# Patient Record
Sex: MALE | Race: WHITE | NOT HISPANIC OR LATINO | Employment: OTHER | URBAN - METROPOLITAN AREA
[De-identification: names, ages, dates, MRNs, and addresses within clinical notes are randomized per-mention and may not be internally consistent; named-entity substitution may affect disease eponyms.]

---

## 2017-01-03 ENCOUNTER — OFFICE VISIT (OUTPATIENT)
Dept: DERMATOLOGY | Facility: CLINIC | Age: 72
End: 2017-01-03
Payer: COMMERCIAL

## 2017-01-03 VITALS — OXYGEN SATURATION: 94 % | HEART RATE: 60 BPM | SYSTOLIC BLOOD PRESSURE: 115 MMHG | DIASTOLIC BLOOD PRESSURE: 73 MMHG

## 2017-01-03 DIAGNOSIS — L57.0 AK (ACTINIC KERATOSIS): Primary | ICD-10-CM

## 2017-01-03 DIAGNOSIS — D48.5 NEOPLASM OF UNCERTAIN BEHAVIOR OF SKIN: ICD-10-CM

## 2017-01-03 PROCEDURE — 88305 TISSUE EXAM BY PATHOLOGIST: CPT | Mod: 90 | Performed by: PHYSICIAN ASSISTANT

## 2017-01-03 PROCEDURE — 11100 HC BIOPSY SKIN/SUBQ/MUC MEM, SINGLE LESION: CPT | Performed by: PHYSICIAN ASSISTANT

## 2017-01-03 PROCEDURE — 99000 SPECIMEN HANDLING OFFICE-LAB: CPT | Performed by: PHYSICIAN ASSISTANT

## 2017-01-03 PROCEDURE — 99213 OFFICE O/P EST LOW 20 MIN: CPT | Mod: 25 | Performed by: PHYSICIAN ASSISTANT

## 2017-01-03 RX ORDER — FLUOROURACIL 50 MG/G
CREAM TOPICAL
Qty: 40 G | Refills: 5 | Status: SHIPPED | OUTPATIENT
Start: 2017-01-03 | End: 2017-05-15

## 2017-01-03 NOTE — NURSING NOTE
"Initial /73 mmHg  Pulse 60  SpO2 94% Estimated body mass index is 47.09 kg/(m^2) as calculated from the following:    Height as of 12/12/16: 1.803 m (5' 11\").    Weight as of 12/12/16: 153.089 kg (337 lb 8 oz). .      "

## 2017-01-03 NOTE — PATIENT INSTRUCTIONS
Directions for face:    1. Wash with CeraVe foaming cleanser AM and PM  2. Apply CeraVe AM in the morning; CeraVe PM in the evening    Reserve the desonide cream for when needed - use once or twice per day when red and rashy for a week at a time then stop              Wound Care Instructions     FOR SUPERFICIAL WOUNDS     [facility] [phone]                       AFTER 24 HOURS YOU SHOULD REMOVE THE BANDAGE AND BEGIN DAILY DRESSING CHANGES AS FOLLOWS:     1) Remove Dressing.     2) Clean and dry the area with tap water using a Q-tip or sterile gauze pad.     3) Apply Vaseline, Aquaphor, Polysporin ointment or Bacitracin ointment over entire wound.  Do NOT use Neosporin ointment.     4) Cover the wound with a band-aid, or a sterile non-stick gauze pad and micropore paper tape      REPEAT THESE INSTRUCTIONS AT LEAST ONCE A DAY UNTIL THE WOUND HAS COMPLETELY HEALED.    It is an old wives tale that a wound heals better when it is exposed to air and allowed to dry out. The wound will heal faster with a better cosmetic result if it is kept moist with ointment and covered with a bandage.    **Do not let the wound dry out.**      Supplies Needed:      *Cotton tipped applicators (Q-tips)    *Polysporin Ointment or Bacitracin Ointment (NOT NEOSPORIN)    *Band-aids or non-stick gauze pads and micropore paper tape.      PATIENT INFORMATION:    During the healing process you will notice a number of changes. All wounds develop a small halo of redness surrounding the wound.  This means healing is occurring. Severe itching with extensive redness usually indicates sensitivity to the ointment or bandage tape used to dress the wound.  You should call our office if this develops.      Swelling  and/or discoloration around your surgical site is common, particularly when performed around the eye.    All wounds normally drain.  The larger the wound the more drainage there will be.  After 7-10 days, you will notice the  wound beginning to shrink and new skin will begin to grow.  The wound is healed when you can see skin has formed over the entire area.  A healed wound has a healthy, shiny look to the surface and is red to dark pink in color to normalize.  Wounds may take approximately 4-6 weeks to heal.  Larger wounds may take 6-8 weeks.  After the wound is healed you may discontinue dressing changes.    You may experience a sensation of tightness as your wound heals. This is normal and will gradually subside.    Your healed wound may be sensitive to temperature changes. This sensitivity improves with time, but if you re having a lot of discomfort, try to avoid temperature extremes.    Patients frequently experience itching after their wound appears to have healed because of the continue healing under the skin.  Plain Vaseline will help relieve the itching.        POSSIBLE COMPLICATIONS    BLEEDIN. Leave the bandage in place.  2. Use tightly rolled up gauze or a cloth to apply direct pressure over the bandage for 30  minutes.  3. Reapply pressure for an additional 30 minutes if necessary  4. Use additional gauze and tape to maintain pressure once the bleeding has stopped.

## 2017-01-03 NOTE — MR AVS SNAPSHOT
After Visit Summary   1/3/2017    James Salvador    MRN: [id number]           Patient Information     Date Of Birth          1945        Visit Information        Provider Department      1/3/2017 9:00 AM Rosa Maria Hansen PA-C [facility]        Today's Diagnoses     AK (actinic keratosis)    -  1     Neoplasm of uncertain behavior of skin           Care Instructions    Directions for face:    1. Wash with CeraVe foaming cleanser AM and PM  2. Apply CeraVe AM in the morning; CeraVe PM in the evening    Reserve the desonide cream for when needed - use once or twice per day when red and rashy for a week at a time then stop              Wound Care Instructions     FOR SUPERFICIAL WOUNDS     [facility] [phone]                       AFTER 24 HOURS YOU SHOULD REMOVE THE BANDAGE AND BEGIN DAILY DRESSING CHANGES AS FOLLOWS:     1) Remove Dressing.     2) Clean and dry the area with tap water using a Q-tip or sterile gauze pad.     3) Apply Vaseline, Aquaphor, Polysporin ointment or Bacitracin ointment over entire wound.  Do NOT use Neosporin ointment.     4) Cover the wound with a band-aid, or a sterile non-stick gauze pad and micropore paper tape      REPEAT THESE INSTRUCTIONS AT LEAST ONCE A DAY UNTIL THE WOUND HAS COMPLETELY HEALED.    It is an old wives tale that a wound heals better when it is exposed to air and allowed to dry out. The wound will heal faster with a better cosmetic result if it is kept moist with ointment and covered with a bandage.    **Do not let the wound dry out.**      Supplies Needed:      *Cotton tipped applicators (Q-tips)    *Polysporin Ointment or Bacitracin Ointment (NOT NEOSPORIN)    *Band-aids or non-stick gauze pads and micropore paper tape.      PATIENT INFORMATION:    During the healing process you will notice a number of changes. All wounds develop a small halo of redness surrounding the wound.  This means healing is  occurring. Severe itching with extensive redness usually indicates sensitivity to the ointment or bandage tape used to dress the wound.  You should call our office if this develops.      Swelling  and/or discoloration around your surgical site is common, particularly when performed around the eye.    All wounds normally drain.  The larger the wound the more drainage there will be.  After 7-10 days, you will notice the wound beginning to shrink and new skin will begin to grow.  The wound is healed when you can see skin has formed over the entire area.  A healed wound has a healthy, shiny look to the surface and is red to dark pink in color to normalize.  Wounds may take approximately 4-6 weeks to heal.  Larger wounds may take 6-8 weeks.  After the wound is healed you may discontinue dressing changes.    You may experience a sensation of tightness as your wound heals. This is normal and will gradually subside.    Your healed wound may be sensitive to temperature changes. This sensitivity improves with time, but if you re having a lot of discomfort, try to avoid temperature extremes.    Patients frequently experience itching after their wound appears to have healed because of the continue healing under the skin.  Plain Vaseline will help relieve the itching.        POSSIBLE COMPLICATIONS    BLEEDIN. Leave the bandage in place.  2. Use tightly rolled up gauze or a cloth to apply direct pressure over the bandage for 30  minutes.  3. Reapply pressure for an additional 30 minutes if necessary  4. Use additional gauze and tape to maintain pressure once the bleeding has stopped.          Follow-ups after your visit        Your next 10 appointments already scheduled     2017 10:40 AM   JENN Extremity with Garrick Mcallister PT   [facility] [place] ([facility] [place]  )    [address] [address]  [place] [address]   [phone]            2017 11:20 AM   JENN Extremity with  Garrick Mcallister    [place] [facility] [place] ([facility] [place]  )    [address]  [address]  [place] [address]   [phone]            Jan 18, 2017 10:40 AM   JENN Extremity with Garrick DE LA TORRE VERNA Mcallister   [facility] [place] ([facility] [place]  )    [address]  [address]  [place] [address]   [phone]            Jan 25, 2017 10:40 AM   JENN Extremity with Garrick WIL Mcallister [facility] [place] ([facility] [place]  )    [address] [place] [address]  [address]  [place] [address]   [phone]            Feb 01, 2017 12:00 PM   JENN Extremity with Garrick DE LA TORRE VERNA Mcallister   [facility] [place] ([facility] [place]  )    [address] [place] [address]  [address]  [place] [address]   [phone]              Who to contact     If you have questions or need follow up information about today's clinic visit or your schedule please contact [facility] directly at [phone].  Normal or non-critical lab and imaging results will be communicated to you by Chideohart, letter or phone within 4 business days after the clinic has received the results. If you do not hear from us within 7 days, please contact the clinic through innRoadt or phone. If you have a critical or abnormal lab result, we will notify you by phone as soon as possible.  Submit refill requests through Step-In or call your pharmacy and they will forward the refill request to us. Please allow 3 business days for your refill to be completed.          Additional Information About Your Visit        Chideohart Information     Step-In gives you secure access to your electronic health record. If you see a primary care provider, you can also send messages to your care team and make appointments. If you have questions, please call your primary care clinic.  If you do not have a primary care provider, please call [phone] and they will assist you.        Your Vitals Were      Pulse Pulse Oximetry                60 94%           Blood Pressure from Last 3 Encounters:   01/03/17 115/73   12/12/16 126/68   12/08/16 122/80    Weight from Last 3 Encounters:   12/12/16 153.089 kg (337 lb 8 oz)   12/08/16 152.318 kg (335 lb 12.8 oz)   11/01/16 153.316 kg (338 lb)              We Performed the Following     BIOPSY SKIN/SUBQ/MUC MEM, SINGLE LESION     Surgical pathology exam          Today's Medication Changes          These changes are accurate as of: 1/3/17  9:33 AM.  If you have any questions, ask your nurse or doctor.               These medicines have changed or have updated prescriptions.        Dose/Directions    * fluorouracil 5 % cream   Commonly known as:  EFUDEX   This may have changed:    - when to take this  - reasons to take this  - additional instructions   Used for:  AK (actinic keratosis), SK (seborrheic keratosis), Lentigo, History of skin cancer   Changed by:  Jv Dutta MD        Apply topically 2 times daily Twice daily for two weeks to face, 3 weeks to arms   Quantity:  40 g   Refills:  2       * fluorouracil 5 % cream   Commonly known as:  EFUDEX   This may have changed:  You were already taking a medication with the same name, and this prescription was added. Make sure you understand how and when to take each.   Used for:  AK (actinic keratosis)   Changed by:  Rosa Maria Hansen PA-C        Apply to AA BID x 2 weeks   Quantity:  40 g   Refills:  5       ketoconazole 2 % shampoo   Commonly known as:  NIZORAL   This may have changed:    - how to take this  - when to take this  - reasons to take this  - additional instructions   Used for:  Seborrheic dermatitis        APPLY TO THE AFFECTED AREA AND WASH OFF AFTER 5 MINUTES. ALTERNATE WITH USUAL SHAMPOO.   Quantity:  240 mL   Refills:  11       * Notice:  This list has 2 medication(s) that are the same as other medications prescribed for you. Read the directions carefully, and ask your doctor or other care  provider to review them with you.         Where to get your medicines      These medications were sent to [facility]/pharmacy #[id number] - [place], [place] - [address] AT [address] [facility]  [address], [place] [address]     Phone:  [phone]    - fluorouracil 5 % cream             Primary Care Provider Office Phone # Fax #    Jeronimo Painter [phone]       [facility] [address] [facility] [address]        Thank you!     Thank you for choosing [facility]  for your care. Our goal is always to provide you with excellent care. Hearing back from our patients is one way we can continue to improve our services. Please take a few minutes to complete the written survey that you may receive in the mail after your visit with us. Thank you!             Your Updated Medication List - Protect others around you: Learn how to safely use, store and throw away your medicines at www.disposemymeds.org.          This list is accurate as of: 1/3/17  9:33 AM.  Always use your most recent med list.                   Brand Name Dispense Instructions for use    aspirin 81 MG tablet      Take 81 mg by mouth daily       atorvastatin 80 MG tablet    LIPITOR    90 tablet    Take 1 tablet (80 mg) by mouth daily       ciclopirox 0.77 % cream    LOPROX    90 g    Apply topically At Bedtime       desonide 0.05 % cream    DESOWEN    60 g    Apply sparingly to affected area on face in morning       * fluorouracil 5 % cream    EFUDEX    40 g    Apply topically 2 times daily Twice daily for two weeks to face, 3 weeks to arms       * fluorouracil 5 % cream    EFUDEX    40 g    Apply to AA BID x 2 weeks       fluticasone 50 MCG/ACT spray    FLONASE    16 g    Spray 2 sprays into both nostrils daily       GABAPENTIN PO      Take 600 mg by mouth 2 times daily       ketoconazole 2 % shampoo    NIZORAL    240 mL    APPLY TO THE AFFECTED AREA AND WASH OFF AFTER 5 MINUTES.  ALTERNATE WITH USUAL SHAMPOO.       ketotifen 0.025 % Soln ophthalmic solution    ZADITOR/REFRESH ANTI-ITCH     Place 1-2 drops into both eyes 2 times daily as needed for itching       lisinopril 20 MG tablet    PRINIVIL/ZESTRIL    90 tablet    Take 1 tablet (20 mg) by mouth daily       loratadine 10 MG tablet    CLARITIN     Take 10 mg by mouth daily as needed for allergies       * metoprolol 50 MG tablet    LOPRESSOR    180 tablet    Take 1 tablet (50 mg) by mouth 2 times daily Take with 25 mg tablet (1 tab) for 75 mg twice a day       * metoprolol 25 MG tablet    LOPRESSOR    180 tablet    Take 1 tablet (25 mg) by mouth 2 times daily Take along  with 50 mg tab for total of 75 mg twice a day       MULTIVITAMIN PO      Take 1 tablet by mouth daily       nitroglycerin 0.4 MG sublingual tablet    NITROSTAT    25 tablet    Place 1 tablet (0.4 mg) under the tongue every 5 minutes as needed for chest pain May repeat twice for a total of 3 tablets.  If chest pain not relieved, call 911       OMEGA-3 FISH OIL PO      Take 3.2 g by mouth daily       PERIOSTAT PO      Take 20 mg by mouth 2 times daily       TRILEPTAL PO      Take 300 mg by mouth 2 times daily       * Notice:  This list has 4 medication(s) that are the same as other medications prescribed for you. Read the directions carefully, and ask your doctor or other care provider to review them with you.

## 2017-01-04 ENCOUNTER — THERAPY VISIT (OUTPATIENT)
Dept: PHYSICAL THERAPY | Facility: CLINIC | Age: 72
End: 2017-01-04
Payer: MEDICARE

## 2017-01-04 DIAGNOSIS — M25.511 SHOULDER PAIN, RIGHT: Primary | ICD-10-CM

## 2017-01-04 DIAGNOSIS — Z96.619 AFTERCARE FOLLOWING SHOULDER JOINT REPLACEMENT SURGERY: ICD-10-CM

## 2017-01-04 DIAGNOSIS — Z47.1 AFTERCARE FOLLOWING SHOULDER JOINT REPLACEMENT SURGERY: ICD-10-CM

## 2017-01-04 PROCEDURE — 97112 NEUROMUSCULAR REEDUCATION: CPT | Mod: GP | Performed by: PHYSICAL THERAPIST

## 2017-01-04 PROCEDURE — 97110 THERAPEUTIC EXERCISES: CPT | Mod: GP | Performed by: PHYSICAL THERAPIST

## 2017-01-05 LAB — COPATH REPORT: NORMAL

## 2017-01-05 NOTE — PROGRESS NOTES
HPI:   James Salvador is a 71 year old male who presents for evaluation of spots on face, spots on arms  chief complaint  Location: face, bilateral lower arms - numerous scaly areas  Condition present for:  years.   Previous treatments include: Efudex, LN2  -h/o multiple SCC    Review Of Systems  Eyes: negative  Ears/Nose/Throat: negative  Respiratory: No shortness of breath, dyspnea on exertion, cough, or hemoptysis  Cardiovascular: negative  Gastrointestinal: negative  Genitourinary: negative  Musculoskeletal: negative  Neurologic: negative  Psychiatric: negative        PHYSICAL EXAM:      Skin exam performed as follows: Type 2 skin. Mood appropriate  Alert and Oriented X 3. Well developed, well nourished in no distress.  General appearance: Normal  Head including face: Normal  Eyes: conjunctiva and lids: Normal  Mouth: Lips, teeth, gums: Normal  Neck: Normal  Chest-breast/axillae: Normal  Back: Normal  Spleen and liver: Normal  Cardiovascular: Exam of peripheral vascular system by observation for swelling, varicosities, edema: Normal  Genitalia: groin, buttocks: Normal  Extremities: digits/nails (clubbing): Normal  Eccrine and Apocrine glands: Normal  Right upper extremity: Normal  Left upper extremity: Normal  Right lower extremity: Normal  Left lower extremity: Normal  Skin: Scalp and body hair: See below    1. Multiple gritty papules across face and forearms, >15 for each  2. 20 mm pink hypertrophic plaque on left forearm    ASSESSMENT/PLAN:     1. Multiple AKs across face and bilateral lower arms. Discussed Efudex vs PDT at length. After discussion he would like to proceed with Efudex BID to face and forearms for 2 weeks. Knows he will get very red and irritated looking. Recheck 2 months after stopping Efudex.   2. R/o SCC on left foreram. Shave bx in typical fashion .  Area cleaned with betadyne and anesthetized with 1% lidocaine with epi .  #15 blade used to remove the lesion and sent to pathology.Bleeding  was cauterized. Pt tolerated procedure well.          Follow-up: recheck after Efudex/PRn sooner  CC:   Scribed By: Rosa Maria Hansen MS, PAVAISHALI

## 2017-01-09 DIAGNOSIS — I47.10 PAROXYSMAL SUPRAVENTRICULAR TACHYCARDIA (H): Primary | ICD-10-CM

## 2017-01-09 DIAGNOSIS — I25.10 CORONARY ARTERY DISEASE INVOLVING NATIVE CORONARY ARTERY OF NATIVE HEART, ANGINA PRESENCE UNSPECIFIED: ICD-10-CM

## 2017-01-09 RX ORDER — METOPROLOL TARTRATE 25 MG/1
25 TABLET, FILM COATED ORAL 2 TIMES DAILY
Qty: 180 TABLET | Refills: 3 | Status: SHIPPED | OUTPATIENT
Start: 2017-01-09 | End: 2017-05-12

## 2017-01-09 RX ORDER — METOPROLOL TARTRATE 50 MG
50 TABLET ORAL 2 TIMES DAILY
Qty: 180 TABLET | Refills: 3 | Status: SHIPPED | OUTPATIENT
Start: 2017-01-09 | End: 2017-05-12

## 2017-01-11 ENCOUNTER — THERAPY VISIT (OUTPATIENT)
Dept: PHYSICAL THERAPY | Facility: CLINIC | Age: 72
End: 2017-01-11
Payer: MEDICARE

## 2017-01-11 DIAGNOSIS — M25.511 SHOULDER PAIN, RIGHT: Primary | ICD-10-CM

## 2017-01-11 DIAGNOSIS — Z96.619 AFTERCARE FOLLOWING SHOULDER JOINT REPLACEMENT SURGERY: ICD-10-CM

## 2017-01-11 DIAGNOSIS — Z47.1 AFTERCARE FOLLOWING SHOULDER JOINT REPLACEMENT SURGERY: ICD-10-CM

## 2017-01-11 PROCEDURE — 97110 THERAPEUTIC EXERCISES: CPT | Mod: GP | Performed by: PHYSICAL THERAPIST

## 2017-01-11 PROCEDURE — 97112 NEUROMUSCULAR REEDUCATION: CPT | Mod: GP | Performed by: PHYSICAL THERAPIST

## 2017-01-18 ENCOUNTER — THERAPY VISIT (OUTPATIENT)
Dept: PHYSICAL THERAPY | Facility: CLINIC | Age: 72
End: 2017-01-18
Payer: MEDICARE

## 2017-01-18 DIAGNOSIS — Z96.619 AFTERCARE FOLLOWING SHOULDER JOINT REPLACEMENT SURGERY: ICD-10-CM

## 2017-01-18 DIAGNOSIS — M25.511 SHOULDER PAIN, RIGHT: Primary | ICD-10-CM

## 2017-01-18 DIAGNOSIS — Z47.1 AFTERCARE FOLLOWING SHOULDER JOINT REPLACEMENT SURGERY: ICD-10-CM

## 2017-01-18 PROCEDURE — 97110 THERAPEUTIC EXERCISES: CPT | Mod: GP | Performed by: PHYSICAL THERAPIST

## 2017-01-18 PROCEDURE — 97112 NEUROMUSCULAR REEDUCATION: CPT | Mod: GP | Performed by: PHYSICAL THERAPIST

## 2017-01-25 ENCOUNTER — THERAPY VISIT (OUTPATIENT)
Dept: PHYSICAL THERAPY | Facility: CLINIC | Age: 72
End: 2017-01-25
Payer: MEDICARE

## 2017-01-25 DIAGNOSIS — Z96.619 AFTERCARE FOLLOWING SHOULDER JOINT REPLACEMENT SURGERY: ICD-10-CM

## 2017-01-25 DIAGNOSIS — M25.511 SHOULDER PAIN, RIGHT: Primary | ICD-10-CM

## 2017-01-25 DIAGNOSIS — Z47.1 AFTERCARE FOLLOWING SHOULDER JOINT REPLACEMENT SURGERY: ICD-10-CM

## 2017-01-25 PROCEDURE — 97112 NEUROMUSCULAR REEDUCATION: CPT | Mod: GP | Performed by: PHYSICAL THERAPIST

## 2017-01-25 PROCEDURE — 97110 THERAPEUTIC EXERCISES: CPT | Mod: GP | Performed by: PHYSICAL THERAPIST

## 2017-01-25 NOTE — PROGRESS NOTES
"Subjective:    HPI                    Objective:    System    Physical Exam    General     ROS    Assessment/Plan:      PROGRESS  REPORT    Progress reporting period is from 11/9/2016 to 1/25/2017.       SUBJECTIVE  Subjective changes noted by patient:  Patient reports shoulder continues to gradually improve.  He has had the flu since he was last here, so he was unable to do the HEP very much this past week.  Dressing, meal prep, and bathing are all getting easier.  Reaching overhead and behind back continue to be challenging.      Current pain level is 3/10 \"discomfort\" .     Previous pain level was  2-6/10.   Changes in function:  Yes (See Goal flowsheet attached for changes in current functional level)  Adverse reaction to treatment or activity: None    OBJECTIVE  AROM: 120 deg elevation, IR/ext to L5, 45 deg ER  PROM: 145 deg flexion, 95 deg Abd, 45 deg IR, 55 deg ER        ASSESSMENT/PLAN  Updated problem list and treatment plan: Diagnosis 1:  TSA  Pain -  self management, education and home program  Decreased ROM/flexibility - therapeutic exercise, therapeutic activity and home program  Decreased strength - therapeutic exercise, therapeutic activities and home program  Impaired muscle performance - neuro re-education and home program  Decreased function - therapeutic activities and home program  Impaired posture - neuro re-education, therapeutic activities and home program  STG/LTGs have been met or progress has been made towards goals:  Yes (See Goal flow sheet completed today.)  Assessment of Progress: The patient's condition is improving.  Patient is meeting short term goals and is progressing towards long term goals.  Self Management Plans:  Patient has been instructed in a home treatment program.  Patient is independent in a home treatment program.  I have re-evaluated this patient and find that the nature, scope, duration and intensity of the therapy is appropriate for the medical condition of the " patientKeerthi Ramirez continues to require the following intervention to meet STG and LTG's:  PT    Recommendations:  This patient would benefit from continued therapy.     Frequency:  1 X week, once daily  Duration:  for 6 weeks        Please refer to the daily flowsheet for treatment today, total treatment time and time spent performing 1:1 timed codes.

## 2017-01-25 NOTE — Clinical Note
"[facility] [place]  [address]  [address]  [place] [address]  [phone]    2017    Re: James Salvador   :   1945  MRN:  [id number]   REFERRING PHYSICIAN:   Jv Nance    [facility] [place]  Date of Initial Evaluation:  16  Visits:  Rxs Used: 15  Reason for Referral:     Shoulder pain, right  Aftercare following shoulder joint replacement surgery    PROGRESS  REPORT  Progress reporting period is from 2016 to 2017.       SUBJECTIVE  Subjective changes noted by patient:  Patient reports shoulder continues to gradually improve.  He has had the flu since he was last here, so he was unable to do the HEP very much this past week.  Dressing, meal prep, and bathing are all getting easier.  Reaching overhead and behind back continue to be challenging.      Current pain level is 3/10 \"discomfort\" .     Previous pain level was  2-6/10.   Changes in function:  Yes (See Goal flowsheet attached for changes in current functional level)  Adverse reaction to treatment or activity: None    OBJECTIVE  AROM: 120 deg elevation, IR/ext to L5, 45 deg ER  PROM: 145 deg flexion, 95 deg Abd, 45 deg IR, 55 deg ER      ASSESSMENT/PLAN  Updated problem list and treatment plan: Diagnosis 1:  TSA  Pain -  self management, education and home program  Decreased ROM/flexibility - therapeutic exercise, therapeutic activity and home program  Decreased strength - therapeutic exercise, therapeutic activities and home program  Impaired muscle performance - neuro re-education and home program  Decreased function - therapeutic activities and home program  Impaired posture - neuro re-education, therapeutic activities and home program  STG/LTGs have been met or progress has been made towards goals:  Yes (See Goal flow sheet completed today.)  Assessment of Progress: The patient's condition is improving.  Patient is meeting short term goals and is " progressing towards long term goals.  Self Management Plans:  Patient has been instructed in a home treatment program.      James Salvador           Patient is independent in a home treatment program.  I have re-evaluated this patient and find that the nature, scope, duration and intensity of the therapy is appropriate for the medical condition of the patient.  James continues to require the following intervention to meet STG and LTG's:  PT    Recommendations:  This patient would benefit from continued therapy.     Frequency:  1 X week, once daily  Duration:  for 6 weeks    Please refer to the daily flowsheet for treatment today, total treatment time and time spent performing 1:1 timed codes.    Thank you for your referral.    INQUIRIES  Therapist: Garrick Mcallister, PT  [facility] [address] [address]  Phone: [phone]  Fax: [phone]

## 2017-02-01 ENCOUNTER — THERAPY VISIT (OUTPATIENT)
Dept: PHYSICAL THERAPY | Facility: CLINIC | Age: 72
End: 2017-02-01
Payer: MEDICARE

## 2017-02-01 DIAGNOSIS — M25.511 SHOULDER PAIN, RIGHT: Primary | ICD-10-CM

## 2017-02-01 DIAGNOSIS — Z47.1 AFTERCARE FOLLOWING SHOULDER JOINT REPLACEMENT SURGERY: ICD-10-CM

## 2017-02-01 DIAGNOSIS — Z96.619 AFTERCARE FOLLOWING SHOULDER JOINT REPLACEMENT SURGERY: ICD-10-CM

## 2017-02-01 PROCEDURE — 97112 NEUROMUSCULAR REEDUCATION: CPT | Mod: GP | Performed by: PHYSICAL THERAPIST

## 2017-02-01 PROCEDURE — 97110 THERAPEUTIC EXERCISES: CPT | Mod: GP | Performed by: PHYSICAL THERAPIST

## 2017-02-08 ENCOUNTER — THERAPY VISIT (OUTPATIENT)
Dept: PHYSICAL THERAPY | Facility: CLINIC | Age: 72
End: 2017-02-08
Payer: MEDICARE

## 2017-02-08 DIAGNOSIS — M25.511 SHOULDER PAIN, RIGHT: Primary | ICD-10-CM

## 2017-02-08 DIAGNOSIS — Z47.1 AFTERCARE FOLLOWING SHOULDER JOINT REPLACEMENT SURGERY: ICD-10-CM

## 2017-02-08 DIAGNOSIS — Z96.619 AFTERCARE FOLLOWING SHOULDER JOINT REPLACEMENT SURGERY: ICD-10-CM

## 2017-02-08 PROCEDURE — 97112 NEUROMUSCULAR REEDUCATION: CPT | Mod: GP | Performed by: PHYSICAL THERAPIST

## 2017-02-08 PROCEDURE — 97110 THERAPEUTIC EXERCISES: CPT | Mod: GP | Performed by: PHYSICAL THERAPIST

## 2017-02-15 ENCOUNTER — THERAPY VISIT (OUTPATIENT)
Dept: PHYSICAL THERAPY | Facility: CLINIC | Age: 72
End: 2017-02-15
Payer: MEDICARE

## 2017-02-15 DIAGNOSIS — M25.511 SHOULDER PAIN, RIGHT: ICD-10-CM

## 2017-02-15 DIAGNOSIS — Z47.1 AFTERCARE FOLLOWING SHOULDER JOINT REPLACEMENT SURGERY: ICD-10-CM

## 2017-02-15 DIAGNOSIS — Z96.619 AFTERCARE FOLLOWING SHOULDER JOINT REPLACEMENT SURGERY: ICD-10-CM

## 2017-02-15 PROCEDURE — 97110 THERAPEUTIC EXERCISES: CPT | Mod: GP | Performed by: PHYSICAL THERAPIST

## 2017-02-15 PROCEDURE — 97112 NEUROMUSCULAR REEDUCATION: CPT | Mod: GP | Performed by: PHYSICAL THERAPIST

## 2017-02-22 ENCOUNTER — THERAPY VISIT (OUTPATIENT)
Dept: PHYSICAL THERAPY | Facility: CLINIC | Age: 72
End: 2017-02-22
Payer: MEDICARE

## 2017-02-22 DIAGNOSIS — Z47.1 AFTERCARE FOLLOWING SHOULDER JOINT REPLACEMENT SURGERY: ICD-10-CM

## 2017-02-22 DIAGNOSIS — Z96.619 AFTERCARE FOLLOWING SHOULDER JOINT REPLACEMENT SURGERY: ICD-10-CM

## 2017-02-22 DIAGNOSIS — M25.511 SHOULDER PAIN, RIGHT: ICD-10-CM

## 2017-02-22 PROCEDURE — 97110 THERAPEUTIC EXERCISES: CPT | Mod: GP | Performed by: PHYSICAL THERAPIST

## 2017-02-22 PROCEDURE — 97112 NEUROMUSCULAR REEDUCATION: CPT | Mod: GP | Performed by: PHYSICAL THERAPIST

## 2017-03-08 ENCOUNTER — THERAPY VISIT (OUTPATIENT)
Dept: PHYSICAL THERAPY | Facility: CLINIC | Age: 72
End: 2017-03-08
Payer: MEDICARE

## 2017-03-08 DIAGNOSIS — Z47.1 AFTERCARE FOLLOWING SHOULDER JOINT REPLACEMENT SURGERY: ICD-10-CM

## 2017-03-08 DIAGNOSIS — M25.511 SHOULDER PAIN, RIGHT: ICD-10-CM

## 2017-03-08 DIAGNOSIS — Z96.619 AFTERCARE FOLLOWING SHOULDER JOINT REPLACEMENT SURGERY: ICD-10-CM

## 2017-03-08 PROCEDURE — G8985 CARRY GOAL STATUS: HCPCS | Mod: GP | Performed by: PHYSICAL THERAPIST

## 2017-03-08 PROCEDURE — G8986 CARRY D/C STATUS: HCPCS | Mod: GP | Performed by: PHYSICAL THERAPIST

## 2017-03-08 PROCEDURE — 97112 NEUROMUSCULAR REEDUCATION: CPT | Mod: GP | Performed by: PHYSICAL THERAPIST

## 2017-03-08 PROCEDURE — 97110 THERAPEUTIC EXERCISES: CPT | Mod: GP | Performed by: PHYSICAL THERAPIST

## 2017-03-08 NOTE — PROGRESS NOTES
Subjective:    HPI                    Objective:    System    Physical Exam    General     ROS    Assessment/Plan:      DISCHARGE REPORT    Progress reporting period is from 11/9/2016 to 3/8/2017.       SUBJECTIVE  Subjective changes noted by patient:  Patient reports shoulder continues to do well and make steady progress.  Reaching in all directions, lifting, carrying, and self-cares have all significantly improved over the course of therapy.  HEP is going well.  Current pain level is 1-2/10.     Previous pain level was  2-6/10.   Changes in function:  Yes (See Goal flowsheet attached for changes in current functional level)  Adverse reaction to treatment or activity: None    OBJECTIVE  AROM: 135 deg elevation, IR/ext to L5, 45 deg ER  PROM: 145 deg flexion, 95 deg Abd, 60 deg IR, 55 deg ER  MMT: 5/5 Add, 4+/5 Abd, 5-/5 IR, 4+/5 ER    ASSESSMENT/PLAN  Updated problem list and treatment plan: Diagnosis 1:  TSA  Pain -  home program  Decreased ROM/flexibility - home program  Decreased strength - home program  Impaired muscle performance - home program  Decreased function - home program  STG/LTGs have been met or progress has been made towards goals:  Yes (See Goal flow sheet completed today.)  Assessment of Progress: The patient's condition is improving.  Patient is meeting short term goals and is progressing towards long term goals.  Self Management Plans:  Patient has been instructed in a home treatment program.  Patient is independent in a home treatment program.  I have re-evaluated this patient and find that the nature, scope, duration and intensity of the therapy is appropriate for the medical condition of the patient.  James continues to require the following intervention to meet STG and LTG's:  PT intervention is no longer required to meet STG/LTG.    Recommendations:  This patient is ready to be discharged from therapy and continue their home treatment program.    Please refer to the daily flowsheet for  treatment today, total treatment time and time spent performing 1:1 timed codes.

## 2017-03-08 NOTE — LETTER
[facility] [facility]  [address]  [address]  [place] [address]  [phone]    2017    Re: James Ramirezppard   :   1945  MRN:  [id number]   REFERRING PHYSICIAN:   Jv Nance    [place] [facility] [place]  Date of Initial Evaluation:    16  Visits:  Rxs Used: 20  Reason for Referral:     Shoulder pain, right  Aftercare following shoulder joint replacement surgery    DISCHARGE REPORT  Progress reporting period is from 2016 to 3/8/2017.       SUBJECTIVE  Subjective changes noted by patient:  Patient reports shoulder continues to do well and make steady progress.  Reaching in all directions, lifting, carrying, and self-cares have all significantly improved over the course of therapy.  HEP is going well.  Current pain level is 1-2/10.     Previous pain level was  2-6/10.   Changes in function:  Yes (See Goal flowsheet attached for changes in current functional level)  Adverse reaction to treatment or activity: None    OBJECTIVE  AROM: 135 deg elevation, IR/ext to L5, 45 deg ER  PROM: 145 deg flexion, 95 deg Abd, 60 deg IR, 55 deg ER  MMT: 5/5 Add, 4+/5 Abd, 5-/5 IR, 4+/5 ER    ASSESSMENT/PLAN  Updated problem list and treatment plan: Diagnosis 1:  TSA  Pain -  home program  Decreased ROM/flexibility - home program  Decreased strength - home program  Impaired muscle performance - home program  Decreased function - home program  STG/LTGs have been met or progress has been made towards goals:  Yes (See Goal flow sheet completed today.)  Assessment of Progress: The patient's condition is improving.  Patient is meeting short term goals and is progressing towards long term goals.  Self Management Plans:  Patient has been instructed in a home treatment program.  Patient is independent in a home treatment program.    Re: James SCHUSTER Madeleine   :   1945      I have re-evaluated this patient and find that the nature, scope, duration and intensity  of the therapy is appropriate for the medical condition of the patient.  James continues to require the following intervention to meet STG and LTG's:  PT intervention is no longer required to meet STG/LTG.    Recommendations:  This patient is ready to be discharged from therapy and continue their home treatment program.    Thank you for your referral.    INQUIRIES  Therapist:   Garrick Mcallister, PT  [facility]  [address] [place] [address]  Phone: [phone]  Fax: [phone]

## 2017-04-04 ENCOUNTER — OFFICE VISIT (OUTPATIENT)
Dept: DERMATOLOGY | Facility: CLINIC | Age: 72
End: 2017-04-04
Payer: COMMERCIAL

## 2017-04-04 VITALS — DIASTOLIC BLOOD PRESSURE: 85 MMHG | HEART RATE: 64 BPM | OXYGEN SATURATION: 94 % | SYSTOLIC BLOOD PRESSURE: 130 MMHG

## 2017-04-04 DIAGNOSIS — D18.00 ANGIOMA: ICD-10-CM

## 2017-04-04 DIAGNOSIS — L82.1 SEBORRHEIC KERATOSIS: ICD-10-CM

## 2017-04-04 DIAGNOSIS — Z85.828 HISTORY OF SQUAMOUS CELL CARCINOMA OF SKIN: ICD-10-CM

## 2017-04-04 DIAGNOSIS — D22.9 NEVUS: ICD-10-CM

## 2017-04-04 DIAGNOSIS — D48.5 NEOPLASM OF UNCERTAIN BEHAVIOR OF SKIN: Primary | ICD-10-CM

## 2017-04-04 DIAGNOSIS — L81.4 LENTIGO: ICD-10-CM

## 2017-04-04 PROCEDURE — 11101 HC BIOPSY SKIN/SUBQ/MUC MEM, EACH ADDTL LESION: CPT | Performed by: PHYSICIAN ASSISTANT

## 2017-04-04 PROCEDURE — 88305 TISSUE EXAM BY PATHOLOGIST: CPT | Performed by: PHYSICIAN ASSISTANT

## 2017-04-04 PROCEDURE — 99214 OFFICE O/P EST MOD 30 MIN: CPT | Mod: 25 | Performed by: PHYSICIAN ASSISTANT

## 2017-04-04 PROCEDURE — 11100 HC BIOPSY SKIN/SUBQ/MUC MEM, SINGLE LESION: CPT | Performed by: PHYSICIAN ASSISTANT

## 2017-04-04 NOTE — NURSING NOTE
"Initial /85  Pulse 64  SpO2 94% Estimated body mass index is 47.07 kg/(m^2) as calculated from the following:    Height as of 12/12/16: 1.803 m (5' 11\").    Weight as of 12/12/16: 153.1 kg (337 lb 8 oz). .      "

## 2017-04-04 NOTE — PROGRESS NOTES
HPI:   James Salvador is a 71 year old male who presents for Full skin cancer screening.  chief complaint  Last Skin Exam: 1/17 1st Baseline: no  Personal HX of Skin Cancer: Scc   Personal HX of Malignant Melanoma: none   Family HX of Skin Cancer / Malignant Melanoma: none  Personal HX of Atypical Moles:   none  Risk factors: sun exposure, history of SCC  New / Changing lesions:Yes has a few scattered areas  Social History: recent shoulder replacemnet - out of PT and doing well. Has TKA scheduled.   On review of systems, there are no further skin complaints, patient is feeling otherwise well.  See patient intake sheet.  ROS of the following were done and are negative: Constitutional, Eyes, Ears, Nose,   Mouth, Throat, Cardiovascular, Respiratory, GI, Genitourinary, Musculoskeletal,   Psychiatric, Endocrine, Allergic/Immunologic.    PHYSICAL EXAM:   Weight:  BP:   Skin exam performed as follows: Type 2 skin. Mood appropriate  Alert and Oriented X 3. Well developed, well nourished in no distress.  General appearance: Normal  Head including face: Normal  Eyes: conjunctiva and lids: Normal  Mouth: Lips, teeth, gums: Normal  Neck: Normal  Chest-breast/axillae: Normal  Back: Normal  Spleen and liver: Normal  Cardiovascular: Exam of peripheral vascular system by observation for swelling, varicosities, edema: Normal  Genitalia: groin, buttocks: Normal  Extremities: digits/nails (clubbing): Normal  Eccrine and Apocrine glands: Normal  Right upper extremity: Normal  Left upper extremity: Normal  Right lower extremity: Normal  Left lower extremity: Normal  Skin: Scalp and body hair: See below    Pt deferred exam of breasts, groin, buttocks: NO    Other physical findings:  1. Multiple pigmented macules on extremities and trunk  2. Multiple pigmented macules on face, trunk and extremities  3. Multiple vascular papules on trunk, arms and legs  4. Multiple scattered keratotic plaques  5. Numerous gritty papules >15 across  face and >15 on each arm  6. 3 mm pink papule on left nasal root  7. Pink shiny papule on right ala 4 mm  8. Pink scaly papule on right posterior shoulder 5 mm       Except as noted above, no other signs of skin cancer or melanoma.     ASSESSMENT/PLAN:   Benign Full skin cancer screening today. . Patient with history of SCC  Advised on monthly self exams and 1 year  Patient Education: Appropriate brochures given.    Multiple benign appearing nevi on arms, legs and trunk. Discussed ABCDEs of melanoma and sunscreen.   Multiple lentigos on arms, legs and trunk. Advised benign, no treatment needed.  Multiple scattered angiomas. Advised benign, no treatment needed.   Seborrheic keratosis on arms, legs and trunk. Advised benign, no treatment needed.  Numerous AKs on face and upper as well as lower arms - advised. Discussed PDT vs Efudex. He will schedule for PDT for face and use Efudex BID x 2 weeks to both arms.   R/o SCC on left nasal root. Shave bx in typical fashion .  Area cleaned with betadyne and anesthetized with 1% lidocaine with epi .  Dermablade used to remove the lesion and sent to pathology. Bleeding was cauterized. Pt tolerated procedure well.  R/o BCC on right ala. Shave bx in typical fashion .  Area cleaned with betadyne and anesthetized with 1% lidocaine with epi .  Dermablade used to remove the lesion and sent to pathology. Bleeding was cauterized. Pt tolerated procedure well.  R/o SCC on right posterior shoulder. Shave bx in typical fashion .  Area cleaned with betadyne and anesthetized with 1% lidocaine with epi .  Dermablade used to remove the lesion and sent to pathology. Bleeding was cauterized. Pt tolerated procedure well.        Follow-up: yearly FSE/PRN sooner    1.) Patient was asked about new and changing moles. YES  2.) Patient received a complete physical skin examination: YES  3.) Patient was counseled to perform a monthly self skin examination: YES  Scribed By: Rosa Maria Hansen, MS, PAVAISHALI

## 2017-04-04 NOTE — MR AVS SNAPSHOT
After Visit Summary   4/4/2017    James Salvador    MRN: [id number]           Patient Information     Date Of Birth          1945        Visit Information        Provider Department      4/4/2017 9:00 AM Rosa Maria Hansen PA-C [facility]        Today's Diagnoses     Neoplasm of uncertain behavior of skin    -  1      Care Instructions          Wound Care Instructions     FOR SUPERFICIAL WOUNDS     [facility] [phone]    [facility] [phone]                       AFTER 24 HOURS YOU SHOULD REMOVE THE BANDAGE AND BEGIN DAILY DRESSING CHANGES AS FOLLOWS:     1) Remove Dressing.     2) Clean and dry the area with tap water using a Q-tip or sterile gauze pad.     3) Apply Vaseline, Aquaphor, Polysporin ointment or Bacitracin ointment over entire wound.  Do NOT use Neosporin ointment.     4) Cover the wound with a band-aid, or a sterile non-stick gauze pad and micropore paper tape      REPEAT THESE INSTRUCTIONS AT LEAST ONCE A DAY UNTIL THE WOUND HAS COMPLETELY HEALED.    It is an old wives tale that a wound heals better when it is exposed to air and allowed to dry out. The wound will heal faster with a better cosmetic result if it is kept moist with ointment and covered with a bandage.    **Do not let the wound dry out.**      Supplies Needed:      *Cotton tipped applicators (Q-tips)    *Polysporin Ointment or Bacitracin Ointment (NOT NEOSPORIN)    *Band-aids or non-stick gauze pads and micropore paper tape.      PATIENT INFORMATION:    During the healing process you will notice a number of changes. All wounds develop a small halo of redness surrounding the wound.  This means healing is occurring. Severe itching with extensive redness usually indicates sensitivity to the ointment or bandage tape used to dress the wound.  You should call our office if this develops.      Swelling  and/or discoloration around your surgical site is common,  particularly when performed around the eye.    All wounds normally drain.  The larger the wound the more drainage there will be.  After 7-10 days, you will notice the wound beginning to shrink and new skin will begin to grow.  The wound is healed when you can see skin has formed over the entire area.  A healed wound has a healthy, shiny look to the surface and is red to dark pink in color to normalize.  Wounds may take approximately 4-6 weeks to heal.  Larger wounds may take 6-8 weeks.  After the wound is healed you may discontinue dressing changes.    You may experience a sensation of tightness as your wound heals. This is normal and will gradually subside.    Your healed wound may be sensitive to temperature changes. This sensitivity improves with time, but if you re having a lot of discomfort, try to avoid temperature extremes.    Patients frequently experience itching after their wound appears to have healed because of the continue healing under the skin.  Plain Vaseline will help relieve the itching.        POSSIBLE COMPLICATIONS    BLEEDIN. Leave the bandage in place.  2. Use tightly rolled up gauze or a cloth to apply direct pressure over the bandage for 30  minutes.  3. Reapply pressure for an additional 30 minutes if necessary  4. Use additional gauze and tape to maintain pressure once the bleeding has stopped.          Follow-ups after your visit        Your next 10 appointments already scheduled     2017 10:20 AM CDT   Pre-Op physical with Jeronimo Painter MD   [facility]    [address] [id number] [phone]            [place] 15, 2017  2:15 PM CDT   Return Visit with Tereso Alexander MD   [facility] ([facility])    [address] [address] [id number] [phone]              Who to contact     If you have questions or need follow  up information about today's clinic visit or your schedule please contact [facility] directly at [phone].  Normal or non-critical lab and imaging results will be communicated to you by [employer], letter or phone within 4 business days after the clinic has received the results. If you do not hear from us within 7 days, please contact the clinic through [employer] or phone. If you have a critical or abnormal lab result, we will notify you by phone as soon as possible.  Submit refill requests through [employer] or call your pharmacy and they will forward the refill request to us. Please allow 3 business days for your refill to be completed.          Additional Information About Your Visit        [employer] Information     [employer] gives you secure access to your electronic health record. If you see a primary care provider, you can also send messages to your care team and make appointments. If you have questions, please call your primary care clinic.  If you do not have a primary care provider, please call [phone] and they will assist you.        Care EveryWhere ID     This is your Care EveryWhere ID. This could be used by other organizations to access your [place] medical records  [id number]        Your Vitals Were     Pulse Pulse Oximetry                64 94%           Blood Pressure from Last 3 Encounters:   04/04/17 130/85   01/03/17 115/73   12/12/16 126/68    Weight from Last 3 Encounters:   12/12/16 (!) 153.1 kg (337 lb 8 oz)   12/08/16 (!) 152.3 kg (335 lb 12.8 oz)   11/01/16 (!) 153.3 kg (338 lb)              We Performed the Following     BIOPSY SKIN/SUBQ/MUC MEM, EACH ADDTL LESION     BIOPSY SKIN/SUBQ/MUC MEM, SINGLE LESION     Surgical pathology exam          Today's Medication Changes          These changes are accurate as of: 4/4/17  9:38 AM.  If you have any questions, ask your nurse or doctor.               These medicines have changed or have updated prescriptions.         Dose/Directions    * fluorouracil 5 % cream   Commonly known as:  EFUDEX   This may have changed:    - when to take this  - reasons to take this  - additional instructions   Used for:  AK (actinic keratosis), SK (seborrheic keratosis), Lentigo, History of skin cancer   Changed by:  Jv Dutta MD        Apply topically 2 times daily Twice daily for two weeks to face, 3 weeks to arms   Quantity:  40 g   Refills:  2       * fluorouracil 5 % cream   Commonly known as:  EFUDEX   This may have changed:  Another medication with the same name was changed. Make sure you understand how and when to take each.   Used for:  AK (actinic keratosis)   Changed by:  Rosa Maria Hansen PA-C        Apply to AA BID x 2 weeks   Quantity:  40 g   Refills:  5       ketoconazole 2 % shampoo   Commonly known as:  NIZORAL   This may have changed:    - how to take this  - when to take this  - reasons to take this  - additional instructions   Used for:  Seborrheic dermatitis        APPLY TO THE AFFECTED AREA AND WASH OFF AFTER 5 MINUTES. ALTERNATE WITH USUAL SHAMPOO.   Quantity:  240 mL   Refills:  11       * Notice:  This list has 2 medication(s) that are the same as other medications prescribed for you. Read the directions carefully, and ask your doctor or other care provider to review them with you.             Primary Care Provider Office Phone # Fax #    Jeronimo Painter [phone]       [facility] [address] [facility] [address]        Thank you!     Thank you for choosing [facility]  for your care. Our goal is always to provide you with excellent care. Hearing back from our patients is one way we can continue to improve our services. Please take a few minutes to complete the written survey that you may receive in the mail after your visit with us. Thank you!             Your Updated Medication List - Protect others around you: Learn how to safely use, store  and throw away your medicines at www.disposemymeds.org.          This list is accurate as of: 4/4/17  9:38 AM.  Always use your most recent med list.                   Brand Name Dispense Instructions for use    aspirin 81 MG tablet      Take 81 mg by mouth daily       atorvastatin 80 MG tablet    LIPITOR    90 tablet    Take 1 tablet (80 mg) by mouth daily       ciclopirox 0.77 % cream    LOPROX    90 g    Apply topically At Bedtime       desonide 0.05 % cream    DESOWEN    60 g    Apply sparingly to affected area on face in morning       * fluorouracil 5 % cream    EFUDEX    40 g    Apply topically 2 times daily Twice daily for two weeks to face, 3 weeks to arms       * fluorouracil 5 % cream    EFUDEX    40 g    Apply to AA BID x 2 weeks       fluticasone 50 MCG/ACT spray    FLONASE    16 g    Spray 2 sprays into both nostrils daily       GABAPENTIN PO      Take 600 mg by mouth 2 times daily       ketoconazole 2 % shampoo    NIZORAL    240 mL    APPLY TO THE AFFECTED AREA AND WASH OFF AFTER 5 MINUTES. ALTERNATE WITH USUAL SHAMPOO.       ketotifen 0.025 % Soln ophthalmic solution    ZADITOR/REFRESH ANTI-ITCH     Place 1-2 drops into both eyes 2 times daily as needed for itching       lisinopril 20 MG tablet    PRINIVIL/ZESTRIL    90 tablet    Take 1 tablet (20 mg) by mouth daily       loratadine 10 MG tablet    CLARITIN     Take 10 mg by mouth daily as needed for allergies       * metoprolol 25 MG tablet    LOPRESSOR    180 tablet    Take 1 tablet (25 mg) by mouth 2 times daily Take along  with 50 mg tab for total of 75 mg twice a day       * metoprolol 50 MG tablet    LOPRESSOR    180 tablet    Take 1 tablet (50 mg) by mouth 2 times daily Take with 25 mg tablet (1 tab) for 75 mg twice a day       MULTIVITAMIN PO      Take 1 tablet by mouth daily       nitroglycerin 0.4 MG sublingual tablet    NITROSTAT    25 tablet    Place 1 tablet (0.4 mg) under the tongue every 5 minutes as needed for chest pain May repeat  twice for a total of 3 tablets.  If chest pain not relieved, call 911       OMEGA-3 FISH OIL PO      Take 3.2 g by mouth daily       PERIOSTAT PO      Take 20 mg by mouth 2 times daily       TRILEPTAL PO      Take 300 mg by mouth 2 times daily       * Notice:  This list has 4 medication(s) that are the same as other medications prescribed for you. Read the directions carefully, and ask your doctor or other care provider to review them with you.

## 2017-04-04 NOTE — PATIENT INSTRUCTIONS
Wound Care Instructions     FOR SUPERFICIAL WOUNDS     [facility] [phone]    [facility] [phone]                       AFTER 24 HOURS YOU SHOULD REMOVE THE BANDAGE AND BEGIN DAILY DRESSING CHANGES AS FOLLOWS:     1) Remove Dressing.     2) Clean and dry the area with tap water using a Q-tip or sterile gauze pad.     3) Apply Vaseline, Aquaphor, Polysporin ointment or Bacitracin ointment over entire wound.  Do NOT use Neosporin ointment.     4) Cover the wound with a band-aid, or a sterile non-stick gauze pad and micropore paper tape      REPEAT THESE INSTRUCTIONS AT LEAST ONCE A DAY UNTIL THE WOUND HAS COMPLETELY HEALED.    It is an old wives tale that a wound heals better when it is exposed to air and allowed to dry out. The wound will heal faster with a better cosmetic result if it is kept moist with ointment and covered with a bandage.    **Do not let the wound dry out.**      Supplies Needed:      *Cotton tipped applicators (Q-tips)    *Polysporin Ointment or Bacitracin Ointment (NOT NEOSPORIN)    *Band-aids or non-stick gauze pads and micropore paper tape.      PATIENT INFORMATION:    During the healing process you will notice a number of changes. All wounds develop a small halo of redness surrounding the wound.  This means healing is occurring. Severe itching with extensive redness usually indicates sensitivity to the ointment or bandage tape used to dress the wound.  You should call our office if this develops.      Swelling  and/or discoloration around your surgical site is common, particularly when performed around the eye.    All wounds normally drain.  The larger the wound the more drainage there will be.  After 7-10 days, you will notice the wound beginning to shrink and new skin will begin to grow.  The wound is healed when you can see skin has formed over the entire area.  A healed wound has a healthy, shiny look to the surface and is red to dark pink in color  to normalize.  Wounds may take approximately 4-6 weeks to heal.  Larger wounds may take 6-8 weeks.  After the wound is healed you may discontinue dressing changes.    You may experience a sensation of tightness as your wound heals. This is normal and will gradually subside.    Your healed wound may be sensitive to temperature changes. This sensitivity improves with time, but if you re having a lot of discomfort, try to avoid temperature extremes.    Patients frequently experience itching after their wound appears to have healed because of the continue healing under the skin.  Plain Vaseline will help relieve the itching.        POSSIBLE COMPLICATIONS    BLEEDIN. Leave the bandage in place.  2. Use tightly rolled up gauze or a cloth to apply direct pressure over the bandage for 30  minutes.  3. Reapply pressure for an additional 30 minutes if necessary  4. Use additional gauze and tape to maintain pressure once the bleeding has stopped.

## 2017-04-06 LAB — COPATH REPORT: NORMAL

## 2017-04-20 ENCOUNTER — OFFICE VISIT (OUTPATIENT)
Dept: INTERNAL MEDICINE | Facility: CLINIC | Age: 72
End: 2017-04-20
Payer: COMMERCIAL

## 2017-04-20 ENCOUNTER — TELEPHONE (OUTPATIENT)
Dept: INTERNAL MEDICINE | Facility: CLINIC | Age: 72
End: 2017-04-20

## 2017-04-20 VITALS
SYSTOLIC BLOOD PRESSURE: 116 MMHG | HEART RATE: 67 BPM | HEIGHT: 71 IN | OXYGEN SATURATION: 93 % | BODY MASS INDEX: 44.1 KG/M2 | TEMPERATURE: 98.9 F | DIASTOLIC BLOOD PRESSURE: 78 MMHG | WEIGHT: 315 LBS

## 2017-04-20 DIAGNOSIS — I48.91 NEW ONSET ATRIAL FIBRILLATION (H): ICD-10-CM

## 2017-04-20 DIAGNOSIS — I25.10 CORONARY ARTERY DISEASE INVOLVING NATIVE CORONARY ARTERY OF NATIVE HEART, ANGINA PRESENCE UNSPECIFIED: ICD-10-CM

## 2017-04-20 DIAGNOSIS — Z01.818 PREOP GENERAL PHYSICAL EXAM: Primary | ICD-10-CM

## 2017-04-20 DIAGNOSIS — M17.12 PRIMARY OSTEOARTHRITIS OF LEFT KNEE: ICD-10-CM

## 2017-04-20 LAB
ANION GAP SERPL CALCULATED.3IONS-SCNC: 8 MMOL/L (ref 3–14)
BUN SERPL-MCNC: 14 MG/DL (ref 7–30)
CALCIUM SERPL-MCNC: 9 MG/DL (ref 8.5–10.1)
CHLORIDE SERPL-SCNC: 101 MMOL/L (ref 94–109)
CO2 SERPL-SCNC: 27 MMOL/L (ref 20–32)
CREAT SERPL-MCNC: 0.76 MG/DL (ref 0.66–1.25)
GFR SERPL CREATININE-BSD FRML MDRD: NORMAL ML/MIN/1.7M2
GLUCOSE SERPL-MCNC: 96 MG/DL (ref 70–99)
HGB BLD-MCNC: 14.6 G/DL (ref 13.3–17.7)
POTASSIUM SERPL-SCNC: 4.6 MMOL/L (ref 3.4–5.3)
SODIUM SERPL-SCNC: 136 MMOL/L (ref 133–144)
TSH SERPL DL<=0.005 MIU/L-ACNC: 1.12 MU/L (ref 0.4–4)

## 2017-04-20 PROCEDURE — 85018 HEMOGLOBIN: CPT | Performed by: INTERNAL MEDICINE

## 2017-04-20 PROCEDURE — 93000 ELECTROCARDIOGRAM COMPLETE: CPT | Performed by: INTERNAL MEDICINE

## 2017-04-20 PROCEDURE — 80048 BASIC METABOLIC PNL TOTAL CA: CPT | Performed by: INTERNAL MEDICINE

## 2017-04-20 PROCEDURE — 84443 ASSAY THYROID STIM HORMONE: CPT | Performed by: INTERNAL MEDICINE

## 2017-04-20 PROCEDURE — 36415 COLL VENOUS BLD VENIPUNCTURE: CPT | Performed by: INTERNAL MEDICINE

## 2017-04-20 PROCEDURE — 99215 OFFICE O/P EST HI 40 MIN: CPT | Performed by: INTERNAL MEDICINE

## 2017-04-20 NOTE — TELEPHONE ENCOUNTER
I saw our mutual patient Mr. Salvador today for a preop for upcoming total knee 5/8 at [facility].  I noted he was in asymptomatic new onset atrial fib of uncertain duration but was quite well rate controlled due to his metoprolol.  My plan was to place him on eliquis in addition to the 81 mg ASA he takes for his ASCVD.  My Q is whether or not there is sufficient benefit of having him put off the surgery until he has been on the anticoag for a month or so.    Your input is appreciated. thanks

## 2017-04-20 NOTE — NURSING NOTE
"Chief Complaint   Patient presents with     Pre-Op Exam       Initial /78  Pulse 67  Temp 98.9  F (37.2  C) (Oral)  Ht 5' 11\" (1.803 m)  Wt (!) 338 lb 12.8 oz (153.7 kg)  SpO2 93%  BMI 47.25 kg/m2 Estimated body mass index is 47.25 kg/(m^2) as calculated from the following:    Height as of this encounter: 5' 11\" (1.803 m).    Weight as of this encounter: 338 lb 12.8 oz (153.7 kg).  Medication Reconciliation: complete    "

## 2017-04-20 NOTE — MR AVS SNAPSHOT
After Visit Summary   4/20/2017    James Salvador    MRN: [id number]           Patient Information     Date Of Birth          1945        Visit Information        Provider Department      4/20/2017 10:20 AM Jeronimo Painter MD [facility]        Today's Diagnoses     Preop general physical exam    -  1    Coronary artery disease involving native coronary artery of native heart, angina presence unspecified          Care Instructions      Before Your Surgery      Call your surgeon if there is any change in your health. This includes signs of a cold or flu (such as a sore throat, runny nose, cough, rash or fever).    Do not smoke, drink alcohol or take over the counter medicine (unless your surgeon or primary care doctor tells you to) for the 24 hours before and after surgery.    If you take prescribed drugs: Follow your doctor s orders about which medicines to take and which to stop until after surgery.    Eating and drinking prior to surgery: follow the instructions from your surgeon    Take a shower or bath the night before surgery. Use the soap your surgeon gave you to gently clean your skin. If you do not have soap from your surgeon, use your regular soap. Do not shave or scrub the surgery site.  Wear clean pajamas and have clean sheets on your bed.     Do not stop your aspirin.  Continue this without any interruption    Do not take your lisinopril for 24 hours prior to your surgery.          Follow-ups after your visit        Your next 10 appointments already scheduled     Jun 08, 2017  1:30 PM CDT   PDT with Rosa Maria Hansen PA-C   [facility]    [address] [address]   [phone]            Tapan 15, 2017  2:15 PM CDT   Return Visit with Tereso Alexander MD   [facility]    [address]  [facility] [address]  "  [phone]            Oct 10, 2017  9:00 AM CDT   Return Visit with Rosa Maria Hansen PA-C   [facility]    [address] [address] [phone]              Who to contact     If you have questions or need follow up information about today's clinic visit or your schedule please contact [facility] directly at [phone].  Normal or non-critical lab and imaging results will be communicated to you by [employer], letter or phone within 4 business days after the clinic has received the results. If you do not hear from us within 7 days, please contact the clinic through [facility] 911t or phone. If you have a critical or abnormal lab result, we will notify you by phone as soon as possible.  Submit refill requests through [employer] or call your pharmacy and they will forward the refill request to us. Please allow 3 business days for your refill to be completed.          Additional Information About Your Visit        [employer] Information     [employer] gives you secure access to your electronic health record. If you see a primary care provider, you can also send messages to your care team and make appointments. If you have questions, please call your primary care clinic.  If you do not have a primary care provider, please call [phone] and they will assist you.        Care EveryWhere ID     This is your Care EveryWhere ID. This could be used by other organizations to access your [place] medical records  [id number]        Your Vitals Were     Pulse Temperature Height Pulse Oximetry BMI (Body Mass Index)       67 98.9  F (37.2  C) (Oral) 5' 11\" (1.803 m) 93% 47.25 kg/m2        Blood Pressure from Last 3 Encounters:   04/20/17 116/78   04/04/17 130/85   01/03/17 115/73    Weight from Last 3 Encounters:   04/20/17 (!) 338 lb 12.8 oz (153.7 kg)   12/12/16 (!) 337 lb 8 oz (153.1 kg)   12/08/16 (!) 335 lb 12.8 oz (152.3 " kg)              We Performed the Following     Basic metabolic panel     EKG 12-lead complete w/read - Clinics     Hemoglobin          Today's Medication Changes          These changes are accurate as of: 4/20/17 11:01 AM.  If you have any questions, ask your nurse or doctor.               These medicines have changed or have updated prescriptions.        Dose/Directions    ketoconazole 2 % shampoo   Commonly known as:  NIZORAL   This may have changed:    - how to take this  - when to take this  - reasons to take this  - additional instructions   Used for:  Seborrheic dermatitis        APPLY TO THE AFFECTED AREA AND WASH OFF AFTER 5 MINUTES. ALTERNATE WITH USUAL SHAMPOO.   Quantity:  240 mL   Refills:  11                Primary Care Provider Office Phone # Fax #    Jeronimo Painter [phone]       [facility] [address] [place] [phone]        Thank you!     Thank you for choosing [facility]  for your care. Our goal is always to provide you with excellent care. Hearing back from our patients is one way we can continue to improve our services. Please take a few minutes to complete the written survey that you may receive in the mail after your visit with us. Thank you!             Your Updated Medication List - Protect others around you: Learn how to safely use, store and throw away your medicines at www.disposemymeds.org.          This list is accurate as of: 4/20/17 11:01 AM.  Always use your most recent med list.                   Brand Name Dispense Instructions for use    aspirin 81 MG tablet      Take 81 mg by mouth daily       atorvastatin 80 MG tablet    LIPITOR    90 tablet    Take 1 tablet (80 mg) by mouth daily       ciclopirox 0.77 % cream    LOPROX    90 g    Apply topically At Bedtime       desonide 0.05 % cream    DESOWEN    60 g    Apply sparingly to affected area on face in morning       fluorouracil 5 % cream    EFUDEX    40 g     Apply to AA BID x 2 weeks       fluticasone 50 MCG/ACT spray    FLONASE    16 g    Spray 2 sprays into both nostrils daily       GABAPENTIN PO      Take 600 mg by mouth 2 times daily       ketoconazole 2 % shampoo    NIZORAL    240 mL    APPLY TO THE AFFECTED AREA AND WASH OFF AFTER 5 MINUTES. ALTERNATE WITH USUAL SHAMPOO.       ketotifen 0.025 % Soln ophthalmic solution    ZADITOR/REFRESH ANTI-ITCH     Place 1-2 drops into both eyes 2 times daily as needed for itching       lisinopril 20 MG tablet    PRINIVIL/ZESTRIL    90 tablet    Take 1 tablet (20 mg) by mouth daily       loratadine 10 MG tablet    CLARITIN     Take 10 mg by mouth daily as needed for allergies       * metoprolol 25 MG tablet    LOPRESSOR    180 tablet    Take 1 tablet (25 mg) by mouth 2 times daily Take along  with 50 mg tab for total of 75 mg twice a day       * metoprolol 50 MG tablet    LOPRESSOR    180 tablet    Take 1 tablet (50 mg) by mouth 2 times daily Take with 25 mg tablet (1 tab) for 75 mg twice a day       MULTIVITAMIN PO      Take 1 tablet by mouth daily       nitroglycerin 0.4 MG sublingual tablet    NITROSTAT    25 tablet    Place 1 tablet (0.4 mg) under the tongue every 5 minutes as needed for chest pain May repeat twice for a total of 3 tablets.  If chest pain not relieved, call 911       OMEGA-3 FISH OIL PO      Take 3.2 g by mouth daily       PERIOSTAT PO      Take 20 mg by mouth 2 times daily       TRILEPTAL PO      Take 300 mg by mouth 2 times daily       * Notice:  This list has 2 medication(s) that are the same as other medications prescribed for you. Read the directions carefully, and ask your doctor or other care provider to review them with you.

## 2017-04-20 NOTE — PROGRESS NOTES
[address] [address]  [phone]  Dept: [phone]    PRE-OP EVALUATION:  Today's date: 2017    James Salvador (: 1945) presents for pre-operative evaluation assessment as requested by Dr. Victor.  He requires evaluation and anesthesia risk assessment prior to undergoing surgery/procedure for treatment of Left knee pain .  Proposed procedure: Left Knee Replacement    Date of Surgery/ Procedure: 17  Time of Surgery/ Procedure: 7:45 AM  Hospital/Surgical Facility: [facility]  Fax number for surgical facility: [phone]  Primary Physician: Jeronimo Painter  Type of Anesthesia Anticipated: General    Patient has a Health Care Directive or Living Will:  YES     1. Yes - Do you have a history of heart attack, stroke, stent, bypass or surgery on an artery in the head, neck, heart or legs?  2. Yes - Do you ever have any pain or discomfort in your chest?  3. Yes - Do you have a history of  Heart Failure?  4. NO - Are you troubled by shortness of breath when: walking on the level, up a slight hill or at night?  5. NO - Do you currently have a cold, bronchitis or other respiratory infection?  6. NO - Do you have a cough, shortness of breath or wheezing?  7. NO - Do you sometimes get pains in the calves of your legs when you walk?  8. NO - Do you or anyone in your family have previous history of blood clots?  9. NO - Do you or does anyone in your family have a serious bleeding problem such as prolonged bleeding following surgeries or cuts?  10. NO - Have you ever had problems with anemia or been told to take iron pills?  11. NO - Have you had any abnormal blood loss such as black, tarry or bloody stools, or abnormal vaginal bleeding?  12. NO - Have you ever had a blood transfusion?  13. NO - Have you or any of your relatives ever had problems with anesthesia?  14. NO - Do you have sleep apnea, excessive snoring or daytime  drowsiness?  15. NO - Do you have any prosthetic heart valves?  16. Yes - Do you have prosthetic joints?  17. NO - Is there any chance that you may be pregnant?      HPI:                                                      Long standing OA both knees.     MEDICAL HISTORY:                                                      Patient Active Problem List    Diagnosis Date Noted     Shoulder pain, right 11/09/2016     Priority: Medium     Aftercare following shoulder joint replacement surgery 11/09/2016     Priority: Medium     Coronary artery disease involving native coronary artery of native heart, angina presence unspecified 01/11/2016     Priority: Medium     Stable angina (H) 01/11/2016     Priority: Medium     Morbid obesity due to excess calories (H) 01/11/2016     Priority: Medium     CAD (coronary artery disease)      Priority: Medium     Cardiac cath and PCI 1994. Cardiac Cath 9/2015: BRIDGET to LAD       History of myocardial infarction      Priority: Medium     PTCA       Paroxysmal supraventricular tachycardia (H)      Priority: Medium     On metoprolol       Actinic keratoses 07/17/2013     Priority: Medium     Actinic cheilitis 07/17/2013     Priority: Medium     Lower lip, left       Hyperlipidemia with target LDL less than 70      Priority: Medium     Benign hypertension      Priority: Medium     Advance care planning 10/30/2012     Priority: Medium     Advance Care Planning 10/5/2015: Receipt of ACP document:  Received: Health Care Directive which was witnessed or notarized on 6/10/2005.  Document previously scanned on 9/22/15.  Validation form previously completed and scanned.  Code Status reflects choices in most recent ACP document. Confirmed/documented designated decision maker(s).  Added by Zohra Hernandez, RN, BSN, MA, Advance Care Planning Liaison.  Advance Care Planning Patient states has Advance Directive and will bring in a copy to clinic. 10/30/2012           Past Medical History:   Diagnosis  Date     Allergic rhinitis      Benign hypertension      CAD (coronary artery disease)     Cardiac cath and PCI 1994. Cardiac Cath 9/2015: BRIDGET to LAD     History of myocardial infarction 1994    PTCA     Hyperlipidemia LDL goal < 70      Paroxysmal supraventricular tachycardia (H)     on metoprolol     Squamous cell carcinoma (H)      Past Surgical History:   Procedure Laterality Date     ANGIOPLASTY  1994    in [place]     ANKLE SURGERY  7/13/2005    right ankle     HEART CATH STENT COR W/WO PTCA  9/23/2015    BRIDGET stent mid LAD     JOINT REPLACEMENT, HIP RT/LT  10/14/2009    right hip      LASER SURGERY OF EYE  06/01/2002     MOHS MICROGRAPHIC PROCEDURE  06/12/2004    squamous cell carcinoma right temple     SINUS SURGERY  7/11/2006     Current Outpatient Prescriptions   Medication Sig Dispense Refill     metoprolol (LOPRESSOR) 25 MG tablet Take 1 tablet (25 mg) by mouth 2 times daily Take along  with 50 mg tab for total of 75 mg twice a day 180 tablet 3     metoprolol (LOPRESSOR) 50 MG tablet Take 1 tablet (50 mg) by mouth 2 times daily Take with 25 mg tablet (1 tab) for 75 mg twice a day 180 tablet 3     fluorouracil (EFUDEX) 5 % cream Apply to AA BID x 2 weeks 40 g 5     atorvastatin (LIPITOR) 80 MG tablet Take 1 tablet (80 mg) by mouth daily 90 tablet 3     fluticasone (FLONASE) 50 MCG/ACT spray Spray 2 sprays into both nostrils daily 16 g 11     lisinopril (PRINIVIL/ZESTRIL) 20 MG tablet Take 1 tablet (20 mg) by mouth daily 90 tablet 3     ciclopirox (LOPROX) 0.77 % cream Apply topically At Bedtime 90 g 3     desonide (DESOWEN) 0.05 % cream Apply sparingly to affected area on face in morning 60 g 2     nitroglycerin (NITROSTAT) 0.4 MG SL tablet Place 1 tablet (0.4 mg) under the tongue every 5 minutes as needed for chest pain May repeat twice for a total of 3 tablets.  If chest pain not relieved, call 911 25 tablet 11     Doxycycline Hyclate (PERIOSTAT PO) Take 20 mg by mouth 2 times daily       aspirin 81  "MG tablet Take 81 mg by mouth daily       Omega-3 Fatty Acids (OMEGA-3 FISH OIL PO) Take 3.2 g by mouth daily        loratadine (CLARITIN) 10 MG tablet Take 10 mg by mouth daily as needed for allergies       ketotifen (ZADITOR/REFRESH ANTI-ITCH) 0.025 % SOLN Place 1-2 drops into both eyes 2 times daily as needed for itching       OXcarbazepine (TRILEPTAL PO) Take 300 mg by mouth 2 times daily       ketoconazole (NIZORAL) 2 % shampoo APPLY TO THE AFFECTED AREA AND WASH OFF AFTER 5 MINUTES. ALTERNATE WITH USUAL SHAMPOO. (Patient taking differently: Apply topically daily as needed APPLY TO THE AFFECTED AREA AND WASH OFF AFTER 5 MINUTES. ALTERNATE WITH USUAL SHAMPOO.) 240 mL 11     GABAPENTIN PO Take 600 mg by mouth 2 times daily        Multiple Vitamin (MULTIVITAMIN OR) Take 1 tablet by mouth daily        OTC products: None, except as noted above    Allergies   Allergen Reactions     Pollen Extract       Latex Allergy: NO    Social History   Substance Use Topics     Smoking status: Former Smoker     Years: 10.00     Types: Cigarettes     Quit date: 1/1/1987     Smokeless tobacco: Never Used     Alcohol use 0.0 oz/week     0 Standard drinks or equivalent per week      Comment: socially - x2-3 per month     History   Drug Use No       REVIEW OF SYSTEMS:                                                    Constitutional, neuro, ENT, endocrine, pulmonary, cardiac, gastrointestinal, genitourinary, musculoskeletal, integument and psychiatric systems are negative, except as otherwise noted.    EXAM:                                                    /78  Pulse 67  Temp 98.9  F (37.2  C) (Oral)  Ht 5' 11\" (1.803 m)  Wt (!) 338 lb 12.8 oz (153.7 kg)  SpO2 93%  BMI 47.25 kg/m2    GENERAL APPEARANCE: over weight     EYES: EOMI,  PERRL     HENT: nose and mouth without ulcers or lesions     NECK: no adenopathy, no asymmetry, masses, or scars and thyroid normal to palpation     RESP: lungs clear to auscultation - no " rales, rhonchi or wheezes     CV: bradycardia and irregular rhythm     ABDOMEN:  soft, nontender, no HSM or masses and bowel sounds normal     MS: extremities normal- no gross deformities noted, no evidence of inflammation in joints, FROM in all extremities.     SKIN: no suspicious lesions or rashes     NEURO: Normal strength and tone, sensory exam grossly normal, mentation intact and speech normal     PSYCH: mentation appears normal. and affect normal/bright     LYMPHATICS: No axillary, cervical, or supraclavicular nodes    DIAGNOSTICS:                                                    EKG: new onset atrial fibrillation/fluuter, rate 50-70s    Recent Labs   Lab Test  12/08/16   1040  11/01/16   1122  02/11/16   1115  09/24/15   0534  09/23/15   1036   12/30/14   0757   HGB   --   14.2  14.4  14.7   --    < >   --    PLT   --    --   154  165   --    < >   --    INR   --    --    --    --   1.02   --    --    NA  135  134  143  138   --    < >   --    POTASSIUM  4.6  4.5   --   4.3  4.7   < >   --    CR  0.71  0.80   --   0.76   --    < >   --    A1C   --    --    --    --    --    --   5.4    < > = values in this interval not displayed.      Results for orders placed or performed in visit on 04/20/17   Hemoglobin   Result Value Ref Range    Hemoglobin 14.6 13.3 - 17.7 g/dL   Basic metabolic panel   Result Value Ref Range    Sodium 136 133 - 144 mmol/L    Potassium 4.6 3.4 - 5.3 mmol/L    Chloride 101 94 - 109 mmol/L    Carbon Dioxide 27 20 - 32 mmol/L    Anion Gap 8 3 - 14 mmol/L    Glucose 96 70 - 99 mg/dL    Urea Nitrogen 14 7 - 30 mg/dL    Creatinine 0.76 0.66 - 1.25 mg/dL    GFR Estimate >90  Non  GFR Calc   >60 mL/min/1.7m2    GFR Estimate If Black >90   GFR Calc   >60 mL/min/1.7m2    Calcium 9.0 8.5 - 10.1 mg/dL   TSH with free T4 reflex   Result Value Ref Range    TSH 1.12 0.40 - 4.00 mU/L      IMPRESSION:                                                    Reason for  surgery/procedure: End stage OA, L knee  Diagnosis/reason for consult: ASCVD, Obesity, new onset atrial fibrillation     The proposed surgical procedure is considered INTERMEDIATE risk.    REVISED CARDIAC RISK INDEX  The patient has the following serious cardiovascular risks for perioperative complications such as (MI, PE, VFib and 3  AV Block):  Coronary Artery Disease (MI, positive stress test, angina, Qs on EKG)  INTERPRETATION: 1 risks: Class II (low risk - 0.9% complication rate)    The patient has the following additional risks for perioperative complications:  No identified additional risks      ICD-10-CM    1. Preop general physical exam Z01.818 EKG 12-lead complete w/read - Clinics     Hemoglobin     Basic metabolic panel   2. Coronary artery disease involving native coronary artery of native heart, angina presence unspecified I25.10 EKG 12-lead complete w/read - Clinics     Hemoglobin     Basic metabolic panel   3. New onset atrial fibrillation (H) I48.91 TSH with free T4 reflex       RECOMMENDATIONS:                                                      -CHADS-VASC score of 3 - pt would benefit from full anticoagulation in addition to his need for ASA due to underlying ASCVD.     -after discussion with cardiology the following recommendations should be implemented:  1. Pt should continue aspirin until surgery without any discontinuation prior to surgery  2. Post operatively the pt should be started on Eliquis 5 mg bid for his atrial fib. This dose should be adequate as well for post procedural DVT prophylaxis.   3. Aspirin should be reintroduced 2 weeks post-operatively   4. His metoprolol should be continued and if NPO a IV dose equivalent Beta-Blocker should be used thru the perioperative period  5. His lisinopril should be held for 24 hrs prior to his surgery     Signed Electronically by: Jeronimo Painter MD    Copy of this evaluation report is provided to requesting physician.    [facility] Preop  Guidelines

## 2017-04-20 NOTE — PATIENT INSTRUCTIONS
Before Your Surgery      Call your surgeon if there is any change in your health. This includes signs of a cold or flu (such as a sore throat, runny nose, cough, rash or fever).    Do not smoke, drink alcohol or take over the counter medicine (unless your surgeon or primary care doctor tells you to) for the 24 hours before and after surgery.    If you take prescribed drugs: Follow your doctor s orders about which medicines to take and which to stop until after surgery.    Eating and drinking prior to surgery: follow the instructions from your surgeon    Take a shower or bath the night before surgery. Use the soap your surgeon gave you to gently clean your skin. If you do not have soap from your surgeon, use your regular soap. Do not shave or scrub the surgery site.  Wear clean pajamas and have clean sheets on your bed.     Do not stop your aspirin.  Continue this without any interruption    Do not take your lisinopril for 24 hours prior to your surgery.

## 2017-04-21 ENCOUNTER — OFFICE VISIT (OUTPATIENT)
Dept: URGENT CARE | Facility: URGENT CARE | Age: 72
End: 2017-04-21
Payer: COMMERCIAL

## 2017-04-21 ENCOUNTER — TELEPHONE (OUTPATIENT)
Dept: NURSING | Facility: CLINIC | Age: 72
End: 2017-04-21

## 2017-04-21 VITALS
DIASTOLIC BLOOD PRESSURE: 74 MMHG | OXYGEN SATURATION: 96 % | HEART RATE: 77 BPM | SYSTOLIC BLOOD PRESSURE: 124 MMHG | WEIGHT: 315 LBS | BODY MASS INDEX: 48.47 KG/M2 | TEMPERATURE: 98.2 F

## 2017-04-21 DIAGNOSIS — S40.021A HEMATOMA OF ARM, RIGHT, INITIAL ENCOUNTER: Primary | ICD-10-CM

## 2017-04-21 PROBLEM — I48.91 NEW ONSET ATRIAL FIBRILLATION (H): Status: ACTIVE | Noted: 2017-04-21

## 2017-04-21 PROCEDURE — 99212 OFFICE O/P EST SF 10 MIN: CPT | Performed by: FAMILY MEDICINE

## 2017-04-21 NOTE — TELEPHONE ENCOUNTER
I'm not sure that starting and then stopping apixaban is of great benefit to this patient.  If he is hemodynamically stable, we can pretend we stopped his anticoagulation for his surgery and will restart it after the surgery.  I don't think he is at higher risk of thromboembolism due to this surgery.  There is also a risk of hemorrhage with apixaban and ASA together.  I would start the apixaban after surgery and restart the ASA two weeks later to avoid post surgical hemorrhage.  Thanks. Jarad Alexander MD, FACC  April 20, 2017 10:33 PM

## 2017-04-21 NOTE — TELEPHONE ENCOUNTER
James Salvador is a 71 year old male who calls with issue after blood draw yesterday.    NURSING ASSESSMENT:  Description:  Describes that he has a 3x2 inch blood blister from blood draw yesterday. Describes it as hard. No redness, no warmth but is swollen and hard.  Onset/duration:  Had blood draw yesterday  Precip. factors:  Blood draw  Associated symptoms:  Denies itchiness.  Pain scale (0-10)   0/10  Last exam/Treatment:  4/20/17  Allergies:   Allergies   Allergen Reactions     Pollen Extract          NURSING PLAN: Nursing advice to patient to  for assessment    RECOMMENDED DISPOSITION:  To  for evaluation  Will comply with recommendation: Yes  If further questions/concerns or if symptoms do not improve, worsen or new symptoms develop, call your PCP or [facility] as soon as possible.      Guideline used:  Telephone Triage Protocols for Nurses, Fifth Edition, Jazmin Noe RN

## 2017-04-21 NOTE — NURSING NOTE
"Chief Complaint   Patient presents with     Urgent Care     Arm Injury     Pt states had right arm drawn for blood yesterday and now has big blood clot on lower right forearm.        Initial /74 (BP Location: Right arm, Patient Position: Chair, Cuff Size: Adult Large)  Pulse 77  Temp 98.2  F (36.8  C) (Tympanic)  Wt (!) 347 lb 8 oz (157.6 kg)  SpO2 96%  BMI 48.47 kg/m2 Estimated body mass index is 48.47 kg/(m^2) as calculated from the following:    Height as of 4/20/17: 5' 11\" (1.803 m).    Weight as of this encounter: 347 lb 8 oz (157.6 kg).  Medication Reconciliation: unable or not appropriate to perform   Zheng Dubios CMA ([facility]) 4/21/2017 3:26 PM    "

## 2017-04-21 NOTE — PROGRESS NOTES
SUBJECTIVE:   James Salvador is a 71 year old male presenting with a chief complaint of a hard 3 inch x 2 inch painless blood blister on the right proximal forearm where a blood draw occurred yesterday.  .  Onset of symptoms was one day ago.  Course of illness is about the same.  .      Current and Associated symptoms: firmness at the blood blister area.       Past Medical History:   Diagnosis Date     Allergic rhinitis      Benign hypertension      CAD (coronary artery disease)     Cardiac cath and PCI 1994. Cardiac Cath 9/2015: BRIDGET to LAD     History of myocardial infarction 1994    PTCA     Hyperlipidemia LDL goal < 70      Paroxysmal supraventricular tachycardia (H)     on metoprolol     Squamous cell carcinoma (H)      Current Outpatient Prescriptions   Medication Sig Dispense Refill     apixaban ANTICOAGULANT (ELIQUIS) 5 MG tablet Take 1 tablet (5 mg) by mouth 2 times daily 60 tablet 1     metoprolol (LOPRESSOR) 25 MG tablet Take 1 tablet (25 mg) by mouth 2 times daily Take along  with 50 mg tab for total of 75 mg twice a day 180 tablet 3     metoprolol (LOPRESSOR) 50 MG tablet Take 1 tablet (50 mg) by mouth 2 times daily Take with 25 mg tablet (1 tab) for 75 mg twice a day 180 tablet 3     fluorouracil (EFUDEX) 5 % cream Apply to AA BID x 2 weeks 40 g 5     atorvastatin (LIPITOR) 80 MG tablet Take 1 tablet (80 mg) by mouth daily 90 tablet 3     fluticasone (FLONASE) 50 MCG/ACT spray Spray 2 sprays into both nostrils daily 16 g 11     lisinopril (PRINIVIL/ZESTRIL) 20 MG tablet Take 1 tablet (20 mg) by mouth daily 90 tablet 3     ciclopirox (LOPROX) 0.77 % cream Apply topically At Bedtime 90 g 3     desonide (DESOWEN) 0.05 % cream Apply sparingly to affected area on face in morning 60 g 2     nitroglycerin (NITROSTAT) 0.4 MG SL tablet Place 1 tablet (0.4 mg) under the tongue every 5 minutes as needed for chest pain May repeat twice for a total of 3 tablets.  If chest pain not relieved, call 911 25 tablet 11      Doxycycline Hyclate (PERIOSTAT PO) Take 20 mg by mouth 2 times daily       aspirin 81 MG tablet Take 81 mg by mouth daily       Omega-3 Fatty Acids (OMEGA-3 FISH OIL PO) Take 3.2 g by mouth daily        loratadine (CLARITIN) 10 MG tablet Take 10 mg by mouth daily as needed for allergies       ketotifen (ZADITOR/REFRESH ANTI-ITCH) 0.025 % SOLN Place 1-2 drops into both eyes 2 times daily as needed for itching       OXcarbazepine (TRILEPTAL PO) Take 300 mg by mouth 2 times daily       GABAPENTIN PO Take 600 mg by mouth 2 times daily        Multiple Vitamin (MULTIVITAMIN OR) Take 1 tablet by mouth daily        Social History   Substance Use Topics     Smoking status: Former Smoker     Years: 10.00     Types: Cigarettes     Quit date: 1/1/1987     Smokeless tobacco: Never Used     Alcohol use 0.0 oz/week     0 Standard drinks or equivalent per week      Comment: socially - x2-3 per month       ROS:  Review of systems negative except as stated above.    OBJECTIVE  :/74 (BP Location: Right arm, Patient Position: Chair, Cuff Size: Adult Large)  Pulse 77  Temp 98.2  F (36.8  C) (Tympanic)  Wt (!) 347 lb 8 oz (157.6 kg)  SpO2 96%  BMI 48.47 kg/m2  GENERAL APPEARANCE: healthy, alert and no distress  Extremities: Anterior aspect of the Right proximal forearm has an 8 cm x 5 cm hematoma.  Normal pulses and capillary refill.    NEURO: Normal strength and tone, sensory exam grossly normal in the right arm and right hand.     ASSESSMENT:  Hematoma at the right proximal forearm.       PLAN:  Ice if the area keeps expanding or if there is pain.     Elder Forde MD

## 2017-04-21 NOTE — MR AVS SNAPSHOT
After Visit Summary   4/21/2017    James Salvador    MRN: [id number]           Patient Information     Date Of Birth          1945        Visit Information        Provider Department      4/21/2017 3:20 PM Elder Forde MD [facility]        Today's Diagnoses     Hematoma of arm, right, initial encounter    -  1      Care Instructions    Ice onto the bruised area if experiencing pain or if the bruised area keeps expanding          Follow-ups after your visit        Your next 10 appointments already scheduled     Jun 08, 2017  1:30 PM CDT   PDT with Rosa Maria Hansen PA-C   [facility]    [address] [place] [address]   [phone]            Tapan 15, 2017  2:15 PM CDT   Return Visit with Tereso Alexander MD   [facility]    [address]  [facility] [place] [id number] [phone]            Oct 10, 2017  9:00 AM CDT   Return Visit with Rosa Maria Hansen PA-C   [facility] ([facility])    [address] [address] [phone]              Who to contact     If you have questions or need follow up information about today's clinic visit or your schedule please contact [facility] directly at [phone].  Normal or non-critical lab and imaging results will be communicated to you by MyChart, letter or phone within 4 business days after the clinic has received the results. If you do not hear from us within 7 days, please contact the clinic through MyChart or phone. If you have a critical or abnormal lab result, we will notify you by phone as soon as possible.  Submit refill requests through [employer] or call your pharmacy and they will forward the refill request to us. Please allow 3 business days for your refill to be completed.          Additional Information About  Your Visit        MyChart Information     [employer] gives you secure access to your electronic health record. If you see a primary care provider, you can also send messages to your care team and make appointments. If you have questions, please call your primary care clinic.  If you do not have a primary care provider, please call [phone] and they will assist you.        Care EveryWhere ID     This is your Care EveryWhere ID. This could be used by other organizations to access your [facility] medical records  [id number]        Your Vitals Were     Pulse Temperature Pulse Oximetry BMI (Body Mass Index)          77 98.2  F (36.8  C) (Tympanic) 96% 48.47 kg/m2         Blood Pressure from Last 3 Encounters:   04/21/17 124/74   04/20/17 116/78   04/04/17 130/85    Weight from Last 3 Encounters:   04/21/17 (!) 347 lb 8 oz (157.6 kg)   04/20/17 (!) 338 lb 12.8 oz (153.7 kg)   12/12/16 (!) 337 lb 8 oz (153.1 kg)              Today, you had the following     No orders found for display       Primary Care Provider Office Phone # Fax #    Jeronimo Painter [phone]       [facility] [address] [place] [address]        Thank you!     Thank you for choosing [facility]  for your care. Our goal is always to provide you with excellent care. Hearing back from our patients is one way we can continue to improve our services. Please take a few minutes to complete the written survey that you may receive in the mail after your visit with us. Thank you!             Your Updated Medication List - Protect others around you: Learn how to safely use, store and throw away your medicines at www.disposemymeds.org.          This list is accurate as of: 4/21/17  4:09 PM.  Always use your most recent med list.                   Brand Name Dispense Instructions for use    apixaban ANTICOAGULANT 5 MG tablet    ELIQUIS    60 tablet    Take 1 tablet (5 mg) by mouth 2 times daily        aspirin 81 MG tablet      Take 81 mg by mouth daily       atorvastatin 80 MG tablet    LIPITOR    90 tablet    Take 1 tablet (80 mg) by mouth daily       ciclopirox 0.77 % cream    LOPROX    90 g    Apply topically At Bedtime       desonide 0.05 % cream    DESOWEN    60 g    Apply sparingly to affected area on face in morning       fluorouracil 5 % cream    EFUDEX    40 g    Apply to AA BID x 2 weeks       fluticasone 50 MCG/ACT spray    FLONASE    16 g    Spray 2 sprays into both nostrils daily       GABAPENTIN PO      Take 600 mg by mouth 2 times daily       ketotifen 0.025 % Soln ophthalmic solution    ZADITOR/REFRESH ANTI-ITCH     Place 1-2 drops into both eyes 2 times daily as needed for itching       lisinopril 20 MG tablet    PRINIVIL/ZESTRIL    90 tablet    Take 1 tablet (20 mg) by mouth daily       loratadine 10 MG tablet    CLARITIN     Take 10 mg by mouth daily as needed for allergies       * metoprolol 25 MG tablet    LOPRESSOR    180 tablet    Take 1 tablet (25 mg) by mouth 2 times daily Take along  with 50 mg tab for total of 75 mg twice a day       * metoprolol 50 MG tablet    LOPRESSOR    180 tablet    Take 1 tablet (50 mg) by mouth 2 times daily Take with 25 mg tablet (1 tab) for 75 mg twice a day       MULTIVITAMIN PO      Take 1 tablet by mouth daily       nitroglycerin 0.4 MG sublingual tablet    NITROSTAT    25 tablet    Place 1 tablet (0.4 mg) under the tongue every 5 minutes as needed for chest pain May repeat twice for a total of 3 tablets.  If chest pain not relieved, call 911       OMEGA-3 FISH OIL PO      Take 3.2 g by mouth daily       PERIOSTAT PO      Take 20 mg by mouth 2 times daily       TRILEPTAL PO      Take 300 mg by mouth 2 times daily       * Notice:  This list has 2 medication(s) that are the same as other medications prescribed for you. Read the directions carefully, and ask your doctor or other care provider to review them with you.

## 2017-04-27 ENCOUNTER — TELEPHONE (OUTPATIENT)
Dept: DERMATOLOGY | Facility: CLINIC | Age: 72
End: 2017-04-27

## 2017-04-27 DIAGNOSIS — L21.9 SEBORRHEIC DERMATITIS: ICD-10-CM

## 2017-04-27 DIAGNOSIS — L21.9 DERMATITIS, SEBORRHEIC: Primary | ICD-10-CM

## 2017-04-27 RX ORDER — KETOCONAZOLE 20 MG/G
CREAM TOPICAL 2 TIMES DAILY
Qty: 30 G | Refills: 2 | Status: CANCELLED | OUTPATIENT
Start: 2017-04-27

## 2017-04-27 RX ORDER — KETOCONAZOLE 20 MG/G
CREAM TOPICAL 2 TIMES DAILY
Qty: 30 G | Refills: 2 | Status: SHIPPED | OUTPATIENT
Start: 2017-04-27 | End: 2017-05-15

## 2017-04-27 RX ORDER — KETOCONAZOLE 20 MG/ML
SHAMPOO TOPICAL
Qty: 120 ML | Refills: 11 | Status: SHIPPED | OUTPATIENT
Start: 2017-04-27 | End: 2017-05-17

## 2017-04-27 NOTE — TELEPHONE ENCOUNTER
Pt running out of ketoconazole shampoo (not cream) and he uses it all the time for flaky itchy scalp. Pt stated he discussed this with Rosa Maria during last visit. Can you please send rx to [facility] in [place]? (med is not on active med list) Pt is aware.

## 2017-05-11 PROBLEM — Z96.652 STATUS POST TOTAL LEFT KNEE REPLACEMENT: Status: ACTIVE | Noted: 2017-05-11

## 2017-05-11 RX ORDER — AMOXICILLIN 250 MG
2 CAPSULE ORAL DAILY
COMMUNITY
End: 2017-06-21

## 2017-05-11 RX ORDER — ONDANSETRON 4 MG/1
4 TABLET, ORALLY DISINTEGRATING ORAL EVERY 6 HOURS PRN
COMMUNITY
End: 2017-05-17

## 2017-05-12 ENCOUNTER — NURSING HOME VISIT (OUTPATIENT)
Dept: GERIATRICS | Facility: CLINIC | Age: 72
End: 2017-05-12
Payer: COMMERCIAL

## 2017-05-12 VITALS
BODY MASS INDEX: 44.1 KG/M2 | DIASTOLIC BLOOD PRESSURE: 93 MMHG | WEIGHT: 315 LBS | TEMPERATURE: 97.6 F | HEART RATE: 106 BPM | OXYGEN SATURATION: 95 % | SYSTOLIC BLOOD PRESSURE: 155 MMHG | HEIGHT: 71 IN | RESPIRATION RATE: 18 BRPM

## 2017-05-12 DIAGNOSIS — I25.10 CORONARY ARTERY DISEASE INVOLVING NATIVE CORONARY ARTERY OF NATIVE HEART, ANGINA PRESENCE UNSPECIFIED: ICD-10-CM

## 2017-05-12 DIAGNOSIS — Z96.652 STATUS POST TOTAL LEFT KNEE REPLACEMENT: Primary | ICD-10-CM

## 2017-05-12 DIAGNOSIS — I48.91 NEW ONSET ATRIAL FIBRILLATION (H): ICD-10-CM

## 2017-05-12 DIAGNOSIS — I10 BENIGN HYPERTENSION: ICD-10-CM

## 2017-05-12 DIAGNOSIS — I47.10 PAROXYSMAL SUPRAVENTRICULAR TACHYCARDIA (H): ICD-10-CM

## 2017-05-12 DIAGNOSIS — E78.5 HYPERLIPIDEMIA WITH TARGET LDL LESS THAN 70: ICD-10-CM

## 2017-05-12 PROCEDURE — 99207 ZZC CDG-CORRECTLY CODED, REVIEWED AND AGREE: CPT | Performed by: NURSE PRACTITIONER

## 2017-05-12 PROCEDURE — 99310 SBSQ NF CARE HIGH MDM 45: CPT | Performed by: NURSE PRACTITIONER

## 2017-05-12 NOTE — PROGRESS NOTES
[facility]  PRIMARY CARE PROVIDER AND CLINIC:  Jeronimo Painter [facility] [address] / [facility] [address]*  Chief Complaint   Patient presents with     Hospital F/U       HPI:    James Salvador is a 71 year old  (1945),admitted to the [facility] from [facility].  Hospital stay 5/8/17 through 5/11/17.  Admitted to this facility for  rehab, medical management and nursing care. Current issues are:       Patient James Salvador is a 71 yr old male admitted to [facility] for rehabilitation s/p hospitalization for left knee replacement by surgeon .  Patient has PMH of CAD with myocardial infarction, history PCI 1994, BRIDGET to LAD 2015, hypertension/HLD, CHF, stable angina, paroxysmal supraventricular tachycardia, obesity, & former smoker. Past surgical history of right & left hip & ankle surgery        Status post total left knee replacement  Incision dressing changes as ordered  has knee immobilizer when up, weight bearing as tolerated ; has TG shapes  On oxycodone as needed, patient report effective, however, request oxycodone 5mg to be scheduled in AM  Follow up  surgeon  Patient was independently with activities of daily living; Patient has numbness in feet bilateral prior to hospital admission   Use bariatric walker, has cane; currently patient is 2 assist with transfers  Lives with wife Renea, she is visiting today  on eliquis x12 days & then start aspirin 162 mg x4 week then resume asa 81mg daily   Patient report no bowel movement since 5/8, he does not want supp; he is ok with increase in Sen    Paroxysmal supraventricular tachycardia (H)  Hyperlipidemia with target LDL less than 70  Coronary artery disease involving native coronary artery of native heart, angina presence unspecified  Benign hypertension  New onset atrial fibrillation (H)  Resume aspirin 2 wks post surgery per hospital note; patient is on eliquis (has stop  date?)  Using oxygen in hospital, no longer using    Has order fordoxycycline on for periodontitis **  Facial nerve pain/neuropathy  MAR states neurontin 3 x daily, had been on 2 x daily   On Trileptal for facial pain?        CODE STATUS/ADVANCE DIRECTIVES DISCUSSION:   CPR/Full code   Patient's living condition: lives with spouse    ALLERGIES:Pollen extract  PAST MEDICAL HISTORY:  has a past medical history of Allergic rhinitis; Benign hypertension; CAD (coronary artery disease); History of myocardial infarction (1994); Hyperlipidemia LDL goal < 70; Paroxysmal supraventricular tachycardia (H); and Squamous cell carcinoma (H). He also has no past medical history of Basal cell carcinoma or Malignant melanoma (H).  PAST SURGICAL HISTORY:  has a past surgical history that includes angioplasty (1994); laser surgery of eye (06/01/2002); Mohs micrographic procedure (06/12/2004); Ankle surgery (7/13/2005); joint replacement, hip rt/lt (10/14/2009); sinus surgery (7/11/2006); and Heart Cath Stent cor w/wo ptca (9/23/2015).  FAMILY HISTORY: family history includes Cardiovascular in his father; Depression in his son; Genitourinary Problems in his mother.  SOCIAL HISTORY:  reports that he quit smoking about 30 years ago. His smoking use included Cigarettes. He quit after 10.00 years of use. He has never used smokeless tobacco. He reports that he drinks alcohol. He reports that he does not use illicit drugs.    Post Discharge Medication Reconciliation Status: discharge medications reconciled and changed, per note/orders (see AVS).  Current Outpatient Prescriptions   Medication Sig Dispense Refill     APIXABAN PO Take 5 mg by mouth 2 times daily       METOPROLOL TARTRATE PO Take 75 mg by mouth 2 times daily       Acetaminophen (TYLENOL PO) Take 650 mg by mouth 4 times daily        Docusate Sodium (COLACE PO) Take 100 mg by mouth 2 times daily       ondansetron (ZOFRAN-ODT) 4 MG ODT tab Take 4 mg by mouth every 6 hours as needed  for nausea       OXYCODONE HCL PO Take 5-10 mg by mouth every 4 hours as needed       senna-docusate (SENOKOT-S;PERICOLACE) 8.6-50 MG per tablet Take 1 tablet by mouth daily        ketoconazole (NIZORAL) 2 % cream Apply topically 2 times daily For face. FAX REFILL REQUESTS TO  [facility]: [phone] 30 g 2     ketoconazole (NIZORAL) 2 % shampoo Use daily as needed 120 mL 11     fluorouracil (EFUDEX) 5 % cream Apply to AA BID x 2 weeks 40 g 5     atorvastatin (LIPITOR) 80 MG tablet Take 1 tablet (80 mg) by mouth daily 90 tablet 3     fluticasone (FLONASE) 50 MCG/ACT spray Spray 2 sprays into both nostrils daily 16 g 11     lisinopril (PRINIVIL/ZESTRIL) 20 MG tablet Take 1 tablet (20 mg) by mouth daily 90 tablet 3     ciclopirox (LOPROX) 0.77 % cream Apply topically At Bedtime 90 g 3     desonide (DESOWEN) 0.05 % cream Apply sparingly to affected area on face in morning 60 g 2     nitroglycerin (NITROSTAT) 0.4 MG SL tablet Place 1 tablet (0.4 mg) under the tongue every 5 minutes as needed for chest pain May repeat twice for a total of 3 tablets.  If chest pain not relieved, call 911 25 tablet 11     Doxycycline Hyclate (PERIOSTAT PO) Take 20 mg by mouth 2 times daily       aspirin 81 MG tablet Take 81 mg by mouth daily       Omega-3 Fatty Acids (OMEGA-3 FISH OIL PO) Take 3.2 g by mouth daily        loratadine (CLARITIN) 10 MG tablet Take 10 mg by mouth daily as needed for allergies       ketotifen (ZADITOR/REFRESH ANTI-ITCH) 0.025 % SOLN Place 1-2 drops into both eyes 2 times daily as needed for itching       OXcarbazepine (TRILEPTAL PO) Take 300 mg by mouth 2 times daily       GABAPENTIN PO Take 600 mg by mouth 2 times daily        Multiple Vitamin (MULTIVITAMIN OR) Take 1 tablet by mouth daily          ROS:  10 point ROS of systems including Constitutional, Eyes, Respiratory, Cardiovascular, Gastroenterology, Genitourinary, Integumentary, Muscularskeletal, Psychiatric were all negative except for pertinent positives  "noted in my HPI.    Exam:  BP (!) 155/93  Pulse 106  Temp 97.6  F (36.4  C)  Resp 18  Ht 5' 11\" (1.803 m)  Wt (!) 338 lb (153.3 kg)  SpO2 95%  BMI 47.14 kg/m2  GENERAL APPEARANCE:  Alert, in no distress  ENT:  Mouth and posterior oropharynx normal, moist mucous membranes  EYES:  EOM, conjunctivae, lids, pupils and irises normal  NECK:  No adenopathy,masses or thyromegaly  RESP:  respiratory effort and palpation of chest normal, lungs clear to auscultation , no respiratory distress  CV:  Palpation and auscultation of heart, regular heart rate & rhythm  SKIN:  Inspection of skin and subcutaneous tissue baseline  NEURO:   Cranial nerves 2-12 are normal tested and grossly at patient's baseline  PSYCH:  oriented X 3     BP: 117-155/71-93  P:   Wt Readings from Last 5 Encounters:   05/12/17 (!) 338 lb (153.3 kg)   04/21/17 (!) 347 lb 8 oz (157.6 kg)   04/20/17 (!) 338 lb 12.8 oz (153.7 kg)   12/12/16 (!) 337 lb 8 oz (153.1 kg)   12/08/16 (!) 335 lb 12.8 oz (152.3 kg)         Lab/Diagnostic data:  CBC RESULTS:   Recent Labs   Lab Test  04/20/17   1148  11/01/16   1122  02/11/16   1115  09/24/15   0534   WBC   --    --   7.6  9.6   RBC   --    --   4.57  4.67   HGB  14.6  14.2  14.4  14.7   HCT   --    --   42.5  42.3   MCV   --    --   93  91   MCH   --    --   31.5  31.5   MCHC   --    --   33.9  34.8   RDW   --    --   13.9  13.9   PLT   --    --   154  165       Last Basic Metabolic Panel:  Recent Labs   Lab Test  04/20/17   1148  12/08/16   1040   NA  136  135   POTASSIUM  4.6  4.6   CHLORIDE  101  101   BENJI  9.0  9.3   CO2  27  27   BUN  14  13   CR  0.76  0.71   GLC  96  86       TSH   Date Value Ref Range Status   04/20/2017 1.12 0.40 - 4.00 mU/L Final   09/22/2015 1.42 0.40 - 4.00 mU/L Final         ASSESSMENT/PLAN:  Status post total left knee replacement  Schedule oxycodone IR 5mg AM, continue as needed order  Add Senna S 2 tabs daily; offer supp if no bowel movement 2 days  Continue " therapies  Follow up surgeon   involved in safe plan home      Paroxysmal supraventricular tachycardia (H)  Hyperlipidemia with target LDL less than 70  Coronary artery disease involving native coronary artery of native heart, angina presence unspecified  Benign hypertension  New onset atrial fibrillation (H)  Stable, continue on current medications to treat, monitor   BMP,CBC 5/15/17           Information reviewed:  Medications, vital signs, orders, nursing notes, problem list, hospital information. Total time spent with patient visit was 35 min including patient visit and review of past records. Greater than 50% of total time spent with counseling and coordinating care.    Electronically signed by:  NAV Busby CNP

## 2017-05-15 ENCOUNTER — HOSPITAL LABORATORY (OUTPATIENT)
Dept: OTHER | Facility: CLINIC | Age: 72
End: 2017-05-15

## 2017-05-15 ENCOUNTER — NURSING HOME VISIT (OUTPATIENT)
Dept: GERIATRICS | Facility: CLINIC | Age: 72
End: 2017-05-15
Payer: COMMERCIAL

## 2017-05-15 VITALS
RESPIRATION RATE: 18 BRPM | TEMPERATURE: 98.2 F | SYSTOLIC BLOOD PRESSURE: 115 MMHG | BODY MASS INDEX: 43.01 KG/M2 | OXYGEN SATURATION: 94 % | HEART RATE: 81 BPM | HEIGHT: 71 IN | DIASTOLIC BLOOD PRESSURE: 74 MMHG | WEIGHT: 307.2 LBS

## 2017-05-15 DIAGNOSIS — R53.81 PHYSICAL DECONDITIONING: ICD-10-CM

## 2017-05-15 DIAGNOSIS — I48.91 ATRIAL FIBRILLATION, UNSPECIFIED TYPE (H): ICD-10-CM

## 2017-05-15 DIAGNOSIS — K59.09 OTHER CONSTIPATION: ICD-10-CM

## 2017-05-15 DIAGNOSIS — I10 BENIGN HYPERTENSION: ICD-10-CM

## 2017-05-15 DIAGNOSIS — Z96.652 STATUS POST TOTAL LEFT KNEE REPLACEMENT: Primary | ICD-10-CM

## 2017-05-15 LAB
ANION GAP SERPL CALCULATED.3IONS-SCNC: 9 MMOL/L (ref 3–14)
BUN SERPL-MCNC: 12 MG/DL (ref 7–30)
CALCIUM SERPL-MCNC: 8.8 MG/DL (ref 8.5–10.1)
CHLORIDE SERPL-SCNC: 103 MMOL/L (ref 94–109)
CO2 SERPL-SCNC: 30 MMOL/L (ref 20–32)
CREAT SERPL-MCNC: 0.72 MG/DL (ref 0.66–1.25)
ERYTHROCYTE [DISTWIDTH] IN BLOOD BY AUTOMATED COUNT: 14.4 % (ref 10–15)
GFR SERPL CREATININE-BSD FRML MDRD: NORMAL ML/MIN/1.7M2
GLUCOSE SERPL-MCNC: 92 MG/DL (ref 70–99)
HCT VFR BLD AUTO: 39.6 % (ref 40–53)
HGB BLD-MCNC: 13.3 G/DL (ref 13.3–17.7)
MCH RBC QN AUTO: 31.4 PG (ref 26.5–33)
MCHC RBC AUTO-ENTMCNC: 33.6 G/DL (ref 31.5–36.5)
MCV RBC AUTO: 94 FL (ref 78–100)
PLATELET # BLD AUTO: 260 10E9/L (ref 150–450)
POTASSIUM SERPL-SCNC: 4.3 MMOL/L (ref 3.4–5.3)
RBC # BLD AUTO: 4.23 10E12/L (ref 4.4–5.9)
SODIUM SERPL-SCNC: 142 MMOL/L (ref 133–144)
WBC # BLD AUTO: 9.8 10E9/L (ref 4–11)

## 2017-05-15 PROCEDURE — 99309 SBSQ NF CARE MODERATE MDM 30: CPT | Performed by: NURSE PRACTITIONER

## 2017-05-15 NOTE — PROGRESS NOTES
[facility]    Chief Complaint   Patient presents with     Nursing Home Acute       HPI:    James Salvador is a 71 year old  (1945), who is being seen today for an episodic care visit at [facility]. Today's concern is:    Patient James Salvador is a 71 yr old male admitted to [facility] for rehabilitation s/p hospitalization for left knee replacement by surgeon .  Patient has PMH of CAD with myocardial infarction, history PCI 1994, BRIDGET to LAD 2015, hypertension/HLD, CHF, stable angina, paroxysmal supraventricular tachycardia, obesity, & former smoker. Past surgical history of right & left hip & ankle surgery       Status post total left knee replacement  Benign hypertension  Atrial fibrillation, unspecified type (H)  Other constipation  Physical deconditioning     Patient reports pain managed with pain medications; patient report regular eliminations  Participating in therapies  He reports he had visitors over the weekend and went outside & he is in good spirits today  He states he is eating well  Vitals stable    ALLERGIES: Pollen extract  Past Medical, Surgical, Family and Social History reviewed and updated in [employer].    Current Outpatient Prescriptions   Medication Sig Dispense Refill     APIXABAN PO Take 5 mg by mouth 2 times daily       METOPROLOL TARTRATE PO Take 75 mg by mouth 2 times daily       Acetaminophen (TYLENOL PO) Take 650 mg by mouth 4 times daily        Docusate Sodium (COLACE PO) Take 100 mg by mouth 2 times daily       ondansetron (ZOFRAN-ODT) 4 MG ODT tab Take 4 mg by mouth every 6 hours as needed for nausea       OXYCODONE HCL PO Take 5-10 mg by mouth every 4 hours as needed Schedule oxycodone 5 mg AM       senna-docusate (SENOKOT-S;PERICOLACE) 8.6-50 MG per tablet Take 2 tablets by mouth daily & as needed 2 tabs daily       ketoconazole (NIZORAL) 2 % shampoo Use daily as needed 120 mL 11     atorvastatin (LIPITOR) 80 MG tablet Take 1 tablet  (80 mg) by mouth daily 90 tablet 3     fluticasone (FLONASE) 50 MCG/ACT spray Spray 2 sprays into both nostrils daily 16 g 11     lisinopril (PRINIVIL/ZESTRIL) 20 MG tablet Take 1 tablet (20 mg) by mouth daily 90 tablet 3     ciclopirox (LOPROX) 0.77 % cream Apply topically At Bedtime 90 g 3     desonide (DESOWEN) 0.05 % cream Apply sparingly to affected area on face in morning 60 g 2     nitroglycerin (NITROSTAT) 0.4 MG SL tablet Place 1 tablet (0.4 mg) under the tongue every 5 minutes as needed for chest pain May repeat twice for a total of 3 tablets.  If chest pain not relieved, call 911 25 tablet 11     Doxycycline Hyclate (PERIOSTAT PO) Take 20 mg by mouth 2 times daily       aspirin 81 MG tablet Take 81 mg by mouth daily       Omega-3 Fatty Acids (OMEGA-3 FISH OIL PO) Take 3.2 g by mouth daily        loratadine (CLARITIN) 10 MG tablet Take 10 mg by mouth daily as needed for allergies       ketotifen (ZADITOR/REFRESH ANTI-ITCH) 0.025 % SOLN Place 1-2 drops into both eyes 2 times daily as needed for itching       OXcarbazepine (TRILEPTAL PO) Take 300 mg by mouth 2 times daily       GABAPENTIN PO Take 600 mg by mouth 2 times daily        Multiple Vitamin (MULTIVITAMIN OR) Take 1 tablet by mouth daily        Medications reviewed:  Medications reconciled to facility chart and changes were made to reflect current medications as identified as above med list. Below are the changes that were made:   Medications stopped since last EPIC medication reconciliation:   Medications Discontinued During This Encounter   Medication Reason     ketoconazole (NIZORAL) 2 % cream      fluorouracil (EFUDEX) 5 % cream        Medications started since last [facility] medication reconciliation:  No orders of the defined types were placed in this encounter.        REVIEW OF SYSTEMS:  10 point ROS of systems including Constitutional, Eyes, Respiratory, Cardiovascular, Gastroenterology, Genitourinary, Integumentary, Muscularskeletal, Psychiatric  "were all negative except for pertinent positives noted in my HPI.    Physical Exam:  /74  Pulse 81  Temp 98.2  F (36.8  C)  Resp 18  Ht 5' 11\" (1.803 m)  Wt (!) 307 lb 3.2 oz (139.3 kg)  SpO2 94%  BMI 42.85 kg/m2  GENERAL APPEARANCE:  Alert, in no distress  ENT:  Mouth and posterior oropharynx normal, moist mucous membranes  EYES:  EOM, conjunctivae, lids, pupils and irises normal  NECK:  No adenopathy,masses or thyromegaly  RESP:  respiratory effort and palpation of chest normal, lungs clear to auscultation , no respiratory distress  CV:  Palpation and auscultation of heart done , regular rate and rhythm, no murmur, rub, or gallop  ABDOMEN:  normal bowel sounds, soft, nontender, no hepatosplenomegaly or other masses  M/S:    lying in bed  SKIN:  Inspection of skin and subcutaneous tissue left knee incision noted: has aqaucell intact with drainage noted beneath drsg, no redness noted, NEURO:   Cranial nerves 2-12 are normal tested and grossly at patient's baseline  PSYCH:  oriented X 3     BP: 115-125/64-74  P:   Wt Readings from Last 5 Encounters:   05/15/17 (!) 307 lb 3.2 oz (139.3 kg)   05/12/17 (!) 338 lb (153.3 kg)   04/21/17 (!) 347 lb 8 oz (157.6 kg)   04/20/17 (!) 338 lb 12.8 oz (153.7 kg)   12/12/16 (!) 337 lb 8 oz (153.1 kg)         Recent Labs:    CBC RESULTS:   Recent Labs   Lab Test  05/15/17   0615  04/20/17   1148   02/11/16   1115   WBC  9.8   --    --   7.6   RBC  4.23*   --    --   4.57   HGB  13.3  14.6   < >  14.4   HCT  39.6*   --    --   42.5   MCV  94   --    --   93   MCH  31.4   --    --   31.5   MCHC  33.6   --    --   33.9   RDW  14.4   --    --   13.9   PLT  260   --    --   154    < > = values in this interval not displayed.       Last Basic Metabolic Panel:  Recent Labs   Lab Test  05/15/17   0615  04/20/17   1148   NA  142  136   POTASSIUM  4.3  4.6   CHLORIDE  103  101   BENJI  8.8  9.0   CO2  30  27   BUN  12  14   CR  0.72  0.76   GLC  92  96         TSH   Date " Value Ref Range Status   04/20/2017 1.12 0.40 - 4.00 mU/L Final   09/22/2015 1.42 0.40 - 4.00 mU/L Final           Assessment/Plan:     Status post total left knee replacement  Benign hypertension  Atrial fibrillation, unspecified type (H)  Other constipation  Physical deconditioning     No new issues, vitals & lab stable; patient reports he had a bowel movement, pain managed  Continue on current medications to treat, monitor   Continue therapies  Follow up ortho 5/25      Time 25min    Electronically signed by  NAV Busby CNP

## 2017-05-17 ENCOUNTER — NURSING HOME VISIT (OUTPATIENT)
Dept: GERIATRICS | Facility: CLINIC | Age: 72
End: 2017-05-17
Payer: COMMERCIAL

## 2017-05-17 VITALS
BODY MASS INDEX: 43.01 KG/M2 | TEMPERATURE: 98.9 F | RESPIRATION RATE: 18 BRPM | WEIGHT: 307.2 LBS | HEIGHT: 71 IN | DIASTOLIC BLOOD PRESSURE: 91 MMHG | SYSTOLIC BLOOD PRESSURE: 136 MMHG | HEART RATE: 75 BPM | OXYGEN SATURATION: 92 %

## 2017-05-17 DIAGNOSIS — I25.10 CORONARY ARTERY DISEASE INVOLVING NATIVE CORONARY ARTERY OF NATIVE HEART, ANGINA PRESENCE UNSPECIFIED: ICD-10-CM

## 2017-05-17 DIAGNOSIS — I10 BENIGN HYPERTENSION: ICD-10-CM

## 2017-05-17 DIAGNOSIS — Z96.652 STATUS POST TOTAL LEFT KNEE REPLACEMENT: Primary | ICD-10-CM

## 2017-05-17 PROCEDURE — 99316 NF DSCHRG MGMT 30 MIN+: CPT | Performed by: NURSE PRACTITIONER

## 2017-05-17 PROCEDURE — 99207 ZZC CDG-CORRECTLY CODED, REVIEWED AND AGREE: CPT | Performed by: NURSE PRACTITIONER

## 2017-05-17 NOTE — PROGRESS NOTES
[facility] DISCHARGE SUMMARY    PATIENT'S NAME: James Salvador  YOB: 1945  MEDICAL RECORD NUMBER:  [id number]    PRIMARY CARE PROVIDER AND CLINIC RESPONSIBLE AFTER TRANSFER: Jeronimo Painter [facility] [address]  / [facility] [id number]    CODE STATUS/ADVANCE DIRECTIVES DISCUSSION:   CPR/Full code        Allergies   Allergen Reactions     Pollen Extract        TRANSFERRING PROVIDERS: NAV Busby CNP, Stein, MD  DATE OF SNF ADMISSION:  May / 11 / 2017  DATE OF SNF (anticipated) DISCHARGE: May / 19 / 2017  DISCHARGE DISPOSITION: FM Provider   Nursing Facility: [facility]  Muslim  stay 5/8/17 to 5/11/17.     Condition on Discharge:  Stable.  Function/Cognitive Scores/ Equipment:  Independent with activities of daily living, transfers and ambulation unlimited per therapies    DISCHARGE DIAGNOSIS:   1. Status post total left knee replacement    2. Benign hypertension    3. Coronary artery disease involving native coronary artery of native heart, angina presence unspecified        [facility] Course:    Patient James Salvador is a 71 yr old male admitted to [facility] for rehabilitation s/p hospitalization for left knee replacement by surgeon .  Patient has PMH of CAD with myocardial infarction, history PCI 1994, BRIDGET to LAD 2015, hypertension/HLD, CHF, stable angina, paroxysmal supraventricular tachycardia, obesity, & former smoker. Past surgical history of right & left hip & ankle surgery        Status post total left knee replacement  Incision dressing changes as ordered, staples intact today, no abnormal drainage noted today, very mild pinkness on bottom edge of incision; staff removed old dressing that was coming off yesterday & noted to have old drainage on dressing with green/yellow tinge; no abnormal drainage noted today; afebrile; negative ultrasound leg today (has edema, some pain)  Pain  managed with using oxycodone 5mg 1-2 x day, plan to wean off ; decreased oxycodone to 5mg 4xday as needed & discontinue scheduled in AM; patient also on tylenol  has knee immobilizer when up, weight bearing as tolerated ; has TG shapes, reminded to elevate legs  See therapies report above, plans to go home Friday with homecare  Follow up  surgeon 5/25  Patient was independently with activities of daily living; Patient has numbness in feet bilateral prior to hospital admission   Use bariatric walker, has cane  Lives with wife Renea, she is very involved  on eliquis x12 days & then start aspirin 162 mg x4 week then resume asa 81mg daily   Patient report regular elimination    Paroxysmal supraventricular tachycardia (H)  Hyperlipidemia with target LDL less than 70  Coronary artery disease involving native coronary artery of native heart, angina presence unspecified  Benign hypertension  New onset atrial fibrillation (H)  Resume aspirin 2 wks post surgery per hospital note; patient is on eliquis with stop date noted above  Using oxygen in hospital, no longer using    Has order for doxycycline on for periodontitis   Facial nerve pain/neuropathy  MAR states neurontin 3 x daily, had been on 2 x daily   On Trileptal for facial pain      CODE STATUS/ADVANCE DIRECTIVES DISCUSSION:   CPR/Full code   Patient's living condition: lives with spouse        PAST MEDICAL HISTORY:  has a past medical history of Actinic cheilitis (7/17/2013); Allergic rhinitis; Benign hypertension; CAD (coronary artery disease); History of myocardial infarction (1994); Hyperlipidemia LDL goal < 70; Paroxysmal supraventricular tachycardia (H); and Squamous cell carcinoma (H). He also has no past medical history of Basal cell carcinoma or Malignant melanoma (H).    DISCHARGE MEDICATIONS:  Current Outpatient Prescriptions   Medication Sig Dispense Refill     APIXABAN PO Take 5 mg by mouth 2 times daily  STOP DATE 5/23       METOPROLOL TARTRATE  PO Take 75 mg by mouth 2 times daily       Acetaminophen (TYLENOL PO) Take 650 mg by mouth 4 times daily        Docusate Sodium (COLACE PO) Take 100 mg by mouth 2 times daily       OXYCODONE HCL PO Take 5 mg by mouth 4 times daily as needed        senna-docusate (SENOKOT-S;PERICOLACE) 8.6-50 MG per tablet Take 2 tablets by mouth daily & as needed 2 tabs daily       atorvastatin (LIPITOR) 80 MG tablet Take 1 tablet (80 mg) by mouth daily 90 tablet 3     fluticasone (FLONASE) 50 MCG/ACT spray Spray 2 sprays into both nostrils daily 16 g 11     lisinopril (PRINIVIL/ZESTRIL) 20 MG tablet Take 1 tablet (20 mg) by mouth daily 90 tablet 3     ciclopirox (LOPROX) 0.77 % cream Apply topically At Bedtime 90 g 3     desonide (DESOWEN) 0.05 % cream Apply sparingly to affected area on face in morning 60 g 2     nitroglycerin (NITROSTAT) 0.4 MG SL tablet Place 1 tablet (0.4 mg) under the tongue every 5 minutes as needed for chest pain May repeat twice for a total of 3 tablets.  If chest pain not relieved, call 911 25 tablet 11     Doxycycline Hyclate (PERIOSTAT PO) Take 20 mg by mouth 2 times daily       aspirin 81 MG tablet Start 162mg on 5/24 -6/21 then resume aspirin 81MG daily       Omega-3 Fatty Acids (OMEGA-3 FISH OIL PO) Take 3.2 g by mouth daily        loratadine (CLARITIN) 10 MG tablet Take 10 mg by mouth daily as needed for allergies       ketotifen (ZADITOR/REFRESH ANTI-ITCH) 0.025 % SOLN Place 1-2 drops into both eyes 2 times daily as needed for itching       OXcarbazepine (TRILEPTAL PO) Take 300 mg by mouth 2 times daily       GABAPENTIN PO Take 600 mg by mouth 2 times daily        Multiple Vitamin (MULTIVITAMIN OR) Take 1 tablet by mouth daily          MEDICATION CHANGES/RATIONALE:     Controlled medications sent with patient: not applicable/none     ROS:    10 point ROS of systems including Constitutional, Eyes, Respiratory, Cardiovascular, Gastroenterology, Genitourinary, Integumentary, Muscularskeletal,  "Psychiatric were all negative except for pertinent positives noted in my HPI.    Physical Exam:   Vitals: BP (!) 136/91  Pulse 75  Temp 98.9  F (37.2  C)  Resp 18  Ht 5' 11\" (1.803 m)  Wt (!) 307 lb 3.2 oz (139.3 kg)  SpO2 92%  BMI 42.85 kg/m2  BMI= Body mass index is 42.85 kg/(m^2).    GENERAL APPEARANCE:  Alert, in no distress  Same as prior  SKIN:  Inspection of skin and subcutaneous tissue incision left knee staples intact, no drainage, has very mild pinkness lower end of incision, has edema ~+2 left lower leg      DISCHARGE PLAN:  Occupational Therapy and Physical Therapy  Patient instructed to follow-up with:  PCP in 7 days      [facility] scheduled appointments:  Future Appointments  Date Time Provider Department Center   6/8/2017 1:30 PM Rosa Maria Hansen PA-C [facility]   6/15/2017 2:15 PM Tereso Alexander MD [facility] CLIN   10/10/2017 9:00 AM Rosa Maria Hansen PA-C [facility]       MTM referral needed and placed by this provider: No      SNF labs   Last Basic Metabolic Panel:  Lab Results   Component Value Date     05/15/2017      Lab Results   Component Value Date    POTASSIUM 4.3 05/15/2017     Lab Results   Component Value Date    CHLORIDE 103 05/15/2017     Lab Results   Component Value Date    BENJI 8.8 05/15/2017     Lab Results   Component Value Date    CO2 30 05/15/2017     Lab Results   Component Value Date    BUN 12 05/15/2017     Lab Results   Component Value Date    CR 0.72 05/15/2017     Lab Results   Component Value Date    GLC 92 05/15/2017       Lab Results   Component Value Date    WBC 9.8 05/15/2017     Lab Results   Component Value Date    RBC 4.23 05/15/2017     Lab Results   Component Value Date    HGB 13.3 05/15/2017     Lab Results   Component Value Date    HCT 39.6 05/15/2017     No components found for: MCT  Lab Results   Component Value Date    MCV 94 05/15/2017     Lab Results   Component Value Date    MCH 31.4 05/15/2017     Lab Results   Component " Value Date    MCHC 33.6 05/15/2017     Lab Results   Component Value Date    RDW 14.4 05/15/2017     Lab Results   Component Value Date     05/15/2017         TOTAL DISCHARGE TIME:   Greater than 30 minutes  Electronically signed by:  NAV Busby CNP

## 2017-05-18 ENCOUNTER — NURSING HOME VISIT (OUTPATIENT)
Dept: GERIATRICS | Facility: CLINIC | Age: 72
End: 2017-05-18
Payer: COMMERCIAL

## 2017-05-18 ENCOUNTER — HOSPITAL LABORATORY (OUTPATIENT)
Dept: OTHER | Facility: CLINIC | Age: 72
End: 2017-05-18

## 2017-05-18 DIAGNOSIS — I10 BENIGN HYPERTENSION: ICD-10-CM

## 2017-05-18 DIAGNOSIS — I48.91 ATRIAL FIBRILLATION, UNSPECIFIED TYPE (H): ICD-10-CM

## 2017-05-18 DIAGNOSIS — I25.10 CORONARY ARTERY DISEASE INVOLVING NATIVE CORONARY ARTERY OF NATIVE HEART, ANGINA PRESENCE UNSPECIFIED: ICD-10-CM

## 2017-05-18 DIAGNOSIS — Z96.652 STATUS POST TOTAL LEFT KNEE REPLACEMENT: Primary | ICD-10-CM

## 2017-05-18 DIAGNOSIS — K59.09 OTHER CONSTIPATION: ICD-10-CM

## 2017-05-18 LAB
ANION GAP SERPL CALCULATED.3IONS-SCNC: 7 MMOL/L (ref 3–14)
BUN SERPL-MCNC: 13 MG/DL (ref 7–30)
CALCIUM SERPL-MCNC: 9.2 MG/DL (ref 8.5–10.1)
CHLORIDE SERPL-SCNC: 103 MMOL/L (ref 94–109)
CO2 SERPL-SCNC: 29 MMOL/L (ref 20–32)
CREAT SERPL-MCNC: 0.76 MG/DL (ref 0.66–1.25)
ERYTHROCYTE [DISTWIDTH] IN BLOOD BY AUTOMATED COUNT: 14 % (ref 10–15)
GFR SERPL CREATININE-BSD FRML MDRD: ABNORMAL ML/MIN/1.7M2
GLUCOSE SERPL-MCNC: 113 MG/DL (ref 70–99)
HCT VFR BLD AUTO: 37.6 % (ref 40–53)
HGB BLD-MCNC: 13 G/DL (ref 13.3–17.7)
MCH RBC QN AUTO: 32 PG (ref 26.5–33)
MCHC RBC AUTO-ENTMCNC: 34.6 G/DL (ref 31.5–36.5)
MCV RBC AUTO: 93 FL (ref 78–100)
PLATELET # BLD AUTO: 227 10E9/L (ref 150–450)
POTASSIUM SERPL-SCNC: 4.8 MMOL/L (ref 3.4–5.3)
RBC # BLD AUTO: 4.06 10E12/L (ref 4.4–5.9)
SODIUM SERPL-SCNC: 139 MMOL/L (ref 133–144)
WBC # BLD AUTO: 8.5 10E9/L (ref 4–11)

## 2017-05-18 PROCEDURE — 99305 1ST NF CARE MODERATE MDM 35: CPT | Performed by: INTERNAL MEDICINE

## 2017-05-18 PROCEDURE — 99207 ZZC CDG-CORRECTLY CODED, REVIEWED AND AGREE: CPT | Performed by: INTERNAL MEDICINE

## 2017-05-20 NOTE — PROGRESS NOTES
[facility]  PRIMARY CARE PROVIDER AND CLINIC:  Jeronimo Painter [facility] [address] / [facility] [address]*    Pt was seen by Dr Tellez on 5/18/17 for an initial TCU visit      HPI:    James Salvador is a 71 year old  (1945),admitted to the [facility] from [facility].  Hospital stay 5/8/17 through 5/11/17.      Admitted to this facility for  rehab, medical management and nursing care.     Pt is a 71 yr old male admitted to [facility] for rehabilitation s/p hospitalization for left knee replacement by  .  Patient has PMH of CAD with myocardial infarction, history PCI 1994, BRIDGET to LAD 2015, hypertension/HLD, CHF, stable angina, paroxysmal supraventricular tachycardia, obesity, & former smoker. Past surgical history of right & left hip & ankle surgery  Was found to have (new dx) afib pre op    Post op course was uncomplicated    Pt notes fair pain control L knee  He has developed significant edema L LE below the knee.  A venous doppler was neg for DVT last pm  He denies chest pain, SOB, dizziness, abd pain  He has chronic facial pain tx with trileptal  He has been constipated since surgery      CODE STATUS/ADVANCE DIRECTIVES DISCUSSION:   CPR/Full code   Patient's living condition: lives with spouse    ALLERGIES:Pollen extract  PAST MEDICAL HISTORY:  has a past medical history of Actinic cheilitis (7/17/2013); Allergic rhinitis; Benign hypertension; CAD (coronary artery disease); History of myocardial infarction (1994); Hyperlipidemia LDL goal < 70; Paroxysmal supraventricular tachycardia (H); and Squamous cell carcinoma (H). He also has no past medical history of Basal cell carcinoma or Malignant melanoma (H).  PAST SURGICAL HISTORY:  has a past surgical history that includes angioplasty (1994); laser surgery of eye (06/01/2002); Mohs micrographic procedure (06/12/2004); Ankle surgery (7/13/2005); joint replacement, hip rt/lt  (10/14/2009); sinus surgery (7/11/2006); and Heart Cath Stent cor w/wo ptca (9/23/2015).  FAMILY HISTORY: family history includes Cardiovascular in his father; Depression in his son; Genitourinary Problems in his mother.  SOCIAL HISTORY:  reports that he quit smoking about 30 years ago. His smoking use included Cigarettes. He quit after 10.00 years of use. He has never used smokeless tobacco. He reports that he drinks alcohol. He reports that he does not use illicit drugs.        Post Discharge Medication Reconciliation Status:   .  Current Outpatient Prescriptions   Medication Sig Dispense Refill     APIXABAN PO Take 5 mg by mouth 2 times daily       METOPROLOL TARTRATE PO Take 75 mg by mouth 2 times daily       Acetaminophen (TYLENOL PO) Take 650 mg by mouth 4 times daily        Docusate Sodium (COLACE PO) Take 100 mg by mouth 2 times daily       OXYCODONE HCL PO Take 5 mg by mouth 4 times daily as needed        senna-docusate (SENOKOT-S;PERICOLACE) 8.6-50 MG per tablet Take 2 tablets by mouth daily & as needed 2 tabs daily       atorvastatin (LIPITOR) 80 MG tablet Take 1 tablet (80 mg) by mouth daily 90 tablet 3     fluticasone (FLONASE) 50 MCG/ACT spray Spray 2 sprays into both nostrils daily 16 g 11     lisinopril (PRINIVIL/ZESTRIL) 20 MG tablet Take 1 tablet (20 mg) by mouth daily 90 tablet 3     ciclopirox (LOPROX) 0.77 % cream Apply topically At Bedtime 90 g 3     desonide (DESOWEN) 0.05 % cream Apply sparingly to affected area on face in morning 60 g 2     nitroglycerin (NITROSTAT) 0.4 MG SL tablet Place 1 tablet (0.4 mg) under the tongue every 5 minutes as needed for chest pain May repeat twice for a total of 3 tablets.  If chest pain not relieved, call 911 25 tablet 11     Doxycycline Hyclate (PERIOSTAT PO) Take 20 mg by mouth 2 times daily       [START ON 5/24/2017] aspirin 81 MG tablet Take 162 mg by mouth daily        Omega-3 Fatty Acids (OMEGA-3 FISH OIL PO) Take 3.2 g by mouth daily        loratadine  (CLARITIN) 10 MG tablet Take 10 mg by mouth daily as needed for allergies       ketotifen (ZADITOR/REFRESH ANTI-ITCH) 0.025 % SOLN Place 1-2 drops into both eyes 2 times daily as needed for itching       OXcarbazepine (TRILEPTAL PO) Take 300 mg by mouth 2 times daily       GABAPENTIN PO Take 600 mg by mouth 2 times daily        Multiple Vitamin (MULTIVITAMIN OR) Take 1 tablet by mouth daily          ROS:  10 point ROS neg except as noted above        Exam:    GENERAL APPEARANCE:  Alert, in no distress, obese male, sitting up in chair  ENT:  Mouth and posterior oropharynx normal, moist mucous membranes  EYES:  EOM, conjunctivae, lids, pupils and irises normal  NECK:  No adenopathy,masses or thyromegaly  RESP RR 12, unlabored, lungs clear  CV:  irregular rhythm, HR 60s  L knee incision was not examined  3 + edema L calf, non-tender, no erythema  Trace edema R calf  SKIN:  Inspection of skin and subcutaneous tissue baseline  NEURO:   Cranial nerves 2-12 are normal tested and grossly at patient's baseline  PSYCH:  oriented X 3       Lab/Diagnostic data:  CBC RESULTS:   Recent Labs   Lab Test  04/20/17   1148  11/01/16   1122  02/11/16   1115  09/24/15   0534   WBC   --    --   7.6  9.6   RBC   --    --   4.57  4.67   HGB  14.6  14.2  14.4  14.7   HCT   --    --   42.5  42.3   MCV   --    --   93  91   MCH   --    --   31.5  31.5   MCHC   --    --   33.9  34.8   RDW   --    --   13.9  13.9   PLT   --    --   154  165       Last Basic Metabolic Panel:  Recent Labs   Lab Test  04/20/17   1148  12/08/16   1040   NA  136  135   POTASSIUM  4.6  4.6   CHLORIDE  101  101   BENJI  9.0  9.3   CO2  27  27   BUN  14  13   CR  0.76  0.71   GLC  96  86       TSH   Date Value Ref Range Status   04/20/2017 1.12 0.40 - 4.00 mU/L Final   09/22/2015 1.42 0.40 - 4.00 mU/L Final         ASSESSMENT/PLAN:    Status post total left knee replacement  Schedule oxycodone IR 5mg AM, continue as needed order  R calf edema secondary to venous  insufficiency, no evidence of DVT  Plan PT/OT, compression stockings, Eliquis for afib. Bowel regimen.    Coronary artery disease involving native coronary artery of native heart, angina presence unspecified  Benign hypertension  New onset atrial fibrillation (H)  HR and BP stable  Plan Continue current medications.  Eliquis has been started, to be continued indefinitely for afib. ASA ( stent) to be started, in addition, in 2 weeks.    Esdras Tellez MD

## 2017-05-22 ENCOUNTER — CARE COORDINATION (OUTPATIENT)
Dept: CARE COORDINATION | Facility: CLINIC | Age: 72
End: 2017-05-22

## 2017-05-22 ENCOUNTER — THERAPY VISIT (OUTPATIENT)
Dept: PHYSICAL THERAPY | Facility: CLINIC | Age: 72
End: 2017-05-22
Payer: MEDICARE

## 2017-05-22 ENCOUNTER — TELEPHONE (OUTPATIENT)
Dept: INTERNAL MEDICINE | Facility: CLINIC | Age: 72
End: 2017-05-22

## 2017-05-22 ENCOUNTER — OFFICE VISIT (OUTPATIENT)
Dept: INTERNAL MEDICINE | Facility: CLINIC | Age: 72
End: 2017-05-22
Payer: COMMERCIAL

## 2017-05-22 VITALS
HEART RATE: 53 BPM | BODY MASS INDEX: 45.72 KG/M2 | OXYGEN SATURATION: 93 % | WEIGHT: 315 LBS | SYSTOLIC BLOOD PRESSURE: 106 MMHG | TEMPERATURE: 99.5 F | DIASTOLIC BLOOD PRESSURE: 76 MMHG

## 2017-05-22 DIAGNOSIS — I48.91 NEW ONSET ATRIAL FIBRILLATION (H): ICD-10-CM

## 2017-05-22 DIAGNOSIS — I25.10 CORONARY ARTERY DISEASE INVOLVING NATIVE CORONARY ARTERY OF NATIVE HEART, ANGINA PRESENCE UNSPECIFIED: ICD-10-CM

## 2017-05-22 DIAGNOSIS — M25.562 LEFT KNEE PAIN: Primary | ICD-10-CM

## 2017-05-22 DIAGNOSIS — Z47.1 AFTERCARE FOLLOWING JOINT REPLACEMENT: ICD-10-CM

## 2017-05-22 DIAGNOSIS — Z96.652 STATUS POST TOTAL LEFT KNEE REPLACEMENT: Primary | ICD-10-CM

## 2017-05-22 PROCEDURE — 99214 OFFICE O/P EST MOD 30 MIN: CPT | Performed by: INTERNAL MEDICINE

## 2017-05-22 PROCEDURE — 97110 THERAPEUTIC EXERCISES: CPT | Mod: GP | Performed by: PHYSICAL THERAPIST

## 2017-05-22 PROCEDURE — G8979 MOBILITY GOAL STATUS: HCPCS | Mod: GP | Performed by: PHYSICAL THERAPIST

## 2017-05-22 PROCEDURE — 97161 PT EVAL LOW COMPLEX 20 MIN: CPT | Mod: GP | Performed by: PHYSICAL THERAPIST

## 2017-05-22 PROCEDURE — G8978 MOBILITY CURRENT STATUS: HCPCS | Mod: GP | Performed by: PHYSICAL THERAPIST

## 2017-05-22 NOTE — LETTER
DEPARTMENT OF HEALTH AND HUMAN SERVICES  CENTERS FOR MEDICARE & MEDICAID SERVICES    PLAN/UPDATED PLAN OF PROGRESS FOR OUTPATIENT REHABILITATION    PATIENT NAME:  James Salvador   : 1945  PROVIDER NUMBER:    [id number]  [id number]   PROVIDER NAME: [email or web address] [place]  MEDICAL RECORD NUMBER: [id number]   START OF CARE DATE:  SOC Date: 17   TYPE:  PT  PRIMARY/TREATMENT DIAGNOSIS: (Pertinent Medical Diagnosis)  Left knee pain  Aftercare following joint replacement  VISITS FROM START OF CARE:  Rxs Used: 1     Subjective:  Patient is a 71 year old male presenting with rehab left knee hpi. The history is provided by the patient. No  was used.   James Salvador is a 71 year old male with a left knee condition.  This is a new condition  S/P TKR on 2017.  No complications.  Patient c/o impaired ambulation on levels and stairs.  He is not driving.  Transitional movements are difficult.  Patient returned home from TCU on 2017.    Patient reports pain: Posterior, medial, lateral and lower leg.  Pain is described as aching and is constant and reported as 4/10.  Associated symptoms: Loss of motion/stiffness, loss of strength and edema. Pain is the same all the time.  Symptoms are exacerbated by activity, walking, standing, descending stairs, ascending stairs, weight bearing, bending/squatting and transfers and relieved by rest, analgesics and ice.  Since onset symptoms are gradually improving.  Previous treatment includes physical therapy (TCU).  General health as reported by patient is good.  Pertinent medical history includes: Cancer, overweight, high blood pressure, heart problems and implanted device.  Medical allergies: no.  Other surgeries include: Orthopedic surgery.  Current medications: High blood pressure medication, pain medication and cardiac medication.  Current occupation is Retired.  Barriers include: None as reported by the patient.  Red flags:  None as reported by the patient.    Objective:  Gait:    Gait Type:  Antalgic   Assistive Devices:  Walker    Knee Evaluation:  AROM  Extension: Left: 15    Right:   Flexion: Left: 75   Right:  PROM  Extension: Left: 10   Right:   Flexion: Left: 85   Right:   Strength:   Extension:  Left: 4-/5    Pain:+        Flexion:  Left: 4/5    Pain:+        PATIENT NAME:  James Salvador   : 1945      Quad Set Left: Poor    Pain:   Edema:  Edema of the knee: 3/5 into LE - LE and ankle are tender to touch.  Kym's (-)    Assessment/Plan:    Patient is a 71-year-old male with left side knee complaints.    Patient has the following significant findings with corresponding treatment plan.                Diagnosis 1:  S/P TKR   Pain -  hot/cold therapy, self management, education and home program  Decreased ROM/flexibility - therapeutic exercise, therapeutic activity and home program  Decreased strength - therapeutic exercise, therapeutic activities and home program  Edema - cold therapy and self management/home program  Impaired gait - gait training and home program  Impaired muscle performance - neuro re-education and home program  Decreased function - therapeutic activities and home program    Therapy Evaluation Codes:   1) History comprised of:   Personal factors that impact the plan of care:  None.    Comorbidity factors that impact the plan of care are:     Cancer, Heart problems, High blood pressure and Overweight.     Medications impacting care: Cardiac, High blood pressure and Pain.  2) Examination of Body Systems comprised of:   Body structures and functions that impact the plan of care:  Knee.   Activity limitations that impact the plan of care are:     Bathing, Driving, Dressing, Sitting, Stairs, Standing and Walking.  3) Clinical presentation characteristics are: Stable/Uncomplicated.  4) Decision-Making:  Low complexity using standardized patient assessment instrument and/or measureable assessment of functional  "outcome.  Cumulative Therapy Evaluation is: Low complexity.    Previous and current functional limitations: (See Goal Flow Sheet for this information)    Short term and Long term goals: (See Goal Flow Sheet for this information)   Communication ability:  Patient appears to be able to clearly communicate and understand verbal and written communication and follow directions correctly.  Treatment Explanation - The following has been discussed with the patient: RX ordered/plan of care  Anticipated outcomes  Possible risks and side effects  This patient would benefit from PT intervention to resume normal activities.   Rehab potential is good.  Frequency:  2 X week, once daily  Duration:  for 8 weeks  Discharge Plan:  Achieve all LTG.  Independent in home treatment program.  Reach maximal therapeutic benefit.    PATIENT NAME:  James Salvador   : 1945            Caregiver Signature/Credentials _____________________________ Date __________           Treating Provider: Brian HILTON         I have reviewed and certified the need for these services and plan of treatment while under my care.    PHYSICIAN'S SIGNATURE:   _____________________________________  Date___________            Jv Nance        Certification period:  Beginning of Cert date period: 17 to  End of Cert period date: 17     Functional Level Progress Report: Please see attached \"Goal Flow sheet for Functional level.\"    ____X____ Continue Services or       ________ DC Services                Service dates: From  SOC Date: 17 date to present         "

## 2017-05-22 NOTE — TELEPHONE ENCOUNTER
See care coordination encounter .  This encounter closed     Nuria Mccormack RN  Care Coordinator-[facility]  [email or web address]  [phone]

## 2017-05-22 NOTE — PROGRESS NOTES
Clinic Care Coordination Contact  OUTREACH    Referral Information:  Referral Source: IP/TCU Report  Reason for Contact: Hospitalization 5/8-5/11/2017- /TCU discharge Left knee replacement  [facility]   Care Conference:  (No)     Universal Utilization:   ED Visits in last year: 0  Hospital visits in last year: 1  Last PCP appointment: 05/22/17  Missed Appointments: 2  Concerns:  (No)  Multiple Providers or Specialists:  (Yes)    Clinical Concerns:  Current Medical Concerns: CC met with the patient and his wife before PCP visit .   Patient presents with a walker.  Patient stays he had a past history of right shoulder and right hip replacement surgery and knows how important the PT is to get stronger  .  Patient will start outpatient PT.    Current Behavioral Concerns: not discussed   Education Provided to patient: CC stressed the importance of monitoring for constipation when on pain medication  and to look for signs of infection or blood clot   Clinical Pathway Name: None      Medication Management:  Reviewed      Functional Status:  Mobility Status: Independent w/Device  Equipment Currently Used at Home: walker, rolling     Psychosocial:  Current living arrangement:: I live in a private home with spouse     Resources and Interventions:  Current Resources:  (NA);  (NA)  PAS Number:  (NA)  Senior Linkage Line Referral Placed:  (NA)  Advanced Care Plans/Directives on file:: Yes  Referrals Placed:  (NA)     Goals:   Goal 1 Statement: I will increase my strength  Goal 1 Progression Percent: 50%  Goal 1 Progression Date: 05/22/17        Barriers: Deconditioned   Strengths: Patient has outpatient therapy   Patient/Caregiver understanding: Expresses good understanding of discharge instructions   Frequency of Care Coordination:  (1-2 weeks )        Plan: CC will follow up in 1-2 weeks     Nuria Mccormack RN  Care Coordinator-[facility]  [email or web address]  [phone]

## 2017-05-22 NOTE — LETTER
[facility] Home  Complex Care Plan  About Me  Patient Name:  James Grant    YOB: 1945  Age:   71 year old   Ana MRN: [id number] Telephone Information:     Home Phone [phone]   Mobile [phone]       Address:    [place] [address] Email address:  [email or web address]      Emergency Contact(s)  Name Relationship Lgl Grd Work Phone Home Phone Mobile Phone   1. CATHERINE GRANT* Spouse   [phone]   2. NSC    [phone]            Primary language:  English     needed? No   [employer]:  [phone] op. 1  Other communication barriers: No  Preferred Method of Communication:  LetterAdan, Phone  Current living arrangement: I live in a private home with spouse  Mobility Status/ Medical Equipment: Independent w/Device  Other information to know about me:    Health Maintenance  Health Maintenance Reviewed: Due/Overdue Hepatitis C screen     My Access Plan  Medical Emergency 911   Primary Clinic Line [facility]- [phone]   24 Hour Appointment Line [phone] or  [phone]) (toll-free)   24 Hour Nurse Line [phone] (toll-free)   Preferred Urgent Care [facility], [phone]   Preferred Hospital [facility]  [phone]   Preferred Pharmacy [id number] - [place], [place] - [address] AT [facility]     Behavioral Health Crisis Line [facility], [phone] or 911     My Care Team Members  Patient Care Team       Relationship Specialty Notifications Start End    Jeronimo Painter MD PCP - General Internal Medicine  10/31/12     Phone: [phone] Pager: [phone] Fax: [phone]        [facility] [address] [facility] [address]         My Care Plans  Self Management and Treatment Plan  Goals and (Comments)  Goal #1: I will increase my strength   I will do  my home exercises and keep future outpatient PT appointments    50% of goal reached       Action Plans on File: None  Advance Care Plans/Directives Type:   Type Advanced Care Plans/Directives: Advanced Directive - On File    My Medical and Care Information  Problem List   Patient Active Problem List   Diagnosis     Advance care planning     Hyperlipidemia with target LDL less than 70     Benign hypertension     Actinic keratoses     Paroxysmal supraventricular tachycardia (H)     History of myocardial infarction     Coronary artery disease involving native coronary artery of native heart, angina presence unspecified     Stable angina (H)     Morbid obesity due to excess calories (H)     Shoulder pain, right     New onset atrial fibrillation (H)     Status post total left knee replacement      Current Medications and Allergies:  See printed Medication Report.    Care Coordination Start Date: 05/22/17   Frequency of Care Coordination:  (1-2 weeks )   Form Last Updated: 05/22/2017

## 2017-05-22 NOTE — MR AVS SNAPSHOT
After Visit Summary   5/22/2017    James Salvador    MRN: [id number]           Patient Information     Date Of Birth          1945        Visit Information        Provider Department      5/22/2017 10:40 AM Jeronimo Painter [place] [facility]        Today's Diagnoses     Status post total left knee replacement    -  1    New onset atrial fibrillation (H)        Coronary artery disease involving native coronary artery of native heart, angina presence unspecified           Follow-ups after your visit        Your next 10 appointments already scheduled     May 22, 2017  2:30 PM CDT   JENN Extremity with Garrick Mcallister PT   [facility] [place] ([place]  )    [address] [place] [address]  [address]  [place] [address]   [phone]            May 24, 2017 10:40 AM CDT   JENN Extremity with Garrick Mcallister PT   [facility] [place] ([place]  )    [address] [place] [address]  [address]  [place] [address]   [phone]            May 31, 2017 10:40 AM CDT   JENN Extremity with Garrick Mcallister PT   [facility] [place] ([facility] [place]  )    [address] [place] [address]  [address]  [place] [address]   [phone]            Jun 02, 2017  9:40 AM CDT   JENN Extremity with Garrick Mcallister PT   [facility] [place] ([place]  )    [address] [place] [address]  [address]  [place] [address]   [phone]            Jun 08, 2017  1:30 PM CDT   PDT with Rosa Maria Hansen PA-C   [facility]    [address] [facility] [address]   [phone]            [place] 15, 2017  2:15 PM CDT   Return Visit with Tereso Alexander MD   [facility] [place] [facility]    [address]  [facility] [id number] [phone]            Oct 10, 2017  9:00 AM CDT   Return Visit with Rosa Maria Hansen PA-C   [facility] ([facility])    [address] [id number] [phone]              Who to contact     If you have questions or need follow up information about today's clinic visit or your schedule please contact [facility] directly at [phone].  Normal or non-critical lab and imaging results will be communicated to you by MyChart, letter or phone within 4 business days after the clinic has received the results. If you do not hear from us within 7 days, please contact the clinic through [employer] or phone. If you have a critical or abnormal lab result, we will notify you by phone as soon as possible.  Submit refill requests through [employer] or call your pharmacy and they will forward the refill request to us. Please allow 3 business days for your refill to be completed.          Additional Information About Your Visit        [employer] Information     [employer] gives you secure access to your electronic health record. If you see a primary care provider, you can also send messages to your care team and make appointments. If you have questions, please call your primary care clinic.  If you do not have a primary care provider, please call [phone] and they will assist you.        Care EveryWhere ID     This is your Care EveryWhere ID. This could be used by other organizations to access your [facility] medical records  [id number]        Your Vitals Were     Pulse Temperature Pulse Oximetry BMI (Body Mass Index)          53 99.5  F (37.5  C) (Oral) 93% 45.72 kg/m2         Blood Pressure from Last 3 Encounters:   05/22/17 106/76   05/17/17 (!) 136/91   05/15/17 115/74    Weight from Last 3 Encounters:   05/22/17 (!) 327 lb 12.5 oz (148.7 kg)   05/17/17 (!) 307 lb 3.2 oz (139.3 kg)   05/15/17 (!) 307 lb 3.2 oz (139.3 kg)              Today, you had the following     No orders found for display       Primary Care Provider Office Phone #  Fax #    Jeronimo Painter [phone]       [facility] [address] [facility] [address]        Thank you!     Thank you for choosing [facility]  for your care. Our goal is always to provide you with excellent care. Hearing back from our patients is one way we can continue to improve our services. Please take a few minutes to complete the written survey that you may receive in the mail after your visit with us. Thank you!             Your Updated Medication List - Protect others around you: Learn how to safely use, store and throw away your medicines at www.disposemymeds.org.          This list is accurate as of: 5/22/17 11:29 AM.  Always use your most recent med list.                   Brand Name Dispense Instructions for use    APIXABAN PO      Take 5 mg by mouth 2 times daily       aspirin 81 MG tablet   Start taking on:  5/24/2017      Take 162 mg by mouth daily Reported on 5/22/2017       atorvastatin 80 MG tablet    LIPITOR    90 tablet    Take 1 tablet (80 mg) by mouth daily       ciclopirox 0.77 % cream    LOPROX    90 g    Apply topically At Bedtime       COLACE PO      Take 100 mg by mouth 2 times daily       desonide 0.05 % cream    DESOWEN    60 g    Apply sparingly to affected area on face in morning       fluticasone 50 MCG/ACT spray    FLONASE    16 g    Spray 2 sprays into both nostrils daily       GABAPENTIN PO      Take 600 mg by mouth 2 times daily       ketotifen 0.025 % Soln ophthalmic solution    ZADITOR/REFRESH ANTI-ITCH     Place 1-2 drops into both eyes 2 times daily as needed for itching       lisinopril 20 MG tablet    PRINIVIL/ZESTRIL    90 tablet    Take 1 tablet (20 mg) by mouth daily       loratadine 10 MG tablet    CLARITIN     Take 10 mg by mouth daily as needed for allergies       METOPROLOL TARTRATE PO      Take 75 mg by mouth 2 times daily       MULTIVITAMIN PO      Take 1 tablet by mouth daily       nitroglycerin 0.4  MG sublingual tablet    NITROSTAT    25 tablet    Place 1 tablet (0.4 mg) under the tongue every 5 minutes as needed for chest pain May repeat twice for a total of 3 tablets.  If chest pain not relieved, call 911       OMEGA-3 FISH OIL PO      Take 3.2 g by mouth daily       OXYCODONE HCL PO      Take 5 mg by mouth 4 times daily as needed       PERIOSTAT PO      Take 20 mg by mouth 2 times daily       senna-docusate 8.6-50 MG per tablet    SENOKOT-S;PERICOLACE     Take 2 tablets by mouth daily & as needed 2 tabs daily       TRILEPTAL PO      Take 300 mg by mouth 2 times daily       TYLENOL PO      Take 650 mg by mouth 4 times daily

## 2017-05-22 NOTE — PROGRESS NOTES
Subjective:    Patient is a 71 year old male presenting with rehab left knee hpi. The history is provided by the patient. No  was used.   James Salvador is a 71 year old male with a left knee condition.      This is a new condition  S/P TKR on 5/8/2017.  No complications.  Patient c/o impaired ambulation on levels and stairs.  He is not driving.  Transitional movements are difficult.  Patient returned home from TCU on 5/18/2017.    Patient reports pain:  Posterior, medial, lateral and lower leg.    Pain is described as aching and is constant and reported as 4/10.  Associated symptoms:  Loss of motion/stiffness, loss of strength and edema. Pain is the same all the time.  Symptoms are exacerbated by activity, walking, standing, descending stairs, ascending stairs, weight bearing, bending/squatting and transfers and relieved by rest, analgesics and ice.  Since onset symptoms are gradually improving.    Previous treatment includes physical therapy (TCU).    General health as reported by patient is good.  Pertinent medical history includes:  Cancer, overweight, high blood pressure, heart problems and implanted device.  Medical allergies: no.  Other surgeries include:  Orthopedic surgery.  Current medications:  High blood pressure medication, pain medication and cardiac medication.  Current occupation is Retired.        Barriers include:  None as reported by the patient.    Red flags:  None as reported by the patient.                        Objective:      Gait:    Gait Type:  Antalgic   Assistive Devices:  Walker                                                        Knee Evaluation:  ROM:    AROM      Extension: Left: 15    Right:   Flexion: Left: 75   Right:  PROM      Extension: Left: 10   Right:   Flexion: Left: 85   Right:       Strength:     Extension:  Left: 4-/5    Pain:+        Flexion:  Left: 4/5    Pain:+        Quad Set Left: Poor    Pain:           Edema:  Edema of the knee: 3/5 into LE  - LE and ankle are tender to touch.  Kym's (-)            General     ROS    Assessment/Plan:      Patient is a 71 year old male with left side knee complaints.    Patient has the following significant findings with corresponding treatment plan.                Diagnosis 1:  S/P TKR   Pain -  hot/cold therapy, self management, education and home program  Decreased ROM/flexibility - therapeutic exercise, therapeutic activity and home program  Decreased strength - therapeutic exercise, therapeutic activities and home program  Edema - cold therapy and self management/home program  Impaired gait - gait training and home program  Impaired muscle performance - neuro re-education and home program  Decreased function - therapeutic activities and home program    Therapy Evaluation Codes:   1) History comprised of:   Personal factors that impact the plan of care:      None.    Comorbidity factors that impact the plan of care are:      Cancer, Heart problems, High blood pressure and Overweight.     Medications impacting care: Cardiac, High blood pressure and Pain.  2) Examination of Body Systems comprised of:   Body structures and functions that impact the plan of care:      Knee.   Activity limitations that impact the plan of care are:      Bathing, Driving, Dressing, Sitting, Stairs, Standing and Walking.  3) Clinical presentation characteristics are:   Stable/Uncomplicated.  4) Decision-Making    Low complexity using standardized patient assessment instrument and/or measureable assessment of functional outcome.  Cumulative Therapy Evaluation is: Low complexity.    Previous and current functional limitations:  (See Goal Flow Sheet for this information)    Short term and Long term goals: (See Goal Flow Sheet for this information)     Communication ability:  Patient appears to be able to clearly communicate and understand verbal and written communication and follow directions correctly.  Treatment Explanation - The following  has been discussed with the patient:   RX ordered/plan of care  Anticipated outcomes  Possible risks and side effects  This patient would benefit from PT intervention to resume normal activities.   Rehab potential is good.    Frequency:  2 X week, once daily  Duration:  for 8 weeks  Discharge Plan:  Achieve all LTG.  Independent in home treatment program.  Reach maximal therapeutic benefit.    Please refer to the daily flowsheet for treatment today, total treatment time and time spent performing 1:1 timed codes.

## 2017-05-22 NOTE — PROGRESS NOTES
SUBJECTIVE:                                                    James Salvador is a 71 year old male who presents to clinic today for the following health issues:    Hospital Follow-up Visit:    Hospital/Nursing Home/IP Rehab Facility: [facility]  Date of Admission: 5-8-17 & 5-11-17  Date of Discharge: 5-11-17 & 5-18-17  Reason(s) for Admission: Knee replacement            Problems taking medications regularly:  None       Medication changes since discharge: None       Problems adhering to non-medication therapy:  None    Summary of hospitalization:  CareEverywhere information obtained and reviewed  Diagnostic Tests/Treatments reviewed.  Follow up needed: none  Other Healthcare Providers Involved in Patient s Care:         Specialist appointment - CV and Surgical follow-up appointment - orthopedics  Update since discharge: improved.     Post Discharge Medication Reconciliation: discharge medications reconciled and changed, per note/orders (see AVS).  Plan of care communicated with patient        Patient was admitted to the hospital after tolerating the procedure listed below. Physical and occupational therapy were consulted for post-acute evaluation and treatment and the patient progressed well. Pain was controlled with oral pain medications and they had return of bowel function. The hospital internists have been consulted for ammon-operative co-management. This has been an otherwise uncomplicated hospital stay.   Since his d/c home from rehab he reports overall feeling well. Knee pain continues and still has good amount of LLE edema     Problem list and histories reviewed & adjusted, as indicated.  Additional history: as documented    Labs reviewed in EPIC    Reviewed and updated as needed this visit by clinical staff  Allergies       Reviewed and updated as needed this visit by Provider         ROS:  Constitutional, HEENT, cardiovascular, pulmonary, gi and gu systems are  negative, except as otherwise noted.    OBJECTIVE:                                                    /76  Pulse 53  Temp 99.5  F (37.5  C) (Oral)  Wt (!) 327 lb 12.5 oz (148.7 kg)  SpO2 93%  BMI 45.72 kg/m2  Body mass index is 45.72 kg/(m^2).  GENERAL APPEARANCE: alert, no distress and over weight  HENT: nose and mouth without ulcers or lesions  NECK: no adenopathy, no asymmetry, masses, or scars and thyroid normal to palpation  RESP: lungs clear to auscultation - no rales, rhonchi or wheezes  CV: regular rates and rhythm, normal S1 S2, no S3 or S4 and no murmur, click or rub  MS: LLE swollen, surgical site w/bandage but otherwise normal.   SKIN: LLE with some erythema distally.      Diagnostic test results:       ASSESSMENT/PLAN:                                                    1. Status post total left knee replacement  Per ortho   Was told to take 162 mg asa for 2 wks starting today? Not needed- continue the eliquis and resume asa at 81 mg below     2. New onset atrial fibrillation (H)  Continue eliquis  Remains rate controlled    3. Coronary artery disease involving native coronary artery of native heart, angina presence unspecified  Resume 81 mg asa now that he is 2 wks post op      Follow up with Provider - prn      Jeronimo Painter MD  [facility]

## 2017-05-22 NOTE — NURSING NOTE
"Chief Complaint   Patient presents with     Hospital F/U       Initial BP 98/64  Pulse 53  Temp 99.5  F (37.5  C) (Oral)  Wt (!) 327 lb 12.5 oz (148.7 kg)  SpO2 93%  BMI 45.72 kg/m2 Estimated body mass index is 45.72 kg/(m^2) as calculated from the following:    Height as of 5/17/17: 5' 11\" (1.803 m).    Weight as of this encounter: 327 lb 12.5 oz (148.7 kg).  Medication Reconciliation: complete    "

## 2017-05-22 NOTE — PROGRESS NOTES
CORRECTION with ELIQUIS;  patient had hospital orders for stop date of eliquis after 2 wks post surgery; however, on further review patient is to stay on indefinitely due to Afib  Follow up primary care provider today  primary care provider & patient updated

## 2017-05-22 NOTE — LETTER
[facility]  [address]  [address]  Phone  [phone]  Fax   [phone]          Dear James,      It was a pleasure to speak with you at your office visit visit on Monday .    I would like to provide you with the enclosed information for your records.  As part of care coordination, we are developing care plans to assist in accomplishing your health care goals.  When we speak next, please feel free to let me know if you want to add or change any information on your care plans.    As always, feel free to contact me if you have any questions or concerns.  I look forward to working with you in the effort to achieve your health care and wellness goals .        Sincerely,        Nuria Mccormack RN, Care Coordinator  [facility]   [email or web address]   [phone]

## 2017-05-24 ENCOUNTER — THERAPY VISIT (OUTPATIENT)
Dept: PHYSICAL THERAPY | Facility: CLINIC | Age: 72
End: 2017-05-24
Payer: MEDICARE

## 2017-05-24 DIAGNOSIS — M25.562 LEFT KNEE PAIN: ICD-10-CM

## 2017-05-24 DIAGNOSIS — Z47.1 AFTERCARE FOLLOWING JOINT REPLACEMENT: ICD-10-CM

## 2017-05-24 PROCEDURE — 97110 THERAPEUTIC EXERCISES: CPT | Mod: GP | Performed by: PHYSICAL THERAPIST

## 2017-05-25 ENCOUNTER — CARE COORDINATION (OUTPATIENT)
Dept: CARE COORDINATION | Facility: CLINIC | Age: 72
End: 2017-05-25

## 2017-05-25 NOTE — PROGRESS NOTES
Clinic Care Coordination Contact  OUTREACH    Referral Information:  Referral Source: IP/TCU Report  Reason for Contact: Hospitalization 5/8-5/11/2017- /TCU discharge Left knee replacement  [facility]     Care Conference: No     Universal Utilization:   ED Visits in last year: 0  Hospital visits in last year: 1  Last PCP appointment: 05/22/17  Missed Appointments: 2  Concerns:  (No)  Multiple Providers or Specialists:  (Yes)    Clinical Concerns:  Current Medical Concerns: Patient reports he is getting around well with his walker  Patient continues home exercises and outpatient PT 2 times a week.  Patient continues to have some ankle soreness on the surgical size after exercise   Patient has a follow up appointment with the Cardiologist Mylene 15 and the  Orthopedic provider June 20   Current Behavioral Concerns: Not discussed    Education Provided to patient: CC stressed the importance of call Orthopedic provider with questions or concerns.  Patient agrees with the plan   Clinical Pathway Name: None  Clinical Pathway: None    Medication Management:  Not discussed      Functional Status:  Mobility Status: Independent w/Device  Equipment Currently Used at Home: walker, rolling  Psychosocial:  Current living arrangement:: I live in a private home with spouse     Resources and Interventions:  Current Resources:  (NA);  (NA)  PAS Number:  (NA)  Senior Linkage Line Referral Placed:  (NA)  Advanced Care Plans/Directives on file:: Yes  Referrals Placed:  (NA)     Goals:   Goal 1 Statement: I will increase my strength  Goal 1 Progression Percent: 50%  Goal 1 Progression Date: 05/25/17         Barriers: deconditioned due to recent surgery   Strengths: Continues outpatient PT     Frequency of Care Coordination:  (1-2 weeks )        Plan: CC will follow up in 1-2 weeks     Nuria Mccormack RN / Clinical Care Coordinator     [address] [address]  [email or web address]  Office :  [phone] / Fax :  [phone]

## 2017-05-31 ENCOUNTER — THERAPY VISIT (OUTPATIENT)
Dept: PHYSICAL THERAPY | Facility: CLINIC | Age: 72
End: 2017-05-31
Payer: MEDICARE

## 2017-05-31 DIAGNOSIS — Z47.1 AFTERCARE FOLLOWING JOINT REPLACEMENT: ICD-10-CM

## 2017-05-31 DIAGNOSIS — M25.562 LEFT KNEE PAIN: ICD-10-CM

## 2017-05-31 PROCEDURE — 97112 NEUROMUSCULAR REEDUCATION: CPT | Mod: GP | Performed by: PHYSICAL THERAPIST

## 2017-05-31 PROCEDURE — 97110 THERAPEUTIC EXERCISES: CPT | Mod: GP | Performed by: PHYSICAL THERAPIST

## 2017-06-05 ENCOUNTER — THERAPY VISIT (OUTPATIENT)
Dept: PHYSICAL THERAPY | Facility: CLINIC | Age: 72
End: 2017-06-05
Payer: MEDICARE

## 2017-06-05 DIAGNOSIS — Z47.1 AFTERCARE FOLLOWING JOINT REPLACEMENT: ICD-10-CM

## 2017-06-05 DIAGNOSIS — M25.562 LEFT KNEE PAIN: ICD-10-CM

## 2017-06-05 PROCEDURE — 97112 NEUROMUSCULAR REEDUCATION: CPT | Mod: GP | Performed by: PHYSICAL THERAPIST

## 2017-06-05 PROCEDURE — 97110 THERAPEUTIC EXERCISES: CPT | Mod: GP | Performed by: PHYSICAL THERAPIST

## 2017-06-05 PROCEDURE — 97010 HOT OR COLD PACKS THERAPY: CPT | Mod: GP | Performed by: PHYSICAL THERAPIST

## 2017-06-07 ENCOUNTER — THERAPY VISIT (OUTPATIENT)
Dept: PHYSICAL THERAPY | Facility: CLINIC | Age: 72
End: 2017-06-07
Payer: MEDICARE

## 2017-06-07 DIAGNOSIS — M25.562 LEFT KNEE PAIN: ICD-10-CM

## 2017-06-07 DIAGNOSIS — Z47.1 AFTERCARE FOLLOWING JOINT REPLACEMENT: ICD-10-CM

## 2017-06-07 PROCEDURE — 97110 THERAPEUTIC EXERCISES: CPT | Mod: GP | Performed by: PHYSICAL THERAPIST

## 2017-06-07 PROCEDURE — 97112 NEUROMUSCULAR REEDUCATION: CPT | Mod: GP | Performed by: PHYSICAL THERAPIST

## 2017-06-12 ENCOUNTER — THERAPY VISIT (OUTPATIENT)
Dept: PHYSICAL THERAPY | Facility: CLINIC | Age: 72
End: 2017-06-12
Payer: MEDICARE

## 2017-06-12 ENCOUNTER — PRE VISIT (OUTPATIENT)
Dept: CARDIOLOGY | Facility: CLINIC | Age: 72
End: 2017-06-12

## 2017-06-12 DIAGNOSIS — M25.562 LEFT KNEE PAIN: ICD-10-CM

## 2017-06-12 DIAGNOSIS — Z47.1 AFTERCARE FOLLOWING JOINT REPLACEMENT: ICD-10-CM

## 2017-06-12 PROCEDURE — 97010 HOT OR COLD PACKS THERAPY: CPT | Mod: GP | Performed by: PHYSICAL THERAPIST

## 2017-06-12 PROCEDURE — 97110 THERAPEUTIC EXERCISES: CPT | Mod: GP | Performed by: PHYSICAL THERAPIST

## 2017-06-13 ENCOUNTER — CARE COORDINATION (OUTPATIENT)
Dept: CARE COORDINATION | Facility: CLINIC | Age: 72
End: 2017-06-13

## 2017-06-13 NOTE — PROGRESS NOTES
Clinic Care Coordination Contact  OUTREACH    Referral Information:  Referral Source: IP/TCU Report  Reason for Contact: Hospitalization 5/8-5/11/2017- /TCU discharge Left knee Arthoplasty   Care Conference: No     Universal Utilization:   ED Visits in last year: 0  Hospital visits in last year: 1  Last PCP appointment: 05/22/17  Missed Appointments: 2  Concerns:  (No)  Multiple Providers or Specialists:  (Yes)    Clinical Concerns:  Current Medical Concerns: Patient continues outpatient PT twice a week.  Patient states he has been off the Oxycodone for a few days .  Patient is walking independently in the house and uses the cane when away from home.  Patient has a follow up appointment with the Orthopedic  surgeon June 20  Current Behavioral Concerns: Not discussed      Clinical Pathway Name: None  Clinical Pathway: None    Medication Management:  Not discussed today      Functional Status:  Mobility Status: Independent w/Device  Equipment Currently Used at Home: walker, rolling  Psychosocial:  Current living arrangement:: I live in a private home with spouse     Resources and Interventions:  Current Resources:  (NA);  (NA)  PAS Number:  (NA)  Senior Linkage Line Referral Placed:  (NA)  Advanced Care Plans/Directives on file:: Yes  Referrals Placed:  (NA)     Goals:   Goal 1 Statement: I will increase my strength  Goal 1 Progression Percent: 70%  Goal 1 Progression Date: 06/13/17        Barriers: Deconditioned   Strengths: Continues outpatient PT     Frequency of Care Coordination:  (1-2 weeks )        Plan:   Patient is requesting a Podiatry referral for great toenail  loss  on the left foot and right foot great toenail is deformed   Care team to call patient back with referral information CC will follow up in 1-2 weeks     Nuria Mccormack RN / Clinical Care Coordinator     [address] [address]  [email or web address]  Office :  [phone] / Fax :   [phone]

## 2017-06-14 ENCOUNTER — THERAPY VISIT (OUTPATIENT)
Dept: PHYSICAL THERAPY | Facility: CLINIC | Age: 72
End: 2017-06-14
Payer: MEDICARE

## 2017-06-14 DIAGNOSIS — Z47.1 AFTERCARE FOLLOWING JOINT REPLACEMENT: ICD-10-CM

## 2017-06-14 DIAGNOSIS — M25.562 LEFT KNEE PAIN: ICD-10-CM

## 2017-06-14 PROCEDURE — 97010 HOT OR COLD PACKS THERAPY: CPT | Mod: GP | Performed by: PHYSICAL THERAPIST

## 2017-06-14 PROCEDURE — 97110 THERAPEUTIC EXERCISES: CPT | Mod: GP | Performed by: PHYSICAL THERAPIST

## 2017-06-14 PROCEDURE — 97112 NEUROMUSCULAR REEDUCATION: CPT | Mod: GP | Performed by: PHYSICAL THERAPIST

## 2017-06-19 ENCOUNTER — THERAPY VISIT (OUTPATIENT)
Dept: PHYSICAL THERAPY | Facility: CLINIC | Age: 72
End: 2017-06-19
Payer: MEDICARE

## 2017-06-19 ENCOUNTER — TELEPHONE (OUTPATIENT)
Dept: CARDIOLOGY | Facility: CLINIC | Age: 72
End: 2017-06-19

## 2017-06-19 DIAGNOSIS — M25.562 LEFT KNEE PAIN: ICD-10-CM

## 2017-06-19 DIAGNOSIS — Z47.1 AFTERCARE FOLLOWING JOINT REPLACEMENT: ICD-10-CM

## 2017-06-19 PROCEDURE — 97010 HOT OR COLD PACKS THERAPY: CPT | Mod: GP | Performed by: PHYSICAL THERAPIST

## 2017-06-19 PROCEDURE — 97112 NEUROMUSCULAR REEDUCATION: CPT | Mod: GP | Performed by: PHYSICAL THERAPIST

## 2017-06-19 PROCEDURE — 97110 THERAPEUTIC EXERCISES: CPT | Mod: GP | Performed by: PHYSICAL THERAPIST

## 2017-06-19 NOTE — TELEPHONE ENCOUNTER
Pt called - had appt time wrong last week.  Wants to see Dr. hill-  Opening available this week-  rescheduled

## 2017-06-21 ENCOUNTER — OFFICE VISIT (OUTPATIENT)
Dept: CARDIOLOGY | Facility: CLINIC | Age: 72
End: 2017-06-21
Payer: COMMERCIAL

## 2017-06-21 ENCOUNTER — THERAPY VISIT (OUTPATIENT)
Dept: PHYSICAL THERAPY | Facility: CLINIC | Age: 72
End: 2017-06-21
Payer: MEDICARE

## 2017-06-21 VITALS
HEIGHT: 71 IN | SYSTOLIC BLOOD PRESSURE: 112 MMHG | BODY MASS INDEX: 44.1 KG/M2 | DIASTOLIC BLOOD PRESSURE: 70 MMHG | WEIGHT: 315 LBS | HEART RATE: 80 BPM

## 2017-06-21 DIAGNOSIS — I25.10 CORONARY ARTERY DISEASE INVOLVING NATIVE CORONARY ARTERY OF NATIVE HEART, ANGINA PRESENCE UNSPECIFIED: Primary | ICD-10-CM

## 2017-06-21 DIAGNOSIS — E78.2 MIXED HYPERLIPIDEMIA: ICD-10-CM

## 2017-06-21 DIAGNOSIS — I20.89 STABLE ANGINA (H): ICD-10-CM

## 2017-06-21 DIAGNOSIS — M25.562 LEFT KNEE PAIN: ICD-10-CM

## 2017-06-21 DIAGNOSIS — I10 BENIGN HYPERTENSION: ICD-10-CM

## 2017-06-21 DIAGNOSIS — I48.91 NEW ONSET ATRIAL FIBRILLATION (H): ICD-10-CM

## 2017-06-21 DIAGNOSIS — Z47.1 AFTERCARE FOLLOWING LEFT KNEE JOINT REPLACEMENT SURGERY: ICD-10-CM

## 2017-06-21 DIAGNOSIS — E66.01 MORBID OBESITY DUE TO EXCESS CALORIES (H): ICD-10-CM

## 2017-06-21 DIAGNOSIS — Z96.652 AFTERCARE FOLLOWING LEFT KNEE JOINT REPLACEMENT SURGERY: ICD-10-CM

## 2017-06-21 PROCEDURE — 97110 THERAPEUTIC EXERCISES: CPT | Mod: GP | Performed by: PHYSICAL THERAPIST

## 2017-06-21 PROCEDURE — G8979 MOBILITY GOAL STATUS: HCPCS | Mod: GP | Performed by: PHYSICAL THERAPIST

## 2017-06-21 PROCEDURE — G8978 MOBILITY CURRENT STATUS: HCPCS | Mod: GP | Performed by: PHYSICAL THERAPIST

## 2017-06-21 PROCEDURE — 97112 NEUROMUSCULAR REEDUCATION: CPT | Mod: GP | Performed by: PHYSICAL THERAPIST

## 2017-06-21 PROCEDURE — 99214 OFFICE O/P EST MOD 30 MIN: CPT | Performed by: INTERNAL MEDICINE

## 2017-06-21 NOTE — LETTER
6/21/2017    Jeronimo Painter MD  [address] [place] [address]    RE: James Salvador       Dear Colleague,    I again had the pleasure of seeing your patient, Alden Salvador, at [facility] for evaluation of coronary artery disease, mixed hyperlipidemia, supraventricular tachyarrhythmias including new onset atrial fibrillation and angina pectoris.  The patient is status post previous myocardial infarction in 1994 while living in [place].  He has had frequent runs of SVT that have been treated medically.  He underwent a right shoulder surgery earlier this year and now on 05/08 underwent left total knee arthroplasty.  In the preoperative evaluation, he was found to be in atrial fibrillation with controlled ventricular response.  The patient was asymptomatic.  In 09/2005, a Lexiscan nuclear stress test showed moderately sized predominantly reversible lateral and inferolateral defect consistent with ischemia.  He was hospitalized for recurrent angina and coronary angiography showed an occluded right coronary artery with collaterals.  The mid-LAD had a severe stenosis which was stented with a drug-eluting stent.  A repeat Lexiscan nuclear stress test in 09/2016 showed the inferior and inferolateral ischemia but no anterior wall ischemia.  The patient is followed by Dr. Gamble in the EP department.  He remains on metoprolol.  He was started on Eliquis after his surgery for his atrial fibrillation.  He denies palpitations.  He denies any recent exertional chest tightness or shortness of breath.  He is exercising at the gym every other day, riding a stationary bicycle for 10 minutes and walking on a treadmill for 10 minutes.  He has been sleeping in a recliner and still denies that he has any sleep apnea.      PHYSICAL EXAMINATION:  Current blood pressure is 112/70, pulse is 80 and irregular, weight is 328 pounds, a decrease of 10 pounds from December.  BMI is 46.  Chest is clear to  auscultation.  Cardiac exam shows an irregularly irregular rhythm, normal S1 and S2 without gallop or murmur.  No JVD.  Pulses are intact without bruits.  Abdomen is obese without organomegaly or bruits.  Extremities are without cyanosis or clubbing.  There is a large scar over his left knee and there is 1+ pedal edema and pretibial edema on the left and trace on the right.     Outpatient Encounter Prescriptions as of 2017   Medication Sig Dispense Refill     METOPROLOL TARTRATE PO Take 75 mg by mouth 2 times daily       [DISCONTINUED] APIXABAN PO Take 5 mg by mouth 2 times daily       Acetaminophen (TYLENOL PO) Take 650 mg by mouth 4 times daily        atorvastatin (LIPITOR) 80 MG tablet Take 1 tablet (80 mg) by mouth daily 90 tablet 3     fluticasone (FLONASE) 50 MCG/ACT spray Spray 2 sprays into both nostrils daily 16 g 11     lisinopril (PRINIVIL/ZESTRIL) 20 MG tablet Take 1 tablet (20 mg) by mouth daily 90 tablet 3     desonide (DESOWEN) 0.05 % cream Apply sparingly to affected area on face in morning 60 g 2     nitroglycerin (NITROSTAT) 0.4 MG SL tablet Place 1 tablet (0.4 mg) under the tongue every 5 minutes as needed for chest pain May repeat twice for a total of 3 tablets.  If chest pain not relieved, call 911 25 tablet 11     Doxycycline Hyclate (PERIOSTAT PO) Take 20 mg by mouth 2 times daily       [] aspirin 81 MG tablet Take 81 mg by mouth daily Reported on 2017       Omega-3 Fatty Acids (OMEGA-3 FISH OIL PO) Take 3.2 g by mouth daily        loratadine (CLARITIN) 10 MG tablet Take 10 mg by mouth daily as needed for allergies       ketotifen (ZADITOR/REFRESH ANTI-ITCH) 0.025 % SOLN Place 1-2 drops into both eyes 2 times daily as needed for itching       OXcarbazepine (TRILEPTAL PO) Take 300 mg by mouth 3 times daily        GABAPENTIN PO Take 600 mg by mouth 3 times daily        Multiple Vitamin (MULTIVITAMIN OR) Take 1 tablet by mouth daily        [DISCONTINUED] Docusate Sodium (COLACE  PO) Take 100 mg by mouth 2 times daily       [DISCONTINUED] OXYCODONE HCL PO Take 5 mg by mouth 4 times daily as needed        [DISCONTINUED] senna-docusate (SENOKOT-S;PERICOLACE) 8.6-50 MG per tablet Take 2 tablets by mouth daily & as needed 2 tabs daily       ciclopirox (LOPROX) 0.77 % cream Apply topically At Bedtime 90 g 3     No facility-administered encounter medications on file as of 6/21/2017.       ASSESSMENT:   1.  Alden Salvador is a delightful almost 71, almost 72-year-old male with a history of coronary artery disease and myocardial infarction with angioplasty in 1994.  He is status post LAD intracoronary stenting in 05/2015 with a known totally occluded right coronary artery.  Stress testing in 2016 showed ischemia in the right coronary artery distribution but not anteriorly.  He is doing well and no intervention is necessary.   2.  New onset atrial fibrillation.  This is not surprising given the patient's risk factors.  He is asymptomatic.  He is anticoagulated and has good rate control.  We are going to perform an echocardiogram to assess atrial size to see if cardioversion makes some sense.   3.  Morbid obesity.  I have again suggested to the patient that many of his issues, including the atrial fibrillation, may be related to his morbid obesity.  I suggested weight loss and exercise as able.      It is my pleasure to assist in the care of Mr. James Salvador (Pete).  I will see him again in 6 months and discuss the results of his echocardiogram when completed.  All his questions were answered to his satisfaction.     Sincerely,    Tereso Alexander MD     [facility]

## 2017-06-21 NOTE — PROGRESS NOTES
HPI and Plan:   See dictation:[id number]    Orders Placed This Encounter   Procedures     Follow-Up with Cardiologist     Echocardiogram       No orders of the defined types were placed in this encounter.      Medications Discontinued During This Encounter   Medication Reason     Docusate Sodium (COLACE PO) Stopped by Patient     OXYCODONE HCL PO Therapy completed     senna-docusate (SENOKOT-S;PERICOLACE) 8.6-50 MG per tablet Therapy completed         Encounter Diagnoses   Name Primary?     Coronary artery disease involving native coronary artery of native heart, angina presence unspecified Yes     New onset atrial fibrillation (H)      Morbid obesity due to excess calories (H)      Mixed hyperlipidemia      Benign hypertension      Stable angina (H)        CURRENT MEDICATIONS:  Current Outpatient Prescriptions   Medication Sig Dispense Refill     APIXABAN PO Take 5 mg by mouth 2 times daily       METOPROLOL TARTRATE PO Take 75 mg by mouth 2 times daily       Acetaminophen (TYLENOL PO) Take 650 mg by mouth 4 times daily        atorvastatin (LIPITOR) 80 MG tablet Take 1 tablet (80 mg) by mouth daily 90 tablet 3     fluticasone (FLONASE) 50 MCG/ACT spray Spray 2 sprays into both nostrils daily 16 g 11     lisinopril (PRINIVIL/ZESTRIL) 20 MG tablet Take 1 tablet (20 mg) by mouth daily 90 tablet 3     desonide (DESOWEN) 0.05 % cream Apply sparingly to affected area on face in morning 60 g 2     nitroglycerin (NITROSTAT) 0.4 MG SL tablet Place 1 tablet (0.4 mg) under the tongue every 5 minutes as needed for chest pain May repeat twice for a total of 3 tablets.  If chest pain not relieved, call 911 25 tablet 11     Doxycycline Hyclate (PERIOSTAT PO) Take 20 mg by mouth 2 times daily       aspirin 81 MG tablet Take 81 mg by mouth daily Reported on 5/22/2017       Omega-3 Fatty Acids (OMEGA-3 FISH OIL PO) Take 3.2 g by mouth daily        loratadine (CLARITIN) 10 MG tablet Take 10 mg by mouth daily as needed for allergies        ketotifen (ZADITOR/REFRESH ANTI-ITCH) 0.025 % SOLN Place 1-2 drops into both eyes 2 times daily as needed for itching       OXcarbazepine (TRILEPTAL PO) Take 300 mg by mouth 3 times daily        GABAPENTIN PO Take 600 mg by mouth 3 times daily        Multiple Vitamin (MULTIVITAMIN OR) Take 1 tablet by mouth daily        ciclopirox (LOPROX) 0.77 % cream Apply topically At Bedtime (Patient not taking: Reported on 6/21/2017) 90 g 3       ALLERGIES     Allergies   Allergen Reactions     Pollen Extract        PAST MEDICAL HISTORY:  Past Medical History:   Diagnosis Date     Actinic cheilitis 7/17/2013    Lower lip, left      Allergic rhinitis      Benign hypertension      CAD (coronary artery disease)     Cardiac cath and PCI 1994. Cardiac Cath 9/2015: BRIDGET to LAD     History of myocardial infarction 1994    PTCA     Hyperlipidemia LDL goal < 70      Paroxysmal supraventricular tachycardia (H)     on metoprolol     Squamous cell carcinoma (H)        PAST SURGICAL HISTORY:  Past Surgical History:   Procedure Laterality Date     ANGIOPLASTY  1994    in [place]     ANKLE SURGERY  7/13/2005    right ankle     HEART CATH STENT COR W/WO PTCA  9/23/2015    BRIDGET stent mid LAD     JOINT REPLACEMENT, HIP RT/LT  10/14/2009    right hip      LASER SURGERY OF EYE  06/01/2002     MOHS MICROGRAPHIC PROCEDURE  06/12/2004    squamous cell carcinoma right temple     SINUS SURGERY  7/11/2006       FAMILY HISTORY:  Family History   Problem Relation Age of Onset     Genitourinary Problems Mother      renal failure     Cardiovascular Father      rupture of dorsal aorta     Depression Son        SOCIAL HISTORY:  Social History     Social History     Marital status:      Spouse name: N/A     Number of children: N/A     Years of education: N/A     Occupational History      Retired     Social History Main Topics     Smoking status: Former Smoker     Years: 10.00     Types: Cigarettes     Quit date: 1/1/1987     Smokeless tobacco: Never  "Used     Alcohol use 0.0 oz/week     0 Standard drinks or equivalent per week      Comment: socially - x2-3 per month     Drug use: No     Sexual activity: Yes     Partners: Female     Other Topics Concern     Caffeine Concern Yes     2 big cups coffee daily     Sleep Concern No     sleeping better since shoulder replaced 11/3/16     Stress Concern No     Weight Concern Yes     Special Diet Yes     trying to do more lean meats     Exercise No     limited - knee     Social History Narrative       Review of Systems:  Skin:  Positive for   hx of skin cancer,   Seeing Dermatologist 3 times year    Eyes:  Positive for glasses    ENT:  Positive for hearing loss;postnasal drainage;sinus trouble hearing aids , seasonal sinus issues   Respiratory:  Negative       Cardiovascular:  Negative      Gastroenterology: Negative      Genitourinary:  Negative      Musculoskeletal:  Positive for back pain right shoulder replaced - 11/3/16  , left knee  replaced 5/8/17 - doing PT  ,  right hip replaced 10/14/2009 ,  some back pain  ,    Neurologic:  Negative      Psychiatric:  Negative      Heme/Lymph/Imm:  Positive for allergies animal , seasonal , pollens   Endocrine:  Negative        Physical Exam:  Vitals: /70 (BP Location: Right arm, Patient Position: Sitting, Cuff Size: Adult Large)  Pulse 80  Ht 1.803 m (5' 11\")  Wt (!) 148.8 kg (328 lb)  BMI 45.75 kg/m2    Constitutional:  cooperative, alert and oriented, well developed, well nourished, in no acute distress morbidly obese      Skin:  warm and dry to the touch, no apparent skin lesions or masses noted        Head:  normocephalic, no masses or lesions        Eyes:  pupils equal and round, conjunctivae and lids unremarkable, sclera white, no xanthalasma, EOMS intact, no nystagmus        ENT:  no pallor or cyanosis, dentition good hearing aide(s) present      Neck:  carotid pulses are full and equal bilaterally, JVP normal, no carotid bruit, no thyromegaly        Chest:  " normal breath sounds, clear to auscultation, normal A-P diameter, normal symmetry, normal respiratory excursion, no use of accessory muscles          Cardiac: regular rhythm;normal S1 and S2;no S3 or S4;apical impulse not displaced irregularly irregular rhythm   no presence of murmur            Abdomen:  abdomen soft, non-tender, BS normoactive, no mass, no HSM, no bruits obese      Vascular: pulses full and equal, no bruits auscultated                                        Extremities and Back:  no deformities, clubbing, cyanosis, erythema observed     RLE edema;trace;pitting LLE edema;1+;pitting scar over left knee    Neurological:  affect appropriate, oriented to time, person and place;no gross motor deficits              CC  Jeronimo Painter MD  [facility]  [address] [place] [address]

## 2017-06-21 NOTE — MR AVS SNAPSHOT
After Visit Summary   6/21/2017    James Salvador    MRN: [id number]           Patient Information     Date Of Birth          1945        Visit Information        Provider Department      6/21/2017 1:45 PM Tereso Alexander MD [facility] [place]        Today's Diagnoses     Coronary artery disease involving native coronary artery of native heart, angina presence unspecified    -  1    New onset atrial fibrillation (H)        Morbid obesity due to excess calories (H)        Mixed hyperlipidemia        Benign hypertension        Stable angina (H)           Follow-ups after your visit        Additional Services     Follow-Up with Cardiologist                 Your next 10 appointments already scheduled     Jun 23, 2017 10:00 AM CDT   New Visit with Jv Milton DPM   [facility] ([facility]    [address] [place] [id number] [phone]            Jun 26, 2017 10:40 AM CDT   JENN Extremity with Garrick Mcallister PT   [facility] [place] ([place]  )    [address] [place] [address]  [address]  [place] [address]   [phone]            Jun 28, 2017 10:40 AM CDT   JENN Extremity with Garrick Mcallister PT   [facility] [place] ([place]  )    [address] [place] [address]  [address]  [place] [address]   [phone]            Jul 03, 2017 10:40 AM CDT   JENN Extremity with Garrick Mcallister PT   [facility] [place] ([facility] [place]  )    [address] [place] [address]  [address]  [place] [address]   [phone]            Jul 05, 2017 10:40 AM CDT   JENN Extremity with Garrick Mcallister PT   [facility] [place] ([place]  )    [address] [place] [address]  [address]  [place] [address]   [phone]            Jul 06, 2017  1:30 PM CDT   Return Visit with Rosa Maria Hansen PA-C   [facility] ([facility]  [place]    [address]  [facility] [address]   [phone]            Oct 10, 2017  9:00 AM CDT   Return Visit with Rosa Maria Hansen PA-C   [facility]    [address] [id number] [phone]              Future tests that were ordered for you today     Open Future Orders        Priority Expected Expires Ordered    Follow-Up with Cardiologist Routine 12/18/2017 6/21/2018 6/21/2017    Echocardiogram Routine 6/28/2017 6/21/2018 6/21/2017            Who to contact     If you have questions or need follow up information about today's clinic visit or your schedule please contact [facility] AT [place] directly at [phone].  Normal or non-critical lab and imaging results will be communicated to you by MyChart, letter or phone within 4 business days after the clinic has received the results. If you do not hear from us within 7 days, please contact the clinic through [employer] or phone. If you have a critical or abnormal lab result, we will notify you by phone as soon as possible.  Submit refill requests through [employer] or call your pharmacy and they will forward the refill request to us. Please allow 3 business days for your refill to be completed.          Additional Information About Your Visit        MyChart Information     [employer] gives you secure access to your electronic health record. If you see a primary care provider, you can also send messages to your care team and make appointments. If you have questions, please call your primary care clinic.  If you do not have a primary care provider, please call [phone] and they will assist you.        Care EveryWhere ID     This is your Care EveryWhere ID. This could be used by other organizations to access your [place] medical records  [id number]        Your Vitals Were     Pulse Height BMI (Body Mass Index)             80 1.803 m  "(5' 11\") 45.75 kg/m2          Blood Pressure from Last 3 Encounters:   06/21/17 112/70   05/22/17 106/76   05/17/17 (!) 136/91    Weight from Last 3 Encounters:   06/21/17 (!) 148.8 kg (328 lb)   05/22/17 (!) 148.7 kg (327 lb 12.5 oz)   05/17/17 (!) 139.3 kg (307 lb 3.2 oz)               Primary Care Provider Office Phone # Fax #    Jeronimo Painter [phone]       [facility] [address] [facility] [address]        Equal Access to Services     NAOMI AYALA : Hadii smooth quiros hadasho Sojoshua, waaxda luqadaha, qaybta kaalmada adeegyada, brenda rausch. So [place] [phone].    ATENCIÓN: Si habla español, tiene a iniguez disposición servicios gratuitos de asistencia lingüística. Debbie al [phone].    We comply with applicable federal civil rights laws and [place] laws. We do not discriminate on the basis of race, color, national origin, age, disability sex, sexual orientation or gender identity.            Thank you!     Thank you for choosing [facility] AT [place]  for your care. Our goal is always to provide you with excellent care. Hearing back from our patients is one way we can continue to improve our services. Please take a few minutes to complete the written survey that you may receive in the mail after your visit with us. Thank you!             Your Updated Medication List - Protect others around you: Learn how to safely use, store and throw away your medicines at www.disposemymeds.org.          This list is accurate as of: 6/21/17  2:26 PM.  Always use your most recent med list.                   Brand Name Dispense Instructions for use Diagnosis    APIXABAN PO      Take 5 mg by mouth 2 times daily        aspirin 81 MG tablet      Take 81 mg by mouth daily Reported on 5/22/2017        atorvastatin 80 MG tablet    LIPITOR    90 tablet    Take 1 tablet (80 mg) by mouth daily    ASCVD (arteriosclerotic cardiovascular " disease)       ciclopirox 0.77 % cream    LOPROX    90 g    Apply topically At Bedtime    Dermatitis, seborrheic       desonide 0.05 % cream    DESOWEN    60 g    Apply sparingly to affected area on face in morning    Dermatitis, seborrheic       fluticasone 50 MCG/ACT spray    FLONASE    16 g    Spray 2 sprays into both nostrils daily    Seasonal allergic rhinitis due to other allergic trigger       GABAPENTIN PO      Take 600 mg by mouth 3 times daily        ketotifen 0.025 % Soln ophthalmic solution    ZADITOR/REFRESH ANTI-ITCH     Place 1-2 drops into both eyes 2 times daily as needed for itching        lisinopril 20 MG tablet    PRINIVIL/ZESTRIL    90 tablet    Take 1 tablet (20 mg) by mouth daily    Benign hypertension       loratadine 10 MG tablet    CLARITIN     Take 10 mg by mouth daily as needed for allergies        METOPROLOL TARTRATE PO      Take 75 mg by mouth 2 times daily        MULTIVITAMIN PO      Take 1 tablet by mouth daily        nitroglycerin 0.4 MG sublingual tablet    NITROSTAT    25 tablet    Place 1 tablet (0.4 mg) under the tongue every 5 minutes as needed for chest pain May repeat twice for a total of 3 tablets.  If chest pain not relieved, call 911    ACS (acute coronary syndrome) (H)       OMEGA-3 FISH OIL PO      Take 3.2 g by mouth daily        PERIOSTAT PO      Take 20 mg by mouth 2 times daily        TRILEPTAL PO      Take 300 mg by mouth 3 times daily        TYLENOL PO      Take 650 mg by mouth 4 times daily

## 2017-06-22 NOTE — PROGRESS NOTES
HISTORY OF PRESENT ILLNESS:  I again had the pleasure of seeing your patient, Alden Salvador, at [facility] for evaluation of coronary artery disease, mixed hyperlipidemia, supraventricular tachyarrhythmias including new onset atrial fibrillation and angina pectoris.  The patient is status post previous myocardial infarction in 1994 while living in [place].  He has had frequent runs of SVT that have been treated medically.  He underwent a right shoulder surgery earlier this year and now on 05/08 underwent left total knee arthroplasty.  In the preoperative evaluation, he was found to be in atrial fibrillation with controlled ventricular response.  The patient was asymptomatic.  In 09/2005, a Lexiscan nuclear stress test showed moderately sized predominantly reversible lateral and inferolateral defect consistent with ischemia.  He was hospitalized for recurrent angina and coronary angiography showed an occluded right coronary artery with collaterals.  The mid-LAD had a severe stenosis which was stented with a drug-eluting stent.  A repeat Lexiscan nuclear stress test in 09/2016 showed the inferior and inferolateral ischemia but no anterior wall ischemia.  The patient is followed by Dr. Gamble in the EP department.  He remains on metoprolol.  He was started on Eliquis after his surgery for his atrial fibrillation.  He denies palpitations.  He denies any recent exertional chest tightness or shortness of breath.  He is exercising at the gym every other day, riding a stationary bicycle for 10 minutes and walking on a treadmill for 10 minutes.  He has been sleeping in a recliner and still denies that he has any sleep apnea.      PHYSICAL EXAMINATION:  Current blood pressure is 112/70, pulse is 80 and irregular, weight is 328 pounds, a decrease of 10 pounds from December.  BMI is 46.  Chest is clear to auscultation.  Cardiac exam shows an irregularly irregular rhythm, normal S1 and S2 without gallop or  murmur.  No JVD.  Pulses are intact without bruits.  Abdomen is obese without organomegaly or bruits.  Extremities are without cyanosis or clubbing.  There is a large scar over his left knee and there is 1+ pedal edema and pretibial edema on the left and trace on the right.      ASSESSMENT:   1.  Alden Grant is a delightful almost 71, almost 72-year-old male with a history of coronary artery disease and myocardial infarction with angioplasty in .  He is status post LAD intracoronary stenting in 2015 with a known totally occluded right coronary artery.  Stress testing in 2016 showed ischemia in the right coronary artery distribution but not anteriorly.  He is doing well and no intervention is necessary.   2.  New onset atrial fibrillation.  This is not surprising given the patient's risk factors.  He is asymptomatic.  He is anticoagulated and has good rate control.  We are going to perform an echocardiogram to assess atrial size to see if cardioversion makes some sense.   3.  Morbid obesity.  I have again suggested to the patient that many of his issues, including the atrial fibrillation, may be related to his morbid obesity.  I suggested weight loss and exercise as able.      It is my pleasure to assist in the care of Mr. James Grant (Pete).  I will see him again in 6 months and discuss the results of his echocardiogram when completed.  All his questions were answered to his satisfaction.      Pj Hahn MD      cc:   Jeronimo Painter MD   [address] [address]         PJ HAHN MD, formerly [facility]             D: 2017 14:36   T: 2017 13:14   MT: KYLEE      Name:     JAMES GRANT   MRN:      [id number]        Account:      [id number]   :      1945           Service Date: 2017      Document: [id number]

## 2017-06-23 ENCOUNTER — OFFICE VISIT (OUTPATIENT)
Dept: PODIATRY | Facility: CLINIC | Age: 72
End: 2017-06-23
Payer: COMMERCIAL

## 2017-06-23 VITALS
WEIGHT: 315 LBS | SYSTOLIC BLOOD PRESSURE: 114 MMHG | HEART RATE: 74 BPM | DIASTOLIC BLOOD PRESSURE: 76 MMHG | BODY MASS INDEX: 44.1 KG/M2 | HEIGHT: 71 IN

## 2017-06-23 DIAGNOSIS — L60.8 CHANGE IN NAIL APPEARANCE: Primary | ICD-10-CM

## 2017-06-23 DIAGNOSIS — L60.8 NAIL DEFORMITY: ICD-10-CM

## 2017-06-23 PROCEDURE — 99203 OFFICE O/P NEW LOW 30 MIN: CPT | Performed by: PODIATRIST

## 2017-06-23 NOTE — MR AVS SNAPSHOT
After Visit Summary   6/23/2017    James Salvador    MRN: [id number]           Patient Information     Date Of Birth          1945        Visit Information        Provider Department      6/23/2017 10:00 AM Jv Milton DPM [facility]        Today's Diagnoses     Change in nail appearance    -  1    Nail deformity          Care Instructions    NAIL FUNGUS / ONYCHOMYCOSIS   Nail fungus is not a hygiene problem and will not likely lead to significant medical   problems. The nails may get thick causing pain and possibly local skin infection.   Treatments include debridement (trimming), oral antifungals, topical antifungals and complete removal of the nail. Most fungal nails are not treated.   Topicals such as tea tree oil can be helpful for surface fungus and may, at best, limit   progression. Over the counter creams (such as Lamisil) can also be used however, their effectiveness is also quite low.  Topical treatment with Pen lac is expensive and often not covered by insurance. Pen lac has an approximate 8% success rate. Topical therapy recommendations is to apply twice a day for at least 3-4 months as it takes 9 months for new nail to grow out.    Experts suggest soaking your feet for 15 to 20 minutes in a mixture of 1 cup vinegar to 4 cups warm water. Be sure to rinse well and pat your feet dry when you're done. You can soak your feet like this daily. But if your skin becomes irritated, try soaking only two to three times a week. Vicks VapoRub, as with vinegar, there have been no controlled clinical trials to assess the effectiveness of Vicks VapoRub on nail fungus, but there have been numerous anecdotal reports that it works. There's no consensus on how often to apply this product, so check with your doctor before using it on your nails.     Oral therapies include Sporanox and Lamisil. Oral therapies are also expensive and not very effective. Side effects such as  liver disease are the main concern. Return of fungus is common even if the treatment worked.     Other Tips:  - Penlac nail medication apply daily x 4 months; remove old polish first day of each week  - Antifungal cream/powder (Zeasorb) - apply daily to feet and shoes x 2 months  - Clean shoes with Lysol or in washing machine every few weeks  - Rotate shoe gear; give them 24 hours to dry out between days wearing them  - Clean pair of socks in morning, clean pair in afternoon if your feet sweat  - Shower shoes used in public showers/pools            Follow-ups after your visit        Your next 10 appointments already scheduled     Jun 26, 2017 10:40 AM CDT   JENN Extremity with Garrick Mcallister PT   [facility] [place] ([facility]  )    [address] [place] [address]  [address]  [place] [address]   [phone]            Jun 28, 2017 10:40 AM CDT   JENN Extremity with Garrick Mcallister    [facility] [place] ([place]  )    [address] [place] [address]  [address]  [place] [address]   [phone]            Jul 03, 2017 10:40 AM CDT   JENN Extremity with Garrick Mcallister    [facility] [place] ([place]  )    [address] [place] [address]  [address]  [place] [address]   [phone]            Jul 05, 2017 10:40 AM CDT   JENN Extremity with Garrick Mcallister    [facility] [place] ([place]  )    [address] [place] [address]  [address]  [place] [address]   [phone]            Jul 06, 2017  1:30 PM CDT   Return Visit with Rosa Maria Hansen PA-C   [facility] ([facility])    [address] [facility] [place] [address]   [phone]            Jul 07, 2017  3:00 PM CDT   Ech Complete with [id number] [facility]    [address] [facility] [address]  [facility] [id number] [phone]           1.  Please bring or wear a comfortable two-piece  outfit. 2.  You may eat, drink and take your normal medicines. 3.  For any questions that cannot be answered, please contact the ordering physician ***Please check-in at the [place] Registration Office located in [address] in the [facility] building. When you are finished registering, please go to [address] and have a seat. The technician will call your name for the test.            Oct 10, 2017  9:00 AM CDT   Return Visit with Rosa Maria Hansen PA-C   [facility] ([facility])    [address] [address] [phone]              Who to contact     If you have questions or need follow up information about today's clinic visit or your schedule please contact [facility] directly at [phone].  Normal or non-critical lab and imaging results will be communicated to you by MyChart, letter or phone within 4 business days after the clinic has received the results. If you do not hear from us within 7 days, please contact the clinic through [employer] or phone. If you have a critical or abnormal lab result, we will notify you by phone as soon as possible.  Submit refill requests through [employer] or call your pharmacy and they will forward the refill request to us. Please allow 3 business days for your refill to be completed.          Additional Information About Your Visit        MyChart Information     [employer] gives you secure access to your electronic health record. If you see a primary care provider, you can also send messages to your care team and make appointments. If you have questions, please call your primary care clinic.  If you do not have a primary care provider, please call [phone] and they will assist you.        Care EveryWhere ID     This is your Care EveryWhere ID. This could be used by other organizations to access your [place] medical records  [id number]        Your Vitals Were     Pulse  "Height BMI (Body Mass Index)             74 1.803 m (5' 11\") 45.75 kg/m2          Blood Pressure from Last 3 Encounters:   06/23/17 114/76   06/21/17 112/70   05/22/17 106/76    Weight from Last 3 Encounters:   06/23/17 (!) 148.8 kg (328 lb)   06/21/17 (!) 148.8 kg (328 lb)   05/22/17 (!) 148.7 kg (327 lb 12.5 oz)              Today, you had the following     No orders found for display       Primary Care Provider Office Phone # Fax #    Jeronimo Painter [phone]       [facility] [address] [facility] [address]        Equal Access to Services     NAOMI AYALA : Hadii aad ku hadasho Soomaali, waaxda luqadaha, qaybta kaalmada adeegyada, waxkaroline carballo haytheo berry . So [facility] [phone].    ATENCIÓN: Si habla español, tiene a iniguez disposición servicios gratuitos de asistencia lingüística. Llame al [phone].    We comply with applicable federal civil rights laws and [place] laws. We do not discriminate on the basis of race, color, national origin, age, disability sex, sexual orientation or gender identity.            Thank you!     Thank you for choosing [facility]  for your care. Our goal is always to provide you with excellent care. Hearing back from our patients is one way we can continue to improve our services. Please take a few minutes to complete the written survey that you may receive in the mail after your visit with us. Thank you!             Your Updated Medication List - Protect others around you: Learn how to safely use, store and throw away your medicines at www.disposemymeds.org.          This list is accurate as of: 6/23/17 10:34 AM.  Always use your most recent med list.                   Brand Name Dispense Instructions for use Diagnosis    APIXABAN PO      Take 5 mg by mouth 2 times daily        atorvastatin 80 MG tablet    LIPITOR    90 tablet    Take 1 tablet (80 mg) by mouth daily    ASCVD (arteriosclerotic " cardiovascular disease)       ciclopirox 0.77 % cream    LOPROX    90 g    Apply topically At Bedtime    Dermatitis, seborrheic       desonide 0.05 % cream    DESOWEN    60 g    Apply sparingly to affected area on face in morning    Dermatitis, seborrheic       fluticasone 50 MCG/ACT spray    FLONASE    16 g    Spray 2 sprays into both nostrils daily    Seasonal allergic rhinitis due to other allergic trigger       GABAPENTIN PO      Take 600 mg by mouth 3 times daily        ketotifen 0.025 % Soln ophthalmic solution    ZADITOR/REFRESH ANTI-ITCH     Place 1-2 drops into both eyes 2 times daily as needed for itching        lisinopril 20 MG tablet    PRINIVIL/ZESTRIL    90 tablet    Take 1 tablet (20 mg) by mouth daily    Benign hypertension       loratadine 10 MG tablet    CLARITIN     Take 10 mg by mouth daily as needed for allergies        METOPROLOL TARTRATE PO      Take 75 mg by mouth 2 times daily        MULTIVITAMIN PO      Take 1 tablet by mouth daily        nitroglycerin 0.4 MG sublingual tablet    NITROSTAT    25 tablet    Place 1 tablet (0.4 mg) under the tongue every 5 minutes as needed for chest pain May repeat twice for a total of 3 tablets.  If chest pain not relieved, call 911    ACS (acute coronary syndrome) (H)       OMEGA-3 FISH OIL PO      Take 3.2 g by mouth daily        PERIOSTAT PO      Take 20 mg by mouth 2 times daily        TRILEPTAL PO      Take 300 mg by mouth 3 times daily        TYLENOL PO      Take 650 mg by mouth 4 times daily

## 2017-06-23 NOTE — PATIENT INSTRUCTIONS
NAIL FUNGUS / ONYCHOMYCOSIS   Nail fungus is not a hygiene problem and will not likely lead to significant medical   problems. The nails may get thick causing pain and possibly local skin infection.   Treatments include debridement (trimming), oral antifungals, topical antifungals and complete removal of the nail. Most fungal nails are not treated.   Topicals such as tea tree oil can be helpful for surface fungus and may, at best, limit   progression. Over the counter creams (such as Lamisil) can also be used however, their effectiveness is also quite low.  Topical treatment with Pen lac is expensive and often not covered by insurance. Pen lac has an approximate 8% success rate. Topical therapy recommendations is to apply twice a day for at least 3-4 months as it takes 9 months for new nail to grow out.    Experts suggest soaking your feet for 15 to 20 minutes in a mixture of 1 cup vinegar to 4 cups warm water. Be sure to rinse well and pat your feet dry when you're done. You can soak your feet like this daily. But if your skin becomes irritated, try soaking only two to three times a week. Vicks VapoRub, as with vinegar, there have been no controlled clinical trials to assess the effectiveness of Vicks VapoRub on nail fungus, but there have been numerous anecdotal reports that it works. There's no consensus on how often to apply this product, so check with your doctor before using it on your nails.     Oral therapies include Sporanox and Lamisil. Oral therapies are also expensive and not very effective. Side effects such as liver disease are the main concern. Return of fungus is common even if the treatment worked.     Other Tips:  - Penlac nail medication apply daily x 4 months; remove old polish first day of each week  - Antifungal cream/powder (Zeasorb)   apply daily to feet and shoes x 2 months  - Clean shoes with Lysol or in washing machine every few weeks  - Rotate shoe gear; give them 24 hours to dry out  between days wearing them  - Clean pair of socks in morning, clean pair in afternoon if your feet sweat  - Shower shoes used in public showers/pools

## 2017-06-23 NOTE — PROGRESS NOTES
ASSESSMENT/PLAN:    Encounter Diagnoses   Name Primary?     Change in nail appearance Yes     Nail deformity    No acute findings to suggest infection.    I explained that the changes might be related to repetitive injury/ trauma, nail fungus, or a cessation in nail growth at a time of other physical injury or illness (shoulder surgery, knee surgery, MI).    The cause and nature of fungal nails was discussed wtih the patient.  I explained that there are no ideal treatment options. Oral therapy rarely results in a cure and often needs to be repeated every few years.   Topical preparations are even less successful.      I explained that for particularly deformed and/or painful toenails, permanent removal is an option.        Body mass index is 45.75 kg/(m^2).    Weight management plan: Patient was referred to their PCP to discuss a diet and exercise plan.      Jv Milton DPM, FACFAS, [facility] Department of Podiatry/Foot & Ankle Surgery      ____________________________________________________________________    HPI:         Chief Complaint: bilateral toenail concern.   Onset of problem: 2 + years  Occasional pain  He stated that last fall the left great toe nail fell off.  Prior to that, it went through changes similar to the current right great toe.   *  Past Medical History:   Diagnosis Date     Actinic cheilitis 7/17/2013    Lower lip, left      Allergic rhinitis      Benign hypertension      CAD (coronary artery disease)     Cardiac cath and PCI 1994. Cardiac Cath 9/2015: BRIDGET to LAD     History of myocardial infarction 1994    PTCA     Hyperlipidemia LDL goal < 70      Paroxysmal supraventricular tachycardia (H)     on metoprolol     Squamous cell carcinoma (H)    *  *  Past Surgical History:   Procedure Laterality Date     ANGIOPLASTY  1994    in [place]     ANKLE SURGERY  7/13/2005    right ankle     HEART CATH STENT COR W/WO PTCA  9/23/2015    BRIDGET stent mid LAD     JOINT REPLACEMENT, HIP RT/LT   10/14/2009    right hip      LASER SURGERY OF EYE  06/01/2002     MOHS MICROGRAPHIC PROCEDURE  06/12/2004    squamous cell carcinoma right temple     SINUS SURGERY  7/11/2006   *  *  Current Outpatient Prescriptions   Medication Sig Dispense Refill     APIXABAN PO Take 5 mg by mouth 2 times daily       METOPROLOL TARTRATE PO Take 75 mg by mouth 2 times daily       Acetaminophen (TYLENOL PO) Take 650 mg by mouth 4 times daily        atorvastatin (LIPITOR) 80 MG tablet Take 1 tablet (80 mg) by mouth daily 90 tablet 3     fluticasone (FLONASE) 50 MCG/ACT spray Spray 2 sprays into both nostrils daily 16 g 11     lisinopril (PRINIVIL/ZESTRIL) 20 MG tablet Take 1 tablet (20 mg) by mouth daily 90 tablet 3     ciclopirox (LOPROX) 0.77 % cream Apply topically At Bedtime 90 g 3     desonide (DESOWEN) 0.05 % cream Apply sparingly to affected area on face in morning 60 g 2     nitroglycerin (NITROSTAT) 0.4 MG SL tablet Place 1 tablet (0.4 mg) under the tongue every 5 minutes as needed for chest pain May repeat twice for a total of 3 tablets.  If chest pain not relieved, call 911 25 tablet 11     Doxycycline Hyclate (PERIOSTAT PO) Take 20 mg by mouth 2 times daily       Omega-3 Fatty Acids (OMEGA-3 FISH OIL PO) Take 3.2 g by mouth daily        loratadine (CLARITIN) 10 MG tablet Take 10 mg by mouth daily as needed for allergies       ketotifen (ZADITOR/REFRESH ANTI-ITCH) 0.025 % SOLN Place 1-2 drops into both eyes 2 times daily as needed for itching       OXcarbazepine (TRILEPTAL PO) Take 300 mg by mouth 3 times daily        GABAPENTIN PO Take 600 mg by mouth 3 times daily        Multiple Vitamin (MULTIVITAMIN OR) Take 1 tablet by mouth daily          ROS:     A 10-point review of systems was performed and is positive for that noted in the HPI and as seen below.  All other areas are negative.     Numbness in feet?  yes   Calf pain with walking? yes  Recent foot/ankle injury? no  Weight change over past 12 months? no  Self  "perception as overweight? yes  Recent flu-like symptoms? no  Joint pain other than feet ? 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, walking 6x/ week;  Tobacco use:  no  Social History     Social History     Marital status:      Spouse name: N/A     Number of children: N/A     Years of education: N/A     Occupational History      Retired     Social History Main Topics     Smoking status: Former Smoker     Years: 10.00     Types: Cigarettes     Quit date: 1/1/1987     Smokeless tobacco: Never Used     Alcohol use 0.0 oz/week     0 Standard drinks or equivalent per week      Comment: socially - x2-3 per month     Drug use: No     Sexual activity: Yes     Partners: Female     Other Topics Concern     Caffeine Concern Yes     2 big cups coffee daily     Sleep Concern No     sleeping better since shoulder replaced 11/3/16     Stress Concern No     Weight Concern Yes     Special Diet Yes     trying to do more lean meats     Exercise No     limited - knee     Social History Narrative       Family history:  Family History   Problem Relation Age of Onset     Genitourinary Problems Mother      renal failure     Cardiovascular Father      rupture of dorsal aorta     Depression Son        Rheumatoid arthritis:  no  Foot Problems: no  Diabetes: no      EXAM:    Vitals: /76  Pulse 74  Ht 1.803 m (5' 11\")  Wt (!) 148.8 kg (328 lb)  BMI 45.75 kg/m2  BMI: Body mass index is 45.75 kg/(m^2).  Height: 5' 11\"    Constitutional/ general:  Pt is in no apparent distress, appears well-nourished.  Cooperative with history and physical exam.     Vascular:  Pedal pulses are palpable bilaterally for both the DP and PT arteries.  CFT < 3 sec.  Generalized left LE edema.       Neuro:  Alert and oriented x 3. Coordinated gait.  Light touch sensation is intact to the L4, L5, S1 distributions. No obvious deficits.  No evidence of neurological-based weakness, spasticity, or " contracture in the lower extremities.     Derm: Normal texture and turgor.  No erythema, ecchymosis, or cyanosis.  No open lesions.   Bilateral hallux nails are abnormal.  Left is thickened and discolored distally.  The right hallux nail appears to have an old nail plate  from a newer nail plate.     Musculoskeletal:    Lower extremity muscle strength is normal.  Patient is ambulatory without an assistive device or brace .  No gross deformities.        Jv Milton DPM, FACFAS, MS    [place] Department of Podiatry/Foot & Ankle Surgery

## 2017-06-23 NOTE — LETTER
6/23/2017       RE: James Salvador  [address] [place] [address]           Dear Colleague,    Thank you for referring your patient, James Salvador, to the [facility]. Please see a copy of my visit note below.      ASSESSMENT/PLAN:    Encounter Diagnoses   Name Primary?     Change in nail appearance Yes     Nail deformity    No acute findings to suggest infection.    I explained that the changes might be related to repetitive injury/ trauma, nail fungus, or a cessation in nail growth at a time of other physical injury or illness (shoulder surgery, knee surgery, MI).    The cause and nature of fungal nails was discussed wtih the patient.  I explained that there are no ideal treatment options. Oral therapy rarely results in a cure and often needs to be repeated every few years.   Topical preparations are even less successful.      I explained that for particularly deformed and/or painful toenails, permanent removal is an option.        Body mass index is 45.75 kg/(m^2).    Weight management plan: Patient was referred to their PCP to discuss a diet and exercise plan.      Jv Milton DPM, FACFAS, MS    [place] Department of Podiatry/Foot & Ankle Surgery      ____________________________________________________________________    HPI:         Chief Complaint: bilateral toenail concern.   Onset of problem: 2 + years  Occasional pain  He stated that last fall the left great toe nail fell off.  Prior to that, it went through changes similar to the current right great toe.   *  Past Medical History:   Diagnosis Date     Actinic cheilitis 7/17/2013    Lower lip, left      Allergic rhinitis      Benign hypertension      CAD (coronary artery disease)     Cardiac cath and PCI 1994. Cardiac Cath 9/2015: BRIDGET to LAD     History of myocardial infarction 1994    PTCA     Hyperlipidemia LDL goal < 70      Paroxysmal supraventricular tachycardia (H)     on metoprolol     Squamous cell carcinoma  (H)    *  *  Past Surgical History:   Procedure Laterality Date     ANGIOPLASTY  1994    in [place]     ANKLE SURGERY  7/13/2005    right ankle     HEART CATH STENT COR W/WO PTCA  9/23/2015    BRIDGET stent mid LAD     JOINT REPLACEMENT, HIP RT/LT  10/14/2009    right hip      LASER SURGERY OF EYE  06/01/2002     MOHS MICROGRAPHIC PROCEDURE  06/12/2004    squamous cell carcinoma right temple     SINUS SURGERY  7/11/2006   *  *  Current Outpatient Prescriptions   Medication Sig Dispense Refill     APIXABAN PO Take 5 mg by mouth 2 times daily       METOPROLOL TARTRATE PO Take 75 mg by mouth 2 times daily       Acetaminophen (TYLENOL PO) Take 650 mg by mouth 4 times daily        atorvastatin (LIPITOR) 80 MG tablet Take 1 tablet (80 mg) by mouth daily 90 tablet 3     fluticasone (FLONASE) 50 MCG/ACT spray Spray 2 sprays into both nostrils daily 16 g 11     lisinopril (PRINIVIL/ZESTRIL) 20 MG tablet Take 1 tablet (20 mg) by mouth daily 90 tablet 3     ciclopirox (LOPROX) 0.77 % cream Apply topically At Bedtime 90 g 3     desonide (DESOWEN) 0.05 % cream Apply sparingly to affected area on face in morning 60 g 2     nitroglycerin (NITROSTAT) 0.4 MG SL tablet Place 1 tablet (0.4 mg) under the tongue every 5 minutes as needed for chest pain May repeat twice for a total of 3 tablets.  If chest pain not relieved, call 911 25 tablet 11     Doxycycline Hyclate (PERIOSTAT PO) Take 20 mg by mouth 2 times daily       Omega-3 Fatty Acids (OMEGA-3 FISH OIL PO) Take 3.2 g by mouth daily        loratadine (CLARITIN) 10 MG tablet Take 10 mg by mouth daily as needed for allergies       ketotifen (ZADITOR/REFRESH ANTI-ITCH) 0.025 % SOLN Place 1-2 drops into both eyes 2 times daily as needed for itching       OXcarbazepine (TRILEPTAL PO) Take 300 mg by mouth 3 times daily        GABAPENTIN PO Take 600 mg by mouth 3 times daily        Multiple Vitamin (MULTIVITAMIN OR) Take 1 tablet by mouth daily          ROS:     A 10-point review of  "systems was performed and is positive for that noted in the HPI and as seen below.  All other areas are negative.     Numbness in feet?  yes   Calf pain with walking? yes  Recent foot/ankle injury? no  Weight change over past 12 months? no  Self perception as overweight? yes  Recent flu-like symptoms? no  Joint pain other than feet ? 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, walking 6x/ week;  Tobacco use:  no  Social History     Social History     Marital status:      Spouse name: N/A     Number of children: N/A     Years of education: N/A     Occupational History      Retired     Social History Main Topics     Smoking status: Former Smoker     Years: 10.00     Types: Cigarettes     Quit date: 1/1/1987     Smokeless tobacco: Never Used     Alcohol use 0.0 oz/week     0 Standard drinks or equivalent per week      Comment: socially - x2-3 per month     Drug use: No     Sexual activity: Yes     Partners: Female     Other Topics Concern     Caffeine Concern Yes     2 big cups coffee daily     Sleep Concern No     sleeping better since shoulder replaced 11/3/16     Stress Concern No     Weight Concern Yes     Special Diet Yes     trying to do more lean meats     Exercise No     limited - knee     Social History Narrative       Family history:  Family History   Problem Relation Age of Onset     Genitourinary Problems Mother      renal failure     Cardiovascular Father      rupture of dorsal aorta     Depression Son        Rheumatoid arthritis:  no  Foot Problems: no  Diabetes: no      EXAM:    Vitals: /76  Pulse 74  Ht 1.803 m (5' 11\")  Wt (!) 148.8 kg (328 lb)  BMI 45.75 kg/m2  BMI: Body mass index is 45.75 kg/(m^2).  Height: 5' 11\"    Constitutional/ general:  Pt is in no apparent distress, appears well-nourished.  Cooperative with history and physical exam.     Vascular:  Pedal pulses are palpable bilaterally for both the DP and PT arteries.  " CFT < 3 sec.  Generalized left LE edema.       Neuro:  Alert and oriented x 3. Coordinated gait.  Light touch sensation is intact to the L4, L5, S1 distributions. No obvious deficits.  No evidence of neurological-based weakness, spasticity, or contracture in the lower extremities.     Derm: Normal texture and turgor.  No erythema, ecchymosis, or cyanosis.  No open lesions.   Bilateral hallux nails are abnormal.  Left is thickened and discolored distally.  The right hallux nail appears to have an old nail plate  from a newer nail plate.     Musculoskeletal:    Lower extremity muscle strength is normal.  Patient is ambulatory without an assistive device or brace .  No gross deformities.        Jv Milton DPM, FACFAS, MS    [place] Department of Podiatry/Foot & Ankle Surgery                Again, thank you for allowing me to participate in the care of your patient.        Sincerely,              Jv Milton DPM

## 2017-06-26 ENCOUNTER — THERAPY VISIT (OUTPATIENT)
Dept: PHYSICAL THERAPY | Facility: CLINIC | Age: 72
End: 2017-06-26
Payer: MEDICARE

## 2017-06-26 ENCOUNTER — CARE COORDINATION (OUTPATIENT)
Dept: CARE COORDINATION | Facility: CLINIC | Age: 72
End: 2017-06-26

## 2017-06-26 DIAGNOSIS — M25.562 LEFT KNEE PAIN: ICD-10-CM

## 2017-06-26 DIAGNOSIS — Z47.1 AFTERCARE FOLLOWING JOINT REPLACEMENT: ICD-10-CM

## 2017-06-26 PROCEDURE — 97112 NEUROMUSCULAR REEDUCATION: CPT | Mod: GP | Performed by: PHYSICAL THERAPIST

## 2017-06-26 PROCEDURE — 97110 THERAPEUTIC EXERCISES: CPT | Mod: GP | Performed by: PHYSICAL THERAPIST

## 2017-06-26 PROCEDURE — G8978 MOBILITY CURRENT STATUS: HCPCS | Mod: GP | Performed by: PHYSICAL THERAPIST

## 2017-06-26 PROCEDURE — G8979 MOBILITY GOAL STATUS: HCPCS | Mod: GP | Performed by: PHYSICAL THERAPIST

## 2017-06-26 NOTE — PROGRESS NOTES
Clinic Care Coordination Contact  OUTREACH    Referral Information:  Referral Source: IP/TCU Report  Reason for Contact: Reason for Contact: Hospitalization 5/8-5/11/2017- /TCU discharge Left knee replacement  Dr [facility]         Universal Utilization:   ED Visits in last year: 0  Hospital visits in last year: 1  Last PCP appointment: 05/22/17  Missed Appointments: 2  Concerns:  (No)  Multiple Providers or Specialists:  (Yes)    Clinical Concerns:  Current Medical Concerns:Patient continues outpatient PT.  Patient reports he is walking independently in the home and using a cane when away from home.  Patient states his degree of motion is even better than the surgeons expectations    Current Behavioral Concerns: Not discussed     Clinical Pathway Name: None  Clinical Pathway: None    Medication Management:  Not discussed today      Functional Status:  Mobility Status: Independent w/Device  Equipment Currently Used at Home: walker, rolling  Psychosocial:  Current living arrangement:: I live in a private home with spouse     Resources and Interventions:  Current Resources:  (NA);  (NA)  PAS Number:  (NA)  Senior Linkage Line Referral Placed:  (NA)  Advanced Care Plans/Directives on file:: Yes  Referrals Placed:  (NA)     Goals:   Goal 1 Statement: I will increase my strength I will continue outpatient PT GoaL %      Barriers: No barriers   Strengths: Patient is gaining strength in knee        Plan: No unmet needs, no further care coordination needed at this time     Nuria Mccormack RN / Clinical Care Coordinator     [address] [address]  [email or web address]  Office :  [phone] / Fax :  [phone]

## 2017-06-26 NOTE — LETTER
[facility]  Complex Care Plan  About Me  Patient Name:  James Salavdor    YOB: 1945  Age:   72 year old   [place] MRN: [id number] Telephone Information:     Home Phone [phone]   Mobile [phone]       Address:    [address] [place] [address] Email address:  [email or web address]      Emergency Contact(s)  Name Relationship Lgl Grd Work Phone Home Phone Mobile Phone   1CATHERINE OSMAN* Spouse   [phone]           Primary language:  English     needed? No   [place] [facility]:  [phone] op. 1  Other communication barriers: No  Preferred Method of Communication:  Letter, MyChart, Phone  Current living arrangement: I live in a private home with spouse  Mobility Status/ Medical Equipment: Independent w/Device  Other information to know about me:    Health Maintenance  Health Maintenance Reviewed: Due/Overdue Hepatitis C screening is recommended for your age group     My Access Plan  Medical Emergency 911   Primary Clinic Line [facility]- [phone]   24 Hour Appointment Line [phone] or  [phone] ([phone]) (toll-free)   24 Hour Nurse Line [phone] (toll-free)   Preferred Urgent Care [facility], [phone]   Preferred Hospital [facility]  [phone]   Preferred Pharmacy [id number] - [place], [place] - [address] AT [facility]     Behavioral Health Crisis Line [facility], [phone] or 911     My Care Team Members  Patient Care Team       Relationship Specialty Notifications Start End    Jeronimo Painter MD PCP - General Internal Medicine  10/31/12     Phone: [phone] Pager: [phone] Fax: [phone]        [facility] [address] [facility] [address]         My Care Plans  Self Management and Treatment Plan  Goals and (Comments)  Goal #1: I will  increase my strength    100%    of goal reached    Action Plans on File: None  Advance Care Plans/Directives Type:   Type Advanced Care Plans/Directives: Advanced Directive - On File    My Medical and Care Information  Problem List   Patient Active Problem List   Diagnosis     Advance care planning     Hyperlipidemia with target LDL less than 70     Benign hypertension     Actinic keratoses     Paroxysmal supraventricular tachycardia (H)     History of myocardial infarction     Coronary artery disease involving native coronary artery of native heart, angina presence unspecified     Stable angina (H)     Morbid obesity due to excess calories (H)     Shoulder pain, right     New onset atrial fibrillation (H)     Status post total left knee replacement     Left knee pain     Aftercare following joint replacement     Mixed hyperlipidemia      Current Medications and Allergies:  See printed Medication Report.    Care Coordination Start Date: 05/22/17   Frequency of Care Coordination:  (1-2 weeks )   Form Last Updated: 07/10/2017

## 2017-06-26 NOTE — LETTER
[facility] [place]  [address]  [address]  [place] [address]  [phone]    2017    Re: James Salvador   :   1945  MRN:  [id number]   REFERRING PHYSICIAN:   Jv Nance    [facility] [place]    Date of Initial Evaluation:  17  Visits:  Rxs Used: 10  Reason for Referral:     Left knee pain  Aftercare following joint replacement    PROGRESS  REPORT  Progress reporting period is from 2017 to 2017.       SUBJECTIVE  Subjective: Patient aggressively stretched knee yesterday w/o warm-up.  Knee was temporarily more painful.  Overall, knee is feeling much better.  Patient and surgeon are pleased with progress.  He uses a can when ambulating outside his home.  He does not use a cane for short indoor distances.  He is able to go up/down stairs reciprocally at times.  Current pain level is low/moderate.     Previous pain level was  4/10.   Changes in function:  Yes (See Goal flowsheet attached for changes in current functional level)  Adverse reaction to treatment or activity: None    OBJECTIVE  PROM: 5-115 degrees.  AROM: 15 degree extension lag.  MMT: 5-/5 Ext, 5-/5 Flex.    ASSESSMENT/PLAN  Updated problem list and treatment plan: Diagnosis 1:  S/P TKR  Pain -  hot/cold therapy, self management, education and home program  Decreased ROM/flexibility - therapeutic exercise, therapeutic activity and home program  Decreased strength - therapeutic exercise, therapeutic activities and home program  Impaired balance - neuro re-education, therapeutic activities and home program  Edema - cold therapy and self management/home program  Impaired gait - gait training and home program  Impaired muscle performance - neuro re-education and home program  Decreased function - therapeutic activities and home program  STG/LTGs have been met or progress has been made towards goals:  Yes (See Goal  Re: James SCHUSTER Madeleine   :   1945     flow  sheet completed today.)  Assessment of Progress: The patient's condition is improving.  Patient is meeting short term goals and is progressing towards long term goals.  Self Management Plans:  Patient has been instructed in a home treatment program.  Patient is independent in a home treatment program.  I have re-evaluated this patient and find that the nature, scope, duration and intensity of the therapy is appropriate for the medical condition of the patient.  James continues to require the following intervention to meet STG and LTG's:  PT  Recommendations:  This patient would benefit from continued therapy.     Frequency:  2 X week, once daily  Duration:  for 4 weeks    Thank you for your referral.    INQUIRIES  Therapist: Garrick Mcallister, PT  [facility] [place]  [address] [place] [address]  Phone: [phone]  Fax: [phone]

## 2017-06-26 NOTE — LETTER
[facility]  Complex Care Plan  About Me  Patient Name:  James Grant    YOB: 1945  Age:   72 year old   Ana MRN: [id number] Telephone Information:     Home Phone [phone]   Mobile [phone]       Address:    [address] [place] [address] Email address:  [email or web address]      Emergency Contact(s)  Name Relationship Lgl Grd Work Phone Home Phone Mobile Phone   1. CATHERINE GRANT* Spouse   [phone]           Primary language:  English     needed? No   [place] [facility]:  [phone] op. 1  Other communication barriers: No  Preferred Method of Communication:  Letter, MyChart, Phone  Current living arrangement: I live in a private home with spouse  Mobility Status/ Medical Equipment: Independent w/Device  Other information to know about me:    Health Maintenance  Health Maintenance Reviewed: Due/Overdue Hepatitis C screening recommended for your age group    My Access Plan  Medical Emergency 911   Primary Clinic Line [facility]- [phone]   24 Hour Appointment Line [phone] or  [phone]) (toll-free)   24 Hour Nurse Line [phone] (toll-free)   Preferred Urgent Care [facility], [phone]   Preferred Hospital [facility]  [phone]   Preferred Pharmacy [id number] [place], [place] - [address] AT [facility]     Behavioral Health Crisis Line [facility], [phone] or 911     My Care Team Members  Patient Care Team       Relationship Specialty Notifications Start End    Jeronimo Painter MD PCP - General Internal Medicine  10/31/12     Phone: [phone] Pager: [phone] Fax: [phone]        [facility] [address] [facility] [address]         My Care Plans  Self Management and Treatment Plan  Goals and (Comments)  Goal #1: I will increase my strength         of goal reached    Goal #2:          of goal reached    Goal #3:          of goal reached    Goal #4:         of goal reached     Goal #5:          of goal reached  -  Goal #6:          of goal reached    Goal #7:          of goal reached    Goal #8:           of goal reached        Goal #9:          of goal reached    Goal #10:        of goal reached    Action Plans on File: None  Advance Care Plans/Directives Type:   Type Advanced Care Plans/Directives: Advanced Directive - On File    My Medical and Care Information  Problem List   Patient Active Problem List   Diagnosis     Advance care planning     Hyperlipidemia with target LDL less than 70     Benign hypertension     Actinic keratoses     Paroxysmal supraventricular tachycardia (H)     History of myocardial infarction     Coronary artery disease involving native coronary artery of native heart, angina presence unspecified     Stable angina (H)     Morbid obesity due to excess calories (H)     Shoulder pain, right     New onset atrial fibrillation (H)     Status post total left knee replacement     Left knee pain     Aftercare following joint replacement     Mixed hyperlipidemia      Current Medications and Allergies:  See printed Medication Report.    Care Coordination Start Date: 05/22/17   Frequency of Care Coordination:  (1-2 weeks )   Form Last Updated: 07/10/2017

## 2017-06-28 ENCOUNTER — THERAPY VISIT (OUTPATIENT)
Dept: PHYSICAL THERAPY | Facility: CLINIC | Age: 72
End: 2017-06-28
Payer: MEDICARE

## 2017-06-28 DIAGNOSIS — M25.562 LEFT KNEE PAIN: ICD-10-CM

## 2017-06-28 DIAGNOSIS — Z47.1 AFTERCARE FOLLOWING JOINT REPLACEMENT: ICD-10-CM

## 2017-06-28 PROCEDURE — 97110 THERAPEUTIC EXERCISES: CPT | Mod: GP | Performed by: PHYSICAL THERAPIST

## 2017-06-28 PROCEDURE — 97112 NEUROMUSCULAR REEDUCATION: CPT | Mod: GP | Performed by: PHYSICAL THERAPIST

## 2017-06-28 NOTE — PROGRESS NOTES
Subjective:    HPI                    Objective:    System    Physical Exam    General     ROS    Assessment/Plan:      PROGRESS  REPORT    Progress reporting period is from 5/22/2017 to 6/26/2017.       SUBJECTIVE  Subjective: Patient aggressively stretched knee yesterday w/o warm-up.  Knee was temporarily more painful.  Overall, knee is feeling much better.  Patient and surgeon are pleased with progress.  He uses a can when ambulating outside his home.  He does not use a cane for short indoor distances.  He is able to go up/down stairs reciprocally at times.  Current pain level is low/moderate.     Previous pain level was  4/10.   Changes in function:  Yes (See Goal flowsheet attached for changes in current functional level)  Adverse reaction to treatment or activity: None    OBJECTIVE  PROM: 5-115 degrees.  AROM: 15 degree extension lag.  MMT: 5-/5 Ext, 5-/5 Flex.      ASSESSMENT/PLAN  Updated problem list and treatment plan: Diagnosis 1:  S/P TKR  Pain -  hot/cold therapy, self management, education and home program  Decreased ROM/flexibility - therapeutic exercise, therapeutic activity and home program  Decreased strength - therapeutic exercise, therapeutic activities and home program  Impaired balance - neuro re-education, therapeutic activities and home program  Edema - cold therapy and self management/home program  Impaired gait - gait training and home program  Impaired muscle performance - neuro re-education and home program  Decreased function - therapeutic activities and home program  STG/LTGs have been met or progress has been made towards goals:  Yes (See Goal flow sheet completed today.)  Assessment of Progress: The patient's condition is improving.  Patient is meeting short term goals and is progressing towards long term goals.  Self Management Plans:  Patient has been instructed in a home treatment program.  Patient is independent in a home treatment program.  I have re-evaluated this patient and  find that the nature, scope, duration and intensity of the therapy is appropriate for the medical condition of the patient.  James continues to require the following intervention to meet STG and LTG's:  PT    Recommendations:  This patient would benefit from continued therapy.     Frequency:  2 X week, once daily  Duration:  for 4 weeks        Please refer to the daily flowsheet for treatment today, total treatment time and time spent performing 1:1 timed codes.

## 2017-07-03 ENCOUNTER — THERAPY VISIT (OUTPATIENT)
Dept: PHYSICAL THERAPY | Facility: CLINIC | Age: 72
End: 2017-07-03
Payer: MEDICARE

## 2017-07-03 DIAGNOSIS — M25.562 LEFT KNEE PAIN: ICD-10-CM

## 2017-07-03 DIAGNOSIS — Z47.1 AFTERCARE FOLLOWING JOINT REPLACEMENT: ICD-10-CM

## 2017-07-03 PROCEDURE — 97112 NEUROMUSCULAR REEDUCATION: CPT | Mod: GP | Performed by: PHYSICAL THERAPIST

## 2017-07-03 PROCEDURE — 97110 THERAPEUTIC EXERCISES: CPT | Mod: GP | Performed by: PHYSICAL THERAPIST

## 2017-07-03 PROCEDURE — 97010 HOT OR COLD PACKS THERAPY: CPT | Mod: GP | Performed by: PHYSICAL THERAPIST

## 2017-07-05 ENCOUNTER — THERAPY VISIT (OUTPATIENT)
Dept: PHYSICAL THERAPY | Facility: CLINIC | Age: 72
End: 2017-07-05
Payer: MEDICARE

## 2017-07-05 DIAGNOSIS — M25.562 LEFT KNEE PAIN: ICD-10-CM

## 2017-07-05 DIAGNOSIS — Z47.1 AFTERCARE FOLLOWING JOINT REPLACEMENT: ICD-10-CM

## 2017-07-05 PROCEDURE — 97112 NEUROMUSCULAR REEDUCATION: CPT | Mod: GP | Performed by: PHYSICAL THERAPIST

## 2017-07-05 PROCEDURE — 97110 THERAPEUTIC EXERCISES: CPT | Mod: GP | Performed by: PHYSICAL THERAPIST

## 2017-07-06 ENCOUNTER — OFFICE VISIT (OUTPATIENT)
Dept: DERMATOLOGY | Facility: CLINIC | Age: 72
End: 2017-07-06
Payer: COMMERCIAL

## 2017-07-06 VITALS — DIASTOLIC BLOOD PRESSURE: 78 MMHG | OXYGEN SATURATION: 95 % | SYSTOLIC BLOOD PRESSURE: 130 MMHG | HEART RATE: 72 BPM

## 2017-07-06 DIAGNOSIS — L57.0 AK (ACTINIC KERATOSIS): ICD-10-CM

## 2017-07-06 DIAGNOSIS — L21.9 DERMATITIS, SEBORRHEIC: Primary | ICD-10-CM

## 2017-07-06 PROCEDURE — 96567 PDT DSTR PRMLG LES SKN: CPT | Performed by: PHYSICIAN ASSISTANT

## 2017-07-06 PROCEDURE — 99213 OFFICE O/P EST LOW 20 MIN: CPT | Mod: 25 | Performed by: PHYSICIAN ASSISTANT

## 2017-07-06 RX ORDER — FLUOCINONIDE TOPICAL SOLUTION USP, 0.05% 0.5 MG/ML
SOLUTION TOPICAL
Qty: 60 ML | Refills: 3 | Status: SHIPPED | OUTPATIENT
Start: 2017-07-06 | End: 2018-02-26

## 2017-07-06 NOTE — PATIENT INSTRUCTIONS
Possible side effects  Photosensitivity reactions: Reactions caused by light can show up on the skin where the drug is applied. They usually involve redness and a tingling or burning sensation. For about 2 days after the drug is used, you should take care to not expose treated areas of your face and scalp to light.  Stay out of strong, direct light.   Stay indoors as much as possible.   Wear protective clothing and wide-brimmed hats to avoid sunlight when outdoors.   Avoid beaches, snow, light colored concrete, or other surfaces where strong light may be reflected.  Sunscreens will not protect the skin from photosensitivity reactions.  Skin changes: The treated skin will likely turn red and may swell after treatment. This usually peaks about a day after treatment and gets better within a week. It should be gone about 4 weeks after treatment. The skin may also be itchy or change color after treatment.  Recommended General Skin care:   Eliminate harsh soaps, i.e. Dial, Zest, Georgian spring.  Use mild soaps such as Cetaphil or Dove sensitive skin.  Avoid overly hot or cold showers.  Use Vanicream, Cetaphil, Eucerin or Cerave creams to moisturize your skin.  Scoop-able creams are better than thin lotions.  Apply moisturizer immediately to damp skin after patting skin dry with towel.   American Academy of D[employer]at[employer] Public inFormation Center:   Informative resources for the public to learn more about   skin conditions, tips on performing self-examinations, sun   safety, and more The public can find information online at   www aad org or by calling (235) 469-DERM (7234)       Recheck in 3-4 months.

## 2017-07-06 NOTE — MR AVS SNAPSHOT
After Visit Summary   7/6/2017    James Salvador    MRN: [id number]           Patient Information     Date Of Birth          1945        Visit Information        Provider Department      7/6/2017 1:30 PM Rosa Maria Hansen PA-C [facility]        Today's Diagnoses     Dermatitis, seborrheic    -  1      Care Instructions    Possible side effects  Photosensitivity reactions: Reactions caused by light can show up on the skin where the drug is applied. They usually involve redness and a tingling or burning sensation. For about 2 days after the drug is used, you should take care to not expose treated areas of your face and scalp to light.  Stay out of strong, direct light.   Stay indoors as much as possible.   Wear protective clothing and wide-brimmed hats to avoid sunlight when outdoors.   Avoid beaches, snow, light colored concrete, or other surfaces where strong light may be reflected.  Sunscreens will not protect the skin from photosensitivity reactions.  Skin changes: The treated skin will likely turn red and may swell after treatment. This usually peaks about a day after treatment and gets better within a week. It should be gone about 4 weeks after treatment. The skin may also be itchy or change color after treatment.  Recommended General Skin care:   Eliminate harsh soaps, i.e. Dial, Zest, [place]itan spring.  Use mild soaps such as Cetaphil or Dove sensitive skin.  Avoid overly hot or cold showers.  Use Vanicream, Cetaphil, Eucerin or Cerave creams to moisturize your skin.  Scoop-able creams are better than thin lotions.  Apply moisturizer immediately to damp skin after patting skin dry with towel.   American Academy of Dermatology Public inFormation Center:   Informative resources for the public to learn more about   skin conditions, tips on performing self-examinations, sun   safety, and more The public can find information online at   www aad org or by calling (579) 470-EXDI (4672)        Recheck in 3-4 months.                 Follow-ups after your visit        Your next 10 appointments already scheduled     Jul 07, 2017  2:00 PM CDT   Ech Complete with [id number] ([facility])    [address] [facility]  [facility] [address]   [phone]           1.  Please bring or wear a comfortable two-piece outfit. 2.  You may eat, drink and take your normal medicines. 3.  For any questions that cannot be answered, please contact the ordering physician ***Please check-in at the [place] Registration Office located in [address] in the [facility] building. When you are finished registering, please go to [address] and have a seat. The technician will call your name for the test.            Jul 10, 2017 11:20 AM CDT   JENN Extremity with Ene Shirley PT   [facility] [place] ([facility] [place]  )    [address] [place] [address]  [address]  [place] [address]   [phone]            Jul 17, 2017 11:20 AM CDT   JENN Extremity with Ene Shirley PT   [facility] [place] ([facility] [place]  )    [address] [place] [address]  [address]  [place] [address]   [phone]            Jul 24, 2017 11:20 AM CDT   JENN Extremity with Ene Shirley PT   [facility] [place] ([facility] [place]  )    [address] [place] [address]  [address]  [place] [address]   [phone]            Aug 02, 2017 11:20 AM CDT   JENN Extremity with Ene Shirley PT   [facility] [place] ([facility] [place]  )    [address] [place] [address]  [address]  [place] [address]   [phone]            Oct 10, 2017  9:00 AM CDT   Return Visit with Rosa Maria Hansen PA-C   [facility]    [address] [address] [phone]              Who to contact     If you have questions or need follow up information about today's clinic visit or your  schedule please contact [facility] directly at [phone].  Normal or non-critical lab and imaging results will be communicated to you by MyChart, letter or phone within 4 business days after the clinic has received the results. If you do not hear from us within 7 days, please contact the clinic through PublicRelayhart or phone. If you have a critical or abnormal lab result, we will notify you by phone as soon as possible.  Submit refill requests through [employer] or call your pharmacy and they will forward the refill request to us. Please allow 3 business days for your refill to be completed.          Additional Information About Your Visit        PublicRelayhart Information     [employer] gives you secure access to your electronic health record. If you see a primary care provider, you can also send messages to your care team and make appointments. If you have questions, please call your primary care clinic.  If you do not have a primary care provider, please call [phone] and they will assist you.        Care EveryWhere ID     This is your Care EveryWhere ID. This could be used by other organizations to access your [facility] medical records  [id number]        Your Vitals Were     Pulse Pulse Oximetry                72 95%           Blood Pressure from Last 3 Encounters:   07/06/17 130/78   06/23/17 114/76   06/21/17 112/70    Weight from Last 3 Encounters:   06/23/17 (!) 148.8 kg (328 lb)   06/21/17 (!) 148.8 kg (328 lb)   05/22/17 (!) 148.7 kg (327 lb 12.5 oz)              Today, you had the following     No orders found for display         Today's Medication Changes          These changes are accurate as of: 7/6/17  3:41 PM.  If you have any questions, ask your nurse or doctor.               Start taking these medicines.        Dose/Directions    fluocinonide 0.05 % solution   Commonly known as:  LIDEX   Used for:  Dermatitis, seborrheic   Started by:  Rosa Maria Hansen PA-C        Apply to scalp BID x 1-2  weeks PRN   Quantity:  60 mL   Refills:  3            Where to get your medicines      These medications were sent to [id number] - [place], [place] - [address] AT [facility]  [address], [place] [address]     Phone:  [phone]     fluocinonide 0.05 % solution                Primary Care Provider Office Phone # Fax #    Jeronimo Painter [phone]       [facility] [address] [place] [phone]        Equal Access to Services     [facility] : Hadii aad ku hadasho Soomaali, waaxda luqadaha, qaybta kaalmada adeegyada, brenda berry . So [facility] [phone].    ATENCIÓN: Si habla español, tiene a iniguez disposición servicios gratuitos de asistencia lingüística. [facility] [phone].    We comply with applicable federal civil rights laws and [place] laws. We do not discriminate on the basis of race, color, national origin, age, disability sex, sexual orientation or gender identity.            Thank you!     Thank you for choosing [facility]  for your care. Our goal is always to provide you with excellent care. Hearing back from our patients is one way we can continue to improve our services. Please take a few minutes to complete the written survey that you may receive in the mail after your visit with us. Thank you!             Your Updated Medication List - Protect others around you: Learn how to safely use, store and throw away your medicines at www.disposemymeds.org.          This list is accurate as of: 7/6/17  3:41 PM.  Always use your most recent med list.                   Brand Name Dispense Instructions for use Diagnosis    APIXABAN PO      Take 5 mg by mouth 2 times daily        atorvastatin 80 MG tablet    LIPITOR    90 tablet    Take 1 tablet (80 mg) by mouth daily    ASCVD (arteriosclerotic cardiovascular disease)       ciclopirox 0.77 % cream    LOPROX    90 g    Apply topically At  Bedtime    Dermatitis, seborrheic       desonide 0.05 % cream    DESOWEN    60 g    Apply sparingly to affected area on face in morning    Dermatitis, seborrheic       fluocinonide 0.05 % solution    LIDEX    60 mL    Apply to scalp BID x 1-2 weeks PRN    Dermatitis, seborrheic       fluticasone 50 MCG/ACT spray    FLONASE    16 g    Spray 2 sprays into both nostrils daily    Seasonal allergic rhinitis due to other allergic trigger       GABAPENTIN PO      Take 600 mg by mouth 3 times daily        ketotifen 0.025 % Soln ophthalmic solution    ZADITOR/REFRESH ANTI-ITCH     Place 1-2 drops into both eyes 2 times daily as needed for itching        lisinopril 20 MG tablet    PRINIVIL/ZESTRIL    90 tablet    Take 1 tablet (20 mg) by mouth daily    Benign hypertension       loratadine 10 MG tablet    CLARITIN     Take 10 mg by mouth daily as needed for allergies        METOPROLOL TARTRATE PO      Take 75 mg by mouth 2 times daily        MULTIVITAMIN PO      Take 1 tablet by mouth daily        nitroGLYcerin 0.4 MG sublingual tablet    NITROSTAT    25 tablet    Place 1 tablet (0.4 mg) under the tongue every 5 minutes as needed for chest pain May repeat twice for a total of 3 tablets.  If chest pain not relieved, call 911    ACS (acute coronary syndrome) (H)       OMEGA-3 FISH OIL PO      Take 3.2 g by mouth daily        PERIOSTAT PO      Take 20 mg by mouth 2 times daily        TRILEPTAL PO      Take 300 mg by mouth 3 times daily        TYLENOL PO      Take 650 mg by mouth 4 times daily

## 2017-07-06 NOTE — NURSING NOTE
"Chief Complaint   Patient presents with     Derm Problem     PDT       Initial /78  Pulse 72  SpO2 95% Estimated body mass index is 45.75 kg/(m^2) as calculated from the following:    Height as of 6/23/17: 1.803 m (5' 11\").    Weight as of 6/23/17: 148.8 kg (328 lb).  BP completed using cuff size: esthela Aguilar LPN    "

## 2017-07-06 NOTE — PROGRESS NOTES
"HPI:   James Salvador is a 72 year old male who presents for PDT for the face for management of numerous AKs  chief complaint  Location: face   Condition present for:  years.   Previous treatments include: none    Review Of Systems  Eyes: negative  Ears/Nose/Throat: negative  Respiratory: No shortness of breath, dyspnea on exertion, cough, or hemoptysis  Cardiovascular: negative  Gastrointestinal: negative  Genitourinary: negative  Musculoskeletal: negative  Neurologic: negative  Psychiatric: negative        PHYSICAL EXAM:      Skin exam performed as follows: Type 2 skin. Mood appropriate  Alert and Oriented X 3. Well developed, well nourished in no distress.  General appearance: Normal  Head including face: Normal  Eyes: conjunctiva and lids: Normal  Mouth: Lips, teeth, gums: Normal  Neck: Normal  Chest-breast/axillae: Normal  Back: Normal  Spleen and liver: Normal  Cardiovascular: Exam of peripheral vascular system by observation for swelling, varicosities, edema: Normal  Genitalia: groin, buttocks: Normal  Extremities: digits/nails (clubbing): Normal  Eccrine and Apocrine glands: Normal  Right upper extremity: Normal  Left upper extremity: Normal  Right lower extremity: Normal  Left lower extremity: Normal  Skin: Scalp and body hair: See below    1. Numerous gritty papules across face >15 in number    ASSESSMENT/PLAN:     Numerous actinic keratoses here today for PDT. PDT: PGACAC discussed. Risks including but not limited to redness, swelling, and blistering to treated area. Patient was instructed to cleanse face thoroughly with a mild cleanser, then face was degreased with acetone. ALA was then applied to face in a uniform manner. Patient sat for 90 minutes after ALA was applied. The blue light shined on patient's face for 16 minutes 40 seconds he/she was approximately 2-4\" away from the light. Patient then was instructed to wash face and sunscreen with spf 50 was applied. Instructed patient to avoid sun light " for 48 hours and apply sunscreen daily. Follow-up in 3-4 months.     2.  Seborrheic dermatitis - advised on chronic, recurrent condition. Discussed that it is a reaction to the normal yeast on the skin. Has tried ketoconazole shampoo, ketoconazole cream and desonide cream in the past.   --Start Lidex sol'n daily x 2-3 weeks PRN  --Continue ketoconazole shampoo daily PRN  --Restart ketoconazole cream for face BID PRN - reserve desonide for big flares only  --Can consider diflucan if struggling        Follow-up: 2-3 months for recheck/PRN sooner  CC:   Scribed By: Rosa Maria Hansen, MS, PA-C

## 2017-07-07 ENCOUNTER — HOSPITAL ENCOUNTER (OUTPATIENT)
Dept: CARDIOLOGY | Facility: CLINIC | Age: 72
Discharge: HOME | End: 2017-07-07
Attending: INTERNAL MEDICINE | Admitting: INTERNAL MEDICINE
Payer: MEDICARE

## 2017-07-07 DIAGNOSIS — I25.10 CORONARY ARTERY DISEASE INVOLVING NATIVE CORONARY ARTERY OF NATIVE HEART, ANGINA PRESENCE UNSPECIFIED: ICD-10-CM

## 2017-07-07 DIAGNOSIS — I48.91 NEW ONSET ATRIAL FIBRILLATION (H): ICD-10-CM

## 2017-07-07 PROCEDURE — 93306 TTE W/DOPPLER COMPLETE: CPT | Mod: 26 | Performed by: INTERNAL MEDICINE

## 2017-07-07 PROCEDURE — 93306 TTE W/DOPPLER COMPLETE: CPT

## 2017-07-10 ENCOUNTER — THERAPY VISIT (OUTPATIENT)
Dept: PHYSICAL THERAPY | Facility: CLINIC | Age: 72
End: 2017-07-10
Payer: MEDICARE

## 2017-07-10 DIAGNOSIS — M25.562 LEFT KNEE PAIN: ICD-10-CM

## 2017-07-10 DIAGNOSIS — Z47.1 AFTERCARE FOLLOWING JOINT REPLACEMENT: ICD-10-CM

## 2017-07-10 PROCEDURE — 97110 THERAPEUTIC EXERCISES: CPT | Mod: GP | Performed by: PHYSICAL THERAPIST

## 2017-07-10 PROCEDURE — 97112 NEUROMUSCULAR REEDUCATION: CPT | Mod: GP | Performed by: PHYSICAL THERAPIST

## 2017-07-13 DIAGNOSIS — I48.91 NEW ONSET ATRIAL FIBRILLATION (H): ICD-10-CM

## 2017-07-14 NOTE — TELEPHONE ENCOUNTER
eliquis      Last Written Prescription Date:  na  Last Fill Quantity: na,   # refills: na  Last Office Visit with [facility], [facility] or  Health prescribing provider: 05/22/17  Future Office visit:  0  Next 5 appointments (look out 90 days)     Oct 10, 2017  9:00 AM CDT   Return Visit with Rosa Maria Hansen PA-C   [facility] ([facility])    [address] [id number] [phone]                   Routing refill request to provider for review/approval because:  Drug not active on patient's medication list

## 2017-07-20 ENCOUNTER — THERAPY VISIT (OUTPATIENT)
Dept: PHYSICAL THERAPY | Facility: CLINIC | Age: 72
End: 2017-07-20
Payer: MEDICARE

## 2017-07-20 DIAGNOSIS — Z47.1 AFTERCARE FOLLOWING JOINT REPLACEMENT: ICD-10-CM

## 2017-07-20 DIAGNOSIS — M25.562 LEFT KNEE PAIN: ICD-10-CM

## 2017-07-20 PROCEDURE — 97112 NEUROMUSCULAR REEDUCATION: CPT | Mod: GP | Performed by: PHYSICAL THERAPIST

## 2017-07-20 PROCEDURE — 97110 THERAPEUTIC EXERCISES: CPT | Mod: GP | Performed by: PHYSICAL THERAPIST

## 2017-07-24 ENCOUNTER — THERAPY VISIT (OUTPATIENT)
Dept: PHYSICAL THERAPY | Facility: CLINIC | Age: 72
End: 2017-07-24
Payer: MEDICARE

## 2017-07-24 DIAGNOSIS — Z47.1 AFTERCARE FOLLOWING JOINT REPLACEMENT: ICD-10-CM

## 2017-07-24 DIAGNOSIS — M25.562 LEFT KNEE PAIN: ICD-10-CM

## 2017-07-24 PROCEDURE — 97110 THERAPEUTIC EXERCISES: CPT | Mod: KX | Performed by: PHYSICAL THERAPIST

## 2017-08-02 ENCOUNTER — THERAPY VISIT (OUTPATIENT)
Dept: PHYSICAL THERAPY | Facility: CLINIC | Age: 72
End: 2017-08-02
Payer: MEDICARE

## 2017-08-02 DIAGNOSIS — M25.562 LEFT KNEE PAIN: ICD-10-CM

## 2017-08-02 DIAGNOSIS — Z47.1 AFTERCARE FOLLOWING JOINT REPLACEMENT: ICD-10-CM

## 2017-08-02 PROCEDURE — G8980 MOBILITY D/C STATUS: HCPCS | Mod: GP | Performed by: PHYSICAL THERAPIST

## 2017-08-02 PROCEDURE — 97112 NEUROMUSCULAR REEDUCATION: CPT | Mod: KX | Performed by: PHYSICAL THERAPIST

## 2017-08-02 PROCEDURE — 97110 THERAPEUTIC EXERCISES: CPT | Mod: KX | Performed by: PHYSICAL THERAPIST

## 2017-08-02 PROCEDURE — G8979 MOBILITY GOAL STATUS: HCPCS | Mod: GP | Performed by: PHYSICAL THERAPIST

## 2017-08-02 ASSESSMENT — ACTIVITIES OF DAILY LIVING (ADL)
RISE FROM A CHAIR: ACTIVITY IS MINIMALLY DIFFICULT
STIFFNESS: I HAVE THE SYMPTOM BUT IT DOES NOT AFFECT MY ACTIVITY
KNEE_ACTIVITY_OF_DAILY_LIVING_SUM: 60
HOW_WOULD_YOU_RATE_THE_OVERALL_FUNCTION_OF_YOUR_KNEE_DURING_YOUR_USUAL_DAILY_ACTIVITIES?: NEARLY NORMAL
SQUAT: ACTIVITY IS NOT DIFFICULT
GO UP STAIRS: ACTIVITY IS MINIMALLY DIFFICULT
PAIN: I HAVE THE SYMPTOM BUT IT DOES NOT AFFECT MY ACTIVITY
AS_A_RESULT_OF_YOUR_KNEE_INJURY,_HOW_WOULD_YOU_RATE_YOUR_CURRENT_LEVEL_OF_DAILY_ACTIVITY?: NEARLY NORMAL
RAW_SCORE: 60
WEAKNESS: I DO NOT HAVE THE SYMPTOM
KNEE_ACTIVITY_OF_DAILY_LIVING_SCORE: 85.71
LIMPING: I DO NOT HAVE THE SYMPTOM
SWELLING: THE SYMPTOM AFFECTS MY ACTIVITY SLIGHTLY
WALK: ACTIVITY IS MINIMALLY DIFFICULT
HOW_WOULD_YOU_RATE_THE_CURRENT_FUNCTION_OF_YOUR_KNEE_DURING_YOUR_USUAL_DAILY_ACTIVITIES_ON_A_SCALE_FROM_0_TO_100_WITH_100_BEING_YOUR_LEVEL_OF_KNEE_FUNCTION_PRIOR_TO_YOUR_INJURY_AND_0_BEING_THE_INABILITY_TO_PERFORM_ANY_OF_YOUR_USUAL_DAILY_ACTIVITIES?: 90
GIVING WAY, BUCKLING OR SHIFTING OF KNEE: I HAVE THE SYMPTOM BUT IT DOES NOT AFFECT MY ACTIVITY
GO DOWN STAIRS: ACTIVITY IS NOT DIFFICULT
STAND: ACTIVITY IS MINIMALLY DIFFICULT
KNEEL ON THE FRONT OF YOUR KNEE: ACTIVITY IS MINIMALLY DIFFICULT
SIT WITH YOUR KNEE BENT: ACTIVITY IS NOT DIFFICULT

## 2017-08-02 NOTE — PROGRESS NOTES
Subjective:    HPI                    Objective:    System    Physical Exam    General     ROS    Assessment/Plan:      DISCHARGE REPORT    Progress reporting period is from 6/26/2017 to 8/2/2017.       SUBJECTIVE  Subjective changes noted by patient:  States his L knee is feeling good today. Saw the surgeon yesterday who was pleased with his progress. Has been able to walk 40 minutes per day.    Current pain level is 0/10.     Previous pain level was  4/10  .   Changes in function:  Yes (See Goal flowsheet attached for changes in current functional level)  Adverse reaction to treatment or activity: None    OBJECTIVE  Changes noted in objective findings:  Yes,   Objective:   AROM L Knee Flx: 119, Ext: 0,   Strength L Knee Flx: 5/5, Ext: 5/5, L Hip Flx: -5/5;   Tolerating 90lbs eccentric loading;   Squat: requires use of railing, good form with support;   Gait: no AD, normal;   Balance SLS: unable w/o finger assist     ASSESSMENT/PLAN  Updated problem list and treatment plan: Diagnosis 1:  S/p L TKA    STG/LTGs have been met or progress has been made towards goals:  Yes (See Goal flow sheet completed today.)  Assessment of Progress: The patient has met all of their long term goals.Decreased ROM/flexibility - home program  Decreased strength - home program  Impaired balance - home program  Self Management Plans:  Patient is independent in a home treatment program.  I have re-evaluated this patient and find that the nature, scope, duration and intensity of the therapy is appropriate for the medical condition of the patient.  James continues to require the following intervention to meet STG and LTG's:  PT intervention is no longer required to meet STG/LTG.    Recommendations:  This patient is ready to be discharged from therapy and continue their home treatment program.    Please refer to the daily flowsheet for treatment today, total treatment time and time spent performing 1:1 timed codes.

## 2017-09-19 ENCOUNTER — DOCUMENTATION ONLY (OUTPATIENT)
Dept: OTHER | Facility: CLINIC | Age: 72
End: 2017-09-19

## 2017-09-19 DIAGNOSIS — Z71.89 ADVANCE CARE PLANNING: Chronic | ICD-10-CM

## 2017-10-10 ENCOUNTER — OFFICE VISIT (OUTPATIENT)
Dept: DERMATOLOGY | Facility: CLINIC | Age: 72
End: 2017-10-10
Payer: COMMERCIAL

## 2017-10-10 VITALS
WEIGHT: 315 LBS | HEIGHT: 71 IN | SYSTOLIC BLOOD PRESSURE: 132 MMHG | HEART RATE: 78 BPM | BODY MASS INDEX: 44.1 KG/M2 | DIASTOLIC BLOOD PRESSURE: 76 MMHG | OXYGEN SATURATION: 96 %

## 2017-10-10 DIAGNOSIS — D18.00 ANGIOMA: ICD-10-CM

## 2017-10-10 DIAGNOSIS — D22.9 NEVUS: ICD-10-CM

## 2017-10-10 DIAGNOSIS — L82.1 SEBORRHEIC KERATOSIS: ICD-10-CM

## 2017-10-10 DIAGNOSIS — L21.9 DERMATITIS, SEBORRHEIC: ICD-10-CM

## 2017-10-10 DIAGNOSIS — L81.4 LENTIGO: ICD-10-CM

## 2017-10-10 DIAGNOSIS — L57.0 AK (ACTINIC KERATOSIS): Primary | ICD-10-CM

## 2017-10-10 PROCEDURE — 17000 DESTRUCT PREMALG LESION: CPT | Performed by: PHYSICIAN ASSISTANT

## 2017-10-10 PROCEDURE — 99214 OFFICE O/P EST MOD 30 MIN: CPT | Mod: 25 | Performed by: PHYSICIAN ASSISTANT

## 2017-10-10 NOTE — PROGRESS NOTES
HPI:   James Salvador is a 71 year old male who presents for Full skin cancer screening.  chief complaint  Last Skin Exam: 6 mo ago      1st Baseline: no  Personal HX of Skin Cancer: Scc   Personal HX of Malignant Melanoma: none   Family HX of Skin Cancer / Malignant Melanoma: none  Personal HX of Atypical Moles:   none  Risk factors: sun exposure, history of SCC  New / Changing lesions:Yes has a few scattered areas  Social History: Recent TKA doing well   On review of systems, there are no further skin complaints, patient is feeling otherwise well.  See patient intake sheet.  ROS of the following were done and are negative: Constitutional, Eyes, Ears, Nose,   Mouth, Throat, Cardiovascular, Respiratory, GI, Genitourinary, Musculoskeletal,   Psychiatric, Endocrine, Allergic/Immunologic.    PHYSICAL EXAM:   Weight:  BP:   Skin exam performed as follows: Type 2 skin. Mood appropriate  Alert and Oriented X 3. Well developed, well nourished in no distress.  General appearance: Normal  Head including face: Normal  Eyes: conjunctiva and lids: Normal  Mouth: Lips, teeth, gums: Normal  Neck: Normal  Chest-breast/axillae: Normal  Back: Normal  Spleen and liver: Normal  Cardiovascular: Exam of peripheral vascular system by observation for swelling, varicosities, edema: Normal  Genitalia: groin, buttocks: Normal  Extremities: digits/nails (clubbing): Normal  Eccrine and Apocrine glands: Normal  Right upper extremity: Normal  Left upper extremity: Normal  Right lower extremity: Normal  Left lower extremity: Normal  Skin: Scalp and body hair: See below    Pt deferred exam of breasts, groin, buttocks: NO    Other physical findings:  1. Multiple pigmented macules on extremities and trunk  2. Multiple pigmented macules on face, trunk and extremities  3. Multiple vascular papules on trunk, arms and legs  4. Multiple scattered keratotic plaques  5. Pink gritty papule on left shoulder x 1  6. Numerous gritty papules  >15 on each  arm         Except as noted above, no other signs of skin cancer or melanoma.     ASSESSMENT/PLAN:   Benign Full skin cancer screening today. . Patient with history of SCC  Advised on monthly self exams and 1 year  Patient Education: Appropriate brochures given.    Multiple benign appearing nevi on arms, legs and trunk. Discussed ABCDEs of melanoma and sunscreen.   Multiple lentigos on arms, legs and trunk. Advised benign, no treatment needed.  Multiple scattered angiomas. Advised benign, no treatment needed.   Seborrheic keratosis on arms, legs and trunk. Advised benign, no treatment needed.  Actinic keratosis on left shoulder x 1. As precancerous, cryosurgery performed. Advised on blistering and post-op care. Advised if not resolved in 1-2 months to return for evaluation  Numerous AKs on upper as well as lower arms - advised. Start Efudex BID x 2 weeks to both arms. (upper and lower left arm). Face looks great after PDT; no further treatment for this.   Seborrheic dermatitis - advised on chronic, recurrent condition. Discussed that it is a reaction to the normal yeast on the skin.   --Continue Lidex solution for the scalp QD-BID x 1-2 weeks PRN  --Desonide for the face QD x 5-7 days then PRN            Follow-up: 6 month FSE/PRN sooner    1.) Patient was asked about new and changing moles. YES  2.) Patient received a complete physical skin examination: YES  3.) Patient was counseled to perform a monthly self skin examination: YES  Scribed By: Rosa Maria Hansen, MS, PA-C

## 2017-10-10 NOTE — MR AVS SNAPSHOT
After Visit Summary   10/10/2017    James Salvador    MRN: [id number]           Patient Information     Date Of Birth          1945        Visit Information        Provider Department      10/10/2017 9:00 AM Rosa Maria Hansen PA-C [facility]        Care Instructions    For any flaky areas on the face (eyebrows, nose, etc.) - you can use desonide cream daily as needed. Try to use it only when needed.    Continue the ketoconazole shampoo every day.     Fluocinonide scalp solution to the scalp and ears daily as needed          Follow-ups after your visit        Who to contact     If you have questions or need follow up information about today's clinic visit or your schedule please contact [facility] directly at [phone].  Normal or non-critical lab and imaging results will be communicated to you by Vacatiahart, letter or phone within 4 business days after the clinic has received the results. If you do not hear from us within 7 days, please contact the clinic through Vacatiahart or phone. If you have a critical or abnormal lab result, we will notify you by phone as soon as possible.  Submit refill requests through [employer] or call your pharmacy and they will forward the refill request to us. Please allow 3 business days for your refill to be completed.          Additional Information About Your Visit        MyChart Information     [employer] gives you secure access to your electronic health record. If you see a primary care provider, you can also send messages to your care team and make appointments. If you have questions, please call your primary care clinic.  If you do not have a primary care provider, please call [phone] and they will assist you.        Care EveryWhere ID     This is your Care EveryWhere ID. This could be used by other organizations to access your [facility] medical records  [id number]        Your Vitals Were     Pulse Height Pulse  "Oximetry BMI (Body Mass Index)          78 1.803 m (5' 11\") 96% 45.75 kg/m2         Blood Pressure from Last 3 Encounters:   10/10/17 132/76   07/06/17 130/78   06/23/17 114/76    Weight from Last 3 Encounters:   10/10/17 (!) 148.8 kg (328 lb)   06/23/17 (!) 148.8 kg (328 lb)   06/21/17 (!) 148.8 kg (328 lb)              Today, you had the following     No orders found for display       Primary Care Provider Office Phone # Fax #    Jeronimo Painter [phone]       [address] [place] [address]        Equal Access to Services     NAOMI AYALA : Hadii smooth alanizo Soerikali, waaxda luqadaha, qaybta kaalmada adeegyada, brenda berry . So [facility] [phone].    ATENCIÓN: Si habla español, tiene a iniguez disposición servicios gratuitos de asistencia lingüística. Llame al [phone].    We comply with applicable federal civil rights laws and [place] laws. We do not discriminate on the basis of race, color, national origin, age, disability, sex, sexual orientation, or gender identity.            Thank you!     Thank you for choosing [facility]  for your care. Our goal is always to provide you with excellent care. Hearing back from our patients is one way we can continue to improve our services. Please take a few minutes to complete the written survey that you may receive in the mail after your visit with us. Thank you!             Your Updated Medication List - Protect others around you: Learn how to safely use, store and throw away your medicines at www.disposemymeds.org.          This list is accurate as of: 10/10/17  9:18 AM.  Always use your most recent med list.                   Brand Name Dispense Instructions for use Diagnosis    apixaban ANTICOAGULANT 5 MG tablet    ELIQUIS    60 tablet    Take 1 tablet (5 mg) by mouth 2 times daily    New onset atrial fibrillation (H)       atorvastatin 80 MG tablet    LIPITOR    90 tablet    Take " 1 tablet (80 mg) by mouth daily    ASCVD (arteriosclerotic cardiovascular disease)       ciclopirox 0.77 % cream    LOPROX    90 g    Apply topically At Bedtime    Dermatitis, seborrheic       desonide 0.05 % cream    DESOWEN    60 g    Apply sparingly to affected area on face in morning    Dermatitis, seborrheic       fluocinonide 0.05 % solution    LIDEX    60 mL    Apply to scalp BID x 1-2 weeks PRN    Dermatitis, seborrheic       fluticasone 50 MCG/ACT spray    FLONASE    16 g    Spray 2 sprays into both nostrils daily    Seasonal allergic rhinitis due to other allergic trigger       GABAPENTIN PO      Take 600 mg by mouth 3 times daily        ketotifen 0.025 % Soln ophthalmic solution    ZADITOR/REFRESH ANTI-ITCH     Place 1-2 drops into both eyes 2 times daily as needed for itching        lisinopril 20 MG tablet    PRINIVIL/ZESTRIL    90 tablet    Take 1 tablet (20 mg) by mouth daily    Benign hypertension       loratadine 10 MG tablet    CLARITIN     Take 10 mg by mouth daily as needed for allergies        METOPROLOL TARTRATE PO      Take 75 mg by mouth 2 times daily        MULTIVITAMIN PO      Take 1 tablet by mouth daily        nitroGLYcerin 0.4 MG sublingual tablet    NITROSTAT    25 tablet    Place 1 tablet (0.4 mg) under the tongue every 5 minutes as needed for chest pain May repeat twice for a total of 3 tablets.  If chest pain not relieved, call 911    ACS (acute coronary syndrome) (H)       OMEGA-3 FISH OIL PO      Take 3.2 g by mouth daily        PERIOSTAT PO      Take 20 mg by mouth 2 times daily        TRILEPTAL PO      Take 300 mg by mouth 3 times daily        TYLENOL PO      Take 650 mg by mouth 4 times daily

## 2017-10-10 NOTE — NURSING NOTE
"Chief Complaint   Patient presents with     Derm Problem       Initial /76  Pulse 78  Ht 1.803 m (5' 11\")  Wt (!) 148.8 kg (328 lb)  SpO2 96%  BMI 45.75 kg/m2 Estimated body mass index is 45.75 kg/(m^2) as calculated from the following:    Height as of this encounter: 1.803 m (5' 11\").    Weight as of this encounter: 148.8 kg (328 lb).  Medication Reconciliation: complete    "

## 2017-10-10 NOTE — PATIENT INSTRUCTIONS
For any flaky areas on the face (eyebrows, nose, etc.) - you can use desonide cream daily as needed. Try to use it only when needed.    Continue the ketoconazole shampoo every day.     Fluocinonide scalp solution to the scalp and ears daily as needed

## 2017-11-24 DIAGNOSIS — I25.10 ASCVD (ARTERIOSCLEROTIC CARDIOVASCULAR DISEASE): ICD-10-CM

## 2017-11-24 RX ORDER — ATORVASTATIN CALCIUM 80 MG/1
TABLET, FILM COATED ORAL
Qty: 90 TABLET | Refills: 0 | Status: SHIPPED | OUTPATIENT
Start: 2017-11-24 | End: 2018-04-23

## 2017-11-27 ENCOUNTER — MYC MEDICAL ADVICE (OUTPATIENT)
Dept: INTERNAL MEDICINE | Facility: CLINIC | Age: 72
End: 2017-11-27

## 2017-11-27 DIAGNOSIS — Z11.59 NEED FOR HEPATITIS C SCREENING TEST: Primary | ICD-10-CM

## 2017-12-01 DIAGNOSIS — I25.10 ASCVD (ARTERIOSCLEROTIC CARDIOVASCULAR DISEASE): ICD-10-CM

## 2017-12-01 DIAGNOSIS — Z11.59 NEED FOR HEPATITIS C SCREENING TEST: ICD-10-CM

## 2017-12-01 LAB
CHOLEST SERPL-MCNC: 132 MG/DL
HCV AB SERPL QL IA: NONREACTIVE
HDLC SERPL-MCNC: 39 MG/DL
LDLC SERPL CALC-MCNC: 65 MG/DL
NONHDLC SERPL-MCNC: 93 MG/DL
TRIGL SERPL-MCNC: 142 MG/DL

## 2017-12-01 PROCEDURE — 80061 LIPID PANEL: CPT | Performed by: INTERNAL MEDICINE

## 2017-12-01 PROCEDURE — 86803 HEPATITIS C AB TEST: CPT | Performed by: INTERNAL MEDICINE

## 2017-12-01 PROCEDURE — 36415 COLL VENOUS BLD VENIPUNCTURE: CPT | Performed by: INTERNAL MEDICINE

## 2017-12-22 ENCOUNTER — OFFICE VISIT (OUTPATIENT)
Dept: CARDIOLOGY | Facility: CLINIC | Age: 72
End: 2017-12-22
Attending: INTERNAL MEDICINE
Payer: COMMERCIAL

## 2017-12-22 VITALS
BODY MASS INDEX: 44.1 KG/M2 | SYSTOLIC BLOOD PRESSURE: 132 MMHG | HEIGHT: 71 IN | HEART RATE: 68 BPM | WEIGHT: 315 LBS | DIASTOLIC BLOOD PRESSURE: 80 MMHG

## 2017-12-22 DIAGNOSIS — E78.2 MIXED HYPERLIPIDEMIA: ICD-10-CM

## 2017-12-22 DIAGNOSIS — I10 BENIGN HYPERTENSION: ICD-10-CM

## 2017-12-22 DIAGNOSIS — E66.01 MORBID OBESITY DUE TO EXCESS CALORIES (H): ICD-10-CM

## 2017-12-22 DIAGNOSIS — I48.91 NEW ONSET ATRIAL FIBRILLATION (H): ICD-10-CM

## 2017-12-22 DIAGNOSIS — I20.89 STABLE ANGINA (H): ICD-10-CM

## 2017-12-22 DIAGNOSIS — I25.10 CORONARY ARTERY DISEASE INVOLVING NATIVE CORONARY ARTERY OF NATIVE HEART, ANGINA PRESENCE UNSPECIFIED: ICD-10-CM

## 2017-12-22 PROCEDURE — 99214 OFFICE O/P EST MOD 30 MIN: CPT | Performed by: INTERNAL MEDICINE

## 2017-12-22 RX ORDER — METOPROLOL TARTRATE 25 MG/1
25 TABLET, FILM COATED ORAL 2 TIMES DAILY
COMMUNITY
End: 2018-01-25

## 2017-12-22 RX ORDER — METOPROLOL TARTRATE 50 MG
50 TABLET ORAL 2 TIMES DAILY
COMMUNITY
End: 2018-01-25

## 2017-12-22 NOTE — MR AVS SNAPSHOT
After Visit Summary   12/22/2017    James Salvador    MRN: [id number]           Patient Information     Date Of Birth          1945        Visit Information        Provider Department      12/22/2017 2:45 PM Tereso Alexander MD [facility]        Today's Diagnoses     Coronary artery disease involving native coronary artery of native heart, angina presence unspecified        New onset atrial fibrillation (H)        Morbid obesity due to excess calories (H)        Mixed hyperlipidemia        Benign hypertension        Stable angina (H)           Follow-ups after your visit        Additional Services     Follow-Up with Cardiologist                 Your next 10 appointments already scheduled     Apr 10, 2018  9:00 AM CDT   [facility] Visit with Rosa Maria Hansen PA-C   [facility]    [address] [place] [id number] [phone]              Future tests that were ordered for you today     Open Future Orders        Priority Expected Expires Ordered    Follow-Up with Cardiologist Routine 6/20/2018 12/22/2018 12/22/2017            Who to contact     If you have questions or need follow up information about today's clinic visit or your schedule please contact [facility] directly at [phone].  Normal or non-critical lab and imaging results will be communicated to you by MyChart, letter or phone within 4 business days after the clinic has received the results. If you do not hear from us within 7 days, please contact the clinic through MyChart or phone. If you have a critical or abnormal lab result, we will notify you by phone as soon as possible.  Submit refill requests through [employer] or call your pharmacy and they will forward the refill request to us. Please allow 3 business days for your refill to be completed.          Additional  "Information About Your Visit        MyChart Information     [facility] gives you secure access to your electronic health record. If you see a primary care provider, you can also send messages to your care team and make appointments. If you have questions, please call your primary care clinic.  If you do not have a primary care provider, please call [phone] and they will assist you.        Care EveryWhere ID     This is your Care EveryWhere ID. This could be used by other organizations to access your [facility] medical records  [id number]        Your Vitals Were     Pulse Height BMI (Body Mass Index)             68 1.803 m (5' 11\") 48.22 kg/m2          Blood Pressure from Last 3 Encounters:   12/22/17 132/80   10/10/17 132/76   07/06/17 130/78    Weight from Last 3 Encounters:   12/22/17 (!) 156.8 kg (345 lb 11.2 oz)   10/10/17 (!) 148.8 kg (328 lb)   06/23/17 (!) 148.8 kg (328 lb)              We Performed the Following     Follow-Up with Cardiologist          Today's Medication Changes          These changes are accurate as of: 12/22/17  3:21 PM.  If you have any questions, ask your nurse or doctor.               These medicines have changed or have updated prescriptions.        Dose/Directions    ciclopirox 0.77 % cream   Commonly known as:  LOPROX   This may have changed:    - when to take this  - reasons to take this   Used for:  Dermatitis, seborrheic        Apply topically At Bedtime   Quantity:  90 g   Refills:  3       desonide 0.05 % cream   Commonly known as:  DESOWEN   This may have changed:    - when to take this  - reasons to take this  - additional instructions   Used for:  Dermatitis, seborrheic        Apply sparingly to affected area on face in morning   Quantity:  60 g   Refills:  2       fluticasone 50 MCG/ACT spray   Commonly known as:  FLONASE   This may have changed:    - when to take this  - reasons to take this   Used for:  Seasonal allergic rhinitis due to other allergic trigger        " Dose:  2 spray   Spray 2 sprays into both nostrils daily   Quantity:  16 g   Refills:  11                Primary Care Provider Office Phone # Fax #    Jeronimo Painter [phone] [address] [place] [address]        Equal Access to Services     NAOMI AYALA : Hadii aad ku hadasho Soomaali, waaxda luqadaha, qaybta kaalmada adeegyada, waxay leticiain haymathewn adechris prudencio kristi ah. So [facility] [phone].    ATENCIÓN: Si habla español, tiene a iniguez disposición servicios gratuitos de asistencia lingüística. [facility] [phone].    We comply with applicable federal civil rights laws and [place] laws. We do not discriminate on the basis of race, color, national origin, age, disability, sex, sexual orientation, or gender identity.            Thank you!     Thank you for choosing [facility]  for your care. Our goal is always to provide you with excellent care. Hearing back from our patients is one way we can continue to improve our services. Please take a few minutes to complete the written survey that you may receive in the mail after your visit with us. Thank you!             Your Updated Medication List - Protect others around you: Learn how to safely use, store and throw away your medicines at www.disposemymeds.org.          This list is accurate as of: 12/22/17  3:21 PM.  Always use your most recent med list.                   Brand Name Dispense Instructions for use Diagnosis    apixaban ANTICOAGULANT 5 MG tablet    ELIQUIS    60 tablet    Take 1 tablet (5 mg) by mouth 2 times daily    New onset atrial fibrillation (H)       atorvastatin 80 MG tablet    LIPITOR    90 tablet    TAKE 1 TABLET (80 MG) BY MOUTH DAILY    ASCVD (arteriosclerotic cardiovascular disease)       ciclopirox 0.77 % cream    LOPROX    90 g    Apply topically At Bedtime    Dermatitis, seborrheic       desonide 0.05 % cream    DESOWEN    60 g    Apply sparingly to affected  area on face in morning    Dermatitis, seborrheic       fluocinonide 0.05 % solution    LIDEX    60 mL    Apply to scalp BID x 1-2 weeks PRN    Dermatitis, seborrheic       fluticasone 50 MCG/ACT spray    FLONASE    16 g    Spray 2 sprays into both nostrils daily    Seasonal allergic rhinitis due to other allergic trigger       GABAPENTIN PO      Take 600 mg by mouth 3 times daily        ketotifen 0.025 % Soln ophthalmic solution    ZADITOR/REFRESH ANTI-ITCH     Place 1-2 drops into both eyes 2 times daily as needed for itching        lisinopril 20 MG tablet    PRINIVIL/ZESTRIL    90 tablet    Take 1 tablet (20 mg) by mouth daily    Benign hypertension       loratadine 10 MG tablet    CLARITIN     Take 10 mg by mouth daily as needed for allergies        * metoprolol 50 MG tablet    LOPRESSOR     Take 50 mg by mouth 2 times daily Take along with a 25 mg tablet twice daily        * metoprolol 25 MG tablet    LOPRESSOR     Take 25 mg by mouth 2 times daily Take along with a 50 mg tablet twice daily        MULTIVITAMIN PO      Take 1 tablet by mouth daily        nitroGLYcerin 0.4 MG sublingual tablet    NITROSTAT    25 tablet    Place 1 tablet (0.4 mg) under the tongue every 5 minutes as needed for chest pain May repeat twice for a total of 3 tablets.  If chest pain not relieved, call 911    ACS (acute coronary syndrome) (H)       OMEGA-3 FISH OIL PO      Take 3.2 g by mouth daily        PERIOSTAT PO      Take 20 mg by mouth 2 times daily        TRILEPTAL PO      Take 300 mg by mouth 3 times daily        TYLENOL PO      Take 650 mg by mouth 4 times daily        * Notice:  This list has 2 medication(s) that are the same as other medications prescribed for you. Read the directions carefully, and ask your doctor or other care provider to review them with you.

## 2017-12-22 NOTE — LETTER
12/22/2017      Jeronimo Painter MD  [address] [facility] [address]      RE: James Ramirezppard       Dear Colleague,    I had the pleasure of seeing James Salvador in the [facility].    PRIMARY CARE PHYSICIAN:  Dr. Jeronimo Painter.      HISTORY OF PRESENT ILLNESS:  I again had the pleasure of seeing your patient, James Salvador, at [facility] for evaluation of coronary artery disease, mixed hyperlipidemia and new onset atrial fibrillation as well as angina pectoris.  The patient is status post previous myocardial infarction in 1994 while living in [place].  He had frequent runs of SVT that had been treated medically.  He underwent a right shoulder surgery earlier this year and on 05/08 underwent left total knee arthroplasty.  In preoperative evaluation he was found to be in atrial fibrillation with controlled ventricular response.  He was asymptomatic.  09/2005 a Lexiscan nuclear stress test showed moderately sized, predominantly reversible, lateral and inferolateral defect consistent with ischemia.  He was hospitalized for recurrent angina and coronary angiography showed an occluded right coronary artery with collaterals.  The mid-LAD had severe stenosis which was then stented with a drug-eluting stent.  Lexiscan nuclear stress test in 09/2016 showed the inferior and inferolateral ischemia but no anterior wall ischemia.  His most recent coronary angiogram 09/23/2015 showed the chronically occluded right coronary artery with contralateral collaterals.  The patient is followed by Dr. Gamble in the EP department.  He remains on metoprolol with excellent rate control.  He was started on Eliquis after a surgery for his atrial fibrillation.  He denies palpitations.  He notes shortness of breath and chest congestion with long walks or stair climbing.  He otherwise does quite well.  He has limited exercise at this point.  His weight continues to increase  because of impulsive eating.  He sleeps in a recliner and still denies that he has any sleep apnea.  His echocardiogram from 07/07 demonstrated moderate to severe left atrial enlargement and mild to moderate right atrial enlargement.  RVSP was 26 mmHg plus right atrial pressure.  Left ventricular ejection fraction was normal with mild concentric left ventricular hypertrophy.      PHYSICAL EXAMINATION:  As below.      ASSESSMENT:   1.  Alden Salvador is a delightful 72-year-old male with a history of coronary artery disease and myocardial infarction with angioplasty in 1994.  He is status post LAD intracoronary stenting in 05/2015 with a known totally occluded right coronary artery.  Followup stress testing in 2006 showed ischemia in the right coronary artery distribution but not anteriorly.  He denies angina and is doing well without intervention now.   2.  Atrial fibrillation since May of this year.  It is not surprising given his risk factors.  He is asymptomatic and anticoagulated and we will continue with rate control alone.   3.  Morbid obesity.  We again discussed the need for a psychologist to evaluate why he has his impulsive eating.      It is my pleasure to assist in the care of Mr. James Salvador (Pete).  I will see him again in 6 months or earlier on a p.r.n. basis.  All his questions were answered to his satisfaction.       Outpatient Encounter Prescriptions as of 12/22/2017   Medication Sig Dispense Refill     metoprolol (LOPRESSOR) 50 MG tablet Take 50 mg by mouth 2 times daily Take along with a 25 mg tablet twice daily       metoprolol (LOPRESSOR) 25 MG tablet Take 25 mg by mouth 2 times daily Take along with a 50 mg tablet twice daily       atorvastatin (LIPITOR) 80 MG tablet TAKE 1 TABLET (80 MG) BY MOUTH DAILY 90 tablet 0     apixaban ANTICOAGULANT (ELIQUIS) 5 MG tablet Take 1 tablet (5 mg) by mouth 2 times daily 60 tablet 11     fluocinonide (LIDEX) 0.05 % solution Apply to scalp BID x 1-2 weeks  PRN 60 mL 3     Acetaminophen (TYLENOL PO) Take 650 mg by mouth 4 times daily        fluticasone (FLONASE) 50 MCG/ACT spray Spray 2 sprays into both nostrils daily (Patient taking differently: Spray 2 sprays into both nostrils as needed ) 16 g 11     lisinopril (PRINIVIL/ZESTRIL) 20 MG tablet Take 1 tablet (20 mg) by mouth daily 90 tablet 3     ciclopirox (LOPROX) 0.77 % cream Apply topically At Bedtime (Patient taking differently: Apply topically as needed ) 90 g 3     desonide (DESOWEN) 0.05 % cream Apply sparingly to affected area on face in morning (Patient taking differently: as needed Apply sparingly to affected area on face in morning) 60 g 2     nitroglycerin (NITROSTAT) 0.4 MG SL tablet Place 1 tablet (0.4 mg) under the tongue every 5 minutes as needed for chest pain May repeat twice for a total of 3 tablets.  If chest pain not relieved, call 911 25 tablet 11     Doxycycline Hyclate (PERIOSTAT PO) Take 20 mg by mouth 2 times daily       Omega-3 Fatty Acids (OMEGA-3 FISH OIL PO) Take 3.2 g by mouth daily        loratadine (CLARITIN) 10 MG tablet Take 10 mg by mouth daily as needed for allergies       ketotifen (ZADITOR/REFRESH ANTI-ITCH) 0.025 % SOLN Place 1-2 drops into both eyes 2 times daily as needed for itching       OXcarbazepine (TRILEPTAL PO) Take 300 mg by mouth 3 times daily        GABAPENTIN PO Take 600 mg by mouth 3 times daily        Multiple Vitamin (MULTIVITAMIN OR) Take 1 tablet by mouth daily        [DISCONTINUED] METOPROLOL TARTRATE PO Take 75 mg by mouth 2 times daily       No facility-administered encounter medications on file as of 12/22/2017.      Again, thank you for allowing me to participate in the care of your patient.      Sincerely,    Tereso Alexander MD     [facility]

## 2017-12-22 NOTE — PROGRESS NOTES
HPI and Plan:   See dictation:[id number]    Orders Placed This Encounter   Procedures     Follow-Up with Cardiologist       Orders Placed This Encounter   Medications     metoprolol (LOPRESSOR) 50 MG tablet     Sig: Take 50 mg by mouth 2 times daily Take along with a 25 mg tablet twice daily     metoprolol (LOPRESSOR) 25 MG tablet     Sig: Take 25 mg by mouth 2 times daily Take along with a 50 mg tablet twice daily       Medications Discontinued During This Encounter   Medication Reason     METOPROLOL TARTRATE PO Erroneous Entry         Encounter Diagnoses   Name Primary?     Coronary artery disease involving native coronary artery of native heart, angina presence unspecified      New onset atrial fibrillation (H)      Morbid obesity due to excess calories (H)      Mixed hyperlipidemia      Benign hypertension      Stable angina (H)        CURRENT MEDICATIONS:  Current Outpatient Prescriptions   Medication Sig Dispense Refill     metoprolol (LOPRESSOR) 50 MG tablet Take 50 mg by mouth 2 times daily Take along with a 25 mg tablet twice daily       metoprolol (LOPRESSOR) 25 MG tablet Take 25 mg by mouth 2 times daily Take along with a 50 mg tablet twice daily       atorvastatin (LIPITOR) 80 MG tablet TAKE 1 TABLET (80 MG) BY MOUTH DAILY 90 tablet 0     apixaban ANTICOAGULANT (ELIQUIS) 5 MG tablet Take 1 tablet (5 mg) by mouth 2 times daily 60 tablet 11     fluocinonide (LIDEX) 0.05 % solution Apply to scalp BID x 1-2 weeks PRN 60 mL 3     Acetaminophen (TYLENOL PO) Take 650 mg by mouth 4 times daily        fluticasone (FLONASE) 50 MCG/ACT spray Spray 2 sprays into both nostrils daily (Patient taking differently: Spray 2 sprays into both nostrils as needed ) 16 g 11     lisinopril (PRINIVIL/ZESTRIL) 20 MG tablet Take 1 tablet (20 mg) by mouth daily 90 tablet 3     ciclopirox (LOPROX) 0.77 % cream Apply topically At Bedtime (Patient taking differently: Apply topically as needed ) 90 g 3     desonide (DESOWEN) 0.05 % cream  Apply sparingly to affected area on face in morning (Patient taking differently: as needed Apply sparingly to affected area on face in morning) 60 g 2     nitroglycerin (NITROSTAT) 0.4 MG SL tablet Place 1 tablet (0.4 mg) under the tongue every 5 minutes as needed for chest pain May repeat twice for a total of 3 tablets.  If chest pain not relieved, call 911 25 tablet 11     Doxycycline Hyclate (PERIOSTAT PO) Take 20 mg by mouth 2 times daily       Omega-3 Fatty Acids (OMEGA-3 FISH OIL PO) Take 3.2 g by mouth daily        loratadine (CLARITIN) 10 MG tablet Take 10 mg by mouth daily as needed for allergies       ketotifen (ZADITOR/REFRESH ANTI-ITCH) 0.025 % SOLN Place 1-2 drops into both eyes 2 times daily as needed for itching       OXcarbazepine (TRILEPTAL PO) Take 300 mg by mouth 3 times daily        GABAPENTIN PO Take 600 mg by mouth 3 times daily        Multiple Vitamin (MULTIVITAMIN OR) Take 1 tablet by mouth daily          ALLERGIES     Allergies   Allergen Reactions     Cats      Cat Dander     Dogs      Dog Dander     Pollen Extract        PAST MEDICAL HISTORY:  Past Medical History:   Diagnosis Date     Actinic cheilitis 7/17/2013    Lower lip, left      Allergic rhinitis      Benign hypertension      CAD (coronary artery disease)     Cardiac cath and PCI 1994. Cardiac Cath 9/2015: BRIDGET to LAD     History of myocardial infarction 1994    PTCA     Hyperlipidemia LDL goal < 70      Mixed hyperlipidemia 6/21/2017     Morbid obesity due to excess calories (H) 1/11/2016     New onset atrial fibrillation (H) 4/21/2017     Paroxysmal supraventricular tachycardia (H)     on metoprolol     Squamous cell carcinoma      Stable angina (H) 1/11/2016       PAST SURGICAL HISTORY:  Past Surgical History:   Procedure Laterality Date     ANGIOPLASTY  1994    in [place]     ANKLE SURGERY  7/13/2005    right ankle     HEART CATH STENT COR W/WO PTCA  9/23/2015    BRIDGET stent mid LAD     JOINT REPLACEMENT, HIP RT/LT  10/14/2009     right hip      LASER SURGERY OF EYE  06/01/2002     MOHS MICROGRAPHIC PROCEDURE  06/12/2004    squamous cell carcinoma right temple     SINUS SURGERY  7/11/2006       FAMILY HISTORY:  Family History   Problem Relation Age of Onset     Genitourinary Problems Mother      renal failure     Cardiovascular Father      rupture of dorsal aorta     Depression Son        SOCIAL HISTORY:  Social History     Social History     Marital status:      Spouse name: N/A     Number of children: N/A     Years of education: N/A     Occupational History      Retired     Social History Main Topics     Smoking status: Former Smoker     Years: 10.00     Types: Cigarettes     Quit date: 1/1/1987     Smokeless tobacco: Never Used     Alcohol use 0.0 oz/week     0 Standard drinks or equivalent per week      Comment: socially - x2-3 per month     Drug use: No     Sexual activity: Yes     Partners: Female     Other Topics Concern     Caffeine Concern Yes     2 big cups coffee daily     Sleep Concern No     sleeping better since shoulder replaced 11/3/16     Stress Concern No     Weight Concern Yes     Special Diet Yes     trying to do more lean meats     Exercise No     limited - knee     Social History Narrative       Review of Systems:  Skin:  Positive for   hx of skin cancer,  Seeing Dermatologist 3 times year   Eyes:  Positive for glasses    ENT:  Positive for hearing loss;postnasal drainage;sinus trouble hearing aids, seasonal sinus issues - comes and goes   Respiratory:  Positive for dyspnea on exertion     Cardiovascular:    Positive for;palpitations;lightheadedness    Gastroenterology: Negative      Genitourinary:  Negative      Musculoskeletal:  Positive for back pain right shoulder replaced - 11/3/16, left knee replaced 5/8/17, right hip replaced 10/14/2009, chronic back pain  Neurologic:  Positive for numbness or tingling of feet    Psychiatric:  Negative      Heme/Lymph/Imm:  Positive for allergies animal , seasonal ,  "pollens  Endocrine:  Negative        Physical Exam:  Vitals: /80 (BP Location: Right arm, Patient Position: Chair, Cuff Size: Adult Large)  Pulse 68  Ht 1.803 m (5' 11\")  Wt (!) 156.8 kg (345 lb 11.2 oz)  BMI 48.22 kg/m2    Constitutional:  cooperative, alert and oriented, well developed, well nourished, in no acute distress morbidly obese      Skin:  warm and dry to the touch, no apparent skin lesions or masses noted          Head:  normocephalic, no masses or lesions        Eyes:  pupils equal and round, conjunctivae and lids unremarkable, sclera white, no xanthalasma, EOMS intact, no nystagmus        Lymph:      ENT:  no pallor or cyanosis, dentition good hearing aide(s) present      Neck:  carotid pulses are full and equal bilaterally, JVP normal, no carotid bruit        Respiratory:  normal breath sounds, clear to auscultation, normal A-P diameter, normal symmetry, normal respiratory excursion, no use of accessory muscles         Cardiac: regular rhythm;normal S1 and S2;no S3 or S4;apical impulse not displaced irregularly irregular rhythm   no presence of murmur          pulses full and equal, no bruits auscultated                                        GI:  abdomen soft, non-tender, BS normoactive, no mass, no HSM, no bruits obese      Extremities and Muscular Skeletal:  no deformities, clubbing, cyanosis, erythema observed     RLE edema;trace;pitting LLE edema;1+;pitting scar over left knee    Neurological:  no gross motor deficits;affect appropriate        Psych:  Alert and Oriented x 3        CC  Tereso Alexander MD  [address]  [place] [id number]              "

## 2017-12-23 NOTE — PROGRESS NOTES
PRIMARY CARE PHYSICIAN:  Dr. Jeronimo Painter.      HISTORY OF PRESENT ILLNESS:  I again had the pleasure of seeing your patient, James Salvador, at [facility] for evaluation of coronary artery disease, mixed hyperlipidemia and new onset atrial fibrillation as well as angina pectoris.  The patient is status post previous myocardial infarction in 1994 while living in [place].  He had frequent runs of SVT that had been treated medically.  He underwent a right shoulder surgery earlier this year and on 05/08 underwent left total knee arthroplasty.  In preoperative evaluation he was found to be in atrial fibrillation with controlled ventricular response.  He was asymptomatic.  09/2005 a Lexiscan nuclear stress test showed moderately sized, predominantly reversible, lateral and inferolateral defect consistent with ischemia.  He was hospitalized for recurrent angina and coronary angiography showed an occluded right coronary artery with collaterals.  The mid-LAD had severe stenosis which was then stented with a drug-eluting stent.  Lexiscan nuclear stress test in 09/2016 showed the inferior and inferolateral ischemia but no anterior wall ischemia.  His most recent coronary angiogram 09/23/2015 showed the chronically occluded right coronary artery with contralateral collaterals.  The patient is followed by Dr. Gamble in the EP department.  He remains on metoprolol with excellent rate control.  He was started on Eliquis after a surgery for his atrial fibrillation.  He denies palpitations.  He notes shortness of breath and chest congestion with long walks or stair climbing.  He otherwise does quite well.  He has limited exercise at this point.  His weight continues to increase because of impulsive eating.  He sleeps in a recliner and still denies that he has any sleep apnea.  His echocardiogram from 07/07 demonstrated moderate to severe left atrial enlargement and mild to moderate right atrial  enlargement.  RVSP was 26 mmHg plus right atrial pressure.  Left ventricular ejection fraction was normal with mild concentric left ventricular hypertrophy.      PHYSICAL EXAMINATION:  As below.      ASSESSMENT:   1.  Alden Grant is a delightful 72-year-old male with a history of coronary artery disease and myocardial infarction with angioplasty in .  He is status post LAD intracoronary stenting in 2015 with a known totally occluded right coronary artery.  Followup stress testing in  showed ischemia in the right coronary artery distribution but not anteriorly.  He denies angina and is doing well without intervention now.   2.  Atrial fibrillation since May of this year.  It is not surprising given his risk factors.  He is asymptomatic and anticoagulated and we will continue with rate control alone.   3.  Morbid obesity.  We again discussed the need for a psychologist to evaluate why he has his impulsive eating.      It is my pleasure to assist in the care of Mr. James Grant (Pete).  I will see him again in 6 months or earlier on a p.r.n. basis.  All his questions were answered to his satisfaction.      Pj Hahn MD       cc:   Jeronimo Painter MD    [address]  [address]         PJ HAHN MD, [facility]             D: 2017 15:32   T: 2017 10:28   MT: [email or web address]      Name:     JAMES GRANT   MRN:      [id number]        Account:      [id number]   :      1945           Service Date: 2017      Document: [id number]

## 2018-01-24 DIAGNOSIS — I10 BENIGN HYPERTENSION: ICD-10-CM

## 2018-01-25 DIAGNOSIS — I47.10 PAROXYSMAL SUPRAVENTRICULAR TACHYCARDIA (H): ICD-10-CM

## 2018-01-25 DIAGNOSIS — I10 BENIGN HYPERTENSION: Primary | ICD-10-CM

## 2018-01-25 DIAGNOSIS — I10 BENIGN HYPERTENSION: ICD-10-CM

## 2018-01-25 RX ORDER — METOPROLOL TARTRATE 50 MG
50 TABLET ORAL 2 TIMES DAILY
Qty: 180 TABLET | Refills: 1 | Status: SHIPPED | OUTPATIENT
Start: 2018-01-25 | End: 2018-07-26

## 2018-01-25 RX ORDER — METOPROLOL TARTRATE 25 MG/1
25 TABLET, FILM COATED ORAL 2 TIMES DAILY
Qty: 180 TABLET | Refills: 1 | Status: SHIPPED | OUTPATIENT
Start: 2018-01-25 | End: 2018-07-24

## 2018-01-25 RX ORDER — LISINOPRIL 20 MG/1
TABLET ORAL
Qty: 90 TABLET | Refills: 1 | Status: SHIPPED | OUTPATIENT
Start: 2018-01-25 | End: 2018-07-24

## 2018-01-25 RX ORDER — METOPROLOL TARTRATE 25 MG/1
25 TABLET, FILM COATED ORAL 2 TIMES DAILY
Qty: 180 TABLET | Refills: 1 | Status: SHIPPED | OUTPATIENT
Start: 2018-01-25 | End: 2018-01-25

## 2018-01-25 NOTE — TELEPHONE ENCOUNTER
"Requested Prescriptions   Pending Prescriptions Disp Refills     lisinopril (PRINIVIL/ZESTRIL) 20 MG tablet [Pharmacy Med Name: LISINOPRIL 20 MG TABLET] 90 tablet 2    Last Written Prescription Date:  12/08/16  Last Fill Quantity: 90,  # refills: 3   Last Office Visit with FMTAYLOR, MIGUEL ANGEL or [facility] prescribing provider:  05/22/17   Future Office Visit:  0  Next 5 appointments (look out 90 days)     Apr 10, 2018  9:00 AM CDT   Return Visit with Rosa Maria Hansen PA-C   [facility] ([facility])    [address] [address] [phone]                      Sig: TAKE 1 TABLET (20 MG) BY MOUTH DAILY **RTS 1/29/17**    ACE Inhibitors (Including Combos) Protocol Passed    1/24/2018  2:48 PM       Passed - Blood pressure under 140/90    BP Readings from Last 3 Encounters:   12/22/17 132/80   10/10/17 132/76   07/06/17 130/78                Passed - Recent or future visit with authorizing provider's specialty    Patient had office visit in the last year or has a visit in the next 30 days with authorizing provider.  See \"Patient Info\" tab in inbasket, or \"Choose Columns\" in Meds & Orders section of the refill encounter.            Passed - Patient is age 18 or older       Passed - Normal serum creatinine on file in past 12 months    Recent Labs   Lab Test  05/18/17   0630   CR  0.76            Passed - Normal serum potassium on file in past 12 months    Recent Labs   Lab Test  05/18/17   0630   POTASSIUM  4.8             "

## 2018-01-29 DIAGNOSIS — J30.2 CHRONIC SEASONAL ALLERGIC RHINITIS, UNSPECIFIED TRIGGER: Primary | ICD-10-CM

## 2018-01-30 ENCOUNTER — TRANSFERRED RECORDS (OUTPATIENT)
Dept: HEALTH INFORMATION MANAGEMENT | Facility: CLINIC | Age: 73
End: 2018-01-30

## 2018-01-30 RX ORDER — FLUTICASONE PROPIONATE 50 MCG
2 SPRAY, SUSPENSION (ML) NASAL DAILY
Qty: 16 ML | Refills: 11 | Status: SHIPPED | OUTPATIENT
Start: 2018-01-30 | End: 2018-12-22

## 2018-01-30 NOTE — TELEPHONE ENCOUNTER
"Requested Prescriptions   Pending Prescriptions Disp Refills     fluticasone (FLONASE) 50 MCG/ACT spray [Pharmacy Med Name: FLUTICASONE PROP 50 MCG SPRAY] 16 mL 9        Last Written Prescription Date:  12/08/16  Last Fill Quantity: 16g,  # refills: 11   Last Office Visit with [facility] provider:  05/22/17   Future Office Visit:  0  Next 5 appointments (look out 90 days)     Apr 10, 2018  9:00 AM CDT   [facility]    [address] [address] [phone]                    Sig: SPRAY 2 SPRAYS INTO BOTH NOSTRILS DAILY **FILL 12/17/16**    Inhaled Steroids Protocol Passed    1/29/2018 11:25 PM       Passed - Patient is age 12 or older       Passed - Recent or future visit with authorizing provider's specialty    Patient had office visit in the last year or has a visit in the next 30 days with authorizing provider.  See \"Patient Info\" tab in inbasket, or \"Choose Columns\" in Meds & Orders section of the refill encounter.             "

## 2018-02-26 DIAGNOSIS — L21.9 DERMATITIS, SEBORRHEIC: ICD-10-CM

## 2018-02-26 RX ORDER — FLUOCINONIDE TOPICAL SOLUTION USP, 0.05% 0.5 MG/ML
SOLUTION TOPICAL
Qty: 60 ML | Refills: 3 | Status: SHIPPED | OUTPATIENT
Start: 2018-02-26 | End: 2018-08-22

## 2018-02-26 NOTE — TELEPHONE ENCOUNTER
"    Requested Prescriptions   Pending Prescriptions Disp Refills     fluocinonide (LIDEX) 0.05 % solution 60 mL 3     Sig: Apply to scalp BID x 1-2 weeks PRN    Topical Steroid Protocol Failed    2/26/2018  7:49 AM       Failed - High potency steroid not ordered       Passed - Patient is age 6 or older       Passed - Authorizing prescriber's most recent note related to this medication read.       Passed - Recent or future visit with authorizing provider's specialty    Patient had office visit in the last year or has a visit in the next 30 days with authorizing provider.  See \"Patient Info\" tab in inbasket, or \"Choose Columns\" in Meds & Orders section of the refill encounter.               "

## 2018-04-23 DIAGNOSIS — I25.10 ASCVD (ARTERIOSCLEROTIC CARDIOVASCULAR DISEASE): ICD-10-CM

## 2018-04-25 RX ORDER — ATORVASTATIN CALCIUM 80 MG/1
TABLET, FILM COATED ORAL
Qty: 90 TABLET | Refills: 0 | Status: SHIPPED | OUTPATIENT
Start: 2018-04-25 | End: 2018-07-09

## 2018-04-25 NOTE — TELEPHONE ENCOUNTER
"Requested Prescriptions   Pending Prescriptions Disp Refills     atorvastatin (LIPITOR) 80 MG tablet [Pharmacy Med Name: ATORVASTATIN 80 MG TABLET] 90 tablet 0     Sig: TAKE 1 TABLET (80 MG) BY MOUTH DAILY - DUE FOR FASTING LABS IN DECEMBER    Statins Protocol Passed    4/23/2018 10:45 PM       Passed - LDL on file in past 12 months    Recent Labs   Lab Test  12/01/17   0854   LDL  65            Passed - No abnormal creatine kinase in past 12 months    No lab results found.            Passed - Recent (12 mo) or future (30 days) visit within the authorizing provider's specialty    Patient had office visit in the last 12 months or has a visit in the next 30 days with authorizing provider or within the authorizing provider's specialty.  See \"Patient Info\" tab in inbasket, or \"Choose Columns\" in Meds & Orders section of the refill encounter.           Passed - Patient is age 18 or older        Prescription approved per [facility] Refill Protocol.    "

## 2018-05-01 ENCOUNTER — TELEPHONE (OUTPATIENT)
Dept: DERMATOLOGY | Facility: CLINIC | Age: 73
End: 2018-05-01

## 2018-05-01 ENCOUNTER — OFFICE VISIT (OUTPATIENT)
Dept: DERMATOLOGY | Facility: CLINIC | Age: 73
End: 2018-05-01
Payer: COMMERCIAL

## 2018-05-01 VITALS — DIASTOLIC BLOOD PRESSURE: 82 MMHG | OXYGEN SATURATION: 95 % | SYSTOLIC BLOOD PRESSURE: 134 MMHG | HEART RATE: 49 BPM

## 2018-05-01 DIAGNOSIS — D48.5 NEOPLASM OF UNCERTAIN BEHAVIOR OF SKIN: Primary | ICD-10-CM

## 2018-05-01 DIAGNOSIS — L57.0 AK (ACTINIC KERATOSIS): ICD-10-CM

## 2018-05-01 DIAGNOSIS — L81.4 LENTIGO: ICD-10-CM

## 2018-05-01 DIAGNOSIS — D18.00 ANGIOMA: ICD-10-CM

## 2018-05-01 DIAGNOSIS — L82.1 SEBORRHEIC KERATOSIS: ICD-10-CM

## 2018-05-01 DIAGNOSIS — D22.9 NEVUS: ICD-10-CM

## 2018-05-01 PROCEDURE — 11100 HC BIOPSY SKIN/SUBQ/MUC MEM, SINGLE LESION: CPT | Mod: 59 | Performed by: PHYSICIAN ASSISTANT

## 2018-05-01 PROCEDURE — 17000 DESTRUCT PREMALG LESION: CPT | Mod: 51 | Performed by: PHYSICIAN ASSISTANT

## 2018-05-01 PROCEDURE — 99214 OFFICE O/P EST MOD 30 MIN: CPT | Mod: 25 | Performed by: PHYSICIAN ASSISTANT

## 2018-05-01 PROCEDURE — 88305 TISSUE EXAM BY PATHOLOGIST: CPT | Mod: TC | Performed by: PHYSICIAN ASSISTANT

## 2018-05-01 PROCEDURE — 17003 DESTRUCT PREMALG LES 2-14: CPT | Performed by: PHYSICIAN ASSISTANT

## 2018-05-01 RX ORDER — FLUOROURACIL 50 MG/G
CREAM TOPICAL
Qty: 40 G | Refills: 5 | Status: SHIPPED | OUTPATIENT
Start: 2018-05-01 | End: 2018-10-30

## 2018-05-01 NOTE — PROGRESS NOTES
HPI:   James Salvador is a 72 year old male who presents for Full skin cancer screening.   chief complaint   Last Skin Exam: 6 mo ago      1st Baseline: no  Personal HX of Skin Cancer: Scc   Personal HX of Malignant Melanoma: none   Family HX of Skin Cancer / Malignant Melanoma: none  Personal HX of Atypical Moles:   none  Risk factors: sun exposure, history of SCC  New / Changing lesions: yes right cheek and lower lip     Social History: Recent TKA doing well. Mother in law will likely pass away soon - he has been in a coma for the past 4 days. Has grandchildren that he watches; youngest is 5 and oldest is 14  On review of systems, there are no further skin complaints, patient is feeling otherwise well.  See patient intake sheet.  ROS of the following were done and are negative: Constitutional, Eyes, Ears, Nose,   Mouth, Throat, Cardiovascular, Respiratory, GI, Genitourinary, Musculoskeletal,   Psychiatric, Endocrine, Allergic/Immunologic.    This document serves as a record of the services and decisions personally performed and made by Rosa Maria Hansen, MS, PA-C. It was created on her behalf by Sara Kendall, a trained medical scribe. The creation of this document is based on the provider's statements to the medical scribe.  Sara Kendall 10:25 AM May 1, 2018    PHYSICAL EXAM:   /82  Pulse (!) 49  SpO2 95%  Skin exam performed as follows: Type 2 skin. Mood appropriate  Alert and Oriented X 3. Well developed, well nourished in no distress.  General appearance: Normal  Head including face: Normal  Eyes: conjunctiva and lids: Normal  Mouth: Lips, teeth, gums: Normal  Neck: Normal  Chest-breast/axillae: Normal  Back: Normal  Spleen and liver: Normal  Cardiovascular: Exam of peripheral vascular system by observation for swelling, varicosities, edema: Normal  Genitalia: groin, buttocks: Normal  Extremities: digits/nails (clubbing): Normal  Eccrine and Apocrine glands: Normal  Right upper extremity:  Normal  Left upper extremity: Normal  Right lower extremity: Normal  Left lower extremity: Normal  Skin: Scalp and body hair: See below    Pt deferred exam of breasts, groin, buttocks: NO    Other physical findings:  1. Multiple pigmented macules on extremities and trunk  2. Multiple pigmented macules on face, trunk and extremities  3. Multiple vascular papules on trunk, arms and legs  4. Multiple scattered keratotic plaques  5. Pink gritty papule on right cheek x 1, left forearm x 4, vertex scalp x 4  6. Numerous gritty papules  >15 on each arm  7. 5 mm irregular brown macule on left lower lip     Except as noted above, no other signs of skin cancer or melanoma.     ASSESSMENT/PLAN:   Benign Full skin cancer screening today. . Patient with history of SCC  Advised on monthly self exams and 1 year  Patient Education: Appropriate brochures given.    Multiple benign appearing nevi on arms, legs and trunk. Discussed ABCDEs of melanoma and sunscreen.   Multiple lentigos on arms, legs and trunk. Advised benign, no treatment needed.  Multiple scattered angiomas. Advised benign, no treatment needed.   Seborrheic keratosis on arms, legs and trunk. Advised benign, no treatment needed.  Actinic keratosis on right cheek x 1, left forearm x 4, vertex scalp x 4. As precancerous, cryosurgery performed. Advised on blistering and post-op care. Advised if not resolved in 1-2 months to return for evaluation  Numerous AKs on upper as well as lower arms - advised. Two week course of Efudex did seem effective. Start Efudex BID x 3-4 weeks to both arms. (upper and lower left arm). Face looks great after PDT; no further treatment for this. Consider PDT for arms if Efudex not effective again.   Lentigo r/o atypicality on left lower lip. Photo taken and placed in chart. Shave bx in typical fashion .  Area cleaned with betadyne and anesthetized with 1% lidocaine with epi .  Dermablade used to remove the lesion and sent to pathology. Bleeding  was cauterized. Pt tolerated procedure well.       Follow-up: 6 month FSE/PRN sooner    1.) Patient was asked about new and changing moles. YES  2.) Patient received a complete physical skin examination: YES  3.) Patient was counseled to perform a monthly self skin examination: YES  Scribed By: Sara Kendall medical scribe    The information in this document, created by the medical scribe for me, accurately reflects the services I personally performed and the decisions made by me. I have reviewed and approved this document for accuracy prior to leaving the patient care area.  May 1, 2018 10:36 AM    Rosa Maria Hansen MS, PA-C

## 2018-05-01 NOTE — LETTER
5/1/2018         RE: James Salvador  [address]  [place] [address]        Dear Colleague,    Thank you for referring your patient, James Salvador, to the [facility]. Please see a copy of my visit note below.    HPI:   James Salvador is a 72 year old male who presents for Full skin cancer screening.   chief complaint   Last Skin Exam: 6 mo ago      1st Baseline: no  Personal HX of Skin Cancer: Scc   Personal HX of Malignant Melanoma: none   Family HX of Skin Cancer / Malignant Melanoma: none  Personal HX of Atypical Moles:   none  Risk factors: sun exposure, history of SCC  New / Changing lesions: yes right cheek and lower lip     Social History: Recent TKA doing well. Mother in law will likely pass away soon - he has been in a coma for the past 4 days. Has grandchildren that he watches; youngest is 5 and oldest is 14  On review of systems, there are no further skin complaints, patient is feeling otherwise well.  See patient intake sheet.  ROS of the following were done and are negative: Constitutional, Eyes, Ears, Nose,   Mouth, Throat, Cardiovascular, Respiratory, GI, Genitourinary, Musculoskeletal,   Psychiatric, Endocrine, Allergic/Immunologic.    This document serves as a record of the services and decisions personally performed and made by Rosa Maria Hansen, MS, PA-C. It was created on her behalf by Sara Kendall, a trained medical scribe. The creation of this document is based on the provider's statements to the medical scribe.  Sara Kendall 10:25 AM May 1, 2018    PHYSICAL EXAM:   /82  Pulse (!) 49  SpO2 95%  Skin exam performed as follows: Type 2 skin. Mood appropriate  Alert and Oriented X 3. Well developed, well nourished in no distress.  General appearance: Normal  Head including face: Normal  Eyes: conjunctiva and lids: Normal  Mouth: Lips, teeth, gums: Normal  Neck: Normal  Chest-breast/axillae: Normal  Back: Normal  Spleen and liver:  Normal  Cardiovascular: Exam of peripheral vascular system by observation for swelling, varicosities, edema: Normal  Genitalia: groin, buttocks: Normal  Extremities: digits/nails (clubbing): Normal  Eccrine and Apocrine glands: Normal  Right upper extremity: Normal  Left upper extremity: Normal  Right lower extremity: Normal  Left lower extremity: Normal  Skin: Scalp and body hair: See below    Pt deferred exam of breasts, groin, buttocks: NO    Other physical findings:  1. Multiple pigmented macules on extremities and trunk  2. Multiple pigmented macules on face, trunk and extremities  3. Multiple vascular papules on trunk, arms and legs  4. Multiple scattered keratotic plaques  5. Pink gritty papule on right cheek x 1, left forearm x 4, vertex scalp x 4  6. Numerous gritty papules  >15 on each arm  7. 5 mm irregular brown macule on left lower lip     Except as noted above, no other signs of skin cancer or melanoma.     ASSESSMENT/PLAN:   Benign Full skin cancer screening today. . Patient with history of SCC  Advised on monthly self exams and 1 year  Patient Education: Appropriate brochures given.    Multiple benign appearing nevi on arms, legs and trunk. Discussed ABCDEs of melanoma and sunscreen.   Multiple lentigos on arms, legs and trunk. Advised benign, no treatment needed.  Multiple scattered angiomas. Advised benign, no treatment needed.   Seborrheic keratosis on arms, legs and trunk. Advised benign, no treatment needed.  Actinic keratosis on right cheek x 1, left forearm x 4, vertex scalp x 4. As precancerous, cryosurgery performed. Advised on blistering and post-op care. Advised if not resolved in 1-2 months to return for evaluation  Numerous AKs on upper as well as lower arms - advised. Two week course of Efudex did seem effective. Start Efudex BID x 3-4 weeks to both arms. (upper and lower left arm). Face looks great after PDT; no further treatment for this. Consider PDT for arms if Efudex not effective  again.   Lentigo r/o atypicality on left lower lip. Photo taken and placed in chart. Shave bx in typical fashion .  Area cleaned with betadyne and anesthetized with 1% lidocaine with epi .  Dermablade used to remove the lesion and sent to pathology. Bleeding was cauterized. Pt tolerated procedure well.       Follow-up: 6 month FSE/PRN sooner    1.) Patient was asked about new and changing moles. YES  2.) Patient received a complete physical skin examination: YES  3.) Patient was counseled to perform a monthly self skin examination: YES  Scribed By: Sara Kendall medical scribe    The information in this document, created by the medical scribe for me, accurately reflects the services I personally performed and the decisions made by me. I have reviewed and approved this document for accuracy prior to leaving the patient care area.  May 1, 2018 10:36 AM    Rosa Maria Hansen MS, SHEY      Again, thank you for allowing me to participate in the care of your patient.        Sincerely,        Rosa Maria Hansen PA-C

## 2018-05-01 NOTE — PATIENT INSTRUCTIONS
Apply Efudex cream to arms twice daily for 3-4 weeks           Wound Care Instructions     FOR SUPERFICIAL WOUNDS     [facility] [phone]    [facility] [phone]                       AFTER 24 HOURS YOU SHOULD REMOVE THE BANDAGE AND BEGIN DAILY DRESSING CHANGES AS FOLLOWS:     1) Remove Dressing.     2) Clean and dry the area with tap water using a Q-tip or sterile gauze pad.     3) Apply Vaseline, Aquaphor, Polysporin ointment or Bacitracin ointment over entire wound.  Do NOT use Neosporin ointment.     4) Cover the wound with a band-aid, or a sterile non-stick gauze pad and micropore paper tape      REPEAT THESE INSTRUCTIONS AT LEAST ONCE A DAY UNTIL THE WOUND HAS COMPLETELY HEALED.    It is an old wives tale that a wound heals better when it is exposed to air and allowed to dry out. The wound will heal faster with a better cosmetic result if it is kept moist with ointment and covered with a bandage.    **Do not let the wound dry out.**      Supplies Needed:      *Cotton tipped applicators (Q-tips)    *Polysporin Ointment or Bacitracin Ointment (NOT NEOSPORIN)    *Band-aids or non-stick gauze pads and micropore paper tape.      PATIENT INFORMATION:    During the healing process you will notice a number of changes. All wounds develop a small halo of redness surrounding the wound.  This means healing is occurring. Severe itching with extensive redness usually indicates sensitivity to the ointment or bandage tape used to dress the wound.  You should call our office if this develops.      Swelling  and/or discoloration around your surgical site is common, particularly when performed around the eye.    All wounds normally drain.  The larger the wound the more drainage there will be.  After 7-10 days, you will notice the wound beginning to shrink and new skin will begin to grow.  The wound is healed when you can see skin has formed over the entire area.  A healed wound has a healthy,  shiny look to the surface and is red to dark pink in color to normalize.  Wounds may take approximately 4-6 weeks to heal.  Larger wounds may take 6-8 weeks.  After the wound is healed you may discontinue dressing changes.    You may experience a sensation of tightness as your wound heals. This is normal and will gradually subside.    Your healed wound may be sensitive to temperature changes. This sensitivity improves with time, but if you re having a lot of discomfort, try to avoid temperature extremes.    Patients frequently experience itching after their wound appears to have healed because of the continue healing under the skin.  Plain Vaseline will help relieve the itching.        POSSIBLE COMPLICATIONS    BLEEDIN. Leave the bandage in place.  2. Use tightly rolled up gauze or a cloth to apply direct pressure over the bandage for 30  minutes.  3. Reapply pressure for an additional 30 minutes if necessary  4. Use additional gauze and tape to maintain pressure once the bleeding has stopped.

## 2018-05-01 NOTE — MR AVS SNAPSHOT
After Visit Summary   5/1/2018    James Salvador    MRN: [id number]           Patient Information     Date Of Birth          1945        Visit Information        Provider Department      5/1/2018 10:00 AM Rosa Maria Hansen PA-C [facility]        Today's Diagnoses     Neoplasm of uncertain behavior of skin    -  1    AK (actinic keratosis)        Nevus        Seborrheic keratosis        Lentigo        Angioma          Care Instructions    Apply Efudex cream to arms twice daily for 3-4 weeks           Wound Care Instructions     FOR SUPERFICIAL WOUNDS     [facility] [phone]    [facility] [phone]                       AFTER 24 HOURS YOU SHOULD REMOVE THE BANDAGE AND BEGIN DAILY DRESSING CHANGES AS FOLLOWS:     1) Remove Dressing.     2) Clean and dry the area with tap water using a Q-tip or sterile gauze pad.     3) Apply Vaseline, Aquaphor, Polysporin ointment or Bacitracin ointment over entire wound.  Do NOT use Neosporin ointment.     4) Cover the wound with a band-aid, or a sterile non-stick gauze pad and micropore paper tape      REPEAT THESE INSTRUCTIONS AT LEAST ONCE A DAY UNTIL THE WOUND HAS COMPLETELY HEALED.    It is an old wives tale that a wound heals better when it is exposed to air and allowed to dry out. The wound will heal faster with a better cosmetic result if it is kept moist with ointment and covered with a bandage.    **Do not let the wound dry out.**      Supplies Needed:      *Cotton tipped applicators (Q-tips)    *Polysporin Ointment or Bacitracin Ointment (NOT NEOSPORIN)    *Band-aids or non-stick gauze pads and micropore paper tape.      PATIENT INFORMATION:    During the healing process you will notice a number of changes. All wounds develop a small halo of redness surrounding the wound.  This means healing is occurring. Severe itching with extensive redness usually indicates sensitivity to the ointment or  bandage tape used to dress the wound.  You should call our office if this develops.      Swelling  and/or discoloration around your surgical site is common, particularly when performed around the eye.    All wounds normally drain.  The larger the wound the more drainage there will be.  After 7-10 days, you will notice the wound beginning to shrink and new skin will begin to grow.  The wound is healed when you can see skin has formed over the entire area.  A healed wound has a healthy, shiny look to the surface and is red to dark pink in color to normalize.  Wounds may take approximately 4-6 weeks to heal.  Larger wounds may take 6-8 weeks.  After the wound is healed you may discontinue dressing changes.    You may experience a sensation of tightness as your wound heals. This is normal and will gradually subside.    Your healed wound may be sensitive to temperature changes. This sensitivity improves with time, but if you re having a lot of discomfort, try to avoid temperature extremes.    Patients frequently experience itching after their wound appears to have healed because of the continue healing under the skin.  Plain Vaseline will help relieve the itching.        POSSIBLE COMPLICATIONS    BLEEDIN. Leave the bandage in place.  2. Use tightly rolled up gauze or a cloth to apply direct pressure over the bandage for 30  minutes.  3. Reapply pressure for an additional 30 minutes if necessary  4. Use additional gauze and tape to maintain pressure once the bleeding has stopped.            Follow-ups after your visit        Your next 10 appointments already scheduled     2018 10:15 AM CDT   Return Visit with Tereso Alexander MD   [facility] ([facility])    [address] [address] [address] [phone]              Who to contact     If you have questions or need follow up information about today's clinic visit or your schedule  please contact [facility] directly at [phone].  Normal or non-critical lab and imaging results will be communicated to you by MyChart, letter or phone within 4 business days after the clinic has received the results. If you do not hear from us within 7 days, please contact the clinic through [email or web address] or phone. If you have a critical or abnormal lab result, we will notify you by phone as soon as possible.  Submit refill requests through [employer] or call your pharmacy and they will forward the refill request to us. Please allow 3 business days for your refill to be completed.          Additional Information About Your Visit        [id number] Information     [employer] gives you secure access to your electronic health record. If you see a primary care provider, you can also send messages to your care team and make appointments. If you have questions, please call your primary care clinic.  If you do not have a primary care provider, please call [phone] and they will assist you.        Care EveryWhere ID     This is your Care EveryWhere ID. This could be used by other organizations to access your [facility] medical records  [id number]        Your Vitals Were     Pulse Pulse Oximetry                49 95%           Blood Pressure from Last 3 Encounters:   05/01/18 134/82   12/22/17 132/80   10/10/17 132/76    Weight from Last 3 Encounters:   12/22/17 (!) 156.8 kg (345 lb 11.2 oz)   10/10/17 (!) 148.8 kg (328 lb)   06/23/17 (!) 148.8 kg (328 lb)              Today, you had the following     No orders found for display         Today's Medication Changes          These changes are accurate as of 5/1/18 10:37 AM.  If you have any questions, ask your nurse or doctor.               These medicines have changed or have updated prescriptions.        Dose/Directions    desonide 0.05 % cream   Commonly known as:  DESOWEN   This may have changed:    - when to take this  - reasons to take this  - additional  instructions   Used for:  Dermatitis, seborrheic        Apply sparingly to affected area on face in morning   Quantity:  60 g   Refills:  2                Primary Care Provider Office Phone # Fax #    Jeronimo Painter [phone] [address] [facility] [address]        Equal Access to Services     NAOMI AAYLA : Hadii aad ku hadasho Soomaali, waaxda luqadaha, qaybta kaalmada adeegyada, waxay idiin hayaan adeeg kharash la'aan . So [facility] [phone].    ATENCIÓN: Si habla español, tiene a iniguez disposición servicios gratuitos de asistencia lingüística. Llame al [phone].    We comply with applicable federal civil rights laws and [place] laws. We do not discriminate on the basis of race, color, national origin, age, disability, sex, sexual orientation, or gender identity.            Thank you!     Thank you for choosing [facility]  for your care. Our goal is always to provide you with excellent care. Hearing back from our patients is one way we can continue to improve our services. Please take a few minutes to complete the written survey that you may receive in the mail after your visit with us. Thank you!             Your Updated Medication List - Protect others around you: Learn how to safely use, store and throw away your medicines at www.disposemymeds.org.          This list is accurate as of 5/1/18 10:37 AM.  Always use your most recent med list.                   Brand Name Dispense Instructions for use Diagnosis    apixaban ANTICOAGULANT 5 MG tablet    ELIQUIS    60 tablet    Take 1 tablet (5 mg) by mouth 2 times daily    New onset atrial fibrillation (H)       atorvastatin 80 MG tablet    LIPITOR    90 tablet    TAKE 1 TABLET (80 MG) BY MOUTH DAILY - DUE FOR FASTING LABS IN DECEMBER    ASCVD (arteriosclerotic cardiovascular disease)       ciclopirox 0.77 % cream    LOPROX    90 g    Apply topically At Bedtime    Dermatitis, seborrheic       desonide  0.05 % cream    DESOWEN    60 g    Apply sparingly to affected area on face in morning    Dermatitis, seborrheic       fluocinonide 0.05 % solution    LIDEX    60 mL    Apply to scalp BID x 1-2 weeks PRN    Dermatitis, seborrheic       fluticasone 50 MCG/ACT spray    FLONASE    16 mL    Spray 2 sprays into both nostrils daily    Chronic seasonal allergic rhinitis, unspecified trigger       GABAPENTIN PO      Take 600 mg by mouth 3 times daily        ketotifen 0.025 % Soln ophthalmic solution    ZADITOR/REFRESH ANTI-ITCH     Place 1-2 drops into both eyes 2 times daily as needed for itching        lisinopril 20 MG tablet    PRINIVIL/ZESTRIL    90 tablet    TAKE 1 TABLET (20 MG) BY MOUTH DAILY **RTS 1/29/17**    Benign hypertension       loratadine 10 MG tablet    CLARITIN     Take 10 mg by mouth daily as needed for allergies        * metoprolol tartrate 50 MG tablet    LOPRESSOR    180 tablet    Take 1 tablet (50 mg) by mouth 2 times daily Take along with a 25 mg tablet twice daily    Benign hypertension, Paroxysmal supraventricular tachycardia (H)       * metoprolol tartrate 25 MG tablet    LOPRESSOR    180 tablet    Take 1 tablet (25 mg) by mouth 2 times daily Take along with a 50 mg tablet twice daily    Benign hypertension, Paroxysmal supraventricular tachycardia (H)       MULTIVITAMIN PO      Take 1 tablet by mouth daily        nitroGLYcerin 0.4 MG sublingual tablet    NITROSTAT    25 tablet    Place 1 tablet (0.4 mg) under the tongue every 5 minutes as needed for chest pain May repeat twice for a total of 3 tablets.  If chest pain not relieved, call 911    ACS (acute coronary syndrome) (H)       OMEGA-3 FISH OIL PO      Take 3.2 g by mouth daily        PERIOSTAT PO      Take 20 mg by mouth 2 times daily        TRILEPTAL PO      Take 300 mg by mouth 3 times daily        TYLENOL PO      Take 650 mg by mouth 4 times daily        * Notice:  This list has 2 medication(s) that are the same as other medications  prescribed for you. Read the directions carefully, and ask your doctor or other care provider to review them with you.

## 2018-05-01 NOTE — NURSING NOTE
"Chief Complaint   Patient presents with     Derm Problem     6 month FSE       Initial /82  Pulse (!) 49  SpO2 95% Estimated body mass index is 48.22 kg/(m^2) as calculated from the following:    Height as of 12/22/17: 1.803 m (5' 11\").    Weight as of 12/22/17: 156.8 kg (345 lb 11.2 oz).  Medication Reconciliation: complete    "

## 2018-05-01 NOTE — TELEPHONE ENCOUNTER
Reason for Call:  Other prescription    Detailed comments: Patient has question about his prescription, please call.    Phone Number Patient can be reached at: Cell number on file:    Telephone Information:   Mobile [phone]       Best Time: Anytime    Can we leave a detailed message on this number? YES    Call taken on 5/1/2018 at 2:49 PM by PEYTON WALKER

## 2018-05-01 NOTE — TELEPHONE ENCOUNTER
Called and spoke with patient clarifying his medication order. Pharmacist gave patient the trade name of the medication so he was confused. Patient voiced understanding after explanation.  Kermit RN-BSN  [facility]  [phone]

## 2018-05-07 ENCOUNTER — TELEPHONE (OUTPATIENT)
Dept: DERMATOLOGY | Facility: CLINIC | Age: 73
End: 2018-05-07

## 2018-05-07 LAB — COPATH REPORT: NORMAL

## 2018-05-07 NOTE — TELEPHONE ENCOUNTER
Called and spoke with patient regarding pathology results-actinic keratosis on lower lip. Educated patient on diagnosis and treatment. Scheduled appointment-patient voiced understanding.  Kermit RN-BSN  [facility]  [phone]

## 2018-05-07 NOTE — TELEPHONE ENCOUNTER
Notes Recorded by Rosa Maria Hansen PA-C on 5/7/2018 at 8:58 AM  Path for the biopsy on the lower lip came back as an actinic keratosis. Recommend cryo for complete resolution.

## 2018-05-15 ENCOUNTER — OFFICE VISIT (OUTPATIENT)
Dept: DERMATOLOGY | Facility: CLINIC | Age: 73
End: 2018-05-15
Payer: COMMERCIAL

## 2018-05-15 VITALS — SYSTOLIC BLOOD PRESSURE: 137 MMHG | DIASTOLIC BLOOD PRESSURE: 80 MMHG | OXYGEN SATURATION: 94 % | HEART RATE: 51 BPM

## 2018-05-15 DIAGNOSIS — L57.0 AK (ACTINIC KERATOSIS): Primary | ICD-10-CM

## 2018-05-15 PROCEDURE — 17000 DESTRUCT PREMALG LESION: CPT | Performed by: PHYSICIAN ASSISTANT

## 2018-05-15 NOTE — LETTER
5/15/2018         RE: James Salvador  [address]  [place] [address]        Dear Colleague,    Thank you for referring your patient, James Salvador, to the [facility]. Please see a copy of my visit note below.    HPI:   James Salvador is a 72 year old male who presents for cryo on left lower lip for biopsy proven AK  chief complaint  Location: left lower lip      Review Of Systems  Eyes: negative  Ears/Nose/Throat: negative  Respiratory: No shortness of breath, dyspnea on exertion, cough, or hemoptysis  Cardiovascular: negative  Gastrointestinal: negative  Genitourinary: negative  Musculoskeletal: negative  Neurologic: negative  Psychiatric: negative    This document serves as a record of the services and decisions personally performed and made by Rosa Maria Hansen, MS, PA-C. It was created on her behalf by Inez Prince, a trained medical scribe. The creation of this document is based on the provider's statements to the medical scribe.  Inez Prince 10:41 AM May 15, 2018    PHYSICAL EXAM:    /80  Pulse 51  SpO2 94%  Skin exam performed as follows: Type 2 skin. Mood appropriate  Alert and Oriented X 3. Well developed, well nourished in no distress.  General appearance: Normal  Head including face: Normal  Eyes: conjunctiva and lids: Normal  Mouth: Lips, teeth, gums: Normal  Neck: Normal  Chest-breast/axillae: Normal  Back: Normal  Spleen and liver: Normal  Cardiovascular: Exam of peripheral vascular system by observation for swelling, varicosities, edema: Normal  Genitalia: groin, buttocks: Normal  Extremities: digits/nails (clubbing): Normal  Eccrine and Apocrine glands: Normal  Right upper extremity: Normal  Left upper extremity: Normal  Right lower extremity: Normal  Left lower extremity: Normal  Skin: Scalp and body hair: See below    1. Pink ulcerated area on the left lower lip      ASSESSMENT/PLAN:     1. Biopsy proven Actinic keratosis on  left lower lip x 1. As precancerous, cryosurgery performed. Advised on blistering and post-op care. Advised if not resolved in 1-2 months to return for evaluation  2. Numerous AKs on the forearm - continue Efudex cream BID for 1 more week (3 weeks total) then d/c          Follow-up: FSE in 6 months/PRN sooner  CC:   Scribed By:Inez Prince, Medical Scribe    The information in this document, created by the medical scribe for me, accurately reflects the services I personally performed and the decisions made by me. I have reviewed and approved this document for accuracy prior to leaving the patient care area.  May 15, 2018 10:46 AM      Rosa Maria Hansen MS, PAVAISHALI      Again, thank you for allowing me to participate in the care of your patient.        Sincerely,        Rosa Maria Hansen PA-C

## 2018-05-15 NOTE — MR AVS SNAPSHOT
After Visit Summary   5/15/2018    James Salvador    MRN: [id number]           Patient Information     Date Of Birth          1945        Visit Information        Provider Department      5/15/2018 10:30 AM Rosa Maria Hansen PA-C [facility]        Today's Diagnoses     AK (actinic keratosis)    -  1       Follow-ups after your visit        Your next 10 appointments already scheduled     Jul 12, 2018 10:15 AM CDT   Return Visit with Tereso Alexander MD   [facility]    [address]  [facility] [id number] [phone]            Oct 30, 2018 10:00 AM CDT   [facility] Visit with Rosa Maria Hansen PA-C   [facility]    [address] [address] [phone]              Who to contact     If you have questions or need follow up information about today's clinic visit or your schedule please contact [facility] directly at [phone].  Normal or non-critical lab and imaging results will be communicated to you by MyChart, letter or phone within 4 business days after the clinic has received the results. If you do not hear from us within 7 days, please contact the clinic through [employer] or phone. If you have a critical or abnormal lab result, we will notify you by phone as soon as possible.  Submit refill requests through [employer] or call your pharmacy and they will forward the refill request to us. Please allow 3 business days for your refill to be completed.          Additional Information About Your Visit        MyChart Information     [employer] gives you secure access to your electronic health record. If you see a primary care provider, you can also send messages to your care team and make appointments. If you have questions, please call your primary care clinic.  If you do not have a  primary care provider, please call [phone] and they will assist you.        Care EveryWhere ID     This is your Care EveryWhere ID. This could be used by other organizations to access your [place] medical records  [id number]        Your Vitals Were     Pulse Pulse Oximetry                51 94%           Blood Pressure from Last 3 Encounters:   05/15/18 137/80   05/01/18 134/82   12/22/17 132/80    Weight from Last 3 Encounters:   12/22/17 (!) 156.8 kg (345 lb 11.2 oz)   10/10/17 (!) 148.8 kg (328 lb)   06/23/17 (!) 148.8 kg (328 lb)              We Performed the Following     DESTRUCT PREMALIGNANT LESION, FIRST          Today's Medication Changes          These changes are accurate as of 5/15/18  5:58 PM.  If you have any questions, ask your nurse or doctor.               These medicines have changed or have updated prescriptions.        Dose/Directions    desonide 0.05 % cream   Commonly known as:  DESOWEN   This may have changed:    - when to take this  - reasons to take this  - additional instructions   Used for:  Dermatitis, seborrheic        Apply sparingly to affected area on face in morning   Quantity:  60 g   Refills:  2                Primary Care Provider Office Phone # Fax #    Jeronimo Painter [phone]       [address] [place] [id number]        Equal Access to Services     NAOMI AYALA AH: Hadii smooth quiros hadasho Soerikali, waaxda luqadaha, qaybta kaalmada debbie, brenda rausch. So [facility] [phone].    ATENCIÓN: Si habla español, tiene a iniguez disposición servicios gratuitos de asistencia lingüística. Llame al [phone].    We comply with applicable federal civil rights laws and [place] laws. We do not discriminate on the basis of race, color, national origin, age, disability, sex, sexual orientation, or gender identity.            Thank you!     Thank you for choosing [facility]  for your care. Our goal is  always to provide you with excellent care. Hearing back from our patients is one way we can continue to improve our services. Please take a few minutes to complete the written survey that you may receive in the mail after your visit with us. Thank you!             Your Updated Medication List - Protect others around you: Learn how to safely use, store and throw away your medicines at www.disposemymeds.org.          This list is accurate as of 5/15/18  5:58 PM.  Always use your most recent med list.                   Brand Name Dispense Instructions for use Diagnosis    apixaban ANTICOAGULANT 5 MG tablet    ELIQUIS    60 tablet    Take 1 tablet (5 mg) by mouth 2 times daily    New onset atrial fibrillation (H)       atorvastatin 80 MG tablet    LIPITOR    90 tablet    TAKE 1 TABLET (80 MG) BY MOUTH DAILY - DUE FOR FASTING LABS IN DECEMBER    ASCVD (arteriosclerotic cardiovascular disease)       ciclopirox 0.77 % cream    LOPROX    90 g    Apply topically At Bedtime    Dermatitis, seborrheic       desonide 0.05 % cream    DESOWEN    60 g    Apply sparingly to affected area on face in morning    Dermatitis, seborrheic       fluocinonide 0.05 % solution    LIDEX    60 mL    Apply to scalp BID x 1-2 weeks PRN    Dermatitis, seborrheic       fluorouracil 5 % cream    EFUDEX    40 g    Apply to AA BID x 3-4 weeks    AK (actinic keratosis)       fluticasone 50 MCG/ACT spray    FLONASE    16 mL    Spray 2 sprays into both nostrils daily    Chronic seasonal allergic rhinitis, unspecified trigger       GABAPENTIN PO      Take 600 mg by mouth 3 times daily        ketotifen 0.025 % Soln ophthalmic solution    ZADITOR/REFRESH ANTI-ITCH     Place 1-2 drops into both eyes 2 times daily as needed for itching        lisinopril 20 MG tablet    PRINIVIL/ZESTRIL    90 tablet    TAKE 1 TABLET (20 MG) BY MOUTH DAILY **RTS 1/29/17**    Benign hypertension       loratadine 10 MG tablet    CLARITIN     Take 10 mg by mouth daily as needed for  allergies        * metoprolol tartrate 50 MG tablet    LOPRESSOR    180 tablet    Take 1 tablet (50 mg) by mouth 2 times daily Take along with a 25 mg tablet twice daily    Benign hypertension, Paroxysmal supraventricular tachycardia (H)       * metoprolol tartrate 25 MG tablet    LOPRESSOR    180 tablet    Take 1 tablet (25 mg) by mouth 2 times daily Take along with a 50 mg tablet twice daily    Benign hypertension, Paroxysmal supraventricular tachycardia (H)       MULTIVITAMIN PO      Take 1 tablet by mouth daily        nitroGLYcerin 0.4 MG sublingual tablet    NITROSTAT    25 tablet    Place 1 tablet (0.4 mg) under the tongue every 5 minutes as needed for chest pain May repeat twice for a total of 3 tablets.  If chest pain not relieved, call 911    ACS (acute coronary syndrome) (H)       OMEGA-3 FISH OIL PO      Take 3.2 g by mouth daily        PERIOSTAT PO      Take 20 mg by mouth 2 times daily        TRILEPTAL PO      Take 300 mg by mouth 3 times daily        TYLENOL PO      Take 650 mg by mouth 4 times daily        * Notice:  This list has 2 medication(s) that are the same as other medications prescribed for you. Read the directions carefully, and ask your doctor or other care provider to review them with you.

## 2018-05-15 NOTE — PROGRESS NOTES
HPI:   James Salvador is a 72 year old male who presents for cryo on left lower lip for biopsy proven AK  chief complaint  Location: left lower lip      Review Of Systems  Eyes: negative  Ears/Nose/Throat: negative  Respiratory: No shortness of breath, dyspnea on exertion, cough, or hemoptysis  Cardiovascular: negative  Gastrointestinal: negative  Genitourinary: negative  Musculoskeletal: negative  Neurologic: negative  Psychiatric: negative    This document serves as a record of the services and decisions personally performed and made by Rosa Maria Hansen, MS, PA-C. It was created on her behalf by Inez Prince, a trained medical scribe. The creation of this document is based on the provider's statements to the medical scribe.  Inez Prince 10:41 AM May 15, 2018    PHYSICAL EXAM:    /80  Pulse 51  SpO2 94%  Skin exam performed as follows: Type 2 skin. Mood appropriate  Alert and Oriented X 3. Well developed, well nourished in no distress.  General appearance: Normal  Head including face: Normal  Eyes: conjunctiva and lids: Normal  Mouth: Lips, teeth, gums: Normal  Neck: Normal  Chest-breast/axillae: Normal  Back: Normal  Spleen and liver: Normal  Cardiovascular: Exam of peripheral vascular system by observation for swelling, varicosities, edema: Normal  Genitalia: groin, buttocks: Normal  Extremities: digits/nails (clubbing): Normal  Eccrine and Apocrine glands: Normal  Right upper extremity: Normal  Left upper extremity: Normal  Right lower extremity: Normal  Left lower extremity: Normal  Skin: Scalp and body hair: See below    1. Pink ulcerated area on the left lower lip      ASSESSMENT/PLAN:     1. Biopsy proven Actinic keratosis on left lower lip x 1. As precancerous, cryosurgery performed. Advised on blistering and post-op care. Advised if not resolved in 1-2 months to return for evaluation  2. Numerous AKs on the forearm - continue Efudex cream BID for 1 more week (3 weeks  total) then d/c          Follow-up: FSE in 6 months/PRN sooner  CC:   Scribed By:Inez Prince, Medical Scribe    The information in this document, created by the medical scribe for me, accurately reflects the services I personally performed and the decisions made by me. I have reviewed and approved this document for accuracy prior to leaving the patient care area.  May 15, 2018 10:46 AM      Rosa Maria Hansen MS, PA-C

## 2018-05-23 DIAGNOSIS — L21.9 SEBORRHEIC DERMATITIS: ICD-10-CM

## 2018-05-23 RX ORDER — KETOCONAZOLE 20 MG/G
CREAM TOPICAL 2 TIMES DAILY
Qty: 30 G | Refills: 2 | Status: SHIPPED | OUTPATIENT
Start: 2018-05-23

## 2018-05-23 NOTE — TELEPHONE ENCOUNTER
Drug to drug warning per [facility] protocol. Please advise.  MIGEL Carmen-BSN  [facility]  [phone]

## 2018-05-25 NOTE — TELEPHONE ENCOUNTER
Called and spoke to patient letting him know his Ketoconazole cream has been called into his pharmacy of choice. Patient voiced understanding.  MIGEL Carmen-BSN  [facility]  [phone]

## 2018-05-30 DIAGNOSIS — L21.9 SEBORRHEIC DERMATITIS: ICD-10-CM

## 2018-05-30 RX ORDER — KETOCONAZOLE 20 MG/ML
SHAMPOO TOPICAL
Qty: 120 ML | Refills: 11 | Status: SHIPPED | OUTPATIENT
Start: 2018-05-30 | End: 2019-01-30

## 2018-05-30 NOTE — TELEPHONE ENCOUNTER
Patient called in stating that he needs more refills for his Ketoconazole shampoo. Let patient know I would have provider put in order- () Pharmacy has been selected. Patient voiced understanding.    Kermit RN-BSN  [facility]  [phone]

## 2018-06-01 ENCOUNTER — OFFICE VISIT (OUTPATIENT)
Dept: URGENT CARE | Facility: URGENT CARE | Age: 73
End: 2018-06-01
Payer: COMMERCIAL

## 2018-06-01 VITALS
HEART RATE: 78 BPM | WEIGHT: 315 LBS | SYSTOLIC BLOOD PRESSURE: 138 MMHG | BODY MASS INDEX: 46.53 KG/M2 | OXYGEN SATURATION: 96 % | DIASTOLIC BLOOD PRESSURE: 80 MMHG
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
## 2018-06-01 DIAGNOSIS — S89.92XA SHIN INJURY, LEFT, INITIAL ENCOUNTER: ICD-10-CM

## 2018-06-01 DIAGNOSIS — Z79.01 LONG TERM CURRENT USE OF ANTICOAGULANT THERAPY: ICD-10-CM

## 2018-06-01 DIAGNOSIS — S00.83XA FACIAL CONTUSION, INITIAL ENCOUNTER: ICD-10-CM

## 2018-06-01 DIAGNOSIS — T07.XXXA ABRASION, MULTIPLE SITES: ICD-10-CM

## 2018-06-01 DIAGNOSIS — S09.90XA HEAD INJURY, INITIAL ENCOUNTER: Primary | ICD-10-CM

## 2018-06-01 PROCEDURE — 99214 OFFICE O/P EST MOD 30 MIN: CPT | Performed by: FAMILY MEDICINE

## 2018-06-01 NOTE — PATIENT INSTRUCTIONS
"  Facial Contusion  A contusion is another word for a bruise. It happens when small blood vessels break open and leak blood into the nearby area. A facial contusion can result from a bump, hit, or fall. This may happen during sports or an accident. Symptoms of a contusion often include changes in skin color (bruising), swelling, and pain.   The swelling from the contusion should decrease in a few days. Bruising and pain may take several weeks to go away.   Home care    If you have been prescribed medicines for pain, take them as directed.    To help reduce swelling and pain, wrap a cold pack or bag of frozen peas in a thin towel. Put it on the injured area for up to 20 minutes. Do this a few times a day until the swelling goes down.     If you have scrapes or cuts on your face requiring stiches or other closures, care for them as directed.    For the next 24 hours (or longer if instructed):  ? Don t drink alcohol, or use sedatives or medicines that make you sleepy.  ? Don t drive or operate machinery.  ? Don't do anything strenuous. Don t lift or strain.  ? Don't return to sports or other activity that could result in another head injury.  Note about concussions  Because the injury was to your head, it is possible that a concussion (mild brain injury) could result. Symptoms of a concussion can show up later. Be alert for signs and symptoms of a concussion. Seek emergency medical care if any of these develop over the next hours to days:    Headache    Nausea or vomiting    Dizziness    Sensitivity to light or noise    Unusual sleepiness or grogginess    Trouble falling asleep    Personality changes    Vision changes    Memory loss    Confusion    Trouble walking or clumsiness    Loss of consciousness (even for a short time)    Inability to be awakened    Feeling \"off\" or slow as if in a daze   Follow-up care  Follow up with your healthcare provider, or as directed.  When to seek medical advice  Call your healthcare " provider right away if any of these occur:    Swelling or pain that gets worse, not better    New swelling or pain    Warmth or drainage from the swollen area or from cuts or scrapes    Fluid drainage or bleeding from the nose or ears    Fever of 100.4 F (38 C) or higher, or as directed by your healthcare provider  Call 911  Call 911 if any of the following occur:     Repeated vomiting    Unusual drowsiness or trouble awakening    Fainting or loss of consciousness    Seizure    Worsening confusion, memory loss, dizziness, headache, behavior, speech, or vision  Date Last Reviewed: 5/1/2017 2000-2017 [employer]. [address] [place], [place] [id number]. All rights reserved. This information is not intended as a substitute for professional medical care. Always follow your healthcare professional's instructions.        Black Eye     Wrap a thin towel around a cold pack before applying it to your eye.     A black eye is really a bruise around your eye. It is often caused by an injury to your face or head. It is not usually due to an injury to the eye itself. The swelling and black-and-blue color happen because of blood and fluids collecting in the skin around your eye. A black eye should return to normal in 1 or 2 weeks.  When to go to the emergency room (ER)  In many cases, a black eye is a minor injury. It can be treated at home with cold packs and pain medicine. But seek medical care right away if you have any of these symptoms:    A change or loss of vision    Trouble moving your eye up and down or side to side     Blood inside your eye, or bleeding from your nose or ears    Fluid leaking from your eye  What to expect in the ER  While in the ER, you may expect the following:     Your injury will be examined.    Your vision, the way your eye moves, and the bones around your eye will be checked.    You may have a fluorescein stain test. This uses dye and a special light to check for damage to the  surface of your eye.    An X-ray or other tests may be done.    Depending on the results of your exam and tests, you may be referred to an eye specialist (ophthalmologist).  Follow-up  While your eye is healing, call your healthcare provider if you notice any of these symptoms:    Swelling that doesn't improve after a few days    Increased or severe pain    Changes in your vision    Warmth, redness, or pus near the bruise  To reduce pain and swelling from a black eye    Apply ice packs every 20 minutes while you're awake for the first 24 hours.    Use warm compresses every 20 minutes while you're awake for the next 24 hours.   Date Last Reviewed: 10/1/2017    5735-2630 [employer]. [address] [place] [address], [place] [id number]. All rights reserved. This information is not intended as a substitute for professional medical care. Always follow your healthcare professional's instructions.        Abrasions  Abrasions are skin scrapes. Their treatment depends on how large and deep the abrasion is.  Home care  You may be prescribed an antibiotic cream or ointment to apply to the wound. This helps prevent infection. Follow instructions when using this medicine.  General care    To care for the abrasion, do the following each day for as long as directed by your healthcare provider.  ? If you were given a bandage, change it once a day. If your bandage sticks to the wound, soak it in warm water until it loosens.  ? Wash the area with soap and warm water. You may do this in a sink or under a tub faucet or shower. Rinse off the soap. Then pat the area dry with a clean towel.  ? If antibiotic ointment or cream was prescribed, reapply it to the wound as directed. Cover the wound with a fresh nonstick bandage. If the bandage becomes wet or dirty, change it as soon as possible.  ? Some antibiotic ointments or cream can cause an allergic reaction or dermatitis. This may cause redness, itching and or hives. If this occurs,  stop using the ointment immediately and wash off any remaining ointment. You may need to take some allergy medicine to relieve symptoms.    You may use acetaminophen or ibuprofen to control pain unless another pain medicine was prescribed. Talk with your healthcare provider before using these medicines if you have chronic liver or kidney disease or ever had a stomach ulcer or GI bleeding. Don t use ibuprofen in children younger than six months old.    Most skin wounds heal within 10 days. But an infection may occur even with treatment. So it s important to watch the wound for signs of infection as listed below.  Follow-up care  Follow up with your healthcare provider, or as advised.  When to seek medical advice  Call your healthcare provider right away if any of these occur:    Fever of 100.4 F (38 C) or higher, or as directed by your healthcare provider    Increasing pain, redness, swelling, or drainage from the wound    Bleeding from the wound that does not stop after a few minutes of steady, firm pressure    Decreased ability to move any body part near the wound  Date Last Reviewed: 3/3/2017    8605-1477 [employer]. [address] [place] [address]. All rights reserved. This information is not intended as a substitute for professional medical care. Always follow your healthcare professional's instructions.

## 2018-06-01 NOTE — MR AVS SNAPSHOT
After Visit Summary   6/1/2018    James Salvador    MRN: [id number]           Patient Information     Date Of Birth          1945        Visit Information        Provider Department      6/1/2018 1:30 PM Abigail Zhang MD [facility]        Today's Diagnoses     Head injury, initial encounter    -  1    Long term current use of anticoagulant therapy        Facial contusion, initial encounter        Abrasion, multiple sites        Shin injury, left, initial encounter        Fall, initial encounter          Care Instructions      Facial Contusion  A contusion is another word for a bruise. It happens when small blood vessels break open and leak blood into the nearby area. A facial contusion can result from a bump, hit, or fall. This may happen during sports or an accident. Symptoms of a contusion often include changes in skin color (bruising), swelling, and pain.   The swelling from the contusion should decrease in a few days. Bruising and pain may take several weeks to go away.   Home care    If you have been prescribed medicines for pain, take them as directed.    To help reduce swelling and pain, wrap a cold pack or bag of frozen peas in a thin towel. Put it on the injured area for up to 20 minutes. Do this a few times a day until the swelling goes down.     If you have scrapes or cuts on your face requiring stiches or other closures, care for them as directed.    For the next 24 hours (or longer if instructed):  ? Don t drink alcohol, or use sedatives or medicines that make you sleepy.  ? Don t drive or operate machinery.  ? Don't do anything strenuous. Don t lift or strain.  ? Don't return to sports or other activity that could result in another head injury.  Note about concussions  Because the injury was to your head, it is possible that a concussion (mild brain injury) could result. Symptoms of a concussion can show up later. Be alert for signs and symptoms of a  "concussion. Seek emergency medical care if any of these develop over the next hours to days:    Headache    Nausea or vomiting    Dizziness    Sensitivity to light or noise    Unusual sleepiness or grogginess    Trouble falling asleep    Personality changes    Vision changes    Memory loss    Confusion    Trouble walking or clumsiness    Loss of consciousness (even for a short time)    Inability to be awakened    Feeling \"off\" or slow as if in a daze   Follow-up care  Follow up with your healthcare provider, or as directed.  When to seek medical advice  Call your healthcare provider right away if any of these occur:    Swelling or pain that gets worse, not better    New swelling or pain    Warmth or drainage from the swollen area or from cuts or scrapes    Fluid drainage or bleeding from the nose or ears    Fever of 100.4 F (38 C) or higher, or as directed by your healthcare provider  Call 911  Call 911 if any of the following occur:     Repeated vomiting    Unusual drowsiness or trouble awakening    Fainting or loss of consciousness    Seizure    Worsening confusion, memory loss, dizziness, headache, behavior, speech, or vision  Date Last Reviewed: 5/1/2017 2000-2017 [employer]. [address] [place] [address]. All rights reserved. This information is not intended as a substitute for professional medical care. Always follow your healthcare professional's instructions.        Black Eye     Wrap a thin towel around a cold pack before applying it to your eye.     A black eye is really a bruise around your eye. It is often caused by an injury to your face or head. It is not usually due to an injury to the eye itself. The swelling and black-and-blue color happen because of blood and fluids collecting in the skin around your eye. A black eye should return to normal in 1 or 2 weeks.  When to go to the emergency room (ER)  In many cases, a black eye is a minor injury. It can be treated at home " with cold packs and pain medicine. But seek medical care right away if you have any of these symptoms:    A change or loss of vision    Trouble moving your eye up and down or side to side     Blood inside your eye, or bleeding from your nose or ears    Fluid leaking from your eye  What to expect in the ER  While in the ER, you may expect the following:     Your injury will be examined.    Your vision, the way your eye moves, and the bones around your eye will be checked.    You may have a fluorescein stain test. This uses dye and a special light to check for damage to the surface of your eye.    An X-ray or other tests may be done.    Depending on the results of your exam and tests, you may be referred to an eye specialist (ophthalmologist).  Follow-up  While your eye is healing, call your healthcare provider if you notice any of these symptoms:    Swelling that doesn't improve after a few days    Increased or severe pain    Changes in your vision    Warmth, redness, or pus near the bruise  To reduce pain and swelling from a black eye    Apply ice packs every 20 minutes while you're awake for the first 24 hours.    Use warm compresses every 20 minutes while you're awake for the next 24 hours.   Date Last Reviewed: 10/1/2017    0819-9729 [employer]. [address] [place] [address], [place] [address]. All rights reserved. This information is not intended as a substitute for professional medical care. Always follow your healthcare professional's instructions.        Abrasions  Abrasions are skin scrapes. Their treatment depends on how large and deep the abrasion is.  Home care  You may be prescribed an antibiotic cream or ointment to apply to the wound. This helps prevent infection. Follow instructions when using this medicine.  General care    To care for the abrasion, do the following each day for as long as directed by your healthcare provider.  ? If you were given a bandage, change it once a day. If your  bandage sticks to the wound, soak it in warm water until it loosens.  ? Wash the area with soap and warm water. You may do this in a sink or under a tub faucet or shower. Rinse off the soap. Then pat the area dry with a clean towel.  ? If antibiotic ointment or cream was prescribed, reapply it to the wound as directed. Cover the wound with a fresh nonstick bandage. If the bandage becomes wet or dirty, change it as soon as possible.  ? Some antibiotic ointments or cream can cause an allergic reaction or dermatitis. This may cause redness, itching and or hives. If this occurs, stop using the ointment immediately and wash off any remaining ointment. You may need to take some allergy medicine to relieve symptoms.    You may use acetaminophen or ibuprofen to control pain unless another pain medicine was prescribed. Talk with your healthcare provider before using these medicines if you have chronic liver or kidney disease or ever had a stomach ulcer or GI bleeding. Don t use ibuprofen in children younger than six months old.    Most skin wounds heal within 10 days. But an infection may occur even with treatment. So it s important to watch the wound for signs of infection as listed below.  Follow-up care  Follow up with your healthcare provider, or as advised.  When to seek medical advice  Call your healthcare provider right away if any of these occur:    Fever of 100.4 F (38 C) or higher, or as directed by your healthcare provider    Increasing pain, redness, swelling, or drainage from the wound    Bleeding from the wound that does not stop after a few minutes of steady, firm pressure    Decreased ability to move any body part near the wound  Date Last Reviewed: 3/3/2017    1984-6113 [employer]. [address] [place] [address]. All rights reserved. This information is not intended as a substitute for professional medical care. Always follow your healthcare professional's instructions.                 Follow-ups after your visit        Follow-up notes from your care team     Return if symptoms worsen or fail to improve.      Your next 10 appointments already scheduled     Jul 12, 2018 10:15 AM CDT   Return Visit with Tereso Alexander MD   [facility] ([facility])    [address]  [facility] [id number] [phone]            Oct 30, 2018 10:00 AM CDT   Return Visit with Rosa Maria Hansen PA-C   [facility]    [address] [address] [phone]              Who to contact     If you have questions or need follow up information about today's clinic visit or your schedule please contact [facility] directly at [phone].  Normal or non-critical lab and imaging results will be communicated to you by MyChart, letter or phone within 4 business days after the clinic has received the results. If you do not hear from us within 7 days, please contact the clinic through MyChart or phone. If you have a critical or abnormal lab result, we will notify you by phone as soon as possible.  Submit refill requests through [employer] or call your pharmacy and they will forward the refill request to us. Please allow 3 business days for your refill to be completed.          Additional Information About Your Visit        MyChart Information     [employer] gives you secure access to your electronic health record. If you see a primary care provider, you can also send messages to your care team and make appointments. If you have questions, please call your primary care clinic.  If you do not have a primary care provider, please call [phone] and they will assist you.        Care EveryWhere ID     This is your Care EveryWhere ID. This could be used by other organizations to access your [place] medical records  [id number]        Your Vitals Were     Pulse  Pulse Oximetry BMI (Body Mass Index)             78 96% 46.53 kg/m2          Blood Pressure from Last 3 Encounters:   06/01/18 138/80   05/15/18 137/80   05/01/18 134/82    Weight from Last 3 Encounters:   06/01/18 (!) 333 lb 9.6 oz (151.3 kg)   12/22/17 (!) 345 lb 11.2 oz (156.8 kg)   10/10/17 (!) 328 lb (148.8 kg)              Today, you had the following     No orders found for display         Today's Medication Changes          These changes are accurate as of 6/1/18  2:08 PM.  If you have any questions, ask your nurse or doctor.               These medicines have changed or have updated prescriptions.        Dose/Directions    desonide 0.05 % cream   Commonly known as:  DESOWEN   This may have changed:    - when to take this  - reasons to take this  - additional instructions   Used for:  Dermatitis, seborrheic        Apply sparingly to affected area on face in morning   Quantity:  60 g   Refills:  2                Primary Care Provider Office Phone # Fax #    Jreonimo Painter [phone]       [address] [place] [address]        Equal Access to Services     NAOMI AYALA AH: Hadii smooth Shah, wajanettda chani, qaybta kaalmada adedaniloda, brenda rausch. So [facility] [phone].    ATENCIÓN: Si habla español, tiene a iniguez disposición servicios gratuitos de asistencia lingüística. [facility] [phone].    We comply with applicable federal civil rights laws and [place] laws. We do not discriminate on the basis of race, color, national origin, age, disability, sex, sexual orientation, or gender identity.            Thank you!     Thank you for choosing [facility]  for your care. Our goal is always to provide you with excellent care. Hearing back from our patients is one way we can continue to improve our services. Please take a few minutes to complete the written survey that you may receive in the mail after your visit with us. Thank  you!             Your Updated Medication List - Protect others around you: Learn how to safely use, store and throw away your medicines at www.disposemymeds.org.          This list is accurate as of 6/1/18  2:08 PM.  Always use your most recent med list.                   Brand Name Dispense Instructions for use Diagnosis    apixaban ANTICOAGULANT 5 MG tablet    ELIQUIS    60 tablet    Take 1 tablet (5 mg) by mouth 2 times daily    New onset atrial fibrillation (H)       atorvastatin 80 MG tablet    LIPITOR    90 tablet    TAKE 1 TABLET (80 MG) BY MOUTH DAILY - DUE FOR FASTING LABS IN DECEMBER    ASCVD (arteriosclerotic cardiovascular disease)       ciclopirox 0.77 % cream    LOPROX    90 g    Apply topically At Bedtime    Dermatitis, seborrheic       desonide 0.05 % cream    DESOWEN    60 g    Apply sparingly to affected area on face in morning    Dermatitis, seborrheic       fluocinonide 0.05 % solution    LIDEX    60 mL    Apply to scalp BID x 1-2 weeks PRN    Dermatitis, seborrheic       fluorouracil 5 % cream    EFUDEX    40 g    Apply to AA BID x 3-4 weeks    AK (actinic keratosis)       fluticasone 50 MCG/ACT spray    FLONASE    16 mL    Spray 2 sprays into both nostrils daily    Chronic seasonal allergic rhinitis, unspecified trigger       GABAPENTIN PO      Take 600 mg by mouth 3 times daily        * ketoconazole 2 % cream    NIZORAL    30 g    Apply topically 2 times daily For face. FAX REFILL REQUESTS TO CACHORRO TRUJILLO: [phone]    Seborrheic dermatitis       * ketoconazole 2 % shampoo    NIZORAL    120 mL    Use daily as needed    Seborrheic dermatitis       ketotifen 0.025 % Soln ophthalmic solution    ZADITOR/REFRESH ANTI-ITCH     Place 1-2 drops into both eyes 2 times daily as needed for itching        lisinopril 20 MG tablet    PRINIVIL/ZESTRIL    90 tablet    TAKE 1 TABLET (20 MG) BY MOUTH DAILY **RTS 1/29/17**    Benign hypertension       loratadine 10 MG tablet    CLARITIN     Take 10 mg by mouth  daily as needed for allergies        * metoprolol tartrate 50 MG tablet    LOPRESSOR    180 tablet    Take 1 tablet (50 mg) by mouth 2 times daily Take along with a 25 mg tablet twice daily    Benign hypertension, Paroxysmal supraventricular tachycardia (H)       * metoprolol tartrate 25 MG tablet    LOPRESSOR    180 tablet    Take 1 tablet (25 mg) by mouth 2 times daily Take along with a 50 mg tablet twice daily    Benign hypertension, Paroxysmal supraventricular tachycardia (H)       MULTIVITAMIN PO      Take 1 tablet by mouth daily        nitroGLYcerin 0.4 MG sublingual tablet    NITROSTAT    25 tablet    Place 1 tablet (0.4 mg) under the tongue every 5 minutes as needed for chest pain May repeat twice for a total of 3 tablets.  If chest pain not relieved, call 911    ACS (acute coronary syndrome) (H)       OMEGA-3 FISH OIL PO      Take 3.2 g by mouth daily        PERIOSTAT PO      Take 20 mg by mouth 2 times daily        TRILEPTAL PO      Take 300 mg by mouth 3 times daily        TYLENOL PO      Take 650 mg by mouth 4 times daily        * Notice:  This list has 4 medication(s) that are the same as other medications prescribed for you. Read the directions carefully, and ask your doctor or other care provider to review them with you.

## 2018-06-01 NOTE — PROGRESS NOTES
SUBJECTIVE:   72 year old male sustained a fall about 1 hour ago.  He tripped over a concrete step and hit his head on the storm door and his left shin area on the step.  No LOC.  He does not feel stunned or dizzy.  No nausea or vomiting.  Normal vision.  His hands also took the fall.  He denies previous head injury.  He did scrape the left forehead/brow area and his left shin.  He is starting to feel a mild headache at the left forehead site.  No epistaxis.  Tetanus vaccination status reviewed: tetanus re-vaccination not indicated.  He takes Eliquis for afib.       OBJECTIVE:   /80 (BP Location: Right arm, Patient Position: Chair, Cuff Size: Adult Large)  Pulse 78  Wt (!) 333 lb 9.6 oz (151.3 kg)  SpO2 96%  BMI 46.53 kg/m2   Patient appears serious but in NAD.  Head: there is a large contusion with superficial abrasion on the left brow area.  no underlying crepitus or bony step offs. Eyes: sclera is not injected.  PERRL, EOMI.  No lid injury.  Nose: no epistaxis. Normal nose alignment. Ears: no discharge, no bleeding, has hearing aids in.   OP is clear.  No TMJ pain.  Normal bite.  Neck: not tender.  Hands: there is bruising on the thenar eminence R>L, no abrasions.  Normal wrist exam.  Normal elbow and shoulder exams.  Left shin: there are superficial abrasions over the left shin area with small bruise underneath.  Normal lower leg ROM and hip ROM.      ASSESSMENT: Head injury, Facial contusion, left forehead and left shin abrasion s/p fall, h/o long term anticoagulation    PLAN:   Wounds were cleansed and dressed by the nurse.  Wound cares reviewed.  Recommend to watch for worsening headache or worsening neurological status for the next 12-24 hours as this would merit further work-up in the ED for internal head bleeding.   No worrisome neurological findings now.  No signs of concussion by history or exam.  Observe for any signs of infection or other problems.   Abigail Barajas MD

## 2018-06-26 DIAGNOSIS — L21.9 DERMATITIS, SEBORRHEIC: ICD-10-CM

## 2018-06-26 NOTE — TELEPHONE ENCOUNTER
Requested Prescriptions   Pending Prescriptions Disp Refills     desonide (DESOWEN) 0.05 % cream 60 g 2     Sig: Apply sparingly to affected area on face in morning    There is no refill protocol information for this order        Last Written Prescription Date:  3/31/16  Last Fill Quantity: 60g,  # refills: 2   Last office visit: 8/11/2016 with prescribing provider: Luzmaria Liao Office Visit:   Next 5 appointments (look out 90 days)     Jul 12, 2018 10:15 AM CDT   Return Visit with Tereso Alexander MD   [facility] ([facility])    [address] [address] [address] [phone]

## 2018-06-28 RX ORDER — DESONIDE 0.5 MG/G
CREAM TOPICAL
Qty: 60 G | Refills: 0 | Status: SHIPPED | OUTPATIENT
Start: 2018-06-28 | End: 2018-06-29

## 2018-06-29 RX ORDER — DESONIDE 0.5 MG/G
CREAM TOPICAL
Qty: 60 G | Refills: 0 | Status: SHIPPED | OUTPATIENT
Start: 2018-06-29 | End: 2019-05-21

## 2018-07-09 ENCOUNTER — OFFICE VISIT (OUTPATIENT)
Dept: INTERNAL MEDICINE | Facility: CLINIC | Age: 73
End: 2018-07-09
Payer: COMMERCIAL

## 2018-07-09 VITALS
BODY MASS INDEX: 44.1 KG/M2 | SYSTOLIC BLOOD PRESSURE: 132 MMHG | DIASTOLIC BLOOD PRESSURE: 76 MMHG | OXYGEN SATURATION: 94 % | HEIGHT: 71 IN | HEART RATE: 53 BPM | WEIGHT: 315 LBS | TEMPERATURE: 98.8 F | RESPIRATION RATE: 20 BRPM

## 2018-07-09 DIAGNOSIS — R63.2 OVEREATING: ICD-10-CM

## 2018-07-09 DIAGNOSIS — I48.91 NEW ONSET ATRIAL FIBRILLATION (H): ICD-10-CM

## 2018-07-09 DIAGNOSIS — R73.9 HYPERGLYCEMIA: ICD-10-CM

## 2018-07-09 DIAGNOSIS — E66.01 MORBID OBESITY (H): Primary | ICD-10-CM

## 2018-07-09 DIAGNOSIS — Z79.01 CHRONIC ANTICOAGULATION: Primary | ICD-10-CM

## 2018-07-09 DIAGNOSIS — I25.10 CORONARY ARTERY DISEASE INVOLVING NATIVE CORONARY ARTERY OF NATIVE HEART, ANGINA PRESENCE UNSPECIFIED: ICD-10-CM

## 2018-07-09 DIAGNOSIS — I25.10 ASCVD (ARTERIOSCLEROTIC CARDIOVASCULAR DISEASE): ICD-10-CM

## 2018-07-09 PROCEDURE — 99214 OFFICE O/P EST MOD 30 MIN: CPT | Performed by: INTERNAL MEDICINE

## 2018-07-09 RX ORDER — AMOXICILLIN 500 MG/1
2000 CAPSULE ORAL PRN
Refills: 0 | COMMUNITY
Start: 2018-02-06 | End: 2022-06-20

## 2018-07-09 NOTE — PROGRESS NOTES
"  SUBJECTIVE:   James Salvador is a 73 year old male who presents to clinic today for the following health issues:      Patient would like to talk to the doctor about his weight  Patient states he has a complex relationship between himself and food.  He has a difficult time controlling his appetite but also sees food as a  a means of making himself feel better.      Problem list and histories reviewed & adjusted, as indicated.  Additional history: as documented    Labs reviewed in EPIC    Reviewed and updated as needed this visit by clinical staff  Allergies  Meds       Reviewed and updated as needed this visit by Provider         ROS:  Constitutional, HEENT, cardiovascular, pulmonary, gi and gu systems are negative, except as otherwise noted.    OBJECTIVE:                                                    /76  Pulse 53  Temp 98.8  F (37.1  C) (Oral)  Resp 20  Ht 5' 11\" (1.803 m)  Wt (!) 339 lb 11.2 oz (154.1 kg)  SpO2 94%  BMI 47.38 kg/m2  Body mass index is 47.38 kg/(m^2).  GENERAL APPEARANCE: alert, no distress and over weight    Diagnostic test results:  none      ASSESSMENT/PLAN:                                                    1. Morbid obesity (H)  2. Overeating  - BARIATRIC ADULT REFERRAL  - MENTAL HEALTH REFERRAL  - Adult; Outpatient Treatment; Individual/Couples/Family/Group Therapy/Health Psychology; Other: Behavioral [facility] Providers [phone]; We will contact you to schedule the appointment or please call with any questi...    3. Hyperglycemia  - Hemoglobin A1c; Future    4. Coronary artery disease involving native coronary artery of native heart, angina presence unspecified  - Basic metabolic panel; Future    Total of 25 minutes were spent with the patient today, over 50% of which was spent in education and/or counselling.        Jeronimo Painter MD  [facility]  "

## 2018-07-09 NOTE — TELEPHONE ENCOUNTER
"Requested Prescriptions   Pending Prescriptions Disp Refills     ELIQUIS 5 MG tablet [Pharmacy Med Name: ELIQUIS 5 MG TABLET]  Last Written Prescription Date:  07/14/2017  Last Fill Quantity: 60,  # refills: 11   Last office visit: 7/9/2018 with prescribing provider:     Future Office Visit:   Next 5 appointments (look out 90 days)     Jul 12, 2018 10:15 AM CDT   Return Visit with Tereso Alexander MD   [facility] ([facility])    [address] [address] [phone]                  60 tablet 11     Sig: TAKE 1 TABLET (5 MG) BY MOUTH 2 TIMES DAILY    Platelet Inhibitors Failed    7/9/2018  2:23 PM       Failed - Normal ALT on file in past 12 months    Recent Labs   Lab Test  12/08/16   1040   ALT  37            Failed - Normal HGB on file in past 12 months    Recent Labs   Lab Test  05/18/17   0630   HGB  13.0*              Failed - Normal AST on file in past 12 months    No lab results found.         Failed - Normal Platelets on file in past 12 months    Recent Labs   Lab Test  05/18/17   0630   PLT  227              Failed - Normal serum creatinine on file in past 12 months    Recent Labs   Lab Test  05/18/17   0630   CR  0.76            Passed - Recent (12 mo) or future (30 days) visit within the authorizing provider's specialty    Patient had office visit in the last 12 months or has a visit in the next 30 days with authorizing provider or within the authorizing provider's specialty.  See \"Patient Info\" tab in inbasket, or \"Choose Columns\" in Meds & Orders section of the refill encounter.           Passed - Patient is age 18 or older        atorvastatin (LIPITOR) 80 MG tablet [Pharmacy Med Name: ATORVASTATIN 80 MG TABLET]  Last Written Prescription Date:  04/25/2018  Last Fill Quantity: 90,  # refills: 0   Last office visit: 7/9/2018 with prescribing provider:     Future Office Visit:   Next 5 appointments (look out 90 days)     " "Jul 12, 2018 10:15 AM CDT   Return Visit with Tereso Alexander MD   [facility] ([facility])    [address] [address] [phone]                  90 tablet 0     Sig: TAKE 1 TABLET BY MOUTH DAILY - DUE FOR FASTING LABS IN DECEMBER    Statins Protocol Passed    7/9/2018  2:23 PM       Passed - LDL on file in past 12 months    Recent Labs   Lab Test  12/01/17   0854   LDL  65            Passed - No abnormal creatine kinase in past 12 months    No lab results found.            Passed - Recent (12 mo) or future (30 days) visit within the authorizing provider's specialty    Patient had office visit in the last 12 months or has a visit in the next 30 days with authorizing provider or within the authorizing provider's specialty.  See \"Patient Info\" tab in inbasket, or \"Choose Columns\" in Meds & Orders section of the refill encounter.           Passed - Patient is age 18 or older          "

## 2018-07-09 NOTE — MR AVS SNAPSHOT
After Visit Summary   7/9/2018    James Salvador    MRN: [id number]           Patient Information     Date Of Birth          1945        Visit Information        Provider Department      7/9/2018 10:40 AM Jeronimo Painter MD [facility]        Today's Diagnoses     Morbid obesity (H)    -  1    Overeating        Hyperglycemia        Coronary artery disease involving native coronary artery of native heart, angina presence unspecified           Follow-ups after your visit        Additional Services     BARIATRIC ADULT REFERRAL       Your provider has referred you to: [facility]: Medical [facility] [phone]. [email or web address]    Please be aware that coverage of these services is subject to the terms and limitations of your health insurance plan.  Call member services at your health plan with any benefit or coverage questions.      Please bring the following with you to your appointment:      (1) List of current medications   (2) This referral request   (3) Any documents/labs given to you for this referral            MENTAL HEALTH REFERRAL  - Adult; Outpatient Treatment; Individual/Couples/Family/Group Therapy/Health Psychology; Other: Behavioral Healthcare Providers [phone]; We will contact you to schedule the appointment or please call with any questi...       All scheduling is subject to the client's specific insurance plan & benefits, provider/location availability, and provider clinical specialities.  Please arrive 15 minutes early for your first appointment and bring your completed paperwork.    Please be aware that coverage of these services is subject to the terms and limitations of your health insurance plan.  Call member services at your health plan with any benefit or coverage questions.                            Your next 10 appointments already  scheduled     Jul 12, 2018 10:15 AM CDT   Return Visit with Tereso Alexander MD   [facility] ([facility])    [address]  [place] [place] [address] [phone]            Oct 30, 2018 10:00 AM CDT   Return Visit with Rosa Maria Hansen PA-C   [facility]    [address] [address] [phone]              Future tests that were ordered for you today     Open Future Orders        Priority Expected Expires Ordered    Basic metabolic panel Routine  7/9/2019 7/9/2018    Hemoglobin A1c Routine 7/16/2018 7/9/2019 7/9/2018            Who to contact     If you have questions or need follow up information about today's clinic visit or your schedule please contact [facility] directly at [phone].  Normal or non-critical lab and imaging results will be communicated to you by [employer], letter or phone within 4 business days after the clinic has received the results. If you do not hear from us within 7 days, please contact the clinic through [employer] or phone. If you have a critical or abnormal lab result, we will notify you by phone as soon as possible.  Submit refill requests through [email or web address] or call your pharmacy and they will forward the refill request to us. Please allow 3 business days for your refill to be completed.          Additional Information About Your Visit        [employer] Information     [email or web address] gives you secure access to your electronic health record. If you see a primary care provider, you can also send messages to your care team and make appointments. If you have questions, please call your primary care clinic.  If you do not have a primary care provider, please call [phone] and they will assist you.        Care EveryWhere ID     This is your Care EveryWhere ID. This could be used by other organizations to access your  "[facility] medical records  [id number]        Your Vitals Were     Pulse Temperature Respirations Height Pulse Oximetry BMI (Body Mass Index)    53 98.8  F (37.1  C) (Oral) 20 5' 11\" (1.803 m) 94% 47.38 kg/m2       Blood Pressure from Last 3 Encounters:   07/09/18 132/76   06/01/18 138/80   05/15/18 137/80    Weight from Last 3 Encounters:   07/09/18 (!) 339 lb 11.2 oz (154.1 kg)   06/01/18 (!) 333 lb 9.6 oz (151.3 kg)   12/22/17 (!) 345 lb 11.2 oz (156.8 kg)              We Performed the Following     BARIATRIC ADULT REFERRAL     MENTAL HEALTH REFERRAL  - Adult; Outpatient Treatment; Individual/Couples/Family/Group Therapy/Health Psychology; Other: Behavioral Healthcare Providers [phone]; We will contact you to schedule the appointment or please call with any [employer]...        Primary Care Provider Office Phone # Fax #    Jeronimo Painter [phone]       [address] [place] [id number]        Equal Access to Services     BARB AYALA : Hadii smooth quiros hadasho Soerikali, waaxda luqadaha, qaybta kaalmada adechrisyada, brenda rausch. So [facility] [phone].    ATENCIÓN: Si habla español, tiene a iniguez disposición servicios gratuitos de asistencia lingüística. Llame al [phone].    We comply with applicable federal civil rights laws and [place] laws. We do not discriminate on the basis of race, color, national origin, age, disability, sex, sexual orientation, or gender identity.            Thank you!     Thank you for choosing [facility]  for your care. Our goal is always to provide you with excellent care. Hearing back from our patients is one way we can continue to improve our services. Please take a few minutes to complete the written survey that you may receive in the mail after your visit with us. Thank you!             Your Updated Medication List - Protect others around you: Learn how to safely use, store and throw away your " medicines at www.disposemymeds.org.          This list is accurate as of 7/9/18 11:23 AM.  Always use your most recent med list.                   Brand Name Dispense Instructions for use Diagnosis    amoxicillin 500 MG capsule    AMOXIL     2,000 mg as needed When going to the dentist        apixaban ANTICOAGULANT 5 MG tablet    ELIQUIS    60 tablet    Take 1 tablet (5 mg) by mouth 2 times daily    New onset atrial fibrillation (H)       atorvastatin 80 MG tablet    LIPITOR    90 tablet    TAKE 1 TABLET (80 MG) BY MOUTH DAILY - DUE FOR FASTING LABS IN DECEMBER    ASCVD (arteriosclerotic cardiovascular disease)       ciclopirox 0.77 % cream    LOPROX    90 g    Apply topically At Bedtime    Dermatitis, seborrheic       desonide 0.05 % cream    DESOWEN    60 g    Apply sparingly to affected area on face in morning    Dermatitis, seborrheic       fluocinonide 0.05 % solution    LIDEX    60 mL    Apply to scalp BID x 1-2 weeks PRN    Dermatitis, seborrheic       fluorouracil 5 % cream    EFUDEX    40 g    Apply to AA BID x 3-4 weeks    AK (actinic keratosis)       fluticasone 50 MCG/ACT spray    FLONASE    16 mL    Spray 2 sprays into both nostrils daily    Chronic seasonal allergic rhinitis, unspecified trigger       GABAPENTIN PO      Take 600 mg by mouth 3 times daily        * ketoconazole 2 % cream    NIZORAL    30 g    Apply topically 2 times daily For face. FAX REFILL REQUESTS TO [facility]: [phone]    Seborrheic dermatitis       * ketoconazole 2 % shampoo    NIZORAL    120 mL    Use daily as needed    Seborrheic dermatitis       ketotifen 0.025 % Soln ophthalmic solution    ZADITOR/REFRESH ANTI-ITCH     Place 1-2 drops into both eyes 2 times daily as needed for itching        lisinopril 20 MG tablet    PRINIVIL/ZESTRIL    90 tablet    TAKE 1 TABLET (20 MG) BY MOUTH DAILY **RTS 1/29/17**    Benign hypertension       loratadine 10 MG tablet    CLARITIN     Take 10 mg by mouth daily as needed for allergies         * metoprolol tartrate 50 MG tablet    LOPRESSOR    180 tablet    Take 1 tablet (50 mg) by mouth 2 times daily Take along with a 25 mg tablet twice daily    Benign hypertension, Paroxysmal supraventricular tachycardia (H)       * metoprolol tartrate 25 MG tablet    LOPRESSOR    180 tablet    Take 1 tablet (25 mg) by mouth 2 times daily Take along with a 50 mg tablet twice daily    Benign hypertension, Paroxysmal supraventricular tachycardia (H)       MULTIVITAMIN PO      Take 1 tablet by mouth daily        nitroGLYcerin 0.4 MG sublingual tablet    NITROSTAT    25 tablet    Place 1 tablet (0.4 mg) under the tongue every 5 minutes as needed for chest pain May repeat twice for a total of 3 tablets.  If chest pain not relieved, call 911    ACS (acute coronary syndrome) (H)       OMEGA-3 FISH OIL PO      Take 3.2 g by mouth daily        PERIOSTAT PO      Take 20 mg by mouth 2 times daily        TRILEPTAL PO      Take 300 mg by mouth 3 times daily        TYLENOL PO      Take 650 mg by mouth 4 times daily        * Notice:  This list has 4 medication(s) that are the same as other medications prescribed for you. Read the directions carefully, and ask your doctor or other care provider to review them with you.

## 2018-07-10 RX ORDER — ATORVASTATIN CALCIUM 80 MG/1
TABLET, FILM COATED ORAL
Qty: 90 TABLET | Refills: 3 | Status: SHIPPED | OUTPATIENT
Start: 2018-07-10 | End: 2019-07-04

## 2018-07-10 ASSESSMENT — PATIENT HEALTH QUESTIONNAIRE - PHQ9: SUM OF ALL RESPONSES TO PHQ QUESTIONS 1-9: 11

## 2018-07-10 NOTE — TELEPHONE ENCOUNTER
eliquis- Routing refill request to provider for review/approval because:  Labs not current:  AST not on file     Lipitor Prescription approved per [facility].

## 2018-07-12 ENCOUNTER — MYC REFILL (OUTPATIENT)
Dept: INTERNAL MEDICINE | Facility: CLINIC | Age: 73
End: 2018-07-12

## 2018-07-12 ENCOUNTER — OFFICE VISIT (OUTPATIENT)
Dept: CARDIOLOGY | Facility: CLINIC | Age: 73
End: 2018-07-12
Attending: INTERNAL MEDICINE
Payer: COMMERCIAL

## 2018-07-12 VITALS
HEIGHT: 71 IN | DIASTOLIC BLOOD PRESSURE: 82 MMHG | SYSTOLIC BLOOD PRESSURE: 125 MMHG | HEART RATE: 60 BPM | BODY MASS INDEX: 44.1 KG/M2 | WEIGHT: 315 LBS

## 2018-07-12 DIAGNOSIS — I10 BENIGN HYPERTENSION: ICD-10-CM

## 2018-07-12 DIAGNOSIS — E78.2 MIXED HYPERLIPIDEMIA: ICD-10-CM

## 2018-07-12 DIAGNOSIS — I48.20 CHRONIC ATRIAL FIBRILLATION (H): ICD-10-CM

## 2018-07-12 DIAGNOSIS — I25.10 CORONARY ARTERY DISEASE INVOLVING NATIVE CORONARY ARTERY OF NATIVE HEART, ANGINA PRESENCE UNSPECIFIED: Primary | ICD-10-CM

## 2018-07-12 DIAGNOSIS — E66.01 MORBID OBESITY DUE TO EXCESS CALORIES (H): ICD-10-CM

## 2018-07-12 DIAGNOSIS — I20.89 STABLE ANGINA (H): ICD-10-CM

## 2018-07-12 DIAGNOSIS — I48.91 NEW ONSET ATRIAL FIBRILLATION (H): ICD-10-CM

## 2018-07-12 PROCEDURE — 99214 OFFICE O/P EST MOD 30 MIN: CPT | Performed by: INTERNAL MEDICINE

## 2018-07-12 NOTE — PROGRESS NOTES
HPI and Plan:   See dictation:[id number]    Orders Placed This Encounter   Procedures     Follow-Up with Cardiologist       Orders Placed This Encounter   Medications     ASPIRIN PO     Sig: Take 81 mg by mouth daily       There are no discontinued medications.      Encounter Diagnoses   Name Primary?     Coronary artery disease involving native coronary artery of native heart, angina presence unspecified Yes     Chronic atrial fibrillation (H)      Morbid obesity due to excess calories (H)      Mixed hyperlipidemia      Benign hypertension      Stable angina (H)        CURRENT MEDICATIONS:  Current Outpatient Prescriptions   Medication Sig Dispense Refill     Acetaminophen (TYLENOL PO) Take 650 mg by mouth 4 times daily        amoxicillin (AMOXIL) 500 MG capsule 2,000 mg as needed When going to the dentist  0     apixaban ANTICOAGULANT (ELIQUIS) 5 MG tablet Take 1 tablet (5 mg) by mouth 2 times daily 60 tablet 11     ASPIRIN PO Take 81 mg by mouth daily       atorvastatin (LIPITOR) 80 MG tablet TAKE 1 TABLET BY MOUTH DAILY - DUE FOR FASTING LABS IN DECEMBER 90 tablet 3     ciclopirox (LOPROX) 0.77 % cream Apply topically At Bedtime 90 g 3     desonide (DESOWEN) 0.05 % cream Apply sparingly to affected area on face in morning 60 g 0     Doxycycline Hyclate (PERIOSTAT PO) Take 20 mg by mouth 2 times daily       fluocinonide (LIDEX) 0.05 % solution Apply to scalp BID x 1-2 weeks PRN 60 mL 3     fluorouracil (EFUDEX) 5 % cream Apply to AA BID x 3-4 weeks 40 g 5     fluticasone (FLONASE) 50 MCG/ACT spray Spray 2 sprays into both nostrils daily 16 mL 11     GABAPENTIN PO Take 600 mg by mouth 3 times daily        ketoconazole (NIZORAL) 2 % cream Apply topically 2 times daily For face. [facility] [phone] 30 g 2     ketoconazole (NIZORAL) 2 % shampoo Use daily as needed 120 mL 11     ketotifen (ZADITOR/REFRESH ANTI-ITCH) 0.025 % SOLN Place 1-2 drops into both eyes 2 times daily as needed for itching        lisinopril (PRINIVIL/ZESTRIL) 20 MG tablet TAKE 1 TABLET (20 MG) BY MOUTH DAILY **RTS 1/29/17** 90 tablet 1     loratadine (CLARITIN) 10 MG tablet Take 10 mg by mouth daily as needed for allergies       metoprolol tartrate (LOPRESSOR) 25 MG tablet Take 1 tablet (25 mg) by mouth 2 times daily Take along with a 50 mg tablet twice daily 180 tablet 1     metoprolol tartrate (LOPRESSOR) 50 MG tablet Take 1 tablet (50 mg) by mouth 2 times daily Take along with a 25 mg tablet twice daily 180 tablet 1     Multiple Vitamin (MULTIVITAMIN OR) Take 1 tablet by mouth daily        nitroglycerin (NITROSTAT) 0.4 MG SL tablet Place 1 tablet (0.4 mg) under the tongue every 5 minutes as needed for chest pain May repeat twice for a total of 3 tablets.  If chest pain not relieved, call 911 25 tablet 11     Omega-3 Fatty Acids (OMEGA-3 FISH OIL PO) Take 3.2 g by mouth daily        OXcarbazepine (TRILEPTAL PO) Take 300 mg by mouth 2 times daily          ALLERGIES     Allergies   Allergen Reactions     Cats      Cat Dander     Dogs      Dog Dander     Pollen Extract        PAST MEDICAL HISTORY:  Past Medical History:   Diagnosis Date     Actinic cheilitis 7/17/2013    Lower lip, left      Actinic keratosis      Allergic rhinitis      Benign hypertension      CAD (coronary artery disease)     Cardiac cath and PCI 1994. Cardiac Cath 9/2015: BRIDGET to LAD     History of myocardial infarction 1994    PTCA     Hyperlipidemia LDL goal < 70      Mixed hyperlipidemia 6/21/2017     Morbid obesity due to excess calories (H) 1/11/2016     New onset atrial fibrillation (H) 4/21/2017     Paroxysmal supraventricular tachycardia (H)     on metoprolol     Squamous cell carcinoma      Stable angina (H) 1/11/2016       PAST SURGICAL HISTORY:  Past Surgical History:   Procedure Laterality Date     ANGIOPLASTY  1994    in [place]     ANKLE SURGERY  7/13/2005    right ankle     HEART CATH STENT COR W/WO PTCA  9/23/2015    BRIDGET stent mid LAD     JOINT  "REPLACEMENT, HIP RT/LT  10/14/2009    right hip      LASER SURGERY OF EYE  06/01/2002     MOHS MICROGRAPHIC PROCEDURE  06/12/2004    squamous cell carcinoma right temple     SINUS SURGERY  7/11/2006       FAMILY HISTORY:  Family History   Problem Relation Age of Onset     Genitourinary Problems Mother      renal failure     Cardiovascular Father      rupture of dorsal aorta     Depression Son        SOCIAL HISTORY:  Social History     Social History     Marital status:      Spouse name: N/A     Number of children: 3     Years of education: N/A     Occupational History      Retired     Social History Main Topics     Smoking status: Former Smoker     Years: 10.00     Types: Cigarettes     Quit date: 1/1/1987     Smokeless tobacco: Never Used     Alcohol use 0.0 oz/week     0 Standard drinks or equivalent per week      Comment: socially - x2-3 per month     Drug use: No     Sexual activity: Yes     Partners: Female     Other Topics Concern     Caffeine Concern Yes     2 big cups coffee daily     Sleep Concern No     sleeping better since shoulder replaced 11/3/16     Stress Concern No     Weight Concern Yes     Special Diet Yes     trying to do more lean meats     Exercise No     limited - knee     Social History Narrative       Review of Systems:  Skin:  Positive for bruising hx of skin cancer,  Seeing Dermatologist 3 times year   Eyes:  Positive for glasses    ENT:  Positive for hearing loss    Respiratory:  Negative for       Cardiovascular:    edema;Positive for    Gastroenterology: Positive for heartburn    Genitourinary:  Negative for      Musculoskeletal:  Positive for joint pain right shoulder replaced - 11/3/16, left knee replaced 5/8/17, right hip replaced 10/14/2009, chronic back pain  Neurologic:  Negative for      Psychiatric:  Positive for sleep disturbances    Heme/Lymph/Imm:  Positive for allergies    Endocrine:  Negative        Physical Exam:  Vitals: /82  Pulse 60  Ht 1.803 m (5' 11\")  " Wt (!) 153.8 kg (339 lb)  BMI 47.28 kg/m2    Constitutional:  cooperative, alert and oriented, well developed, well nourished, in no acute distress morbidly obese      Skin:  warm and dry to the touch, no apparent skin lesions or masses noted          Head:  normocephalic, no masses or lesions        Eyes:  pupils equal and round;conjunctivae and lids unremarkable;sclera white;no xanthalasma;EOMS intact        Lymph:      ENT:  no pallor or cyanosis, dentition good hearing aide(s) present      Neck:  carotid pulses are full and equal bilaterally, JVP normal, no carotid bruit        Respiratory:  normal breath sounds, clear to auscultation, normal A-P diameter, normal symmetry, normal respiratory excursion, no use of accessory muscles         Cardiac: regular rhythm;normal S1 and S2;no S3 or S4;apical impulse not displaced irregularly irregular rhythm   no presence of murmur          pulses full and equal, no bruits auscultated                                        GI:  abdomen soft, non-tender, BS normoactive, no mass, no HSM, no bruits obese      Extremities and Muscular Skeletal:  no deformities, clubbing, cyanosis, erythema observed   trace;bilateral LE edema     scar over left knee    Neurological:  no gross motor deficits;affect appropriate        Psych:  Alert and Oriented x 3        CC  Tereso Alexander MD  [address]  [place] [address]

## 2018-07-12 NOTE — PROGRESS NOTES
Service Date: 07/12/2018      PRIMARY CARE PHYSICIAN:  Jeronimo Painter MD      HISTORY OF PRESENT ILLNESS:  I again had the pleasure of seeing your patient, James Salvador (Pete) at [facility] for evaluation of coronary artery disease, mixed hyperlipidemia and now chronic atrial fibrillation.  The patient is status post myocardial infarction in 1994 while living in [place].  He had frequent runs of SVT that had been treated medically.  He underwent a right shoulder surgery earlier in 2017 and then on 05/08/2017 underwent left total knee arthroplasty.  In preoperative evaluation, he was found to be in atrial fibrillation with controlled ventricular response.  He was asymptomatic.  In 09/2015, a Lexiscan nuclear stress test showed moderate-sized predominantly reversible lateral and inferolateral defect consistent with ischemia.  He was hospitalized for recurrent angina in 2015 and coronary angiography again showed occluded right coronary artery with collaterals and severe stenosis in the mid-LAD that was then stented with a drug-eluting stent.  His last Lexiscan nuclear stress test in 09/2016 showed the inferior and inferolateral ischemia but no anterior wall ischemia.  The patient follows with Dr. Gamble in the EP department.  He remains on metoprolol with excellent rate control.  He has been started on Eliquis after a surgery last year for his atrial fibrillation.  The patient notes some dyspnea on exertion with stair climbing.  His weight continues to be elevated due to impulsive eating.  He sleeps in a recliner but denies sleep apnea.  He discusses a low-carbohydrate diet that he may consider.  Echocardiogram in 07/2016 demonstrated moderate to severe left atrial enlargement and mild to moderate right atrial enlargement.  RVSP was 26 mmHg plus right atrial pressure and LV function was normal with mild concentric left ventricular hypertrophy.  He has some arthritis in his ankles and toes  which is troubling him.     PHYSICAL EXAMINATION:  As below.      ASSESSMENT/PLAN:   1.  lAden Grant is a delightful 73-year-old male with a history of coronary artery disease and myocardial infarction with angioplasty in .  He is status post LAD intracoronary stenting in 2015 with a known totally occluded right coronary artery.  Followup stress testing in 2016 showed ischemia in the right coronary artery distribution but not anteriorly.  He denies angina and is doing well without intervention.  He continues to exercise in the [employer] [facility].   2.  Atrial fibrillation over the last year is now chronic.  He is rate controlled and I have made no medication changes.  He remains anticoagulated.   3.  Morbid obesity.  We again discussed the need for a psychologist to evaluate why he has impulsive eating.      It is my pleasure to assist in the care of Alden Grant.  I will see him again in 1 year or earlier on a p.r.n. basis.    All his questions were answered to his satisfaction.      Pj Hahn MD      cc:   [place] [address]         PJ HAHN MD, [facility]             D: 2018   T: 2018   MT: al      Name:     HUGO GRANT   MRN:      [id number]        Account:      [id number]   :      1945           Service Date: 2018      Document: [id number]

## 2018-07-12 NOTE — TELEPHONE ENCOUNTER
"Requested Prescriptions   Pending Prescriptions Disp Refills     apixaban ANTICOAGULANT (ELIQUIS) 5 MG tablet 60 tablet 11     Sig: Take 1 tablet (5 mg) by mouth 2 times daily    Platelet Inhibitors Failed    7/12/2018  9:08 AM       Failed - Normal ALT on file in past 12 months    Recent Labs   Lab Test  12/08/16   1040   ALT  37            Failed - Normal HGB on file in past 12 months    Recent Labs   Lab Test  05/18/17   0630   HGB  13.0*              Failed - Normal AST on file in past 12 months    No lab results found.         Failed - Normal Platelets on file in past 12 months    Recent Labs   Lab Test  05/18/17   0630   PLT  227              Failed - Normal serum creatinine on file in past 12 months    Recent Labs   Lab Test  05/18/17   0630   CR  0.76            Passed - Recent (12 mo) or future (30 days) visit within the authorizing provider's specialty    Patient had office visit in the last 12 months or has a visit in the next 30 days with authorizing provider or within the authorizing provider's specialty.  See \"Patient Info\" tab in inbasket, or \"Choose Columns\" in Meds & Orders section of the refill encounter.           Passed - Patient is age 18 or older          "

## 2018-07-12 NOTE — TELEPHONE ENCOUNTER
Routing refill request to provider for review/approval because:  Labs out of range:  Hgb  Labs not current:  Hgb, ALT, Plt, Cr,   No AST on file

## 2018-07-12 NOTE — LETTER
7/12/2018    Jeronimo Painter MD  [address] [facility] [address]    RE: James Salvador       Dear Colleague,    I had the pleasure of seeing James Salvador in the [facility].    HPI and Plan:   See dictation:[id number]    Orders Placed This Encounter   Procedures     Follow-Up with Cardiologist       Orders Placed This Encounter   Medications     ASPIRIN PO     Sig: Take 81 mg by mouth daily       There are no discontinued medications.      Encounter Diagnoses   Name Primary?     Coronary artery disease involving native coronary artery of native heart, angina presence unspecified Yes     Chronic atrial fibrillation (H)      Morbid obesity due to excess calories (H)      Mixed hyperlipidemia      Benign hypertension      Stable angina (H)        CURRENT MEDICATIONS:  Current Outpatient Prescriptions   Medication Sig Dispense Refill     Acetaminophen (TYLENOL PO) Take 650 mg by mouth 4 times daily        amoxicillin (AMOXIL) 500 MG capsule 2,000 mg as needed When going to the dentist  0     apixaban ANTICOAGULANT (ELIQUIS) 5 MG tablet Take 1 tablet (5 mg) by mouth 2 times daily 60 tablet 11     ASPIRIN PO Take 81 mg by mouth daily       atorvastatin (LIPITOR) 80 MG tablet TAKE 1 TABLET BY MOUTH DAILY - DUE FOR FASTING LABS IN DECEMBER 90 tablet 3     ciclopirox (LOPROX) 0.77 % cream Apply topically At Bedtime 90 g 3     desonide (DESOWEN) 0.05 % cream Apply sparingly to affected area on face in morning 60 g 0     Doxycycline Hyclate (PERIOSTAT PO) Take 20 mg by mouth 2 times daily       fluocinonide (LIDEX) 0.05 % solution Apply to scalp BID x 1-2 weeks PRN 60 mL 3     fluorouracil (EFUDEX) 5 % cream Apply to AA BID x 3-4 weeks 40 g 5     fluticasone (FLONASE) 50 MCG/ACT spray Spray 2 sprays into both nostrils daily 16 mL 11     GABAPENTIN PO Take 600 mg by mouth 3 times daily        ketoconazole (NIZORAL) 2 % cream Apply topically 2 times daily For face. FAX REFILL REQUESTS  TO [facility]: [phone] 30 g 2     ketoconazole (NIZORAL) 2 % shampoo Use daily as needed 120 mL 11     ketotifen (ZADITOR/REFRESH ANTI-ITCH) 0.025 % SOLN Place 1-2 drops into both eyes 2 times daily as needed for itching       lisinopril (PRINIVIL/ZESTRIL) 20 MG tablet TAKE 1 TABLET (20 MG) BY MOUTH DAILY **RTS 1/29/17** 90 tablet 1     loratadine (CLARITIN) 10 MG tablet Take 10 mg by mouth daily as needed for allergies       metoprolol tartrate (LOPRESSOR) 25 MG tablet Take 1 tablet (25 mg) by mouth 2 times daily Take along with a 50 mg tablet twice daily 180 tablet 1     metoprolol tartrate (LOPRESSOR) 50 MG tablet Take 1 tablet (50 mg) by mouth 2 times daily Take along with a 25 mg tablet twice daily 180 tablet 1     Multiple Vitamin (MULTIVITAMIN OR) Take 1 tablet by mouth daily        nitroglycerin (NITROSTAT) 0.4 MG SL tablet Place 1 tablet (0.4 mg) under the tongue every 5 minutes as needed for chest pain May repeat twice for a total of 3 tablets.  If chest pain not relieved, call 911 25 tablet 11     Omega-3 Fatty Acids (OMEGA-3 FISH OIL PO) Take 3.2 g by mouth daily        OXcarbazepine (TRILEPTAL PO) Take 300 mg by mouth 2 times daily          ALLERGIES     Allergies   Allergen Reactions     Cats      Cat Dander     Dogs      Dog Dander     Pollen Extract        PAST MEDICAL HISTORY:  Past Medical History:   Diagnosis Date     Actinic cheilitis 7/17/2013    Lower lip, left      Actinic keratosis      Allergic rhinitis      Benign hypertension      CAD (coronary artery disease)     Cardiac cath and PCI 1994. Cardiac Cath 9/2015: BRIDGET to LAD     History of myocardial infarction 1994    PTCA     Hyperlipidemia LDL goal < 70      Mixed hyperlipidemia 6/21/2017     Morbid obesity due to excess calories (H) 1/11/2016     New onset atrial fibrillation (H) 4/21/2017     Paroxysmal supraventricular tachycardia (H)     on metoprolol     Squamous cell carcinoma      Stable angina (H) 1/11/2016       PAST SURGICAL  HISTORY:  Past Surgical History:   Procedure Laterality Date     ANGIOPLASTY  1994    in [place]     ANKLE SURGERY  7/13/2005    right ankle     HEART CATH STENT COR W/WO PTCA  9/23/2015    BRIDGET stent mid LAD     JOINT REPLACEMENT, HIP RT/LT  10/14/2009    right hip      LASER SURGERY OF EYE  06/01/2002     MOHS MICROGRAPHIC PROCEDURE  06/12/2004    squamous cell carcinoma right temple     SINUS SURGERY  7/11/2006       FAMILY HISTORY:  Family History   Problem Relation Age of Onset     Genitourinary Problems Mother      renal failure     Cardiovascular Father      rupture of dorsal aorta     Depression Son        SOCIAL HISTORY:  Social History     Social History     Marital status:      Spouse name: N/A     Number of children: 3     Years of education: N/A     Occupational History      Retired     Social History Main Topics     Smoking status: Former Smoker     Years: 10.00     Types: Cigarettes     Quit date: 1/1/1987     Smokeless tobacco: Never Used     Alcohol use 0.0 oz/week     0 Standard drinks or equivalent per week      Comment: socially - x2-3 per month     Drug use: No     Sexual activity: Yes     Partners: Female     Other Topics Concern     Caffeine Concern Yes     2 big cups coffee daily     Sleep Concern No     sleeping better since shoulder replaced 11/3/16     Stress Concern No     Weight Concern Yes     Special Diet Yes     trying to do more lean meats     Exercise No     limited - knee     Social History Narrative       Review of Systems:  Skin:  Positive for bruising hx of skin cancer,  Seeing Dermatologist 3 times year   Eyes:  Positive for glasses    ENT:  Positive for hearing loss    Respiratory:  Negative for       Cardiovascular:    edema;Positive for    Gastroenterology: Positive for heartburn    Genitourinary:  Negative for      Musculoskeletal:  Positive for joint pain right shoulder replaced - 11/3/16, left knee replaced 5/8/17, right hip replaced 10/14/2009, chronic back  "pain  Neurologic:  Negative for      Psychiatric:  Positive for sleep disturbances    Heme/Lymph/Imm:  Positive for allergies    Endocrine:  Negative        Physical Exam:  Vitals: /82  Pulse 60  Ht 1.803 m (5' 11\")  Wt (!) 153.8 kg (339 lb)  BMI 47.28 kg/m2    Constitutional:  cooperative, alert and oriented, well developed, well nourished, in no acute distress morbidly obese      Skin:  warm and dry to the touch, no apparent skin lesions or masses noted          Head:  normocephalic, no masses or lesions        Eyes:  pupils equal and round;conjunctivae and lids unremarkable;sclera white;no xanthalasma;EOMS intact        Lymph:      ENT:  no pallor or cyanosis, dentition good hearing aide(s) present      Neck:  carotid pulses are full and equal bilaterally, JVP normal, no carotid bruit        Respiratory:  normal breath sounds, clear to auscultation, normal A-P diameter, normal symmetry, normal respiratory excursion, no use of accessory muscles         Cardiac: regular rhythm;normal S1 and S2;no S3 or S4;apical impulse not displaced irregularly irregular rhythm   no presence of murmur          pulses full and equal, no bruits auscultated                                        GI:  abdomen soft, non-tender, BS normoactive, no mass, no HSM, no bruits obese      Extremities and Muscular Skeletal:  no deformities, clubbing, cyanosis, erythema observed   trace;bilateral LE edema     scar over left knee    Neurological:  no gross motor deficits;affect appropriate        Psych:  Alert and Oriented x 3        CC  Tereso Alexander MD  [address]  [place] [id number]                Thank you for allowing me to participate in the care of your patient.      Sincerely,     Tereso Alexander MD     [facility]    cc:   Tereso Alexander MD  [address]  [place] [phone]        "

## 2018-07-12 NOTE — TELEPHONE ENCOUNTER
Message from doggyloott:  Original authorizing provider: Jeronimo Painter MD    Alden Salvador would like a refill of the following medications:  apixaban ANTICOAGULANT (ELIQUIS) 5 MG tablet [Jeronimo Painter MD]    Preferred pharmacy: [id number] - [place], [place] - [address] AT [facility] [place]    Comment:  Please have this Rx refilled as soon as possible as I only have enough to get me into Friday. If there are any issues, please call me direct at [phone]. Thank you.

## 2018-07-12 NOTE — LETTER
7/12/2018      Jeronimo Painter MD  [address] [facility] [address]      RE: James Salvador       Dear Colleague,    I had the pleasure of seeing James Salvador in the [facility].    Service Date: 07/12/2018      PRIMARY CARE PHYSICIAN:  Jeronimo Painter MD      HISTORY OF PRESENT ILLNESS:  I again had the pleasure of seeing your patient, James Salvador (Pete) at [facility] for evaluation of coronary artery disease, mixed hyperlipidemia and now chronic atrial fibrillation.  The patient is status post myocardial infarction in 1994 while living in [place].  He had frequent runs of SVT that had been treated medically.  He underwent a right shoulder surgery earlier in 2017 and then on 05/08/2017 underwent left total knee arthroplasty.  In preoperative evaluation, he was found to be in atrial fibrillation with controlled ventricular response.  He was asymptomatic.  In 09/2015, a Lexiscan nuclear stress test showed moderate-sized predominantly reversible lateral and inferolateral defect consistent with ischemia.  He was hospitalized for recurrent angina in 2015 and coronary angiography again showed occluded right coronary artery with collaterals and severe stenosis in the mid-LAD that was then stented with a drug-eluting stent.  His last Lexiscan nuclear stress test in 09/2016 showed the inferior and inferolateral ischemia but no anterior wall ischemia.  The patient follows with Dr. Gamble in the EP department.  He remains on metoprolol with excellent rate control.  He has been started on Eliquis after a surgery last year for his atrial fibrillation.  The patient notes some dyspnea on exertion with stair climbing.  His weight continues to be elevated due to impulsive eating.  He sleeps in a recliner but denies sleep apnea.  He discusses a low-carbohydrate diet that he may consider.  Echocardiogram in 07/2016 demonstrated moderate to severe left atrial  enlargement and mild to moderate right atrial enlargement.  RVSP was 26 mmHg plus right atrial pressure and LV function was normal with mild concentric left ventricular hypertrophy.  He has some arthritis in his ankles and toes which is troubling him.     PHYSICAL EXAMINATION:  As below.      ASSESSMENT/PLAN:   1.  Alden Grant is a delightful 73-year-old male with a history of coronary artery disease and myocardial infarction with angioplasty in .  He is status post LAD intracoronary stenting in 2015 with a known totally occluded right coronary artery.  Followup stress testing in 2016 showed ischemia in the right coronary artery distribution but not anteriorly.  He denies angina and is doing well without intervention.  He continues to exercise in the [employer].   2.  Atrial fibrillation over the last year is now chronic.  He is rate controlled and I have made no medication changes.  He remains anticoagulated.   3.  Morbid obesity.  We again discussed the need for a psychologist to evaluate why he has impulsive eating.      It is my pleasure to assist in the care of Alden Grant.  I will see him again in 1 year or earlier on a p.r.n. basis.    All his questions were answered to his satisfaction.      Pj Hahn MD      cc:   [place] [address]         PJ HAHN MD, [facility]             D: 2018   T: 2018   MT: al      Name:     HUGO GRANT   MRN:      [id number]        Account:      [id number]   :      1945           Service Date: 2018      Document: [id number]           Outpatient Encounter Prescriptions as of 2018   Medication Sig Dispense Refill     Acetaminophen (TYLENOL PO) Take 650 mg by mouth 4 times daily        amoxicillin (AMOXIL) 500 MG capsule 2,000 mg as needed When going to the dentist  0     ASPIRIN PO Take 81 mg by mouth daily       atorvastatin (LIPITOR) 80 MG tablet TAKE 1  TABLET BY MOUTH DAILY - DUE FOR FASTING LABS IN DECEMBER 90 tablet 3     ciclopirox (LOPROX) 0.77 % cream Apply topically At Bedtime 90 g 3     desonide (DESOWEN) 0.05 % cream Apply sparingly to affected area on face in morning 60 g 0     Doxycycline Hyclate (PERIOSTAT PO) Take 20 mg by mouth 2 times daily       fluocinonide (LIDEX) 0.05 % solution Apply to scalp BID x 1-2 weeks PRN 60 mL 3     fluorouracil (EFUDEX) 5 % cream Apply to AA BID x 3-4 weeks 40 g 5     fluticasone (FLONASE) 50 MCG/ACT spray Spray 2 sprays into both nostrils daily 16 mL 11     GABAPENTIN PO Take 600 mg by mouth 3 times daily        ketoconazole (NIZORAL) 2 % cream Apply topically 2 times daily For face. [facility]X [facility]FI[facility] REQUESTS TO  [facility]: [phone] 30 g 2     ketoconazole (NIZORAL) 2 % shampoo Use daily as needed 120 mL 11     ketotifen (ZADITOR/REFRESH ANTI-ITCH) 0.025 % SOLN Place 1-2 drops into both eyes 2 times daily as needed for itching       lisinopril (PRINIVIL/ZESTRIL) 20 MG tablet TAKE 1 TABLET (20 MG) BY MOUTH DAILY **RTS 1/29/17** 90 tablet 1     loratadine (CLARITIN) 10 MG tablet Take 10 mg by mouth daily as needed for allergies       metoprolol tartrate (LOPRESSOR) 25 MG tablet Take 1 tablet (25 mg) by mouth 2 times daily Take along with a 50 mg tablet twice daily 180 tablet 1     metoprolol tartrate (LOPRESSOR) 50 MG tablet Take 1 tablet (50 mg) by mouth 2 times daily Take along with a 25 mg tablet twice daily 180 tablet 1     Multiple Vitamin (MULTIVITAMIN OR) Take 1 tablet by mouth daily        nitroglycerin (NITROSTAT) 0.4 MG SL tablet Place 1 tablet (0.4 mg) under the tongue every 5 minutes as needed for chest pain May repeat twice for a total of 3 tablets.  If chest pain not relieved, call 911 25 tablet 11     Omega-3 Fatty Acids (OMEGA-3 FISH OIL PO) Take 3.2 g by mouth daily        OXcarbazepine (TRILEPTAL PO) Take 300 mg by mouth 2 times daily        [DISCONTINUED] apixaban ANTICOAGULANT (ELIQUIS) 5 MG tablet Take  1 tablet (5 mg) by mouth 2 times daily 60 tablet 11     No facility-administered encounter medications on file as of 7/12/2018.        Again, thank you for allowing me to participate in the care of your patient.      Sincerely,    Tereso Alexander MD     [facility]

## 2018-07-12 NOTE — MR AVS SNAPSHOT
After Visit Summary   7/12/2018    James Salvador    MRN: [id number]           Patient Information     Date Of Birth          1945        Visit Information        Provider Department      7/12/2018 10:15 AM Tereso Alexander MD [facility]        Today's Diagnoses     Coronary artery disease involving native coronary artery of native heart, angina presence unspecified    -  1    Chronic atrial fibrillation (H)        Morbid obesity due to excess calories (H)        Mixed hyperlipidemia        Benign hypertension        Stable angina (H)           Follow-ups after your visit        Additional Services     Follow-Up with Cardiologist                 Your next 10 appointments already scheduled     Oct 30, 2018 10:00 AM CDT   Return Visit with Rosa Maria Hansen PA-C   [facility] ([facility])    [address] [place] [address] [phone]              Future tests that were ordered for you today     Open Future Orders        Priority Expected Expires Ordered    Follow-Up with Cardiologist Routine 7/12/2019 7/13/2019 7/12/2018            Who to contact     If you have questions or need follow up information about today's clinic visit or your schedule please contact [facility] directly at [phone].  Normal or non-critical lab and imaging results will be communicated to you by MyChart, letter or phone within 4 business days after the clinic has received the results. If you do not hear from us within 7 days, please contact the clinic through MyChart or phone. If you have a critical or abnormal lab result, we will notify you by phone as soon as possible.  Submit refill requests through [employer] or call your pharmacy and they will forward the refill request to us. Please allow 3 business days for your refill to be completed.          Additional  "Information About Your Visit        MyChart Information     [employer] gives you secure access to your electronic health record. If you see a primary care provider, you can also send messages to your care team and make appointments. If you have questions, please call your primary care clinic.  If you do not have a primary care provider, please call [phone] and they will assist you.        Care EveryWhere ID     This is your Care EveryWhere ID. This could be used by other organizations to access your [place] medical records  [id number]        Your Vitals Were     Pulse Height BMI (Body Mass Index)             60 1.803 m (5' 11\") 47.28 kg/m2          Blood Pressure from Last 3 Encounters:   07/12/18 125/82   07/09/18 132/76   06/01/18 138/80    Weight from Last 3 Encounters:   07/12/18 (!) 153.8 kg (339 lb)   07/09/18 (!) 154.1 kg (339 lb 11.2 oz)   06/01/18 (!) 151.3 kg (333 lb 9.6 oz)              We Performed the Following     Follow-Up with Cardiologist        Primary Care Provider Office Phone # Fax #    Jeronimo Painter [phone]       [address] [place] [phone]        Equal Access to Services     NAOMI AYALA : Hadii smooth ku hadasho Soomaali, waaxda luqadaha, qaybta kaalmada adeegyada, waxay idiin haymathewn jody rausch. So [facility] [phone].    ATENCIÓN: Si habla español, tiene a iniguez disposición servicios gratHC Rods and Customsos de asistencia lingüística. Llame al [phone].    We comply with applicable federal civil rights laws and [place] laws. We do not discriminate on the basis of race, color, national origin, age, disability, sex, sexual orientation, or gender identity.            Thank you!     Thank you for choosing [facility]  for your care. Our goal is always to provide you with excellent care. Hearing back from our patients is one way we can continue to improve our services. Please take a few minutes to complete the " written survey that you may receive in the mail after your visit with us. Thank you!             Your Updated Medication List - Protect others around you: Learn how to safely use, store and throw away your medicines at www.disposemymeds.org.          This list is accurate as of 7/12/18 11:00 AM.  Always use your most recent med list.                   Brand Name Dispense Instructions for use Diagnosis    amoxicillin 500 MG capsule    AMOXIL     2,000 mg as needed When going to the dentist        apixaban ANTICOAGULANT 5 MG tablet    ELIQUIS    60 tablet    Take 1 tablet (5 mg) by mouth 2 times daily    New onset atrial fibrillation (H)       ASPIRIN PO      Take 81 mg by mouth daily        atorvastatin 80 MG tablet    LIPITOR    90 tablet    TAKE 1 TABLET BY MOUTH DAILY - DUE FOR FASTING LABS IN DECEMBER    ASCVD (arteriosclerotic cardiovascular disease)       ciclopirox 0.77 % cream    LOPROX    90 g    Apply topically At Bedtime    Dermatitis, seborrheic       desonide 0.05 % cream    DESOWEN    60 g    Apply sparingly to affected area on face in morning    Dermatitis, seborrheic       fluocinonide 0.05 % solution    LIDEX    60 mL    Apply to scalp BID x 1-2 weeks PRN    Dermatitis, seborrheic       fluorouracil 5 % cream    EFUDEX    40 g    Apply to AA BID x 3-4 weeks    AK (actinic keratosis)       fluticasone 50 MCG/ACT spray    FLONASE    16 mL    Spray 2 sprays into both nostrils daily    Chronic seasonal allergic rhinitis, unspecified trigger       GABAPENTIN PO      Take 600 mg by mouth 3 times daily        * ketoconazole 2 % cream    NIZORAL    30 g    Apply topically 2 times daily For face. FAX REFILL REQUESTS TO [facility]: [phone]    Seborrheic dermatitis       * ketoconazole 2 % shampoo    NIZORAL    120 mL    Use daily as needed    Seborrheic dermatitis       ketotifen 0.025 % Soln ophthalmic solution    ZADITOR/REFRESH ANTI-ITCH     Place 1-2 drops into both eyes 2 times daily as needed for  itching        lisinopril 20 MG tablet    PRINIVIL/ZESTRIL    90 tablet    TAKE 1 TABLET (20 MG) BY MOUTH DAILY **RTS 1/29/17**    Benign hypertension       loratadine 10 MG tablet    CLARITIN     Take 10 mg by mouth daily as needed for allergies        * metoprolol tartrate 50 MG tablet    LOPRESSOR    180 tablet    Take 1 tablet (50 mg) by mouth 2 times daily Take along with a 25 mg tablet twice daily    Benign hypertension, Paroxysmal supraventricular tachycardia (H)       * metoprolol tartrate 25 MG tablet    LOPRESSOR    180 tablet    Take 1 tablet (25 mg) by mouth 2 times daily Take along with a 50 mg tablet twice daily    Benign hypertension, Paroxysmal supraventricular tachycardia (H)       MULTIVITAMIN PO      Take 1 tablet by mouth daily        nitroGLYcerin 0.4 MG sublingual tablet    NITROSTAT    25 tablet    Place 1 tablet (0.4 mg) under the tongue every 5 minutes as needed for chest pain May repeat twice for a total of 3 tablets.  If chest pain not relieved, call 911    ACS (acute coronary syndrome) (H)       OMEGA-3 FISH OIL PO      Take 3.2 g by mouth daily        PERIOSTAT PO      Take 20 mg by mouth 2 times daily        TRILEPTAL PO      Take 300 mg by mouth 2 times daily        TYLENOL PO      Take 650 mg by mouth 4 times daily        * Notice:  This list has 4 medication(s) that are the same as other medications prescribed for you. Read the directions carefully, and ask your doctor or other care provider to review them with you.

## 2018-07-13 RX ORDER — APIXABAN 5 MG/1
TABLET, FILM COATED ORAL
Qty: 60 TABLET | Refills: 2 | OUTPATIENT
Start: 2018-07-13

## 2018-07-18 DIAGNOSIS — I25.10 CORONARY ARTERY DISEASE INVOLVING NATIVE CORONARY ARTERY OF NATIVE HEART, ANGINA PRESENCE UNSPECIFIED: ICD-10-CM

## 2018-07-18 DIAGNOSIS — Z79.01 CHRONIC ANTICOAGULATION: ICD-10-CM

## 2018-07-18 DIAGNOSIS — R73.9 HYPERGLYCEMIA: ICD-10-CM

## 2018-07-18 LAB
ALT SERPL W P-5'-P-CCNC: 38 U/L (ref 0–70)
ANION GAP SERPL CALCULATED.3IONS-SCNC: 7 MMOL/L (ref 3–14)
AST SERPL W P-5'-P-CCNC: 24 U/L (ref 0–45)
BUN SERPL-MCNC: 15 MG/DL (ref 7–30)
CALCIUM SERPL-MCNC: 8.6 MG/DL (ref 8.5–10.1)
CHLORIDE SERPL-SCNC: 100 MMOL/L (ref 94–109)
CO2 SERPL-SCNC: 26 MMOL/L (ref 20–32)
CREAT SERPL-MCNC: 0.74 MG/DL (ref 0.66–1.25)
ERYTHROCYTE [DISTWIDTH] IN BLOOD BY AUTOMATED COUNT: 13.5 % (ref 10–15)
GFR SERPL CREATININE-BSD FRML MDRD: >90 ML/MIN/1.7M2
GLUCOSE SERPL-MCNC: 87 MG/DL (ref 70–99)
HBA1C MFR BLD: 5.5 % (ref 0–5.6)
HCT VFR BLD AUTO: 43.4 % (ref 40–53)
HGB BLD-MCNC: 14.6 G/DL (ref 13.3–17.7)
MCH RBC QN AUTO: 31.7 PG (ref 26.5–33)
MCHC RBC AUTO-ENTMCNC: 33.6 G/DL (ref 31.5–36.5)
MCV RBC AUTO: 94 FL (ref 78–100)
PLATELET # BLD AUTO: 161 10E9/L (ref 150–450)
POTASSIUM SERPL-SCNC: 4.5 MMOL/L (ref 3.4–5.3)
RBC # BLD AUTO: 4.61 10E12/L (ref 4.4–5.9)
SODIUM SERPL-SCNC: 133 MMOL/L (ref 133–144)
WBC # BLD AUTO: 6.8 10E9/L (ref 4–11)

## 2018-07-18 PROCEDURE — 85027 COMPLETE CBC AUTOMATED: CPT | Performed by: INTERNAL MEDICINE

## 2018-07-18 PROCEDURE — 80048 BASIC METABOLIC PNL TOTAL CA: CPT | Performed by: INTERNAL MEDICINE

## 2018-07-18 PROCEDURE — 84450 TRANSFERASE (AST) (SGOT): CPT | Performed by: INTERNAL MEDICINE

## 2018-07-18 PROCEDURE — 84460 ALANINE AMINO (ALT) (SGPT): CPT | Performed by: INTERNAL MEDICINE

## 2018-07-18 PROCEDURE — 83036 HEMOGLOBIN GLYCOSYLATED A1C: CPT | Performed by: INTERNAL MEDICINE

## 2018-07-18 PROCEDURE — 36415 COLL VENOUS BLD VENIPUNCTURE: CPT | Performed by: INTERNAL MEDICINE

## 2018-07-24 DIAGNOSIS — I10 BENIGN HYPERTENSION: ICD-10-CM

## 2018-07-24 DIAGNOSIS — I47.10 PAROXYSMAL SUPRAVENTRICULAR TACHYCARDIA (H): ICD-10-CM

## 2018-07-24 RX ORDER — METOPROLOL TARTRATE 25 MG/1
25 TABLET, FILM COATED ORAL 2 TIMES DAILY
Qty: 180 TABLET | Refills: 3 | Status: SHIPPED | OUTPATIENT
Start: 2018-07-24 | End: 2019-05-06

## 2018-07-24 NOTE — TELEPHONE ENCOUNTER
Received refill request for:  Metoprolol Tartrate  Last OV was: 7/12/2018 with Dr. Alexander  Labs/EKG: n/a  F/U scheduled: orders in Epic for 7/2019  New script sent to: [facility]

## 2018-07-24 NOTE — TELEPHONE ENCOUNTER
"Requested Prescriptions   Pending Prescriptions Disp Refills     lisinopril (PRINIVIL/ZESTRIL) 20 MG tablet [Pharmacy Med Name: LISINOPRIL 20 MG TABLET] 90 tablet 1    Last Written Prescription Date:  1/25/2018  Last Fill Quantity: 90,  # refills: 1   Last office visit: 7/9/2018 with prescribing provider:  7/9/2018   Future Office Visit:     Sig: TAKE 1 TABLET (20 MG) BY MOUTH DAILY **RTS 1/29/17**    ACE Inhibitors (Including Combos) Protocol Passed    7/24/2018 11:36 AM       Passed - Blood pressure under 140/90 in past 12 months    BP Readings from Last 3 Encounters:   07/12/18 125/82   07/09/18 132/76   06/01/18 138/80                Passed - Recent (12 mo) or future (30 days) visit within the authorizing provider's specialty    Patient had office visit in the last 12 months or has a visit in the next 30 days with authorizing provider or within the authorizing provider's specialty.  See \"Patient Info\" tab in inbasket, or \"Choose Columns\" in Meds & Orders section of the refill encounter.           Passed - Patient is age 18 or older       Passed - Normal serum creatinine on file in past 12 months    Recent Labs   Lab Test  07/18/18   0820   CR  0.74            Passed - Normal serum potassium on file in past 12 months    Recent Labs   Lab Test  07/18/18   0820   POTASSIUM  4.5               "

## 2018-07-25 RX ORDER — LISINOPRIL 20 MG/1
TABLET ORAL
Qty: 90 TABLET | Refills: 3 | Status: SHIPPED | OUTPATIENT
Start: 2018-07-25 | End: 2019-01-28

## 2018-07-26 DIAGNOSIS — I10 BENIGN HYPERTENSION: ICD-10-CM

## 2018-07-26 DIAGNOSIS — I47.10 PAROXYSMAL SUPRAVENTRICULAR TACHYCARDIA (H): ICD-10-CM

## 2018-07-26 RX ORDER — METOPROLOL TARTRATE 50 MG
50 TABLET ORAL 2 TIMES DAILY
Qty: 180 TABLET | Refills: 3 | Status: SHIPPED | OUTPATIENT
Start: 2018-07-26 | End: 2019-05-06

## 2018-07-26 NOTE — TELEPHONE ENCOUNTER
Received refill request for:  Metoprolol Tartrate - 50 mg tab  Last OV was: 7/12/2018 with Dr. Alexander  Labs/EKG: n/a  F/U scheduled: orders in Epic for 7/2019  [facility]

## 2018-07-30 ENCOUNTER — TRANSFERRED RECORDS (OUTPATIENT)
Dept: HEALTH INFORMATION MANAGEMENT | Facility: CLINIC | Age: 73
End: 2018-07-30

## 2018-08-06 ENCOUNTER — OFFICE VISIT (OUTPATIENT)
Dept: SURGERY | Facility: CLINIC | Age: 73
End: 2018-08-06
Payer: COMMERCIAL

## 2018-08-06 VITALS
HEART RATE: 56 BPM | DIASTOLIC BLOOD PRESSURE: 80 MMHG | BODY MASS INDEX: 44.1 KG/M2 | RESPIRATION RATE: 12 BRPM | WEIGHT: 315 LBS | SYSTOLIC BLOOD PRESSURE: 135 MMHG | HEIGHT: 71 IN | OXYGEN SATURATION: 95 %

## 2018-08-06 DIAGNOSIS — E66.01 MORBID OBESITY (H): Primary | ICD-10-CM

## 2018-08-06 PROCEDURE — 99205 OFFICE O/P NEW HI 60 MIN: CPT | Performed by: FAMILY MEDICINE

## 2018-08-06 NOTE — PROGRESS NOTES
HPI:  James Salvador is a 73 year old year old male who I was asked to see by Dr. Jeronimo Painter for medical bariatric consultation. Weight related co-morbidities include   Patient Active Problem List   Diagnosis     Advance care planning     Hyperlipidemia with target LDL less than 70     Benign hypertension     Actinic keratoses     Paroxysmal supraventricular tachycardia (H)     History of myocardial infarction     Coronary artery disease involving native coronary artery of native heart, angina presence unspecified     Stable angina (H)     Morbid obesity due to excess calories (H)     Shoulder pain, right     Chronic atrial fibrillation (H)     Status post total left knee replacement     Mixed hyperlipidemia   .  His BMI is Body mass index is 47.25 kg/(m^2)..      Assessment/ Plan:  James is a 73 year old year old male who presents for medical weight management.    1. Morbid Obesity  We discussed healthy habits to assist with weight loss. We discussed medication that may assist with weight loss. At this point he is not interested in taking another medication.  Written information was given. He may be a candidate for topamax in the future. He may also benefit from meeting with a therapist who works with weight issues at [facility]. He will consider joining a gym that has a swimming pool so that he can exercise with less joint pain.    2. ASCVD  Patient is aware that weight loss may help prevent the development of further heart disease. He is motivated by this to lose weight.      Follow up: 1 month with bariatric dietician (I suggested his wife come also)  and 1 month later with myself      >60 minutes spent with patient, > 50% spent in counseling          Counseling:  We discussed [facility] Bariatric Basics including:  -eating 3 meals daily  -eating protein first  -eating slowly, chewing food well  -avoiding/limiting calorie containing beverages  -choosing wheat, not white with breads,  crackers, pastas, danna, bagels, tortillas, rice  -limiting carbohydrates in general  -limiting restaurant or cafeteria eating to twice a week or less    We discussed the importance of restorative sleep and stress management in maintaining a healthy weight.    We reviewed medications associated with weight gain.    We discussed insulin resistance and glycemic index as it relates to appetite and weight control.     We discussed the National Weight Control Registry healthy weight maintenance strategies and ways to optimize metabolism.  We discussed the importance of physical activity including cardiovascular and strength training in maintaining a healthier weight and explored viable options.    We discussed medications available for weight loss including phentermine, phendimetrazine, topamax, qsymia, lorcaserin, contrave, diethylproprion, and orlistat. We discussed the risks and benefits of each. We discussed indications, contraindications, potential side effects, and estimated costs of each. Literature was offered.    History Surrouding Consultation:  Struggles with weight started at age after heart attack and multiple orthopedic surgeries  His weight at age 18 was 225#  He has had several past supervised and unsupervised weight loss attempts  The most weight lost was: unsure  Unfortunately there was not durable weight maintenance.  History of bulimia, anorexia, or binge eating disorder? no  If Present has eating disorder been in remission at least 3 years? na    Dietary History  Meals per day: 3  Snacks: frequent  Typical Snack: donuts, chips, cookies, ice cream  Who does the grocery shopping? Patient's wife  Who does the cooking? Patient's wife  A typical meal includes: B: cheerios and banana and coffee L: sandwich with apple and diet soda and chips D: chicken and corn and mashed potato and butter  Regular Pop: no  Juice: once a week  Caffeine: coffee (32oz per day)  Amount of restaurant eating per week: 2-3  Eating  "a the table with the TV off? sometimes    Physical Activity Patterns  Current physical activity routine includes\" goes to fitness center, does cardio (new step machine or treadmill)    Limitations from being physically active on a regular basis includes: heart issues, L knee pain. Ankle pain        PMH:  Past Medical History:   Diagnosis Date     Actinic cheilitis 7/17/2013    Lower lip, left      Actinic keratosis      Allergic rhinitis      Benign hypertension      CAD (coronary artery disease)     Cardiac cath and PCI 1994. Cardiac Cath 9/2015: BRIDGET to LAD     History of myocardial infarction 1994    PTCA     Hyperlipidemia LDL goal < 70      Mixed hyperlipidemia 6/21/2017     Morbid obesity due to excess calories (H) 1/11/2016     New onset atrial fibrillation (H) 4/21/2017     Paroxysmal supraventricular tachycardia (H)     on metoprolol     Squamous cell carcinoma      Stable angina (H) 1/11/2016       Past Surgical Hx:  Past Surgical History:   Procedure Laterality Date     ANGIOPLASTY  1994    in [place]     ANKLE SURGERY  7/13/2005    right ankle     HEART CATH STENT COR W/WO PTCA  9/23/2015    BRIDGET stent mid LAD     JOINT REPLACEMENT, HIP RT/LT  10/14/2009    right hip      LASER SURGERY OF EYE  06/01/2002     MOHS MICROGRAPHIC PROCEDURE  06/12/2004    squamous cell carcinoma right temple     SINUS SURGERY  7/11/2006       Medication:  Current Outpatient Prescriptions   Medication     Acetaminophen (TYLENOL PO)     amoxicillin (AMOXIL) 500 MG capsule     apixaban ANTICOAGULANT (ELIQUIS) 5 MG tablet     ASPIRIN PO     atorvastatin (LIPITOR) 80 MG tablet     ciclopirox (LOPROX) 0.77 % cream     desonide (DESOWEN) 0.05 % cream     Doxycycline Hyclate (PERIOSTAT PO)     fluocinonide (LIDEX) 0.05 % solution     fluorouracil (EFUDEX) 5 % cream     fluticasone (FLONASE) 50 MCG/ACT spray     GABAPENTIN PO     ketoconazole (NIZORAL) 2 % cream     ketoconazole (NIZORAL) 2 % shampoo     ketotifen (ZADITOR/REFRESH " ANTI-ITCH) 0.025 % SOLN     lisinopril (PRINIVIL/ZESTRIL) 20 MG tablet     loratadine (CLARITIN) 10 MG tablet     metoprolol tartrate (LOPRESSOR) 25 MG tablet     metoprolol tartrate (LOPRESSOR) 50 MG tablet     Multiple Vitamin (MULTIVITAMIN OR)     nitroglycerin (NITROSTAT) 0.4 MG SL tablet     Omega-3 Fatty Acids (OMEGA-3 FISH OIL PO)     OXcarbazepine (TRILEPTAL PO)     No current facility-administered medications for this visit.        Allergies:Cats; Dogs; and Pollen extract    Family Hx:  Family History   Problem Relation Age of Onset     Genitourinary Problems Mother      renal failure     Cardiovascular Father      rupture of dorsal aorta     Depression Son        Social Hx:  Social History     Social History     Marital status:      Spouse name: N/A     Number of children: 3     Years of education: N/A     Occupational History      Retired     Social History Main Topics     Smoking status: Former Smoker     Years: 10.00     Types: Cigarettes     Quit date: 1/1/1987     Smokeless tobacco: Never Used     Alcohol use 0.0 oz/week     0 Standard drinks or equivalent per week      Comment: socially - x2-3 per month     Drug use: No     Sexual activity: Yes     Partners: Female     Other Topics Concern     Caffeine Concern Yes     2 big cups coffee daily     Sleep Concern No     sleeping better since shoulder replaced 11/3/16     Stress Concern No     Weight Concern Yes     Special Diet Yes     trying to do more lean meats     Exercise No     limited - knee     Social History Narrative       Tobacco Use Hx:  History   Smoking Status     Former Smoker     Years: 10.00     Types: Cigarettes     Quit date: 1/1/1987   Smokeless Tobacco     Never Used       ROS  General  Fatigue: sometimes  Sleep Quality:good, sleeps in recliner or temperpedic bed due to knee issues  HEENT  Hx of glaucoma: no  Vision changes: no  Cardiovascular  Chest Pain with Exertion: sometimes  Palpitations: sometimes  Hx of heart disease:  "yes  Pulmonary  Shortness of breath at rest: no  Shortness of breath with exertion: no  Snoring: not if he sleeps in recliner  Stop-bang score: na  Salcha Score: na  Gastrointestinal  Heartburn: no  Abdominal pain: no  Psychiatric  Moods Stable: yes, sometimes gets depressed thinking about his weight  Endocrine  Polydipsia: no  Polyuria: no  Neurologic:  Hx of seizures: no  Dermatologic  Rashes: yes      /80 (BP Location: Left arm, Patient Position: Sitting, Cuff Size: Adult Large)  Pulse 56  Resp 12  Ht 5' 11\" (1.803 m)  Wt (!) 338 lb 12.8 oz (153.7 kg)  SpO2 95%  BMI 47.25 kg/m2  Wt Readings from Last 2 Encounters:   08/06/18 (!) 338 lb 12.8 oz (153.7 kg)   07/12/18 (!) 339 lb (153.8 kg)     Body mass index is 47.25 kg/(m^2).  Neck circumference/Waist Circumference: [unfilled]      Physical Exam:    GEN: Alert and oriented in no acute distress.   HEENT: PERRLA, mucous membranes moist. Airway adequate  NECK: Supple without LAD or thyromegaly. Carotid bruits absent.  LUNGS: CTA without wheezes or crackles, good air movement throughout  CV: RRR no MRG  ABDOMEN: moderate protuberance, BS normal, non tender to palpation, no rebound or guarding, no HSM  SKIN: no rashes, no skin tags, no acanthosis nigrans  EXTREMITIES: trace edema, dorsalis pedis palpable    Labs:    Lab Results   Component Value Date    WBC 6.8 07/18/2018    HGB 14.6 07/18/2018    HCT 43.4 07/18/2018    MCV 94 07/18/2018     07/18/2018     Lab Results   Component Value Date    CHOL 132 12/01/2017    CHOL 117 12/08/2016    CHOL 135 09/22/2015     Lab Results   Component Value Date    HDL 39 (L) 12/01/2017    HDL 46 12/08/2016    HDL 38 (L) 09/22/2015     No components found for: LDLCALC  Lab Results   Component Value Date    TRIG 142 12/01/2017    TRIG 160 (H) 12/08/2016    TRIG 160 (H) 09/22/2015     No components found for: CHOLHDL  Lab Results   Component Value Date    ALT 38 07/18/2018    AST 24 07/18/2018     No components " found for: HGBA1C  No components found for: [id number]         >60 minutes spent with patient, > 50% spent in counseling

## 2018-08-06 NOTE — MR AVS SNAPSHOT
After Visit Summary   8/6/2018    James Salvador    MRN: [id number]           Patient Information     Date Of Birth          1945        Visit Information        Provider Department      8/6/2018 1:00 PM Lucila Boykin MD [facility] BARIATRICS      Care Instructions    Eat Better ? Move More ? Live Well    Eat 3 nutrient-rich meals each day     Don t skip meals--it will cause you to overeat later in the day!     Eating fiber (vegetables/fruits/whole grains) and protein with meals helps you stay full longer     Choose foods with less than 10 grams of sugar and 5 grams of fat per serving to prevent excess calories and weight re-gain   Eat around the same times each day to develop a routine eating schedule    Avoid snacking unless physically hungry.   Planned snacks: 1-2 times per day and no more than 150 calories    Eat protein first    Protein helps with healing, maintaining adequate muscle mass, reducing hunger and optimizing nutritional status    Aim for 60-80 grams of protein per day   Fill up on Fiber    Fiber comes from plants--fruits, veggies, whole grains, nuts/seeds and beans    Fiber is low in calories, high in phytonutrients and helps you stay full longer    Aim for 25-35 grams per day by eating fiber with meals and snacks  Eat S-[id number]-W-[id number]    Take 20-30 minutes to eat each meal by taking small bites, chewing foods to applesauce consistency or 20-30 times before you swallow    Eating foods too fast can delay satiety/fullness signals and increase overeating   ? Slow down your eating by using toddler utensils, putting your fork/spoon down between bites and not watching TV or emailing during meals!   Keep a Journal          Writing down what you eat, how you feel and when you are active helps you identify new changes to work on from week to week          Look for ways to cut 100 calories from your current diet 2-3 times per day  Drink 64 ounces  of 0-Calorie drinks between meals    Water    Zero calorie Propel  or Vitamin Water      SoBe Lifewater  Zero Calories    Crystal Light , Sugar-Free Edvin-Aid , and other sugar-free lemonade or flavored win    Keep Caffeine to less than 300mg per day ie: 3-6oz cups coffee     Work up to 45-60 minutes of physical activity most days of the week    Helps with losing weight and prevent regaining those extra pounds!     Do a combo of cardio (walking/water exercises) and strength training (lifting weights/Vinyasa yoga)    Avoid Mindless Eating    Be present when you eat--take note of the smell, taste and quality of your food    Make a list of alternative activities you could do to prevent eating out of boredom/stress  ? Go for a walk, call a friend, chew gum, paint your nails, re-organize the garage, etc              Follow-ups after your visit        Follow-up notes from your care team     Return in about 4 weeks (around 9/3/2018) for bariatric dietician evaluation.      Your next 10 appointments already scheduled     Sep 06, 2018  1:30 PM CDT   New Weight Management Visit with Chery Seymour 3, RD   [facility] ([facility])    [address] [place] [id number] [phone]            Oct 30, 2018 10:00 AM CDT   [facility]    [address] [address] [phone]              Who to contact     If you have questions or need follow up information about today's clinic visit or your schedule please contact [facility] directly at [phone].  Normal or non-critical lab and imaging results will be communicated to you by MyChart, letter or phone within 4 business days after the clinic has received the results. If you do not hear from us within 7 days, please contact the clinic  "through [employer] or phone. If you have a critical or abnormal lab result, we will notify you by phone as soon as possible.  Submit refill requests through [employer] or call your pharmacy and they will forward the refill request to us. Please allow 3 business days for your refill to be completed.          Additional Information About Your Visit        [employer] Information     [employer] gives you secure access to your electronic health record. If you see a primary care provider, you can also send messages to your care team and make appointments. If you have questions, please call your primary care clinic.  If you do not have a primary care provider, please call [phone] and they will assist you.        Care EveryWhere ID     This is your Care EveryWhere ID. This could be used by other organizations to access your [facility] medical records  [id number]        Your Vitals Were     Pulse Respirations Height Pulse Oximetry BMI (Body Mass Index)       56 12 5' 11\" (1.803 m) 95% 47.25 kg/m2        Blood Pressure from Last 3 Encounters:   08/06/18 135/80   07/12/18 125/82   07/09/18 132/76    Weight from Last 3 Encounters:   08/06/18 (!) 338 lb 12.8 oz (153.7 kg)   07/12/18 (!) 339 lb (153.8 kg)   07/09/18 (!) 339 lb 11.2 oz (154.1 kg)              Today, you had the following     No orders found for display       Primary Care Provider Office Phone # Fax #    Jeronimo Painter [phone]       [address] [place] [address]        Equal Access to Services     [facility] : Hadii smooth quiros hadasho Sojoshua, waaxda luqadaha, qaybta kaalmabrenda kellogg . So [facility] [phone].    ATENCIÓN: Si habla español, tiene a iniguez disposición servicios gratuitos de asistencia lingüística. Llame al [phone].    We comply with applicable federal civil rights laws and [place] laws. We do not discriminate on the basis of race, color, national origin, age, disability, sex, " sexual orientation, or gender identity.            Thank you!     Thank you for choosing [facility]  for your care. Our goal is always to provide you with excellent care. Hearing back from our patients is one way we can continue to improve our services. Please take a few minutes to complete the written survey that you may receive in the mail after your visit with us. Thank you!             Your Updated Medication List - Protect others around you: Learn how to safely use, store and throw away your medicines at www.disposemymeds.org.          This list is accurate as of 8/6/18  1:56 PM.  Always use your most recent med list.                   Brand Name Dispense Instructions for use Diagnosis    amoxicillin 500 MG capsule    AMOXIL     2,000 mg as needed When going to the dentist        apixaban ANTICOAGULANT 5 MG tablet    ELIQUIS    60 tablet    Take 1 tablet (5 mg) by mouth 2 times daily    New onset atrial fibrillation (H)       ASPIRIN PO      Take 81 mg by mouth daily        atorvastatin 80 MG tablet    LIPITOR    90 tablet    TAKE 1 TABLET BY MOUTH DAILY - DUE FOR FASTING LABS IN DECEMBER    ASCVD (arteriosclerotic cardiovascular disease)       ciclopirox 0.77 % cream    LOPROX    90 g    Apply topically At Bedtime    Dermatitis, seborrheic       desonide 0.05 % cream    DESOWEN    60 g    Apply sparingly to affected area on face in morning    Dermatitis, seborrheic       fluocinonide 0.05 % solution    LIDEX    60 mL    Apply to scalp BID x 1-2 weeks PRN    Dermatitis, seborrheic       fluorouracil 5 % cream    EFUDEX    40 g    Apply to AA BID x 3-4 weeks    AK (actinic keratosis)       fluticasone 50 MCG/ACT spray    FLONASE    16 mL    Spray 2 sprays into both nostrils daily    Chronic seasonal allergic rhinitis, unspecified trigger       GABAPENTIN PO      Take 600 mg by mouth 3 times daily        * ketoconazole 2 % cream    NIZORAL    30 g    Apply topically 2 times daily  For face. FAX REFILL REQUESTS TO CACH[facility] TRUJILLO: [phone]    Seborrheic dermatitis       * ketoconazole 2 % shampoo    NIZORAL    120 mL    Use daily as needed    Seborrheic dermatitis       ketotifen 0.025 % Soln ophthalmic solution    ZADITOR/REFRESH ANTI-ITCH     Place 1-2 drops into both eyes 2 times daily as needed for itching        lisinopril 20 MG tablet    PRINIVIL/ZESTRIL    90 tablet    TAKE 1 TABLET (20 MG) BY MOUTH DAILY **RTS 1/29/17**    Benign hypertension       loratadine 10 MG tablet    CLARITIN     Take 10 mg by mouth daily as needed for allergies        * metoprolol tartrate 25 MG tablet    LOPRESSOR    180 tablet    Take 1 tablet (25 mg) by mouth 2 times daily Take along with a 50 mg tablet twice daily    Benign hypertension, Paroxysmal supraventricular tachycardia (H)       * metoprolol tartrate 50 MG tablet    LOPRESSOR    180 tablet    Take 1 tablet (50 mg) by mouth 2 times daily Take along with a 25 mg tablet twice daily    Benign hypertension, Paroxysmal supraventricular tachycardia (H)       MULTIVITAMIN PO      Take 1 tablet by mouth daily        nitroGLYcerin 0.4 MG sublingual tablet    NITROSTAT    25 tablet    Place 1 tablet (0.4 mg) under the tongue every 5 minutes as needed for chest pain May repeat twice for a total of 3 tablets.  If chest pain not relieved, call 911    ACS (acute coronary syndrome) (H)       OMEGA-3 FISH OIL PO      Take 3.2 g by mouth daily        PERIOSTAT PO      Take 20 mg by mouth 2 times daily        TRILEPTAL PO      Take 300 mg by mouth 2 times daily        TYLENOL PO      Take 650 mg by mouth 4 times daily        * Notice:  This list has 4 medication(s) that are the same as other medications prescribed for you. Read the directions carefully, and ask your doctor or other care provider to review them with you.

## 2018-08-06 NOTE — PATIENT INSTRUCTIONS

## 2018-08-22 DIAGNOSIS — L21.9 DERMATITIS, SEBORRHEIC: ICD-10-CM

## 2018-08-22 RX ORDER — FLUOCINONIDE TOPICAL SOLUTION USP, 0.05% 0.5 MG/ML
SOLUTION TOPICAL
Qty: 60 ML | Refills: 3 | Status: SHIPPED | OUTPATIENT
Start: 2018-08-22 | End: 2019-02-06

## 2018-08-22 NOTE — TELEPHONE ENCOUNTER
fluocinonide (LIDEX) 0.05 % solution    Date Last Filled: 06/22/2018  QTY: 60    [facility]

## 2018-10-30 ENCOUNTER — OFFICE VISIT (OUTPATIENT)
Dept: DERMATOLOGY | Facility: CLINIC | Age: 73
End: 2018-10-30
Payer: COMMERCIAL

## 2018-10-30 VITALS — OXYGEN SATURATION: 95 % | SYSTOLIC BLOOD PRESSURE: 140 MMHG | HEART RATE: 59 BPM | DIASTOLIC BLOOD PRESSURE: 80 MMHG

## 2018-10-30 DIAGNOSIS — Z85.89 HISTORY OF SQUAMOUS CELL CARCINOMA: ICD-10-CM

## 2018-10-30 DIAGNOSIS — L57.0 AK (ACTINIC KERATOSIS): ICD-10-CM

## 2018-10-30 DIAGNOSIS — L82.1 SEBORRHEIC KERATOSIS: ICD-10-CM

## 2018-10-30 DIAGNOSIS — D48.5 NEOPLASM OF UNCERTAIN BEHAVIOR OF SKIN: Primary | ICD-10-CM

## 2018-10-30 DIAGNOSIS — D22.9 NEVUS: ICD-10-CM

## 2018-10-30 DIAGNOSIS — D18.00 ANGIOMA: ICD-10-CM

## 2018-10-30 DIAGNOSIS — L81.4 LENTIGO: ICD-10-CM

## 2018-10-30 PROCEDURE — 11101 HC DESTRUCT PREMALIGNANT LESION, FIRST: CPT | Performed by: PHYSICIAN ASSISTANT

## 2018-10-30 PROCEDURE — 99214 OFFICE O/P EST MOD 30 MIN: CPT | Mod: 25 | Performed by: PHYSICIAN ASSISTANT

## 2018-10-30 PROCEDURE — 11100 HC BIOPSY SKIN/SUBQ/MUC MEM, SINGLE LESION: CPT | Mod: 59 | Performed by: PHYSICIAN ASSISTANT

## 2018-10-30 PROCEDURE — 17000 DESTRUCT PREMALG LESION: CPT | Mod: 51 | Performed by: PHYSICIAN ASSISTANT

## 2018-10-30 PROCEDURE — 88305 TISSUE EXAM BY PATHOLOGIST: CPT | Mod: TC | Performed by: PHYSICIAN ASSISTANT

## 2018-10-30 RX ORDER — FLUOROURACIL 50 MG/G
CREAM TOPICAL
Qty: 40 G | Refills: 5 | Status: SHIPPED | OUTPATIENT
Start: 2018-10-30 | End: 2019-05-28

## 2018-10-30 NOTE — PATIENT INSTRUCTIONS
Wound Care Instructions     FOR SUPERFICIAL WOUNDS     [facility] [phone]                 AFTER 24 HOURS YOU SHOULD REMOVE THE BANDAGE AND BEGIN DAILY DRESSING CHANGES AS FOLLOWS:     1) Remove Dressing.     2) Clean and dry the area with tap water using a Q-tip or sterile gauze pad.     3) Apply Vaseline, Aquaphor, Polysporin ointment or Bacitracin ointment over entire wound.  Do NOT use Neosporin ointment.     4) Cover the wound with a band-aid, or a sterile non-stick gauze pad and micropore paper tape      REPEAT THESE INSTRUCTIONS AT LEAST ONCE A DAY UNTIL THE WOUND HAS COMPLETELY HEALED.    It is an old wives tale that a wound heals better when it is exposed to air and allowed to dry out. The wound will heal faster with a better cosmetic result if it is kept moist with ointment and covered with a bandage.    **Do not let the wound dry out.**      Supplies Needed:      *Cotton tipped applicators (Q-tips)    *Polysporin Ointment or Bacitracin Ointment (NOT NEOSPORIN)    *Band-aids or non-stick gauze pads and micropore paper tape.      PATIENT INFORMATION:    During the healing process you will notice a number of changes. All wounds develop a small halo of redness surrounding the wound.  This means healing is occurring. Severe itching with extensive redness usually indicates sensitivity to the ointment or bandage tape used to dress the wound.  You should call our office if this develops.      Swelling  and/or discoloration around your surgical site is common, particularly when performed around the eye.    All wounds normally drain.  The larger the wound the more drainage there will be.  After 7-10 days, you will notice the wound beginning to shrink and new skin will begin to grow.  The wound is healed when you can see skin has formed over the entire area.  A healed wound has a healthy, shiny look to the surface and is red to dark pink in color to normalize.  Wounds may take approximately 4-6  weeks to heal.  Larger wounds may take 6-8 weeks.  After the wound is healed you may discontinue dressing changes.    You may experience a sensation of tightness as your wound heals. This is normal and will gradually subside.    Your healed wound may be sensitive to temperature changes. This sensitivity improves with time, but if you re having a lot of discomfort, try to avoid temperature extremes.    Patients frequently experience itching after their wound appears to have healed because of the continue healing under the skin.  Plain Vaseline will help relieve the itching.        POSSIBLE COMPLICATIONS    BLEEDIN. Leave the bandage in place.  2. Use tightly rolled up gauze or a cloth to apply direct pressure over the bandage for 30  minutes.  3. Reapply pressure for an additional 30 minutes if necessary  4. Use additional gauze and tape to maintain pressure once the bleeding has stopped.

## 2018-10-30 NOTE — PROGRESS NOTES
HPI:   James Salvador is a 73 year old male who presents for Full skin cancer screening.   chief complaint   Last Skin Exam: 6 mo ago      1st Baseline: no  Personal HX of Skin Cancer: SCC   Personal HX of Malignant Melanoma: none   Family HX of Skin Cancer / Malignant Melanoma: none  Personal HX of Atypical Moles:   none  Risk factors: sun exposure, history of SCC  New / Changing lesions: yes, spot on left arm      Social History: Recent TKA doing well. Mother in law will likely pass away soon - he has been in a coma for the past 4 days. Has grandchildren that he watches; youngest is 5 and oldest is 14  On review of systems, there are no further skin complaints, patient is feeling otherwise well.  See patient intake sheet.  ROS of the following were done and are negative: Constitutional, Eyes, Ears, Nose,   Mouth, Throat, Cardiovascular, Respiratory, GI, Genitourinary, Musculoskeletal,   Psychiatric, Endocrine, Allergic/Immunologic.    This document serves as a record of the services and decisions personally performed and made by Rosa Maria Hansen, MS, PA-C. It was created on her behalf by Inez Prince, a trained medical scribe. The creation of this document is based on the provider's statements to the medical scribe.  Inez Prince 10:09 AM October 30, 2018    PHYSICAL EXAM:   /87  Pulse 59  SpO2 95%  Skin exam performed as follows: Type 2 skin. Mood appropriate  Alert and Oriented X 3. Well developed, well nourished in no distress.  General appearance: Normal  Head including face: Normal  Eyes: conjunctiva and lids: Normal  Mouth: Lips, teeth, gums: Normal  Neck: Normal  Chest-breast/axillae: Normal  Back: Normal  Spleen and liver: Normal  Cardiovascular: Exam of peripheral vascular system by observation for swelling, varicosities, edema: Normal  Genitalia: groin, buttocks: Normal  Extremities: digits/nails (clubbing): Normal  Eccrine and Apocrine glands: Normal  Right upper  extremity: Normal  Left upper extremity: Normal  Right lower extremity: Normal  Left lower extremity: Normal  Skin: Scalp and body hair: See below    Pt deferred exam of breasts, groin, buttocks: NO    Other physical findings:  1. Multiple pigmented macules on extremities and trunk  2. Multiple pigmented macules on face, trunk and extremities  3. Multiple vascular papules on trunk, arms and legs  4. Multiple scattered keratotic plaques  5. Left cubital fossa 5 mm pink hypertrophic papule  6. 10 mm pink scaly plaque on left forearm proximal   7. 11 mm pink scaly plaque on left forearm distal   8. 12 mm pink scaly plaque on left sternum     9. Pink scaly papule located on right wrist    6. Numerous gritty papules  >15 on each arm      Except as noted above, no other signs of skin cancer or melanoma.     ASSESSMENT/PLAN:   Benign Full skin cancer screening today. . Patient with history of SCC  Advised on monthly self exams and 1 year  Patient Education: Appropriate brochures given.    Multiple benign appearing nevi on arms, legs and trunk. Discussed ABCDEs of melanoma and sunscreen.   Multiple lentigos on arms, legs and trunk. Advised benign, no treatment needed.  Multiple scattered angiomas. Advised benign, no treatment needed.   Seborrheic keratosis on arms, legs and trunk. Advised benign, no treatment needed.  R/o SCC on left volar forearm, left forearm proximal, left forearm distal, left sternum. Photo taken and placed in chart. Shave bx in typical fashion .  Area cleaned with betadyne and anesthetized with 1% lidocaine with epi .  Dermablade used to remove the lesion and sent to pathology. Bleeding was cauterized. Pt tolerated procedure well.  Actinic keratosis on right wrist. As precancerous, cryosurgery performed. Advised on blistering and post-op care. Advised if not resolved in 1-2 months to return for evaluation  Numerous AKs on upper arms, lower arms, scalp, and face - advised. Would like to try efudex again  for all areas.      Patient to follow up with Primary Care provider regarding elevated blood pressure.      Follow-up: pending path/6 month FSE/PRN sooner    1.) Patient was asked about new and changing moles. YES  2.) Patient received a complete physical skin examination: YES  3.) Patient was counseled to perform a monthly self skin examination: YES  Scribed By: Inez Prince, Medical Scribe    The information in this document, created by the medical scribe for me, accurately reflects the services I personally performed and the decisions made by me. I have reviewed and approved this document for accuracy prior to leaving the patient care area.  October 30, 2018 10:32 AM    Rosa Maria Hansen MS, PA-C

## 2018-10-30 NOTE — MR AVS SNAPSHOT
After Visit Summary   10/30/2018    James Salvador    MRN: [id number]           Patient Information     Date Of Birth          1945        Visit Information        Provider Department      10/30/2018 10:00 AM Rosa Maria Hansen PA-C [facility]        Today's Diagnoses     Neoplasm of uncertain behavior of skin    -  1    AK (actinic keratosis)        Angioma        Lentigo        Seborrheic keratosis        Nevus        History of squamous cell carcinoma          Care Instructions      Wound Care Instructions     FOR SUPERFICIAL WOUNDS     [facility] [phone]                 AFTER 24 HOURS YOU SHOULD REMOVE THE BANDAGE AND BEGIN DAILY DRESSING CHANGES AS FOLLOWS:     1) Remove Dressing.     2) Clean and dry the area with tap water using a Q-tip or sterile gauze pad.     3) Apply Vaseline, Aquaphor, Polysporin ointment or Bacitracin ointment over entire wound.  Do NOT use Neosporin ointment.     4) Cover the wound with a band-aid, or a sterile non-stick gauze pad and micropore paper tape      REPEAT THESE INSTRUCTIONS AT LEAST ONCE A DAY UNTIL THE WOUND HAS COMPLETELY HEALED.    It is an old wives tale that a wound heals better when it is exposed to air and allowed to dry out. The wound will heal faster with a better cosmetic result if it is kept moist with ointment and covered with a bandage.    **Do not let the wound dry out.**      Supplies Needed:      *Cotton tipped applicators (Q-tips)    *Polysporin Ointment or Bacitracin Ointment (NOT NEOSPORIN)    *Band-aids or non-stick gauze pads and micropore paper tape.      PATIENT INFORMATION:    During the healing process you will notice a number of changes. All wounds develop a small halo of redness surrounding the wound.  This means healing is occurring. Severe itching with extensive redness usually indicates sensitivity to the ointment or bandage tape used to dress the wound.  You should call our  office if this develops.      Swelling  and/or discoloration around your surgical site is common, particularly when performed around the eye.    All wounds normally drain.  The larger the wound the more drainage there will be.  After 7-10 days, you will notice the wound beginning to shrink and new skin will begin to grow.  The wound is healed when you can see skin has formed over the entire area.  A healed wound has a healthy, shiny look to the surface and is red to dark pink in color to normalize.  Wounds may take approximately 4-6 weeks to heal.  Larger wounds may take 6-8 weeks.  After the wound is healed you may discontinue dressing changes.    You may experience a sensation of tightness as your wound heals. This is normal and will gradually subside.    Your healed wound may be sensitive to temperature changes. This sensitivity improves with time, but if you re having a lot of discomfort, try to avoid temperature extremes.    Patients frequently experience itching after their wound appears to have healed because of the continue healing under the skin.  Plain Vaseline will help relieve the itching.        POSSIBLE COMPLICATIONS    BLEEDIN. Leave the bandage in place.  2. Use tightly rolled up gauze or a cloth to apply direct pressure over the bandage for 30  minutes.  3. Reapply pressure for an additional 30 minutes if necessary  4. Use additional gauze and tape to maintain pressure once the bleeding has stopped.            Follow-ups after your visit        Your next 10 appointments already scheduled     Dec 11, 2018  9:10 AM CST   LAB with EA LAB   [facility] ([facility])    [address] [place] [id number] [phone]           Please do not eat 10-12 hours before your appointment if you are coming in fasting for labs on lipids, cholesterol, or glucose (sugar). This does not apply to pregnant women. Water, hot tea and black coffee (with nothing  added) are okay. Do not drink other fluids, diet soda or chew gum.              Who to contact     If you have questions or need follow up information about today's clinic visit or your schedule please contact [facility] directly at [phone].  Normal or non-critical lab and imaging results will be communicated to you by MyChart, letter or phone within 4 business days after the clinic has received the results. If you do not hear from us within 7 days, please contact the clinic through [facility] or phone. If you have a critical or abnormal lab result, we will notify you by phone as soon as possible.  Submit refill requests through [employer] or call your pharmacy and they will forward the refill request to us. Please allow 3 business days for your refill to be completed.          Additional Information About Your Visit        [facility] Information     [employer] gives you secure access to your electronic health record. If you see a primary care provider, you can also send messages to your care team and make appointments. If you have questions, please call your primary care clinic.  If you do not have a primary care provider, please call [phone] and they will assist you.        Care EveryWhere ID     This is your Care EveryWhere ID. This could be used by other organizations to access your [facility] medical records  [id number]        Your Vitals Were     Pulse Pulse Oximetry                59 95%           Blood Pressure from Last 3 Encounters:   10/30/18 148/87   08/06/18 135/80   07/12/18 125/82    Weight from Last 3 Encounters:   08/06/18 (!) 153.7 kg (338 lb 12.8 oz)   07/12/18 (!) 153.8 kg (339 lb)   07/09/18 (!) 154.1 kg (339 lb 11.2 oz)              Today, you had the following     No orders found for display       Primary Care Provider Office Phone # Fax #    Jeronimo Painter [phone] [address] [place] [address]        Equal Access to Services      NAOMI [facility] : Hadii aad ku hadvik Soeirkali, waaxda luqadaha, qaybta kaalmada adetamy, brenda leticiain hayaalety jimenezchris flannery kristi . So [facility] [phone].    ATENCIÓN: Si habla español, tiene a iniguez disposición servicios gratuitos de asistencia lingüística. Llame al [phone].    We comply with applicable federal civil rights laws and [place] laws. We do not discriminate on the basis of race, color, national origin, age, disability, sex, sexual orientation, or gender identity.            Thank you!     Thank you for choosing [facility]  for your care. Our goal is always to provide you with excellent care. Hearing back from our patients is one way we can continue to improve our services. Please take a few minutes to complete the written survey that you may receive in the mail after your visit with us. Thank you!             Your Updated Medication List - Protect others around you: Learn how to safely use, store and throw away your medicines at www.disposemymeds.org.          This list is accurate as of 10/30/18 10:41 AM.  Always use your most recent med list.                   Brand Name Dispense Instructions for use Diagnosis    amoxicillin 500 MG capsule    AMOXIL     2,000 mg as needed When going to the dentist        apixaban ANTICOAGULANT 5 MG tablet    ELIQUIS    60 tablet    Take 1 tablet (5 mg) by mouth 2 times daily    New onset atrial fibrillation (H)       ASPIRIN PO      Take 81 mg by mouth daily        atorvastatin 80 MG tablet    LIPITOR    90 tablet    TAKE 1 TABLET BY MOUTH DAILY - DUE FOR FASTING LABS IN DECEMBER    ASCVD (arteriosclerotic cardiovascular disease)       ciclopirox 0.77 % cream    LOPROX    90 g    Apply topically At Bedtime    Dermatitis, seborrheic       desonide 0.05 % cream    DESOWEN    60 g    Apply sparingly to affected area on face in morning    Dermatitis, seborrheic       fluocinonide 0.05 % solution    LIDEX    60 mL    Apply to scalp BID x 1-2  weeks PRN    Dermatitis, seborrheic       fluorouracil 5 % cream    EFUDEX    40 g    Apply to AA BID x 3-4 weeks    AK (actinic keratosis)       fluticasone 50 MCG/ACT spray    FLONASE    16 mL    Spray 2 sprays into both nostrils daily    Chronic seasonal allergic rhinitis, unspecified trigger       GABAPENTIN PO      Take 600 mg by mouth 3 times daily        * ketoconazole 2 % cream    NIZORAL    30 g    Apply topically 2 times daily For face. [facility] REQUESTS [facility]  [facility]: [phone]    Seborrheic dermatitis       * ketoconazole 2 % shampoo    NIZORAL    120 mL    Use daily as needed    Seborrheic dermatitis       ketotifen 0.025 % Soln ophthalmic solution    ZADITOR/REFRESH ANTI-ITCH     Place 1-2 drops into both eyes 2 times daily as needed for itching        lisinopril 20 MG tablet    PRINIVIL/ZESTRIL    90 tablet    TAKE 1 TABLET (20 MG) BY MOUTH DAILY **RTS 1/29/17**    Benign hypertension       loratadine 10 MG tablet    CLARITIN     Take 10 mg by mouth daily as needed for allergies        * metoprolol tartrate 25 MG tablet    LOPRESSOR    180 tablet    Take 1 tablet (25 mg) by mouth 2 times daily Take along with a 50 mg tablet twice daily    Benign hypertension, Paroxysmal supraventricular tachycardia (H)       * metoprolol tartrate 50 MG tablet    LOPRESSOR    180 tablet    Take 1 tablet (50 mg) by mouth 2 times daily Take along with a 25 mg tablet twice daily    Benign hypertension, Paroxysmal supraventricular tachycardia (H)       MULTIVITAMIN PO      Take 1 tablet by mouth daily        nitroGLYcerin 0.4 MG sublingual tablet    NITROSTAT    25 tablet    Place 1 tablet (0.4 mg) under the tongue every 5 minutes as needed for chest pain May repeat twice for a total of 3 tablets.  If chest pain not relieved, call 911    ACS (acute coronary syndrome) (H)       OMEGA-3 FISH OIL PO      Take 3.2 g by mouth daily        PERIOSTAT PO      Take 20 mg by mouth 2 times daily        TRILEPTAL PO      Take 300  mg by mouth 2 times daily        TYLENOL PO      Take 650 mg by mouth 4 times daily        * Notice:  This list has 4 medication(s) that are the same as other medications prescribed for you. Read the directions carefully, and ask your doctor or other care provider to review them with you.

## 2018-10-30 NOTE — LETTER
10/30/2018         RE: James Salvador  [address] [place] [address]        Dear Colleague,    Thank you for referring your patient, James Salvador, to the [facility]. Please see a copy of my visit note below.    HPI:   James Salvador is a 73 year old male who presents for Full skin cancer screening.   chief complaint   Last Skin Exam: 6 mo ago      1st Baseline: no  Personal HX of Skin Cancer: SCC   Personal HX of Malignant Melanoma: none   Family HX of Skin Cancer / Malignant Melanoma: none  Personal HX of Atypical Moles:   none  Risk factors: sun exposure, history of SCC  New / Changing lesions: yes, spot on left arm      Social History: Recent TKA doing well. Mother in law will likely pass away soon - he has been in a coma for the past 4 days. Has grandchildren that he watches; youngest is 5 and oldest is 14  On review of systems, there are no further skin complaints, patient is feeling otherwise well.  See patient intake sheet.  ROS of the following were done and are negative: Constitutional, Eyes, Ears, Nose,   Mouth, Throat, Cardiovascular, Respiratory, GI, Genitourinary, Musculoskeletal,   Psychiatric, Endocrine, Allergic/Immunologic.    This document serves as a record of the services and decisions personally performed and made by Rosa Maria Hansen, MS, PA-C. It was created on her behalf by Inez Prince, a trained medical scribe. The creation of this document is based on the provider's statements to the medical scribe.  Inez Prince 10:09 AM October 30, 2018    PHYSICAL EXAM:   /87  Pulse 59  SpO2 95%  Skin exam performed as follows: Type 2 skin. Mood appropriate  Alert and Oriented X 3. Well developed, well nourished in no distress.  General appearance: Normal  Head including face: Normal  Eyes: conjunctiva and lids: Normal  Mouth: Lips, teeth, gums: Normal  Neck: Normal  Chest-breast/axillae: Normal  Back: Normal  Spleen and  liver: Normal  Cardiovascular: Exam of peripheral vascular system by observation for swelling, varicosities, edema: Normal  Genitalia: groin, buttocks: Normal  Extremities: digits/nails (clubbing): Normal  Eccrine and Apocrine glands: Normal  Right upper extremity: Normal  Left upper extremity: Normal  Right lower extremity: Normal  Left lower extremity: Normal  Skin: Scalp and body hair: See below    Pt deferred exam of breasts, groin, buttocks: NO    Other physical findings:  1. Multiple pigmented macules on extremities and trunk  2. Multiple pigmented macules on face, trunk and extremities  3. Multiple vascular papules on trunk, arms and legs  4. Multiple scattered keratotic plaques  5. Left cubital fossa 5 mm pink hypertrophic papule  6. 10 mm pink scaly plaque on left forearm proximal   7. 11 mm pink scaly plaque on left forearm distal   8. 12 mm pink scaly plaque on left sternum     9. Pink scaly papule located on right wrist    6. Numerous gritty papules  >15 on each arm      Except as noted above, no other signs of skin cancer or melanoma.     ASSESSMENT/PLAN:   Benign Full skin cancer screening today. . Patient with history of SCC  Advised on monthly self exams and 1 year  Patient Education: Appropriate brochures given.    Multiple benign appearing nevi on arms, legs and trunk. Discussed ABCDEs of melanoma and sunscreen.   Multiple lentigos on arms, legs and trunk. Advised benign, no treatment needed.  Multiple scattered angiomas. Advised benign, no treatment needed.   Seborrheic keratosis on arms, legs and trunk. Advised benign, no treatment needed.  R/o SCC on left volar forearm, left forearm proximal, left forearm distal, left sternum. Photo taken and placed in chart. Shave bx in typical fashion .  Area cleaned with betadyne and anesthetized with 1% lidocaine with epi .  Dermablade used to remove the lesion and sent to pathology. Bleeding was cauterized. Pt tolerated procedure well.  Actinic keratosis on  right wrist. As precancerous, cryosurgery performed. Advised on blistering and post-op care. Advised if not resolved in 1-2 months to return for evaluation  Numerous AKs on upper arms, lower arms, scalp, and face - advised. Would like to try efudex again for all areas.      Patient to follow up with Primary Care provider regarding elevated blood pressure.      Follow-up: pending path/6 month FSE/PRN sooner    1.) Patient was asked about new and changing moles. YES  2.) Patient received a complete physical skin examination: YES  3.) Patient was counseled to perform a monthly self skin examination: YES  Scribed By: Inez Prince, Medical Scribe    The information in this document, created by the medical scribe for me, accurately reflects the services I personally performed and the decisions made by me. I have reviewed and approved this document for accuracy prior to leaving the patient care area.  October 30, 2018 10:32 AM    Rosa Maria Hansen MS, PAVAISHALI      Again, thank you for allowing me to participate in the care of your patient.        Sincerely,        Rosa Maria Hansen PA-C

## 2018-11-02 ENCOUNTER — TELEPHONE (OUTPATIENT)
Dept: DERMATOLOGY | Facility: CLINIC | Age: 73
End: 2018-11-02

## 2018-11-02 LAB — COPATH REPORT: NORMAL

## 2018-11-02 NOTE — TELEPHONE ENCOUNTER
Called and spoke to patient. Educated patient on biopsy results- SCIS x2, Verrucous Keratosis, and AK. Educated patient on Cryo treatment for his AK. Educated patient on SCIS, Mohs, and tentatively scheduled Mohs. Informed patient that I need to get approval from provider in order to get both spots done in one appointment. Informed patient I would call back with an answer. Patient voiced understanding.    Kermit RN-BSN  [facility]  [phone]

## 2018-11-02 NOTE — LETTER
[address]  [address]  [phone]    11/6/2018       James Salvador  [address] [place] [address]      Dear James:    You are scheduled for Mohs Surgery on: 1/3/19 @7:15am.    Please check in at [facility], [address].     You don't need to arrive more than 5-10 minutes prior to your appointment time.     Be sure to eat a good breakfast and bathe and wash your hair prior to surgery.     If you are taking any anti-coagulants that are prescribed by your Doctor (such as Coumadin/Warfarin, Plavix, Aspirin, Ibuprofen), please continue taking them.     However, if you are taking anti-coagulants over the counter without a Doctor's order for a medical condition, please discontinue them 10 days prior to surgery.     Please wear loose comfortable clothing as it could possibly be 4-6 hours until your surgery is completed depending upon how many layers of tissue need to be removed.      Thank you,    VICENTE Dutta MD

## 2018-11-02 NOTE — TELEPHONE ENCOUNTER
Notes Recorded by Rosa Maria Hansen PA-C on 11/2/2018 at 2:49 PM  Left forearm proximal, left forearm distal SCIS please schedule for excision.     Path for the biopsy on the sternum came back as an actinic keratosis. Recommend cryo for complete resolution - this can be done at mohs appt.     Left volar forearm verrucous keratosis no further treatment.

## 2018-11-05 NOTE — TELEPHONE ENCOUNTER
Called and LM for patient to call back to confirm upcoming Mohs appointment- provider agreed to do both spots in 1 appointment.    Kermit RN-BSN  [facility]  [phone]

## 2018-11-06 NOTE — TELEPHONE ENCOUNTER
Called and spoke to patient. Informed patient that provider has approved of doing Mohs for both spots in 1 appointment. Packet/letter sent. Patient voiced understanding.    Kermit RN-BSN  [facility]  [phone]

## 2018-12-06 ENCOUNTER — DOCUMENTATION ONLY (OUTPATIENT)
Dept: LAB | Facility: CLINIC | Age: 73
End: 2018-12-06

## 2018-12-06 DIAGNOSIS — E78.5 HYPERLIPIDEMIA WITH TARGET LDL LESS THAN 70: Primary | ICD-10-CM

## 2018-12-11 DIAGNOSIS — E78.5 HYPERLIPIDEMIA WITH TARGET LDL LESS THAN 70: ICD-10-CM

## 2018-12-11 LAB
CHOLEST SERPL-MCNC: 126 MG/DL
HDLC SERPL-MCNC: 40 MG/DL
LDLC SERPL CALC-MCNC: 55 MG/DL
NONHDLC SERPL-MCNC: 86 MG/DL
TRIGL SERPL-MCNC: 153 MG/DL

## 2018-12-11 PROCEDURE — 36415 COLL VENOUS BLD VENIPUNCTURE: CPT | Performed by: INTERNAL MEDICINE

## 2018-12-11 PROCEDURE — 80061 LIPID PANEL: CPT | Performed by: INTERNAL MEDICINE

## 2018-12-22 DIAGNOSIS — J30.2 CHRONIC SEASONAL ALLERGIC RHINITIS: ICD-10-CM

## 2018-12-23 NOTE — TELEPHONE ENCOUNTER
"Requested Prescriptions   Pending Prescriptions Disp Refills     fluticasone (FLONASE) 50 MCG/ACT nasal spray [Pharmacy Med Name: FLUTICASONE PROP 50 MCG SPRAY] 16 mL 11    Last Written Prescription Date:  1/30/2018  Last Fill Quantity: 16mL,  # refills: 11   Last office visit: 7/9/2018 with prescribing provider:  7/9/2018   Future Office Visit:     Sig: SPRAY 2 SPRAYS INTO BOTH NOSTRILS DAILY    Inhaled Steroids Protocol Passed - 12/22/2018  5:42 PM       Passed - Patient is age 12 or older       Passed - Recent (12 mo) or future (30 days) visit within the authorizing provider's specialty    Patient had office visit in the last 12 months or has a visit in the next 30 days with authorizing provider or within the authorizing provider's specialty.  See \"Patient Info\" tab in inbasket, or \"Choose Columns\" in Meds & Orders section of the refill encounter.                "

## 2018-12-24 RX ORDER — FLUTICASONE PROPIONATE 50 MCG
2 SPRAY, SUSPENSION (ML) NASAL DAILY
Qty: 16 ML | Refills: 6 | Status: SHIPPED | OUTPATIENT
Start: 2018-12-24 | End: 2019-10-15

## 2018-12-31 NOTE — PROGRESS NOTES
Surgical Office Location:  [facility]  [address] [place] [id number]

## 2019-01-02 DIAGNOSIS — I48.91 NEW ONSET ATRIAL FIBRILLATION (H): ICD-10-CM

## 2019-01-03 ENCOUNTER — OFFICE VISIT (OUTPATIENT)
Dept: DERMATOLOGY | Facility: CLINIC | Age: 74
End: 2019-01-03
Payer: COMMERCIAL

## 2019-01-03 VITALS — DIASTOLIC BLOOD PRESSURE: 80 MMHG | HEART RATE: 55 BPM | SYSTOLIC BLOOD PRESSURE: 127 MMHG | OXYGEN SATURATION: 93 %

## 2019-01-03 DIAGNOSIS — L57.0 AK (ACTINIC KERATOSIS): Primary | ICD-10-CM

## 2019-01-03 DIAGNOSIS — D04.62 SQUAMOUS CELL CARCINOMA IN SITU (SCCIS) OF SKIN OF LEFT FOREARM: ICD-10-CM

## 2019-01-03 PROCEDURE — 17000 DESTRUCT PREMALG LESION: CPT | Mod: 51 | Performed by: DERMATOLOGY

## 2019-01-03 PROCEDURE — 17313 MOHS 1 STAGE T/A/L: CPT | Mod: 59 | Performed by: DERMATOLOGY

## 2019-01-03 PROCEDURE — 17313 MOHS 1 STAGE T/A/L: CPT | Performed by: DERMATOLOGY

## 2019-01-03 RX ORDER — APIXABAN 5 MG/1
TABLET, FILM COATED ORAL
Qty: 60 TABLET | Refills: 5 | Status: SHIPPED | OUTPATIENT
Start: 2019-01-03 | End: 2019-07-04

## 2019-01-03 NOTE — LETTER
1/3/2019         RE: James Salvador  [address] [place] [address]        Dear Colleague,    Thank you for referring your patient, James Salvador, to the [facility]. Please see a copy of my visit note below.    Surgical Office Location:  [facility]  [address] [place] [address]      James Salvador is a 73 year old year old male patient here today for evaluation and managment of actinic keratosis and squamous cell carcinoma in situ.   .  Patient states this has been present for a while on arms.  Patient reports the following symptoms:  Scale on arms.  Patient reports the following previous treatments cryo several times.  .  Patient reports the following modifying factors none.  Associated symptoms: none.  Patient has no other skin complaints today.  Remainder of the HPI, Meds, PMH, Allergies, FH, and SH was reviewed in chart.      Past Medical History:   Diagnosis Date     Actinic cheilitis 7/17/2013    Lower lip, left      Actinic keratosis      Allergic rhinitis      Benign hypertension      CAD (coronary artery disease)     Cardiac cath and PCI 1994. Cardiac Cath 9/2015: BRIDGET to LAD     History of myocardial infarction 1994    PTCA     Hyperlipidemia LDL goal < 70      Mixed hyperlipidemia 6/21/2017     Morbid obesity due to excess calories (H) 1/11/2016     New onset atrial fibrillation (H) 4/21/2017     Paroxysmal supraventricular tachycardia (H)     on metoprolol     Squamous cell carcinoma      Stable angina (H) 1/11/2016       Past Surgical History:   Procedure Laterality Date     ANGIOPLASTY  1994    in [place]     ANKLE SURGERY  7/13/2005    right ankle     HEART CATH STENT COR W/WO PTCA  9/23/2015    BRIDGET stent mid LAD     JOINT REPLACEMENT, HIP RT/LT  10/14/2009    right hip      LASER SURGERY OF EYE  06/01/2002     MOHS MICROGRAPHIC PROCEDURE  06/12/2004    squamous cell carcinoma right temple     SINUS SURGERY  7/11/2006         Family History   Problem Relation Age of Onset     Genitourinary Problems Mother         renal failure     Cardiovascular Father         rupture of dorsal aorta     Depression Son        Social History     Socioeconomic History     Marital status:      Spouse name: Not on file     Number of children: 3     Years of education: Not on file     Highest education level: Not on file   Social Needs     Financial resource strain: Not on file     Food insecurity - worry: Not on file     Food insecurity - inability: Not on file     Transportation needs - medical: Not on file     Transportation needs - non-medical: Not on file   Occupational History     Employer: RETIRED   Tobacco Use     Smoking status: Former Smoker     Years: 10.00     Types: Cigarettes     Last attempt to quit: 1987     Years since quittin.0     Smokeless tobacco: Never Used   Substance and Sexual Activity     Alcohol use: Yes     Alcohol/week: 0.0 oz     Comment: socially - x2-3 per month     Drug use: No     Sexual activity: Yes     Partners: Female   Other Topics Concern     Parent/sibling w/ CABG, MI or angioplasty before 65F 55M? Not Asked      Service Not Asked     Blood Transfusions Not Asked     Caffeine Concern Yes     Comment: 2 big cups coffee daily     Occupational Exposure Not Asked     Hobby Hazards Not Asked     Sleep Concern No     Comment: sleeping better since shoulder replaced 11/3/16     Stress Concern No     Weight Concern Yes     Special Diet Yes     Comment: trying to do more lean meats     Back Care Not Asked     Exercise No     Comment: limited - knee     Bike Helmet Not Asked     Seat Belt Not Asked     Self-Exams Not Asked   Social History Narrative     Not on file       Outpatient Encounter Medications as of 1/3/2019   Medication Sig Dispense Refill     Acetaminophen (TYLENOL PO) Take 650 mg by mouth 4 times daily        amoxicillin (AMOXIL) 500 MG capsule 2,000 mg as needed When going to the dentist   0     apixaban ANTICOAGULANT (ELIQUIS) 5 MG tablet Take 1 tablet (5 mg) by mouth 2 times daily 60 tablet 5     ASPIRIN PO Take 81 mg by mouth daily       atorvastatin (LIPITOR) 80 MG tablet TAKE 1 TABLET BY MOUTH DAILY - DUE FOR FASTING LABS IN DECEMBER 90 tablet 3     ciclopirox (LOPROX) 0.77 % cream Apply topically At Bedtime 90 g 3     desonide (DESOWEN) 0.05 % cream Apply sparingly to affected area on face in morning 60 g 0     Doxycycline Hyclate (PERIOSTAT PO) Take 20 mg by mouth 2 times daily       fluocinonide (LIDEX) 0.05 % solution Apply to scalp BID x 1-2 weeks PRN 60 mL 3     fluorouracil (EFUDEX) 5 % cream Apply to AA BID x 3-4 weeks 40 g 5     fluticasone (FLONASE) 50 MCG/ACT nasal spray SPRAY 2 SPRAYS INTO BOTH NOSTRILS DAILY 16 mL 6     GABAPENTIN PO Take 600 mg by mouth 3 times daily        ketoconazole (NIZORAL) 2 % cream Apply topically 2 times daily For face. FAX [facility]FILL REQUESTS TO [facility]: [phone] 30 g 2     ketoconazole (NIZORAL) 2 % shampoo Use daily as needed 120 mL 11     ketotifen (ZADITOR/REFRESH ANTI-ITCH) 0.025 % SOLN Place 1-2 drops into both eyes 2 times daily as needed for itching       lisinopril (PRINIVIL/ZESTRIL) 20 MG tablet TAKE 1 TABLET (20 MG) BY MOUTH DAILY **RTS 1/29/17** 90 tablet 3     loratadine (CLARITIN) 10 MG tablet Take 10 mg by mouth daily as needed for allergies       metoprolol tartrate (LOPRESSOR) 25 MG tablet Take 1 tablet (25 mg) by mouth 2 times daily Take along with a 50 mg tablet twice daily 180 tablet 3     metoprolol tartrate (LOPRESSOR) 50 MG tablet Take 1 tablet (50 mg) by mouth 2 times daily Take along with a 25 mg tablet twice daily 180 tablet 3     Multiple Vitamin (MULTIVITAMIN OR) Take 1 tablet by mouth daily        nitroglycerin (NITROSTAT) 0.4 MG SL tablet Place 1 tablet (0.4 mg) under the tongue every 5 minutes as needed for chest pain May repeat twice for a total of 3 tablets.  If chest pain not relieved, call 911 25 tablet 11     Burton-3  Fatty Acids (OMEGA-3 FISH OIL PO) Take 3.2 g by mouth daily        OXcarbazepine (TRILEPTAL PO) Take 300 mg by mouth 2 times daily        No facility-administered encounter medications on file as of 1/3/2019.              Review Of Systems  Skin: As above  Eyes: negative  Ears/Nose/Throat: negative  Respiratory: No shortness of breath, dyspnea on exertion, cough, or hemoptysis  Cardiovascular: negative  Gastrointestinal: negative  Genitourinary: negative  Musculoskeletal: negative  Neurologic: negative  Psychiatric: negative  Hematologic/Lymphatic/Immunologic: negative  Endocrine: negative      O:   NAD, WDWN, Alert & Oriented, Mood & Affect wnl, Vitals stable   Here today alone   /80   Pulse 55   SpO2 93%    General appearance normal   Vitals stable   Alert, oriented and in no acute distress      Following lymph nodes palpated: Occipital, Cervical, Supraclavicular no lad   Sternum gritty papule   L forear prox 1x1.4cm red scaly papule in white macule   L forearm ditsal 11x15 mm red scaly papule in white scar      Eyes: Conjunctivae/lids:Normal     ENT: Lips, buccal mucosa, tongue: normal    MSK:Normal    Cardiovascular: peripheral edema none    Pulm: Breathing Normal    Lymph Nodes: No Head and Neck Lymphadenopathy     Neuro/Psych: Orientation:Normal; Mood/Affect:Normal      A/P:  1. Sternum actinic keratosis   LN2:  Treated with LN2 for 5s for 1-2 cycles. Warned risks of blistering, pain, pigment change, scarring, and incomplete resolution.  Advised patient to return if lesions do not completely resolve.  Wound care sheet given.  2. L forearm prox rec squamous cell carcinoma in situ  MOHS:   Recurrent    After PGACAC discussed with patient, decision for Mohs surgery was made. Indication for Mohs was Recurrent. Patient confirmed the site with Dr. Dutta.  After anesthesia with LEC, the tumor was excised using standard Mohs technique in 1 stages(s).  CLEAR MARGINS OBTAINED and Final defect size was 1.9 x  1.7 cm.       3. L forearm distal rec squamous cell carcinoma in situ  MOHS:   Recurrent    After PGACAC discussed with patient, decision for Mohs surgery was made. Indication for Mohs was Recurrent. Patient confirmed the site with Dr. Dutta.  After anesthesia with LEC, the tumor was excised using standard Mohs technique in 1 stages(s).  CLEAR MARGINS OBTAINED and Final defect size was 2 x 1.8cm.       REPAIR SECOND INTENT: We discussed the options for wound management in full with the patient including risks/benefits/possible outcomes. Decision made to allow the wound to heal by second intention. EBL minimal; complications none; wound care routine.  The patient was discharged in good condition and will return in one month or prn for wound evaluation.  BENIGN LESIONS DISCUSSED WITH PATIENT:  I discussed the specifics of tumor, prognosis, and genetics of benign lesions.  I explained that treatment of these lesions would be purely cosmetic and not medically neccessary.  I discussed with patient different removal options including excision, cautery and /or laser.      Nature and genetics of benign skin lesions dicussed with patient.  Signs and Symptoms of skin cancer discussed with patient.  ABCDEs of melanoma reviewed with patient.  Patient encouraged to perform monthly skin exams.  UV precautions reviewed with patient.  Patient to follow up with Primary Care provider regarding elevated blood pressure.  Skin care regimen reviewed with patient: Eliminate harsh soaps, i.e. Dial, zest, irsih spring; Mild soaps such as Cetaphil or Dove sensitive skin, avoid hot or cold showers, aggressive use of emollients including vanicream, cetaphil or cerave discussed with patient.    Risks of non-melanoma skin cancer discussed with patient   Return to clinic 6 months      Again, thank you for allowing me to participate in the care of your patient.        Sincerely,        Jv Dutta MD

## 2019-01-03 NOTE — PATIENT INSTRUCTIONS
Open Wound Care     for ______________        ? No strenuous activity for 48 hours    ? Take Tylenol as needed for discomfort.                                                .         ? Do not drink alcoholic beverages for 48 hours.    ? Keep the pressure bandage in place for 24 hours. If the bandage becomes blood tinged or loose, reinforce it with gauze and tape.        (Refer to the reverse side of this page for management of bleeding).    ? Remove bandage in 24 hours and begin wound care as follows:     1. Clean area with tap water using a Q tip or gauze pad, (shower / bathe normally)  2. Dry wound with Q tip or gauze pad  3. Apply Aquaphor, Vaseline, Polysporin or Bacitracin Ointment with a Q tip    Do NOT use Neosporin Ointment *  4. Cover the wound with a band-aid or nonstick gauze pad and paper tape.  5. Repeat wound care once a day until wound is completely healed.    It is an old wives tale that a wound heals better when it is exposed to air and allowed to dry out. The wound will heal faster with a better cosmetic result if it is kept moist with ointment and covered with a bandage.  Do not let the wound dry out.      Supplies Needed:                Qtips or gauze pads                Polysporin or Bacitracin Ointment                Bandaids or nonstick gauze pads and paper tape    Wound care kits and brown paper tape are available for purchase at   the pharmacy.    BLEEDIN. Use tightly rolled up gauze or cloth to apply direct pressure over the bandage for 20   minutes.  2. Reapply pressure for an additional 20 minutes if necessary  3. Call the office or go to the nearest emergency room if pressure fails to stop the bleeding.  4. Use additional gauze and tape to maintain pressure once the bleeding has stopped.  5. Begin wound care 24 hours after surgery as directed.                  WOUND HEALING    1. One week after surgery a pink / red halo will form around the outside of the wound.   This is new  skin.  2. The center of the wound will appear yellowish white and produce some drainage.  3. The pink halo will slowly migrate in toward the center of the wound until the wound is covered with new shiny pink skin.  4. There will be no more drainage when the wound is completely healed.  5. It will take six months to one year for the redness to fade.  6. The scar may be itchy, tight and sensitive to extreme temperatures for a year after the surgery.  7. Massaging the area several times a day for several minutes after the wound is completely healed will help the scar soften and normalize faster. Begin massage only after healing is complete.      In case of emergency call: Dr Dutta: [phone]     [facility]: [phone]    [facility]: [phone]

## 2019-01-03 NOTE — NURSING NOTE
"Initial /80   Pulse 55   SpO2 93%  Estimated body mass index is 47.25 kg/m  as calculated from the following:    Height as of 8/6/18: 1.803 m (5' 11\").    Weight as of 8/6/18: 153.7 kg (338 lb 12.8 oz). .    MIGEL Carmen-BSN  [facility]  [phone]  "

## 2019-01-03 NOTE — PROGRESS NOTES
James Salvador is a 73 year old year old male patient here today for evaluation and managment of actinic keratosis and squamous cell carcinoma in situ.   .  Patient states this has been present for a while on arms.  Patient reports the following symptoms:  Scale on arms.  Patient reports the following previous treatments cryo several times.  .  Patient reports the following modifying factors none.  Associated symptoms: none.  Patient has no other skin complaints today.  Remainder of the HPI, Meds, PMH, Allergies, FH, and SH was reviewed in chart.      Past Medical History:   Diagnosis Date     Actinic cheilitis 7/17/2013    Lower lip, left      Actinic keratosis      Allergic rhinitis      Benign hypertension      CAD (coronary artery disease)     Cardiac cath and PCI 1994. Cardiac Cath 9/2015: BRIDGET to LAD     History of myocardial infarction 1994    PTCA     Hyperlipidemia LDL goal < 70      Mixed hyperlipidemia 6/21/2017     Morbid obesity due to excess calories (H) 1/11/2016     New onset atrial fibrillation (H) 4/21/2017     Paroxysmal supraventricular tachycardia (H)     on metoprolol     Squamous cell carcinoma      Stable angina (H) 1/11/2016       Past Surgical History:   Procedure Laterality Date     ANGIOPLASTY  1994    in [place]     ANKLE SURGERY  7/13/2005    right ankle     HEART CATH STENT COR W/WO PTCA  9/23/2015    BRIDGET stent mid LAD     JOINT REPLACEMENT, HIP RT/LT  10/14/2009    right hip      LASER SURGERY OF EYE  06/01/2002     MOHS MICROGRAPHIC PROCEDURE  06/12/2004    squamous cell carcinoma right temple     SINUS SURGERY  7/11/2006        Family History   Problem Relation Age of Onset     Genitourinary Problems Mother         renal failure     Cardiovascular Father         rupture of dorsal aorta     Depression Son        Social History     Socioeconomic History     Marital status:      Spouse name: Not on file     Number of children: 3     Years of education: Not on file     Highest  education level: Not on file   Social Needs     Financial resource strain: Not on file     Food insecurity - worry: Not on file     Food insecurity - inability: Not on file     Transportation needs - medical: Not on file     Transportation needs - non-medical: Not on file   Occupational History     Employer: RETIRED   Tobacco Use     Smoking status: Former Smoker     Years: 10.00     Types: Cigarettes     Last attempt to quit: 1987     Years since quittin.0     Smokeless tobacco: Never Used   Substance and Sexual Activity     Alcohol use: Yes     Alcohol/week: 0.0 oz     Comment: socially - x2-3 per month     Drug use: No     Sexual activity: Yes     Partners: Female   Other Topics Concern     Parent/sibling w/ CABG, MI or angioplasty before 65F 55M? Not Asked      Service Not Asked     Blood Transfusions Not Asked     Caffeine Concern Yes     Comment: 2 big cups coffee daily     Occupational Exposure Not Asked     Hobby Hazards Not Asked     Sleep Concern No     Comment: sleeping better since shoulder replaced 11/3/16     Stress Concern No     Weight Concern Yes     Special Diet Yes     Comment: trying to do more lean meats     Back Care Not Asked     Exercise No     Comment: limited - knee     Bike Helmet Not Asked     Seat Belt Not Asked     Self-Exams Not Asked   Social History Narrative     Not on file       Outpatient Encounter Medications as of 1/3/2019   Medication Sig Dispense Refill     Acetaminophen (TYLENOL PO) Take 650 mg by mouth 4 times daily        amoxicillin (AMOXIL) 500 MG capsule 2,000 mg as needed When going to the dentist  0     apixaban ANTICOAGULANT (ELIQUIS) 5 MG tablet Take 1 tablet (5 mg) by mouth 2 times daily 60 tablet 5     ASPIRIN PO Take 81 mg by mouth daily       atorvastatin (LIPITOR) 80 MG tablet TAKE 1 TABLET BY MOUTH DAILY - DUE FOR FASTING LABS IN  tablet 3     ciclopirox (LOPROX) 0.77 % cream Apply topically At Bedtime 90 g 3     desonide (DESOWEN)  0.05 % cream Apply sparingly to affected area on face in morning 60 g 0     Doxycycline Hyclate (PERIOSTAT PO) Take 20 mg by mouth 2 times daily       fluocinonide (LIDEX) 0.05 % solution Apply to scalp BID x 1-2 weeks PRN 60 mL 3     fluorouracil (EFUDEX) 5 % cream Apply to AA BID x 3-4 weeks 40 g 5     fluticasone (FLONASE) 50 MCG/ACT nasal spray SPRAY 2 SPRAYS INTO BOTH NOSTRILS DAILY 16 mL 6     GABAPENTIN PO Take 600 mg by mouth 3 times daily        ketoconazole (NIZORAL) 2 % cream Apply topically 2 times daily For face. FAX REFILL REQUESTS TO [facility]: [phone] 30 g 2     ketoconazole (NIZORAL) 2 % shampoo Use daily as needed 120 mL 11     ketotifen (ZADITOR/REFRESH ANTI-ITCH) 0.025 % SOLN Place 1-2 drops into both eyes 2 times daily as needed for itching       lisinopril (PRINIVIL/ZESTRIL) 20 MG tablet TAKE 1 TABLET (20 MG) BY MOUTH DAILY **RTS 1/29/17** 90 tablet 3     loratadine (CLARITIN) 10 MG tablet Take 10 mg by mouth daily as needed for allergies       metoprolol tartrate (LOPRESSOR) 25 MG tablet Take 1 tablet (25 mg) by mouth 2 times daily Take along with a 50 mg tablet twice daily 180 tablet 3     metoprolol tartrate (LOPRESSOR) 50 MG tablet Take 1 tablet (50 mg) by mouth 2 times daily Take along with a 25 mg tablet twice daily 180 tablet 3     Multiple Vitamin (MULTIVITAMIN OR) Take 1 tablet by mouth daily        nitroglycerin (NITROSTAT) 0.4 MG SL tablet Place 1 tablet (0.4 mg) under the tongue every 5 minutes as needed for chest pain May repeat twice for a total of 3 tablets.  If chest pain not relieved, call 911 25 tablet 11     Omega-3 Fatty Acids (OMEGA-3 FISH OIL PO) Take 3.2 g by mouth daily        OXcarbazepine (TRILEPTAL PO) Take 300 mg by mouth 2 times daily        No facility-administered encounter medications on file as of 1/3/2019.              Review Of Systems  Skin: As above  Eyes: negative  Ears/Nose/Throat: negative  Respiratory: No shortness of breath, dyspnea on exertion,  cough, or hemoptysis  Cardiovascular: negative  Gastrointestinal: negative  Genitourinary: negative  Musculoskeletal: negative  Neurologic: negative  Psychiatric: negative  Hematologic/Lymphatic/Immunologic: negative  Endocrine: negative      O:   NAD, WDWN, Alert & Oriented, Mood & Affect wnl, Vitals stable   Here today alone   /80   Pulse 55   SpO2 93%    General appearance normal   Vitals stable   Alert, oriented and in no acute distress      Following lymph nodes palpated: Occipital, Cervical, Supraclavicular no lad   Sternum gritty papule   L forear prox 1x1.4cm red scaly papule in white macule   L forearm ditsal 11x15 mm red scaly papule in white scar      Eyes: Conjunctivae/lids:Normal     ENT: Lips, buccal mucosa, tongue: normal    MSK:Normal    Cardiovascular: peripheral edema none    Pulm: Breathing Normal    Lymph Nodes: No Head and Neck Lymphadenopathy     Neuro/Psych: Orientation:Normal; Mood/Affect:Normal      A/P:  1. Sternum actinic keratosis   LN2:  Treated with LN2 for 5s for 1-2 cycles. Warned risks of blistering, pain, pigment change, scarring, and incomplete resolution.  Advised patient to return if lesions do not completely resolve.  Wound care sheet given.  2. L forearm prox rec squamous cell carcinoma in situ  MOHS:   Recurrent    After PGACAC discussed with patient, decision for Mohs surgery was made. Indication for Mohs was Recurrent. Patient confirmed the site with Dr. Dutta.  After anesthesia with LEC, the tumor was excised using standard Mohs technique in 1 stages(s).  CLEAR MARGINS OBTAINED and Final defect size was 1.9 x 1.7 cm.       3. L forearm distal rec squamous cell carcinoma in situ  MOHS:   Recurrent    After PGACAC discussed with patient, decision for Mohs surgery was made. Indication for Mohs was Recurrent. Patient confirmed the site with Dr. Dutta.  After anesthesia with LEC, the tumor was excised using standard Mohs technique in 1 stages(s).  CLEAR MARGINS  OBTAINED and Final defect size was 2 x 1.8cm.       REPAIR SECOND INTENT: We discussed the options for wound management in full with the patient including risks/benefits/possible outcomes. Decision made to allow the wound to heal by second intention. EBL minimal; complications none; wound care routine.  The patient was discharged in good condition and will return in one month or prn for wound evaluation.  BENIGN LESIONS DISCUSSED WITH PATIENT:  I discussed the specifics of tumor, prognosis, and genetics of benign lesions.  I explained that treatment of these lesions would be purely cosmetic and not medically neccessary.  I discussed with patient different removal options including excision, cautery and /or laser.      Nature and genetics of benign skin lesions dicussed with patient.  Signs and Symptoms of skin cancer discussed with patient.  ABCDEs of melanoma reviewed with patient.  Patient encouraged to perform monthly skin exams.  UV precautions reviewed with patient.  Patient to follow up with Primary Care provider regarding elevated blood pressure.  Skin care regimen reviewed with patient: Eliminate harsh soaps, i.e. Dial, zest, irsih spring; Mild soaps such as Cetaphil or Dove sensitive skin, avoid hot or cold showers, aggressive use of emollients including vanicream, cetaphil or cerave discussed with patient.    Risks of non-melanoma skin cancer discussed with patient   Return to clinic 6 months

## 2019-01-03 NOTE — TELEPHONE ENCOUNTER
Prescription approved per [facility] Refill Protocol.    Fina ESPINAL RN, BSN, PHN

## 2019-01-03 NOTE — TELEPHONE ENCOUNTER
Requested Prescriptions   Pending Prescriptions Disp Refills     ELIQUIS 5 MG tablet [Pharmacy Med Name: ELIQUIS 5 MG TABLET] 60 tablet 5     Sig: TAKE 1 TABLET (5 MG) BY MOUTH 2 TIMES DAILY    Direct Oral Anticoagulant Agents Passed - 1/2/2019 10:28 AM       Passed - Normal Platelets on file in past 12 months    Recent Labs   Lab Test 07/18/18  0820                 Passed - Medication is active on med list       Passed - Patient is 18-79 years of age       Passed - Serum creatinine less than or equal to 1.4 on file in past 12 mos    Recent Labs   Lab Test 07/18/18  0820   CR 0.74            Passed - Weight is greater than 60 kg for the past year    Wt Readings from Last 3 Encounters:   08/06/18 (!) 153.7 kg (338 lb 12.8 oz)   07/12/18 (!) 153.8 kg (339 lb)   07/09/18 (!) 154.1 kg (339 lb 11.2 oz)            Passed - Recent (6 mo) or future (30 days) visit within the authorizing provider's specialty

## 2019-01-28 DIAGNOSIS — I10 BENIGN HYPERTENSION: ICD-10-CM

## 2019-01-30 DIAGNOSIS — L21.9 SEBORRHEIC DERMATITIS: ICD-10-CM

## 2019-01-30 RX ORDER — KETOCONAZOLE 20 MG/ML
SHAMPOO TOPICAL
Qty: 120 ML | Refills: 3 | Status: SHIPPED | OUTPATIENT
Start: 2019-01-30 | End: 2020-03-31

## 2019-01-30 RX ORDER — LISINOPRIL 20 MG/1
TABLET ORAL
Qty: 90 TABLET | Refills: 1 | Status: SHIPPED | OUTPATIENT
Start: 2019-01-30 | End: 2019-06-07

## 2019-02-05 DIAGNOSIS — L21.9 DERMATITIS, SEBORRHEIC: ICD-10-CM

## 2019-02-06 RX ORDER — FLUOCINONIDE TOPICAL SOLUTION USP, 0.05% 0.5 MG/ML
SOLUTION TOPICAL
Qty: 60 ML | Refills: 3 | Status: SHIPPED | OUTPATIENT
Start: 2019-02-06 | End: 2019-05-23

## 2019-03-04 ENCOUNTER — OFFICE VISIT (OUTPATIENT)
Dept: INTERNAL MEDICINE | Facility: CLINIC | Age: 74
End: 2019-03-04
Payer: COMMERCIAL

## 2019-03-04 VITALS
BODY MASS INDEX: 44.1 KG/M2 | OXYGEN SATURATION: 96 % | HEIGHT: 71 IN | HEART RATE: 74 BPM | SYSTOLIC BLOOD PRESSURE: 130 MMHG | WEIGHT: 315 LBS | TEMPERATURE: 98.6 F | DIASTOLIC BLOOD PRESSURE: 76 MMHG | RESPIRATION RATE: 18 BRPM

## 2019-03-04 DIAGNOSIS — I20.89 STABLE ANGINA (H): ICD-10-CM

## 2019-03-04 DIAGNOSIS — Z00.00 MEDICARE ANNUAL WELLNESS VISIT, SUBSEQUENT: Primary | ICD-10-CM

## 2019-03-04 DIAGNOSIS — I48.20 CHRONIC ATRIAL FIBRILLATION (H): ICD-10-CM

## 2019-03-04 DIAGNOSIS — I25.10 CORONARY ARTERY DISEASE INVOLVING NATIVE CORONARY ARTERY OF NATIVE HEART, ANGINA PRESENCE UNSPECIFIED: ICD-10-CM

## 2019-03-04 DIAGNOSIS — E66.01 MORBID OBESITY (H): ICD-10-CM

## 2019-03-04 PROCEDURE — 99397 PER PM REEVAL EST PAT 65+ YR: CPT | Performed by: INTERNAL MEDICINE

## 2019-03-04 ASSESSMENT — MIFFLIN-ST. JEOR: SCORE: 2322.06

## 2019-03-04 ASSESSMENT — PATIENT HEALTH QUESTIONNAIRE - PHQ9: SUM OF ALL RESPONSES TO PHQ QUESTIONS 1-9: 3

## 2019-03-04 ASSESSMENT — ACTIVITIES OF DAILY LIVING (ADL): CURRENT_FUNCTION: NO ASSISTANCE NEEDED

## 2019-03-04 NOTE — PROGRESS NOTES
"SUBJECTIVE:   James Salvador is a 73 year old male who presents for Preventive Visit.    Are you in the first 12 months of your Medicare coverage?  No    Annual Wellness Visit     In general, how would you rate your overall health?  Good    Frequency of exercise:  2-3 days/week    Duration of exercise:  30-45 minutes    Do you usually eat at least 4 servings of fruit and vegetables a day, include whole grains    & fiber and avoid regularly eating high fat or \"junk\" foods?  Yes    Taking medications regularly:  Yes    Medication side effects:  None    Ability to successfully perform activities of daily living:  No assistance needed    Home Safety:  No safety concerns identified    Hearing Impairment:  Difficulty following a conversation in a noisy restaurant or crowded room and find that men's voices are easier to understand than woman's    In the past 6 months, have you been bothered by leaking of urine?  No    In general, how would you rate your overall mental or emotional health?  Good    PHQ-2 Total Score: 0    Additional concerns today:  Yes    Do you feel safe in your environment? Yes    Do you have a Health Care Directive? Yes: Advance Directive has been received and scanned.    Fall risk  Fallen 2 or more times in the past year?: No(1 time)  Any fall with injury in the past year?: No    Cognitive Screening   1) Repeat 3 items (Leader, Season, Table)    2) Clock draw: NORMAL  3) 3 item recall: Recalls 2 objects   Results: NORMAL clock, 1-2 items recalled: COGNITIVE IMPAIRMENT LESS LIKELY    Mini-CogTM Copyright PAOLO Casas. Licensed by the author for use in [facility]; reprinted with permission (cordelia@.[facility]). All rights reserved.      Reviewed and updated as needed this visit by clinical staff  Tobacco  Allergies  Meds  Med Hx  Surg Hx  Fam Hx  Soc Hx        Reviewed and updated as needed this visit by Provider        Social History     Tobacco Use     Smoking status: Former Smoker     Years: " "10.00     Types: Cigarettes     Last attempt to quit: 1987     Years since quittin.1     Smokeless tobacco: Never Used   Substance Use Topics     Alcohol use: Yes     Alcohol/week: 0.0 oz     Comment: socially - x2-3 per month       Alcohol Use 3/4/2019   If you drink alcohol do you typically have greater than 3 drinks per day OR greater than 7 drinks per week? No       Current providers sharing in care for this patient include:   Patient Care Team:  Jeronimo Painter MD as PCP - General (Internal Medicine)  Jeronimo Painter MD as PCP - Assigned PCP    The following health maintenance items are reviewed in Epic and correct as of today:  Health Maintenance   Topic Date Due     DEPRESSION ACTION PLAN  1963     MEDICARE ANNUAL WELLNESS VISIT  2016     BMP Q1 YR  2019     PHQ-9 Q6 MONTHS  2019     LIPID MONITORING Q1 YEAR  2019     FALL RISK ASSESSMENT  2020     COLONOSCOPY Q10 YR  2022     ADVANCE DIRECTIVE PLANNING Q5 YRS  2022     DTAP/TDAP/TD IMMUNIZATION (2 - Td) 10/02/2022     INFLUENZA VACCINE  Completed     ZOSTER IMMUNIZATION  Completed     AORTIC ANEURYSM SCREENING (SYSTEM ASSIGNED)  Completed     HEPATITIS C SCREENING  Completed     IPV IMMUNIZATION  Aged Out     MENINGITIS IMMUNIZATION  Aged Out       Review of Systems  Constitutional, HEENT, cardiovascular, pulmonary, GI, , musculoskeletal, neuro, skin, endocrine and psych systems are negative, except as otherwise noted.    OBJECTIVE:   /76   Pulse 74   Temp 98.6  F (37  C) (Oral)   Resp 18   Ht 1.803 m (5' 11\")   Wt (!) 155.5 kg (342 lb 12.8 oz)   SpO2 96%   BMI 47.81 kg/m   Estimated body mass index is 47.81 kg/m  as calculated from the following:    Height as of this encounter: 1.803 m (5' 11\").    Weight as of this encounter: 155.5 kg (342 lb 12.8 oz).  Physical Exam  GENERAL: alert, no distress and over weight  EYES: Eyes grossly normal to inspection, PERRL and conjunctivae " "and sclerae normal  HENT: ear canals and TM's normal, nose and mouth without ulcers or lesions  NECK: no adenopathy, no asymmetry, masses, or scars and thyroid normal to palpation  RESP: lungs clear to auscultation - no rales, rhonchi or wheezes  CV: regular rate and rhythm, normal S1 S2, no S3 or S4, no murmur, click or rub, no peripheral edema and peripheral pulses strong  ABDOMEN: soft, nontender, no hepatosplenomegaly, no masses and bowel sounds normal  MS: no gross musculoskeletal defects noted, no edema  SKIN: no suspicious lesions or rashes  NEURO: Normal strength and tone, mentation intact and speech normal  PSYCH: mentation appears normal, affect normal/bright  LYMPH: no cervical, supraclavicular, axillary, or inguinal adenopathy    Diagnostic Test Results:  No results found for this or any previous visit (from the past 24 hour(s)).    ASSESSMENT / PLAN:   1. Medicare annual wellness visit, subsequent  uptodate on screening      2. Coronary artery disease involving native coronary artery of native heart, angina presence unspecified  Stable. No symptoms.     3. Morbid obesity (H)  Exercise and diet    4. Chronic atrial fibrillation (H)  Rate controlled. Anticoagulated  We discussed his dual anticoag - asa + elquis. Would like Dr. Rodriguez's opinion in regard to whether he may d/c asa. Appears quite stable clinically at this time from a CAD standpoint.  Obviously, with both meds bleeding risk increased.     End of Life Planning:  Patient currently has an advanced directive: Yes.  Practitioner is supportive of decision.    COUNSELING:  Reviewed preventive health counseling, as reflected in patient instructions    BP Readings from Last 1 Encounters:   03/04/19 130/76     Estimated body mass index is 47.81 kg/m  as calculated from the following:    Height as of this encounter: 1.803 m (5' 11\").    Weight as of this encounter: 155.5 kg (342 lb 12.8 oz).      Weight management plan: Discussed healthy diet and " exercise guidelines     reports that he quit smoking about 32 years ago. His smoking use included cigarettes. He quit after 10.00 years of use. he has never used smokeless tobacco.      Appropriate preventive services were discussed with this patient, including applicable screening as appropriate for cardiovascular disease, diabetes, osteopenia/osteoporosis, and glaucoma.  As appropriate for age/gender, discussed screening for colorectal cancer, prostate cancer, breast cancer, and cervical cancer. Checklist reviewing preventive services available has been given to the patient.    Reviewed patients plan of care and provided an AVS. The Basic Care Plan (routine screening as documented in Health Maintenance) for James meets the Care Plan requirement. This Care Plan has been established and reviewed with the Patient.    Counseling Resources:  ATP IV Guidelines  Pooled Cohorts Equation Calculator  Breast Cancer Risk Calculator  FRAX Risk Assessment  ICSI Preventive Guidelines  Dietary Guidelines for Americans, 2010  USDA's MyPlate  ASA Prophylaxis  Lung CA Screening    Jeronimo Painter MD  [facility]

## 2019-03-04 NOTE — PATIENT INSTRUCTIONS
Preventive Health Recommendations:     See your health care provider every year to    Review health changes.     Discuss preventive care.      Review your medicines if your doctor has prescribed any.    Talk with your health care provider about whether you should have a test to screen for prostate cancer (PSA).    Every 3 years, have a diabetes test (fasting glucose). If you are at risk for diabetes, you should have this test more often.    Every 5 years, have a cholesterol test. Have this test more often if you are at risk for high cholesterol or heart disease.     Every 10 years, have a colonoscopy. Or, have a yearly FIT test (stool test). These exams will check for colon cancer.    Talk to with your health care provider about screening for Abdominal Aortic Aneurysm if you have a family history of AAA or have a history of smoking.  Shots:     Get a flu shot each year.     Get a tetanus shot every 10 years.     Talk to your doctor about your pneumonia vaccines. There are now two you should receive - Pneumovax (PPSV 23) and Prevnar (PCV 13).    Talk to your pharmacist about a shingles vaccine.     Talk to your doctor about the hepatitis B vaccine.  Nutrition:     Eat at least 5 servings of fruits and vegetables each day.     Eat whole-grain bread, whole-wheat pasta and brown rice instead of white grains and rice.     Get adequate Calcium and Vitamin D.   Lifestyle    Exercise for at least 150 minutes a week (30 minutes a day, 5 days a week). This will help you control your weight and prevent disease.     Limit alcohol to one drink per day.     No smoking.     Wear sunscreen to prevent skin cancer.     See your dentist every six months for an exam and cleaning.     See your eye doctor every 1 to 2 years to screen for conditions such as glaucoma, macular degeneration and cataracts.    Personalized Prevention Plan  You are due for the preventive services outlined below.  Your care team is available to assist you in  scheduling these services.  If you have already completed any of these items, please share that information with your care team to update in your medical record.    Health Maintenance Due   Topic Date Due     Depression Action Plan Review  06/28/1963     Annual Wellness Visit  01/12/2016     Depression Assessment - every 6 months  01/09/2019

## 2019-04-16 ENCOUNTER — OFFICE VISIT (OUTPATIENT)
Dept: DERMATOLOGY | Facility: CLINIC | Age: 74
End: 2019-04-16
Payer: COMMERCIAL

## 2019-04-16 VITALS — HEART RATE: 48 BPM | DIASTOLIC BLOOD PRESSURE: 80 MMHG | SYSTOLIC BLOOD PRESSURE: 145 MMHG | OXYGEN SATURATION: 95 %

## 2019-04-16 DIAGNOSIS — L82.0 INFLAMED SEBORRHEIC KERATOSIS: ICD-10-CM

## 2019-04-16 DIAGNOSIS — Z85.89 HISTORY OF SQUAMOUS CELL CARCINOMA: ICD-10-CM

## 2019-04-16 DIAGNOSIS — L57.0 AK (ACTINIC KERATOSIS): Primary | ICD-10-CM

## 2019-04-16 PROCEDURE — 99212 OFFICE O/P EST SF 10 MIN: CPT | Mod: 25 | Performed by: PHYSICIAN ASSISTANT

## 2019-04-16 PROCEDURE — 17003 DESTRUCT PREMALG LES 2-14: CPT | Mod: 59 | Performed by: PHYSICIAN ASSISTANT

## 2019-04-16 PROCEDURE — 17000 DESTRUCT PREMALG LESION: CPT | Mod: 59 | Performed by: PHYSICIAN ASSISTANT

## 2019-04-16 PROCEDURE — 17110 DESTRUCTION B9 LES UP TO 14: CPT | Performed by: PHYSICIAN ASSISTANT

## 2019-04-16 NOTE — LETTER
4/16/2019         RE: James Salvador  [address] [place] [address]        Dear Colleague,    Thank you for referring your patient, James Salvador, to the [facility]. Please see a copy of my visit note below.    HPI:   Chief complaints: James Salvador (Pete) is a 73 year old male who presents for evaluation of spots on left calf, head and right wrist.  Condition present for:  On left calf- since January, started off small and has increased in size.   Previous treatments include: on right wrist- LN2; head- ketoconazole    SHx: traveled to [place] during the winter time    Review Of Systems  Eyes: negative  Ears/Nose/Throat: negative  Respiratory: No shortness of breath, dyspnea on exertion, cough, or hemoptysis  Cardiovascular: negative  Gastrointestinal: negative  Genitourinary: negative  Musculoskeletal: negative  Neurologic: negative  Psychiatric: negative    This document serves as a record of the services and decisions personally performed and made by Rosa Maria Hansen, MS, PA-C. It was created on her behalf by Inez Prince, a trained medical scribe. The creation of this document is based on the provider's statements to the medical scribe.  Inez Prince 9:02 AM April 16, 2019    PHYSICAL EXAM:    /80   Pulse (!) 48   SpO2 95%   Skin exam performed as follows: Type 2 skin. Mood appropriate  Alert and Oriented X 3. Well developed, well nourished in no distress.  General appearance: Normal  Head including face: Normal  Eyes: conjunctiva and lids: Normal  Mouth: Lips, teeth, gums: Normal  Neck: Normal  Chest-breast/axillae: Normal  Back: Normal  Spleen and liver: Normal  Cardiovascular: Exam of peripheral vascular system by observation for swelling, varicosities, edema: Normal  Genitalia: groin, buttocks: Normal  Extremities: digits/nails (clubbing): Normal  Eccrine and Apocrine glands: Normal  Right upper extremity: Normal  Left upper  extremity: Normal  Right lower extremity: Normal  Left lower extremity: Normal  Skin: Scalp and body hair: See below    1. Inflamed keratotic plaque located on left calf x 1  2. Pink scaly papule located on vertex scalp x 5, right wrist x 1, and left cheek x 1    ASSESSMENT/PLAN:     1. Inflamed seborrheic keratosis on left calf x 1. As physically tender cryosurgery performed. Advised on post op care.   2. Actinic keratosis on vertex scalp x 5, right wrist x 1 and left cheek x 1 . As precancerous, cryosurgery performed. Advised on blistering and post-op care. Advised if not resolved in 1-2 months to return for evaluation  3. VINICIUS to follow up with Primary Care provider regarding elevated blood pressure.        Follow-up: 6 month FSE/PRN sooner  CC:   Scribed By: Inez Prince, Medical Scribe    The information in this document, created by the medical scribe for me, accurately reflects the services I personally performed and the decisions made by me. I have reviewed and approved this document for accuracy prior to leaving the patient care area.  April 16, 2019 9:16 AM    Rosa Maria Hansen MS, PAVAISHALI          Again, thank you for allowing me to participate in the care of your patient.        Sincerely,        Rosa Maria Hansen PA-C

## 2019-04-16 NOTE — PROGRESS NOTES
HPI:   Chief complaints: James Salvador (Pete) is a 73 year old male who presents for evaluation of spots on left calf, head and right wrist.  Condition present for:  On left calf- since January, started off small and has increased in size.   Previous treatments include: on right wrist- LN2; head- ketoconazole    SHx: traveled to [place] during the winter time    Review Of Systems  Eyes: negative  Ears/Nose/Throat: negative  Respiratory: No shortness of breath, dyspnea on exertion, cough, or hemoptysis  Cardiovascular: negative  Gastrointestinal: negative  Genitourinary: negative  Musculoskeletal: negative  Neurologic: negative  Psychiatric: negative    This document serves as a record of the services and decisions personally performed and made by Rosa Maria Hansen, MS, PA-C. It was created on her behalf by Inez Prince, a trained medical scribe. The creation of this document is based on the provider's statements to the medical scribe.  Inez Prince 9:02 AM April 16, 2019    PHYSICAL EXAM:    /80   Pulse (!) 48   SpO2 95%   Skin exam performed as follows: Type 2 skin. Mood appropriate  Alert and Oriented X 3. Well developed, well nourished in no distress.  General appearance: Normal  Head including face: Normal  Eyes: conjunctiva and lids: Normal  Mouth: Lips, teeth, gums: Normal  Neck: Normal  Chest-breast/axillae: Normal  Back: Normal  Spleen and liver: Normal  Cardiovascular: Exam of peripheral vascular system by observation for swelling, varicosities, edema: Normal  Genitalia: groin, buttocks: Normal  Extremities: digits/nails (clubbing): Normal  Eccrine and Apocrine glands: Normal  Right upper extremity: Normal  Left upper extremity: Normal  Right lower extremity: Normal  Left lower extremity: Normal  Skin: Scalp and body hair: See below    1. Inflamed keratotic plaque located on left calf x 1  2. Pink scaly papule located on vertex scalp x 5, right wrist x 1, and left cheek x  1    ASSESSMENT/PLAN:     1. Inflamed seborrheic keratosis on left calf x 1. As physically tender cryosurgery performed. Advised on post op care.   2. Actinic keratosis on vertex scalp x 5, right wrist x 1 and left cheek x 1 . As precancerous, cryosurgery performed. Advised on blistering and post-op care. Advised if not resolved in 1-2 months to return for evaluation  3. VINICIUS to follow up with Primary Care provider regarding elevated blood pressure.        Follow-up: 6 month FSE/PRN sooner  CC:   Scribed By: Inez Prince, Medical Scribe    The information in this document, created by the medical scribe for me, accurately reflects the services I personally performed and the decisions made by me. I have reviewed and approved this document for accuracy prior to leaving the patient care area.  April 16, 2019 9:16 AM    Rosa Maria Hansen MS, PA-C

## 2019-05-03 ENCOUNTER — TELEPHONE (OUTPATIENT)
Dept: CARDIOLOGY | Facility: CLINIC | Age: 74
End: 2019-05-03

## 2019-05-03 NOTE — TELEPHONE ENCOUNTER
Patient called stating that he was admitted to [facility] and discharged yesterday. Pt was unable to get in to see Dr. Alexander until 7/29/19 so he made an appointment with his NP Mylene Kat for Monday 5/6/19. Pt wanted to make sure that was OK and what was recommended.     Hospital notes in Care Everywhere. Pt was admitted for SOB, diaphoresis and lightheadedness. Pt was found to have bradycardia and hyponatremia. Metoprolol was held and restarted at lower dose (25 mg BID) per EP consult Recommended cardiology follow up 1-2 weeks.     Agreed patient should keep follow up with Mylene Kat 5/6/19 to review hospitalization and then can see Dr. Alexander for follow up end of July.

## 2019-05-06 ENCOUNTER — HOSPITAL ENCOUNTER (OUTPATIENT)
Dept: CARDIOLOGY | Facility: CLINIC | Age: 74
Discharge: HOME | End: 2019-05-06
Attending: NURSE PRACTITIONER | Admitting: NURSE PRACTITIONER
Payer: COMMERCIAL

## 2019-05-06 ENCOUNTER — OFFICE VISIT (OUTPATIENT)
Dept: CARDIOLOGY | Facility: CLINIC | Age: 74
End: 2019-05-06
Payer: COMMERCIAL

## 2019-05-06 VITALS
HEIGHT: 71 IN | SYSTOLIC BLOOD PRESSURE: 152 MMHG | HEART RATE: 60 BPM | BODY MASS INDEX: 44.1 KG/M2 | DIASTOLIC BLOOD PRESSURE: 76 MMHG | WEIGHT: 315 LBS

## 2019-05-06 DIAGNOSIS — I48.20 CHRONIC ATRIAL FIBRILLATION (H): ICD-10-CM

## 2019-05-06 DIAGNOSIS — I48.20 CHRONIC ATRIAL FIBRILLATION (H): Primary | ICD-10-CM

## 2019-05-06 DIAGNOSIS — I10 BENIGN HYPERTENSION: ICD-10-CM

## 2019-05-06 DIAGNOSIS — I47.10 PAROXYSMAL SUPRAVENTRICULAR TACHYCARDIA (H): ICD-10-CM

## 2019-05-06 LAB
ANION GAP SERPL CALCULATED.3IONS-SCNC: 3 MMOL/L (ref 3–14)
BUN SERPL-MCNC: 13 MG/DL (ref 7–30)
CALCIUM SERPL-MCNC: 8.9 MG/DL (ref 8.5–10.1)
CHLORIDE SERPL-SCNC: 92 MMOL/L (ref 94–109)
CO2 SERPL-SCNC: 31 MMOL/L (ref 20–32)
CREAT SERPL-MCNC: 0.72 MG/DL (ref 0.66–1.25)
GFR SERPL CREATININE-BSD FRML MDRD: >90 ML/MIN/{1.73_M2}
GLUCOSE SERPL-MCNC: 81 MG/DL (ref 70–99)
POTASSIUM SERPL-SCNC: 4.6 MMOL/L (ref 3.4–5.3)
SODIUM SERPL-SCNC: 126 MMOL/L (ref 133–144)

## 2019-05-06 PROCEDURE — 93225 XTRNL ECG REC<48 HRS REC: CPT

## 2019-05-06 PROCEDURE — 36415 COLL VENOUS BLD VENIPUNCTURE: CPT | Performed by: NURSE PRACTITIONER

## 2019-05-06 PROCEDURE — 99214 OFFICE O/P EST MOD 30 MIN: CPT | Mod: 25 | Performed by: NURSE PRACTITIONER

## 2019-05-06 PROCEDURE — 80048 BASIC METABOLIC PNL TOTAL CA: CPT | Performed by: NURSE PRACTITIONER

## 2019-05-06 PROCEDURE — 93227 XTRNL ECG REC<48 HR R&I: CPT | Performed by: INTERNAL MEDICINE

## 2019-05-06 RX ORDER — METOPROLOL TARTRATE 25 MG/1
25 TABLET, FILM COATED ORAL 2 TIMES DAILY
Qty: 180 TABLET | Refills: 0 | Status: SHIPPED | OUTPATIENT
Start: 2019-05-06 | End: 2019-05-10

## 2019-05-06 ASSESSMENT — MIFFLIN-ST. JEOR: SCORE: 2308.91

## 2019-05-06 NOTE — LETTER
5/6/2019    Jeronimo Painter MD  [address] [facility] [address]    RE: James Salvador       Dear Colleague,    I had the pleasure of seeing James Salvador in the [facility].    HPI and Plan:   See dictation  #[id number]    Orders Placed This Encounter   Procedures     NM Lexiscan stress test (nuc card)     Basic metabolic panel     Holter Monitor 24 hour Adult Pediatric       Orders Placed This Encounter   Medications     metoprolol tartrate (LOPRESSOR) 25 MG tablet     Sig: Take 1 tablet (25 mg) by mouth 2 times daily Take along with a 50 mg tablet twice daily     Dispense:  180 tablet     Refill:  0       Medications Discontinued During This Encounter   Medication Reason     metoprolol tartrate (LOPRESSOR) 50 MG tablet      metoprolol tartrate (LOPRESSOR) 25 MG tablet          Encounter Diagnoses   Name Primary?     Benign hypertension      Paroxysmal supraventricular tachycardia (H)      Chronic atrial fibrillation (H) Yes       CURRENT MEDICATIONS:  Current Outpatient Medications   Medication Sig Dispense Refill     Acetaminophen (TYLENOL PO) Take 650 mg by mouth 4 times daily        amoxicillin (AMOXIL) 500 MG capsule 2,000 mg as needed When going to the dentist  0     ASPIRIN PO Take 81 mg by mouth daily       atorvastatin (LIPITOR) 80 MG tablet TAKE 1 TABLET BY MOUTH DAILY - DUE FOR FASTING LABS IN DECEMBER 90 tablet 3     ciclopirox (LOPROX) 0.77 % cream Apply topically At Bedtime 90 g 3     desonide (DESOWEN) 0.05 % cream Apply sparingly to affected area on face in morning (Patient taking differently: Apply sparingly to affected area on face in morning PRN) 60 g 0     Doxycycline Hyclate (PERIOSTAT PO) Take 20 mg by mouth 2 times daily       ELIQUIS 5 MG tablet TAKE 1 TABLET (5 MG) BY MOUTH 2 TIMES DAILY 60 tablet 5     fluocinonide (LIDEX) 0.05 % external solution Apply to scalp BID x 1-2 weeks PRN 60 mL 3     fluorouracil (EFUDEX) 5 % cream Apply to AA BID x 3-4  weeks 40 g 5     fluticasone (FLONASE) 50 MCG/ACT nasal spray SPRAY 2 SPRAYS INTO BOTH NOSTRILS DAILY (Patient taking differently: Spray 2 sprays into both nostrils as needed ) 16 mL 6     GABAPENTIN PO Take 600 mg by mouth 3 times daily        ketoconazole (NIZORAL) 2 % cream Apply topically 2 times daily For face. FAX REFILL REQUESTS TO [facility]: [phone] 30 g 2     ketoconazole (NIZORAL) 2 % external shampoo Use daily as needed 120 mL 3     ketotifen (ZADITOR/REFRESH ANTI-ITCH) 0.025 % SOLN Place 1-2 drops into both eyes 2 times daily as needed for itching       lisinopril (PRINIVIL/ZESTRIL) 20 MG tablet TAKE 1 TABLET (20 MG) BY MOUTH DAILY **RTS 1/29/17** 90 tablet 1     loratadine (CLARITIN) 10 MG tablet Take 10 mg by mouth daily as needed for allergies       metoprolol tartrate (LOPRESSOR) 25 MG tablet Take 1 tablet (25 mg) by mouth 2 times daily Take along with a 50 mg tablet twice daily 180 tablet 0     Multiple Vitamin (MULTIVITAMIN OR) Take 1 tablet by mouth daily        nitroglycerin (NITROSTAT) 0.4 MG SL tablet Place 1 tablet (0.4 mg) under the tongue every 5 minutes as needed for chest pain May repeat twice for a total of 3 tablets.  If chest pain not relieved, call 911 25 tablet 11     Omega-3 Fatty Acids (OMEGA-3 FISH OIL PO) Take 3.2 g by mouth daily        OXcarbazepine (TRILEPTAL PO) Take 300 mg by mouth 3 times daily          ALLERGIES     Allergies   Allergen Reactions     Cats      Cat Dander     Dogs      Dog Dander     No Clinical Screening - See Comments      Pet dander, pollen, grasses, molds     Pollen Extract        PAST MEDICAL HISTORY:  Past Medical History:   Diagnosis Date     Actinic cheilitis 7/17/2013    Lower lip, left      Actinic keratosis      Allergic rhinitis      Benign hypertension      CAD (coronary artery disease)     Cardiac cath and PCI 1994. Cardiac Cath 9/2015: BRIDGET to LAD     History of myocardial infarction 1994    PTCA     Hyperlipidemia LDL goal < 70      Mixed  hyperlipidemia 2017     Morbid obesity due to excess calories (H) 2016     New onset atrial fibrillation (H) 2017     Paroxysmal supraventricular tachycardia (H)     on metoprolol     Squamous cell carcinoma      Stable angina (H) 2016       PAST SURGICAL HISTORY:  Past Surgical History:   Procedure Laterality Date     ANGIOPLASTY      in [place]     ANKLE SURGERY  2005    right ankle     HEART CATH STENT COR W/WO PTCA  2015    BRIDGET stent mid LAD     JOINT REPLACEMENT, HIP RT/LT  10/14/2009    right hip      LASER SURGERY OF EYE  2002     MOHS MICROGRAPHIC PROCEDURE  2004    squamous cell carcinoma right temple     SINUS SURGERY  2006       FAMILY HISTORY:  Family History   Problem Relation Age of Onset     Genitourinary Problems Mother         renal failure     Cardiovascular Father         rupture of dorsal aorta     Depression Son        SOCIAL HISTORY:  Social History     Socioeconomic History     Marital status:      Spouse name: None     Number of children: 3     Years of education: None     Highest education level: None   Occupational History     Employer: RETIRED   Social Needs     Financial resource strain: None     Food insecurity:     Worry: None     Inability: None     Transportation needs:     Medical: None     Non-medical: None   Tobacco Use     Smoking status: Former Smoker     Years: 10.00     Types: Cigarettes     Last attempt to quit: 1987     Years since quittin.3     Smokeless tobacco: Never Used   Substance and Sexual Activity     Alcohol use: Yes     Alcohol/week: 0.0 oz     Comment: socially - x2-3 per month -  none currently     Drug use: No     Sexual activity: Yes     Partners: Female   Lifestyle     Physical activity:     Days per week: None     Minutes per session: None     Stress: None   Relationships     Social connections:     Talks on phone: None     Gets together: None     Attends [employer] service: None     Active  "member of club or organization: None     Attends meetings of clubs or organizations: None     Relationship status: None     Intimate partner violence:     Fear of current or ex partner: None     Emotionally abused: None     Physically abused: None     Forced sexual activity: None   Other Topics Concern     Parent/sibling w/ CABG, MI or angioplasty before 65F 55M? Not Asked      Service Not Asked     Blood Transfusions Not Asked     Caffeine Concern Yes     Comment: 2 big cups coffee daily     Occupational Exposure Not Asked     Hobby Hazards Not Asked     Sleep Concern No     Comment: sleeping better since shoulder replaced 11/3/16     Stress Concern No     Weight Concern Yes     Special Diet Yes     Comment: trying to do more lean meats     Back Care Not Asked     Exercise No     Comment: limited - knee     Bike Helmet Not Asked     Seat Belt Not Asked     Self-Exams Not Asked   Social History Narrative     None       Review of Systems:  Skin:  Positive for bruising hx of skin cancer,  Seeing Dermatologist 3 times year   Eyes:  Positive for glasses    ENT:  Positive for hearing loss hearing aids, seasonal sinus issues - comes and goes  Respiratory:  Positive for   \"breathing is a little more strained\" per pt; yawning more frequently   Cardiovascular:    Positive for;fatigue fatigue occ  Gastroenterology: not assessed      Genitourinary:  not assessed      Musculoskeletal:  not assessed      Neurologic:  Positive for headaches incoherent on tuesday before his hospital stay and his eyes rolled into the back of his head per pt- none since then; occ headaches  Psychiatric:  not assessed      Heme/Lymph/Imm:  not assessed      Endocrine:  not assessed        Physical Exam:  Vitals: /76 (BP Location: Right arm, Patient Position: Chair, Cuff Size: Adult Large)   Pulse 60   Ht 1.803 m (5' 11\")   Wt (!) 154.2 kg (339 lb 14.4 oz)   BMI 47.41 kg/m       Constitutional:  cooperative;in no acute distress " morbidly obese      Skin:  warm and dry to the touch          Head:  normocephalic        Eyes:  pupils equal and round        Lymph:      ENT:  no pallor or cyanosis, dentition good hearing aide(s) present      Neck:  no carotid bruit        Respiratory:  clear to auscultation;normal respiratory excursion         Cardiac: normal S1 and S2;apical impulse not displaced irregularly irregular rhythm   no presence of murmur          pulses full and equal                                        GI:  abdomen soft obese      Extremities and Muscular Skeletal:      trace;bilateral LE edema     scar over left knee    Neurological:  no gross motor deficits;affect appropriate        Psych:  Alert and Oriented x 3          CC  Jeronimo Painter MD  [address] [place] [id number]                    Thank you for allowing me to participate in the care of your patient.      Sincerely,     NAV Muller CNP     [facility]    cc:   Jeronimo Painter MD  [facility] [address] [place] [id number]

## 2019-05-06 NOTE — PROGRESS NOTES
HPI and Plan:   See dictation  #[id number]    Orders Placed This Encounter   Procedures     NM Lexiscan stress test (nuc card)     Basic metabolic panel     Holter Monitor 24 hour Adult Pediatric       Orders Placed This Encounter   Medications     metoprolol tartrate (LOPRESSOR) 25 MG tablet     Sig: Take 1 tablet (25 mg) by mouth 2 times daily Take along with a 50 mg tablet twice daily     Dispense:  180 tablet     Refill:  0       Medications Discontinued During This Encounter   Medication Reason     metoprolol tartrate (LOPRESSOR) 50 MG tablet      metoprolol tartrate (LOPRESSOR) 25 MG tablet          Encounter Diagnoses   Name Primary?     Benign hypertension      Paroxysmal supraventricular tachycardia (H)      Chronic atrial fibrillation (H) Yes       CURRENT MEDICATIONS:  Current Outpatient Medications   Medication Sig Dispense Refill     Acetaminophen (TYLENOL PO) Take 650 mg by mouth 4 times daily        amoxicillin (AMOXIL) 500 MG capsule 2,000 mg as needed When going to the dentist  0     ASPIRIN PO Take 81 mg by mouth daily       atorvastatin (LIPITOR) 80 MG tablet TAKE 1 TABLET BY MOUTH DAILY - DUE FOR FASTING LABS IN DECEMBER 90 tablet 3     ciclopirox (LOPROX) 0.77 % cream Apply topically At Bedtime 90 g 3     desonide (DESOWEN) 0.05 % cream Apply sparingly to affected area on face in morning (Patient taking differently: Apply sparingly to affected area on face in morning PRN) 60 g 0     Doxycycline Hyclate (PERIOSTAT PO) Take 20 mg by mouth 2 times daily       ELIQUIS 5 MG tablet TAKE 1 TABLET (5 MG) BY MOUTH 2 TIMES DAILY 60 tablet 5     fluocinonide (LIDEX) 0.05 % external solution Apply to scalp BID x 1-2 weeks PRN 60 mL 3     fluorouracil (EFUDEX) 5 % cream Apply to AA BID x 3-4 weeks 40 g 5     fluticasone (FLONASE) 50 MCG/ACT nasal spray SPRAY 2 SPRAYS INTO BOTH NOSTRILS DAILY (Patient taking differently: Spray 2 sprays into both nostrils as needed ) 16 mL 6     GABAPENTIN PO Take 600 mg by mouth  3 times daily        ketoconazole (NIZORAL) 2 % cream Apply topically 2 times daily For face. FAX REFILL REQUESTS TO  [facility]: [phone] 30 g 2     ketoconazole (NIZORAL) 2 % external shampoo Use daily as needed 120 mL 3     ketotifen (ZADITOR/REFRESH ANTI-ITCH) 0.025 % SOLN Place 1-2 drops into both eyes 2 times daily as needed for itching       lisinopril (PRINIVIL/ZESTRIL) 20 MG tablet TAKE 1 TABLET (20 MG) BY MOUTH DAILY **RTS 1/29/17** 90 tablet 1     loratadine (CLARITIN) 10 MG tablet Take 10 mg by mouth daily as needed for allergies       metoprolol tartrate (LOPRESSOR) 25 MG tablet Take 1 tablet (25 mg) by mouth 2 times daily Take along with a 50 mg tablet twice daily 180 tablet 0     Multiple Vitamin (MULTIVITAMIN OR) Take 1 tablet by mouth daily        nitroglycerin (NITROSTAT) 0.4 MG SL tablet Place 1 tablet (0.4 mg) under the tongue every 5 minutes as needed for chest pain May repeat twice for a total of 3 tablets.  If chest pain not relieved, call 911 25 tablet 11     Omega-3 Fatty Acids (OMEGA-3 FISH OIL PO) Take 3.2 g by mouth daily        OXcarbazepine (TRILEPTAL PO) Take 300 mg by mouth 3 times daily          ALLERGIES     Allergies   Allergen Reactions     Cats      Cat Dander     Dogs      Dog Dander     No Clinical Screening - See Comments      Pet dander, pollen, grasses, molds     Pollen Extract        PAST MEDICAL HISTORY:  Past Medical History:   Diagnosis Date     Actinic cheilitis 7/17/2013    Lower lip, left      Actinic keratosis      Allergic rhinitis      Benign hypertension      CAD (coronary artery disease)     Cardiac cath and PCI 1994. Cardiac Cath 9/2015: BRIDGET to LAD     History of myocardial infarction 1994    PTCA     Hyperlipidemia LDL goal < 70      Mixed hyperlipidemia 6/21/2017     Morbid obesity due to excess calories (H) 1/11/2016     New onset atrial fibrillation (H) 4/21/2017     Paroxysmal supraventricular tachycardia (H)     on metoprolol     Squamous cell carcinoma       Stable angina (H) 2016       PAST SURGICAL HISTORY:  Past Surgical History:   Procedure Laterality Date     ANGIOPLASTY      in [place]     ANKLE SURGERY  2005    right ankle     HEART CATH STENT COR W/WO PTCA  2015    BRIDGET stent mid LAD     JOINT REPLACEMENT, HIP RT/LT  10/14/2009    right hip      LASER SURGERY OF EYE  2002     MOHS MICROGRAPHIC PROCEDURE  2004    squamous cell carcinoma right temple     SINUS SURGERY  2006       FAMILY HISTORY:  Family History   Problem Relation Age of Onset     Genitourinary Problems Mother         renal failure     Cardiovascular Father         rupture of dorsal aorta     Depression Son        SOCIAL HISTORY:  Social History     Socioeconomic History     Marital status:      Spouse name: None     Number of children: 3     Years of education: None     Highest education level: None   Occupational History     Employer: RETIRED   Social Needs     Financial resource strain: None     Food insecurity:     Worry: None     Inability: None     Transportation needs:     Medical: None     Non-medical: None   Tobacco Use     Smoking status: Former Smoker     Years: 10.00     Types: Cigarettes     Last attempt to quit: 1987     Years since quittin.3     Smokeless tobacco: Never Used   Substance and Sexual Activity     Alcohol use: Yes     Alcohol/week: 0.0 oz     Comment: socially - x2-3 per month -  none currently     Drug use: No     Sexual activity: Yes     Partners: Female   Lifestyle     Physical activity:     Days per week: None     Minutes per session: None     Stress: None   Relationships     Social connections:     Talks on phone: None     Gets together: None     Attends [employer] service: None     Active member of club or organization: None     Attends meetings of clubs or organizations: None     Relationship status: None     Intimate partner violence:     Fear of current or ex partner: None     Emotionally abused: None      "Physically abused: None     Forced sexual activity: None   Other Topics Concern     Parent/sibling w/ CABG, MI or angioplasty before 65F 55M? Not Asked      Service Not Asked     Blood Transfusions Not Asked     Caffeine Concern Yes     Comment: 2 big cups coffee daily     Occupational Exposure Not Asked     Hobby Hazards Not Asked     Sleep Concern No     Comment: sleeping better since shoulder replaced 11/3/16     Stress Concern No     Weight Concern Yes     Special Diet Yes     Comment: trying to do more lean meats     Back Care Not Asked     Exercise No     Comment: limited - knee     Bike Helmet Not Asked     Seat Belt Not Asked     Self-Exams Not Asked   Social History Narrative     None       Review of Systems:  Skin:  Positive for bruising hx of skin cancer,  Seeing Dermatologist 3 times year   Eyes:  Positive for glasses    ENT:  Positive for hearing loss hearing aids, seasonal sinus issues - comes and goes  Respiratory:  Positive for   \"breathing is a little more strained\" per pt; yawning more frequently   Cardiovascular:    Positive for;fatigue fatigue occ  Gastroenterology: not assessed      Genitourinary:  not assessed      Musculoskeletal:  not assessed      Neurologic:  Positive for headaches incoherent on tuesday before his hospital stay and his eyes rolled into the back of his head per pt- none since then; occ headaches  Psychiatric:  not assessed      Heme/Lymph/Imm:  not assessed      Endocrine:  not assessed        Physical Exam:  Vitals: /76 (BP Location: Right arm, Patient Position: Chair, Cuff Size: Adult Large)   Pulse 60   Ht 1.803 m (5' 11\")   Wt (!) 154.2 kg (339 lb 14.4 oz)   BMI 47.41 kg/m      Constitutional:  cooperative;in no acute distress morbidly obese      Skin:  warm and dry to the touch          Head:  normocephalic        Eyes:  pupils equal and round        Lymph:      ENT:  no pallor or cyanosis, dentition good hearing aide(s) present      Neck:  no carotid " bruit        Respiratory:  clear to auscultation;normal respiratory excursion         Cardiac: normal S1 and S2;apical impulse not displaced irregularly irregular rhythm   no presence of murmur          pulses full and equal                                        GI:  abdomen soft obese      Extremities and Muscular Skeletal:      trace;bilateral LE edema     scar over left knee    Neurological:  no gross motor deficits;affect appropriate        Psych:  Alert and Oriented x 3          CC  Jeronimo Painter MD  [address] [place] [address]

## 2019-05-06 NOTE — LETTER
5/6/2019      Jeronimo Painter MD  [address] [facility] [address]      RE: James Salvador       Dear Colleague,    I had the pleasure of seeing James Salvador in the [facility].    Service Date: 05/06/2019      HISTORY OF PRESENT ILLNESS:  This 73-year-old male presents to [facility] today for a followup visit.  He is a patient of Dr. Alexander seen in our clinic for coronary artery disease, permanent atrial fibrillation, mixed hyperlipidemia and obesity.      Alden suffered a myocardial infarction in 1994 in [place].  In 2015, he suffered unstable angina and underwent stenting to the mid LAD.  He was also noted to have a collateral dependent occluded right coronary artery.  His last stress test in 2016 did show some inferior ischemia.      He was found to have asymptomatic atrial fibrillation in 2017.  His heart rate was then controlled on metoprolol and he is on Eliquis for CVA prophylaxis.  He was last seen by Dr. Alexander in 07/2018 and doing well.      Unfortunately last week, Alden was seen at [facility] after he developed acute onset of shortness of breath and lightheadedness.  Emergency services found significant bradycardia ( heart rate of 20 beats per minute) and systolic blood pressure 90 mmHg.  He was given atropine and IVFs, which improved his heart rate to 40-50 beats per minute.    His metoprolol and lisinopril were both held.  He ruled out for myocardial infarction.  He developed some mild heart failure which was treated with IV Lasix.  Initially, his sodium was 126 and improved to 127 at discharge.  Followup telemetry monitoring showed improvement in his heart rate without any significant bradycardia and metoprolol was added back at a lower dose of 25 mg twice daily.  He was evaluated by the electrophysiologist who did not feel that a pacemaker was indicated at that time.  An echo (05/01/2019) showed normal left  ventricular ejection fraction and size, no regional wall motion abnormalities, normal right ventricular function, mild to moderate left ventricular hypertrophy, severe biatrial enlargement and dilated IVC.  He returns today for reassessment.      Alden has been home now 1 week.  He tells me he is slowly improving.  He does have less energy than he did prior to this event.  He denies any true chest pain or significant shortness of breath.  He sleeps in a recliner for the past several years due to orthopedic pain when lying flat on his side.   He is unsure if he has sleep apnea and is not too excited about having an evaluation.  He has no sensations of palpitations.  He denies any significant lightheadedness or dizziness.  He is weighing himself daily and has not noticed significant weight fluctuations.  He tells me that he is now motivated to lose weight and is changing his eating habits.  We talked about avoiding salty foods.  He denies any significant edema or orthopnea.      PHYSICAL EXAMINATION:  His blood pressure today is 152/70.  He tells me he was told to hold lisinopril until yesterday.  Therefore, he has only taken lisinopril 20 mg for the last 24 hours.  His heart rate 60 beats per minute and is irregular.  His lungs are clear.  There is no significant peripheral edema.  Weight is 339 pounds, placing his BMI at 47.      IMPRESSION AND PLAN:   1.  Coronary artery disease.  Remote myocardial infarction and unstable angina in 2015, which he underwent stenting to the mid LAD.  He has known collateral dependent occluded right coronary artery.  Given his recent hospitalization for significant bradycardia, I have asked him to undergo a nuclear stress test.   2.  Permanent asymptomatic atrial fibrillation/recent hospitalization for significant bradycardia.  Due to heart rates at 20 beats per minute, his Metoprolol was temporarily held and resumed at a lower dose at 25 mg twice daily.  He has done fairly well in  followup.  Heart rate appears to be appropriate today.  I have asked him to undergo 24-hour Holter monitor.  We will have him further discuss this issue with Dr. Gamble, whom he has seen prior.   3.  Morbid obesity.  The patient is motivated to make some lifestyle changes and reduce his caloric intake.      Thanks for allowing me to participate in this patient's care.  We also ordered a BMP to assess his hyponatremia.  Followup is planned.         NAV GREENWOOD, MAL             D: 2019   T: 2019   MT: GARRICK      Name:     HUGO GRANT   MRN:      [id number]        Account:      [id number]   :      1945           Service Date: 2019      Document: [id number]           Outpatient Encounter Medications as of 2019   Medication Sig Dispense Refill     Acetaminophen (TYLENOL PO) Take 650 mg by mouth 4 times daily        amoxicillin (AMOXIL) 500 MG capsule 2,000 mg as needed When going to the dentist  0     ASPIRIN PO Take 81 mg by mouth daily       atorvastatin (LIPITOR) 80 MG tablet TAKE 1 TABLET BY MOUTH DAILY - DUE FOR FASTING LABS IN  tablet 3     ciclopirox (LOPROX) 0.77 % cream Apply topically At Bedtime 90 g 3     desonide (DESOWEN) 0.05 % cream Apply sparingly to affected area on face in morning (Patient taking differently: Apply sparingly to affected area on face in morning PRN) 60 g 0     Doxycycline Hyclate (PERIOSTAT PO) Take 20 mg by mouth 2 times daily       ELIQUIS 5 MG tablet TAKE 1 TABLET (5 MG) BY MOUTH 2 TIMES DAILY 60 tablet 5     fluocinonide (LIDEX) 0.05 % external solution Apply to scalp BID x 1-2 weeks PRN 60 mL 3     fluorouracil (EFUDEX) 5 % cream Apply to AA BID x 3-4 weeks 40 g 5     fluticasone (FLONASE) 50 MCG/ACT nasal spray SPRAY 2 SPRAYS INTO BOTH NOSTRILS DAILY (Patient taking differently: Spray 2 sprays into both nostrils as needed ) 16 mL 6     GABAPENTIN PO Take 600 mg by mouth 3 times daily        ketoconazole (NIZORAL) 2 % cream Apply  topically 2 times daily For face. FAX REFILL REQUESTS TO  [facility]: [phone] 30 g 2     ketoconazole (NIZORAL) 2 % external shampoo Use daily as needed 120 mL 3     ketotifen (ZADITOR/REFRESH ANTI-ITCH) 0.025 % SOLN Place 1-2 drops into both eyes 2 times daily as needed for itching       lisinopril (PRINIVIL/ZESTRIL) 20 MG tablet TAKE 1 TABLET (20 MG) BY MOUTH DAILY **RTS 1/29/17** 90 tablet 1     loratadine (CLARITIN) 10 MG tablet Take 10 mg by mouth daily as needed for allergies       metoprolol tartrate (LOPRESSOR) 25 MG tablet Take 1 tablet (25 mg) by mouth 2 times daily Take along with a 50 mg tablet twice daily 180 tablet 0     Multiple Vitamin (MULTIVITAMIN OR) Take 1 tablet by mouth daily        nitroglycerin (NITROSTAT) 0.4 MG SL tablet Place 1 tablet (0.4 mg) under the tongue every 5 minutes as needed for chest pain May repeat twice for a total of 3 tablets.  If chest pain not relieved, call 911 25 tablet 11     Omega-3 Fatty Acids (OMEGA-3 FISH OIL PO) Take 3.2 g by mouth daily        OXcarbazepine (TRILEPTAL PO) Take 300 mg by mouth 3 times daily        [DISCONTINUED] metoprolol tartrate (LOPRESSOR) 25 MG tablet Take 1 tablet (25 mg) by mouth 2 times daily Take along with a 50 mg tablet twice daily (Patient taking differently: Take 25 mg by mouth 2 times daily ) 180 tablet 3     [DISCONTINUED] metoprolol tartrate (LOPRESSOR) 50 MG tablet Take 1 tablet (50 mg) by mouth 2 times daily Take along with a 25 mg tablet twice daily (Patient not taking: Reported on 5/6/2019) 180 tablet 3     No facility-administered encounter medications on file as of 5/6/2019.        Again, thank you for allowing me to participate in the care of your patient.      Sincerely,    NAV Muller [facility]

## 2019-05-06 NOTE — PROGRESS NOTES
Service Date: 05/06/2019      HISTORY OF PRESENT ILLNESS:  This 73-year-old male presents to [facility] today for a followup visit.  He is a patient of Dr. Alexander seen in our clinic for coronary artery disease, permanent atrial fibrillation, mixed hyperlipidemia and obesity.      Alden suffered a myocardial infarction in 1994 in [place].  In 2015, he suffered unstable angina and underwent stenting to the mid LAD.  He was also noted to have a collateral dependent occluded right coronary artery.  His last stress test in 2016 did show some inferior ischemia.      He was found to have asymptomatic atrial fibrillation in 2017.  His heart rate was then controlled on metoprolol and he is on Eliquis for CVA prophylaxis.  He was last seen by Dr. Alexander in 07/2018 and doing well.      Unfortunately last week, Alden was seen at [facility] after he developed acute onset of shortness of breath and lightheadedness.  Emergency services found significant bradycardia ( heart rate of 20 beats per minute) and systolic blood pressure 90 mmHg.  He was given atropine and IVFs, which improved his heart rate to 40-50 beats per minute.    His metoprolol and lisinopril were both held.  He ruled out for myocardial infarction.  He developed some mild heart failure which was treated with IV Lasix.  Initially, his sodium was 126 and improved to 127 at discharge.  Followup telemetry monitoring showed improvement in his heart rate without any significant bradycardia and metoprolol was added back at a lower dose of 25 mg twice daily.  He was evaluated by the electrophysiologist who did not feel that a pacemaker was indicated at that time.  An echo (05/01/2019) showed normal left ventricular ejection fraction and size, no regional wall motion abnormalities, normal right ventricular function, mild to moderate left ventricular hypertrophy, severe biatrial enlargement and dilated IVC.  He returns today  for reassessment.      Alden has been home now 1 week.  He tells me he is slowly improving.  He does have less energy than he did prior to this event.  He denies any true chest pain or significant shortness of breath.  He sleeps in a recliner for the past several years due to orthopedic pain when lying flat on his side.   He is unsure if he has sleep apnea and is not too excited about having an evaluation.  He has no sensations of palpitations.  He denies any significant lightheadedness or dizziness.  He is weighing himself daily and has not noticed significant weight fluctuations.  He tells me that he is now motivated to lose weight and is changing his eating habits.  We talked about avoiding salty foods.  He denies any significant edema or orthopnea.      PHYSICAL EXAMINATION:  His blood pressure today is 152/70.  He tells me he was told to hold lisinopril until yesterday.  Therefore, he has only taken lisinopril 20 mg for the last 24 hours.  His heart rate 60 beats per minute and is irregular.  His lungs are clear.  There is no significant peripheral edema.  Weight is 339 pounds, placing his BMI at 47.      IMPRESSION AND PLAN:   1.  Coronary artery disease.  Remote myocardial infarction and unstable angina in 2015, which he underwent stenting to the mid LAD.  He has known collateral dependent occluded right coronary artery.  Given his recent hospitalization for significant bradycardia, I have asked him to undergo a nuclear stress test.   2.  Permanent asymptomatic atrial fibrillation/recent hospitalization for significant bradycardia.  Due to heart rates at 20 beats per minute, his Metoprolol was temporarily held and resumed at a lower dose at 25 mg twice daily.  He has done fairly well in followup.  Heart rate appears to be appropriate today.  I have asked him to undergo 24-hour Holter monitor.  We will have him further discuss this issue with Dr. Gamble, whom he has seen prior.   3.  Morbid obesity.  The patient  is motivated to make some lifestyle changes and reduce his caloric intake.      Thanks for allowing me to participate in this patient's care.  We also ordered a BMP to assess his hyponatremia.  Followup is planned.         NAV GREENWOOD, CNP             D: 2019   T: 2019   MT: GARRICK      Name:     HUGO GRANT   MRN:      [id number]        Account:      [id number]   :      1945           Service Date: 2019      Document: [id number]

## 2019-05-07 ENCOUNTER — TELEPHONE (OUTPATIENT)
Dept: CARDIOLOGY | Facility: CLINIC | Age: 74
End: 2019-05-07

## 2019-05-07 NOTE — TELEPHONE ENCOUNTER
Ongoing hypnatremia. W/ NL BUN/Cr.  Pls have pt discuss with PMD  Possible SE of Trileptal He is not on a diuretic.  Thanks, JS

## 2019-05-08 ENCOUNTER — TELEPHONE (OUTPATIENT)
Dept: CARDIOLOGY | Facility: CLINIC | Age: 74
End: 2019-05-08

## 2019-05-08 DIAGNOSIS — I47.10 PAROXYSMAL SUPRAVENTRICULAR TACHYCARDIA (H): Primary | ICD-10-CM

## 2019-05-08 DIAGNOSIS — I48.20 CHRONIC ATRIAL FIBRILLATION (H): ICD-10-CM

## 2019-05-08 NOTE — TELEPHONE ENCOUNTER
"Patient dropped off a Health/Fitness Pre Participation Medical Clearance form for the [facility].     Per Mylene aKt NP, \"I do not recommend starting a exercise program until testing is completed. Recommended f/u with EP within a month\"     Recommendations were reviewed with patient over the phone. Patient was advised to call scheduling to set up his EP appt. Eli LAINEZ         "

## 2019-05-09 ENCOUNTER — OFFICE VISIT (OUTPATIENT)
Dept: INTERNAL MEDICINE | Facility: CLINIC | Age: 74
End: 2019-05-09
Payer: COMMERCIAL

## 2019-05-09 VITALS
HEART RATE: 54 BPM | BODY MASS INDEX: 46.56 KG/M2 | DIASTOLIC BLOOD PRESSURE: 72 MMHG | RESPIRATION RATE: 18 BRPM | WEIGHT: 315 LBS | TEMPERATURE: 98.1 F | OXYGEN SATURATION: 96 % | SYSTOLIC BLOOD PRESSURE: 138 MMHG

## 2019-05-09 DIAGNOSIS — E87.1 HYPO-OSMOLAR HYPONATREMIA: Primary | ICD-10-CM

## 2019-05-09 DIAGNOSIS — I48.20 CHRONIC ATRIAL FIBRILLATION (H): ICD-10-CM

## 2019-05-09 DIAGNOSIS — R00.1 SYMPTOMATIC BRADYCARDIA: ICD-10-CM

## 2019-05-09 LAB
OSMOLALITY UR: 542 MMOL/KG (ref 100–1200)
SODIUM UR-SCNC: 37 MMOL/L

## 2019-05-09 PROCEDURE — 83935 ASSAY OF URINE OSMOLALITY: CPT | Performed by: INTERNAL MEDICINE

## 2019-05-09 PROCEDURE — 84300 ASSAY OF URINE SODIUM: CPT | Performed by: INTERNAL MEDICINE

## 2019-05-09 PROCEDURE — 99214 OFFICE O/P EST MOD 30 MIN: CPT | Performed by: INTERNAL MEDICINE

## 2019-05-09 NOTE — PROGRESS NOTES
SUBJECTIVE:   James Salvador is a 73 year old male who presents to clinic today for the following   health issues:    Hospital Follow-up Visit:    Hospital/Nursing Home/IP Rehab Facility: [facility]   Date of Admission: 4/30/19  Date of Discharge: 5/2/19  Reason(s) for Admission: Symptomatic sinus bradycardia              Problems taking medications regularly:  None       Medication changes since discharge:   Lowered Metoprolol to 25mg from 75mg       Problems adhering to non-medication therapy:  None    Summary of hospitalization:  [facility] discharge summary reviewed  Diagnostic Tests/Treatments reviewed.  Follow up needed: none  Other Healthcare Providers Involved in Patient s Care:         None  Update since discharge: stable.     Post Discharge Medication Reconciliation: discharge medications reconciled, continue medications without change.  Plan of care communicated with patient        73 yoM PMH of CAD s/p PCI, Atrial fibrillation (on metoprolol), HTN who presented with acute onset shortness of breath, diaphoresis, lightheadedness. He reports that he was doing his normal daily routine and had just dropped the grandchildren off at school when he got home and suddenly felt very sick. Per his wife, his eyes were going in and out of rolling in the back of his head and he looked sick so she called 9-11.      When EMS arrived, his HR was in the 20's and BP was 90/50s. He received a total of 1 mg of atropine and HR improved to the 40-50's. He was then asymptomatic but more sleepy upon arrival to the ED. He was hemodynamically stable and given an 500 cc bolus. His initial labs were remarkable for hyponatremia to 126. Initial magnesium was 1.6. CBC was unremarkable. Troponin was normal at 0.03. EKG was notable for atrial fibrillation with slow ventricular response. His Bps were stable to the 110-130s/70-80s    # Symptomatic bradycardia   Patient initially presented with HR in the 30-40's. Metoprolol  was held and his heart rate improved to the 60-70's at rest and 100's when active. EP was consulted who recommended starting metoprolol at 25 mg bid and monitoring for recurrence of bradycardia. He responded well to the metoprolol and was occasionally bradycardic to the 30's but was asymptomatic at that time and was able to have increased HR to the 120s when active. He was intermittently hypertensive and with soft blood pressures throughout his hospitalization. His lisinopril was held. At the time of discharge, it was recommended to the patient to take his blood pressure twice daily and follow up with his PCP with recordings. He will also be discharged with a holter monitor to get a better estimate of baseline heart rate. He should follow up with his PCP early next week for BP monitoring. He was encouraged to restart the lisinopril if multiple blood pressures remained >130. He was instructed to follow up with cardiology within 2-3 weeks.     #Hyponatremia   Differential is a mixed picture including primary polydipsia from significant PO intake of water vs SIADH from trileptal vs hypervolemic hyponatremia from a component of decompensated heart failure. Patient was fluid restricted to 2L and diuresed with IV lasix and sodium increased from 126 to 127. We spoke with the patient regarding this sodium and the potential for it to be related to trileptal. He requested trying the fluid restriction and close monitoring before making changes to the trileptal. He was instructed to follow up with his PCP for sodium management.     Since discharge is felt okay.  His lisinopril was increased to 20 mg daily.  He remains on metoprolol 25 twice daily.  He was seen yesterday cardiology.  Plan is to go forth with stress testing and Holter monitoring.  As far as the hyponatremia is concerned etiology is not known though with the elevated urine osmolality and sodium levels this is most consistent with SIADH.    Additional history: as  documented    Reviewed  and updated as needed this visit by clinical staff  Tobacco  Allergies  Meds  Med Hx  Surg Hx  Fam Hx  Soc Hx        Reviewed and updated as needed this visit by Provider         Labs reviewed in EPIC    ROS:  Constitutional, HEENT, cardiovascular, pulmonary, gi and gu systems are negative, except as otherwise noted.    OBJECTIVE:                                                    /72   Pulse 54   Temp 98.1  F (36.7  C) (Temporal)   Resp 18   Wt (!) 151.4 kg (333 lb 12.8 oz)   SpO2 96%   BMI 46.56 kg/m    Body mass index is 46.56 kg/m .  GENERAL APPEARANCE: alert, active, no distress and over weight  HENT: nose and mouth without ulcers or lesions  NECK: no adenopathy, no asymmetry, masses, or scars and thyroid normal to palpation  RESP: lungs clear to auscultation - no rales, rhonchi or wheezes  CV: normal S1 S2, no S3 or S4, no murmur, click or rub and irregularly irregular rhythm  ABDOMEN: soft, nontender, without hepatosplenomegaly or masses and bowel sounds normal  MS: extremities normal- no gross deformities noted  SKIN: no suspicious lesions or rashes    Diagnostic test results:  No results found for this or any previous visit (from the past 24 hour(s)).     ASSESSMENT/PLAN:                                                    1. Hypo-osmolar hyponatremia  Seems to have developed at some point since summer 2018.   Repeat urine studies. Serum Na level from 5/6 unchanged - remains 126. He is asymptomatic so work up can be done non-urgently.  Cont fluid restriction   - Osmolality urine  - Sodium random urine  - **Basic metabolic panel FUTURE 14d; Future    2. Chronic atrial fibrillation  3. Symptomatic bradycardia  Per cardiology, await results of upcoming testing      Jeronimo Painter MD  [facility]

## 2019-05-10 ENCOUNTER — TELEPHONE (OUTPATIENT)
Dept: CARDIOLOGY | Facility: CLINIC | Age: 74
End: 2019-05-10

## 2019-05-10 DIAGNOSIS — I10 BENIGN HYPERTENSION: ICD-10-CM

## 2019-05-10 DIAGNOSIS — I47.10 PAROXYSMAL SUPRAVENTRICULAR TACHYCARDIA (H): ICD-10-CM

## 2019-05-10 RX ORDER — METOPROLOL TARTRATE 25 MG/1
12.5 TABLET, FILM COATED ORAL 2 TIMES DAILY
Qty: 180 TABLET | Refills: 0
Start: 2019-05-10 | End: 2019-05-10

## 2019-05-10 RX ORDER — METOPROLOL TARTRATE 25 MG/1
12.5 TABLET, FILM COATED ORAL 2 TIMES DAILY
Qty: 30 TABLET | Refills: 0 | Status: ON HOLD | OUTPATIENT
Start: 2019-05-10 | End: 2019-05-31

## 2019-05-10 NOTE — TELEPHONE ENCOUNTER
Holter reviewed by Dr. Gamble.     Notes recorded by Lucila Wallace RN on 5/10/2019 at 10:24 AM CDT  Had Dr Gamble review 24 hour holter in [place], SERGO lucas.    Verbal orders per Dr Gamble:  1. Decrease metoprolol (lopressor)  to 12.5mg po bid  2. Make appt to see EP to discuss PPM placement    Medication list updated.  Message sent to Amita Siddiqui RN to call patient to discuss recommendations and set up appt with EP.  MIGEL Son  ---------------------------------------------------------------------------------------------------------------------------------------------------    Recommendations were reviewed with patient over the phone. Sent script for Metoprolol 12.5 mg BID. Patient was instructed to call our scheduling dept to set up his visit.     Spoke w/Lucila Gaytan will check w/Dr. Gamble if we should hold off on Nuclear Stress Test that is scheduled next week on Mon/Tue. Lucila will update patient if we need to hold off on testing.     Eli LAINEZ

## 2019-05-10 NOTE — TELEPHONE ENCOUNTER
Spoke to Dr Gamble to see if patient should proceed with nuclear stress testing on 5/13 and 5/14.      Verbal order:  1 hold on nuclear stress testing until seen by EP.       Spoke to patient regarding above.  He provided verbal understanding.  Patient to see Dr Gamble on 5/28 in [place] (patient aware of location).  Asked patient to monitor and log home BP and HR twice daily and bring to appt.  Patient will be reducing his metoprolol tartrate to 12.5 mg po bid starting this evening.  Also asked patient to call if he had any questions or concerns.    MIGEL Son

## 2019-05-20 DIAGNOSIS — E87.1 HYPO-OSMOLAR HYPONATREMIA: ICD-10-CM

## 2019-05-20 LAB
ANION GAP SERPL CALCULATED.3IONS-SCNC: 4 MMOL/L (ref 3–14)
BUN SERPL-MCNC: 13 MG/DL (ref 7–30)
CALCIUM SERPL-MCNC: 8.9 MG/DL (ref 8.5–10.1)
CHLORIDE SERPL-SCNC: 97 MMOL/L (ref 94–109)
CO2 SERPL-SCNC: 28 MMOL/L (ref 20–32)
CREAT SERPL-MCNC: 0.7 MG/DL (ref 0.66–1.25)
GFR SERPL CREATININE-BSD FRML MDRD: >90 ML/MIN/{1.73_M2}
GLUCOSE SERPL-MCNC: 87 MG/DL (ref 70–99)
POTASSIUM SERPL-SCNC: 4.7 MMOL/L (ref 3.4–5.3)
SODIUM SERPL-SCNC: 129 MMOL/L (ref 133–144)

## 2019-05-20 PROCEDURE — 36415 COLL VENOUS BLD VENIPUNCTURE: CPT | Performed by: INTERNAL MEDICINE

## 2019-05-20 PROCEDURE — 80048 BASIC METABOLIC PNL TOTAL CA: CPT | Performed by: INTERNAL MEDICINE

## 2019-05-21 DIAGNOSIS — L21.9 DERMATITIS, SEBORRHEIC: ICD-10-CM

## 2019-05-21 PROBLEM — I48.21 PERMANENT ATRIAL FIBRILLATION (H): Status: ACTIVE | Noted: 2017-04-21

## 2019-05-21 NOTE — TELEPHONE ENCOUNTER
Requested Prescriptions   Pending Prescriptions Disp Refills     desonide (DESOWEN) 0.05 % external cream 60 g 0     Sig: Apply sparingly to affected area on face in morning   Last Written Prescription Date:  6/29/18  Last Fill Quantity: 60g,  # refills: 0   Last office visit: 4/16/2019 with prescribing provider:  KAPIL Hansen   Future Office Visit:   Next 5 appointments (look out 90 days)    May 23, 2019  9:00 AM CDT  [facility]ter [facility] [address] [address] [phone]   Jul 29, 2019  2:15 PM CDT  Return Visit with Tereso Alexander MD  [facility] ([facility]) [address] [place] [address] [phone]             There is no refill protocol information for this order

## 2019-05-22 RX ORDER — DESONIDE 0.5 MG/G
CREAM TOPICAL
Qty: 60 G | Refills: 0 | Status: SHIPPED | OUTPATIENT
Start: 2019-05-22 | End: 2021-06-18

## 2019-05-23 ENCOUNTER — OFFICE VISIT (OUTPATIENT)
Dept: INTERNAL MEDICINE | Facility: CLINIC | Age: 74
End: 2019-05-23
Payer: COMMERCIAL

## 2019-05-23 VITALS
TEMPERATURE: 97.9 F | BODY MASS INDEX: 45.16 KG/M2 | DIASTOLIC BLOOD PRESSURE: 78 MMHG | HEART RATE: 54 BPM | WEIGHT: 315 LBS | SYSTOLIC BLOOD PRESSURE: 120 MMHG | RESPIRATION RATE: 18 BRPM | OXYGEN SATURATION: 94 %

## 2019-05-23 DIAGNOSIS — L21.9 DERMATITIS, SEBORRHEIC: ICD-10-CM

## 2019-05-23 DIAGNOSIS — E87.1 HYPONATREMIA: Primary | ICD-10-CM

## 2019-05-23 DIAGNOSIS — I25.10 CORONARY ARTERY DISEASE INVOLVING NATIVE CORONARY ARTERY OF NATIVE HEART, ANGINA PRESENCE UNSPECIFIED: ICD-10-CM

## 2019-05-23 DIAGNOSIS — I10 BENIGN HYPERTENSION: ICD-10-CM

## 2019-05-23 DIAGNOSIS — I48.20 CHRONIC ATRIAL FIBRILLATION (H): ICD-10-CM

## 2019-05-23 DIAGNOSIS — L57.0 AK (ACTINIC KERATOSIS): ICD-10-CM

## 2019-05-23 PROCEDURE — 99214 OFFICE O/P EST MOD 30 MIN: CPT | Performed by: INTERNAL MEDICINE

## 2019-05-23 RX ORDER — FLUOCINONIDE TOPICAL SOLUTION USP, 0.05% 0.5 MG/ML
SOLUTION TOPICAL
Qty: 60 ML | Refills: 3 | Status: SHIPPED | OUTPATIENT
Start: 2019-05-23 | End: 2019-12-10

## 2019-05-23 NOTE — PROGRESS NOTES
Subjective     James Salvador is a 73 year old male who presents to clinic today for the following health issues:    HPI   Patient is here to discuss most recent lab work.  Recent follow-up visit here for hospitalization for bradycardia.  Work-up is ongoing for that issue and he recently had his metoprolol dose decreased further to 12.5 mg bid.  At the same hospitalization he was noted to be hyponatremic.  Work-up was consistent with SIADH.  Sodium abi was 125.  Most recent increase to 129.  He has been asymptomatic throughout this.  Today feels fine.  Is restricting his fluid intake to 64 ounces/day prior to admit would consume considerable amount of liquids a day up to 100 ounces.     Reviewed and updated as needed this visit by Provider         Review of Systems   ROS COMP: Constitutional, HEENT, cardiovascular, pulmonary, gi and gu systems are negative, except as otherwise noted.      Objective    /78   Pulse 54   Temp 97.9  F (36.6  C) (Temporal)   Resp 18   Wt 146.9 kg (323 lb 12.8 oz)   SpO2 94%   BMI 45.16 kg/m    Body mass index is 45.16 kg/m .  Physical Exam   GENERAL: alert, no distress and over weight  EYES: Eyes grossly normal to inspection  NECK: no adenopathy, no asymmetry, masses, or scars and thyroid normal to palpation  RESP: lungs clear to auscultation - no rales, rhonchi or wheezes  CV: Bradycardic irregular,  no peripheral edema and peripheral pulses strong  t    Labs reviewed in Epic        Assessment & Plan     1. Hyponatremia  Improving.  Asymptomatic.  If he can cut back little bit further on his fluid intake would be helpful but overall not concerned.  It is possible that his Trileptal contributes to his hyponatremia.  Recommended only way to follow this and do further work-up only if it worsens  - Basic metabolic panel; Future     (I48.2) Chronic atrial fibrillation (H)  (I25.10) Coronary artery disease involving native coronary artery of native heart, angina presence  unspecified  (I10) Benign hypertension  Comment: BP looks ok here, f/u cards for rhythm  Plan: EP f/u for bradycardia           No follow-ups on file.    Jeronimo Painter MD  [facility]

## 2019-05-28 ENCOUNTER — OFFICE VISIT (OUTPATIENT)
Dept: CARDIOLOGY | Facility: CLINIC | Age: 74
End: 2019-05-28
Attending: NURSE PRACTITIONER
Payer: COMMERCIAL

## 2019-05-28 VITALS
BODY MASS INDEX: 46.44 KG/M2 | WEIGHT: 315 LBS | HEART RATE: 77 BPM | DIASTOLIC BLOOD PRESSURE: 83 MMHG | SYSTOLIC BLOOD PRESSURE: 170 MMHG

## 2019-05-28 DIAGNOSIS — I47.10 PAROXYSMAL SUPRAVENTRICULAR TACHYCARDIA (H): ICD-10-CM

## 2019-05-28 DIAGNOSIS — I48.20 CHRONIC ATRIAL FIBRILLATION (H): ICD-10-CM

## 2019-05-28 PROCEDURE — 99214 OFFICE O/P EST MOD 30 MIN: CPT | Performed by: INTERNAL MEDICINE

## 2019-05-28 RX ORDER — LIDOCAINE 40 MG/G
CREAM TOPICAL
Status: CANCELLED | OUTPATIENT
Start: 2019-05-28

## 2019-05-28 RX ORDER — CEFAZOLIN SODIUM IN 0.9 % NACL 3 G/100 ML
3 INTRAVENOUS SOLUTION, PIGGYBACK (ML) INTRAVENOUS
Status: CANCELLED | OUTPATIENT
Start: 2019-05-28

## 2019-05-28 RX ORDER — SODIUM CHLORIDE 450 MG/100ML
INJECTION, SOLUTION INTRAVENOUS CONTINUOUS
Status: CANCELLED | OUTPATIENT
Start: 2019-05-28

## 2019-05-28 RX ORDER — FUROSEMIDE 40 MG
40 TABLET ORAL DAILY
Qty: 30 TABLET | Refills: 1 | Status: SHIPPED | OUTPATIENT
Start: 2019-05-28 | End: 2019-06-20

## 2019-05-28 RX ORDER — FLUOROURACIL 50 MG/G
CREAM TOPICAL
Qty: 40 G | Refills: 1 | Status: SHIPPED | OUTPATIENT
Start: 2019-05-28 | End: 2019-10-15

## 2019-05-28 NOTE — PROGRESS NOTES
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      HISTORY OF PRESENT ILLNESS:  Mr. 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     Patient saw me back in 2016 due to episodes of paroxysmal SVT.  He was starting beta-blockers and did well from the SVT standpoint.  Apparently, in 2017 he developed atrial fibrillation has been rate control strategy since then.  He informs in the last 2 years the dose of metoprolol has been slowly, progressively been decreased as he develops side effects related to the medication.  Some point he was taken 125 mg of metoprolol daily, which has decreased to 50 mg daily.    In the beginning of May, he was admitted to [facility] with symptomatic bradycardia.  At that time, he informs that begins complete fatigue and lightheaded.  His wife was not able to obtain blood pressure at home and EMS was called.  He was brought to [facility] and was found to have symptomatic bradycardia with heart rate around 20 bpm.  During hospital stay metoprolol was discontinued and heart rate progressively improved.  An echocardiogram was obtained which revealed normal cardiac function.  He was discharged home with a reduced dose of metoprolol.    He was evaluated in clinic here and a Holter monitor confirmed tachycardia bradycardia syndrome.  The monitor showed average heart rate of 59 bpm with a max of 163 and a minimum of 25 bpm.  He had several pauses during the day.  The longest one lasting 4.18 seconds.    Today, he complains of dyspnea on exertion and affirms that he is coughing as he got cold recently.  He denies any other symptoms such as chest pain, lightheadedness, near-syncope or syncope.       ASSESSMENT AND PLAN:   1.  Tachy-edinson syndrome.   He was recently admitted with symptomatic bradycardia and most recent monitor confirmed tachycardia bradycardia syndrome with significant pauses.  I favor pacemaker rotation to facilitate therapy.  I explained the procedure details understand there is 1 2% risk in case associated procedure.  Like to move for the procedure make arrangements to have it done.    Of note, he requested a Medtronic pacemaker.    2.  Coronary artery disease, status post PCI to LAD with no acute issues.  We will continue current medical therapy.   3.  Hypertension.  Blood pressure is elevated today.  Recommend increasing lisinopril from 20 to 30 mg a day.  I will also add Lasix (40 mg daily) as he seems to be fluid overloaded physical exam.  4.  Embolic prevention.  Chads vas score of 3.  Continue to correlation with Eliquis indefinitely.  He will hold Eliquis 2 days prior to the procedure.  May stop aspirin after procedure.    Ronny Gamble MD    Physical Exam:  Vitals: /83   Pulse 77   Wt (!) 151 kg (333 lb)   BMI 46.44 kg/m      Constitutional:  AAO x3.  Pt is in NAD.  HEAD: normocephalic.  SKIN: Skin normal color, texture and turgor with no lesions or eruptions.  Eyes: PERRL, EOMI.  ENT:  Supple, normal JVP. No lymphadenopathy or thyroid enlargement.  Chest:  Rales in base B/L.  Cardiac:  IIR, variable sound of S1 and Normal S2. No murmurs rubs or gallop.   Abdomen:  Normal BS.  Soft, non-tender and non-distended.  No rebound or guarding.    Extremities:  Pedious pulses palpable B/L.  No LE edema noticed.   Neurological: Strength and sensation grossly symmetric and intact throughout.       CURRENT MEDICATIONS:  Current Outpatient Medications   Medication Sig Dispense Refill     Acetaminophen (TYLENOL PO) Take 650 mg by mouth 4 times daily        amoxicillin (AMOXIL) 500 MG capsule 2,000 mg as needed When going to the dentist  0     ASPIRIN PO Take 81 mg by mouth daily       atorvastatin (LIPITOR) 80 MG tablet TAKE 1 TABLET BY  MOUTH DAILY - DUE FOR FASTING LABS IN DECEMBER 90 tablet 3     ciclopirox (LOPROX) 0.77 % cream Apply topically At Bedtime 90 g 3     desonide (DESOWEN) 0.05 % external cream Apply sparingly to affected area on face in morning 60 g 0     Doxycycline Hyclate (PERIOSTAT PO) Take 20 mg by mouth 2 times daily       ELIQUIS 5 MG tablet TAKE 1 TABLET (5 MG) BY MOUTH 2 TIMES DAILY 60 tablet 5     fluocinonide (LIDEX) 0.05 % external solution Apply to scalp BID x 1-2 weeks PRN 60 mL 3     fluorouracil (EFUDEX) 5 % external cream Apply to scap BID x 3-4 weeks. Protect area from the sun. Do not apply to large surface area. 40 g 1     fluticasone (FLONASE) 50 MCG/ACT nasal spray SPRAY 2 SPRAYS INTO BOTH NOSTRILS DAILY (Patient taking differently: Spray 2 sprays into both nostrils as needed ) 16 mL 6     GABAPENTIN PO Take 600 mg by mouth 3 times daily        ketoconazole (NIZORAL) 2 % cream Apply topically 2 times daily For face. FAX REFILL REQUESTS TO [facility]: [phone] 30 g 2     ketoconazole (NIZORAL) 2 % external shampoo Use daily as needed 120 mL 3     ketotifen (ZADITOR/REFRESH ANTI-ITCH) 0.025 % SOLN Place 1-2 drops into both eyes 2 times daily as needed for itching       lisinopril (PRINIVIL/ZESTRIL) 20 MG tablet TAKE 1 TABLET (20 MG) BY MOUTH DAILY **RTS 1/29/17** 90 tablet 1     loratadine (CLARITIN) 10 MG tablet Take 10 mg by mouth daily as needed for allergies       metoprolol tartrate (LOPRESSOR) 25 MG tablet Take 0.5 tablets (12.5 mg) by mouth 2 times daily 30 tablet 0     Multiple Vitamin (MULTIVITAMIN OR) Take 1 tablet by mouth daily        nitroglycerin (NITROSTAT) 0.4 MG SL tablet Place 1 tablet (0.4 mg) under the tongue every 5 minutes as needed for chest pain May repeat twice for a total of 3 tablets.  If chest pain not relieved, call 911 25 tablet 11     Omega-3 Fatty Acids (OMEGA-3 FISH OIL PO) Take 3.2 g by mouth daily        OXcarbazepine (TRILEPTAL PO) Take 300 mg by mouth 3 times daily           ALLERGIES     Allergies   Allergen Reactions     Cats      Cat Dander     Dogs      Dog Dander     No Clinical Screening - See Comments      Pet dander, pollen, grasses, molds     Pollen Extract        PAST MEDICAL HISTORY:  Past Medical History:   Diagnosis Date     Actinic cheilitis 7/17/2013    Lower lip, left      Actinic keratosis      Allergic rhinitis      Benign hypertension      CAD (coronary artery disease)     Cardiac cath and PCI 1994. Cardiac Cath 9/2015: BRIDGET to LAD     History of myocardial infarction 1994    PTCA     Hyperlipidemia LDL goal < 70      Mixed hyperlipidemia 6/21/2017     Morbid obesity due to excess calories (H) 1/11/2016     Paroxysmal supraventricular tachycardia (H)     on metoprolol     Permanent atrial fibrillation (H) 04/21/2017     Squamous cell carcinoma      Stable angina (H) 1/11/2016       PAST SURGICAL HISTORY:  Past Surgical History:   Procedure Laterality Date     ANGIOPLASTY  1994    in [place]     ANKLE SURGERY  7/13/2005    right ankle     HEART CATH STENT COR W/WO PTCA  9/23/2015    BRIDGET stent mid LAD     JOINT REPLACEMENT, HIP RT/LT  10/14/2009    right hip      LASER SURGERY OF EYE  06/01/2002     MOHS MICROGRAPHIC PROCEDURE  06/12/2004    squamous cell carcinoma right temple     SINUS SURGERY  7/11/2006       FAMILY HISTORY:  Family History   Problem Relation Age of Onset     Genitourinary Problems Mother         renal failure     Cardiovascular Father         rupture of dorsal aorta     Depression Son        SOCIAL HISTORY:  Social History     Socioeconomic History     Marital status:      Spouse name: None     Number of children: 3     Years of education: None     Highest education level: None   Occupational History     Employer: RETIRED   Social Needs     Financial resource strain: None     Food insecurity:     Worry: None     Inability: None     Transportation needs:     Medical: None     Non-medical: None   Tobacco Use     Smoking status: Former  Smoker     Years: 10.00     Types: Cigarettes     Last attempt to quit: 1987     Years since quittin.4     Smokeless tobacco: Never Used   Substance and Sexual Activity     Alcohol use: Yes     Alcohol/week: 0.0 oz     Comment: socially - x2-3 per month -  none currently     Drug use: No     Sexual activity: Yes     Partners: Female   Lifestyle     Physical activity:     Days per week: None     Minutes per session: None     Stress: None   Relationships     Social connections:     Talks on phone: None     Gets together: None     Attends Evangelical service: None     Active member of club or organization: None     Attends meetings of clubs or organizations: None     Relationship status: None     Intimate partner violence:     Fear of current or ex partner: None     Emotionally abused: None     Physically abused: None     Forced sexual activity: None   Other Topics Concern     Parent/sibling w/ CABG, MI or angioplasty before 65F 55M? Not Asked      Service Not Asked     Blood Transfusions Not Asked     Caffeine Concern Yes     Comment: 2 big cups coffee daily     Occupational Exposure Not Asked     Hobby Hazards Not Asked     Sleep Concern No     Comment: sleeping better since shoulder replaced 11/3/16     Stress Concern No     Weight Concern Yes     Special Diet Yes     Comment: trying to do more lean meats     Back Care Not Asked     Exercise No     Comment: limited - knee     Bike Helmet Not Asked     Seat Belt Not Asked     Self-Exams Not Asked   Social History Narrative     None       Review of Systems:  Skin:  not assessed     Eyes:  Positive for glasses  ENT:  Positive for hearing loss  Respiratory:  Positive for dyspnea on exertion  Cardiovascular:    Positive for;fatigue  Gastroenterology: not assessed    Genitourinary:  not assessed    Musculoskeletal:  not assessed    Neurologic:  Positive for headaches  Psychiatric:  not assessed    Heme/Lymph/Imm:  not assessed    Endocrine:  not assessed         Recent Lab Results:  LIPID RESULTS:  Lab Results   Component Value Date    CHOL 126 12/11/2018    HDL 40 12/11/2018    LDL 55 12/11/2018    TRIG 153 (H) 12/11/2018    CHOLHDLRATIO 3.6 09/22/2015       LIVER ENZYME RESULTS:  Lab Results   Component Value Date    AST 24 07/18/2018    ALT 38 07/18/2018       CBC RESULTS:  Lab Results   Component Value Date    WBC 6.8 07/18/2018    RBC 4.61 07/18/2018    HGB 14.6 07/18/2018    HCT 43.4 07/18/2018    MCV 94 07/18/2018    MCH 31.7 07/18/2018    MCHC 33.6 07/18/2018    RDW 13.5 07/18/2018     07/18/2018       BMP RESULTS:  Lab Results   Component Value Date     (L) 05/20/2019    POTASSIUM 4.7 05/20/2019    CHLORIDE 97 05/20/2019    CO2 28 05/20/2019    ANIONGAP 4 05/20/2019    GLC 87 05/20/2019    BUN 13 05/20/2019    CR 0.70 05/20/2019    GFRESTIMATED >90 05/20/2019    GFRESTBLACK >90 05/20/2019    BENJI 8.9 05/20/2019        A1C RESULTS:  Lab Results   Component Value Date    A1C 5.5 07/18/2018       INR RESULTS:  Lab Results   Component Value Date    INR 1.02 09/23/2015         ECHOCARDIOGRAM  No results found for this or any previous visit (from the past 8760 hour(s)).      No orders of the defined types were placed in this encounter.    No orders of the defined types were placed in this encounter.    There are no discontinued medications.      Encounter Diagnoses   Name Primary?     Paroxysmal supraventricular tachycardia (H)      Chronic atrial fibrillation (H)          CC  NAV Renteria CNP  [address]  [place] [id number]

## 2019-05-28 NOTE — LETTER
5/28/2019    Jeronimo Painter MD  [address] [facility] [address]    RE: James MARIELY Salvador       Dear Colleague,    I had the pleasure of seeing James Salvador in the [facility].    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      HISTORY OF PRESENT ILLNESS:  Mr. 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     Patient saw me back in 2016 due to episodes of paroxysmal SVT.  He was starting beta-blockers and did well from the SVT standpoint.  Apparently, in 2017 he developed atrial fibrillation has been rate control strategy since then.  He informs in the last 2 years the dose of metoprolol has been slowly, progressively been decreased as he develops side effects related to the medication.  Some point he was taken 125 mg of metoprolol daily, which has decreased to 50 mg daily.    In the beginning of May, he was admitted to [facility] with symptomatic bradycardia.  At that time, he informs that begins complete fatigue and lightheaded.  His wife was not able to obtain blood pressure at home and EMS was called.  He was brought to [facility] and was found to have symptomatic bradycardia with heart rate around 20 bpm.  During hospital stay metoprolol was discontinued and heart rate progressively improved.  An echocardiogram was obtained which revealed normal cardiac function.  He was discharged home with a reduced dose of metoprolol.    He was evaluated in clinic here and a Holter monitor confirmed tachycardia bradycardia syndrome.  The monitor showed average heart rate of 59 bpm with a max of 163 and a minimum of 25 bpm.  He had several pauses during the day.  The longest one lasting 4.18 seconds.    Today, he complains of  dyspnea on exertion and affirms that he is coughing as he got cold recently.  He denies any other symptoms such as chest pain, lightheadedness, near-syncope or syncope.       ASSESSMENT AND PLAN:   1.   Tachy-edinson syndrome.  He was recently admitted with symptomatic bradycardia and most recent monitor confirmed tachycardia bradycardia syndrome with significant pauses.  I favor pacemaker rotation to facilitate therapy.  I explained the procedure details understand there is 1 2% risk in case associated procedure.  Like to move for the procedure make arrangements to have it done.    Of note, he requested a Medtronic pacemaker.    2.  Coronary artery disease, status post PCI to LAD with no acute issues.  We will continue current medical therapy.   3.  Hypertension.  Blood pressure is elevated today.  Recommend increasing lisinopril from 20 to 30 mg a day.  I will also add Lasix (40 mg daily) as he seems to be fluid overloaded physical exam.  4.  Embolic prevention.  Chads vas score of 3.  Continue to correlation with Eliquis indefinitely.  He will hold Eliquis 2 days prior to the procedure.  May stop aspirin after procedure.    Ronny Gamble MD    Physical Exam:  Vitals: /83   Pulse 77   Wt (!) 151 kg (333 lb)   BMI 46.44 kg/m       Constitutional:  AAO x3.  Pt is in NAD.  HEAD: normocephalic.  SKIN: Skin normal color, texture and turgor with no lesions or eruptions.  Eyes: PERRL, EOMI.  ENT:  Supple, normal JVP. No lymphadenopathy or thyroid enlargement.  Chest:  Rales in base B/L.  Cardiac:  IIR, variable sound of S1 and Normal S2. No murmurs rubs or gallop.   Abdomen:  Normal BS.  Soft, non-tender and non-distended.  No rebound or guarding.    Extremities:  Pedious pulses palpable B/L.  No LE edema noticed.   Neurological: Strength and sensation grossly symmetric and intact throughout.       CURRENT MEDICATIONS:  Current Outpatient Medications   Medication Sig Dispense Refill     Acetaminophen (TYLENOL PO)  Take 650 mg by mouth 4 times daily        amoxicillin (AMOXIL) 500 MG capsule 2,000 mg as needed When going to the dentist  0     ASPIRIN PO Take 81 mg by mouth daily       atorvastatin (LIPITOR) 80 MG tablet TAKE 1 TABLET BY MOUTH DAILY - DUE FOR FASTING LABS IN DECEMBER 90 tablet 3     ciclopirox (LOPROX) 0.77 % cream Apply topically At Bedtime 90 g 3     desonide (DESOWEN) 0.05 % external cream Apply sparingly to affected area on face in morning 60 g 0     Doxycycline Hyclate (PERIOSTAT PO) Take 20 mg by mouth 2 times daily       ELIQUIS 5 MG tablet TAKE 1 TABLET (5 MG) BY MOUTH 2 TIMES DAILY 60 tablet 5     fluocinonide (LIDEX) 0.05 % external solution Apply to scalp BID x 1-2 weeks PRN 60 mL 3     fluorouracil (EFUDEX) 5 % external cream Apply to scap BID x 3-4 weeks. Protect area from the sun. Do not apply to large surface area. 40 g 1     fluticasone (FLONASE) 50 MCG/ACT nasal spray SPRAY 2 SPRAYS INTO BOTH NOSTRILS DAILY (Patient taking differently: Spray 2 sprays into both nostrils as needed ) 16 mL 6     GABAPENTIN PO Take 600 mg by mouth 3 times daily        ketoconazole (NIZORAL) 2 % cream Apply topically 2 times daily For face. FAX REFILL REQUESTS TO [facility]: [phone] 30 g 2     ketoconazole (NIZORAL) 2 % external shampoo Use daily as needed 120 mL 3     ketotifen (ZADITOR/REFRESH ANTI-ITCH) 0.025 % SOLN Place 1-2 drops into both eyes 2 times daily as needed for itching       lisinopril (PRINIVIL/ZESTRIL) 20 MG tablet TAKE 1 TABLET (20 MG) BY MOUTH DAILY **RTS 1/29/17** 90 tablet 1     loratadine (CLARITIN) 10 MG tablet Take 10 mg by mouth daily as needed for allergies       metoprolol tartrate (LOPRESSOR) 25 MG tablet Take 0.5 tablets (12.5 mg) by mouth 2 times daily 30 tablet 0     Multiple Vitamin (MULTIVITAMIN OR) Take 1 tablet by mouth daily        nitroglycerin (NITROSTAT) 0.4 MG SL tablet Place 1 tablet (0.4 mg) under the tongue every 5 minutes as needed for chest pain May repeat twice for  a total of 3 tablets.  If chest pain not relieved, call 911 25 tablet 11     Omega-3 Fatty Acids (OMEGA-3 FISH OIL PO) Take 3.2 g by mouth daily        OXcarbazepine (TRILEPTAL PO) Take 300 mg by mouth 3 times daily          ALLERGIES     Allergies   Allergen Reactions     Cats      Cat Dander     Dogs      Dog Dander     No Clinical Screening - See Comments      Pet dander, pollen, grasses, molds     Pollen Extract        PAST MEDICAL HISTORY:  Past Medical History:   Diagnosis Date     Actinic cheilitis 7/17/2013    Lower lip, left      Actinic keratosis      Allergic rhinitis      Benign hypertension      CAD (coronary artery disease)     Cardiac cath and PCI 1994. Cardiac Cath 9/2015: BRIDGET to LAD     History of myocardial infarction 1994    PTCA     Hyperlipidemia LDL goal < 70      Mixed hyperlipidemia 6/21/2017     Morbid obesity due to excess calories (H) 1/11/2016     Paroxysmal supraventricular tachycardia (H)     on metoprolol     Permanent atrial fibrillation (H) 04/21/2017     Squamous cell carcinoma      Stable angina (H) 1/11/2016       PAST SURGICAL HISTORY:  Past Surgical History:   Procedure Laterality Date     ANGIOPLASTY  1994    in [place]     ANKLE SURGERY  7/13/2005    right ankle     HEART CATH STENT COR W/WO PTCA  9/23/2015    BRIDGET stent mid LAD     JOINT REPLACEMENT, HIP RT/LT  10/14/2009    right hip      LASER SURGERY OF EYE  06/01/2002     MOHS MICROGRAPHIC PROCEDURE  06/12/2004    squamous cell carcinoma right temple     SINUS SURGERY  7/11/2006       FAMILY HISTORY:  Family History   Problem Relation Age of Onset     Genitourinary Problems Mother         renal failure     Cardiovascular Father         rupture of dorsal aorta     Depression Son        SOCIAL HISTORY:  Social History     Socioeconomic History     Marital status:      Spouse name: None     Number of children: 3     Years of education: None     Highest education level: None   Occupational History     Employer:  RETIRED   Social Needs     Financial resource strain: None     Food insecurity:     Worry: None     Inability: None     Transportation needs:     Medical: None     Non-medical: None   Tobacco Use     Smoking status: Former Smoker     Years: 10.00     Types: Cigarettes     Last attempt to quit: 1987     Years since quittin.4     Smokeless tobacco: Never Used   Substance and Sexual Activity     Alcohol use: Yes     Alcohol/week: 0.0 oz     Comment: socially - x2-3 per month -  none currently     Drug use: No     Sexual activity: Yes     Partners: Female   Lifestyle     Physical activity:     Days per week: None     Minutes per session: None     Stress: None   Relationships     Social connections:     Talks on phone: None     Gets together: None     Attends Faith service: None     Active member of club or organization: None     Attends meetings of clubs or organizations: None     Relationship status: None     Intimate partner violence:     Fear of current or ex partner: None     Emotionally abused: None     Physically abused: None     Forced sexual activity: None   Other Topics Concern     Parent/sibling w/ CABG, MI or angioplasty before 65F 55M? Not Asked      Service Not Asked     Blood Transfusions Not Asked     Caffeine Concern Yes     Comment: 2 big cups coffee daily     Occupational Exposure Not Asked     Hobby Hazards Not Asked     Sleep Concern No     Comment: sleeping better since shoulder replaced 11/3/16     Stress Concern No     Weight Concern Yes     Special Diet Yes     Comment: trying to do more lean meats     Back Care Not Asked     Exercise No     Comment: limited - knee     Bike Helmet Not Asked     Seat Belt Not Asked     Self-Exams Not Asked   Social History Narrative     None       Review of Systems:  Skin:  not assessed     Eyes:  Positive for glasses  ENT:  Positive for hearing loss  Respiratory:  Positive for dyspnea on exertion  Cardiovascular:    Positive  for;fatigue  Gastroenterology: not assessed    Genitourinary:  not assessed    Musculoskeletal:  not assessed    Neurologic:  Positive for headaches  Psychiatric:  not assessed    Heme/Lymph/Imm:  not assessed    Endocrine:  not assessed        Recent Lab Results:  LIPID RESULTS:  Lab Results   Component Value Date    CHOL 126 12/11/2018    HDL 40 12/11/2018    LDL 55 12/11/2018    TRIG 153 (H) 12/11/2018    CHOLHDLRATIO 3.6 09/22/2015       LIVER ENZYME RESULTS:  Lab Results   Component Value Date    AST 24 07/18/2018    ALT 38 07/18/2018       CBC RESULTS:  Lab Results   Component Value Date    WBC 6.8 07/18/2018    RBC 4.61 07/18/2018    HGB 14.6 07/18/2018    HCT 43.4 07/18/2018    MCV 94 07/18/2018    MCH 31.7 07/18/2018    MCHC 33.6 07/18/2018    RDW 13.5 07/18/2018     07/18/2018       BMP RESULTS:  Lab Results   Component Value Date     (L) 05/20/2019    POTASSIUM 4.7 05/20/2019    CHLORIDE 97 05/20/2019    CO2 28 05/20/2019    ANIONGAP 4 05/20/2019    GLC 87 05/20/2019    BUN 13 05/20/2019    CR 0.70 05/20/2019    GFRESTIMATED >90 05/20/2019    GFRESTBLACK >90 05/20/2019    BENJI 8.9 05/20/2019        A1C RESULTS:  Lab Results   Component Value Date    A1C 5.5 07/18/2018       INR RESULTS:  Lab Results   Component Value Date    INR 1.02 09/23/2015         ECHOCARDIOGRAM  No results found for this or any previous visit (from the past 8760 hour(s)).      No orders of the defined types were placed in this encounter.    No orders of the defined types were placed in this encounter.    There are no discontinued medications.      Encounter Diagnoses   Name Primary?     Paroxysmal supraventricular tachycardia (H)      Chronic atrial fibrillation (H)          GUANAKITO Kat APRN CNP  [address]  [place] [id number]              Thank you for allowing me to participate in the care of your patient.    Sincerely,     Ronny Gamble MD     [facility]

## 2019-05-29 DIAGNOSIS — I49.5 SSS (SICK SINUS SYNDROME) (H): Primary | ICD-10-CM

## 2019-05-31 ENCOUNTER — SURGERY (OUTPATIENT)
Age: 74
End: 2019-05-31
Payer: COMMERCIAL

## 2019-05-31 ENCOUNTER — HOSPITAL ENCOUNTER (OUTPATIENT)
Facility: CLINIC | Age: 74
Discharge: HOME | End: 2019-05-31
Admitting: INTERNAL MEDICINE
Payer: COMMERCIAL

## 2019-05-31 ENCOUNTER — APPOINTMENT (OUTPATIENT)
Dept: GENERAL RADIOLOGY | Facility: CLINIC | Age: 74
End: 2019-05-31
Attending: INTERNAL MEDICINE
Payer: COMMERCIAL

## 2019-05-31 VITALS
RESPIRATION RATE: 16 BRPM | TEMPERATURE: 98.3 F | WEIGHT: 315 LBS | HEART RATE: 53 BPM | DIASTOLIC BLOOD PRESSURE: 120 MMHG | OXYGEN SATURATION: 94 % | BODY MASS INDEX: 45.24 KG/M2 | SYSTOLIC BLOOD PRESSURE: 169 MMHG

## 2019-05-31 DIAGNOSIS — I48.20 CHRONIC ATRIAL FIBRILLATION (H): ICD-10-CM

## 2019-05-31 DIAGNOSIS — I10 BENIGN HYPERTENSION: Primary | ICD-10-CM

## 2019-05-31 DIAGNOSIS — I48.91 NEW ONSET ATRIAL FIBRILLATION (H): ICD-10-CM

## 2019-05-31 LAB
ANION GAP SERPL CALCULATED.3IONS-SCNC: 6 MMOL/L (ref 3–14)
BUN SERPL-MCNC: 12 MG/DL (ref 7–30)
CALCIUM SERPL-MCNC: 9 MG/DL (ref 8.5–10.1)
CHLORIDE SERPL-SCNC: 98 MMOL/L (ref 94–109)
CO2 SERPL-SCNC: 28 MMOL/L (ref 20–32)
CREAT SERPL-MCNC: 0.65 MG/DL (ref 0.66–1.25)
ERYTHROCYTE [DISTWIDTH] IN BLOOD BY AUTOMATED COUNT: 13.1 % (ref 10–15)
GFR SERPL CREATININE-BSD FRML MDRD: >90 ML/MIN/{1.73_M2}
GLUCOSE SERPL-MCNC: 90 MG/DL (ref 70–99)
HCT VFR BLD AUTO: 41.9 % (ref 40–53)
HGB BLD-MCNC: 14.9 G/DL (ref 13.3–17.7)
MCH RBC QN AUTO: 32.5 PG (ref 26.5–33)
MCHC RBC AUTO-ENTMCNC: 35.6 G/DL (ref 31.5–36.5)
MCV RBC AUTO: 91 FL (ref 78–100)
PLATELET # BLD AUTO: 170 10E9/L (ref 150–450)
POTASSIUM SERPL-SCNC: 4.6 MMOL/L (ref 3.4–5.3)
RBC # BLD AUTO: 4.59 10E12/L (ref 4.4–5.9)
SODIUM SERPL-SCNC: 132 MMOL/L (ref 133–144)
WBC # BLD AUTO: 5.2 10E9/L (ref 4–11)

## 2019-05-31 PROCEDURE — 40000852 ZZH STATISTIC HEART CATH LAB OR EP LAB

## 2019-05-31 PROCEDURE — 25800029 ZZH RX IP 258 OP 250: Performed by: INTERNAL MEDICINE

## 2019-05-31 PROCEDURE — 25000125 ZZHC RX 250: Performed by: INTERNAL MEDICINE

## 2019-05-31 PROCEDURE — 93010 ELECTROCARDIOGRAM REPORT: CPT | Performed by: INTERNAL MEDICINE

## 2019-05-31 PROCEDURE — C1786 PMKR, SINGLE, RATE-RESP: HCPCS | Performed by: INTERNAL MEDICINE

## 2019-05-31 PROCEDURE — 25000132 ZZH RX MED GY IP 250 OP 250 PS 637: Performed by: INTERNAL MEDICINE

## 2019-05-31 PROCEDURE — 36591 DRAW BLOOD OFF VENOUS DEVICE: CPT

## 2019-05-31 PROCEDURE — 33274 TCAT INSJ/RPL PERM LDLS PM: CPT | Performed by: INTERNAL MEDICINE

## 2019-05-31 PROCEDURE — C1894 INTRO/SHEATH, NON-LASER: HCPCS | Performed by: INTERNAL MEDICINE

## 2019-05-31 PROCEDURE — 27210794 ZZH OR GENERAL SUPPLY STERILE: Performed by: INTERNAL MEDICINE

## 2019-05-31 PROCEDURE — 99152 MOD SED SAME PHYS/QHP 5/>YRS: CPT | Performed by: INTERNAL MEDICINE

## 2019-05-31 PROCEDURE — 40000235 ZZH STATISTIC TELEMETRY

## 2019-05-31 PROCEDURE — 85027 COMPLETE CBC AUTOMATED: CPT | Performed by: INTERNAL MEDICINE

## 2019-05-31 PROCEDURE — 93005 ELECTROCARDIOGRAM TRACING: CPT

## 2019-05-31 PROCEDURE — 99153 MOD SED SAME PHYS/QHP EA: CPT | Performed by: INTERNAL MEDICINE

## 2019-05-31 PROCEDURE — 40000986 XR CHEST 2 VW

## 2019-05-31 PROCEDURE — 25000128 H RX IP 250 OP 636: Performed by: INTERNAL MEDICINE

## 2019-05-31 PROCEDURE — 33207 INSERT HEART PM VENTRICULAR: CPT | Mod: KX | Performed by: INTERNAL MEDICINE

## 2019-05-31 PROCEDURE — C1898 LEAD, PMKR, OTHER THAN TRANS: HCPCS | Performed by: INTERNAL MEDICINE

## 2019-05-31 PROCEDURE — 80048 BASIC METABOLIC PNL TOTAL CA: CPT | Performed by: INTERNAL MEDICINE

## 2019-05-31 PROCEDURE — 40000065 ZZH STATISTIC EKG NON-CHARGEABLE

## 2019-05-31 DEVICE — IMP LEAD PACING BIPOLAR CAPSUREFIX NOVUS 58CM 5076-58: Type: IMPLANTABLE DEVICE | Status: FUNCTIONAL

## 2019-05-31 DEVICE — PACEMAKER AZURE XT SR MRI SYS: Type: IMPLANTABLE DEVICE | Status: FUNCTIONAL

## 2019-05-31 RX ORDER — HEPARIN SODIUM 200 [USP'U]/100ML
100-600 INJECTION, SOLUTION INTRAVENOUS CONTINUOUS PRN
Status: DISCONTINUED | OUTPATIENT
Start: 2019-05-31 | End: 2019-05-31 | Stop reason: HOSPADM

## 2019-05-31 RX ORDER — FENTANYL CITRATE 50 UG/ML
INJECTION, SOLUTION INTRAMUSCULAR; INTRAVENOUS
Status: DISCONTINUED | OUTPATIENT
Start: 2019-05-31 | End: 2019-05-31 | Stop reason: HOSPADM

## 2019-05-31 RX ORDER — OXYCODONE AND ACETAMINOPHEN 5; 325 MG/1; MG/1
1 TABLET ORAL EVERY 4 HOURS PRN
Status: DISCONTINUED | OUTPATIENT
Start: 2019-05-31 | End: 2019-05-31 | Stop reason: HOSPADM

## 2019-05-31 RX ORDER — LIDOCAINE 40 MG/G
CREAM TOPICAL
Status: DISCONTINUED | OUTPATIENT
Start: 2019-05-31 | End: 2019-05-31 | Stop reason: HOSPADM

## 2019-05-31 RX ORDER — CEFAZOLIN SODIUM IN 0.9 % NACL 3 G/100 ML
3 INTRAVENOUS SOLUTION, PIGGYBACK (ML) INTRAVENOUS
Status: COMPLETED | OUTPATIENT
Start: 2019-05-31 | End: 2019-05-31

## 2019-05-31 RX ORDER — ACETAMINOPHEN 325 MG/1
650 TABLET ORAL EVERY 4 HOURS PRN
Status: DISCONTINUED | OUTPATIENT
Start: 2019-05-31 | End: 2019-05-31 | Stop reason: HOSPADM

## 2019-05-31 RX ORDER — SODIUM CHLORIDE 450 MG/100ML
INJECTION, SOLUTION INTRAVENOUS CONTINUOUS
Status: DISCONTINUED | OUTPATIENT
Start: 2019-05-31 | End: 2019-05-31 | Stop reason: HOSPADM

## 2019-05-31 RX ORDER — CARVEDILOL 3.12 MG/1
3.12 TABLET ORAL 2 TIMES DAILY WITH MEALS
Qty: 60 TABLET | Refills: 3 | Status: SHIPPED | OUTPATIENT
Start: 2019-05-31 | End: 2019-06-07

## 2019-05-31 RX ORDER — HEPARIN SODIUM 200 [USP'U]/100ML
100-500 INJECTION, SOLUTION INTRAVENOUS CONTINUOUS PRN
Status: DISCONTINUED | OUTPATIENT
Start: 2019-05-31 | End: 2019-05-31 | Stop reason: HOSPADM

## 2019-05-31 RX ORDER — NALOXONE HYDROCHLORIDE 0.4 MG/ML
.1-.4 INJECTION, SOLUTION INTRAMUSCULAR; INTRAVENOUS; SUBCUTANEOUS
Status: DISCONTINUED | OUTPATIENT
Start: 2019-05-31 | End: 2019-05-31 | Stop reason: HOSPADM

## 2019-05-31 RX ORDER — DOBUTAMINE HYDROCHLORIDE 200 MG/100ML
5-40 INJECTION INTRAVENOUS CONTINUOUS PRN
Status: DISCONTINUED | OUTPATIENT
Start: 2019-05-31 | End: 2019-05-31 | Stop reason: HOSPADM

## 2019-05-31 RX ADMIN — FENTANYL CITRATE 50 MCG: 50 INJECTION, SOLUTION INTRAMUSCULAR; INTRAVENOUS at 15:25

## 2019-05-31 RX ADMIN — FENTANYL CITRATE 50 MCG: 50 INJECTION, SOLUTION INTRAMUSCULAR; INTRAVENOUS at 15:10

## 2019-05-31 RX ADMIN — FENTANYL CITRATE 50 MCG: 50 INJECTION, SOLUTION INTRAMUSCULAR; INTRAVENOUS at 15:35

## 2019-05-31 RX ADMIN — MIDAZOLAM 1 MG: 1 INJECTION INTRAMUSCULAR; INTRAVENOUS at 15:35

## 2019-05-31 RX ADMIN — Medication 3 G: at 15:05

## 2019-05-31 RX ADMIN — LIDOCAINE HYDROCHLORIDE 25 ML: 10 INJECTION, SOLUTION EPIDURAL; INFILTRATION; INTRACAUDAL; PERINEURAL at 15:23

## 2019-05-31 RX ADMIN — SODIUM CHLORIDE: 4.5 INJECTION, SOLUTION INTRAVENOUS at 14:22

## 2019-05-31 RX ADMIN — BACITRACIN 25000 UNITS: 5000 INJECTION, POWDER, FOR SOLUTION INTRAMUSCULAR at 15:39

## 2019-05-31 RX ADMIN — MIDAZOLAM 1 MG: 1 INJECTION INTRAMUSCULAR; INTRAVENOUS at 15:25

## 2019-05-31 RX ADMIN — MIDAZOLAM 1 MG: 1 INJECTION INTRAMUSCULAR; INTRAVENOUS at 15:10

## 2019-05-31 RX ADMIN — ACETAMINOPHEN 650 MG: 325 TABLET, FILM COATED ORAL at 17:23

## 2019-05-31 NOTE — PROGRESS NOTES
[facility] Discharge Nursing Note    Education/questions answered: YES  Patient DC location: HOME  Accompanied by: WIFE  CS discharge time: 1816    Patient s/p PPM implant.  Left sided chest dressing remains CDI without hematoma or swelling.  VS within patients baseline, Afib, tolerates PO, voiding, and rating pain a 2 of 10 after PO Tylenol given.  CXR shows no pneumothorax and device check OK.   Discharge home with .

## 2019-05-31 NOTE — PROGRESS NOTES
Care Suites Admission Nursing Note    Reason for admission: pacemaker placement   CS arrival time: 1240  Accompanied by: wife, Elham  Name/phone of DC : Elham [phone]  Medications held: Asprin since 5/30 Eliquis since 5/29  Consent signed: per MD  Abnormal assessment/labs: na  If abnormal, provider notified: na  Education/questions answered: pre procedure education explained to patient and family   Plan: pacemaker placement

## 2019-05-31 NOTE — DISCHARGE INSTRUCTIONS
Pacemaker Implant Discharge Instructions     After you go home:      Have an adult stay with you until tomorrow.    You may resume your normal diet.       For 24 hours - due to the sedation you received:    Relax and take it easy.    Do NOT make any important or legal decisions.    Do NOT drive or operate machines at home or at work.    Do NOT drink alcohol.    Care of Chest Incision:      Keep the bandage on at least 3 days. You may remove the dressing on Monday Mylene 3. Change it only if it gets loose or soaked. If you need to change it, use 4x4-inch gauze and a large clear bandage.     If there is a pressure dressing (gauze & tape) - 24 hours after your procedure you may remove ONLY the top dressing. Leave the bottom dressing on.    Leave the strips of tape on. They will fall off on their own, or we will remove them at your first check-up.    Check your wound daily for signs of infection, such as increased redness, severe swelling or draining. Fever may also be a sign of infection. Call us if you see any of these signs.    If there are no signs of infection, you may shower after the bandage comes off in 3 days. If you take a tub bath, keep the wound dry.    No soaking the incision (swimming pool, bathtub, hot tub) for 2 weeks.    You may have mild to medium pain for 3 to 5 days. Take Acetaminophen (Tylenol) or Ibuprofen (Advil) for the pain. If the pain persists or is severe, call us.    Activity:      For at least 2 weeks: Do not raise your elbow above your shoulder. You can begin to use your arm as it feels comfortable to you.    Do not use arm on implant side to lift more than 10 pounds for 2 weeks.    In 6 to 8 weeks: You may begin to golf, play tennis, swim and do similar activities.    No driving for one day & limit to necessary driving for one week.    Bleeding:      If you start bleeding from the incision site, sit down and press firmly on the site for 10 minutes.     Once bleeding stops, call [facility] as soon as you can.       Call 911 right away if you have heavy bleeding or bleeding that does not stop.      Medicines:      Take your medications, including blood thinners, unless your provider tells you not to.    If you have stopped any medicines, check with your provider about when to restart them.    Follow Up Appointments:      Follow up with Device Clinic at [facility] of patient preference in 7-10 days.    Call the clinic if:      You have a large or growing hard lump around the site.    The site is red, swollen, hot or tender.    Blood or fluid is draining from the site.    You have chills or a fever greater than 101 F (38 C).    You feel dizzy or light-headed.    Questions or concerns    Telling others about your device:      Before you leave the hospital, you will receive a temporary ID card. A permanent card will be mailed to you about 6 to 8 weeks later. Always carry the ID card with you. It has important details about your device.    You may also get a Medical Alert bracelet or tag that says you have a pacemaker.  Go to [email or web address].     Always tell doctors, dentists and other care providers that you have a device implanted in you.    Let us know before you plan any surgeries. Your care team must take special steps to keep you safe during certain procedures. These steps will depend on the type of device you have. Your provider will need to see your ID card. They may need to call us for instructions.    Device Safety:      Please refer to device  s booklet for further information.        [facility] [place]:    [phone] [facility] (7 days a week)

## 2019-06-03 ENCOUNTER — TELEPHONE (OUTPATIENT)
Dept: CARDIOLOGY | Facility: CLINIC | Age: 74
End: 2019-06-03

## 2019-06-03 DIAGNOSIS — I25.10 CAD (CORONARY ARTERY DISEASE): ICD-10-CM

## 2019-06-03 DIAGNOSIS — I48.91 ATRIAL FIBRILLATION (H): Primary | ICD-10-CM

## 2019-06-03 DIAGNOSIS — Z95.0 CARDIAC PACEMAKER IN SITU: ICD-10-CM

## 2019-06-03 RX ORDER — CARVEDILOL 3.12 MG/1
3.12 TABLET ORAL 2 TIMES DAILY WITH MEALS
Qty: 60 TABLET | Refills: 3 | Status: SHIPPED | OUTPATIENT
Start: 2019-06-03 | End: 2019-07-24

## 2019-06-03 NOTE — TELEPHONE ENCOUNTER
Post device implant discharge phone call.    Reviewed the following:  No raising arm above shoulder on the side of implant for 3 weeks (until June 21)  Remove outer dressing 3 days after implant. (Tuesday 6-4-19)    May shower after outer dressing removed. Leave steri-strips in place, will be removed at 1 week device check  No driving for: 3 days    Watch for redness, drainage, warmth, or fever. Call device clinic if any signs of infection.     1 week device check scheduled: Wednesday June 5th @ 1130 in [facility]/ pt is aware of the location    Pt states understanding of all instructions. sml

## 2019-06-04 LAB — INTERPRETATION ECG - MUSE: NORMAL

## 2019-06-05 ENCOUNTER — ANCILLARY PROCEDURE (OUTPATIENT)
Dept: CARDIOLOGY | Facility: CLINIC | Age: 74
End: 2019-06-05
Attending: INTERNAL MEDICINE
Payer: COMMERCIAL

## 2019-06-05 DIAGNOSIS — I25.10 CAD (CORONARY ARTERY DISEASE): ICD-10-CM

## 2019-06-05 DIAGNOSIS — Z95.0 CARDIAC PACEMAKER IN SITU: ICD-10-CM

## 2019-06-05 DIAGNOSIS — I48.91 ATRIAL FIBRILLATION (H): ICD-10-CM

## 2019-06-05 PROCEDURE — 93279 PRGRMG DEV EVAL PM/LDLS PM: CPT | Performed by: INTERNAL MEDICINE

## 2019-06-07 ENCOUNTER — MYC MEDICAL ADVICE (OUTPATIENT)
Dept: INTERNAL MEDICINE | Facility: CLINIC | Age: 74
End: 2019-06-07

## 2019-06-07 DIAGNOSIS — I10 BENIGN HYPERTENSION: ICD-10-CM

## 2019-06-07 RX ORDER — LISINOPRIL 30 MG/1
30 TABLET ORAL DAILY
Qty: 90 TABLET | Refills: 1 | Status: SHIPPED | OUTPATIENT
Start: 2019-06-07 | End: 2020-01-02

## 2019-06-07 NOTE — TELEPHONE ENCOUNTER
Please note Adan PENA from patient.     Last script for Lisinopril was on 1/30/19 from Dr. Painter.   Hospital discharge medication list review from 5/31/19 no medication change in lisinopril.       However per progress note on 5/28/19 by Dr. Gamble     Hypertension.  Blood pressure is elevated  today.  Recommend increasing lisinopril from 20  to 30 mg a day.  I will also add Lasix (40 mg  daily) as he seems to be fluid overloaded  physical exam.      Please advise.

## 2019-06-09 LAB
MDC_IDC_LEAD_IMPLANT_DT: NORMAL
MDC_IDC_LEAD_LOCATION: NORMAL
MDC_IDC_LEAD_LOCATION_DETAIL_1: NORMAL
MDC_IDC_LEAD_MFG: NORMAL
MDC_IDC_LEAD_MODEL: NORMAL
MDC_IDC_LEAD_POLARITY_TYPE: NORMAL
MDC_IDC_LEAD_SERIAL: NORMAL
MDC_IDC_MSMT_BATTERY_DTM: NORMAL
MDC_IDC_MSMT_BATTERY_REMAINING_LONGEVITY: 181 MO
MDC_IDC_MSMT_BATTERY_RRT_TRIGGER: 2.62
MDC_IDC_MSMT_BATTERY_STATUS: NORMAL
MDC_IDC_MSMT_BATTERY_VOLTAGE: 3.2 V
MDC_IDC_MSMT_LEADCHNL_RV_IMPEDANCE_VALUE: 456 OHM
MDC_IDC_MSMT_LEADCHNL_RV_IMPEDANCE_VALUE: 551 OHM
MDC_IDC_MSMT_LEADCHNL_RV_PACING_THRESHOLD_AMPLITUDE: 0.5 V
MDC_IDC_MSMT_LEADCHNL_RV_PACING_THRESHOLD_PULSEWIDTH: 0.4 MS
MDC_IDC_MSMT_LEADCHNL_RV_SENSING_INTR_AMPL: 2.5 MV
MDC_IDC_MSMT_LEADCHNL_RV_SENSING_INTR_AMPL: 2.88 MV
MDC_IDC_PG_IMPLANT_DTM: NORMAL
MDC_IDC_PG_MFG: NORMAL
MDC_IDC_PG_MODEL: NORMAL
MDC_IDC_PG_SERIAL: NORMAL
MDC_IDC_PG_TYPE: NORMAL
MDC_IDC_SESS_CLINIC_NAME: NORMAL
MDC_IDC_SESS_DTM: NORMAL
MDC_IDC_SESS_TYPE: NORMAL
MDC_IDC_SET_BRADY_HYSTRATE: NORMAL
MDC_IDC_SET_BRADY_LOWRATE: 50 {BEATS}/MIN
MDC_IDC_SET_BRADY_MODE: NORMAL
MDC_IDC_SET_LEADCHNL_RV_PACING_AMPLITUDE: 3 V
MDC_IDC_SET_LEADCHNL_RV_PACING_ANODE_ELECTRODE_1: NORMAL
MDC_IDC_SET_LEADCHNL_RV_PACING_ANODE_LOCATION_1: NORMAL
MDC_IDC_SET_LEADCHNL_RV_PACING_CAPTURE_MODE: NORMAL
MDC_IDC_SET_LEADCHNL_RV_PACING_CATHODE_ELECTRODE_1: NORMAL
MDC_IDC_SET_LEADCHNL_RV_PACING_CATHODE_LOCATION_1: NORMAL
MDC_IDC_SET_LEADCHNL_RV_PACING_POLARITY: NORMAL
MDC_IDC_SET_LEADCHNL_RV_PACING_PULSEWIDTH: 0.4 MS
MDC_IDC_SET_LEADCHNL_RV_SENSING_ANODE_ELECTRODE_1: NORMAL
MDC_IDC_SET_LEADCHNL_RV_SENSING_ANODE_LOCATION_1: NORMAL
MDC_IDC_SET_LEADCHNL_RV_SENSING_CATHODE_ELECTRODE_1: NORMAL
MDC_IDC_SET_LEADCHNL_RV_SENSING_CATHODE_LOCATION_1: NORMAL
MDC_IDC_SET_LEADCHNL_RV_SENSING_POLARITY: NORMAL
MDC_IDC_SET_LEADCHNL_RV_SENSING_SENSITIVITY: 0.9 MV
MDC_IDC_SET_ZONE_DETECTION_INTERVAL: 360 MS
MDC_IDC_SET_ZONE_TYPE: NORMAL
MDC_IDC_STAT_BRADY_DTM_END: NORMAL
MDC_IDC_STAT_BRADY_DTM_START: NORMAL
MDC_IDC_STAT_BRADY_RV_PERCENT_PACED: 24.22 %
MDC_IDC_STAT_EPISODE_RECENT_COUNT: 0
MDC_IDC_STAT_EPISODE_RECENT_COUNT: 0
MDC_IDC_STAT_EPISODE_RECENT_COUNT_DTM_END: NORMAL
MDC_IDC_STAT_EPISODE_RECENT_COUNT_DTM_END: NORMAL
MDC_IDC_STAT_EPISODE_RECENT_COUNT_DTM_START: NORMAL
MDC_IDC_STAT_EPISODE_RECENT_COUNT_DTM_START: NORMAL
MDC_IDC_STAT_EPISODE_TOTAL_COUNT: 0
MDC_IDC_STAT_EPISODE_TOTAL_COUNT: 0
MDC_IDC_STAT_EPISODE_TOTAL_COUNT_DTM_END: NORMAL
MDC_IDC_STAT_EPISODE_TOTAL_COUNT_DTM_END: NORMAL
MDC_IDC_STAT_EPISODE_TOTAL_COUNT_DTM_START: NORMAL
MDC_IDC_STAT_EPISODE_TOTAL_COUNT_DTM_START: NORMAL
MDC_IDC_STAT_EPISODE_TYPE: NORMAL

## 2019-06-20 DIAGNOSIS — I48.20 CHRONIC ATRIAL FIBRILLATION (H): ICD-10-CM

## 2019-06-20 RX ORDER — FUROSEMIDE 40 MG
40 TABLET ORAL DAILY
Qty: 90 TABLET | Refills: 0 | Status: SHIPPED | OUTPATIENT
Start: 2019-06-20 | End: 2019-09-17

## 2019-06-26 ENCOUNTER — ANCILLARY PROCEDURE (OUTPATIENT)
Dept: CARDIOLOGY | Facility: CLINIC | Age: 74
End: 2019-06-26
Attending: INTERNAL MEDICINE
Payer: COMMERCIAL

## 2019-06-26 DIAGNOSIS — Z95.0 CARDIAC PACEMAKER IN SITU: ICD-10-CM

## 2019-07-04 DIAGNOSIS — I25.10 ASCVD (ARTERIOSCLEROTIC CARDIOVASCULAR DISEASE): ICD-10-CM

## 2019-07-04 DIAGNOSIS — I48.91 NEW ONSET ATRIAL FIBRILLATION (H): ICD-10-CM

## 2019-07-04 NOTE — TELEPHONE ENCOUNTER
Requested Prescriptions   Pending Prescriptions Disp Refills     ELIQUIS 5 MG tablet [Pharmacy Med Name: ELIQUIS 5 MG TABLET] 60 tablet 5     Sig: TAKE 1 TABLET BY MOUTH TWICE A DAY   Last Written Prescription Date:  1/3/2019  Last Fill Quantity: 60,  # refills: 5   Last Office Visit: 5/23/2019   Future Office Visit:    Next 5 appointments (look out 90 days)    Jul 29, 2019  2:15 PM CDT  Return Visit with Tereso Alexander MD  [facility] [address] [address]  [facility] [address] [phone]             Direct Oral Anticoagulant Agents Passed - 7/4/2019 11:47 AM        Passed - Normal Platelets on file in past 12 months     Recent Labs   Lab Test 05/31/19  1415                  Passed - Medication is active on med list        Passed - Patient is 18-79 years of age        Passed - Serum creatinine less than or equal to 1.4 on file in past 12 mos     Recent Labs   Lab Test 05/31/19  1415   CR 0.65*             Passed - Weight is greater than 60 kg for the past year     Wt Readings from Last 3 Encounters:   05/31/19 147.1 kg (324 lb 6.4 oz)   05/28/19 (!) 151 kg (333 lb)   05/23/19 146.9 kg (323 lb 12.8 oz)             Passed - Recent (6 mo) or future (30 days) visit within the authorizing provider's specialty        atorvastatin (LIPITOR) 80 MG tablet [Pharmacy Med Name: ATORVASTATIN 80 MG TABLET] 90 tablet 3     Sig: TAKE 1 TABLET BY MOUTH DAILY - DUE FOR FASTING LABS IN DECEMBER   Last Written Prescription Date:  7/10/2018  Last Fill Quantity: 90,  # refills: 3   Last Office Visit: 5/23/2019   Future Office Visit:    Next 5 appointments (look out 90 days)    Jul 29, 2019  2:15 PM CDT  Return Visit with Tereso Alexander MD  [facility]) [address] [address]  [facility] [id number] [phone]             Statins Protocol Passed - 7/4/2019 11:47 AM         "Passed - LDL on file in past 12 months     Recent Labs   Lab Test 12/11/18  0914   LDL 55             Passed - No abnormal creatine kinase in past 12 months     No lab results found.             Passed - Recent (12 mo) or future (30 days) visit within the authorizing provider's specialty     Patient had office visit in the last 12 months or has a visit in the next 30 days with authorizing provider or within the authorizing provider's specialty.  See \"Patient Info\" tab in inbasket, or \"Choose Columns\" in Meds & Orders section of the refill encounter.              Passed - Medication is active on med list        Passed - Patient is age 18 or older          "

## 2019-07-05 RX ORDER — APIXABAN 5 MG/1
TABLET, FILM COATED ORAL
Qty: 60 TABLET | Refills: 5 | Status: SHIPPED | OUTPATIENT
Start: 2019-07-05 | End: 2019-12-30

## 2019-07-05 RX ORDER — ATORVASTATIN CALCIUM 80 MG/1
TABLET, FILM COATED ORAL
Qty: 90 TABLET | Refills: 1 | Status: SHIPPED | OUTPATIENT
Start: 2019-07-05 | End: 2020-01-15

## 2019-07-24 DIAGNOSIS — I48.91 ATRIAL FIBRILLATION (H): ICD-10-CM

## 2019-07-24 DIAGNOSIS — I25.10 CAD (CORONARY ARTERY DISEASE): ICD-10-CM

## 2019-07-24 RX ORDER — CARVEDILOL 3.12 MG/1
3.12 TABLET ORAL 2 TIMES DAILY WITH MEALS
Qty: 180 TABLET | Refills: 0 | Status: SHIPPED | OUTPATIENT
Start: 2019-07-24 | End: 2019-12-11

## 2019-07-29 ENCOUNTER — OFFICE VISIT (OUTPATIENT)
Dept: CARDIOLOGY | Facility: CLINIC | Age: 74
End: 2019-07-29
Payer: COMMERCIAL

## 2019-07-29 VITALS
HEIGHT: 71 IN | WEIGHT: 315 LBS | BODY MASS INDEX: 44.1 KG/M2 | DIASTOLIC BLOOD PRESSURE: 64 MMHG | SYSTOLIC BLOOD PRESSURE: 118 MMHG | HEART RATE: 68 BPM

## 2019-07-29 DIAGNOSIS — E78.5 HYPERLIPIDEMIA WITH TARGET LDL LESS THAN 70: ICD-10-CM

## 2019-07-29 DIAGNOSIS — I48.20 CHRONIC ATRIAL FIBRILLATION (H): ICD-10-CM

## 2019-07-29 DIAGNOSIS — E66.01 MORBID OBESITY DUE TO EXCESS CALORIES (H): ICD-10-CM

## 2019-07-29 DIAGNOSIS — I10 BENIGN HYPERTENSION: ICD-10-CM

## 2019-07-29 DIAGNOSIS — I25.10 CORONARY ARTERY DISEASE INVOLVING NATIVE CORONARY ARTERY OF NATIVE HEART, ANGINA PRESENCE UNSPECIFIED: Primary | ICD-10-CM

## 2019-07-29 DIAGNOSIS — I49.5 TACHY-BRADY SYNDROME (H): ICD-10-CM

## 2019-07-29 PROCEDURE — 99214 OFFICE O/P EST MOD 30 MIN: CPT | Mod: 24 | Performed by: INTERNAL MEDICINE

## 2019-07-29 ASSESSMENT — MIFFLIN-ST. JEOR: SCORE: 2256.28

## 2019-07-29 NOTE — PROGRESS NOTES
Service Date: 07/29/2019      PRIMARY CARE PHYSICIAN:  Dr. Jeronimo Painter.      HISTORY OF PRESENT ILLNESS:  I again had the pleasure of seeing your patient, James Salvador (Pete), for evaluation of coronary artery disease, mixed hyperlipidemia, chronic atrial fibrillation and tachybrady syndrome.  The patient is status post myocardial infarction in 1994 while living in [place].  He had frequent runs of SVT that had been treated medically.  He underwent right shoulder surgery early in 2017 and then on 05/08/2017 underwent left total knee arthroplasty.  In preoperative evaluation he was found to be in atrial fibrillation with controlled ventricular response.  He was asymptomatic.  In 09/2015 a Lexiscan nuclear stress test showed moderate-sized predominantly reversible lateral and inferolateral defect consistent with ischemia.  He was hospitalized for recurrent angina in 2015 and coronary angiography again showed occluded right coronary artery with collaterals and severe stenosis in the mid LAD that was then stented with a drug-eluting stent.  A Lexiscan nuclear stress test in 09/2016 showed the inferior and inferolateral ischemia but no anterior wall ischemia.  The patient has been following with Dr. Gamble in the EP Department for tachybrady syndrome and atrial fibrillation.  Because of severe bradycardia a decision was made to place a VVIR pacemaker on 05/31/2019.  The patient notes that he feels better and denies syncope or presyncope.  The patient has reasonable rate control with the use of metoprolol.  He remains on Eliquis without bleeding diathesis.  The patient's weight and lack of exercise continue to be a problem.  The patient is an impulsive eater.  He sleeps in a recliner but denies sleep apnea.  I gave him a handout on the Mediterranean diet to again help him understand a low-carbohydrate diet.  The patient has now started exercising with a  twice a week.  We talked about aerobic  exercise at least every other day building up to 30 minutes in 3 months.  We also discussed low-impact weightlifting and other exercises.  The patient notes he has some arthritis in ankles and toes.      PHYSICAL EXAMINATION:  As noted below.  Weight 1 year ago 339 pounds, weight this year is 329 pounds.      ASSESSMENT:   1.  Alden Grant is a delightful 74-year-old male with a history of coronary artery disease and myocardial infarction with angioplasty in .  He is status post LAD intracoronary stenting in 2015 with known totally occluded right coronary artery.  Followup Lexiscan nuclear stress test in  showed ischemia in the right coronary artery distribution but not anteriorly.  We would not repeat this until at least  unless he has recurrent angina.   2.  Atrial fibrillation which is chronic.  He is rate controlled.  He has tachybrady syndrome and a permanent VVIR pacemaker was implanted with a low rate of 50 beats per minute.  We will continue to monitor.   3.  Morbid obesity.  We again discussed the need for diet and exercise.      It is my pleasure to assist in the care of Alden Grant.  I will see him again in 1 year or earlier on a p.r.n. basis.  All his questions were answered to his satisfaction.      Pj Hahn MD        cc:   Jeronimo Painter MD    [facility]    [address]  [address]         PJ HAHN MD, [facility]             D: 2019   T: 2019   MT: ALEJANDRO      Name:     HUGO GRANT   MRN:      [id number]        Account:      [id number]   :      1945           Service Date: 2019      Document: [id number]

## 2019-07-29 NOTE — LETTER
7/29/2019      Jeronimo Painter MD  [address] [facility] [address]      RE: James Salvador       Dear Colleague,    I had the pleasure of seeing James Salvador in the [facility].    Service Date: 07/29/2019      PRIMARY CARE PHYSICIAN:  Dr. Jeronimo Painter.      HISTORY OF PRESENT ILLNESS:  I again had the pleasure of seeing your patient, James Salvador (Pete), for evaluation of coronary artery disease, mixed hyperlipidemia, chronic atrial fibrillation and tachybrady syndrome.  The patient is status post myocardial infarction in 1994 while living in [place].  He had frequent runs of SVT that had been treated medically.  He underwent right shoulder surgery early in 2017 and then on 05/08/2017 underwent left total knee arthroplasty.  In preoperative evaluation he was found to be in atrial fibrillation with controlled ventricular response.  He was asymptomatic.  In 09/2015 a Lexiscan nuclear stress test showed moderate-sized predominantly reversible lateral and inferolateral defect consistent with ischemia.  He was hospitalized for recurrent angina in 2015 and coronary angiography again showed occluded right coronary artery with collaterals and severe stenosis in the mid LAD that was then stented with a drug-eluting stent.  A Lexiscan nuclear stress test in 09/2016 showed the inferior and inferolateral ischemia but no anterior wall ischemia.  The patient has been following with Dr. Gamble in the EP Department for tachybrady syndrome and atrial fibrillation.  Because of severe bradycardia a decision was made to place a VVIR pacemaker on 05/31/2019.  The patient notes that he feels better and denies syncope or presyncope.  The patient has reasonable rate control with the use of metoprolol.  He remains on Eliquis without bleeding diathesis.  The patient's weight and lack of exercise continue to be a problem.  The patient is an impulsive eater.  He sleeps in a recliner but  denies sleep apnea.  I gave him a handout on the Mediterranean diet to again help him understand a low-carbohydrate diet.  The patient has now started exercising with a  twice a week.  We talked about aerobic exercise at least every other day building up to 30 minutes in 3 months.  We also discussed low-impact weightlifting and other exercises.  The patient notes he has some arthritis in ankles and toes.      PHYSICAL EXAMINATION:  As noted below.  Weight 1 year ago 339 pounds, weight this year is 329 pounds.      ASSESSMENT:   1.  Alden Grant is a delightful 74-year-old male with a history of coronary artery disease and myocardial infarction with angioplasty in .  He is status post LAD intracoronary stenting in 2015 with known totally occluded right coronary artery.  Followup Lexiscan nuclear stress test in  showed ischemia in the right coronary artery distribution but not anteriorly.  We would not repeat this until at least  unless he has recurrent angina.   2.  Atrial fibrillation which is chronic.  He is rate controlled.  He has tachybrady syndrome and a permanent VVIR pacemaker was implanted with a low rate of 50 beats per minute.  We will continue to monitor.   3.  Morbid obesity.  We again discussed the need for diet and exercise.      It is my pleasure to assist in the care of Alden Grant.  I will see him again in 1 year or earlier on a p.r.n. basis.  All his questions were answered to his satisfaction.      Pj Hahn MD        cc:   Jeronimo Painter MD    [address]  [address]         [facility]HN [facility], [facility]             D: 2019   T: 2019   MT: ALEJANDRO      Name:     HUGO GRANT   MRN:      [id number]        Account:      [id number]   :      1945           Service Date: 2019      Document: [id number]         Outpatient Encounter Medications as of 2019   Medication Sig Dispense  Refill     Acetaminophen (TYLENOL PO) Take 650 mg by mouth 4 times daily        amoxicillin (AMOXIL) 500 MG capsule 2,000 mg as needed When going to the dentist  0     ASPIRIN PO Take 81 mg by mouth daily       atorvastatin (LIPITOR) 80 MG tablet TAKE 1 TABLET BY MOUTH DAILY - DUE FOR FASTING LABS IN DECEMBER 90 tablet 1     carvedilol (COREG) 3.125 MG tablet Take 1 tablet (3.125 mg) by mouth 2 times daily (with meals) 180 tablet 0     ciclopirox (LOPROX) 0.77 % cream Apply topically At Bedtime 90 g 3     Doxycycline Hyclate (PERIOSTAT PO) Take 20 mg by mouth 2 times daily       ELIQUIS 5 MG tablet TAKE 1 TABLET BY MOUTH TWICE A DAY 60 tablet 5     fluocinonide (LIDEX) 0.05 % external solution Apply to scalp BID x 1-2 weeks PRN 60 mL 3     fluorouracil (EFUDEX) 5 % external cream Apply to scap BID x 3-4 weeks. Protect area from the sun. Do not apply to large surface area. 40 g 1     fluticasone (FLONASE) 50 MCG/ACT nasal spray SPRAY 2 SPRAYS INTO BOTH NOSTRILS DAILY (Patient taking differently: Spray 2 sprays into both nostrils as needed ) 16 mL 6     furosemide (LASIX) 40 MG tablet Take 1 tablet (40 mg) by mouth daily 90 tablet 0     GABAPENTIN PO Take 600 mg by mouth 3 times daily        ketoconazole (NIZORAL) 2 % cream Apply topically 2 times daily For face. FAX REFILL REQUESTS TO [facility]: [phone] 30 g 2     ketoconazole (NIZORAL) 2 % external shampoo Use daily as needed 120 mL 3     ketotifen (ZADITOR/REFRESH ANTI-ITCH) 0.025 % SOLN Place 1-2 drops into both eyes 2 times daily as needed for itching       lisinopril (PRINIVIL/ZESTRIL) 30 MG tablet Take 1 tablet (30 mg) by mouth daily 90 tablet 1     loratadine (CLARITIN) 10 MG tablet Take 10 mg by mouth daily as needed for allergies       Multiple Vitamin (MULTIVITAMIN OR) Take 1 tablet by mouth daily        nitroglycerin (NITROSTAT) 0.4 MG SL tablet Place 1 tablet (0.4 mg) under the tongue every 5 minutes as needed for chest pain May repeat twice for a  total of 3 tablets.  If chest pain not relieved, call 911 25 tablet 11     Omega-3 Fatty Acids (OMEGA-3 FISH OIL PO) Take 3.2 g by mouth daily        OXcarbazepine (TRILEPTAL PO) Take 300 mg by mouth 3 times daily        desonide (DESOWEN) 0.05 % external cream Apply sparingly to affected area on face in morning 60 g 0     No facility-administered encounter medications on file as of 7/29/2019.        Again, thank you for allowing me to participate in the care of your patient.      Sincerely,    Tereso Alexander MD     [facility]

## 2019-07-29 NOTE — LETTER
7/29/2019    Jeronimo Painter MD  [address] [facility] [address]    RE: James Salvador       Dear Colleague,    I had the pleasure of seeing James Salvador in the [facility].    HPI and Plan:   See dictation:[id number]    Orders Placed This Encounter   Procedures     Follow-Up with Cardiologist       No orders of the defined types were placed in this encounter.      There are no discontinued medications.      Encounter Diagnoses   Name Primary?     Coronary artery disease involving native coronary artery of native heart, angina presence unspecified Yes     Chronic atrial fibrillation (H)      Tachy-edinson syndrome (H)      Benign hypertension      Hyperlipidemia with target LDL less than 70      Morbid obesity due to excess calories (H)        CURRENT MEDICATIONS:  Current Outpatient Medications   Medication Sig Dispense Refill     Acetaminophen (TYLENOL PO) Take 650 mg by mouth 4 times daily        amoxicillin (AMOXIL) 500 MG capsule 2,000 mg as needed When going to the dentist  0     ASPIRIN PO Take 81 mg by mouth daily       atorvastatin (LIPITOR) 80 MG tablet TAKE 1 TABLET BY MOUTH DAILY - DUE FOR FASTING LABS IN DECEMBER 90 tablet 1     carvedilol (COREG) 3.125 MG tablet Take 1 tablet (3.125 mg) by mouth 2 times daily (with meals) 180 tablet 0     ciclopirox (LOPROX) 0.77 % cream Apply topically At Bedtime 90 g 3     Doxycycline Hyclate (PERIOSTAT PO) Take 20 mg by mouth 2 times daily       ELIQUIS 5 MG tablet TAKE 1 TABLET BY MOUTH TWICE A DAY 60 tablet 5     fluocinonide (LIDEX) 0.05 % external solution Apply to scalp BID x 1-2 weeks PRN 60 mL 3     fluorouracil (EFUDEX) 5 % external cream Apply to scap BID x 3-4 weeks. Protect area from the sun. Do not apply to large surface area. 40 g 1     fluticasone (FLONASE) 50 MCG/ACT nasal spray SPRAY 2 SPRAYS INTO BOTH NOSTRILS DAILY (Patient taking differently: Spray 2 sprays into both nostrils as needed ) 16 mL 6      furosemide (LASIX) 40 MG tablet Take 1 tablet (40 mg) by mouth daily 90 tablet 0     GABAPENTIN PO Take 600 mg by mouth 3 times daily        ketoconazole (NIZORAL) 2 % cream Apply topically 2 times daily For face. FAX REFILL REQUESTS TO  [facility]: [phone] 30 g 2     ketoconazole (NIZORAL) 2 % external shampoo Use daily as needed 120 mL 3     ketotifen (ZADITOR/REFRESH ANTI-ITCH) 0.025 % SOLN Place 1-2 drops into both eyes 2 times daily as needed for itching       lisinopril (PRINIVIL/ZESTRIL) 30 MG tablet Take 1 tablet (30 mg) by mouth daily 90 tablet 1     loratadine (CLARITIN) 10 MG tablet Take 10 mg by mouth daily as needed for allergies       Multiple Vitamin (MULTIVITAMIN OR) Take 1 tablet by mouth daily        nitroglycerin (NITROSTAT) 0.4 MG SL tablet Place 1 tablet (0.4 mg) under the tongue every 5 minutes as needed for chest pain May repeat twice for a total of 3 tablets.  If chest pain not relieved, call 911 25 tablet 11     Omega-3 Fatty Acids (OMEGA-3 FISH OIL PO) Take 3.2 g by mouth daily        OXcarbazepine (TRILEPTAL PO) Take 300 mg by mouth 3 times daily        desonide (DESOWEN) 0.05 % external cream Apply sparingly to affected area on face in morning 60 g 0       ALLERGIES     Allergies   Allergen Reactions     Cats      Cat Dander     Dogs      Dog Dander     No Clinical Screening - See Comments      Pet dander, pollen, grasses, molds     Pollen Extract        PAST MEDICAL HISTORY:  Past Medical History:   Diagnosis Date     Actinic cheilitis 7/17/2013    Lower lip, left      Actinic keratosis      Allergic rhinitis      Benign hypertension      CAD (coronary artery disease)     Cardiac cath and PCI 1994. Cardiac Cath 9/2015: BRIDGET to LAD     History of myocardial infarction 1994    PTCA     Hyperlipidemia LDL goal < 70      Mixed hyperlipidemia 6/21/2017     Morbid obesity due to excess calories (H) 1/11/2016     Paroxysmal supraventricular tachycardia (H)     on metoprolol     Permanent  atrial fibrillation (H) 2017     Squamous cell carcinoma      Stable angina (H) 2016     Tachy-edinson syndrome (H) 2019    Added automatically from request for surgery [id number]       PAST SURGICAL HISTORY:  Past Surgical History:   Procedure Laterality Date     ANGIOPLASTY      in [place]     ANKLE SURGERY  2005    right ankle     EP PERM PACER SINGLE LEAD N/A 2019    Medtronic single lead pacemaker     HEART CATH STENT COR W/WO PTCA  2015    BRIDGET stent mid LAD     JOINT REPLACEMENT, HIP RT/LT  10/14/2009    right hip      LASER SURGERY OF EYE  2002     MOHS MICROGRAPHIC PROCEDURE  2004    squamous cell carcinoma right temple     SINUS SURGERY  2006       FAMILY HISTORY:  Family History   Problem Relation Age of Onset     Genitourinary Problems Mother         renal failure     Cardiovascular Father         rupture of dorsal aorta     Depression Son        SOCIAL HISTORY:  Social History     Socioeconomic History     Marital status:      Spouse name: None     Number of children: 3     Years of education: None     Highest education level: None   Occupational History     Employer: RETIRED   Social Needs     Financial resource strain: None     Food insecurity:     Worry: None     Inability: None     Transportation needs:     Medical: None     Non-medical: None   Tobacco Use     Smoking status: Former Smoker     Years: 10.00     Types: Cigarettes     Last attempt to quit: 1987     Years since quittin.5     Smokeless tobacco: Never Used   Substance and Sexual Activity     Alcohol use: Yes     Alcohol/week: 0.0 oz     Comment: socially - x2-3 per month -  none currently     Drug use: No     Sexual activity: Yes     Partners: Female   Lifestyle     Physical activity:     Days per week: None     Minutes per session: None     Stress: None   Relationships     Social connections:     Talks on phone: None     Gets together: None     Attends Synagogue service:  "None     Active member of club or organization: None     Attends meetings of clubs or organizations: None     Relationship status: None     Intimate partner violence:     Fear of current or ex partner: None     Emotionally abused: None     Physically abused: None     Forced sexual activity: None   Other Topics Concern     Parent/sibling w/ CABG, MI or angioplasty before 65F 55M? Not Asked      Service Not Asked     Blood Transfusions Not Asked     Caffeine Concern Yes     Comment: 2 big cups coffee daily     Occupational Exposure Not Asked     Hobby Hazards Not Asked     Sleep Concern No     Comment: sleeping better since shoulder replaced 11/3/16     Stress Concern No     Weight Concern Yes     Special Diet Yes     Comment: trying to do more lean meats     Back Care Not Asked     Exercise No     Comment: limited - knee     Bike Helmet Not Asked     Seat Belt Not Asked     Self-Exams Not Asked   Social History Narrative     None       Review of Systems:  Skin:  Negative       Eyes:  Positive for glasses    ENT:  Positive for hearing loss    Respiratory:  Negative       Cardiovascular:  Negative      Gastroenterology: Negative      Genitourinary:  Negative      Musculoskeletal:  Positive for arthritis;neck pain;back pain    Neurologic:  Negative      Psychiatric:  Negative      Heme/Lymph/Imm:  Positive for allergies animal , seasonal , pollens  Endocrine:  Negative        Physical Exam:  Vitals: /64 (BP Location: Right arm, Patient Position: Sitting, Cuff Size: Adult Large)   Pulse 68   Ht 1.803 m (5' 11\")   Wt 149.4 kg (329 lb 6.4 oz)   BMI 45.94 kg/m       Constitutional:  cooperative, alert and oriented, well developed, well nourished, in no acute distress morbidly obese      Skin:  warm and dry to the touch, no apparent skin lesions or masses noted   pacemaker incision in the left infraclavicular area was well-healed      Head:  normocephalic, no masses or lesions        Eyes:  pupils equal and " round;conjunctivae and lids unremarkable;sclera white;no xanthalasma;EOMS intact        Lymph:      ENT:  no pallor or cyanosis, dentition good hearing aide(s) present      Neck:  carotid pulses are full and equal bilaterally, JVP normal, no carotid bruit        Respiratory:  normal breath sounds, clear to auscultation, normal A-P diameter, normal symmetry, normal respiratory excursion, no use of accessory muscles         Cardiac: regular rhythm;normal S1 and S2;no S3 or S4;apical impulse not displaced irregularly irregular rhythm   no presence of murmur          pulses full and equal, no bruits auscultated                                        GI:  abdomen soft, non-tender, BS normoactive, no mass, no HSM, no bruits obese      Extremities and Muscular Skeletal:  no deformities, clubbing, cyanosis, erythema observed   trace;bilateral LE edema     scar over left knee    Neurological:  no gross motor deficits;affect appropriate        Psych:  Alert and Oriented x 3        CC  Jeronimo Painter MD  [address] [place] [id number]                Thank you for allowing me to participate in the care of your patient.      Sincerely,     Tereso Alexander MD     [facility]    cc:   Jeronimo Painter MD  [address] [place] [id number]

## 2019-07-29 NOTE — PROGRESS NOTES
HPI and Plan:   See dictation:[id number]    Orders Placed This Encounter   Procedures     Follow-Up with Cardiologist       No orders of the defined types were placed in this encounter.      There are no discontinued medications.      Encounter Diagnoses   Name Primary?     Coronary artery disease involving native coronary artery of native heart, angina presence unspecified Yes     Chronic atrial fibrillation (H)      Tachy-edinosn syndrome (H)      Benign hypertension      Hyperlipidemia with target LDL less than 70      Morbid obesity due to excess calories (H)        CURRENT MEDICATIONS:  Current Outpatient Medications   Medication Sig Dispense Refill     Acetaminophen (TYLENOL PO) Take 650 mg by mouth 4 times daily        amoxicillin (AMOXIL) 500 MG capsule 2,000 mg as needed When going to the dentist  0     ASPIRIN PO Take 81 mg by mouth daily       atorvastatin (LIPITOR) 80 MG tablet TAKE 1 TABLET BY MOUTH DAILY - DUE FOR FASTING LABS IN DECEMBER 90 tablet 1     carvedilol (COREG) 3.125 MG tablet Take 1 tablet (3.125 mg) by mouth 2 times daily (with meals) 180 tablet 0     ciclopirox (LOPROX) 0.77 % cream Apply topically At Bedtime 90 g 3     Doxycycline Hyclate (PERIOSTAT PO) Take 20 mg by mouth 2 times daily       ELIQUIS 5 MG tablet TAKE 1 TABLET BY MOUTH TWICE A DAY 60 tablet 5     fluocinonide (LIDEX) 0.05 % external solution Apply to scalp BID x 1-2 weeks PRN 60 mL 3     fluorouracil (EFUDEX) 5 % external cream Apply to scap BID x 3-4 weeks. Protect area from the sun. Do not apply to large surface area. 40 g 1     fluticasone (FLONASE) 50 MCG/ACT nasal spray SPRAY 2 SPRAYS INTO BOTH NOSTRILS DAILY (Patient taking differently: Spray 2 sprays into both nostrils as needed ) 16 mL 6     furosemide (LASIX) 40 MG tablet Take 1 tablet (40 mg) by mouth daily 90 tablet 0     GABAPENTIN PO Take 600 mg by mouth 3 times daily        ketoconazole (NIZORAL) 2 % cream Apply topically 2 times daily For face. FAX REFILL  REQUESTS TO [facility]: [phone] 30 g 2     ketoconazole (NIZORAL) 2 % external shampoo Use daily as needed 120 mL 3     ketotifen (ZADITOR/REFRESH ANTI-ITCH) 0.025 % SOLN Place 1-2 drops into both eyes 2 times daily as needed for itching       lisinopril (PRINIVIL/ZESTRIL) 30 MG tablet Take 1 tablet (30 mg) by mouth daily 90 tablet 1     loratadine (CLARITIN) 10 MG tablet Take 10 mg by mouth daily as needed for allergies       Multiple Vitamin (MULTIVITAMIN OR) Take 1 tablet by mouth daily        nitroglycerin (NITROSTAT) 0.4 MG SL tablet Place 1 tablet (0.4 mg) under the tongue every 5 minutes as needed for chest pain May repeat twice for a total of 3 tablets.  If chest pain not relieved, call 911 25 tablet 11     Omega-3 Fatty Acids (OMEGA-3 FISH OIL PO) Take 3.2 g by mouth daily        OXcarbazepine (TRILEPTAL PO) Take 300 mg by mouth 3 times daily        desonide (DESOWEN) 0.05 % external cream Apply sparingly to affected area on face in morning 60 g 0       ALLERGIES     Allergies   Allergen Reactions     Cats      Cat Dander     Dogs      Dog Dander     No Clinical Screening - See Comments      Pet dander, pollen, grasses, molds     Pollen Extract        PAST MEDICAL HISTORY:  Past Medical History:   Diagnosis Date     Actinic cheilitis 7/17/2013    Lower lip, left      Actinic keratosis      Allergic rhinitis      Benign hypertension      CAD (coronary artery disease)     Cardiac cath and PCI 1994. Cardiac Cath 9/2015: BRIDGET to LAD     History of myocardial infarction 1994    PTCA     Hyperlipidemia LDL goal < 70      Mixed hyperlipidemia 6/21/2017     Morbid obesity due to excess calories (H) 1/11/2016     Paroxysmal supraventricular tachycardia (H)     on metoprolol     Permanent atrial fibrillation (H) 04/21/2017     Squamous cell carcinoma      Stable angina (H) 1/11/2016     Tachy-edinson syndrome (H) 5/29/2019    Added automatically from request for surgery [id number]       PAST SURGICAL HISTORY:  Past  Surgical History:   Procedure Laterality Date     ANGIOPLASTY  1994    in [place]     ANKLE SURGERY  2005    right ankle     EP PERM PACER SINGLE LEAD N/A 2019    Medtronic single lead pacemaker     HEART CATH STENT COR W/WO PTCA  2015    BRIDGET stent mid LAD     JOINT REPLACEMENT, HIP RT/LT  10/14/2009    right hip      LASER SURGERY OF EYE  2002     MOHS MICROGRAPHIC PROCEDURE  2004    squamous cell carcinoma right temple     SINUS SURGERY  2006       FAMILY HISTORY:  Family History   Problem Relation Age of Onset     Genitourinary Problems Mother         renal failure     Cardiovascular Father         rupture of dorsal aorta     Depression Son        SOCIAL HISTORY:  Social History     Socioeconomic History     Marital status:      Spouse name: None     Number of children: 3     Years of education: None     Highest education level: None   Occupational History     Employer: RETIRED   Social Needs     Financial resource strain: None     Food insecurity:     Worry: None     Inability: None     Transportation needs:     Medical: None     Non-medical: None   Tobacco Use     Smoking status: Former Smoker     Years: 10.00     Types: Cigarettes     Last attempt to quit: 1987     Years since quittin.5     Smokeless tobacco: Never Used   Substance and Sexual Activity     Alcohol use: Yes     Alcohol/week: 0.0 oz     Comment: socially - x2-3 per month -  none currently     Drug use: No     Sexual activity: Yes     Partners: Female   Lifestyle     Physical activity:     Days per week: None     Minutes per session: None     Stress: None   Relationships     Social connections:     Talks on phone: None     Gets together: None     Attends Alevism service: None     Active member of club or organization: None     Attends meetings of clubs or organizations: None     Relationship status: None     Intimate partner violence:     Fear of current or ex partner: None     Emotionally  "abused: None     Physically abused: None     Forced sexual activity: None   Other Topics Concern     Parent/sibling w/ CABG, MI or angioplasty before 65F 55M? Not Asked      Service Not Asked     Blood Transfusions Not Asked     Caffeine Concern Yes     Comment: 2 big cups coffee daily     Occupational Exposure Not Asked     Hobby Hazards Not Asked     Sleep Concern No     Comment: sleeping better since shoulder replaced 11/3/16     Stress Concern No     Weight Concern Yes     Special Diet Yes     Comment: trying to do more lean meats     Back Care Not Asked     Exercise No     Comment: limited - knee     Bike Helmet Not Asked     Seat Belt Not Asked     Self-Exams Not Asked   Social History Narrative     None       Review of Systems:  Skin:  Negative       Eyes:  Positive for glasses    ENT:  Positive for hearing loss    Respiratory:  Negative       Cardiovascular:  Negative      Gastroenterology: Negative      Genitourinary:  Negative      Musculoskeletal:  Positive for arthritis;neck pain;back pain    Neurologic:  Negative      Psychiatric:  Negative      Heme/Lymph/Imm:  Positive for allergies animal , seasonal , pollens  Endocrine:  Negative        Physical Exam:  Vitals: /64 (BP Location: Right arm, Patient Position: Sitting, Cuff Size: Adult Large)   Pulse 68   Ht 1.803 m (5' 11\")   Wt 149.4 kg (329 lb 6.4 oz)   BMI 45.94 kg/m      Constitutional:  cooperative, alert and oriented, well developed, well nourished, in no acute distress morbidly obese      Skin:  warm and dry to the touch, no apparent skin lesions or masses noted   pacemaker incision in the left infraclavicular area was well-healed      Head:  normocephalic, no masses or lesions        Eyes:  pupils equal and round;conjunctivae and lids unremarkable;sclera white;no xanthalasma;EOMS intact        Lymph:      ENT:  no pallor or cyanosis, dentition good hearing aide(s) present      Neck:  carotid pulses are full and equal " bilaterally, JVP normal, no carotid bruit        Respiratory:  normal breath sounds, clear to auscultation, normal A-P diameter, normal symmetry, normal respiratory excursion, no use of accessory muscles         Cardiac: regular rhythm;normal S1 and S2;no S3 or S4;apical impulse not displaced irregularly irregular rhythm   no presence of murmur          pulses full and equal, no bruits auscultated                                        GI:  abdomen soft, non-tender, BS normoactive, no mass, no HSM, no bruits obese      Extremities and Muscular Skeletal:  no deformities, clubbing, cyanosis, erythema observed   trace;bilateral LE edema     scar over left knee    Neurological:  no gross motor deficits;affect appropriate        Psych:  Alert and Oriented x 3        CC  Jeronimo Painter MD  [address] [place] [phone]

## 2019-09-17 DIAGNOSIS — I48.20 CHRONIC ATRIAL FIBRILLATION (H): ICD-10-CM

## 2019-09-17 RX ORDER — FUROSEMIDE 40 MG
40 TABLET ORAL DAILY
Qty: 90 TABLET | Refills: 3 | Status: SHIPPED | OUTPATIENT
Start: 2019-09-17 | End: 2020-08-10

## 2019-10-07 ENCOUNTER — ANCILLARY PROCEDURE (OUTPATIENT)
Dept: CARDIOLOGY | Facility: CLINIC | Age: 74
End: 2019-10-07
Attending: INTERNAL MEDICINE
Payer: COMMERCIAL

## 2019-10-07 DIAGNOSIS — Z95.0 CARDIAC PACEMAKER IN SITU: ICD-10-CM

## 2019-10-07 PROCEDURE — 93296 REM INTERROG EVL PM/IDS: CPT | Performed by: INTERNAL MEDICINE

## 2019-10-07 PROCEDURE — 93294 REM INTERROG EVL PM/LDLS PM: CPT | Performed by: INTERNAL MEDICINE

## 2019-10-09 ENCOUNTER — TRANSFERRED RECORDS (OUTPATIENT)
Dept: HEALTH INFORMATION MANAGEMENT | Facility: CLINIC | Age: 74
End: 2019-10-09

## 2019-10-11 ENCOUNTER — ANCILLARY PROCEDURE (OUTPATIENT)
Dept: CARDIOLOGY | Facility: CLINIC | Age: 74
End: 2019-10-11
Attending: INTERNAL MEDICINE
Payer: COMMERCIAL

## 2019-10-11 DIAGNOSIS — Z95.0 CARDIAC PACEMAKER IN SITU: Primary | ICD-10-CM

## 2019-10-11 DIAGNOSIS — Z95.0 PACEMAKER: ICD-10-CM

## 2019-10-15 ENCOUNTER — OFFICE VISIT (OUTPATIENT)
Dept: DERMATOLOGY | Facility: CLINIC | Age: 74
End: 2019-10-15
Payer: COMMERCIAL

## 2019-10-15 ENCOUNTER — TRANSFERRED RECORDS (OUTPATIENT)
Dept: HEALTH INFORMATION MANAGEMENT | Facility: CLINIC | Age: 74
End: 2019-10-15

## 2019-10-15 VITALS — SYSTOLIC BLOOD PRESSURE: 112 MMHG | DIASTOLIC BLOOD PRESSURE: 71 MMHG | OXYGEN SATURATION: 96 % | HEART RATE: 63 BPM

## 2019-10-15 DIAGNOSIS — L82.1 SEBORRHEIC KERATOSIS: ICD-10-CM

## 2019-10-15 DIAGNOSIS — D18.01 ANGIOMA OF SKIN: ICD-10-CM

## 2019-10-15 DIAGNOSIS — J30.2 CHRONIC SEASONAL ALLERGIC RHINITIS: ICD-10-CM

## 2019-10-15 DIAGNOSIS — Z85.89 HISTORY OF SQUAMOUS CELL CARCINOMA: ICD-10-CM

## 2019-10-15 DIAGNOSIS — L57.0 AK (ACTINIC KERATOSIS): ICD-10-CM

## 2019-10-15 DIAGNOSIS — L21.9 DERMATITIS, SEBORRHEIC: ICD-10-CM

## 2019-10-15 DIAGNOSIS — D48.5 NEOPLASM OF UNCERTAIN BEHAVIOR OF SKIN: Primary | ICD-10-CM

## 2019-10-15 DIAGNOSIS — L81.4 LENTIGO: ICD-10-CM

## 2019-10-15 DIAGNOSIS — D22.9 NEVUS: ICD-10-CM

## 2019-10-15 PROCEDURE — 88305 TISSUE EXAM BY PATHOLOGIST: CPT | Mod: TC | Performed by: PHYSICIAN ASSISTANT

## 2019-10-15 PROCEDURE — 11102 TANGNTL BX SKIN SINGLE LES: CPT | Performed by: PHYSICIAN ASSISTANT

## 2019-10-15 PROCEDURE — 11103 TANGNTL BX SKIN EA SEP/ADDL: CPT | Performed by: PHYSICIAN ASSISTANT

## 2019-10-15 PROCEDURE — 88342 IMHCHEM/IMCYTCHM 1ST ANTB: CPT | Mod: TC | Performed by: PHYSICIAN ASSISTANT

## 2019-10-15 PROCEDURE — 99214 OFFICE O/P EST MOD 30 MIN: CPT | Mod: 25 | Performed by: PHYSICIAN ASSISTANT

## 2019-10-15 RX ORDER — FLUOROURACIL 50 MG/G
CREAM TOPICAL
Qty: 40 G | Refills: 3 | Status: SHIPPED | OUTPATIENT
Start: 2019-10-15 | End: 2020-06-16

## 2019-10-15 NOTE — PROGRESS NOTES
HPI:   Chief complaint: James Salvador (Pete) is a 74 year old male who presents for Full skin cancer screening to rule out skin cancer.   Last Skin Exam: 6 mo ago      1st Baseline: no  Personal HX of Skin Cancer: SCC   Personal HX of Malignant Melanoma: none   Family HX of Skin Cancer / Malignant Melanoma: none  Personal HX of Atypical Moles: none  Risk factors: sun exposure, history of SCC  New / Changing lesions: yes, spots on scalp   MHx: had pacemaker placed over the summer     Social History: Has grandchildren that he watches; youngest is 5 and oldest is 14. He lives in [place]. Had a pacemaker placed in June; HR dropped to 20 BPM  On review of systems, there are no further skin complaints, patient is feeling otherwise well.  See patient intake sheet.  ROS of the following were done and are negative: Constitutional, Eyes, Ears, Nose,   Mouth, Throat, Cardiovascular, Respiratory, GI, Genitourinary, Musculoskeletal,   Psychiatric, Endocrine, Allergic/Immunologic.    This document serves as a record of the services and decisions personally performed and made by Rosa Maria Hansen, MS, PA-C. It was created on her behalf by Inez Prince, a trained medical scribe. The creation of this document is based on the provider's statements to the medical scribe.  Inez Prince 9:29 AM October 15, 2019    PHYSICAL EXAM:   /71   Pulse 63   SpO2 96%   Skin exam performed as follows: Type 2 skin. Mood appropriate  Alert and Oriented X 3. Well developed, well nourished in no distress.  General appearance: Normal  Head including face: Normal  Eyes: conjunctiva and lids: Normal  Mouth: Lips, teeth, gums: Normal  Neck: Normal  Chest-breast/axillae: Normal  Back: Normal  Spleen and liver: Normal  Cardiovascular: Exam of peripheral vascular system by observation for swelling, varicosities, edema: Normal  Genitalia: groin, buttocks: Normal  Extremities: digits/nails (clubbing): Normal  Eccrine and  Apocrine glands: Normal  Right upper extremity: Normal  Left upper extremity: Normal  Right lower extremity: Normal  Left lower extremity: Normal  Skin: Scalp and body hair: See below    Pt deferred exam of breasts, groin, buttocks: NO    Other physical findings:  1. Multiple pigmented macules on extremities and trunk  2. Multiple pigmented macules on face, trunk and extremities  3. Multiple vascular papules on trunk, arms and legs  4. Multiple scattered keratotic plaques  5. 6 mm pink papule right frontal scalp   6. 5 mm pink excoriated papule on left parietal scalp  7. 6 mm pink papule on left zygoma  8. Numerous gritty papules  >15 on each arm, on the right cheek, top of the scalp      Except as noted above, no other signs of skin cancer or melanoma.     ASSESSMENT/PLAN:   Benign Full skin cancer screening today.     Patient with history of SCC  Advised on monthly self exams and 1 year  Patient Education: Appropriate brochures given.    Multiple benign appearing nevi on arms, legs and trunk. Discussed ABCDEs of melanoma and sunscreen.   Multiple lentigos on arms, legs and trunk. Advised benign, no treatment needed.  Multiple scattered angiomas. Advised benign, no treatment needed.   Seborrheic keratosis on arms, legs and trunk. Advised benign, no treatment needed.  R/o SCC on right frontal scalp. Photo taken and placed in chart. Shave biopsy performed.  Area cleaned and anesthetized with 1% lidocaine with epinephrine.  Dermablade used to remove the lesion and sent to pathology. Bleeding was cauterized. Pt tolerated procedure well with no complications.   R/o SCC on left parietal scalp. Photo taken and placed in chart. Shave biopsy performed.  Area cleaned and anesthetized with 1% lidocaine with epinephrine.  Dermablade used to remove the lesion and sent to pathology. Bleeding was cauterized. Pt tolerated procedure well with no complications. .   R/o BCC on left zygoma. Photo taken and placed in chart. Shave  biopsy performed.  Area cleaned and anesthetized with 1% lidocaine with epinephrine.  Dermablade used to remove the lesion and sent to pathology. Bleeding was cauterized. Pt tolerated procedure well with no complications.    Numerous AKs on upper arms, lower arms, scalp, and face - advised. Would like to try efudex again for all areas.      Follow-up: pending path/Q 6 month FSE/PRN sooner    1.) Patient was asked about new and changing moles. YES  2.) Patient received a complete physical skin examination: YES  3.) Patient was counseled to perform a monthly self skin examination: YES  Scribed By: Inez Prince, Medical Scribe    The information in this document, created by the medical scribe for me, accurately reflects the services I personally performed and the decisions made by me. I have reviewed and approved this document for accuracy prior to leaving the patient care area.  October 15, 2019 9:42 AM    Rosa Maria Hansen MS, PA-C

## 2019-10-15 NOTE — PATIENT INSTRUCTIONS
Wound Care Instructions     FOR SUPERFICIAL WOUNDS     [facility] [phone]                 AFTER 24 HOURS YOU SHOULD REMOVE THE BANDAGE AND BEGIN DAILY DRESSING CHANGES AS FOLLOWS:     1) Remove Dressing.     2) Clean and dry the area with tap water using a Q-tip or sterile gauze pad.     3) Apply Vaseline, Aquaphor, Polysporin ointment or Bacitracin ointment over entire wound.  Do NOT use Neosporin ointment.     4) Cover the wound with a band-aid, or a sterile non-stick gauze pad and micropore paper tape    REPEAT THESE INSTRUCTIONS AT LEAST ONCE A DAY UNTIL THE WOUND HAS COMPLETELY HEALED.    It is an old wives tale that a wound heals better when it is exposed to air and allowed to dry out. The wound will heal faster with a better cosmetic result if it is kept moist with ointment and covered with a bandage.    **Do not let the wound dry out.**    Supplies Needed:      *Cotton tipped applicators (Q-tips)    *Vaseline, Aquaphor, Polysporin or Bacitracin Ointment (NOT NEOSPORIN)    *Band-aids or non-stick gauze pads and micropore paper tape.      PATIENT INFORMATION:    During the healing process you will notice a number of changes. All wounds develop a small halo of redness surrounding the wound.  This means healing is occurring. Severe itching with extensive redness usually indicates sensitivity to the ointment or bandage tape used to dress the wound.  You should call our office if this develops.      Swelling  and/or discoloration around your surgical site is common, particularly when performed around the eye.    All wounds normally drain.  The larger the wound the more drainage there will be.  After 7-10 days, you will notice the wound beginning to shrink and new skin will begin to grow.  The wound is healed when you can see skin has formed over the entire area.  A healed wound has a healthy, shiny look to the surface and is red to dark pink in color to normalize.  Wounds may take approximately  4-6 weeks to heal.  Larger wounds may take 6-8 weeks.  After the wound is healed you may discontinue dressing changes.    You may experience a sensation of tightness as your wound heals. This is normal and will gradually subside.    Your healed wound may be sensitive to temperature changes. This sensitivity improves with time, but if you re having a lot of discomfort, try to avoid temperature extremes.    Patients frequently experience itching after their wound appears to have healed because of the continue healing under the skin.  Plain Vaseline will help relieve the itching.      POSSIBLE COMPLICATIONS    BLEEDIN. Leave the bandage in place.  2. Use tightly rolled up gauze or a cloth to apply direct pressure over the bandage for 30  minutes.  3. Reapply pressure for an additional 30 minutes if necessary  4. Use additional gauze and tape to maintain pressure once the bleeding has stopped.

## 2019-10-15 NOTE — LETTER
10/15/2019         RE: James Salvador  [address] [facility] [address]        Dear Colleague,    Thank you for referring your patient, James Salvador, to the [facility]. Please see a copy of my visit note below.    HPI:   Chief complaint: James Salvador (Pete) is a 74 year old male who presents for Full skin cancer screening to rule out skin cancer.   Last Skin Exam: 6 mo ago      1st Baseline: no  Personal HX of Skin Cancer: SCC   Personal HX of Malignant Melanoma: none   Family HX of Skin Cancer / Malignant Melanoma: none  Personal HX of Atypical Moles: none  Risk factors: sun exposure, history of SCC  New / Changing lesions: yes, spots on scalp   MHx: had pacemaker placed over the summer     Social History: Has grandchildren that he watches; youngest is 5 and oldest is 14. He lives in [place]. Had a pacemaker placed in June; HR dropped to 20 BPM  On review of systems, there are no further skin complaints, patient is feeling otherwise well.  See patient intake sheet.  ROS of the following were done and are negative: Constitutional, Eyes, Ears, Nose,   Mouth, Throat, Cardiovascular, Respiratory, GI, Genitourinary, Musculoskeletal,   Psychiatric, Endocrine, Allergic/Immunologic.    This document serves as a record of the services and decisions personally performed and made by Rosa Maria Hansen, MS, PA-C. It was created on her behalf by Inez Prince, a trained medical scribe. The creation of this document is based on the provider's statements to the medical scribe.  Inez Prince 9:29 AM October 15, 2019    PHYSICAL EXAM:   /71   Pulse 63   SpO2 96%   Skin exam performed as follows: Type 2 skin. Mood appropriate  Alert and Oriented X 3. Well developed, well nourished in no distress.  General appearance: Normal  Head including face: Normal  Eyes: conjunctiva and lids: Normal  Mouth: Lips, teeth, gums: Normal  Neck: Normal  Chest-breast/axillae:  Normal  Back: Normal  Spleen and liver: Normal  Cardiovascular: Exam of peripheral vascular system by observation for swelling, varicosities, edema: Normal  Genitalia: groin, buttocks: Normal  Extremities: digits/nails (clubbing): Normal  Eccrine and Apocrine glands: Normal  Right upper extremity: Normal  Left upper extremity: Normal  Right lower extremity: Normal  Left lower extremity: Normal  Skin: Scalp and body hair: See below    Pt deferred exam of breasts, groin, buttocks: NO    Other physical findings:  1. Multiple pigmented macules on extremities and trunk  2. Multiple pigmented macules on face, trunk and extremities  3. Multiple vascular papules on trunk, arms and legs  4. Multiple scattered keratotic plaques  5. 6 mm pink papule right frontal scalp   6. 5 mm pink excoriated papule on left parietal scalp  7. 6 mm pink papule on left zygoma  8. Numerous gritty papules  >15 on each arm, on the right cheek, top of the scalp      Except as noted above, no other signs of skin cancer or melanoma.     ASSESSMENT/PLAN:   Benign Full skin cancer screening today.     Patient with history of SCC  Advised on monthly self exams and 1 year  Patient Education: Appropriate brochures given.    Multiple benign appearing nevi on arms, legs and trunk. Discussed ABCDEs of melanoma and sunscreen.   Multiple lentigos on arms, legs and trunk. Advised benign, no treatment needed.  Multiple scattered angiomas. Advised benign, no treatment needed.   Seborrheic keratosis on arms, legs and trunk. Advised benign, no treatment needed.  R/o SCC on right frontal scalp. Photo taken and placed in chart. Shave biopsy performed.  Area cleaned and anesthetized with 1% lidocaine with epinephrine.  Dermablade used to remove the lesion and sent to pathology. Bleeding was cauterized. Pt tolerated procedure well with no complications.   R/o SCC on left parietal scalp. Photo taken and placed in chart. Shave biopsy performed.  Area cleaned and  anesthetized with 1% lidocaine with epinephrine.  Dermablade used to remove the lesion and sent to pathology. Bleeding was cauterized. Pt tolerated procedure well with no complications. .   R/o BCC on left zygoma. Photo taken and placed in chart. Shave biopsy performed.  Area cleaned and anesthetized with 1% lidocaine with epinephrine.  Dermablade used to remove the lesion and sent to pathology. Bleeding was cauterized. Pt tolerated procedure well with no complications.    Numerous AKs on upper arms, lower arms, scalp, and face - advised. Would like to try efudex again for all areas.      Follow-up: pending path/Q 6 month FSE/PRN sooner    1.) Patient was asked about new and changing moles. YES  2.) Patient received a complete physical skin examination: YES  3.) Patient was counseled to perform a monthly self skin examination: YES  Scribed By: Inez Prince, Medical Scribe    The information in this document, created by the medical scribe for me, accurately reflects the services I personally performed and the decisions made by me. I have reviewed and approved this document for accuracy prior to leaving the patient care area.  October 15, 2019 9:42 AM    Rosa Maria Hansen MS, SHEY      Again, thank you for allowing me to participate in the care of your patient.        Sincerely,        Rosa Maria Hansen PA-C

## 2019-10-16 RX ORDER — FLUTICASONE PROPIONATE 50 MCG
SPRAY, SUSPENSION (ML) NASAL
Qty: 48 ML | Refills: 1 | Status: SHIPPED | OUTPATIENT
Start: 2019-10-16 | End: 2020-04-27

## 2019-10-16 NOTE — TELEPHONE ENCOUNTER
"Requested Prescriptions   Pending Prescriptions Disp Refills     fluticasone (FLONASE) 50 MCG/ACT nasal spray [Pharmacy Med Name: FLUTICASONE PROP 50 MCG SPRAY] 48 mL 2     Sig: SPRAY 2 SPRAYS INTO BOTH NOSTRILS DAILY -RTS 1/7/19       Inhaled Steroids Protocol Passed - 10/15/2019 11:00 PM        Passed - Patient is age 12 or older        Passed - Recent (12 mo) or future (30 days) visit within the authorizing provider's specialty     Patient has had an office visit with the authorizing provider or a provider within the authorizing providers department within the previous 12 mos or has a future within next 30 days. See \"Patient Info\" tab in inbasket, or \"Choose Columns\" in Meds & Orders section of the refill encounter.              Passed - Medication is active on med list          "

## 2019-10-22 ENCOUNTER — MYC MEDICAL ADVICE (OUTPATIENT)
Dept: INTERNAL MEDICINE | Facility: CLINIC | Age: 74
End: 2019-10-22

## 2019-10-23 ENCOUNTER — DOCUMENTATION ONLY (OUTPATIENT)
Dept: CARDIOLOGY | Facility: CLINIC | Age: 74
End: 2019-10-23

## 2019-10-23 DIAGNOSIS — Z95.0 CARDIAC PACEMAKER IN SITU: Primary | ICD-10-CM

## 2019-10-23 LAB — COPATH REPORT: NORMAL

## 2019-10-24 ENCOUNTER — TELEPHONE (OUTPATIENT)
Dept: DERMATOLOGY | Facility: CLINIC | Age: 74
End: 2019-10-24

## 2019-10-24 NOTE — LETTER
[address]  [id number] [phone]    10/28/2019       James Salvador  [address] [place] [address]      Dear James:    You are scheduled for Mohs Surgery on: 11/7/19 @7:00am & 12/19/19 @7:30am.    Please check in at [facility], [address].     You don't need to arrive more than 5-10 minutes prior to your appointment time.     Be sure to eat a good breakfast and bathe and wash your hair prior to surgery.     If you are taking any anti-coagulants that are prescribed by your Doctor (such as Coumadin/Warfarin, Plavix, Aspirin, Ibuprofen), please continue taking them.     However, if you are taking anti-coagulants over the counter without a Doctor's order for a medical condition, please discontinue them 10 days prior to surgery.     Please wear loose comfortable clothing as it could possibly be 4-6 hours until your surgery is completed depending upon how many layers of tissue need to be removed.      Thank you,    VICENTE Dutta MD

## 2019-10-24 NOTE — TELEPHONE ENCOUNTER
----- Message from Rosa Maria Hansen PA-C sent at 10/24/2019  1:51 PM CDT -----  Notified patient of results of melanoma on the left zygoma please schedule for mohs   Notified patient of results of SCC on the left parietal scalp please schedule mohs    He would like both done together if possible; advised this might be decided day of depending on how the day is going.

## 2019-10-25 ENCOUNTER — HOSPITAL ENCOUNTER (OUTPATIENT)
Dept: GENERAL RADIOLOGY | Facility: CLINIC | Age: 74
Discharge: HOME | End: 2019-10-25
Attending: INTERNAL MEDICINE | Admitting: INTERNAL MEDICINE
Payer: COMMERCIAL

## 2019-10-25 DIAGNOSIS — Z95.0 CARDIAC PACEMAKER IN SITU: ICD-10-CM

## 2019-10-25 PROCEDURE — 71046 X-RAY EXAM CHEST 2 VIEWS: CPT

## 2019-10-25 NOTE — TELEPHONE ENCOUNTER
Called and spoke to patient.     Informed patient I am waiting on an answer from the physician in regards to scheduling his mohs appointment- Melanoma and SCC.     Informed patient I will give him a call back on Monday with an answer.     Patient voiced understanding.    Kermit RN-BSN-N  [facility]  [phone]

## 2019-10-26 LAB
MDC_IDC_LEAD_IMPLANT_DT: NORMAL
MDC_IDC_LEAD_LOCATION: NORMAL
MDC_IDC_LEAD_LOCATION_DETAIL_1: NORMAL
MDC_IDC_LEAD_MFG: NORMAL
MDC_IDC_LEAD_MODEL: NORMAL
MDC_IDC_LEAD_POLARITY_TYPE: NORMAL
MDC_IDC_LEAD_SERIAL: NORMAL
MDC_IDC_MSMT_BATTERY_DTM: NORMAL
MDC_IDC_MSMT_BATTERY_REMAINING_LONGEVITY: 184 MO
MDC_IDC_MSMT_BATTERY_RRT_TRIGGER: 2.62
MDC_IDC_MSMT_BATTERY_STATUS: NORMAL
MDC_IDC_MSMT_BATTERY_VOLTAGE: 3.17 V
MDC_IDC_MSMT_LEADCHNL_RV_IMPEDANCE_VALUE: 361 OHM
MDC_IDC_MSMT_LEADCHNL_RV_IMPEDANCE_VALUE: 437 OHM
MDC_IDC_MSMT_LEADCHNL_RV_PACING_THRESHOLD_AMPLITUDE: 0.62 V
MDC_IDC_MSMT_LEADCHNL_RV_PACING_THRESHOLD_PULSEWIDTH: 0.4 MS
MDC_IDC_MSMT_LEADCHNL_RV_SENSING_INTR_AMPL: 2 MV
MDC_IDC_MSMT_LEADCHNL_RV_SENSING_INTR_AMPL: 2.12 MV
MDC_IDC_PG_IMPLANT_DTM: NORMAL
MDC_IDC_PG_MFG: NORMAL
MDC_IDC_PG_MODEL: NORMAL
MDC_IDC_PG_SERIAL: NORMAL
MDC_IDC_PG_TYPE: NORMAL
MDC_IDC_SESS_CLINIC_NAME: NORMAL
MDC_IDC_SESS_DTM: NORMAL
MDC_IDC_SESS_TYPE: NORMAL
MDC_IDC_SET_BRADY_HYSTRATE: NORMAL
MDC_IDC_SET_BRADY_LOWRATE: 50 {BEATS}/MIN
MDC_IDC_SET_BRADY_MODE: NORMAL
MDC_IDC_SET_LEADCHNL_RV_PACING_AMPLITUDE: 2 V
MDC_IDC_SET_LEADCHNL_RV_PACING_ANODE_ELECTRODE_1: NORMAL
MDC_IDC_SET_LEADCHNL_RV_PACING_ANODE_LOCATION_1: NORMAL
MDC_IDC_SET_LEADCHNL_RV_PACING_CAPTURE_MODE: NORMAL
MDC_IDC_SET_LEADCHNL_RV_PACING_CATHODE_ELECTRODE_1: NORMAL
MDC_IDC_SET_LEADCHNL_RV_PACING_CATHODE_LOCATION_1: NORMAL
MDC_IDC_SET_LEADCHNL_RV_PACING_POLARITY: NORMAL
MDC_IDC_SET_LEADCHNL_RV_PACING_PULSEWIDTH: 0.4 MS
MDC_IDC_SET_LEADCHNL_RV_SENSING_ANODE_ELECTRODE_1: NORMAL
MDC_IDC_SET_LEADCHNL_RV_SENSING_ANODE_LOCATION_1: NORMAL
MDC_IDC_SET_LEADCHNL_RV_SENSING_CATHODE_ELECTRODE_1: NORMAL
MDC_IDC_SET_LEADCHNL_RV_SENSING_CATHODE_LOCATION_1: NORMAL
MDC_IDC_SET_LEADCHNL_RV_SENSING_POLARITY: NORMAL
MDC_IDC_SET_LEADCHNL_RV_SENSING_SENSITIVITY: 0.6 MV
MDC_IDC_SET_ZONE_DETECTION_INTERVAL: 360 MS
MDC_IDC_SET_ZONE_TYPE: NORMAL
MDC_IDC_STAT_BRADY_DTM_END: NORMAL
MDC_IDC_STAT_BRADY_DTM_START: NORMAL
MDC_IDC_STAT_BRADY_RV_PERCENT_PACED: 23.14 %
MDC_IDC_STAT_EPISODE_RECENT_COUNT: 0
MDC_IDC_STAT_EPISODE_RECENT_COUNT: 2
MDC_IDC_STAT_EPISODE_RECENT_COUNT_DTM_END: NORMAL
MDC_IDC_STAT_EPISODE_RECENT_COUNT_DTM_END: NORMAL
MDC_IDC_STAT_EPISODE_RECENT_COUNT_DTM_START: NORMAL
MDC_IDC_STAT_EPISODE_RECENT_COUNT_DTM_START: NORMAL
MDC_IDC_STAT_EPISODE_TOTAL_COUNT: 0
MDC_IDC_STAT_EPISODE_TOTAL_COUNT: 2
MDC_IDC_STAT_EPISODE_TOTAL_COUNT_DTM_END: NORMAL
MDC_IDC_STAT_EPISODE_TOTAL_COUNT_DTM_END: NORMAL
MDC_IDC_STAT_EPISODE_TOTAL_COUNT_DTM_START: NORMAL
MDC_IDC_STAT_EPISODE_TOTAL_COUNT_DTM_START: NORMAL
MDC_IDC_STAT_EPISODE_TYPE: NORMAL

## 2019-10-28 NOTE — TELEPHONE ENCOUNTER
Called and spoke to patient.     Scheduled future mohs appointments (x2). Letter/packet sent.     Patient voiced understanding.    MIGEL Carmen-BSN-PHN  [facility]  [phone]

## 2019-10-29 ENCOUNTER — TELEPHONE (OUTPATIENT)
Dept: CARDIOLOGY | Facility: CLINIC | Age: 74
End: 2019-10-29

## 2019-10-29 DIAGNOSIS — Z95.0 CARDIAC PACEMAKER IN SITU: Primary | ICD-10-CM

## 2019-10-29 NOTE — TELEPHONE ENCOUNTER
Called and informed pt of need to f/u with Dr Gamble to discuss lead revision due to CXR showing that lead has retracted and R wave sensitivity is low. Pt agreed.

## 2019-10-29 NOTE — TELEPHONE ENCOUNTER
----- Message from Ronny Gamble MD sent at 10/29/2019 11:23 AM CDT -----  I cant be sure but the lead seems to be slightly retracted.  He should come to see me to discuss lead revision.    Ronny

## 2019-11-07 ENCOUNTER — OFFICE VISIT (OUTPATIENT)
Dept: DERMATOLOGY | Facility: CLINIC | Age: 74
End: 2019-11-07
Payer: COMMERCIAL

## 2019-11-07 VITALS — OXYGEN SATURATION: 95 % | HEART RATE: 71 BPM | DIASTOLIC BLOOD PRESSURE: 78 MMHG | SYSTOLIC BLOOD PRESSURE: 136 MMHG

## 2019-11-07 DIAGNOSIS — C43.39 MELANOMA OF CHEEK (H): Primary | ICD-10-CM

## 2019-11-07 PROCEDURE — 13132 CMPLX RPR F/C/C/M/N/AX/G/H/F: CPT | Mod: 51 | Performed by: DERMATOLOGY

## 2019-11-07 PROCEDURE — 88341 IMHCHEM/IMCYTCHM EA ADD ANTB: CPT | Performed by: DERMATOLOGY

## 2019-11-07 PROCEDURE — 88342 IMHCHEM/IMCYTCHM 1ST ANTB: CPT | Mod: 59 | Performed by: DERMATOLOGY

## 2019-11-07 PROCEDURE — 17311 MOHS 1 STAGE H/N/HF/G: CPT | Performed by: DERMATOLOGY

## 2019-11-07 NOTE — LETTER
11/7/2019         RE: James Salvador  [address] [place] [address]        Dear Colleague,    Thank you for referring your patient, James Salvador, to the [facility]. Please see a copy of my visit note below.    Surgical Office Location:  [facility]  [address] [place] [address]      James Salvador is a 74 year old year old male patient here today for evaluation and managment of 0.6mm mlenaoma on left zygoma.  Patient reports the following modifying factors none.  Associated symptoms: none.  Patient has no other skin complaints today.  Remainder of the HPI, Meds, PMH, Allergies, FH, and SH was reviewed in chart.      Past Medical History:   Diagnosis Date     Actinic cheilitis 7/17/2013    Lower lip, left      Actinic keratosis      Allergic rhinitis      Benign hypertension      CAD (coronary artery disease)     Cardiac cath and PCI 1994. Cardiac Cath 9/2015: BRIDGET to LAD     History of myocardial infarction 1994    PTCA     Hyperlipidemia LDL goal < 70      Melanoma (H) 10/15/2019    10/15/19 left zygoma     Mixed hyperlipidemia 6/21/2017     Morbid obesity due to excess calories (H) 1/11/2016     Paroxysmal supraventricular tachycardia (H)     on metoprolol     Permanent atrial fibrillation 04/21/2017     Squamous cell carcinoma      Stable angina (H) 1/11/2016     Tachy-edinson syndrome (H) 5/29/2019    Added automatically from request for surgery [id number]       Past Surgical History:   Procedure Laterality Date     ANGIOPLASTY  1994    in [place]     ANKLE SURGERY  7/13/2005    right ankle     EP PERM PACER SINGLE LEAD N/A 5/31/2019    [employer] single lead pacemaker     HEART CATH STENT COR W/WO PTCA  9/23/2015    BRIDGET stent mid LAD     JOINT REPLACEMENT, HIP RT/LT  10/14/2009    right hip      LASER SURGERY OF EYE  06/01/2002     MOHS MICROGRAPHIC PROCEDURE  06/12/2004    squamous cell carcinoma right temple     SINUS SURGERY  7/11/2006         Family History   Problem Relation Age of Onset     Genitourinary Problems Mother         renal failure     Cardiovascular Father         rupture of dorsal aorta     Depression Son      Skin Cancer No family hx of        Social History     Socioeconomic History     Marital status:      Spouse name: Not on file     Number of children: 3     Years of education: Not on file     Highest education level: Not on file   Occupational History     Employer: RETIRED   Social Needs     Financial resource strain: Not on file     Food insecurity:     Worry: Not on file     Inability: Not on file     Transportation needs:     Medical: Not on file     Non-medical: Not on file   Tobacco Use     Smoking status: Former Smoker     Packs/day: 0.00     Years: 10.00     Pack years: 0.00     Types: Cigarettes     Last attempt to quit: 1987     Years since quittin.8     Smokeless tobacco: Never Used   Substance and Sexual Activity     Alcohol use: Yes     Alcohol/week: 0.0 standard drinks     Comment: socially - x2-3 per month -  none currently     Drug use: No     Sexual activity: Yes     Partners: Female   Lifestyle     Physical activity:     Days per week: Not on file     Minutes per session: Not on file     Stress: Not on file   Relationships     Social connections:     Talks on phone: Not on file     Gets together: Not on file     Attends Gnosticist service: Not on file     Active member of club or organization: Not on file     Attends meetings of clubs or organizations: Not on file     Relationship status: Not on file     Intimate partner violence:     Fear of current or ex partner: Not on file     Emotionally abused: Not on file     Physically abused: Not on file     Forced sexual activity: Not on file   Other Topics Concern     Parent/sibling w/ CABG, MI or angioplasty before 65F 55M? Not Asked      Service Not Asked     Blood Transfusions Not Asked     Caffeine Concern Yes     Comment: 2 big cups coffee  daily     Occupational Exposure Not Asked     Hobby Hazards Not Asked     Sleep Concern No     Comment: sleeping better since shoulder replaced 11/3/16     Stress Concern No     Weight Concern Yes     Special Diet Yes     Comment: trying to do more lean meats     Back Care Not Asked     Exercise No     Comment: limited - knee     Bike Helmet Not Asked     Seat Belt Not Asked     Self-Exams Not Asked   Social History Narrative     Not on file       Outpatient Encounter Medications as of 11/7/2019   Medication Sig Dispense Refill     Acetaminophen (TYLENOL PO) Take 650 mg by mouth 4 times daily        amoxicillin (AMOXIL) 500 MG capsule 2,000 mg as needed When going to the dentist  0     ASPIRIN PO Take 81 mg by mouth daily       atorvastatin (LIPITOR) 80 MG tablet TAKE 1 TABLET BY MOUTH DAILY - DUE FOR FASTING LABS IN DECEMBER 90 tablet 1     carvedilol (COREG) 3.125 MG tablet Take 1 tablet (3.125 mg) by mouth 2 times daily (with meals) 180 tablet 0     ciclopirox (LOPROX) 0.77 % cream Apply topically At Bedtime 90 g 3     desonide (DESOWEN) 0.05 % external cream Apply sparingly to affected area on face in morning 60 g 0     Doxycycline Hyclate (PERIOSTAT PO) Take 20 mg by mouth 2 times daily       ELIQUIS 5 MG tablet TAKE 1 TABLET BY MOUTH TWICE A DAY 60 tablet 5     fluocinonide (LIDEX) 0.05 % external solution Apply to scalp BID x 1-2 weeks PRN 60 mL 3     fluorouracil (EFUDEX) 5 % external cream Apply to scap BID x 3-4 weeks. Protect area from the sun. 40 g 3     fluticasone (FLONASE) 50 MCG/ACT nasal spray SPRAY 2 SPRAYS INTO BOTH NOSTRILS DAILY -RTS 1/7/19 48 mL 1     furosemide (LASIX) 40 MG tablet Take 1 tablet (40 mg) by mouth daily 90 tablet 3     GABAPENTIN PO Take 600 mg by mouth 3 times daily        ketoconazole (NIZORAL) 2 % cream Apply topically 2 times daily For face. FAX REFILL REQUESTS TO  RONNIE: [phone] 30 g 2     ketoconazole (NIZORAL) 2 % external shampoo Use daily as needed 120 mL 3      ketotifen (ZADITOR/REFRESH ANTI-ITCH) 0.025 % SOLN Place 1-2 drops into both eyes 2 times daily as needed for itching       lisinopril (PRINIVIL/ZESTRIL) 30 MG tablet Take 1 tablet (30 mg) by mouth daily 90 tablet 1     loratadine (CLARITIN) 10 MG tablet Take 10 mg by mouth daily as needed for allergies       Multiple Vitamin (MULTIVITAMIN OR) Take 1 tablet by mouth daily        nitroglycerin (NITROSTAT) 0.4 MG SL tablet Place 1 tablet (0.4 mg) under the tongue every 5 minutes as needed for chest pain May repeat twice for a total of 3 tablets.  If chest pain not relieved, call 911 25 tablet 11     Omega-3 Fatty Acids (OMEGA-3 FISH OIL PO) Take 3.2 g by mouth daily        OXcarbazepine (TRILEPTAL PO) Take 300 mg by mouth 3 times daily        No facility-administered encounter medications on file as of 11/7/2019.              Review Of Systems  Skin: As above  Eyes: negative  Ears/Nose/Throat: negative  Respiratory: No shortness of breath, dyspnea on exertion, cough, or hemoptysis  Cardiovascular: negative  Gastrointestinal: negative  Genitourinary: negative  Musculoskeletal: negative  Neurologic: negative  Psychiatric: negative  Hematologic/Lymphatic/Immunologic: negative  Endocrine: negative      O:   NAD, WDWN, Alert & Oriented, Mood & Affect wnl, Vitals stable   Here today alone   /78   Pulse 71   SpO2 95%    General appearance normal   Vitals stable   Alert, oriented and in no acute distress      Following lymph nodes palpated: Occipital, Cervical, Supraclavicular no lad   L zygoma 7mm red macule      Eyes: Conjunctivae/lids:Normal     ENT: Lips, buccal mucosa, tongue: normal    MSK:Normal    Cardiovascular: peripheral edema none    Pulm: Breathing Normal    Lymph Nodes: No Head and Neck Lymphadenopathy     Neuro/Psych: Orientation:Normal; Mood/Affect:Normal      A/P:  1. L zygoma melanoma 0.6mm  [place] discussed with patient   [place] sent out today     MELANOMA DISCUSSED WITH PATIENT:  I discussed the  specifics of tumor, prognosis, metachronous melanoma, self exam, and genetics with the patient. I explained the need for monthly skin exams including and taught the patient how to do this. Patient was asked about new or changing moles . I discussed with patient signs and symptoms that could arise in the setting of recurrent locoregional or metastatic disease. In addition, the need to undergo every 4 month dermatologic full skin survey and evaluation given that patients with a diagnosis of melanoma are at risk of recurrence (local and distant) and of subsequent de marty melanoma.  . I reviewed treatment options, including a discussion of wide excision (the gold standard) versus Mohs surgery with MART-1 immunostains.     Note: MART-1 (Melanoma Antigen Recognized by T-cells) antibody immunostaining was used during Mohs surgery as per standard protocol, in addition to routine processing of all specimens with hematoxylin and eosin. The peripheral margins/edges were processed with the MART-1 stain (4 specimens total). The center was examined with hematoxylin & eosin and MART-1 immunostains. The patient was informed of the procedure and its risk/benefits during the consent for the procedure.    One or more of the reagents used in immunohistochemical testing in this case may not have been cleared or approved by the U.S. Food and Drug Administration (FDA). The FDA has determined that such clearance or approval is not necessary. These tests are used for clinical purposes. They should not be regarded as investigational or for research. These reagents  performance characteristics have been determined by [facility]. This laboratory is certified under the Clinical Laboratory Improvement Amendments of 1988 (CLIA-88) as qualified to perform high complexity clinical laboratory testing.    [facility], decision for Mohs surgery was made. Indication for Mohs was aggressive histology. Patient  confirmed the site with Dr. Dutta. After anesthesia with LEC, the tumor was excised using standard Mohs technique in 1 stages(s).  MART 1 stains were performed on 4 specimens. CLEAR MARGINS OBTAINED and Final defect size was 2 x 1.7 cm.   REPAIR COMPLEX: Because of the tightness of the surrounding skin and Because of the size and full thickness nature of the defect, a complex closure was planned. After LEC anesthesia and prep, Burow's triangles were excised in the relaxed skin tension lines. The wound edges were widely undermined by dissection in the subcutaneous plane until adequate tissue mobility was obtained. Hemostasis was obtained. The wound edges were closed in a layered fashion using Vicryl and Fast Absorbing Plain Gut sutures. Postoperative length was 6.8 cm.   EBL minimal; complications none; wound care routine.  The patient was discharged in good condition and will return in one week for wound evaluation.  BENIGN LESIONS DISCUSSED WITH PATIENT:  I discussed the specifics of tumor, prognosis, and genetics of benign lesions.  I explained that treatment of these lesions would be purely cosmetic and not medically neccessary.  I discussed with patient different removal options including excision, cautery and /or laser.      Nature and genetics of benign skin lesions dicussed with patient.  Signs and Symptoms of skin cancer discussed with patient.  ABCDEs of melanoma reviewed with patient.  Patient encouraged to perform monthly skin exams.  UV precautions reviewed with patient.  Skin care regimen reviewed with patient: Eliminate harsh soaps, i.e. Dial, zest, irsih spring; Mild soaps such as Cetaphil or Dove sensitive skin, avoid hot or cold showers, aggressive use of emollients including vanicream, cetaphil or cerave discussed with patient.    Risks of non-melanoma skin cancer discussed with patient   Return to clinic 4 months      Again, thank you for allowing me to participate in the care of your patient.         Sincerely,        Jv Dutta MD

## 2019-11-07 NOTE — PROGRESS NOTES
James Salvador is a 74 year old year old male patient here today for evaluation and managment of 0.6mm mlenaoma on left zygoma.  Patient reports the following modifying factors none.  Associated symptoms: none.  Patient has no other skin complaints today.  Remainder of the HPI, Meds, PMH, Allergies, FH, and SH was reviewed in chart.      Past Medical History:   Diagnosis Date     Actinic cheilitis 7/17/2013    Lower lip, left      Actinic keratosis      Allergic rhinitis      Benign hypertension      CAD (coronary artery disease)     Cardiac cath and PCI 1994. Cardiac Cath 9/2015: BRIDGET to LAD     History of myocardial infarction 1994    PTCA     Hyperlipidemia LDL goal < 70      Melanoma (H) 10/15/2019    10/15/19 left zygoma     Mixed hyperlipidemia 6/21/2017     Morbid obesity due to excess calories (H) 1/11/2016     Paroxysmal supraventricular tachycardia (H)     on metoprolol     Permanent atrial fibrillation 04/21/2017     Squamous cell carcinoma      Stable angina (H) 1/11/2016     Tachy-edinson syndrome (H) 5/29/2019    Added automatically from request for surgery [id number]       Past Surgical History:   Procedure Laterality Date     ANGIOPLASTY  1994    in [place]     ANKLE SURGERY  7/13/2005    right ankle     EP PERM PACER SINGLE LEAD N/A 5/31/2019    Medtronic single lead pacemaker     HEART CATH STENT COR W/WO PTCA  9/23/2015    BRIDGET stent mid LAD     JOINT REPLACEMENT, HIP RT/LT  10/14/2009    right hip      LASER SURGERY OF EYE  06/01/2002     MOHS MICROGRAPHIC PROCEDURE  06/12/2004    squamous cell carcinoma right temple     SINUS SURGERY  7/11/2006        Family History   Problem Relation Age of Onset     Genitourinary Problems Mother         renal failure     Cardiovascular Father         rupture of dorsal aorta     Depression Son      Skin Cancer No family hx of        Social History     Socioeconomic History     Marital status:      Spouse name: Not on file     Number of children: 3     Years  of education: Not on file     Highest education level: Not on file   Occupational History     Employer: RETIRED   Social Needs     Financial resource strain: Not on file     Food insecurity:     Worry: Not on file     Inability: Not on file     Transportation needs:     Medical: Not on file     Non-medical: Not on file   Tobacco Use     Smoking status: Former Smoker     Packs/day: 0.00     Years: 10.00     Pack years: 0.00     Types: Cigarettes     Last attempt to quit: 1987     Years since quittin.8     Smokeless tobacco: Never Used   Substance and Sexual Activity     Alcohol use: Yes     Alcohol/week: 0.0 standard drinks     Comment: socially - x2-3 per month -  none currently     Drug use: No     Sexual activity: Yes     Partners: Female   Lifestyle     Physical activity:     Days per week: Not on file     Minutes per session: Not on file     Stress: Not on file   Relationships     Social connections:     Talks on phone: Not on file     Gets together: Not on file     Attends Christian service: Not on file     Active member of club or organization: Not on file     Attends meetings of clubs or organizations: Not on file     Relationship status: Not on file     Intimate partner violence:     Fear of current or ex partner: Not on file     Emotionally abused: Not on file     Physically abused: Not on file     Forced sexual activity: Not on file   Other Topics Concern     Parent/sibling w/ CABG, MI or angioplasty before 65F 55M? Not Asked      Service Not Asked     Blood Transfusions Not Asked     Caffeine Concern Yes     Comment: 2 big cups coffee daily     Occupational Exposure Not Asked     Hobby Hazards Not Asked     Sleep Concern No     Comment: sleeping better since shoulder replaced 11/3/16     Stress Concern No     Weight Concern Yes     Special Diet Yes     Comment: trying to do more lean meats     Back Care Not Asked     Exercise No     Comment: limited - knee     Bike Helmet Not Asked      Seat Belt Not Asked     Self-Exams Not Asked   Social History Narrative     Not on file       Outpatient Encounter Medications as of 11/7/2019   Medication Sig Dispense Refill     Acetaminophen (TYLENOL PO) Take 650 mg by mouth 4 times daily        amoxicillin (AMOXIL) 500 MG capsule 2,000 mg as needed When going to the dentist  0     ASPIRIN PO Take 81 mg by mouth daily       atorvastatin (LIPITOR) 80 MG tablet TAKE 1 TABLET BY MOUTH DAILY - DUE FOR FASTING LABS IN DECEMBER 90 tablet 1     carvedilol (COREG) 3.125 MG tablet Take 1 tablet (3.125 mg) by mouth 2 times daily (with meals) 180 tablet 0     ciclopirox (LOPROX) 0.77 % cream Apply topically At Bedtime 90 g 3     desonide (DESOWEN) 0.05 % external cream Apply sparingly to affected area on face in morning 60 g 0     Doxycycline Hyclate (PERIOSTAT PO) Take 20 mg by mouth 2 times daily       ELIQUIS 5 MG tablet TAKE 1 TABLET BY MOUTH TWICE A DAY 60 tablet 5     fluocinonide (LIDEX) 0.05 % external solution Apply to scalp BID x 1-2 weeks PRN 60 mL 3     fluorouracil (EFUDEX) 5 % external cream Apply to scap BID x 3-4 weeks. Protect area from the sun. 40 g 3     fluticasone (FLONASE) 50 MCG/ACT nasal spray SPRAY 2 SPRAYS INTO BOTH NOSTRILS DAILY -RTS 1/7/19 48 mL 1     furosemide (LASIX) 40 MG tablet Take 1 tablet (40 mg) by mouth daily 90 tablet 3     GABAPENTIN PO Take 600 mg by mouth 3 times daily        ketoconazole (NIZORAL) 2 % cream Apply topically 2 times daily For face. FAX REFILL REQUESTS TO [facility]: [phone] 30 g 2     ketoconazole (NIZORAL) 2 % external shampoo Use daily as needed 120 mL 3     ketotifen (ZADITOR/REFRESH ANTI-ITCH) 0.025 % SOLN Place 1-2 drops into both eyes 2 times daily as needed for itching       lisinopril (PRINIVIL/ZESTRIL) 30 MG tablet Take 1 tablet (30 mg) by mouth daily 90 tablet 1     loratadine (CLARITIN) 10 MG tablet Take 10 mg by mouth daily as needed for allergies       Multiple Vitamin (MULTIVITAMIN OR) Take 1  tablet by mouth daily        nitroglycerin (NITROSTAT) 0.4 MG SL tablet Place 1 tablet (0.4 mg) under the tongue every 5 minutes as needed for chest pain May repeat twice for a total of 3 tablets.  If chest pain not relieved, call 911 25 tablet 11     Omega-3 Fatty Acids (OMEGA-3 FISH OIL PO) Take 3.2 g by mouth daily        OXcarbazepine (TRILEPTAL PO) Take 300 mg by mouth 3 times daily        No facility-administered encounter medications on file as of 11/7/2019.              Review Of Systems  Skin: As above  Eyes: negative  Ears/Nose/Throat: negative  Respiratory: No shortness of breath, dyspnea on exertion, cough, or hemoptysis  Cardiovascular: negative  Gastrointestinal: negative  Genitourinary: negative  Musculoskeletal: negative  Neurologic: negative  Psychiatric: negative  Hematologic/Lymphatic/Immunologic: negative  Endocrine: negative      O:   NAD, WDWN, Alert & Oriented, Mood & Affect wnl, Vitals stable   Here today alone   /78   Pulse 71   SpO2 95%    General appearance normal   Vitals stable   Alert, oriented and in no acute distress      Following lymph nodes palpated: Occipital, Cervical, Supraclavicular no lad   L zygoma 7mm red macule      Eyes: Conjunctivae/lids:Normal     ENT: Lips, buccal mucosa, tongue: normal    MSK:Normal    Cardiovascular: peripheral edema none    Pulm: Breathing Normal    Lymph Nodes: No Head and Neck Lymphadenopathy     Neuro/Psych: Orientation:Normal; Mood/Affect:Normal      A/P:  1. L zygoma melanoma 0.6mm  [place] discussed with patient   [place] sent out today     MELANOMA DISCUSSED WITH PATIENT:  I discussed the specifics of tumor, prognosis, metachronous melanoma, self exam, and genetics with the patient. I explained the need for monthly skin exams including and taught the patient how to do this. Patient was asked about new or changing moles . I discussed with patient signs and symptoms that could arise in the setting of recurrent locoregional or metastatic  disease. In addition, the need to undergo every 4 month dermatologic full skin survey and evaluation given that patients with a diagnosis of melanoma are at risk of recurrence (local and distant) and of subsequent de marty melanoma.  . I reviewed treatment options, including a discussion of wide excision (the gold standard) versus Mohs surgery with MART-1 immunostains.     Note: MART-1 (Melanoma Antigen Recognized by T-cells) antibody immunostaining was used during Mohs surgery as per standard protocol, in addition to routine processing of all specimens with hematoxylin and eosin. The peripheral margins/edges were processed with the MART-1 stain (4 specimens total). The center was examined with hematoxylin & eosin and MART-1 immunostains. The patient was informed of the procedure and its risk/benefits during the consent for the procedure.    One or more of the reagents used in immunohistochemical testing in this case may not have been cleared or approved by the U.S. Food and Drug Administration (FDA). The FDA has determined that such clearance or approval is not necessary. These tests are used for clinical purposes. They should not be regarded as investigational or for research. These reagents  performance characteristics have been determined by [facility]. This laboratory is certified under the Clinical Laboratory Improvement Amendments of 1988 (CLIA-88) as qualified to perform high complexity clinical laboratory testing.    After [facility] discussed with patient, decision for Mohs surgery was made. Indication for Mohs was aggressive histology. Patient confirmed the site with Dr. Dutta. After anesthesia with LEC, the tumor was excised using standard Mohs technique in 1 stages(s).  MART 1 stains were performed on 4 specimens. CLEAR MARGINS OBTAINED and Final defect size was 2 x 1.7 cm.   REPAIR COMPLEX: Because of the tightness of the surrounding skin and Because of the size and full thickness nature  of the defect, a complex closure was planned. After LEC anesthesia and prep, Burow's triangles were excised in the relaxed skin tension lines. The wound edges were widely undermined by dissection in the subcutaneous plane until adequate tissue mobility was obtained. Hemostasis was obtained. The wound edges were closed in a layered fashion using Vicryl and Fast Absorbing Plain Gut sutures. Postoperative length was 6.8 cm.   EBL minimal; complications none; wound care routine.  The patient was discharged in good condition and will return in one week for wound evaluation.  BENIGN LESIONS DISCUSSED WITH PATIENT:  I discussed the specifics of tumor, prognosis, and genetics of benign lesions.  I explained that treatment of these lesions would be purely cosmetic and not medically neccessary.  I discussed with patient different removal options including excision, cautery and /or laser.      Nature and genetics of benign skin lesions dicussed with patient.  Signs and Symptoms of skin cancer discussed with patient.  ABCDEs of melanoma reviewed with patient.  Patient encouraged to perform monthly skin exams.  UV precautions reviewed with patient.  Skin care regimen reviewed with patient: Eliminate harsh soaps, i.e. Dial, zest, irsih spring; Mild soaps such as Cetaphil or Dove sensitive skin, avoid hot or cold showers, aggressive use of emollients including vanicream, cetaphil or cerave discussed with patient.    Risks of non-melanoma skin cancer discussed with patient   Return to clinic 4 months

## 2019-11-07 NOTE — PATIENT INSTRUCTIONS
Sutured Wound Care     [facility]: [phone]    [facility]: [phone]          ? No strenuous activity for 48 hours. Resume moderate activity in 48 hours. No heavy exercising until you are seen for follow up in one week.     ? Take Tylenol as needed for discomfort.                         ? Do not drink alcoholic beverages for 48 hours.     ? Keep the pressure bandage in place for 24 hours. If the bandage becomes blood tinged or loose, reinforce it with gauze and tape.        (Refer to the reverse side of this page for management of bleeding).    ? Remove pressure bandage in 24 hours     ? Leave the flat bandage in place until your follow up appointment.    ? Keep the bandage dry. Wash around it carefully.    ? If the tape becomes soiled or starts to come off, reinforce it with additional paper tape.    ? Do not smoke for 3 weeks; smoking is detrimental to wound healing.    ? It is normal to have swelling and bruising around the surgical site. The bruising will fade in approximately 10-14 days. Elevate the area to reduce swelling.    ? Numbness, itchiness and sensitivity to temperature changes can occur after surgery and may take up to 18 months to normalize.      POSSIBLE COMPLICATIONS    BLEEDIN. Leave the bandage in place.  2. Use tightly rolled up gauze or a cloth to apply direct pressure over the bandage for 20   minutes.  3. Reapply pressure for an additional 20 minutes if necessary  4. Call the office or go to the nearest emergency room if pressure fails to stop the bleeding.  5. Use additional gauze and tape to maintain pressure once the bleeding has stopped.        PAIN:    1. Post operative pain should slowly get better, never worse.  2. A severe increase in pain may indicate a problem. Call the office if this occurs.    In case of emergency phone:Dr Dutta [phone]

## 2019-11-14 ENCOUNTER — OFFICE VISIT (OUTPATIENT)
Dept: CARDIOLOGY | Facility: CLINIC | Age: 74
End: 2019-11-14
Attending: INTERNAL MEDICINE
Payer: COMMERCIAL

## 2019-11-14 VITALS
SYSTOLIC BLOOD PRESSURE: 132 MMHG | HEART RATE: 60 BPM | BODY MASS INDEX: 44.1 KG/M2 | HEIGHT: 71 IN | DIASTOLIC BLOOD PRESSURE: 74 MMHG | WEIGHT: 315 LBS

## 2019-11-14 DIAGNOSIS — Z95.0 CARDIAC PACEMAKER IN SITU: ICD-10-CM

## 2019-11-14 PROCEDURE — 99214 OFFICE O/P EST MOD 30 MIN: CPT | Performed by: INTERNAL MEDICINE

## 2019-11-14 ASSESSMENT — MIFFLIN-ST. JEOR: SCORE: 2218.18

## 2019-11-14 NOTE — LETTER
"11/14/2019    Jeronimo Painter MD  [address] [facility] [address]    RE: James SCHUSTER Madeleine       Dear Colleague,    I had the pleasure of seeing James Salvador in the [facility].    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      HISTORY OF PRESENT ILLNESS:  Mr. Salvador is a delightful 73-year-old gentleman with a history of hypertension, hyperlipidemia, squamous cell carcinoma, previous MI (1994, status post PCI) and permanent atrial fibrillation, who  underwent pacemaker implantation (05/31/2018) and most recent was found to have low sensing the RV lead.  He was here he is here for evaluation.      At the moment he is doing well.  He denies any symptoms during this visit chest pain, shortness of breath, lightheadedness, near-syncope or syncope.      Device was interrogated October 7 and showed low sensing RV lead on (2 mV).  He is known to have chronic low since the RV lead.  Other problems are stable on (including pacing threshold and impedance).  Additionally he is not pacemaker dependent.          ASSESSMENT AND PLAN:   1.  Device care.  Poor LV lead sensing was noticed, however seems to be a chronic problem for him.  The device is otherwise working and pacing functionally well.  Therefore I favor conservative approach.  We will continue device checks to 3 months.     2. Coronary artery disease, status post PCI to LAD with no acute issues.  We will continue current medical therapy.   3. Hypertension.  Blood pressure is   well controlled.  Continue current medical therapy.    4.  Atrial fibrillation.  Heart rates well controlled.    5. Embolic prevention.  Chads vas score of 3.  Continue to correlation with Eliquis indefinitely.        Ronny Gamble MD    Physical Exam:  Vitals: /74   Pulse 60   Ht 1.803 m (5' 11\")   Wt 145.6 kg (321 lb)   BMI 44.77 kg/m       Constitutional:  AAO x3.  Pt " is in NAD.  HEAD: normocephalic.  SKIN: Skin normal color, texture and turgor with no lesions or eruptions.  Eyes: PERRL, EOMI.  ENT:  Supple, normal JVP. No lymphadenopathy or thyroid enlargement.  Chest:  CTAB  Cardiac:  IIR, variable sound of S1 and Normal S2. No murmurs rubs or gallop.   Abdomen:  Normal BS.  Soft, non-tender and non-distended.  No rebound or guarding.    Extremities:  Pedious pulses palpable B/L.  No LE edema noticed.   Neurological: Strength and sensation grossly symmetric and intact throughout.       CURRENT MEDICATIONS:  Current Outpatient Medications   Medication Sig Dispense Refill     Acetaminophen (TYLENOL PO) Take 650 mg by mouth 4 times daily        amoxicillin (AMOXIL) 500 MG capsule 2,000 mg as needed When going to the dentist  0     ASPIRIN PO Take 81 mg by mouth daily       atorvastatin (LIPITOR) 80 MG tablet TAKE 1 TABLET BY MOUTH DAILY - DUE FOR FASTING LABS IN DECEMBER 90 tablet 1     carvedilol (COREG) 3.125 MG tablet Take 1 tablet (3.125 mg) by mouth 2 times daily (with meals) 180 tablet 0     ciclopirox (LOPROX) 0.77 % cream Apply topically At Bedtime 90 g 3     desonide (DESOWEN) 0.05 % external cream Apply sparingly to affected area on face in morning 60 g 0     Doxycycline Hyclate (PERIOSTAT PO) Take 20 mg by mouth 2 times daily       ELIQUIS 5 MG tablet TAKE 1 TABLET BY MOUTH TWICE A DAY 60 tablet 5     fluocinonide (LIDEX) 0.05 % external solution Apply to scalp BID x 1-2 weeks PRN 60 mL 3     fluorouracil (EFUDEX) 5 % external cream Apply to scap BID x 3-4 weeks. Protect area from the sun. 40 g 3     fluticasone (FLONASE) 50 MCG/ACT nasal spray SPRAY 2 SPRAYS INTO BOTH NOSTRILS DAILY -RTS 1/7/19 48 mL 1     furosemide (LASIX) 40 MG tablet Take 1 tablet (40 mg) by mouth daily 90 tablet 3     GABAPENTIN PO Take 600 mg by mouth 3 times daily        ketoconazole (NIZORAL) 2 % cream Apply topically 2 times daily For face. FAX REFILL REQUESTS TO [facility]: [phone] 30 g 2      ketoconazole (NIZORAL) 2 % external shampoo Use daily as needed 120 mL 3     ketotifen (ZADITOR/REFRESH ANTI-ITCH) 0.025 % SOLN Place 1-2 drops into both eyes 2 times daily as needed for itching       lisinopril (PRINIVIL/ZESTRIL) 30 MG tablet Take 1 tablet (30 mg) by mouth daily 90 tablet 1     loratadine (CLARITIN) 10 MG tablet Take 10 mg by mouth daily as needed for allergies       Multiple Vitamin (MULTIVITAMIN OR) Take 1 tablet by mouth daily        nitroglycerin (NITROSTAT) 0.4 MG SL tablet Place 1 tablet (0.4 mg) under the tongue every 5 minutes as needed for chest pain May repeat twice for a total of 3 tablets.  If chest pain not relieved, call 911 25 tablet 11     Omega-3 Fatty Acids (OMEGA-3 FISH OIL PO) Take 3.2 g by mouth daily        OXcarbazepine (TRILEPTAL PO) Take 300 mg by mouth 3 times daily          ALLERGIES     Allergies   Allergen Reactions     Cats      Cat Dander     Dogs      Dog Dander     No Clinical Screening - See Comments      Pet dander, pollen, grasses, molds     Pollen Extract        PAST MEDICAL HISTORY:  Past Medical History:   Diagnosis Date     Actinic cheilitis 7/17/2013    Lower lip, left      Actinic keratosis      Allergic rhinitis      Benign hypertension      CAD (coronary artery disease)     Cardiac cath and PCI 1994. Cardiac Cath 9/2015: BRIDGET to LAD     History of myocardial infarction 1994    PTCA     Hyperlipidemia LDL goal < 70      Melanoma (H) 10/15/2019    10/15/19 left zygoma     Mixed hyperlipidemia 6/21/2017     Morbid obesity due to excess calories (H) 1/11/2016     Paroxysmal supraventricular tachycardia (H)     on metoprolol     Permanent atrial fibrillation 04/21/2017     Squamous cell carcinoma      Stable angina (H) 1/11/2016     Tachy-edinson syndrome (H) 5/29/2019    Added automatically from request for surgery [id number]       PAST SURGICAL HISTORY:  Past Surgical History:   Procedure Laterality Date     ANGIOPLASTY  1994    in [place]     ANKLE SURGERY   2005    right ankle     EP PERM PACER SINGLE LEAD N/A 2019    Medtronic single lead pacemaker     HEART CATH STENT COR W/WO PTCA  2015    BRIDGET stent mid LAD     JOINT REPLACEMENT, HIP RT/LT  10/14/2009    right hip      LASER SURGERY OF EYE  2002     MOHS MICROGRAPHIC PROCEDURE  2004    squamous cell carcinoma right temple     SINUS SURGERY  2006       FAMILY HISTORY:  Family History   Problem Relation Age of Onset     Genitourinary Problems Mother         renal failure     Cardiovascular Father         rupture of dorsal aorta     Depression Son      Skin Cancer No family hx of        SOCIAL HISTORY:  Social History     Socioeconomic History     Marital status:      Spouse name: None     Number of children: 3     Years of education: None     Highest education level: None   Occupational History     Employer: RETIRED   Social Needs     Financial resource strain: None     Food insecurity:     Worry: None     Inability: None     Transportation needs:     Medical: None     Non-medical: None   Tobacco Use     Smoking status: Former Smoker     Packs/day: 0.00     Years: 10.00     Pack years: 0.00     Types: Cigarettes     Last attempt to quit: 1987     Years since quittin.8     Smokeless tobacco: Never Used   Substance and Sexual Activity     Alcohol use: Yes     Alcohol/week: 0.0 standard drinks     Comment: socially - x2-3 per month -  none currently     Drug use: No     Sexual activity: Yes     Partners: Female   Lifestyle     Physical activity:     Days per week: None     Minutes per session: None     Stress: None   Relationships     Social connections:     Talks on phone: None     Gets together: None     Attends Evangelical service: None     Active member of club or organization: None     Attends meetings of clubs or organizations: None     Relationship status: None     Intimate partner violence:     Fear of current or ex partner: None     Emotionally abused: None      Physically abused: None     Forced sexual activity: None   Other Topics Concern     Parent/sibling w/ CABG, MI or angioplasty before 65F 55M? Not Asked      Service Not Asked     Blood Transfusions Not Asked     Caffeine Concern Yes     Comment: 2 big cups coffee daily     Occupational Exposure Not Asked     Hobby Hazards Not Asked     Sleep Concern No     Comment: sleeping better since shoulder replaced 11/3/16     Stress Concern No     Weight Concern Yes     Special Diet Yes     Comment: trying to do more lean meats     Back Care Not Asked     Exercise No     Comment: limited - knee     Bike Helmet Not Asked     Seat Belt Not Asked     Self-Exams Not Asked   Social History Narrative     None       Review of Systems:  Skin:  Positive for bruising   Eyes:  Positive for glasses  ENT:  Positive for hearing loss  Respiratory:  Positive for    Cardiovascular:    Positive for;fatigue  Gastroenterology: not assessed    Genitourinary:  not assessed    Musculoskeletal:  not assessed    Neurologic:  Positive for headaches  Psychiatric:  not assessed    Heme/Lymph/Imm:  not assessed    Endocrine:  not assessed        Recent Lab Results:  LIPID RESULTS:  Lab Results   Component Value Date    CHOL 126 12/11/2018    HDL 40 12/11/2018    LDL 55 12/11/2018    TRIG 153 (H) 12/11/2018    CHOLHDLRATIO 3.6 09/22/2015       LIVER ENZYME RESULTS:  Lab Results   Component Value Date    AST 24 07/18/2018    ALT 38 07/18/2018       CBC RESULTS:  Lab Results   Component Value Date    WBC 5.2 05/31/2019    RBC 4.59 05/31/2019    HGB 14.9 05/31/2019    HCT 41.9 05/31/2019    MCV 91 05/31/2019    MCH 32.5 05/31/2019    MCHC 35.6 05/31/2019    RDW 13.1 05/31/2019     05/31/2019       BMP RESULTS:  Lab Results   Component Value Date     (L) 05/31/2019    POTASSIUM 4.6 05/31/2019    CHLORIDE 98 05/31/2019    CO2 28 05/31/2019    ANIONGAP 6 05/31/2019    GLC 90 05/31/2019    BUN 12 05/31/2019    CR 0.65 (L) 05/31/2019     GFRESTIMATED >90 05/31/2019    GFRESTBLACK >90 05/31/2019    BENJI 9.0 05/31/2019        A1C RESULTS:  Lab Results   Component Value Date    A1C 5.5 07/18/2018       INR RESULTS:  Lab Results   Component Value Date    INR 1.02 09/23/2015         ECHOCARDIOGRAM  No results found for this or any previous visit (from the past 8760 hour(s)).      No orders of the defined types were placed in this encounter.    No orders of the defined types were placed in this encounter.    There are no discontinued medications.      Encounter Diagnosis   Name Primary?     Cardiac pacemaker in situ          CC  Ronny Gamble MD  [address]  [place] [id number]                Thank you for allowing me to participate in the care of your patient.      Sincerely,     Ronny Gamble MD     [facility]    cc:   Ronny Gamble MD  [address]  [place] [id number]

## 2019-11-14 NOTE — PROGRESS NOTES
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      HISTORY OF PRESENT ILLNESS:  Mr. Salvador is a delightful 73-year-old gentleman with a history of hypertension, hyperlipidemia, squamous cell carcinoma, previous MI (1994, status post PCI) and permanent atrial fibrillation, who underwent pacemaker implantation (05/31/2018) and most recent was found to have low sensing the RV lead.  He was here he is here for evaluation.      At the moment he is doing well.  He denies any symptoms during this visit chest pain, shortness of breath, lightheadedness, near-syncope or syncope.      Device was interrogated October 7 and showed low sensing RV lead on (2 mV).  He is known to have chronic low since the RV lead.  Other problems are stable on (including pacing threshold and impedance).  Additionally he is not pacemaker dependent.          ASSESSMENT AND PLAN:   1. Device care.  Poor LV lead sensing was noticed, however seems to be a chronic problem for him.  The device is otherwise working and pacing functionally well.  Therefore I favor conservative approach.  We will continue device checks to 3 months.     2. Coronary artery disease, status post PCI to LAD with no acute issues.  We will continue current medical therapy.   3. Hypertension.  Blood pressure is  well controlled.  Continue current medical therapy.    4. Atrial fibrillation.  Heart rates well controlled.    5. Embolic prevention.  Chads vas score of 3.  Continue to correlation with Eliquis indefinitely.        Ronny Gamble MD    Physical Exam:  Vitals: /74   Pulse 60   Ht 1.803 m (5' 11\")   Wt 145.6 kg (321 lb)   BMI 44.77 kg/m      Constitutional:  AAO x3.  Pt is in NAD.  HEAD: normocephalic.  SKIN: Skin normal color, texture and turgor with no lesions or eruptions.  Eyes: PERRL, EOMI.  ENT:  Supple, normal JVP. No lymphadenopathy or thyroid enlargement.  Chest:  CTAB  Cardiac:  IIR, " variable sound of S1 and Normal S2. No murmurs rubs or gallop.   Abdomen:  Normal BS.  Soft, non-tender and non-distended.  No rebound or guarding.    Extremities:  Pedious pulses palpable B/L.  No LE edema noticed.   Neurological: Strength and sensation grossly symmetric and intact throughout.       CURRENT MEDICATIONS:  Current Outpatient Medications   Medication Sig Dispense Refill     Acetaminophen (TYLENOL PO) Take 650 mg by mouth 4 times daily        amoxicillin (AMOXIL) 500 MG capsule 2,000 mg as needed When going to the dentist  0     ASPIRIN PO Take 81 mg by mouth daily       atorvastatin (LIPITOR) 80 MG tablet TAKE 1 TABLET BY MOUTH DAILY - DUE FOR FASTING LABS IN DECEMBER 90 tablet 1     carvedilol (COREG) 3.125 MG tablet Take 1 tablet (3.125 mg) by mouth 2 times daily (with meals) 180 tablet 0     ciclopirox (LOPROX) 0.77 % cream Apply topically At Bedtime 90 g 3     desonide (DESOWEN) 0.05 % external cream Apply sparingly to affected area on face in morning 60 g 0     Doxycycline Hyclate (PERIOSTAT PO) Take 20 mg by mouth 2 times daily       ELIQUIS 5 MG tablet TAKE 1 TABLET BY MOUTH TWICE A DAY 60 tablet 5     fluocinonide (LIDEX) 0.05 % external solution Apply to scalp BID x 1-2 weeks PRN 60 mL 3     fluorouracil (EFUDEX) 5 % external cream Apply to scap BID x 3-4 weeks. Protect area from the sun. 40 g 3     fluticasone (FLONASE) 50 MCG/ACT nasal spray SPRAY 2 SPRAYS INTO BOTH NOSTRILS DAILY -RTS 1/7/19 48 mL 1     furosemide (LASIX) 40 MG tablet Take 1 tablet (40 mg) by mouth daily 90 tablet 3     GABAPENTIN PO Take 600 mg by mouth 3 times daily        ketoconazole (NIZORAL) 2 % cream Apply topically 2 times daily For face. FAX REFILL REQUESTS TO [facility]: [phone] 30 g 2     ketoconazole (NIZORAL) 2 % external shampoo Use daily as needed 120 mL 3     ketotifen (ZADITOR/REFRESH ANTI-ITCH) 0.025 % SOLN Place 1-2 drops into both eyes 2 times daily as needed for itching       lisinopril  (PRINIVIL/ZESTRIL) 30 MG tablet Take 1 tablet (30 mg) by mouth daily 90 tablet 1     loratadine (CLARITIN) 10 MG tablet Take 10 mg by mouth daily as needed for allergies       Multiple Vitamin (MULTIVITAMIN OR) Take 1 tablet by mouth daily        nitroglycerin (NITROSTAT) 0.4 MG SL tablet Place 1 tablet (0.4 mg) under the tongue every 5 minutes as needed for chest pain May repeat twice for a total of 3 tablets.  If chest pain not relieved, call 911 25 tablet 11     Omega-3 Fatty Acids (OMEGA-3 FISH OIL PO) Take 3.2 g by mouth daily        OXcarbazepine (TRILEPTAL PO) Take 300 mg by mouth 3 times daily          ALLERGIES     Allergies   Allergen Reactions     Cats      Cat Dander     Dogs      Dog Dander     No Clinical Screening - See Comments      Pet dander, pollen, grasses, molds     Pollen Extract        PAST MEDICAL HISTORY:  Past Medical History:   Diagnosis Date     Actinic cheilitis 7/17/2013    Lower lip, left      Actinic keratosis      Allergic rhinitis      Benign hypertension      CAD (coronary artery disease)     Cardiac cath and PCI 1994. Cardiac Cath 9/2015: BRIDGET to LAD     History of myocardial infarction 1994    PTCA     Hyperlipidemia LDL goal < 70      Melanoma (H) 10/15/2019    10/15/19 left zygoma     Mixed hyperlipidemia 6/21/2017     Morbid obesity due to excess calories (H) 1/11/2016     Paroxysmal supraventricular tachycardia (H)     on metoprolol     Permanent atrial fibrillation 04/21/2017     Squamous cell carcinoma      Stable angina (H) 1/11/2016     Tachy-edinson syndrome (H) 5/29/2019    Added automatically from request for surgery [id number]       PAST SURGICAL HISTORY:  Past Surgical History:   Procedure Laterality Date     ANGIOPLASTY  1994    in [place]     ANKLE SURGERY  7/13/2005    right ankle     EP PERM PACER SINGLE LEAD N/A 5/31/2019    Medtronic single lead pacemaker     HEART CATH STENT COR W/WO PTCA  9/23/2015    BRIDGET stent mid LAD     JOINT REPLACEMENT, HIP RT/LT   10/14/2009    right hip      LASER SURGERY OF EYE  2002     MOHS MICROGRAPHIC PROCEDURE  2004    squamous cell carcinoma right temple     SINUS SURGERY  2006       FAMILY HISTORY:  Family History   Problem Relation Age of Onset     Genitourinary Problems Mother         renal failure     Cardiovascular Father         rupture of dorsal aorta     Depression Son      Skin Cancer No family hx of        SOCIAL HISTORY:  Social History     Socioeconomic History     Marital status:      Spouse name: None     Number of children: 3     Years of education: None     Highest education level: None   Occupational History     Employer: RETIRED   Social Needs     Financial resource strain: None     Food insecurity:     Worry: None     Inability: None     Transportation needs:     Medical: None     Non-medical: None   Tobacco Use     Smoking status: Former Smoker     Packs/day: 0.00     Years: 10.00     Pack years: 0.00     Types: Cigarettes     Last attempt to quit: 1987     Years since quittin.8     Smokeless tobacco: Never Used   Substance and Sexual Activity     Alcohol use: Yes     Alcohol/week: 0.0 standard drinks     Comment: socially - x2-3 per month -  none currently     Drug use: No     Sexual activity: Yes     Partners: Female   Lifestyle     Physical activity:     Days per week: None     Minutes per session: None     Stress: None   Relationships     Social connections:     Talks on phone: None     Gets together: None     Attends [employer] service: None     Active member of club or organization: None     Attends meetings of clubs or organizations: None     Relationship status: None     Intimate partner violence:     Fear of current or ex partner: None     Emotionally abused: None     Physically abused: None     Forced sexual activity: None   Other Topics Concern     Parent/sibling w/ CABG, MI or angioplasty before 65F 55M? Not Asked      Service Not Asked     Blood Transfusions Not  Asked     Caffeine Concern Yes     Comment: 2 big cups coffee daily     Occupational Exposure Not Asked     Hobby Hazards Not Asked     Sleep Concern No     Comment: sleeping better since shoulder replaced 11/3/16     Stress Concern No     Weight Concern Yes     Special Diet Yes     Comment: trying to do more lean meats     Back Care Not Asked     Exercise No     Comment: limited - knee     Bike Helmet Not Asked     Seat Belt Not Asked     Self-Exams Not Asked   Social History Narrative     None       Review of Systems:  Skin:  Positive for bruising   Eyes:  Positive for glasses  ENT:  Positive for hearing loss  Respiratory:  Positive for    Cardiovascular:    Positive for;fatigue  Gastroenterology: not assessed    Genitourinary:  not assessed    Musculoskeletal:  not assessed    Neurologic:  Positive for headaches  Psychiatric:  not assessed    Heme/Lymph/Imm:  not assessed    Endocrine:  not assessed        Recent Lab Results:  LIPID RESULTS:  Lab Results   Component Value Date    CHOL 126 12/11/2018    HDL 40 12/11/2018    LDL 55 12/11/2018    TRIG 153 (H) 12/11/2018    CHOLHDLRATIO 3.6 09/22/2015       LIVER ENZYME RESULTS:  Lab Results   Component Value Date    AST 24 07/18/2018    ALT 38 07/18/2018       CBC RESULTS:  Lab Results   Component Value Date    WBC 5.2 05/31/2019    RBC 4.59 05/31/2019    HGB 14.9 05/31/2019    HCT 41.9 05/31/2019    MCV 91 05/31/2019    MCH 32.5 05/31/2019    MCHC 35.6 05/31/2019    RDW 13.1 05/31/2019     05/31/2019       BMP RESULTS:  Lab Results   Component Value Date     (L) 05/31/2019    POTASSIUM 4.6 05/31/2019    CHLORIDE 98 05/31/2019    CO2 28 05/31/2019    ANIONGAP 6 05/31/2019    GLC 90 05/31/2019    BUN 12 05/31/2019    CR 0.65 (L) 05/31/2019    GFRESTIMATED >90 05/31/2019    GFRESTBLACK >90 05/31/2019    BENJI 9.0 05/31/2019        A1C RESULTS:  Lab Results   Component Value Date    A1C 5.5 07/18/2018       INR RESULTS:  Lab Results   Component Value Date     INR 1.02 09/23/2015         ECHOCARDIOGRAM  No results found for this or any previous visit (from the past 8760 hour(s)).      No orders of the defined types were placed in this encounter.    No orders of the defined types were placed in this encounter.    There are no discontinued medications.      Encounter Diagnosis   Name Primary?     Cardiac pacemaker in situ          CC  Ronny Gamble MD  [address]  [place] [id number]

## 2019-11-14 NOTE — LETTER
"11/14/2019    Jeronimo Painter MD  [address] [facility] [address]    RE: James SCHUSTER Madeleine       Dear Colleague,    I had the pleasure of seeing James Salvador in the [facility].    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      HISTORY OF PRESENT ILLNESS:  Mr. Salvador is a delightful 73-year-old gentleman with a history of hypertension, hyperlipidemia, squamous cell carcinoma, previous MI (1994, status post PCI) and permanent atrial fibrillation, who  underwent pacemaker implantation (05/31/2018) and most recent was found to have low sensing the RV lead.  He was here he is here for evaluation.      At the moment he is doing well.  He denies any symptoms during this visit chest pain, shortness of breath, lightheadedness, near-syncope or syncope.      Device was interrogated October 7 and showed low sensing RV lead on (2 mV).  He is known to have chronic low since the RV lead.  Other problems are stable on (including pacing threshold and impedance).  Additionally he is not pacemaker dependent.          ASSESSMENT AND PLAN:   1.  Device care.  Poor LV lead sensing was noticed, however seems to be a chronic problem for him.  The device is otherwise working and pacing functionally well.  Therefore I favor conservative approach.  We will continue device checks to 3 months.     2. Coronary artery disease, status post PCI to LAD with no acute issues.  We will continue current medical therapy.   3. Hypertension.  Blood pressure is   well controlled.  Continue current medical therapy.    4.  Atrial fibrillation.  Heart rates well controlled.    5. Embolic prevention.  Chads vas score of 3.  Continue to correlation with Eliquis indefinitely.        Ronny Gamble MD    Physical Exam:  Vitals: /74   Pulse 60   Ht 1.803 m (5' 11\")   Wt 145.6 kg (321 lb)   BMI 44.77 kg/m       Constitutional:  AAO x3.  Pt " is in NAD.  HEAD: normocephalic.  SKIN: Skin normal color, texture and turgor with no lesions or eruptions.  Eyes: PERRL, EOMI.  ENT:  Supple, normal JVP. No lymphadenopathy or thyroid enlargement.  Chest:  CTAB  Cardiac:  IIR, variable sound of S1 and Normal S2. No murmurs rubs or gallop.   Abdomen:  Normal BS.  Soft, non-tender and non-distended.  No rebound or guarding.    Extremities:  Pedious pulses palpable B/L.  No LE edema noticed.   Neurological: Strength and sensation grossly symmetric and intact throughout.       CURRENT MEDICATIONS:  Current Outpatient Medications   Medication Sig Dispense Refill     Acetaminophen (TYLENOL PO) Take 650 mg by mouth 4 times daily        amoxicillin (AMOXIL) 500 MG capsule 2,000 mg as needed When going to the dentist  0     ASPIRIN PO Take 81 mg by mouth daily       atorvastatin (LIPITOR) 80 MG tablet TAKE 1 TABLET BY MOUTH DAILY - DUE FOR FASTING LABS IN DECEMBER 90 tablet 1     carvedilol (COREG) 3.125 MG tablet Take 1 tablet (3.125 mg) by mouth 2 times daily (with meals) 180 tablet 0     ciclopirox (LOPROX) 0.77 % cream Apply topically At Bedtime 90 g 3     desonide (DESOWEN) 0.05 % external cream Apply sparingly to affected area on face in morning 60 g 0     Doxycycline Hyclate (PERIOSTAT PO) Take 20 mg by mouth 2 times daily       ELIQUIS 5 MG tablet TAKE 1 TABLET BY MOUTH TWICE A DAY 60 tablet 5     fluocinonide (LIDEX) 0.05 % external solution Apply to scalp BID x 1-2 weeks PRN 60 mL 3     fluorouracil (EFUDEX) 5 % external cream Apply to scap BID x 3-4 weeks. Protect area from the sun. 40 g 3     fluticasone (FLONASE) 50 MCG/ACT nasal spray SPRAY 2 SPRAYS INTO BOTH NOSTRILS DAILY -RTS 1/7/19 48 mL 1     furosemide (LASIX) 40 MG tablet Take 1 tablet (40 mg) by mouth daily 90 tablet 3     GABAPENTIN PO Take 600 mg by mouth 3 times daily        ketoconazole (NIZORAL) 2 % cream Apply topically 2 times daily For face. FAX REFILL REQUESTS TO [facility]: [phone] 30 g 2      ketoconazole (NIZORAL) 2 % external shampoo Use daily as needed 120 mL 3     ketotifen (ZADITOR/REFRESH ANTI-ITCH) 0.025 % SOLN Place 1-2 drops into both eyes 2 times daily as needed for itching       lisinopril (PRINIVIL/ZESTRIL) 30 MG tablet Take 1 tablet (30 mg) by mouth daily 90 tablet 1     loratadine (CLARITIN) 10 MG tablet Take 10 mg by mouth daily as needed for allergies       Multiple Vitamin (MULTIVITAMIN OR) Take 1 tablet by mouth daily        nitroglycerin (NITROSTAT) 0.4 MG SL tablet Place 1 tablet (0.4 mg) under the tongue every 5 minutes as needed for chest pain May repeat twice for a total of 3 tablets.  If chest pain not relieved, call 911 25 tablet 11     Omega-3 Fatty Acids (OMEGA-3 FISH OIL PO) Take 3.2 g by mouth daily        OXcarbazepine (TRILEPTAL PO) Take 300 mg by mouth 3 times daily          ALLERGIES     Allergies   Allergen Reactions     Cats      Cat Dander     Dogs      Dog Dander     No Clinical Screening - See Comments      Pet dander, pollen, grasses, molds     Pollen Extract        PAST MEDICAL HISTORY:  Past Medical History:   Diagnosis Date     Actinic cheilitis 7/17/2013    Lower lip, left      Actinic keratosis      Allergic rhinitis      Benign hypertension      CAD (coronary artery disease)     Cardiac cath and PCI 1994. Cardiac Cath 9/2015: BRIDGET to LAD     History of myocardial infarction 1994    PTCA     Hyperlipidemia LDL goal < 70      Melanoma (H) 10/15/2019    10/15/19 left zygoma     Mixed hyperlipidemia 6/21/2017     Morbid obesity due to excess calories (H) 1/11/2016     Paroxysmal supraventricular tachycardia (H)     on metoprolol     Permanent atrial fibrillation 04/21/2017     Squamous cell carcinoma      Stable angina (H) 1/11/2016     Tachy-edinson syndrome (H) 5/29/2019    Added automatically from request for surgery [id number]       PAST SURGICAL HISTORY:  Past Surgical History:   Procedure Laterality Date     ANGIOPLASTY  1994    in [place]     ANKLE SURGERY   2005    right ankle     EP PERM PACER SINGLE LEAD N/A 2019    Medtronic single lead pacemaker     HEART CATH STENT COR W/WO PTCA  2015    BRIDGET stent mid LAD     JOINT REPLACEMENT, HIP RT/LT  10/14/2009    right hip      LASER SURGERY OF EYE  2002     MOHS MICROGRAPHIC PROCEDURE  2004    squamous cell carcinoma right temple     SINUS SURGERY  2006       FAMILY HISTORY:  Family History   Problem Relation Age of Onset     Genitourinary Problems Mother         renal failure     Cardiovascular Father         rupture of dorsal aorta     Depression Son      Skin Cancer No family hx of        SOCIAL HISTORY:  Social History     Socioeconomic History     Marital status:      Spouse name: None     Number of children: 3     Years of education: None     Highest education level: None   Occupational History     Employer: RETIRED   Social Needs     Financial resource strain: None     Food insecurity:     Worry: None     Inability: None     Transportation needs:     Medical: None     Non-medical: None   Tobacco Use     Smoking status: Former Smoker     Packs/day: 0.00     Years: 10.00     Pack years: 0.00     Types: Cigarettes     Last attempt to quit: 1987     Years since quittin.8     Smokeless tobacco: Never Used   Substance and Sexual Activity     Alcohol use: Yes     Alcohol/week: 0.0 standard drinks     Comment: socially - x2-3 per month -  none currently     Drug use: No     Sexual activity: Yes     Partners: Female   Lifestyle     Physical activity:     Days per week: None     Minutes per session: None     Stress: None   Relationships     Social connections:     Talks on phone: None     Gets together: None     Attends [employer] service: None     Active member of club or organization: None     Attends meetings of clubs or organizations: None     Relationship status: None     Intimate partner violence:     Fear of current or ex partner: None     Emotionally abused: None      Physically abused: None     Forced sexual activity: None   Other Topics Concern     Parent/sibling w/ CABG, MI or angioplasty before 65F 55M? Not Asked      Service Not Asked     Blood Transfusions Not Asked     Caffeine Concern Yes     Comment: 2 big cups coffee daily     Occupational Exposure Not Asked     Hobby Hazards Not Asked     Sleep Concern No     Comment: sleeping better since shoulder replaced 11/3/16     Stress Concern No     Weight Concern Yes     Special Diet Yes     Comment: trying to do more lean meats     Back Care Not Asked     Exercise No     Comment: limited - knee     Bike Helmet Not Asked     Seat Belt Not Asked     Self-Exams Not Asked   Social History Narrative     None       Review of Systems:  Skin:  Positive for bruising   Eyes:  Positive for glasses  ENT:  Positive for hearing loss  Respiratory:  Positive for    Cardiovascular:    Positive for;fatigue  Gastroenterology: not assessed    Genitourinary:  not assessed    Musculoskeletal:  not assessed    Neurologic:  Positive for headaches  Psychiatric:  not assessed    Heme/Lymph/Imm:  not assessed    Endocrine:  not assessed        Recent Lab Results:  LIPID RESULTS:  Lab Results   Component Value Date    CHOL 126 12/11/2018    HDL 40 12/11/2018    LDL 55 12/11/2018    TRIG 153 (H) 12/11/2018    CHOLHDLRATIO 3.6 09/22/2015       LIVER ENZYME RESULTS:  Lab Results   Component Value Date    AST 24 07/18/2018    ALT 38 07/18/2018       CBC RESULTS:  Lab Results   Component Value Date    WBC 5.2 05/31/2019    RBC 4.59 05/31/2019    HGB 14.9 05/31/2019    HCT 41.9 05/31/2019    MCV 91 05/31/2019    MCH 32.5 05/31/2019    MCHC 35.6 05/31/2019    RDW 13.1 05/31/2019     05/31/2019       BMP RESULTS:  Lab Results   Component Value Date     (L) 05/31/2019    POTASSIUM 4.6 05/31/2019    CHLORIDE 98 05/31/2019    CO2 28 05/31/2019    ANIONGAP 6 05/31/2019    GLC 90 05/31/2019    BUN 12 05/31/2019    CR 0.65 (L) 05/31/2019     GFRESTIMATED >90 05/31/2019    GFRESTBLACK >90 05/31/2019    BENJI 9.0 05/31/2019        A1C RESULTS:  Lab Results   Component Value Date    A1C 5.5 07/18/2018       INR RESULTS:  Lab Results   Component Value Date    INR 1.02 09/23/2015         ECHOCARDIOGRAM  No results found for this or any previous visit (from the past 8760 hour(s)).      No orders of the defined types were placed in this encounter.    No orders of the defined types were placed in this encounter.    There are no discontinued medications.      Encounter Diagnosis   Name Primary?     Cardiac pacemaker in situ              Thank you for allowing me to participate in the care of your patient.    Sincerely,     Ronny Gamble MD     [facility]

## 2019-11-15 ENCOUNTER — ALLIED HEALTH/NURSE VISIT (OUTPATIENT)
Dept: DERMATOLOGY | Facility: CLINIC | Age: 74
End: 2019-11-15
Payer: COMMERCIAL

## 2019-11-15 DIAGNOSIS — Z48.01 ENCOUNTER FOR CHANGE OR REMOVAL OF SURGICAL WOUND DRESSING: Primary | ICD-10-CM

## 2019-11-15 PROCEDURE — 99207 ZZC NO CHARGE NURSE ONLY: CPT

## 2019-11-15 NOTE — PATIENT INSTRUCTIONS

## 2019-11-15 NOTE — NURSING NOTE
Pt returned to clinic for post surgery 1 week follow up bandage change. Pt has no complaints, denies pain. Bandage removed from left zygoma, area cleansed with normal saline. Site is healing and wound edges approximating well. Reapplied new steri strips and paper tape.    Advised to watch for signs/sx of infection; spreading redness, drainage, odor, fever. Call or report promptly to clinic. Pt given written instructions and informed to rtc as needed. Patient verbalized understanding.     MIGEL Carmen-BSN-PHN  [facility]  [phone]

## 2019-12-04 ENCOUNTER — TELEPHONE (OUTPATIENT)
Dept: DERMATOLOGY | Facility: CLINIC | Age: 74
End: 2019-12-04

## 2019-12-04 LAB — LAB SCANNED RESULT: NORMAL

## 2019-12-04 NOTE — TELEPHONE ENCOUNTER
Called and spoke w/ female.    Asked female to have patient call me back at [phone].    Female voiced understanding.    Kermit RN-BSN-PHN  [facility]  [phone]

## 2019-12-04 NOTE — TELEPHONE ENCOUNTER
----- Message from Veena Armstrong CMA sent at 12/4/2019  2:20 PM CST -----    ----- Message -----  From: Jv Dutta MD  Sent: 12/4/2019   2:13 PM CST  To: Alanna Henao Ma/Rosamaria    Your melanoma test showed that your melanoma was a low risk Class 1B score in which the 5-year metastatic specific free survival rate was determined to be 97% and recurrence free survival is 86%        This is consider a class 1 or low risk melanoma.  I would still encrouage self lymph node exams and skin checks every 3-4 months    Please call with questions

## 2019-12-05 NOTE — TELEPHONE ENCOUNTER
Patient returned phone call at 8:20a 12/5. Please call him back at [phone]. You can leave a message if he does not answer. Thanks.

## 2019-12-06 ENCOUNTER — MYC MEDICAL ADVICE (OUTPATIENT)
Dept: DERMATOLOGY | Facility: CLINIC | Age: 74
End: 2019-12-06

## 2019-12-06 NOTE — TELEPHONE ENCOUNTER
Patient read [employer] message explaining castle results.    MIGEL Carmen-BSN-PHN  [facility]  [phone]

## 2019-12-06 NOTE — TELEPHONE ENCOUNTER
[employer] message sent:    Holden Ruano-    I hope you had a great Thanksgiving!    Your castle results for your melanoma came back.    Your melanoma test showed that your melanoma was a low risk Class 1B score in which the 5-year metastatic specific free survival rate was determined to be 97% and recurrence free survival is 86%.    This is considered a class 1 or low risk melanoma. The provider still encourages self lymph node exams and skin checks every 3-4 months.    Let me know if you have any questions.    Have a nice weekend,    MIGEL Carmen-BSN-PHN  [facility]  [phone]

## 2019-12-09 DIAGNOSIS — L21.9 DERMATITIS, SEBORRHEIC: ICD-10-CM

## 2019-12-09 RX ORDER — FLUOCINONIDE TOPICAL SOLUTION USP, 0.05% 0.5 MG/ML
SOLUTION TOPICAL
Qty: 60 ML | Refills: 3 | Status: CANCELLED | OUTPATIENT
Start: 2019-12-09

## 2019-12-09 NOTE — TELEPHONE ENCOUNTER
"Requested Prescriptions   Pending Prescriptions Disp Refills     fluocinonide (LIDEX) 0.05 % external solution 60 mL 3     Sig: Apply to scalp BID x 1-2 weeks PRN   Last Written Prescription Date:  5/23/19  Last Fill Quantity: 60ml,  # refills: 3   Last office visit: 11/15/2019 with prescribing provider:  Nurse   Future Office Visit:   Next 5 appointments (look out 90 days)    Mar 03, 2020  9:00 AM CST  Return Visit with Rosa Maria Hansen PA-C  [facility] ([facility] [address] [address] [phone]             Topical Steroids and Nonsteroidals Protocol Failed - 12/9/2019  3:52 PM        Failed - High potency steroid not ordered        Passed - Patient is age 6 or older        Passed - Authorizing prescriber's most recent note related to this medication read.     If refill request is for ophthalmic use, please forward request to provider for approval.          Passed - Recent (12 mo) or future (30 days) visit within the authorizing provider's specialty     Patient has had an office visit with the authorizing provider or a provider within the authorizing providers department within the previous 12 mos or has a future within next 30 days. See \"Patient Info\" tab in inbasket, or \"Choose Columns\" in Meds & Orders section of the refill encounter.              Passed - Medication is active on med list          "

## 2019-12-10 DIAGNOSIS — L21.9 DERMATITIS, SEBORRHEIC: ICD-10-CM

## 2019-12-10 RX ORDER — FLUOCINONIDE TOPICAL SOLUTION USP, 0.05% 0.5 MG/ML
SOLUTION TOPICAL
Qty: 60 ML | Refills: 3 | Status: SHIPPED | OUTPATIENT
Start: 2019-12-10 | End: 2021-08-04

## 2019-12-10 NOTE — TELEPHONE ENCOUNTER
[facility] protocol failed.     Topical Steroids and Nonsteroidals Protocol Fxzayp56/10 11:49 AM   High potency steroid not ordered     MIGEL Carmen-BSN-N  [facility]  [phone]

## 2019-12-11 DIAGNOSIS — I48.91 ATRIAL FIBRILLATION (H): ICD-10-CM

## 2019-12-11 DIAGNOSIS — I25.10 CAD (CORONARY ARTERY DISEASE): ICD-10-CM

## 2019-12-11 RX ORDER — CARVEDILOL 3.12 MG/1
3.12 TABLET ORAL 2 TIMES DAILY WITH MEALS
Qty: 180 TABLET | Refills: 3 | Status: SHIPPED | OUTPATIENT
Start: 2019-12-11 | End: 2020-11-11

## 2019-12-17 NOTE — PROGRESS NOTES
Surgical Office Location:  [facility]  [address] [place] [id number]

## 2019-12-19 ENCOUNTER — OFFICE VISIT (OUTPATIENT)
Dept: DERMATOLOGY | Facility: CLINIC | Age: 74
End: 2019-12-19
Payer: COMMERCIAL

## 2019-12-19 VITALS — SYSTOLIC BLOOD PRESSURE: 105 MMHG | HEART RATE: 70 BPM | OXYGEN SATURATION: 95 % | DIASTOLIC BLOOD PRESSURE: 68 MMHG

## 2019-12-19 DIAGNOSIS — C44.42 SQUAMOUS CELL CARCINOMA OF SCALP: Primary | ICD-10-CM

## 2019-12-19 PROCEDURE — 17311 MOHS 1 STAGE H/N/HF/G: CPT | Performed by: DERMATOLOGY

## 2019-12-19 NOTE — LETTER
12/19/2019         RE: James Salvador  [address] [place] [address]        Dear Colleague,    Thank you for referring your patient, James Salvador, to the [facility]. Please see a copy of my visit note below.    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  Patient has no other skin complaints today.  Remainder of the HPI, Meds, PMH, Allergies, FH, and SH was reviewed in chart.      Past Medical History:   Diagnosis Date     Actinic cheilitis 7/17/2013    Lower lip, left      Actinic keratosis      Allergic rhinitis      Benign hypertension      CAD (coronary artery disease)     Cardiac cath and PCI 1994. Cardiac Cath 9/2015: BRIDGET to LAD     History of myocardial infarction 1994    PTCA     Hyperlipidemia LDL goal < 70      Melanoma (H) 10/15/2019    10/15/19 left zygoma     Mixed hyperlipidemia 6/21/2017     Morbid obesity due to excess calories (H) 1/11/2016     Paroxysmal supraventricular tachycardia (H)     on metoprolol     Permanent atrial fibrillation 04/21/2017     Squamous cell carcinoma      Stable angina (H) 1/11/2016     Tachy-edinson syndrome (H) 5/29/2019    Added automatically from request for surgery [id number]       Past Surgical History:   Procedure Laterality Date     ANGIOPLASTY  1994    in [place]     ANKLE SURGERY  7/13/2005    right ankle     EP PERM PACER SINGLE LEAD N/A 5/31/2019    Medtronic single lead pacemaker     HEART CATH STENT COR W/WO PTCA  9/23/2015    BRIDGET stent mid LAD     JOINT REPLACEMENT, HIP RT/LT  10/14/2009    right hip      LASER SURGERY OF EYE  06/01/2002     MOHS MICROGRAPHIC PROCEDURE  06/12/2004    squamous cell carcinoma right temple     SINUS SURGERY  7/11/2006        Family History   Problem Relation Age of Onset     Genitourinary  Problems Mother         renal failure     Cardiovascular Father         rupture of dorsal aorta     Depression Son      Skin Cancer No family hx of        Social History     Socioeconomic History     Marital status:      Spouse name: Not on file     Number of children: 3     Years of education: Not on file     Highest education level: Not on file   Occupational History     Employer: RETIRED   Social Needs     Financial resource strain: Not on file     Food insecurity:     Worry: Not on file     Inability: Not on file     Transportation needs:     Medical: Not on file     Non-medical: Not on file   Tobacco Use     Smoking status: Former Smoker     Packs/day: 0.00     Years: 10.00     Pack years: 0.00     Types: Cigarettes     Last attempt to quit: 1987     Years since quittin.9     Smokeless tobacco: Never Used   Substance and Sexual Activity     Alcohol use: Yes     Alcohol/week: 0.0 standard drinks     Comment: socially - x2-3 per month -  none currently     Drug use: No     Sexual activity: Yes     Partners: Female   Lifestyle     Physical activity:     Days per week: Not on file     Minutes per session: Not on file     Stress: Not on file   Relationships     Social connections:     Talks on phone: Not on file     Gets together: Not on file     Attends Amish service: Not on file     Active member of club or organization: Not on file     Attends meetings of clubs or organizations: Not on file     Relationship status: Not on file     Intimate partner violence:     Fear of current or ex partner: Not on file     Emotionally abused: Not on file     Physically abused: Not on file     Forced sexual activity: Not on file   Other Topics Concern     Parent/sibling w/ CABG, MI or angioplasty before 65F 55M? Not Asked      Service Not Asked     Blood Transfusions Not Asked     Caffeine Concern Yes     Comment: 2 big cups coffee daily     Occupational Exposure Not Asked     Hobby Hazards Not Asked      Sleep Concern No     Comment: sleeping better since shoulder replaced 11/3/16     Stress Concern No     Weight Concern Yes     Special Diet Yes     Comment: trying to do more lean meats     Back Care Not Asked     Exercise No     Comment: limited - knee     Bike Helmet Not Asked     Seat Belt Not Asked     Self-Exams Not Asked   Social History Narrative     Not on file       Outpatient Encounter Medications as of 12/19/2019   Medication Sig Dispense Refill     Acetaminophen (TYLENOL PO) Take 650 mg by mouth 4 times daily        amoxicillin (AMOXIL) 500 MG capsule 2,000 mg as needed When going to the dentist  0     ASPIRIN PO Take 81 mg by mouth daily       atorvastatin (LIPITOR) 80 MG tablet TAKE 1 TABLET BY MOUTH DAILY - DUE FOR FASTING LABS IN DECEMBER 90 tablet 1     carvedilol (COREG) 3.125 MG tablet Take 1 tablet (3.125 mg) by mouth 2 times daily (with meals) 180 tablet 3     ciclopirox (LOPROX) 0.77 % cream Apply topically At Bedtime 90 g 3     desonide (DESOWEN) 0.05 % external cream Apply sparingly to affected area on face in morning 60 g 0     Doxycycline Hyclate (PERIOSTAT PO) Take 20 mg by mouth 2 times daily       ELIQUIS 5 MG tablet TAKE 1 TABLET BY MOUTH TWICE A DAY 60 tablet 5     fluocinonide (LIDEX) 0.05 % external solution Apply to scalp BID x 1-2 weeks PRN ([phone] Thanks) 60 mL 3     fluorouracil (EFUDEX) 5 % external cream Apply to scap BID x 3-4 weeks. Protect area from the sun. 40 g 3     fluticasone (FLONASE) 50 MCG/ACT nasal spray SPRAY 2 SPRAYS INTO BOTH NOSTRILS DAILY -RTS 1/7/19 48 mL 1     furosemide (LASIX) 40 MG tablet Take 1 tablet (40 mg) by mouth daily 90 tablet 3     GABAPENTIN PO Take 600 mg by mouth 3 times daily        ketoconazole (NIZORAL) 2 % cream Apply topically 2 times daily For face. FAX REFILL REQUESTS TO [facility]: [phone] 30 g 2     ketoconazole (NIZORAL) 2 % external shampoo Use daily as  needed 120 mL 3     ketotifen (ZADITOR/REFRESH ANTI-ITCH) 0.025 % SOLN Place 1-2 drops into both eyes 2 times daily as needed for itching       lisinopril (PRINIVIL/ZESTRIL) 30 MG tablet Take 1 tablet (30 mg) by mouth daily 90 tablet 1     loratadine (CLARITIN) 10 MG tablet Take 10 mg by mouth daily as needed for allergies       Multiple Vitamin (MULTIVITAMIN OR) Take 1 tablet by mouth daily        nitroglycerin (NITROSTAT) 0.4 MG SL tablet Place 1 tablet (0.4 mg) under the tongue every 5 minutes as needed for chest pain May repeat twice for a total of 3 tablets.  If chest pain not relieved, call 911 25 tablet 11     Omega-3 Fatty Acids (OMEGA-3 FISH OIL PO) Take 3.2 g by mouth daily        OXcarbazepine (TRILEPTAL PO) Take 300 mg by mouth 3 times daily        No facility-administered encounter medications on file as of 12/19/2019.              Review Of Systems  Skin: As above  Eyes: negative  Ears/Nose/Throat: negative  Respiratory: No shortness of breath, dyspnea on exertion, cough, or hemoptysis  Cardiovascular: negative  Gastrointestinal: negative  Genitourinary: negative  Musculoskeletal: negative  Neurologic: negative  Psychiatric: negative  Hematologic/Lymphatic/Immunologic: negative  Endocrine: negative      O:   NAD, WDWN, Alert & Oriented, Mood & Affect wnl, Vitals stable   Here today alone   /68   Pulse 70   SpO2 95%    General appearance normal   Vitals stable   Alert, oriented and in no acute distress      Following lymph nodes palpated: Occipital, Cervical, Supraclavicular no lad   L cheek healing well   L parietal scalp 6mm scaly papule       Eyes: Conjunctivae/lids:Normal     ENT: Lips, buccal mucosa, tongue: normal    MSK:Normal    Cardiovascular: peripheral edema none    Pulm: Breathing Normal    Lymph Nodes: No Head and Neck Lymphadenopathy     Neuro/Psych: Orientation:Alert and Orientedx3 ; Mood/Affect:normal       A/P:  1. L parietal scalp squamous cell carcinoma  MOHS:    Location    After PGACAC discussed with patient, decision for Mohs surgery was made. Indication for Mohs was Location. Patient confirmed the site with Dr. Dutta.  After anesthesia with LEC, the tumor was excised using standard Mohs technique in 1 stages(s).  CLEAR MARGINS OBTAINED and Final defect size was 1.3 x 1.1 cm.       REPAIR SECOND INTENT: We discussed the options for wound management in full with the patient including risks/benefits/possible outcomes. Decision made to allow the wound to heal by second intention. EBL minimal; complications none; wound care routine.  The patient was discharged in good condition and will return in one month or prn for wound evaluation.  BENIGN LESIONS DISCUSSED WITH PATIENT:  I discussed the specifics of tumor, prognosis, and genetics of benign lesions.  I explained that treatment of these lesions would be purely cosmetic and not medically neccessary.  I discussed with patient different removal options including excision, cautery and /or laser.      Nature and genetics of benign skin lesions dicussed with patient.  Signs and Symptoms of skin cancer discussed with patient.  ABCDEs of melanoma reviewed with patient.  Patient encouraged to perform monthly skin exams.  UV precautions reviewed with patient.  Patient to follow up with Primary Care provider regarding elevated blood pressure.  Skin care regimen reviewed with patient: Eliminate harsh soaps, i.e. Dial, zest, irsih spring; Mild soaps such as Cetaphil or Dove sensitive skin, avoid hot or cold showers, aggressive use of emollients including vanicream, cetaphil or cerave discussed with patient.    Risks of non-melanoma skin cancer discussed with patient   Return to clinic 6 months      Again, thank you for allowing me to participate in the care of your patient.        Sincerely,        Jv Dutta MD

## 2019-12-19 NOTE — PROGRESS NOTES
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  Patient has no other skin complaints today.  Remainder of the HPI, Meds, PMH, Allergies, FH, and SH was reviewed in chart.      Past Medical History:   Diagnosis Date     Actinic cheilitis 7/17/2013    Lower lip, left      Actinic keratosis      Allergic rhinitis      Benign hypertension      CAD (coronary artery disease)     Cardiac cath and PCI 1994. Cardiac Cath 9/2015: BRIDGET to LAD     History of myocardial infarction 1994    PTCA     Hyperlipidemia LDL goal < 70      Melanoma (H) 10/15/2019    10/15/19 left zygoma     Mixed hyperlipidemia 6/21/2017     Morbid obesity due to excess calories (H) 1/11/2016     Paroxysmal supraventricular tachycardia (H)     on metoprolol     Permanent atrial fibrillation 04/21/2017     Squamous cell carcinoma      Stable angina (H) 1/11/2016     Tachy-edinson syndrome (H) 5/29/2019    Added automatically from request for surgery [id number]       Past Surgical History:   Procedure Laterality Date     ANGIOPLASTY  1994    in [place]     ANKLE SURGERY  7/13/2005    right ankle     EP PERM PACER SINGLE LEAD N/A 5/31/2019    Medtronic single lead pacemaker     HEART CATH STENT COR W/WO PTCA  9/23/2015    BRIDGET stent mid LAD     JOINT REPLACEMENT, HIP RT/LT  10/14/2009    right hip      LASER SURGERY OF EYE  06/01/2002     MOHS MICROGRAPHIC PROCEDURE  06/12/2004    squamous cell carcinoma right temple     SINUS SURGERY  7/11/2006        Family History   Problem Relation Age of Onset     Genitourinary Problems Mother         renal failure     Cardiovascular Father         rupture of dorsal aorta     Depression Son      Skin Cancer No family hx of        Social History     Socioeconomic History     Marital status:      Spouse name: Not on file     Number of children: 3     Years of education: Not on file     Highest education level: Not on file    Occupational History     Employer: RETIRED   Social Needs     Financial resource strain: Not on file     Food insecurity:     Worry: Not on file     Inability: Not on file     Transportation needs:     Medical: Not on file     Non-medical: Not on file   Tobacco Use     Smoking status: Former Smoker     Packs/day: 0.00     Years: 10.00     Pack years: 0.00     Types: Cigarettes     Last attempt to quit: 1987     Years since quittin.9     Smokeless tobacco: Never Used   Substance and Sexual Activity     Alcohol use: Yes     Alcohol/week: 0.0 standard drinks     Comment: socially - x2-3 per month -  none currently     Drug use: No     Sexual activity: Yes     Partners: Female   Lifestyle     Physical activity:     Days per week: Not on file     Minutes per session: Not on file     Stress: Not on file   Relationships     Social connections:     Talks on phone: Not on file     Gets together: Not on file     Attends Jehovah's witness service: Not on file     Active member of club or organization: Not on file     Attends meetings of clubs or organizations: Not on file     Relationship status: Not on file     Intimate partner violence:     Fear of current or ex partner: Not on file     Emotionally abused: Not on file     Physically abused: Not on file     Forced sexual activity: Not on file   Other Topics Concern     Parent/sibling w/ CABG, MI or angioplasty before 65F 55M? Not Asked      Service Not Asked     Blood Transfusions Not Asked     Caffeine Concern Yes     Comment: 2 big cups coffee daily     Occupational Exposure Not Asked     Hobby Hazards Not Asked     Sleep Concern No     Comment: sleeping better since shoulder replaced 11/3/16     Stress Concern No     Weight Concern Yes     Special Diet Yes     Comment: trying to do more lean meats     Back Care Not Asked     Exercise No     Comment: limited - knee     Bike Helmet Not Asked     Seat Belt Not Asked     Self-Exams Not Asked   Social History  Narrative     Not on file       Outpatient Encounter Medications as of 12/19/2019   Medication Sig Dispense Refill     Acetaminophen (TYLENOL PO) Take 650 mg by mouth 4 times daily        amoxicillin (AMOXIL) 500 MG capsule 2,000 mg as needed When going to the dentist  0     ASPIRIN PO Take 81 mg by mouth daily       atorvastatin (LIPITOR) 80 MG tablet TAKE 1 TABLET BY MOUTH DAILY - DUE FOR FASTING LABS IN DECEMBER 90 tablet 1     carvedilol (COREG) 3.125 MG tablet Take 1 tablet (3.125 mg) by mouth 2 times daily (with meals) 180 tablet 3     ciclopirox (LOPROX) 0.77 % cream Apply topically At Bedtime 90 g 3     desonide (DESOWEN) 0.05 % external cream Apply sparingly to affected area on face in morning 60 g 0     Doxycycline Hyclate (PERIOSTAT PO) Take 20 mg by mouth 2 times daily       ELIQUIS 5 MG tablet TAKE 1 TABLET BY MOUTH TWICE A DAY 60 tablet 5     fluocinonide (LIDEX) 0.05 % external solution Apply to scalp BID x 1-2 weeks PRN (Fax Future refill requests to clinic patient is seen at-[facility]: fax [phone] Thanks) 60 mL 3     fluorouracil (EFUDEX) 5 % external cream Apply to scap BID x 3-4 weeks. Protect area from the sun. 40 g 3     fluticasone (FLONASE) 50 MCG/ACT nasal spray SPRAY 2 SPRAYS INTO BOTH NOSTRILS DAILY -RTS 1/7/19 48 mL 1     furosemide (LASIX) 40 MG tablet Take 1 tablet (40 mg) by mouth daily 90 tablet 3     GABAPENTIN PO Take 600 mg by mouth 3 times daily        ketoconazole (NIZORAL) 2 % cream Apply topically 2 times daily For face. FAX REFILL REQUESTS TO [facility]: [phone] 30 g 2     ketoconazole (NIZORAL) 2 % external shampoo Use daily as needed 120 mL 3     ketotifen (ZADITOR/REFRESH ANTI-ITCH) 0.025 % SOLN Place 1-2 drops into both eyes 2 times daily as needed for itching       lisinopril (PRINIVIL/ZESTRIL) 30 MG tablet Take 1 tablet (30 mg) by mouth daily 90 tablet 1     loratadine (CLARITIN) 10 MG tablet Take 10 mg by mouth daily as needed for allergies       Multiple Vitamin  (MULTIVITAMIN OR) Take 1 tablet by mouth daily        nitroglycerin (NITROSTAT) 0.4 MG SL tablet Place 1 tablet (0.4 mg) under the tongue every 5 minutes as needed for chest pain May repeat twice for a total of 3 tablets.  If chest pain not relieved, call 911 25 tablet 11     Omega-3 Fatty Acids (OMEGA-3 FISH OIL PO) Take 3.2 g by mouth daily        OXcarbazepine (TRILEPTAL PO) Take 300 mg by mouth 3 times daily        No facility-administered encounter medications on file as of 12/19/2019.              Review Of Systems  Skin: As above  Eyes: negative  Ears/Nose/Throat: negative  Respiratory: No shortness of breath, dyspnea on exertion, cough, or hemoptysis  Cardiovascular: negative  Gastrointestinal: negative  Genitourinary: negative  Musculoskeletal: negative  Neurologic: negative  Psychiatric: negative  Hematologic/Lymphatic/Immunologic: negative  Endocrine: negative      O:   NAD, WDWN, Alert & Oriented, Mood & Affect wnl, Vitals stable   Here today alone   /68   Pulse 70   SpO2 95%    General appearance normal   Vitals stable   Alert, oriented and in no acute distress      Following lymph nodes palpated: Occipital, Cervical, Supraclavicular no lad   L cheek healing well   L parietal scalp 6mm scaly papule       Eyes: Conjunctivae/lids:Normal     ENT: Lips, buccal mucosa, tongue: normal    MSK:Normal    Cardiovascular: peripheral edema none    Pulm: Breathing Normal    Lymph Nodes: No Head and Neck Lymphadenopathy     Neuro/Psych: Orientation:Alert and Orientedx3 ; Mood/Affect:normal       A/P:  1. L parietal scalp squamous cell carcinoma  MOHS:   Location    After PGACAC discussed with patient, decision for Mohs surgery was made. Indication for Mohs was Location. Patient confirmed the site with Dr. Dutta.  After anesthesia with LEC, the tumor was excised using standard Mohs technique in 1 stages(s).  CLEAR MARGINS OBTAINED and Final defect size was 1.3 x 1.1 cm.       REPAIR SECOND INTENT: We  discussed the options for wound management in full with the patient including risks/benefits/possible outcomes. Decision made to allow the wound to heal by second intention. EBL minimal; complications none; wound care routine.  The patient was discharged in good condition and will return in one month or prn for wound evaluation.  BENIGN LESIONS DISCUSSED WITH PATIENT:  I discussed the specifics of tumor, prognosis, and genetics of benign lesions.  I explained that treatment of these lesions would be purely cosmetic and not medically neccessary.  I discussed with patient different removal options including excision, cautery and /or laser.      Nature and genetics of benign skin lesions dicussed with patient.  Signs and Symptoms of skin cancer discussed with patient.  ABCDEs of melanoma reviewed with patient.  Patient encouraged to perform monthly skin exams.  UV precautions reviewed with patient.  Patient to follow up with Primary Care provider regarding elevated blood pressure.  Skin care regimen reviewed with patient: Eliminate harsh soaps, i.e. Dial, zest, irsih spring; Mild soaps such as Cetaphil or Dove sensitive skin, avoid hot or cold showers, aggressive use of emollients including vanicream, cetaphil or cerave discussed with patient.    Risks of non-melanoma skin cancer discussed with patient   Return to clinic 6 months

## 2019-12-19 NOTE — PATIENT INSTRUCTIONS
Open Wound Care     for ______________        ? No strenuous activity for 48 hours    ? Take Tylenol as needed for discomfort.                                                .         ? Do not drink alcoholic beverages for 48 hours.    ? Keep the pressure bandage in place for 24 hours. If the bandage becomes blood tinged or loose, reinforce it with gauze and tape.        (Refer to the reverse side of this page for management of bleeding).    ? Remove bandage in 24 hours and begin wound care as follows:     1. Clean area with tap water using a Q tip or gauze pad, (shower / bathe normally)  2. Dry wound with Q tip or gauze pad  3. Apply Aquaphor, Vaseline, Polysporin or Bacitracin Ointment with a Q tip    Do NOT use Neosporin Ointment *  4. Cover the wound with a band-aid or nonstick gauze pad and paper tape.  5. Repeat wound care once a day until wound is completely healed.    It is an old wives tale that a wound heals better when it is exposed to air and allowed to dry out. The wound will heal faster with a better cosmetic result if it is kept moist with ointment and covered with a bandage.  Do not let the wound dry out.      Supplies Needed:                Qtips or gauze pads                Polysporin or Bacitracin Ointment                Bandaids or nonstick gauze pads and paper tape    Wound care kits and brown paper tape are available for purchase at   the pharmacy.    BLEEDIN. Use tightly rolled up gauze or cloth to apply direct pressure over the bandage for 20   minutes.  2. Reapply pressure for an additional 20 minutes if necessary  3. Call the office or go to the nearest emergency room if pressure fails to stop the bleeding.  4. Use additional gauze and tape to maintain pressure once the bleeding has stopped.  5. Begin wound care 24 hours after surgery as directed.                  WOUND HEALING    1. One week after surgery a pink / red halo will form around the outside of the wound.   This is new  skin.  2. The center of the wound will appear yellowish white and produce some drainage.  3. The pink halo will slowly migrate in toward the center of the wound until the wound is covered with new shiny pink skin.  4. There will be no more drainage when the wound is completely healed.  5. It will take six months to one year for the redness to fade.  6. The scar may be itchy, tight and sensitive to extreme temperatures for a year after the surgery.  7. Massaging the area several times a day for several minutes after the wound is completely healed will help the scar soften and normalize faster. Begin massage only after healing is complete.      In case of emergency call: Dr Dutta: [phone]     [facility]: [phone]    [facility]: [phone]

## 2020-01-02 DIAGNOSIS — I10 BENIGN HYPERTENSION: ICD-10-CM

## 2020-01-02 RX ORDER — LISINOPRIL 30 MG/1
TABLET ORAL
Qty: 90 TABLET | Refills: 1 | Status: SHIPPED | OUTPATIENT
Start: 2020-01-02 | End: 2020-06-17

## 2020-01-02 NOTE — TELEPHONE ENCOUNTER
Routing refill request to provider for review/approval because:  Labs out of range:  Elevated CR    Fina CATN, RN, PHN

## 2020-01-02 NOTE — TELEPHONE ENCOUNTER
"Requested Prescriptions   Pending Prescriptions Disp Refills     lisinopril (PRINIVIL/ZESTRIL) 30 MG tablet [Pharmacy Med Name: LISINOPRIL 30 MG TABLET]  Last Written Prescription Date:  06/07/2019  Last Fill Quantity: 90,  # refills: 01   Last Office Visit: 5/23/2019   Future Office Visit:    Next 5 appointments (look out 90 days)    Mar 03, 2020  9:00 AM CST  Return Visit with Rosa Maria Hansen PA-C  [facility] ([facility] [address] [address] [phone]          90 tablet 1     Sig: TAKE 1 TABLET BY MOUTH EVERY DAY       ACE Inhibitors (Including Combos) Protocol Failed - 1/2/2020 12:30 PM        Failed - Normal serum creatinine on file in past 12 months     Recent Labs   Lab Test 05/31/19  1415   CR 0.65*             Passed - Blood pressure under 140/90 in past 12 months     BP Readings from Last 3 Encounters:   12/19/19 105/68   11/14/19 132/74   11/07/19 136/78                 Passed - Recent (12 mo) or future (30 days) visit within the authorizing provider's specialty     Patient has had an office visit with the authorizing provider or a provider within the authorizing providers department within the previous 12 mos or has a future within next 30 days. See \"Patient Info\" tab in inbasket, or \"Choose Columns\" in Meds & Orders section of the refill encounter.              Passed - Medication is active on med list        Passed - Patient is age 18 or older        Passed - Normal serum potassium on file in past 12 months     Recent Labs   Lab Test 05/31/19  1415   POTASSIUM 4.6               "

## 2020-01-15 DIAGNOSIS — I25.10 ASCVD (ARTERIOSCLEROTIC CARDIOVASCULAR DISEASE): ICD-10-CM

## 2020-01-15 RX ORDER — ATORVASTATIN CALCIUM 80 MG/1
TABLET, FILM COATED ORAL
Qty: 30 TABLET | Refills: 0 | Status: SHIPPED | OUTPATIENT
Start: 2020-01-15 | End: 2020-01-30

## 2020-01-15 NOTE — LETTER
[facility]  [address] [id number] [phone]            James Salvador  [address] [place] [address]        January 15, 2020    Dear VINICIUS,    While refilling your prescription today, we noticed that you are due to have labs drawn.  We will refill your prescription for 30 days, but a follow-up appointment must be made before any additional refills can be approved.     Taking care of your health is important to us and we look forward to seeing you in the near future.  Please call us at [phone] or [phone] (or use [employer]) to schedule an appointment.     Please disregard this notice if you have already made an appointment.    Sincerely,        [facility]

## 2020-01-15 NOTE — TELEPHONE ENCOUNTER
"Requested Prescriptions   Pending Prescriptions Disp Refills     atorvastatin (LIPITOR) 80 MG tablet [Pharmacy Med Name: ATORVASTATIN 80 MG TABLET] 90 tablet 1     Sig: TAKE 1 TABLET BY MOUTH DAILY - DUE FOR FASTING LABS IN DECEMBER       Statins Protocol Failed - 1/15/2020  1:37 AM        Failed - LDL on file in past 12 months     Recent Labs   Lab Test 12/11/18  0914   LDL 55             Passed - No abnormal creatine kinase in past 12 months     No lab results found.             Passed - Recent (12 mo) or future (30 days) visit within the authorizing provider's specialty     Patient has had an office visit with the authorizing provider or a provider within the authorizing providers department within the previous 12 mos or has a future within next 30 days. See \"Patient Info\" tab in inbasket, or \"Choose Columns\" in Meds & Orders section of the refill encounter.              Passed - Medication is active on med list        Passed - Patient is age 18 or older        Medication is being filled for 1 time refill only due to:  Patient needs labs fasting.    "

## 2020-01-16 LAB
MDC_IDC_EPISODE_DTM: NORMAL
MDC_IDC_EPISODE_DTM: NORMAL
MDC_IDC_EPISODE_DURATION: 1 S
MDC_IDC_EPISODE_DURATION: 1 S
MDC_IDC_EPISODE_ID: 1
MDC_IDC_EPISODE_ID: 2
MDC_IDC_EPISODE_TYPE: NORMAL
MDC_IDC_EPISODE_TYPE: NORMAL
MDC_IDC_LEAD_IMPLANT_DT: NORMAL
MDC_IDC_LEAD_LOCATION: NORMAL
MDC_IDC_LEAD_LOCATION_DETAIL_1: NORMAL
MDC_IDC_LEAD_MFG: NORMAL
MDC_IDC_LEAD_MODEL: NORMAL
MDC_IDC_LEAD_POLARITY_TYPE: NORMAL
MDC_IDC_LEAD_SERIAL: NORMAL
MDC_IDC_MSMT_BATTERY_DTM: NORMAL
MDC_IDC_MSMT_BATTERY_REMAINING_LONGEVITY: 184 MO
MDC_IDC_MSMT_BATTERY_RRT_TRIGGER: 2.62
MDC_IDC_MSMT_BATTERY_STATUS: NORMAL
MDC_IDC_MSMT_BATTERY_VOLTAGE: 3.18 V
MDC_IDC_MSMT_LEADCHNL_RV_IMPEDANCE_VALUE: 361 OHM
MDC_IDC_MSMT_LEADCHNL_RV_IMPEDANCE_VALUE: 437 OHM
MDC_IDC_MSMT_LEADCHNL_RV_PACING_THRESHOLD_AMPLITUDE: 0.5 V
MDC_IDC_MSMT_LEADCHNL_RV_PACING_THRESHOLD_PULSEWIDTH: 0.4 MS
MDC_IDC_MSMT_LEADCHNL_RV_SENSING_INTR_AMPL: 2 MV
MDC_IDC_MSMT_LEADCHNL_RV_SENSING_INTR_AMPL: 2 MV
MDC_IDC_PG_IMPLANT_DTM: NORMAL
MDC_IDC_PG_MFG: NORMAL
MDC_IDC_PG_MODEL: NORMAL
MDC_IDC_PG_SERIAL: NORMAL
MDC_IDC_PG_TYPE: NORMAL
MDC_IDC_SESS_CLINIC_NAME: NORMAL
MDC_IDC_SESS_DTM: NORMAL
MDC_IDC_SESS_TYPE: NORMAL
MDC_IDC_SET_BRADY_HYSTRATE: NORMAL
MDC_IDC_SET_BRADY_LOWRATE: 50 {BEATS}/MIN
MDC_IDC_SET_BRADY_MODE: NORMAL
MDC_IDC_SET_LEADCHNL_RV_PACING_AMPLITUDE: 2 V
MDC_IDC_SET_LEADCHNL_RV_PACING_ANODE_ELECTRODE_1: NORMAL
MDC_IDC_SET_LEADCHNL_RV_PACING_ANODE_LOCATION_1: NORMAL
MDC_IDC_SET_LEADCHNL_RV_PACING_CAPTURE_MODE: NORMAL
MDC_IDC_SET_LEADCHNL_RV_PACING_CATHODE_ELECTRODE_1: NORMAL
MDC_IDC_SET_LEADCHNL_RV_PACING_CATHODE_LOCATION_1: NORMAL
MDC_IDC_SET_LEADCHNL_RV_PACING_POLARITY: NORMAL
MDC_IDC_SET_LEADCHNL_RV_PACING_PULSEWIDTH: 0.4 MS
MDC_IDC_SET_LEADCHNL_RV_SENSING_ANODE_ELECTRODE_1: NORMAL
MDC_IDC_SET_LEADCHNL_RV_SENSING_ANODE_LOCATION_1: NORMAL
MDC_IDC_SET_LEADCHNL_RV_SENSING_CATHODE_ELECTRODE_1: NORMAL
MDC_IDC_SET_LEADCHNL_RV_SENSING_CATHODE_LOCATION_1: NORMAL
MDC_IDC_SET_LEADCHNL_RV_SENSING_POLARITY: NORMAL
MDC_IDC_SET_LEADCHNL_RV_SENSING_SENSITIVITY: 0.9 MV
MDC_IDC_SET_ZONE_DETECTION_INTERVAL: 360 MS
MDC_IDC_SET_ZONE_TYPE: NORMAL
MDC_IDC_STAT_BRADY_DTM_END: NORMAL
MDC_IDC_STAT_BRADY_DTM_START: NORMAL
MDC_IDC_STAT_BRADY_RV_PERCENT_PACED: 23.13 %
MDC_IDC_STAT_EPISODE_RECENT_COUNT: 0
MDC_IDC_STAT_EPISODE_RECENT_COUNT: 2
MDC_IDC_STAT_EPISODE_RECENT_COUNT_DTM_END: NORMAL
MDC_IDC_STAT_EPISODE_RECENT_COUNT_DTM_END: NORMAL
MDC_IDC_STAT_EPISODE_RECENT_COUNT_DTM_START: NORMAL
MDC_IDC_STAT_EPISODE_RECENT_COUNT_DTM_START: NORMAL
MDC_IDC_STAT_EPISODE_TOTAL_COUNT: 0
MDC_IDC_STAT_EPISODE_TOTAL_COUNT: 2
MDC_IDC_STAT_EPISODE_TOTAL_COUNT_DTM_END: NORMAL
MDC_IDC_STAT_EPISODE_TOTAL_COUNT_DTM_END: NORMAL
MDC_IDC_STAT_EPISODE_TOTAL_COUNT_DTM_START: NORMAL
MDC_IDC_STAT_EPISODE_TOTAL_COUNT_DTM_START: NORMAL
MDC_IDC_STAT_EPISODE_TYPE: NORMAL

## 2020-01-19 ENCOUNTER — ANCILLARY PROCEDURE (OUTPATIENT)
Dept: CARDIOLOGY | Facility: CLINIC | Age: 75
End: 2020-01-19
Attending: INTERNAL MEDICINE
Payer: COMMERCIAL

## 2020-01-19 DIAGNOSIS — Z95.0 CARDIAC PACEMAKER IN SITU: ICD-10-CM

## 2020-01-19 PROCEDURE — 93294 REM INTERROG EVL PM/LDLS PM: CPT | Performed by: INTERNAL MEDICINE

## 2020-01-20 DIAGNOSIS — Z95.0 CARDIAC PACEMAKER IN SITU: Primary | ICD-10-CM

## 2020-01-23 DIAGNOSIS — E87.1 HYPONATREMIA: ICD-10-CM

## 2020-01-23 DIAGNOSIS — I25.10 ASCVD (ARTERIOSCLEROTIC CARDIOVASCULAR DISEASE): ICD-10-CM

## 2020-01-23 LAB
ANION GAP SERPL CALCULATED.3IONS-SCNC: 3 MMOL/L (ref 3–14)
BUN SERPL-MCNC: 23 MG/DL (ref 7–30)
CALCIUM SERPL-MCNC: 9.5 MG/DL (ref 8.5–10.1)
CHLORIDE SERPL-SCNC: 106 MMOL/L (ref 94–109)
CHOLEST SERPL-MCNC: 159 MG/DL
CO2 SERPL-SCNC: 30 MMOL/L (ref 20–32)
CREAT SERPL-MCNC: 0.8 MG/DL (ref 0.66–1.25)
GFR SERPL CREATININE-BSD FRML MDRD: 88 ML/MIN/{1.73_M2}
GLUCOSE SERPL-MCNC: 90 MG/DL (ref 70–99)
HDLC SERPL-MCNC: 38 MG/DL
LDLC SERPL CALC-MCNC: 79 MG/DL
NONHDLC SERPL-MCNC: 121 MG/DL
POTASSIUM SERPL-SCNC: 4.6 MMOL/L (ref 3.4–5.3)
SODIUM SERPL-SCNC: 139 MMOL/L (ref 133–144)
TRIGL SERPL-MCNC: 211 MG/DL

## 2020-01-23 PROCEDURE — 80061 LIPID PANEL: CPT | Performed by: INTERNAL MEDICINE

## 2020-01-23 PROCEDURE — 36415 COLL VENOUS BLD VENIPUNCTURE: CPT | Performed by: INTERNAL MEDICINE

## 2020-01-23 PROCEDURE — 80048 BASIC METABOLIC PNL TOTAL CA: CPT | Performed by: INTERNAL MEDICINE

## 2020-01-30 ENCOUNTER — OFFICE VISIT (OUTPATIENT)
Dept: INTERNAL MEDICINE | Facility: CLINIC | Age: 75
End: 2020-01-30
Payer: COMMERCIAL

## 2020-01-30 VITALS
RESPIRATION RATE: 16 BRPM | SYSTOLIC BLOOD PRESSURE: 108 MMHG | WEIGHT: 315 LBS | DIASTOLIC BLOOD PRESSURE: 72 MMHG | TEMPERATURE: 98.4 F | BODY MASS INDEX: 44.1 KG/M2 | HEART RATE: 86 BPM | HEIGHT: 71 IN | OXYGEN SATURATION: 94 %

## 2020-01-30 DIAGNOSIS — I25.10 ASCVD (ARTERIOSCLEROTIC CARDIOVASCULAR DISEASE): Primary | ICD-10-CM

## 2020-01-30 DIAGNOSIS — E78.5 HYPERLIPIDEMIA WITH TARGET LDL LESS THAN 70: ICD-10-CM

## 2020-01-30 DIAGNOSIS — I10 BENIGN HYPERTENSION: ICD-10-CM

## 2020-01-30 DIAGNOSIS — I48.20 CHRONIC ATRIAL FIBRILLATION (H): ICD-10-CM

## 2020-01-30 PROCEDURE — 99214 OFFICE O/P EST MOD 30 MIN: CPT | Performed by: INTERNAL MEDICINE

## 2020-01-30 RX ORDER — ATORVASTATIN CALCIUM 80 MG/1
TABLET, FILM COATED ORAL
Qty: 90 TABLET | Refills: 3 | Status: SHIPPED | OUTPATIENT
Start: 2020-01-30 | End: 2021-01-28

## 2020-01-30 ASSESSMENT — MIFFLIN-ST. JEOR: SCORE: 2222.26

## 2020-01-30 NOTE — NURSING NOTE
"Vital signs:  Temp: 98.4  F (36.9  C) Temp src: Oral BP: 108/72 Pulse: 86   Resp: 16 SpO2: 94 %     Height: 180.3 cm (5' 11\") Weight: 146 kg (321 lb 14.4 oz)  Estimated body mass index is 44.9 kg/m  as calculated from the following:    Height as of this encounter: 1.803 m (5' 11\").    Weight as of this encounter: 146 kg (321 lb 14.4 oz).          "

## 2020-01-30 NOTE — PATIENT INSTRUCTIONS

## 2020-01-30 NOTE — PROGRESS NOTES
"Subjective     James Salvador is a 74 year old male who presents to clinic today for the following health issues:    HPI   ASCVD  Hyperlipidemia Follow-Up      Are you regularly taking any medication or supplement to lower your cholesterol?   Yes- Atorvastatin    Are you having muscle aches or other side effects that you think could be caused by your cholesterol lowering medication?  No    Hypertension Follow-up      Do you check your blood pressure regularly outside of the clinic? Yes     Are you following a low salt diet? No    Are your blood pressures ever more than 140 on the top number (systolic) OR more   than 90 on the bottom number (diastolic), for example 140/90? No      How many servings of fruits and vegetables do you eat daily?  4 or more    On average, how many sweetened beverages do you drink each day (Examples: soda, juice, sweet tea, etc.  Do NOT count diet or artificially sweetened beverages)?   0    How many days per week do you exercise enough to make your heart beat faster? 3 or less    How many minutes a day do you exercise enough to make your heart beat faster? 30 - 60    How many days per week do you miss taking your medication? 0           Reviewed and updated as needed this visit by Provider         Review of Systems   ROS COMP: Constitutional, HEENT, cardiovascular, pulmonary, gi and gu systems are negative, except as otherwise noted.      Objective    /72   Pulse 86   Temp 98.4  F (36.9  C) (Oral)   Resp 16   Ht 1.803 m (5' 11\")   Wt 146 kg (321 lb 14.4 oz)   SpO2 94%   BMI 44.90 kg/m    Body mass index is 44.9 kg/m .  Physical Exam   GENERAL APPEARANCE: alert, no distress and over weight  HENT: nose and mouth without ulcers or lesions and normal cephalic/atraumatic  NECK: no adenopathy, no asymmetry, masses, or scars and thyroid normal to palpation  RESP: lungs clear to auscultation - no rales, rhonchi or wheezes  CV: regular rates and rhythm, normal S1 S2, no S3 or S4 and " "no murmur, click or rub  MS: extremities normal- no gross deformities noted  SKIN: no suspicious lesions or rashes    Labs reviewed        Assessment & Plan       ICD-10-CM    1. ASCVD (arteriosclerotic cardiovascular disease) I25.10 atorvastatin (LIPITOR) 80 MG tablet   2. Benign hypertension I10    3. Chronic atrial fibrillation I48.20    4. Hyperlipidemia with target LDL less than 70 E78.5      Overall doing well  Ideally would like LDL< 70. If on recheck on achieved, as had been better- consider switch to crestor.     BMI:   Estimated body mass index is 44.9 kg/m  as calculated from the following:    Height as of this encounter: 1.803 m (5' 11\").    Weight as of this encounter: 146 kg (321 lb 14.4 oz).           Work on weight loss  Regular exercise  See Patient Instructions    Return in about 6 months (around 7/30/2020) for Wellness Visit with labs before.    Jeronimo Painter MD  [facility]        "

## 2020-02-04 LAB
MDC_IDC_LEAD_IMPLANT_DT: NORMAL
MDC_IDC_LEAD_LOCATION: NORMAL
MDC_IDC_LEAD_LOCATION_DETAIL_1: NORMAL
MDC_IDC_LEAD_MFG: NORMAL
MDC_IDC_LEAD_MODEL: NORMAL
MDC_IDC_LEAD_POLARITY_TYPE: NORMAL
MDC_IDC_LEAD_SERIAL: NORMAL
MDC_IDC_MSMT_BATTERY_DTM: NORMAL
MDC_IDC_MSMT_BATTERY_REMAINING_LONGEVITY: 180 MO
MDC_IDC_MSMT_BATTERY_RRT_TRIGGER: 2.62
MDC_IDC_MSMT_BATTERY_STATUS: NORMAL
MDC_IDC_MSMT_BATTERY_VOLTAGE: 3.15 V
MDC_IDC_MSMT_LEADCHNL_RV_IMPEDANCE_VALUE: 342 OHM
MDC_IDC_MSMT_LEADCHNL_RV_IMPEDANCE_VALUE: 399 OHM
MDC_IDC_MSMT_LEADCHNL_RV_PACING_THRESHOLD_AMPLITUDE: 0.62 V
MDC_IDC_MSMT_LEADCHNL_RV_PACING_THRESHOLD_PULSEWIDTH: 0.4 MS
MDC_IDC_MSMT_LEADCHNL_RV_SENSING_INTR_AMPL: 2.5 MV
MDC_IDC_MSMT_LEADCHNL_RV_SENSING_INTR_AMPL: 2.5 MV
MDC_IDC_PG_IMPLANT_DTM: NORMAL
MDC_IDC_PG_MFG: NORMAL
MDC_IDC_PG_MODEL: NORMAL
MDC_IDC_PG_SERIAL: NORMAL
MDC_IDC_PG_TYPE: NORMAL
MDC_IDC_SESS_CLINIC_NAME: NORMAL
MDC_IDC_SESS_DTM: NORMAL
MDC_IDC_SESS_TYPE: NORMAL
MDC_IDC_SET_BRADY_HYSTRATE: NORMAL
MDC_IDC_SET_BRADY_LOWRATE: 50 {BEATS}/MIN
MDC_IDC_SET_BRADY_MODE: NORMAL
MDC_IDC_SET_LEADCHNL_RV_PACING_AMPLITUDE: 2 V
MDC_IDC_SET_LEADCHNL_RV_PACING_ANODE_ELECTRODE_1: NORMAL
MDC_IDC_SET_LEADCHNL_RV_PACING_ANODE_LOCATION_1: NORMAL
MDC_IDC_SET_LEADCHNL_RV_PACING_CAPTURE_MODE: NORMAL
MDC_IDC_SET_LEADCHNL_RV_PACING_CATHODE_ELECTRODE_1: NORMAL
MDC_IDC_SET_LEADCHNL_RV_PACING_CATHODE_LOCATION_1: NORMAL
MDC_IDC_SET_LEADCHNL_RV_PACING_POLARITY: NORMAL
MDC_IDC_SET_LEADCHNL_RV_PACING_PULSEWIDTH: 0.4 MS
MDC_IDC_SET_LEADCHNL_RV_SENSING_ANODE_ELECTRODE_1: NORMAL
MDC_IDC_SET_LEADCHNL_RV_SENSING_ANODE_LOCATION_1: NORMAL
MDC_IDC_SET_LEADCHNL_RV_SENSING_CATHODE_ELECTRODE_1: NORMAL
MDC_IDC_SET_LEADCHNL_RV_SENSING_CATHODE_LOCATION_1: NORMAL
MDC_IDC_SET_LEADCHNL_RV_SENSING_POLARITY: NORMAL
MDC_IDC_SET_LEADCHNL_RV_SENSING_SENSITIVITY: 0.6 MV
MDC_IDC_SET_ZONE_DETECTION_INTERVAL: 360 MS
MDC_IDC_SET_ZONE_TYPE: NORMAL
MDC_IDC_STAT_BRADY_DTM_END: NORMAL
MDC_IDC_STAT_BRADY_DTM_START: NORMAL
MDC_IDC_STAT_BRADY_RV_PERCENT_PACED: 22.28 %
MDC_IDC_STAT_EPISODE_RECENT_COUNT: 0
MDC_IDC_STAT_EPISODE_RECENT_COUNT: 0
MDC_IDC_STAT_EPISODE_RECENT_COUNT_DTM_END: NORMAL
MDC_IDC_STAT_EPISODE_RECENT_COUNT_DTM_END: NORMAL
MDC_IDC_STAT_EPISODE_RECENT_COUNT_DTM_START: NORMAL
MDC_IDC_STAT_EPISODE_RECENT_COUNT_DTM_START: NORMAL
MDC_IDC_STAT_EPISODE_TOTAL_COUNT: 0
MDC_IDC_STAT_EPISODE_TOTAL_COUNT: 2
MDC_IDC_STAT_EPISODE_TOTAL_COUNT_DTM_END: NORMAL
MDC_IDC_STAT_EPISODE_TOTAL_COUNT_DTM_END: NORMAL
MDC_IDC_STAT_EPISODE_TOTAL_COUNT_DTM_START: NORMAL
MDC_IDC_STAT_EPISODE_TOTAL_COUNT_DTM_START: NORMAL
MDC_IDC_STAT_EPISODE_TYPE: NORMAL

## 2020-02-07 ENCOUNTER — OFFICE VISIT (OUTPATIENT)
Dept: DERMATOLOGY | Facility: CLINIC | Age: 75
End: 2020-02-07
Payer: COMMERCIAL

## 2020-02-07 VITALS — SYSTOLIC BLOOD PRESSURE: 147 MMHG | HEART RATE: 64 BPM | DIASTOLIC BLOOD PRESSURE: 89 MMHG | OXYGEN SATURATION: 96 %

## 2020-02-07 DIAGNOSIS — D18.01 ANGIOMA OF SKIN: ICD-10-CM

## 2020-02-07 DIAGNOSIS — D22.9 NEVUS: ICD-10-CM

## 2020-02-07 DIAGNOSIS — Z85.828 HISTORY OF BASAL CELL CARCINOMA: ICD-10-CM

## 2020-02-07 DIAGNOSIS — L81.4 LENTIGO: ICD-10-CM

## 2020-02-07 DIAGNOSIS — Z85.828 HISTORY OF SQUAMOUS CELL CARCINOMA OF SKIN: ICD-10-CM

## 2020-02-07 DIAGNOSIS — L82.1 SEBORRHEIC KERATOSIS: ICD-10-CM

## 2020-02-07 DIAGNOSIS — D48.5 NEOPLASM OF UNCERTAIN BEHAVIOR OF SKIN: Primary | ICD-10-CM

## 2020-02-07 DIAGNOSIS — L57.0 AK (ACTINIC KERATOSIS): ICD-10-CM

## 2020-02-07 DIAGNOSIS — Z85.820 HISTORY OF MELANOMA: ICD-10-CM

## 2020-02-07 PROCEDURE — 17003 DESTRUCT PREMALG LES 2-14: CPT | Mod: 59 | Performed by: PHYSICIAN ASSISTANT

## 2020-02-07 PROCEDURE — 99214 OFFICE O/P EST MOD 30 MIN: CPT | Mod: 25 | Performed by: PHYSICIAN ASSISTANT

## 2020-02-07 PROCEDURE — 11102 TANGNTL BX SKIN SINGLE LES: CPT | Performed by: PHYSICIAN ASSISTANT

## 2020-02-07 PROCEDURE — 88305 TISSUE EXAM BY PATHOLOGIST: CPT | Mod: TC | Performed by: PHYSICIAN ASSISTANT

## 2020-02-07 PROCEDURE — 17000 DESTRUCT PREMALG LESION: CPT | Mod: 59 | Performed by: PHYSICIAN ASSISTANT

## 2020-02-07 NOTE — PROGRESS NOTES
HPI:   Chief complaint: James Salvador (Pete) is a 74 year old male who presents for Full skin cancer screening to rule out skin cancer.   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 He lives in [place]. Had a pacemaker placed in June; HR dropped to 20 BPM  On review of systems, there are no further skin complaints, patient is feeling otherwise well.  See patient intake sheet.  ROS of the following were done and are negative: Constitutional, Eyes, Ears, Nose,   Mouth, Throat, Cardiovascular, Respiratory, GI, Genitourinary, Musculoskeletal,   Psychiatric, Endocrine, Allergic/Immunologic.    HPI, past medical history, social history, allergies, medications reviewed as of February 7, 2020    This document serves as a record of the services and decisions personally performed and made by Rosa Maria Hansen, MS, PA-C. It was created on her behalf by Inez Prince, a trained medical scribe. The creation of this document is based on the provider's statements to the medical scribe.  Inez Prince 11:06 AM February 7, 2020    PHYSICAL EXAM:   BP (!) 147/89   Pulse 64   SpO2 96%   Skin exam performed as follows: Type 2 skin. Mood appropriate  Alert and Oriented X 3. Well developed, well nourished in no distress.  General appearance: Normal  Head including face: Normal  Eyes: conjunctiva and lids: Normal  Mouth: Lips, teeth, gums: Normal  Neck: Normal  Chest-breast/axillae: Normal  Back:  Normal  Spleen and liver: Normal  Cardiovascular: Exam of peripheral vascular system by observation for swelling, varicosities, edema: Normal  Genitalia: groin, buttocks: Normal  Extremities: digits/nails (clubbing): Normal  Eccrine and Apocrine glands: Normal  Right upper extremity: Normal  Left upper extremity: Normal  Right lower extremity: Normal  Left lower extremity: Normal  Skin: Scalp and body hair: See below    Pt deferred exam of breasts, groin, buttocks: NO    Other physical findings:  1. Multiple pigmented macules on extremities and trunk  2. Multiple pigmented macules on face, trunk and extremities  3. Multiple vascular papules on trunk, arms and legs  4. Multiple scattered keratotic plaques  5. Pink gritty papule on the left dorsal hand x 4  6. Left temple 10 mm pink plaque      Except as noted above, no other signs of skin cancer or melanoma.     ASSESSMENT/PLAN:   Benign Full skin cancer screening today.     Patient with history of SCC  Advised on monthly self exams and 1 year  Patient Education: Appropriate brochures given.    Multiple benign appearing nevi on arms, legs and trunk. Discussed ABCDEs of melanoma and sunscreen.   Multiple lentigos on arms, legs and trunk. Advised benign, no treatment needed.  Multiple scattered angiomas. Advised benign, no treatment needed.   Seborrheic keratosis on arms, legs and trunk. Advised benign, no treatment needed.  Actinic keratosis on the left dorsal hand x 4. As precancerous, cryosurgery performed. Advised on blistering and post-op care. Advised if not resolved in 1-2 months to return for evaluation   R/o BCC on left temple. Photo taken and placed in chart. Shave biopsy performed.  Area cleaned and anesthetized with 1% lidocaine with epinephrine.  Dermablade used to remove the lesion and sent to pathology. Bleeding was cauterized. Pt tolerated procedure well with no complications.        Follow-up: pending path/Q 6 month FSE/PRN sooner    1.) Patient was  asked about new and changing moles. YES  2.) Patient received a complete physical skin examination: YES  3.) Patient was counseled to perform a monthly self skin examination: YES  Scribed By: Inez Prince, Medical Scribe    The information in this document, created by the medical scribe for me, accurately reflects the services I personally performed and the decisions made by me. I have reviewed and approved this document for accuracy prior to leaving the patient care area.  February 7, 2020 11:20 AM    Rosa Maria Hansen MS, PA-C

## 2020-02-07 NOTE — LETTER
2/7/2020         RE: James Salvador  [address] [facility] [address]        Dear Colleague,    Thank you for referring your patient, James Salvador, to the [facility]. Please see a copy of my visit note below.    HPI:   Chief complaint: James Salvador (Pete) is a 74 year old male who presents for Full skin cancer screening to rule out skin cancer.   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 He lives in [place]. Had a pacemaker placed in June; HR dropped to 20 BPM  On review of systems, there are no further skin complaints, patient is feeling otherwise well.  See patient intake sheet.  ROS of the following were done and are negative: Constitutional, Eyes, Ears, Nose,   Mouth, Throat, Cardiovascular, Respiratory, GI, Genitourinary, Musculoskeletal,   Psychiatric, Endocrine, Allergic/Immunologic.    HPI, past medical history, social history, allergies, medications reviewed as of February 7, 2020    This document serves as a record of the services and decisions personally performed and made by Rosa Maria Hansen, MS, PA-C. It was created on her behalf by Inez Prince, a trained medical scribe. The creation of this document is based on the provider's statements to the medical scribe.  Inez Prince 11:06 AM February 7, 2020    PHYSICAL EXAM:   BP (!) 147/89   Pulse 64   SpO2 96%   Skin exam performed as follows: Type 2 skin. Mood  appropriate  Alert and Oriented X 3. Well developed, well nourished in no distress.  General appearance: Normal  Head including face: Normal  Eyes: conjunctiva and lids: Normal  Mouth: Lips, teeth, gums: Normal  Neck: Normal  Chest-breast/axillae: Normal  Back: Normal  Spleen and liver: Normal  Cardiovascular: Exam of peripheral vascular system by observation for swelling, varicosities, edema: Normal  Genitalia: groin, buttocks: Normal  Extremities: digits/nails (clubbing): Normal  Eccrine and Apocrine glands: Normal  Right upper extremity: Normal  Left upper extremity: Normal  Right lower extremity: Normal  Left lower extremity: Normal  Skin: Scalp and body hair: See below    Pt deferred exam of breasts, groin, buttocks: NO    Other physical findings:  1. Multiple pigmented macules on extremities and trunk  2. Multiple pigmented macules on face, trunk and extremities  3. Multiple vascular papules on trunk, arms and legs  4. Multiple scattered keratotic plaques  5. Pink gritty papule on the left dorsal hand x 4  6. Left temple 10 mm pink plaque      Except as noted above, no other signs of skin cancer or melanoma.     ASSESSMENT/PLAN:   Benign Full skin cancer screening today.     Patient with history of SCC  Advised on monthly self exams and 1 year  Patient Education: Appropriate brochures given.    Multiple benign appearing nevi on arms, legs and trunk. Discussed ABCDEs of melanoma and sunscreen.   Multiple lentigos on arms, legs and trunk. Advised benign, no treatment needed.  Multiple scattered angiomas. Advised benign, no treatment needed.   Seborrheic keratosis on arms, legs and trunk. Advised benign, no treatment needed.  Actinic keratosis on the left dorsal hand x 4. As precancerous, cryosurgery performed. Advised on blistering and post-op care. Advised if not resolved in 1-2 months to return for evaluation   R/o BCC on left temple. Photo taken and placed in chart. Shave biopsy performed.  Area cleaned and  anesthetized with 1% lidocaine with epinephrine.  Dermablade used to remove the lesion and sent to pathology. Bleeding was cauterized. Pt tolerated procedure well with no complications.        Follow-up: pending path/Q 6 month FSE/PRN sooner    1.) Patient was asked about new and changing moles. YES  2.) Patient received a complete physical skin examination: YES  3.) Patient was counseled to perform a monthly self skin examination: YES  Scribed By: Inez Prince, Medical Scribe    The information in this document, created by the medical scribe for me, accurately reflects the services I personally performed and the decisions made by me. I have reviewed and approved this document for accuracy prior to leaving the patient care area.  February 7, 2020 11:20 AM    Rosa Maria Hansen MS, SHEY      Again, thank you for allowing me to participate in the care of your patient.        Sincerely,        Rosa Maria Hansen PA-C

## 2020-02-11 LAB — COPATH REPORT: NORMAL

## 2020-02-12 ENCOUNTER — TELEPHONE (OUTPATIENT)
Dept: DERMATOLOGY | Facility: CLINIC | Age: 75
End: 2020-02-12

## 2020-02-12 NOTE — TELEPHONE ENCOUNTER
Called and spoke to patient.    Educated patient on biopsy results- BCC.    Educated patient on BCC, mohs, and scheduled following day mohs appointment.    (Patient has had multiple mohs appointments in the past- understands pre-surgery instructions).    Patient voiced understanding.    Kermit RN-BSN-N  [facility]  [phone]

## 2020-02-12 NOTE — TELEPHONE ENCOUNTER
----- Message from Jennifer Marroquin PA-C sent at 2/12/2020  2:02 PM CST -----  LAUREN Ruano,  The biopsy of your left temple returned as a basal cell carcinoma, please schedule mohs with Dr. Dutta.

## 2020-02-13 ENCOUNTER — OFFICE VISIT (OUTPATIENT)
Dept: DERMATOLOGY | Facility: CLINIC | Age: 75
End: 2020-02-13
Payer: COMMERCIAL

## 2020-02-13 VITALS — SYSTOLIC BLOOD PRESSURE: 130 MMHG | HEART RATE: 70 BPM | DIASTOLIC BLOOD PRESSURE: 78 MMHG | OXYGEN SATURATION: 94 %

## 2020-02-13 DIAGNOSIS — C44.319 BASAL CELL CARCINOMA (BCC) OF LEFT TEMPLE REGION: Primary | ICD-10-CM

## 2020-02-13 PROCEDURE — 17312 MOHS ADDL STAGE: CPT | Performed by: DERMATOLOGY

## 2020-02-13 PROCEDURE — 17311 MOHS 1 STAGE H/N/HF/G: CPT | Performed by: DERMATOLOGY

## 2020-02-13 NOTE — PROGRESS NOTES
James Salvador is a 74 year old year old male patient here today for evaluation and managment of basal cell carcinoma on left temple.  HE notes redness on previous site.  Patient has no other skin complaints today.  Remainder of the HPI, Meds, PMH, Allergies, FH, and SH was reviewed in chart.      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 Cardiac Cath 9/2015: BRIDGET to LAD     History of myocardial infarction 1994    PTCA     Hyperlipidemia LDL goal < 70      Melanoma (H) 10/15/2019    10/15/19 left zygoma     Mixed hyperlipidemia 6/21/2017     Morbid obesity due to excess calories (H) 1/11/2016     Paroxysmal supraventricular tachycardia (H)     on metoprolol     Permanent atrial fibrillation 04/21/2017     Squamous cell carcinoma      Stable angina (H) 1/11/2016     Tachy-edinson syndrome (H) 5/29/2019    Added automatically from request for surgery [id number]       Past Surgical History:   Procedure Laterality Date     ANGIOPLASTY  1994    in [place]     ANKLE SURGERY  7/13/2005    right ankle     EP PERM PACER SINGLE LEAD N/A 5/31/2019    Medtronic single lead pacemaker     HEART CATH STENT COR W/WO PTCA  9/23/2015    BRIDGET stent mid LAD     JOINT REPLACEMENT, HIP RT/LT  10/14/2009    right hip      LASER SURGERY OF EYE  06/01/2002     MOHS MICROGRAPHIC PROCEDURE  06/12/2004    squamous cell carcinoma right temple     SINUS SURGERY  7/11/2006        Family History   Problem Relation Age of Onset     Genitourinary Problems Mother         renal failure     Cardiovascular Father         rupture of dorsal aorta     Depression Son      Skin Cancer No family hx of        Social History     Socioeconomic History     Marital status:      Spouse name: Not on file     Number of children: 3     Years of education:  Not on file     Highest education level: Not on file   Occupational History     Employer: RETIRED   Social Needs     Financial resource strain: Not on file     Food insecurity:     Worry: Not on file     Inability: Not on file     Transportation needs:     Medical: Not on file     Non-medical: Not on file   Tobacco Use     Smoking status: Former Smoker     Packs/day: 0.00     Years: 10.00     Pack years: 0.00     Types: Cigarettes     Last attempt to quit: 1987     Years since quittin.1     Smokeless tobacco: Never Used   Substance and Sexual Activity     Alcohol use: Yes     Alcohol/week: 0.0 standard drinks     Comment: socially - x2-3 per month -  none currently     Drug use: No     Sexual activity: Yes     Partners: Female   Lifestyle     Physical activity:     Days per week: Not on file     Minutes per session: Not on file     Stress: Not on file   Relationships     Social connections:     Talks on phone: Not on file     Gets together: Not on file     Attends Amish service: Not on file     Active member of club or organization: Not on file     Attends meetings of clubs or organizations: Not on file     Relationship status: Not on file     Intimate partner violence:     Fear of current or ex partner: Not on file     Emotionally abused: Not on file     Physically abused: Not on file     Forced sexual activity: Not on file   Other Topics Concern     Parent/sibling w/ CABG, MI or angioplasty before 65F 55M? Not Asked      Service Not Asked     Blood Transfusions Not Asked     Caffeine Concern Yes     Comment: 2 big cups coffee daily     Occupational Exposure Not Asked     Hobby Hazards Not Asked     Sleep Concern No     Comment: sleeping better since shoulder replaced 11/3/16     Stress Concern No     Weight Concern Yes     Special Diet Yes     Comment: trying to do more lean meats     Back Care Not Asked     Exercise No     Comment: limited - knee     Bike Helmet Not Asked     Seat Belt Not  Asked     Self-Exams Not Asked   Social History Narrative     Not on file       Outpatient Encounter Medications as of 2/13/2020   Medication Sig Dispense Refill     Acetaminophen (TYLENOL PO) Take 650 mg by mouth 4 times daily        amoxicillin (AMOXIL) 500 MG capsule 2,000 mg as needed When going to the dentist  0     ASPIRIN PO Take 81 mg by mouth daily       atorvastatin (LIPITOR) 80 MG tablet TAKE 1 TABLET BY MOUTH AT BEDTIME 90 tablet 3     carvedilol (COREG) 3.125 MG tablet Take 1 tablet (3.125 mg) by mouth 2 times daily (with meals) 180 tablet 3     ciclopirox (LOPROX) 0.77 % cream Apply topically At Bedtime 90 g 3     desonide (DESOWEN) 0.05 % external cream Apply sparingly to affected area on face in morning 60 g 0     Doxycycline Hyclate (PERIOSTAT PO) Take 20 mg by mouth 2 times daily       ELIQUIS ANTICOAGULANT 5 MG tablet TAKE 1 TABLET BY MOUTH TWICE A DAY 60 tablet 5     fluocinonide (LIDEX) 0.05 % external solution Apply to scalp BID x 1-2 weeks PRN (Fax [facility] [phone] Thanks) 60 mL 3     fluorouracil (EFUDEX) 5 % external cream Apply to scap BID x 3-4 weeks. Protect area from the sun. 40 g 3     fluticasone (FLONASE) 50 MCG/ACT nasal spray SPRAY 2 SPRAYS INTO BOTH NOSTRILS DAILY -RTS 1/7/19 48 mL 1     furosemide (LASIX) 40 MG tablet Take 1 tablet (40 mg) by mouth daily 90 tablet 3     GABAPENTIN PO Take 600 mg by mouth 3 times daily        ketoconazole (NIZORAL) 2 % cream Apply topically 2 times daily For face. [facility]X [facility]FILL REQUESTS TO [facility]: [phone] 30 g 2     ketoconazole (NIZORAL) 2 % external shampoo Use daily as needed 120 mL 3     ketotifen (ZADITOR/REFRESH ANTI-ITCH) 0.025 % SOLN Place 1-2 drops into both eyes 2 times daily as needed for itching       lisinopril (PRINIVIL/ZESTRIL) 30 MG tablet TAKE 1 TABLET BY MOUTH EVERY DAY 90 tablet 1     loratadine (CLARITIN) 10 MG tablet Take 10 mg by mouth daily as needed for  allergies       Multiple Vitamin (MULTIVITAMIN OR) Take 1 tablet by mouth daily        nitroglycerin (NITROSTAT) 0.4 MG SL tablet Place 1 tablet (0.4 mg) under the tongue every 5 minutes as needed for chest pain May repeat twice for a total of 3 tablets.  If chest pain not relieved, call 911 25 tablet 11     Omega-3 Fatty Acids (OMEGA-3 FISH OIL PO) Take 3.2 g by mouth daily        OXcarbazepine (TRILEPTAL PO) Take 300 mg by mouth 3 times daily        No facility-administered encounter medications on file as of 2/13/2020.              Review Of Systems  Skin: As above  Eyes: negative  Ears/Nose/Throat: negative  Respiratory: No shortness of breath, dyspnea on exertion, cough, or hemoptysis  Cardiovascular: negative  Gastrointestinal: negative  Genitourinary: negative  Musculoskeletal: negative  Neurologic: negative  Psychiatric: negative  Hematologic/Lymphatic/Immunologic: negative  Endocrine: negative      O:   NAD, WDWN, Alert & Oriented, Mood & Affect wnl, Vitals stable   Here today alone   /78   Pulse 70   SpO2 94%    General appearance normal   Vitals stable   Alert, oriented and in no acute distress     L temple 1cm scaly papule   L cheek telangiectasia on scar      Eyes: Conjunctivae/lids:Normal     ENT: Lips, buccal mucosa, tongue: normal    MSK:Normal    Cardiovascular: peripheral edema none    Pulm: Breathing Normal    Neuro/Psych: Orientation:Alert and Orientedx3 ; Mood/Affect:normal       A/P:  1. Hx of non-melanoma skin cancer  ipl discussed with patient   Schedule  2. L temple basal cell carcinoma   MOHS:   Location    After PGACAC discussed with patient, decision for Mohs surgery was made. Indication for Mohs was Location. Patient confirmed the site with Dr. Dutta.  After anesthesia with LEC, the tumor was excised using standard Mohs technique in 2 stages(s).  CLEAR MARGINS OBTAINED and Final defect size was 1.9 x 1.6 cm.       REPAIR SECOND INTENT: We discussed the options for wound  management in full with the patient including risks/benefits/possible outcomes. Decision made to allow the wound to heal by second intention. EBL minimal; complications none; wound care routine.  The patient was discharged in good condition and will return in one month or prn for wound evaluation.  BENIGN LESIONS DISCUSSED WITH PATIENT:  I discussed the specifics of tumor, prognosis, and genetics of benign lesions.  I explained that treatment of these lesions would be purely cosmetic and not medically neccessary.  I discussed with patient different removal options including excision, cautery and /or laser.      Nature and genetics of benign skin lesions dicussed with patient.  Signs and Symptoms of skin cancer discussed with patient.  ABCDEs of melanoma reviewed with patient.  Patient encouraged to perform monthly skin exams.  UV precautions reviewed with patient.  Patient to follow up with Primary Care provider regarding elevated blood pressure.  Skin care regimen reviewed with patient: Eliminate harsh soaps, i.e. Dial, zest, irsih spring; Mild soaps such as Cetaphil or Dove sensitive skin, avoid hot or cold showers, aggressive use of emollients including vanicream, cetaphil or cerave discussed with patient.    Risks of non-melanoma skin cancer discussed with patient   Return to clinic 3 months

## 2020-02-13 NOTE — PROGRESS NOTES
Surgical Office Location:  [facility]  [address] [place] [id number]

## 2020-02-13 NOTE — LETTER
2/13/2020         RE: James Salvador  [address] [place] [address]        Dear Colleague,    Thank you for referring your patient, James Salvador, to the [facility]. Please see a copy of my visit note below.    Surgical Office Location:  [facility]  [address] [place] [address]      James Salvador is a 74 year old year old male patient here today for evaluation and managment of basal cell carcinoma on left temple.  HE notes redness on previous site.  Patient has no other skin complaints today.  Remainder of the HPI, Meds, PMH, Allergies, FH, and SH was reviewed in chart.      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 Cardiac Cath 9/2015: BRIDGET to LAD     History of myocardial infarction 1994    PTCA     Hyperlipidemia LDL goal < 70      Melanoma (H) 10/15/2019    10/15/19 left zygoma     Mixed hyperlipidemia 6/21/2017     Morbid obesity due to excess calories (H) 1/11/2016     Paroxysmal supraventricular tachycardia (H)     on metoprolol     Permanent atrial fibrillation 04/21/2017     Squamous cell carcinoma      Stable angina (H) 1/11/2016     Tachy-edinson syndrome (H) 5/29/2019    Added automatically from request for surgery [id number]       Past Surgical History:   Procedure Laterality Date     ANGIOPLASTY  1994    in [place]     ANKLE SURGERY  7/13/2005    right ankle     EP PERM PACER SINGLE LEAD N/A 5/31/2019    Medtronic single lead pacemaker     HEART CATH STENT COR W/WO PTCA  9/23/2015    BRIDGET stent mid LAD     JOINT REPLACEMENT, HIP RT/LT  10/14/2009    right hip      LASER SURGERY OF EYE  06/01/2002     MOHS MICROGRAPHIC PROCEDURE  06/12/2004    squamous cell carcinoma right temple     SINUS SURGERY  7/11/2006        Family  History   Problem Relation Age of Onset     Genitourinary Problems Mother         renal failure     Cardiovascular Father         rupture of dorsal aorta     Depression Son      Skin Cancer No family hx of        Social History     Socioeconomic History     Marital status:      Spouse name: Not on file     Number of children: 3     Years of education: Not on file     Highest education level: Not on file   Occupational History     Employer: RETIRED   Social Needs     Financial resource strain: Not on file     Food insecurity:     Worry: Not on file     Inability: Not on file     Transportation needs:     Medical: Not on file     Non-medical: Not on file   Tobacco Use     Smoking status: Former Smoker     Packs/day: 0.00     Years: 10.00     Pack years: 0.00     Types: Cigarettes     Last attempt to quit: 1987     Years since quittin.1     Smokeless tobacco: Never Used   Substance and Sexual Activity     Alcohol use: Yes     Alcohol/week: 0.0 standard drinks     Comment: socially - x2-3 per month -  none currently     Drug use: No     Sexual activity: Yes     Partners: Female   Lifestyle     Physical activity:     Days per week: Not on file     Minutes per session: Not on file     Stress: Not on file   Relationships     Social connections:     Talks on phone: Not on file     Gets together: Not on file     Attends Jainism service: Not on file     Active member of club or organization: Not on file     Attends meetings of clubs or organizations: Not on file     Relationship status: Not on file     Intimate partner violence:     Fear of current or ex partner: Not on file     Emotionally abused: Not on file     Physically abused: Not on file     Forced sexual activity: Not on file   Other Topics Concern     Parent/sibling w/ CABG, MI or angioplasty before 65F 55M? Not Asked      Service Not Asked     Blood Transfusions Not Asked     Caffeine Concern Yes     Comment: 2 big cups coffee daily      Occupational Exposure Not Asked     Hobby Hazards Not Asked     Sleep Concern No     Comment: sleeping better since shoulder replaced 11/3/16     Stress Concern No     Weight Concern Yes     Special Diet Yes     Comment: trying to do more lean meats     Back Care Not Asked     Exercise No     Comment: limited - knee     Bike Helmet Not Asked     Seat Belt Not Asked     Self-Exams Not Asked   Social History Narrative     Not on file       Outpatient Encounter Medications as of 2/13/2020   Medication Sig Dispense Refill     Acetaminophen (TYLENOL PO) Take 650 mg by mouth 4 times daily        amoxicillin (AMOXIL) 500 MG capsule 2,000 mg as needed When going to the dentist  0     ASPIRIN PO Take 81 mg by mouth daily       atorvastatin (LIPITOR) 80 MG tablet TAKE 1 TABLET BY MOUTH AT BEDTIME 90 tablet 3     carvedilol (COREG) 3.125 MG tablet Take 1 tablet (3.125 mg) by mouth 2 times daily (with meals) 180 tablet 3     ciclopirox (LOPROX) 0.77 % cream Apply topically At Bedtime 90 g 3     desonide (DESOWEN) 0.05 % external cream Apply sparingly to affected area on face in morning 60 g 0     Doxycycline Hyclate (PERIOSTAT PO) Take 20 mg by mouth 2 times daily       ELIQUIS ANTICOAGULANT 5 MG tablet TAKE 1 TABLET BY MOUTH TWICE A DAY 60 tablet 5     fluocinonide (LIDEX) 0.05 % external solution Apply to scalp BID x 1-2 weeks PRN ([facility]ax [facility] [phone] Thanks) 60 mL 3     fluorouracil (EFUDEX) 5 % external cream Apply to scap BID x 3-4 weeks. Protect area from the sun. 40 g 3     fluticasone (FLONASE) 50 MCG/ACT nasal spray SPRAY 2 SPRAYS INTO BOTH NOSTRILS DAILY -RTS 1/7/19 48 mL 1     furosemide (LASIX) 40 MG tablet Take 1 tablet (40 mg) by mouth daily 90 tablet 3     GABAPENTIN PO Take 600 mg by mouth 3 times daily        ketoconazole (NIZORAL) 2 % cream Apply topically 2 times daily For face. [facility]QUEST[facility]: [phone] 30 g 2     ketoconazole  (NIZORAL) 2 % external shampoo Use daily as needed 120 mL 3     ketotifen (ZADITOR/REFRESH ANTI-ITCH) 0.025 % SOLN Place 1-2 drops into both eyes 2 times daily as needed for itching       lisinopril (PRINIVIL/ZESTRIL) 30 MG tablet TAKE 1 TABLET BY MOUTH EVERY DAY 90 tablet 1     loratadine (CLARITIN) 10 MG tablet Take 10 mg by mouth daily as needed for allergies       Multiple Vitamin (MULTIVITAMIN OR) Take 1 tablet by mouth daily        nitroglycerin (NITROSTAT) 0.4 MG SL tablet Place 1 tablet (0.4 mg) under the tongue every 5 minutes as needed for chest pain May repeat twice for a total of 3 tablets.  If chest pain not relieved, call 911 25 tablet 11     Omega-3 Fatty Acids (OMEGA-3 FISH OIL PO) Take 3.2 g by mouth daily        OXcarbazepine (TRILEPTAL PO) Take 300 mg by mouth 3 times daily        No facility-administered encounter medications on file as of 2/13/2020.              Review Of Systems  Skin: As above  Eyes: negative  Ears/Nose/Throat: negative  Respiratory: No shortness of breath, dyspnea on exertion, cough, or hemoptysis  Cardiovascular: negative  Gastrointestinal: negative  Genitourinary: negative  Musculoskeletal: negative  Neurologic: negative  Psychiatric: negative  Hematologic/Lymphatic/Immunologic: negative  Endocrine: negative      O:   NAD, WDWN, Alert & Oriented, Mood & Affect wnl, Vitals stable   Here today alone   /78   Pulse 70   SpO2 94%    General appearance normal   Vitals stable   Alert, oriented and in no acute distress     L temple 1cm scaly papule   L cheek telangiectasia on scar      Eyes: Conjunctivae/lids:Normal     ENT: Lips, buccal mucosa, tongue: normal    MSK:Normal    Cardiovascular: peripheral edema none    Pulm: Breathing Normal    Neuro/Psych: Orientation:Alert and Orientedx3 ; Mood/Affect:normal       A/P:  1. Hx of non-melanoma skin cancer  ipl discussed with patient   Schedule  2. L temple basal cell carcinoma   MOHS:   Location    After PGACAC discussed with  patient, decision for Mohs surgery was made. Indication for Mohs was Location. Patient confirmed the site with Dr. Dutta.  After anesthesia with LEC, the tumor was excised using standard Mohs technique in 2 stages(s).  CLEAR MARGINS OBTAINED and Final defect size was 1.9 x 1.6 cm.       REPAIR SECOND INTENT: We discussed the options for wound management in full with the patient including risks/benefits/possible outcomes. Decision made to allow the wound to heal by second intention. EBL minimal; complications none; wound care routine.  The patient was discharged in good condition and will return in one month or prn for wound evaluation.  BENIGN LESIONS DISCUSSED WITH PATIENT:  I discussed the specifics of tumor, prognosis, and genetics of benign lesions.  I explained that treatment of these lesions would be purely cosmetic and not medically neccessary.  I discussed with patient different removal options including excision, cautery and /or laser.      Nature and genetics of benign skin lesions dicussed with patient.  Signs and Symptoms of skin cancer discussed with patient.  ABCDEs of melanoma reviewed with patient.  Patient encouraged to perform monthly skin exams.  UV precautions reviewed with patient.  Patient to follow up with Primary Care provider regarding elevated blood pressure.  Skin care regimen reviewed with patient: Eliminate harsh soaps, i.e. Dial, zest, irsih spring; Mild soaps such as Cetaphil or Dove sensitive skin, avoid hot or cold showers, aggressive use of emollients including vanicream, cetaphil or cerave discussed with patient.    Risks of non-melanoma skin cancer discussed with patient   Return to clinic 3 months      Again, thank you for allowing me to participate in the care of your patient.        Sincerely,        Jv Dutta MD

## 2020-02-13 NOTE — PATIENT INSTRUCTIONS
Open Wound Care     for Left temple    ? No strenuous activity for 48 hours    ? Take Tylenol as needed for discomfort.                                                .         ? Do not drink alcoholic beverages for 48 hours.    ? Keep the pressure bandage in place for 24 hours. If the bandage becomes blood tinged or loose, reinforce it with gauze and tape.        (Refer to the reverse side of this page for management of bleeding).    ? Remove bandage in 24 hours and begin wound care as follows:     1. Clean area with tap water using a Q tip or gauze pad, (shower / bathe normally)  2. Dry wound with Q tip or gauze pad  3. Apply Aquaphor, Vaseline, Polysporin or Bacitracin Ointment with a Q tip    Do NOT use Neosporin Ointment   4. Cover the wound with a band-aid or nonstick gauze pad and paper tape.  5. Repeat wound care once a day until wound is completely healed.    It is an old wives tale that a wound heals better when it is exposed to air and allowed to dry out. The wound will heal faster with a better cosmetic result if it is kept moist with ointment and covered with a bandage.  Do not let the wound dry out.    Supplies Needed:                Qtips or gauze pads                Vaseline, Aquaphor, Polysporin Ointment or Bacitracin Ointment (NOT NEOSPORIN)                Bandaids or nonstick gauze pads and paper tape    Wound care kits and brown paper tape are available for purchase at   the pharmacy.    BLEEDIN. Use tightly rolled up gauze or cloth to apply direct pressure over the bandage for 20   minutes.  2. Reapply pressure for an additional 20 minutes if necessary  3. Call the office or go to the nearest emergency room if pressure fails to stop the bleeding.  4. Use additional gauze and tape to maintain pressure once the bleeding has stopped.  5. Begin wound care 24 hours after surgery as directed.                WOUND HEALING    1. One week after surgery a pink / red halo will form around the  outside of the wound.   This is new skin.  2. The center of the wound will appear yellowish white and produce some drainage.  3. The pink halo will slowly migrate in toward the center of the wound until the wound is covered with new shiny pink skin.  4. There will be no more drainage when the wound is completely healed.  5. It will take six months to one year for the redness to fade.  6. The scar may be itchy, tight and sensitive to extreme temperatures for a year after the surgery.  7. Massaging the area several times a day for several minutes after the wound is completely healed will help the scar soften and normalize faster. Begin massage only after healing is complete.    In case of emergency call: Dr Dutta: [phone]     [facility]: [phone]    [facility]: [phone]

## 2020-03-30 DIAGNOSIS — L21.9 SEBORRHEIC DERMATITIS: ICD-10-CM

## 2020-03-31 RX ORDER — KETOCONAZOLE 20 MG/ML
SHAMPOO TOPICAL
Qty: 120 ML | Refills: 3 | Status: SHIPPED | OUTPATIENT
Start: 2020-03-31 | End: 2020-07-31

## 2020-03-31 NOTE — TELEPHONE ENCOUNTER
"Requested Prescriptions   Pending Prescriptions Disp Refills     ketoconazole (NIZORAL) 2 % external shampoo 120 mL 3     Sig: Use daily as needed       Antifungal Agents Passed - 3/30/2020 11:23 AM        Passed - Recent (12 mo) or future (30 days) visit within the authorizing provider's specialty     Patient has had an office visit with the authorizing provider or a provider within the authorizing providers department within the previous 12 mos or has a future within next 30 days. See \"Patient Info\" tab in inbasket, or \"Choose Columns\" in Meds & Orders section of the refill encounter.              Passed - Not Fluconazole or Terconazole      If oral Fluconazole or Terconazole, may refill if indicated in progress notes.           Passed - Medication is active on med list             Prescription approved per [facility] [place].    Fina CATN, RN, PHN      "

## 2020-04-27 DIAGNOSIS — J30.2 CHRONIC SEASONAL ALLERGIC RHINITIS: ICD-10-CM

## 2020-04-27 RX ORDER — FLUTICASONE PROPIONATE 50 MCG
SPRAY, SUSPENSION (ML) NASAL
Qty: 48 ML | Refills: 1 | Status: SHIPPED | OUTPATIENT
Start: 2020-04-27 | End: 2021-02-23

## 2020-04-27 NOTE — TELEPHONE ENCOUNTER
"Requested Prescriptions   Pending Prescriptions Disp Refills     fluticasone (FLONASE) 50 MCG/ACT nasal spray 48 mL 1       Nasal Allergy Protocol Passed - 4/27/2020 11:39 AM        Passed - Patient is age 12 or older        Passed - Recent (12 mo) or future (30 days) visit within the authorizing provider's specialty     Patient has had an office visit with the authorizing provider or a provider within the authorizing providers department within the previous 12 mos or has a future within next 30 days. See \"Patient Info\" tab in inbasket, or \"Choose Columns\" in Meds & Orders section of the refill encounter.              Passed - Medication is active on med list           "

## 2020-05-04 ENCOUNTER — ANCILLARY PROCEDURE (OUTPATIENT)
Dept: CARDIOLOGY | Facility: CLINIC | Age: 75
End: 2020-05-04
Attending: INTERNAL MEDICINE
Payer: COMMERCIAL

## 2020-05-04 DIAGNOSIS — Z95.0 CARDIAC PACEMAKER IN SITU: ICD-10-CM

## 2020-05-04 PROCEDURE — 93294 REM INTERROG EVL PM/LDLS PM: CPT | Performed by: INTERNAL MEDICINE

## 2020-05-04 PROCEDURE — 93296 REM INTERROG EVL PM/IDS: CPT | Performed by: INTERNAL MEDICINE

## 2020-05-12 LAB
MDC_IDC_LEAD_IMPLANT_DT: NORMAL
MDC_IDC_LEAD_LOCATION: NORMAL
MDC_IDC_LEAD_LOCATION_DETAIL_1: NORMAL
MDC_IDC_LEAD_MFG: NORMAL
MDC_IDC_LEAD_MODEL: NORMAL
MDC_IDC_LEAD_POLARITY_TYPE: NORMAL
MDC_IDC_LEAD_SERIAL: NORMAL
MDC_IDC_MSMT_BATTERY_DTM: NORMAL
MDC_IDC_MSMT_BATTERY_REMAINING_LONGEVITY: 177 MO
MDC_IDC_MSMT_BATTERY_RRT_TRIGGER: 2.62
MDC_IDC_MSMT_BATTERY_STATUS: NORMAL
MDC_IDC_MSMT_BATTERY_VOLTAGE: 3.11 V
MDC_IDC_MSMT_LEADCHNL_RV_IMPEDANCE_VALUE: 342 OHM
MDC_IDC_MSMT_LEADCHNL_RV_IMPEDANCE_VALUE: 418 OHM
MDC_IDC_MSMT_LEADCHNL_RV_PACING_THRESHOLD_AMPLITUDE: 0.62 V
MDC_IDC_MSMT_LEADCHNL_RV_PACING_THRESHOLD_PULSEWIDTH: 0.4 MS
MDC_IDC_MSMT_LEADCHNL_RV_SENSING_INTR_AMPL: 2.5 MV
MDC_IDC_MSMT_LEADCHNL_RV_SENSING_INTR_AMPL: 2.5 MV
MDC_IDC_PG_IMPLANT_DTM: NORMAL
MDC_IDC_PG_MFG: NORMAL
MDC_IDC_PG_MODEL: NORMAL
MDC_IDC_PG_SERIAL: NORMAL
MDC_IDC_PG_TYPE: NORMAL
MDC_IDC_SESS_CLINIC_NAME: NORMAL
MDC_IDC_SESS_DTM: NORMAL
MDC_IDC_SESS_TYPE: NORMAL
MDC_IDC_SET_BRADY_HYSTRATE: NORMAL
MDC_IDC_SET_BRADY_LOWRATE: 50 {BEATS}/MIN
MDC_IDC_SET_BRADY_MODE: NORMAL
MDC_IDC_SET_LEADCHNL_RV_PACING_AMPLITUDE: 2 V
MDC_IDC_SET_LEADCHNL_RV_PACING_ANODE_ELECTRODE_1: NORMAL
MDC_IDC_SET_LEADCHNL_RV_PACING_ANODE_LOCATION_1: NORMAL
MDC_IDC_SET_LEADCHNL_RV_PACING_CAPTURE_MODE: NORMAL
MDC_IDC_SET_LEADCHNL_RV_PACING_CATHODE_ELECTRODE_1: NORMAL
MDC_IDC_SET_LEADCHNL_RV_PACING_CATHODE_LOCATION_1: NORMAL
MDC_IDC_SET_LEADCHNL_RV_PACING_POLARITY: NORMAL
MDC_IDC_SET_LEADCHNL_RV_PACING_PULSEWIDTH: 0.4 MS
MDC_IDC_SET_LEADCHNL_RV_SENSING_ANODE_ELECTRODE_1: NORMAL
MDC_IDC_SET_LEADCHNL_RV_SENSING_ANODE_LOCATION_1: NORMAL
MDC_IDC_SET_LEADCHNL_RV_SENSING_CATHODE_ELECTRODE_1: NORMAL
MDC_IDC_SET_LEADCHNL_RV_SENSING_CATHODE_LOCATION_1: NORMAL
MDC_IDC_SET_LEADCHNL_RV_SENSING_POLARITY: NORMAL
MDC_IDC_SET_LEADCHNL_RV_SENSING_SENSITIVITY: 0.6 MV
MDC_IDC_SET_ZONE_DETECTION_INTERVAL: 360 MS
MDC_IDC_SET_ZONE_TYPE: NORMAL
MDC_IDC_STAT_BRADY_DTM_END: NORMAL
MDC_IDC_STAT_BRADY_DTM_START: NORMAL
MDC_IDC_STAT_BRADY_RV_PERCENT_PACED: 24.3 %
MDC_IDC_STAT_EPISODE_RECENT_COUNT: 0
MDC_IDC_STAT_EPISODE_RECENT_COUNT: 0
MDC_IDC_STAT_EPISODE_RECENT_COUNT_DTM_END: NORMAL
MDC_IDC_STAT_EPISODE_RECENT_COUNT_DTM_END: NORMAL
MDC_IDC_STAT_EPISODE_RECENT_COUNT_DTM_START: NORMAL
MDC_IDC_STAT_EPISODE_RECENT_COUNT_DTM_START: NORMAL
MDC_IDC_STAT_EPISODE_TOTAL_COUNT: 0
MDC_IDC_STAT_EPISODE_TOTAL_COUNT: 2
MDC_IDC_STAT_EPISODE_TOTAL_COUNT_DTM_END: NORMAL
MDC_IDC_STAT_EPISODE_TOTAL_COUNT_DTM_END: NORMAL
MDC_IDC_STAT_EPISODE_TOTAL_COUNT_DTM_START: NORMAL
MDC_IDC_STAT_EPISODE_TOTAL_COUNT_DTM_START: NORMAL
MDC_IDC_STAT_EPISODE_TYPE: NORMAL

## 2020-06-12 ENCOUNTER — OFFICE VISIT (OUTPATIENT)
Dept: DERMATOLOGY | Facility: CLINIC | Age: 75
End: 2020-06-12
Payer: COMMERCIAL

## 2020-06-12 VITALS — HEART RATE: 57 BPM | OXYGEN SATURATION: 93 % | DIASTOLIC BLOOD PRESSURE: 79 MMHG | SYSTOLIC BLOOD PRESSURE: 146 MMHG

## 2020-06-12 DIAGNOSIS — Z85.828 HISTORY OF SQUAMOUS CELL CARCINOMA OF SKIN: ICD-10-CM

## 2020-06-12 DIAGNOSIS — Z85.828 HISTORY OF BASAL CELL CARCINOMA (BCC) OF SKIN: ICD-10-CM

## 2020-06-12 DIAGNOSIS — L82.1 SEBORRHEIC KERATOSIS: ICD-10-CM

## 2020-06-12 DIAGNOSIS — D22.9 NEVUS: ICD-10-CM

## 2020-06-12 DIAGNOSIS — L57.0 ACTINIC KERATOSIS: ICD-10-CM

## 2020-06-12 DIAGNOSIS — D48.5 NEOPLASM OF UNCERTAIN BEHAVIOR OF SKIN: Primary | ICD-10-CM

## 2020-06-12 DIAGNOSIS — D18.01 ANGIOMA OF SKIN: ICD-10-CM

## 2020-06-12 DIAGNOSIS — L81.4 LENTIGO: ICD-10-CM

## 2020-06-12 DIAGNOSIS — Z85.820 HISTORY OF MELANOMA: ICD-10-CM

## 2020-06-12 PROCEDURE — 11103 TANGNTL BX SKIN EA SEP/ADDL: CPT | Performed by: PHYSICIAN ASSISTANT

## 2020-06-12 PROCEDURE — 88342 IMHCHEM/IMCYTCHM 1ST ANTB: CPT | Mod: TC | Performed by: PHYSICIAN ASSISTANT

## 2020-06-12 PROCEDURE — 99214 OFFICE O/P EST MOD 30 MIN: CPT | Mod: 25 | Performed by: PHYSICIAN ASSISTANT

## 2020-06-12 PROCEDURE — 88305 TISSUE EXAM BY PATHOLOGIST: CPT | Mod: TC | Performed by: PHYSICIAN ASSISTANT

## 2020-06-12 PROCEDURE — 11102 TANGNTL BX SKIN SINGLE LES: CPT | Performed by: PHYSICIAN ASSISTANT

## 2020-06-12 NOTE — PROGRESS NOTES
HPI:   Chief complaint: James Salvador (Pete) is a 74 year old male who presents for Full skin cancer screening to rule out skin cancer.   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 He lives in [place]. Had a pacemaker placed in June; HR dropped to 20 BPM  On review of systems, there are no further skin complaints, patient is feeling otherwise well.  See patient intake sheet.  ROS of the following were done and are negative: Constitutional, Eyes, Ears, Nose,   Mouth, Throat, Cardiovascular, Respiratory, GI, Genitourinary, Musculoskeletal,   Psychiatric, Endocrine, Allergic/Immunologic.    HPI, past medical history, social history, allergies, medications reviewed as of June 12, 2020        PHYSICAL EXAM:   BP (!) 146/79   Pulse 57   SpO2 93%   Skin exam performed as follows: Type 2 skin. Mood appropriate  Alert and Oriented X 3. Well developed, well nourished in no distress.  General appearance: Normal  Head including face: Normal  Eyes: conjunctiva and lids: Normal  Mouth: Lips, teeth, gums: Normal  Neck: Normal  Chest-breast/axillae: Normal  Back: Normal  Spleen and liver: Normal  Cardiovascular: Exam of peripheral vascular system by observation for swelling, varicosities, edema: Normal  Genitalia: groin, buttocks: Normal  Extremities: digits/nails (clubbing): Normal  Eccrine and Apocrine glands: Normal  Right upper extremity: Normal  Left upper extremity: Normal  Right lower extremity:  Normal  Left lower extremity: Normal  Skin: Scalp and body hair: See below    Pt deferred exam of breasts, groin, buttocks: NO    Other physical findings:  1. Multiple pigmented macules on extremities and trunk  2. Multiple pigmented macules on face, trunk and extremities  3. Multiple vascular papules on trunk, arms and legs  4. Multiple scattered keratotic plaques  5. Pink gritty papules on the right forearm, left elbow, left helix  6. Left forearm distal 5 mm hyperkeratotic papule  7. Left forearm proximal 5mm pink hyperkeratotic papule  8. Right medial back 10 mm pink shiny plaque       Except as noted above, no other signs of skin cancer or melanoma.     ASSESSMENT/PLAN:   Benign Full skin cancer screening today.     Patient with history of SCC, BCC and melanoma  Advised on monthly self exams and 1 year  Patient Education: Appropriate brochures given.    Multiple benign appearing nevi on arms, legs and trunk. Discussed ABCDEs of melanoma and sunscreen.   Multiple lentigos on arms, legs and trunk. Advised benign, no treatment needed.  Multiple scattered angiomas. Advised benign, no treatment needed.   Seborrheic keratosis on arms, legs and trunk. Advised benign, no treatment needed.  Actinic keratoses on the forearm left elbow, left helix - start efudex BID x 2 weeks   R/o SCC on the left forearm distal, left forearm proximal. Shave biopsy performed.  Area cleaned and anesthetized with 1% lidocaine with epinephrine.  Dermablade used to remove the lesion and sent to pathology. Bleeding was cauterized. Pt tolerated procedure well with no complications.   R/o BCC on the right medial back. Shave biopsy performed.  Area cleaned and anesthetized with 1% lidocaine with epinephrine.  Dermablade used to remove the lesion and sent to pathology. Bleeding was cauterized. Pt tolerated procedure well with no complications.        Follow-up: 4 months    1.) Patient was asked about new and changing moles. YES  2.) Patient  received a complete physical skin examination: YES  3.) Patient was counseled to perform a monthly self skin examination: YES  Scribed By: Rosa Maria Hansen MS, PA-C

## 2020-06-12 NOTE — PATIENT INSTRUCTIONS
Use efudex twice per day for 2 weeks (or once per day for 4 weeks) on the right forearm, left elbow area and on the right upper ear (scaly area)          Wound Care Instructions     FOR SUPERFICIAL WOUNDS     [facility] [phone]    [facility] [phone]                       AFTER 24 HOURS YOU SHOULD REMOVE THE BANDAGE AND BEGIN DAILY DRESSING CHANGES AS FOLLOWS:     1) Remove Dressing.     2) Clean and dry the area with tap water using a Q-tip or sterile gauze pad.     3) Apply Vaseline, Aquaphor, Polysporin ointment or Bacitracin ointment over entire wound.  Do NOT use Neosporin ointment.     4) Cover the wound with a band-aid, or a sterile non-stick gauze pad and micropore paper tape      REPEAT THESE INSTRUCTIONS AT LEAST ONCE A DAY UNTIL THE WOUND HAS COMPLETELY HEALED.    It is an old wives tale that a wound heals better when it is exposed to air and allowed to dry out. The wound will heal faster with a better cosmetic result if it is kept moist with ointment and covered with a bandage.    **Do not let the wound dry out.**      Supplies Needed:      *Cotton tipped applicators (Q-tips)    *Polysporin Ointment or Bacitracin Ointment (NOT NEOSPORIN)    *Band-aids or non-stick gauze pads and micropore paper tape.      PATIENT INFORMATION:    During the healing process you will notice a number of changes. All wounds develop a small halo of redness surrounding the wound.  This means healing is occurring. Severe itching with extensive redness usually indicates sensitivity to the ointment or bandage tape used to dress the wound.  You should call our office if this develops.      Swelling  and/or discoloration around your surgical site is common, particularly when performed around the eye.    All wounds normally drain.  The larger the wound the more drainage there will be.  After 7-10 days, you will notice the wound beginning to shrink and new skin will begin to grow.  The wound is  healed when you can see skin has formed over the entire area.  A healed wound has a healthy, shiny look to the surface and is red to dark pink in color to normalize.  Wounds may take approximately 4-6 weeks to heal.  Larger wounds may take 6-8 weeks.  After the wound is healed you may discontinue dressing changes.    You may experience a sensation of tightness as your wound heals. This is normal and will gradually subside.    Your healed wound may be sensitive to temperature changes. This sensitivity improves with time, but if you re having a lot of discomfort, try to avoid temperature extremes.    Patients frequently experience itching after their wound appears to have healed because of the continue healing under the skin.  Plain Vaseline will help relieve the itching.        POSSIBLE COMPLICATIONS    BLEEDIN. Leave the bandage in place.  2. Use tightly rolled up gauze or a cloth to apply direct pressure over the bandage for 30  minutes.  3. Reapply pressure for an additional 30 minutes if necessary  4. Use additional gauze and tape to maintain pressure once the bleeding has stopped.

## 2020-06-12 NOTE — LETTER
6/12/2020         RE: James Salvador  [address] [address]        Dear Colleague,    Thank you for referring your patient, James Salvador, to the [facility]. Please see a copy of my visit note below.    HPI:   Chief complaint: James Salvador (Pete) is a 74 year old male who presents for Full skin cancer screening to rule out skin cancer.   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 He lives in [place]. Had a pacemaker placed in June; HR dropped to 20 BPM  On review of systems, there are no further skin complaints, patient is feeling otherwise well.  See patient intake sheet.  ROS of the following were done and are negative: Constitutional, Eyes, Ears, Nose,   Mouth, Throat, Cardiovascular, Respiratory, GI, Genitourinary, Musculoskeletal,   Psychiatric, Endocrine, Allergic/Immunologic.    HPI, past medical history, social history, allergies, medications reviewed as of June 12, 2020        PHYSICAL EXAM:   BP (!) 146/79   Pulse 57   SpO2 93%   Skin exam performed as follows: Type 2 skin. Mood appropriate  Alert and Oriented X 3. Well developed, well nourished in no distress.  General appearance: Normal  Head including face: Normal  Eyes: conjunctiva and lids: Normal  Mouth: Lips, teeth, gums: Normal  Neck: Normal  Chest-breast/axillae: Normal  Back: Normal  Spleen and liver: Normal  Cardiovascular: Exam of peripheral vascular system by  observation for swelling, varicosities, edema: Normal  Genitalia: groin, buttocks: Normal  Extremities: digits/nails (clubbing): Normal  Eccrine and Apocrine glands: Normal  Right upper extremity: Normal  Left upper extremity: Normal  Right lower extremity: Normal  Left lower extremity: Normal  Skin: Scalp and body hair: See below    Pt deferred exam of breasts, groin, buttocks: NO    Other physical findings:  1. Multiple pigmented macules on extremities and trunk  2. Multiple pigmented macules on face, trunk and extremities  3. Multiple vascular papules on trunk, arms and legs  4. Multiple scattered keratotic plaques  5. Pink gritty papules on the right forearm, left elbow, left helix  6. Left forearm distal 5 mm hyperkeratotic papule  7. Left forearm proximal 5mm pink hyperkeratotic papule  8. Right medial back 10 mm pink shiny plaque       Except as noted above, no other signs of skin cancer or melanoma.     ASSESSMENT/PLAN:   Benign Full skin cancer screening today.     Patient with history of SCC, BCC and melanoma  Advised on monthly self exams and 1 year  Patient Education: Appropriate brochures given.    Multiple benign appearing nevi on arms, legs and trunk. Discussed ABCDEs of melanoma and sunscreen.   Multiple lentigos on arms, legs and trunk. Advised benign, no treatment needed.  Multiple scattered angiomas. Advised benign, no treatment needed.   Seborrheic keratosis on arms, legs and trunk. Advised benign, no treatment needed.  Actinic keratoses on the forearm left elbow, left helix - start efudex BID x 2 weeks   R/o SCC on the left forearm distal, left forearm proximal. Shave biopsy performed.  Area cleaned and anesthetized with 1% lidocaine with epinephrine.  Dermablade used to remove the lesion and sent to pathology. Bleeding was cauterized. Pt tolerated procedure well with no complications.   R/o BCC on the right medial back. Shave biopsy performed.  Area cleaned and anesthetized with 1% lidocaine  with epinephrine.  Dermablade used to remove the lesion and sent to pathology. Bleeding was cauterized. Pt tolerated procedure well with no complications.        Follow-up: 4 months    1.) Patient was asked about new and changing moles. YES  2.) Patient received a complete physical skin examination: YES  3.) Patient was counseled to perform a monthly self skin examination: YES  Scribed By: Rosa Maria Hansen MS, SHEY      Again, thank you for allowing me to participate in the care of your patient.        Sincerely,        Rosa Maria Hansen PA-C

## 2020-06-16 DIAGNOSIS — L57.0 AK (ACTINIC KERATOSIS): ICD-10-CM

## 2020-06-16 RX ORDER — FLUOROURACIL 50 MG/G
CREAM TOPICAL
Qty: 40 G | Refills: 3 | Status: SHIPPED | OUTPATIENT
Start: 2020-06-16 | End: 2023-06-20

## 2020-06-16 NOTE — TELEPHONE ENCOUNTER
Routing refill request to provider for review/approval because:  Refills for this medication group require provider approval

## 2020-06-16 NOTE — TELEPHONE ENCOUNTER
Requested Prescriptions   Pending Prescriptions Disp Refills     fluorouracil (EFUDEX) 5 % external cream 40 g 3     Sig: Apply to scap BID x 3-4 weeks. Protect area from the sun.       There is no refill protocol information for this order        Last Written Prescription Date:  Last Fill Quantity: ,  # refills:    Last office visit: 6/12/2020 with prescribing provider:  Rosa Maria Liao Office Visit:

## 2020-06-17 ENCOUNTER — TELEPHONE (OUTPATIENT)
Dept: DERMATOLOGY | Facility: CLINIC | Age: 75
End: 2020-06-17

## 2020-06-17 DIAGNOSIS — I10 BENIGN HYPERTENSION: ICD-10-CM

## 2020-06-17 LAB — COPATH REPORT: NORMAL

## 2020-06-17 NOTE — TELEPHONE ENCOUNTER
Called and spoke to patient.    Educated patient on biopsy results- SCIS (mohs) + AK (cryo) + scar (benign).    Educated patient on SCIS, AK, mohs, cryo, scheduled mohs/cryo appointment, and letter sent (patient declined mohs packet).    Patient voiced understanding.    Kermit RN-BSN-N  [facility]  [phone]

## 2020-06-17 NOTE — TELEPHONE ENCOUNTER
----- Message from Rosa Maria Hansen PA-C sent at 6/17/2020  1:01 PM CDT -----  Left forearm distal scis please schedule for excision  Path for the biopsy on the left forearm proximal came back as an actinic keratosis. Recommend cryo for complete resolution.   Neck came back as a scar no further treatment

## 2020-06-17 NOTE — TELEPHONE ENCOUNTER
Routing refill request to provider for review/approval because:  Labs out of range:  Elevated BP

## 2020-06-17 NOTE — LETTER
[address]  [address]  [phone]    6/17/2020       James Salvador  [address] [place] [address]      Dear James:    You are scheduled for Mohs Surgery on: 7/16/20 @8:00am.    Please check in at [facility], [address].     You don't need to arrive more than 5-10 minutes prior to your appointment time.     Be sure to eat a good breakfast and bathe and wash your hair prior to surgery.     If you are taking any anti-coagulants that are prescribed by your Doctor (such as Coumadin/Warfarin, Plavix, Aspirin, Ibuprofen), please continue taking them.     However, if you are taking anti-coagulants over the counter without a Doctor's order for a medical condition, please discontinue them 10 days prior to surgery.     Please wear loose comfortable clothing as it could possibly be 4-6 hours until your surgery is completed depending upon how many layers of tissue need to be removed.      Thank you,    VICENTE Dutta MD

## 2020-06-18 RX ORDER — LISINOPRIL 30 MG/1
30 TABLET ORAL DAILY
Qty: 90 TABLET | Refills: 0 | Status: SHIPPED | OUTPATIENT
Start: 2020-06-18 | End: 2020-09-11

## 2020-06-25 ENCOUNTER — TRANSFERRED RECORDS (OUTPATIENT)
Dept: HEALTH INFORMATION MANAGEMENT | Facility: CLINIC | Age: 75
End: 2020-06-25

## 2020-07-02 ENCOUNTER — TELEPHONE (OUTPATIENT)
Dept: DERMATOLOGY | Facility: CLINIC | Age: 75
End: 2020-07-02

## 2020-07-02 NOTE — TELEPHONE ENCOUNTER
Reason for Call:  Other call back    Detailed comments: Patient has some questions regarding Mohs    Phone Number Patient can be reached at: Cell number on file:    Telephone Information:   Mobile [phone]       Best Time: Anytime    Can we leave a detailed message on this number? YES    Call taken on 7/2/2020 at 2:01 PM by Hernando Blanco

## 2020-07-03 NOTE — TELEPHONE ENCOUNTER
calld patient- he received a letter from  [facility] stating he was due for a FBSCS and recheck MOHS site- patient states he just had this done with Rosa Maria at  [facility].  I do not see a letter dated 06/30/20- I believe this was an automated lab reminder letter  And advised patient that he can disregard this as he has had a FBSCS on 06/12 and a pending appointment with Dr. Dutta for another MOHS    patient verbalized understanding      Eunice SANTANA RN BSN  [facility]  [phone]  [facility]  [phone]

## 2020-07-14 NOTE — PROGRESS NOTES
Surgical Office Location:  [facility]  [address] [place] [id number]

## 2020-07-16 ENCOUNTER — OFFICE VISIT (OUTPATIENT)
Dept: DERMATOLOGY | Facility: CLINIC | Age: 75
End: 2020-07-16
Payer: COMMERCIAL

## 2020-07-16 VITALS — DIASTOLIC BLOOD PRESSURE: 78 MMHG | SYSTOLIC BLOOD PRESSURE: 138 MMHG | OXYGEN SATURATION: 95 % | HEART RATE: 66 BPM

## 2020-07-16 DIAGNOSIS — D04.62 SQUAMOUS CELL CARCINOMA IN SITU (SCCIS) OF SKIN OF LEFT FOREARM: Primary | ICD-10-CM

## 2020-07-16 PROCEDURE — 17313 MOHS 1 STAGE T/A/L: CPT | Performed by: DERMATOLOGY

## 2020-07-16 NOTE — LETTER
7/16/2020         RE: James Salvador  [address] [place] [address]        Dear Colleague,    Thank you for referring your patient, James Salvador, to the [facility]. Please see a copy of my visit note below.    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   .  Patient states this has been present for a while.  Patient reports the following symptoms:  Not healing.  Patient reports the following previous treatments cryo and ed+c or excision of something.  .  These treatments did not work.  Patient reports the following modifying factors none.  Associated symptoms: none.  Patient has no other skin complaints today.  Remainder of the HPI, Meds, PMH, Allergies, FH, and SH was reviewed in chart.      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 Cardiac Cath 9/2015: BRIDGET to LAD     History of myocardial infarction 1994    PTCA     Hyperlipidemia LDL goal < 70      Malignant melanoma (H)      Melanoma (H) 10/15/2019    10/15/19 left zygoma     Mixed hyperlipidemia 6/21/2017     Morbid obesity due to excess calories (H) 1/11/2016     Paroxysmal supraventricular tachycardia (H)     on metoprolol     Permanent atrial fibrillation (H) 04/21/2017     Squamous cell carcinoma      Stable angina (H) 1/11/2016     Tachy-edinson syndrome (H) 5/29/2019    Added automatically from request for surgery [id number]       Past Surgical History:   Procedure Laterality Date     ANGIOPLASTY  1994    in [place]     ANKLE SURGERY  7/13/2005    right ankle     EP PERM PACER SINGLE LEAD N/A  2019    Medtronic single lead pacemaker     HEART CATH STENT COR W/WO PTCA  2015    BRIDGET stent mid LAD     JOINT REPLACEMENT, HIP RT/LT  10/14/2009    right hip      LASER SURGERY OF EYE  2002     MOHS MICROGRAPHIC PROCEDURE  2004    squamous cell carcinoma right temple     SINUS SURGERY  2006        Family History   Problem Relation Age of Onset     Genitourinary Problems Mother         renal failure     Cardiovascular Father         rupture of dorsal aorta     Depression Son      Skin Cancer No family hx of        Social History     Socioeconomic History     Marital status:      Spouse name: Not on file     Number of children: 3     Years of education: Not on file     Highest education level: Not on file   Occupational History     Employer: RETIRED   Social Needs     Financial resource strain: Not on file     Food insecurity     Worry: Not on file     Inability: Not on file     Transportation needs     Medical: Not on file     Non-medical: Not on file   Tobacco Use     Smoking status: Former Smoker     Packs/day: 0.00     Years: 10.00     Pack years: 0.00     Types: Cigarettes     Last attempt to quit: 1987     Years since quittin.5     Smokeless tobacco: Never Used   Substance and Sexual Activity     Alcohol use: Yes     Alcohol/week: 0.0 standard drinks     Comment: socially - x2-3 per month -  none currently     Drug use: No     Sexual activity: Yes     Partners: Female   Lifestyle     Physical activity     Days per week: Not on file     Minutes per session: Not on file     Stress: Not on file   Relationships     Social connections     Talks on phone: Not on file     Gets together: Not on file     Attends Buddhist service: Not on file     Active member of club or organization: Not on file     Attends meetings of clubs or organizations: Not on file     Relationship status: Not on file     Intimate partner violence     Fear of current or ex partner: Not on file      Emotionally abused: Not on file     Physically abused: Not on file     Forced sexual activity: Not on file   Other Topics Concern     Parent/sibling w/ CABG, MI or angioplasty before 65F 55M? Not Asked      Service Not Asked     Blood Transfusions Not Asked     Caffeine Concern Yes     Comment: 2 big cups coffee daily     Occupational Exposure Not Asked     Hobby Hazards Not Asked     Sleep Concern No     Comment: sleeping better since shoulder replaced 11/3/16     Stress Concern No     Weight Concern Yes     Special Diet Yes     Comment: trying to do more lean meats     Back Care Not Asked     Exercise No     Comment: limited - knee     Bike Helmet Not Asked     Seat Belt Not Asked     Self-Exams Not Asked   Social History Narrative     Not on file       Outpatient Encounter Medications as of 7/16/2020   Medication Sig Dispense Refill     Acetaminophen (TYLENOL PO) Take 650 mg by mouth 4 times daily        amoxicillin (AMOXIL) 500 MG capsule 2,000 mg as needed When going to the dentist  0     ASPIRIN PO Take 81 mg by mouth daily       atorvastatin (LIPITOR) 80 MG tablet TAKE 1 TABLET BY MOUTH AT BEDTIME 90 tablet 3     carvedilol (COREG) 3.125 MG tablet Take 1 tablet (3.125 mg) by mouth 2 times daily (with meals) 180 tablet 3     ciclopirox (LOPROX) 0.77 % cream Apply topically At Bedtime 90 g 3     desonide (DESOWEN) 0.05 % external cream Apply sparingly to affected area on face in morning 60 g 0     Doxycycline Hyclate (PERIOSTAT PO) Take 20 mg by mouth 2 times daily       ELIQUIS ANTICOAGULANT 5 MG tablet TAKE 1 TABLET BY MOUTH TWICE A DAY 60 tablet 5     fluocinonide (LIDEX) 0.05 % external solution Apply to scalp BID x 1-2 weeks PRN ([phone] Thanks) 60 mL 3     fluorouracil (EFUDEX) 5 % external cream Apply to scap BID x 3-4 weeks. Protect area from the sun. 40 g 3     fluticasone (FLONASE) 50 MCG/ACT nasal spray SPRAY 2 SPRAYS  INTO BOTH NOSTRILS DAILY 48 mL 1     furosemide (LASIX) 40 MG tablet Take 1 tablet (40 mg) by mouth daily 90 tablet 3     GABAPENTIN PO Take 600 mg by mouth 3 times daily        ketoconazole (NIZORAL) 2 % cream Apply topically 2 times daily For face. FAX REFILL REQUESTS TO  [facility]: [phone] 30 g 2     ketoconazole (NIZORAL) 2 % external shampoo Use daily as needed 120 mL 3     ketotifen (ZADITOR/REFRESH ANTI-ITCH) 0.025 % SOLN Place 1-2 drops into both eyes 2 times daily as needed for itching       lisinopril (ZESTRIL) 30 MG tablet Take 1 tablet (30 mg) by mouth daily 90 tablet 0     loratadine (CLARITIN) 10 MG tablet Take 10 mg by mouth daily as needed for allergies       Multiple Vitamin (MULTIVITAMIN OR) Take 1 tablet by mouth daily        nitroglycerin (NITROSTAT) 0.4 MG SL tablet Place 1 tablet (0.4 mg) under the tongue every 5 minutes as needed for chest pain May repeat twice for a total of 3 tablets.  If chest pain not relieved, call 911 25 tablet 11     Omega-3 Fatty Acids (OMEGA-3 FISH OIL PO) Take 3.2 g by mouth daily        OXcarbazepine (TRILEPTAL PO) Take 300 mg by mouth 3 times daily        No facility-administered encounter medications on file as of 7/16/2020.              Review Of Systems  Skin: As above  Eyes: negative  Ears/Nose/Throat: negative  Respiratory: No shortness of breath, dyspnea on exertion, cough, or hemoptysis  Cardiovascular: negative  Gastrointestinal: negative  Genitourinary: negative  Musculoskeletal: negative  Neurologic: negative  Psychiatric: negative  Hematologic/Lymphatic/Immunologic: negative  Endocrine: negative      O:   NAD, WDWN, Alert & Oriented, Mood & Affect wnl, Vitals stable   Here today alone   /78   Pulse 66   SpO2 95%    General appearance normal   Vitals stable   Alert, oriented and in no acute distress     L distal forearm 9mm scaly plaque on edge of white scar       Eyes: Conjunctivae/lids:Normal     ENT: Lips, buccal mucosa, tongue:  normal    MSK:Normal    Cardiovascular: peripheral edema none    Pulm: Breathing Normal    Neuro/Psych: Orientation:Alert and Orientedx3 ; Mood/Affect:normal       A/P:  1. Recurrent squamous cell carcinoma in situ   MOHS:   Recurrent    The rationale for Mohs surgery was discussed with the patient and consent was obtained.  The risks and benefits as well as alternatives to therapy were discussed, in detail.  Specifically, the risks of infection, scarring, bleeding, prolonged wound healing, incomplete removal, allergy to anesthesia, nerve injury and recurrence were addressed.  Indication for Mohs was Recurrent. Prior to the procedure, the treatment site was clearly identified and, if available, confirmed with previous photos and confirmed by the patient   All components of the Universal Protocol/PAUSE rule were completed.  The Mohs surgeon operated in two distinct and integrated capacities as the surgeon and pathologist.      The area was prepped with Betasept.  A rim of normal appearing skin was marked circumferentially around the lesion.  The area was infiltrated with local anesthesia.  The tumor was first debulked to remove all clinically apparent tumor.  An incision following the standard Mohs approach was done and the specimen was oriented,mapped and placed in 1 block(s).  Each specimen was then chromacoded and processed in the Mohs laboratory using standard Mohs technique and submitted for frozen section histology.    The tumor was excised using standard Mohs technique in 1 stages(s).  CLEAR MARGINS OBTAINED and Final defect size was 1.5 x 1.6 cm.     We discussed the options for wound management in full with the patient including risks/benefits/ possible outcomes.        REPAIR SECOND INTENT: We discussed the options for wound management in full with the patient including risks/benefits/possible outcomes. Decision made to allow the wound to heal by second intention. EBL minimal; complications none; wound care  routine.  The patient was discharged in good condition and will return in one month or prn for wound evaluation.  BENIGN LESIONS DISCUSSED WITH PATIENT:  I discussed the specifics of tumor, prognosis, and genetics of benign lesions.  I explained that treatment of these lesions would be purely cosmetic and not medically neccessary.  I discussed with patient different removal options including excision, cautery and /or laser.      Nature and genetics of benign skin lesions dicussed with patient.  Signs and Symptoms of skin cancer discussed with patient.  Patient encouraged to perform monthly skin exams.  UV precautions reviewed with patient.  Skin care regimen reviewed with patient: Eliminate harsh soaps, i.e. Dial, zest, irsih spring; Mild soaps such as Cetaphil or Dove sensitive skin, avoid hot or cold showers, aggressive use of emollients including vanicream, cetaphil or cerave discussed with patient.    Risks of non-melanoma skin cancer discussed with patient   Return to clinic 6 months      Again, thank you for allowing me to participate in the care of your patient.        Sincerely,        Jv Dutta MD

## 2020-07-16 NOTE — NURSING NOTE
"Initial /78   Pulse 66   SpO2 95%  Estimated body mass index is 44.9 kg/m  as calculated from the following:    Height as of 1/30/20: 1.803 m (5' 11\").    Weight as of 1/30/20: 146 kg (321 lb 14.4 oz). .    MIGEL Carmen-BSN-N  [facility]  [phone]  "

## 2020-07-16 NOTE — PATIENT INSTRUCTIONS
Open Wound Care     for ______________        ? No strenuous activity for 48 hours    ? Take Tylenol as needed for discomfort.                                                .         ? Do not drink alcoholic beverages for 48 hours.    ? Keep the pressure bandage in place for 24 hours. If the bandage becomes blood tinged or loose, reinforce it with gauze and tape.        (Refer to the reverse side of this page for management of bleeding).    ? Remove bandage in 24 hours and begin wound care as follows:     1. Clean area with tap water using a Q tip or gauze pad, (shower / bathe normally)  2. Dry wound with Q tip or gauze pad  3. Apply Aquaphor, Vaseline, Polysporin or Bacitracin Ointment with a Q tip    Do NOT use Neosporin Ointment *  4. Cover the wound with a band-aid or nonstick gauze pad and paper tape.  5. Repeat wound care once a day until wound is completely healed.    It is an old wives tale that a wound heals better when it is exposed to air and allowed to dry out. The wound will heal faster with a better cosmetic result if it is kept moist with ointment and covered with a bandage.  Do not let the wound dry out.      Supplies Needed:                Qtips or gauze pads                Polysporin or Bacitracin Ointment                Bandaids or nonstick gauze pads and paper tape    Wound care kits and brown paper tape are available for purchase at   the pharmacy.    BLEEDIN. Use tightly rolled up gauze or cloth to apply direct pressure over the bandage for 20   minutes.  2. Reapply pressure for an additional 20 minutes if necessary  3. Call the office or go to the nearest emergency room if pressure fails to stop the bleeding.  4. Use additional gauze and tape to maintain pressure once the bleeding has stopped.  5. Begin wound care 24 hours after surgery as directed.                  WOUND HEALING    1. One week after surgery a pink / red halo will form around the outside of the wound.   This is new  skin.  2. The center of the wound will appear yellowish white and produce some drainage.  3. The pink halo will slowly migrate in toward the center of the wound until the wound is covered with new shiny pink skin.  4. There will be no more drainage when the wound is completely healed.  5. It will take six months to one year for the redness to fade.  6. The scar may be itchy, tight and sensitive to extreme temperatures for a year after the surgery.  7. Massaging the area several times a day for several minutes after the wound is completely healed will help the scar soften and normalize faster. Begin massage only after healing is complete.      In case of emergency call: Dr Dutta: [phone]     [facility]: [phone]     Detail Level: Zone

## 2020-07-16 NOTE — PROGRESS NOTES
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   .  Patient states this has been present for a while.  Patient reports the following symptoms:  Not healing.  Patient reports the following previous treatments cryo and ed+c or excision of something.  .  These treatments did not work.  Patient reports the following modifying factors none.  Associated symptoms: none.  Patient has no other skin complaints today.  Remainder of the HPI, Meds, PMH, Allergies, FH, and SH was reviewed in chart.      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 Cardiac Cath 9/2015: BRIDGET to LAD     History of myocardial infarction 1994    PTCA     Hyperlipidemia LDL goal < 70      Malignant melanoma (H)      Melanoma (H) 10/15/2019    10/15/19 left zygoma     Mixed hyperlipidemia 6/21/2017     Morbid obesity due to excess calories (H) 1/11/2016     Paroxysmal supraventricular tachycardia (H)     on metoprolol     Permanent atrial fibrillation (H) 04/21/2017     Squamous cell carcinoma      Stable angina (H) 1/11/2016     Tachy-edinson syndrome (H) 5/29/2019    Added automatically from request for surgery [id number]       Past Surgical History:   Procedure Laterality Date     ANGIOPLASTY  1994    in [place]     ANKLE SURGERY  7/13/2005    right ankle     EP PERM PACER SINGLE LEAD N/A 5/31/2019    [employer] single lead pacemaker     HEART CATH STENT COR W/WO PTCA  9/23/2015    BRIDGET stent mid LAD     JOINT REPLACEMENT, HIP RT/LT  10/14/2009    right hip      LASER SURGERY OF EYE  06/01/2002     MOHS MICROGRAPHIC PROCEDURE  06/12/2004    squamous cell carcinoma right temple     SINUS SURGERY  7/11/2006        Family History   Problem Relation  Age of Onset     Genitourinary Problems Mother         renal failure     Cardiovascular Father         rupture of dorsal aorta     Depression Son      Skin Cancer No family hx of        Social History     Socioeconomic History     Marital status:      Spouse name: Not on file     Number of children: 3     Years of education: Not on file     Highest education level: Not on file   Occupational History     Employer: RETIRED   Social Needs     Financial resource strain: Not on file     Food insecurity     Worry: Not on file     Inability: Not on file     Transportation needs     Medical: Not on file     Non-medical: Not on file   Tobacco Use     Smoking status: Former Smoker     Packs/day: 0.00     Years: 10.00     Pack years: 0.00     Types: Cigarettes     Last attempt to quit: 1987     Years since quittin.5     Smokeless tobacco: Never Used   Substance and Sexual Activity     Alcohol use: Yes     Alcohol/week: 0.0 standard drinks     Comment: socially - x2-3 per month -  none currently     Drug use: No     Sexual activity: Yes     Partners: Female   Lifestyle     Physical activity     Days per week: Not on file     Minutes per session: Not on file     Stress: Not on file   Relationships     Social connections     Talks on phone: Not on file     Gets together: Not on file     Attends Tenriism service: Not on file     Active member of club or organization: Not on file     Attends meetings of clubs or organizations: Not on file     Relationship status: Not on file     Intimate partner violence     Fear of current or ex partner: Not on file     Emotionally abused: Not on file     Physically abused: Not on file     Forced sexual activity: Not on file   Other Topics Concern     Parent/sibling w/ CABG, MI or angioplasty before 65F 55M? Not Asked      Service Not Asked     Blood Transfusions Not Asked     Caffeine Concern Yes     Comment: 2 big cups coffee daily     Occupational Exposure Not Asked      Hobby Hazards Not Asked     Sleep Concern No     Comment: sleeping better since shoulder replaced 11/3/16     Stress Concern No     Weight Concern Yes     Special Diet Yes     Comment: trying to do more lean meats     Back Care Not Asked     Exercise No     Comment: limited - knee     Bike Helmet Not Asked     Seat Belt Not Asked     Self-Exams Not Asked   Social History Narrative     Not on file       Outpatient Encounter Medications as of 7/16/2020   Medication Sig Dispense Refill     Acetaminophen (TYLENOL PO) Take 650 mg by mouth 4 times daily        amoxicillin (AMOXIL) 500 MG capsule 2,000 mg as needed When going to the dentist  0     ASPIRIN PO Take 81 mg by mouth daily       atorvastatin (LIPITOR) 80 MG tablet TAKE 1 TABLET BY MOUTH AT BEDTIME 90 tablet 3     carvedilol (COREG) 3.125 MG tablet Take 1 tablet (3.125 mg) by mouth 2 times daily (with meals) 180 tablet 3     ciclopirox (LOPROX) 0.77 % cream Apply topically At Bedtime 90 g 3     desonide (DESOWEN) 0.05 % external cream Apply sparingly to affected area on face in morning 60 g 0     Doxycycline Hyclate (PERIOSTAT PO) Take 20 mg by mouth 2 times daily       ELIQUIS ANTICOAGULANT 5 MG tablet TAKE 1 TABLET BY MOUTH TWICE A DAY 60 tablet 5     fluocinonide (LIDEX) 0.05 % external solution Apply to scalp BID x 1-2 weeks PRN ([phone] Thanks) 60 mL 3     fluorouracil (EFUDEX) 5 % external cream Apply to scap BID x 3-4 weeks. Protect area from the sun. 40 g 3     fluticasone (FLONASE) 50 MCG/ACT nasal spray SPRAY 2 SPRAYS INTO BOTH NOSTRILS DAILY 48 mL 1     furosemide (LASIX) 40 MG tablet Take 1 tablet (40 mg) by mouth daily 90 tablet 3     GABAPENTIN PO Take 600 mg by mouth 3 times daily        ketoconazole (NIZORAL) 2 % cream Apply topically 2 times daily For face. FAX REFILL REQUESTS TO [facility]: [phone] 30 g 2     ketoconazole (NIZORAL) 2 % external shampoo Use daily as  needed 120 mL 3     ketotifen (ZADITOR/REFRESH ANTI-ITCH) 0.025 % SOLN Place 1-2 drops into both eyes 2 times daily as needed for itching       lisinopril (ZESTRIL) 30 MG tablet Take 1 tablet (30 mg) by mouth daily 90 tablet 0     loratadine (CLARITIN) 10 MG tablet Take 10 mg by mouth daily as needed for allergies       Multiple Vitamin (MULTIVITAMIN OR) Take 1 tablet by mouth daily        nitroglycerin (NITROSTAT) 0.4 MG SL tablet Place 1 tablet (0.4 mg) under the tongue every 5 minutes as needed for chest pain May repeat twice for a total of 3 tablets.  If chest pain not relieved, call 911 25 tablet 11     Omega-3 Fatty Acids (OMEGA-3 FISH OIL PO) Take 3.2 g by mouth daily        OXcarbazepine (TRILEPTAL PO) Take 300 mg by mouth 3 times daily        No facility-administered encounter medications on file as of 7/16/2020.              Review Of Systems  Skin: As above  Eyes: negative  Ears/Nose/Throat: negative  Respiratory: No shortness of breath, dyspnea on exertion, cough, or hemoptysis  Cardiovascular: negative  Gastrointestinal: negative  Genitourinary: negative  Musculoskeletal: negative  Neurologic: negative  Psychiatric: negative  Hematologic/Lymphatic/Immunologic: negative  Endocrine: negative      O:   NAD, WDWN, Alert & Oriented, Mood & Affect wnl, Vitals stable   Here today alone   /78   Pulse 66   SpO2 95%    General appearance normal   Vitals stable   Alert, oriented and in no acute distress     L distal forearm 9mm scaly plaque on edge of white scar       Eyes: Conjunctivae/lids:Normal     ENT: Lips, buccal mucosa, tongue: normal    MSK:Normal    Cardiovascular: peripheral edema none    Pulm: Breathing Normal    Neuro/Psych: Orientation:Alert and Orientedx3 ; Mood/Affect:normal       A/P:  1. Recurrent squamous cell carcinoma in situ   MOHS:   Recurrent    The rationale for Mohs surgery was discussed with the patient and consent was obtained.  The risks and benefits as well as alternatives to  therapy were discussed, in detail.  Specifically, the risks of infection, scarring, bleeding, prolonged wound healing, incomplete removal, allergy to anesthesia, nerve injury and recurrence were addressed.  Indication for Mohs was Recurrent. Prior to the procedure, the treatment site was clearly identified and, if available, confirmed with previous photos and confirmed by the patient   All components of the Universal Protocol/PAUSE rule were completed.  The Mohs surgeon operated in two distinct and integrated capacities as the surgeon and pathologist.      The area was prepped with Betasept.  A rim of normal appearing skin was marked circumferentially around the lesion.  The area was infiltrated with local anesthesia.  The tumor was first debulked to remove all clinically apparent tumor.  An incision following the standard Mohs approach was done and the specimen was oriented,mapped and placed in 1 block(s).  Each specimen was then chromacoded and processed in the Mohs laboratory using standard Mohs technique and submitted for frozen section histology.    The tumor was excised using standard Mohs technique in 1 stages(s).  CLEAR MARGINS OBTAINED and Final defect size was 1.5 x 1.6 cm.     We discussed the options for wound management in full with the patient including risks/benefits/ possible outcomes.        REPAIR SECOND INTENT: We discussed the options for wound management in full with the patient including risks/benefits/possible outcomes. Decision made to allow the wound to heal by second intention. EBL minimal; complications none; wound care routine.  The patient was discharged in good condition and will return in one month or prn for wound evaluation.  BENIGN LESIONS DISCUSSED WITH PATIENT:  I discussed the specifics of tumor, prognosis, and genetics of benign lesions.  I explained that treatment of these lesions would be purely cosmetic and not medically neccessary.  I discussed with patient different removal  options including excision, cautery and /or laser.      Nature and genetics of benign skin lesions dicussed with patient.  Signs and Symptoms of skin cancer discussed with patient.  Patient encouraged to perform monthly skin exams.  UV precautions reviewed with patient.  Skin care regimen reviewed with patient: Eliminate harsh soaps, i.e. Dial, zest, irsih spring; Mild soaps such as Cetaphil or Dove sensitive skin, avoid hot or cold showers, aggressive use of emollients including vanicream, cetaphil or cerave discussed with patient.    Risks of non-melanoma skin cancer discussed with patient   Return to clinic 6 months

## 2020-07-23 DIAGNOSIS — I48.91 NEW ONSET ATRIAL FIBRILLATION (H): ICD-10-CM

## 2020-07-27 DIAGNOSIS — L21.9 DERMATITIS, SEBORRHEIC: Primary | ICD-10-CM

## 2020-07-27 RX ORDER — FLUOCINONIDE TOPICAL SOLUTION USP, 0.05% 0.5 MG/ML
SOLUTION TOPICAL 2 TIMES DAILY
Qty: 60 ML | Refills: 4 | Status: SHIPPED | OUTPATIENT
Start: 2020-07-27 | End: 2021-04-26

## 2020-07-27 NOTE — TELEPHONE ENCOUNTER
Refill request approved/signed by provider.    Kermit RN-BSN-N  [facility]  [phone]

## 2020-07-30 DIAGNOSIS — L21.9 SEBORRHEIC DERMATITIS: ICD-10-CM

## 2020-07-31 RX ORDER — KETOCONAZOLE 20 MG/ML
SHAMPOO TOPICAL
Qty: 120 ML | Refills: 11 | Status: SHIPPED | OUTPATIENT
Start: 2020-07-31 | End: 2022-02-11

## 2020-07-31 NOTE — TELEPHONE ENCOUNTER
Requested Prescriptions   Pending Prescriptions Disp Refills     ketoconazole (NIZORAL) 2 % external shampoo 120 mL 11     Sig: Use daily as needed       There is no refill protocol information for this order

## 2020-08-10 DIAGNOSIS — I48.20 CHRONIC ATRIAL FIBRILLATION (H): ICD-10-CM

## 2020-08-10 RX ORDER — FUROSEMIDE 40 MG
40 TABLET ORAL DAILY
Qty: 90 TABLET | Refills: 0 | Status: SHIPPED | OUTPATIENT
Start: 2020-08-10 | End: 2020-11-30

## 2020-08-11 ENCOUNTER — DOCUMENTATION ONLY (OUTPATIENT)
Dept: CARDIOLOGY | Facility: CLINIC | Age: 75
End: 2020-08-11

## 2020-08-11 NOTE — PROGRESS NOTES

## 2020-08-12 ENCOUNTER — ANCILLARY PROCEDURE (OUTPATIENT)
Dept: CARDIOLOGY | Facility: CLINIC | Age: 75
End: 2020-08-12
Attending: INTERNAL MEDICINE
Payer: COMMERCIAL

## 2020-08-12 DIAGNOSIS — I49.5 SSS (SICK SINUS SYNDROME) (H): Primary | ICD-10-CM

## 2020-08-12 DIAGNOSIS — Z95.0 CARDIAC PACEMAKER IN SITU: ICD-10-CM

## 2020-08-12 PROCEDURE — 93279 PRGRMG DEV EVAL PM/LDLS PM: CPT | Performed by: INTERNAL MEDICINE

## 2020-08-13 ENCOUNTER — HOSPITAL ENCOUNTER (EMERGENCY)
Facility: CLINIC | Age: 75
Discharge: HOME | End: 2020-08-13
Attending: EMERGENCY MEDICINE | Admitting: EMERGENCY MEDICINE
Payer: COMMERCIAL

## 2020-08-13 ENCOUNTER — OFFICE VISIT (OUTPATIENT)
Dept: URGENT CARE | Facility: URGENT CARE | Age: 75
End: 2020-08-13
Payer: COMMERCIAL

## 2020-08-13 ENCOUNTER — APPOINTMENT (OUTPATIENT)
Dept: CT IMAGING | Facility: CLINIC | Age: 75
End: 2020-08-13
Attending: EMERGENCY MEDICINE
Payer: COMMERCIAL

## 2020-08-13 VITALS
RESPIRATION RATE: 13 BRPM | HEART RATE: 67 BPM | OXYGEN SATURATION: 97 % | SYSTOLIC BLOOD PRESSURE: 165 MMHG | TEMPERATURE: 97.7 F | DIASTOLIC BLOOD PRESSURE: 106 MMHG

## 2020-08-13 VITALS
DIASTOLIC BLOOD PRESSURE: 84 MMHG | TEMPERATURE: 98.4 F | OXYGEN SATURATION: 96 % | SYSTOLIC BLOOD PRESSURE: 144 MMHG | HEART RATE: 76 BPM

## 2020-08-13 DIAGNOSIS — E87.1 HYPONATREMIA: ICD-10-CM
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
## 2020-08-13 DIAGNOSIS — S01.21XA LACERATION OF NOSE, INITIAL ENCOUNTER: ICD-10-CM

## 2020-08-13 DIAGNOSIS — Z79.01 LONG TERM CURRENT USE OF ANTICOAGULANT THERAPY: ICD-10-CM

## 2020-08-13 DIAGNOSIS — S00.83XA CONTUSION OF FACE, INITIAL ENCOUNTER: Primary | ICD-10-CM

## 2020-08-13 LAB
ANION GAP SERPL CALCULATED.3IONS-SCNC: 7 MMOL/L (ref 3–14)
BASOPHILS # BLD AUTO: 0.1 10E9/L (ref 0–0.2)
BASOPHILS NFR BLD AUTO: 1 %
BUN SERPL-MCNC: 13 MG/DL (ref 7–30)
CALCIUM SERPL-MCNC: 9 MG/DL (ref 8.5–10.1)
CHLORIDE SERPL-SCNC: 94 MMOL/L (ref 94–109)
CO2 SERPL-SCNC: 26 MMOL/L (ref 20–32)
CREAT SERPL-MCNC: 0.72 MG/DL (ref 0.66–1.25)
DIFFERENTIAL METHOD BLD: ABNORMAL
EOSINOPHIL # BLD AUTO: 0.4 10E9/L (ref 0–0.7)
EOSINOPHIL NFR BLD AUTO: 3.2 %
ERYTHROCYTE [DISTWIDTH] IN BLOOD BY AUTOMATED COUNT: 12.9 % (ref 10–15)
GFR SERPL CREATININE-BSD FRML MDRD: >90 ML/MIN/{1.73_M2}
GLUCOSE SERPL-MCNC: 89 MG/DL (ref 70–99)
HCT VFR BLD AUTO: 43.3 % (ref 40–53)
HGB BLD-MCNC: 14.9 G/DL (ref 13.3–17.7)
IMM GRANULOCYTES # BLD: 0 10E9/L (ref 0–0.4)
IMM GRANULOCYTES NFR BLD: 0.4 %
LYMPHOCYTES # BLD AUTO: 1 10E9/L (ref 0.8–5.3)
LYMPHOCYTES NFR BLD AUTO: 8.7 %
MCH RBC QN AUTO: 32.4 PG (ref 26.5–33)
MCHC RBC AUTO-ENTMCNC: 34.4 G/DL (ref 31.5–36.5)
MCV RBC AUTO: 94 FL (ref 78–100)
MONOCYTES # BLD AUTO: 0.7 10E9/L (ref 0–1.3)
MONOCYTES NFR BLD AUTO: 6.3 %
NEUTROPHILS # BLD AUTO: 8.9 10E9/L (ref 1.6–8.3)
NEUTROPHILS NFR BLD AUTO: 80.4 %
NRBC # BLD AUTO: 0 10*3/UL
NRBC BLD AUTO-RTO: 0 /100
PLATELET # BLD AUTO: 171 10E9/L (ref 150–450)
POTASSIUM SERPL-SCNC: 4.6 MMOL/L (ref 3.4–5.3)
RBC # BLD AUTO: 4.6 10E12/L (ref 4.4–5.9)
SODIUM SERPL-SCNC: 127 MMOL/L (ref 133–144)
WBC # BLD AUTO: 11 10E9/L (ref 4–11)

## 2020-08-13 PROCEDURE — 70450 CT HEAD/BRAIN W/O DYE: CPT

## 2020-08-13 PROCEDURE — 99213 OFFICE O/P EST LOW 20 MIN: CPT | Performed by: FAMILY MEDICINE

## 2020-08-13 PROCEDURE — 27210282 ZZH ADHESIVE DERMABOND SKIN

## 2020-08-13 PROCEDURE — 93005 ELECTROCARDIOGRAM TRACING: CPT

## 2020-08-13 PROCEDURE — 99285 EMERGENCY DEPT VISIT HI MDM: CPT | Mod: 25

## 2020-08-13 PROCEDURE — 85025 COMPLETE CBC W/AUTO DIFF WBC: CPT | Performed by: EMERGENCY MEDICINE

## 2020-08-13 PROCEDURE — 80048 BASIC METABOLIC PNL TOTAL CA: CPT | Performed by: EMERGENCY MEDICINE

## 2020-08-13 RX ORDER — METHYLCELLULOSE 4000CPS 30 %
POWDER (GRAM) MISCELLANEOUS
Status: DISCONTINUED
Start: 2020-08-13 | End: 2020-08-13 | Stop reason: HOSPADM

## 2020-08-13 ASSESSMENT — ENCOUNTER SYMPTOMS
ARTHRALGIAS: 0
LIGHT-HEADEDNESS: 0
ABDOMINAL PAIN: 0
BACK PAIN: 0
WOUND: 1
NECK PAIN: 0
FATIGUE: 1
HEADACHES: 1

## 2020-08-13 NOTE — ED AVS SNAPSHOT
[facility] Emergency Department  [address]  [facility] [address]  Phone:  [phone]  Fax:  [phone]                                    James Salvador   MRN: [id number]    Department:  [facility] Emergency Department   Date of Visit:  8/13/2020           After Visit Summary Signature Page    I have received my discharge instructions, and my questions have been answered. I have discussed any challenges I see with this plan with the nurse or doctor.    ..........................................................................................................................................  Patient/Patient Representative Signature      ..........................................................................................................................................  Patient Representative Print Name and Relationship to Patient    ..................................................               ................................................  Date                                   Time    ..........................................................................................................................................  Reviewed by Signature/Title    ...................................................              ..............................................  Date                                               Time          22EPIC Rev 08/18

## 2020-08-13 NOTE — PROGRESS NOTES
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 Cardiac Cath 9/2015: BRIDGET to LAD     History of myocardial infarction 1994    PTCA     Hyperlipidemia LDL goal < 70      Malignant melanoma (H)      Melanoma (H) 10/15/2019    10/15/19 left zygoma     Mixed hyperlipidemia 6/21/2017     Morbid obesity due to excess calories (H) 1/11/2016     Paroxysmal supraventricular tachycardia (H)     on metoprolol     Permanent atrial fibrillation (H) 04/21/2017     Squamous cell carcinoma      Stable angina (H) 1/11/2016     Tachy-edinson syndrome (H) 5/29/2019    Added automatically from request for surgery [id number]     Current Outpatient Medications   Medication     Acetaminophen (TYLENOL PO)     amoxicillin (AMOXIL) 500 MG capsule     apixaban ANTICOAGULANT (ELIQUIS ANTICOAGULANT) 5 MG tablet     ASPIRIN PO     atorvastatin (LIPITOR) 80 MG tablet     carvedilol (COREG) 3.125 MG tablet     ciclopirox (LOPROX) 0.77 % cream     desonide (DESOWEN) 0.05 % external cream     Doxycycline Hyclate (PERIOSTAT PO)     fluocinonide (LIDEX) 0.05 % external solution     fluocinonide (LIDEX) 0.05 % external solution     fluorouracil (EFUDEX) 5 % external cream     fluticasone (FLONASE) 50 MCG/ACT nasal spray     furosemide (LASIX) 40 MG tablet     GABAPENTIN PO     ketoconazole (NIZORAL) 2 % cream     ketoconazole (NIZORAL) 2 % external shampoo     ketotifen (ZADITOR/REFRESH ANTI-ITCH) 0.025 % SOLN     lisinopril (ZESTRIL) 30 MG tablet     loratadine (CLARITIN) 10 MG tablet     Multiple Vitamin (MULTIVITAMIN OR)     nitroglycerin (NITROSTAT) 0.4 MG SL tablet     Omega-3 Fatty Acids (OMEGA-3 FISH OIL PO)     OXcarbazepine (TRILEPTAL PO)     No current facility-administered medications for this visit.      Allergies   Allergen Reactions      Cats      Cat Dander     Dogs      Dog Dander     No Clinical Screening - See Comments      Pet dander, pollen, grasses, molds     Pollen Extract      BP (!) 144/84   Pulse 76   Temp 98.4  F (36.9  C)   SpO2 96%

## 2020-08-13 NOTE — PROGRESS NOTES
CHIEF COMPLAINT    Fell, hit face.      HISTORY    76 y/o man with h/o a fib fell and hit face today. Landed on asphalt. No LOC. He has a laceration on nose.    He takes Eliquis. He has a pacemaker.      Patient Active Problem List   Diagnosis     Advance care planning     Hyperlipidemia with target LDL less than 70     Benign hypertension     Actinic keratoses     Paroxysmal supraventricular tachycardia (H)     History of myocardial infarction     Coronary artery disease involving native coronary artery of native heart, angina presence unspecified     Stable angina (H)     Morbid obesity due to excess calories (H)     Shoulder pain, right     Chronic atrial fibrillation (H)     Status post total left knee replacement     Mixed hyperlipidemia     Tachy-edinson syndrome (H)       Current Outpatient Medications   Medication Sig Dispense Refill     Acetaminophen (TYLENOL PO) Take 650 mg by mouth 4 times daily        amoxicillin (AMOXIL) 500 MG capsule 2,000 mg as needed When going to the dentist  0     apixaban ANTICOAGULANT (ELIQUIS ANTICOAGULANT) 5 MG tablet TAKE 1 TABLET BY MOUTH TWICE A DAY 60 tablet 5     ASPIRIN PO Take 81 mg by mouth daily       atorvastatin (LIPITOR) 80 MG tablet TAKE 1 TABLET BY MOUTH AT BEDTIME 90 tablet 3     carvedilol (COREG) 3.125 MG tablet Take 1 tablet (3.125 mg) by mouth 2 times daily (with meals) 180 tablet 3     ciclopirox (LOPROX) 0.77 % cream Apply topically At Bedtime 90 g 3     desonide (DESOWEN) 0.05 % external cream Apply sparingly to affected area on face in morning 60 g 0     Doxycycline Hyclate (PERIOSTAT PO) Take 20 mg by mouth 2 times daily       fluocinonide (LIDEX) 0.05 % external solution Apply topically 2 times daily 60 mL 4     fluocinonide (LIDEX) 0.05 % external solution Apply to scalp BID x 1-2 weeks PRN ([facility] [phone] Thanks) 60 mL 3     fluorouracil (EFUDEX) 5 % external cream Apply to scap BID x  3-4 weeks. Protect area from the sun. 40 g 3     fluticasone (FLONASE) 50 MCG/ACT nasal spray SPRAY 2 SPRAYS INTO BOTH NOSTRILS DAILY 48 mL 1     furosemide (LASIX) 40 MG tablet Take 1 tablet (40 mg) by mouth daily 90 tablet 0     GABAPENTIN PO Take 600 mg by mouth 3 times daily        ketoconazole (NIZORAL) 2 % cream Apply topically 2 times daily For face. [facility]X [facility]FILL REQUESTS TO [facility]: [phone] 30 g 2     ketoconazole (NIZORAL) 2 % external shampoo Use daily as needed 120 mL 11     ketotifen (ZADITOR/REFRESH ANTI-ITCH) 0.025 % SOLN Place 1-2 drops into both eyes 2 times daily as needed for itching       lisinopril (ZESTRIL) 30 MG tablet Take 1 tablet (30 mg) by mouth daily 90 tablet 0     loratadine (CLARITIN) 10 MG tablet Take 10 mg by mouth daily as needed for allergies       Multiple Vitamin (MULTIVITAMIN OR) Take 1 tablet by mouth daily        nitroglycerin (NITROSTAT) 0.4 MG SL tablet Place 1 tablet (0.4 mg) under the tongue every 5 minutes as needed for chest pain May repeat twice for a total of 3 tablets.  If chest pain not relieved, call 911 25 tablet 11     Omega-3 Fatty Acids (OMEGA-3 FISH OIL PO) Take 3.2 g by mouth daily        OXcarbazepine (TRILEPTAL PO) Take 300 mg by mouth 3 times daily          REVIEW OF SYSTEMS    No SOB  No CP  No HA      Past Medical History:   Diagnosis Date     Actinic cheilitis 7/17/2013    Lower lip, left      Actinic keratosis      Allergic rhinitis      Basal cell carcinoma      Benign hypertension      CAD (coronary artery disease)     Cardiac cath and PCI 1994. Cardiac Cath 9/2015: BRIDGET to LAD     History of myocardial infarction 1994    PTCA     Hyperlipidemia LDL goal < 70      Malignant melanoma (H)      Melanoma (H) 10/15/2019    10/15/19 left zygoma     Mixed hyperlipidemia 6/21/2017     Morbid obesity due to excess calories (H) 1/11/2016     Paroxysmal supraventricular tachycardia (H)     on metoprolol     Permanent atrial fibrillation (H) 04/21/2017      Squamous cell carcinoma      Stable angina (H) 1/11/2016     Tachy-edinson syndrome (H) 5/29/2019    Added automatically from request for surgery [id number]       EXAM  BP (!) 144/84   Pulse 76   Temp 98.4  F (36.9  C)   SpO2 96%     Alert. Speech clear.  PERRL  Bandaged laceration nose  Non labored resp  Motor intact      (S00.83XA) Contusion of face, initial encounter  (primary encounter diagnosis)  Comment:   Plan:     (S01.21XA) Laceration of nose, initial encounter  Comment:   Plan:     (Z79.01) Long term current use of anticoagulant therapy  Comment:   Plan:       Referred to MYRIAM Paul.  Spoke with MYRIAM PEDRAZA

## 2020-08-13 NOTE — ED PROVIDER NOTES
History     Chief Complaint:  Fall  Fall       HPI  James Salvador is a 75 year old year old male on Eliquis with a history of atrial fibrillation, hypertension, hyperlipidemia, and myocardial infarction who presents for evaluation of fall. The patient was with friends when he tripped on some grass after his foot got stuck in a divot and he fell forward onto his face. His friend who witnessed the fall is a nurse so was able to give him some aid and sat him in a chair. The patient reports bleeding from his nose and knee and also complains of a headache. Of note, he has not eaten since breakfast, and his wife thinks he seems lethargic. The patient denies loss of consciousness, knee pain, hip pain, back pain, abdominal pain, neck pain,  or light headedness.   Tetanus up to date.    Allergies:  No Known Drug Allergies     Medications:   Eliquis  Aspirin  Lipitor  Coreg  Loprox  Periostat  Flonase  Lasix  Gabapentin  Zestril  Claritin  Nitrostat  Trileptal    Medical History:   Actinic cheilitis  Actinic keratosis  Basal cell carcinoma  Hypertension  Coronary artery disease  History of myocardial infarction  Hyperlipidemia  Malignant melanoma  Morbid obesity  Paroxysmal supraventricular tachycardia  Atrial fibrillation  Stable angina  Degenerative joint disease of knee  Carpal tunnel syndrome of right wrist  Osteoarthritis    Surgical History   Angioplasty  Ankle surgery, right  Perm pacer  Herat cath stent cor w/wo ptca  Joint replacement, right hip  Laser surgery of eye  Mohs micrographic procedure  Sinus surgery    Family History:   Genitourinary problems, renal failure  Cardiovascular, rupture of dorsal aorta  Depression    Social History:  The patient was accompanied to the ED by his wife.  Smoking Status: Former Smoker    Type: Cigarettes    Quit 1987  Smokeless Tobacco: Never Used  Alcohol Use: Positive  Drug Use: Negative  Primary Care: Jeronimo Painter    Review of Systems   Constitutional: Positive for  fatigue.   Gastrointestinal: Negative for abdominal pain.   Musculoskeletal: Negative for arthralgias, back pain and neck pain.   Skin: Positive for wound (nose).   Neurological: Positive for headaches. Negative for syncope and light-headedness.   All other systems reviewed and are negative.    Physical Exam     Patient Vitals for the past 24 hrs:   BP Temp Temp src Heart Rate Resp SpO2   08/13/20 1645 -- -- -- 64 13 97 %   08/13/20 1615 (!) 158/89 -- -- 61 11 94 %   08/13/20 1610 -- -- -- 60 13 96 %   08/13/20 1600 -- -- -- 64 11 95 %   08/13/20 1550 -- -- -- 63 15 98 %   08/13/20 1540 -- -- -- 74 16 98 %   08/13/20 1530 -- -- -- 82 15 96 %   08/13/20 1520 (!) 151/89 -- -- -- -- 98 %   08/13/20 1443 133/77 97.7  F (36.5  C) Oral 79 16 97 %      Physical Exam    General: Sitting up in bed  Eyes:  The pupils are equal and round    Conjunctivae and sclerae are normal  ENT:    Wearing a mask, abrasion on nose, no mid face tenderness  Neck:  Normal range of motion, no midline cervical spine tenderness  CV:  Regular rate     Skin warm and well perfused   Resp:  Non labored breathing on room air    No tachypnea    No cough heard  GI:  Abdomen is soft, there is no rigidity    No distension    No rebound tenderness     No abdominal tenderness  MS:  Normal muscular tone, non tender lower extremities  Skin:  Abrasion on right knee  Neuro:   Awake, alert.      GCS 15    Speech is normal and fluent.    Face is symmetric.     Moves all extremities equally  Psych: Normal affect.  Appropriate interactions.    Emergency Department Course     ECG:  ECG taken at 1519, ECG read at 1524  Atrial fibrillation with premature ventricular or aberrantly conducted complexes  Abnormal ECG  Rate 86 bpm. AK interval * ms. QRS duration 96 ms. QT/QTc 376/449 ms. P-R-T axes * 5 25.    Imaging:  Radiology results were communicated with the patient who voiced understanding of the findings.    Head CT w/o contrast  1. No bleed or fractures are  identified.     2. Air-fluid level left maxillary sinus.    Reading per radiology     Laboratory:  Laboratory findings were communicated with the patient who voiced understanding of the findings.    CBC: WBC 11.0, HGB 14.9,   BMP:  (L) (Creatinine 0.72) o/w WNL     Procedures:      Laceration Repair        LACERATION:  A simple clean 2 cm abrasion      LOCATION:  Nose      FUNCTION:  Distally sensation and circulation are intact.      ANESTHESIA:  LET - Topical      PREPARATION:  Irrigation with Normal Saline      DEBRIDEMENT:  no debridement      CLOSURE:  Wound was closed with Dermabond    Interventions:   1540 LET topical  1543 Methylcellulose powder    Emergency Department Course:    1513 Nursing notes and vitals reviewed.    1519 EKG obtained as noted above.    1525 I performed an exam of the patient as documented above.     1528 IV was inserted and blood was drawn for laboratory testing, results above.    1648 The patient was sent for CT while in the emergency department, results above.     1705 Findings and plan explained to the Patient. Patient discharged home with instructions regarding supportive care, medications, and reasons to return. The importance of close follow-up was reviewed. The patient was prescribed as below.    1731 I performed the laceration procedure as above.    Impression & Plan     Medical Decision Making:  James Salvador is a 75 year old male who presented to the ED with fall. Patient with mechanical fall. CT head with no acute findings. Sodium mildly low, has history of this-told in past to not drink as much water. This is likely incidental finding given that he was having no preceding symptoms prior to fall. EKG with atrial fibrillation which he has a history of. Ambulated well in ED. Recommended f/u with PCP for recheck of sodium. No other injuries from fall. Cleared cervical spine clinically.     Diagnosis:     ICD-10-CM    1. Fall, initial encounter  W19.XXXA    2.  Hyponatremia  E87.1    3. Laceration of nose, initial encounter  S01.21XA         Disposition:  Discharged to home.    Discharge Medications:  New Prescriptions    No medications on file       Scribe Disclosure:  I, Albania Gardner, am serving as a scribe at 3:14 PM on 8/13/2020 to document services personally performed by Kylah Blair MD based on my observations and the provider's statements to me.      Kylah Blair MD  08/13/20 1809

## 2020-08-13 NOTE — DISCHARGE INSTRUCTIONS
Follow up with primary care provider for recheck of sodium  Let the glue fall off on its own. No ointment on the nose since that will dissolve it    Discharge Instructions  Head Injury    You have been seen today for a head injury. Your evaluation included a history and physical examination. You may have had a CT (CAT) scan performed, though most head injuries do not require a scan. Based on this evaluation, your provider today does not feel that your head injury is serious.    Generally, every Emergency Department visit should have a follow-up clinic visit with either a primary or a specialty clinic/provider. Please follow-up as instructed by your emergency provider today.  Return to the Emergency Department if:  You are confused or you are not acting right.  Your headache gets worse or you start to have a really bad headache even with your recommended treatment plan.  You vomit (throw up) more than once.  You have a seizure.  You have trouble walking.  You have weakness or paralysis (cannot move) in an arm or a leg.  You have blood or fluid coming from your ears or nose.  You have new symptoms or anything that worries you.    Sleeping:  It is okay for you to sleep, but someone should wake you up if instructed by your provider, and someone should check on you at your usual time to wake up.     Activity:  Do not drive for at least 24 hours.  Do not drive if you have dizzy spells or trouble concentrating, or remembering things.  Do not return to any contact sports until cleared by your regular provider.     MORE INFORMATION:    Concussion:  A concussion is a minor head injury that may cause temporary problems with the way the brain works. Although concussions are important, they are generally not an emergency or a reason that a person needs to be hospitalized. Some concussion symptoms include confusion, amnesia (forgetful), nausea (sick to your stomach) and vomiting (throwing up), dizziness, fatigue, memory or  concentration problems, irritability and sleep problems. For most people, concussions are mild and temporary but some will have more severe and persistent symptoms that require on-going care and treatment.  CT Scans: Your evaluation today may have included a CT scan (CAT scan) to look for things like bleeding or a skull fracture (broken bone).  CT scans involve radiation and too many CT scans can cause serious health problems like cancer, especially in children.  Because of this, your provider may not have ordered a CT scan today if they think you are at low risk for a serious or life threatening problem.    If you were given a prescription for medicine here today, be sure to read all of the information (including the package insert) that comes with your prescription.  This will include important information about the medicine, its side effects, and any warnings that you need to know about.  The pharmacist who fills the prescription can provide more information and answer questions you may have about the medicine.  If you have questions or concerns that the pharmacist cannot address, please call or return to the Emergency Department.     Remember that you can always come back to the Emergency Department if you are not able to see your regular provider in the amount of time listed above, if you get any new symptoms, or if there is anything that worries you.

## 2020-08-13 NOTE — ED TRIAGE NOTES
Pt was at a baseball game; was walking and tripped on the grass but landed face first on asphalt.  Pt has laceration on nose.  Pt takes Elliquis.

## 2020-08-14 LAB — INTERPRETATION ECG - MUSE: NORMAL

## 2020-08-19 LAB
MDC_IDC_LEAD_IMPLANT_DT: NORMAL
MDC_IDC_LEAD_LOCATION: NORMAL
MDC_IDC_LEAD_LOCATION_DETAIL_1: NORMAL
MDC_IDC_LEAD_MFG: NORMAL
MDC_IDC_LEAD_MODEL: NORMAL
MDC_IDC_LEAD_POLARITY_TYPE: NORMAL
MDC_IDC_LEAD_SERIAL: NORMAL
MDC_IDC_MSMT_BATTERY_DTM: NORMAL
MDC_IDC_MSMT_BATTERY_REMAINING_LONGEVITY: 173 MO
MDC_IDC_MSMT_BATTERY_RRT_TRIGGER: 2.62
MDC_IDC_MSMT_BATTERY_STATUS: NORMAL
MDC_IDC_MSMT_BATTERY_VOLTAGE: 3.08 V
MDC_IDC_MSMT_LEADCHNL_RV_IMPEDANCE_VALUE: 342 OHM
MDC_IDC_MSMT_LEADCHNL_RV_IMPEDANCE_VALUE: 418 OHM
MDC_IDC_MSMT_LEADCHNL_RV_PACING_THRESHOLD_AMPLITUDE: 0.62 V
MDC_IDC_MSMT_LEADCHNL_RV_PACING_THRESHOLD_PULSEWIDTH: 0.4 MS
MDC_IDC_MSMT_LEADCHNL_RV_SENSING_INTR_AMPL: 3.25 MV
MDC_IDC_MSMT_LEADCHNL_RV_SENSING_INTR_AMPL: 3.5 MV
MDC_IDC_PG_IMPLANT_DTM: NORMAL
MDC_IDC_PG_MFG: NORMAL
MDC_IDC_PG_MODEL: NORMAL
MDC_IDC_PG_SERIAL: NORMAL
MDC_IDC_PG_TYPE: NORMAL
MDC_IDC_SESS_CLINIC_NAME: NORMAL
MDC_IDC_SESS_DTM: NORMAL
MDC_IDC_SESS_TYPE: NORMAL
MDC_IDC_SET_BRADY_HYSTRATE: NORMAL
MDC_IDC_SET_BRADY_LOWRATE: 50 {BEATS}/MIN
MDC_IDC_SET_BRADY_MODE: NORMAL
MDC_IDC_SET_LEADCHNL_RV_PACING_AMPLITUDE: 2 V
MDC_IDC_SET_LEADCHNL_RV_PACING_ANODE_ELECTRODE_1: NORMAL
MDC_IDC_SET_LEADCHNL_RV_PACING_ANODE_LOCATION_1: NORMAL
MDC_IDC_SET_LEADCHNL_RV_PACING_CAPTURE_MODE: NORMAL
MDC_IDC_SET_LEADCHNL_RV_PACING_CATHODE_ELECTRODE_1: NORMAL
MDC_IDC_SET_LEADCHNL_RV_PACING_CATHODE_LOCATION_1: NORMAL
MDC_IDC_SET_LEADCHNL_RV_PACING_POLARITY: NORMAL
MDC_IDC_SET_LEADCHNL_RV_PACING_PULSEWIDTH: 0.4 MS
MDC_IDC_SET_LEADCHNL_RV_SENSING_ANODE_ELECTRODE_1: NORMAL
MDC_IDC_SET_LEADCHNL_RV_SENSING_ANODE_LOCATION_1: NORMAL
MDC_IDC_SET_LEADCHNL_RV_SENSING_CATHODE_ELECTRODE_1: NORMAL
MDC_IDC_SET_LEADCHNL_RV_SENSING_CATHODE_LOCATION_1: NORMAL
MDC_IDC_SET_LEADCHNL_RV_SENSING_POLARITY: NORMAL
MDC_IDC_SET_LEADCHNL_RV_SENSING_SENSITIVITY: 0.6 MV
MDC_IDC_SET_ZONE_DETECTION_INTERVAL: 360 MS
MDC_IDC_SET_ZONE_TYPE: NORMAL
MDC_IDC_STAT_BRADY_DTM_END: NORMAL
MDC_IDC_STAT_BRADY_DTM_START: NORMAL
MDC_IDC_STAT_BRADY_RV_PERCENT_PACED: 26.4 %
MDC_IDC_STAT_EPISODE_RECENT_COUNT: 0
MDC_IDC_STAT_EPISODE_RECENT_COUNT: 0
MDC_IDC_STAT_EPISODE_RECENT_COUNT_DTM_END: NORMAL
MDC_IDC_STAT_EPISODE_RECENT_COUNT_DTM_END: NORMAL
MDC_IDC_STAT_EPISODE_RECENT_COUNT_DTM_START: NORMAL
MDC_IDC_STAT_EPISODE_RECENT_COUNT_DTM_START: NORMAL
MDC_IDC_STAT_EPISODE_TOTAL_COUNT: 0
MDC_IDC_STAT_EPISODE_TOTAL_COUNT: 2
MDC_IDC_STAT_EPISODE_TOTAL_COUNT_DTM_END: NORMAL
MDC_IDC_STAT_EPISODE_TOTAL_COUNT_DTM_END: NORMAL
MDC_IDC_STAT_EPISODE_TOTAL_COUNT_DTM_START: NORMAL
MDC_IDC_STAT_EPISODE_TOTAL_COUNT_DTM_START: NORMAL
MDC_IDC_STAT_EPISODE_TYPE: NORMAL

## 2020-09-10 DIAGNOSIS — I10 BENIGN HYPERTENSION: ICD-10-CM

## 2020-09-11 RX ORDER — LISINOPRIL 30 MG/1
TABLET ORAL
Qty: 90 TABLET | Refills: 0 | Status: SHIPPED | OUTPATIENT
Start: 2020-09-11 | End: 2020-12-17

## 2020-09-11 NOTE — TELEPHONE ENCOUNTER
Most recent BPs quite high  If checks home BP -set up for virtual visit. otherwise curbside BP then virtual.

## 2020-09-11 NOTE — PROGRESS NOTES
CARDIOLOGY VISIT    REASON FOR VISIT: Coronary disease, A. fib, pacemaker    SUBJECTIVE:  75-year-old male seen for coronary disease, A. fib, and pacemaker.    He had MI in 1994 treated in [place].  He had LAD stent in 2015.  He has a history of SVT runs treated medically.  Nuclear stress in 2016 showed medium sized area of ischemia of the basal to mid inferior and inferolateral wall, EF 60%.    A. fib was diagnosed in 2017, he had no symptoms.  Echo July 2017 showed EF 60%, moderate severe left atrial enlargement, no valve disease.    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    Device check August 2020 showed 26% ventricular paced, rhythm permanent A. fib with rates 40s to 70s, no other arrhythmias, 14 years battery.    During the coronavirus pandemic, he has been very cautious and not been out much.  He does some light walking, but has some knee pain that limits him.  He denies any chest pain, dyspnea, or lower extremity edema.  Blood pressure averages 125/80.    MEDICATIONS:  Current Outpatient Medications   Medication     Acetaminophen (TYLENOL PO)     amoxicillin (AMOXIL) 500 MG capsule     apixaban ANTICOAGULANT (ELIQUIS ANTICOAGULANT) 5 MG tablet     ASPIRIN PO     atorvastatin (LIPITOR) 80 MG tablet     carvedilol (COREG) 3.125 MG tablet     ciclopirox (LOPROX) 0.77 % cream     desonide (DESOWEN) 0.05 % external cream     Doxycycline Hyclate (PERIOSTAT PO)     fluocinonide (LIDEX) 0.05 % external solution     fluocinonide (LIDEX) 0.05 % external solution     fluorouracil (EFUDEX) 5 % external cream     fluticasone (FLONASE) 50 MCG/ACT nasal spray     furosemide (LASIX) 40 MG tablet     GABAPENTIN PO     ketoconazole (NIZORAL) 2 % cream     ketoconazole (NIZORAL) 2 % external shampoo     ketotifen (ZADITOR/REFRESH ANTI-ITCH) 0.025 % SOLN     lisinopril (ZESTRIL) 30 MG tablet      "loratadine (CLARITIN) 10 MG tablet     Multiple Vitamin (MULTIVITAMIN OR)     nitroglycerin (NITROSTAT) 0.4 MG SL tablet     Omega-3 Fatty Acids (OMEGA-3 FISH OIL PO)     OXcarbazepine (TRILEPTAL PO)     No current facility-administered medications for this visit.        ALLERGIES:  Allergies   Allergen Reactions     Cats      Cat Dander     Dogs      Dog Dander     No Clinical Screening - See Comments      Pet dander, pollen, grasses, molds     Pollen Extract        REVIEW OF SYSTEMS:  Constitutional:  No weight loss, fever, chills, weakness or fatigue.  HEENT:  Eyes:  No visual loss, blurred vision, double vision or yellow sclerae. No hearing loss, sneezing, congestion, runny nose or sore throat.  Skin:  No rash or itching.  Cardiovascular: per HPI  Respiratory: per HPI  GI:  No anorexia, nausea, vomiting or diarrhea. No abdominal pain or blood.  :  No dysurea, hematuria  Neurologic:  No headache, dizziness, syncope, paralysis, ataxia, numbness or tingling in the extremities. No change in bowel or bladder control.  Musculoskeletal:  No muscle, back pain, joint pain or stiffness.  Hematologic:  No anemia, bleeding or bruising.  Lymphatics:  No enlarged nodes. No history of splenectomy.  Psychiatric:  No history of depression or anxiety.  Endocrine:  No reports of sweating, cold or heat intolerance. No polyuria or polydipsia.  Allergies:  No history of asthma, hives, eczema or rhinitis.    PHYSICAL EXAM:  /68 (BP Location: Right arm, Patient Position: Sitting, Cuff Size: Adult Regular)   Pulse 66   Ht 1.803 m (5' 11\")   Wt 148.8 kg (328 lb)   SpO2 96%   BMI 45.75 kg/m      Constitutional: awake, alert, no distress  Eyes: PERRL, sclera nonicteric  ENT: trachea midline  Respiratory: Lungs clear  Cardiovascular: Distant sounds, regular rate and rhythm, no murmur, no lower extremity edema  GI: nondistended, nontender, bowel sounds present  Lymph/Hematologic: no lymphadenopathy  Skin: dry, no " rash  Musculoskeletal: good muscle tone, strength 5/5 in upper and lower extremities  Neurologic: no focal deficits  Neuropsychiatric: appropriate affact    DATA:  Lab: August 2020: Potassium 4.6, creatinine 0.7  Recent Labs   Lab Test 01/23/20  0737 12/11/18  0914  09/22/15  1825 12/30/14  0757   CHOL 159 126   < > 135 127   HDL 38* 40   < > 38* 34*   LDL 79 55   < > 65 50   TRIG 211* 153*   < > 160* 214*   CHOLHDLRATIO  --   --   --  3.6 3.7    < > = values in this interval not displayed.       ASSESSMENT:  75-year-old male seen for follow-up of coronary disease, A. fib, and pacemaker.  Overall he is stable from a cardiac perspective.  He has no angina or heart failure symptoms.  Pacemaker is working well.  Blood pressure and lipids are well controlled.  He is due for an echo to make sure there is no tricuspid valve disease with a pacemaker wire.    RECOMMENDATIONS:  1.  Coronary artery disease  -Continue current medical therapy    2.  Pacemaker  -Continue routine device checks  -Echocardiogram    3.  Chronic atrial fibrillation  -Continue Eliquis    Follow-up in 1 year in [place].    Aquilino Buchanan MD  Cardiology - [facility]  Pager:  [phone]  Text Page  September 18, 2020

## 2020-09-11 NOTE — TELEPHONE ENCOUNTER
Routing refill request to provider for review/approval because:  BP not at goal    BP Readings from Last 3 Encounters:   08/13/20 (!) 165/106   08/13/20 (!) 144/84   07/16/20 138/78       Last office visit: 1/30/2020   Future Office Visit:   Next 5 appointments (look out 90 days)    Sep 18, 2020  9:15 AM CDT  Return Visit with Aquilino Buchanan MD  [facility] ([facility]) [address] [address] [address] [phone]   Oct 16, 2020 10:00 AM CDT  Return Visit with Rosa Maria Hansen PA-C  [facility] [address] [address] [phone]

## 2020-09-18 ENCOUNTER — OFFICE VISIT (OUTPATIENT)
Dept: CARDIOLOGY | Facility: CLINIC | Age: 75
End: 2020-09-18
Payer: COMMERCIAL

## 2020-09-18 VITALS
HEART RATE: 66 BPM | WEIGHT: 315 LBS | DIASTOLIC BLOOD PRESSURE: 68 MMHG | BODY MASS INDEX: 44.1 KG/M2 | OXYGEN SATURATION: 96 % | SYSTOLIC BLOOD PRESSURE: 104 MMHG | HEIGHT: 71 IN

## 2020-09-18 DIAGNOSIS — I25.10 CORONARY ARTERY DISEASE INVOLVING NATIVE CORONARY ARTERY OF NATIVE HEART WITHOUT ANGINA PECTORIS: ICD-10-CM

## 2020-09-18 DIAGNOSIS — Z95.0 CARDIAC PACEMAKER IN SITU: Primary | ICD-10-CM

## 2020-09-18 DIAGNOSIS — I48.21 PERMANENT ATRIAL FIBRILLATION (H): ICD-10-CM

## 2020-09-18 PROCEDURE — 99214 OFFICE O/P EST MOD 30 MIN: CPT | Performed by: INTERNAL MEDICINE

## 2020-09-18 ASSESSMENT — MIFFLIN-ST. JEOR: SCORE: 2244.93

## 2020-09-18 NOTE — PATIENT INSTRUCTIONS
Echocardiogram  Will schedule an echocardiogram, or ultrasound of the heart.  This looks at the size and function of the heart and valves.  It generally takes about 20-30 minutes.  This will tell if there is any reduced heart function or problem with any of the valves.        Will have you follow up in one year in [place].

## 2020-09-18 NOTE — LETTER
9/18/2020    Jeronimo Painter MD  [address]  [facility] [address]    RE: James Salvador       Dear Colleague,    I had the pleasure of seeing James Salvador in the [facility].    CARDIOLOGY VISIT    REASON FOR VISIT: Coronary disease, A. fib, pacemaker    SUBJECTIVE:  75-year-old male seen for coronary disease, A. fib, and pacemaker.    He had MI in 1994 treated in [place].  He had LAD stent in 2015.  He has a history of SVT runs treated medically.  Nuclear stress in 2016 showed medium sized area of ischemia of the basal to mid inferior and inferolateral wall, EF 60%.    A. fib was diagnosed in 2017, he had no symptoms.  Echo July 2017 showed EF 60%, moderate severe left atrial enlargement, no valve disease.    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    Device check August 2020 showed 26% ventricular paced, rhythm permanent A. fib with rates 40s to 70s, no other arrhythmias, 14 years battery.    During the coronavirus pandemic, he has been very cautious and not been out much.  He does some light walking, but has some knee pain that limits him.  He denies any chest pain, dyspnea, or lower extremity edema.  Blood pressure averages 125/80.    MEDICATIONS:  Current Outpatient Medications   Medication     Acetaminophen (TYLENOL PO)     amoxicillin (AMOXIL) 500 MG capsule     apixaban ANTICOAGULANT (ELIQUIS ANTICOAGULANT) 5 MG tablet     ASPIRIN PO     atorvastatin (LIPITOR) 80 MG tablet     carvedilol (COREG) 3.125 MG tablet     ciclopirox (LOPROX) 0.77 % cream     desonide (DESOWEN) 0.05 % external cream     Doxycycline Hyclate (PERIOSTAT PO)     fluocinonide (LIDEX) 0.05 % external solution     fluocinonide (LIDEX) 0.05 % external solution     fluorouracil (EFUDEX) 5 % external cream     fluticasone (FLONASE) 50 MCG/ACT nasal spray      "furosemide (LASIX) 40 MG tablet     GABAPENTIN PO     ketoconazole (NIZORAL) 2 % cream     ketoconazole (NIZORAL) 2 % external shampoo     ketotifen (ZADITOR/REFRESH ANTI-ITCH) 0.025 % SOLN     lisinopril (ZESTRIL) 30 MG tablet     loratadine (CLARITIN) 10 MG tablet     Multiple Vitamin (MULTIVITAMIN OR)     nitroglycerin (NITROSTAT) 0.4 MG SL tablet     Omega-3 Fatty Acids (OMEGA-3 FISH OIL PO)     OXcarbazepine (TRILEPTAL PO)     No current facility-administered medications for this visit.        ALLERGIES:  Allergies   Allergen Reactions     Cats      Cat Dander     Dogs      Dog Dander     No Clinical Screening - See Comments      Pet dander, pollen, grasses, molds     Pollen Extract        REVIEW OF SYSTEMS:  Constitutional:  No weight loss, fever, chills, weakness or fatigue.  HEENT:  Eyes:  No visual loss, blurred vision, double vision or yellow sclerae. No hearing loss, sneezing, congestion, runny nose or sore throat.  Skin:  No rash or itching.  Cardiovascular: per HPI  Respiratory: per HPI  GI:  No anorexia, nausea, vomiting or diarrhea. No abdominal pain or blood.  :  No dysurea, hematuria  Neurologic:  No headache, dizziness, syncope, paralysis, ataxia, numbness or tingling in the extremities. No change in bowel or bladder control.  Musculoskeletal:  No muscle, back pain, joint pain or stiffness.  Hematologic:  No anemia, bleeding or bruising.  Lymphatics:  No enlarged nodes. No history of splenectomy.  Psychiatric:  No history of depression or anxiety.  Endocrine:  No reports of sweating, cold or heat intolerance. No polyuria or polydipsia.  Allergies:  No history of asthma, hives, eczema or rhinitis.    PHYSICAL EXAM:  /68 (BP Location: Right arm, Patient Position: Sitting, Cuff Size: Adult Regular)   Pulse 66   Ht 1.803 m (5' 11\")   Wt 148.8 kg (328 lb)   SpO2 96%   BMI 45.75 kg/m      Constitutional: awake, alert, no distress  Eyes: PERRL, sclera nonicteric  ENT: trachea " midline  Respiratory: Lungs clear  Cardiovascular: Distant sounds, regular rate and rhythm, no murmur, no lower extremity edema  GI: nondistended, nontender, bowel sounds present  Lymph/Hematologic: no lymphadenopathy  Skin: dry, no rash  Musculoskeletal: good muscle tone, strength 5/5 in upper and lower extremities  Neurologic: no focal deficits  Neuropsychiatric: appropriate affact    DATA:  Lab: August 2020: Potassium 4.6, creatinine 0.7  Recent Labs   Lab Test 01/23/20  0737 12/11/18  0914  09/22/15  1825 12/30/14  0757   CHOL 159 126   < > 135 127   HDL 38* 40   < > 38* 34*   LDL 79 55   < > 65 50   TRIG 211* 153*   < > 160* 214*   CHOLHDLRATIO  --   --   --  3.6 3.7    < > = values in this interval not displayed.       ASSESSMENT:  75-year-old male seen for follow-up of coronary disease, A. fib, and pacemaker.  Overall he is stable from a cardiac perspective.  He has no angina or heart failure symptoms.  Pacemaker is working well.  Blood pressure and lipids are well controlled.  He is due for an echo to make sure there is no tricuspid valve disease with a pacemaker wire.    RECOMMENDATIONS:  1.  Coronary artery disease  -Continue current medical therapy    2.  Pacemaker  -Continue routine device checks  -Echocardiogram    3.  Chronic atrial fibrillation  -Continue Eliquis    Follow-up in 1 year in [place].    Aquilino Buchanan MD  Cardiology - [facility]  Pager:  [phone]  Text Page  September 18, 2020        Thank you for allowing me to participate in the care of your patient.    Sincerely,     Aquilino Buchanan MD     [facility]

## 2020-09-24 ENCOUNTER — HOSPITAL ENCOUNTER (OUTPATIENT)
Dept: CARDIOLOGY | Facility: CLINIC | Age: 75
Discharge: HOME | End: 2020-09-24
Attending: INTERNAL MEDICINE | Admitting: INTERNAL MEDICINE
Payer: COMMERCIAL

## 2020-09-24 DIAGNOSIS — Z95.0 CARDIAC PACEMAKER IN SITU: ICD-10-CM

## 2020-09-24 DIAGNOSIS — I25.10 CORONARY ARTERY DISEASE INVOLVING NATIVE CORONARY ARTERY OF NATIVE HEART WITHOUT ANGINA PECTORIS: ICD-10-CM

## 2020-09-24 PROCEDURE — 25500064 ZZH RX 255 OP 636: Performed by: INTERNAL MEDICINE

## 2020-09-24 PROCEDURE — 93306 TTE W/DOPPLER COMPLETE: CPT | Mod: 26 | Performed by: INTERNAL MEDICINE

## 2020-09-24 PROCEDURE — 93306 TTE W/DOPPLER COMPLETE: CPT

## 2020-09-24 RX ADMIN — HUMAN ALBUMIN MICROSPHERES AND PERFLUTREN 3 ML: 10; .22 INJECTION, SOLUTION INTRAVENOUS at 15:19

## 2020-09-25 NOTE — RESULT ENCOUNTER NOTE
"Dr. Buchanan reviewed echo done 9/24/20, \"Echo looks good, normal function, no valve disease from the pacemaker wire.  Follow-up as planned\".     Sent pt Sidecar message w/ results/recommendations. Follow-up due 9/2021.   Sandhya RN, BSN  09/25/20 12:52 PM"

## 2020-10-12 ENCOUNTER — MYC MEDICAL ADVICE (OUTPATIENT)
Dept: INTERNAL MEDICINE | Facility: CLINIC | Age: 75
End: 2020-10-12

## 2020-10-13 ENCOUNTER — MYC MEDICAL ADVICE (OUTPATIENT)
Dept: INTERNAL MEDICINE | Facility: CLINIC | Age: 75
End: 2020-10-13

## 2020-10-16 ENCOUNTER — OFFICE VISIT (OUTPATIENT)
Dept: DERMATOLOGY | Facility: CLINIC | Age: 75
End: 2020-10-16
Payer: COMMERCIAL

## 2020-10-16 VITALS — OXYGEN SATURATION: 95 % | SYSTOLIC BLOOD PRESSURE: 120 MMHG | DIASTOLIC BLOOD PRESSURE: 67 MMHG | HEART RATE: 74 BPM

## 2020-10-16 DIAGNOSIS — D48.5 NEOPLASM OF UNCERTAIN BEHAVIOR OF SKIN: Primary | ICD-10-CM

## 2020-10-16 DIAGNOSIS — D22.9 NEVUS: ICD-10-CM

## 2020-10-16 DIAGNOSIS — L81.4 LENTIGO: ICD-10-CM

## 2020-10-16 DIAGNOSIS — D18.01 ANGIOMA OF SKIN: ICD-10-CM

## 2020-10-16 DIAGNOSIS — Z85.820 HISTORY OF MELANOMA: ICD-10-CM

## 2020-10-16 DIAGNOSIS — L82.1 SEBORRHEIC KERATOSIS: ICD-10-CM

## 2020-10-16 DIAGNOSIS — Z85.828 HISTORY OF SQUAMOUS CELL CARCINOMA OF SKIN: ICD-10-CM

## 2020-10-16 PROCEDURE — 11102 TANGNTL BX SKIN SINGLE LES: CPT | Performed by: PHYSICIAN ASSISTANT

## 2020-10-16 PROCEDURE — 88305 TISSUE EXAM BY PATHOLOGIST: CPT | Performed by: PATHOLOGY

## 2020-10-16 PROCEDURE — 99214 OFFICE O/P EST MOD 30 MIN: CPT | Mod: 25 | Performed by: PHYSICIAN ASSISTANT

## 2020-10-16 NOTE — PATIENT INSTRUCTIONS
Wound Care Instructions     FOR SUPERFICIAL WOUNDS     [facility] [phone]                 AFTER 24 HOURS YOU SHOULD REMOVE THE BANDAGE AND BEGIN DAILY DRESSING CHANGES AS FOLLOWS:     1) Remove Dressing.     2) Clean and dry the area with tap water using a Q-tip or sterile gauze pad.     3) Apply Vaseline, Aquaphor, Polysporin ointment or Bacitracin ointment over entire wound.  Do NOT use Neosporin ointment.     4) Cover the wound with a band-aid, or a sterile non-stick gauze pad and micropore paper tape    REPEAT THESE INSTRUCTIONS AT LEAST ONCE A DAY UNTIL THE WOUND HAS COMPLETELY HEALED.    It is an old wives tale that a wound heals better when it is exposed to air and allowed to dry out. The wound will heal faster with a better cosmetic result if it is kept moist with ointment and covered with a bandage.    **Do not let the wound dry out.**    Supplies Needed:      *Cotton tipped applicators (Q-tips)    *Vaseline, Aquaphor, Polysporin or Bacitracin Ointment (NOT NEOSPORIN)    *Band-aids or non-stick gauze pads and micropore paper tape.      PATIENT INFORMATION:    During the healing process you will notice a number of changes. All wounds develop a small halo of redness surrounding the wound.  This means healing is occurring. Severe itching with extensive redness usually indicates sensitivity to the ointment or bandage tape used to dress the wound.  You should call our office if this develops.      Swelling  and/or discoloration around your surgical site is common, particularly when performed around the eye.    All wounds normally drain.  The larger the wound the more drainage there will be.  After 7-10 days, you will notice the wound beginning to shrink and new skin will begin to grow.  The wound is healed when you can see skin has formed over the entire area.  A healed wound has a healthy, shiny look to the surface and is red to dark pink in color to normalize.  Wounds may take approximately  4-6 weeks to heal.  Larger wounds may take 6-8 weeks.  After the wound is healed you may discontinue dressing changes.    You may experience a sensation of tightness as your wound heals. This is normal and will gradually subside.    Your healed wound may be sensitive to temperature changes. This sensitivity improves with time, but if you re having a lot of discomfort, try to avoid temperature extremes.    Patients frequently experience itching after their wound appears to have healed because of the continue healing under the skin.  Plain Vaseline will help relieve the itching.      POSSIBLE COMPLICATIONS    BLEEDIN. Leave the bandage in place.  2. Use tightly rolled up gauze or a cloth to apply direct pressure over the bandage for 30  minutes.  3. Reapply pressure for an additional 30 minutes if necessary  4. Use additional gauze and tape to maintain pressure once the bleeding has stopped.

## 2020-10-16 NOTE — LETTER
10/16/2020         RE: James Salvador  [address]  [place] [address]        Dear Colleague,    Thank you for referring your patient, James Salvador, to the [facility]. Please see a copy of my visit note below.    HPI:   Chief complaint: James Salvador (Pete) is a 75 year old male who presents for Full skin cancer screening to rule out skin cancer.   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 He lives in [place].   On review of systems, there are no further skin complaints, patient is feeling otherwise well.  See patient intake sheet.  ROS of the following were done and are negative: Constitutional, Eyes, Ears, Nose,   Mouth, Throat, Cardiovascular, Respiratory, GI, Genitourinary, Musculoskeletal,   Psychiatric, Endocrine, Allergic/Immunologic.    HPI, past medical history, social history, allergies, medications reviewed as of October 16, 2020      PHYSICAL EXAM:   /67   Pulse 74   SpO2 95%   Skin exam performed as follows: Type 2 skin. Mood appropriate  Alert and Oriented X 3. Well developed, well nourished in no distress.  General appearance: Normal  Head including face: Normal  Eyes: conjunctiva and lids: Normal  Mouth: Lips, teeth, gums: Normal  Neck: Normal  Chest-breast/axillae: Normal  Back: Normal  Spleen and liver: Normal  Cardiovascular: Exam of peripheral vascular system by observation for swelling,  varicosities, edema: Normal  Genitalia: groin, buttocks: Normal  Extremities: digits/nails (clubbing): Normal  Eccrine and Apocrine glands: Normal  Right upper extremity: Normal  Left upper extremity: Normal  Right lower extremity: Normal  Left lower extremity: Normal  Skin: Scalp and body hair: See below    Pt deferred exam of breasts, groin, buttocks: NO    Other physical findings:  1. Multiple pigmented macules on extremities and trunk  2. Multiple pigmented macules on face, trunk and extremities  3. Multiple vascular papules on trunk, arms and legs  4. Multiple scattered keratotic plaques  5. Pink gritty papules on the left cheek x 2, right frontal scalp, left frontal scalp   6. 8 mm pink hypertrophic papule on the right forearm        Except as noted above, no other signs of skin cancer or melanoma.     ASSESSMENT/PLAN:   Benign Full skin cancer screening today.     Patient with history of SCC, BCC and melanoma  Advised on monthly self exams and 1 year  Patient Education: Appropriate brochures given.    Multiple benign appearing nevi on arms, legs and trunk. Discussed ABCDEs of melanoma and sunscreen.   Multiple lentigos on arms, legs and trunk. Advised benign, no treatment needed.  Multiple scattered angiomas. Advised benign, no treatment needed.   Seborrheic keratosis on arms, legs and trunk. Advised benign, no treatment needed.  Actinic keratoses on the  left cheek x 2, right frontal scalp, left frontal scalp - start efudex BID x 2 weeks   R/o SCC on the right forearm. Shave biopsy performed.  Area cleaned and anesthetized with 1% lidocaine with epinephrine.  Dermablade used to remove the lesion and sent to pathology. Bleeding was cauterized. Pt tolerated procedure well with no complications.         Follow-up: 4 months    1.) Patient was asked about new and changing moles. YES  2.) Patient received a complete physical skin examination: YES  3.) Patient was counseled to perform a monthly self skin  examination: YES  Scribed By: Rosa Maria Hansen, MS, PAAnishaC        Again, thank you for allowing me to participate in the care of your patient.        Sincerely,        Rosa Maria Hansen PA-C

## 2020-10-16 NOTE — PROGRESS NOTES
HPI:   Chief complaint: James Salvador (Pete) is a 75 year old male who presents for Full skin cancer screening to rule out skin cancer.   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 He lives in [place].   On review of systems, there are no further skin complaints, patient is feeling otherwise well.  See patient intake sheet.  ROS of the following were done and are negative: Constitutional, Eyes, Ears, Nose,   Mouth, Throat, Cardiovascular, Respiratory, GI, Genitourinary, Musculoskeletal,   Psychiatric, Endocrine, Allergic/Immunologic.    HPI, past medical history, social history, allergies, medications reviewed as of October 16, 2020      PHYSICAL EXAM:   /67   Pulse 74   SpO2 95%   Skin exam performed as follows: Type 2 skin. Mood appropriate  Alert and Oriented X 3. Well developed, well nourished in no distress.  General appearance: Normal  Head including face: Normal  Eyes: conjunctiva and lids: Normal  Mouth: Lips, teeth, gums: Normal  Neck: Normal  Chest-breast/axillae: Normal  Back: Normal  Spleen and liver: Normal  Cardiovascular: Exam of peripheral vascular system by observation for swelling, varicosities, edema: Normal  Genitalia: groin, buttocks: Normal  Extremities: digits/nails (clubbing): Normal  Eccrine and Apocrine glands: Normal  Right upper extremity: Normal  Left upper extremity: Normal  Right lower extremity: Normal  Left lower extremity:  Normal  Skin: Scalp and body hair: See below    Pt deferred exam of breasts, groin, buttocks: NO    Other physical findings:  1. Multiple pigmented macules on extremities and trunk  2. Multiple pigmented macules on face, trunk and extremities  3. Multiple vascular papules on trunk, arms and legs  4. Multiple scattered keratotic plaques  5. Pink gritty papules on the left cheek x 2, right frontal scalp, left frontal scalp   6. 8 mm pink hypertrophic papule on the right forearm        Except as noted above, no other signs of skin cancer or melanoma.     ASSESSMENT/PLAN:   Benign Full skin cancer screening today.     Patient with history of SCC, BCC and melanoma  Advised on monthly self exams and 1 year  Patient Education: Appropriate brochures given.    Multiple benign appearing nevi on arms, legs and trunk. Discussed ABCDEs of melanoma and sunscreen.   Multiple lentigos on arms, legs and trunk. Advised benign, no treatment needed.  Multiple scattered angiomas. Advised benign, no treatment needed.   Seborrheic keratosis on arms, legs and trunk. Advised benign, no treatment needed.  Actinic keratoses on the  left cheek x 2, right frontal scalp, left frontal scalp - start efudex BID x 2 weeks   R/o SCC on the right forearm. Shave biopsy performed.  Area cleaned and anesthetized with 1% lidocaine with epinephrine.  Dermablade used to remove the lesion and sent to pathology. Bleeding was cauterized. Pt tolerated procedure well with no complications.         Follow-up: 4 months    1.) Patient was asked about new and changing moles. YES  2.) Patient received a complete physical skin examination: YES  3.) Patient was counseled to perform a monthly self skin examination: YES  Scribed By: Rosa Maria Hansen MS, PAVAISHALI

## 2020-10-20 LAB — COPATH REPORT: NORMAL

## 2020-10-21 ENCOUNTER — MYC MEDICAL ADVICE (OUTPATIENT)
Dept: INTERNAL MEDICINE | Facility: CLINIC | Age: 75
End: 2020-10-21

## 2020-10-21 ENCOUNTER — TELEPHONE (OUTPATIENT)
Dept: DERMATOLOGY | Facility: CLINIC | Age: 75
End: 2020-10-21

## 2020-10-21 DIAGNOSIS — Z91.81 PERSONAL HISTORY OF FALL: Primary | ICD-10-CM

## 2020-10-21 NOTE — TELEPHONE ENCOUNTER
MD Please write a prescription for DME for grab bars and installation also write a letter of medical necessity to [facility] that pt needs these for balance and safety concerns for falls .Iris Bryant RN

## 2020-10-21 NOTE — LETTER
2020    INSURER: Payor: [employer] / Plan: [employer] MEDICARE ADVANTAGE / Product Type: Medicare /   Re: Prior Authorization Request  Patient: James Salvador  Policy ID#:  [id number]  : 1945      To Whom it May Concern:    I am writing to formally request authorization of coverage for my patient,  James Salvador.  I recommend Grab Bars for his bathroom.      DME Documentation:   Describe the reason for need to support medical necessity: history of falls, high risk of recurrent falls.     I, the undersigned, certify that the above prescribed supplies are medically necessary for this patient and is both reasonable and necessary in reference to accepted standards of medical and necessary in reference to accepted standards of medical practice in the treatment of this patient's condition and is not prescribed as a convenience.        Please call my office if you have any questions or concerns.      Sincerely,        Jeronimo Painter MD

## 2020-10-21 NOTE — LETTER
[address]  [address]  [phone]    10/23/2020       James Salvador  [address] [place] [address]      Dear James:    You are scheduled for Mohs Surgery on: 11/19/20 @10:00am.    Please check in at [facility], [address].     You don't need to arrive more than 5-10 minutes prior to your appointment time.     Be sure to eat a good breakfast and bathe and wash your hair prior to surgery.     If you are taking any anti-coagulants that are prescribed by your Doctor (such as Coumadin/Warfarin, Plavix, Aspirin, Ibuprofen), please continue taking them.     However, if you are taking anti-coagulants over the counter without a Doctor's order for a medical condition, please discontinue them 10 days prior to surgery.     Please wear loose comfortable clothing as it could possibly be 4-6 hours until your surgery is completed depending upon how many layers of tissue need to be removed.      Thank you,    VICENTE Dutta MD

## 2020-10-21 NOTE — TELEPHONE ENCOUNTER
----- Message from Rosa Maria Hansen PA-C sent at 10/21/2020  2:35 PM CDT -----  Right forearm scis please schedule for excision

## 2020-10-23 NOTE — TELEPHONE ENCOUNTER
Called and spoke to patient.    Educated patient on biopsy results- SCIS.    Educated patient on SCIS, mohs, scheduled mohs, and letter/packet sent.    Patient voiced understanding.    Kermit RN-BSN-N  [facility]  [phone]

## 2020-10-26 ENCOUNTER — TRANSFERRED RECORDS (OUTPATIENT)
Dept: HEALTH INFORMATION MANAGEMENT | Facility: CLINIC | Age: 75
End: 2020-10-26

## 2020-10-26 NOTE — TELEPHONE ENCOUNTER
Spoke with pt, faxed and emailed letter and order.  Pt will mychart back with [employer] to send letter and dme to as well    Florecita Srivastava CMA

## 2020-11-11 ENCOUNTER — ANCILLARY PROCEDURE (OUTPATIENT)
Dept: CARDIOLOGY | Facility: CLINIC | Age: 75
End: 2020-11-11
Attending: INTERNAL MEDICINE
Payer: COMMERCIAL

## 2020-11-11 DIAGNOSIS — I25.10 CAD (CORONARY ARTERY DISEASE): ICD-10-CM

## 2020-11-11 DIAGNOSIS — I49.5 SSS (SICK SINUS SYNDROME) (H): ICD-10-CM

## 2020-11-11 DIAGNOSIS — I48.91 ATRIAL FIBRILLATION (H): ICD-10-CM

## 2020-11-11 DIAGNOSIS — Z95.0 CARDIAC PACEMAKER IN SITU: ICD-10-CM

## 2020-11-11 PROCEDURE — 93294 REM INTERROG EVL PM/LDLS PM: CPT | Performed by: INTERNAL MEDICINE

## 2020-11-11 PROCEDURE — 93296 REM INTERROG EVL PM/IDS: CPT | Performed by: INTERNAL MEDICINE

## 2020-11-11 RX ORDER — CARVEDILOL 3.12 MG/1
3.12 TABLET ORAL 2 TIMES DAILY WITH MEALS
Qty: 180 TABLET | Refills: 3 | Status: SHIPPED | OUTPATIENT
Start: 2020-11-11 | End: 2021-02-08

## 2020-11-12 LAB
MDC_IDC_LEAD_IMPLANT_DT: NORMAL
MDC_IDC_LEAD_LOCATION: NORMAL
MDC_IDC_LEAD_LOCATION_DETAIL_1: NORMAL
MDC_IDC_LEAD_MFG: NORMAL
MDC_IDC_LEAD_MODEL: NORMAL
MDC_IDC_LEAD_POLARITY_TYPE: NORMAL
MDC_IDC_LEAD_SERIAL: NORMAL
MDC_IDC_MSMT_BATTERY_DTM: NORMAL
MDC_IDC_MSMT_BATTERY_REMAINING_LONGEVITY: 169 MO
MDC_IDC_MSMT_BATTERY_RRT_TRIGGER: 2.62
MDC_IDC_MSMT_BATTERY_STATUS: NORMAL
MDC_IDC_MSMT_BATTERY_VOLTAGE: 3.06 V
MDC_IDC_MSMT_LEADCHNL_RV_IMPEDANCE_VALUE: 323 OHM
MDC_IDC_MSMT_LEADCHNL_RV_IMPEDANCE_VALUE: 399 OHM
MDC_IDC_MSMT_LEADCHNL_RV_PACING_THRESHOLD_AMPLITUDE: 0.62 V
MDC_IDC_MSMT_LEADCHNL_RV_PACING_THRESHOLD_PULSEWIDTH: 0.4 MS
MDC_IDC_MSMT_LEADCHNL_RV_SENSING_INTR_AMPL: 3.75 MV
MDC_IDC_MSMT_LEADCHNL_RV_SENSING_INTR_AMPL: 3.75 MV
MDC_IDC_PG_IMPLANT_DTM: NORMAL
MDC_IDC_PG_MFG: NORMAL
MDC_IDC_PG_MODEL: NORMAL
MDC_IDC_PG_SERIAL: NORMAL
MDC_IDC_PG_TYPE: NORMAL
MDC_IDC_SESS_CLINIC_NAME: NORMAL
MDC_IDC_SESS_DTM: NORMAL
MDC_IDC_SESS_TYPE: NORMAL
MDC_IDC_SET_BRADY_HYSTRATE: NORMAL
MDC_IDC_SET_BRADY_LOWRATE: 50 {BEATS}/MIN
MDC_IDC_SET_BRADY_MODE: NORMAL
MDC_IDC_SET_LEADCHNL_RV_PACING_AMPLITUDE: 2 V
MDC_IDC_SET_LEADCHNL_RV_PACING_ANODE_ELECTRODE_1: NORMAL
MDC_IDC_SET_LEADCHNL_RV_PACING_ANODE_LOCATION_1: NORMAL
MDC_IDC_SET_LEADCHNL_RV_PACING_CAPTURE_MODE: NORMAL
MDC_IDC_SET_LEADCHNL_RV_PACING_CATHODE_ELECTRODE_1: NORMAL
MDC_IDC_SET_LEADCHNL_RV_PACING_CATHODE_LOCATION_1: NORMAL
MDC_IDC_SET_LEADCHNL_RV_PACING_POLARITY: NORMAL
MDC_IDC_SET_LEADCHNL_RV_PACING_PULSEWIDTH: 0.4 MS
MDC_IDC_SET_LEADCHNL_RV_SENSING_ANODE_ELECTRODE_1: NORMAL
MDC_IDC_SET_LEADCHNL_RV_SENSING_ANODE_LOCATION_1: NORMAL
MDC_IDC_SET_LEADCHNL_RV_SENSING_CATHODE_ELECTRODE_1: NORMAL
MDC_IDC_SET_LEADCHNL_RV_SENSING_CATHODE_LOCATION_1: NORMAL
MDC_IDC_SET_LEADCHNL_RV_SENSING_POLARITY: NORMAL
MDC_IDC_SET_LEADCHNL_RV_SENSING_SENSITIVITY: 0.6 MV
MDC_IDC_SET_ZONE_DETECTION_INTERVAL: 360 MS
MDC_IDC_SET_ZONE_TYPE: NORMAL
MDC_IDC_STAT_BRADY_DTM_END: NORMAL
MDC_IDC_STAT_BRADY_DTM_START: NORMAL
MDC_IDC_STAT_BRADY_RV_PERCENT_PACED: 23.82 %
MDC_IDC_STAT_EPISODE_RECENT_COUNT: 0
MDC_IDC_STAT_EPISODE_RECENT_COUNT: 0
MDC_IDC_STAT_EPISODE_RECENT_COUNT_DTM_END: NORMAL
MDC_IDC_STAT_EPISODE_RECENT_COUNT_DTM_END: NORMAL
MDC_IDC_STAT_EPISODE_RECENT_COUNT_DTM_START: NORMAL
MDC_IDC_STAT_EPISODE_RECENT_COUNT_DTM_START: NORMAL
MDC_IDC_STAT_EPISODE_TOTAL_COUNT: 0
MDC_IDC_STAT_EPISODE_TOTAL_COUNT: 2
MDC_IDC_STAT_EPISODE_TOTAL_COUNT_DTM_END: NORMAL
MDC_IDC_STAT_EPISODE_TOTAL_COUNT_DTM_END: NORMAL
MDC_IDC_STAT_EPISODE_TOTAL_COUNT_DTM_START: NORMAL
MDC_IDC_STAT_EPISODE_TOTAL_COUNT_DTM_START: NORMAL
MDC_IDC_STAT_EPISODE_TYPE: NORMAL

## 2020-11-16 NOTE — PROGRESS NOTES
Surgical Office Location:  [facility]  [address] [place] [id number]

## 2020-11-19 ENCOUNTER — OFFICE VISIT (OUTPATIENT)
Dept: DERMATOLOGY | Facility: CLINIC | Age: 75
End: 2020-11-19
Payer: COMMERCIAL

## 2020-11-19 VITALS — OXYGEN SATURATION: 94 % | DIASTOLIC BLOOD PRESSURE: 64 MMHG | HEART RATE: 61 BPM | SYSTOLIC BLOOD PRESSURE: 116 MMHG

## 2020-11-19 DIAGNOSIS — D04.61 SQUAMOUS CELL CARCINOMA IN SITU (SCCIS) OF SKIN OF RIGHT FOREARM: Primary | ICD-10-CM

## 2020-11-19 PROCEDURE — 99207 PR NO CHARGE LOS: CPT | Performed by: DERMATOLOGY

## 2020-11-19 PROCEDURE — 17313 MOHS 1 STAGE T/A/L: CPT | Performed by: DERMATOLOGY

## 2020-11-19 NOTE — LETTER
11/19/2020         RE: James Salvador  [address] [place] [address]        Dear Colleague,    Thank you for referring your patient, James Salvador, to the [facility]. Please see a copy of my visit note below.    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   .  Patient states this has been present for a while.  Patient reports the following symptoms:  Not healing.  Patient reports the following previous treatments cryo.  These treatments did not work.  Patient reports the following modifying factors none.  Associated symptoms: none.  Patient has no other skin complaints today.  Remainder of the HPI, Meds, PMH, Allergies, FH, and SH was reviewed in chart.      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 Cardiac Cath 9/2015: BRIDGET to LAD     History of myocardial infarction 1994    PTCA     Hyperlipidemia LDL goal < 70      Malignant melanoma (H)      Melanoma (H) 10/15/2019    10/15/19 left zygoma     Mixed hyperlipidemia 6/21/2017     Morbid obesity due to excess calories (H) 1/11/2016     Paroxysmal supraventricular tachycardia (H)     on metoprolol     Permanent atrial fibrillation (H) 04/21/2017     Squamous cell carcinoma      Stable angina (H) 1/11/2016     Tachy-edinson syndrome (H) 5/29/2019    Added automatically from request for surgery [id number]       Past Surgical History:   Procedure Laterality Date     ANGIOPLASTY  1994    in [place]     ANKLE SURGERY  7/13/2005    right ankle     EP PERM PACER SINGLE LEAD N/A 5/31/2019     Medtronic single lead pacemaker     HEART CATH STENT COR W/WO PTCA  2015    BRIDGET stent mid LAD     JOINT REPLACEMENT, HIP RT/LT  10/14/2009    right hip      LASER SURGERY OF EYE  2002     MOHS MICROGRAPHIC PROCEDURE  2004    squamous cell carcinoma right temple     SINUS SURGERY  2006        Family History   Problem Relation Age of Onset     Genitourinary Problems Mother         renal failure     Cardiovascular Father         rupture of dorsal aorta     Depression Son      Skin Cancer No family hx of        Social History     Socioeconomic History     Marital status:      Spouse name: Not on file     Number of children: 3     Years of education: Not on file     Highest education level: Not on file   Occupational History     Employer: RETIRED   Social Needs     Financial resource strain: Not on file     Food insecurity     Worry: Not on file     Inability: Not on file     Transportation needs     Medical: Not on file     Non-medical: Not on file   Tobacco Use     Smoking status: Former Smoker     Packs/day: 0.00     Years: 10.00     Pack years: 0.00     Types: Cigarettes     Quit date: 1987     Years since quittin.9     Smokeless tobacco: Never Used   Substance and Sexual Activity     Alcohol use: Yes     Alcohol/week: 0.0 standard drinks     Comment: socially - x2-3 per month -  none currently     Drug use: No     Sexual activity: Yes     Partners: Female   Lifestyle     Physical activity     Days per week: Not on file     Minutes per session: Not on file     Stress: Not on file   Relationships     Social connections     Talks on phone: Not on file     Gets together: Not on file     Attends Christian service: Not on file     Active member of club or organization: Not on file     Attends meetings of clubs or organizations: Not on file     Relationship status: Not on file     Intimate partner violence     Fear of current or ex partner: Not on file     Emotionally abused: Not on  file     Physically abused: Not on file     Forced sexual activity: Not on file   Other Topics Concern     Parent/sibling w/ CABG, MI or angioplasty before 65F 55M? Not Asked      Service Not Asked     Blood Transfusions Not Asked     Caffeine Concern Yes     Comment: 2 big cups coffee daily     Occupational Exposure Not Asked     Hobby Hazards Not Asked     Sleep Concern No     Comment: sleeping better since shoulder replaced 11/3/16     Stress Concern No     Weight Concern Yes     Special Diet Yes     Comment: trying to do more lean meats     Back Care Not Asked     Exercise No     Comment: limited - knee     Bike Helmet Not Asked     Seat Belt Not Asked     Self-Exams Not Asked   Social History Narrative     Not on file       Outpatient Encounter Medications as of 11/19/2020   Medication Sig Dispense Refill     Acetaminophen (TYLENOL PO) Take 650 mg by mouth 4 times daily        amoxicillin (AMOXIL) 500 MG capsule 2,000 mg as needed When going to the dentist  0     apixaban ANTICOAGULANT (ELIQUIS ANTICOAGULANT) 5 MG tablet TAKE 1 TABLET BY MOUTH TWICE A DAY 60 tablet 5     ASPIRIN PO Take 81 mg by mouth daily       atorvastatin (LIPITOR) 80 MG tablet TAKE 1 TABLET BY MOUTH AT BEDTIME 90 tablet 3     carvedilol (COREG) 3.125 MG tablet Take 1 tablet (3.125 mg) by mouth 2 times daily (with meals) 180 tablet 3     ciclopirox (LOPROX) 0.77 % cream Apply topically At Bedtime 90 g 3     desonide (DESOWEN) 0.05 % external cream Apply sparingly to affected area on face in morning 60 g 0     Doxycycline Hyclate (PERIOSTAT PO) Take 20 mg by mouth 2 times daily       fluocinonide (LIDEX) 0.05 % external solution Apply topically 2 times daily 60 mL 4     fluocinonide (LIDEX) 0.05 % external solution Apply to scalp BID x 1-2 weeks PRN ([phone] Thanks) 60 mL 3     fluorouracil (EFUDEX) 5 % external cream Apply to scap BID x 3-4 weeks. Protect area  from the sun. 40 g 3     fluticasone (FLONASE) 50 MCG/ACT nasal spray SPRAY 2 SPRAYS INTO BOTH NOSTRILS DAILY 48 mL 1     furosemide (LASIX) 40 MG tablet Take 1 tablet (40 mg) by mouth daily 90 tablet 0     GABAPENTIN PO Take 600 mg by mouth 3 times daily        ketoconazole (NIZORAL) 2 % cream Apply topically 2 times daily For face. FAX REFILL REQUESTS TO [facility]: [phone] 30 g 2     ketoconazole (NIZORAL) 2 % external shampoo Use daily as needed 120 mL 11     ketotifen (ZADITOR/REFRESH ANTI-ITCH) 0.025 % SOLN Place 1-2 drops into both eyes 2 times daily as needed for itching       lisinopril (ZESTRIL) 30 MG tablet TAKE 1 TABLET BY MOUTH EVERY DAY 90 tablet 0     loratadine (CLARITIN) 10 MG tablet Take 10 mg by mouth daily as needed for allergies       Multiple Vitamin (MULTIVITAMIN OR) Take 1 tablet by mouth daily        nitroglycerin (NITROSTAT) 0.4 MG SL tablet Place 1 tablet (0.4 mg) under the tongue every 5 minutes as needed for chest pain May repeat twice for a total of 3 tablets.  If chest pain not relieved, call 911 25 tablet 11     Omega-3 Fatty Acids (OMEGA-3 FISH OIL PO) Take 3.2 g by mouth daily        OXcarbazepine (TRILEPTAL PO) Take 300 mg by mouth 3 times daily        No facility-administered encounter medications on file as of 11/19/2020.              Review Of Systems  Skin: As above  Eyes: negative  Ears/Nose/Throat: negative  Respiratory: No shortness of breath, dyspnea on exertion, cough, or hemoptysis  Cardiovascular: negative  Gastrointestinal: negative  Genitourinary: negative  Musculoskeletal: negative  Neurologic: negative  Psychiatric: negative  Hematologic/Lymphatic/Immunologic: negative  Endocrine: negative      O:   NAD, WDWN, Alert & Oriented, Mood & Affect wnl, Vitals stable   Here today alone   /64   Pulse 61   SpO2 94%    General appearance normal   Vitals stable   Alert, oriented and in no acute distress     R forearm 1.2cm ulcerated scaly papule       Eyes:  Conjunctivae/lids:Normal     ENT: Lips, buccal mucosa, tongue: normal    MSK:Normal    Cardiovascular: peripheral edema none    Pulm: Breathing Normal    Neuro/Psych: Orientation:Alert and Orientedx3 ; Mood/Affect:normal       A/P:  1. R forearm squamous cell carcinoma in situ   MOHS:   Size    The rationale for Mohs surgery was discussed with the patient and consent was obtained.  The risks and benefits as well as alternatives to therapy were discussed, in detail.  Specifically, the risks of infection, scarring, bleeding, prolonged wound healing, incomplete removal, allergy to anesthesia, nerve injury and recurrence were addressed.  Indication for Mohs was Size. Prior to the procedure, the treatment site was clearly identified and, if available, confirmed with previous photos and confirmed by the patient   All components of the Universal Protocol/PAUSE rule were completed.  The Mohs surgeon operated in two distinct and integrated capacities as the surgeon and pathologist.      The area was prepped with Betasept.  A rim of normal appearing skin was marked circumferentially around the lesion.  The area was infiltrated with local anesthesia.  The tumor was first debulked to remove all clinically apparent tumor.  An incision following the standard Mohs approach was done and the specimen was oriented,mapped and placed in 1 block(s).  Each specimen was then chromacoded and processed in the Mohs laboratory using standard Mohs technique and submitted for frozen section histology.  Frozen section analysis showed no residual tumor but CLEAR MARGINS.      The tumor was excised using standard Mohs technique in 1 stages(s).  CLEAR MARGINS OBTAINED and Final defect size was 1.7 cm.     We discussed the options for wound management in full with the patient including risks/benefits/ possible outcomes.        REPAIR SECOND INTENT: We discussed the options for wound management in full with the patient including  risks/benefits/possible outcomes. Decision made to allow the wound to heal by second intention. EBL minimal; complications none; wound care routine.  The patient was discharged in good condition and will return in one month or prn for wound evaluation.    It was a pleasure speaking to James Salvador today.  BENIGN LESIONS DISCUSSED WITH PATIENT:  I discussed the specifics of tumor, prognosis, and genetics of benign lesions.  I explained that treatment of these lesions would be purely cosmetic and not medically neccessary.  I discussed with patient different removal options including excision, cautery and /or laser.      Nature and genetics of benign skin lesions dicussed with patient.  Signs and Symptoms of skin cancer discussed with patient.  Patient encouraged to perform monthly skin exams.  UV precautions reviewed with patient.  Skin care regimen reviewed with patient: Eliminate harsh soaps, i.e. Dial, zest, irsih spring; Mild soaps such as Cetaphil or Dove sensitive skin, avoid hot or cold showers, aggressive use of emollients including vanicream, cetaphil or cerave discussed with patient.    Risks of non-melanoma skin cancer discussed with patient   Return to clinic 6 months        Again, thank you for allowing me to participate in the care of your patient.        Sincerely,        Jv Dutta MD

## 2020-11-19 NOTE — NURSING NOTE
"Initial /64   Pulse 61   SpO2 94%  Estimated body mass index is 45.75 kg/m  as calculated from the following:    Height as of 9/18/20: 1.803 m (5' 11\").    Weight as of 9/18/20: 148.8 kg (328 lb). .    MIGEL Carmen-BSN-N  [facility]  [phone]  "

## 2020-11-19 NOTE — PROGRESS NOTES
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   .  Patient states this has been present for a while.  Patient reports the following symptoms:  Not healing.  Patient reports the following previous treatments cryo.  These treatments did not work.  Patient reports the following modifying factors none.  Associated symptoms: none.  Patient has no other skin complaints today.  Remainder of the HPI, Meds, PMH, Allergies, FH, and SH was reviewed in chart.      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 Cardiac Cath 9/2015: BRIDGET to LAD     History of myocardial infarction 1994    PTCA     Hyperlipidemia LDL goal < 70      Malignant melanoma (H)      Melanoma (H) 10/15/2019    10/15/19 left zygoma     Mixed hyperlipidemia 6/21/2017     Morbid obesity due to excess calories (H) 1/11/2016     Paroxysmal supraventricular tachycardia (H)     on metoprolol     Permanent atrial fibrillation (H) 04/21/2017     Squamous cell carcinoma      Stable angina (H) 1/11/2016     Tachy-edinson syndrome (H) 5/29/2019    Added automatically from request for surgery [id number]       Past Surgical History:   Procedure Laterality Date     ANGIOPLASTY  1994    in [place]     ANKLE SURGERY  7/13/2005    right ankle     EP PERM PACER SINGLE LEAD N/A 5/31/2019    Medtronic single lead pacemaker     HEART CATH STENT COR W/WO PTCA  9/23/2015    BRIDGET stent mid LAD     JOINT REPLACEMENT, HIP RT/LT  10/14/2009    right hip      LASER SURGERY OF EYE  06/01/2002     MOHS MICROGRAPHIC PROCEDURE  06/12/2004    squamous cell carcinoma right temple     SINUS SURGERY  7/11/2006        Family History   Problem Relation Age of Onset      Genitourinary Problems Mother         renal failure     Cardiovascular Father         rupture of dorsal aorta     Depression Son      Skin Cancer No family hx of        Social History     Socioeconomic History     Marital status:      Spouse name: Not on file     Number of children: 3     Years of education: Not on file     Highest education level: Not on file   Occupational History     Employer: RETIRED   Social Needs     Financial resource strain: Not on file     Food insecurity     Worry: Not on file     Inability: Not on file     Transportation needs     Medical: Not on file     Non-medical: Not on file   Tobacco Use     Smoking status: Former Smoker     Packs/day: 0.00     Years: 10.00     Pack years: 0.00     Types: Cigarettes     Quit date: 1987     Years since quittin.9     Smokeless tobacco: Never Used   Substance and Sexual Activity     Alcohol use: Yes     Alcohol/week: 0.0 standard drinks     Comment: socially - x2-3 per month -  none currently     Drug use: No     Sexual activity: Yes     Partners: Female   Lifestyle     Physical activity     Days per week: Not on file     Minutes per session: Not on file     Stress: Not on file   Relationships     Social connections     Talks on phone: Not on file     Gets together: Not on file     Attends Holiness service: Not on file     Active member of club or organization: Not on file     Attends meetings of clubs or organizations: Not on file     Relationship status: Not on file     Intimate partner violence     Fear of current or ex partner: Not on file     Emotionally abused: Not on file     Physically abused: Not on file     Forced sexual activity: Not on file   Other Topics Concern     Parent/sibling w/ CABG, MI or angioplasty before 65F 55M? Not Asked      Service Not Asked     Blood Transfusions Not Asked     Caffeine Concern Yes     Comment: 2 big cups coffee daily     Occupational Exposure Not Asked     Hobby Hazards Not Asked      Sleep Concern No     Comment: sleeping better since shoulder replaced 11/3/16     Stress Concern No     Weight Concern Yes     Special Diet Yes     Comment: trying to do more lean meats     Back Care Not Asked     Exercise No     Comment: limited - knee     Bike Helmet Not Asked     Seat Belt Not Asked     Self-Exams Not Asked   Social History Narrative     Not on file       Outpatient Encounter Medications as of 11/19/2020   Medication Sig Dispense Refill     Acetaminophen (TYLENOL PO) Take 650 mg by mouth 4 times daily        amoxicillin (AMOXIL) 500 MG capsule 2,000 mg as needed When going to the dentist  0     apixaban ANTICOAGULANT (ELIQUIS ANTICOAGULANT) 5 MG tablet TAKE 1 TABLET BY MOUTH TWICE A DAY 60 tablet 5     ASPIRIN PO Take 81 mg by mouth daily       atorvastatin (LIPITOR) 80 MG tablet TAKE 1 TABLET BY MOUTH AT BEDTIME 90 tablet 3     carvedilol (COREG) 3.125 MG tablet Take 1 tablet (3.125 mg) by mouth 2 times daily (with meals) 180 tablet 3     ciclopirox (LOPROX) 0.77 % cream Apply topically At Bedtime 90 g 3     desonide (DESOWEN) 0.05 % external cream Apply sparingly to affected area on face in morning 60 g 0     Doxycycline Hyclate (PERIOSTAT PO) Take 20 mg by mouth 2 times daily       fluocinonide (LIDEX) 0.05 % external solution Apply topically 2 times daily 60 mL 4     fluocinonide (LIDEX) 0.05 % external solution Apply to scalp BID x 1-2 weeks PRN ([phone] Thanks) 60 mL 3     fluorouracil (EFUDEX) 5 % external cream Apply to scap BID x 3-4 weeks. Protect area from the sun. 40 g 3     fluticasone (FLONASE) 50 MCG/ACT nasal spray SPRAY 2 SPRAYS INTO BOTH NOSTRILS DAILY 48 mL 1     furosemide (LASIX) 40 MG tablet Take 1 tablet (40 mg) by mouth daily 90 tablet 0     GABAPENTIN PO Take 600 mg by mouth 3 times daily        ketoconazole (NIZORAL) 2 % cream Apply topically 2 times daily For face. FAX REFILL REQUESTS TO [facility]:  [phone] 30 g 2     ketoconazole (NIZORAL) 2 % external shampoo Use daily as needed 120 mL 11     ketotifen (ZADITOR/REFRESH ANTI-ITCH) 0.025 % SOLN Place 1-2 drops into both eyes 2 times daily as needed for itching       lisinopril (ZESTRIL) 30 MG tablet TAKE 1 TABLET BY MOUTH EVERY DAY 90 tablet 0     loratadine (CLARITIN) 10 MG tablet Take 10 mg by mouth daily as needed for allergies       Multiple Vitamin (MULTIVITAMIN OR) Take 1 tablet by mouth daily        nitroglycerin (NITROSTAT) 0.4 MG SL tablet Place 1 tablet (0.4 mg) under the tongue every 5 minutes as needed for chest pain May repeat twice for a total of 3 tablets.  If chest pain not relieved, call 911 25 tablet 11     Omega-3 Fatty Acids (OMEGA-3 FISH OIL PO) Take 3.2 g by mouth daily        OXcarbazepine (TRILEPTAL PO) Take 300 mg by mouth 3 times daily        No facility-administered encounter medications on file as of 11/19/2020.              Review Of Systems  Skin: As above  Eyes: negative  Ears/Nose/Throat: negative  Respiratory: No shortness of breath, dyspnea on exertion, cough, or hemoptysis  Cardiovascular: negative  Gastrointestinal: negative  Genitourinary: negative  Musculoskeletal: negative  Neurologic: negative  Psychiatric: negative  Hematologic/Lymphatic/Immunologic: negative  Endocrine: negative      O:   NAD, WDWN, Alert & Oriented, Mood & Affect wnl, Vitals stable   Here today alone   /64   Pulse 61   SpO2 94%    General appearance normal   Vitals stable   Alert, oriented and in no acute distress     R forearm 1.2cm ulcerated scaly papule       Eyes: Conjunctivae/lids:Normal     ENT: Lips, buccal mucosa, tongue: normal    MSK:Normal    Cardiovascular: peripheral edema none    Pulm: Breathing Normal    Neuro/Psych: Orientation:Alert and Orientedx3 ; Mood/Affect:normal       A/P:  1. R forearm squamous cell carcinoma in situ   MOHS:   Size    The rationale for Mohs surgery was discussed with the patient and consent was  obtained.  The risks and benefits as well as alternatives to therapy were discussed, in detail.  Specifically, the risks of infection, scarring, bleeding, prolonged wound healing, incomplete removal, allergy to anesthesia, nerve injury and recurrence were addressed.  Indication for Mohs was Size. Prior to the procedure, the treatment site was clearly identified and, if available, confirmed with previous photos and confirmed by the patient   All components of the Universal Protocol/PAUSE rule were completed.  The Mohs surgeon operated in two distinct and integrated capacities as the surgeon and pathologist.      The area was prepped with Betasept.  A rim of normal appearing skin was marked circumferentially around the lesion.  The area was infiltrated with local anesthesia.  The tumor was first debulked to remove all clinically apparent tumor.  An incision following the standard Mohs approach was done and the specimen was oriented,mapped and placed in 1 block(s).  Each specimen was then chromacoded and processed in the Mohs laboratory using standard Mohs technique and submitted for frozen section histology.  Frozen section analysis showed no residual tumor but CLEAR MARGINS.      The tumor was excised using standard Mohs technique in 1 stages(s).  CLEAR MARGINS OBTAINED and Final defect size was 1.7 cm.     We discussed the options for wound management in full with the patient including risks/benefits/ possible outcomes.        REPAIR SECOND INTENT: We discussed the options for wound management in full with the patient including risks/benefits/possible outcomes. Decision made to allow the wound to heal by second intention. EBL minimal; complications none; wound care routine.  The patient was discharged in good condition and will return in one month or prn for wound evaluation.    It was a pleasure speaking to James Salvador today.  BENIGN LESIONS DISCUSSED WITH PATIENT:  I discussed the specifics of tumor,  prognosis, and genetics of benign lesions.  I explained that treatment of these lesions would be purely cosmetic and not medically neccessary.  I discussed with patient different removal options including excision, cautery and /or laser.      Nature and genetics of benign skin lesions dicussed with patient.  Signs and Symptoms of skin cancer discussed with patient.  Patient encouraged to perform monthly skin exams.  UV precautions reviewed with patient.  Skin care regimen reviewed with patient: Eliminate harsh soaps, i.e. Dial, zest, irsih spring; Mild soaps such as Cetaphil or Dove sensitive skin, avoid hot or cold showers, aggressive use of emollients including vanicream, cetaphil or cerave discussed with patient.    Risks of non-melanoma skin cancer discussed with patient   Return to clinic 6 months

## 2020-11-30 DIAGNOSIS — I48.20 CHRONIC ATRIAL FIBRILLATION (H): ICD-10-CM

## 2020-11-30 RX ORDER — FUROSEMIDE 40 MG
40 TABLET ORAL DAILY
Qty: 90 TABLET | Refills: 0 | Status: SHIPPED | OUTPATIENT
Start: 2020-11-30 | End: 2021-02-24

## 2020-11-30 NOTE — TELEPHONE ENCOUNTER
Patient asked that the lasix be transferred to Dr Buchanan to fill.  90 days sent and pt will need f/u with Dr Buchanan 9/8/21 per Dr Buchanan note. [email or web address]

## 2020-12-14 ENCOUNTER — HEALTH MAINTENANCE LETTER (OUTPATIENT)
Age: 75
End: 2020-12-14

## 2020-12-17 DIAGNOSIS — I10 BENIGN HYPERTENSION: ICD-10-CM

## 2020-12-17 RX ORDER — LISINOPRIL 30 MG/1
TABLET ORAL
Qty: 90 TABLET | Refills: 0 | Status: SHIPPED | OUTPATIENT
Start: 2020-12-17 | End: 2021-02-10

## 2021-01-20 ENCOUNTER — TELEPHONE (OUTPATIENT)
Dept: INTERNAL MEDICINE | Facility: CLINIC | Age: 76
End: 2021-01-20

## 2021-01-20 DIAGNOSIS — I10 BENIGN HYPERTENSION: ICD-10-CM

## 2021-01-20 DIAGNOSIS — E78.5 HYPERLIPIDEMIA WITH TARGET LDL LESS THAN 70: Primary | ICD-10-CM

## 2021-01-20 NOTE — TELEPHONE ENCOUNTER
Reason for Call:  Other call back    Detailed comments: PT requesting his PCP put in a cholesterol order for him before his physical 01/28. (Please call pt when order is placed so he can schedule before seeing his PCP)     Phone Number Patient can be reached at: Cell number on file:    Telephone Information:   Mobile [phone]       Best Time: Anytime    Can we leave a detailed message on this number? YES    Call taken on 1/20/2021 at 4:36 PM by Lauren Chavez

## 2021-01-22 DIAGNOSIS — I48.91 NEW ONSET ATRIAL FIBRILLATION (H): ICD-10-CM

## 2021-01-22 RX ORDER — APIXABAN 5 MG/1
TABLET, FILM COATED ORAL
Qty: 60 TABLET | Refills: 5 | Status: SHIPPED | OUTPATIENT
Start: 2021-01-22 | End: 2021-02-03

## 2021-01-26 DIAGNOSIS — I25.10 ASCVD (ARTERIOSCLEROTIC CARDIOVASCULAR DISEASE): ICD-10-CM

## 2021-01-27 ENCOUNTER — TELEPHONE (OUTPATIENT)
Dept: CARDIOLOGY | Facility: CLINIC | Age: 76
End: 2021-01-27

## 2021-01-27 NOTE — TELEPHONE ENCOUNTER
Received call from patient stating that he was wondering if it was okay to use his iPhone with his pacemaker.  Reassured patient that he could use his iPhone and that we have not seen any interference with the use of it since his pacemaker was implanted on 5/31/19.    Patient also inquired if he had a defibrillator.  Explained that patient did not have a defibrillator and only had the pacemaker function on his device.    Patient was appreciative of information and will call with any further questions.    MIGEL Doyle

## 2021-01-29 RX ORDER — ATORVASTATIN CALCIUM 80 MG/1
TABLET, FILM COATED ORAL
Qty: 90 TABLET | Refills: 0 | Status: SHIPPED | OUTPATIENT
Start: 2021-01-29 | End: 2021-02-10

## 2021-01-29 ASSESSMENT — ACTIVITIES OF DAILY LIVING (ADL): CURRENT_FUNCTION: NO ASSISTANCE NEEDED

## 2021-01-31 ENCOUNTER — IMMUNIZATION (OUTPATIENT)
Dept: NURSING | Facility: CLINIC | Age: 76
End: 2021-01-31
Payer: COMMERCIAL

## 2021-01-31 PROCEDURE — 91300 PR COVID VAC PFIZER DIL RECON 30 MCG/0.3 ML IM: CPT

## 2021-01-31 PROCEDURE — 0001A PR COVID VAC PFIZER DIL RECON 30 MCG/0.3 ML IM: CPT

## 2021-02-03 ENCOUNTER — OFFICE VISIT (OUTPATIENT)
Dept: INTERNAL MEDICINE | Facility: CLINIC | Age: 76
End: 2021-02-03
Payer: COMMERCIAL

## 2021-02-03 VITALS
TEMPERATURE: 97.9 F | HEIGHT: 71 IN | DIASTOLIC BLOOD PRESSURE: 78 MMHG | SYSTOLIC BLOOD PRESSURE: 132 MMHG | HEART RATE: 74 BPM | OXYGEN SATURATION: 96 % | WEIGHT: 315 LBS | BODY MASS INDEX: 44.1 KG/M2

## 2021-02-03 DIAGNOSIS — N52.9 IMPOTENCE OF ORGANIC ORIGIN: ICD-10-CM

## 2021-02-03 DIAGNOSIS — I10 BENIGN HYPERTENSION: ICD-10-CM

## 2021-02-03 DIAGNOSIS — E66.01 MORBID OBESITY DUE TO EXCESS CALORIES (H): ICD-10-CM

## 2021-02-03 DIAGNOSIS — Z00.00 ENCOUNTER FOR MEDICARE ANNUAL WELLNESS EXAM: Primary | ICD-10-CM

## 2021-02-03 DIAGNOSIS — I48.91 NEW ONSET ATRIAL FIBRILLATION (H): ICD-10-CM

## 2021-02-03 DIAGNOSIS — I25.10 ASCVD (ARTERIOSCLEROTIC CARDIOVASCULAR DISEASE): ICD-10-CM

## 2021-02-03 DIAGNOSIS — Z79.01 CHRONIC ANTICOAGULATION: ICD-10-CM

## 2021-02-03 PROCEDURE — 99214 OFFICE O/P EST MOD 30 MIN: CPT | Mod: 25 | Performed by: INTERNAL MEDICINE

## 2021-02-03 PROCEDURE — 99397 PER PM REEVAL EST PAT 65+ YR: CPT | Performed by: INTERNAL MEDICINE

## 2021-02-03 RX ORDER — SILDENAFIL 50 MG/1
50-100 TABLET, FILM COATED ORAL DAILY PRN
Qty: 10 TABLET | Refills: 11 | Status: SHIPPED | OUTPATIENT
Start: 2021-02-03 | End: 2021-08-04

## 2021-02-03 ASSESSMENT — ACTIVITIES OF DAILY LIVING (ADL): CURRENT_FUNCTION: NO ASSISTANCE NEEDED

## 2021-02-03 ASSESSMENT — MIFFLIN-ST. JEOR: SCORE: 2197.3

## 2021-02-03 NOTE — PROGRESS NOTES
"SUBJECTIVE:   James Salvador is a 75 year old male who presents for Preventive Visit.    Patient has been advised of split billing requirements and indicates understanding: Yes   Are you in the first 12 months of your Medicare coverage?  No    Healthy Habits:     In general, how would you rate your overall health?  Good    Frequency of exercise:  2-3 days/week    Duration of exercise:  Less than 15 minutes    Do you usually eat at least 4 servings of fruit and vegetables a day, include whole grains    & fiber and avoid regularly eating high fat or \"junk\" foods?  No    Taking medications regularly:  Yes    Medication side effects:  None    Ability to successfully perform activities of daily living:  No assistance needed    Home Safety:  Lack of grab bars in the bathroom    Hearing Impairment:  No hearing concerns    In the past 6 months, have you been bothered by leaking of urine? Yes    In general, how would you rate your overall mental or emotional health?  Good      PHQ-2 Total Score: 0    Additional concerns today:  Yes (Discuss ongoing L knee pain from fall 10/2020, does see ortho, planning on seeing 03/15/2021, mild tremors, short term memory loss, change eliquis to 3 month supply)      Do you feel safe in your environment? YES    Have you ever done Advance Care Planning? (For example, a Health Directive, POLST, or a discussion with a medical provider or your loved ones about your wishes): Yes, advance care planning is on file.       Fall risk  Fallen 2 or more times in the past year?: Yes  Any fall with injury in the past year?: Yes  Timed Up and Go Test (>13.5 is fall risk; contact physician) : 16    Cognitive Screening   1) Repeat 3 items (Leader, Season, Table)    2) Clock draw: NORMAL  3) 3 item recall: Recalls 3 objects  Results: 3 items recalled: COGNITIVE IMPAIRMENT LESS LIKELY    Mini-CogTM Copyright PAOLO Casas. Licensed by the author for use in [facility]; reprinted with permission " (cordelia@[email or web address]). All rights reserved.      Do you have sleep apnea, excessive snoring or daytime drowsiness?: no    Reviewed and updated as needed this visit by clinical staff  Tobacco  Allergies  Meds   Med Hx  Surg Hx  Fam Hx  Soc Hx        Reviewed and updated as needed this visit by Provider                Social History     Tobacco Use     Smoking status: Former Smoker     Packs/day: 0.00     Years: 10.00     Pack years: 0.00     Types: Cigarettes     Quit date: 1987     Years since quittin.1     Smokeless tobacco: Never Used   Substance Use Topics     Alcohol use: Yes     Alcohol/week: 0.0 standard drinks     Comment: socially - x2-3 per month -  none currently     If you drink alcohol do you typically have >3 drinks per day or >7 drinks per week? No    No flowsheet data found.            Current providers sharing in care for this patient include:   Patient Care Team:  Jeronimo Painter MD as PCP - General (Internal Medicine)  Jeronimo Painter MD as Assigned PCP  Jv Dutta MD as Assigned Surgical Provider  Aquilino Buchanan MD as Assigned Heart and Vascular Provider    The following health maintenance items are reviewed in Epic and correct as of today:  Health Maintenance   Topic Date Due     COVID-19 Vaccine (2 of 2 - Pfizer series) 2021     MEDICARE ANNUAL WELLNESS VISIT  2022     FALL RISK ASSESSMENT  2022     BMP  2022     LIPID  2022     COLORECTAL CANCER SCREENING  2022     DTAP/TDAP/TD IMMUNIZATION (2 - Td) 10/02/2022     ADVANCE CARE PLANNING  2026     HEPATITIS C SCREENING  Completed     PHQ-2  Completed     INFLUENZA VACCINE  Completed     Pneumococcal Vaccine: 65+ Years  Completed     ZOSTER IMMUNIZATION  Completed     AORTIC ANEURYSM SCREENING (SYSTEM ASSIGNED)  Completed     Pneumococcal Vaccine: Pediatrics (0 to 5 Years) and At-Risk Patients (6 to 64 Years)  Aged Out     IPV IMMUNIZATION  Aged Out     MENINGITIS  "IMMUNIZATION  Aged Out     HEPATITIS B IMMUNIZATION  Aged Out         Review of Systems      OBJECTIVE:   /78   Pulse 74   Temp 97.9  F (36.6  C) (Temporal)   Ht 1.803 m (5' 11\")   Wt 144 kg (317 lb 8 oz)   SpO2 96%   BMI 44.28 kg/m   Estimated body mass index is 44.28 kg/m  as calculated from the following:    Height as of this encounter: 1.803 m (5' 11\").    Weight as of this encounter: 144 kg (317 lb 8 oz).  Physical Exam  GENERAL APPEARANCE: alert, no distress and over weight  HENT: nose and mouth without ulcers or lesions and normal cephalic/atraumatic  NECK: no adenopathy, no asymmetry, masses, or scars and thyroid normal to palpation  RESP: lungs clear to auscultation - no rales, rhonchi or wheezes  CV: regular rates and rhythm, normal S1 S2, no S3 or S4 and no murmur, click or rub    Labs reviewed in Epic    ASSESSMENT / PLAN:   1. Encounter for Medicare annual wellness exam  Updated screening, immunizations, prevention.  Please see health maintenance list, care gaps     2. Chronic anticoagulation  - CBC with platelets; Future    3. Impotence of organic origin  - sildenafil (VIAGRA) 50 MG tablet; Take 1-2 tablets ( mg) by mouth daily as needed (ED)  Dispense: 10 tablet; Refill: 11    4. New onset atrial fibrillation (H)  - apixaban ANTICOAGULANT (ELIQUIS ANTICOAGULANT) 5 MG tablet; TAKE 1 TABLET BY MOUTH TWICE A DAY  Dispense: 180 tablet; Refill: 3    5. Morbid obesity due to excess calories (H)  Weight loss      Patient has been advised of split billing requirements and indicates understanding: Yes  COUNSELING:  Reviewed preventive health counseling, as reflected in patient instructions    Estimated body mass index is 44.28 kg/m  as calculated from the following:    Height as of this encounter: 1.803 m (5' 11\").    Weight as of this encounter: 144 kg (317 lb 8 oz).    Weight management plan: Discussed healthy diet and exercise guidelines    He reports that he quit smoking about 34 years " ago. His smoking use included cigarettes. He smoked 0.00 packs per day for 10.00 years. He has never used smokeless tobacco.      Appropriate preventive services were discussed with this patient, including applicable screening as appropriate for cardiovascular disease, diabetes, osteopenia/osteoporosis, and glaucoma.  As appropriate for age/gender, discussed screening for colorectal cancer, prostate cancer, breast cancer, and cervical cancer. Checklist reviewing preventive services available has been given to the patient.    Reviewed patients plan of care and provided an AVS. The Basic Care Plan (routine screening as documented in Health Maintenance) for James meets the Care Plan requirement. This Care Plan has been established and reviewed with the Patient.    Counseling Resources:  ATP IV Guidelines  Pooled Cohorts Equation Calculator  Breast Cancer Risk Calculator  Breast Cancer: Medication to Reduce Risk  FRAX Risk Assessment  ICSI Preventive Guidelines  Dietary Guidelines for Americans, 2010  [employer] MyPlate  ASA Prophylaxis  Lung CA Screening    Jeronimo Painter MD  [facility]    Identified Health Risks:    The patient was counseled and encouraged to consider modifying their diet and eating habits. He was provided with information on recommended healthy diet options.  Information on urinary incontinence and treatment options given to patient.  He is at risk for falling and has been provided with information to reduce the risk of falling at home.

## 2021-02-03 NOTE — PATIENT INSTRUCTIONS
James can engage in intercourse.    At your visit today, we discussed your risk for falls and preventive options.    Fall Prevention  Falls often occur due to slipping, tripping or losing your balance. Millions of people fall every year and injure themselves. Here are ways to reduce your risk of falling again.     Think about your fall, was there anything that caused your fall that can be fixed, removed, or replaced?    Make your home safe by keeping walkways clear of objects you may trip over, such as electric cords.    Use non-slip pads under rugs. Don't use area rugs or small throw rugs.    Use non-slip mats in bathtubs and showers.    Install handrails and lights on staircases. The handrails should be on both sides of the stairs.    Don't walk in poorly lit areas.    Don't stand on chairs or wobbly ladders.    Use caution when reaching overhead or looking upward. This position can cause a loss of balance.    Be sure your shoes fit properly, have non-slip bottoms and are in good condition.     Wear shoes both inside and out. Don't go barefoot or wear slippers.    Be cautious when going up and down stairs, curbs, and when walking on uneven sidewalks.    If your balance is poor, consider using a cane or walker.    If your fall was related to alcohol use, stop or limit alcohol intake.     If your fall was related to use of sleeping medicines, talk to your healthcare provider about this. You may need to reduce your dosage at bedtime if you awaken during the night to go to the bathroom.      To reduce the need for nighttime bathroom trips:  ? Don't drink fluids for several hours before going to bed  ? Empty your bladder before going to bed  ? Men can keep a urinal at the bedside    Stay as active as you can. Balance, flexibility, strength, and endurance all come from exercise. They all play a role in preventing falls. Ask your healthcare provider which types of activity are right for you.    Get your vision checked on  a regular basis.    If you have pets, know where they are before you stand up or walk so you don't trip over them.    Use night lights.    Go over all your medicines with a pharmacist or other healthcare provider to see if any of them could make you more likely to fall.  Lena last reviewed this educational content on 4/1/2018 2000-2020 [employer]. [address] [place] [address], [place] [address]. All rights reserved. This information is not intended as a substitute for professional medical care. Always follow your healthcare professional's instructions.        Patient Education   Personalized Prevention Plan  You are due for the preventive services outlined below.  Your care team is available to assist you in scheduling these services.  If you have already completed any of these items, please share that information with your care team to update in your medical record.  Health Maintenance Due   Topic Date Due     Cholesterol Lab  01/23/2021     FALL RISK ASSESSMENT  01/30/2021       Understanding USDA MyPlate  The USDA has guidelines to help you make healthy food choices. These are called MyPlate. MyPlate shows the food groups that make up healthy meals using the image of a place setting. Before you eat, think about the healthiest choices for what to put on your plate or in your cup or bowl. To learn more about building a healthy plate, visit www.choosemyplate.gov.     The food groups    Fruits. Any fruit or 100% fruit juice counts as part of the Fruit Group. Fruits may be fresh, canned, frozen, or dried, and may be whole, cut-up, or pureed. Make 1/2 of your plate fruits and vegetables.    Vegetables. Any vegetable or 100% vegetable juice counts as a member of the Vegetable Group. Vegetables may be fresh, frozen, canned, or dried. They can be served raw or cooked and may be whole, cut-up, or mashed. Make 1/2 of your plate fruits and vegetables.    Grains. All foods made from grains are part of the  Grains Group. These include wheat, rice, oats, cornmeal, and barley. Grains are often used to make foods such as bread, pasta, oatmeal, cereal, tortillas, and grits. Grains should be no more than 1/4 of your plate. At least half of your grains should be whole grains.    Protein. This group includes meat, poultry, seafood, beans and peas, eggs, processed soy products (such as tofu), nuts (including nut butters), and seeds. Make protein choices no more than 1/4 of your plate. Meat and poultry choices should be lean or low fat.    Dairy. The Dairy Group includes all fluid milk products and foods made from milk that contain calcium, such as yogurt and cheese. (Foods that have little calcium, such as cream, butter, and cream cheese, are not part of this group.) Most dairy choices should be low-fat or fat-free.    Oils. Oils aren't a food group, but they do contain essential nutrients. However it's important to watch your intake of oils. These are fats that are liquid at room temperature. They include canola, corn, olive, soybean, vegetable, and sunflower oil. Foods that are mainly oil include mayonnaise, certain salad dressings, and soft margarines. You likely already get your daily oil allowance from the foods you eat.  Things to limit  Eating healthy also means limiting these things in your diet:    Salt (sodium). Many processed foods have a lot of sodium. To keep sodium intake down, eat fresh vegetables, meats, poultry, and seafood when possible. Purchase low-sodium, reduced-sodium, or no-salt-added food products at the store. And don't add salt to your meals at home. Instead, season them with herbs and spices such as dill, oregano, cumin, and paprika. Or try adding flavor with lemon or lime zest and juice.    Saturated fat. Saturated fats are most often found in animal products such as beef, pork, and chicken. They are often solid at room temperature, such as butter. To reduce your saturated fat intake, choose leaner  cuts of meat and poultry. And try healthier cooking methods such as grilling, broiling, roasting, or baking. For a simple lower-fat swap, use plain nonfat yogurt instead of mayonnaise when making potato salad or macaroni salad.    Added sugars. These are sugars added to foods. They are in foods such as ice cream, candy, soda, fruit drinks, sports drinks, energy drinks, cookies, pastries, jams, and syrups. Cut down on added sugars by sharing sweet treats with a family member or friend. You can also choose fruit for dessert, and drink water or other unsweetened beverages.  StayWell last reviewed this educational content on 6/1/2020 2000-2020 [employer]. [address] [place] [address], [place] [address]. All rights reserved. This information is not intended as a substitute for professional medical care. Always follow your healthcare professional's instructions.          Urinary Incontinence (Male)    Urinary incontinence means not being able to control the release of urine from the bladder.   Causes  Common causes of urinary incontinence in men include:    Infection    Certain medicines    Aging    Poor pelvic muscle tone    Bladder spasms    Obesity    Trouble urinating and fully emptying the bladder (urinary retention)  Other things that can cause incontinence are:     Nervous system diseases    Diabetes    Sleep apnea    Urinary tract infections    Prostate surgery    Pelvic injury  Constipation and smoking have also been identified as risk factors.   Symptoms    Urge incontinence (overactive bladder). This is a sudden urge to urinate. It occurs even though there may not be much urine in the bladder. The need to urinate often during the night is common. It's due to bladder spasms.    Stress incontinence. This is urine leakage that you can't control. It can occur with sneezing, coughing, and other actions that put stress on the bladder.    Treatment  Treatment depends on what is causing the condition.  Bladder infections are treated with antibiotics. Urinary retention is treated with a bladder catheter.   Home care  Follow these guidelines when caring for yourself at home:    Don't have any foods and drinks that may irritate the bladder. This includes:  ? Chocolate  ? Alcohol  ? Caffeine  ? Carbonated drinks  ? Acidic fruits and juices    Limit fluids to 6 to 8 cups a day.    Lose weight if you are overweight. This will reduce your symptoms.    If advised, do regular pelvic muscle-strengthening exercises such as Kegel exercises.    If needed, wear absorbent pads to catch urine. Change the pads often. This is for good hygiene and to prevent skin and bladder infections.    Bathe daily for good hygiene.    If an antibiotic was prescribed to treat a bladder infection, take it until it's finished. Keep taking it even if you are feeling better. This is to make sure your infection has cleared.    If a catheter was left in place, keep bacteria from getting into the collection bag. Don't disconnect the catheter from the collection bag.    Use a leg band to secure the catheter drainage tube, so it does not pull on the catheter. Drain the collection bag when it becomes full. To do this, use the drain spout at the bottom of the bag. Don't disconnect the bag from the catheter.    Don't pull on or try to remove a catheter. The catheter must be removed by a healthcare provider.    If you smoke, stop. Ask your provider for help if you can't do this on your own.  Follow-up care  Follow up with your healthcare provider, or as advised.  When to get medical advice  Call your healthcare provider right away if any of these occur:    Fever over 100.4 F (38 C), or as directed by your provider    Bladder pain or fullness    Belly swelling, nausea, or vomiting    Back pain    Weakness, dizziness, or fainting    If a catheter was left in place, return if:  ? The catheter falls out  ? The catheter stops draining for 6 hours  ? Your urine  gets cloudy or smells bad  Lena last reviewed this educational content on 1/1/2020 2000-2020 [employer]. [address] [facility], [place] [address]. All rights reserved. This information is not intended as a substitute for professional medical care. Always follow your healthcare professional's instructions.          Preventing Falls at Home  A person can fall for many reasons. Older adults may fall because reaction time slows down as we age. Your muscles and joints may get stiff, weak, or less flexible because of illness, medicines, or a physical condition.   Other health problems that make falls more likely include:     Arthritis    Dizziness or lightheadedness when you stand up (orthostatic hypotension)    History of a stroke    Dizziness    Anemia    Certain medicines taken for mental illness or to control blood pressure.    Problems with balance or gait    Bladder or urinary problems    History of falling    Changes in vision (vision impairment)    Changes in thinking skills and memory (cognitive impairment)  Injuries from a fall can include serious injuries such as broken bones, dislocated joints, internal bleeding and cuts. Injuries like these can limit your independence.   Prevention tips  To help prevent falls and fall-related injuries, follow the tips below.    Floors  To make floors safer:     Put nonskid pads under area rugs.    Remove small rugs.    Replace worn floor coverings.    Tack carpets firmly to each step on carpeted stairs. Put nonskid strips on the edges of uncarpeted stairs.    Keep floors and stairs free of clutter and cords.    Arrange furniture so there are clear pathways.    Clean up any spills right away.  Bathrooms    To make bathrooms safer:     Install grab bars in the tub or shower.    Apply nonskid strips or put a nonskid rubber mat in the tub or shower.    Sit on a bath chair to bathe.    Use bathmats with nonskid backing.  Lighting  To improve visibility in  your home:      Keep a flashlight in each room. Or put a lamp next to the bed within easy reach.    Put nightlights in the bedrooms, hallways, kitchen, and bathrooms.    Make sure all stairways have good lighting.    Take your time when going up and down stairs.    Put handrails on both sides of stairs and in walkways for more support. To prevent injury to your wrist or arm, don t use handrails to pull yourself up.    Install grab bars to pull yourself up.    Move or rearrange items that you use often. This will make them easier to find or reach.    Look at your home to find any safety hazards. Especially look at doorways, walkways, and the driveway. Remove or repair any safety problems that you find.  Other changes to make    Look around to find any safety hazards. Look closely at doorways, walkways, and the driveway. Remove or repair any safety problems that you find.    Wear shoes that fit well.    Take your time when going up and down stairs.    Put handrails on both sides of stairs and in walkways for more support. To prevent injury to your wrist or arm, don t use handrails to pull yourself up.    Install grab bars wherever needed to pull yourself up.    Arrange items that you use often. This will make them easier to find or reach.    Lena last reviewed this educational content on 3/1/2020    5421-6712 [employer]. [address] [place] [address], [place] [address]. All rights reserved. This information is not intended as a substitute for professional medical care. Always follow your healthcare professional's instructions.

## 2021-02-08 DIAGNOSIS — I25.10 CAD (CORONARY ARTERY DISEASE): ICD-10-CM

## 2021-02-08 DIAGNOSIS — I48.91 ATRIAL FIBRILLATION (H): ICD-10-CM

## 2021-02-08 RX ORDER — CARVEDILOL 3.12 MG/1
3.12 TABLET ORAL 2 TIMES DAILY WITH MEALS
Qty: 180 TABLET | Refills: 1 | Status: SHIPPED | OUTPATIENT
Start: 2021-02-08 | End: 2021-03-22

## 2021-02-09 DIAGNOSIS — Z79.01 CHRONIC ANTICOAGULATION: ICD-10-CM

## 2021-02-09 DIAGNOSIS — I25.10 ASCVD (ARTERIOSCLEROTIC CARDIOVASCULAR DISEASE): ICD-10-CM

## 2021-02-09 DIAGNOSIS — I10 BENIGN HYPERTENSION: ICD-10-CM

## 2021-02-09 LAB
ANION GAP SERPL CALCULATED.3IONS-SCNC: 2 MMOL/L (ref 3–14)
BUN SERPL-MCNC: 14 MG/DL (ref 7–30)
CALCIUM SERPL-MCNC: 9.6 MG/DL (ref 8.5–10.1)
CHLORIDE SERPL-SCNC: 95 MMOL/L (ref 94–109)
CHOLEST SERPL-MCNC: 133 MG/DL
CO2 SERPL-SCNC: 31 MMOL/L (ref 20–32)
CREAT SERPL-MCNC: 0.77 MG/DL (ref 0.66–1.25)
ERYTHROCYTE [DISTWIDTH] IN BLOOD BY AUTOMATED COUNT: 12.6 % (ref 10–15)
GFR SERPL CREATININE-BSD FRML MDRD: 89 ML/MIN/{1.73_M2}
GLUCOSE SERPL-MCNC: 82 MG/DL (ref 70–99)
HCT VFR BLD AUTO: 42.5 % (ref 40–53)
HDLC SERPL-MCNC: 48 MG/DL
HGB BLD-MCNC: 14.6 G/DL (ref 13.3–17.7)
LDLC SERPL CALC-MCNC: 65 MG/DL
MCH RBC QN AUTO: 32.7 PG (ref 26.5–33)
MCHC RBC AUTO-ENTMCNC: 34.4 G/DL (ref 31.5–36.5)
MCV RBC AUTO: 95 FL (ref 78–100)
NONHDLC SERPL-MCNC: 85 MG/DL
PLATELET # BLD AUTO: 162 10E9/L (ref 150–450)
POTASSIUM SERPL-SCNC: 4.9 MMOL/L (ref 3.4–5.3)
RBC # BLD AUTO: 4.47 10E12/L (ref 4.4–5.9)
SODIUM SERPL-SCNC: 128 MMOL/L (ref 133–144)
TRIGL SERPL-MCNC: 99 MG/DL
WBC # BLD AUTO: 7.4 10E9/L (ref 4–11)

## 2021-02-09 PROCEDURE — 80048 BASIC METABOLIC PNL TOTAL CA: CPT | Performed by: INTERNAL MEDICINE

## 2021-02-09 PROCEDURE — 36415 COLL VENOUS BLD VENIPUNCTURE: CPT | Performed by: INTERNAL MEDICINE

## 2021-02-09 PROCEDURE — 85027 COMPLETE CBC AUTOMATED: CPT | Performed by: INTERNAL MEDICINE

## 2021-02-09 PROCEDURE — 80061 LIPID PANEL: CPT | Performed by: INTERNAL MEDICINE

## 2021-02-10 ENCOUNTER — MYC MEDICAL ADVICE (OUTPATIENT)
Dept: INTERNAL MEDICINE | Facility: CLINIC | Age: 76
End: 2021-02-10

## 2021-02-10 DIAGNOSIS — I48.91 NEW ONSET ATRIAL FIBRILLATION (H): ICD-10-CM

## 2021-02-10 RX ORDER — ATORVASTATIN CALCIUM 80 MG/1
TABLET, FILM COATED ORAL
Qty: 90 TABLET | Refills: 1 | Status: SHIPPED | OUTPATIENT
Start: 2021-02-10 | End: 2021-09-24

## 2021-02-10 RX ORDER — LISINOPRIL 30 MG/1
30 TABLET ORAL DAILY
Qty: 90 TABLET | Refills: 1 | Status: SHIPPED | OUTPATIENT
Start: 2021-02-10 | End: 2021-08-17

## 2021-02-21 ENCOUNTER — IMMUNIZATION (OUTPATIENT)
Dept: NURSING | Facility: CLINIC | Age: 76
End: 2021-02-21
Attending: INTERNAL MEDICINE
Payer: COMMERCIAL

## 2021-02-21 PROCEDURE — 0002A PR COVID VAC PFIZER DIL RECON 30 MCG/0.3 ML IM: CPT

## 2021-02-21 PROCEDURE — 91300 PR COVID VAC PFIZER DIL RECON 30 MCG/0.3 ML IM: CPT

## 2021-02-22 DIAGNOSIS — J30.2 CHRONIC SEASONAL ALLERGIC RHINITIS: ICD-10-CM

## 2021-02-23 RX ORDER — FLUTICASONE PROPIONATE 50 MCG
SPRAY, SUSPENSION (ML) NASAL
Qty: 48 ML | Refills: 3 | Status: SHIPPED | OUTPATIENT
Start: 2021-02-23 | End: 2022-03-11

## 2021-02-24 DIAGNOSIS — I48.20 CHRONIC ATRIAL FIBRILLATION (H): ICD-10-CM

## 2021-02-24 RX ORDER — FUROSEMIDE 40 MG
40 TABLET ORAL DAILY
Qty: 90 TABLET | Refills: 2 | Status: SHIPPED | OUTPATIENT
Start: 2021-02-24 | End: 2021-10-19

## 2021-02-24 NOTE — TELEPHONE ENCOUNTER
Received refill request for:  furosemide  Last OV was: 9/18/2020 with Dr. Buchanan  Labs/EKG: last BMP 2/9/21  F/U scheduled: orders in Epic for 9/2021  New script sent to: [facility]

## 2021-02-25 ENCOUNTER — ANCILLARY PROCEDURE (OUTPATIENT)
Dept: CARDIOLOGY | Facility: CLINIC | Age: 76
End: 2021-02-25
Attending: INTERNAL MEDICINE
Payer: COMMERCIAL

## 2021-02-25 DIAGNOSIS — Z95.0 CARDIAC PACEMAKER IN SITU: ICD-10-CM

## 2021-02-25 PROCEDURE — 93296 REM INTERROG EVL PM/IDS: CPT | Performed by: INTERNAL MEDICINE

## 2021-02-25 PROCEDURE — 93294 REM INTERROG EVL PM/LDLS PM: CPT | Performed by: INTERNAL MEDICINE

## 2021-02-28 DIAGNOSIS — E87.1 HYPONATREMIA: Primary | ICD-10-CM

## 2021-03-01 LAB
MDC_IDC_LEAD_IMPLANT_DT: NORMAL
MDC_IDC_LEAD_LOCATION: NORMAL
MDC_IDC_LEAD_LOCATION_DETAIL_1: NORMAL
MDC_IDC_LEAD_MFG: NORMAL
MDC_IDC_LEAD_MODEL: NORMAL
MDC_IDC_LEAD_POLARITY_TYPE: NORMAL
MDC_IDC_LEAD_SERIAL: NORMAL
MDC_IDC_MSMT_BATTERY_DTM: NORMAL
MDC_IDC_MSMT_BATTERY_REMAINING_LONGEVITY: 166 MO
MDC_IDC_MSMT_BATTERY_RRT_TRIGGER: 2.62
MDC_IDC_MSMT_BATTERY_STATUS: NORMAL
MDC_IDC_MSMT_BATTERY_VOLTAGE: 3.05 V
MDC_IDC_MSMT_LEADCHNL_RV_IMPEDANCE_VALUE: 342 OHM
MDC_IDC_MSMT_LEADCHNL_RV_IMPEDANCE_VALUE: 399 OHM
MDC_IDC_MSMT_LEADCHNL_RV_PACING_THRESHOLD_AMPLITUDE: 0.62 V
MDC_IDC_MSMT_LEADCHNL_RV_PACING_THRESHOLD_PULSEWIDTH: 0.4 MS
MDC_IDC_MSMT_LEADCHNL_RV_SENSING_INTR_AMPL: 4.38 MV
MDC_IDC_MSMT_LEADCHNL_RV_SENSING_INTR_AMPL: 4.38 MV
MDC_IDC_PG_IMPLANT_DTM: NORMAL
MDC_IDC_PG_MFG: NORMAL
MDC_IDC_PG_MODEL: NORMAL
MDC_IDC_PG_SERIAL: NORMAL
MDC_IDC_PG_TYPE: NORMAL
MDC_IDC_SESS_CLINIC_NAME: NORMAL
MDC_IDC_SESS_DTM: NORMAL
MDC_IDC_SESS_TYPE: NORMAL
MDC_IDC_SET_BRADY_HYSTRATE: NORMAL
MDC_IDC_SET_BRADY_LOWRATE: 50 {BEATS}/MIN
MDC_IDC_SET_BRADY_MODE: NORMAL
MDC_IDC_SET_LEADCHNL_RV_PACING_AMPLITUDE: 2 V
MDC_IDC_SET_LEADCHNL_RV_PACING_ANODE_ELECTRODE_1: NORMAL
MDC_IDC_SET_LEADCHNL_RV_PACING_ANODE_LOCATION_1: NORMAL
MDC_IDC_SET_LEADCHNL_RV_PACING_CAPTURE_MODE: NORMAL
MDC_IDC_SET_LEADCHNL_RV_PACING_CATHODE_ELECTRODE_1: NORMAL
MDC_IDC_SET_LEADCHNL_RV_PACING_CATHODE_LOCATION_1: NORMAL
MDC_IDC_SET_LEADCHNL_RV_PACING_POLARITY: NORMAL
MDC_IDC_SET_LEADCHNL_RV_PACING_PULSEWIDTH: 0.4 MS
MDC_IDC_SET_LEADCHNL_RV_SENSING_ANODE_ELECTRODE_1: NORMAL
MDC_IDC_SET_LEADCHNL_RV_SENSING_ANODE_LOCATION_1: NORMAL
MDC_IDC_SET_LEADCHNL_RV_SENSING_CATHODE_ELECTRODE_1: NORMAL
MDC_IDC_SET_LEADCHNL_RV_SENSING_CATHODE_LOCATION_1: NORMAL
MDC_IDC_SET_LEADCHNL_RV_SENSING_POLARITY: NORMAL
MDC_IDC_SET_LEADCHNL_RV_SENSING_SENSITIVITY: 0.6 MV
MDC_IDC_SET_ZONE_DETECTION_INTERVAL: 360 MS
MDC_IDC_SET_ZONE_TYPE: NORMAL
MDC_IDC_STAT_BRADY_DTM_END: NORMAL
MDC_IDC_STAT_BRADY_DTM_START: NORMAL
MDC_IDC_STAT_BRADY_RV_PERCENT_PACED: 24.03 %
MDC_IDC_STAT_EPISODE_RECENT_COUNT: 0
MDC_IDC_STAT_EPISODE_RECENT_COUNT: 0
MDC_IDC_STAT_EPISODE_RECENT_COUNT_DTM_END: NORMAL
MDC_IDC_STAT_EPISODE_RECENT_COUNT_DTM_END: NORMAL
MDC_IDC_STAT_EPISODE_RECENT_COUNT_DTM_START: NORMAL
MDC_IDC_STAT_EPISODE_RECENT_COUNT_DTM_START: NORMAL
MDC_IDC_STAT_EPISODE_TOTAL_COUNT: 0
MDC_IDC_STAT_EPISODE_TOTAL_COUNT: 2
MDC_IDC_STAT_EPISODE_TOTAL_COUNT_DTM_END: NORMAL
MDC_IDC_STAT_EPISODE_TOTAL_COUNT_DTM_END: NORMAL
MDC_IDC_STAT_EPISODE_TOTAL_COUNT_DTM_START: NORMAL
MDC_IDC_STAT_EPISODE_TOTAL_COUNT_DTM_START: NORMAL
MDC_IDC_STAT_EPISODE_TYPE: NORMAL

## 2021-03-03 ENCOUNTER — MYC MEDICAL ADVICE (OUTPATIENT)
Dept: INTERNAL MEDICINE | Facility: CLINIC | Age: 76
End: 2021-03-03

## 2021-03-12 DIAGNOSIS — E87.1 HYPONATREMIA: ICD-10-CM

## 2021-03-12 DIAGNOSIS — E78.5 HYPERLIPIDEMIA WITH TARGET LDL LESS THAN 70: ICD-10-CM

## 2021-03-12 LAB
ANION GAP SERPL CALCULATED.3IONS-SCNC: 4 MMOL/L (ref 3–14)
BUN SERPL-MCNC: 12 MG/DL (ref 7–30)
CALCIUM SERPL-MCNC: 9 MG/DL (ref 8.5–10.1)
CHLORIDE SERPL-SCNC: 99 MMOL/L (ref 94–109)
CHOLEST SERPL-MCNC: 151 MG/DL
CO2 SERPL-SCNC: 26 MMOL/L (ref 20–32)
CREAT SERPL-MCNC: 0.71 MG/DL (ref 0.66–1.25)
GFR SERPL CREATININE-BSD FRML MDRD: >90 ML/MIN/{1.73_M2}
GLUCOSE SERPL-MCNC: 85 MG/DL (ref 70–99)
HDLC SERPL-MCNC: 47 MG/DL
LDLC SERPL CALC-MCNC: 73 MG/DL
NONHDLC SERPL-MCNC: 104 MG/DL
POTASSIUM SERPL-SCNC: 4.5 MMOL/L (ref 3.4–5.3)
SODIUM SERPL-SCNC: 129 MMOL/L (ref 133–144)
TRIGL SERPL-MCNC: 154 MG/DL

## 2021-03-12 PROCEDURE — 80048 BASIC METABOLIC PNL TOTAL CA: CPT | Performed by: INTERNAL MEDICINE

## 2021-03-12 PROCEDURE — 80061 LIPID PANEL: CPT | Performed by: INTERNAL MEDICINE

## 2021-03-12 PROCEDURE — 36415 COLL VENOUS BLD VENIPUNCTURE: CPT | Performed by: INTERNAL MEDICINE

## 2021-03-19 ENCOUNTER — OFFICE VISIT (OUTPATIENT)
Dept: DERMATOLOGY | Facility: CLINIC | Age: 76
End: 2021-03-19
Payer: COMMERCIAL

## 2021-03-19 ENCOUNTER — OFFICE VISIT (OUTPATIENT)
Dept: INTERNAL MEDICINE | Facility: CLINIC | Age: 76
End: 2021-03-19
Payer: COMMERCIAL

## 2021-03-19 VITALS
OXYGEN SATURATION: 96 % | DIASTOLIC BLOOD PRESSURE: 70 MMHG | TEMPERATURE: 97.1 F | HEART RATE: 52 BPM | SYSTOLIC BLOOD PRESSURE: 124 MMHG

## 2021-03-19 VITALS — SYSTOLIC BLOOD PRESSURE: 129 MMHG | DIASTOLIC BLOOD PRESSURE: 70 MMHG | OXYGEN SATURATION: 95 % | HEART RATE: 63 BPM

## 2021-03-19 DIAGNOSIS — I25.10 CORONARY ARTERY DISEASE INVOLVING NATIVE CORONARY ARTERY OF NATIVE HEART, ANGINA PRESENCE UNSPECIFIED: ICD-10-CM

## 2021-03-19 DIAGNOSIS — J01.01 ACUTE RECURRENT MAXILLARY SINUSITIS: ICD-10-CM

## 2021-03-19 DIAGNOSIS — L57.0 ACTINIC KERATOSIS: ICD-10-CM

## 2021-03-19 DIAGNOSIS — D48.5 NEOPLASM OF UNCERTAIN BEHAVIOR OF SKIN: Primary | ICD-10-CM

## 2021-03-19 DIAGNOSIS — Z01.818 PREOP GENERAL PHYSICAL EXAM: Primary | ICD-10-CM

## 2021-03-19 PROCEDURE — 88305 TISSUE EXAM BY PATHOLOGIST: CPT | Performed by: PATHOLOGY

## 2021-03-19 PROCEDURE — 17003 DESTRUCT PREMALG LES 2-14: CPT | Mod: 59 | Performed by: PHYSICIAN ASSISTANT

## 2021-03-19 PROCEDURE — 93000 ELECTROCARDIOGRAM COMPLETE: CPT | Performed by: INTERNAL MEDICINE

## 2021-03-19 PROCEDURE — 99212 OFFICE O/P EST SF 10 MIN: CPT | Mod: 25 | Performed by: PHYSICIAN ASSISTANT

## 2021-03-19 PROCEDURE — 17000 DESTRUCT PREMALG LESION: CPT | Mod: 59 | Performed by: PHYSICIAN ASSISTANT

## 2021-03-19 PROCEDURE — 11102 TANGNTL BX SKIN SINGLE LES: CPT | Performed by: PHYSICIAN ASSISTANT

## 2021-03-19 PROCEDURE — 99214 OFFICE O/P EST MOD 30 MIN: CPT | Performed by: INTERNAL MEDICINE

## 2021-03-19 RX ORDER — AMOXICILLIN AND CLAVULANATE POTASSIUM 562.5; 437.5; 62.5 MG/1; MG/1; MG/1
2 TABLET, MULTILAYER, EXTENDED RELEASE ORAL 2 TIMES DAILY
Qty: 28 TABLET | Refills: 0 | Status: SHIPPED | OUTPATIENT
Start: 2021-03-19 | End: 2021-03-26

## 2021-03-19 NOTE — PATIENT INSTRUCTIONS

## 2021-03-19 NOTE — PROGRESS NOTES
[address] [address]  Phone: [phone]  Primary Provider: Jeronimo Painter        PREOPERATIVE EVALUATION:  Today's date: 3/19/2021    James Salvador is a 75 year old male who presents for a preoperative evaluation.    Surgical Information:  Surgery/Procedure: left knee surgery- L quadriceps tendon repair  Surgery Location: [facility]   Surgeon: Dr. Victor  Surgery Date: 4/5/21  Time of Surgery: TBD  Where patient plans to recover: At home with family  Fax number for surgical facility: [phone]    Type of Anesthesia Anticipated: General    Assessment & Plan     The proposed surgical procedure is considered INTERMEDIATE risk.    Preop general physical exam  Patellar instability, L knee  Coronary artery disease involving native coronary artery of native heart, angina presence unspecified  Acute recurrent maxillary sinusitis     Implanted Device:   - Type of device: [employer] PCM Patient advised to bring device information on day of surgery.    Risks and Recommendations:  The patient has the following additional risks and recommendations for perioperative complications:   - Consult Hospitalist / IM to assist with post-op medical management   - Morbid obesity (BMI >40)    Medication Instructions:  Patient is to take all scheduled medications on the day of surgery   - aspirin: Discontinue aspirin 7-10 days prior to procedure to reduce bleeding risk. It should be resumed postoperatively.    - apixaban (Eliquis), edoxaban (Savaysa), rivaroxaban (Xarelto): Bleeding risk is moderate or high for this procedure AND CrCl  (>=) 50 mL/min. HOLD 2 days before surgery.    - ACE/ARB: May be continued on the day of surgery.    - Beta Blockers: Continue taking on the day of surgery.   - Diuretics: HOLD on the day of surgery.    RECOMMENDATION:  APPROVAL GIVEN to proceed with proposed procedure, without further diagnostic evaluation.    Review of  prior external note(s) from - Outside records from orthopedics, cardiology  Review of the result(s) of each unique test - labs                  Subjective      HPI related to upcoming procedure: Longstanding OA knees s/p L TKA. Patellar instability since that procedure.       Preop Questions 3/27/2021   1. Have you ever had a heart attack or stroke? YES    2. Have you ever had surgery on your heart or blood vessels, such as a stent placement, a coronary artery bypass, or surgery on an artery in your head, neck, heart, or legs? YES    3. Do you have chest pain with activity? No   4. Do you have a history of  heart failure? No   5. Do you currently have a cold, bronchitis or symptoms of other infection? YES    6. Do you have a cough, shortness of breath, or wheezing? No   7. Do you or anyone in your family have previous history of blood clots? No   8. Do you or does anyone in your family have a serious bleeding problem such as prolonged bleeding following surgeries or cuts? No   9. Have you ever had problems with anemia or been told to take iron pills? No   10. Have you had any abnormal blood loss such as black, tarry or bloody stools? No   11. Have you ever had a blood transfusion? No   12. Are you willing to have a blood transfusion if it is medically needed before, during, or after your surgery? Yes   13. Have you or any of your relatives ever had problems with anesthesia? No   14. Do you have sleep apnea, excessive snoring or daytime drowsiness? No   15. Do you have any artifical heart valves or other implanted medical devices like a pacemaker, defibrillator, or continuous glucose monitor? YES    15a. What type of device do you have? medtronic pacemaker   15b. Name of the clinic that manages your device:  [facility]   16. Do you have artificial joints? YES    17. Are you allergic to latex? No     Health Care Directive:  Patient has a Health Care Directive on file      Preoperative Review of :   reviewed - no  record of controlled substances prescribed.     Review of Systems  Constitutional, neuro, ENT, endocrine, pulmonary, cardiac, gastrointestinal, genitourinary, musculoskeletal, integument and psychiatric systems are negative, except as otherwise noted.    Patient Active Problem List    Diagnosis Date Noted     Tachy-edinson syndrome (H) 05/29/2019     Priority: Medium     Added automatically from request for surgery [id number]       Mixed hyperlipidemia 06/21/2017     Priority: Medium     Status post total left knee replacement 05/11/2017     Priority: Medium     Chronic atrial fibrillation (H) 04/21/2017     Priority: Medium     Shoulder pain, right 11/09/2016     Priority: Medium     Coronary artery disease involving native coronary artery of native heart, angina presence unspecified 01/11/2016     Priority: Medium     Stable angina (H) 01/11/2016     Priority: Medium     Morbid obesity due to excess calories (H) 01/11/2016     Priority: Medium     History of myocardial infarction      Priority: Medium     PTCA       Paroxysmal supraventricular tachycardia (H)      Priority: Medium     On metoprolol       Actinic keratoses 07/17/2013     Priority: Medium     Hyperlipidemia with target LDL less than 70      Priority: Medium     Benign hypertension      Priority: Medium     Advance care planning 10/30/2012     Priority: Medium     Advance Care Planning 10/5/2015: Receipt of ACP document:  Received: Health Care Directive which was witnessed or notarized on 6/10/2005.  Document previously scanned on 9/22/15.  Validation form previously completed and scanned.  Code Status reflects choices in most recent ACP document. Confirmed/documented designated decision maker(s).  Added by Zohra Hernandez, RN, BSN, MA, Advance Care Planning Liaison.  Advance Care Planning Patient states has Advance Directive and will bring in a copy to clinic. 10/30/2012         Past Medical History:   Diagnosis Date     Actinic cheilitis 7/17/2013     Lower lip, left      Actinic keratosis      Allergic rhinitis      Basal cell carcinoma      Benign hypertension      CAD (coronary artery disease)     Cardiac cath and PCI 1994. Cardiac Cath 9/2015: BRIDGET to LAD     History of myocardial infarction 1994    PTCA     Hyperlipidemia LDL goal < 70      Malignant melanoma (H)      Melanoma (H) 10/15/2019    10/15/19 left zygoma     Mixed hyperlipidemia 6/21/2017     Morbid obesity due to excess calories (H) 1/11/2016     Paroxysmal supraventricular tachycardia (H)     on metoprolol     Permanent atrial fibrillation (H) 04/21/2017     Squamous cell carcinoma      Stable angina (H) 1/11/2016     Tachy-edinson syndrome (H) 5/29/2019    Added automatically from request for surgery [id number]     Past Surgical History:   Procedure Laterality Date     ANGIOPLASTY  1994    in [place]     ANKLE SURGERY  7/13/2005    right ankle     EP PERM PACER SINGLE LEAD N/A 5/31/2019    Medtronic single lead pacemaker     HEART CATH STENT COR W/WO PTCA  9/23/2015    BRIDGET stent mid LAD     JOINT REPLACEMENT, HIP RT/LT  10/14/2009    right hip      LASER SURGERY OF EYE  06/01/2002     MOHS MICROGRAPHIC PROCEDURE  06/12/2004    squamous cell carcinoma right temple     SINUS SURGERY  7/11/2006     Current Outpatient Medications   Medication Sig Dispense Refill     Acetaminophen (TYLENOL PO) Take 650 mg by mouth 4 times daily        amoxicillin (AMOXIL) 500 MG capsule 2,000 mg as needed When going to the dentist  0     apixaban ANTICOAGULANT (ELIQUIS ANTICOAGULANT) 5 MG tablet TAKE 1 TABLET BY MOUTH TWICE A  tablet 3     ASPIRIN PO Take 81 mg by mouth daily       atorvastatin (LIPITOR) 80 MG tablet TAKE 1 TABLET BY MOUTH EVERYDAY AT BEDTIME 90 tablet 1     ciclopirox (LOPROX) 0.77 % cream Apply topically At Bedtime 90 g 3     desonide (DESOWEN) 0.05 % external cream Apply sparingly to affected area on face in morning 60 g 0     Doxycycline Hyclate (PERIOSTAT PO) Take 20 mg by mouth 2 times  daily       fluocinonide (LIDEX) 0.05 % external solution Apply topically 2 times daily 60 mL 4     fluocinonide (LIDEX) 0.05 % external solution Apply to scalp BID x 1-2 weeks PRN (Fax [facility] [phone] Thanks) 60 mL 3     fluorouracil (EFUDEX) 5 % external cream Apply to scap BID x 3-4 weeks. Protect area from the sun. 40 g 3     fluticasone (FLONASE) 50 MCG/ACT nasal spray INSTILL 2 SPRAYS INTO BOTH NOSTRILS DAILY 48 mL 3     furosemide (LASIX) 40 MG tablet Take 1 tablet (40 mg) by mouth daily 90 tablet 2     GABAPENTIN PO Take 600 mg by mouth 3 times daily        ketoconazole (NIZORAL) 2 % cream Apply topically 2 times daily For face. FAX REFILL REQUESTS TO [facility]: [phone] 30 g 2     ketoconazole (NIZORAL) 2 % external shampoo Use daily as needed 120 mL 11     ketotifen (ZADITOR/REFRESH ANTI-ITCH) 0.025 % SOLN Place 1-2 drops into both eyes 2 times daily as needed for itching       lisinopril (ZESTRIL) 30 MG tablet Take 1 tablet (30 mg) by mouth daily 90 tablet 1     loratadine (CLARITIN) 10 MG tablet Take 10 mg by mouth daily as needed for allergies       Multiple Vitamin (MULTIVITAMIN OR) Take 1 tablet by mouth daily        nitroglycerin (NITROSTAT) 0.4 MG SL tablet Place 1 tablet (0.4 mg) under the tongue every 5 minutes as needed for chest pain May repeat twice for a total of 3 tablets.  If chest pain not relieved, call 911 25 tablet 11     Omega-3 Fatty Acids (OMEGA-3 FISH OIL PO) Take 3.2 g by mouth daily        OXcarbazepine (TRILEPTAL PO) Take 300 mg by mouth 3 times daily        sildenafil (VIAGRA) 50 MG tablet Take 1-2 tablets ( mg) by mouth daily as needed (ED) 10 tablet 11     carvedilol (COREG) 3.125 MG tablet Take 1 tablet (3.125 mg) by mouth 2 times daily (with meals) 180 tablet 1       Allergies   Allergen Reactions     Cats      Cat Dander     Dogs      Dog Dander     No Clinical Screening - See Comments      Pet dander,  pollen, grasses, molds     Pollen Extract         Social History     Tobacco Use     Smoking status: Former Smoker     Packs/day: 0.00     Years: 10.00     Pack years: 0.00     Types: Cigarettes     Quit date: 1987     Years since quittin.2     Smokeless tobacco: Never Used   Substance Use Topics     Alcohol use: Yes     Alcohol/week: 0.0 standard drinks     Comment: socially - x2-3 per month -  none currently       History   Drug Use No         Objective     /70   Pulse 52   Temp 97.1  F (36.2  C) (Oral)   SpO2 96%     Physical Exam    GENERAL APPEARANCE: alert, no distress and over weight     EYES: EOMI,  PERRL     HENT: normal cephalic/atraumatic     NECK: no adenopathy, no asymmetry, masses, or scars and thyroid normal to palpation     RESP: lungs clear to auscultation - no rales, rhonchi or wheezes     CV: irregular rhythm, rate controlled.      ABDOMEN:  soft, nontender, no HSM or masses and bowel sounds normal     MS: extremities normal- no gross deformities noted, no evidence of inflammation in joints, FROM in all extremities.     SKIN: no suspicious lesions or rashes     NEURO: Normal strength and tone, sensory exam grossly normal, mentation intact and speech normal     PSYCH: mentation appears normal. and affect normal/bright     LYMPHATICS: No cervical adenopathy    Recent Labs   Lab Test 21  0947 21  0808 20  1528   HGB  --  14.6 14.9   PLT  --  162 171   * 128* 127*   POTASSIUM 4.5 4.9 4.6   CR 0.71 0.77 0.72        Diagnostics:  EKG: atrial fibrillation, rate 70s, unchanged from previous tracings    Revised Cardiac Risk Index (RCRI):  The patient has the following serious cardiovascular risks for perioperative complications:   - Coronary Artery Disease (MI, positive stress test, angina, Qs on EKG) = 1 point     RCRI Interpretation: 1 point: Class II (low risk - 0.9% complication rate)           Signed Electronically by: Jeronimo Painter MD  Copy of this  evaluation report is provided to requesting physician.

## 2021-03-19 NOTE — PROGRESS NOTES
HPI:   Chief complaints: James Salvador is a 75 year old male who presents for recheck of mohs site on the right forearm. It has been healing well.       PHYSICAL EXAM:    /70   Pulse 63   SpO2 95%   Skin exam performed as follows: Type 2 skin. Mood appropriate  Alert and Oriented X 3. Well developed, well nourished in no distress.  General appearance: Normal  Head including face: Normal  Eyes: conjunctiva and lids: Normal  Mouth: Lips, teeth, gums: Normal  Neck: Normal  Chest-breast/axillae: Normal  Back: Normal  Spleen and liver: Normal  Cardiovascular: Exam of peripheral vascular system by observation for swelling, varicosities, edema: Normal  Genitalia: groin, buttocks: Normal  Extremities: digits/nails (clubbing): Normal  Eccrine and Apocrine glands: Normal  Right upper extremity: Normal  Left upper extremity: Normal  Right lower extremity: Normal  Left lower extremity: Normal  Skin: Scalp and body hair: See below    1. 7 mm pink scaly papule on the left forearm  2. Pink gritty papule on the right dorsal hand x 1, right forearm x 1, left dorsal hand x 2    ASSESSMENT/PLAN:     1. R/o residual SCC on the left forearm. Shave biopsy performed.  Area cleaned and anesthetized with 1% lidocaine with epinephrine.  Dermablade used to remove the lesion and sent to pathology. Bleeding was cauterized. Pt tolerated procedure well with no complications.   2. Actinic keratosis on the right dorsal hand x 1, right forearm x 1, left dorsal hand x 2. As precancerous, cryosurgery performed. Advised on blistering and post-op care. Advised if not resolved in 1-2 months to return for evaluation          Follow-up: 6 month FSE  CC:   Scribed By: Rosa Maria Hansen, MS, PA-C

## 2021-03-19 NOTE — LETTER
3/19/2021         RE: James Salvador  [address] [place] [address]        Dear Colleague,    Thank you for referring your patient, James Salvador, to the [facility]. Please see a copy of my visit note below.    HPI:   Chief complaints: James Salvador is a 75 year old male who presents for recheck of mohs site on the right forearm. It has been healing well.       PHYSICAL EXAM:    /70   Pulse 63   SpO2 95%   Skin exam performed as follows: Type 2 skin. Mood appropriate  Alert and Oriented X 3. Well developed, well nourished in no distress.  General appearance: Normal  Head including face: Normal  Eyes: conjunctiva and lids: Normal  Mouth: Lips, teeth, gums: Normal  Neck: Normal  Chest-breast/axillae: Normal  Back: Normal  Spleen and liver: Normal  Cardiovascular: Exam of peripheral vascular system by observation for swelling, varicosities, edema: Normal  Genitalia: groin, buttocks: Normal  Extremities: digits/nails (clubbing): Normal  Eccrine and Apocrine glands: Normal  Right upper extremity: Normal  Left upper extremity: Normal  Right lower extremity: Normal  Left lower extremity: Normal  Skin: Scalp and body hair: See below    1. 7 mm pink scaly papule on the left forearm  2. Pink gritty papule on the right dorsal hand x 1, right forearm x 1, left dorsal hand x 2    ASSESSMENT/PLAN:     1. R/o residual SCC on the left forearm. Shave biopsy performed.  Area cleaned and anesthetized with 1% lidocaine with epinephrine.  Dermablade used to remove the lesion and sent to pathology. Bleeding was cauterized. Pt tolerated procedure well with no complications.   2. Actinic keratosis on the right dorsal hand x 1, right forearm x 1, left dorsal hand x 2. As precancerous, cryosurgery performed. Advised on blistering and post-op care. Advised if not resolved in 1-2 months to return for evaluation          Follow-up: 6 month FSE  CC:   Scribed By: Rosa Maria Hansen MS,  SHEY          Again, thank you for allowing me to participate in the care of your patient.        Sincerely,        Rosa Maria Nascimento PA-C

## 2021-03-22 DIAGNOSIS — I48.91 ATRIAL FIBRILLATION (H): ICD-10-CM

## 2021-03-22 DIAGNOSIS — I25.10 CAD (CORONARY ARTERY DISEASE): ICD-10-CM

## 2021-03-22 RX ORDER — CARVEDILOL 3.12 MG/1
3.12 TABLET ORAL 2 TIMES DAILY WITH MEALS
Qty: 180 TABLET | Refills: 1 | Status: SHIPPED | OUTPATIENT
Start: 2021-03-22 | End: 2021-06-03

## 2021-03-23 ENCOUNTER — TELEPHONE (OUTPATIENT)
Dept: DERMATOLOGY | Facility: CLINIC | Age: 76
End: 2021-03-23

## 2021-03-23 LAB — COPATH REPORT: NORMAL

## 2021-03-23 NOTE — TELEPHONE ENCOUNTER
----- Message from Rosa Maria Hansen PA-C sent at 3/23/2021  2:17 PM CDT -----  Path for the biopsy on the left forearm came back as an actinic keratosis. Recommend cryo for complete resolution. Alternatively, he can use his Efudex on the spot BID x 2 weeks if he'd prefer to treat at home.

## 2021-03-24 NOTE — TELEPHONE ENCOUNTER
Called and spoke to patient.    Educated patient on biopsy results- AK (cryo).    **patient has hx of AK    Scheduled following day cryo appointment.    Patient voiced understanding.    Kermit RN-BSN-PHN  [facility]  [phone]

## 2021-03-25 ENCOUNTER — OFFICE VISIT (OUTPATIENT)
Dept: DERMATOLOGY | Facility: CLINIC | Age: 76
End: 2021-03-25
Payer: COMMERCIAL

## 2021-03-25 DIAGNOSIS — L57.0 AK (ACTINIC KERATOSIS): Primary | ICD-10-CM

## 2021-03-25 PROCEDURE — 17000 DESTRUCT PREMALG LESION: CPT | Performed by: DERMATOLOGY

## 2021-03-25 PROCEDURE — 99207 PR NO CHARGE LOS: CPT | Performed by: DERMATOLOGY

## 2021-03-25 NOTE — PROGRESS NOTES
James Salvador is an extremely pleasant 75 year old year old male patient here today for evaluation and managment of actinic keratosis on left forearm.  Patient has no other skin complaints today.  Remainder of the HPI, Meds, PMH, Allergies, FH, and SH was reviewed in chart.      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 Cardiac Cath 9/2015: BRIDGET to LAD     History of myocardial infarction 1994    PTCA     Hyperlipidemia LDL goal < 70      Malignant melanoma (H)      Melanoma (H) 10/15/2019    10/15/19 left zygoma     Mixed hyperlipidemia 6/21/2017     Morbid obesity due to excess calories (H) 1/11/2016     Paroxysmal supraventricular tachycardia (H)     on metoprolol     Permanent atrial fibrillation (H) 04/21/2017     Squamous cell carcinoma      Stable angina (H) 1/11/2016     Tachy-edinson syndrome (H) 5/29/2019    Added automatically from request for surgery [id number]       Past Surgical History:   Procedure Laterality Date     ANGIOPLASTY  1994    in [place]     ANKLE SURGERY  7/13/2005    right ankle     EP PERM PACER SINGLE LEAD N/A 5/31/2019    Medtronic single lead pacemaker     HEART CATH STENT COR W/WO PTCA  9/23/2015    BRIDGET stent mid LAD     JOINT REPLACEMENT, HIP RT/LT  10/14/2009    right hip      LASER SURGERY OF EYE  06/01/2002     MOHS MICROGRAPHIC PROCEDURE  06/12/2004    squamous cell carcinoma right temple     SINUS SURGERY  7/11/2006        Family History   Problem Relation Age of Onset     Genitourinary Problems Mother         renal failure     Cardiovascular Father         rupture of dorsal aorta     Depression Son      Skin Cancer No family hx of        Social History     Socioeconomic History     Marital status:      Spouse name: Not on file     Number of children: 3     Years  of education: Not on file     Highest education level: Not on file   Occupational History     Employer: RETIRED   Social Needs     Financial resource strain: Not on file     Food insecurity     Worry: Not on file     Inability: Not on file     Transportation needs     Medical: Not on file     Non-medical: Not on file   Tobacco Use     Smoking status: Former Smoker     Packs/day: 0.00     Years: 10.00     Pack years: 0.00     Types: Cigarettes     Quit date: 1987     Years since quittin.2     Smokeless tobacco: Never Used   Substance and Sexual Activity     Alcohol use: Yes     Alcohol/week: 0.0 standard drinks     Comment: socially - x2-3 per month -  none currently     Drug use: No     Sexual activity: Yes     Partners: Female   Lifestyle     Physical activity     Days per week: Not on file     Minutes per session: Not on file     Stress: Not on file   Relationships     Social connections     Talks on phone: Not on file     Gets together: Not on file     Attends Spiritism service: Not on file     Active member of club or organization: Not on file     Attends meetings of clubs or organizations: Not on file     Relationship status: Not on file     Intimate partner violence     Fear of current or ex partner: Not on file     Emotionally abused: Not on file     Physically abused: Not on file     Forced sexual activity: Not on file   Other Topics Concern     Parent/sibling w/ CABG, MI or angioplasty before 65F 55M? Not Asked      Service Not Asked     Blood Transfusions Not Asked     Caffeine Concern Yes     Comment: 2 big cups coffee daily     Occupational Exposure Not Asked     Hobby Hazards Not Asked     Sleep Concern No     Comment: sleeping better since shoulder replaced 11/3/16     Stress Concern No     Weight Concern Yes     Special Diet Yes     Comment: trying to do more lean meats     Back Care Not Asked     Exercise No     Comment: limited - knee     Bike Helmet Not Asked     Seat Belt Not  Asked     Self-Exams Not Asked   Social History Narrative     Not on file       Outpatient Encounter Medications as of 3/25/2021   Medication Sig Dispense Refill     Acetaminophen (TYLENOL PO) Take 650 mg by mouth 4 times daily        amoxicillin (AMOXIL) 500 MG capsule 2,000 mg as needed When going to the dentist  0     amoxicillin-clavulanate (AUGMENTIN XR) 1000-62.5 MG 12 hr tablet Take 2 tablets by mouth 2 times daily for 7 days 28 tablet 0     apixaban ANTICOAGULANT (ELIQUIS ANTICOAGULANT) 5 MG tablet TAKE 1 TABLET BY MOUTH TWICE A  tablet 3     ASPIRIN PO Take 81 mg by mouth daily       atorvastatin (LIPITOR) 80 MG tablet TAKE 1 TABLET BY MOUTH EVERYDAY AT BEDTIME 90 tablet 1     carvedilol (COREG) 3.125 MG tablet Take 1 tablet (3.125 mg) by mouth 2 times daily (with meals) 180 tablet 1     ciclopirox (LOPROX) 0.77 % cream Apply topically At Bedtime 90 g 3     desonide (DESOWEN) 0.05 % external cream Apply sparingly to affected area on face in morning 60 g 0     Doxycycline Hyclate (PERIOSTAT PO) Take 20 mg by mouth 2 times daily       fluocinonide (LIDEX) 0.05 % external solution Apply topically 2 times daily 60 mL 4     fluocinonide (LIDEX) 0.05 % external solution Apply to scalp BID x 1-2 weeks PRN ([phone] Thanks) 60 mL 3     fluorouracil (EFUDEX) 5 % external cream Apply to scap BID x 3-4 weeks. Protect area from the sun. 40 g 3     fluticasone (FLONASE) 50 MCG/ACT nasal spray INSTILL 2 SPRAYS INTO BOTH NOSTRILS DAILY 48 mL 3     furosemide (LASIX) 40 MG tablet Take 1 tablet (40 mg) by mouth daily 90 tablet 2     GABAPENTIN PO Take 600 mg by mouth 3 times daily        ketoconazole (NIZORAL) 2 % cream Apply topically 2 times daily For face. FAX REFILL REQUESTS TO [facility]: [phone] 30 g 2     ketoconazole (NIZORAL) 2 % external shampoo Use daily as needed 120 mL 11     ketotifen (ZADITOR/REFRESH ANTI-ITCH) 0.025 % SOLN  Place 1-2 drops into both eyes 2 times daily as needed for itching       lisinopril (ZESTRIL) 30 MG tablet Take 1 tablet (30 mg) by mouth daily 90 tablet 1     loratadine (CLARITIN) 10 MG tablet Take 10 mg by mouth daily as needed for allergies       Multiple Vitamin (MULTIVITAMIN OR) Take 1 tablet by mouth daily        nitroglycerin (NITROSTAT) 0.4 MG SL tablet Place 1 tablet (0.4 mg) under the tongue every 5 minutes as needed for chest pain May repeat twice for a total of 3 tablets.  If chest pain not relieved, call 911 25 tablet 11     Omega-3 Fatty Acids (OMEGA-3 FISH OIL PO) Take 3.2 g by mouth daily        OXcarbazepine (TRILEPTAL PO) Take 300 mg by mouth 3 times daily        sildenafil (VIAGRA) 50 MG tablet Take 1-2 tablets ( mg) by mouth daily as needed (ED) 10 tablet 11     No facility-administered encounter medications on file as of 3/25/2021.              Review Of Systems  Skin: As above  Eyes: negative  Ears/Nose/Throat: negative  Respiratory: No shortness of breath, dyspnea on exertion, cough, or hemoptysis  Cardiovascular: negative  Gastrointestinal: negative  Genitourinary: negative  Musculoskeletal: negative  Neurologic: negative  Psychiatric: negative  Hematologic/Lymphatic/Immunologic: negative  Endocrine: negative      O:   NAD, WDWN, Alert & Oriented, Mood & Affect wnl, Vitals stable   Here today alone   There were no vitals taken for this visit.   General appearance normal   Vitals stable   Alert, oriented and in no acute distress     L forearm gritty scaly papule       Eyes: Conjunctivae/lids:Normal     ENT: Lips, buccal mucosa, tongue: normal    MSK:Normal    Cardiovascular: peripheral edema none    Pulm: Breathing Normal    Neuro/Psych: Orientation:Alert and Orientedx3 ; Mood/Affect:normal       A/P:  1. L forearm actinic keratosis   LN2:  Treated with LN2 for 5s for 1-2 cycles. Warned risks of blistering, pain, pigment change, scarring, and incomplete resolution.  Advised patient to  return if lesions do not completely resolve.  Wound care sheet given.  It was a pleasure speaking to James Salvador today.  Previous clinic  notes and pertinent laboratory tests were reviewed prior to James Salvador's visit.  Patient encouraged to perform monthly skin exams.  UV precautions reviewed with patient.  Return to clinic 6 months

## 2021-03-25 NOTE — LETTER
3/25/2021         RE: James Salvador  [address] [place] [address]        Dear Colleague,    Thank you for referring your patient, James Salvador, to the [facility]. Please see a copy of my visit note below.    James Salvador is an extremely pleasant 75 year old year old male patient here today for evaluation and managment of actinic keratosis on left forearm.  Patient has no other skin complaints today.  Remainder of the HPI, Meds, PMH, Allergies, FH, and SH was reviewed in chart.      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 Cardiac Cath 9/2015: BRIDGET to LAD     History of myocardial infarction 1994    PTCA     Hyperlipidemia LDL goal < 70      Malignant melanoma (H)      Melanoma (H) 10/15/2019    10/15/19 left zygoma     Mixed hyperlipidemia 6/21/2017     Morbid obesity due to excess calories (H) 1/11/2016     Paroxysmal supraventricular tachycardia (H)     on metoprolol     Permanent atrial fibrillation (H) 04/21/2017     Squamous cell carcinoma      Stable angina (H) 1/11/2016     Tachy-edinson syndrome (H) 5/29/2019    Added automatically from request for surgery [id number]       Past Surgical History:   Procedure Laterality Date     ANGIOPLASTY  1994    in [place]     ANKLE SURGERY  7/13/2005    right ankle     EP PERM PACER SINGLE LEAD N/A 5/31/2019    Medtronic single lead pacemaker     HEART CATH STENT COR W/WO PTCA  9/23/2015    BRIDGET stent mid LAD     JOINT REPLACEMENT, HIP RT/LT  10/14/2009    right hip      LASER SURGERY OF EYE  06/01/2002     MOHS MICROGRAPHIC PROCEDURE  06/12/2004    squamous cell carcinoma right temple     SINUS SURGERY  7/11/2006        Family History   Problem Relation Age of Onset     Genitourinary Problems Mother          renal failure     Cardiovascular Father         rupture of dorsal aorta     Depression Son      Skin Cancer No family hx of        Social History     Socioeconomic History     Marital status:      Spouse name: Not on file     Number of children: 3     Years of education: Not on file     Highest education level: Not on file   Occupational History     Employer: RETIRED   Social Needs     Financial resource strain: Not on file     Food insecurity     Worry: Not on file     Inability: Not on file     Transportation needs     Medical: Not on file     Non-medical: Not on file   Tobacco Use     Smoking status: Former Smoker     Packs/day: 0.00     Years: 10.00     Pack years: 0.00     Types: Cigarettes     Quit date: 1987     Years since quittin.2     Smokeless tobacco: Never Used   Substance and Sexual Activity     Alcohol use: Yes     Alcohol/week: 0.0 standard drinks     Comment: socially - x2-3 per month -  none currently     Drug use: No     Sexual activity: Yes     Partners: Female   Lifestyle     Physical activity     Days per week: Not on file     Minutes per session: Not on file     Stress: Not on file   Relationships     Social connections     Talks on phone: Not on file     Gets together: Not on file     Attends Mu-ism service: Not on file     Active member of club or organization: Not on file     Attends meetings of clubs or organizations: Not on file     Relationship status: Not on file     Intimate partner violence     Fear of current or ex partner: Not on file     Emotionally abused: Not on file     Physically abused: Not on file     Forced sexual activity: Not on file   Other Topics Concern     Parent/sibling w/ CABG, MI or angioplasty before 65F 55M? Not Asked      Service Not Asked     Blood Transfusions Not Asked     Caffeine Concern Yes     Comment: 2 big cups coffee daily     Occupational Exposure Not Asked     Hobby Hazards Not Asked     Sleep Concern No     Comment:  sleeping better since shoulder replaced 11/3/16     Stress Concern No     Weight Concern Yes     Special Diet Yes     Comment: trying to do more lean meats     Back Care Not Asked     Exercise No     Comment: limited - knee     Bike Helmet Not Asked     Seat Belt Not Asked     Self-Exams Not Asked   Social History Narrative     Not on file       Outpatient Encounter Medications as of 3/25/2021   Medication Sig Dispense Refill     Acetaminophen (TYLENOL PO) Take 650 mg by mouth 4 times daily        amoxicillin (AMOXIL) 500 MG capsule 2,000 mg as needed When going to the dentist  0     amoxicillin-clavulanate (AUGMENTIN XR) 1000-62.5 MG 12 hr tablet Take 2 tablets by mouth 2 times daily for 7 days 28 tablet 0     apixaban ANTICOAGULANT (ELIQUIS ANTICOAGULANT) 5 MG tablet TAKE 1 TABLET BY MOUTH TWICE A  tablet 3     ASPIRIN PO Take 81 mg by mouth daily       atorvastatin (LIPITOR) 80 MG tablet TAKE 1 TABLET BY MOUTH EVERYDAY AT BEDTIME 90 tablet 1     carvedilol (COREG) 3.125 MG tablet Take 1 tablet (3.125 mg) by mouth 2 times daily (with meals) 180 tablet 1     ciclopirox (LOPROX) 0.77 % cream Apply topically At Bedtime 90 g 3     desonide (DESOWEN) 0.05 % external cream Apply sparingly to affected area on face in morning 60 g 0     Doxycycline Hyclate (PERIOSTAT PO) Take 20 mg by mouth 2 times daily       fluocinonide (LIDEX) 0.05 % external solution Apply topically 2 times daily 60 mL 4     fluocinonide (LIDEX) 0.05 % external solution Apply to scalp BID x 1-2 weeks PRN ([phone] Thanks) 60 mL 3     fluorouracil (EFUDEX) 5 % external cream Apply to scap BID x 3-4 weeks. Protect area from the sun. 40 g 3     fluticasone (FLONASE) 50 MCG/ACT nasal spray INSTILL 2 SPRAYS INTO BOTH NOSTRILS DAILY 48 mL 3     furosemide (LASIX) 40 MG tablet Take 1 tablet (40 mg) by mouth daily 90 tablet 2     GABAPENTIN PO Take 600 mg by mouth 3 times daily         ketoconazole (NIZORAL) 2 % cream Apply topically 2 times daily For face. FAX REFILL REQUESTS TO  [facility]: [phone] 30 g 2     ketoconazole (NIZORAL) 2 % external shampoo Use daily as needed 120 mL 11     ketotifen (ZADITOR/REFRESH ANTI-ITCH) 0.025 % SOLN Place 1-2 drops into both eyes 2 times daily as needed for itching       lisinopril (ZESTRIL) 30 MG tablet Take 1 tablet (30 mg) by mouth daily 90 tablet 1     loratadine (CLARITIN) 10 MG tablet Take 10 mg by mouth daily as needed for allergies       Multiple Vitamin (MULTIVITAMIN OR) Take 1 tablet by mouth daily        nitroglycerin (NITROSTAT) 0.4 MG SL tablet Place 1 tablet (0.4 mg) under the tongue every 5 minutes as needed for chest pain May repeat twice for a total of 3 tablets.  If chest pain not relieved, call 911 25 tablet 11     Omega-3 Fatty Acids (OMEGA-3 FISH OIL PO) Take 3.2 g by mouth daily        OXcarbazepine (TRILEPTAL PO) Take 300 mg by mouth 3 times daily        sildenafil (VIAGRA) 50 MG tablet Take 1-2 tablets ( mg) by mouth daily as needed (ED) 10 tablet 11     No facility-administered encounter medications on file as of 3/25/2021.              Review Of Systems  Skin: As above  Eyes: negative  Ears/Nose/Throat: negative  Respiratory: No shortness of breath, dyspnea on exertion, cough, or hemoptysis  Cardiovascular: negative  Gastrointestinal: negative  Genitourinary: negative  Musculoskeletal: negative  Neurologic: negative  Psychiatric: negative  Hematologic/Lymphatic/Immunologic: negative  Endocrine: negative      O:   NAD, WDWN, Alert & Oriented, Mood & Affect wnl, Vitals stable   Here today alone   There were no vitals taken for this visit.   General appearance normal   Vitals stable   Alert, oriented and in no acute distress     L forearm gritty scaly papule       Eyes: Conjunctivae/lids:Normal     ENT: Lips, buccal mucosa, tongue: normal    MSK:Normal    Cardiovascular: peripheral edema none    Pulm: Breathing  Normal    Neuro/Psych: Orientation:Alert and Orientedx3 ; Mood/Affect:normal       A/P:  1. L forearm actinic keratosis   LN2:  Treated with LN2 for 5s for 1-2 cycles. Warned risks of blistering, pain, pigment change, scarring, and incomplete resolution.  Advised patient to return if lesions do not completely resolve.  Wound care sheet given.  It was a pleasure speaking to James Salvador today.  Previous clinic  notes and pertinent laboratory tests were reviewed prior to James Salvador's visit.  Patient encouraged to perform monthly skin exams.  UV precautions reviewed with patient.  Return to clinic 6 months        Again, thank you for allowing me to participate in the care of your patient.        Sincerely,        Jv Dutta MD

## 2021-04-06 ENCOUNTER — TELEPHONE (OUTPATIENT)
Dept: CARDIOLOGY | Facility: CLINIC | Age: 76
End: 2021-04-06

## 2021-04-06 NOTE — TELEPHONE ENCOUNTER
Pt's wife Sharon called, requesting a refill for Furosemide 40 mg once daily.   Refill was already sent 2/24/21 for 90 day supply with 2 refills. Advised I would call the pharmacy.     Called [facility] pharmacy, they did not receive the refill on 2/24/21, however took verbal order for furosemide. They will fill today.   Sandhya RN, BSN  04/06/21 11:15 AM

## 2021-04-23 DIAGNOSIS — L21.9 DERMATITIS, SEBORRHEIC: ICD-10-CM

## 2021-04-26 RX ORDER — FLUOCINONIDE TOPICAL SOLUTION USP, 0.05% 0.5 MG/ML
SOLUTION TOPICAL
Qty: 60 ML | Refills: 4 | Status: SHIPPED | OUTPATIENT
Start: 2021-04-26 | End: 2022-02-11

## 2021-04-26 NOTE — TELEPHONE ENCOUNTER
"Failed protocol.  please route to  team if patient needs an appointment     Eunice BALDERASRN BSN  [facility]  [phone]  Requested Prescriptions   Pending Prescriptions Disp Refills     fluocinonide (LIDEX) 0.05 % external solution 60 mL 4     Sig: Apply topically 2 times daily       Topical Steroids and Nonsteroidals Protocol Failed - 4/23/2021  7:18 PM        Failed - High potency steroid not ordered        Passed - Patient is age 6 or older        Passed - Authorizing prescriber's most recent note related to this medication read.     If refill request is for ophthalmic use, please forward request to provider for approval.          Passed - Recent (12 mo) or future (30 days) visit within the authorizing provider's specialty     Patient has had an office visit with the authorizing provider or a provider within the authorizing providers department within the previous 12 mos or has a future within next 30 days. See \"Patient Info\" tab in inbasket, or \"Choose Columns\" in Meds & Orders section of the refill encounter.              Passed - Medication is active on med list             "

## 2021-05-28 ENCOUNTER — THERAPY VISIT (OUTPATIENT)
Dept: PHYSICAL THERAPY | Facility: CLINIC | Age: 76
End: 2021-05-28
Payer: COMMERCIAL

## 2021-05-28 DIAGNOSIS — M25.562 KNEE PAIN, LEFT: Primary | ICD-10-CM

## 2021-05-28 DIAGNOSIS — Z47.89 AFTERCARE FOLLOWING SURGERY OF THE MUSCULOSKELETAL SYSTEM: ICD-10-CM

## 2021-05-28 PROCEDURE — 97161 PT EVAL LOW COMPLEX 20 MIN: CPT | Mod: GP | Performed by: PHYSICAL THERAPIST

## 2021-05-28 PROCEDURE — 97110 THERAPEUTIC EXERCISES: CPT | Mod: GP | Performed by: PHYSICAL THERAPIST

## 2021-05-28 ASSESSMENT — ACTIVITIES OF DAILY LIVING (ADL)
WEAKNESS: THE SYMPTOM AFFECTS MY ACTIVITY SLIGHTLY
HOW_WOULD_YOU_RATE_THE_OVERALL_FUNCTION_OF_YOUR_KNEE_DURING_YOUR_USUAL_DAILY_ACTIVITIES?: ABNORMAL
STAND: ACTIVITY IS FAIRLY DIFFICULT
GO UP STAIRS: ACTIVITY IS FAIRLY DIFFICULT
KNEEL ON THE FRONT OF YOUR KNEE: ACTIVITY IS VERY DIFFICULT
RISE FROM A CHAIR: ACTIVITY IS SOMEWHAT DIFFICULT
SQUAT: ACTIVITY IS VERY DIFFICULT
SIT WITH YOUR KNEE BENT: ACTIVITY IS MINIMALLY DIFFICULT
WALK: ACTIVITY IS MINIMALLY DIFFICULT
STIFFNESS: THE SYMPTOM AFFECTS MY ACTIVITY MODERATELY
KNEE_ACTIVITY_OF_DAILY_LIVING_SCORE: 48.57
AS_A_RESULT_OF_YOUR_KNEE_INJURY,_HOW_WOULD_YOU_RATE_YOUR_CURRENT_LEVEL_OF_DAILY_ACTIVITY?: ABNORMAL
GIVING WAY, BUCKLING OR SHIFTING OF KNEE: THE SYMPTOM AFFECTS MY ACTIVITY SLIGHTLY
LIMPING: THE SYMPTOM AFFECTS MY ACTIVITY SLIGHTLY
KNEE_ACTIVITY_OF_DAILY_LIVING_SUM: 34
GO DOWN STAIRS: ACTIVITY IS FAIRLY DIFFICULT
RAW_SCORE: 34
PAIN: THE SYMPTOM AFFECTS MY ACTIVITY MODERATELY
HOW_WOULD_YOU_RATE_THE_CURRENT_FUNCTION_OF_YOUR_KNEE_DURING_YOUR_USUAL_DAILY_ACTIVITIES_ON_A_SCALE_FROM_0_TO_100_WITH_100_BEING_YOUR_LEVEL_OF_KNEE_FUNCTION_PRIOR_TO_YOUR_INJURY_AND_0_BEING_THE_INABILITY_TO_PERFORM_ANY_OF_YOUR_USUAL_DAILY_ACTIVITIES?: 80
SWELLING: THE SYMPTOM AFFECTS MY ACTIVITY MODERATELY

## 2021-05-28 NOTE — PROGRESS NOTES
Physical Therapy Initial Evaluation  Subjective:  The history is provided by the patient. No  was used.   Patient Health History  James Salvador being seen for PT for rehab of left knee quad tendon repair.     Problem began: 4/5/2021.   Problem occurred: Fall onto knee   Pain is reported as 1/10 on pain scale.  General health as reported by patient is fair.  Pertinent medical history includes: cancer, chest pain, heart problems, high blood pressure, implanted device, overweight, sleep disorder/apnea and weakness.   Red flags:  None as reported by patient.  Medical allergies: none.   Surgeries include:  Orthopedic surgery, heart surgery, cancer surgery and other. Other surgery history details: Eyelid surgery, mohs surgery, sinus surgery, tonsils and adenoids removed, muscle and tendon repair in wrists as a child, oral surgeries.    Current medications:  Anti-inflammatory, cardiac medication, high blood pressure medication and pain medication.    Current occupation is Retired.   Primary job tasks include:  Computer work, driving, lifting/carrying and prolonged sitting.                  Therapist Generated HPI Evaluation  Problem details: End of October 2020 the patient fell at home, tearing his L quad tendon. Held off on surgery due to COVID-19 pandemic. Underwent L quad tendon repair on 4/5/2021 performed by Dr. Victor as an outpatient procedure. Was placed in an immobilizer for 6 weeks; however, wasn't able to transfer out of his lazy boy so did not wear the immobilizer constantly for the 6 weeks. Hasn't had physical therapy at this point. Currently ambulating w/ a SEC and navigating stairs step to. 5 stairs into home w/ B rails, flight of stairs to basement w/ 1 rail and 1 partial rail - goes to basement infrequently, otherwise, single floor living. Bowflex machine in basement. Pain increases randomly, activity doesn't seem to matter. Using ice and Tylenol for pain..         Type of problem:   Left knee.    This is a new condition.  Condition occurred with:  A fall/slip.  Where condition occurred: at home.  Patient reports pain:  Anterior and medial.  Pain is described as aching and is intermittent.  Pain radiates to:  Thigh and lower leg. Pain is the same all the time.  Since onset symptoms are gradually improving.  Associated symptoms:  Edema, loss of motion/stiffness, loss of strength, buckling/giving out and numbness. Symptoms are exacerbated by nothing  and relieved by other and ice (Tyelnol).      Barriers include:  None as reported by patient.                        Objective:    Gait:    Gait Type:  Antalgic   Assistive Devices:  Cane  Deviations:  General Deviations:  Toe out L, stance time decr, stride length decr, cole decr and base of support incr    Flexibility/Screens:       Lower Extremity:  Decreased left lower extremity flexibility:Quadriceps                                                   Hip Evaluation    Hip Strength:  : will assess further hip strength next session, limited d/t time in eval today.    Flexion:   Left: 4+/5   Pain:  Right: 5/5   Pain:                                                 Knee Evaluation:  ROM:  Strength wnl knee: SLR L: partial - no ext lag.  AROM      Extension:  Left: 5    Right:  3  Flexion: Left: 103    Right: 120  PROM      Extension: Left: 2   Right:   Flexion: Left: 109   Right:       Strength:     Extension:  Left: 4+/5    Pain:+      Right: 5/5   Pain:  Flexion:  Left: 5-/5   Strong/pain free  Pain:      Right: 5/5   Pain:    Quad Set Left: Fair    Pain:   Quad Set Right: Good    Pain:        Edema:  Edema of the knee: mild generalized L knee edema.    Mobility Testing:      Patellofemoral Medial:  Left: hypomobile      Patellofemoral Lateral:  Left: hypomobile      Patellofemoral Superior:  Left: hypomobile      Patellofemoral Inferior:  Left: hypermobile      Functional Testing:            Proprioception:   Stork Balance Test: Left:  Unable   Right:   % of Uninvolved:           General     ROS    Assessment/Plan:    Patient is a 75 year old male with left side knee complaints.    Patient has the following significant findings with corresponding treatment plan.                Diagnosis 1:  S/p L quad tendon repair (  Pain -  hot/cold therapy, electric stimulation, manual therapy, education, directional preference exercise and home program  Decreased ROM/flexibility - manual therapy and therapeutic exercise  Decreased joint mobility - manual therapy and therapeutic exercise  Decreased strength - therapeutic exercise and therapeutic activities  Impaired balance - neuro re-education and therapeutic activities  Decreased proprioception - neuro re-education and therapeutic activities  Impaired gait - gait training  Impaired muscle performance - neuro re-education  Decreased function - therapeutic activities    Therapy Evaluation Codes:   1) History comprised of:   Personal factors that impact the plan of care:      None.    Comorbidity factors that impact the plan of care are:      Cancer, Heart problems, High blood pressure, Implanted device, Overweight, Sleep disorder/apnea and Weakness.     Medications impacting care: Anti-depressant, Cardiac, High blood pressure and Pain.  2) Examination of Body Systems comprised of:   Body structures and functions that impact the plan of care:      Knee.   Activity limitations that impact the plan of care are:      Bending, Dressing, Lifting, Squatting/kneeling, Stairs, Standing and Walking.  3) Clinical presentation characteristics are:   Stable/Uncomplicated.  4) Decision-Making    Low complexity using standardized patient assessment instrument and/or measureable assessment of functional outcome.  Cumulative Therapy Evaluation is: Low complexity.    Previous and current functional limitations:  (See Goal Flow Sheet for this information)    Short term and Long term goals: (See Goal Flow Sheet for this information)      Communication ability:  Patient appears to be able to clearly communicate and understand verbal and written communication and follow directions correctly.  Treatment Explanation - The following has been discussed with the patient:   RX ordered/plan of care  Anticipated outcomes  Possible risks and side effects  This patient would benefit from PT intervention to resume normal activities.   Rehab potential is good.    Frequency:  1 X week, once daily  Duration:  for 12 weeks  Discharge Plan:  Achieve all LTG.  Independent in home treatment program.  Reach maximal therapeutic benefit.    Please refer to the daily flowsheet for treatment today, total treatment time and time spent performing 1:1 timed codes.

## 2021-05-28 NOTE — LETTER
[facility]  [address]  [address]  [place] [address]  [phone]    May 28, 2021    Re: James Salvador   :   1945  MRN:  [id number]   REFERRING PHYSICIAN:   Sim [facility]    Date of Initial Evaluation:  2021  Visits:  Rxs Used: 1  Reason for Referral:     Aftercare following surgery of the musculoskeletal system  Knee pain, left    EVALUATION SUMMARY    Physical Therapy Initial Evaluation  Subjective:  The history is provided by the patient. No  was used.   Patient Health History  James Salvador being seen for PT for rehab of left knee quad tendon repair.   Problem began: 2021.   Problem occurred: Fall onto knee   Pain is reported as 1/10 on pain scale.  General health as reported by patient is fair.  Pertinent medical history includes: cancer, chest pain, heart problems, high blood pressure, implanted device, overweight, sleep disorder/apnea and weakness.   Red flags:  None as reported by patient.  Medical allergies: none.   Surgeries include:  Orthopedic surgery, heart surgery, cancer surgery and other. Other surgery history details: Eyelid surgery, mohs surgery, sinus surgery, tonsils and adenoids removed, muscle and tendon repair in wrists as a child, oral surgeries.    Current medications:  Anti-inflammatory, cardiac medication, high blood pressure medication and pain medication.    Current occupation is Retired.   Primary job tasks include:  Computer work, driving, lifting/carrying and prolonged sitting.   Therapist Generated HPI Evaluation  Problem details: End of 2020 the patient fell at home, tearing his L quad tendon. Held off on surgery due to COVID-19 pandemic. Underwent L quad tendon repair on 2021 performed by Dr. Victor as an outpatient procedure. Was placed in an immobilizer for 6 weeks; however, wasn't able to transfer out of his lazy boy so  did not wear the immobilizer constantly for the 6 weeks. Hasn't had physical therapy at this point. Currently ambulating w/ a SEC and navigating stairs step to. 5 stairs into home w/ B rails, flight of stairs to basement w/ 1 rail and 1 partial rail - goes to basement infrequently, otherwise, single floor living. Bowflex machine in basement. Pain increases randomly, activity doesn't seem to matter. Using ice and Tylenol for pain..         Type of problem:  Left knee.  This is a new condition.  Re: James Salvador   :   1945    Condition occurred with:  A fall/slip.  Where condition occurred: at home.  Patient reports pain:  Anterior and medial.  Pain is described as aching and is intermittent.  Pain radiates to:  Thigh and lower leg. Pain is the same all the time.  Since onset symptoms are gradually improving.  Associated symptoms:  Edema, loss of motion/stiffness, loss of strength, buckling/giving out and numbness. Symptoms are exacerbated by nothing  and relieved by other and ice (Tyelnol).  Barriers include:  None as reported by patient.  Objective:  Gait:    Gait Type:  Antalgic   Assistive Devices:  Cane  Deviations:  General Deviations:  Toe out L, stance time decr, stride length decr, cole decr and base of support incr  Flexibility/Screens:   Lower Extremity:  Decreased left lower extremity flexibility:Quadriceps  Hip Evaluation  Hip Strength:  : will assess further hip strength next session, limited d/t time in eval today.  Flexion:   Left: 4+/5   Pain:  Right: 5/5   Pain:  Knee Evaluation:  ROM:  Strength wnl knee: SLR L: partial - no ext lag.  AROM  Extension:  Left: 5    Right:  3  Flexion: Left: 103    Right: 120  PROM  Extension: Left: 2   Right:   Flexion: Left: 109   Right:   Strength:   Extension:  Left: 4+/5    Pain:+      Right: 5/5   Pain:  Flexion:  Left: 5-/5   Strong/pain free  Pain:      Right: 5/5   Pain:    Quad Set Left: Fair    Pain:   Quad Set Right: Good    Pain:  Edema:   Edema of the knee: mild generalized L knee edema.  Mobility Testing:    Patellofemoral Medial:  Left: hypomobile      Patellofemoral Lateral:  Left: hypomobile      Patellofemoral Superior:  Left: hypomobile      Patellofemoral Inferior:  Left: hypermobile      Functional Testing:    Proprioception:   Stork Balance Test: Left:  Unable  Right:   % of Uninvolved:   Assessment/Plan:    Patient is a 75 year old male with left side knee complaints.    Patient has the following significant findings with corresponding treatment plan.                Diagnosis 1:  S/p L quad tendon repair (  Pain -  hot/cold therapy, electric stimulation, manual therapy, education, directional preference exercise and home program  Decreased ROM/flexibility - manual therapy and therapeutic exercise  Decreased joint mobility - manual therapy and therapeutic exercise  Re: James Salvador   :   1945    Decreased strength - therapeutic exercise and therapeutic activities  Impaired balance - neuro re-education and therapeutic activities  Decreased proprioception - neuro re-education and therapeutic activities  Impaired gait - gait training  Impaired muscle performance - neuro re-education  Decreased function - therapeutic activities    Therapy Evaluation Codes:   1) History comprised of:   Personal factors that impact the plan of care:      None.    Comorbidity factors that impact the plan of care are:      Cancer, Heart problems, High blood pressure, Implanted device, Overweight, Sleep disorder/apnea and Weakness.     Medications impacting care: Anti-depressant, Cardiac, High blood pressure and Pain.  2) Examination of Body Systems comprised of:   Body structures and functions that impact the plan of care:      Knee.   Activity limitations that impact the plan of care are:      Bending, Dressing, Lifting, Squatting/kneeling, Stairs, Standing and Walking.  3) Clinical presentation characteristics  are:   Stable/Uncomplicated.  4) Decision-Making    Low complexity using standardized patient assessment instrument and/or measureable assessment of functional outcome.  Cumulative Therapy Evaluation is: Low complexity.    Previous and current functional limitations:  (See Goal Flow Sheet for this information)    Short term and Long term goals: (See Goal Flow Sheet for this information)     Communication ability:  Patient appears to be able to clearly communicate and understand verbal and written communication and follow directions correctly.  Treatment Explanation - The following has been discussed with the patient:   RX ordered/plan of care  Anticipated outcomes  Possible risks and side effects  This patient would benefit from PT intervention to resume normal activities.   Rehab potential is good.    Frequency:  1 X week, once daily  Duration:  for 12 weeks  Discharge Plan:  Achieve all LTG.  Independent in home treatment program.  Reach maximal therapeutic benefit.            Re: James Salvador   :   1945          Thank you for your referral.    INQUIRIES  Therapist: Joel Alcantara PT  [address] [address]  Phone: [phone]  Fax: [phone]

## 2021-06-03 ENCOUNTER — TELEPHONE (OUTPATIENT)
Dept: CARDIOLOGY | Facility: CLINIC | Age: 76
End: 2021-06-03

## 2021-06-03 DIAGNOSIS — I25.10 CAD (CORONARY ARTERY DISEASE): ICD-10-CM

## 2021-06-03 DIAGNOSIS — I48.91 ATRIAL FIBRILLATION (H): ICD-10-CM

## 2021-06-03 RX ORDER — CARVEDILOL 3.12 MG/1
3.12 TABLET ORAL 2 TIMES DAILY WITH MEALS
Qty: 180 TABLET | Refills: 1 | Status: SHIPPED | OUTPATIENT
Start: 2021-06-03 | End: 2021-11-22

## 2021-06-03 NOTE — TELEPHONE ENCOUNTER
Patient left VM at 1:45pm for Melvin Buchanan, was transferred to Complex Care  for Austin Buchanan    Would like to speak to a nurse regarding a prescription that needs to be coordinated with the pharmacy    Needs to schedule an appointment as well with Dr. Melvin Buchanan    Please return call    Thank you  Sabas CANAS   - Complex Care Team

## 2021-06-03 NOTE — TELEPHONE ENCOUNTER
Called and spoke with pt. He used to follow up with Dr. Alexander, and saw Dr. Buchanan last fall to establish care. Pt reports he has his medications filled together routinely, but Carvedilol got off, because he was filling under Dr. Alexander's name. Pt wondering if we could send new Rx for 90 days of Carvedilol to get him in sync with refills of his other medications. Rx sent. Pt also wanting to arrange follow up. He is due 9/2021 with Dr. Buchanan. Assisted pt in arranging.     Natalia Meneses, RN, BSN, CHFN  06/03/21 at 2:47 PM

## 2021-06-04 ENCOUNTER — THERAPY VISIT (OUTPATIENT)
Dept: PHYSICAL THERAPY | Facility: CLINIC | Age: 76
End: 2021-06-04
Payer: COMMERCIAL

## 2021-06-04 DIAGNOSIS — M25.562 KNEE PAIN, LEFT: ICD-10-CM

## 2021-06-04 DIAGNOSIS — Z47.89 AFTERCARE FOLLOWING SURGERY OF THE MUSCULOSKELETAL SYSTEM: ICD-10-CM

## 2021-06-04 PROCEDURE — 97110 THERAPEUTIC EXERCISES: CPT | Mod: GP | Performed by: PHYSICAL THERAPIST

## 2021-06-09 ENCOUNTER — THERAPY VISIT (OUTPATIENT)
Dept: PHYSICAL THERAPY | Facility: CLINIC | Age: 76
End: 2021-06-09
Payer: COMMERCIAL

## 2021-06-09 DIAGNOSIS — Z47.89 AFTERCARE FOLLOWING SURGERY OF THE MUSCULOSKELETAL SYSTEM: ICD-10-CM

## 2021-06-09 DIAGNOSIS — M25.562 KNEE PAIN, LEFT: ICD-10-CM

## 2021-06-09 PROCEDURE — 97110 THERAPEUTIC EXERCISES: CPT | Mod: GP | Performed by: PHYSICAL THERAPIST

## 2021-06-09 PROCEDURE — 97530 THERAPEUTIC ACTIVITIES: CPT | Mod: GP | Performed by: PHYSICAL THERAPIST

## 2021-06-14 ENCOUNTER — ANCILLARY PROCEDURE (OUTPATIENT)
Dept: CARDIOLOGY | Facility: CLINIC | Age: 76
End: 2021-06-14
Attending: INTERNAL MEDICINE
Payer: COMMERCIAL

## 2021-06-14 DIAGNOSIS — Z95.0 CARDIAC PACEMAKER IN SITU: ICD-10-CM

## 2021-06-14 PROCEDURE — 93294 REM INTERROG EVL PM/LDLS PM: CPT | Performed by: INTERNAL MEDICINE

## 2021-06-14 PROCEDURE — 93296 REM INTERROG EVL PM/IDS: CPT | Performed by: INTERNAL MEDICINE

## 2021-06-18 ENCOUNTER — OFFICE VISIT (OUTPATIENT)
Dept: DERMATOLOGY | Facility: CLINIC | Age: 76
End: 2021-06-18
Payer: COMMERCIAL

## 2021-06-18 ENCOUNTER — THERAPY VISIT (OUTPATIENT)
Dept: PHYSICAL THERAPY | Facility: CLINIC | Age: 76
End: 2021-06-18
Payer: COMMERCIAL

## 2021-06-18 VITALS — DIASTOLIC BLOOD PRESSURE: 78 MMHG | HEART RATE: 57 BPM | SYSTOLIC BLOOD PRESSURE: 125 MMHG | OXYGEN SATURATION: 95 %

## 2021-06-18 DIAGNOSIS — M25.562 KNEE PAIN, LEFT: ICD-10-CM

## 2021-06-18 DIAGNOSIS — D18.01 ANGIOMA OF SKIN: ICD-10-CM

## 2021-06-18 DIAGNOSIS — D22.9 NEVUS: Primary | ICD-10-CM

## 2021-06-18 DIAGNOSIS — Z85.828 HISTORY OF SKIN CANCER: ICD-10-CM

## 2021-06-18 DIAGNOSIS — Z85.820 HISTORY OF MELANOMA: ICD-10-CM

## 2021-06-18 DIAGNOSIS — Z47.89 AFTERCARE FOLLOWING SURGERY OF THE MUSCULOSKELETAL SYSTEM: ICD-10-CM

## 2021-06-18 DIAGNOSIS — L21.9 DERMATITIS, SEBORRHEIC: ICD-10-CM

## 2021-06-18 DIAGNOSIS — L57.0 ACTINIC KERATOSIS: ICD-10-CM

## 2021-06-18 DIAGNOSIS — L82.1 SEBORRHEIC KERATOSIS: ICD-10-CM

## 2021-06-18 DIAGNOSIS — L81.4 LENTIGO: ICD-10-CM

## 2021-06-18 PROCEDURE — 97530 THERAPEUTIC ACTIVITIES: CPT | Mod: GP | Performed by: PHYSICAL THERAPIST

## 2021-06-18 PROCEDURE — 97110 THERAPEUTIC EXERCISES: CPT | Mod: GP | Performed by: PHYSICAL THERAPIST

## 2021-06-18 PROCEDURE — 17000 DESTRUCT PREMALG LESION: CPT | Performed by: PHYSICIAN ASSISTANT

## 2021-06-18 PROCEDURE — 99213 OFFICE O/P EST LOW 20 MIN: CPT | Mod: 25 | Performed by: PHYSICIAN ASSISTANT

## 2021-06-18 RX ORDER — DESONIDE 0.5 MG/G
CREAM TOPICAL
Qty: 60 G | Refills: 3 | Status: SHIPPED | OUTPATIENT
Start: 2021-06-18 | End: 2022-07-15

## 2021-06-18 NOTE — LETTER
6/18/2021         RE: James Salvador  [address] [id number]        Dear Colleague,    Thank you for referring your patient, James Salvador, to the [facility]. Please see a copy of my visit note below.    HPI:   Chief complaint: James Salvador (Pete) is a 75 year old male who presents for Full skin cancer screening to rule out skin cancer.   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 He lives in [place].   On review of systems, there are no further skin complaints, patient is feeling otherwise well.  See patient intake sheet.  ROS of the following were done and are negative: Constitutional, Eyes, Ears, Nose,   Mouth, Throat, Cardiovascular, Respiratory, GI, Genitourinary, Musculoskeletal,   Psychiatric, Endocrine, Allergic/Immunologic.      PHYSICAL EXAM:   /78   Pulse 57   SpO2 95%   Skin exam performed as follows: Type 2 skin. Mood appropriate  Alert and Oriented X 3. Well developed, well nourished in no distress.  General appearance: Normal  Head including face: Normal  Eyes: conjunctiva and lids: Normal  Mouth: Lips, teeth, gums: Normal  Neck: Normal  Chest-breast/axillae: Normal  Back: Normal  Spleen and liver: Normal  Cardiovascular: Exam of peripheral vascular system by observation for swelling, varicosities, edema: Normal  Genitalia: groin, buttocks: Normal  Extremities: digits/nails (clubbing): Normal  Eccrine and Apocrine glands: Normal  Right upper extremity: Normal  Left upper extremity:  Normal  Right lower extremity: Normal  Left lower extremity: Normal  Skin: Scalp and body hair: See below    Pt deferred exam of breasts, groin, buttocks: NO    Other physical findings:  1. Multiple pigmented macules on extremities and trunk  2. Multiple pigmented macules on face, trunk and extremities  3. Multiple vascular papules on trunk, arms and legs  4. Multiple scattered keratotic plaques  5. Pink gritty papules on the right lower cheek x 1      Except as noted above, no other signs of skin cancer or melanoma.     ASSESSMENT/PLAN:   Benign Full skin cancer screening today.     Patient with history of SCC, BCC and melanoma  Advised on monthly self exams and 1 year  Patient Education: Appropriate brochures given.    Multiple benign appearing nevi on arms, legs and trunk. Discussed ABCDEs of melanoma and sunscreen.   Multiple lentigos on arms, legs and trunk. Advised benign, no treatment needed.  Multiple scattered angiomas. Advised benign, no treatment needed.   Seborrheic keratosis on arms, legs and trunk. Advised benign, no treatment needed.  Actinic keratosis on the right lower cheek x 1. As precancerous, cryosurgery performed. Advised on blistering and post-op care. Advised if not resolved in 1-2 months to return for evaluation  History of seborrheic dermatitis on the face and scalp - scalp well controlled with ketoconazole and lidex as needed. Face controlled with desonide - advised to only use this when needed.             Follow-up: 4 months    1.) Patient was asked about new and changing moles. YES  2.) Patient received a complete physical skin examination: YES  3.) Patient was counseled to perform a monthly self skin examination: YES  Scribed By: Rosa Maria Hansen, MS, PAAnishaC        Again, thank you for allowing me to participate in the care of your patient.        Sincerely,        Rosa Maria Hansen PA-C

## 2021-06-18 NOTE — PROGRESS NOTES
HPI:   Chief complaint: James Salvador (Pete) is a 75 year old male who presents for Full skin cancer screening to rule out skin cancer.   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 He lives in [place].   On review of systems, there are no further skin complaints, patient is feeling otherwise well.  See patient intake sheet.  ROS of the following were done and are negative: Constitutional, Eyes, Ears, Nose,   Mouth, Throat, Cardiovascular, Respiratory, GI, Genitourinary, Musculoskeletal,   Psychiatric, Endocrine, Allergic/Immunologic.      PHYSICAL EXAM:   /78   Pulse 57   SpO2 95%   Skin exam performed as follows: Type 2 skin. Mood appropriate  Alert and Oriented X 3. Well developed, well nourished in no distress.  General appearance: Normal  Head including face: Normal  Eyes: conjunctiva and lids: Normal  Mouth: Lips, teeth, gums: Normal  Neck: Normal  Chest-breast/axillae: Normal  Back: Normal  Spleen and liver: Normal  Cardiovascular: Exam of peripheral vascular system by observation for swelling, varicosities, edema: Normal  Genitalia: groin, buttocks: Normal  Extremities: digits/nails (clubbing): Normal  Eccrine and Apocrine glands: Normal  Right upper extremity: Normal  Left upper extremity: Normal  Right lower extremity: Normal  Left lower extremity: Normal  Skin: Scalp and body hair: See below    Pt deferred exam of breasts, groin, buttocks: NO    Other physical findings:  1. Multiple pigmented macules on extremities and trunk  2. Multiple pigmented macules  on face, trunk and extremities  3. Multiple vascular papules on trunk, arms and legs  4. Multiple scattered keratotic plaques  5. Pink gritty papules on the right lower cheek x 1      Except as noted above, no other signs of skin cancer or melanoma.     ASSESSMENT/PLAN:   Benign Full skin cancer screening today.     Patient with history of SCC, BCC and melanoma  Advised on monthly self exams and 1 year  Patient Education: Appropriate brochures given.    Multiple benign appearing nevi on arms, legs and trunk. Discussed ABCDEs of melanoma and sunscreen.   Multiple lentigos on arms, legs and trunk. Advised benign, no treatment needed.  Multiple scattered angiomas. Advised benign, no treatment needed.   Seborrheic keratosis on arms, legs and trunk. Advised benign, no treatment needed.  Actinic keratosis on the right lower cheek x 1. As precancerous, cryosurgery performed. Advised on blistering and post-op care. Advised if not resolved in 1-2 months to return for evaluation  History of seborrheic dermatitis on the face and scalp - scalp well controlled with ketoconazole and lidex as needed. Face controlled with desonide - advised to only use this when needed.             Follow-up: 4 months    1.) Patient was asked about new and changing moles. YES  2.) Patient received a complete physical skin examination: YES  3.) Patient was counseled to perform a monthly self skin examination: YES  Scribed By: Rosa Maria Hansen MS, PAVAISHALI

## 2021-06-24 LAB
MDC_IDC_LEAD_IMPLANT_DT: NORMAL
MDC_IDC_LEAD_LOCATION: NORMAL
MDC_IDC_LEAD_LOCATION_DETAIL_1: NORMAL
MDC_IDC_LEAD_MFG: NORMAL
MDC_IDC_LEAD_MODEL: NORMAL
MDC_IDC_LEAD_POLARITY_TYPE: NORMAL
MDC_IDC_LEAD_SERIAL: NORMAL
MDC_IDC_MSMT_BATTERY_DTM: NORMAL
MDC_IDC_MSMT_BATTERY_REMAINING_LONGEVITY: 162 MO
MDC_IDC_MSMT_BATTERY_RRT_TRIGGER: 2.62
MDC_IDC_MSMT_BATTERY_STATUS: NORMAL
MDC_IDC_MSMT_BATTERY_VOLTAGE: 3.04 V
MDC_IDC_MSMT_LEADCHNL_RV_IMPEDANCE_VALUE: 342 OHM
MDC_IDC_MSMT_LEADCHNL_RV_IMPEDANCE_VALUE: 399 OHM
MDC_IDC_MSMT_LEADCHNL_RV_PACING_THRESHOLD_AMPLITUDE: 0.62 V
MDC_IDC_MSMT_LEADCHNL_RV_PACING_THRESHOLD_PULSEWIDTH: 0.4 MS
MDC_IDC_MSMT_LEADCHNL_RV_SENSING_INTR_AMPL: 3.25 MV
MDC_IDC_MSMT_LEADCHNL_RV_SENSING_INTR_AMPL: 3.25 MV
MDC_IDC_PG_IMPLANT_DTM: NORMAL
MDC_IDC_PG_MFG: NORMAL
MDC_IDC_PG_MODEL: NORMAL
MDC_IDC_PG_SERIAL: NORMAL
MDC_IDC_PG_TYPE: NORMAL
MDC_IDC_SESS_CLINIC_NAME: NORMAL
MDC_IDC_SESS_DTM: NORMAL
MDC_IDC_SESS_TYPE: NORMAL
MDC_IDC_SET_BRADY_HYSTRATE: NORMAL
MDC_IDC_SET_BRADY_LOWRATE: 50 {BEATS}/MIN
MDC_IDC_SET_BRADY_MODE: NORMAL
MDC_IDC_SET_LEADCHNL_RV_PACING_AMPLITUDE: 2 V
MDC_IDC_SET_LEADCHNL_RV_PACING_ANODE_ELECTRODE_1: NORMAL
MDC_IDC_SET_LEADCHNL_RV_PACING_ANODE_LOCATION_1: NORMAL
MDC_IDC_SET_LEADCHNL_RV_PACING_CAPTURE_MODE: NORMAL
MDC_IDC_SET_LEADCHNL_RV_PACING_CATHODE_ELECTRODE_1: NORMAL
MDC_IDC_SET_LEADCHNL_RV_PACING_CATHODE_LOCATION_1: NORMAL
MDC_IDC_SET_LEADCHNL_RV_PACING_POLARITY: NORMAL
MDC_IDC_SET_LEADCHNL_RV_PACING_PULSEWIDTH: 0.4 MS
MDC_IDC_SET_LEADCHNL_RV_SENSING_ANODE_ELECTRODE_1: NORMAL
MDC_IDC_SET_LEADCHNL_RV_SENSING_ANODE_LOCATION_1: NORMAL
MDC_IDC_SET_LEADCHNL_RV_SENSING_CATHODE_ELECTRODE_1: NORMAL
MDC_IDC_SET_LEADCHNL_RV_SENSING_CATHODE_LOCATION_1: NORMAL
MDC_IDC_SET_LEADCHNL_RV_SENSING_POLARITY: NORMAL
MDC_IDC_SET_LEADCHNL_RV_SENSING_SENSITIVITY: 0.6 MV
MDC_IDC_SET_ZONE_DETECTION_INTERVAL: 360 MS
MDC_IDC_SET_ZONE_TYPE: NORMAL
MDC_IDC_STAT_BRADY_DTM_END: NORMAL
MDC_IDC_STAT_BRADY_DTM_START: NORMAL
MDC_IDC_STAT_BRADY_RV_PERCENT_PACED: 24.91 %
MDC_IDC_STAT_EPISODE_RECENT_COUNT: 0
MDC_IDC_STAT_EPISODE_RECENT_COUNT: 0
MDC_IDC_STAT_EPISODE_RECENT_COUNT_DTM_END: NORMAL
MDC_IDC_STAT_EPISODE_RECENT_COUNT_DTM_END: NORMAL
MDC_IDC_STAT_EPISODE_RECENT_COUNT_DTM_START: NORMAL
MDC_IDC_STAT_EPISODE_RECENT_COUNT_DTM_START: NORMAL
MDC_IDC_STAT_EPISODE_TOTAL_COUNT: 0
MDC_IDC_STAT_EPISODE_TOTAL_COUNT: 2
MDC_IDC_STAT_EPISODE_TOTAL_COUNT_DTM_END: NORMAL
MDC_IDC_STAT_EPISODE_TOTAL_COUNT_DTM_END: NORMAL
MDC_IDC_STAT_EPISODE_TOTAL_COUNT_DTM_START: NORMAL
MDC_IDC_STAT_EPISODE_TOTAL_COUNT_DTM_START: NORMAL
MDC_IDC_STAT_EPISODE_TYPE: NORMAL

## 2021-08-04 ENCOUNTER — OFFICE VISIT (OUTPATIENT)
Dept: INTERNAL MEDICINE | Facility: CLINIC | Age: 76
End: 2021-08-04
Payer: COMMERCIAL

## 2021-08-04 VITALS
DIASTOLIC BLOOD PRESSURE: 82 MMHG | OXYGEN SATURATION: 98 % | TEMPERATURE: 98.7 F | BODY MASS INDEX: 44.1 KG/M2 | WEIGHT: 315 LBS | HEIGHT: 71 IN | SYSTOLIC BLOOD PRESSURE: 136 MMHG | HEART RATE: 63 BPM

## 2021-08-04 DIAGNOSIS — I25.10 CORONARY ARTERY DISEASE INVOLVING NATIVE CORONARY ARTERY OF NATIVE HEART, ANGINA PRESENCE UNSPECIFIED: ICD-10-CM

## 2021-08-04 DIAGNOSIS — Z96.652 STATUS POST TOTAL LEFT KNEE REPLACEMENT: ICD-10-CM

## 2021-08-04 DIAGNOSIS — Z01.818 ENCOUNTER FOR PREOPERATIVE ASSESSMENT: Primary | ICD-10-CM

## 2021-08-04 DIAGNOSIS — I20.89 STABLE ANGINA (H): ICD-10-CM

## 2021-08-04 DIAGNOSIS — S76.112A QUADRICEPS TENDON RUPTURE, LEFT, INITIAL ENCOUNTER: ICD-10-CM

## 2021-08-04 DIAGNOSIS — E87.1 HYPONATREMIA: ICD-10-CM

## 2021-08-04 PROBLEM — Z96.611 STATUS POST TOTAL SHOULDER ARTHROPLASTY, RIGHT: Status: ACTIVE | Noted: 2017-11-12

## 2021-08-04 PROBLEM — M17.9 DJD (DEGENERATIVE JOINT DISEASE) OF KNEE: Status: ACTIVE | Noted: 2017-05-09

## 2021-08-04 LAB
ERYTHROCYTE [DISTWIDTH] IN BLOOD BY AUTOMATED COUNT: 12.9 % (ref 10–15)
HCT VFR BLD AUTO: 42.5 % (ref 40–53)
HGB BLD-MCNC: 14.9 G/DL (ref 13.3–17.7)
MCH RBC QN AUTO: 32.9 PG (ref 26.5–33)
MCHC RBC AUTO-ENTMCNC: 35.1 G/DL (ref 31.5–36.5)
MCV RBC AUTO: 94 FL (ref 78–100)
PLATELET # BLD AUTO: 186 10E3/UL (ref 150–450)
RBC # BLD AUTO: 4.53 10E6/UL (ref 4.4–5.9)
WBC # BLD AUTO: 7.8 10E3/UL (ref 4–11)

## 2021-08-04 PROCEDURE — 93000 ELECTROCARDIOGRAM COMPLETE: CPT | Performed by: INTERNAL MEDICINE

## 2021-08-04 PROCEDURE — 85027 COMPLETE CBC AUTOMATED: CPT | Performed by: INTERNAL MEDICINE

## 2021-08-04 PROCEDURE — 36415 COLL VENOUS BLD VENIPUNCTURE: CPT | Performed by: INTERNAL MEDICINE

## 2021-08-04 PROCEDURE — 99214 OFFICE O/P EST MOD 30 MIN: CPT | Performed by: INTERNAL MEDICINE

## 2021-08-04 PROCEDURE — 80048 BASIC METABOLIC PNL TOTAL CA: CPT | Performed by: INTERNAL MEDICINE

## 2021-08-04 RX ORDER — DOXYCYCLINE HYCLATE 20 MG
TABLET ORAL
COMMUNITY
Start: 2021-07-24 | End: 2023-06-20

## 2021-08-04 ASSESSMENT — MIFFLIN-ST. JEOR: SCORE: 2244.46

## 2021-08-04 NOTE — PROGRESS NOTES
[address] [address]  Phone: [phone]  Primary Provider: Jeronimo Painter  Pre-op Performing Provider: IBRAHIMA WOMACK    PREOPERATIVE EVALUATION:  Today's date: 8/4/2021    James Salvador is a 76 year old male who presents for a preoperative evaluation.    Surgical Information:  Surgery/Procedure: RECONSTRUCTION KNEE LIGAMENT MEDIAL PATELLOFEMORAL  Surgery Location: [facility]   Surgeon: Dr. Osborn  Surgery Date: 8/18/21  Time of Surgery: 1PM  Where patient plans to recover: At home with family  Fax number for surgical facility: [phone]    Type of Anesthesia Anticipated: General    Assessment & Plan     The proposed surgical procedure is considered INTERMEDIATE risk.    Encounter for preoperative assessment  Quadriceps tendon rupture, left, initial encounter  Status post total left knee replacement  Approval given to proceed with proposed procedure without further diagnostic evaluation. Medications were reviewed in detail today. Hold Eliquis for 2 days prior to procedure and aspirin for 7 days prior to procedure. On the morning of surgery, take all other normal medications with a small sip of water. Resume all medications post-operatively at the same doses unless otherwise directed by surgical care team.  - EKG 12-lead complete w/read - Clinics  - CBC with platelets; Future  - Basic metabolic panel; Future    Coronary artery disease involving native coronary artery of native heart, angina presence unspecified  Stable angina (H)  EKG done today unchanged from past. Med recs as above.     Implanted Device:   - Type of device: pacemaker Patient advised to bring device information on day of surgery.    RECOMMENDATION:  APPROVAL GIVEN to proceed with proposed procedure, without further diagnostic evaluation.    RECOMMEND CONSULTING WITH HOSPITALIST FOR POST-OP MEDICAL MANAGEMENT    ADDENDUM 08/06/2021  Sodium unexpectedly  came back at 122. Does have history of hyponatremia but not usually this low. Unclear etiology. On furosemide 40mg daily but not on thiazide diuretic. Will have Alden come back for a non-fasting lab appointment to repeat BMP and check urine sodium.    Geovanni Crook MD, MPH  [facility]  Internal Medicine    Subjective     HPI related to upcoming procedure: Alden presents for a pre-op exam. He has no other complaints. He reports a personal history of cardiac disease. He tells me he is able to walk up a flight of stairs with a cane without getting short of breath or developing chest pain.    Preop Questions 8/4/2021   1. Have you ever had a heart attack or stroke? YES   2. Have you ever had surgery on your heart or blood vessels, such as a stent placement, a coronary artery bypass, or surgery on an artery in your head, neck, heart, or legs? YES   3. Do you have chest pain with activity? No   4. Do you have a history of  heart failure? YES   5. Do you currently have a cold, bronchitis or symptoms of other infection? No   6. Do you have a cough, shortness of breath, or wheezing? No   7. Do you or anyone in your family have previous history of blood clots? No   8. Do you or does anyone in your family have a serious bleeding problem such as prolonged bleeding following surgeries or cuts? No   9. Have you ever had problems with anemia or been told to take iron pills? No   10. Have you had any abnormal blood loss such as black, tarry or bloody stools? No   11. Have you ever had a blood transfusion? No   12. Are you willing to have a blood transfusion if it is medically needed before, during, or after your surgery? Yes   13. Have you or any of your relatives ever had problems with anesthesia? YES   14. Do you have sleep apnea, excessive snoring or daytime drowsiness? YES    14a. Do you have a CPAP machine? No   15. Do you have any artifical heart valves or other implanted medical devices like  a pacemaker, defibrillator, or continuous glucose monitor? YES   15a. What type of device do you have? Pacemaker   15b. Name of the clinic that manages your device:  [place]   16. Do you have artificial joints? YES   17. Are you allergic to latex? No     Health Care Directive: Patient has a Health Care Directive on file    Preoperative Review of :   reviewed - old norco script from last surgery in March 2021    Review of Systems  Constitutional, neuro, ENT, endocrine, pulmonary, cardiac, gastrointestinal, genitourinary, musculoskeletal, integument and psychiatric systems are negative, except as otherwise noted.    Patient Active Problem List    Diagnosis Date Noted     Tachy-edinson syndrome (H) 05/29/2019     Priority: Medium     Added automatically from request for surgery [id number]       Status post total shoulder arthroplasty, right 11/12/2017     Priority: Medium     Mixed hyperlipidemia 06/21/2017     Priority: Medium     Knee pain, left 05/22/2017     Priority: Medium     Status post total left knee replacement 05/11/2017     Priority: Medium     DJD (degenerative joint disease) of knee 05/09/2017     Priority: Medium     Formatting of this note might be different from the original.  DJD (degenerative joint disease) of knee--left TKA       Chronic atrial fibrillation (H) 04/21/2017     Priority: Medium     Coronary artery disease involving native coronary artery of native heart, angina presence unspecified 01/11/2016     Priority: Medium     Cardiac cath and PCI 1994. Cardiac Cath 9/2015: BRIDGET to LAD       Stable angina (H) 01/11/2016     Priority: Medium     Morbid obesity due to excess calories (H) 01/11/2016     Priority: Medium     History of myocardial infarction      Priority: Medium     PTCA       Paroxysmal supraventricular tachycardia (H)      Priority: Medium     On metoprolol       Actinic keratoses 07/17/2013     Priority: Medium     Hyperlipidemia with target LDL less than 70       Priority: Medium     Benign hypertension      Priority: Medium     Advance care planning 10/30/2012     Priority: Medium     Advance Care Planning 10/5/2015: Receipt of ACP document:  Received: Health Care Directive which was witnessed or notarized on 6/10/2005.  Document previously scanned on 9/22/15.  Validation form previously completed and scanned.  Code Status reflects choices in most recent ACP document. Confirmed/documented designated decision maker(s).  Added by Zohra Hernandez, RN, BSN, MA, Advance Care Planning Liaison.  Advance Care Planning Patient states has Advance Directive and will bring in a copy to clinic. 10/30/2012         Past Medical History:   Diagnosis Date     Actinic cheilitis 7/17/2013    Lower lip, left      Actinic keratosis      Allergic rhinitis      Basal cell carcinoma      Benign hypertension      CAD (coronary artery disease)     Cardiac cath and PCI 1994. Cardiac Cath 9/2015: BRIDGET to LAD     History of myocardial infarction 1994    PTCA     Hyperlipidemia LDL goal < 70      Malignant melanoma (H)      Melanoma (H) 10/15/2019    10/15/19 left zygoma     Mixed hyperlipidemia 6/21/2017     Morbid obesity due to excess calories (H) 1/11/2016     Paroxysmal supraventricular tachycardia (H)     on metoprolol     Permanent atrial fibrillation (H) 04/21/2017     Squamous cell carcinoma      Stable angina (H) 1/11/2016     Tachy-edinson syndrome (H) 5/29/2019    Added automatically from request for surgery [id number]     Past Surgical History:   Procedure Laterality Date     ANGIOPLASTY  1994    in [place]     ANKLE SURGERY  7/13/2005    right ankle     EP PERM PACER SINGLE LEAD N/A 5/31/2019    [employer] single lead pacemaker     HEART CATH STENT COR W/WO PTCA  9/23/2015    BRIDGET stent mid LAD     JOINT REPLACEMENT, HIP RT/LT  10/14/2009    right hip      LASER SURGERY OF EYE  06/01/2002     MOHS MICROGRAPHIC PROCEDURE  06/12/2004    squamous cell carcinoma right temple     SINUS SURGERY   7/11/2006     Current Outpatient Medications   Medication Sig Dispense Refill     Acetaminophen (TYLENOL PO) Take 650 mg by mouth 4 times daily        amoxicillin (AMOXIL) 500 MG capsule 2,000 mg as needed When going to the dentist  0     apixaban ANTICOAGULANT (ELIQUIS ANTICOAGULANT) 5 MG tablet TAKE 1 TABLET BY MOUTH TWICE A  tablet 3     ASPIRIN PO Take 81 mg by mouth daily       atorvastatin (LIPITOR) 80 MG tablet TAKE 1 TABLET BY MOUTH EVERYDAY AT BEDTIME 90 tablet 1     carvedilol (COREG) 3.125 MG tablet Take 1 tablet (3.125 mg) by mouth 2 times daily (with meals) 180 tablet 1     ciclopirox (LOPROX) 0.77 % cream Apply topically At Bedtime 90 g 3     desonide (DESOWEN) 0.05 % external cream Apply sparingly to affected area on face every day-BID x 1-2 weeks then PRN only 60 g 3     Doxycycline Hyclate (PERIOSTAT PO) Take 20 mg by mouth 2 times daily       doxycycline hyclate (PERIOSTAT) 20 MG tablet TAKE 1 TABLET BY MOUTH TWICE A DAY       fluocinonide (LIDEX) 0.05 % external solution Apply to AA BID x 1-2 weeks then PRN only 60 mL 4     fluorouracil (EFUDEX) 5 % external cream Apply to scap BID x 3-4 weeks. Protect area from the sun. 40 g 3     fluticasone (FLONASE) 50 MCG/ACT nasal spray INSTILL 2 SPRAYS INTO BOTH NOSTRILS DAILY 48 mL 3     furosemide (LASIX) 40 MG tablet Take 1 tablet (40 mg) by mouth daily 90 tablet 2     GABAPENTIN PO Take 600 mg by mouth 3 times daily        ketoconazole (NIZORAL) 2 % cream Apply topically 2 times daily For face. FAX REFILL REQUESTS TO [facility]: [phone] 30 g 2     ketoconazole (NIZORAL) 2 % external shampoo Use daily as needed 120 mL 11     ketotifen (ZADITOR/REFRESH ANTI-ITCH) 0.025 % SOLN Place 1-2 drops into both eyes 2 times daily as needed for itching       lisinopril (ZESTRIL) 30 MG tablet Take 1 tablet (30 mg) by mouth daily 90 tablet 1     loratadine (CLARITIN) 10 MG tablet Take 10 mg by mouth daily as needed for allergies       Multiple Vitamin  "(MULTIVITAMIN OR) Take 1 tablet by mouth daily        nitroglycerin (NITROSTAT) 0.4 MG SL tablet Place 1 tablet (0.4 mg) under the tongue every 5 minutes as needed for chest pain May repeat twice for a total of 3 tablets.  If chest pain not relieved, call 911 25 tablet 11     Omega-3 Fatty Acids (OMEGA-3 FISH OIL PO) Take 3.2 g by mouth daily        OXcarbazepine (TRILEPTAL PO) Take 300 mg by mouth 3 times daily        Allergies   Allergen Reactions     Cats      Cat Dander     Dogs      Dog Dander     Hydromorphone      Other reaction(s): Confusion     No Clinical Screening - See Comments      Pet dander, pollen, grasses, molds     Pollen Extract       Social History     Tobacco Use     Smoking status: Former Smoker     Packs/day: 0.00     Years: 10.00     Pack years: 0.00     Types: Cigarettes     Quit date: 1987     Years since quittin.6     Smokeless tobacco: Never Used   Substance Use Topics     Alcohol use: Yes     Alcohol/week: 0.0 standard drinks     Comment: socially - x2-3 per month -  none currently     History   Drug Use No         Objective   /82   Pulse 63   Temp 98.7  F (37.1  C) (Tympanic)   Ht 1.803 m (5' 11\")   Wt 149.2 kg (329 lb)   SpO2 98%   BMI 45.89 kg/m      Physical Exam  GENERAL: Alert and in no distress.  EYES: Conjunctivae/corneas clear. EOMs grossly intact  HENT: NC/AT, facies symmetric.  RESP: CTAB. No w/r/r.  CV: IRR IRR, no m/r/g.  GI: NT, ND, without rebound or guarding, no CVA tenderness  MSK: Walks with cane.  SKIN: No significant ulcers, lesions or rashes on the visualized portions of the skin  NEURO: Alert. Oriented.  PSYCH: Linear thought process. Speech normal rate and volume. No tangential thoughts, hallucinations, or delusions.    Recent Labs   Lab Test 21  0947 21  0808 20  1528   HGB  --  14.6 14.9   PLT  --  162 171   * 128* 127*   POTASSIUM 4.5 4.9 4.6   CR 0.71 0.77 0.72      Diagnostics:  Labs pending at this time.  Results " will be reviewed when available.   EKG: atrial fibrillation, rate controlled, unchanged from previous tracings    Revised Cardiac Risk Index (RCRI):  The patient has the following serious cardiovascular risks for perioperative complications:   - Coronary Artery Disease (MI, positive stress test, angina, Qs on EKG) = 1 point     RCRI Interpretation: 1 point: Class II (low risk - 0.9% complication rate)    Signed Electronically by: Geovanni Crook MD  Copy of this evaluation report is provided to requesting physician.

## 2021-08-04 NOTE — PATIENT INSTRUCTIONS
Medication Instructions:  Take all scheduled medications on the day of surgery except:   - aspirin: Discontinue aspirin 7-10 days prior to procedure to reduce bleeding risk. It should be resumed postoperatively.    - apixaban (Eliquis): HOLD 2 days before surgery.     - I will send you a message on [email or web address] when I am able to look at the results of your tests from today

## 2021-08-05 LAB
ANION GAP SERPL CALCULATED.3IONS-SCNC: 5 MMOL/L (ref 3–14)
BUN SERPL-MCNC: 13 MG/DL (ref 7–30)
CALCIUM SERPL-MCNC: 9.1 MG/DL (ref 8.5–10.1)
CHLORIDE BLD-SCNC: 88 MMOL/L (ref 94–109)
CO2 SERPL-SCNC: 29 MMOL/L (ref 20–32)
CREAT SERPL-MCNC: 0.73 MG/DL (ref 0.66–1.25)
GFR SERPL CREATININE-BSD FRML MDRD: 90 ML/MIN/1.73M2
GLUCOSE BLD-MCNC: 86 MG/DL (ref 70–99)
POTASSIUM BLD-SCNC: 5.1 MMOL/L (ref 3.4–5.3)
SODIUM SERPL-SCNC: 122 MMOL/L (ref 133–144)

## 2021-08-10 ENCOUNTER — LAB (OUTPATIENT)
Dept: LAB | Facility: CLINIC | Age: 76
End: 2021-08-10
Payer: COMMERCIAL

## 2021-08-10 DIAGNOSIS — E87.1 HYPONATREMIA: ICD-10-CM

## 2021-08-10 PROCEDURE — 84300 ASSAY OF URINE SODIUM: CPT

## 2021-08-10 PROCEDURE — 80048 BASIC METABOLIC PNL TOTAL CA: CPT

## 2021-08-10 PROCEDURE — 36415 COLL VENOUS BLD VENIPUNCTURE: CPT

## 2021-08-11 LAB
ANION GAP SERPL CALCULATED.3IONS-SCNC: 8 MMOL/L (ref 3–14)
BUN SERPL-MCNC: 11 MG/DL (ref 7–30)
CALCIUM SERPL-MCNC: 9.5 MG/DL (ref 8.5–10.1)
CHLORIDE BLD-SCNC: 89 MMOL/L (ref 94–109)
CO2 SERPL-SCNC: 28 MMOL/L (ref 20–32)
CREAT SERPL-MCNC: 0.77 MG/DL (ref 0.66–1.25)
GFR SERPL CREATININE-BSD FRML MDRD: 88 ML/MIN/1.73M2
GLUCOSE BLD-MCNC: 75 MG/DL (ref 70–99)
POTASSIUM BLD-SCNC: 4.8 MMOL/L (ref 3.4–5.3)
SODIUM SERPL-SCNC: 125 MMOL/L (ref 133–144)
SODIUM UR-SCNC: 40 MMOL/L

## 2021-08-16 DIAGNOSIS — I10 BENIGN HYPERTENSION: ICD-10-CM

## 2021-08-17 RX ORDER — LISINOPRIL 30 MG/1
TABLET ORAL
Qty: 90 TABLET | Refills: 3 | Status: SHIPPED | OUTPATIENT
Start: 2021-08-17 | End: 2022-07-15

## 2021-08-26 NOTE — PROGRESS NOTES
CARDIOLOGY VISIT    REASON FOR VISIT: CAD, afib, pacemaker    SUBJECTIVE:  76-year-old male seen for coronary disease, A. fib, and pacemaker.     He had MI in 1994 treated in [place].  He had LAD stent in 2015.  He has a history of SVT runs treated medically.  Nuclear stress in 2016 showed medium sized area of ischemia of the basal to mid inferior and inferolateral wall, EF 60%.     A. fib was diagnosed in 2017, he had no symptoms.  Echo July 2017 showed EF 60%, moderate severe left atrial enlargement, no valve disease.     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     Echo September 2020 showed EF 60%, normal RV, no valve disease.    Device check June 2021 showed 25% V paced, chronic A. fib, stable heart rates, battery 13 years.    He underwent a left knee replacement and subsequently had palpitations.  He required another surgery 2 weeks ago.  He has been laid up most of the summer unfortunately.  The past 2 weeks he has had some lower extremity edema since his second surgery.  Blood pressure runs 135/70.  He denies any chest pain.    MEDICATIONS:  Current Outpatient Medications   Medication     Acetaminophen (TYLENOL PO)     amoxicillin (AMOXIL) 500 MG capsule     apixaban ANTICOAGULANT (ELIQUIS ANTICOAGULANT) 5 MG tablet     ASPIRIN PO     atorvastatin (LIPITOR) 80 MG tablet     carvedilol (COREG) 3.125 MG tablet     ciclopirox (LOPROX) 0.77 % cream     desonide (DESOWEN) 0.05 % external cream     Doxycycline Hyclate (PERIOSTAT PO)     doxycycline hyclate (PERIOSTAT) 20 MG tablet     fluocinonide (LIDEX) 0.05 % external solution     fluorouracil (EFUDEX) 5 % external cream     fluticasone (FLONASE) 50 MCG/ACT nasal spray     furosemide (LASIX) 40 MG tablet     GABAPENTIN PO     ketoconazole (NIZORAL) 2 % cream     ketoconazole (NIZORAL) 2 % external shampoo     ketotifen (ZADITOR/REFRESH  "ANTI-ITCH) 0.025 % SOLN     lisinopril (ZESTRIL) 30 MG tablet     loratadine (CLARITIN) 10 MG tablet     Multiple Vitamin (MULTIVITAMIN OR)     nitroglycerin (NITROSTAT) 0.4 MG SL tablet     Omega-3 Fatty Acids (OMEGA-3 FISH OIL PO)     OXcarbazepine (TRILEPTAL PO)     No current facility-administered medications for this visit.       ALLERGIES:  Allergies   Allergen Reactions     Cats      Cat Dander     Dogs      Dog Dander     Hydromorphone      Other reaction(s): Confusion     No Clinical Screening - See Comments      Pet dander, pollen, grasses, molds     Pollen Extract        REVIEW OF SYSTEMS:  Constitutional:  No weight loss, fever, chills, weakness or fatigue.  HEENT:  Eyes:  No visual loss, blurred vision, double vision or yellow sclerae. No hearing loss, sneezing, congestion, runny nose or sore throat.  Skin:  No rash or itching.  Cardiovascular: per HPI  Respiratory: per HPI  GI:  No anorexia, nausea, vomiting or diarrhea. No abdominal pain or blood.  :  No dysurea, hematuria  Neurologic:  No headache, dizziness, syncope, paralysis, ataxia, numbness or tingling in the extremities. No change in bowel or bladder control.  Musculoskeletal:  No muscle, back pain, joint pain or stiffness.  Hematologic:  No anemia, bleeding or bruising.  Lymphatics:  No enlarged nodes. No history of splenectomy.  Psychiatric:  No history of depression or anxiety.  Endocrine:  No reports of sweating, cold or heat intolerance. No polyuria or polydipsia.  Allergies:  No history of asthma, hives, eczema or rhinitis.    PHYSICAL EXAM:  /66 (BP Location: Right arm, Patient Position: Chair, Cuff Size: Adult Large)   Pulse 58   Ht 1.803 m (5' 11\")   Wt 148.7 kg (327 lb 14.4 oz)   SpO2 97%   BMI 45.73 kg/m    Constitutional: awake, alert, no distress  Eyes: PERRL, sclera nonicteric  ENT: trachea midline  Respiratory: Lungs clear  Cardiovascular: Regular rate and rhythm, no murmurs, 1+ pitting edema of the right leg, left " leg is wrapped  GI: nondistended, nontender, bowel sounds present  Lymph/Hematologic: no lymphadenopathy  Skin: dry, no rash  Musculoskeletal: good muscle tone, strength 5/5 in upper and lower extremities  Neurologic: no focal deficits  Neuropsychiatric: appropriate affact    DATA:  Lab: August 10, 2021: Sodium 125, potassium 4.8, creatinine 0.8  Recent Labs   Lab Test 03/12/21  0947 02/09/21  0808 09/22/15  1825 12/30/14  0757   CHOL 151 133 135 127   HDL 47 48 38* 34*   LDL 73 65 65 50   TRIG 154* 99 160* 214*   CHOLHDLRATIO  --   --  3.6 3.7     ASSESSMENT:  76-year-old male seen for CAD, A. fib, pacemaker.  His cardiac issues seem stable.  Unfortunately he has been laid up due to his knee surgeries and recovery.  Suspect his current edema is postop related.  His primary physician is adjusting his furosemide in the context of the recent hyponatremia.    RECOMMENDATIONS:  1.  CAD  -Continue current medications    2.  Chronic A. Fib  -Rates controlled with pacemaker, continue Eliquis    3.  Pacemaker  -Continue routine device checks    Follow-up in 1 year in the [facility].  Repeat echo in about 2 years.    Aquilino Buchanan MD  Cardiology - [facility]  Pager:  [phone]  Text Page  September 1, 2021

## 2021-09-01 ENCOUNTER — OFFICE VISIT (OUTPATIENT)
Dept: CARDIOLOGY | Facility: CLINIC | Age: 76
End: 2021-09-01
Payer: COMMERCIAL

## 2021-09-01 VITALS
SYSTOLIC BLOOD PRESSURE: 132 MMHG | BODY MASS INDEX: 44.1 KG/M2 | OXYGEN SATURATION: 97 % | HEART RATE: 58 BPM | DIASTOLIC BLOOD PRESSURE: 66 MMHG | HEIGHT: 71 IN | WEIGHT: 315 LBS

## 2021-09-01 DIAGNOSIS — Z95.0 CARDIAC PACEMAKER IN SITU: ICD-10-CM

## 2021-09-01 DIAGNOSIS — I48.21 PERMANENT ATRIAL FIBRILLATION (H): ICD-10-CM

## 2021-09-01 DIAGNOSIS — I25.10 CORONARY ARTERY DISEASE INVOLVING NATIVE CORONARY ARTERY OF NATIVE HEART WITHOUT ANGINA PECTORIS: ICD-10-CM

## 2021-09-01 PROCEDURE — 99214 OFFICE O/P EST MOD 30 MIN: CPT | Performed by: INTERNAL MEDICINE

## 2021-09-01 ASSESSMENT — MIFFLIN-ST. JEOR: SCORE: 2239.47

## 2021-09-01 NOTE — PATIENT INSTRUCTIONS
Keep your medications the same.    We will see you back in one year, our schedulers will call you then to arrange the appointment.

## 2021-09-01 NOTE — LETTER
9/1/2021    Jeronimo Painter MD  [address]  [facility] [address]    RE: James Salvador       Dear Colleague,    I had the pleasure of seeing James Salvador in the [facility].    CARDIOLOGY VISIT    REASON FOR VISIT: CAD, afib, pacemaker    SUBJECTIVE:  76-year-old male seen for coronary disease, A. fib, and pacemaker.     He had MI in 1994 treated in [place].  He had LAD stent in 2015.  He has a history of SVT runs treated medically.  Nuclear stress in 2016 showed medium sized area of ischemia of the basal to mid inferior and inferolateral wall, EF 60%.     A. fib was diagnosed in 2017, he had no symptoms.  Echo July 2017 showed EF 60%, moderate severe left atrial enlargement, no valve disease.     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     Echo September 2020 showed EF 60%, normal RV, no valve disease.    Device check June 2021 showed 25% V paced, chronic A. fib, stable heart rates, battery 13 years.    He underwent a left knee replacement and subsequently had palpitations.  He required another surgery 2 weeks ago.  He has been laid up most of the summer unfortunately.  The past 2 weeks he has had some lower extremity edema since his second surgery.  Blood pressure runs 135/70.  He denies any chest pain.    MEDICATIONS:  Current Outpatient Medications   Medication     Acetaminophen (TYLENOL PO)     amoxicillin (AMOXIL) 500 MG capsule     apixaban ANTICOAGULANT (ELIQUIS ANTICOAGULANT) 5 MG tablet     ASPIRIN PO     atorvastatin (LIPITOR) 80 MG tablet     carvedilol (COREG) 3.125 MG tablet     ciclopirox (LOPROX) 0.77 % cream     desonide (DESOWEN) 0.05 % external cream     Doxycycline Hyclate (PERIOSTAT PO)     doxycycline hyclate (PERIOSTAT) 20 MG tablet     fluocinonide (LIDEX) 0.05 % external solution      "fluorouracil (EFUDEX) 5 % external cream     fluticasone (FLONASE) 50 MCG/ACT nasal spray     furosemide (LASIX) 40 MG tablet     GABAPENTIN PO     ketoconazole (NIZORAL) 2 % cream     ketoconazole (NIZORAL) 2 % external shampoo     ketotifen (ZADITOR/REFRESH ANTI-ITCH) 0.025 % SOLN     lisinopril (ZESTRIL) 30 MG tablet     loratadine (CLARITIN) 10 MG tablet     Multiple Vitamin (MULTIVITAMIN OR)     nitroglycerin (NITROSTAT) 0.4 MG SL tablet     Omega-3 Fatty Acids (OMEGA-3 FISH OIL PO)     OXcarbazepine (TRILEPTAL PO)     No current facility-administered medications for this visit.       ALLERGIES:  Allergies   Allergen Reactions     Cats      Cat Dander     Dogs      Dog Dander     Hydromorphone      Other reaction(s): Confusion     No Clinical Screening - See Comments      Pet dander, pollen, grasses, molds     Pollen Extract        REVIEW OF SYSTEMS:  Constitutional:  No weight loss, fever, chills, weakness or fatigue.  HEENT:  Eyes:  No visual loss, blurred vision, double vision or yellow sclerae. No hearing loss, sneezing, congestion, runny nose or sore throat.  Skin:  No rash or itching.  Cardiovascular: per HPI  Respiratory: per HPI  GI:  No anorexia, nausea, vomiting or diarrhea. No abdominal pain or blood.  :  No dysurea, hematuria  Neurologic:  No headache, dizziness, syncope, paralysis, ataxia, numbness or tingling in the extremities. No change in bowel or bladder control.  Musculoskeletal:  No muscle, back pain, joint pain or stiffness.  Hematologic:  No anemia, bleeding or bruising.  Lymphatics:  No enlarged nodes. No history of splenectomy.  Psychiatric:  No history of depression or anxiety.  Endocrine:  No reports of sweating, cold or heat intolerance. No polyuria or polydipsia.  Allergies:  No history of asthma, hives, eczema or rhinitis.    PHYSICAL EXAM:  /66 (BP Location: Right arm, Patient Position: Chair, Cuff Size: Adult Large)   Pulse 58   Ht 1.803 m (5' 11\")   Wt 148.7 kg (327 lb " 14.4 oz)   SpO2 97%   BMI 45.73 kg/m    Constitutional: awake, alert, no distress  Eyes: PERRL, sclera nonicteric  ENT: trachea midline  Respiratory: Lungs clear  Cardiovascular: Regular rate and rhythm, no murmurs, 1+ pitting edema of the right leg, left leg is wrapped  GI: nondistended, nontender, bowel sounds present  Lymph/Hematologic: no lymphadenopathy  Skin: dry, no rash  Musculoskeletal: good muscle tone, strength 5/5 in upper and lower extremities  Neurologic: no focal deficits  Neuropsychiatric: appropriate affact    DATA:  Lab: August 10, 2021: Sodium 125, potassium 4.8, creatinine 0.8  Recent Labs   Lab Test 03/12/21  0947 02/09/21  0808 09/22/15  1825 12/30/14  0757   CHOL 151 133 135 127   HDL 47 48 38* 34*   LDL 73 65 65 50   TRIG 154* 99 160* 214*   CHOLHDLRATIO  --   --  3.6 3.7     ASSESSMENT:  76-year-old male seen for CAD, A. fib, pacemaker.  His cardiac issues seem stable.  Unfortunately he has been laid up due to his knee surgeries and recovery.  Suspect his current edema is postop related.  His primary physician is adjusting his furosemide in the context of the recent hyponatremia.    RECOMMENDATIONS:  1.  CAD  -Continue current medications    2.  Chronic A. Fib  -Rates controlled with pacemaker, continue Eliquis    3.  Pacemaker  -Continue routine device checks    Follow-up in 1 year in the [place] clinic.  Repeat echo in about 2 years.    Aquilino Buchanan MD  Cardiology - [facility]  Pager:  [phone]  Text Page  September 1, 2021          Thank you for allowing me to participate in the care of your patient.      Sincerely,     Aquilino Buchanan MD     [facility]  cc:   Aquilino Buchanan MD  [facility]  [address]  [place] [id number]

## 2021-09-07 ENCOUNTER — VIRTUAL VISIT (OUTPATIENT)
Dept: INTERNAL MEDICINE | Facility: CLINIC | Age: 76
End: 2021-09-07
Payer: COMMERCIAL

## 2021-09-07 DIAGNOSIS — E87.1 HYPONATREMIA: Primary | ICD-10-CM

## 2021-09-07 PROCEDURE — 99213 OFFICE O/P EST LOW 20 MIN: CPT | Mod: TEL | Performed by: INTERNAL MEDICINE

## 2021-09-07 NOTE — PROGRESS NOTES
"Alden is a 76 year old who is being evaluated via a billable video visit.      How would you like to obtain your AVS? MyChart  If the video visit is dropped, the invitation should be resent by: Send to e-mail at: [email or web address]  Will anyone else be joining your video visit? No    Assessment & Plan     Hyponatremia  Recheck in a few weeks on diuretic/current meds  - Basic metabolic panel  (Ca, Cl, CO2, Creat, Gluc, K, Na, BUN); Future    Ordering of each unique test         BMI:   Estimated body mass index is 45.73 kg/m  as calculated from the following:    Height as of 9/1/21: 1.803 m (5' 11\").    Weight as of 9/1/21: 148.7 kg (327 lb 14.4 oz).       See Patient Instructions    No follow-ups on file.    Jeronimo Painter MD  [facility]    Jose Ruano is a 76 year old who presents for the following health issues     HPI           Review of Systems         Objective           Vitals:  No vitals were obtained today due to virtual visit.    Physical Exam   GENERAL: alert and no distress              Telephone visit : 22 minutes  "

## 2021-09-15 ENCOUNTER — ANCILLARY PROCEDURE (OUTPATIENT)
Dept: CARDIOLOGY | Facility: CLINIC | Age: 76
End: 2021-09-15
Attending: INTERNAL MEDICINE
Payer: COMMERCIAL

## 2021-09-15 ENCOUNTER — TELEPHONE (OUTPATIENT)
Dept: CARDIOLOGY | Facility: CLINIC | Age: 76
End: 2021-09-15

## 2021-09-15 DIAGNOSIS — I49.5 SSS (SICK SINUS SYNDROME) (H): ICD-10-CM

## 2021-09-15 DIAGNOSIS — Z95.0 CARDIAC PACEMAKER IN SITU: Primary | ICD-10-CM

## 2021-09-15 DIAGNOSIS — Z95.0 CARDIAC PACEMAKER IN SITU: ICD-10-CM

## 2021-09-15 PROCEDURE — 93296 REM INTERROG EVL PM/IDS: CPT | Performed by: INTERNAL MEDICINE

## 2021-09-15 PROCEDURE — 93294 REM INTERROG EVL PM/LDLS PM: CPT | Performed by: INTERNAL MEDICINE

## 2021-09-15 NOTE — TELEPHONE ENCOUNTER
Patient called heart clinic and is unable to make in-clinic appointment today due to mobility issues. Patient sent manual device transmission to do remote device check today and was rescheduled for in-clinic device check in January.

## 2021-09-16 ENCOUNTER — THERAPY VISIT (OUTPATIENT)
Dept: PHYSICAL THERAPY | Facility: CLINIC | Age: 76
End: 2021-09-16
Payer: COMMERCIAL

## 2021-09-16 DIAGNOSIS — M25.562 KNEE PAIN, LEFT: Primary | ICD-10-CM

## 2021-09-16 DIAGNOSIS — Z47.89 AFTERCARE FOLLOWING SURGERY OF THE MUSCULOSKELETAL SYSTEM: ICD-10-CM

## 2021-09-16 LAB
MDC_IDC_LEAD_IMPLANT_DT: NORMAL
MDC_IDC_LEAD_LOCATION: NORMAL
MDC_IDC_LEAD_LOCATION_DETAIL_1: NORMAL
MDC_IDC_LEAD_MFG: NORMAL
MDC_IDC_LEAD_MODEL: NORMAL
MDC_IDC_LEAD_POLARITY_TYPE: NORMAL
MDC_IDC_LEAD_SERIAL: NORMAL
MDC_IDC_MSMT_BATTERY_DTM: NORMAL
MDC_IDC_MSMT_BATTERY_REMAINING_LONGEVITY: 159 MO
MDC_IDC_MSMT_BATTERY_RRT_TRIGGER: 2.62
MDC_IDC_MSMT_BATTERY_STATUS: NORMAL
MDC_IDC_MSMT_BATTERY_VOLTAGE: 3.04 V
MDC_IDC_MSMT_LEADCHNL_RV_IMPEDANCE_VALUE: 323 OHM
MDC_IDC_MSMT_LEADCHNL_RV_IMPEDANCE_VALUE: 399 OHM
MDC_IDC_MSMT_LEADCHNL_RV_PACING_THRESHOLD_AMPLITUDE: 0.75 V
MDC_IDC_MSMT_LEADCHNL_RV_PACING_THRESHOLD_PULSEWIDTH: 0.4 MS
MDC_IDC_MSMT_LEADCHNL_RV_SENSING_INTR_AMPL: 3.38 MV
MDC_IDC_MSMT_LEADCHNL_RV_SENSING_INTR_AMPL: 3.38 MV
MDC_IDC_PG_IMPLANT_DTM: NORMAL
MDC_IDC_PG_MFG: NORMAL
MDC_IDC_PG_MODEL: NORMAL
MDC_IDC_PG_SERIAL: NORMAL
MDC_IDC_PG_TYPE: NORMAL
MDC_IDC_SESS_CLINIC_NAME: NORMAL
MDC_IDC_SESS_DTM: NORMAL
MDC_IDC_SESS_TYPE: NORMAL
MDC_IDC_SET_BRADY_HYSTRATE: NORMAL
MDC_IDC_SET_BRADY_LOWRATE: 50 {BEATS}/MIN
MDC_IDC_SET_BRADY_MODE: NORMAL
MDC_IDC_SET_LEADCHNL_RV_PACING_AMPLITUDE: 2 V
MDC_IDC_SET_LEADCHNL_RV_PACING_ANODE_ELECTRODE_1: NORMAL
MDC_IDC_SET_LEADCHNL_RV_PACING_ANODE_LOCATION_1: NORMAL
MDC_IDC_SET_LEADCHNL_RV_PACING_CAPTURE_MODE: NORMAL
MDC_IDC_SET_LEADCHNL_RV_PACING_CATHODE_ELECTRODE_1: NORMAL
MDC_IDC_SET_LEADCHNL_RV_PACING_CATHODE_LOCATION_1: NORMAL
MDC_IDC_SET_LEADCHNL_RV_PACING_POLARITY: NORMAL
MDC_IDC_SET_LEADCHNL_RV_PACING_PULSEWIDTH: 0.4 MS
MDC_IDC_SET_LEADCHNL_RV_SENSING_ANODE_ELECTRODE_1: NORMAL
MDC_IDC_SET_LEADCHNL_RV_SENSING_ANODE_LOCATION_1: NORMAL
MDC_IDC_SET_LEADCHNL_RV_SENSING_CATHODE_ELECTRODE_1: NORMAL
MDC_IDC_SET_LEADCHNL_RV_SENSING_CATHODE_LOCATION_1: NORMAL
MDC_IDC_SET_LEADCHNL_RV_SENSING_POLARITY: NORMAL
MDC_IDC_SET_LEADCHNL_RV_SENSING_SENSITIVITY: 0.6 MV
MDC_IDC_SET_ZONE_DETECTION_INTERVAL: 360 MS
MDC_IDC_SET_ZONE_TYPE: NORMAL
MDC_IDC_STAT_BRADY_DTM_END: NORMAL
MDC_IDC_STAT_BRADY_DTM_START: NORMAL
MDC_IDC_STAT_BRADY_RV_PERCENT_PACED: 28.7 %
MDC_IDC_STAT_EPISODE_RECENT_COUNT: 0
MDC_IDC_STAT_EPISODE_RECENT_COUNT_DTM_END: NORMAL
MDC_IDC_STAT_EPISODE_RECENT_COUNT_DTM_START: NORMAL
MDC_IDC_STAT_EPISODE_TOTAL_COUNT: 0
MDC_IDC_STAT_EPISODE_TOTAL_COUNT: 0
MDC_IDC_STAT_EPISODE_TOTAL_COUNT: 2
MDC_IDC_STAT_EPISODE_TOTAL_COUNT_DTM_END: NORMAL
MDC_IDC_STAT_EPISODE_TOTAL_COUNT_DTM_START: NORMAL
MDC_IDC_STAT_EPISODE_TYPE: NORMAL

## 2021-09-16 PROCEDURE — 97110 THERAPEUTIC EXERCISES: CPT | Mod: GP | Performed by: PHYSICAL THERAPIST

## 2021-09-16 PROCEDURE — 97161 PT EVAL LOW COMPLEX 20 MIN: CPT | Mod: GP | Performed by: PHYSICAL THERAPIST

## 2021-09-16 ASSESSMENT — ACTIVITIES OF DAILY LIVING (ADL)
STAND: ACTIVITY IS SOMEWHAT DIFFICULT
KNEE_ACTIVITY_OF_DAILY_LIVING_SCORE: 34.29
GO DOWN STAIRS: ACTIVITY IS VERY DIFFICULT
WEAKNESS: THE SYMPTOM AFFECTS MY ACTIVITY SLIGHTLY
RAW_SCORE: 24
SWELLING: THE SYMPTOM AFFECTS MY ACTIVITY SLIGHTLY
SIT WITH YOUR KNEE BENT: I AM UNABLE TO DO THE ACTIVITY
AS_A_RESULT_OF_YOUR_KNEE_INJURY,_HOW_WOULD_YOU_RATE_YOUR_CURRENT_LEVEL_OF_DAILY_ACTIVITY?: SEVERELY ABNORMAL
SQUAT: I AM UNABLE TO DO THE ACTIVITY
RISE FROM A CHAIR: I AM UNABLE TO DO THE ACTIVITY
WALK: ACTIVITY IS VERY DIFFICULT
LIMPING: THE SYMPTOM AFFECTS MY ACTIVITY SEVERELY
GO UP STAIRS: ACTIVITY IS VERY DIFFICULT
GIVING WAY, BUCKLING OR SHIFTING OF KNEE: I DO NOT HAVE THE SYMPTOM
STIFFNESS: THE SYMPTOM AFFECTS MY ACTIVITY SLIGHTLY
HOW_WOULD_YOU_RATE_THE_CURRENT_FUNCTION_OF_YOUR_KNEE_DURING_YOUR_USUAL_DAILY_ACTIVITIES_ON_A_SCALE_FROM_0_TO_100_WITH_100_BEING_YOUR_LEVEL_OF_KNEE_FUNCTION_PRIOR_TO_YOUR_INJURY_AND_0_BEING_THE_INABILITY_TO_PERFORM_ANY_OF_YOUR_USUAL_DAILY_ACTIVITIES?: 5
HOW_WOULD_YOU_RATE_THE_OVERALL_FUNCTION_OF_YOUR_KNEE_DURING_YOUR_USUAL_DAILY_ACTIVITIES?: SEVERELY ABNORMAL
KNEEL ON THE FRONT OF YOUR KNEE: I AM UNABLE TO DO THE ACTIVITY
KNEE_ACTIVITY_OF_DAILY_LIVING_SUM: 24
PAIN: THE SYMPTOM AFFECTS MY ACTIVITY SLIGHTLY

## 2021-09-16 NOTE — LETTER
[facility]  [address]  [address]  [place] [address]  [phone]    2021    Re: James Salvador   :   1945  MRN:  [id number]   REFERRING PHYSICIAN:   Calderon DICK [facility]    Date of Initial Evaluation: 21  Visits:  Rxs Used: 1  Reason for Referral:     Aftercare following surgery of the musculoskeletal system  Knee pain, left    EVALUATION SUMMARY    Physical Therapy Initial Evaluation  Subjective:  The history is provided by the patient. No  was used.   Patient Health History  James Salvador being seen for L knee.     Problem began: 2021.   Problem occurred: fall   Pain is reported as 3/10 on pain scale.  General health as reported by patient is fair.  Pertinent medical history includes: cancer, heart problems, high blood pressure, implanted device, overweight and sleep disorder/apnea.   Red flags:  None as reported by patient.  Medical allergies: other. Other medical allergies details: see Epic.   Surgeries include:  Cancer surgery, heart surgery and orthopedic surgery. Other surgery history details: R shoulder, L TKA, R hip, R heel.    Current medications:  Anti-inflammatory, cardiac medication, high blood pressure medication, pain medication, sleep medication and heparin/coumadin.    Current occupation is retired.               Therapist Generated HPI Evaluation  Problem details: Patient underwent L MPFL reconstruction performed by Dr. Osborn from [facility] with a 1 night stay at [facility]. Currently ambulating with a FWW and knee immobilizer. Pain is located medially and increases with activity but also while sitting. Transfers are difficult, as a result has rented a lift recliner, has installed shower and toilet bars. Able to live on main level but does have stairs to other levels, 5 steps into the home with B rails..         Type of problem:  Left  knee.          Re: James Salvador   :   1945    This is a new condition.  Condition occurred with:  A fall/slip.  Where condition occurred: at home.  Patient reports pain:  Medial and in the joint.  Pain is described as sharp and is intermittent.  Pain radiates to:  Thigh. Pain is the same all the time.  Since onset symptoms are gradually improving.  Associated symptoms:  Edema, loss of motion/stiffness and loss of strength. Symptoms are exacerbated by activity, standing, weight bearing, walking, sitting and transfers  and relieved by bracing/immobilizing and ice.    Barriers include:  Requires assistance with ADL's.                 Objective:    Gait:    Gait Type:  Antalgic   Weight Bearing Status:  WBAT   Assistive Devices:  Walker and brace  Deviations:  Hip:  Hip hiking L and circumduction LKnee:  Knee flexion decr L     Knee Evaluation:  ROM:  Strength wnl knee: Requires assistant for L SLR.  AROM      Extension:  Left: 3    Right:  0  Flexion: Left: 55    Right: 111  PROM    Extension: Left: 0   Right:     Strength:         Quad Set Left: Fair    Pain:   Quad Set Right: Good    Pain:    Edema:  Edema of the knee: mild increase in generalized L LE swelling.  Circumference:      Joint Line:  Left:  48cm   Right:  48cm    Mobility Testing:      Patellofemoral Medial:  Left: hypomobile      Patellofemoral Lateral:  Left: hypomobile      Patellofemoral Superior:  Left: hypomobile      Patellofemoral Inferior:  Left: hypomobile        Re: James Salvador   :   1945    Assessment/Plan:    Patient is a 76 year old male with left side knee complaints.    Patient has the following significant findings with corresponding treatment plan.                Diagnosis 1:  S/p L MPLF reconstruction (DOS 2021)  Pain -  hot/cold therapy, manual therapy, education and home program  Decreased ROM/flexibility - manual therapy and therapeutic exercise  Decreased joint mobility - manual therapy and therapeutic  exercise  Decreased strength - therapeutic exercise and therapeutic activities  Edema - cold therapy  Impaired gait - gait training  Impaired muscle performance - neuro re-education  Decreased function - therapeutic activities    Therapy Evaluation Codes:   1) History comprised of:   Personal factors that impact the plan of care:      Age and Past/current experiences.    Comorbidity factors that impact the plan of care are:      Cancer, Heart problems, High blood pressure, Implanted device, Overweight and Sleep disorder/apnea.     Medications impacting care: Anti-inflammatory, Cardiac, High blood pressure, Heparin/coumadin, Pain and Sleep.  2) Examination of Body Systems comprised of:   Body structures and functions that impact the plan of care:      Knee.   Activity limitations that impact the plan of care are:      Bathing, Bending, Driving, Lifting, Sitting, Squatting/kneeling, Stairs, Standing, Walking, Sleeping and Laying down.  3) Clinical presentation characteristics are:   Stable/Uncomplicated.  4) Decision-Making    Low complexity using standardized patient assessment instrument and/or measureable assessment of functional outcome.  Cumulative Therapy Evaluation is: Low complexity.    Previous and current functional limitations:  (See Goal Flow Sheet for this information)    Short term and Long term goals: (See Goal Flow Sheet for this information)     Communication ability:  Patient appears to be able to clearly communicate and understand verbal and written communication and follow directions correctly.  Treatment Explanation - The following has been discussed with the patient:   RX ordered/plan of care  Anticipated outcomes  Possible risks and side effects  This patient would benefit from PT intervention to resume normal activities.   Rehab potential is good.    Frequency:  2 X week, once daily  Duration:  for 6 weeks tapering to 1 X a week over 6 weeks  Discharge Plan:  Achieve all LTG.  Independent in  home treatment program.  Re: James Salvador   :   1945    Reach maximal therapeutic benefit.    Thank you for your referral.    INQUIRIES  Therapist: Ene DICK [address] [address]  Phone: [phone]  Fax: [phone]

## 2021-09-16 NOTE — PROGRESS NOTES
Physical Therapy Initial Evaluation  Subjective:  The history is provided by the patient. No  was used.   Patient Health History  James Salvador being seen for L knee.     Problem began: 8/18/2021.   Problem occurred: fall   Pain is reported as 3/10 on pain scale.  General health as reported by patient is fair.  Pertinent medical history includes: cancer, heart problems, high blood pressure, implanted device, overweight and sleep disorder/apnea.   Red flags:  None as reported by patient.  Medical allergies: other. Other medical allergies details: see Epic.   Surgeries include:  Cancer surgery, heart surgery and orthopedic surgery. Other surgery history details: R shoulder, L TKA, R hip, R heel.    Current medications:  Anti-inflammatory, cardiac medication, high blood pressure medication, pain medication, sleep medication and heparin/coumadin.    Current occupation is retired.                     Therapist Generated HPI Evaluation  Problem details: Patient underwent L MPFL reconstruction performed by Dr. Osborn from [facility] with a 1 night stay at [facility]. Currently ambulating with a FWW and knee immobilizer. Pain is located medially and increases with activity but also while sitting. Transfers are difficult, as a result has rented a lift recliner, has installed shower and toilet bars. Able to live on main level but does have stairs to other levels, 5 steps into the home with B rails..         Type of problem:  Left knee.    This is a new condition.  Condition occurred with:  A fall/slip.  Where condition occurred: at home.  Patient reports pain:  Medial and in the joint.  Pain is described as sharp and is intermittent.  Pain radiates to:  Thigh. Pain is the same all the time.  Since onset symptoms are gradually improving.  Associated symptoms:  Edema, loss of motion/stiffness and loss of strength. Symptoms are exacerbated by activity, standing, weight bearing, walking, sitting and  transfers  and relieved by bracing/immobilizing and ice.      Barriers include:  Requires assistance with ADL's.                        Objective:    Gait:    Gait Type:  Antalgic   Weight Bearing Status:  WBAT   Assistive Devices:  Walker and brace  Deviations:  Hip:  Hip hiking L and circumduction LKnee:  Knee flexion decr L                                                      Knee Evaluation:  ROM:  Strength wnl knee: Requires assistant for L SLR.  AROM      Extension:  Left: 3    Right:  0  Flexion: Left: 55    Right: 111  PROM      Extension: Left: 0   Right:         Strength:         Quad Set Left: Fair    Pain:   Quad Set Right: Good    Pain:        Edema:  Edema of the knee: mild increase in generalized L LE swelling.  Circumference:      Joint Line:  Left:  48cm   Right:  48cm    Mobility Testing:      Patellofemoral Medial:  Left: hypomobile      Patellofemoral Lateral:  Left: hypomobile      Patellofemoral Superior:  Left: hypomobile      Patellofemoral Inferior:  Left: hypomobile            General     ROS    Assessment/Plan:    Patient is a 76 year old male with left side knee complaints.    Patient has the following significant findings with corresponding treatment plan.                Diagnosis 1:  S/p L MPLF reconstruction (DOS 8/18/2021)  Pain -  hot/cold therapy, manual therapy, education and home program  Decreased ROM/flexibility - manual therapy and therapeutic exercise  Decreased joint mobility - manual therapy and therapeutic exercise  Decreased strength - therapeutic exercise and therapeutic activities  Edema - cold therapy  Impaired gait - gait training  Impaired muscle performance - neuro re-education  Decreased function - therapeutic activities    Therapy Evaluation Codes:   1) History comprised of:   Personal factors that impact the plan of care:      Age and Past/current experiences.    Comorbidity factors that impact the plan of care are:      Cancer, Heart problems, High blood  pressure, Implanted device, Overweight and Sleep disorder/apnea.     Medications impacting care: Anti-inflammatory, Cardiac, High blood pressure, Heparin/coumadin, Pain and Sleep.  2) Examination of Body Systems comprised of:   Body structures and functions that impact the plan of care:      Knee.   Activity limitations that impact the plan of care are:      Bathing, Bending, Driving, Lifting, Sitting, Squatting/kneeling, Stairs, Standing, Walking, Sleeping and Laying down.  3) Clinical presentation characteristics are:   Stable/Uncomplicated.  4) Decision-Making    Low complexity using standardized patient assessment instrument and/or measureable assessment of functional outcome.  Cumulative Therapy Evaluation is: Low complexity.    Previous and current functional limitations:  (See Goal Flow Sheet for this information)    Short term and Long term goals: (See Goal Flow Sheet for this information)     Communication ability:  Patient appears to be able to clearly communicate and understand verbal and written communication and follow directions correctly.  Treatment Explanation - The following has been discussed with the patient:   RX ordered/plan of care  Anticipated outcomes  Possible risks and side effects  This patient would benefit from PT intervention to resume normal activities.   Rehab potential is good.    Frequency:  2 X week, once daily  Duration:  for 6 weeks tapering to 1 X a week over 6 weeks  Discharge Plan:  Achieve all LTG.  Independent in home treatment program.  Reach maximal therapeutic benefit.    Please refer to the daily flowsheet for treatment today, total treatment time and time spent performing 1:1 timed codes.

## 2021-09-20 ENCOUNTER — THERAPY VISIT (OUTPATIENT)
Dept: PHYSICAL THERAPY | Facility: CLINIC | Age: 76
End: 2021-09-20
Payer: COMMERCIAL

## 2021-09-20 DIAGNOSIS — Z47.89 AFTERCARE FOLLOWING SURGERY OF THE MUSCULOSKELETAL SYSTEM: ICD-10-CM

## 2021-09-20 PROCEDURE — 97110 THERAPEUTIC EXERCISES: CPT | Mod: GP | Performed by: PHYSICAL THERAPIST

## 2021-09-20 PROCEDURE — 97112 NEUROMUSCULAR REEDUCATION: CPT | Mod: GP | Performed by: PHYSICAL THERAPIST

## 2021-09-22 ENCOUNTER — THERAPY VISIT (OUTPATIENT)
Dept: PHYSICAL THERAPY | Facility: CLINIC | Age: 76
End: 2021-09-22
Payer: COMMERCIAL

## 2021-09-22 DIAGNOSIS — Z47.89 AFTERCARE FOLLOWING SURGERY OF THE MUSCULOSKELETAL SYSTEM: ICD-10-CM

## 2021-09-22 PROCEDURE — 97112 NEUROMUSCULAR REEDUCATION: CPT | Mod: GP | Performed by: PHYSICAL THERAPIST

## 2021-09-22 PROCEDURE — 97110 THERAPEUTIC EXERCISES: CPT | Mod: GP | Performed by: PHYSICAL THERAPIST

## 2021-09-24 DIAGNOSIS — I25.10 ASCVD (ARTERIOSCLEROTIC CARDIOVASCULAR DISEASE): ICD-10-CM

## 2021-09-24 RX ORDER — ATORVASTATIN CALCIUM 80 MG/1
TABLET, FILM COATED ORAL
Qty: 90 TABLET | Refills: 3 | Status: SHIPPED | OUTPATIENT
Start: 2021-09-24 | End: 2022-10-10

## 2021-09-24 NOTE — TELEPHONE ENCOUNTER
Routing refill request to provider for review/approval because:  Drug interaction warning HIGH   Svetlana Roche, RN

## 2021-09-27 ENCOUNTER — THERAPY VISIT (OUTPATIENT)
Dept: PHYSICAL THERAPY | Facility: CLINIC | Age: 76
End: 2021-09-27
Payer: COMMERCIAL

## 2021-09-27 DIAGNOSIS — Z47.89 AFTERCARE FOLLOWING SURGERY OF THE MUSCULOSKELETAL SYSTEM: ICD-10-CM

## 2021-09-27 PROCEDURE — 97530 THERAPEUTIC ACTIVITIES: CPT | Mod: GP | Performed by: PHYSICAL THERAPIST

## 2021-09-27 PROCEDURE — 97110 THERAPEUTIC EXERCISES: CPT | Mod: GP | Performed by: PHYSICAL THERAPIST

## 2021-09-29 ENCOUNTER — THERAPY VISIT (OUTPATIENT)
Dept: PHYSICAL THERAPY | Facility: CLINIC | Age: 76
End: 2021-09-29
Payer: COMMERCIAL

## 2021-09-29 DIAGNOSIS — Z47.89 AFTERCARE FOLLOWING SURGERY OF THE MUSCULOSKELETAL SYSTEM: ICD-10-CM

## 2021-09-29 PROCEDURE — 97110 THERAPEUTIC EXERCISES: CPT | Mod: GP | Performed by: PHYSICAL THERAPIST

## 2021-09-29 PROCEDURE — 97112 NEUROMUSCULAR REEDUCATION: CPT | Mod: GP | Performed by: PHYSICAL THERAPIST

## 2021-10-05 ENCOUNTER — THERAPY VISIT (OUTPATIENT)
Dept: PHYSICAL THERAPY | Facility: CLINIC | Age: 76
End: 2021-10-05
Payer: COMMERCIAL

## 2021-10-05 DIAGNOSIS — Z47.89 AFTERCARE FOLLOWING SURGERY OF THE MUSCULOSKELETAL SYSTEM: ICD-10-CM

## 2021-10-05 PROCEDURE — 97110 THERAPEUTIC EXERCISES: CPT | Mod: GP | Performed by: PHYSICAL THERAPIST

## 2021-10-05 PROCEDURE — 97112 NEUROMUSCULAR REEDUCATION: CPT | Mod: GP | Performed by: PHYSICAL THERAPIST

## 2021-10-07 ENCOUNTER — THERAPY VISIT (OUTPATIENT)
Dept: PHYSICAL THERAPY | Facility: CLINIC | Age: 76
End: 2021-10-07
Payer: COMMERCIAL

## 2021-10-07 DIAGNOSIS — Z47.89 AFTERCARE FOLLOWING SURGERY OF THE MUSCULOSKELETAL SYSTEM: ICD-10-CM

## 2021-10-07 DIAGNOSIS — R26.9 IMPAIRED GAIT: Primary | ICD-10-CM

## 2021-10-07 PROCEDURE — 97110 THERAPEUTIC EXERCISES: CPT | Mod: GP | Performed by: PHYSICAL THERAPIST

## 2021-10-07 PROCEDURE — 97530 THERAPEUTIC ACTIVITIES: CPT | Mod: GP | Performed by: PHYSICAL THERAPIST

## 2021-10-07 PROCEDURE — 97116 GAIT TRAINING THERAPY: CPT | Mod: GP | Performed by: PHYSICAL THERAPIST

## 2021-10-07 NOTE — LETTER
"[facility]  [address]  [address]  [place] [address]  [phone]    2021    Re: James SCHUSTER Salvador   :   1945  MRN:  [id number]   REFERRING PHYSICIAN:   Calderon DICK [facility]    Date of Initial Evaluation:  21  Visits:  Rxs Used: 7  Reason for Referral:     Aftercare following surgery of the musculoskeletal system  Impaired gait    EVALUATION SUMMARY    Subjective:  HPI  Physical Exam                  Objective:  System    Physical Exam    General     ROS    Assessment/Plan:    PROGRESS  REPORT    Progress reporting period is from 21 to 10-7-21.       SUBJECTIVE   Subjective: Pt is noting decreased pain and improved walking tolerance, walking 5-10 minutes at a time with walker.      Current Pain level: 1/10.     Previous pain level was  3/10  .   Changes in function:  Yes (See Goal flowsheet attached for changes in current functional level)  Adverse reaction to treatment or activity: None    OBJECTIVE  Changes noted in objective findings:  Yes, improved ROM and gait.   Objective: AROM left knee 5-100, PROM left knee 0-110, Tandem stance 10\", Gait slightly antalgic with walker, Improved with cueing for upright posture, and glut control with left stance.    Re: James Salvador   :   1945      ASSESSMENT/PLAN  Updated problem list and treatment plan: Diagnosis 1:  Left knee(MPFL)  Pain -  hot/cold therapy, self management, education and home program  Decreased ROM/flexibility - therapeutic exercise and home program  Decreased strength - therapeutic exercise, therapeutic activities and home program  Impaired gait - gait training and home program  Decreased function - therapeutic activities and home program  STG/LTGs have been met or progress has been made towards goals:  Yes (See Goal flow sheet completed today.)  Assessment of Progress: Patient is meeting short term goals and is " progressing towards long term goals.  Self Management Plans:  Patient has been instructed in a home treatment program.  I have re-evaluated this patient and find that the nature, scope, duration and intensity of the therapy is appropriate for the medical condition of the patient.  James continues to require the following intervention to meet STG and LTG's:  PT    Recommendations:  This patient would benefit from continued therapy.     Frequency:  2 X week, once daily  Duration:  for 3 weeks tapering to 1 X a week over 5 weeks        Thank you for your referral.    INQUIRIES  Therapist: Estefanía Townsend PT  [place] [id number]  Phone: [phone]  Fax: [phone]

## 2021-10-07 NOTE — PROGRESS NOTES
"Subjective:  HPI  Physical Exam                    Objective:  System    Physical Exam    General     ROS    Assessment/Plan:    PROGRESS  REPORT    Progress reporting period is from 9-16-21 to 10-7-21.       SUBJECTIVE   Subjective: Pt is noting decreased pain and improved walking tolerance, walking 5-10 minutes at a time with walker.      Current Pain level: 1/10.     Previous pain level was  3/10  .   Changes in function:  Yes (See Goal flowsheet attached for changes in current functional level)  Adverse reaction to treatment or activity: None    OBJECTIVE  Changes noted in objective findings:  Yes, improved ROM and gait.   Objective: AROM left knee 5-100, PROM left knee 0-110, Tandem stance 10\", Gait slightly antalgic with walker, Improved with cueing for upright posture, and glut control with left stance.      ASSESSMENT/PLAN  Updated problem list and treatment plan: Diagnosis 1:  Left knee(MPFL)  Pain -  hot/cold therapy, self management, education and home program  Decreased ROM/flexibility - therapeutic exercise and home program  Decreased strength - therapeutic exercise, therapeutic activities and home program  Impaired gait - gait training and home program  Decreased function - therapeutic activities and home program  STG/LTGs have been met or progress has been made towards goals:  Yes (See Goal flow sheet completed today.)  Assessment of Progress: Patient is meeting short term goals and is progressing towards long term goals.  Self Management Plans:  Patient has been instructed in a home treatment program.  I have re-evaluated this patient and find that the nature, scope, duration and intensity of the therapy is appropriate for the medical condition of the patient.  James continues to require the following intervention to meet STG and LTG's:  PT    Recommendations:  This patient would benefit from continued therapy.     Frequency:  2 X week, once daily  Duration:  for 3 weeks tapering to 1 X a week over 5 " weeks        Please refer to the daily flowsheet for treatment today, total treatment time and time spent performing 1:1 timed codes.

## 2021-10-08 ENCOUNTER — IMMUNIZATION (OUTPATIENT)
Dept: NURSING | Facility: CLINIC | Age: 76
End: 2021-10-08
Payer: COMMERCIAL

## 2021-10-08 PROCEDURE — 91300 PR COVID VAC PFIZER DIL RECON 30 MCG/0.3 ML IM: CPT

## 2021-10-08 PROCEDURE — 0004A PR COVID VAC PFIZER DIL RECON 30 MCG/0.3 ML IM: CPT

## 2021-10-13 ENCOUNTER — MYC MEDICAL ADVICE (OUTPATIENT)
Dept: INTERNAL MEDICINE | Facility: CLINIC | Age: 76
End: 2021-10-13

## 2021-10-13 DIAGNOSIS — I48.20 CHRONIC ATRIAL FIBRILLATION (H): ICD-10-CM

## 2021-10-15 NOTE — TELEPHONE ENCOUNTER
Dr. Painter,     Patient would like to lower the dose of Furosemide. Please see [facility] message.     Should I have him make an appointment to discuss?    Svetlana Roche RN

## 2021-10-19 ENCOUNTER — OFFICE VISIT (OUTPATIENT)
Dept: DERMATOLOGY | Facility: CLINIC | Age: 76
End: 2021-10-19
Payer: COMMERCIAL

## 2021-10-19 VITALS — OXYGEN SATURATION: 95 % | HEART RATE: 62 BPM | SYSTOLIC BLOOD PRESSURE: 165 MMHG | DIASTOLIC BLOOD PRESSURE: 89 MMHG

## 2021-10-19 DIAGNOSIS — Z85.828 HISTORY OF SCC (SQUAMOUS CELL CARCINOMA) OF SKIN: ICD-10-CM

## 2021-10-19 DIAGNOSIS — D48.5 NEOPLASM OF UNCERTAIN BEHAVIOR OF SKIN: ICD-10-CM

## 2021-10-19 DIAGNOSIS — Z85.820 HISTORY OF MELANOMA: ICD-10-CM

## 2021-10-19 DIAGNOSIS — L82.1 SEBORRHEIC KERATOSIS: ICD-10-CM

## 2021-10-19 DIAGNOSIS — D22.9 NEVUS: ICD-10-CM

## 2021-10-19 DIAGNOSIS — L81.4 LENTIGO: ICD-10-CM

## 2021-10-19 DIAGNOSIS — L57.0 ACTINIC KERATOSIS: Primary | ICD-10-CM

## 2021-10-19 DIAGNOSIS — D18.01 ANGIOMA OF SKIN: ICD-10-CM

## 2021-10-19 DIAGNOSIS — Z85.828 HISTORY OF BASAL CELL CARCINOMA (BCC): ICD-10-CM

## 2021-10-19 PROCEDURE — 88304 TISSUE EXAM BY PATHOLOGIST: CPT | Performed by: PHYSICIAN ASSISTANT

## 2021-10-19 PROCEDURE — 17000 DESTRUCT PREMALG LESION: CPT | Performed by: PHYSICIAN ASSISTANT

## 2021-10-19 PROCEDURE — 99214 OFFICE O/P EST MOD 30 MIN: CPT | Mod: 25 | Performed by: PHYSICIAN ASSISTANT

## 2021-10-19 RX ORDER — FUROSEMIDE 40 MG
20 TABLET ORAL DAILY
Start: 2021-10-19 | End: 2022-02-14 | Stop reason: ALTCHOICE

## 2021-10-19 NOTE — LETTER
10/19/2021         RE: James Salvador  [address] [address]        Dear Colleague,    Thank you for referring your patient, James Salvador, to the [facility]. Please see a copy of my visit note below.    HPI:   Chief complaint: James Salvador (Pete) is a 76 year old male who presents for Full skin cancer screening to rule out skin cancer.   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 He lives in [place].   On review of systems, there are no further skin complaints, patient is feeling otherwise well.  See patient intake sheet.  ROS of the following were done and are negative: Constitutional, Eyes, Ears, Nose,   Mouth, Throat, Cardiovascular, Respiratory, GI, Genitourinary, Musculoskeletal,   Psychiatric, Endocrine, Allergic/Immunologic.      PHYSICAL EXAM:   BP (!) 165/89   Pulse 62   SpO2 95%   Skin exam performed as follows: Type 2 skin. Mood appropriate  Alert and Oriented X 3. Well developed, well nourished in no distress.  General appearance: Normal  Head including face: Normal  Eyes: conjunctiva and lids: Normal  Mouth: Lips, teeth, gums: Normal  Neck: Normal  Chest-breast/axillae: Normal  Back: Normal  Spleen and liver: Normal  Cardiovascular: Exam of peripheral vascular system by observation for swelling, varicosities, edema: Normal  Genitalia: groin, buttocks: Normal  Extremities: digits/nails (clubbing): Normal  Eccrine and Apocrine glands: Normal  Right upper extremity: Normal  Left upper extremity:  Normal  Right lower extremity: Normal  Left lower extremity: Normal  Skin: Scalp and body hair: See below    Pt deferred exam of breasts, groin, buttocks: NO    Other physical findings:  1. Multiple pigmented macules on extremities and trunk  2. Multiple pigmented macules on face, trunk and extremities  3. Multiple vascular papules on trunk, arms and legs  4. Multiple scattered keratotic plaques  5. Pink gritty papules on the right elbow x 1  6. Vascular papules on the buttocks and perianal area      Except as noted above, no other signs of skin cancer or melanoma.     ASSESSMENT/PLAN:   Benign Full skin cancer screening today.     Patient with history of SCC, BCC and melanoma  Advised on monthly self exams and 1 year  Patient Education: Appropriate brochures given.    Multiple benign appearing nevi on arms, legs and trunk. Discussed ABCDEs of melanoma and sunscreen.   Multiple lentigos on arms, legs and trunk. Advised benign, no treatment needed.  Multiple scattered angiomas. Advised benign, no treatment needed.   Seborrheic keratosis on arms, legs and trunk. Advised benign, no treatment needed.  Actinic keratosis on the right elbow x 1. As precancerous, cryosurgery performed. Advised on blistering and post-op care. 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   History of seborrheic dermatitis on the face and scalp - scalp well controlled with ketoconazole and lidex as needed. Face controlled with desonide - advised to only use this when needed.             Follow-up: 4 months    1.) Patient was asked about new and changing moles. YES  2.) Patient received a complete physical skin examination: YES  3.) Patient was counseled to perform a monthly self skin examination: YES  Scribed By: Rosa Maria Hansen, MS, PA-C        Again, thank you for allowing me to participate in the care of your patient.         Sincerely,        Rosa Maria Nascimento PA-C

## 2021-10-19 NOTE — PATIENT INSTRUCTIONS
On the buttocks, these appear to be angiokeratomas/angiomas - these are blood vessel overgrowths and are benign.       Wound Care Instructions     FOR SUPERFICIAL WOUNDS     [facility] [phone]                 AFTER 24 HOURS YOU SHOULD REMOVE THE BANDAGE AND BEGIN DAILY DRESSING CHANGES AS FOLLOWS:     1) Remove Dressing.     2) Clean and dry the area with tap water using a Q-tip or sterile gauze pad.     3) Apply Vaseline, Aquaphor, Polysporin ointment or Bacitracin ointment over entire wound.  Do NOT use Neosporin ointment.     4) Cover the wound with a band-aid, or a sterile non-stick gauze pad and micropore paper tape    REPEAT THESE INSTRUCTIONS AT LEAST ONCE A DAY UNTIL THE WOUND HAS COMPLETELY HEALED.    It is an old wives tale that a wound heals better when it is exposed to air and allowed to dry out. The wound will heal faster with a better cosmetic result if it is kept moist with ointment and covered with a bandage.    **Do not let the wound dry out.**    Supplies Needed:      *Cotton tipped applicators (Q-tips)    *Vaseline, Aquaphor, Polysporin or Bacitracin Ointment (NOT NEOSPORIN)    *Band-aids or non-stick gauze pads and micropore paper tape.      PATIENT INFORMATION:    During the healing process you will notice a number of changes. All wounds develop a small halo of redness surrounding the wound.  This means healing is occurring. Severe itching with extensive redness usually indicates sensitivity to the ointment or bandage tape used to dress the wound.  You should call our office if this develops.      Swelling  and/or discoloration around your surgical site is common, particularly when performed around the eye.    All wounds normally drain.  The larger the wound the more drainage there will be.  After 7-10 days, you will notice the wound beginning to shrink and new skin will begin to grow.  The wound is healed when you can see skin has formed over the entire area.  A healed wound  has a healthy, shiny look to the surface and is red to dark pink in color to normalize.  Wounds may take approximately 4-6 weeks to heal.  Larger wounds may take 6-8 weeks.  After the wound is healed you may discontinue dressing changes.    You may experience a sensation of tightness as your wound heals. This is normal and will gradually subside.    Your healed wound may be sensitive to temperature changes. This sensitivity improves with time, but if you re having a lot of discomfort, try to avoid temperature extremes.    Patients frequently experience itching after their wound appears to have healed because of the continue healing under the skin.  Plain Vaseline will help relieve the itching.      POSSIBLE COMPLICATIONS    BLEEDIN. Leave the bandage in place.  2. Use tightly rolled up gauze or a cloth to apply direct pressure over the bandage for 30  minutes.  3. Reapply pressure for an additional 30 minutes if necessary  4. Use additional gauze and tape to maintain pressure once the bleeding has stopped.

## 2021-10-19 NOTE — PROGRESS NOTES
HPI:   Chief complaint: James Salvador (Pete) is a 76 year old male who presents for Full skin cancer screening to rule out skin cancer.   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 He lives in [place].   On review of systems, there are no further skin complaints, patient is feeling otherwise well.  See patient intake sheet.  ROS of the following were done and are negative: Constitutional, Eyes, Ears, Nose,   Mouth, Throat, Cardiovascular, Respiratory, GI, Genitourinary, Musculoskeletal,   Psychiatric, Endocrine, Allergic/Immunologic.      PHYSICAL EXAM:   BP (!) 165/89   Pulse 62   SpO2 95%   Skin exam performed as follows: Type 2 skin. Mood appropriate  Alert and Oriented X 3. Well developed, well nourished in no distress.  General appearance: Normal  Head including face: Normal  Eyes: conjunctiva and lids: Normal  Mouth: Lips, teeth, gums: Normal  Neck: Normal  Chest-breast/axillae: Normal  Back: Normal  Spleen and liver: Normal  Cardiovascular: Exam of peripheral vascular system by observation for swelling, varicosities, edema: Normal  Genitalia: groin, buttocks: Normal  Extremities: digits/nails (clubbing): Normal  Eccrine and Apocrine glands: Normal  Right upper extremity: Normal  Left upper extremity: Normal  Right lower extremity: Normal  Left lower extremity: Normal  Skin: Scalp and body hair: See below    Pt deferred exam of breasts, groin, buttocks: NO    Other physical findings:  1. Multiple pigmented macules on extremities and trunk  2. Multiple pigmented  macules on face, trunk and extremities  3. Multiple vascular papules on trunk, arms and legs  4. Multiple scattered keratotic plaques  5. Pink gritty papules on the right elbow x 1  6. Vascular papules on the buttocks and perianal area      Except as noted above, no other signs of skin cancer or melanoma.     ASSESSMENT/PLAN:   Benign Full skin cancer screening today.     Patient with history of SCC, BCC and melanoma  Advised on monthly self exams and 1 year  Patient Education: Appropriate brochures given.    Multiple benign appearing nevi on arms, legs and trunk. Discussed ABCDEs of melanoma and sunscreen.   Multiple lentigos on arms, legs and trunk. Advised benign, no treatment needed.  Multiple scattered angiomas. Advised benign, no treatment needed.   Seborrheic keratosis on arms, legs and trunk. Advised benign, no treatment needed.  Actinic keratosis on the right elbow x 1. As precancerous, cryosurgery performed. Advised on blistering and post-op care. 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   History of seborrheic dermatitis on the face and scalp - scalp well controlled with ketoconazole and lidex as needed. Face controlled with desonide - advised to only use this when needed.             Follow-up: 4 months    1.) Patient was asked about new and changing moles. YES  2.) Patient received a complete physical skin examination: YES  3.) Patient was counseled to perform a monthly self skin examination: YES  Scribed By: Rosa Maria Hansen, MS, PAVAISHALI

## 2021-10-20 ENCOUNTER — THERAPY VISIT (OUTPATIENT)
Dept: PHYSICAL THERAPY | Facility: CLINIC | Age: 76
End: 2021-10-20
Payer: COMMERCIAL

## 2021-10-20 DIAGNOSIS — Z47.89 AFTERCARE FOLLOWING SURGERY OF THE MUSCULOSKELETAL SYSTEM: ICD-10-CM

## 2021-10-20 DIAGNOSIS — R26.9 IMPAIRED GAIT: ICD-10-CM

## 2021-10-20 PROCEDURE — 97110 THERAPEUTIC EXERCISES: CPT | Mod: GP | Performed by: PHYSICAL THERAPIST

## 2021-10-20 PROCEDURE — 97116 GAIT TRAINING THERAPY: CPT | Mod: GP | Performed by: PHYSICAL THERAPIST

## 2021-10-20 PROCEDURE — 97530 THERAPEUTIC ACTIVITIES: CPT | Mod: GP | Performed by: PHYSICAL THERAPIST

## 2021-10-21 LAB
PATH REPORT.COMMENTS IMP SPEC: NORMAL
PATH REPORT.COMMENTS IMP SPEC: NORMAL
PATH REPORT.FINAL DX SPEC: NORMAL
PATH REPORT.GROSS SPEC: NORMAL
PATH REPORT.MICROSCOPIC SPEC OTHER STN: NORMAL
PATH REPORT.RELEVANT HX SPEC: NORMAL

## 2021-10-22 ENCOUNTER — THERAPY VISIT (OUTPATIENT)
Dept: PHYSICAL THERAPY | Facility: CLINIC | Age: 76
End: 2021-10-22
Payer: COMMERCIAL

## 2021-10-22 DIAGNOSIS — R26.9 IMPAIRED GAIT: ICD-10-CM

## 2021-10-22 DIAGNOSIS — Z47.89 AFTERCARE FOLLOWING SURGERY OF THE MUSCULOSKELETAL SYSTEM: ICD-10-CM

## 2021-10-22 PROCEDURE — 97110 THERAPEUTIC EXERCISES: CPT | Mod: GP | Performed by: PHYSICAL THERAPIST

## 2021-10-22 PROCEDURE — 97530 THERAPEUTIC ACTIVITIES: CPT | Mod: GP | Performed by: PHYSICAL THERAPIST

## 2021-10-25 ENCOUNTER — THERAPY VISIT (OUTPATIENT)
Dept: PHYSICAL THERAPY | Facility: CLINIC | Age: 76
End: 2021-10-25
Payer: COMMERCIAL

## 2021-10-25 DIAGNOSIS — R26.9 IMPAIRED GAIT: ICD-10-CM

## 2021-10-25 DIAGNOSIS — Z47.89 AFTERCARE FOLLOWING SURGERY OF THE MUSCULOSKELETAL SYSTEM: ICD-10-CM

## 2021-10-25 PROCEDURE — 97110 THERAPEUTIC EXERCISES: CPT | Mod: GP | Performed by: PHYSICAL THERAPIST

## 2021-10-25 PROCEDURE — 97530 THERAPEUTIC ACTIVITIES: CPT | Mod: GP | Performed by: PHYSICAL THERAPIST

## 2021-10-29 ENCOUNTER — THERAPY VISIT (OUTPATIENT)
Dept: PHYSICAL THERAPY | Facility: CLINIC | Age: 76
End: 2021-10-29
Payer: COMMERCIAL

## 2021-10-29 DIAGNOSIS — Z47.89 AFTERCARE FOLLOWING SURGERY OF THE MUSCULOSKELETAL SYSTEM: ICD-10-CM

## 2021-10-29 DIAGNOSIS — R26.9 IMPAIRED GAIT: ICD-10-CM

## 2021-10-29 PROCEDURE — 97530 THERAPEUTIC ACTIVITIES: CPT | Mod: GP | Performed by: PHYSICAL THERAPIST

## 2021-10-29 PROCEDURE — 97110 THERAPEUTIC EXERCISES: CPT | Mod: GP | Performed by: PHYSICAL THERAPIST

## 2021-11-01 ENCOUNTER — THERAPY VISIT (OUTPATIENT)
Dept: PHYSICAL THERAPY | Facility: CLINIC | Age: 76
End: 2021-11-01
Payer: COMMERCIAL

## 2021-11-01 DIAGNOSIS — R26.9 IMPAIRED GAIT: ICD-10-CM

## 2021-11-01 DIAGNOSIS — Z47.89 AFTERCARE FOLLOWING SURGERY OF THE MUSCULOSKELETAL SYSTEM: ICD-10-CM

## 2021-11-01 PROCEDURE — 97530 THERAPEUTIC ACTIVITIES: CPT | Mod: GP | Performed by: PHYSICAL THERAPIST

## 2021-11-01 PROCEDURE — 97110 THERAPEUTIC EXERCISES: CPT | Mod: GP | Performed by: PHYSICAL THERAPIST

## 2021-11-05 ENCOUNTER — THERAPY VISIT (OUTPATIENT)
Dept: PHYSICAL THERAPY | Facility: CLINIC | Age: 76
End: 2021-11-05
Payer: COMMERCIAL

## 2021-11-05 DIAGNOSIS — Z47.89 AFTERCARE FOLLOWING SURGERY OF THE MUSCULOSKELETAL SYSTEM: ICD-10-CM

## 2021-11-05 DIAGNOSIS — R26.9 IMPAIRED GAIT: ICD-10-CM

## 2021-11-05 PROCEDURE — 97110 THERAPEUTIC EXERCISES: CPT | Mod: GP | Performed by: PHYSICAL THERAPIST

## 2021-11-05 PROCEDURE — 97140 MANUAL THERAPY 1/> REGIONS: CPT | Mod: GP | Performed by: PHYSICAL THERAPIST

## 2021-11-08 ENCOUNTER — THERAPY VISIT (OUTPATIENT)
Dept: PHYSICAL THERAPY | Facility: CLINIC | Age: 76
End: 2021-11-08
Payer: COMMERCIAL

## 2021-11-08 DIAGNOSIS — R26.9 IMPAIRED GAIT: ICD-10-CM

## 2021-11-08 DIAGNOSIS — Z47.89 AFTERCARE FOLLOWING SURGERY OF THE MUSCULOSKELETAL SYSTEM: ICD-10-CM

## 2021-11-08 PROCEDURE — 97112 NEUROMUSCULAR REEDUCATION: CPT | Mod: GP | Performed by: PHYSICAL THERAPIST

## 2021-11-08 PROCEDURE — 97530 THERAPEUTIC ACTIVITIES: CPT | Mod: GP | Performed by: PHYSICAL THERAPIST

## 2021-11-08 PROCEDURE — 97110 THERAPEUTIC EXERCISES: CPT | Mod: GP | Performed by: PHYSICAL THERAPIST

## 2021-11-17 ENCOUNTER — THERAPY VISIT (OUTPATIENT)
Dept: PHYSICAL THERAPY | Facility: CLINIC | Age: 76
End: 2021-11-17
Payer: COMMERCIAL

## 2021-11-17 DIAGNOSIS — Z47.89 AFTERCARE FOLLOWING SURGERY OF THE MUSCULOSKELETAL SYSTEM: ICD-10-CM

## 2021-11-17 DIAGNOSIS — R26.9 IMPAIRED GAIT: ICD-10-CM

## 2021-11-17 PROCEDURE — 97110 THERAPEUTIC EXERCISES: CPT | Mod: GP | Performed by: PHYSICAL THERAPIST

## 2021-11-17 PROCEDURE — 97112 NEUROMUSCULAR REEDUCATION: CPT | Mod: GP | Performed by: PHYSICAL THERAPIST

## 2021-11-22 ENCOUNTER — THERAPY VISIT (OUTPATIENT)
Dept: PHYSICAL THERAPY | Facility: CLINIC | Age: 76
End: 2021-11-22
Payer: COMMERCIAL

## 2021-11-22 DIAGNOSIS — I48.91 ATRIAL FIBRILLATION (H): ICD-10-CM

## 2021-11-22 DIAGNOSIS — I25.10 CAD (CORONARY ARTERY DISEASE): ICD-10-CM

## 2021-11-22 DIAGNOSIS — R26.9 IMPAIRED GAIT: ICD-10-CM

## 2021-11-22 DIAGNOSIS — Z47.89 AFTERCARE FOLLOWING SURGERY OF THE MUSCULOSKELETAL SYSTEM: ICD-10-CM

## 2021-11-22 PROCEDURE — 97140 MANUAL THERAPY 1/> REGIONS: CPT | Mod: GP | Performed by: PHYSICAL THERAPIST

## 2021-11-22 PROCEDURE — 97110 THERAPEUTIC EXERCISES: CPT | Mod: GP | Performed by: PHYSICAL THERAPIST

## 2021-11-22 PROCEDURE — 97530 THERAPEUTIC ACTIVITIES: CPT | Mod: GP | Performed by: PHYSICAL THERAPIST

## 2021-11-22 RX ORDER — CARVEDILOL 3.12 MG/1
3.12 TABLET ORAL 2 TIMES DAILY WITH MEALS
Qty: 180 TABLET | Refills: 2 | Status: SHIPPED | OUTPATIENT
Start: 2021-11-22 | End: 2022-02-14

## 2021-12-02 ENCOUNTER — THERAPY VISIT (OUTPATIENT)
Dept: PHYSICAL THERAPY | Facility: CLINIC | Age: 76
End: 2021-12-02
Payer: COMMERCIAL

## 2021-12-02 DIAGNOSIS — I48.91 NEW ONSET ATRIAL FIBRILLATION (H): ICD-10-CM

## 2021-12-02 DIAGNOSIS — R26.9 IMPAIRED GAIT: ICD-10-CM

## 2021-12-02 DIAGNOSIS — Z47.89 AFTERCARE FOLLOWING SURGERY OF THE MUSCULOSKELETAL SYSTEM: ICD-10-CM

## 2021-12-02 PROCEDURE — 99207 PR NO CHARGE LOS: CPT | Mod: GP | Performed by: PHYSICAL THERAPIST

## 2021-12-02 NOTE — PROGRESS NOTES
"Subjective:  HPI  Physical Exam                    Objective:  System    Physical Exam    General     ROS    Assessment/Plan:    SUBJECTIVE   Subjective: Has been working hard the past week, adding ankle weights to his exercises and walking, every other day. Noticed his L patella \"popping\" a couple of days ago. Doesn't recall a specific incident that caused this to happen. Denies pain and swelling.     Current pain level: 0/10       Changes in function:  Yes,        Adverse reaction to treatment or activity:  L patella subluxing, patient cannot recall specific event/activity that resulted in initial subluxation    OBJECTIVE  Changes in objective findings:    Objective: with patient seated, L patella subluxes laterally during knee extension      ASSESSMENT  James continues to require intervention to meet STG and LTG's: recommend follow-up w/ Dr. Osborn prior to continuing PT    Patient has experienced an exacerbation of symptoms.  Response to therapy has shown a worsening of  muscle control and patellar subluxation    Progress made towards STG/LTG?  Yes, prior to today's session, had been progressing toward goals    PLAN  Contact MD regarding concerning symptom    This patient would benefit from continued therapy.     Frequency:  1 X week, once daily  Duration:  for 6 weeks tapering to 2 X a month over 6 weeks    PTA/ATC plan:  N/A    Please refer to the daily flowsheet for treatment today, total treatment time and time spent performing 1:1 timed codes.        "

## 2021-12-03 RX ORDER — APIXABAN 5 MG/1
TABLET, FILM COATED ORAL
Qty: 180 TABLET | Refills: 1 | Status: SHIPPED | OUTPATIENT
Start: 2021-12-03 | End: 2022-06-23

## 2021-12-03 NOTE — TELEPHONE ENCOUNTER
Needs MD review.    Please refill as appropriate.  Thank you,    Alicia Avery RN  [facility]

## 2021-12-08 ENCOUNTER — THERAPY VISIT (OUTPATIENT)
Dept: PHYSICAL THERAPY | Facility: CLINIC | Age: 76
End: 2021-12-08
Payer: COMMERCIAL

## 2021-12-08 DIAGNOSIS — Z47.89 AFTERCARE FOLLOWING SURGERY OF THE MUSCULOSKELETAL SYSTEM: ICD-10-CM

## 2021-12-08 DIAGNOSIS — R26.9 IMPAIRED GAIT: ICD-10-CM

## 2021-12-08 PROCEDURE — 97110 THERAPEUTIC EXERCISES: CPT | Mod: GP | Performed by: PHYSICAL THERAPIST

## 2021-12-15 ENCOUNTER — THERAPY VISIT (OUTPATIENT)
Dept: PHYSICAL THERAPY | Facility: CLINIC | Age: 76
End: 2021-12-15
Payer: COMMERCIAL

## 2021-12-15 DIAGNOSIS — R26.9 IMPAIRED GAIT: ICD-10-CM

## 2021-12-15 DIAGNOSIS — Z47.89 AFTERCARE FOLLOWING SURGERY OF THE MUSCULOSKELETAL SYSTEM: ICD-10-CM

## 2021-12-15 PROCEDURE — 97110 THERAPEUTIC EXERCISES: CPT | Mod: GP | Performed by: PHYSICAL THERAPIST

## 2021-12-22 ENCOUNTER — THERAPY VISIT (OUTPATIENT)
Dept: PHYSICAL THERAPY | Facility: CLINIC | Age: 76
End: 2021-12-22
Payer: COMMERCIAL

## 2021-12-22 DIAGNOSIS — R26.9 IMPAIRED GAIT: ICD-10-CM

## 2021-12-22 DIAGNOSIS — Z47.89 AFTERCARE FOLLOWING SURGERY OF THE MUSCULOSKELETAL SYSTEM: ICD-10-CM

## 2021-12-22 PROCEDURE — 97110 THERAPEUTIC EXERCISES: CPT | Mod: GP | Performed by: PHYSICAL THERAPIST

## 2021-12-29 ENCOUNTER — THERAPY VISIT (OUTPATIENT)
Dept: PHYSICAL THERAPY | Facility: CLINIC | Age: 76
End: 2021-12-29
Payer: COMMERCIAL

## 2021-12-29 DIAGNOSIS — R26.9 IMPAIRED GAIT: ICD-10-CM

## 2021-12-29 DIAGNOSIS — Z47.89 AFTERCARE FOLLOWING SURGERY OF THE MUSCULOSKELETAL SYSTEM: ICD-10-CM

## 2021-12-29 PROCEDURE — 97112 NEUROMUSCULAR REEDUCATION: CPT | Mod: GP | Performed by: PHYSICAL THERAPIST

## 2021-12-29 PROCEDURE — 97110 THERAPEUTIC EXERCISES: CPT | Mod: GP | Performed by: PHYSICAL THERAPIST

## 2021-12-29 NOTE — PROGRESS NOTES
Subjective:  HPI  Physical Exam                    Objective:  System    Physical Exam    General     ROS    Assessment/Plan:    PROGRESS  REPORT    Progress reporting period is from 12/2/2021 to 12/29/2021.       SUBJECTIVE  Subjective changes noted by patient: Continues to have good days and bad days. Yesterday was a good day, but did fatigue easily with the exercises.      Current pain level is 1/10.     Previous pain level was 3/10.     Changes in function:  Yes (See Goal flowsheet attached for changes in current functional level)  Adverse reaction to treatment or activity: None    OBJECTIVE  Changes noted in objective findings:  Yes,     Objective:     Strength L Hip Flx: -5/5, Knee Ext: 4/5 painful, Knee Flx: 5/5,     QS: good,     Gait: SEC, slow cole, able to ambulate short distances w/o SEC, increased base of support, unsteady;     Balance FT EC: 5 sec, SL L: unable      ASSESSMENT/PLAN  Updated problem list and treatment plan: Diagnosis 1:  S/p L MPLF reconstruction (DOS 8/18/2021)  Pain -  hot/cold therapy, manual therapy, education and home program  Decreased strength - therapeutic exercise and therapeutic activities  Impaired balance - neuro re-education and therapeutic activities  Decreased proprioception - neuro re-education and therapeutic activities  Impaired gait - gait training  Impaired muscle performance - neuro re-education  Decreased function - therapeutic activities  STG/LTGs have been met or progress has been made towards goals:  Yes (See Goal flow sheet completed today.)  Assessment of Progress: The patient's condition is improving.  The patient's condition has potential to improve.  The patient has had set backs in their progress.  Patient is meeting short term goals and is progressing towards long term goals.  Self Management Plans:  Patient has been instructed in a home treatment program.  I have re-evaluated this patient and find that the nature, scope, duration and intensity of the  therapy is appropriate for the medical condition of the patient.  James continues to require the following intervention to meet STG and LTG's:  PT    Recommendations:  This patient would benefit from further evaluation.    Continue working on HEP, plan to be conservative with PT sessions and PT medicare dollars into 2022 as further surgical procedures are a possibility. Patient has a follow up with Dr. Osborn in Jan 2022, will schedule further PT after this appointment.    Please refer to the daily flowsheet for treatment today, total treatment time and time spent performing 1:1 timed codes.

## 2022-01-05 DIAGNOSIS — I25.10 CAD (CORONARY ARTERY DISEASE): Primary | ICD-10-CM

## 2022-01-05 DIAGNOSIS — I48.20 CHRONIC ATRIAL FIBRILLATION (H): ICD-10-CM

## 2022-01-05 NOTE — TELEPHONE ENCOUNTER
Medication Refilled: Furosemide   Last office visit: 2021 with Dr. Buchanan   Last Labs/EK2021 BMP   Next office visit: 2022 orders in epic - add on for FLP and BMP for annual review at time of OV   Pharmacy sent to: [facility] Marie RN      ADDENDUM: pt contacted to review lasix dosage - ordered for 40mg tabs with orders to take 20mg daily - pt reports he was recently reduced in dose by his PMD due to low sodium levels. Pt states he has a large supply left at home for the time being and does not need a refill. Pt will reach out should he need more supply.   HORACIO Marie RN, BSN. 22 1:24 PM

## 2022-01-07 ENCOUNTER — TELEPHONE (OUTPATIENT)
Dept: DERMATOLOGY | Facility: CLINIC | Age: 77
End: 2022-01-07
Payer: COMMERCIAL

## 2022-01-07 NOTE — TELEPHONE ENCOUNTER
[facility] Call Center    Phone Message    May a detailed message be left on voicemail: yes     Reason for Call: Symptoms or Concerns     If patient has red-flag symptoms, warm transfer to triage line    Current symptom or concern: red spots of concern    Symptoms have been present for:  1 month(s)    Has patient previously been seen for this? No    By : NA    Date: NA    Are there any new or worsening symptoms? Yes: Red spots that are not going away on his neck, cheek, and thigh. Please call Pt to discuss. His next Appt is not until 04/19  Thanks      Action Taken: Message routed to:  [facility] Derm    Travel Screening: Not Applicable

## 2022-01-07 NOTE — TELEPHONE ENCOUNTER
Called patient:  Has new red spots- one on the face, neck, and inside of thigh (dime sized)- neck and thigh came around end of November  Facial one has been there prior to that- thinks its been there for a year- states Rosa Maria has evaluated this one    patient has used efudex on these spots- states he was given the efudex and told to treat any abnormal spot he gets.    States efudex did not do anything for the spots- they have not grown but become mildly redder     advised that we do not have anything sooner - added to the wait list    Patient wants this sent to Dr. Dutta and Rosa Maria to see if they can see him sooner.      Please advise,    Eunice BALDERASRN BSN  [facility] [place]  [phone]

## 2022-01-10 NOTE — TELEPHONE ENCOUNTER
Patient scheduled to see Rosa Maria Hansen PA-C on 1/11/2022 @10:00am.    MIGEL Carmen-BSN-PHN  [facility]  [phone]

## 2022-01-11 ENCOUNTER — OFFICE VISIT (OUTPATIENT)
Dept: DERMATOLOGY | Facility: CLINIC | Age: 77
End: 2022-01-11
Payer: COMMERCIAL

## 2022-01-11 VITALS — DIASTOLIC BLOOD PRESSURE: 82 MMHG | HEART RATE: 69 BPM | OXYGEN SATURATION: 95 % | SYSTOLIC BLOOD PRESSURE: 167 MMHG

## 2022-01-11 DIAGNOSIS — D48.5 NEOPLASM OF UNCERTAIN BEHAVIOR OF SKIN: ICD-10-CM

## 2022-01-11 PROCEDURE — 11103 TANGNTL BX SKIN EA SEP/ADDL: CPT | Performed by: PHYSICIAN ASSISTANT

## 2022-01-11 PROCEDURE — 11102 TANGNTL BX SKIN SINGLE LES: CPT | Performed by: PHYSICIAN ASSISTANT

## 2022-01-11 PROCEDURE — 99207 PR DROP WITH A PROCEDURE: CPT | Performed by: PHYSICIAN ASSISTANT

## 2022-01-11 PROCEDURE — 88305 TISSUE EXAM BY PATHOLOGIST: CPT | Performed by: DERMATOLOGY

## 2022-01-11 NOTE — PATIENT INSTRUCTIONS
Wound Care Instructions     FOR SUPERFICIAL WOUNDS     [facility]  [phone]                 AFTER 24 HOURS YOU SHOULD REMOVE THE BANDAGE AND BEGIN DAILY DRESSING CHANGES AS FOLLOWS:     1) Remove Dressing.     2) Clean and dry the area with tap water using a Q-tip or sterile gauze pad.     3) Apply Vaseline, Aquaphor, Polysporin ointment or Bacitracin ointment over entire wound.  Do NOT use Neosporin ointment.     4) Cover the wound with a band-aid, or a sterile non-stick gauze pad and micropore paper tape    REPEAT THESE INSTRUCTIONS AT LEAST ONCE A DAY UNTIL THE WOUND HAS COMPLETELY HEALED.    It is an old wives tale that a wound heals better when it is exposed to air and allowed to dry out. The wound will heal faster with a better cosmetic result if it is kept moist with ointment and covered with a bandage.    **Do not let the wound dry out.**    Supplies Needed:      *Cotton tipped applicators (Q-tips)    *Vaseline, Aquaphor, Polysporin or Bacitracin Ointment (NOT NEOSPORIN)    *Band-aids or non-stick gauze pads and micropore paper tape.      PATIENT INFORMATION:    During the healing process you will notice a number of changes. All wounds develop a small halo of redness surrounding the wound.  This means healing is occurring. Severe itching with extensive redness usually indicates sensitivity to the ointment or bandage tape used to dress the wound.  You should call our office if this develops.      Swelling  and/or discoloration around your surgical site is common, particularly when performed around the eye.    All wounds normally drain.  The larger the wound the more drainage there will be.  After 7-10 days, you will notice the wound beginning to shrink and new skin will begin to grow.  The wound is healed when you can see skin has formed over the entire area.  A healed wound has a healthy, shiny look to the surface and is red to dark pink in color to normalize.  Wounds may  take approximately 4-6 weeks to heal.  Larger wounds may take 6-8 weeks.  After the wound is healed you may discontinue dressing changes.    You may experience a sensation of tightness as your wound heals. This is normal and will gradually subside.    Your healed wound may be sensitive to temperature changes. This sensitivity improves with time, but if you re having a lot of discomfort, try to avoid temperature extremes.    Patients frequently experience itching after their wound appears to have healed because of the continue healing under the skin.  Plain Vaseline will help relieve the itching.      POSSIBLE COMPLICATIONS    BLEEDIN. Leave the bandage in place.  2. Use tightly rolled up gauze or a cloth to apply direct pressure over the bandage for 30  minutes.  3. Reapply pressure for an additional 30 minutes if necessary  4. Use additional gauze and tape to maintain pressure once the bleeding has stopped.

## 2022-01-11 NOTE — LETTER
1/11/2022         RE: James Salvador  [address] [place] [address]        Dear Colleague,    Thank you for referring your patient, James Salvador, to the [facility]. Please see a copy of my visit note below.    HPI:   Chief complaints: James Salvador is a pleasant 76 year old male who presents for evaluation of spots of concern. They are sore and will not heal. He has tried to treat the areas with efudex, but this has not helped. He has a history of NMSC and melanoma      PHYSICAL EXAM:    BP (!) 167/82   Pulse 69   SpO2 95%   Skin exam performed as follows: Type 2 skin. Mood appropriate  Alert and Oriented X 3. Well developed, well nourished in no distress.  General appearance: Normal  Head including face: Normal  Eyes: conjunctiva and lids: Normal  Mouth: Lips, teeth, gums: Normal  Neck: Normal  Cardiovascular: Exam of peripheral vascular system by observation for swelling, varicosities, edema: Normal  Right upper extremity: Normal  Left upper extremity: Normal  Right lower extremity: Normal  Left lower extremity: Normal  Skin: Scalp and body hair: See below    1. Ill defined 10 mm pink plaque on the right lateral neck superior  2. 12 mm pink plaque on the right lateral neck inferior  3. 15 mm pink plaque on the right thigh    ASSESSMENT/PLAN:     1. R/o SCC on the right lateral neck superior, right lateral neck inferior, right thigh. Shave biopsy performed.  Area cleaned and anesthetized with 1% lidocaine with epinephrine.  Dermablade used to remove the lesion and sent to pathology. Bleeding was cauterized. Pt tolerated procedure well with no complications.           Follow-up: pending path  CC:   Scribed By: Rosa Maria Hansen, MS, PAVAISHALI          Again, thank you for allowing me to participate in the care of your patient.        Sincerely,        Rosa Maria Hansen PA-C

## 2022-01-11 NOTE — PROGRESS NOTES
HPI:   Chief complaints: James Salvador is a pleasant 76 year old male who presents for evaluation of spots of concern. They are sore and will not heal. He has tried to treat the areas with efudex, but this has not helped. He has a history of NMSC and melanoma      PHYSICAL EXAM:    BP (!) 167/82   Pulse 69   SpO2 95%   Skin exam performed as follows: Type 2 skin. Mood appropriate  Alert and Oriented X 3. Well developed, well nourished in no distress.  General appearance: Normal  Head including face: Normal  Eyes: conjunctiva and lids: Normal  Mouth: Lips, teeth, gums: Normal  Neck: Normal  Cardiovascular: Exam of peripheral vascular system by observation for swelling, varicosities, edema: Normal  Right upper extremity: Normal  Left upper extremity: Normal  Right lower extremity: Normal  Left lower extremity: Normal  Skin: Scalp and body hair: See below    1. Ill defined 10 mm pink plaque on the right lateral neck superior  2. 12 mm pink plaque on the right lateral neck inferior  3. 15 mm pink plaque on the right thigh    ASSESSMENT/PLAN:     1. R/o SCC on the right lateral neck superior, right lateral neck inferior, right thigh. Shave biopsy performed.  Area cleaned and anesthetized with 1% lidocaine with epinephrine.  Dermablade used to remove the lesion and sent to pathology. Bleeding was cauterized. Pt tolerated procedure well with no complications.           Follow-up: pending path  CC:   Scribed By: Rosa Maria Hansen, MS, PAVAISHALI

## 2022-01-12 ENCOUNTER — ANCILLARY PROCEDURE (OUTPATIENT)
Dept: CARDIOLOGY | Facility: CLINIC | Age: 77
End: 2022-01-12
Attending: INTERNAL MEDICINE
Payer: COMMERCIAL

## 2022-01-12 DIAGNOSIS — Z95.0 CARDIAC PACEMAKER IN SITU: ICD-10-CM

## 2022-01-12 DIAGNOSIS — I44.2 CHB (COMPLETE HEART BLOCK) (H): Primary | ICD-10-CM

## 2022-01-12 PROCEDURE — 93279 PRGRMG DEV EVAL PM/LDLS PM: CPT | Performed by: INTERNAL MEDICINE

## 2022-01-13 LAB
MDC_IDC_EPISODE_DTM: NORMAL
MDC_IDC_EPISODE_DURATION: 0 S
MDC_IDC_EPISODE_ID: 3
MDC_IDC_EPISODE_TYPE: NORMAL
MDC_IDC_LEAD_IMPLANT_DT: NORMAL
MDC_IDC_LEAD_LOCATION: NORMAL
MDC_IDC_LEAD_LOCATION_DETAIL_1: NORMAL
MDC_IDC_LEAD_MFG: NORMAL
MDC_IDC_LEAD_MODEL: NORMAL
MDC_IDC_LEAD_POLARITY_TYPE: NORMAL
MDC_IDC_LEAD_SERIAL: NORMAL
MDC_IDC_MSMT_BATTERY_DTM: NORMAL
MDC_IDC_MSMT_BATTERY_REMAINING_LONGEVITY: 155 MO
MDC_IDC_MSMT_BATTERY_RRT_TRIGGER: 2.62
MDC_IDC_MSMT_BATTERY_STATUS: NORMAL
MDC_IDC_MSMT_BATTERY_VOLTAGE: 3.04 V
MDC_IDC_MSMT_LEADCHNL_RV_IMPEDANCE_VALUE: 323 OHM
MDC_IDC_MSMT_LEADCHNL_RV_IMPEDANCE_VALUE: 399 OHM
MDC_IDC_MSMT_LEADCHNL_RV_PACING_THRESHOLD_AMPLITUDE: 0.62 V
MDC_IDC_MSMT_LEADCHNL_RV_PACING_THRESHOLD_PULSEWIDTH: 0.4 MS
MDC_IDC_MSMT_LEADCHNL_RV_SENSING_INTR_AMPL: 3.75 MV
MDC_IDC_MSMT_LEADCHNL_RV_SENSING_INTR_AMPL: 4.25 MV
MDC_IDC_PG_IMPLANT_DTM: NORMAL
MDC_IDC_PG_MFG: NORMAL
MDC_IDC_PG_MODEL: NORMAL
MDC_IDC_PG_SERIAL: NORMAL
MDC_IDC_PG_TYPE: NORMAL
MDC_IDC_SESS_CLINIC_NAME: NORMAL
MDC_IDC_SESS_DTM: NORMAL
MDC_IDC_SESS_TYPE: NORMAL
MDC_IDC_SET_BRADY_HYSTRATE: NORMAL
MDC_IDC_SET_BRADY_LOWRATE: 50 {BEATS}/MIN
MDC_IDC_SET_BRADY_MAX_SENSOR_RATE: 130 {BEATS}/MIN
MDC_IDC_SET_BRADY_MODE: NORMAL
MDC_IDC_SET_LEADCHNL_RV_PACING_AMPLITUDE: 2 V
MDC_IDC_SET_LEADCHNL_RV_PACING_ANODE_ELECTRODE_1: NORMAL
MDC_IDC_SET_LEADCHNL_RV_PACING_ANODE_LOCATION_1: NORMAL
MDC_IDC_SET_LEADCHNL_RV_PACING_CAPTURE_MODE: NORMAL
MDC_IDC_SET_LEADCHNL_RV_PACING_CATHODE_ELECTRODE_1: NORMAL
MDC_IDC_SET_LEADCHNL_RV_PACING_CATHODE_LOCATION_1: NORMAL
MDC_IDC_SET_LEADCHNL_RV_PACING_POLARITY: NORMAL
MDC_IDC_SET_LEADCHNL_RV_PACING_PULSEWIDTH: 0.4 MS
MDC_IDC_SET_LEADCHNL_RV_SENSING_ANODE_ELECTRODE_1: NORMAL
MDC_IDC_SET_LEADCHNL_RV_SENSING_ANODE_LOCATION_1: NORMAL
MDC_IDC_SET_LEADCHNL_RV_SENSING_CATHODE_ELECTRODE_1: NORMAL
MDC_IDC_SET_LEADCHNL_RV_SENSING_CATHODE_LOCATION_1: NORMAL
MDC_IDC_SET_LEADCHNL_RV_SENSING_POLARITY: NORMAL
MDC_IDC_SET_LEADCHNL_RV_SENSING_SENSITIVITY: 0.6 MV
MDC_IDC_SET_ZONE_DETECTION_INTERVAL: 360 MS
MDC_IDC_SET_ZONE_TYPE: NORMAL
MDC_IDC_STAT_BRADY_DTM_END: NORMAL
MDC_IDC_STAT_BRADY_DTM_START: NORMAL
MDC_IDC_STAT_BRADY_RV_PERCENT_PACED: 26.09 %
MDC_IDC_STAT_EPISODE_RECENT_COUNT: 0
MDC_IDC_STAT_EPISODE_RECENT_COUNT: 1
MDC_IDC_STAT_EPISODE_RECENT_COUNT_DTM_END: NORMAL
MDC_IDC_STAT_EPISODE_RECENT_COUNT_DTM_END: NORMAL
MDC_IDC_STAT_EPISODE_RECENT_COUNT_DTM_START: NORMAL
MDC_IDC_STAT_EPISODE_RECENT_COUNT_DTM_START: NORMAL
MDC_IDC_STAT_EPISODE_TOTAL_COUNT: 0
MDC_IDC_STAT_EPISODE_TOTAL_COUNT: 3
MDC_IDC_STAT_EPISODE_TOTAL_COUNT_DTM_END: NORMAL
MDC_IDC_STAT_EPISODE_TOTAL_COUNT_DTM_END: NORMAL
MDC_IDC_STAT_EPISODE_TOTAL_COUNT_DTM_START: NORMAL
MDC_IDC_STAT_EPISODE_TOTAL_COUNT_DTM_START: NORMAL
MDC_IDC_STAT_EPISODE_TYPE: NORMAL

## 2022-01-15 LAB
PATH REPORT.COMMENTS IMP SPEC: ABNORMAL
PATH REPORT.COMMENTS IMP SPEC: ABNORMAL
PATH REPORT.COMMENTS IMP SPEC: YES
PATH REPORT.FINAL DX SPEC: ABNORMAL
PATH REPORT.GROSS SPEC: ABNORMAL
PATH REPORT.MICROSCOPIC SPEC OTHER STN: ABNORMAL
PATH REPORT.RELEVANT HX SPEC: ABNORMAL

## 2022-01-17 ENCOUNTER — TELEPHONE (OUTPATIENT)
Dept: DERMATOLOGY | Facility: CLINIC | Age: 77
End: 2022-01-17
Payer: COMMERCIAL

## 2022-01-17 NOTE — LETTER
[address]  [address]  [phone]    1/17/2022       James Salvador  [address] [place] [address]      Dear James:    You are scheduled for Mohs Surgery on: 2/24/2022 @8:15am.    Please check in at [facility], [address].     You don't need to arrive more than 5-10 minutes prior to your appointment time.     Be sure to eat a good breakfast and bathe and wash your hair prior to surgery.     If you are taking any anti-coagulants that are prescribed by your Doctor (such as Coumadin/Warfarin, Plavix, Aspirin, Ibuprofen), please continue taking them.     However, if you are taking anti-coagulants over the counter without a Doctor's order for a medical condition, please discontinue them 10 days prior to surgery.     Please wear loose comfortable clothing as it could possibly be 4-6 hours until your surgery is completed depending upon how many layers of tissue need to be removed.      Thank you,    VICENTE Dutta MD

## 2022-01-27 ENCOUNTER — MEDICAL CORRESPONDENCE (OUTPATIENT)
Dept: HEALTH INFORMATION MANAGEMENT | Facility: CLINIC | Age: 77
End: 2022-01-27
Payer: COMMERCIAL

## 2022-01-27 ENCOUNTER — TRANSFERRED RECORDS (OUTPATIENT)
Dept: HEALTH INFORMATION MANAGEMENT | Facility: CLINIC | Age: 77
End: 2022-01-27
Payer: COMMERCIAL

## 2022-02-04 ENCOUNTER — LAB (OUTPATIENT)
Dept: LAB | Facility: CLINIC | Age: 77
End: 2022-02-04
Payer: COMMERCIAL

## 2022-02-04 DIAGNOSIS — E87.1 HYPONATREMIA: ICD-10-CM

## 2022-02-04 DIAGNOSIS — I25.10 CAD (CORONARY ARTERY DISEASE): ICD-10-CM

## 2022-02-04 DIAGNOSIS — T88.7XXA DRUG SIDE EFFECTS: Primary | ICD-10-CM

## 2022-02-04 PROCEDURE — 84450 TRANSFERASE (AST) (SGOT): CPT

## 2022-02-04 PROCEDURE — 36415 COLL VENOUS BLD VENIPUNCTURE: CPT

## 2022-02-04 PROCEDURE — 82247 BILIRUBIN TOTAL: CPT

## 2022-02-04 PROCEDURE — 80048 BASIC METABOLIC PNL TOTAL CA: CPT

## 2022-02-04 PROCEDURE — 84460 ALANINE AMINO (ALT) (SGPT): CPT

## 2022-02-04 PROCEDURE — 80061 LIPID PANEL: CPT

## 2022-02-05 LAB
ALT SERPL W P-5'-P-CCNC: 38 U/L (ref 0–70)
ANION GAP SERPL CALCULATED.3IONS-SCNC: 2 MMOL/L (ref 3–14)
AST SERPL W P-5'-P-CCNC: 26 U/L (ref 0–45)
BILIRUB SERPL-MCNC: 0.4 MG/DL (ref 0.2–1.3)
BUN SERPL-MCNC: 13 MG/DL (ref 7–30)
CALCIUM SERPL-MCNC: 8.9 MG/DL (ref 8.5–10.1)
CHLORIDE BLD-SCNC: 95 MMOL/L (ref 94–109)
CHOLEST SERPL-MCNC: 137 MG/DL
CO2 SERPL-SCNC: 31 MMOL/L (ref 20–32)
CREAT SERPL-MCNC: 0.64 MG/DL (ref 0.66–1.25)
FASTING STATUS PATIENT QL REPORTED: YES
GFR SERPL CREATININE-BSD FRML MDRD: >90 ML/MIN/1.73M2
GLUCOSE BLD-MCNC: 97 MG/DL (ref 70–99)
HDLC SERPL-MCNC: 46 MG/DL
LDLC SERPL CALC-MCNC: 64 MG/DL
NONHDLC SERPL-MCNC: 91 MG/DL
POTASSIUM BLD-SCNC: 4.8 MMOL/L (ref 3.4–5.3)
SODIUM SERPL-SCNC: 128 MMOL/L (ref 133–144)
TRIGL SERPL-MCNC: 135 MG/DL

## 2022-02-07 ENCOUNTER — THERAPY VISIT (OUTPATIENT)
Dept: PHYSICAL THERAPY | Facility: CLINIC | Age: 77
End: 2022-02-07
Payer: COMMERCIAL

## 2022-02-07 DIAGNOSIS — R26.9 IMPAIRED GAIT: ICD-10-CM

## 2022-02-07 DIAGNOSIS — Z47.89 AFTERCARE FOLLOWING SURGERY OF THE MUSCULOSKELETAL SYSTEM: ICD-10-CM

## 2022-02-07 PROCEDURE — 97530 THERAPEUTIC ACTIVITIES: CPT | Mod: GP | Performed by: PHYSICAL THERAPIST

## 2022-02-07 PROCEDURE — 97110 THERAPEUTIC EXERCISES: CPT | Mod: GP | Performed by: PHYSICAL THERAPIST

## 2022-02-07 NOTE — PROGRESS NOTES
Subjective:  HPI  Physical Exam                    Objective:  System    Physical Exam    General     ROS    Assessment/Plan:    PROGRESS  REPORT    Progress reporting period is from 12/29/2021 to 2/7/2022.       SUBJECTIVE  Subjective changes noted by patient: Had a follow up with Dr. Osbonr, at this time the plan is to avoid another surgery, more physical therapy ordered. States L knee isn't bad but could be better. At times the knee will hurt, specifically when transferring to stand after prolonged sitting. Wears his brace when up and walking. Has been working from last years PT exercises and walking. Walks twice daily, with his walker, 10-15 minutes each. Navigating stairs step too.      Current pain level is 0/10.     Previous pain level was 3/10.     Changes in function:  Yes (See Goal flowsheet attached for changes in current functional level)  Adverse reaction to treatment or activity: None    OBJECTIVE  Changes noted in objective findings:  Yes,   Objective:   Gait: SEC, brace on L knee, decrease L knee flexion through swing, circumducts L LE, L LE externally rotated;   Strength Knee Flx: -5/5, Ext: -5/5 painful, Hip Flx L: +4/5, R: 5/5, Hip Abd L: 4/5, R: +4/5   Difficulty transferring from sit to stand, requires assist from arms and a raise surface  Lateral translation of patella with open chain terminal knee extension and open chain knee flexion    ASSESSMENT/PLAN  Updated problem list and treatment plan: Diagnosis 1:  L knee pain (failed MPLF reconstruction)  Pain -  hot/cold therapy, manual therapy, education, directional preference exercise and home program  Decreased strength - therapeutic exercise and therapeutic activities  Decreased proprioception - neuro re-education and therapeutic activities  Impaired gait - gait training  Impaired muscle performance - neuro re-education  Decreased function - therapeutic activities  STG/LTGs have been met or progress has been made towards goals:  Yes (See Goal  flow sheet completed today.)  Assessment of Progress: The patient's condition has potential to improve.  The patient has had set backs in their progress.  Self Management Plans:  Patient has been instructed in a home treatment program.  I have re-evaluated this patient and find that the nature, scope, duration and intensity of the therapy is appropriate for the medical condition of the patient.  James continues to require the following intervention to meet STG and LTG's:  PT    Recommendations:  This patient would benefit from continued therapy.     Frequency:  1 X week, once daily  Duration:  for 6 weeks tapering to 2 X a month over 6 weeks        Please refer to the daily flowsheet for treatment today, total treatment time and time spent performing 1:1 timed codes.

## 2022-02-10 DIAGNOSIS — L21.9 DERMATITIS, SEBORRHEIC: ICD-10-CM

## 2022-02-11 DIAGNOSIS — L21.9 SEBORRHEIC DERMATITIS: ICD-10-CM

## 2022-02-11 RX ORDER — FLUOCINONIDE TOPICAL SOLUTION USP, 0.05% 0.5 MG/ML
SOLUTION TOPICAL
Qty: 60 ML | Refills: 4 | Status: SHIPPED | OUTPATIENT
Start: 2022-02-11 | End: 2022-06-17

## 2022-02-11 RX ORDER — KETOCONAZOLE 20 MG/ML
SHAMPOO TOPICAL
Qty: 120 ML | Refills: 11 | Status: SHIPPED | OUTPATIENT
Start: 2022-02-11 | End: 2023-08-15

## 2022-02-11 NOTE — TELEPHONE ENCOUNTER
Routing refill request to provider for review/approval because:   High potency steroid not ordered    Anna Whitlock RN

## 2022-02-14 ENCOUNTER — THERAPY VISIT (OUTPATIENT)
Dept: PHYSICAL THERAPY | Facility: CLINIC | Age: 77
End: 2022-02-14
Payer: COMMERCIAL

## 2022-02-14 ENCOUNTER — MYC MEDICAL ADVICE (OUTPATIENT)
Dept: INTERNAL MEDICINE | Facility: CLINIC | Age: 77
End: 2022-02-14

## 2022-02-14 ENCOUNTER — OFFICE VISIT (OUTPATIENT)
Dept: INTERNAL MEDICINE | Facility: CLINIC | Age: 77
End: 2022-02-14
Payer: COMMERCIAL

## 2022-02-14 VITALS
DIASTOLIC BLOOD PRESSURE: 80 MMHG | TEMPERATURE: 97.3 F | HEIGHT: 71 IN | WEIGHT: 315 LBS | BODY MASS INDEX: 44.1 KG/M2 | HEART RATE: 91 BPM | RESPIRATION RATE: 16 BRPM | OXYGEN SATURATION: 98 % | SYSTOLIC BLOOD PRESSURE: 136 MMHG

## 2022-02-14 DIAGNOSIS — R26.9 IMPAIRED GAIT: ICD-10-CM

## 2022-02-14 DIAGNOSIS — I10 BENIGN HYPERTENSION: ICD-10-CM

## 2022-02-14 DIAGNOSIS — R39.15 URINARY URGENCY: ICD-10-CM

## 2022-02-14 DIAGNOSIS — I48.11 LONGSTANDING PERSISTENT ATRIAL FIBRILLATION (H): Primary | ICD-10-CM

## 2022-02-14 DIAGNOSIS — Z47.89 AFTERCARE FOLLOWING SURGERY OF THE MUSCULOSKELETAL SYSTEM: ICD-10-CM

## 2022-02-14 DIAGNOSIS — E66.01 MORBID OBESITY DUE TO EXCESS CALORIES (H): ICD-10-CM

## 2022-02-14 DIAGNOSIS — R25.9 MIXED ACTION AND RESTING TREMOR: ICD-10-CM

## 2022-02-14 DIAGNOSIS — I25.10 CORONARY ARTERY DISEASE INVOLVING NATIVE CORONARY ARTERY OF NATIVE HEART WITHOUT ANGINA PECTORIS: ICD-10-CM

## 2022-02-14 PROCEDURE — 99215 OFFICE O/P EST HI 40 MIN: CPT | Performed by: INTERNAL MEDICINE

## 2022-02-14 PROCEDURE — 97112 NEUROMUSCULAR REEDUCATION: CPT | Mod: GP | Performed by: PHYSICAL THERAPIST

## 2022-02-14 PROCEDURE — 97110 THERAPEUTIC EXERCISES: CPT | Mod: GP | Performed by: PHYSICAL THERAPIST

## 2022-02-14 RX ORDER — CARVEDILOL 3.12 MG/1
6.25 TABLET ORAL 2 TIMES DAILY WITH MEALS
Start: 2022-02-14 | End: 2022-04-07

## 2022-02-14 ASSESSMENT — MIFFLIN-ST. JEOR: SCORE: 2203.64

## 2022-02-14 NOTE — PROGRESS NOTES
"  Assessment & Plan     Longstanding persistent atrial fibrillation (H)  Benign hypertension  Coronary artery disease involving native coronary artery of native heart without angina pectoris  Paced w/underlying afib- cont anticoag.   Rate controlled  Edema resolved on furosemide but with significant hyponatremia developing.  Discontinue furosemide. For BP- increase carvedilolol. Monitor weights     Mixed action and resting tremor  Most consistent with essential tremor- more noticeable w/action. But will monitor. No other obvious bradykinesia noted    Urinary urgency  Likely will improve some off of the diuretic- monitor.     Morbid obesity due to excess calories (H)  Cont focus on weight loss        I spent a total of 50 minutes on the day of the visit.   Time spent doing chart review, history and exam, documentation and further activities per the note       BMI:   Estimated body mass index is 44.63 kg/m  as calculated from the following:    Height as of this encounter: 1.803 m (5' 11\").    Weight as of this encounter: 145.2 kg (320 lb).       Work on weight loss  See Patient Instructions    No follow-ups on file.    Jeronimo Painter MD  [facility]    Jose Ruano is a 76 year old who presents for the following health issues      Chief Complaint   Patient presents with     Discuss     medication Furoemide and dosage      Tremors     getting worse     Memory Loss     short term memory loss     Urinary Problem     Frequent Urination          Review of Systems         Objective    /80   Pulse 91   Temp 97.3  F (36.3  C) (Temporal)   Resp 16   Ht 1.803 m (5' 11\")   Wt 145.2 kg (320 lb)   SpO2 98%   BMI 44.63 kg/m    Body mass index is 44.63 kg/m .  Physical Exam   GENERAL: alert, no distress and over weight  CV: regular rates and rhythm and no peripheral edema  SKIN: no suspicious lesions or rashes  Neuro: tremor- seems more action but there is minor resting " component RUE            Six Item Cognitive Impairment Test   (6CIT):      What year is it?                               Correct - 0 points    What month is it?                               Correct - 0 points      Give the patient an address to remember with five components:   [place] ( first and last name - 2 components)   [address]  (number and name of street - 2 components)   [place] ( city - 1 component)      About what time is it (within the hour)? Correct - 0 points    Count backwards from 20 to 1:   Correct - 0 points    Say the months of the year in reverse: Correct - 0 points    Repeat the address phrase:   Correct - 0 points    Total 6CIT Score:      0/28    Interpretation: The 6CIT uses an inverse score and questions are weighted to produce a total out of 28. Scores of 0-7 are considered normal and 8 or more significant.    Advantages The test has high sensitivity without compromising specificity even in mild dementia. It is easy to translate linguistically and culturally.  Disadvantages The main disadvantage is in the scoring and weighting of the test, which is initially confusing, however computer models have simplified this greatly.    Probability Statistics: At the 7/8 cut off: Overall figures sensitivity 90% specificity 100%, in mild dementia sensitivity = 78% , specificity = 100%    Copyright 2000 [facility], [facility], [place]. Courtesy of Dr. Mynor Watt

## 2022-02-14 NOTE — PATIENT INSTRUCTIONS
Stop the furosemide  Monitor BP goal < 130/80  Increase your carvedilol to 2 tablets twice daily    When checking your blood pressure at home make sure you do the followin. No exercise, caffeine or nicotine within the past 30 minutes  2. Sit down and relax for 5 to 10 minutes before checking  3.   Check your blood pressure 3 times  by 1 minute intervals.  Average the 3 readings to get your final value.  4.   If you do not like math instead of averaging the 3 values you can cross out the high and low readings and use the middle value.      If you gain more than 2 lbs in one day or 5 lbs in 1 week contact our office.

## 2022-02-21 ENCOUNTER — THERAPY VISIT (OUTPATIENT)
Dept: PHYSICAL THERAPY | Facility: CLINIC | Age: 77
End: 2022-02-21
Payer: COMMERCIAL

## 2022-02-21 DIAGNOSIS — Z47.89 AFTERCARE FOLLOWING SURGERY OF THE MUSCULOSKELETAL SYSTEM: Primary | ICD-10-CM

## 2022-02-21 DIAGNOSIS — R26.9 IMPAIRED GAIT: ICD-10-CM

## 2022-02-21 PROCEDURE — 97110 THERAPEUTIC EXERCISES: CPT | Mod: GP | Performed by: PHYSICAL THERAPIST

## 2022-02-22 NOTE — PROGRESS NOTES
Surgical Office Location:  [facility]  [address] [place] [id number]

## 2022-02-24 ENCOUNTER — OFFICE VISIT (OUTPATIENT)
Dept: DERMATOLOGY | Facility: CLINIC | Age: 77
End: 2022-02-24
Payer: COMMERCIAL

## 2022-02-24 VITALS — DIASTOLIC BLOOD PRESSURE: 75 MMHG | SYSTOLIC BLOOD PRESSURE: 131 MMHG | HEART RATE: 55 BPM | OXYGEN SATURATION: 94 %

## 2022-02-24 DIAGNOSIS — L57.0 AK (ACTINIC KERATOSIS): Primary | ICD-10-CM

## 2022-02-24 DIAGNOSIS — D04.4 SQUAMOUS CELL CARCINOMA IN SITU (SCCIS) OF SKIN OF NECK: ICD-10-CM

## 2022-02-24 PROCEDURE — 99207 PR NO CHARGE LOS: CPT | Performed by: DERMATOLOGY

## 2022-02-24 PROCEDURE — 12001 RPR S/N/AX/GEN/TRNK 2.5CM/<: CPT | Mod: 59 | Performed by: DERMATOLOGY

## 2022-02-24 PROCEDURE — 17000 DESTRUCT PREMALG LESION: CPT | Performed by: DERMATOLOGY

## 2022-02-24 PROCEDURE — 17311 MOHS 1 STAGE H/N/HF/G: CPT | Performed by: DERMATOLOGY

## 2022-02-24 NOTE — PATIENT INSTRUCTIONS
Open Wound Care     for _right neck_____________        ? No strenuous activity for 48 hours    ? Take Tylenol as needed for discomfort.                                                .         ? Do not drink alcoholic beverages for 48 hours.    ? Keep the pressure bandage in place for 24 hours. If the bandage becomes blood tinged or loose, reinforce it with gauze and tape.        (Refer to the reverse side of this page for management of bleeding).    ? Remove bandage in 24 hours and begin wound care as follows:     1. Clean area with tap water using a Q tip or gauze pad, (shower / bathe normally)  2. Dry wound with Q tip or gauze pad  3. Apply Aquaphor, Vaseline, Polysporin or Bacitracin Ointment with a Q tip    Do NOT use Neosporin Ointment *  4. Cover the wound with a band-aid or nonstick gauze pad and paper tape.  5. Repeat wound care once a day until wound is completely healed.    It is an old wives tale that a wound heals better when it is exposed to air and allowed to dry out. The wound will heal faster with a better cosmetic result if it is kept moist with ointment and covered with a bandage.  Do not let the wound dry out.      Supplies Needed:                Qtips or gauze pads                Polysporin or Bacitracin Ointment                Bandaids or nonstick gauze pads and paper tape    Wound care kits and brown paper tape are available for purchase at   the pharmacy.    BLEEDIN. Use tightly rolled up gauze or cloth to apply direct pressure over the bandage for 20   minutes.  2. Reapply pressure for an additional 20 minutes if necessary  3. Call the office or go to the nearest emergency room if pressure fails to stop the bleeding.  4. Use additional gauze and tape to maintain pressure once the bleeding has stopped.  5. Begin wound care 24 hours after surgery as directed.                  WOUND HEALING    1. One week after surgery a pink / red halo will form around the outside of the wound.   This  is new skin.  2. The center of the wound will appear yellowish white and produce some drainage.  3. The pink halo will slowly migrate in toward the center of the wound until the wound is covered with new shiny pink skin.  4. There will be no more drainage when the wound is completely healed.  5. It will take six months to one year for the redness to fade.  6. The scar may be itchy, tight and sensitive to extreme temperatures for a year after the surgery.  7. Massaging the area several times a day for several minutes after the wound is completely healed will help the scar soften and normalize faster. Begin massage only after healing is complete.      In case of emergency call: Dr Dutta: [phone]    [facility]:[phone]    WOUND CARE INSTRUCTIONS   FOR CRYOSURGERY   This area treated with liquid nitrogen should form a blister (areas treated may or may not blister-skin may just turn dark and slough off). You do not need to bandage the area unless a blister forms and breaks (which may be a few days). When the blister breaks, begin daily dressing changes as follows:  1) Clean and dry the area with tap water using clean Q-tip or sterile gauze pad.   2) Apply Polysporin ointment or Bacitracin ointment over entire wound. Do NOT use Neosporin ointment.   3) Cover the wound with a band-aid or sterile non-stick gauze pad and micropore paper tape.   REPEAT THESE INSTRUCTIONS AT LEAST ONCE A DAY UNTIL THE WOUND HAS COMPLETELY HEALED.   It is an old wives tale that a wound heals better when it is exposed to air and allowed to dry out. The wound will heal faster with a better cosmetic result if it is kept moist with ointment and covered with a bandage.   Do not let the wound dry out.   IMPORTANT INFORMATION ON REVERSE SIDE   Supplies Needed:   *Cotton tipped applicators (Q-tips)   *Polysporin ointment or Bacitracin ointment (NOT NEOSPORIN)   *Band-aids, or non stick gauze pads and micropore paper tape   PATIENT  INFORMATION   During the healing process you will notice a number of changes. All wounds develop a small halo of redness surrounding the wound. This means healing is occurring. Severe itching with extensive redness usually indicates sensitivity to the ointment or bandage tape used to dress the wound. You should call our office if this develops.   Swelling and/or discoloration around your surgical site is common, particularly when performed around the eye.   All wounds normally drain. The larger the wound the more drainage there will be. After 7-10 days, you will notice the wound beginning to shrink and new skin will begin to grow. The wound is healed when you can see skin has formed over the entire area. A healed wound has a healthy, shiny look to the surface and is red to dark pink in color to normalize. Wounds may take approximately 4-6 weeks to heal. Larger wounds may take 6-8 weeks. After the wound is healed you may discontinue dressing changes.   You may experience a sensation of tightness as your wound heals. This is normal and will gradually subside.   Your healed wound may be sensitive to temperature changes. This sensitivity improves with time, but if you re having a lot of discomfort, try to avoid temperature extremes.   Patients frequently experience itching after their wound appears to have healed because of the continue healing under the skin. Plain Vaseline will help relieve the itching.

## 2022-02-24 NOTE — LETTER
2/24/2022         RE: James Salvador  [address] [place] [address]        Dear Colleague,    Thank you for referring your patient, James Salvador, to the [facility]. Please see a copy of my visit note below.    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  Patient has no other skin complaints today.  Remainder of the HPI, Meds, PMH, Allergies, FH, and SH was reviewed in chart.      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 Cardiac Cath 9/2015: BRIDGET to LAD     History of myocardial infarction 1994    PTCA     Hyperlipidemia LDL goal < 70      Malignant melanoma (H)      Melanoma (H) 10/15/2019    10/15/19 left zygoma     Mixed hyperlipidemia 6/21/2017     Morbid obesity due to excess calories (H) 1/11/2016     Paroxysmal supraventricular tachycardia (H)     on metoprolol     Permanent atrial fibrillation (H) 04/21/2017     Squamous cell carcinoma      Stable angina (H) 1/11/2016     Tachy-edinson syndrome (H) 5/29/2019    Added automatically from request for surgery [id number]       Past Surgical History:   Procedure Laterality Date     ANGIOPLASTY  1994    in [place]     ANKLE SURGERY  7/13/2005    right ankle     EP PERM PACER SINGLE LEAD N/A 5/31/2019    Medtronic single lead pacemaker     HEART CATH STENT COR W/WO PTCA  9/23/2015    BRIDGET stent mid LAD     JOINT REPLACEMENT, HIP RT/LT  10/14/2009    right hip      LASER SURGERY OF EYE  06/01/2002     MOHS MICROGRAPHIC PROCEDURE  06/12/2004    squamous cell carcinoma right temple      SINUS SURGERY  2006        Family History   Problem Relation Age of Onset     Genitourinary Problems Mother         renal failure     Cardiovascular Father         rupture of dorsal aorta     Depression Son      Skin Cancer No family hx of        Social History     Socioeconomic History     Marital status:      Spouse name: Not on file     Number of children: 3     Years of education: Not on file     Highest education level: Not on file   Occupational History     Employer: RETIRED   Tobacco Use     Smoking status: Former Smoker     Packs/day: 0.00     Years: 10.00     Pack years: 0.00     Types: Cigarettes     Quit date: 1987     Years since quittin.1     Smokeless tobacco: Never Used   Substance and Sexual Activity     Alcohol use: Yes     Alcohol/week: 0.0 standard drinks     Drug use: No     Sexual activity: Yes     Partners: Female   Other Topics Concern     Parent/sibling w/ CABG, MI or angioplasty before 65F 55M? Not Asked      Service Not Asked     Blood Transfusions Not Asked     Caffeine Concern Yes     Comment: 2 big cups coffee daily     Occupational Exposure Not Asked     Hobby Hazards Not Asked     Sleep Concern No     Comment: sleeping better since shoulder replaced 11/3/16     Stress Concern No     Weight Concern Yes     Special Diet Yes     Comment: trying to do more lean meats     Back Care Not Asked     Exercise No     Comment: limited - knee     Bike Helmet Not Asked     Seat Belt Not Asked     Self-Exams Not Asked   Social History Narrative     Not on file     Social Determinants of Health     Financial Resource Strain: Not on file   Food Insecurity: Not on file   Transportation Needs: Not on file   Physical Activity: Not on file   Stress: Not on file   Social Connections: Not on file   Intimate Partner Violence: Not on file   Housing Stability: Not on file       Outpatient Encounter Medications as of 2022   Medication Sig Dispense Refill     furosemide (LASIX)  20 MG tablet Take 1 tablet (20 mg) by mouth daily 90 tablet 3     Acetaminophen (TYLENOL PO) Take 650 mg by mouth 4 times daily        amoxicillin (AMOXIL) 500 MG capsule 2,000 mg as needed When going to the dentist  0     ASPIRIN PO Take 81 mg by mouth daily       atorvastatin (LIPITOR) 80 MG tablet TAKE 1 TABLET BY MOUTH EVERYDAY AT BEDTIME 90 tablet 3     carvedilol (COREG) 3.125 MG tablet Take 2 tablets (6.25 mg) by mouth 2 times daily (with meals)       ciclopirox (LOPROX) 0.77 % cream Apply topically At Bedtime 90 g 3     desonide (DESOWEN) 0.05 % external cream Apply sparingly to affected area on face every day-BID x 1-2 weeks then PRN only 60 g 3     doxycycline hyclate (PERIOSTAT) 20 MG tablet TAKE 1 TABLET BY MOUTH TWICE A DAY       ELIQUIS ANTICOAGULANT 5 MG tablet TAKE 1 TABLET BY MOUTH  TWICE DAILY 180 tablet 1     fluocinonide (LIDEX) 0.05 % external solution Apply to AA BID x 1-2 weeks then PRN only 60 mL 4     fluorouracil (EFUDEX) 5 % external cream Apply to scap BID x 3-4 weeks. Protect area from the sun. 40 g 3     fluticasone (FLONASE) 50 MCG/ACT nasal spray INSTILL 2 SPRAYS INTO BOTH NOSTRILS DAILY 48 mL 3     GABAPENTIN PO Take 600 mg by mouth 3 times daily        ketoconazole (NIZORAL) 2 % cream Apply topically 2 times daily For face. FAX REFILL REQUESTS TO [facility]: [phone] 30 g 2     ketoconazole (NIZORAL) 2 % external shampoo Use daily as needed 120 mL 11     ketotifen (ZADITOR/REFRESH ANTI-ITCH) 0.025 % SOLN Place 1-2 drops into both eyes 2 times daily as needed for itching       lisinopril (ZESTRIL) 30 MG tablet TAKE 1 TABLET BY MOUTH EVERY DAY 90 tablet 3     loratadine (CLARITIN) 10 MG tablet Take 10 mg by mouth daily as needed for allergies       Multiple Vitamin (MULTIVITAMIN OR) Take 1 tablet by mouth daily        nitroglycerin (NITROSTAT) 0.4 MG SL tablet Place 1 tablet (0.4 mg) under the tongue every 5 minutes as needed for chest pain May repeat twice for a total of 3  tablets.  If chest pain not relieved, call 911 25 tablet 11     Omega-3 Fatty Acids (OMEGA-3 FISH OIL PO) Take 3.2 g by mouth daily        OXcarbazepine (TRILEPTAL PO) Take 300 mg by mouth 3 times daily        No facility-administered encounter medications on file as of 2/24/2022.             O:   NAD, WDWN, Alert & Oriented, Mood & Affect wnl, Vitals stable   Here today alone   /75   Pulse 55   SpO2 94%    General appearance normal   Vitals stable   Alert, oriented and in no acute distress      Following lymph nodes palpated: Occipital, Cervical, Supraclavicular no lad  R neck superior gritty scaly papule   R neck inferior 1.2cm red scaly plaque       Eyes: Conjunctivae/lids:Normal     ENT: Lips, buccal mucosa, tongue: normal    MSK:Normal    Cardiovascular: peripheral edema none    Pulm: Breathing Normal    Lymph Nodes: No Head and Neck Lymphadenopathy     Neuro/Psych: Orientation:Alert and Orientedx3 ; Mood/Affect:normal       A/P:  1. R neck superior actinic keratosis   LN2:  Treated with LN2 for 5s for 1-2 cycles. Warned risks of blistering, pain, pigment change, scarring, and incomplete resolution.  Advised patient to return if lesions do not completely resolve.  Wound care sheet given.  2. R neck inf squamous cell carcinoma in situ   MOHS:   Size    The rationale for Mohs surgery was discussed with the patient and consent was obtained.  The risks and benefits as well as alternatives to therapy were discussed, in detail.  Specifically, the risks of infection, scarring, bleeding, prolonged wound healing, incomplete removal, allergy to anesthesia, nerve injury and recurrence were addressed.  Indication for Mohs was Size. Prior to the procedure, the treatment site was clearly identified and, if available, confirmed with previous photos and confirmed by the patient   All components of the Universal Protocol/PAUSE rule were completed.  The Mohs surgeon operated in two distinct and integrated capacities as  the surgeon and pathologist.      The area was prepped with Betasept.  A rim of normal appearing skin was marked circumferentially around the lesion.  The area was infiltrated with local anesthesia.  The tumor was first debulked to remove all clinically apparent tumor.  An incision following the standard Mohs approach was done and the specimen was oriented,mapped and placed in 1 block(s).  Each specimen was then chromacoded and processed in the Mohs laboratory using standard Mohs technique and submitted for frozen section histology.  Frozen section analysis showed no residual tumor but CLEAR MARGINS.      The tumor was excised using standard Mohs technique in 1 stages(s).  CLEAR MARGINS OBTAINED and Final defect size was 1.8 x 1.6 cm.     We discussed the options for wound management in full with the patient including risks/benefits/ possible outcomes.        REPAIR SECOND INTENT: We discussed the options for wound management in full with the patient including risks/benefits/possible outcomes. Decision made to allow the wound to heal by second intention. Cautery was used for for hemostasis. EBL minimal; complications none; wound care routine.  The patient was discharged in good condition and will return in one month or prn for wound evaluation.  It was a pleasure speaking to James Salvador today.  Previous clinic notes and pertinent laboratory tests were reviewed prior to James Salvador's visit.  Nature and genetics of benign skin lesions dicussed with patient.  Signs and Symptoms of skin cancer discussed with patient.  Patient encouraged to perform monthly skin exams.  UV precautions reviewed with patient.  Risks of non-melanoma skin cancer discussed with patient   Return to clinic 6 months        Again, thank you for allowing me to participate in the care of your patient.        Sincerely,        Jv Dutta MD

## 2022-02-24 NOTE — PROGRESS NOTES
James Salvador is an extremely pleasant 76 year old year old male patient here today for evaluation and managment of squamous cell carcinoma in situ and actinic keratosis.  Patient has no other skin complaints today.  Remainder of the HPI, Meds, PMH, Allergies, FH, and SH was reviewed in chart.      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 Cardiac Cath 9/2015: BRIDGET to LAD     History of myocardial infarction 1994    PTCA     Hyperlipidemia LDL goal < 70      Malignant melanoma (H)      Melanoma (H) 10/15/2019    10/15/19 left zygoma     Mixed hyperlipidemia 6/21/2017     Morbid obesity due to excess calories (H) 1/11/2016     Paroxysmal supraventricular tachycardia (H)     on metoprolol     Permanent atrial fibrillation (H) 04/21/2017     Squamous cell carcinoma      Stable angina (H) 1/11/2016     Tachy-edinson syndrome (H) 5/29/2019    Added automatically from request for surgery [id number]       Past Surgical History:   Procedure Laterality Date     ANGIOPLASTY  1994    in [place]     ANKLE SURGERY  7/13/2005    right ankle     EP PERM PACER SINGLE LEAD N/A 5/31/2019    [employer] single lead pacemaker     HEART CATH STENT COR W/WO PTCA  9/23/2015    BRIDGET stent mid LAD     JOINT REPLACEMENT, HIP RT/LT  10/14/2009    right hip      LASER SURGERY OF EYE  06/01/2002     MOHS MICROGRAPHIC PROCEDURE  06/12/2004    squamous cell carcinoma right temple     SINUS SURGERY  7/11/2006        Family History   Problem Relation Age of Onset     Genitourinary Problems Mother         renal failure     Cardiovascular Father         rupture of dorsal aorta     Depression Son      Skin Cancer No family hx of        Social History     Socioeconomic History     Marital status:      Spouse name: Not on file     Number of  children: 3     Years of education: Not on file     Highest education level: Not on file   Occupational History     Employer: RETIRED   Tobacco Use     Smoking status: Former Smoker     Packs/day: 0.00     Years: 10.00     Pack years: 0.00     Types: Cigarettes     Quit date: 1987     Years since quittin.1     Smokeless tobacco: Never Used   Substance and Sexual Activity     Alcohol use: Yes     Alcohol/week: 0.0 standard drinks     Drug use: No     Sexual activity: Yes     Partners: Female   Other Topics Concern     Parent/sibling w/ CABG, MI or angioplasty before 65F 55M? Not Asked      Service Not Asked     Blood Transfusions Not Asked     Caffeine Concern Yes     Comment: 2 big cups coffee daily     Occupational Exposure Not Asked     Hobby Hazards Not Asked     Sleep Concern No     Comment: sleeping better since shoulder replaced 11/3/16     Stress Concern No     Weight Concern Yes     Special Diet Yes     Comment: trying to do more lean meats     Back Care Not Asked     Exercise No     Comment: limited - knee     Bike Helmet Not Asked     Seat Belt Not Asked     Self-Exams Not Asked   Social History Narrative     Not on file     Social Determinants of Health     Financial Resource Strain: Not on file   Food Insecurity: Not on file   Transportation Needs: Not on file   Physical Activity: Not on file   Stress: Not on file   Social Connections: Not on file   Intimate Partner Violence: Not on file   Housing Stability: Not on file       Outpatient Encounter Medications as of 2022   Medication Sig Dispense Refill     furosemide (LASIX) 20 MG tablet Take 1 tablet (20 mg) by mouth daily 90 tablet 3     Acetaminophen (TYLENOL PO) Take 650 mg by mouth 4 times daily        amoxicillin (AMOXIL) 500 MG capsule 2,000 mg as needed When going to the dentist  0     ASPIRIN PO Take 81 mg by mouth daily       atorvastatin (LIPITOR) 80 MG tablet TAKE 1 TABLET BY MOUTH EVERYDAY AT BEDTIME 90 tablet 3      carvedilol (COREG) 3.125 MG tablet Take 2 tablets (6.25 mg) by mouth 2 times daily (with meals)       ciclopirox (LOPROX) 0.77 % cream Apply topically At Bedtime 90 g 3     desonide (DESOWEN) 0.05 % external cream Apply sparingly to affected area on face every day-BID x 1-2 weeks then PRN only 60 g 3     doxycycline hyclate (PERIOSTAT) 20 MG tablet TAKE 1 TABLET BY MOUTH TWICE A DAY       ELIQUIS ANTICOAGULANT 5 MG tablet TAKE 1 TABLET BY MOUTH  TWICE DAILY 180 tablet 1     fluocinonide (LIDEX) 0.05 % external solution Apply to AA BID x 1-2 weeks then PRN only 60 mL 4     fluorouracil (EFUDEX) 5 % external cream Apply to scap BID x 3-4 weeks. Protect area from the sun. 40 g 3     fluticasone (FLONASE) 50 MCG/ACT nasal spray INSTILL 2 SPRAYS INTO BOTH NOSTRILS DAILY 48 mL 3     GABAPENTIN PO Take 600 mg by mouth 3 times daily        ketoconazole (NIZORAL) 2 % cream Apply topically 2 times daily For face. FAX REFILL REQUESTS TO [facility]: [phone] 30 g 2     ketoconazole (NIZORAL) 2 % external shampoo Use daily as needed 120 mL 11     ketotifen (ZADITOR/REFRESH ANTI-ITCH) 0.025 % SOLN Place 1-2 drops into both eyes 2 times daily as needed for itching       lisinopril (ZESTRIL) 30 MG tablet TAKE 1 TABLET BY MOUTH EVERY DAY 90 tablet 3     loratadine (CLARITIN) 10 MG tablet Take 10 mg by mouth daily as needed for allergies       Multiple Vitamin (MULTIVITAMIN OR) Take 1 tablet by mouth daily        nitroglycerin (NITROSTAT) 0.4 MG SL tablet Place 1 tablet (0.4 mg) under the tongue every 5 minutes as needed for chest pain May repeat twice for a total of 3 tablets.  If chest pain not relieved, call 911 25 tablet 11     Omega-3 Fatty Acids (OMEGA-3 FISH OIL PO) Take 3.2 g by mouth daily        OXcarbazepine (TRILEPTAL PO) Take 300 mg by mouth 3 times daily        No facility-administered encounter medications on file as of 2/24/2022.             O:   NAD, WDWN, Alert & Oriented, Mood & Affect wnl, Vitals stable   Here  today alone   /75   Pulse 55   SpO2 94%    General appearance normal   Vitals stable   Alert, oriented and in no acute distress      Following lymph nodes palpated: Occipital, Cervical, Supraclavicular no lad  R neck superior gritty scaly papule   R neck inferior 1.2cm red scaly plaque       Eyes: Conjunctivae/lids:Normal     ENT: Lips, buccal mucosa, tongue: normal    MSK:Normal    Cardiovascular: peripheral edema none    Pulm: Breathing Normal    Lymph Nodes: No Head and Neck Lymphadenopathy     Neuro/Psych: Orientation:Alert and Orientedx3 ; Mood/Affect:normal       A/P:  1. R neck superior actinic keratosis   LN2:  Treated with LN2 for 5s for 1-2 cycles. Warned risks of blistering, pain, pigment change, scarring, and incomplete resolution.  Advised patient to return if lesions do not completely resolve.  Wound care sheet given.  2. R neck inf squamous cell carcinoma in situ   MOHS:   Size    The rationale for Mohs surgery was discussed with the patient and consent was obtained.  The risks and benefits as well as alternatives to therapy were discussed, in detail.  Specifically, the risks of infection, scarring, bleeding, prolonged wound healing, incomplete removal, allergy to anesthesia, nerve injury and recurrence were addressed.  Indication for Mohs was Size. Prior to the procedure, the treatment site was clearly identified and, if available, confirmed with previous photos and confirmed by the patient   All components of the Universal Protocol/PAUSE rule were completed.  The Mohs surgeon operated in two distinct and integrated capacities as the surgeon and pathologist.      The area was prepped with Betasept.  A rim of normal appearing skin was marked circumferentially around the lesion.  The area was infiltrated with local anesthesia.  The tumor was first debulked to remove all clinically apparent tumor.  An incision following the standard Mohs approach was done and the specimen was oriented,mapped and  placed in 1 block(s).  Each specimen was then chromacoded and processed in the Mohs laboratory using standard Mohs technique and submitted for frozen section histology.  Frozen section analysis showed no residual tumor but CLEAR MARGINS.      The tumor was excised using standard Mohs technique in 1 stages(s).  CLEAR MARGINS OBTAINED and Final defect size was 1.8 x 1.6 cm.     We discussed the options for wound management in full with the patient including risks/benefits/ possible outcomes.        REPAIR SECOND INTENT: We discussed the options for wound management in full with the patient including risks/benefits/possible outcomes. Decision made to allow the wound to heal by second intention. Cautery was used for for hemostasis. EBL minimal; complications none; wound care routine.  The patient was discharged in good condition and will return in one month or prn for wound evaluation.  It was a pleasure speaking to James Salvador today.  Previous clinic notes and pertinent laboratory tests were reviewed prior to James Salvador's visit.  Nature and genetics of benign skin lesions dicussed with patient.  Signs and Symptoms of skin cancer discussed with patient.  Patient encouraged to perform monthly skin exams.  UV precautions reviewed with patient.  Risks of non-melanoma skin cancer discussed with patient   Return to clinic 6 months

## 2022-02-24 NOTE — NURSING NOTE
"Initial /75   Pulse 55   SpO2 94%  Estimated body mass index is 44.63 kg/m  as calculated from the following:    Height as of 2/14/22: 1.803 m (5' 11\").    Weight as of 2/14/22: 145.2 kg (320 lb).     MIGEL Carmen-BSN-N  [facility]  [phone]  "

## 2022-02-28 ENCOUNTER — THERAPY VISIT (OUTPATIENT)
Dept: PHYSICAL THERAPY | Facility: CLINIC | Age: 77
End: 2022-02-28
Payer: COMMERCIAL

## 2022-02-28 DIAGNOSIS — R26.9 IMPAIRED GAIT: ICD-10-CM

## 2022-02-28 DIAGNOSIS — Z47.89 AFTERCARE FOLLOWING SURGERY OF THE MUSCULOSKELETAL SYSTEM: ICD-10-CM

## 2022-02-28 PROCEDURE — 97112 NEUROMUSCULAR REEDUCATION: CPT | Mod: GP | Performed by: PHYSICAL THERAPIST

## 2022-02-28 PROCEDURE — 97110 THERAPEUTIC EXERCISES: CPT | Mod: GP | Performed by: PHYSICAL THERAPIST

## 2022-03-07 ENCOUNTER — THERAPY VISIT (OUTPATIENT)
Dept: PHYSICAL THERAPY | Facility: CLINIC | Age: 77
End: 2022-03-07
Payer: COMMERCIAL

## 2022-03-07 DIAGNOSIS — Z47.89 AFTERCARE FOLLOWING SURGERY OF THE MUSCULOSKELETAL SYSTEM: ICD-10-CM

## 2022-03-07 DIAGNOSIS — R26.9 IMPAIRED GAIT: ICD-10-CM

## 2022-03-07 PROCEDURE — 97110 THERAPEUTIC EXERCISES: CPT | Mod: GP | Performed by: PHYSICAL THERAPIST

## 2022-03-07 PROCEDURE — 97530 THERAPEUTIC ACTIVITIES: CPT | Mod: GP | Performed by: PHYSICAL THERAPIST

## 2022-03-11 DIAGNOSIS — J30.2 CHRONIC SEASONAL ALLERGIC RHINITIS: ICD-10-CM

## 2022-03-11 RX ORDER — FLUTICASONE PROPIONATE 50 MCG
SPRAY, SUSPENSION (ML) NASAL
Qty: 48 ML | Refills: 3 | Status: SHIPPED | OUTPATIENT
Start: 2022-03-11 | End: 2023-06-29

## 2022-03-11 NOTE — TELEPHONE ENCOUNTER
Routing refill request to provider for review/approval because:  Drug interaction warning between flonase and ketoconazole    Amanda Kimball RN

## 2022-03-16 ENCOUNTER — THERAPY VISIT (OUTPATIENT)
Dept: PHYSICAL THERAPY | Facility: CLINIC | Age: 77
End: 2022-03-16
Payer: COMMERCIAL

## 2022-03-16 DIAGNOSIS — Z47.89 AFTERCARE FOLLOWING SURGERY OF THE MUSCULOSKELETAL SYSTEM: ICD-10-CM

## 2022-03-16 DIAGNOSIS — R26.9 IMPAIRED GAIT: ICD-10-CM

## 2022-03-16 PROCEDURE — 97530 THERAPEUTIC ACTIVITIES: CPT | Mod: GP | Performed by: PHYSICAL THERAPIST

## 2022-03-16 PROCEDURE — 97110 THERAPEUTIC EXERCISES: CPT | Mod: GP | Performed by: PHYSICAL THERAPIST

## 2022-03-19 ENCOUNTER — HEALTH MAINTENANCE LETTER (OUTPATIENT)
Age: 77
End: 2022-03-19

## 2022-03-22 ENCOUNTER — HOSPITAL ENCOUNTER (EMERGENCY)
Facility: CLINIC | Age: 77
Discharge: HOME | End: 2022-03-23
Attending: EMERGENCY MEDICINE | Admitting: EMERGENCY MEDICINE
Payer: COMMERCIAL

## 2022-03-22 DIAGNOSIS — R04.0 EPISTAXIS: ICD-10-CM

## 2022-03-22 LAB
BASOPHILS # BLD AUTO: 0.1 10E3/UL (ref 0–0.2)
BASOPHILS NFR BLD AUTO: 2 %
EOSINOPHIL # BLD AUTO: 0.4 10E3/UL (ref 0–0.7)
EOSINOPHIL NFR BLD AUTO: 5 %
ERYTHROCYTE [DISTWIDTH] IN BLOOD BY AUTOMATED COUNT: 12.4 % (ref 10–15)
HCT VFR BLD AUTO: 41.4 % (ref 40–53)
HGB BLD-MCNC: 14.6 G/DL (ref 13.3–17.7)
IMM GRANULOCYTES # BLD: 0 10E3/UL
IMM GRANULOCYTES NFR BLD: 0 %
LYMPHOCYTES # BLD AUTO: 1.3 10E3/UL (ref 0.8–5.3)
LYMPHOCYTES NFR BLD AUTO: 16 %
MCH RBC QN AUTO: 32.7 PG (ref 26.5–33)
MCHC RBC AUTO-ENTMCNC: 35.3 G/DL (ref 31.5–36.5)
MCV RBC AUTO: 93 FL (ref 78–100)
MONOCYTES # BLD AUTO: 0.6 10E3/UL (ref 0–1.3)
MONOCYTES NFR BLD AUTO: 8 %
NEUTROPHILS # BLD AUTO: 5.3 10E3/UL (ref 1.6–8.3)
NEUTROPHILS NFR BLD AUTO: 69 %
NRBC # BLD AUTO: 0 10E3/UL
NRBC BLD AUTO-RTO: 0 /100
PLATELET # BLD AUTO: 199 10E3/UL (ref 150–450)
RBC # BLD AUTO: 4.46 10E6/UL (ref 4.4–5.9)
WBC # BLD AUTO: 7.7 10E3/UL (ref 4–11)

## 2022-03-22 PROCEDURE — 36415 COLL VENOUS BLD VENIPUNCTURE: CPT | Performed by: EMERGENCY MEDICINE

## 2022-03-22 PROCEDURE — 99284 EMERGENCY DEPT VISIT MOD MDM: CPT | Mod: 25

## 2022-03-22 PROCEDURE — 250N000009 HC RX 250: Performed by: EMERGENCY MEDICINE

## 2022-03-22 PROCEDURE — 30901 CONTROL OF NOSEBLEED: CPT

## 2022-03-22 PROCEDURE — 85025 COMPLETE CBC W/AUTO DIFF WBC: CPT | Performed by: EMERGENCY MEDICINE

## 2022-03-22 RX ORDER — OXYMETAZOLINE HYDROCHLORIDE 0.05 G/100ML
2 SPRAY NASAL ONCE
Status: COMPLETED | OUTPATIENT
Start: 2022-03-22 | End: 2022-03-22

## 2022-03-22 RX ORDER — OXYMETAZOLINE HYDROCHLORIDE 0.05 G/100ML
SPRAY NASAL
Status: DISCONTINUED
Start: 2022-03-22 | End: 2022-03-23 | Stop reason: HOSPADM

## 2022-03-22 RX ORDER — TRANEXAMIC ACID 100 MG/ML
500 INJECTION, SOLUTION INTRAVENOUS ONCE
Status: DISCONTINUED | OUTPATIENT
Start: 2022-03-22 | End: 2022-03-23 | Stop reason: HOSPADM

## 2022-03-22 RX ADMIN — Medication 2 SPRAY: at 23:43

## 2022-03-22 ASSESSMENT — ENCOUNTER SYMPTOMS
BLOOD IN STOOL: 0
SHORTNESS OF BREATH: 0

## 2022-03-23 VITALS
WEIGHT: 310 LBS | RESPIRATION RATE: 20 BRPM | SYSTOLIC BLOOD PRESSURE: 126 MMHG | HEART RATE: 88 BPM | BODY MASS INDEX: 43.24 KG/M2 | DIASTOLIC BLOOD PRESSURE: 83 MMHG | OXYGEN SATURATION: 95 %

## 2022-03-23 LAB
HOLD SPECIMEN: NORMAL

## 2022-03-23 NOTE — ED PROVIDER NOTES
History   Chief Complaint:  Epistaxis     The history is provided by the patient.      James Salvador is a 76 year old male with a history of atrial fibrillation, CAD, and hypertension who presents with his wife for epistaxis. The patient notes that his nose first bled on Sunday (3/20). However, at around 1840 today, his nose began bleeding nonstop. He is unsure if there is blood coming from his right nostril, but he is confident the left nostril is where the bleeding is coming from. In the past, he had a deviated septum, which he got surgery to fix. The patient denies having any blood in stool/urine, vomiting, and shortness of breath.  Patient is on Eliquis for atrial fibrillation.    Review of Systems   HENT: Positive for nosebleeds (Left side).    Respiratory: Negative for shortness of breath.    Gastrointestinal: Negative for blood in stool.   Genitourinary:        NEG: Blood in urine   All other systems reviewed and are negative.    Allergies:  Cats  Dogs  Hydromorphone  No Clinical Screening - See Comments  Pollen Extract    Medications:  amoxicillin   Aspirin  atorvastatin   carvedilol   doxycycline hyclate   Eliquis  fluocinonide  furosemide   Gabapentin  ketotifen  lisinopril  loratadine  nitroglycerin   OXcarbazepine    Past Medical History:     Actinic cheilitis   Actinic keratosis   Allergic rhinitis   Basal cell carcinoma   Benign hypertension   CAD   History of myocardial infarction   Hyperlipidemia   Malignant melanoma   Melanoma   Mixed hyperlipidemia   Morbid obesity due to excess calories    Paroxysmal supraventricular tachycardia    Permanent atrial fibrillation    Squamous cell carcinoma   Stable angina  Tachy-edinson syndrome       Past Surgical History:    Angioplasty  Ankle surgery, rt  EP perm pacer single lead  Heart cath stent cor w/wo ptca  Join replacement, hip rt/lt  Laser surgery of eye  Mohs micrographic procedure  Sinus surgery     Family History:    Renal failure -  Mother  Cardiovascular - Father  Depression - Son    Social History:  The patient presents with his wife.  The patient is a former smoker.    Physical Exam     Patient Vitals for the past 24 hrs:   BP Pulse Resp SpO2 Weight   03/23/22 0045 126/83 -- -- 95 % --   03/23/22 0030 (!) 151/90 88 -- 95 % --   03/23/22 0015 -- -- -- 95 % --   03/23/22 0000 -- -- -- 97 % --   03/22/22 2345 -- -- -- 95 % --   03/22/22 2330 -- -- -- 100 % --   03/22/22 2328 -- -- -- -- 140.6 kg (310 lb)   03/22/22 2327 (!) 159/111 85 20 96 % --     Physical Exam    General:   Pleasant, age appropriate sitting upright with nasal clamps in place.  HEENT:    Ongoing venous bleeding present in the left naris.      Active venous bleeding from Kiesselbach's plexus on the left.      No mass or lesion noted.     Oropharynx is moist, without lesions or trismus.  Eyes:    Conjunctiva normal  Neck:    Supple, no meningismus.     CV:     Regular rate, irregular rhythm.      No murmurs, rubs or gallops.  PULM:    Clear to auscultation bilateral.       No respiratory distress.      Good air exchange.  ABD:    Soft, non-tender, non-distended.       No pulsatile masses.       No rebound, guarding or rigidity.  MSK:     No gross deformity to all four extremities.   LYMPH:   No cervical lymphadenopathy.  NEURO:   Alert, good muscular tone, no atrophy.   Skin:    Warm, dry and intact.    Psych:    Mood is good and affect is appropriate.      Emergency Department Course     Laboratory:  Labs Ordered and Resulted from Time of ED Arrival to Time of ED Departure   CBC WITH PLATELETS AND DIFFERENTIAL       Result Value    WBC Count 7.7      RBC Count 4.46      Hemoglobin 14.6      Hematocrit 41.4      MCV 93      MCH 32.7      MCHC 35.3      RDW 12.4      Platelet Count 199      % Neutrophils 69      % Lymphocytes 16      % Monocytes 8      % Eosinophils 5      % Basophils 2      % Immature Granulocytes 0      NRBCs per 100 WBC 0      Absolute Neutrophils 5.3       Absolute Lymphocytes 1.3      Absolute Monocytes 0.6      Absolute Eosinophils 0.4      Absolute Basophils 0.1      Absolute Immature Granulocytes 0.0      Absolute NRBCs 0.0        Emergency Department Course:    Procedures:  Procedure: Epistaxis Management  Performed by: Dr. Sumit Whittaker    Technique: Afrin nasal was sprayed in the nares bilaterally followed by packing the nares with cotton balls soaked in tranexamic acid for 15 minutes.  On examination, bleeding was isolated to the Kiesselbach's plexus.  Silver nitrate cautery was then applied to the area of bleeding.  On re-examination 20 minutes later there is no residual bleeding.    Complications: none    Reviewed:  I reviewed nursing notes, vitals, past medical history, Care Everywhere and [facility]    Assessments:  2345 I obtained history and examined the patient as noted above.   0017 I rechecked the patient and explained findings.  0044 I rechecked the patient again.    Interventions:  2343 oxymetazoline (AFRIN) 0.05 % spray 2 spray, Nasal    Disposition:  The patient was discharged to home.     Impression & Plan     CMS Diagnoses: None.    Medical Decision Makin-year-old male on Eliquis presents the ED with epistaxis primary in the left.  Patient noted to have an area of bleeding at Kiesselbach's plexus in the left.  Bleeding was reduced with Afrin and tranexamic acid.  There was mild residual venous bleeding that resolved with silver nitrate.  Patient was observed for an additional 25 minutes with no recurrence of bleeding.  Patient will use Vaseline to keep the area moisturized.  He will return to ED for any worsening symptoms.    Diagnosis:    ICD-10-CM    1. Epistaxis  R04.0      Scribe Disclosure:  Norma BOOTHh[place] Alex, am serving as a scribe at 11:43 PM on 3/22/2022 to document services personally performed by Sumit Whittaker MD based on my observations and the provider's statements to me.      Sumit Whittaker MD  22 [phone]

## 2022-04-04 ASSESSMENT — ENCOUNTER SYMPTOMS
FREQUENCY: 1
CHILLS: 0
FEVER: 0
HEARTBURN: 0
EYE PAIN: 0
WEAKNESS: 1
HEMATURIA: 0
PALPITATIONS: 0
NAUSEA: 0
SORE THROAT: 0
SHORTNESS OF BREATH: 0
DYSURIA: 0
NERVOUS/ANXIOUS: 0
DIZZINESS: 0
COUGH: 0
DIARRHEA: 0
PARESTHESIAS: 0
ABDOMINAL PAIN: 0
HEMATOCHEZIA: 0
MYALGIAS: 0
CONSTIPATION: 0
HEADACHES: 0
ARTHRALGIAS: 0
JOINT SWELLING: 1

## 2022-04-04 ASSESSMENT — ACTIVITIES OF DAILY LIVING (ADL)
CURRENT_FUNCTION: HOUSEWORK REQUIRES ASSISTANCE
CURRENT_FUNCTION: PREPARING MEALS REQUIRES ASSISTANCE
CURRENT_FUNCTION: LAUNDRY REQUIRES ASSISTANCE
CURRENT_FUNCTION: SHOPPING REQUIRES ASSISTANCE

## 2022-04-05 ENCOUNTER — THERAPY VISIT (OUTPATIENT)
Dept: PHYSICAL THERAPY | Facility: CLINIC | Age: 77
End: 2022-04-05
Payer: COMMERCIAL

## 2022-04-05 DIAGNOSIS — Z47.89 AFTERCARE FOLLOWING SURGERY OF THE MUSCULOSKELETAL SYSTEM: ICD-10-CM

## 2022-04-05 DIAGNOSIS — R26.9 IMPAIRED GAIT: ICD-10-CM

## 2022-04-05 PROCEDURE — 97112 NEUROMUSCULAR REEDUCATION: CPT | Mod: GP | Performed by: PHYSICAL THERAPIST

## 2022-04-05 PROCEDURE — 97110 THERAPEUTIC EXERCISES: CPT | Mod: GP | Performed by: PHYSICAL THERAPIST

## 2022-04-05 NOTE — PROGRESS NOTES
Subjective:  HPI  Physical Exam                    Objective:  System    Physical Exam    General     ROS    Assessment/Plan:    PROGRESS  REPORT    Progress reporting period is from 2/7/2022 to 4/5/2022.       SUBJECTIVE  Subjective changes noted by patient:   knee pain has been up and down. The past week has been dealing with prolonged nose bleed. Was seen in ER. At the moment it has stopped.     Current pain level is 2/10.     Previous pain level was 3/10.     Changes in function:  Yes (See Goal flowsheet attached for changes in current functional level), slowly with multiple set backs  Adverse reaction to treatment or activity: None    OBJECTIVE  Changes noted in objective findings:  Yes,   Objective:   Gait: FWW, knee brace, slow cole; QS: good,   SLR: no ext lag,   Strength Knee Ext L: 4/5 painful, Knee Flx L: -5/5, Hip Flex L: +4/5;   Balance FT EO: 30 sec, FT EC: 2 sec - falls posteriorly,      ASSESSMENT/PLAN  Updated problem list and treatment plan: Diagnosis 1:  L knee pain (failed MPLF reconstruction)  Pain -  hot/cold therapy, manual therapy, education and home program  Decreased strength - therapeutic exercise and therapeutic activities  Impaired balance - neuro re-education and therapeutic activities  Decreased proprioception - neuro re-education and therapeutic activities  Impaired gait - gait training  Impaired muscle performance - neuro re-education  Decreased function - therapeutic activities  STG/LTGs have been met or progress has been made towards goals:  Yes (See Goal flow sheet completed today.); however, progress is slow  Assessment of Progress: The patient's condition is improving.  The patient's condition has potential to improve.  The patient has had set backs in their progress.  Self Management Plans:  Patient has been instructed in a home treatment program.  I have re-evaluated this patient and find that the nature, scope, duration and intensity of the therapy is appropriate for the  medical condition of the patient.  James continues to require the following intervention to meet STG and LTG's:  PT    Recommendations:  This patient would benefit from continued therapy.     Frequency:  1 X week, once daily  Duration:  for 4 weeks tapering to 2 X a month over 8 weeks        Please refer to the daily flowsheet for treatment today, total treatment time and time spent performing 1:1 timed codes.

## 2022-04-07 ENCOUNTER — OFFICE VISIT (OUTPATIENT)
Dept: INTERNAL MEDICINE | Facility: CLINIC | Age: 77
End: 2022-04-07
Payer: COMMERCIAL

## 2022-04-07 VITALS
SYSTOLIC BLOOD PRESSURE: 134 MMHG | OXYGEN SATURATION: 97 % | WEIGHT: 309.1 LBS | HEART RATE: 57 BPM | HEIGHT: 71 IN | DIASTOLIC BLOOD PRESSURE: 82 MMHG | BODY MASS INDEX: 43.27 KG/M2 | TEMPERATURE: 97.3 F

## 2022-04-07 DIAGNOSIS — E87.1 HYPONATREMIA: ICD-10-CM

## 2022-04-07 DIAGNOSIS — Z00.00 ENCOUNTER FOR MEDICARE ANNUAL WELLNESS EXAM: Primary | ICD-10-CM

## 2022-04-07 DIAGNOSIS — I24.9 ACS (ACUTE CORONARY SYNDROME) (H): ICD-10-CM

## 2022-04-07 DIAGNOSIS — Z12.11 SCREEN FOR COLON CANCER: ICD-10-CM

## 2022-04-07 DIAGNOSIS — Z85.820 HX OF MELANOMA EXCISION: ICD-10-CM

## 2022-04-07 DIAGNOSIS — R04.0 EPISTAXIS: ICD-10-CM

## 2022-04-07 DIAGNOSIS — Z98.890 HX OF MELANOMA EXCISION: ICD-10-CM

## 2022-04-07 DIAGNOSIS — I48.11 LONGSTANDING PERSISTENT ATRIAL FIBRILLATION (H): ICD-10-CM

## 2022-04-07 DIAGNOSIS — I25.10 CORONARY ARTERY DISEASE INVOLVING NATIVE CORONARY ARTERY OF NATIVE HEART WITHOUT ANGINA PECTORIS: ICD-10-CM

## 2022-04-07 PROBLEM — C43.39 MELANOMA OF CHEEK (H): Status: ACTIVE | Noted: 2022-04-07

## 2022-04-07 LAB
ANION GAP SERPL CALCULATED.3IONS-SCNC: 4 MMOL/L (ref 3–14)
BUN SERPL-MCNC: 11 MG/DL (ref 7–30)
CALCIUM SERPL-MCNC: 9.4 MG/DL (ref 8.5–10.1)
CHLORIDE BLD-SCNC: 91 MMOL/L (ref 94–109)
CO2 SERPL-SCNC: 26 MMOL/L (ref 20–32)
CREAT SERPL-MCNC: 0.65 MG/DL (ref 0.66–1.25)
GFR SERPL CREATININE-BSD FRML MDRD: >90 ML/MIN/1.73M2
GLUCOSE BLD-MCNC: 87 MG/DL (ref 70–99)
POTASSIUM BLD-SCNC: 5.1 MMOL/L (ref 3.4–5.3)
SODIUM SERPL-SCNC: 121 MMOL/L (ref 133–144)

## 2022-04-07 PROCEDURE — 99214 OFFICE O/P EST MOD 30 MIN: CPT | Mod: 25 | Performed by: INTERNAL MEDICINE

## 2022-04-07 PROCEDURE — 0054A COVID-19,PF,PFIZER (12+ YRS): CPT | Performed by: INTERNAL MEDICINE

## 2022-04-07 PROCEDURE — 36415 COLL VENOUS BLD VENIPUNCTURE: CPT | Performed by: INTERNAL MEDICINE

## 2022-04-07 PROCEDURE — 91305 COVID-19,PF,PFIZER (12+ YRS): CPT | Performed by: INTERNAL MEDICINE

## 2022-04-07 PROCEDURE — 99397 PER PM REEVAL EST PAT 65+ YR: CPT | Mod: 25 | Performed by: INTERNAL MEDICINE

## 2022-04-07 PROCEDURE — 80048 BASIC METABOLIC PNL TOTAL CA: CPT | Performed by: INTERNAL MEDICINE

## 2022-04-07 RX ORDER — CARVEDILOL 3.12 MG/1
3.12 TABLET ORAL 2 TIMES DAILY WITH MEALS
COMMUNITY
Start: 2022-04-07 | End: 2022-08-23

## 2022-04-07 RX ORDER — NITROGLYCERIN 0.4 MG/1
0.4 TABLET SUBLINGUAL EVERY 5 MIN PRN
Qty: 25 TABLET | Refills: 11 | Status: SHIPPED | OUTPATIENT
Start: 2022-04-07

## 2022-04-07 ASSESSMENT — ENCOUNTER SYMPTOMS
DIZZINESS: 0
HEARTBURN: 0
COUGH: 0
NAUSEA: 0
EYE PAIN: 0
PALPITATIONS: 0
HEMATOCHEZIA: 0
ARTHRALGIAS: 0
FEVER: 0
CONSTIPATION: 0
DIARRHEA: 0
ABDOMINAL PAIN: 0
HEADACHES: 0
MYALGIAS: 0
HEMATURIA: 0
PARESTHESIAS: 0
SORE THROAT: 0
NERVOUS/ANXIOUS: 0
WEAKNESS: 1
SHORTNESS OF BREATH: 0
FREQUENCY: 1
CHILLS: 0
JOINT SWELLING: 1
DYSURIA: 0

## 2022-04-07 ASSESSMENT — ACTIVITIES OF DAILY LIVING (ADL)
CURRENT_FUNCTION: SHOPPING REQUIRES ASSISTANCE
CURRENT_FUNCTION: PREPARING MEALS REQUIRES ASSISTANCE
CURRENT_FUNCTION: HOUSEWORK REQUIRES ASSISTANCE
CURRENT_FUNCTION: LAUNDRY REQUIRES ASSISTANCE

## 2022-04-07 NOTE — PATIENT INSTRUCTIONS
Patient Education   Personalized Prevention Plan  You are due for the preventive services outlined below.  Your care team is available to assist you in scheduling these services.  If you have already completed any of these items, please share that information with your care team to update in your medical record.  Health Maintenance Due   Topic Date Due     FALL RISK ASSESSMENT  02/03/2022     Colorectal Cancer Screening  04/25/2022     Your Health Risk Assessment indicates you feel you are not in good health    A healthy lifestyle helps keep the body fit and the mind alert. It helps protect you from disease, helps you fight disease, and helps prevent chronic disease (disease that doesn't go away) from getting worse. This is important as you get older and begin to notice twinges in muscles and joints and a decline in the strength and stamina you once took for granted. A healthy lifestyle includes good healthcare, good nutrition, weight control, recreation, and regular exercise. Avoid harmful substances and do what you can to keep safe. Another part of a healthy lifestyle is stay mentally active and socially involved.    Good healthcare     Have a wellness visit every year.     If you have new symptoms, let us know right away. Don't wait until the next checkup.     Take medicines exactly as prescribed and keep your medicines in a safe place. Tell us if your medicine causes problems.   Healthy diet and weight control     Eat 3 or 4 small, nutritious, low-fat, high-fiber meals a day. Include a variety of fruits, vegetables, and whole-grain foods.     Make sure you get enough calcium in your diet. Calcium, vitamin D, and exercise help prevent osteoporosis (bone thinning).     If you live alone, try eating with others when you can. That way you get a good meal and have company while you eat it.     Try to keep a healthy weight. If you eat more calories than your body uses for energy, it will be stored as fat and you  will gain weight.     Recreation   Recreation is not limited to sports and team events. It includes any activity that provides relaxation, interest, enjoyment, and exercise. Recreation provides an outlet for physical, mental, and social energy. It can give a sense of worth and achievement. It can help you stay healthy.    Mental Exercise and Social Involvement  Mental and emotional health is as important as physical health. Keep in touch with friends and family. Stay as active as possible. Continue to learn and challenge yourself.   Things you can do to stay mentally active are:    Learn something new, like a foreign language or musical instrument.     Play SCRABBLE or do crossword puzzles. If you cannot find people to play these games with you at home, you can play them with others on your computer through the Internet.     Join a games club--anything from card games to chess or checkers or lawn bowling.     Start a new hobby.     Go back to school.     Volunteer.     Read.   Keep up with world events.  Activities of Daily Living    Your Health Risk Assessment indicates you have difficulties with activities of daily living such as housework, bathing, preparing meals, taking medication, etc. Please make a follow up appointment for us to address this issue in more detail.    Signs of Hearing Loss      Hearing much better with one ear can be a sign of hearing loss.   Hearing loss is a problem shared by many people. In fact, it is one of the most common health problems, particularly as people age. Most people age 65 and older have some hearing loss. By age 80, almost everyone does. Hearing loss often occurs slowly over the years. So you may not realize your hearing has gotten worse.  Have your hearing checked  Call your healthcare provider if you:    Have to strain to hear normal conversation    Have to watch other people s faces very carefully to follow what they re saying    Need to ask people to repeat what they ve  said    Often misunderstand what people are saying    Turn the volume of the television or radio up so high that others complain    Feel that people are mumbling when they re talking to you    Find that the effort to hear leaves you feeling tired and irritated    Notice, when using the phone, that you hear better with one ear than the other  [employer] last reviewed this educational content on 1/1/2020 2000-2021 [employer]. All rights reserved. This information is not intended as a substitute for professional medical care. Always follow your healthcare professional's instructions.          Urinary Incontinence (Male)    Urinary incontinence means not being able to control the release of urine from the bladder.   Causes  Common causes of urinary incontinence in men include:    Infection    Certain medicines    Aging    Poor pelvic muscle tone    Bladder spasms    Obesity    Trouble urinating and fully emptying the bladder (urinary retention)  Other things that can cause incontinence are:     Nervous system diseases    Diabetes    Sleep apnea    Urinary tract infections    Prostate surgery    Pelvic injury  Constipation and smoking have also been identified as risk factors.   Symptoms    Urge incontinence (overactive bladder). This is a sudden urge to urinate. It occurs even though there may not be much urine in the bladder. The need to urinate often during the night is common. It's due to bladder spasms.    Stress incontinence. This is urine leakage that you can't control. It can occur with sneezing, coughing, and other actions that put stress on the bladder.    Treatment  Treatment depends on what is causing the condition. Bladder infections are treated with antibiotics. Urinary retention is treated with a bladder catheter.   Home care  Follow these guidelines when caring for yourself at home:    Don't have any foods and drinks that may irritate the bladder. This  includes:  ? Chocolate  ? Alcohol  ? Caffeine  ? Carbonated drinks  ? Acidic fruits and juices    Limit fluids to 6 to 8 cups a day.    Lose weight if you are overweight. This will reduce your symptoms.    If advised, do regular pelvic muscle-strengthening exercises such as Kegel exercises.    If needed, wear absorbent pads to catch urine. Change the pads often. This is for good hygiene and to prevent skin and bladder infections.    Bathe daily for good hygiene.    If an antibiotic was prescribed to treat a bladder infection, take it until it's finished. Keep taking it even if you are feeling better. This is to make sure your infection has cleared.    If a catheter was left in place, keep bacteria from getting into the collection bag. Don't disconnect the catheter from the collection bag.    Use a leg band to secure the catheter drainage tube, so it does not pull on the catheter. Drain the collection bag when it becomes full. To do this, use the drain spout at the bottom of the bag. Don't disconnect the bag from the catheter.    Don't pull on or try to remove a catheter. The catheter must be removed by a healthcare provider.    If you smoke, stop. Ask your provider for help if you can't do this on your own.  Follow-up care  Follow up with your healthcare provider, or as advised.  When to get medical advice  Call your healthcare provider right away if any of these occur:    Fever over 100.4 F (38 C), or as directed by your provider    Bladder pain or fullness    Belly swelling, nausea, or vomiting    Back pain    Weakness, dizziness, or fainting    If a catheter was left in place, return if:  ? The catheter falls out  ? The catheter stops draining for 6 hours  ? Your urine gets cloudy or smells bad  Lena last reviewed this educational content on 1/1/2020 2000-2021 [employer]. All rights reserved. This information is not intended as a substitute for professional medical care. Always follow your  healthcare professional's instructions.          Preventing Falls at Home  A person can fall for many reasons. Older adults may fall because reaction time slows down as we age. Your muscles and joints may get stiff, weak, or less flexible because of illness, medicines, or a physical condition.   Other health problems that make falls more likely include:     Arthritis    Dizziness or lightheadedness when you stand up (orthostatic hypotension)    History of a stroke    Dizziness    Anemia    Certain medicines taken for mental illness or to control blood pressure.    Problems with balance or gait    Bladder or urinary problems    History of falling    Changes in vision (vision impairment)    Changes in thinking skills and memory (cognitive impairment)  Injuries from a fall can include serious injuries such as broken bones, dislocated joints, internal bleeding and cuts. Injuries like these can limit your independence.   Prevention tips  To help prevent falls and fall-related injuries, follow the tips below.    Floors  To make floors safer:     Put nonskid pads under area rugs.    Remove small rugs.    Replace worn floor coverings.    Tack carpets firmly to each step on carpeted stairs. Put nonskid strips on the edges of uncarpeted stairs.    Keep floors and stairs free of clutter and cords.    Arrange furniture so there are clear pathways.    Clean up any spills right away.  Bathrooms    To make bathrooms safer:     Install grab bars in the tub or shower.    Apply nonskid strips or put a nonskid rubber mat in the tub or shower.    Sit on a bath chair to bathe.    Use bathmats with nonskid backing.  Lighting  To improve visibility in your home:      Keep a flashlight in each room. Or put a lamp next to the bed within easy reach.    Put nightlights in the bedrooms, hallways, kitchen, and bathrooms.    Make sure all stairways have good lighting.    Take your time when going up and down stairs.    Put handrails on both  sides of stairs and in walkways for more support. To prevent injury to your wrist or arm, don t use handrails to pull yourself up.    Install grab bars to pull yourself up.    Move or rearrange items that you use often. This will make them easier to find or reach.    Look at your home to find any safety hazards. Especially look at doorways, walkways, and the driveway. Remove or repair any safety problems that you find.  Other changes to make    Look around to find any safety hazards. Look closely at doorways, walkways, and the driveway. Remove or repair any safety problems that you find.    Wear shoes that fit well.    Take your time when going up and down stairs.    Put handrails on both sides of stairs and in walkways for more support. To prevent injury to your wrist or arm, don t use handrails to pull yourself up.    Install grab bars wherever needed to pull yourself up.    Arrange items that you use often. This will make them easier to find or reach.    [employer] last reviewed this educational content on 3/1/2020    4578-1375 [employer]. All rights reserved. This information is not intended as a substitute for professional medical care. Always follow your healthcare professional's instructions.

## 2022-04-07 NOTE — PROGRESS NOTES
"SUBJECTIVE:   James Salvador is a 76 year old male who presents for Preventive Visit.    Patient has been advised of split billing requirements and indicates understanding: Yes  Are you in the first 12 months of your Medicare coverage?  No    Healthy Habits:     In general, how would you rate your overall health?  Fair    Frequency of exercise:  2-3 days/week    Duration of exercise:  Less than 15 minutes    Do you usually eat at least 4 servings of fruit and vegetables a day, include whole grains    & fiber and avoid regularly eating high fat or \"junk\" foods?  Yes    Taking medications regularly:  Yes    Medication side effects:  None    Ability to successfully perform activities of daily living:  Shopping requires assistance, preparing meals requires assistance, housework requires assistance and laundry requires assistance    Home Safety:  No safety concerns identified    Hearing Impairment:  Difficulty following a conversation in a noisy restaurant or crowded room, feel that people are mumbling or not speaking clearly, difficulty following dialogue in the theater, difficult to understand a speaker at a public meeting or Rastafari service, need to ask people to speak up or repeat themselves, find that men's voices are easier to understand than woman's, difficulty understanding soft or whispered speech and difficulty understanding speech on the telephone    In the past 6 months, have you been bothered by leaking of urine? Yes    In general, how would you rate your overall mental or emotional health?  Good      PHQ-2 Total Score: 0    Additional concerns today:  Yes    Do you feel safe in your environment? YES    Have you ever done Advance Care Planning? (For example, a Health Directive, POLST, or a discussion with a medical provider or your loved ones about your wishes): Yes, advance care planning is on file.       Fall risk  Fallen 2 or more times in the past year?: Yes  Any fall with injury in the past year?: " No  Timed Up and Go Test (>13.5 is fall risk; contact physician) : 13      Cognitive Screening   1) Repeat 3 items (Leader, Season, Table)    2) Clock draw: NORMAL  3) 3 item recall: Recalls 3 objects  Results: 3 items recalled: COGNITIVE IMPAIRMENT LESS LIKELY    Mini-CogTM Copyright PAOLO Casas. Licensed by the author for use in [facility]; reprinted with permission (cordelia@[facility]). All rights reserved.      Do you have sleep apnea, excessive snoring or daytime drowsiness?: no    Reviewed and updated as needed this visit by clinical staff   Tobacco  Allergies  Meds   Med Hx  Surg Hx  Fam Hx  Soc Hx        Reviewed and updated as needed this visit by Provider                 Social History     Tobacco Use     Smoking status: Former Smoker     Packs/day: 0.50     Years: 10.00     Pack years: 5.00     Types: Cigarettes     Start date: 1966     Quit date: 1974     Years since quittin.9     Smokeless tobacco: Never Used     Tobacco comment: Also smoked from 1985 - 1990   Substance Use Topics     Alcohol use: Yes     Alcohol/week: 0.0 standard drinks     Comment: Socially         Alcohol Use 2022   Prescreen: >3 drinks/day or >7 drinks/week? Not Applicable   Prescreen: >3 drinks/day or >7 drinks/week? -         Current providers sharing in care for this patient include: Patient Care Team:  Jeronimo Painter MD as PCP - General (Internal Medicine)  Jeronimo Painter MD as Assigned PCP  Aquilino Buchanan MD as Assigned Heart and Vascular Provider  Rosa Maria Hansen PA-C as Assigned Surgical Provider    The following health maintenance items are reviewed in Epic and correct as of today:  Health Maintenance Due   Topic Date Due     FALL RISK ASSESSMENT  2022           Review of Systems   Constitutional: Negative for chills and fever.   HENT: Positive for hearing loss. Negative for congestion, ear pain and sore throat.    Eyes: Negative for pain and visual  "disturbance.   Respiratory: Negative for cough and shortness of breath.    Cardiovascular: Negative for chest pain, palpitations and peripheral edema.   Gastrointestinal: Negative for abdominal pain, constipation, diarrhea, heartburn, hematochezia and nausea.   Genitourinary: Positive for frequency, impotence and urgency. Negative for dysuria, genital sores, hematuria and penile discharge.   Musculoskeletal: Positive for joint swelling. Negative for arthralgias and myalgias.   Skin: Negative for rash.   Neurological: Positive for weakness. Negative for dizziness, headaches and paresthesias.   Psychiatric/Behavioral: Negative for mood changes. The patient is not nervous/anxious.          OBJECTIVE:   /82   Pulse 57   Temp 97.3  F (36.3  C) (Temporal)   Ht 1.803 m (5' 11\")   Wt 140.2 kg (309 lb 1.6 oz)   SpO2 97%   BMI 43.11 kg/m   Estimated body mass index is 43.11 kg/m  as calculated from the following:    Height as of this encounter: 1.803 m (5' 11\").    Weight as of this encounter: 140.2 kg (309 lb 1.6 oz).  Physical Exam  GENERAL: alert, no distress, obese and elderly  EYES: Eyes grossly normal to inspection, PERRL and conjunctivae and sclerae normal  HENT: ear canals and TM's normal, nose and mouth without ulcers or lesions  NECK: no adenopathy, no asymmetry, masses, or scars and thyroid normal to palpation  RESP: lungs clear to auscultation - no rales, rhonchi or wheezes  CV: regular rate and rhythm, normal S1 S2, no S3 or S4, no murmur, click or rub, no peripheral edema and peripheral pulses strong  ABDOMEN: soft, nontender, no hepatosplenomegaly, no masses and bowel sounds normal  MS: obese legs, L knee in brace, no pitting edema.  Varicose veins bilat.   SKIN: no suspicious lesions or rashes  NEURO: Normal strength and tone, mentation intact and speech normal  PSYCH: mentation appears normal, affect normal/bright  LYMPH: no cervical, supraclavicular, axillary, or inguinal adenopathy    Results " for orders placed or performed in visit on 04/07/22 (from the past 24 hour(s))   Basic metabolic panel  (Ca, Cl, CO2, Creat, Gluc, K, Na, BUN)   Result Value Ref Range    Sodium 121 (L) 133 - 144 mmol/L    Potassium 5.1 3.4 - 5.3 mmol/L    Chloride 91 (L) 94 - 109 mmol/L    Carbon Dioxide (CO2) 26 20 - 32 mmol/L    Anion Gap 4 3 - 14 mmol/L    Urea Nitrogen 11 7 - 30 mg/dL    Creatinine 0.65 (L) 0.66 - 1.25 mg/dL    Calcium 9.4 8.5 - 10.1 mg/dL    Glucose 87 70 - 99 mg/dL    GFR Estimate >90 >60 mL/min/1.73m2       ASSESSMENT / PLAN:       ICD-10-CM    1. Encounter for Medicare annual wellness exam  Z00.00    2. Epistaxis  R04.0    3. Longstanding persistent atrial fibrillation (H)  I48.11 carvedilol (COREG) 3.125 MG tablet     ASPIRIN NOT PRESCRIBED (INTENTIONAL)   4. Coronary artery disease involving native coronary artery of native heart without angina pectoris  I25.10 Basic metabolic panel  (Ca, Cl, CO2, Creat, Gluc, K, Na, BUN)     carvedilol (COREG) 3.125 MG tablet     ASPIRIN NOT PRESCRIBED (INTENTIONAL)     Basic metabolic panel  (Ca, Cl, CO2, Creat, Gluc, K, Na, BUN)   5. Screen for colon cancer  Z12.11    6. Hx of melanoma excision  Z98.890     Z85.820      -Updated screening, immunizations, prevention.  Please see health maintenance list, care gaps  -never made changes to medical regimen per our last encounter- still on furosemide and lower dose of carvedilol. Given this - rechecked lytes - with Na+ 121.   -volume status seems euvolemic- so a bit perplexed on Na+ level. Will ask him to get urine Na+. Osm tomorrow at CV visit.  Hold off on any med changes until these are back.     -d/c asa. Pretty severe episode of epistaxis. On eliquis , CAD stable since 2017 PTCA. Unless sx recur I think ok to go with eliquis alone vs dual therapy.   -continue focus on weight loss  -discussed colon ca screening. For now defers    Patient has been advised of split billing requirements and indicates understanding:  "Yes    COUNSELING:  Reviewed preventive health counseling, as reflected in patient instructions    Estimated body mass index is 43.11 kg/m  as calculated from the following:    Height as of this encounter: 1.803 m (5' 11\").    Weight as of this encounter: 140.2 kg (309 lb 1.6 oz).    Weight management plan: Discussed healthy diet and exercise guidelines    He reports that he quit smoking about 47 years ago. His smoking use included cigarettes. He started smoking about 55 years ago. He has a 5.00 pack-year smoking history. He has never used smokeless tobacco.      Appropriate preventive services were discussed with this patient, including applicable screening as appropriate for cardiovascular disease, diabetes, osteopenia/osteoporosis, and glaucoma.  As appropriate for age/gender, discussed screening for colorectal cancer, prostate cancer, breast cancer, and cervical cancer. Checklist reviewing preventive services available has been given to the patient.    Reviewed patients plan of care and provided an AVS. The Basic Care Plan (routine screening as documented in Health Maintenance) for James meets the Care Plan requirement. This Care Plan has been established and reviewed with the Patient.    Counseling Resources:  ATP IV Guidelines  Pooled Cohorts Equation Calculator  Breast Cancer Risk Calculator  Breast Cancer: Medication to Reduce Risk  FRAX Risk Assessment  ICSI Preventive Guidelines  Dietary Guidelines for Americans, 2010  [employer]'s MyPlate  ASA Prophylaxis  Lung CA Screening    Jeronimo Painter MD  [facility]    Identified Health Risks:    The patient was provided with suggestions to help him develop a healthy physical lifestyle.  The patient reports that he has difficulty with activities of daily living. I have asked that the patient make a follow up appointment in 26 weeks where this issue will be further evaluated and addressed.  The patient was provided with written " information regarding signs of hearing loss.  Information on urinary incontinence and treatment options given to patient.  He is at risk for falling and has been provided with information to reduce the risk of falling at home.

## 2022-04-13 ENCOUNTER — ANCILLARY PROCEDURE (OUTPATIENT)
Dept: CARDIOLOGY | Facility: CLINIC | Age: 77
End: 2022-04-13
Attending: INTERNAL MEDICINE
Payer: COMMERCIAL

## 2022-04-13 DIAGNOSIS — I44.2 CHB (COMPLETE HEART BLOCK) (H): ICD-10-CM

## 2022-04-13 DIAGNOSIS — Z95.0 CARDIAC PACEMAKER IN SITU: ICD-10-CM

## 2022-04-13 PROCEDURE — 93296 REM INTERROG EVL PM/IDS: CPT | Performed by: INTERNAL MEDICINE

## 2022-04-13 PROCEDURE — 93294 REM INTERROG EVL PM/LDLS PM: CPT | Performed by: INTERNAL MEDICINE

## 2022-04-18 ENCOUNTER — THERAPY VISIT (OUTPATIENT)
Dept: PHYSICAL THERAPY | Facility: CLINIC | Age: 77
End: 2022-04-18
Payer: COMMERCIAL

## 2022-04-18 DIAGNOSIS — Z47.89 AFTERCARE FOLLOWING SURGERY OF THE MUSCULOSKELETAL SYSTEM: Primary | ICD-10-CM

## 2022-04-18 DIAGNOSIS — R26.9 IMPAIRED GAIT: ICD-10-CM

## 2022-04-18 PROCEDURE — 97110 THERAPEUTIC EXERCISES: CPT | Mod: GP | Performed by: PHYSICAL THERAPIST

## 2022-04-19 ENCOUNTER — OFFICE VISIT (OUTPATIENT)
Dept: DERMATOLOGY | Facility: CLINIC | Age: 77
End: 2022-04-19
Payer: COMMERCIAL

## 2022-04-19 VITALS — OXYGEN SATURATION: 97 % | DIASTOLIC BLOOD PRESSURE: 75 MMHG | HEART RATE: 55 BPM | SYSTOLIC BLOOD PRESSURE: 132 MMHG

## 2022-04-19 DIAGNOSIS — L21.9 DERMATITIS, SEBORRHEIC: Primary | ICD-10-CM

## 2022-04-19 DIAGNOSIS — D22.9 NEVUS: ICD-10-CM

## 2022-04-19 DIAGNOSIS — L82.1 SEBORRHEIC KERATOSIS: ICD-10-CM

## 2022-04-19 DIAGNOSIS — D48.5 NEOPLASM OF UNCERTAIN BEHAVIOR OF SKIN: ICD-10-CM

## 2022-04-19 DIAGNOSIS — D18.01 ANGIOMA OF SKIN: ICD-10-CM

## 2022-04-19 DIAGNOSIS — L81.4 LENTIGO: ICD-10-CM

## 2022-04-19 PROCEDURE — 99213 OFFICE O/P EST LOW 20 MIN: CPT | Mod: 25 | Performed by: PHYSICIAN ASSISTANT

## 2022-04-19 PROCEDURE — 88342 IMHCHEM/IMCYTCHM 1ST ANTB: CPT | Performed by: DERMATOLOGY

## 2022-04-19 PROCEDURE — 88305 TISSUE EXAM BY PATHOLOGIST: CPT | Performed by: DERMATOLOGY

## 2022-04-19 PROCEDURE — 11102 TANGNTL BX SKIN SINGLE LES: CPT | Performed by: PHYSICIAN ASSISTANT

## 2022-04-19 RX ORDER — TRIAMCINOLONE ACETONIDE 1 MG/ML
LOTION TOPICAL
Qty: 60 ML | Refills: 3 | Status: SHIPPED | OUTPATIENT
Start: 2022-04-19 | End: 2022-07-15

## 2022-04-19 NOTE — PROGRESS NOTES
HPI:   Chief complaint: James Salvador (Pete) is a 76 year old male who presents for Full skin cancer screening to rule out skin cancer.   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  See patient intake sheet.  ROS of the following were done and are negative: Constitutional, Eyes, Ears, Nose,   Mouth, Throat, Cardiovascular, Respiratory, GI, Genitourinary, Musculoskeletal,   Psychiatric, Endocrine, Allergic/Immunologic.      PHYSICAL EXAM:   /75   Pulse 55   SpO2 97%   Skin exam performed as follows: Type 2 skin. Mood appropriate  Alert and Oriented X 3. Well developed, well nourished in no distress.  General appearance: Normal  Head including face: Normal  Eyes: conjunctiva and lids: Normal  Mouth: Lips, teeth, gums: Normal  Neck: Normal  Chest-breast/axillae: Normal  Back: Normal  Spleen and liver: Normal  Cardiovascular: Exam of peripheral vascular system by observation for swelling, varicosities, edema: Normal  Genitalia: groin, buttocks: Normal  Extremities: digits/nails (clubbing): Normal  Eccrine and Apocrine glands: Normal  Right upper extremity: Normal  Left upper extremity: Normal  Right lower extremity: Normal  Left lower extremity: Normal  Skin: Scalp and body hair: See below    Pt deferred exam of breasts, groin, buttocks: NO    Other physical findings:  1. Multiple pigmented macules on extremities and trunk  2. Multiple pigmented macules on face, trunk and extremities  3. Multiple vascular papules on trunk, arms and legs  4. Multiple  scattered keratotic plaques  5. 2 mm dark brown macule on the left frontal scalp      Except as noted above, no other signs of skin cancer or melanoma.     ASSESSMENT/PLAN:   Benign Full skin cancer screening today.     Patient with history of SCC, BCC and melanoma  Advised on monthly self exams and 1 year  Patient Education: Appropriate brochures given.    Multiple benign appearing nevi on arms, legs and trunk. Discussed ABCDEs of melanoma and sunscreen.   Multiple lentigos on arms, legs and trunk. Advised benign, no treatment needed.  Multiple scattered angiomas. Advised benign, no treatment needed.   Seborrheic keratosis on arms, legs and trunk. Advised benign, no treatment needed.  Atypical nevus vs blue nevus vs other on the left frontal scalp. Shave biopsy performed.  Area cleaned and anesthetized with 1% lidocaine with epinephrine.  Dermablade used to remove the lesion and sent to pathology. Bleeding was cauterized. Pt tolerated procedure well with no complications.   History of seborrheic dermatitis on the face and scalp - scalp well controlled with ketoconazole and lidex as needed. Will switch to TAC lotion as fluocinonide solution is no longer covered.  Face controlled with desonide - advised to only use this when needed.             Follow-up: 6 months    1.) Patient was asked about new and changing moles. YES  2.) Patient received a complete physical skin examination: YES  3.) Patient was counseled to perform a monthly self skin examination: YES  Scribed By: Rosa Maria Hansen MS, PAVAISHALI

## 2022-04-19 NOTE — LETTER
4/19/2022         RE: James Salvador  [address] [place] [address]        Dear Colleague,    Thank you for referring your patient, James Salvador, to the [facility]. Please see a copy of my visit note below.    HPI:   Chief complaint: James Salvador (Pete) is a 76 year old male who presents for Full skin cancer screening to rule out skin cancer.   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  See patient intake sheet.  ROS of the following were done and are negative: Constitutional, Eyes, Ears, Nose,   Mouth, Throat, Cardiovascular, Respiratory, GI, Genitourinary, Musculoskeletal,   Psychiatric, Endocrine, Allergic/Immunologic.      PHYSICAL EXAM:   /75   Pulse 55   SpO2 97%   Skin exam performed as follows: Type 2 skin. Mood appropriate  Alert and Oriented X 3. Well developed, well nourished in no distress.  General appearance: Normal  Head including face: Normal  Eyes: conjunctiva and lids: Normal  Mouth: Lips, teeth, gums: Normal  Neck: Normal  Chest-breast/axillae: Normal  Back: Normal  Spleen and liver: Normal  Cardiovascular: Exam of peripheral vascular system by observation for swelling, varicosities, edema: Normal  Genitalia: groin, buttocks: Normal  Extremities: digits/nails (clubbing): Normal  Eccrine and Apocrine glands: Normal  Right upper extremity: Normal  Left upper extremity: Normal  Right lower extremity: Normal  Left lower extremity: Normal  Skin: Scalp and body hair: See below    Pt  deferred exam of breasts, groin, buttocks: NO    Other physical findings:  1. Multiple pigmented macules on extremities and trunk  2. Multiple pigmented macules on face, trunk and extremities  3. Multiple vascular papules on trunk, arms and legs  4. Multiple scattered keratotic plaques  5. 2 mm dark brown macule on the left frontal scalp      Except as noted above, no other signs of skin cancer or melanoma.     ASSESSMENT/PLAN:   Benign Full skin cancer screening today.     Patient with history of SCC, BCC and melanoma  Advised on monthly self exams and 1 year  Patient Education: Appropriate brochures given.    Multiple benign appearing nevi on arms, legs and trunk. Discussed ABCDEs of melanoma and sunscreen.   Multiple lentigos on arms, legs and trunk. Advised benign, no treatment needed.  Multiple scattered angiomas. Advised benign, no treatment needed.   Seborrheic keratosis on arms, legs and trunk. Advised benign, no treatment needed.  Atypical nevus vs blue nevus vs other on the left frontal scalp. Shave biopsy performed.  Area cleaned and anesthetized with 1% lidocaine with epinephrine.  Dermablade used to remove the lesion and sent to pathology. Bleeding was cauterized. Pt tolerated procedure well with no complications.   History of seborrheic dermatitis on the face and scalp - scalp well controlled with ketoconazole and lidex as needed. Will switch to TAC lotion as fluocinonide solution is no longer covered.  Face controlled with desonide - advised to only use this when needed.             Follow-up: 6 months    1.) Patient was asked about new and changing moles. YES  2.) Patient received a complete physical skin examination: YES  3.) Patient was counseled to perform a monthly self skin examination: YES  Scribed By: Rosa Maria Hansen, MS, PAVAISHALI        Again, thank you for allowing me to participate in the care of your patient.        Sincerely,        Rosa Maria Hansen PA-C

## 2022-04-19 NOTE — PATIENT INSTRUCTIONS
Wound Care Instructions     FOR SUPERFICIAL WOUNDS     [facility]  [phone]                 AFTER 24 HOURS YOU SHOULD REMOVE THE BANDAGE AND BEGIN DAILY DRESSING CHANGES AS FOLLOWS:     1) Remove Dressing.     2) Clean and dry the area with tap water using a Q-tip or sterile gauze pad.     3) Apply Vaseline, Aquaphor, Polysporin ointment or Bacitracin ointment over entire wound.  Do NOT use Neosporin ointment.     4) Cover the wound with a band-aid, or a sterile non-stick gauze pad and micropore paper tape    REPEAT THESE INSTRUCTIONS AT LEAST ONCE A DAY UNTIL THE WOUND HAS COMPLETELY HEALED.    It is an old wives tale that a wound heals better when it is exposed to air and allowed to dry out. The wound will heal faster with a better cosmetic result if it is kept moist with ointment and covered with a bandage.    **Do not let the wound dry out.**    Supplies Needed:      *Cotton tipped applicators (Q-tips)    *Vaseline, Aquaphor, Polysporin or Bacitracin Ointment (NOT NEOSPORIN)    *Band-aids or non-stick gauze pads and micropore paper tape.      PATIENT INFORMATION:    During the healing process you will notice a number of changes. All wounds develop a small halo of redness surrounding the wound.  This means healing is occurring. Severe itching with extensive redness usually indicates sensitivity to the ointment or bandage tape used to dress the wound.  You should call our office if this develops.      Swelling  and/or discoloration around your surgical site is common, particularly when performed around the eye.    All wounds normally drain.  The larger the wound the more drainage there will be.  After 7-10 days, you will notice the wound beginning to shrink and new skin will begin to grow.  The wound is healed when you can see skin has formed over the entire area.  A healed wound has a healthy, shiny look to the surface and is red to dark pink in color to normalize.  Wounds may  take approximately 4-6 weeks to heal.  Larger wounds may take 6-8 weeks.  After the wound is healed you may discontinue dressing changes.    You may experience a sensation of tightness as your wound heals. This is normal and will gradually subside.    Your healed wound may be sensitive to temperature changes. This sensitivity improves with time, but if you re having a lot of discomfort, try to avoid temperature extremes.    Patients frequently experience itching after their wound appears to have healed because of the continue healing under the skin.  Plain Vaseline will help relieve the itching.      POSSIBLE COMPLICATIONS    BLEEDING:    Leave the bandage in place.  Use tightly rolled up gauze or a cloth to apply direct pressure over the bandage for 30  minutes.  Reapply pressure for an additional 30 minutes if necessary  Use additional gauze and tape to maintain pressure once the bleeding has stopped.

## 2022-04-21 LAB
MDC_IDC_LEAD_IMPLANT_DT: NORMAL
MDC_IDC_LEAD_LOCATION: NORMAL
MDC_IDC_LEAD_LOCATION_DETAIL_1: NORMAL
MDC_IDC_LEAD_MFG: NORMAL
MDC_IDC_LEAD_MODEL: NORMAL
MDC_IDC_LEAD_POLARITY_TYPE: NORMAL
MDC_IDC_LEAD_SERIAL: NORMAL
MDC_IDC_MSMT_BATTERY_DTM: NORMAL
MDC_IDC_MSMT_BATTERY_REMAINING_LONGEVITY: 150 MO
MDC_IDC_MSMT_BATTERY_RRT_TRIGGER: 2.62
MDC_IDC_MSMT_BATTERY_STATUS: NORMAL
MDC_IDC_MSMT_BATTERY_VOLTAGE: 3.03 V
MDC_IDC_MSMT_LEADCHNL_RV_IMPEDANCE_VALUE: 342 OHM
MDC_IDC_MSMT_LEADCHNL_RV_IMPEDANCE_VALUE: 380 OHM
MDC_IDC_MSMT_LEADCHNL_RV_PACING_THRESHOLD_AMPLITUDE: 0.75 V
MDC_IDC_MSMT_LEADCHNL_RV_PACING_THRESHOLD_PULSEWIDTH: 0.4 MS
MDC_IDC_MSMT_LEADCHNL_RV_SENSING_INTR_AMPL: 4.38 MV
MDC_IDC_MSMT_LEADCHNL_RV_SENSING_INTR_AMPL: 4.38 MV
MDC_IDC_PG_IMPLANT_DTM: NORMAL
MDC_IDC_PG_MFG: NORMAL
MDC_IDC_PG_MODEL: NORMAL
MDC_IDC_PG_SERIAL: NORMAL
MDC_IDC_PG_TYPE: NORMAL
MDC_IDC_SESS_CLINIC_NAME: NORMAL
MDC_IDC_SESS_DTM: NORMAL
MDC_IDC_SESS_TYPE: NORMAL
MDC_IDC_SET_BRADY_HYSTRATE: NORMAL
MDC_IDC_SET_BRADY_LOWRATE: 50 {BEATS}/MIN
MDC_IDC_SET_BRADY_MAX_SENSOR_RATE: 130 {BEATS}/MIN
MDC_IDC_SET_BRADY_MODE: NORMAL
MDC_IDC_SET_LEADCHNL_RV_PACING_AMPLITUDE: 2 V
MDC_IDC_SET_LEADCHNL_RV_PACING_ANODE_ELECTRODE_1: NORMAL
MDC_IDC_SET_LEADCHNL_RV_PACING_ANODE_LOCATION_1: NORMAL
MDC_IDC_SET_LEADCHNL_RV_PACING_CAPTURE_MODE: NORMAL
MDC_IDC_SET_LEADCHNL_RV_PACING_CATHODE_ELECTRODE_1: NORMAL
MDC_IDC_SET_LEADCHNL_RV_PACING_CATHODE_LOCATION_1: NORMAL
MDC_IDC_SET_LEADCHNL_RV_PACING_POLARITY: NORMAL
MDC_IDC_SET_LEADCHNL_RV_PACING_PULSEWIDTH: 0.4 MS
MDC_IDC_SET_LEADCHNL_RV_SENSING_ANODE_ELECTRODE_1: NORMAL
MDC_IDC_SET_LEADCHNL_RV_SENSING_ANODE_LOCATION_1: NORMAL
MDC_IDC_SET_LEADCHNL_RV_SENSING_CATHODE_ELECTRODE_1: NORMAL
MDC_IDC_SET_LEADCHNL_RV_SENSING_CATHODE_LOCATION_1: NORMAL
MDC_IDC_SET_LEADCHNL_RV_SENSING_POLARITY: NORMAL
MDC_IDC_SET_LEADCHNL_RV_SENSING_SENSITIVITY: 0.6 MV
MDC_IDC_SET_ZONE_DETECTION_INTERVAL: 360 MS
MDC_IDC_SET_ZONE_TYPE: NORMAL
MDC_IDC_STAT_BRADY_DTM_END: NORMAL
MDC_IDC_STAT_BRADY_DTM_START: NORMAL
MDC_IDC_STAT_BRADY_RV_PERCENT_PACED: 57.94 %
MDC_IDC_STAT_EPISODE_RECENT_COUNT: 0
MDC_IDC_STAT_EPISODE_RECENT_COUNT_DTM_END: NORMAL
MDC_IDC_STAT_EPISODE_RECENT_COUNT_DTM_START: NORMAL
MDC_IDC_STAT_EPISODE_TOTAL_COUNT: 0
MDC_IDC_STAT_EPISODE_TOTAL_COUNT: 0
MDC_IDC_STAT_EPISODE_TOTAL_COUNT: 3
MDC_IDC_STAT_EPISODE_TOTAL_COUNT_DTM_END: NORMAL
MDC_IDC_STAT_EPISODE_TOTAL_COUNT_DTM_START: NORMAL
MDC_IDC_STAT_EPISODE_TYPE: NORMAL

## 2022-04-26 ENCOUNTER — LAB (OUTPATIENT)
Dept: LAB | Facility: CLINIC | Age: 77
End: 2022-04-26
Payer: COMMERCIAL

## 2022-04-26 DIAGNOSIS — E87.1 HYPONATREMIA: ICD-10-CM

## 2022-04-26 LAB
OSMOLALITY UR: 523 MMOL/KG (ref 100–1200)
SODIUM UR-SCNC: 38 MMOL/L

## 2022-04-26 PROCEDURE — 83935 ASSAY OF URINE OSMOLALITY: CPT

## 2022-04-26 PROCEDURE — 84300 ASSAY OF URINE SODIUM: CPT

## 2022-05-01 ENCOUNTER — MYC MEDICAL ADVICE (OUTPATIENT)
Dept: INTERNAL MEDICINE | Facility: CLINIC | Age: 77
End: 2022-05-01
Payer: COMMERCIAL

## 2022-05-02 ENCOUNTER — TRANSCRIBE ORDERS (OUTPATIENT)
Dept: OTHER | Age: 77
End: 2022-05-02
Payer: COMMERCIAL

## 2022-05-02 DIAGNOSIS — Z96.652 S/P REVISION OF TOTAL KNEE, LEFT: Primary | ICD-10-CM

## 2022-05-04 NOTE — TELEPHONE ENCOUNTER
See FYI from patient in mychart.    If any action is needed, please route to the appropriate pool (ie.triage or team).    Amanda Kimball, RN    [facility] Triage Nurse

## 2022-05-15 DIAGNOSIS — E87.1 HYPONATREMIA: Primary | ICD-10-CM

## 2022-05-17 DIAGNOSIS — Z01.818 PRE-OPERATIVE CLEARANCE: Primary | ICD-10-CM

## 2022-05-20 ENCOUNTER — DOCUMENTATION ONLY (OUTPATIENT)
Dept: OTHER | Facility: CLINIC | Age: 77
End: 2022-05-20
Payer: COMMERCIAL

## 2022-05-26 ENCOUNTER — LAB (OUTPATIENT)
Dept: LAB | Facility: CLINIC | Age: 77
End: 2022-05-26
Payer: COMMERCIAL

## 2022-05-26 DIAGNOSIS — E87.1 HYPONATREMIA: ICD-10-CM

## 2022-05-26 LAB
ANION GAP SERPL CALCULATED.3IONS-SCNC: 8 MMOL/L (ref 3–14)
BUN SERPL-MCNC: 10 MG/DL (ref 7–30)
CALCIUM SERPL-MCNC: 8.6 MG/DL (ref 8.5–10.1)
CHLORIDE BLD-SCNC: 93 MMOL/L (ref 94–109)
CO2 SERPL-SCNC: 24 MMOL/L (ref 20–32)
CREAT SERPL-MCNC: 0.62 MG/DL (ref 0.66–1.25)
GFR SERPL CREATININE-BSD FRML MDRD: >90 ML/MIN/1.73M2
GLUCOSE BLD-MCNC: 136 MG/DL (ref 70–99)
POTASSIUM BLD-SCNC: 4.6 MMOL/L (ref 3.4–5.3)
SODIUM SERPL-SCNC: 125 MMOL/L (ref 133–144)

## 2022-05-26 PROCEDURE — 36415 COLL VENOUS BLD VENIPUNCTURE: CPT

## 2022-05-26 PROCEDURE — 80048 BASIC METABOLIC PNL TOTAL CA: CPT

## 2022-06-07 ENCOUNTER — TELEPHONE (OUTPATIENT)
Dept: INTERNAL MEDICINE | Facility: CLINIC | Age: 77
End: 2022-06-07
Payer: COMMERCIAL

## 2022-06-07 NOTE — TELEPHONE ENCOUNTER
Christina from [facility] called, state dthat patient no longer needs the MRSA MSSA PCR swab. Patient is having that done there.

## 2022-06-17 ENCOUNTER — OFFICE VISIT (OUTPATIENT)
Dept: INTERNAL MEDICINE | Facility: CLINIC | Age: 77
End: 2022-06-17
Payer: COMMERCIAL

## 2022-06-17 VITALS
OXYGEN SATURATION: 96 % | WEIGHT: 297 LBS | DIASTOLIC BLOOD PRESSURE: 80 MMHG | HEART RATE: 72 BPM | SYSTOLIC BLOOD PRESSURE: 153 MMHG | BODY MASS INDEX: 41.58 KG/M2 | HEIGHT: 71 IN | TEMPERATURE: 97.9 F

## 2022-06-17 DIAGNOSIS — Z91.81 AT HIGH RISK FOR INJURY RELATED TO FALL: ICD-10-CM

## 2022-06-17 DIAGNOSIS — Z01.818 PREOPERATIVE EXAMINATION: Primary | ICD-10-CM

## 2022-06-17 DIAGNOSIS — E87.5 HYPERKALEMIA: ICD-10-CM

## 2022-06-17 DIAGNOSIS — I25.10 CORONARY ARTERY DISEASE INVOLVING NATIVE CORONARY ARTERY OF NATIVE HEART WITHOUT ANGINA PECTORIS: ICD-10-CM

## 2022-06-17 DIAGNOSIS — M17.12 PRIMARY OSTEOARTHRITIS OF LEFT KNEE: ICD-10-CM

## 2022-06-17 DIAGNOSIS — E66.01 MORBID OBESITY DUE TO EXCESS CALORIES (H): ICD-10-CM

## 2022-06-17 DIAGNOSIS — I49.5 TACHY-BRADY SYNDROME (H): ICD-10-CM

## 2022-06-17 DIAGNOSIS — E22.2 SIADH (SYNDROME OF INAPPROPRIATE ADH PRODUCTION) (H): ICD-10-CM

## 2022-06-17 DIAGNOSIS — Z96.652 STATUS POST TOTAL LEFT KNEE REPLACEMENT: ICD-10-CM

## 2022-06-17 LAB
ANION GAP SERPL CALCULATED.3IONS-SCNC: 2 MMOL/L (ref 3–14)
BUN SERPL-MCNC: 12 MG/DL (ref 7–30)
CALCIUM SERPL-MCNC: 9 MG/DL (ref 8.5–10.1)
CHLORIDE BLD-SCNC: 90 MMOL/L (ref 94–109)
CO2 SERPL-SCNC: 31 MMOL/L (ref 20–32)
CREAT SERPL-MCNC: 0.7 MG/DL (ref 0.66–1.25)
ERYTHROCYTE [DISTWIDTH] IN BLOOD BY AUTOMATED COUNT: 12.2 % (ref 10–15)
GFR SERPL CREATININE-BSD FRML MDRD: >90 ML/MIN/1.73M2
GLUCOSE BLD-MCNC: 83 MG/DL (ref 70–99)
HCT VFR BLD AUTO: 39.5 % (ref 40–53)
HGB BLD-MCNC: 14 G/DL (ref 13.3–17.7)
MCH RBC QN AUTO: 32.6 PG (ref 26.5–33)
MCHC RBC AUTO-ENTMCNC: 35.4 G/DL (ref 31.5–36.5)
MCV RBC AUTO: 92 FL (ref 78–100)
PLATELET # BLD AUTO: 173 10E3/UL (ref 150–450)
POTASSIUM BLD-SCNC: 5.5 MMOL/L (ref 3.4–5.3)
RBC # BLD AUTO: 4.29 10E6/UL (ref 4.4–5.9)
SODIUM SERPL-SCNC: 123 MMOL/L (ref 133–144)
WBC # BLD AUTO: 6.4 10E3/UL (ref 4–11)

## 2022-06-17 PROCEDURE — 85027 COMPLETE CBC AUTOMATED: CPT | Performed by: INTERNAL MEDICINE

## 2022-06-17 PROCEDURE — 93000 ELECTROCARDIOGRAM COMPLETE: CPT | Performed by: INTERNAL MEDICINE

## 2022-06-17 PROCEDURE — 36415 COLL VENOUS BLD VENIPUNCTURE: CPT | Performed by: INTERNAL MEDICINE

## 2022-06-17 PROCEDURE — 80048 BASIC METABOLIC PNL TOTAL CA: CPT | Performed by: INTERNAL MEDICINE

## 2022-06-17 PROCEDURE — 99214 OFFICE O/P EST MOD 30 MIN: CPT | Performed by: INTERNAL MEDICINE

## 2022-06-17 NOTE — PATIENT INSTRUCTIONS
Preparing for Your Surgery  Getting started  A nurse will call you to review your health history and instructions. They will give you an arrival time based on your scheduled surgery time. Please be ready to share:  Your doctor's clinic name and phone number  Your medical, surgical and anesthesia history  A list of allergies and sensitivities  A list of medicines, including herbal treatments and over-the-counter drugs  Whether the patient has a legal guardian (ask how to send us the papers in advance)  Please tell us if you're pregnant--or if there's any chance you might be pregnant. Some surgeries may injure a fetus (unborn baby), so they require a pregnancy test. Surgeries that are safe for a fetus don't always need a test, and you can choose whether to have one.   If you have a child who's having surgery, please ask for a copy of Preparing for Your Child's Surgery.    Preparing for surgery  Within 30 days of surgery: Have a pre-op exam (sometimes called an H&P, or History and Physical). This can be done at a clinic or pre-operative center.  If you're having a , you may not need this exam. Talk to your care team.  At your pre-op exam, talk to your care team about all medicines you take. If you need to stop any medicines before surgery, ask when to start taking them again.  We do this for your safety. Many medicines can make you bleed too much during surgery. Some change how well surgery (anesthesia) drugs work.  Call your insurance company to let them know you're having surgery. (If you don't have insurance, call [phone].)  Call your clinic if there's any change in your health. This includes signs of a cold or flu (sore throat, runny nose, cough, rash, fever). It also includes a scrape or scratch near the surgery site.  If you have questions on the day of surgery, call your hospital or surgery center.  COVID testing  You may need to be tested for COVID-19 before having surgery. If so, we will give  you instructions.  Eating and drinking guidelines  For your safety: Unless your surgeon tells you otherwise, follow the guidelines below.  Eat and drink as usual until 8 hours before surgery. After that, no food or milk.  Drink clear liquids until 2 hours before surgery. These are liquids you can see through, like water, Gatorade and Propel Water. You may also have black coffee and tea (no cream or milk).  Nothing by mouth within 2 hours of surgery. This includes gum, candy and breath mints.  If you drink alcohol: Stop drinking it the night before surgery.  If your care team tells you to take medicine on the morning of surgery, it's okay to take it with a sip of water.  Preventing infection  Shower or bathe the night before and morning of your surgery. Follow the instructions your clinic gave you. (If no instructions, use regular soap.)  Don't shave or clip hair near your surgery site. We'll remove the hair if needed.  Don't smoke or vape the morning of surgery. You may chew nicotine gum up to 2 hours before surgery. A nicotine patch is okay.  Note: Some surgeries require you to completely quit smoking and nicotine. Check with your surgeon.  Your care team will make every effort to keep you safe from infection. We will:  Clean our hands often with soap and water (or an alcohol-based hand rub).  Clean the skin at your surgery site with a special soap that kills germs.  Give you a special gown to keep you warm. (Cold raises the risk of infection.)  Wear special hair covers, masks, gowns and gloves during surgery.  Give antibiotic medicine, if prescribed. Not all surgeries need antibiotics.  What to bring on the day of surgery  Photo ID and insurance card  Copy of your health care directive, if you have one  Glasses and hearing aides (bring cases)  You can't wear contacts during surgery  Inhaler and eye drops, if you use them (tell us about these when you arrive)  CPAP machine or breathing device, if you use them  A  few personal items, if spending the night  If you have . . .  A pacemaker, ICD (cardiac defibrillator) or other implant: Bring the ID card.  An implanted stimulator: Bring the remote control.  A legal guardian: Bring a copy of the certified (court-stamped) guardianship papers.  Please remove any jewelry, including body piercings. Leave jewelry and other valuables at home.  If you're going home the day of surgery  You must have a responsible adult drive you home. They should stay with you overnight as well.  If you don't have someone to stay with you, and you aren't safe to go home alone, we may keep you overnight. Insurance often won't pay for this.  After surgery  If it's hard to control your pain or you need more pain medicine, please call your surgeon's office.  Questions?   If you have any questions for your care team, list them here: _________________________________________________________________________________________________________________________________________________________________________ ____________________________________ ____________________________________ ____________________________________  For informational purposes only. Not to replace the advice of your health care provider. Copyright   2003, 2019 [employer]. All rights reserved. Clinically reviewed by Scarlet Fuller MD. [facility] 723313 - REV 07/21.    How to Take Your Medication Before Surgery  - Take all of your medications before surgery except as noted below  -     - apixaban (Eliquis) HOLD 2 days before surgery.  Last dose July 4th   - Furosemide: HOLD on the day of surgery.

## 2022-06-17 NOTE — PROGRESS NOTES
[address] [address]  Phone: [phone]  Primary Provider: Sean Painter  Pre-op Performing Provider: SEAN PAINTER      PREOPERATIVE EVALUATION:  Today's date: 6/17/2022    James Salvador is a 76 year old male who presents for a preoperative evaluation.    Surgical Information:  Surgery/Procedure: L total knee revision  Surgery Location: [facility]  Surgeon: Dr. Mynor Singh  Surgery Date: 07/07/2022  Time of Surgery: TBD  Where patient plans to recover: At home with family  Fax number for surgical facility: [phone]    Type of Anesthesia Anticipated: General    Assessment & Plan     The proposed surgical procedure is considered INTERMEDIATE risk.    Preoperative examination  Primary osteoarthritis of left knee  Status post total left knee replacement  Coronary artery disease involving native coronary artery of native heart without angina pectoris  Morbid obesity due to excess calories (H)  SIADH (syndrome of inappropriate ADH production) (H)  Tachy-edinson syndrome (H)  At high risk for injury related to fall     Implanted Device:   - For full details on implanted device, refer to device management note in Epic from date: pacemaker      Risks and Recommendations:  The patient has the following additional risks and recommendations for perioperative complications:   - Morbid obesity (BMI >40)    Medication Instructions:  Patient is to take all scheduled medications on the day of surgery EXCEPT for modifications listed below:   - apixaban (Eliquis), edoxaban (Savaysa), rivaroxaban (Xarelto): Bleeding risk is moderate or high for this procedure AND CrCl  (>=) 50 mL/min. HOLD 2 days before surgery.    - Diuretics: HOLD on the day of surgery.    RECOMMENDATION:  APPROVAL GIVEN to proceed with proposed procedure, without further diagnostic evaluation.  Monitor fluid intake- SIADH will predispose him to further hyponatremia  w/IVF use    Review of prior external note(s) from - Outside records from ER  Review of the result(s) of each unique test - labs                  Subjective     HPI related to upcoming procedure: Longstanding OA L knee, s/p L TKA previously.     Preop Questions 6/17/2022   1. Have you ever had a heart attack or stroke? YES    2. Have you ever had surgery on your heart or blood vessels, such as a stent placement, a coronary artery bypass, or surgery on an artery in your head, neck, heart, or legs? YES   3. Do you have chest pain with activity? No   4. Do you have a history of  heart failure? YES   5. Do you currently have a cold, bronchitis or symptoms of other infection? No   6. Do you have a cough, shortness of breath, or wheezing? No   7. Do you or anyone in your family have previous history of blood clots? No   8. Do you or does anyone in your family have a serious bleeding problem such as prolonged bleeding following surgeries or cuts? No   9. Have you ever had problems with anemia or been told to take iron pills? No   10. Have you had any abnormal blood loss such as black, tarry or bloody stools? No   11. Have you ever had a blood transfusion? No   12. Are you willing to have a blood transfusion if it is medically needed before, during, or after your surgery? Yes   13. Have you or any of your relatives ever had problems with anesthesia? No   14. Do you have sleep apnea, excessive snoring or daytime drowsiness? YES    14a. Do you have a CPAP machine? No   15. Do you have any artifical heart valves or other implanted medical devices like a pacemaker, defibrillator, or continuous glucose monitor? YES    15a. What type of device do you have? pacemaker   15b. Name of the clinic that manages your device:  [facility]   16. Do you have artificial joints? YES    17. Are you allergic to latex? No       Health Care Directive:  Patient has a Health Care Directive on file      Preoperative Review  of :   reviewed - controlled substances reflected in medication list.          Review of Systems  Constitutional, neuro, ENT, endocrine, pulmonary, cardiac, gastrointestinal, genitourinary, musculoskeletal, integument and psychiatric systems are negative, except as otherwise noted.    Patient Active Problem List    Diagnosis Date Noted     Melanoma of cheek (H) 04/07/2022     Priority: Medium     Impaired gait 10/07/2021     Priority: Medium     Aftercare following surgery of the musculoskeletal system 09/16/2021     Priority: Medium     Tachy-edinson syndrome (H) 05/29/2019     Priority: Medium     Added automatically from request for surgery [id number]       Status post total shoulder arthroplasty, right 11/12/2017     Priority: Medium     Mixed hyperlipidemia 06/21/2017     Priority: Medium     Status post total left knee replacement 05/11/2017     Priority: Medium     DJD (degenerative joint disease) of knee 05/09/2017     Priority: Medium     Formatting of this note might be different from the original.  DJD (degenerative joint disease) of knee--left TKA       Chronic atrial fibrillation (H) 04/21/2017     Priority: Medium     Coronary artery disease involving native coronary artery of native heart, angina presence unspecified 01/11/2016     Priority: Medium     Cardiac cath and PCI 1994. Cardiac Cath 9/2015: BRIDGET to LAD  Replacing diagnoses that were inactivated after the 10/1/2021 regulatory import.       Morbid obesity due to excess calories (H) 01/11/2016     Priority: Medium     History of myocardial infarction      Priority: Medium     PTCA       Paroxysmal supraventricular tachycardia (H)      Priority: Medium     On metoprolol       Actinic keratoses 07/17/2013     Priority: Medium     Hyperlipidemia with target LDL less than 70      Priority: Medium     Benign hypertension      Priority: Medium      Past Medical History:   Diagnosis Date     Actinic cheilitis 7/17/2013    Lower lip, left      Actinic  keratosis      Allergic rhinitis      Arthritis 2004     Basal cell carcinoma      Benign hypertension      CAD (coronary artery disease)     Cardiac cath and PCI 1994. Cardiac Cath 9/2015: BRIDGET to LAD     History of myocardial infarction 1994    PTCA     Hyperlipidemia LDL goal < 70      Malignant melanoma (H)      Melanoma (H) 10/15/2019    10/15/19 left zygoma     Mixed hyperlipidemia 6/21/2017     Morbid obesity due to excess calories (H) 1/11/2016     Paroxysmal supraventricular tachycardia (H)     on metoprolol     Permanent atrial fibrillation (H) 04/21/2017     Squamous cell carcinoma      Stable angina (H) 1/11/2016     Tachy-edinson syndrome (H) 5/29/2019    Added automatically from request for surgery [id number]     Past Surgical History:   Procedure Laterality Date     ANGIOPLASTY  1994    in [place]     ANKLE SURGERY  7/13/2005    right ankle     BIOPSY  10/2019    Facial melanoma     EP PERM PACER SINGLE LEAD N/A 5/31/2019    [employer] single lead pacemaker     HEART CATH STENT COR W/WO PTCA  9/23/2015    BRIDGET stent mid LAD     JOINT REPLACEMENT, HIP RT/LT  10/14/2009    right hip      LASER SURGERY OF EYE  06/01/2002     MOHS MICROGRAPHIC PROCEDURE  06/12/2004    squamous cell carcinoma right temple     SINUS SURGERY  7/11/2006     Current Outpatient Medications   Medication Sig Dispense Refill     Acetaminophen (TYLENOL PO) Take 650 mg by mouth 4 times daily        amoxicillin (AMOXIL) 500 MG capsule 2,000 mg as needed When going to the dentist  0     ASPIRIN NOT PRESCRIBED (INTENTIONAL) Please choose reason not prescribed from choices below. (Patient not taking: Reported on 4/19/2022)       atorvastatin (LIPITOR) 80 MG tablet TAKE 1 TABLET BY MOUTH EVERYDAY AT BEDTIME 90 tablet 3     carvedilol (COREG) 3.125 MG tablet Take 1 tablet (3.125 mg) by mouth 2 times daily (with meals)       ciclopirox (LOPROX) 0.77 % cream Apply topically At Bedtime (Patient not taking: Reported on 4/19/2022) 90 g 3      desonide (DESOWEN) 0.05 % external cream Apply sparingly to affected area on face every day-BID x 1-2 weeks then PRN only 60 g 3     doxycycline hyclate (PERIOSTAT) 20 MG tablet TAKE 1 TABLET BY MOUTH TWICE A DAY       ELIQUIS ANTICOAGULANT 5 MG tablet TAKE 1 TABLET BY MOUTH  TWICE DAILY 180 tablet 1     fluocinonide (LIDEX) 0.05 % external solution Apply to AA BID x 1-2 weeks then PRN only 60 mL 4     fluorouracil (EFUDEX) 5 % external cream Apply to scap BID x 3-4 weeks. Protect area from the sun. 40 g 3     fluticasone (FLONASE) 50 MCG/ACT nasal spray INSTILL 2 SPRAYS INTO BOTH NOSTRILS DAILY. 48 mL 3     furosemide (LASIX) 20 MG tablet Take 1 tablet (20 mg) by mouth daily 90 tablet 3     GABAPENTIN PO Take 600 mg by mouth 3 times daily        ketoconazole (NIZORAL) 2 % cream Apply topically 2 times daily For face. FAX REFILL REQUESTS TO [facility]: [phone] 30 g 2     ketoconazole (NIZORAL) 2 % external shampoo Use daily as needed 120 mL 11     ketotifen (ZADITOR/REFRESH ANTI-ITCH) 0.025 % SOLN Place 1-2 drops into both eyes 2 times daily as needed for itching       lisinopril (ZESTRIL) 30 MG tablet TAKE 1 TABLET BY MOUTH EVERY DAY 90 tablet 3     loratadine (CLARITIN) 10 MG tablet Take 10 mg by mouth daily as needed for allergies       Multiple Vitamin (MULTIVITAMIN OR) Take 1 tablet by mouth daily        nitroGLYcerin (NITROSTAT) 0.4 MG sublingual tablet Place 1 tablet (0.4 mg) under the tongue every 5 minutes as needed for chest pain May repeat twice for a total of 3 tablets.  If chest pain not relieved, call 911 25 tablet 11     Omega-3 Fatty Acids (OMEGA-3 FISH OIL PO) Take 3.2 g by mouth daily        OXcarbazepine (TRILEPTAL PO) Take 300 mg by mouth 3 times daily        triamcinolone (KENALOG) 0.1 % external lotion Apply to scalp BID x 1-2 weeks then PRN only 60 mL 3       Allergies   Allergen Reactions     Cats      Cat Dander     Dogs      Dog Dander     Hydromorphone      Other reaction(s): Confusion  "    No Clinical Screening - See Comments      Pet dander, pollen, grasses, molds     Pollen Extract         Social History     Tobacco Use     Smoking status: Former Smoker     Packs/day: 0.50     Years: 10.00     Pack years: 5.00     Types: Cigarettes     Start date: 1966     Quit date: 1974     Years since quittin.1     Smokeless tobacco: Never Used     Tobacco comment: Also smoked from 1985 - 1990   Substance Use Topics     Alcohol use: Yes     Alcohol/week: 0.0 standard drinks     Comment: Socially       History   Drug Use No         Objective     BP (!) 153/80   Pulse 72   Temp 97.9  F (36.6  C)   Ht 1.803 m (5' 11\")   Wt 134.7 kg (297 lb)   SpO2 96%   BMI 41.42 kg/m      Physical Exam    GENERAL APPEARANCE: alert, no distress and over weight     EYES: EOMI,  PERRL     HENT: normal cephalic/atraumatic     NECK: no adenopathy, no asymmetry, masses, or scars and thyroid normal to palpation     RESP: lungs clear to auscultation - no rales, rhonchi or wheezes     CV: regular rates and rhythm, normal S1 S2, no S3 or S4 and no murmur, click or rub     ABDOMEN:  soft, nontender, no HSM or masses and bowel sounds normal     MS: extremities normal- no gross deformities noted, no evidence of inflammation in joints, FROM in all extremities.     SKIN: no suspicious lesions or rashes     NEURO: Normal strength and tone, sensory exam grossly normal, mentation intact and speech normal     PSYCH: mentation appears normal. and affect normal/bright     LYMPHATICS: No cervical adenopathy    Recent Labs   Lab Test 22  0929 22  1109 22  2337 08/10/21  1512 21  1238   HGB  --   --  14.6  --  14.9   PLT  --   --  199  --  186   * 121*  --    < > 122*   POTASSIUM 4.6 5.1  --    < > 5.1   CR 0.62* 0.65*  --    < > 0.73    < > = values in this interval not displayed.        Diagnostics:  Recent Results (from the past 24 hour(s))   CBC with platelets    Collection Time: 22 " 12:11 PM   Result Value Ref Range    WBC Count 6.4 4.0 - 11.0 10e3/uL    RBC Count 4.29 (L) 4.40 - 5.90 10e6/uL    Hemoglobin 14.0 13.3 - 17.7 g/dL    Hematocrit 39.5 (L) 40.0 - 53.0 %    MCV 92 78 - 100 fL    MCH 32.6 26.5 - 33.0 pg    MCHC 35.4 31.5 - 36.5 g/dL    RDW 12.2 10.0 - 15.0 %    Platelet Count 173 150 - 450 10e3/uL   Basic metabolic panel  (Ca, Cl, CO2, Creat, Gluc, K, Na, BUN)    Collection Time: 06/17/22 12:11 PM   Result Value Ref Range    Sodium 123 (L) 133 - 144 mmol/L    Potassium 5.5 (H) 3.4 - 5.3 mmol/L    Chloride 90 (L) 94 - 109 mmol/L    Carbon Dioxide (CO2) 31 20 - 32 mmol/L    Anion Gap 2 (L) 3 - 14 mmol/L    Urea Nitrogen 12 7 - 30 mg/dL    Creatinine 0.70 0.66 - 1.25 mg/dL    Calcium 9.0 8.5 - 10.1 mg/dL    Glucose 83 70 - 99 mg/dL    GFR Estimate >90 >60 mL/min/1.73m2      EKG: ventricularly paced rhythm, unchanged from previous tracings    Revised Cardiac Risk Index (RCRI):  The patient has the following serious cardiovascular risks for perioperative complications:   - Coronary Artery Disease (MI, positive stress test, angina, Qs on EKG) = 1 point     RCRI Interpretation: 1 point: Class II (low risk - 0.9% complication rate)       Signed Electronically by: Jeronimo Painter MD  Copy of this evaluation report is provided to requesting physician.

## 2022-06-20 ENCOUNTER — APPOINTMENT (OUTPATIENT)
Dept: CT IMAGING | Facility: CLINIC | Age: 77
End: 2022-06-20
Attending: EMERGENCY MEDICINE
Payer: COMMERCIAL

## 2022-06-20 ENCOUNTER — HOSPITAL ENCOUNTER (EMERGENCY)
Facility: CLINIC | Age: 77
Discharge: HOME | End: 2022-06-20
Attending: EMERGENCY MEDICINE | Admitting: EMERGENCY MEDICINE
Payer: COMMERCIAL

## 2022-06-20 ENCOUNTER — APPOINTMENT (OUTPATIENT)
Dept: GENERAL RADIOLOGY | Facility: CLINIC | Age: 77
End: 2022-06-20
Attending: EMERGENCY MEDICINE
Payer: COMMERCIAL

## 2022-06-20 ENCOUNTER — TELEPHONE (OUTPATIENT)
Dept: INTERNAL MEDICINE | Facility: CLINIC | Age: 77
End: 2022-06-20

## 2022-06-20 VITALS
SYSTOLIC BLOOD PRESSURE: 167 MMHG | DIASTOLIC BLOOD PRESSURE: 90 MMHG | OXYGEN SATURATION: 100 % | RESPIRATION RATE: 14 BRPM | TEMPERATURE: 97.9 F | HEART RATE: 59 BPM

## 2022-06-20 DIAGNOSIS — R55 NEAR SYNCOPE: ICD-10-CM

## 2022-06-20 DIAGNOSIS — I95.1 ORTHOSTATIC HYPOTENSION: ICD-10-CM

## 2022-06-20 DIAGNOSIS — E87.1 HYPONATREMIA: ICD-10-CM

## 2022-06-20 DIAGNOSIS — E86.0 DEHYDRATION: ICD-10-CM

## 2022-06-20 LAB
ALBUMIN SERPL-MCNC: 3.6 G/DL (ref 3.4–5)
ALBUMIN UR-MCNC: 10 MG/DL
ALP SERPL-CCNC: 141 U/L (ref 40–150)
ALT SERPL W P-5'-P-CCNC: 34 U/L (ref 0–70)
AMORPH CRY #/AREA URNS HPF: ABNORMAL /HPF
ANION GAP SERPL CALCULATED.3IONS-SCNC: 3 MMOL/L (ref 3–14)
APPEARANCE UR: CLEAR
AST SERPL W P-5'-P-CCNC: 25 U/L (ref 0–45)
ATRIAL RATE - MUSE: 67 BPM
BASOPHILS # BLD AUTO: 0.1 10E3/UL (ref 0–0.2)
BASOPHILS NFR BLD AUTO: 1 %
BILIRUB SERPL-MCNC: 0.3 MG/DL (ref 0.2–1.3)
BILIRUB UR QL STRIP: NEGATIVE
BUN SERPL-MCNC: 15 MG/DL (ref 7–30)
CALCIUM SERPL-MCNC: 8.9 MG/DL (ref 8.5–10.1)
CHLORIDE BLD-SCNC: 93 MMOL/L (ref 94–109)
CO2 SERPL-SCNC: 29 MMOL/L (ref 20–32)
COLOR UR AUTO: ABNORMAL
CREAT SERPL-MCNC: 0.82 MG/DL (ref 0.66–1.25)
DIASTOLIC BLOOD PRESSURE - MUSE: NORMAL MMHG
EOSINOPHIL # BLD AUTO: 0.5 10E3/UL (ref 0–0.7)
EOSINOPHIL NFR BLD AUTO: 5 %
ERYTHROCYTE [DISTWIDTH] IN BLOOD BY AUTOMATED COUNT: 12.8 % (ref 10–15)
GFR SERPL CREATININE-BSD FRML MDRD: >90 ML/MIN/1.73M2
GLUCOSE BLD-MCNC: 98 MG/DL (ref 70–99)
GLUCOSE UR STRIP-MCNC: NEGATIVE MG/DL
HCT VFR BLD AUTO: 39.6 % (ref 40–53)
HGB BLD-MCNC: 13.6 G/DL (ref 13.3–17.7)
HGB UR QL STRIP: NEGATIVE
HOLD SPECIMEN: NORMAL
IMM GRANULOCYTES # BLD: 0 10E3/UL
IMM GRANULOCYTES NFR BLD: 0 %
INTERPRETATION ECG - MUSE: NORMAL
KETONES UR STRIP-MCNC: NEGATIVE MG/DL
LACTATE SERPL-SCNC: 1 MMOL/L (ref 0.7–2)
LEUKOCYTE ESTERASE UR QL STRIP: NEGATIVE
LYMPHOCYTES # BLD AUTO: 0.9 10E3/UL (ref 0.8–5.3)
LYMPHOCYTES NFR BLD AUTO: 10 %
MCH RBC QN AUTO: 32.7 PG (ref 26.5–33)
MCHC RBC AUTO-ENTMCNC: 34.3 G/DL (ref 31.5–36.5)
MCV RBC AUTO: 95 FL (ref 78–100)
MONOCYTES # BLD AUTO: 0.6 10E3/UL (ref 0–1.3)
MONOCYTES NFR BLD AUTO: 7 %
NEUTROPHILS # BLD AUTO: 7.3 10E3/UL (ref 1.6–8.3)
NEUTROPHILS NFR BLD AUTO: 77 %
NITRATE UR QL: NEGATIVE
NRBC # BLD AUTO: 0 10E3/UL
NRBC BLD AUTO-RTO: 0 /100
P AXIS - MUSE: NORMAL DEGREES
PH UR STRIP: 6.5 [PH] (ref 5–7)
PLATELET # BLD AUTO: 168 10E3/UL (ref 150–450)
POTASSIUM BLD-SCNC: 4.3 MMOL/L (ref 3.4–5.3)
PR INTERVAL - MUSE: NORMAL MS
PROT SERPL-MCNC: 8 G/DL (ref 6.8–8.8)
QRS DURATION - MUSE: 206 MS
QT - MUSE: 478 MS
QTC - MUSE: 497 MS
R AXIS - MUSE: -58 DEGREES
RBC # BLD AUTO: 4.16 10E6/UL (ref 4.4–5.9)
RBC URINE: 0 /HPF
SODIUM SERPL-SCNC: 125 MMOL/L (ref 133–144)
SP GR UR STRIP: 1.02 (ref 1–1.03)
SYSTOLIC BLOOD PRESSURE - MUSE: NORMAL MMHG
T AXIS - MUSE: 110 DEGREES
TROPONIN I SERPL HS-MCNC: 39 NG/L
UROBILINOGEN UR STRIP-MCNC: 2 MG/DL
VENTRICULAR RATE- MUSE: 65 BPM
WBC # BLD AUTO: 9.5 10E3/UL (ref 4–11)
WBC URINE: 0 /HPF

## 2022-06-20 PROCEDURE — 96360 HYDRATION IV INFUSION INIT: CPT

## 2022-06-20 PROCEDURE — 84484 ASSAY OF TROPONIN QUANT: CPT | Performed by: EMERGENCY MEDICINE

## 2022-06-20 PROCEDURE — 99285 EMERGENCY DEPT VISIT HI MDM: CPT | Mod: 25

## 2022-06-20 PROCEDURE — 81001 URINALYSIS AUTO W/SCOPE: CPT | Performed by: EMERGENCY MEDICINE

## 2022-06-20 PROCEDURE — 71046 X-RAY EXAM CHEST 2 VIEWS: CPT

## 2022-06-20 PROCEDURE — 70450 CT HEAD/BRAIN W/O DYE: CPT

## 2022-06-20 PROCEDURE — 83605 ASSAY OF LACTIC ACID: CPT | Performed by: EMERGENCY MEDICINE

## 2022-06-20 PROCEDURE — 93005 ELECTROCARDIOGRAM TRACING: CPT

## 2022-06-20 PROCEDURE — 36415 COLL VENOUS BLD VENIPUNCTURE: CPT | Performed by: EMERGENCY MEDICINE

## 2022-06-20 PROCEDURE — 80053 COMPREHEN METABOLIC PANEL: CPT | Performed by: EMERGENCY MEDICINE

## 2022-06-20 PROCEDURE — 258N000003 HC RX IP 258 OP 636: Performed by: EMERGENCY MEDICINE

## 2022-06-20 PROCEDURE — 85004 AUTOMATED DIFF WBC COUNT: CPT | Performed by: EMERGENCY MEDICINE

## 2022-06-20 RX ORDER — LIDOCAINE 40 MG/G
CREAM TOPICAL
Status: DISCONTINUED | OUTPATIENT
Start: 2022-06-20 | End: 2022-06-20 | Stop reason: HOSPADM

## 2022-06-20 RX ADMIN — SODIUM CHLORIDE 1000 ML: 9 INJECTION, SOLUTION INTRAVENOUS at 01:30

## 2022-06-20 ASSESSMENT — ENCOUNTER SYMPTOMS
SHORTNESS OF BREATH: 0
WEAKNESS: 1
VOMITING: 0
FEVER: 0
APPETITE CHANGE: 0
DIARRHEA: 0

## 2022-06-20 NOTE — ED PROVIDER NOTES
History     Chief Complaint:  Near Syncopal Episode     The history is provided by the patient and the spouse. History limited by: Memory Deficits.      James Salvador is a 76 year old male on Eliquis with a pacemaker and history of hypertension, hyperlipidemia, MI, chronic Afib, and CAD who presents with near syncopal episode.The patient's wife reports this evening the patient had taken a shower and when he got out he was walking exceptionally slow and appeared weak and pale. She states he did not think he was going to make it to bed so she helped him sit on the bathroom floor. Patient did not fall to the ground or have a syncopal episode. She adds his right leg was especially weak. She notes that he had a normal appetite today but did not eat dinner since they had a later lunch. She adds that they had gone to a baseball game this morning and the patient took a nap from [id number] after they got home.  Patient reports he has had double vision for a couple days. He has a hard time explaining exactly what he means by double vision.  He denies fever, vomiting, and diarrhea. Denies chest pain and shortness of breath. Per triage note, EMS reports patient's initial blood pressure was 65/38 so they 500 mL of normal saline solution. BP improved on arrival.       Review of Systems   Constitutional: Negative for appetite change and fever.   Eyes: Positive for visual disturbance.   Respiratory: Negative for shortness of breath.    Cardiovascular: Negative for chest pain.   Gastrointestinal: Negative for diarrhea and vomiting.   Skin: Positive for pallor (resolved).   Neurological: Positive for weakness. Negative for syncope.   All other systems reviewed and are negative.    Allergies:  Cats  Dogs  Hydromorphone  Pollen Extract    Medications:  Lipitor   Coreg  Periostat  Eliquis   Efudex  Flonase  Lasix  Zestril   Nitrostat  Trileptal     Past Medical History:     Hyperlipidemia  Hypertension   Actinic  kerastoses  Paroxysmal supraventricular tachycardia  CAD  Obesity   Chronic Afib  Tachy-edinson syndrome  Melanoma of cheek   SIADH  Impaired gait   DJD    Past Surgical History:    Angioplasty   Ankle surgery   Arthroplasty hip   Pacemaker placement  Stent placement   Laser surgery of eye   MOHS Micrographic procedure  Sinus surgery      Family History:    Mother- Genitourinary problems   Father- Cardiovascular problems    Social History:  Patient presents with wife.   Patient arrives via ambulance.    Physical Exam     Patient Vitals for the past 24 hrs:   BP Temp Temp src Pulse Resp SpO2   06/20/22 0315 -- -- -- -- -- 100 %   06/20/22 0300 -- -- -- -- -- 100 %   06/20/22 0245 167/90 -- -- 59 -- --   06/20/22 0145 136/81 -- -- 50 14 100 %   06/20/22 0130 131/75 -- -- 52 14 99 %   06/20/22 0115 112/67 -- -- 49 10 92 %   06/20/22 0100 123/78 97.9  F (36.6  C) Oral 56 15 99 %     Physical Exam  Nursing note and vitals reviewed.  Constitutional: Cooperative. Tired appearing.   HENT:   Mouth/Throat: Very dry mucous membranes.   Eyes: Pupils are equal, round, and reactive to light. Extra occular movements intact.  Cardiovascular: Bradycardic rate, regular rhythm and normal heart sounds.  No murmur. Pacemaker in left upper chest.   Pulmonary/Chest: Effort normal and breath sounds normal. No respiratory distress. No wheezes. No rales.   Abdominal: Soft. Normal appearance and bowel sounds are normal. No distension. There is no tenderness. There is no rigidity and no guarding.   Musculoskeletal: Normal range of motion. No edema in legs.   Neurological: Alert. Oriented x3.  Strength and sensation normal. CN 2-12 intact.   Skin: Skin is warm and dry. No rash noted.   Psychiatric: Normal mood and affect.     Emergency Department Course     ECG  ECG taken at 0052, ECG read at 0100  Ventricular-paced rhythm   Rate 65 bpm. OK interval * ms. QRS duration 206 ms. QT/QTc 478/497 ms. P-R-T axes * 58 110.     Imaging:  XR Chest 2 Views    Final Result   IMPRESSION: Heart size is enlarged but stable. Cardiac pacemaker remains in place. Mildly diminished lung volumes. Chronic mild interstitial prominence, potentially scarring. No overt failure, new lobar consolidation or effusion. Mediastinum and bony    structures are stable in appearance..      CT Head w/o Contrast   Final Result   IMPRESSION:   1.  No CT evidence for acute intracranial process.   2.  Brain atrophy and presumed chronic microvascular ischemic changes as above.        Report per radiology    Laboratory:  Labs Ordered and Resulted from Time of ED Arrival to Time of ED Departure   COMPREHENSIVE METABOLIC PANEL - Abnormal       Result Value    Sodium 125 (*)     Potassium 4.3      Chloride 93 (*)     Carbon Dioxide (CO2) 29      Anion Gap 3      Urea Nitrogen 15      Creatinine 0.82      Calcium 8.9      Glucose 98      Alkaline Phosphatase 141      AST 25      ALT 34      Protein Total 8.0      Albumin 3.6      Bilirubin Total 0.3      GFR Estimate >90     ROUTINE UA WITH MICROSCOPIC - Abnormal    Color Urine Light Yellow      Appearance Urine Clear      Glucose Urine Negative      Bilirubin Urine Negative      Ketones Urine Negative      Specific Gravity Urine 1.017      Blood Urine Negative      pH Urine 6.5      Protein Albumin Urine 10  (*)     Urobilinogen Urine 2.0      Nitrite Urine Negative      Leukocyte Esterase Urine Negative      Amorphous Crystals Urine Few (*)     RBC Urine 0      WBC Urine 0     CBC WITH PLATELETS AND DIFFERENTIAL - Abnormal    WBC Count 9.5      RBC Count 4.16 (*)     Hemoglobin 13.6      Hematocrit 39.6 (*)     MCV 95      MCH 32.7      MCHC 34.3      RDW 12.8      Platelet Count 168      % Neutrophils 77      % Lymphocytes 10      % Monocytes 7      % Eosinophils 5      % Basophils 1      % Immature Granulocytes 0      NRBCs per 100 WBC 0      Absolute Neutrophils 7.3      Absolute Lymphocytes 0.9      Absolute Monocytes 0.6      Absolute Eosinophils  0.5      Absolute Basophils 0.1      Absolute Immature Granulocytes 0.0      Absolute NRBCs 0.0     TROPONIN I - Normal    Troponin I High Sensitivity 39     LACTIC ACID WHOLE BLOOD - Normal    Lactic Acid 1.0         Reviewed:  I reviewed nursing notes, vitals, past medical history and Care Everywhere    Assessments:  0100 I obtained history and examined the patient as noted above.   0238 Nurse did orthostatics results below:   Sitting Orthostatic BP: 150/94 Sitting Orthostatic Pulse: 60 bpm Lying Orthostatic BP: 142/74 Lying Orthostatic Pulse: 54 bpm Standing Orthostatic BP: 167/90 Standing Orthostatic Pulse: 59 bpm  0306  I rechecked the patient and explained findings. Feels improved following IV fluids. Successfully ambulated to bathroom with his walker.    Interventions:  0130 NS, 1000 mL, IV    Disposition:  The patient was discharged to home.     Impression & Plan     Medical Decision Making:  Mr. Salvador is a 76 year old gentleman who presents after a near syncopal event. History was difficult to obtain, sounds like the patient has some baseline memory deficits per his wife. He was out in the heat today watching a baseball from his truck. Clinically he has very dry mucous membrane and I suspect dehydration ultimately as the cause for his near syncopal episode. He did not have dinner.  He never fully lost consciousness. No lateral neurological deficits on exam. After fluid from EMS, his blood pressure has improved. Following an additional liter of IV fluid here, his orthostatic blood pressure and heart rate is normal. Head scan, chest X-ray, and basic labs are reassuring. He is chronically hyponatremic, but this is baseline for him. EKG shows a paced hythm with a reassuring troponin. He is now ambulated in the ER without difficulty. Clinical suspicion for acute stroke or any life threatening event is very low and he will be discharged home in his wife care in stable condition.     Diagnosis:    ICD-10-CM     1. Dehydration  E86.0    2. Orthostatic hypotension  I95.1    3. Near syncope  R55    4. Hyponatremia - chronic  E87.1      Scribe Disclosure:  I, Jennifer Jean & Cristina Crane, am serving as a scribe at 12:58 AM on 6/20/2022 to document services personally performed by Sim Nina MD based on my observations and the provider's statements to me.            Sim Nina MD  06/20/22 0413

## 2022-06-20 NOTE — TELEPHONE ENCOUNTER
Patient's wife calling to inform that the patient collapsed last night.     Was told at the ED that he was dehydrated. Given fluids and potassium is now normal.    Lab only for today cancelled.    Patient is still scheduled for surgery 6/7. Wife states that she will help to continue to monitor symptoms.  Jazmin Hernandez RN

## 2022-06-20 NOTE — TELEPHONE ENCOUNTER
Monitor BP and given fact that he is on furosemide daily if it remains low we may want to adjust things before surgery.  Hopefully this was a one time issue related to being outside yesterday in the heat and humidity.

## 2022-06-20 NOTE — ED TRIAGE NOTES
Pt to ED via Ems from home with c/o near syncopal episode after getting out of shower. Upon arrival EMS's initial BP was 65/38. After 500 CC NS BP increased and pt is now normotensive. Pt a/o x4.  for EMS.      Triage Assessment     Row Name 06/20/22 0051       Triage Assessment (Adult)    Airway WDL WDL       Respiratory WDL    Respiratory WDL WDL       Skin Circulation/Temperature WDL    Skin Circulation/Temperature WDL WDL       Cardiac WDL    Cardiac WDL WDL       Peripheral/Neurovascular WDL    Peripheral Neurovascular WDL WDL       Cognitive/Neuro/Behavioral WDL    Cognitive/Neuro/Behavioral WDL WDL

## 2022-06-20 NOTE — TELEPHONE ENCOUNTER
Spoke to patient's spouse, Renea, to relay providers message.     Spouse stated patient had a decreased fluid intake over weekend and meals were off schedule. She and pt will continue to monitor and pt is increasing fluid intake. Pt is checking BP daily.

## 2022-06-22 DIAGNOSIS — I48.91 NEW ONSET ATRIAL FIBRILLATION (H): ICD-10-CM

## 2022-06-24 RX ORDER — APIXABAN 5 MG/1
TABLET, FILM COATED ORAL
Qty: 180 TABLET | Refills: 1 | Status: SHIPPED | OUTPATIENT
Start: 2022-06-24 | End: 2022-07-15

## 2022-07-14 ENCOUNTER — LAB REQUISITION (OUTPATIENT)
Dept: LAB | Facility: CLINIC | Age: 77
End: 2022-07-14

## 2022-07-14 ENCOUNTER — PATIENT OUTREACH (OUTPATIENT)
Dept: CARE COORDINATION | Facility: CLINIC | Age: 77
End: 2022-07-14

## 2022-07-14 DIAGNOSIS — Z00.01 ENCOUNTER FOR GENERAL ADULT MEDICAL EXAMINATION WITH ABNORMAL FINDINGS: ICD-10-CM

## 2022-07-14 PROCEDURE — U0003 INFECTIOUS AGENT DETECTION BY NUCLEIC ACID (DNA OR RNA); SEVERE ACUTE RESPIRATORY SYNDROME CORONAVIRUS 2 (SARS-COV-2) (CORONAVIRUS DISEASE [COVID-19]), AMPLIFIED PROBE TECHNIQUE, MAKING USE OF HIGH THROUGHPUT TECHNOLOGIES AS DESCRIBED BY CMS-2020-01-R: HCPCS | Performed by: NURSE PRACTITIONER

## 2022-07-14 NOTE — PROGRESS NOTES
Clinic Care Coordination Contact  Care Team Conversations    Situation: .RN Clinical Product Navigator  following patient through TCU care progression.     Background: Patient was inpatient at [facility] 7/7/22-7/14/22 due to s/p TKA, pain, hyponatremia and urinary retention.  [facility]    Assessment: Prior to hospitalization patient was living in a split level home with wife.  Patient has the following equipment at home: reacher, sock aid, shower chair, grab bars in shower, elevated toilet seat and FWW.    Discharge recommendations: Patient to follow-up with orthopedics in 2 weeks.  SBAT to left LE with brace locked in extension, no flexion for 6 weeks.  Discharge with chavez catheter and outpatient follow-up.    Plan/Recommendations: RN Clinical Product Navigator  will send TCU Care Team Care Management hand in communication and request involvement in discharge planning and coordination of care.      Melissa Behl BSN, RN, PHN, [facility]  RN Clinical Product Navigator  [phone]

## 2022-07-15 ENCOUNTER — TRANSITIONAL CARE UNIT VISIT (OUTPATIENT)
Dept: GERIATRICS | Facility: CLINIC | Age: 77
End: 2022-07-15
Payer: COMMERCIAL

## 2022-07-15 ENCOUNTER — NURSING HOME VISIT (OUTPATIENT)
Dept: GERIATRICS | Facility: CLINIC | Age: 77
End: 2022-07-15
Payer: COMMERCIAL

## 2022-07-15 VITALS
RESPIRATION RATE: 18 BRPM | OXYGEN SATURATION: 94 % | DIASTOLIC BLOOD PRESSURE: 78 MMHG | SYSTOLIC BLOOD PRESSURE: 159 MMHG | HEART RATE: 57 BPM | TEMPERATURE: 98.2 F

## 2022-07-15 DIAGNOSIS — G50.0 TRIGEMINAL NEURALGIA: ICD-10-CM

## 2022-07-15 DIAGNOSIS — R53.81 PHYSICAL DECONDITIONING: ICD-10-CM

## 2022-07-15 DIAGNOSIS — I25.10 CORONARY ARTERY DISEASE INVOLVING NATIVE CORONARY ARTERY OF NATIVE HEART WITHOUT ANGINA PECTORIS: ICD-10-CM

## 2022-07-15 DIAGNOSIS — I47.10 PAROXYSMAL SUPRAVENTRICULAR TACHYCARDIA (H): ICD-10-CM

## 2022-07-15 DIAGNOSIS — R33.9 URINARY RETENTION: ICD-10-CM

## 2022-07-15 DIAGNOSIS — E66.01 MORBID OBESITY (H): ICD-10-CM

## 2022-07-15 DIAGNOSIS — Z95.0 S/P PLACEMENT OF CARDIAC PACEMAKER: ICD-10-CM

## 2022-07-15 DIAGNOSIS — I49.5 TACHY-BRADY SYNDROME (H): ICD-10-CM

## 2022-07-15 DIAGNOSIS — C43.39 MELANOMA OF CHEEK (H): ICD-10-CM

## 2022-07-15 DIAGNOSIS — D62 ABLA (ACUTE BLOOD LOSS ANEMIA): ICD-10-CM

## 2022-07-15 DIAGNOSIS — Z71.89 COUNSELING REGARDING ADVANCED DIRECTIVES: ICD-10-CM

## 2022-07-15 DIAGNOSIS — I10 BENIGN HYPERTENSION: ICD-10-CM

## 2022-07-15 DIAGNOSIS — E78.5 HYPERLIPIDEMIA WITH TARGET LDL LESS THAN 70: ICD-10-CM

## 2022-07-15 DIAGNOSIS — Z96.659 STATUS POST REVISION OF TOTAL KNEE REPLACEMENT, UNSPECIFIED LATERALITY: Primary | ICD-10-CM

## 2022-07-15 DIAGNOSIS — I48.20 CHRONIC ATRIAL FIBRILLATION (H): ICD-10-CM

## 2022-07-15 DIAGNOSIS — E22.2 SIADH (SYNDROME OF INAPPROPRIATE ADH PRODUCTION) (H): ICD-10-CM

## 2022-07-15 PROCEDURE — 86481 TB AG RESPONSE T-CELL SUSP: CPT | Performed by: NURSE PRACTITIONER

## 2022-07-15 PROCEDURE — 99207 PR NO CHARGE LOS: CPT | Performed by: PHYSICIAN ASSISTANT

## 2022-07-15 PROCEDURE — 36415 COLL VENOUS BLD VENIPUNCTURE: CPT | Performed by: NURSE PRACTITIONER

## 2022-07-15 PROCEDURE — P9604 ONE-WAY ALLOW PRORATED TRIP: HCPCS | Performed by: NURSE PRACTITIONER

## 2022-07-15 PROCEDURE — 99310 SBSQ NF CARE HIGH MDM 45: CPT | Performed by: NURSE PRACTITIONER

## 2022-07-15 RX ORDER — DESONIDE 0.5 MG/G
CREAM TOPICAL DAILY
COMMUNITY
End: 2024-02-21

## 2022-07-15 RX ORDER — SENNOSIDES 8.6 MG
2 TABLET ORAL DAILY
COMMUNITY
End: 2022-10-06

## 2022-07-15 RX ORDER — LISINOPRIL 20 MG/1
20 TABLET ORAL DAILY
COMMUNITY
End: 2022-10-12

## 2022-07-15 RX ORDER — CEFADROXIL 500 MG/1
1000 CAPSULE ORAL 2 TIMES DAILY
COMMUNITY
Start: 2022-07-14 | End: 2022-07-21

## 2022-07-15 RX ORDER — ACETAMINOPHEN 325 MG/1
650 TABLET ORAL 4 TIMES DAILY
COMMUNITY

## 2022-07-15 NOTE — PROGRESS NOTES
[facility]  INITIAL VISIT NOTE  July 18, 2022    PRIMARY CARE PROVIDER AND CLINIC:  Jeronimo Painter [address]  [address] / [facility] [address]    CHIEF COMPLAINT:  Hospital follow-up/Initial visit    HPI:    James Salvador is a 77 year old  (1945) male who was [facility] July [facility], 20[facility] for an initial visit.     Medical history is notable for CAD, hypertension, dyslipidemia, permanent atrial fibrillation, tachycardia-bradycardia syndrome, s/p pacemaker, PSVT, SIADH, chronic hyponatremia, trigeminal neuralgia, periodontitis, tremor, SCC, melanoma, actinic keratosis, osteoarthritis, and morbid obesity.    Summary of hospital course:  Patient was hospitalized at [facility] from July 7 through July 14, 2022 for planned left TKA revision for failed total knee replacement.  Surgery was performed on July 7, 2022.  Postop hemoglobin dropped to 10.3.  Hospital course was complicated by acute metabolic toxic encephalopathy which was felt to be multifactorial due to hyponatremia and medications.  Mental status improved after adjusting medications and improvement of sodium level.  A Gill catheter was placed for urinary retention.  Ortho recommended WBAT to left LE with brace locked in extension.    Patient is admitted to this facility for medical management, nursing care, and rehab.     Of note, history was obtained from patient, facility RN, and extensive review of the chart.    Today's visit:  Patient was seen in his room, while lying in bed.  He is wearing a left knee hinged brace.  He rates his left knee pain at 4 out of 10.  Pain worsens with any movement.  He has a Gill catheter.  He reports that he had a bowel movement 4 days ago.  He denies fever, chills, chest pain, palpitation, dyspnea, nausea, vomiting, abdomen, or urinary symptoms.  He states that after reducing the dose of oxcarbazepine in the hospital, he has some discomfort on his right side of the  face due to trigeminal neuralgia.  I explained to the patient that we can gradually increase his oxcarbazepine to PTA dose but he also has a neurology appointment at the end of the month and he can wait until that time, which he prefers that.  I also stressed the importance of re-implementing fluid restriction given his SIADH and hyponatremia.    CODE STATUS:   CPR/Full code     PAST MEDICAL HISTORY:   CAD, s/p MI in 1994 (status post PCI and angioplasty) and BRIDGET to mid LAD in September 2015 for unstable angina  Hypertension  Dyslipidemia  Permanent atrial fibrillation  Tachycardia-bradycardia syndrome with paroxysmal complete AV block, s/p cardiac pacemaker in May 2019  PSVT  SIADH  Chronic hyponatremia  Trigeminal neuralgia  Periodontitis  Tremor  SCC/BCC of skin   Melanoma, left zygoma  Actinic keratosis  Seborrheic dermatitis  Allergic rhinitis  Osteoarthritis  Rotator cuff tear  Chronic shoulder pain  Morbid obesity, BMI 40.5    Past Medical History:   Diagnosis Date     Actinic cheilitis 07/17/2013    Lower lip, left      Actinic keratosis      Allergic rhinitis      Arthritis 2004     Basal cell carcinoma      Benign hypertension      CAD (coronary artery disease)     Cardiac cath and PCI 1994. Cardiac Cath 9/2015: BRIDGET to LAD     Hyperlipidemia      Malignant melanoma      Morbid obesity 01/11/2016     Paroxysmal supraventricular tachycardia     on metoprolol     Permanent atrial fibrillation 04/21/2017     Squamous cell carcinoma      Tachy-edinson syndrome 05/29/2019       PAST SURGICAL HISTORY:   Past Surgical History:   Procedure Laterality Date     ANGIOPLASTY  1994    in [place]     ANKLE SURGERY  07/13/2005    right ankle     ARTHROPLASTY HIP Right 10/2009     EP PERM PACER SINGLE LEAD N/A 05/31/2019    Medtronic single lead pacemaker     Facial biopsy - Melanoma       HEART CATH STENT COR W/WO PTCA  09/23/2015    BRIDGET stent mid LAD     LASER SURGERY OF EYE  06/01/2002     MOHS MICROGRAPHIC PROCEDURE   2004    squamous cell carcinoma right temple     SINUS SURGERY  2006       FAMILY HISTORY:   Family History   Problem Relation Age of Onset     Genitourinary Problems Mother         renal failure     Cardiovascular Father         rupture of dorsal aorta     Depression Son      Skin Cancer No family hx of        SOCIAL HISTORY:  Social History     Tobacco Use     Smoking status: Former Smoker     Packs/day: 0.50     Years: 10.00     Pack years: 5.00     Types: Cigarettes     Start date: 1966     Quit date: 1974     Years since quittin.2     Smokeless tobacco: Never Used     Tobacco comment: Also smoked from 1985 - 1990   Substance Use Topics     Alcohol use: Yes     Alcohol/week: 0.0 standard drinks     Comment: Socially       MEDICATIONS:  Current Outpatient Medications   Medication Sig Dispense Refill     acetaminophen (TYLENOL) 325 MG tablet Take 650 mg by mouth 4 times daily       apixaban ANTICOAGULANT (ELIQUIS) 5 MG tablet Take 5 mg by mouth daily       atorvastatin (LIPITOR) 80 MG tablet TAKE 1 TABLET BY MOUTH EVERYDAY AT BEDTIME 90 tablet 3     carvedilol (COREG) 3.125 MG tablet Take 1 tablet (3.125 mg) by mouth 2 times daily (with meals)       cefadroxil (DURICEF) 500 MG capsule Take 1,000 mg by mouth 2 times daily       desonide (DESOWEN) 0.05 % external cream Apply topically daily       doxycycline hyclate (PERIOSTAT) 20 MG tablet TAKE 1 TABLET BY MOUTH TWICE A DAY       fluorouracil (EFUDEX) 5 % external cream Apply to scap BID x 3-4 weeks. Protect area from the sun. 40 g 3     fluticasone (FLONASE) 50 MCG/ACT nasal spray INSTILL 2 SPRAYS INTO BOTH NOSTRILS DAILY. 48 mL 3     furosemide (LASIX) 20 MG tablet Take 1 tablet (20 mg) by mouth daily 90 tablet 3     GABAPENTIN PO Take 900 mg by mouth 3 times daily       ketoconazole (NIZORAL) 2 % cream Apply topically 2 times daily For face. FAX REFILL REQUESTS TO [facility]: [phone] 30 g 2     ketoconazole (NIZORAL) 2 %  "external shampoo Use daily as needed 120 mL 11     lisinopril (ZESTRIL) 20 MG tablet Take 20 mg by mouth daily       Multiple Vitamin (MULTIVITAMIN OR) Take 1 tablet by mouth daily        nitroGLYcerin (NITROSTAT) 0.4 MG sublingual tablet Place 1 tablet (0.4 mg) under the tongue every 5 minutes as needed for chest pain May repeat twice for a total of 3 tablets.  If chest pain not relieved, call 911 25 tablet 11     OXcarbazepine (TRILEPTAL PO) Give 600mg at 7am AND Give 900 mg by mouth two times a day for Nerve Pain Give 900mg at 1600 and 900 mg at 2200.       sennosides (SENOKOT) 8.6 MG tablet Take 2 tablets by mouth daily         Post Discharge Medication Reconciliation Status: discharge medications reconciled and changed, per note/orders.        ALLERGIES:  Allergies   Allergen Reactions     Cats      Cat Dander     Dogs      Dog Dander     Hydromorphone      Other reaction(s): Confusion     Pollen Extract        ROS:  10 point ROS were negative other than the symptoms noted above in the HPI.    PHYSICAL EXAM:  Vital signs were reviewed in the chart.  Vital Signs: BP (!) 155/75   Pulse 67   Temp 98.3  F (36.8  C)   Resp 18   Ht 1.803 m (5' 11\")   Wt 131.8 kg (290 lb 9.6 oz)   SpO2 97%   BMI 40.53 kg/m    General: Comfortable and in no acute distress  HEENT: Conjunctival pallor, no scleral icterus or injection, moist oral mucosa  Cardiovascular: Normal S1, S2, irregularly irregular rhythm, normal rate  Respiratory: Lungs clear to auscultation bilaterally  GI: Abdomen soft, non-tender, non-distended, +BS  Extremities: Left knee hinged brace is on and his left lower extremity is wrapped.  There is no significant right lower extremity.  Neuro: CX II-XII grossly intact; ROM in all four extremities grossly intact  Psych: Alert and oriented x3; normal affect  Skin: No acute rash    LABORATORY/IMAGING DATA:  All relevant labs and imaging data in [facility] and/or Care Everywhere were personally reviewed " today.      Chemistry profile (July 13, 2022): Sodium 128, potassium 4.5, creatinine 0.55, BUN 8, calcium 9, glucose 99    Hemoglobin (July 9, 2022): 10.3      ASSESSMENT/PLAN:  Failed total knee replacement, s/p L TKA revision on July 7, 2022,  Physical conditioning.  Patient is hemodynamically stable.  He has moderate surgical pain.  Plan:  WBAT to left LE with brace locked in extension  Staff to assist with daily care and mobility   Continue PT/OT evaluation and therapy  Continue pain management with scheduled acetaminophen 650 mg p.o. 4 times daily as well as oxcarbazepine and gabapentin as below  Continue DVT prophylaxis with apixaban for atrial fibrillation  Continue prophylactic postop antibiotic therapy with cefadroxil 1000 mg p.o. twice daily through July 21  Follow-up with Ortho as recommended    Acute blood loss anemia.  Due to knee surgery.  Postop hemoglobin dropped to 10.3 from initial 14.3.  Plan:  Recheck CBC on July 19 as ordered  Transfuse PRN for hemoglobin less than 7    Postop encephalopathy, resolved.  Multifactorial due to profound hyponatremia and medications.  PTA oxcarbazepine dose was reduced in the hospital.  He is now oriented x3.  Plan:  Monitor mental status    Postop urinary retention.  A Gill catheter was placed in the hospital.  Plan:  Continue Gill  Voiding trial when patient is more ambulatory    SIADH with chronic hyponatremia.  Profound hyponatremia with sodium level 120 on July 8.  SIADH likely due to oxcarbazepine.  Sodium level improved to 128 on July 13 with fluid restriction in the hospital.  Plan:  Resume fluid restriction at 1.8 L per 24 hours  Recheck BMP on July 19 as ordered    CAD, s/p MI in 1994 (status post PCI and angioplasty) and BRIDGET to mid LAD in September 2015 for unstable angina,  Essential hypertension,  Dyslipidemia.  Stable.  Blood pressure is controlled.  Patient is not on antiplatelet therapy due to chronic anticoagulation.  Plan:  Continue atorvastatin  80 mg p.o. daily, lisinopril 20 mg p.o. daily, carvedilol 3.125 mg p.o. twice daily, and furosemide 20 mg p.o. daily  Nitroglycerin sublingual available PRN for angina  Monitor blood pressure and cardiac status    Permanent atrial fibrillation,  Tachycardia-bradycardia syndrome with paroxysmal complete AV block, s/p cardiac pacemaker in May 2019  History of PSVT.  On today's examination, he has irregular rhythm but rate is controlled.  Plan:  Increase apixaban to PTA dose of 5 mg p.o. twice daily  Continue carvedilol 3.125 mg p.o. twice daily  Monitor heart rate    Trigeminal neuralgia.  Oxcarbazepine dose was reduced in hospital due to hyponatremia.  He reports some discomfort on the right side of his face.  Plan:  Continue gabapentin 900 mg p.o. 3 times daily  Continue oxcarbazepine 600 mg daily at 7 AM, 900 mg p.o. at 1600, and 900 mg p.o. at 2200  Follow-up with neurology as directed    Periodontitis.  Plan:  Continue chronic doxycycline 20 mg p.o. twice daily    BCC of skin,  Right neck SCC in situ, s/p Mohs excision in February 2022,  Left zygoma melanoma.  Patient is followed by dermatology.  Plan:  Continue fluorouracil 5% cream topical twice daily  Follow-up with dermatology as scheduled    Slow transit constipation.  Plan:  Continue the bowel regimen    Morbid obesity, BMI 40.5.  This is clinically significant due to increased nursing cares, use of resources, and specialty equipment.   Plan:  Encourage participation in a long-term plan for weight loss and healthy lifestyle  Staff to assist with daily care and mobility          Orders written by provider at facility:  Increase apixaban to 5 mg p.o. twice daily  Fluid restriction of 1.8 L per 24 hours for SIADH            Disclaimer: This note may contain text created using speech-recognition software and may contain unintended word substitutions.      Electronically signed by:  Agustín Toledo MD

## 2022-07-15 NOTE — PROGRESS NOTES
[facility]ERIATRICS    PRIMARY CARE PROVIDER AND CLINIC:  Jeronimo Painter MD, [address] / [facility] [address]  Chief Complaint   Patient presents with     Hospital F/U      [facility] Medical Record Number:  [id number]  Place of Service where encounter took place:  [facility] (TCU) [[id number]]    James Salvador  is a 77 year old  (1945), admitted to the above facility from  [facility] . Hospital stay 7/7/22 through 7/14/22.  HPI:    78yo male with PMH CAD (most recent stent in 2015), tachy-edinson syndrome s/p pacemaker, afib and pSVT, HTN, HLD, SIADH with chronic hyponatremia, morbid obesity, H/O malignant melanoma admitted for revision left TKA. Post op multifactorial metabolic encephalopathy - improved with med adjustment and increase in NA level. Trigeminal neuralgia treated with gabapentin and trileptal. Urinary retention chavez in place. To TCU for therapies.     Patient seen for initial TCU visit. Reports pain managed adequately with currently available meds. Complains of constipation. No headaches, dizziness, chest pain, dyspnea, bladder issues. Sats 94% room air. BP range 153-159/78-85 since admission. Reviewed code status - Full Code confirmed.     CODE STATUS/ADVANCE DIRECTIVES DISCUSSION:  Prior  CPR/Full code   ALLERGIES:   Allergies   Allergen Reactions     Cats      Cat Dander     Dogs      Dog Dander     Hydromorphone      Other reaction(s): Confusion     Pollen Extract       PAST MEDICAL HISTORY:   Past Medical History:   Diagnosis Date     Actinic cheilitis 07/17/2013    Lower lip, left      Actinic keratosis      Allergic rhinitis      Arthritis 2004     Basal cell carcinoma      Benign hypertension      CAD (coronary artery disease)     Cardiac cath and PCI 1994. Cardiac Cath 9/2015: BRIDGET to LAD     Hyperlipidemia      Malignant melanoma      Morbid obesity 01/11/2016     Paroxysmal supraventricular tachycardia     on metoprolol     Permanent atrial  fibrillation 04/21/2017     Squamous cell carcinoma      Tachy-edinson syndrome 05/29/2019      PAST SURGICAL HISTORY:   has a past surgical history that includes angioplasty (1994); laser surgery of eye (06/01/2002); Mohs micrographic procedure (06/12/2004); Ankle surgery (07/13/2005); sinus surgery (07/11/2006); Heart Cath Stent cor w/wo ptca (09/23/2015); Permanent Pacer Single Lead (N/A, 05/31/2019); Facial biopsy - Melanoma; and Arthroplasty hip (Right, 10/2009).  FAMILY HISTORY: family history includes Cardiovascular in his father; Depression in his son; Genitourinary Problems in his mother.  SOCIAL HISTORY:   reports that he quit smoking about 48 years ago. His smoking use included cigarettes. He started smoking about 55 years ago. He has a 5.00 pack-year smoking history. He has never used smokeless tobacco. He reports current alcohol use. He reports that he does not use drugs.  Patient's living condition: lives with spouse    Post Discharge Medication Reconciliation Status: discharge medications reconciled and changed, per note/orders  Current Outpatient Medications   Medication Sig     acetaminophen (TYLENOL) 325 MG tablet Take 650 mg by mouth 4 times daily     apixaban ANTICOAGULANT (ELIQUIS) 5 MG tablet Take 5 mg by mouth 2 times daily     atorvastatin (LIPITOR) 80 MG tablet TAKE 1 TABLET BY MOUTH EVERYDAY AT BEDTIME     carvedilol (COREG) 3.125 MG tablet Take 1 tablet (3.125 mg) by mouth 2 times daily (with meals)     cefadroxil (DURICEF) 500 MG capsule Take 1,000 mg by mouth 2 times daily Through July 21     desonide (DESOWEN) 0.05 % external cream Apply topically daily     doxycycline hyclate (PERIOSTAT) 20 MG tablet TAKE 1 TABLET BY MOUTH TWICE A DAY     fluorouracil (EFUDEX) 5 % external cream Apply to scap BID x 3-4 weeks. Protect area from the sun.     fluticasone (FLONASE) 50 MCG/ACT nasal spray INSTILL 2 SPRAYS INTO BOTH NOSTRILS DAILY.     furosemide (LASIX) 20 MG tablet Take 1 tablet (20 mg) by  mouth daily     GABAPENTIN PO Take 900 mg by mouth 3 times daily     ketoconazole (NIZORAL) 2 % cream Apply topically 2 times daily For face. FAX REFILL REQUESTS TO  [facility]: [phone]     ketoconazole (NIZORAL) 2 % external shampoo Use daily as needed     lisinopril (ZESTRIL) 20 MG tablet Take 20 mg by mouth daily     Multiple Vitamin (MULTIVITAMIN OR) Take 1 tablet by mouth daily      nitroGLYcerin (NITROSTAT) 0.4 MG sublingual tablet Place 1 tablet (0.4 mg) under the tongue every 5 minutes as needed for chest pain May repeat twice for a total of 3 tablets.  If chest pain not relieved, call 911     OXcarbazepine (TRILEPTAL PO) Give 600mg at 7am AND Give 900 mg by mouth two times a day for Nerve Pain Give 900mg at 1600 and 900 mg at 2200.     sennosides (SENOKOT) 8.6 MG tablet Take 2 tablets by mouth daily     No current facility-administered medications for this visit.       ROS:  10 point ROS of systems including Constitutional, Eyes, Respiratory, Cardiovascular, Gastroenterology, Genitourinary, Integumentary, Musculoskeletal, Psychiatric were all negative except for pertinent positives noted in my HPI.    Vitals:  BP (!) 159/78   Pulse 57   Temp 98.2  F (36.8  C)   Resp 18   SpO2 94%   Exam:  GENERAL APPEARANCE:  Alert, in no distress, pleasant, cooperative, oriented x 4  EYES:  EOM, lids, pupils and irises normal, sclera clear and conjunctiva normal, no discharge or mattering on lids or lashes noted  ENT:  Mouth normal, moist mucous membranes, nose normal without drainage or crusting, external ears without lesions, hearing acuity intact  NECK: supple, symmetrical, trachea midline  RESP:  respiratory effort normal, no chest wall tenderness, no respiratory distress, Lung sounds clear, patient is on room air  CV:  Auscultation of heart done, rate and rhythm controlled and regular, no murmur, no rub or gallop. Edema none bilateral lower extremities.   ABDOMEN:  normal bowel sounds, soft, nontender, no  palpable masses.  M/S:   Gait and station not assessed, no tenderness or swelling of the joints; able to move all extremities, digits normal  NEURO: cranial nerves 2-12 grossly intact, no facial asymmetry, no speech deficits and able to follow directions, moves all extremities symmetrically  PSYCH:  insight and judgement and memory appear intact, affect and mood normal     Lab/Diagnostic data:  7/13: K 4.5, Na 128, Cr 0.55  7/9 Hgb 10.3    ASSESSMENT/PLAN:  Status post revision of total knee replacement, unspecified laterality  Physical deconditioning  Acute, ongoing. Continue Duricef 1000 mg BID x 7 days post op, tylenol 650 mg QID, monitor for effectiveness. Therapies as ordered and f/u with progress next visit. Ortho f/u as planned.     ABLA (acute blood loss anemia)  Acute with surgery. CBC check next week and f/u with results.     Coronary artery disease involving native coronary artery of native heart without angina pectoris  Tachy-edinson syndrome (H)  S/P placement of cardiac pacemaker  Chronic atrial fibrillation (H)  Paroxysmal supraventricular tachycardia (H)  Benign hypertension  Chronic, appears stable and compensated. Continue PTA lisinopril 20 mg daily, coreg 3.125 mg PO BID, lasix 20 mg daily, NTG as needed, apixaban 5 mg BID, monitor vs and wt and review ranges next visit.     Hyperlipidemia with target LDL less than 70  Chronic, continue statin Lipitor 20 mg daily as PTA.     SIADH (syndrome of inappropriate ADH production) (H)  Chronic. Na 128 7/13. BMP next week, f/u with results.     Morbid obesity (H)  Chronic, monitor for safety and assist with mobility. Encourage weight loss.     Melanoma of cheek (H)  By history, oncology f/u per home routine.     Trigeminal neuralgia  Chronic, managed well with PTA trileptal 600 mg in AM and 900 mg at 1400 and 2200 daily, gabapentin 900 mg TID, monitor.     Urinary retention  By history, able to urinate at this time. Monitor.     Slow constipation  Acute,  increase senna to 2 tabs PO daily and give MOM 30 ml PO today. Staff to update provider if not effective.     Counseling regarding advanced directives  Reviewed code status - Full Code confirmed.     Orders:  1. Full Code  2. Increase senna to 2 tabs PO daily diagnosis constipation  3. MOM 30 ml PO today diagnosis constipation  4. CBC and BMP 7/19 diagnosis anemia, SIADH    Total unit/floor time of 40 minutes spent consisted of the following: examination of patient, reviewing the record including pertinent labs and imaging. More than 50% of this time was spent in coordination of care with the patient, nursing staff, and other healthcare providers. This time was spent on discussing the care plan including labs, discharge/safe placement, and specialty follow up. Additional specific discussion included review of code status, pain management, management of constipation, needed follow up labs and visits, expected TCU course/routine/discharge planning, clarification of meds and ordered with staff for a total of over 22 minutes.     Electronically signed by:  NAV [place] CNP

## 2022-07-15 NOTE — PROGRESS NOTES
Ortho Nursing home visit    James Salvador is a 77 year old male who resides at [facility];    Patient is seen today for questions post-op after revision TKA :       Past Medical History:   Diagnosis Date     Actinic cheilitis 07/17/2013    Lower lip, left      Actinic keratosis      Allergic rhinitis      Arthritis 2004     Basal cell carcinoma      Benign hypertension      CAD (coronary artery disease)     Cardiac cath and PCI 1994. Cardiac Cath 9/2015: BRIDGET to LAD     Hyperlipidemia      Malignant melanoma      Morbid obesity 01/11/2016     Paroxysmal supraventricular tachycardia     on metoprolol     Permanent atrial fibrillation 04/21/2017     Squamous cell carcinoma      Tachy-edinson syndrome 05/29/2019      Past Surgical History:   Procedure Laterality Date     ANGIOPLASTY  1994    in [place]     ANKLE SURGERY  07/13/2005    right ankle     ARTHROPLASTY HIP Right 10/2009     EP PERM PACER SINGLE LEAD N/A 05/31/2019    Medtronic single lead pacemaker     Facial biopsy - Melanoma       HEART CATH STENT COR W/WO PTCA  09/23/2015    BRIDGET stent mid LAD     LASER SURGERY OF EYE  06/01/2002     MOHS MICROGRAPHIC PROCEDURE  06/12/2004    squamous cell carcinoma right temple     SINUS SURGERY  07/11/2006        Allergies   Allergen Reactions     Cats      Cat Dander     Dogs      Dog Dander     Hydromorphone      Other reaction(s): Confusion     Pollen Extract       There were no vitals taken for this visit.     Exam:Patient is seen in room with his wife and PT: able with asst;of 2 to rise to standing height; ambulating 20 feet with walker, remains in locked brace in extension; Gill in place for urinary retention.pain well controlled;;       X-rays not available     ASSESSMENT / PLAN:    Patient will likely be followed by [facility] team on-site vs transferring in for post=op visit;    [id number]          [facility]  [phone] Cell

## 2022-07-16 LAB — SARS-COV-2 RNA RESP QL NAA+PROBE: NEGATIVE

## 2022-07-18 ENCOUNTER — LAB REQUISITION (OUTPATIENT)
Dept: LAB | Facility: CLINIC | Age: 77
End: 2022-07-18

## 2022-07-18 ENCOUNTER — TRANSITIONAL CARE UNIT VISIT (OUTPATIENT)
Dept: GERIATRICS | Facility: CLINIC | Age: 77
End: 2022-07-18
Payer: COMMERCIAL

## 2022-07-18 VITALS
WEIGHT: 290.6 LBS | HEART RATE: 67 BPM | OXYGEN SATURATION: 97 % | DIASTOLIC BLOOD PRESSURE: 75 MMHG | TEMPERATURE: 98.3 F | BODY MASS INDEX: 40.68 KG/M2 | RESPIRATION RATE: 18 BRPM | SYSTOLIC BLOOD PRESSURE: 155 MMHG | HEIGHT: 71 IN

## 2022-07-18 DIAGNOSIS — K59.01 SLOW TRANSIT CONSTIPATION: ICD-10-CM

## 2022-07-18 DIAGNOSIS — E22.2 SIADH (SYNDROME OF INAPPROPRIATE ADH PRODUCTION) (H): ICD-10-CM

## 2022-07-18 DIAGNOSIS — G50.0 TRIGEMINAL NEURALGIA: ICD-10-CM

## 2022-07-18 DIAGNOSIS — C44.92 SCC (SQUAMOUS CELL CARCINOMA): ICD-10-CM

## 2022-07-18 DIAGNOSIS — Z96.659 STATUS POST REVISION OF TOTAL KNEE REPLACEMENT, UNSPECIFIED LATERALITY: Primary | ICD-10-CM

## 2022-07-18 DIAGNOSIS — E66.01 MORBID OBESITY (H): ICD-10-CM

## 2022-07-18 DIAGNOSIS — D62 ACUTE BLOOD LOSS ANEMIA: ICD-10-CM

## 2022-07-18 DIAGNOSIS — R33.9 URINARY RETENTION: ICD-10-CM

## 2022-07-18 DIAGNOSIS — I10 ESSENTIAL HYPERTENSION: ICD-10-CM

## 2022-07-18 DIAGNOSIS — I48.21 PERMANENT ATRIAL FIBRILLATION (H): ICD-10-CM

## 2022-07-18 DIAGNOSIS — I49.5 TACHY-BRADY SYNDROME (H): ICD-10-CM

## 2022-07-18 DIAGNOSIS — Z00.01 ENCOUNTER FOR GENERAL ADULT MEDICAL EXAMINATION WITH ABNORMAL FINDINGS: ICD-10-CM

## 2022-07-18 DIAGNOSIS — C43.9 MELANOMA OF SKIN (H): ICD-10-CM

## 2022-07-18 DIAGNOSIS — D64.9 ANEMIA, UNSPECIFIED: ICD-10-CM

## 2022-07-18 DIAGNOSIS — E87.1 HYPO-OSMOLALITY AND HYPONATREMIA: ICD-10-CM

## 2022-07-18 DIAGNOSIS — E87.1 HYPONATREMIA: ICD-10-CM

## 2022-07-18 DIAGNOSIS — I25.10 CORONARY ARTERY DISEASE INVOLVING NATIVE CORONARY ARTERY OF NATIVE HEART WITHOUT ANGINA PECTORIS: ICD-10-CM

## 2022-07-18 DIAGNOSIS — R53.81 PHYSICAL DECONDITIONING: ICD-10-CM

## 2022-07-18 DIAGNOSIS — Z95.0 CARDIAC PACEMAKER IN SITU: ICD-10-CM

## 2022-07-18 DIAGNOSIS — E78.5 DYSLIPIDEMIA: ICD-10-CM

## 2022-07-18 DIAGNOSIS — C44.91 BASAL CELL CARCINOMA (BCC), UNSPECIFIED SITE: ICD-10-CM

## 2022-07-18 DIAGNOSIS — G93.40 ENCEPHALOPATHY: ICD-10-CM

## 2022-07-18 LAB
GAMMA INTERFERON BACKGROUND BLD IA-ACNC: 0 IU/ML
M TB IFN-G BLD-IMP: NEGATIVE
M TB IFN-G CD4+ BCKGRND COR BLD-ACNC: 3.57 IU/ML
MITOGEN IGNF BCKGRD COR BLD-ACNC: 0 IU/ML
MITOGEN IGNF BCKGRD COR BLD-ACNC: 0 IU/ML
QUANTIFERON MITOGEN: 3.57 IU/ML
QUANTIFERON NIL TUBE: 0 IU/ML
QUANTIFERON TB1 TUBE: 0 IU/ML
QUANTIFERON TB2 TUBE: 0

## 2022-07-18 PROCEDURE — U0005 INFEC AGEN DETEC AMPLI PROBE: HCPCS | Performed by: NURSE PRACTITIONER

## 2022-07-18 PROCEDURE — 99305 1ST NF CARE MODERATE MDM 35: CPT | Performed by: INTERNAL MEDICINE

## 2022-07-18 NOTE — LETTER
7/18/2022        RE: James Salvador  [address]  [place] [address]        [facility]  INITIAL VISIT NOTE  July 18, 2022    PRIMARY CARE PROVIDER AND CLINIC:  Jeronimo Painter [address]  [address] / [facility] [id number]    CHIEF COMPLAINT:  Hospital follow-up/Initial visit    HPI:    James Salvador is a 77 year old  (1945) male who was [facility] on July 18, 2022 for an initial visit.     Medical history is notable for CAD, hypertension, dyslipidemia, permanent atrial fibrillation, tachycardia-bradycardia syndrome, s/p pacemaker, PSVT, SIADH, chronic hyponatremia, trigeminal neuralgia, periodontitis, tremor, SCC, melanoma, actinic keratosis, osteoarthritis, and morbid obesity.    Summary of hospital course:  Patient was hospitalized at [facility] from July 7 through July 14, 2022 for planned left TKA revision for failed total knee replacement.  Surgery was performed on July 7, 2022.  Postop hemoglobin dropped to 10.3.  Hospital course was complicated by acute metabolic toxic encephalopathy which was felt to be multifactorial due to hyponatremia and medications.  Mental status improved after adjusting medications and improvement of sodium level.  A Gill catheter was placed for urinary retention.  Ortho recommended WBAT to left LE with brace locked in extension.    Patient is admitted to this facility for medical management, nursing care, and rehab.     Of note, history was obtained from patient, facility RN, and extensive review of the chart.    Today's visit:  Patient was seen in his room, while lying in bed.  He is wearing a left knee hinged brace.  He rates his left knee pain at 4 out of 10.  Pain worsens with any movement.  He has a Gill catheter.  He reports that he had a bowel movement 4 days ago.  He denies fever, chills, chest pain, palpitation, dyspnea, nausea, vomiting, abdomen, or urinary symptoms.  He states that after reducing  the dose of oxcarbazepine in the hospital, he has some discomfort on his right side of the face due to trigeminal neuralgia.  I explained to the patient that we can gradually increase his oxcarbazepine to PTA dose but he also has a neurology appointment at the end of the month and he can wait until that time, which he prefers that.  I also stressed the importance of re-implementing fluid restriction given his SIADH and hyponatremia.    CODE STATUS:   CPR/Full code     PAST MEDICAL HISTORY:   CAD, s/p MI in 1994 (status post PCI and angioplasty) and BRIDGET to mid LAD in September 2015 for unstable angina  Hypertension  Dyslipidemia  Permanent atrial fibrillation  Tachycardia-bradycardia syndrome with paroxysmal complete AV block, s/p cardiac pacemaker in May 2019  PSVT  SIADH  Chronic hyponatremia  Trigeminal neuralgia  Periodontitis  Tremor  SCC/BCC of skin   Melanoma, left zygoma  Actinic keratosis  Seborrheic dermatitis  Allergic rhinitis  Osteoarthritis  Rotator cuff tear  Chronic shoulder pain  Morbid obesity, BMI 40.5    Past Medical History:   Diagnosis Date     Actinic cheilitis 07/17/2013    Lower lip, left      Actinic keratosis      Allergic rhinitis      Arthritis 2004     Basal cell carcinoma      Benign hypertension      CAD (coronary artery disease)     Cardiac cath and PCI 1994. Cardiac Cath 9/2015: BRIDGET to LAD     Hyperlipidemia      Malignant melanoma      Morbid obesity 01/11/2016     Paroxysmal supraventricular tachycardia     on metoprolol     Permanent atrial fibrillation 04/21/2017     Squamous cell carcinoma      Tachy-edinson syndrome 05/29/2019       PAST SURGICAL HISTORY:   Past Surgical History:   Procedure Laterality Date     ANGIOPLASTY  1994    in [place]     ANKLE SURGERY  07/13/2005    right ankle     ARTHROPLASTY HIP Right 10/2009     EP PERM PACER SINGLE LEAD N/A 05/31/2019    [employer] single lead pacemaker     Facial biopsy - Melanoma       HEART CATH STENT COR W/WO PTCA  09/23/2015     BRIDGET stent mid LAD     LASER SURGERY OF EYE  2002     MOHS MICROGRAPHIC PROCEDURE  2004    squamous cell carcinoma right temple     SINUS SURGERY  2006       FAMILY HISTORY:   Family History   Problem Relation Age of Onset     Genitourinary Problems Mother         renal failure     Cardiovascular Father         rupture of dorsal aorta     Depression Son      Skin Cancer No family hx of        SOCIAL HISTORY:  Social History     Tobacco Use     Smoking status: Former Smoker     Packs/day: 0.50     Years: 10.00     Pack years: 5.00     Types: Cigarettes     Start date: 1966     Quit date: 1974     Years since quittin.2     Smokeless tobacco: Never Used     Tobacco comment: Also smoked from 1985 - 1990   Substance Use Topics     Alcohol use: Yes     Alcohol/week: 0.0 standard drinks     Comment: Socially       MEDICATIONS:  Current Outpatient Medications   Medication Sig Dispense Refill     acetaminophen (TYLENOL) 325 MG tablet Take 650 mg by mouth 4 times daily       apixaban ANTICOAGULANT (ELIQUIS) 5 MG tablet Take 5 mg by mouth daily       atorvastatin (LIPITOR) 80 MG tablet TAKE 1 TABLET BY MOUTH EVERYDAY AT BEDTIME 90 tablet 3     carvedilol (COREG) 3.125 MG tablet Take 1 tablet (3.125 mg) by mouth 2 times daily (with meals)       cefadroxil (DURICEF) 500 MG capsule Take 1,000 mg by mouth 2 times daily       desonide (DESOWEN) 0.05 % external cream Apply topically daily       doxycycline hyclate (PERIOSTAT) 20 MG tablet TAKE 1 TABLET BY MOUTH TWICE A DAY       fluorouracil (EFUDEX) 5 % external cream Apply to scap BID x 3-4 weeks. Protect area from the sun. 40 g 3     fluticasone (FLONASE) 50 MCG/ACT nasal spray INSTILL 2 SPRAYS INTO BOTH NOSTRILS DAILY. 48 mL 3     furosemide (LASIX) 20 MG tablet Take 1 tablet (20 mg) by mouth daily 90 tablet 3     GABAPENTIN PO Take 900 mg by mouth 3 times daily       ketoconazole (NIZORAL) 2 % cream Apply topically 2 times daily For face.  "FAX REFILL REQUESTS TO [facility]: [phone] 30 g 2     ketoconazole (NIZORAL) 2 % external shampoo Use daily as needed 120 mL 11     lisinopril (ZESTRIL) 20 MG tablet Take 20 mg by mouth daily       Multiple Vitamin (MULTIVITAMIN OR) Take 1 tablet by mouth daily        nitroGLYcerin (NITROSTAT) 0.4 MG sublingual tablet Place 1 tablet (0.4 mg) under the tongue every 5 minutes as needed for chest pain May repeat twice for a total of 3 tablets.  If chest pain not relieved, call 911 25 tablet 11     OXcarbazepine (TRILEPTAL PO) Give 600mg at 7am AND Give 900 mg by mouth two times a day for Nerve Pain Give 900mg at 1600 and 900 mg at 2200.       sennosides (SENOKOT) 8.6 MG tablet Take 2 tablets by mouth daily         Post Discharge Medication Reconciliation Status: discharge medications reconciled and changed, per note/orders.        ALLERGIES:  Allergies   Allergen Reactions     Cats      Cat Dander     Dogs      Dog Dander     Hydromorphone      Other reaction(s): Confusion     Pollen Extract        ROS:  10 point ROS were negative other than the symptoms noted above in the HPI.    PHYSICAL EXAM:  Vital signs were reviewed in the chart.  Vital Signs: BP (!) 155/75   Pulse 67   Temp 98.3  F (36.8  C)   Resp 18   Ht 1.803 m (5' 11\")   Wt 131.8 kg (290 lb 9.6 oz)   SpO2 97%   BMI 40.53 kg/m    General: Comfortable and in no acute distress  HEENT: Conjunctival pallor, no scleral icterus or injection, moist oral mucosa  Cardiovascular: Normal S1, S2, irregularly irregular rhythm, normal rate  Respiratory: Lungs clear to auscultation bilaterally  GI: Abdomen soft, non-tender, non-distended, +BS  Extremities: Left knee hinged brace is on and his left lower extremity is wrapped.  There is no significant right lower extremity.  Neuro: CX II-XII grossly intact; ROM in all four extremities grossly intact  Psych: Alert and oriented x3; normal affect  Skin: No acute rash    LABORATORY/IMAGING DATA:  All relevant labs and " imaging data in [facility] and/or Care Everywhere were personally reviewed today.      Chemistry profile (July 13, 2022): Sodium 128, potassium 4.5, creatinine 0.55, BUN 8, calcium 9, glucose 99    Hemoglobin (July 9, 2022): 10.3      ASSESSMENT/PLAN:  Failed total knee replacement, s/p L TKA revision on July 7, 2022,  Physical conditioning.  Patient is hemodynamically stable.  He has moderate surgical pain.  Plan:  WBAT to left LE with brace locked in extension  Staff to assist with daily care and mobility   Continue PT/OT evaluation and therapy  Continue pain management with scheduled acetaminophen 650 mg p.o. 4 times daily as well as oxcarbazepine and gabapentin as below  Continue DVT prophylaxis with apixaban for atrial fibrillation  Continue prophylactic postop antibiotic therapy with cefadroxil 1000 mg p.o. twice daily through July 21  Follow-up with Ortho as recommended    Acute blood loss anemia.  Due to knee surgery.  Postop hemoglobin dropped to 10.3 from initial 14.3.  Plan:  Recheck CBC on July 19 as ordered  Transfuse PRN for hemoglobin less than 7    Postop encephalopathy, resolved.  Multifactorial due to profound hyponatremia and medications.  PTA oxcarbazepine dose was reduced in the hospital.  He is now oriented x3.  Plan:  Monitor mental status    Postop urinary retention.  A Gill catheter was placed in the hospital.  Plan:  Continue Gill  Voiding trial when patient is more ambulatory    SIADH with chronic hyponatremia.  Profound hyponatremia with sodium level 120 on July 8.  SIADH likely due to oxcarbazepine.  Sodium level improved to 128 on July 13 with fluid restriction in the hospital.  Plan:  Resume fluid restriction at 1.8 L per 24 hours  Recheck BMP on July 19 as ordered    CAD, s/p MI in 1994 (status post PCI and angioplasty) and BRIDGET to mid LAD in September 2015 for unstable angina,  Essential hypertension,  Dyslipidemia.  Stable.  Blood pressure is controlled.  Patient is not on antiplatelet  therapy due to chronic anticoagulation.  Plan:  Continue atorvastatin 80 mg p.o. daily, lisinopril 20 mg p.o. daily, carvedilol 3.125 mg p.o. twice daily, and furosemide 20 mg p.o. daily  Nitroglycerin sublingual available PRN for angina  Monitor blood pressure and cardiac status    Permanent atrial fibrillation,  Tachycardia-bradycardia syndrome with paroxysmal complete AV block, s/p cardiac pacemaker in May 2019  History of PSVT.  On today's examination, he has irregular rhythm but rate is controlled.  Plan:  Increase apixaban to PTA dose of 5 mg p.o. twice daily  Continue carvedilol 3.125 mg p.o. twice daily  Monitor heart rate    Trigeminal neuralgia.  Oxcarbazepine dose was reduced in hospital due to hyponatremia.  He reports some discomfort on the right side of his face.  Plan:  Continue gabapentin 900 mg p.o. 3 times daily  Continue oxcarbazepine 600 mg daily at 7 AM, 900 mg p.o. at 1600, and 900 mg p.o. at 2200  Follow-up with neurology as directed    Periodontitis.  Plan:  Continue chronic doxycycline 20 mg p.o. twice daily    BCC of skin,  Right neck SCC in situ, s/p Mohs excision in February 2022,  Left zygoma melanoma.  Patient is followed by dermatology.  Plan:  Continue fluorouracil 5% cream topical twice daily  Follow-up with dermatology as scheduled    Slow transit constipation.  Plan:  Continue the bowel regimen    Morbid obesity, BMI 40.5.  This is clinically significant due to increased nursing cares, use of resources, and specialty equipment.   Plan:  Encourage participation in a long-term plan for weight loss and healthy lifestyle  Staff to assist with daily care and mobility          Orders written by provider at facility:  Increase apixaban to 5 mg p.o. twice daily  Fluid restriction of 1.8 L per 24 hours for SIADH            Disclaimer: This note may contain text created using speech-recognition software and may contain unintended word substitutions.      Electronically signed by:  Agustín  RICARDO Toledo MD                          Sincerely,        Agustín Toledo MD

## 2022-07-19 LAB
ANION GAP SERPL CALCULATED.3IONS-SCNC: 8 MMOL/L (ref 3–14)
BUN SERPL-MCNC: 14 MG/DL (ref 7–30)
CALCIUM SERPL-MCNC: 8.9 MG/DL (ref 8.5–10.1)
CHLORIDE BLD-SCNC: 97 MMOL/L (ref 94–109)
CO2 SERPL-SCNC: 25 MMOL/L (ref 20–32)
CREAT SERPL-MCNC: 0.56 MG/DL (ref 0.66–1.25)
ERYTHROCYTE [DISTWIDTH] IN BLOOD BY AUTOMATED COUNT: 13.4 % (ref 10–15)
GFR SERPL CREATININE-BSD FRML MDRD: >90 ML/MIN/1.73M2
GLUCOSE BLD-MCNC: 82 MG/DL (ref 70–99)
HCT VFR BLD AUTO: 36 % (ref 40–53)
HGB BLD-MCNC: 12.3 G/DL (ref 13.3–17.7)
MCH RBC QN AUTO: 32.6 PG (ref 26.5–33)
MCHC RBC AUTO-ENTMCNC: 34.2 G/DL (ref 31.5–36.5)
MCV RBC AUTO: 96 FL (ref 78–100)
PLATELET # BLD AUTO: 265 10E3/UL (ref 150–450)
POTASSIUM BLD-SCNC: 4.2 MMOL/L (ref 3.4–5.3)
RBC # BLD AUTO: 3.77 10E6/UL (ref 4.4–5.9)
SARS-COV-2 RNA RESP QL NAA+PROBE: NEGATIVE
SODIUM SERPL-SCNC: 130 MMOL/L (ref 133–144)
WBC # BLD AUTO: 9.7 10E3/UL (ref 4–11)

## 2022-07-19 PROCEDURE — P9604 ONE-WAY ALLOW PRORATED TRIP: HCPCS | Performed by: NURSE PRACTITIONER

## 2022-07-19 PROCEDURE — 85027 COMPLETE CBC AUTOMATED: CPT | Performed by: NURSE PRACTITIONER

## 2022-07-19 PROCEDURE — 36415 COLL VENOUS BLD VENIPUNCTURE: CPT | Performed by: NURSE PRACTITIONER

## 2022-07-19 PROCEDURE — 80048 BASIC METABOLIC PNL TOTAL CA: CPT | Performed by: NURSE PRACTITIONER

## 2022-07-20 ENCOUNTER — ANCILLARY PROCEDURE (OUTPATIENT)
Dept: CARDIOLOGY | Facility: CLINIC | Age: 77
End: 2022-07-20
Attending: INTERNAL MEDICINE
Payer: COMMERCIAL

## 2022-07-20 VITALS
SYSTOLIC BLOOD PRESSURE: 148 MMHG | BODY MASS INDEX: 40.53 KG/M2 | RESPIRATION RATE: 18 BRPM | WEIGHT: 290.6 LBS | OXYGEN SATURATION: 94 % | HEART RATE: 62 BPM | DIASTOLIC BLOOD PRESSURE: 82 MMHG | TEMPERATURE: 98.3 F

## 2022-07-20 DIAGNOSIS — I44.2 CHB (COMPLETE HEART BLOCK) (H): ICD-10-CM

## 2022-07-20 DIAGNOSIS — Z95.0 CARDIAC PACEMAKER IN SITU: ICD-10-CM

## 2022-07-20 DIAGNOSIS — Z95.0 CARDIAC PACEMAKER IN SITU: Primary | ICD-10-CM

## 2022-07-20 LAB
MDC_IDC_LEAD_IMPLANT_DT: NORMAL
MDC_IDC_LEAD_LOCATION: NORMAL
MDC_IDC_LEAD_LOCATION_DETAIL_1: NORMAL
MDC_IDC_LEAD_MFG: NORMAL
MDC_IDC_LEAD_MODEL: NORMAL
MDC_IDC_LEAD_POLARITY_TYPE: NORMAL
MDC_IDC_LEAD_SERIAL: NORMAL
MDC_IDC_MSMT_BATTERY_DTM: NORMAL
MDC_IDC_MSMT_BATTERY_REMAINING_LONGEVITY: 145 MO
MDC_IDC_MSMT_BATTERY_RRT_TRIGGER: 2.62
MDC_IDC_MSMT_BATTERY_STATUS: NORMAL
MDC_IDC_MSMT_BATTERY_VOLTAGE: 3.03 V
MDC_IDC_MSMT_LEADCHNL_RV_IMPEDANCE_VALUE: 285 OHM
MDC_IDC_MSMT_LEADCHNL_RV_IMPEDANCE_VALUE: 361 OHM
MDC_IDC_MSMT_LEADCHNL_RV_PACING_THRESHOLD_AMPLITUDE: 0.75 V
MDC_IDC_MSMT_LEADCHNL_RV_PACING_THRESHOLD_PULSEWIDTH: 0.4 MS
MDC_IDC_MSMT_LEADCHNL_RV_SENSING_INTR_AMPL: 3.75 MV
MDC_IDC_MSMT_LEADCHNL_RV_SENSING_INTR_AMPL: 3.75 MV
MDC_IDC_PG_IMPLANT_DTM: NORMAL
MDC_IDC_PG_MFG: NORMAL
MDC_IDC_PG_MODEL: NORMAL
MDC_IDC_PG_SERIAL: NORMAL
MDC_IDC_PG_TYPE: NORMAL
MDC_IDC_SESS_CLINIC_NAME: NORMAL
MDC_IDC_SESS_DTM: NORMAL
MDC_IDC_SESS_TYPE: NORMAL
MDC_IDC_SET_BRADY_HYSTRATE: NORMAL
MDC_IDC_SET_BRADY_LOWRATE: 50 {BEATS}/MIN
MDC_IDC_SET_BRADY_MAX_SENSOR_RATE: 130 {BEATS}/MIN
MDC_IDC_SET_BRADY_MODE: NORMAL
MDC_IDC_SET_LEADCHNL_RV_PACING_AMPLITUDE: 2 V
MDC_IDC_SET_LEADCHNL_RV_PACING_ANODE_ELECTRODE_1: NORMAL
MDC_IDC_SET_LEADCHNL_RV_PACING_ANODE_LOCATION_1: NORMAL
MDC_IDC_SET_LEADCHNL_RV_PACING_CAPTURE_MODE: NORMAL
MDC_IDC_SET_LEADCHNL_RV_PACING_CATHODE_ELECTRODE_1: NORMAL
MDC_IDC_SET_LEADCHNL_RV_PACING_CATHODE_LOCATION_1: NORMAL
MDC_IDC_SET_LEADCHNL_RV_PACING_POLARITY: NORMAL
MDC_IDC_SET_LEADCHNL_RV_PACING_PULSEWIDTH: 0.4 MS
MDC_IDC_SET_LEADCHNL_RV_SENSING_ANODE_ELECTRODE_1: NORMAL
MDC_IDC_SET_LEADCHNL_RV_SENSING_ANODE_LOCATION_1: NORMAL
MDC_IDC_SET_LEADCHNL_RV_SENSING_CATHODE_ELECTRODE_1: NORMAL
MDC_IDC_SET_LEADCHNL_RV_SENSING_CATHODE_LOCATION_1: NORMAL
MDC_IDC_SET_LEADCHNL_RV_SENSING_POLARITY: NORMAL
MDC_IDC_SET_LEADCHNL_RV_SENSING_SENSITIVITY: 0.6 MV
MDC_IDC_SET_ZONE_DETECTION_INTERVAL: 360 MS
MDC_IDC_SET_ZONE_TYPE: NORMAL
MDC_IDC_STAT_BRADY_DTM_END: NORMAL
MDC_IDC_STAT_BRADY_DTM_START: NORMAL
MDC_IDC_STAT_BRADY_RV_PERCENT_PACED: 69.27 %
MDC_IDC_STAT_EPISODE_RECENT_COUNT: 0
MDC_IDC_STAT_EPISODE_RECENT_COUNT_DTM_END: NORMAL
MDC_IDC_STAT_EPISODE_RECENT_COUNT_DTM_START: NORMAL
MDC_IDC_STAT_EPISODE_TOTAL_COUNT: 0
MDC_IDC_STAT_EPISODE_TOTAL_COUNT: 0
MDC_IDC_STAT_EPISODE_TOTAL_COUNT: 3
MDC_IDC_STAT_EPISODE_TOTAL_COUNT_DTM_END: NORMAL
MDC_IDC_STAT_EPISODE_TOTAL_COUNT_DTM_START: NORMAL
MDC_IDC_STAT_EPISODE_TYPE: NORMAL

## 2022-07-20 PROCEDURE — 93296 REM INTERROG EVL PM/IDS: CPT | Performed by: INTERNAL MEDICINE

## 2022-07-20 PROCEDURE — 93294 REM INTERROG EVL PM/LDLS PM: CPT | Performed by: INTERNAL MEDICINE

## 2022-07-21 ENCOUNTER — LAB REQUISITION (OUTPATIENT)
Dept: LAB | Facility: CLINIC | Age: 77
End: 2022-07-21

## 2022-07-21 ENCOUNTER — TRANSITIONAL CARE UNIT VISIT (OUTPATIENT)
Dept: GERIATRICS | Facility: CLINIC | Age: 77
End: 2022-07-21
Payer: COMMERCIAL

## 2022-07-21 DIAGNOSIS — Z95.0 CARDIAC PACEMAKER IN SITU: ICD-10-CM

## 2022-07-21 DIAGNOSIS — I25.10 CORONARY ARTERY DISEASE INVOLVING NATIVE CORONARY ARTERY OF NATIVE HEART WITHOUT ANGINA PECTORIS: ICD-10-CM

## 2022-07-21 DIAGNOSIS — E78.5 DYSLIPIDEMIA: ICD-10-CM

## 2022-07-21 DIAGNOSIS — C44.92 SCC (SQUAMOUS CELL CARCINOMA): ICD-10-CM

## 2022-07-21 DIAGNOSIS — G50.0 TRIGEMINAL NEURALGIA: ICD-10-CM

## 2022-07-21 DIAGNOSIS — K59.01 SLOW TRANSIT CONSTIPATION: ICD-10-CM

## 2022-07-21 DIAGNOSIS — E22.2 SIADH (SYNDROME OF INAPPROPRIATE ADH PRODUCTION) (H): ICD-10-CM

## 2022-07-21 DIAGNOSIS — I48.21 PERMANENT ATRIAL FIBRILLATION (H): ICD-10-CM

## 2022-07-21 DIAGNOSIS — K05.30 PERIODONTITIS: ICD-10-CM

## 2022-07-21 DIAGNOSIS — R53.81 PHYSICAL DECONDITIONING: ICD-10-CM

## 2022-07-21 DIAGNOSIS — C43.9 MELANOMA OF SKIN (H): ICD-10-CM

## 2022-07-21 DIAGNOSIS — Z96.659 STATUS POST REVISION OF TOTAL KNEE REPLACEMENT, UNSPECIFIED LATERALITY: Primary | ICD-10-CM

## 2022-07-21 DIAGNOSIS — R33.9 URINARY RETENTION: ICD-10-CM

## 2022-07-21 DIAGNOSIS — D62 ACUTE BLOOD LOSS ANEMIA: ICD-10-CM

## 2022-07-21 DIAGNOSIS — G93.40 ENCEPHALOPATHY: ICD-10-CM

## 2022-07-21 DIAGNOSIS — Z00.01 ENCOUNTER FOR GENERAL ADULT MEDICAL EXAMINATION WITH ABNORMAL FINDINGS: ICD-10-CM

## 2022-07-21 DIAGNOSIS — E66.01 MORBID OBESITY (H): ICD-10-CM

## 2022-07-21 DIAGNOSIS — I49.5 TACHY-BRADY SYNDROME (H): ICD-10-CM

## 2022-07-21 DIAGNOSIS — I10 ESSENTIAL HYPERTENSION: ICD-10-CM

## 2022-07-21 DIAGNOSIS — E87.1 HYPONATREMIA: ICD-10-CM

## 2022-07-21 DIAGNOSIS — C44.91 BASAL CELL CARCINOMA (BCC), UNSPECIFIED SITE: ICD-10-CM

## 2022-07-21 PROCEDURE — U0005 INFEC AGEN DETEC AMPLI PROBE: HCPCS | Performed by: NURSE PRACTITIONER

## 2022-07-21 PROCEDURE — 99309 SBSQ NF CARE MODERATE MDM 30: CPT | Performed by: NURSE PRACTITIONER

## 2022-07-21 NOTE — PROGRESS NOTES
[facility]    Chief Complaint   Patient presents with     RECHECK     HPI:  James Salvador is a 77 year old  (1945), who is being seen today for an episodic care visit at: [facility] (TCU) [[id number]].     Per recent TCU provider progress notes:  78yo male with PMH CAD (most recent stent in 2015), tachy-edinson syndrome s/p pacemaker, afib and pSVT, HTN, HLD, SIADH with chronic hyponatremia, morbid obesity, H/O malignant melanoma admitted for revision left TKA. Post op multifactorial metabolic encephalopathy - improved with med adjustment and increase in NA level. Trigeminal neuralgia treated with gabapentin and trileptal. Urinary retention chavez in place. To TCU for therapies.     Today's concern is: seen for f/u pain, mobility, lab results, urinary retention. Reports doing well. No headaches, dizziness, chest pain, dyspnea, bowel issues. Chavez removed for voiding trail. Recent BP range 143-155/70-82 and sats 93% room air. Walks 86 ft with 2WW in therapies. SLUMS 22/30.     Allergies, and PMH/PSH reviewed in EPIC today.  REVIEW OF SYSTEMS:  4 point ROS including Respiratory, CV, GI and , other than that noted in the HPI,  is negative    Objective:   BP (!) 148/82   Pulse 62   Temp 98.3  F (36.8  C)   Resp 18   Wt 131.8 kg (290 lb 9.6 oz)   SpO2 94%   BMI 40.53 kg/m    GENERAL APPEARANCE:  Alert, in no distress, pleasant, cooperative, oriented x 3  EYES:  EOM, lids, pupils and irises normal, sclera clear and conjunctiva normal, no discharge or mattering on lids or lashes noted  ENT:  Mouth normal, moist mucous membranes, nose normal without drainage or crusting, external ears without lesions, hearing acuity intact  RESP:  respiratory effort normal, no chest wall tenderness, no respiratory distress, Lung sounds clear, patient is on room air  CV:  Auscultation of heart done, rate and rhythm controlled and regular, no murmur, no rub or gallop. Edema none bilateral lower extremities.    ABDOMEN:  normal bowel sounds, soft, nontender, no palpable masses.  M/S:   Gait and station walks with assist , no tenderness or swelling of the joints; able to move all extremities, digits normal  SKIN:  Inspection and palpation of skin and subcutaneous tissue: left knee incision covered with dry dressing, no redness or drainage  NEURO: cranial nerves 2-12 grossly intact, no facial asymmetry, no speech deficits and able to follow directions, moves all extremities symmetrically  PSYCH:  insight and judgement and memory at baseline, affect and mood normal     7/19/22 WBC 9.7, Hgb 12.3, , Na 130, K 4.2, BUN 14, Cr 0.56, Ca 8.9  7/13: K 4.5, Na 128, Cr 0.55  7/9 Hgb 10.3    Assessment/Plan:  Failed total knee replacement, s/p L TKA revision  Physical conditioning  Acute, ongoing. WBAT to left LE with brace locked in extension. Continue pain management with scheduled acetaminophen 650 mg p.o. 4 times daily as well as oxcarbazepine and gabapentin as below. Continue DVT prophylaxis with apixaban. Continue prophylactic postop antibiotic therapy with cefadroxil 1000 mg p.o. twice daily through 7/21. Therapies as ordered and f/u progress next visit. Follow-up with Ortho as recommended.     Acute blood loss anemia  Acute, Hgb 9.7 on 7/19. Monitor CBC PRN. Transfuse PRN for hemoglobin less than 7.     Postop encephalopathy, resolved  Appears at baseline. Monitor mental status and safety.     Postop urinary retention  Acute post op. Gill removed, voiding trial as ordered below.     SIADH with chronic hyponatremia  Acute on chronic. Resumed fluid restriction at 1.8 L per 24 hours. Last Na 130 on 7/19. Monitor BMP PRN.     CAD, s/p MI in 1994 (status post PCI and angioplasty) and BRIDGET to mid LAD in 2015  Essential hypertension  Dyslipidemia  Stable. Continue atorvastatin 80 mg p.o. daily, lisinopril 20 mg p.o. daily, carvedilol 3.125 mg p.o. twice daily, and furosemide 20 mg p.o. daily. Nitroglycerin sublingual  available PRN for angina. Monitor vs and review ranges next visit.     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 Recently Increased apixaban to PTA dose of 5 mg p.o. twice daily, continue carvedilol 3.125 mg p.o. twice daily. Monitor HR and review next visit.     Trigeminal neuralgia  Chronic, stable. Continue gabapentin 900 mg p.o. 3 times daily and oxcarbazepine 600 mg daily at 7 AM, 900 mg p.o. at 1600, and 900 mg p.o. at 2200. Follow-up with neurology as directed.     Periodontitis  Continue chronic doxycycline 20 mg p.o. twice daily    BCC of skin  Right neck SCC in situ, s/p Mohs excision in 2/2022  Left zygoma melanoma  Chronic. Continue fluorouracil 5% cream topical twice daily. Follow-up with dermatology as scheduled.     Slow transit constipation  No complaints. Continue current bowel regimen    Morbid obesity, BMI 40.5  Chronic. Encourage weight loss. Staff to assist with daily care and mobility    Orders:  Removed chavez for voiding trial. Check PVR every shift x 2 days. Straight cath for PVR over 350 ml. If need to cath third time - replace Chavez 16 F 10 ml balloon and schedule urology consult.     Electronically signed by: NAV Montana CNP

## 2022-07-22 LAB — SARS-COV-2 RNA RESP QL NAA+PROBE: NEGATIVE

## 2022-07-25 ENCOUNTER — TELEPHONE (OUTPATIENT)
Dept: GERIATRICS | Facility: CLINIC | Age: 77
End: 2022-07-25

## 2022-07-25 ENCOUNTER — LAB REQUISITION (OUTPATIENT)
Dept: LAB | Facility: CLINIC | Age: 77
End: 2022-07-25

## 2022-07-25 DIAGNOSIS — R30.0 DYSURIA: ICD-10-CM

## 2022-07-25 LAB
MUCOUS THREADS #/AREA URNS LPF: PRESENT /LPF
RBC URINE: 1 /HPF
SQUAMOUS EPITHELIAL: <1 /HPF
WBC URINE: <1 /HPF

## 2022-07-25 PROCEDURE — 87086 URINE CULTURE/COLONY COUNT: CPT | Performed by: NURSE PRACTITIONER

## 2022-07-25 PROCEDURE — 81001 URINALYSIS AUTO W/SCOPE: CPT | Performed by: NURSE PRACTITIONER

## 2022-07-25 PROCEDURE — 81015 MICROSCOPIC EXAM OF URINE: CPT | Performed by: NURSE PRACTITIONER

## 2022-07-25 NOTE — TELEPHONE ENCOUNTER
[facility] Geriatrics Triage Nurse Telephone Encounter    Provider: NAV Talbot CNP  Facility: [facility] Facility Type:  TCU    Caller: [place]  Call Back Number: [phone]    Allergies:    Allergies   Allergen Reactions     Cats      Cat Dander     Dogs      Dog Dander     Hydromorphone      Other reaction(s): Confusion     Pollen Extract         Reason for call: Pt reporting lower abdominal pain, burning with urination and frequency. Gill removed on 7/21/22.    Verbal Order/Direction given by Provider: ISAEL/LAYO    Provider giving Order:  NAV Talbot CNP    Verbal Order given to: Luanne John RN

## 2022-07-26 LAB
ALBUMIN UR-MCNC: 10 MG/DL
APPEARANCE UR: CLEAR
BILIRUB UR QL STRIP: NEGATIVE
COLOR UR AUTO: YELLOW
GLUCOSE UR STRIP-MCNC: NEGATIVE MG/DL
GRANULAR CAST: 1 /LPF
HGB UR QL STRIP: NEGATIVE
HYALINE CASTS: 1 /LPF
KETONES UR STRIP-MCNC: NEGATIVE MG/DL
LEUKOCYTE ESTERASE UR QL STRIP: NEGATIVE
MUCOUS THREADS #/AREA URNS LPF: PRESENT /LPF
NITRATE UR QL: NEGATIVE
PH UR STRIP: 6.5 [PH] (ref 5–7)
RBC URINE: 1 /HPF
SP GR UR STRIP: 1.02 (ref 1–1.03)
SQUAMOUS EPITHELIAL: <1 /HPF
UROBILINOGEN UR STRIP-MCNC: 12 MG/DL
WBC URINE: 1 /HPF

## 2022-07-27 VITALS
WEIGHT: 290.6 LBS | HEART RATE: 68 BPM | OXYGEN SATURATION: 94 % | DIASTOLIC BLOOD PRESSURE: 89 MMHG | BODY MASS INDEX: 40.53 KG/M2 | TEMPERATURE: 97.9 F | RESPIRATION RATE: 18 BRPM | SYSTOLIC BLOOD PRESSURE: 145 MMHG

## 2022-07-27 LAB — BACTERIA UR CULT: NO GROWTH

## 2022-07-28 ENCOUNTER — DISCHARGE SUMMARY NURSING HOME (OUTPATIENT)
Dept: GERIATRICS | Facility: CLINIC | Age: 77
End: 2022-07-28
Payer: COMMERCIAL

## 2022-07-28 ENCOUNTER — LAB REQUISITION (OUTPATIENT)
Dept: LAB | Facility: CLINIC | Age: 77
End: 2022-07-28

## 2022-07-28 DIAGNOSIS — Z96.659 STATUS POST REVISION OF TOTAL KNEE REPLACEMENT, UNSPECIFIED LATERALITY: Primary | ICD-10-CM

## 2022-07-28 DIAGNOSIS — C44.91 BASAL CELL CARCINOMA (BCC), UNSPECIFIED SITE: ICD-10-CM

## 2022-07-28 DIAGNOSIS — I10 ESSENTIAL HYPERTENSION: ICD-10-CM

## 2022-07-28 DIAGNOSIS — C43.9 MELANOMA OF SKIN (H): ICD-10-CM

## 2022-07-28 DIAGNOSIS — R33.9 URINARY RETENTION: ICD-10-CM

## 2022-07-28 DIAGNOSIS — K05.30 PERIODONTITIS: ICD-10-CM

## 2022-07-28 DIAGNOSIS — I49.5 TACHY-BRADY SYNDROME (H): ICD-10-CM

## 2022-07-28 DIAGNOSIS — Z95.0 CARDIAC PACEMAKER IN SITU: ICD-10-CM

## 2022-07-28 DIAGNOSIS — E78.5 DYSLIPIDEMIA: ICD-10-CM

## 2022-07-28 DIAGNOSIS — K59.01 SLOW TRANSIT CONSTIPATION: ICD-10-CM

## 2022-07-28 DIAGNOSIS — I48.21 PERMANENT ATRIAL FIBRILLATION (H): ICD-10-CM

## 2022-07-28 DIAGNOSIS — C44.92 SCC (SQUAMOUS CELL CARCINOMA): ICD-10-CM

## 2022-07-28 DIAGNOSIS — E22.2 SIADH (SYNDROME OF INAPPROPRIATE ADH PRODUCTION) (H): ICD-10-CM

## 2022-07-28 DIAGNOSIS — D62 ACUTE BLOOD LOSS ANEMIA: ICD-10-CM

## 2022-07-28 DIAGNOSIS — E66.01 MORBID OBESITY (H): ICD-10-CM

## 2022-07-28 DIAGNOSIS — R53.81 PHYSICAL DECONDITIONING: ICD-10-CM

## 2022-07-28 DIAGNOSIS — G50.0 TRIGEMINAL NEURALGIA: ICD-10-CM

## 2022-07-28 DIAGNOSIS — I25.10 CORONARY ARTERY DISEASE INVOLVING NATIVE CORONARY ARTERY OF NATIVE HEART WITHOUT ANGINA PECTORIS: ICD-10-CM

## 2022-07-28 DIAGNOSIS — Z00.01 ENCOUNTER FOR GENERAL ADULT MEDICAL EXAMINATION WITH ABNORMAL FINDINGS: ICD-10-CM

## 2022-07-28 DIAGNOSIS — G93.40 ENCEPHALOPATHY: ICD-10-CM

## 2022-07-28 DIAGNOSIS — E87.1 HYPONATREMIA: ICD-10-CM

## 2022-07-28 LAB — SARS-COV-2 RNA RESP QL NAA+PROBE: NEGATIVE

## 2022-07-28 PROCEDURE — U0003 INFECTIOUS AGENT DETECTION BY NUCLEIC ACID (DNA OR RNA); SEVERE ACUTE RESPIRATORY SYNDROME CORONAVIRUS 2 (SARS-COV-2) (CORONAVIRUS DISEASE [COVID-19]), AMPLIFIED PROBE TECHNIQUE, MAKING USE OF HIGH THROUGHPUT TECHNOLOGIES AS DESCRIBED BY CMS-2020-01-R: HCPCS | Performed by: NURSE PRACTITIONER

## 2022-07-28 PROCEDURE — 99316 NF DSCHRG MGMT 30 MIN+: CPT | Performed by: NURSE PRACTITIONER

## 2022-07-28 NOTE — PROGRESS NOTES
[facility] GERIATRICS DISCHARGE SUMMARY  PATIENT'S NAME: James Salvador  YOB: 1945  MEDICAL RECORD NUMBER:  [id number]  Place of Service where encounter took place:  [facility] (TCU) [[id number]]    PRIMARY CARE PROVIDER AND CLINIC RESPONSIBLE AFTER TRANSFER:   Jeronimo Painter MD, [address] / [facility] [address]    [facility] Provider     Transferring providers: NAV Montana CNP, Dr, MD Tre  Recent Hospitalization/ED:  [facility]  stay 7/7/22 to 7/14/22.  Date of SNF Admission: 7/14/22  Date of SNF (anticipated) Discharge: 07/30/22  Discharged to: previous independent home  Cognitive Scores: SLUMS: 22/30  Physical Function: Ambulating 300 ft with 2ww  DME: No new DME needed    CODE STATUS/ADVANCE DIRECTIVES DISCUSSION:  Prior Full Code  ALLERGIES: Cats, Dogs, Hydromorphone, and Pollen extract    NURSING FACILITY COURSE   Medication Changes/Rationale:     Increased senna s dose due to constipation    Increased Eliquis dose to 5 mg BID due to a fib    Per recent TCU provider progress notes:  78yo male with PMH CAD (most recent stent in 2015), tachy-edinson syndrome s/p pacemaker, afib and pSVT, HTN, HLD, SIADH with chronic hyponatremia, morbid obesity, H/O malignant melanoma admitted for revision left TKA. Post op multifactorial metabolic encephalopathy - improved with med adjustment and increase in NA level. Trigeminal neuralgia treated with gabapentin and trileptal. Urinary retention chavez in place. To TCU for therapies.     Today seen for discharge visit. Unfortunately failed voiding trial, Chavez replaced and to see urology as outpatient for follow up. No headaches, dizziness, chest pain, dyspnea, bowel issues. BP range 144-162/82-93 daily and sats 90% room air. Home with home care as ordered below. Reports pain managed well in left knee.     Summary of nursing facility stay:   Failed total knee replacement, s/p L TKA revision  Physical  conditioning  Acute, ongoing. WBAT to left LE with brace locked in extension. Continue pain management with scheduled acetaminophen 650 mg p.o. 4 times daily as well as oxcarbazepine and gabapentin as below. Continue DVT prophylaxis with apixaban. Completed prophylactic post-op antibiotic. Follow-up with Ortho as recommended. Home with home care as ordered below.     Acute blood loss anemia  Acute, Hgb 9.7 on 7/19. Monitor CBC PRN. Transfuse PRN for hemoglobin less than 7.     Postop encephalopathy, resolved  Appears at baseline. Monitor mental status and safety.     Postop urinary retention  Acute post op. Gill removed, voiding trial was failed - Gill back in place, home with planned urology consult outpatient.     SIADH with chronic hyponatremia  Acute on chronic. Resumed fluid restriction at 1.8 L per 24 hours. Last Na 130 on 7/19. Monitor BMP PRN.     CAD, s/p MI in 1994 (status post PCI and angioplasty) and BRIDGET to mid LAD in 2015  Essential hypertension  Dyslipidemia  Stable. Continue atorvastatin 80 mg p.o. daily, lisinopril 20 mg p.o. daily, carvedilol 3.125 mg p.o. twice daily, and furosemide 20 mg p.o. daily. Nitroglycerin sublingual available PRN for angina. Monitor vs and review ranges next PCP visit.     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 Recently Increased apixaban to PTA dose of 5 mg p.o. twice daily, continue carvedilol 3.125 mg p.o. twice daily. Monitor HR and review next visit.     Trigeminal neuralgia  Chronic, stable. Continue gabapentin 900 mg p.o. 3 times daily and oxcarbazepine 600 mg daily at 7 AM, 900 mg p.o. at 1600, and 900 mg p.o. at 2200. Follow-up with neurology as directed.     Periodontitis  Continue chronic doxycycline 20 mg p.o. twice daily    BCC of skin  Right neck SCC in situ, s/p Mohs excision in 2/2022  Left zygoma melanoma  Chronic. Continue fluorouracil 5%  cream topical twice daily. Follow-up with dermatology as scheduled.     Slow transit constipation  No complaints. Continue current bowel regimen    Morbid obesity, BMI 40.5  Chronic. Encourage weight loss. Staff to assist with daily care and mobility    Discharge Medications:  Current Outpatient Medications   Medication Sig Dispense Refill     acetaminophen (TYLENOL) 325 MG tablet Take 650 mg by mouth 4 times daily       apixaban ANTICOAGULANT (ELIQUIS) 5 MG tablet Take 5 mg by mouth 2 times daily       atorvastatin (LIPITOR) 80 MG tablet TAKE 1 TABLET BY MOUTH EVERYDAY AT BEDTIME 90 tablet 3     carvedilol (COREG) 3.125 MG tablet Take 1 tablet (3.125 mg) by mouth 2 times daily (with meals)       desonide (DESOWEN) 0.05 % external cream Apply topically daily       doxycycline hyclate (PERIOSTAT) 20 MG tablet TAKE 1 TABLET BY MOUTH TWICE A DAY       fluorouracil (EFUDEX) 5 % external cream Apply to scap BID x 3-4 weeks. Protect area from the sun. 40 g 3     fluticasone (FLONASE) 50 MCG/ACT nasal spray INSTILL 2 SPRAYS INTO BOTH NOSTRILS DAILY. 48 mL 3     furosemide (LASIX) 20 MG tablet Take 1 tablet (20 mg) by mouth daily 90 tablet 3     GABAPENTIN PO Take 900 mg by mouth 3 times daily       ketoconazole (NIZORAL) 2 % cream Apply topically 2 times daily For face. FAX REFILL REQUESTS TO [facility]: [phone] 30 g 2     ketoconazole (NIZORAL) 2 % external shampoo Use daily as needed 120 mL 11     lisinopril (ZESTRIL) 20 MG tablet Take 20 mg by mouth daily       Multiple Vitamin (MULTIVITAMIN OR) Take 1 tablet by mouth daily        nitroGLYcerin (NITROSTAT) 0.4 MG sublingual tablet Place 1 tablet (0.4 mg) under the tongue every 5 minutes as needed for chest pain May repeat twice for a total of 3 tablets.  If chest pain not relieved, call 911 25 tablet 11     OXcarbazepine (TRILEPTAL PO) Give 600mg at 7am AND Give 900 mg by mouth two times a day for Nerve Pain Give 900mg at 1600 and 900 mg at 2200.       sennosides  (SENOKOT) 8.6 MG tablet Take 2 tablets by mouth daily          Controlled medications:   not applicable/none     Past Medical History:   Past Medical History:   Diagnosis Date     Actinic cheilitis 07/17/2013    Lower lip, left      Actinic keratosis      Allergic rhinitis      Arthritis 2004     Basal cell carcinoma      Benign hypertension      CAD (coronary artery disease)     Cardiac cath and PCI 1994. Cardiac Cath 9/2015: BRIDGET to LAD     Hyperlipidemia      Malignant melanoma      Morbid obesity 01/11/2016     Paroxysmal supraventricular tachycardia     on metoprolol     Permanent atrial fibrillation 04/21/2017     Squamous cell carcinoma      Tachy-edinson syndrome 05/29/2019     Physical Exam:   Vitals: BP (!) 145/89   Pulse 68   Temp 97.9  F (36.6  C)   Resp 18   Wt 131.8 kg (290 lb 9.6 oz)   SpO2 94%   BMI 40.53 kg/m    BMI: Body mass index is 40.53 kg/m .  GENERAL APPEARANCE:  Alert, in no distress, pleasant, cooperative, oriented x 3  EYES:  EOM, lids, pupils and irises normal, sclera clear and conjunctiva normal, no discharge or mattering on lids or lashes noted  ENT:  Mouth normal, moist mucous membranes, nose normal without drainage or crusting, external ears without lesions, hearing acuity intact  RESP:  respiratory effort normal, no chest wall tenderness, no respiratory distress, patient is on room air  CV: Edema none bilateral lower extremities.   M/S:   Gait and station walks with assist, no tenderness or swelling of the joints; able to move all extremities, digits normal  NEURO: cranial nerves 2-12 grossly intact, no facial asymmetry, no speech deficits and able to follow directions, moves all extremities symmetrically  PSYCH:  insight and judgement and memory at baseline, affect and mood normal     SNF labs:   7/19/22 WBC 9.7, Hgb 12.3, , Na 130, K 4.2, BUN 14, Cr 0.56, Ca 8.9  7/13: K 4.5, Na 128, Cr 0.55  7/9 Hgb 10.3    Most Recent 3 CBC's:Recent Labs   Lab Test 06/20/22  0054  06/17/22  1211 03/22/22  2337   WBC 9.5 6.4 7.7   HGB 13.6 14.0 14.6   MCV 95 92 93    173 199     Most Recent 3 BMP's:Recent Labs   Lab Test 06/20/22  0054 06/17/22  1211 05/26/22  0929   * 123* 125*   POTASSIUM 4.3 5.5* 4.6   CHLORIDE 93* 90* 93*   CO2 29 31 24   BUN 15 12 10   CR 0.82 0.70 0.62*   ANIONGAP 3 2* 8   BENJI 8.9 9.0 8.6   GLC 98 83 136*       DISCHARGE PLAN:    Follow up labs: No labs orders/due    Medical Follow Up:      Follow up with primary care provider in 2-3 weeks  Follow up with specialist ortho as recommended   Follow up with consultant urology 1-2 weeks for voiding trial    [facility] scheduled appointments:  Next 5 appointments (look out 90 days)    Oct 18, 2022 11:00 AM  (Arrive by 10:45 AM)  Return Visit with Rosa Maria Hansen PA-C  [facility] ([facility] ) [address] [address] [phone]           Discharge Services: Home Care:  Occupational Therapy, Physical Therapy, Registered Nurse, Home Health Aide and From:  Home Health Care, [facility]    Discharge Instructions Verbalized to Patient at Discharge:     None    TOTAL DISCHARGE TIME:   Greater than 30 minutes  Electronically signed by:  NAV [place] CNP     Documentation of Face to Face and Certification for Home Health Services    I certify that services are/were furnished while this patient was under the care of a physician and that a physician or an allowed non-physician practitioner (NPP), had a face-to-face encounter that meets the physician face-to-face encounter requirements. The encounter was in whole, or in part, related to the primary reason for home health. The patient is confined to his/her home and needs intermittent skilled nursing, physical therapy, speech-language pathology, or the continued need for occupational therapy. A plan of care has been established by a physician and is periodically reviewed  by a physician.  Date of Face-to-Face Encounter: 7/28/2022.    I certify that, based on my findings, the following services are medically necessary home health services: Nursing, Occupational Therapy, Physical Therapy and HHA.    My clinical findings support the need for the above skilled services because: Requires assistance of another person or specialized equipment to access medical services because patient: Is unable to exit home safely on own due to: weakness and pain..    Patient to re-establish plan of care with their PCP within 7-10 days after leaving the facility to reestablish care.  Medicare certified PECOS provider: NAV [place] CNP  Date: August 1, 2022

## 2022-08-01 ENCOUNTER — PATIENT OUTREACH (OUTPATIENT)
Dept: CARE COORDINATION | Facility: CLINIC | Age: 77
End: 2022-08-01

## 2022-08-01 DIAGNOSIS — Z96.652 STATUS POST TOTAL LEFT KNEE REPLACEMENT: Primary | ICD-10-CM

## 2022-08-01 NOTE — PROGRESS NOTES
S-(situation): TCU Discharge    B-(background): Patient was inpatient at [facility] 7/7/22-7/14/22 due to s/p TKA, pain, hyponatremia and urinary retention.  Patient was discharged to [facility] TCU for rehabilitation.    A-(assessment): Patient was discharged to home on 7/30/22 with home care services through OT, PT, RN and HHA.  Patient will need to:  Follow up with primary care provider in 2-3 weeks  Follow up with consultant urology 1-2 weeks for voiding trial  Orthopedics provider is DIANNA     R-(recommendations/plan): Care Coordination referral placed.  CPN will monitor for any newly identified care navigation needs in 1 month.    Melissa Behl BSN, RN, PHN, CCM  RN Clinical Product Navigator  [phone]

## 2022-08-01 NOTE — PROGRESS NOTES
Clinic Care Coordination Contact  [facility]/Voicemail    Referral Source: SNF/TCU  Clinical Data: Care Coordinator Outreach  Outreach attempted x 1.  Left message on patient's voicemail with call back information and requested return call.    Plan: Care Coordinator will try to reach patient again in 1-2 business days.     Vickie Day RN, BSN, PHN  Primary Care / Care Coordinator   [facility]  E-mail [email or web address]   [phone]

## 2022-08-02 ENCOUNTER — PATIENT OUTREACH (OUTPATIENT)
Dept: CARE COORDINATION | Facility: CLINIC | Age: 77
End: 2022-08-02

## 2022-08-02 NOTE — LETTER
[facility] CARE COORDINATION  [address]  [address]  [facility] [address]    August 2, 2022    James Salvador  [address] [place] [address]      Dear James,        I am a clinic care coordinator who works with Jeronimo Painter MD with the [facility]. I recently tried to call and was unable to reach you. Below is a description of clinic care coordination and how I can further assist you.       The clinic care coordination team is made up of a registered nurse, , financial resource worker and community health worker who understand the health care system. The goal of clinic care coordination is to help you manage your health and improve access to the health care system. Our team works alongside your provider to assist you in determining your health and social needs. We can help you obtain health care and community resources, providing you with necessary information and education. We can work with you through any barriers and develop a care plan that helps coordinate and strengthen the communication between you and your care team.    Please feel free to contact me with any questions or concerns regarding care coordination and what we can offer.      We are focused on providing you with the highest-quality healthcare experience possible.    Sincerely,      Vickie Day RN, BSN, PHN  Primary Care / Care Coordinator   [facility]  E-mail [email or web address]   [phone]

## 2022-08-02 NOTE — PROGRESS NOTES
Clinic Care Coordination Contact  [facility]/Voicemail       Clinical Data: Care Coordinator Outreach  Outreach attempted x 2.  Left message on patient's voicemail with call back information and requested return call.  Plan: Care Coordinator will send care coordination introduction letter with care coordinator contact information and explanation of care coordination services via [employer]. Care Coordinator will do no further outreaches at this time.     Vickie Day RN, BSN, PHN  Primary Care / Care Coordinator   [facility]  E-mail [email or web address]   [phone]

## 2022-08-04 ENCOUNTER — PATIENT OUTREACH (OUTPATIENT)
Dept: NURSING | Facility: CLINIC | Age: 77
End: 2022-08-04

## 2022-08-04 NOTE — PROGRESS NOTES
"Clinic Care Coordination Contact  Care Team Conversations    RNCC received a vm from patient's wife with patient concern of urinary retention.    Renea, wife, states patient had a \"total knee revision\" at [facility] by [facility] surgeon Dr. Singh under general anethesia and post surgery patient experienced urinary retention and a chavez was placed.  Patient was transferred to [place] [facility] TCU x 10 days with the chavez cath in place.    Patient has an appointment today at [facility], Connor Sacks, PA. Renea is hoping the chavez cath will be removed today but is unsure what to expect.  Patient has an appointment with Dr. Singh, Ortho [facility] on on 8/23/2022.    Renea is also concerned that patient is experiencing side effects of the Gabapentin/Trileptol combination for his right face trigeminal neuralgia (golfing accident \"many\" years ago) is affecting his balance.  Patient has a Neurology appointment on 8/29/2022.    Renea would like RNCC to relay this message to PCP so that he is \"aware\" of what's going on with patient.    RNCC will apprise PCP of the above.    No further care coordination outreaches at this time.     Vickie Day RN, BSN, PHN  Primary Care / Care Coordinator   [facility]  E-mail [email or web address]   [phone]            "

## 2022-08-09 NOTE — TELEPHONE ENCOUNTER
Please relay to pt and wife that its definitely possible that the gabapentin + trileptal are contributing to his balance , + we also think the trileptal is the main cause of his low sodium.     I rec'd they update Dr. Herzog (neurologist) about this - given symptoms maybe they would recommend some changes prior to his end of Aug appt.

## 2022-08-10 DIAGNOSIS — Z53.9 DIAGNOSIS NOT YET DEFINED: Primary | ICD-10-CM

## 2022-08-10 PROCEDURE — G0180 MD CERTIFICATION HHA PATIENT: HCPCS | Performed by: INTERNAL MEDICINE

## 2022-08-12 NOTE — TELEPHONE ENCOUNTER
Wife informed of notes below- they have updated Dr. Herzog- will see Neuro 08/29/2022    Florecita Srivastava, CMA

## 2022-08-23 ENCOUNTER — PATIENT OUTREACH (OUTPATIENT)
Dept: CARE COORDINATION | Facility: CLINIC | Age: 77
End: 2022-08-23

## 2022-08-23 DIAGNOSIS — I48.11 LONGSTANDING PERSISTENT ATRIAL FIBRILLATION (H): ICD-10-CM

## 2022-08-23 DIAGNOSIS — I25.10 CORONARY ARTERY DISEASE INVOLVING NATIVE CORONARY ARTERY OF NATIVE HEART WITHOUT ANGINA PECTORIS: ICD-10-CM

## 2022-08-23 RX ORDER — CARVEDILOL 3.12 MG/1
3.12 TABLET ORAL 2 TIMES DAILY WITH MEALS
Qty: 180 TABLET | Refills: 0 | Status: SHIPPED | OUTPATIENT
Start: 2022-08-23 | End: 2022-12-25

## 2022-08-23 NOTE — PROGRESS NOTES
Clinic Care Coordination - Chart Review Only    Situation/Background: Patient chart reviewed by RN Clinical Product Navigator related to [employer] conversion.    Assessment: Patient continues to be followed by RN Clinical Product Navigator.    Plan: Patient's chart updated to align with [employer] program for ongoing patient management.    Melissa Behl BSN, RN, PHN, CCM  RN Clinical Product Navigator  [phone]

## 2022-08-29 ENCOUNTER — TRANSFERRED RECORDS (OUTPATIENT)
Dept: HEALTH INFORMATION MANAGEMENT | Facility: CLINIC | Age: 77
End: 2022-08-29

## 2022-08-30 ENCOUNTER — PATIENT OUTREACH (OUTPATIENT)
Dept: CARE COORDINATION | Facility: CLINIC | Age: 77
End: 2022-08-30

## 2022-08-30 ENCOUNTER — DOCUMENTATION ONLY (OUTPATIENT)
Dept: CARDIOLOGY | Facility: CLINIC | Age: 77
End: 2022-08-30

## 2022-08-30 NOTE — PROGRESS NOTES
S-(situation): TCU Discharge Progression    B-(background): [facility]    A-(assessment): Patient was unable to be reached primary care-care coordination services.  No new network navigation needs identified.    R-(recommendations/plan): RN Clinical Product Navigator will perform no further monitoring/outreaches at this time.    Melissa Behl BSN, RN, PHN, [facility]  RN Clinical Product Navigator  [phone]

## 2022-08-30 NOTE — PROGRESS NOTES
Received MRI Cardiology Clearance form from [facility] MRI department.  Form completed, signed by MD, and faxed back to MRI at [phone].      MIGEL Doyle

## 2022-08-31 ENCOUNTER — THERAPY VISIT (OUTPATIENT)
Dept: PHYSICAL THERAPY | Facility: CLINIC | Age: 77
End: 2022-08-31
Payer: COMMERCIAL

## 2022-08-31 DIAGNOSIS — Z96.652 STATUS POST TOTAL LEFT KNEE REPLACEMENT: Primary | ICD-10-CM

## 2022-08-31 PROCEDURE — 97161 PT EVAL LOW COMPLEX 20 MIN: CPT | Mod: GP | Performed by: PHYSICAL THERAPIST

## 2022-08-31 PROCEDURE — 97530 THERAPEUTIC ACTIVITIES: CPT | Mod: GP | Performed by: PHYSICAL THERAPIST

## 2022-08-31 ASSESSMENT — ACTIVITIES OF DAILY LIVING (ADL)
WEAKNESS: I HAVE THE SYMPTOM BUT IT DOES NOT AFFECT MY ACTIVITY
GIVING WAY, BUCKLING OR SHIFTING OF KNEE: I HAVE THE SYMPTOM BUT IT DOES NOT AFFECT MY ACTIVITY
AS_A_RESULT_OF_YOUR_KNEE_INJURY,_HOW_WOULD_YOU_RATE_YOUR_CURRENT_LEVEL_OF_DAILY_ACTIVITY?: ABNORMAL
GO UP STAIRS: ACTIVITY IS MINIMALLY DIFFICULT
STIFFNESS: THE SYMPTOM AFFECTS MY ACTIVITY MODERATELY
HOW_WOULD_YOU_RATE_THE_CURRENT_FUNCTION_OF_YOUR_KNEE_DURING_YOUR_USUAL_DAILY_ACTIVITIES_ON_A_SCALE_FROM_0_TO_100_WITH_100_BEING_YOUR_LEVEL_OF_KNEE_FUNCTION_PRIOR_TO_YOUR_INJURY_AND_0_BEING_THE_INABILITY_TO_PERFORM_ANY_OF_YOUR_USUAL_DAILY_ACTIVITIES?: 99
KNEE_ACTIVITY_OF_DAILY_LIVING_SCORE: 67.14
SQUAT: ACTIVITY IS MINIMALLY DIFFICULT
LIMPING: I DO NOT HAVE THE SYMPTOM
WALK: ACTIVITY IS NOT DIFFICULT
SWELLING: I HAVE THE SYMPTOM BUT IT DOES NOT AFFECT MY ACTIVITY
RISE FROM A CHAIR: ACTIVITY IS SOMEWHAT DIFFICULT
PAIN: I HAVE THE SYMPTOM BUT IT DOES NOT AFFECT MY ACTIVITY
KNEEL ON THE FRONT OF YOUR KNEE: ACTIVITY IS VERY DIFFICULT
GO DOWN STAIRS: ACTIVITY IS SOMEWHAT DIFFICULT
HOW_WOULD_YOU_RATE_THE_OVERALL_FUNCTION_OF_YOUR_KNEE_DURING_YOUR_USUAL_DAILY_ACTIVITIES?: NEARLY NORMAL
RAW_SCORE: 47
STAND: ACTIVITY IS VERY DIFFICULT
SIT WITH YOUR KNEE BENT: ACTIVITY IS SOMEWHAT DIFFICULT
KNEE_ACTIVITY_OF_DAILY_LIVING_SUM: 47

## 2022-08-31 NOTE — PROGRESS NOTES
[facility]    OUTPATIENT Physical Therapy ORTHOPEDIC EVALUATION  PLAN OF TREATMENT FOR OUTPATIENT REHABILITATION  (COMPLETE FOR INITIAL CLAIMS ONLY)  Patient's Last Name, First Name, M.I.  YOB: 1945  James Salvador    Provider s Name:  [facility]   Medical Record No.  [id number]   Start of Care Date:  08/31/22   Onset Date:  07/07/22    Type:     _X__PT   ___OT Medical Diagnosis:    Encounter Diagnosis   Name Primary?    Status post total left knee replacement Yes        Treatment Diagnosis:  L TKA        Goals:     08/31/22 0500   Body Part   Goals listed below are for Left Knee   Goal #1   Goal #1 ambulation   Previous Functional Level Minutes patient could walk;with walker   Performance Level 10 minutes   Current Functional Level Minutes patient can walk;with walker   Performance Level 5 with walker   STG Target Performance Minutes patient will be able to walk;with walker   Performance Level 10 minutes   Rationale for safe household ambulation;for safe outdoor household ambulation;for safe community ambulation   Due Date 09/30/22    LTG Target Performance Minutes patient will be able to  walk;with walker   Performance Level 20 with walker   Rationale for safe household ambulation;for safe outdoor household ambulation;for safe community ambulation   Due Date 11/30/22       Therapy Frequency:  weekly  Predicted Duration of Therapy Intervention:  12 weeks    Susan Joya, PT                 I CERTIFY THE NEED FOR THESE SERVICES FURNISHED UNDER        THIS PLAN OF TREATMENT AND WHILE UNDER MY CARE     (Physician attestation of this document indicates review and certification of the therapy plan).                     Certification Date From:  08/31/22   Certification Date To:  11/28/22    Referring Provider:  Mynor Sung  Assessment        See Epic Evaluation SOC Date: 08/31/22

## 2022-08-31 NOTE — PROGRESS NOTES
Physical Therapy Initial Examination/Evaluation  August 31, 2022    James Salvador is a 77 year old male referred to physical therapy by Mynor Singh MD for treatment of left TKA revision.     Diagnosis: L TKA revision  Precautions/Restrictions/MD instructions/Other pertinent hx E&T    Therapist Impression:   Alden presents to therapy following a left TKA. Demonstrates lack of knee flexion, decreased quadriceps strengths, gait impairments, falls risk, and decreased overall function. Skilled therapy is required to address ongoing limitations, make progress towards therapy goals, improve quality of life, and return to previous level of function.     GOALS: walking    NEXT: strengthening     PTRX: printed     Subjective:  DOI/onset: 5/2/2022 DOS: 7/7/2022  Has had home health physical therapy 1x/week since discharge from hospital and TCU. Today the knee feels the best it has since he has been home. Prior to surgery was using a walker for ambulation due to knee pain.   Acute Injury or Gradual Onset?: post operative  Related PMH: multiple knee surgeries Previous Treatment: PT and Surgery Effect of prior treatment: fair  Imaging: None  Chief Complaint/Functional Limitations:   Walking, motion and see below in therapy evaluation codes   Pain: rest 1 /10, activity 10/10 Location: left knee Frequency: Intermittent Described as: sharp Alleviated by: Rest and Ice Progression of Symptoms: Gradually getting better. Time of day when pain is worse: Activity related and Position related  Sleeping: No issues/uninterrupted   Current HEP/exercise regimen: PT exercises  Patient's goals are see chief complaints walking     Answers for HPI/ROS submitted by the patient on 8/30/2022  Reason for Visit:: PT for left knee post surgical  When problem began:: 7/7/2022  How problem occurred:: Three knee surgeries failed from previous years. Latest revision with 2nd knee replacement to repair failures  Number scale: 2/10  General health as  reported by patient: fair  Please check all that apply to your current or past medical history: changes in bowel/bladder, heart problems, high blood pressure, implanted device, numbness/tingling, overweight, weakness  Surgeries: orthopedic surgery, heart surgery, cancer surgery, other  Other Surgery Detail: Nasal sinus surgery , tonsillectomy, laser eye surgery, colonoscopy, pacemaker implant  Medications you are currently taking: cardiac medication, high blood pressure medication, pain medication, other  Other Meds Detail: Meds for dermatological issues, med to prevent gum disease, diuretic, cholesterol lowering med, seasonal allergy med  Occupation:: Retired  What are your primary job tasks: prolonged sitting      Return to MD:  10/4/2022    KNEE EVALUATION    Dynamic Movement Screen  Gait: antalgic gait favoring left lower extremity, use of front wheeled walker, flexed posture     Range of Motion      Knee ROM Extension Flexion   Left 0 94   Right 0 128      Flexibility Left Right   Quadriceps severe NA   Hamstrings moderate mild   Ankle none/WNL none/WNL   Figure 4 NA NA     Hip and Knee Strength   MMT Left Right   Hip Abduction 3/5 4/5   Hip Extension 3/5 4/5   Hip ER 3/5 4/5          Knee MMT Quadriceps set Straight Leg Raise   Left Good Fair   Right Good Good     Knee ligaments and meniscus: Not assessed      Assessment/Plan:  Patient is a 77 year old male with left side knee complaints.    Patient has the following significant findings with corresponding treatment plan.                Diagnosis 1:  Left Knee  Pain -  hot/cold therapy, electric stimulation, manual therapy, splint/taping/bracing/orthotics, education and home program  Decreased ROM/flexibility - manual therapy and therapeutic exercise  Decreased joint mobility - manual therapy and therapeutic exercise  Decreased strength - therapeutic exercise and therapeutic activities  Impaired balance - neuro re-education and therapeutic activities  Impaired  gait - gait training  Decreased function - therapeutic activities    Therapy Evaluation Codes:     Cumulative Therapy Evaluation is: Low complexity.    Previous and current functional limitations:  (See Goal Flow Sheet for this information)    Short term and Long term goals: (See Goal Flow Sheet for this information)     Communication ability:  Patient appears to be able to clearly communicate and understand verbal and written communication and follow directions correctly.  Treatment Explanation - The following has been discussed with the patient:   RX ordered/plan of care  Anticipated outcomes  Possible risks and side effects  This patient would benefit from PT intervention to resume normal activities.   Rehab potential is good.    Frequency:  1 X week, once daily  Duration:  for 12 weeks  Discharge Plan:  Achieve all LTG.  Independent in home treatment program.  Reach maximal therapeutic benefit.    Please refer to the daily flowsheet for treatment today, total treatment time and time spent performing 1:1 timed codes.     Please refer to the daily flowsheet for treatment today, total treatment time and time spent performing 1:1 timed codes.

## 2022-09-03 ENCOUNTER — HEALTH MAINTENANCE LETTER (OUTPATIENT)
Age: 77
End: 2022-09-03

## 2022-09-06 ENCOUNTER — THERAPY VISIT (OUTPATIENT)
Dept: PHYSICAL THERAPY | Facility: CLINIC | Age: 77
End: 2022-09-06
Payer: COMMERCIAL

## 2022-09-06 DIAGNOSIS — Z96.652 STATUS POST TOTAL LEFT KNEE REPLACEMENT: Primary | ICD-10-CM

## 2022-09-06 PROCEDURE — 97110 THERAPEUTIC EXERCISES: CPT | Mod: GP | Performed by: PHYSICAL THERAPIST

## 2022-09-09 ENCOUNTER — THERAPY VISIT (OUTPATIENT)
Dept: PHYSICAL THERAPY | Facility: CLINIC | Age: 77
End: 2022-09-09
Payer: COMMERCIAL

## 2022-09-09 DIAGNOSIS — Z96.652 STATUS POST TOTAL LEFT KNEE REPLACEMENT: Primary | ICD-10-CM

## 2022-09-09 PROCEDURE — 97110 THERAPEUTIC EXERCISES: CPT | Mod: GP | Performed by: PHYSICAL THERAPIST

## 2022-09-09 PROCEDURE — 97140 MANUAL THERAPY 1/> REGIONS: CPT | Mod: GP | Performed by: PHYSICAL THERAPIST

## 2022-09-12 ENCOUNTER — THERAPY VISIT (OUTPATIENT)
Dept: PHYSICAL THERAPY | Facility: CLINIC | Age: 77
End: 2022-09-12
Payer: COMMERCIAL

## 2022-09-12 DIAGNOSIS — Z96.652 STATUS POST TOTAL LEFT KNEE REPLACEMENT: Primary | ICD-10-CM

## 2022-09-12 PROCEDURE — 97140 MANUAL THERAPY 1/> REGIONS: CPT | Mod: GP | Performed by: PHYSICAL THERAPIST

## 2022-09-12 PROCEDURE — 97530 THERAPEUTIC ACTIVITIES: CPT | Mod: GP | Performed by: PHYSICAL THERAPIST

## 2022-09-12 PROCEDURE — 97110 THERAPEUTIC EXERCISES: CPT | Mod: GP | Performed by: PHYSICAL THERAPIST

## 2022-09-13 ENCOUNTER — HOSPITAL ENCOUNTER (OUTPATIENT)
Dept: MRI IMAGING | Facility: CLINIC | Age: 77
Discharge: HOME | End: 2022-09-13
Attending: PSYCHIATRY & NEUROLOGY
Payer: COMMERCIAL

## 2022-09-13 ENCOUNTER — HOSPITAL ENCOUNTER (OUTPATIENT)
Facility: CLINIC | Age: 77
Discharge: HOME | End: 2022-09-13
Admitting: RADIOLOGY
Payer: COMMERCIAL

## 2022-09-13 DIAGNOSIS — G95.9 CERVICAL MYELOPATHY (H): ICD-10-CM

## 2022-09-13 PROCEDURE — 999N000154 HC STATISTIC RADIOLOGY XRAY, US, CT, MAR, NM

## 2022-09-13 PROCEDURE — 72141 MRI NECK SPINE W/O DYE: CPT

## 2022-09-13 ASSESSMENT — ACTIVITIES OF DAILY LIVING (ADL)
ADLS_ACUITY_SCORE: 37

## 2022-09-13 NOTE — IR NOTE
Patient with implanted pacemaker here for MRI scan. Patient pacemaker remotely accessed. VVO 70bpm. Patient awake alert and oriented X4. Patient denies pain. MRI consent signed.     Patient denies discomfort . Will continue to monitor.

## 2022-09-14 ASSESSMENT — ACTIVITIES OF DAILY LIVING (ADL)
ADLS_ACUITY_SCORE: 37

## 2022-09-15 ENCOUNTER — THERAPY VISIT (OUTPATIENT)
Dept: PHYSICAL THERAPY | Facility: CLINIC | Age: 77
End: 2022-09-15
Payer: COMMERCIAL

## 2022-09-15 DIAGNOSIS — Z96.652 STATUS POST TOTAL LEFT KNEE REPLACEMENT: Primary | ICD-10-CM

## 2022-09-15 DIAGNOSIS — Z47.89 AFTERCARE FOLLOWING SURGERY OF THE MUSCULOSKELETAL SYSTEM: ICD-10-CM

## 2022-09-15 DIAGNOSIS — R26.9 IMPAIRED GAIT: ICD-10-CM

## 2022-09-15 PROCEDURE — 97140 MANUAL THERAPY 1/> REGIONS: CPT | Mod: 59 | Performed by: PHYSICAL THERAPIST

## 2022-09-15 PROCEDURE — 97530 THERAPEUTIC ACTIVITIES: CPT | Mod: GP | Performed by: PHYSICAL THERAPIST

## 2022-09-15 PROCEDURE — 97110 THERAPEUTIC EXERCISES: CPT | Mod: 59 | Performed by: PHYSICAL THERAPIST

## 2022-09-20 ENCOUNTER — THERAPY VISIT (OUTPATIENT)
Dept: PHYSICAL THERAPY | Facility: CLINIC | Age: 77
End: 2022-09-20
Payer: COMMERCIAL

## 2022-09-20 ENCOUNTER — MYC MEDICAL ADVICE (OUTPATIENT)
Dept: INTERNAL MEDICINE | Facility: CLINIC | Age: 77
End: 2022-09-20

## 2022-09-20 DIAGNOSIS — Z96.652 STATUS POST TOTAL LEFT KNEE REPLACEMENT: Primary | ICD-10-CM

## 2022-09-20 DIAGNOSIS — I48.20 CHRONIC ATRIAL FIBRILLATION (H): Primary | ICD-10-CM

## 2022-09-20 PROCEDURE — 97140 MANUAL THERAPY 1/> REGIONS: CPT | Mod: GP | Performed by: PHYSICAL THERAPIST

## 2022-09-20 PROCEDURE — 97110 THERAPEUTIC EXERCISES: CPT | Mod: GP | Performed by: PHYSICAL THERAPIST

## 2022-09-23 ENCOUNTER — IMMUNIZATION (OUTPATIENT)
Dept: PEDIATRICS | Facility: CLINIC | Age: 77
End: 2022-09-23
Payer: COMMERCIAL

## 2022-09-23 ENCOUNTER — THERAPY VISIT (OUTPATIENT)
Dept: PHYSICAL THERAPY | Facility: CLINIC | Age: 77
End: 2022-09-23

## 2022-09-23 DIAGNOSIS — Z23 NEED FOR COVID-19 VACCINE: ICD-10-CM

## 2022-09-23 DIAGNOSIS — Z96.652 STATUS POST TOTAL LEFT KNEE REPLACEMENT: Primary | ICD-10-CM

## 2022-09-23 DIAGNOSIS — Z23 NEEDS FLU SHOT: Primary | ICD-10-CM

## 2022-09-23 PROCEDURE — 97110 THERAPEUTIC EXERCISES: CPT | Mod: GP | Performed by: PHYSICAL THERAPIST

## 2022-09-23 PROCEDURE — 91312 COVID-19,PF,PFIZER BOOSTER BIVALENT: CPT

## 2022-09-23 PROCEDURE — 90662 IIV NO PRSV INCREASED AG IM: CPT

## 2022-09-23 PROCEDURE — 97140 MANUAL THERAPY 1/> REGIONS: CPT | Mod: GP | Performed by: PHYSICAL THERAPIST

## 2022-09-23 PROCEDURE — 99207 PR NO CHARGE NURSE ONLY: CPT

## 2022-09-23 PROCEDURE — 97112 NEUROMUSCULAR REEDUCATION: CPT | Mod: GP | Performed by: PHYSICAL THERAPIST

## 2022-09-23 PROCEDURE — 0124A COVID-19,PF,PFIZER BOOSTER BIVALENT: CPT

## 2022-09-23 PROCEDURE — G0008 ADMIN INFLUENZA VIRUS VAC: HCPCS

## 2022-09-23 NOTE — PROGRESS NOTES
Prior to immunization administration, verified patients identity using patient s name and date of birth. Please see Immunization Activity for additional information.     Screening Questionnaire for Adult Immunization    Are you sick today?   No   Do you have allergies to medications, food, a vaccine component or latex?   No   Have you ever had a serious reaction after receiving a vaccination?   No   Do you have a long-term health problem with heart, lung, kidney, or metabolic disease (e.g., diabetes), asthma, a blood disorder, no spleen, complement component deficiency, a cochlear implant, or a spinal fluid leak?  Are you on long-term aspirin therapy?   No   Do you have cancer, leukemia, HIV/AIDS, or any other immune system problem?   No   Do you have a parent, brother, or sister with an immune system problem?   No   In the past 3 months, have you taken medications that affect  your immune system, such as prednisone, other steroids, or anticancer drugs; drugs for the treatment of rheumatoid arthritis, Crohn s disease, or psoriasis; or have you had radiation treatments?   No   Have you had a seizure, or a brain or other nervous system problem?   No   During the past year, have you received a transfusion of blood or blood    products, or been given immune (gamma) globulin or antiviral drug?   No   For women: Are you pregnant or is there a chance you could become       pregnant during the next month?   No   Have you received any vaccinations in the past 4 weeks?   No     Immunization questionnaire answers were all negative.        Per orders of Dr. Painter, injection of influenza and Bivalent given by Kari Luna MA. Patient instructed to remain in clinic for 15 minutes afterwards, and to report any adverse reaction to me immediately.       Screening performed by Kari Luna MA on 9/23/2022 at 10:52 AM.

## 2022-09-28 ENCOUNTER — THERAPY VISIT (OUTPATIENT)
Dept: PHYSICAL THERAPY | Facility: CLINIC | Age: 77
End: 2022-09-28
Payer: COMMERCIAL

## 2022-09-28 DIAGNOSIS — Z96.652 STATUS POST TOTAL LEFT KNEE REPLACEMENT: Primary | ICD-10-CM

## 2022-09-28 PROCEDURE — 97110 THERAPEUTIC EXERCISES: CPT | Mod: GP | Performed by: PHYSICAL THERAPIST

## 2022-09-28 PROCEDURE — 97112 NEUROMUSCULAR REEDUCATION: CPT | Mod: GP | Performed by: PHYSICAL THERAPIST

## 2022-09-30 ENCOUNTER — THERAPY VISIT (OUTPATIENT)
Dept: PHYSICAL THERAPY | Facility: CLINIC | Age: 77
End: 2022-09-30
Payer: COMMERCIAL

## 2022-09-30 DIAGNOSIS — Z96.652 STATUS POST TOTAL LEFT KNEE REPLACEMENT: Primary | ICD-10-CM

## 2022-09-30 PROCEDURE — 97140 MANUAL THERAPY 1/> REGIONS: CPT | Mod: GP | Performed by: PHYSICAL THERAPIST

## 2022-09-30 PROCEDURE — 97112 NEUROMUSCULAR REEDUCATION: CPT | Mod: GP | Performed by: PHYSICAL THERAPIST

## 2022-09-30 PROCEDURE — 97110 THERAPEUTIC EXERCISES: CPT | Mod: GP | Performed by: PHYSICAL THERAPIST

## 2022-10-03 ENCOUNTER — THERAPY VISIT (OUTPATIENT)
Dept: PHYSICAL THERAPY | Facility: CLINIC | Age: 77
End: 2022-10-03
Payer: COMMERCIAL

## 2022-10-03 DIAGNOSIS — Z96.652 STATUS POST TOTAL LEFT KNEE REPLACEMENT: Primary | ICD-10-CM

## 2022-10-03 PROCEDURE — 97112 NEUROMUSCULAR REEDUCATION: CPT | Mod: GP | Performed by: PHYSICAL THERAPIST

## 2022-10-03 PROCEDURE — 97110 THERAPEUTIC EXERCISES: CPT | Mod: GP | Performed by: PHYSICAL THERAPIST

## 2022-10-03 NOTE — LETTER
[facility]  [address]  [address]  [place] [address]  [phone]    October 3, 2022    Re: James Salvador   :   1945  MRN:  [id number]   REFERRING PHYSICIAN:   Mynor DICK [facility]    Date of Initial Evaluation:  22  Visits:  Rxs Used: 10  Reason for Referral:  Status post total left knee replacement    EVALUATION SUMMARY    Subjective:  HPI  Physical Exam                    Objective:  System    Physical Exam    General     ROS    Assessment/Plan:    PROGRESS  REPORT    Progress reporting period is from 2022 to 10/3/2022.       SUBJECTIVE  Subjective changes noted by patient:  L knee is sore today, had assistance lifting his leg over his scooter yesterday and it was moved wrong.      Current pain level is 5/10.     Previous pain level was 2/10.     Changes in function:  Yes (See Goal flowsheet attached for changes in current functional level)  Adverse reaction to treatment or activity: None    OBJECTIVE  Changes noted in objective findings:  Yes,     Re: James Salvador   :   1945        Objective:     Gait: FWW,     AROM L Knee Flx: 95, Ext: 0,     PROM L Knee Flx: 106 (113 the session prior),     QS: fair+, SLR: no ext lag,     Strength L Knee Flx: +4/5, Ext: +4/5, Hip Flx L: 4/4     ASSESSMENT/PLAN  Updated problem list and treatment plan: Diagnosis 1:  S/p L TKA revision (DOS  Pain -  hot/cold therapy, manual therapy, education and home program  Decreased ROM/flexibility - manual therapy and therapeutic exercise  Decreased joint mobility - manual therapy and therapeutic exercise  Decreased strength - therapeutic exercise and therapeutic activities  Impaired balance - neuro re-education and therapeutic activities  Decreased proprioception - neuro re-education and therapeutic activities  Impaired gait - gait training  Impaired muscle performance - neuro  re-education  Decreased function - therapeutic activities  STG/LTGs have been met or progress has been made towards goals:  Yes (See Goal flow sheet completed today.)  Assessment of Progress: The patient's condition is improving.  The patient's condition has potential to improve.  Patient is meeting short term goals and is progressing towards long term goals.  Self Management Plans:  Patient has been instructed in a home treatment program.  I have re-evaluated this patient and find that the nature, scope, duration and intensity of the therapy is appropriate for the medical condition of the patient.  James continues to require the following intervention to meet STG and LTG's:  PT    Recommendations:  This patient would benefit from continued therapy.     Frequency:  2 X week, once daily  Duration:  for 4 weeks tapering to 1 X a month over 8 weeks      Thank you for your referral.    INQUIRIES  Therapist: Ene Shirley PT  [address] [address]  Phone: [phone]  Fax: [phone]

## 2022-10-03 NOTE — PROGRESS NOTES
Subjective:  HPI  Physical Exam                    Objective:  System    Physical Exam    General     ROS    Assessment/Plan:    PROGRESS  REPORT    Progress reporting period is from 8/31/2022 to 10/3/2022.       SUBJECTIVE  Subjective changes noted by patient:  L knee is sore today, had assistance lifting his leg over his scooter yesterday and it was moved wrong.      Current pain level is 5/10.     Previous pain level was 2/10.     Changes in function:  Yes (See Goal flowsheet attached for changes in current functional level)  Adverse reaction to treatment or activity: None    OBJECTIVE  Changes noted in objective findings:  Yes,     Objective:     Gait: FWW,     AROM L Knee Flx: 95, Ext: 0,     PROM L Knee Flx: 106 (113 the session prior),     QS: fair+, SLR: no ext lag,     Strength L Knee Flx: +4/5, Ext: +4/5, Hip Flx L: 4/4     ASSESSMENT/PLAN  Updated problem list and treatment plan: Diagnosis 1:  S/p L TKA revision (DOS 7/7/20220 Pain -  hot/cold therapy, manual therapy, education and home program  Decreased ROM/flexibility - manual therapy and therapeutic exercise  Decreased joint mobility - manual therapy and therapeutic exercise  Decreased strength - therapeutic exercise and therapeutic activities  Impaired balance - neuro re-education and therapeutic activities  Decreased proprioception - neuro re-education and therapeutic activities  Impaired gait - gait training  Impaired muscle performance - neuro re-education  Decreased function - therapeutic activities  STG/LTGs have been met or progress has been made towards goals:  Yes (See Goal flow sheet completed today.)  Assessment of Progress: The patient's condition is improving.  The patient's condition has potential to improve.  Patient is meeting short term goals and is progressing towards long term goals.  Self Management Plans:  Patient has been instructed in a home treatment program.  I have re-evaluated this patient and find that the nature, scope,  duration and intensity of the therapy is appropriate for the medical condition of the patient.  James continues to require the following intervention to meet STG and LTG's:  PT    Recommendations:  This patient would benefit from continued therapy.     Frequency:  2 X week, once daily  Duration:  for 4 weeks tapering to 1 X a month over 8 weeks        Please refer to the daily flowsheet for treatment today, total treatment time and time spent performing 1:1 timed codes.

## 2022-10-06 ENCOUNTER — THERAPY VISIT (OUTPATIENT)
Dept: PHYSICAL THERAPY | Facility: CLINIC | Age: 77
End: 2022-10-06

## 2022-10-06 ENCOUNTER — OFFICE VISIT (OUTPATIENT)
Dept: INTERNAL MEDICINE | Facility: CLINIC | Age: 77
End: 2022-10-06
Payer: COMMERCIAL

## 2022-10-06 ENCOUNTER — MYC MEDICAL ADVICE (OUTPATIENT)
Dept: INTERNAL MEDICINE | Facility: CLINIC | Age: 77
End: 2022-10-06

## 2022-10-06 VITALS
OXYGEN SATURATION: 97 % | DIASTOLIC BLOOD PRESSURE: 70 MMHG | WEIGHT: 283.9 LBS | HEART RATE: 68 BPM | SYSTOLIC BLOOD PRESSURE: 110 MMHG | TEMPERATURE: 96.3 F | BODY MASS INDEX: 39.6 KG/M2

## 2022-10-06 DIAGNOSIS — R33.8 BENIGN PROSTATIC HYPERPLASIA WITH URINARY RETENTION: ICD-10-CM

## 2022-10-06 DIAGNOSIS — N40.1 BENIGN PROSTATIC HYPERPLASIA WITH URINARY RETENTION: ICD-10-CM

## 2022-10-06 DIAGNOSIS — I48.20 CHRONIC ATRIAL FIBRILLATION (H): ICD-10-CM

## 2022-10-06 DIAGNOSIS — N39.0 URINARY TRACT INFECTION ASSOCIATED WITH INDWELLING URETHRAL CATHETER, INITIAL ENCOUNTER (H): ICD-10-CM

## 2022-10-06 DIAGNOSIS — E22.2 SIADH (SYNDROME OF INAPPROPRIATE ADH PRODUCTION) (H): ICD-10-CM

## 2022-10-06 DIAGNOSIS — I25.10 ASCVD (ARTERIOSCLEROTIC CARDIOVASCULAR DISEASE): ICD-10-CM

## 2022-10-06 DIAGNOSIS — Z96.652 STATUS POST TOTAL LEFT KNEE REPLACEMENT: Primary | ICD-10-CM

## 2022-10-06 DIAGNOSIS — T83.511A URINARY TRACT INFECTION ASSOCIATED WITH INDWELLING URETHRAL CATHETER, INITIAL ENCOUNTER (H): ICD-10-CM

## 2022-10-06 DIAGNOSIS — I10 BENIGN HYPERTENSION: ICD-10-CM

## 2022-10-06 DIAGNOSIS — Z01.818 PREOP GENERAL PHYSICAL EXAM: Primary | ICD-10-CM

## 2022-10-06 LAB
ALBUMIN UR-MCNC: 30 MG/DL
ANION GAP SERPL CALCULATED.3IONS-SCNC: 5 MMOL/L (ref 3–14)
APPEARANCE UR: CLEAR
BACTERIA #/AREA URNS HPF: ABNORMAL /HPF
BILIRUB UR QL STRIP: NEGATIVE
BUN SERPL-MCNC: 10 MG/DL (ref 7–30)
CALCIUM SERPL-MCNC: 9.2 MG/DL (ref 8.5–10.1)
CHLORIDE BLD-SCNC: 92 MMOL/L (ref 94–109)
CO2 SERPL-SCNC: 27 MMOL/L (ref 20–32)
COLOR UR AUTO: YELLOW
CREAT SERPL-MCNC: 0.56 MG/DL (ref 0.66–1.25)
ERYTHROCYTE [DISTWIDTH] IN BLOOD BY AUTOMATED COUNT: 14.2 % (ref 10–15)
GFR SERPL CREATININE-BSD FRML MDRD: >90 ML/MIN/1.73M2
GLUCOSE BLD-MCNC: 81 MG/DL (ref 70–99)
GLUCOSE UR STRIP-MCNC: NEGATIVE MG/DL
HCT VFR BLD AUTO: 34 % (ref 40–53)
HGB BLD-MCNC: 11.5 G/DL (ref 13.3–17.7)
HGB UR QL STRIP: ABNORMAL
KETONES UR STRIP-MCNC: NEGATIVE MG/DL
LEUKOCYTE ESTERASE UR QL STRIP: ABNORMAL
MCH RBC QN AUTO: 30.7 PG (ref 26.5–33)
MCHC RBC AUTO-ENTMCNC: 33.8 G/DL (ref 31.5–36.5)
MCV RBC AUTO: 91 FL (ref 78–100)
NITRATE UR QL: POSITIVE
PH UR STRIP: 6 [PH] (ref 5–7)
PLATELET # BLD AUTO: 307 10E3/UL (ref 150–450)
POTASSIUM BLD-SCNC: 5.1 MMOL/L (ref 3.4–5.3)
RBC # BLD AUTO: 3.75 10E6/UL (ref 4.4–5.9)
RBC #/AREA URNS AUTO: ABNORMAL /HPF
SODIUM SERPL-SCNC: 124 MMOL/L (ref 133–144)
SP GR UR STRIP: 1.01 (ref 1–1.03)
UROBILINOGEN UR STRIP-ACNC: 1 E.U./DL
WBC # BLD AUTO: 7.3 10E3/UL (ref 4–11)
WBC #/AREA URNS AUTO: ABNORMAL /HPF
WBC CLUMPS #/AREA URNS HPF: PRESENT /HPF

## 2022-10-06 PROCEDURE — 97112 NEUROMUSCULAR REEDUCATION: CPT | Mod: GP | Performed by: PHYSICAL THERAPIST

## 2022-10-06 PROCEDURE — 87086 URINE CULTURE/COLONY COUNT: CPT | Performed by: INTERNAL MEDICINE

## 2022-10-06 PROCEDURE — 85027 COMPLETE CBC AUTOMATED: CPT | Performed by: INTERNAL MEDICINE

## 2022-10-06 PROCEDURE — 81001 URINALYSIS AUTO W/SCOPE: CPT | Performed by: INTERNAL MEDICINE

## 2022-10-06 PROCEDURE — 80048 BASIC METABOLIC PNL TOTAL CA: CPT | Performed by: INTERNAL MEDICINE

## 2022-10-06 PROCEDURE — 99214 OFFICE O/P EST MOD 30 MIN: CPT | Performed by: INTERNAL MEDICINE

## 2022-10-06 PROCEDURE — 87186 SC STD MICRODIL/AGAR DIL: CPT | Performed by: INTERNAL MEDICINE

## 2022-10-06 PROCEDURE — 36415 COLL VENOUS BLD VENIPUNCTURE: CPT | Performed by: INTERNAL MEDICINE

## 2022-10-06 PROCEDURE — 97110 THERAPEUTIC EXERCISES: CPT | Mod: GP | Performed by: PHYSICAL THERAPIST

## 2022-10-06 RX ORDER — TAMSULOSIN HYDROCHLORIDE 0.4 MG/1
0.4 CAPSULE ORAL DAILY
COMMUNITY
Start: 2022-08-17 | End: 2023-03-08

## 2022-10-06 RX ORDER — FINASTERIDE 5 MG/1
TABLET, FILM COATED ORAL
COMMUNITY
Start: 2022-08-30 | End: 2023-03-08

## 2022-10-06 NOTE — PATIENT INSTRUCTIONS
Preparing for Your Surgery  Getting started  A nurse will call you to review your health history and instructions. They will give you an arrival time based on your scheduled surgery time. Please be ready to share:    Your doctor's clinic name and phone number    Your medical, surgical and anesthesia history    A list of allergies and sensitivities    A list of medicines, including herbal treatments and over-the-counter drugs    Whether the patient has a legal guardian (ask how to send us the papers in advance)  Please tell us if you're pregnant--or if there's any chance you might be pregnant. Some surgeries may injure a fetus (unborn baby), so they require a pregnancy test. Surgeries that are safe for a fetus don't always need a test, and you can choose whether to have one.   If you have a child who's having surgery, please ask for a copy of Preparing for Your Child's Surgery.    Preparing for surgery    Within 10 to 30 days of surgery: Have a pre-op exam (sometimes called an H&P, or History and Physical). This can be done at a clinic or pre-operative center.  ? If you're having a , you may not need this exam. Talk to your care team.    At your pre-op exam, talk to your care team about all medicines you take. If you need to stop any medicines before surgery, ask when to start taking them again.  ? We do this for your safety. Many medicines can make you bleed too much during surgery. Some change how well surgery (anesthesia) drugs work.    Call your insurance company to let them know you're having surgery. (If you don't have insurance, call [phone].)    Call your clinic if there's any change in your health. This includes signs of a cold or flu (sore throat, runny nose, cough, rash, fever). It also includes a scrape or scratch near the surgery site.    If you have questions on the day of surgery, call your hospital or surgery center.  COVID testing  You may need to be tested for COVID-19 before having  surgery. If so, we will give you instructions (or click here).  Eating and drinking guidelines  For your safety: Unless your surgeon tells you otherwise, follow the guidelines below.    Eat and drink as usual until 8 hours before surgery. After that, no food or milk.    Drink clear liquids until 2 hours before surgery. These are liquids you can see through, like water, Gatorade and Propel Water. You may also have black coffee and tea (no cream or milk).    Nothing by mouth within 2 hours of surgery. This includes gum, candy and breath mints.    If you drink alcohol: Stop drinking it the night before surgery.    If your care team tells you to take medicine on the morning of surgery, it's okay to take it with a sip of water.  Preventing infection    Shower or bathe the night before and morning of your surgery. Follow the instructions your clinic gave you. (If no instructions, use regular soap.)    Don't shave or clip hair near your surgery site. We'll remove the hair if needed.    Don't smoke or vape the morning of surgery. You may chew nicotine gum up to 2 hours before surgery. A nicotine patch is okay.  ? Note: Some surgeries require you to completely quit smoking and nicotine. Check with your surgeon.    Your care team will make every effort to keep you safe from infection. We will:  ? Clean our hands often with soap and water (or an alcohol-based hand rub).  ? Clean the skin at your surgery site with a special soap that kills germs.  ? Give you a special gown to keep you warm. (Cold raises the risk of infection.)  ? Wear special hair covers, masks, gowns and gloves during surgery.  ? Give antibiotic medicine, if prescribed. Not all surgeries need antibiotics.  What to bring on the day of surgery    Photo ID and insurance card    Copy of your health care directive, if you have one    Glasses and hearing aides (bring cases)  ? You can't wear contacts during surgery    Inhaler and eye drops, if you use them (tell us  about these when you arrive)    CPAP machine or breathing device, if you use them    A few personal items, if spending the night    If you have . . .  ? A pacemaker, ICD (cardiac defibrillator) or other implant: Bring the ID card.  ? An implanted stimulator: Bring the remote control.  ? A legal guardian: Bring a copy of the certified (court-stamped) guardianship papers.  Please remove any jewelry, including body piercings. Leave jewelry and other valuables at home.  If you're going home the day of surgery    You must have a responsible adult drive you home. They should stay with you overnight as well.    If you don't have someone to stay with you, and you aren't safe to go home alone, we may keep you overnight. Insurance often won't pay for this.  After surgery  If it's hard to control your pain or you need more pain medicine, please call your surgeon's office.  Questions?   If you have any questions for your care team, list them here: _________________________________________________________________________________________________________________________________________________________________________ ____________________________________ ____________________________________ ____________________________________  For informational purposes only. Not to replace the advice of your health care provider. Copyright   2003, 2019 [facility]. All rights reserved. Clinically reviewed by Scarlet Fuller MD. [employer] [id number] - REV 07/22.    How to Take Your Medication Before Surgery  - Take all of your medications before surgery except as noted below  - HOLD (do not take) your LASIX (FUROSEMIDE) on the morning of surgery.   - HOLD (do not take) Eliquis for 5 days prior to surgery  - Make sure to take both your carvedilol and lisinopril the morning of your surgery

## 2022-10-06 NOTE — PROGRESS NOTES
[address] [address]  Phone: [phone]  Primary Provider: Sean Painter  Pre-op Performing Provider: SEAN PAINTER      PREOPERATIVE EVALUATION:  Today's date: 10/6/2022    James Salvador is a 77 year old male who presents for a preoperative evaluation.    Surgical Information:  Surgery/Procedure: TRANSURETHRAL RESECTION OF THE PROSTATE  Surgery Location: [facility]  Surgeon: Jv Snow  Surgery Date: 10/20/2022  Time of Surgery: 7:10am  Where patient plans to recover: At home with family  Fax number for surgical facility: Note does not need to be faxed, will be available electronically in Epic.    Type of Anesthesia Anticipated: Spinal    Assessment & Plan     The proposed surgical procedure is considered INTERMEDIATE risk.    Preop general physical exam  Benign prostatic hyperplasia with urinary retention  Urinary tract infection associated with indwelling urethral catheter, initial encounter (H)  Chronic atrial fibrillation (H)  Benign hypertension  SIADH (syndrome of inappropriate ADH production) (H)    Risks and Recommendations:  The patient has the following additional risks and recommendations for perioperative complications:   - Morbid obesity (BMI >40)    Medication Instructions:   - apixaban (Eliquis), edoxaban (Savaysa), rivaroxaban (Xarelto): Neuraxial or regional block anticipated AND CrCL (>=) 50mL/min. HOLD 3 days before surgery. Per surgeons request this is being held for 5 days.    - Diuretics: HOLD on the day of surgery.   - start course of keflex for + urine culture     RECOMMENDATION:  APPROVAL GIVEN to proceed with proposed procedure, without further diagnostic evaluation.    Review of prior external note(s) from - Outside records from urology  Review of the result(s) of each unique test - labs                  Subjective     HPI related to upcoming procedure:  Urinary retention requiring indwelling chavez  since undergoing L TKA.       Preop Questions 9/30/2022   1. Have you ever had a heart attack or stroke? YES    2. Have you ever had surgery on your heart or blood vessels, such as a stent placement, a coronary artery bypass, or surgery on an artery in your head, neck, heart, or legs? YES    3. Do you have chest pain with activity? No   4. Do you have a history of  heart failure? YES    5. Do you currently have a cold, bronchitis or symptoms of other infection? No   6. Do you have a cough, shortness of breath, or wheezing? No   7. Do you or anyone in your family have previous history of blood clots? No   8. Do you or does anyone in your family have a serious bleeding problem such as prolonged bleeding following surgeries or cuts? YES   9. Have you ever had problems with anemia or been told to take iron pills? No   10. Have you had any abnormal blood loss such as black, tarry or bloody stools? No   11. Have you ever had a blood transfusion? No   12. Are you willing to have a blood transfusion if it is medically needed before, during, or after your surgery? Yes   13. Have you or any of your relatives ever had problems with anesthesia? YES   14. Do you have sleep apnea, excessive snoring or daytime drowsiness? YES    14a. Do you have a CPAP machine? No   15. Do you have any artifical heart valves or other implanted medical devices like a pacemaker, defibrillator, or continuous glucose monitor? YES    15a. What type of device do you have? Medtronic pacemaker model [id number] Little XT SR MRI SureScan SN: [id number]   15b. Name of the clinic that manages your device:  Ana Gamble MD   16. Do you have artificial joints? YES    17. Are you allergic to latex? No       Health Care Directive:  Patient has a Health Care Directive on file      Preoperative Review of :   reviewed - controlled substances reflected in medication list.      Status of Chronic Conditions:  See problem list for active medical  problems.  Problems all longstanding and stable, except as noted/documented.  See ROS for pertinent symptoms related to these conditions.      Review of Systems  Constitutional, neuro, ENT, endocrine, pulmonary, cardiac, gastrointestinal, genitourinary, musculoskeletal, integument and psychiatric systems are negative, except as otherwise noted.    Patient Active Problem List    Diagnosis Date Noted     SIADH (syndrome of inappropriate ADH production) (H) 06/17/2022     Priority: Medium     Due to oxcarbazepine . Na+ 121-upper 120s range       Melanoma of cheek (H) 04/07/2022     Priority: Medium     Impaired gait 10/07/2021     Priority: Medium     Aftercare following surgery of the musculoskeletal system 09/16/2021     Priority: Medium     Tachy-edinson syndrome (H) 05/29/2019     Priority: Medium     Added automatically from request for surgery [id number]       Status post total shoulder arthroplasty, right 11/12/2017     Priority: Medium     Mixed hyperlipidemia 06/21/2017     Priority: Medium     Status post total left knee replacement 05/11/2017     Priority: Medium     DJD (degenerative joint disease) of knee 05/09/2017     Priority: Medium     Formatting of this note might be different from the original.  DJD (degenerative joint disease) of knee--left TKA       Chronic atrial fibrillation (H) 04/21/2017     Priority: Medium     Coronary artery disease involving native coronary artery of native heart, angina presence unspecified 01/11/2016     Priority: Medium     Cardiac cath and PCI 1994. Cardiac Cath 9/2015: BRIDGET to LAD  Replacing diagnoses that were inactivated after the 10/1/2021 regulatory import.       Morbid obesity due to excess calories (H) 01/11/2016     Priority: Medium     History of myocardial infarction      Priority: Medium     PTCA       Paroxysmal supraventricular tachycardia (H)      Priority: Medium     On metoprolol       Actinic keratoses 07/17/2013     Priority: Medium     Hyperlipidemia with  target LDL less than 70      Priority: Medium     Benign hypertension      Priority: Medium      Past Medical History:   Diagnosis Date     Actinic cheilitis 07/17/2013    Lower lip, left      Actinic keratosis      Allergic rhinitis      Arthritis 2004     Basal cell carcinoma      Benign hypertension      CAD (coronary artery disease)     Cardiac cath and PCI 1994. Cardiac Cath 9/2015: BRIDGET to LAD     Hyperlipidemia      Malignant melanoma      Morbid obesity 01/11/2016     Paroxysmal supraventricular tachycardia     on metoprolol     Permanent atrial fibrillation 04/21/2017     Squamous cell carcinoma      Tachy-edinson syndrome 05/29/2019     Past Surgical History:   Procedure Laterality Date     ANGIOPLASTY  1994    in [place]     ANKLE SURGERY  07/13/2005    right ankle     ARTHROPLASTY HIP Right 10/2009     EP PERM PACER SINGLE LEAD N/A 05/31/2019    Medtronic single lead pacemaker     Facial biopsy - Melanoma       HEART CATH STENT COR W/WO PTCA  09/23/2015    BRIDGET stent mid LAD     LASER SURGERY OF EYE  06/01/2002     MOHS MICROGRAPHIC PROCEDURE  06/12/2004    squamous cell carcinoma right temple     SINUS SURGERY  07/11/2006     Current Outpatient Medications   Medication Sig Dispense Refill     acetaminophen (TYLENOL) 325 MG tablet Take 650 mg by mouth 4 times daily       apixaban ANTICOAGULANT (ELIQUIS) 5 MG tablet Take 1 tablet (5 mg) by mouth 2 times daily 180 tablet 1     carvedilol (COREG) 3.125 MG tablet Take 1 tablet (3.125 mg) by mouth 2 times daily (with meals) 180 tablet 0     [START ON 10/13/2022] cephALEXin (KEFLEX) 500 MG capsule Take 1 capsule (500 mg) by mouth 2 times daily for 5 days (when finished take the previous cephalexin Rx as directed on that Rx as well) 10 capsule 0     desonide (DESOWEN) 0.05 % external cream Apply topically daily       doxycycline hyclate (PERIOSTAT) 20 MG tablet TAKE 1 TABLET BY MOUTH TWICE A DAY       finasteride (PROSCAR) 5 MG tablet        fluorouracil  (EFUDEX) 5 % external cream Apply to scap BID x 3-4 weeks. Protect area from the sun. 40 g 3     fluticasone (FLONASE) 50 MCG/ACT nasal spray INSTILL 2 SPRAYS INTO BOTH NOSTRILS DAILY. 48 mL 3     furosemide (LASIX) 20 MG tablet Take 1 tablet (20 mg) by mouth daily 90 tablet 3     GABAPENTIN PO Take 900 mg by mouth 3 times daily       ketoconazole (NIZORAL) 2 % cream Apply topically 2 times daily For face. FAX REFILL REQUESTS TO [facility]: [phone] 30 g 2     ketoconazole (NIZORAL) 2 % external shampoo Use daily as needed 120 mL 11     lisinopril (ZESTRIL) 20 MG tablet Take 20 mg by mouth daily       Multiple Vitamin (MULTIVITAMIN OR) Take 1 tablet by mouth daily        nitroGLYcerin (NITROSTAT) 0.4 MG sublingual tablet Place 1 tablet (0.4 mg) under the tongue every 5 minutes as needed for chest pain May repeat twice for a total of 3 tablets.  If chest pain not relieved, call 911 25 tablet 11     OXcarbazepine (TRILEPTAL PO) Give 600mg at 7am AND Give 900 mg by mouth two times a day for Nerve Pain Give 900mg at 1600 and 900 mg at 2200.       tamsulosin (FLOMAX) 0.4 MG capsule Take 0.4 mg by mouth daily       atorvastatin (LIPITOR) 80 MG tablet TAKE 1 TABLET BY MOUTH EVERYDAY AT BEDTIME 90 tablet 2       Allergies   Allergen Reactions     Cats      Cat Dander     Dogs      Dog Dander     Hydromorphone      Other reaction(s): Confusion     Pollen Extract         Social History     Tobacco Use     Smoking status: Former     Packs/day: 0.50     Years: 10.00     Pack years: 5.00     Types: Cigarettes     Start date: 1966     Quit date: 1974     Years since quittin.4     Smokeless tobacco: Never     Tobacco comments:     Also smoked from 1985 - 1990   Substance Use Topics     Alcohol use: Yes     Comment: seldom     Family History   Problem Relation Age of Onset     Genitourinary Problems Mother         renal failure     Cardiovascular Father         rupture of dorsal aorta     Depression Son       Skin Cancer No family hx of      History   Drug Use No         Objective     /70   Pulse 68   Temp (!) 96.3  F (35.7  C) (Temporal)   Wt 128.8 kg (283 lb 14.4 oz)   SpO2 97%   BMI 39.60 kg/m      Physical Exam    GENERAL APPEARANCE: healthy, alert and no distress     EYES: Eyes grossly normal to inspection     HENT: normal cephalic/atraumatic     NECK: no adenopathy, no asymmetry, masses, or scars and thyroid normal to palpation     RESP: lungs clear to auscultation - no rales, rhonchi or wheezes     CV: regular rates and rhythm, normal S1 S2, no S3 or S4 and no murmur, click or rub     ABDOMEN:  soft, nontender, no HSM or masses and bowel sounds normal     MS: extremities normal- no gross deformities noted, no evidence of inflammation in joints, FROM in all extremities.     SKIN: no suspicious lesions or rashes     NEURO: Normal strength and tone, sensory exam grossly normal, mentation intact and speech normal     PSYCH: mentation appears normal. and affect normal/bright     LYMPHATICS: No cervical adenopathy    Recent Labs   Lab Test 06/20/22  0054 06/17/22  1211   HGB 13.6 14.0    173   * 123*   POTASSIUM 4.3 5.5*   CR 0.82 0.70        Diagnostics:  Recent Results (from the past 168 hour(s))   Basic metabolic panel  (Ca, Cl, CO2, Creat, Gluc, K, Na, BUN)    Collection Time: 10/06/22 11:28 AM   Result Value Ref Range    Sodium 124 (L) 133 - 144 mmol/L    Potassium 5.1 3.4 - 5.3 mmol/L    Chloride 92 (L) 94 - 109 mmol/L    Carbon Dioxide (CO2) 27 20 - 32 mmol/L    Anion Gap 5 3 - 14 mmol/L    Urea Nitrogen 10 7 - 30 mg/dL    Creatinine 0.56 (L) 0.66 - 1.25 mg/dL    Calcium 9.2 8.5 - 10.1 mg/dL    Glucose 81 70 - 99 mg/dL    GFR Estimate >90 >60 mL/min/1.73m2   CBC with platelets    Collection Time: 10/06/22 11:28 AM   Result Value Ref Range    WBC Count 7.3 4.0 - 11.0 10e3/uL    RBC Count 3.75 (L) 4.40 - 5.90 10e6/uL    Hemoglobin 11.5 (L) 13.3 - 17.7 g/dL    Hematocrit 34.0 (L) 40.0 - 53.0  %    MCV 91 78 - 100 fL    MCH 30.7 26.5 - 33.0 pg    MCHC 33.8 31.5 - 36.5 g/dL    RDW 14.2 10.0 - 15.0 %    Platelet Count 307 150 - 450 10e3/uL   UA Macro with Reflex to Micro and Culture - lab collect    Collection Time: 10/06/22 11:28 AM    Specimen: Urine, Gill Catheter   Result Value Ref Range    Color Urine Yellow Colorless, Straw, Light Yellow, Yellow    Appearance Urine Clear Clear    Glucose Urine Negative Negative mg/dL    Bilirubin Urine Negative Negative    Ketones Urine Negative Negative mg/dL    Specific Gravity Urine 1.015 1.003 - 1.035    Blood Urine Small (A) Negative    pH Urine 6.0 5.0 - 7.0    Protein Albumin Urine 30 (A) Negative mg/dL    Urobilinogen Urine 1.0 0.2, 1.0 E.U./dL    Nitrite Urine Positive (A) Negative    Leukocyte Esterase Urine Large (A) Negative   Urine Microscopic Exam    Collection Time: 10/06/22 11:28 AM   Result Value Ref Range    Bacteria Urine Many (A) None Seen /HPF    RBC Urine 0-2 0-2 /HPF /HPF    WBC Urine  (A) 0-5 /HPF /HPF    WBC Clumps Urine Present (A) None Seen /HPF   Urine Culture    Collection Time: 10/06/22 11:28 AM    Specimen: Urine, Gill Catheter   Result Value Ref Range    Culture >100,000 CFU/mL Escherichia coli (A)        Susceptibility    Escherichia coli - ANALILIA     Ampicillin >=32 Resistant ug/mL     Ampicillin/ Sulbactam >=32 Resistant ug/mL     Piperacillin/Tazobactam 8 Susceptible ug/mL     Cefazolin* 8 Susceptible ug/mL      * Cefazolin ANALILIA breakpoints are for the treatment of uncomplicated urinary tract infections. For the treatment of systemic infections, please contact the laboratory for additional testing.     Cefoxitin 16 Intermediate ug/mL     Ceftazidime <=1 Susceptible ug/mL     Ceftriaxone <=1 Susceptible ug/mL     Cefepime <=1 Susceptible ug/mL     Gentamicin <=1 Susceptible ug/mL     Tobramycin <=1 Susceptible ug/mL     Ciprofloxacin <=0.25 Susceptible ug/mL     Levofloxacin <=0.12 Susceptible ug/mL     Nitrofurantoin <=16  Susceptible ug/mL     Trimethoprim/Sulfamethoxazole <=1/19 Susceptible ug/mL      No EKG this visit, completed in June 2022. paced rhythm     Revised Cardiac Risk Index (RCRI):  The patient has the following serious cardiovascular risks for perioperative complications:   - Coronary Artery Disease (MI, positive stress test, angina, Qs on EKG) = 1 point     RCRI Interpretation: 1 point: Class II (low risk - 0.9% complication rate)       Signed Electronically by: Jeronimo Painter MD  Copy of this evaluation report is provided to requesting physician.

## 2022-10-07 LAB — BACTERIA UR CULT: ABNORMAL

## 2022-10-10 RX ORDER — ATORVASTATIN CALCIUM 80 MG/1
TABLET, FILM COATED ORAL
Qty: 90 TABLET | Refills: 2 | Status: SHIPPED | OUTPATIENT
Start: 2022-10-10 | End: 2023-06-20

## 2022-10-10 NOTE — TELEPHONE ENCOUNTER
Prescription approved per [facility] Refill Protocol.  Wayne Moraes RN  [facility] Triage Nurse

## 2022-10-12 ENCOUNTER — MYC REFILL (OUTPATIENT)
Dept: INTERNAL MEDICINE | Facility: CLINIC | Age: 77
End: 2022-10-12

## 2022-10-12 ENCOUNTER — THERAPY VISIT (OUTPATIENT)
Dept: PHYSICAL THERAPY | Facility: CLINIC | Age: 77
End: 2022-10-12
Payer: COMMERCIAL

## 2022-10-12 DIAGNOSIS — Z96.652 STATUS POST TOTAL LEFT KNEE REPLACEMENT: Primary | ICD-10-CM

## 2022-10-12 DIAGNOSIS — I10 BENIGN HYPERTENSION: Primary | ICD-10-CM

## 2022-10-12 PROCEDURE — 97530 THERAPEUTIC ACTIVITIES: CPT | Mod: GP | Performed by: PHYSICAL THERAPIST

## 2022-10-12 PROCEDURE — 97110 THERAPEUTIC EXERCISES: CPT | Mod: GP | Performed by: PHYSICAL THERAPIST

## 2022-10-12 RX ORDER — CEPHALEXIN 500 MG/1
500 CAPSULE ORAL 2 TIMES DAILY
Qty: 10 CAPSULE | Refills: 0 | Status: SHIPPED | OUTPATIENT
Start: 2022-10-13 | End: 2022-10-18

## 2022-10-14 ENCOUNTER — THERAPY VISIT (OUTPATIENT)
Dept: PHYSICAL THERAPY | Facility: CLINIC | Age: 77
End: 2022-10-14
Payer: COMMERCIAL

## 2022-10-14 DIAGNOSIS — Z96.652 STATUS POST TOTAL LEFT KNEE REPLACEMENT: Primary | ICD-10-CM

## 2022-10-14 PROCEDURE — 97530 THERAPEUTIC ACTIVITIES: CPT | Mod: GP | Performed by: PHYSICAL THERAPIST

## 2022-10-14 PROCEDURE — 97110 THERAPEUTIC EXERCISES: CPT | Mod: GP | Performed by: PHYSICAL THERAPIST

## 2022-10-14 RX ORDER — LISINOPRIL 20 MG/1
20 TABLET ORAL DAILY
Qty: 90 TABLET | Refills: 3 | Status: SHIPPED | OUTPATIENT
Start: 2022-10-14 | End: 2022-10-20

## 2022-10-14 NOTE — TELEPHONE ENCOUNTER
Routing refill request to provider for review/approval because:  Medication is reported/historical    Anna Whitlock RN

## 2022-10-17 ENCOUNTER — MYC MEDICAL ADVICE (OUTPATIENT)
Dept: INTERNAL MEDICINE | Facility: CLINIC | Age: 77
End: 2022-10-17

## 2022-10-17 ENCOUNTER — THERAPY VISIT (OUTPATIENT)
Dept: PHYSICAL THERAPY | Facility: CLINIC | Age: 77
End: 2022-10-17
Payer: COMMERCIAL

## 2022-10-17 DIAGNOSIS — I10 BENIGN HYPERTENSION: ICD-10-CM

## 2022-10-17 DIAGNOSIS — Z96.652 STATUS POST TOTAL LEFT KNEE REPLACEMENT: Primary | ICD-10-CM

## 2022-10-17 PROCEDURE — 97110 THERAPEUTIC EXERCISES: CPT | Mod: 59 | Performed by: PHYSICAL THERAPIST

## 2022-10-17 PROCEDURE — 97530 THERAPEUTIC ACTIVITIES: CPT | Mod: GP | Performed by: PHYSICAL THERAPIST

## 2022-10-20 RX ORDER — LISINOPRIL 30 MG/1
30 TABLET ORAL DAILY
Qty: 90 TABLET | Refills: 3 | Status: SHIPPED | OUTPATIENT
Start: 2022-10-20 | End: 2023-06-20

## 2022-10-20 NOTE — TELEPHONE ENCOUNTER
Please see [facility] and confirm if patient should be taking 20 mg or 30 mg Lisinopril    Per chart review, 7/15/22 notes:  Continue PTA lisinopril 20 mg daily

## 2022-10-26 ENCOUNTER — ANCILLARY PROCEDURE (OUTPATIENT)
Dept: CARDIOLOGY | Facility: CLINIC | Age: 77
End: 2022-10-26
Attending: INTERNAL MEDICINE
Payer: COMMERCIAL

## 2022-10-26 DIAGNOSIS — Z95.0 CARDIAC PACEMAKER IN SITU: ICD-10-CM

## 2022-10-26 PROCEDURE — 93296 REM INTERROG EVL PM/IDS: CPT | Performed by: INTERNAL MEDICINE

## 2022-10-26 PROCEDURE — 93294 REM INTERROG EVL PM/LDLS PM: CPT | Performed by: INTERNAL MEDICINE

## 2022-11-11 ENCOUNTER — THERAPY VISIT (OUTPATIENT)
Dept: PHYSICAL THERAPY | Facility: CLINIC | Age: 77
End: 2022-11-11
Payer: COMMERCIAL

## 2022-11-11 DIAGNOSIS — Z96.652 STATUS POST TOTAL LEFT KNEE REPLACEMENT: Primary | ICD-10-CM

## 2022-11-11 PROBLEM — R26.9 IMPAIRED GAIT: Status: RESOLVED | Noted: 2021-10-07 | Resolved: 2022-11-11

## 2022-11-11 PROBLEM — Z47.89 AFTERCARE FOLLOWING SURGERY OF THE MUSCULOSKELETAL SYSTEM: Status: RESOLVED | Noted: 2021-09-16 | Resolved: 2022-11-11

## 2022-11-11 PROCEDURE — 97110 THERAPEUTIC EXERCISES: CPT | Mod: GP | Performed by: PHYSICAL THERAPIST

## 2022-11-11 PROCEDURE — 97530 THERAPEUTIC ACTIVITIES: CPT | Mod: GP | Performed by: PHYSICAL THERAPIST

## 2022-11-17 ENCOUNTER — THERAPY VISIT (OUTPATIENT)
Dept: PHYSICAL THERAPY | Facility: CLINIC | Age: 77
End: 2022-11-17
Payer: COMMERCIAL

## 2022-11-17 DIAGNOSIS — Z96.652 STATUS POST TOTAL LEFT KNEE REPLACEMENT: Primary | ICD-10-CM

## 2022-11-17 PROCEDURE — 97112 NEUROMUSCULAR REEDUCATION: CPT | Mod: GP | Performed by: PHYSICAL THERAPIST

## 2022-11-17 PROCEDURE — 97530 THERAPEUTIC ACTIVITIES: CPT | Mod: GP | Performed by: PHYSICAL THERAPIST

## 2022-11-17 PROCEDURE — 97110 THERAPEUTIC EXERCISES: CPT | Mod: GP | Performed by: PHYSICAL THERAPIST

## 2022-11-23 LAB
MDC_IDC_LEAD_IMPLANT_DT: NORMAL
MDC_IDC_LEAD_LOCATION: NORMAL
MDC_IDC_LEAD_LOCATION_DETAIL_1: NORMAL
MDC_IDC_LEAD_MFG: NORMAL
MDC_IDC_LEAD_MODEL: NORMAL
MDC_IDC_LEAD_POLARITY_TYPE: NORMAL
MDC_IDC_LEAD_SERIAL: NORMAL
MDC_IDC_MSMT_BATTERY_DTM: NORMAL
MDC_IDC_MSMT_BATTERY_REMAINING_LONGEVITY: 142 MO
MDC_IDC_MSMT_BATTERY_RRT_TRIGGER: 2.62
MDC_IDC_MSMT_BATTERY_STATUS: NORMAL
MDC_IDC_MSMT_BATTERY_VOLTAGE: 3.02 V
MDC_IDC_MSMT_LEADCHNL_RV_IMPEDANCE_VALUE: 304 OHM
MDC_IDC_MSMT_LEADCHNL_RV_IMPEDANCE_VALUE: 361 OHM
MDC_IDC_MSMT_LEADCHNL_RV_PACING_THRESHOLD_AMPLITUDE: 0.75 V
MDC_IDC_MSMT_LEADCHNL_RV_PACING_THRESHOLD_PULSEWIDTH: 0.4 MS
MDC_IDC_MSMT_LEADCHNL_RV_SENSING_INTR_AMPL: 1.25 MV
MDC_IDC_MSMT_LEADCHNL_RV_SENSING_INTR_AMPL: 1.25 MV
MDC_IDC_PG_IMPLANT_DTM: NORMAL
MDC_IDC_PG_MFG: NORMAL
MDC_IDC_PG_MODEL: NORMAL
MDC_IDC_PG_SERIAL: NORMAL
MDC_IDC_PG_TYPE: NORMAL
MDC_IDC_SESS_CLINIC_NAME: NORMAL
MDC_IDC_SESS_DTM: NORMAL
MDC_IDC_SESS_TYPE: NORMAL
MDC_IDC_SET_BRADY_HYSTRATE: NORMAL
MDC_IDC_SET_BRADY_LOWRATE: 50 {BEATS}/MIN
MDC_IDC_SET_BRADY_MAX_SENSOR_RATE: 130 {BEATS}/MIN
MDC_IDC_SET_BRADY_MODE: NORMAL
MDC_IDC_SET_LEADCHNL_RV_PACING_AMPLITUDE: 2 V
MDC_IDC_SET_LEADCHNL_RV_PACING_ANODE_ELECTRODE_1: NORMAL
MDC_IDC_SET_LEADCHNL_RV_PACING_ANODE_LOCATION_1: NORMAL
MDC_IDC_SET_LEADCHNL_RV_PACING_CAPTURE_MODE: NORMAL
MDC_IDC_SET_LEADCHNL_RV_PACING_CATHODE_ELECTRODE_1: NORMAL
MDC_IDC_SET_LEADCHNL_RV_PACING_CATHODE_LOCATION_1: NORMAL
MDC_IDC_SET_LEADCHNL_RV_PACING_POLARITY: NORMAL
MDC_IDC_SET_LEADCHNL_RV_PACING_PULSEWIDTH: 0.4 MS
MDC_IDC_SET_LEADCHNL_RV_SENSING_ANODE_ELECTRODE_1: NORMAL
MDC_IDC_SET_LEADCHNL_RV_SENSING_ANODE_LOCATION_1: NORMAL
MDC_IDC_SET_LEADCHNL_RV_SENSING_CATHODE_ELECTRODE_1: NORMAL
MDC_IDC_SET_LEADCHNL_RV_SENSING_CATHODE_LOCATION_1: NORMAL
MDC_IDC_SET_LEADCHNL_RV_SENSING_POLARITY: NORMAL
MDC_IDC_SET_LEADCHNL_RV_SENSING_SENSITIVITY: 0.6 MV
MDC_IDC_SET_ZONE_DETECTION_INTERVAL: 360 MS
MDC_IDC_SET_ZONE_TYPE: NORMAL
MDC_IDC_STAT_BRADY_DTM_END: NORMAL
MDC_IDC_STAT_BRADY_DTM_START: NORMAL
MDC_IDC_STAT_BRADY_RV_PERCENT_PACED: 35.51 %
MDC_IDC_STAT_EPISODE_RECENT_COUNT: 0
MDC_IDC_STAT_EPISODE_RECENT_COUNT_DTM_END: NORMAL
MDC_IDC_STAT_EPISODE_RECENT_COUNT_DTM_START: NORMAL
MDC_IDC_STAT_EPISODE_TOTAL_COUNT: 0
MDC_IDC_STAT_EPISODE_TOTAL_COUNT: 0
MDC_IDC_STAT_EPISODE_TOTAL_COUNT: 3
MDC_IDC_STAT_EPISODE_TOTAL_COUNT_DTM_END: NORMAL
MDC_IDC_STAT_EPISODE_TOTAL_COUNT_DTM_START: NORMAL
MDC_IDC_STAT_EPISODE_TYPE: NORMAL

## 2022-11-25 ENCOUNTER — THERAPY VISIT (OUTPATIENT)
Dept: PHYSICAL THERAPY | Facility: CLINIC | Age: 77
End: 2022-11-25
Payer: COMMERCIAL

## 2022-11-25 DIAGNOSIS — Z96.652 STATUS POST TOTAL LEFT KNEE REPLACEMENT: Primary | ICD-10-CM

## 2022-11-25 PROCEDURE — 97112 NEUROMUSCULAR REEDUCATION: CPT | Mod: GP | Performed by: PHYSICAL THERAPIST

## 2022-11-25 PROCEDURE — 97530 THERAPEUTIC ACTIVITIES: CPT | Mod: GP | Performed by: PHYSICAL THERAPIST

## 2022-11-25 PROCEDURE — 97110 THERAPEUTIC EXERCISES: CPT | Mod: GP | Performed by: PHYSICAL THERAPIST

## 2022-12-02 ENCOUNTER — THERAPY VISIT (OUTPATIENT)
Dept: PHYSICAL THERAPY | Facility: CLINIC | Age: 77
End: 2022-12-02
Payer: COMMERCIAL

## 2022-12-02 ENCOUNTER — TRANSFERRED RECORDS (OUTPATIENT)
Dept: HEALTH INFORMATION MANAGEMENT | Facility: CLINIC | Age: 77
End: 2022-12-02

## 2022-12-02 DIAGNOSIS — Z96.652 STATUS POST TOTAL LEFT KNEE REPLACEMENT: Primary | ICD-10-CM

## 2022-12-02 PROCEDURE — 97530 THERAPEUTIC ACTIVITIES: CPT | Mod: GP | Performed by: PHYSICAL THERAPIST

## 2022-12-02 PROCEDURE — 97112 NEUROMUSCULAR REEDUCATION: CPT | Mod: GP | Performed by: PHYSICAL THERAPIST

## 2022-12-02 NOTE — LETTER
[facility]  [address]  [address]  [facility] [place] [facility] [address]  [phone]    2022    Re: James Salvador   :   1945  MRN:  [id number]   REFERRING PHYSICIAN:   Mynor DICK [facility]    Date of Initial Evaluation:  22  Visits:  Rxs Used: 18  Reason for Referral:  Status post total left knee replacement    EVALUATION SUMMARY    Subjective:  HPI  Physical Exam                    Objective:  System    Physical Exam    General     ROS    Assessment/Plan:    PROGRESS  REPORT    Progress reporting period is from 10/3/2022 to 2022.       SUBJECTIVE  Subjective changes noted by patient:   Was sore after last session but no increase in pain, simply muscle faigue. Has continued to struggle with urinary pain and incontinence since his prostate surgery. Was prescribed Cipro for the next month, in hopes a clearing a UTI. Has also been struggling with cognition, feels he can watch a TV show but not remember, feels more emotional and hasn't been motivated to exercise lately. Has been able to continue working on walking in the house, with his FWW. Walks are typically 4-10 minutes each with standing breaks.      Current pain level is 2/10.     Previous pain level was 2/10.     Changes in function:  Yes (See Goal flowsheet attached for changes in current functional level)  Re: James SCHUSTER Madeleine   :   1945    Adverse reaction to treatment or activity: activity - prolonged standing/walking    OBJECTIVE  Changes noted in objective findings:  Yes,     Objective:     Gait: FWW, increased trunk flexion but able to correct when cued;     Strength Knee Flx L: -5/5, Ext L: -5/5, Hip Flx L: -4/5, R: -5/5,     30 Sec Sit to Stand: 9 reps,     Balance FT EC: 3 sec, Tandem EO L foot back: unable without assist from counter;     Able to ascend 5, 5in steps with assist of counter and rail, CGA      ASSESSMENT/PLAN  Updated problem list and treatment plan: Diagnosis 1:  S/p L TKA revision (DOS 7/7/20220  Pain -  hot/cold therapy, manual therapy, education and home program  Decreased ROM/flexibility - manual therapy and therapeutic exercise  Decreased strength - therapeutic exercise and therapeutic activities  Impaired balance - neuro re-education and therapeutic activities  Decreased proprioception - neuro re-education and therapeutic activities  Impaired gait - gait training  Impaired muscle performance - neuro re-education  Decreased function - therapeutic activities  Diagnosis 2:  Difficulty walking   Pain -  hot/cold therapy, manual therapy, education and home program  Decreased strength - therapeutic exercise and therapeutic activities  Impaired balance - neuro re-education and therapeutic activities  Decreased proprioception - neuro re-education and therapeutic activities  Impaired gait - gait training  Impaired muscle performance - neuro re-education  STG/LTGs have been met or progress has been made towards goals:  Yes (See Goal flow sheet completed today.)  Assessment of Progress: The patient's condition is improving.  The patient's condition has potential to improve.  The patient has had set backs in their progress.  Self Management Plans:  Patient has been instructed in a home treatment program.  I have re-evaluated this patient and find that the nature, scope, duration and intensity of the therapy is appropriate for the medical condition of the patient.  James continues to require the following intervention to meet STG and LTG's:  PT    Recommendations:  This patient would benefit from continued therapy.     Frequency:  1 X week, once daily  Duration:  for 12 weeks                                                                                               [facility]    OUTPATIENT Physical Therapy ORTHOPEDIC EVALUATION  PLAN OF TREATMENT FOR OUTPATIENT  REHABILITATION  (COMPLETE FOR INITIAL CLAIMS ONLY)  Patient's Last Name, First Name, M.I.  YOB: 1945  James Salvador    Provider s Name:  [facility]   Medical Record No.  [id number]   Start of Care Date:  08/31/22   Onset Date:  07/07/22    Treatment Diagnosis:  s/p L TKA revision Medical Diagnosis:  Status post total left knee replacement       Goals:     12/02/22 0500   Body Part   Goals listed below are for Left Knee   Goal #1   Goal #1 ambulation   Previous Functional Level Minutes patient could walk;with walker   Performance Level 10 minutes   Current Functional Level Minutes patient can walk;with walker   Performance Level 10, hasn't attempted further, has been fatigued and weak from UTI following prostate surgery   STG Target Performance Minutes patient will be able to walk;with walker   Performance Level 20 min   Rationale for safe household ambulation;for safe outdoor household ambulation;for safe community ambulation   Due Date 01/13/23    LTG Target Performance Minutes patient will be able to  walk;with cane   Performance Level 10   Rationale for safe household ambulation;for safe outdoor household ambulation;for safe community ambulation   Due Date 02/24/23               Therapy Frequency:  1x/wk  Predicted Duration of Therapy Intervention:  12 weeks    Ene Shirley PT                 I CERTIFY THE NEED FOR THESE SERVICES FURNISHED UNDER        THIS PLAN OF TREATMENT AND WHILE UNDER MY CARE     (Physician attestation of this document indicates review and certification of the therapy plan).                     Certification Date From:  11/29/22   Certification Date To:  02/21/23    Referring Provider:  Mynor Singh    Initial Assessment        See Epic Evaluation SOC Date: 08/31/22                                                           Thank you for your referral.    INQUIRIES  Therapist: Ene Shirley PT  [facility]  [facility]  [address] [place] [address]  [address]  [facility] [address]  Phone: [phone]  Fax: [phone]

## 2022-12-02 NOTE — LETTER
[facility]  [address]  [address]  [place] [address]  [phone]    2022    Re: James Salvador   :   1945  MRN:  [id number]   REFERRING PHYSICIAN:   Mynor DICK [facility]    Date of Initial Evaluation: 2022  Visits:  Rxs Used: 18  Reason for Referral:  Status post total left knee replacement    EVALUATION SUMMARY    Assessment/Plan:    PROGRESS  REPORT    Progress reporting period is from 10/3/2022 to 2022.       SUBJECTIVE  Subjective changes noted by patient:   Was sore after last session but no increase in pain, simply muscle faigue. Has continued to struggle with urinary pain and incontinence since his prostate surgery. Was prescribed Cipro for the next month, in hopes a clearing a UTI. Has also been struggling with cognition, feels he can watch a TV show but not remember, feels more emotional and hasn't been motivated to exercise lately. Has been able to continue working on walking in the house, with his FWW. Walks are typically 4-10 minutes each with standing breaks.      Current pain level is 2/10.     Previous pain level was 2/10.     Changes in function:  Yes (See Goal flowsheet attached for changes in current functional level)    Adverse reaction to treatment or activity: activity - prolonged standing/walking    OBJECTIVE  Changes noted in objective findings:  Yes,     Objective:     Gait: FWW, increased trunk flexion but able to correct when cued;     Strength Knee Flx L: -5/5, Ext L: -5/5, Hip Flx L: -4/5, R: -5/5,     30 Sec Sit to Stand: 9 reps,     Re: James Salvador   :   1945    Balance FT EC: 3 sec, Tandem EO L foot back: unable without assist from counter;     Able to ascend 5, 5in steps with assist of counter and rail, CGA     ASSESSMENT/PLAN  Updated problem list and treatment plan: Diagnosis 1:  S/p L TKA revision (DOS   Pain -  hot/cold  therapy, manual therapy, education and home program  Decreased ROM/flexibility - manual therapy and therapeutic exercise  Decreased strength - therapeutic exercise and therapeutic activities  Impaired balance - neuro re-education and therapeutic activities  Decreased proprioception - neuro re-education and therapeutic activities  Impaired gait - gait training  Impaired muscle performance - neuro re-education  Decreased function - therapeutic activities  Diagnosis 2:  Difficulty walking   Pain -  hot/cold therapy, manual therapy, education and home program  Decreased strength - therapeutic exercise and therapeutic activities  Impaired balance - neuro re-education and therapeutic activities  Decreased proprioception - neuro re-education and therapeutic activities  Impaired gait - gait training  Impaired muscle performance - neuro re-education  STG/LTGs have been met or progress has been made towards goals:  Yes (See Goal flow sheet completed today.)  Assessment of Progress: The patient's condition is improving.  The patient's condition has potential to improve.  The patient has had set backs in their progress.  Self Management Plans:  Patient has been instructed in a home treatment program.  I have re-evaluated this patient and find that the nature, scope, duration and intensity of the therapy is appropriate for the medical condition of the patient.  James continues to require the following intervention to meet STG and LTG's:  PT  Recommendations:  This patient would benefit from continued therapy.     Frequency:  1 X week, once daily  Duration:  for 12 weeks    Thank you for your referral.    INQUIRIES  Therapist: GET HOWE [facility] [address] [address]  Phone: [phone]  Fax: [phone]               Re: James Salvador   :   1945                                                                               [facility]  [facility]    OUTPATIENT Physical Therapy ORTHOPEDIC EVALUATION  PLAN OF TREATMENT FOR OUTPATIENT REHABILITATION  (COMPLETE FOR INITIAL CLAIMS ONLY)  Patient's Last Name, First Name, M.I.  YOB: 1945  James Salvador    Provider s Name:  [facility]   Medical Record No.  [id number]   Start of Care Date:  08/31/22   Onset Date:  07/07/22    Treatment Diagnosis:  s/p L TKA revision Medical Diagnosis:  Status post total left knee replacement       Goals:     12/02/22 0500   Body Part   Goals listed below are for Left Knee   Goal #1   Goal #1 ambulation   Previous Functional Level Minutes patient could walk;with walker   Performance Level 10 minutes   Current Functional Level Minutes patient can walk;with walker   Performance Level 10, hasn't attempted further, has been fatigued and weak from UTI following prostate surgery   STG Target Performance Minutes patient will be able to walk;with walker   Performance Level 20 min   Rationale for safe household ambulation;for safe outdoor household ambulation;for safe community ambulation   Due Date 01/13/23    LTG Target Performance Minutes patient will be able to  walk;with cane   Performance Level 10   Rationale for safe household ambulation;for safe outdoor household ambulation;for safe community ambulation   Due Date 02/24/23       Therapy Frequency:  1x/wk  Predicted Duration of Therapy Intervention:  12 weeks    Ene Shirley, PT                 I CERTIFY THE NEED FOR THESE SERVICES FURNISHED UNDER        THIS PLAN OF TREATMENT AND WHILE UNDER MY CARE     (Physician attestation of this document indicates review and certification of the therapy plan).                     Certification Date From:  11/29/22   Certification Date To:  02/21/23    Referring Provider:  Mynor Singh    Initial Assessment        See Epic Evaluation SOC Date: 08/31/22

## 2022-12-02 NOTE — PROGRESS NOTES
[facility]    OUTPATIENT Physical Therapy ORTHOPEDIC EVALUATION  PLAN OF TREATMENT FOR OUTPATIENT REHABILITATION  (COMPLETE FOR INITIAL CLAIMS ONLY)  Patient's Last Name, First Name, M.I.  YOB: 1945  James Salvador    Provider s Name:  [facility]   Medical Record No.  [id number]   Start of Care Date:  08/31/22   Onset Date:  07/07/22    Treatment Diagnosis:  s/p L TKA revision Medical Diagnosis:  Status post total left knee replacement       Goals:     12/02/22 0500   Body Part   Goals listed below are for Left Knee   Goal #1   Goal #1 ambulation   Previous Functional Level Minutes patient could walk;with walker   Performance Level 10 minutes   Current Functional Level Minutes patient can walk;with walker   Performance Level 10, hasn't attempted further, has been fatigued and weak from UTI following prostate surgery   STG Target Performance Minutes patient will be able to walk;with walker   Performance Level 20 min   Rationale for safe household ambulation;for safe outdoor household ambulation;for safe community ambulation   Due Date 01/13/23    LTG Target Performance Minutes patient will be able to  walk;with cane   Performance Level 10   Rationale for safe household ambulation;for safe outdoor household ambulation;for safe community ambulation   Due Date 02/24/23       Therapy Frequency:  1x/wk  Predicted Duration of Therapy Intervention:  12 weeks    Ene Shirley, PT                 I CERTIFY THE NEED FOR THESE SERVICES FURNISHED UNDER        THIS PLAN OF TREATMENT AND WHILE UNDER MY CARE     (Physician attestation of this document indicates review and certification of the therapy plan).                     Certification Date From:  11/29/22   Certification Date To:  02/21/23    Referring Provider:  Mynor Singh    Initial Assessment        See Epic  Evaluation SOC Date: 08/31/22

## 2022-12-02 NOTE — PROGRESS NOTES
Subjective:  HPI  Physical Exam                    Objective:  System    Physical Exam    General     ROS    Assessment/Plan:    PROGRESS  REPORT    Progress reporting period is from 10/3/2022 to 12/2/2022.       SUBJECTIVE  Subjective changes noted by patient:   Was sore after last session but no increase in pain, simply muscle faigue. Has continued to struggle with urinary pain and incontinence since his prostate surgery. Was prescribed Cipro for the next month, in hopes a clearing a UTI. Has also been struggling with cognition, feels he can watch a TV show but not remember, feels more emotional and hasn't been motivated to exercise lately. Has been able to continue working on walking in the house, with his FWW. Walks are typically 4-10 minutes each with standing breaks.      Current pain level is 2/10.     Previous pain level was 2/10.     Changes in function:  Yes (See Goal flowsheet attached for changes in current functional level)    Adverse reaction to treatment or activity: activity - prolonged standing/walking    OBJECTIVE  Changes noted in objective findings:  Yes,     Objective:     Gait: FWW, increased trunk flexion but able to correct when cued;     Strength Knee Flx L: -5/5, Ext L: -5/5, Hip Flx L: -4/5, R: -5/5,     30 Sec Sit to Stand: 9 reps,     Balance FT EC: 3 sec, Tandem EO L foot back: unable without assist from counter;     Able to ascend 5, 5in steps with assist of counter and rail, CGA     ASSESSMENT/PLAN  Updated problem list and treatment plan: Diagnosis 1:  S/p L TKA revision (DOS 7/7/20220  Pain -  hot/cold therapy, manual therapy, education and home program  Decreased ROM/flexibility - manual therapy and therapeutic exercise  Decreased strength - therapeutic exercise and therapeutic activities  Impaired balance - neuro re-education and therapeutic activities  Decreased proprioception - neuro re-education and therapeutic activities  Impaired gait - gait training  Impaired muscle  performance - neuro re-education  Decreased function - therapeutic activities  Diagnosis 2:  Difficulty walking   Pain -  hot/cold therapy, manual therapy, education and home program  Decreased strength - therapeutic exercise and therapeutic activities  Impaired balance - neuro re-education and therapeutic activities  Decreased proprioception - neuro re-education and therapeutic activities  Impaired gait - gait training  Impaired muscle performance - neuro re-education  STG/LTGs have been met or progress has been made towards goals:  Yes (See Goal flow sheet completed today.)  Assessment of Progress: The patient's condition is improving.  The patient's condition has potential to improve.  The patient has had set backs in their progress.  Self Management Plans:  Patient has been instructed in a home treatment program.  I have re-evaluated this patient and find that the nature, scope, duration and intensity of the therapy is appropriate for the medical condition of the patient.  James continues to require the following intervention to meet STG and LTG's:  PT    Recommendations:  This patient would benefit from continued therapy.     Frequency:  1 X week, once daily  Duration:  for 12 weeks        Please refer to the daily flowsheet for treatment today, total treatment time and time spent performing 1:1 timed codes.

## 2022-12-16 ENCOUNTER — THERAPY VISIT (OUTPATIENT)
Dept: PHYSICAL THERAPY | Facility: CLINIC | Age: 77
End: 2022-12-16
Payer: COMMERCIAL

## 2022-12-16 DIAGNOSIS — Z96.652 STATUS POST TOTAL LEFT KNEE REPLACEMENT: Primary | ICD-10-CM

## 2022-12-16 PROCEDURE — 97530 THERAPEUTIC ACTIVITIES: CPT | Mod: GP | Performed by: PHYSICAL THERAPIST

## 2022-12-21 ENCOUNTER — THERAPY VISIT (OUTPATIENT)
Dept: PHYSICAL THERAPY | Facility: CLINIC | Age: 77
End: 2022-12-21
Payer: COMMERCIAL

## 2022-12-21 DIAGNOSIS — Z96.652 STATUS POST TOTAL LEFT KNEE REPLACEMENT: Primary | ICD-10-CM

## 2022-12-21 PROCEDURE — 97116 GAIT TRAINING THERAPY: CPT | Mod: GP | Performed by: PHYSICAL THERAPIST

## 2022-12-21 PROCEDURE — 97140 MANUAL THERAPY 1/> REGIONS: CPT | Mod: GP | Performed by: PHYSICAL THERAPIST

## 2022-12-21 PROCEDURE — 97110 THERAPEUTIC EXERCISES: CPT | Mod: GP | Performed by: PHYSICAL THERAPIST

## 2023-01-12 ENCOUNTER — THERAPY VISIT (OUTPATIENT)
Dept: PHYSICAL THERAPY | Facility: CLINIC | Age: 78
End: 2023-01-12
Payer: COMMERCIAL

## 2023-01-12 DIAGNOSIS — Z96.652 STATUS POST TOTAL LEFT KNEE REPLACEMENT: Primary | ICD-10-CM

## 2023-01-12 PROCEDURE — 97110 THERAPEUTIC EXERCISES: CPT | Mod: GP | Performed by: PHYSICAL THERAPIST

## 2023-01-12 PROCEDURE — 97112 NEUROMUSCULAR REEDUCATION: CPT | Mod: GP | Performed by: PHYSICAL THERAPIST

## 2023-01-13 ENCOUNTER — TRANSCRIBE ORDERS (OUTPATIENT)
Dept: ORTHOPEDICS | Facility: CLINIC | Age: 78
End: 2023-01-13

## 2023-01-13 DIAGNOSIS — Z96.652 S/P REVISION OF TOTAL KNEE, LEFT: Primary | ICD-10-CM

## 2023-01-16 ENCOUNTER — THERAPY VISIT (OUTPATIENT)
Dept: PHYSICAL THERAPY | Facility: CLINIC | Age: 78
End: 2023-01-16
Payer: COMMERCIAL

## 2023-01-16 DIAGNOSIS — Z96.652 STATUS POST TOTAL LEFT KNEE REPLACEMENT: Primary | ICD-10-CM

## 2023-01-16 PROCEDURE — 97112 NEUROMUSCULAR REEDUCATION: CPT | Mod: GP | Performed by: PHYSICAL THERAPIST

## 2023-01-16 PROCEDURE — 97110 THERAPEUTIC EXERCISES: CPT | Mod: GP | Performed by: PHYSICAL THERAPIST

## 2023-01-23 ENCOUNTER — THERAPY VISIT (OUTPATIENT)
Dept: PHYSICAL THERAPY | Facility: CLINIC | Age: 78
End: 2023-01-23
Payer: COMMERCIAL

## 2023-01-23 DIAGNOSIS — Z96.652 STATUS POST TOTAL LEFT KNEE REPLACEMENT: Primary | ICD-10-CM

## 2023-01-23 PROCEDURE — 97110 THERAPEUTIC EXERCISES: CPT | Mod: GP | Performed by: PHYSICAL THERAPIST

## 2023-01-23 PROCEDURE — 97112 NEUROMUSCULAR REEDUCATION: CPT | Mod: GP | Performed by: PHYSICAL THERAPIST

## 2023-01-23 PROCEDURE — 97140 MANUAL THERAPY 1/> REGIONS: CPT | Mod: GP | Performed by: PHYSICAL THERAPIST

## 2023-01-30 ENCOUNTER — THERAPY VISIT (OUTPATIENT)
Dept: PHYSICAL THERAPY | Facility: CLINIC | Age: 78
End: 2023-01-30
Payer: COMMERCIAL

## 2023-01-30 DIAGNOSIS — Z96.652 STATUS POST TOTAL LEFT KNEE REPLACEMENT: Primary | ICD-10-CM

## 2023-01-30 PROCEDURE — 97140 MANUAL THERAPY 1/> REGIONS: CPT | Mod: GP | Performed by: PHYSICAL THERAPIST

## 2023-01-30 PROCEDURE — 97110 THERAPEUTIC EXERCISES: CPT | Mod: GP | Performed by: PHYSICAL THERAPIST

## 2023-01-30 PROCEDURE — 97112 NEUROMUSCULAR REEDUCATION: CPT | Mod: GP | Performed by: PHYSICAL THERAPIST

## 2023-01-30 NOTE — PROGRESS NOTES
CARDIOLOGY VISIT    REASON FOR VISIT: CAD, A-fib, pacemaker    SUBJECTIVE:  77-year-old male seen for coronary disease, A. fib, and pacemaker.     He had MI in 1994 treated in [place].  He had LAD stent in 2015.  He has a history of SVT runs treated medically.  Nuclear stress in 2016 showed medium sized area of ischemia of the basal to mid inferior and inferolateral wall, EF 60%.     A. fib was diagnosed in 2017, he had no symptoms.  Echo July 2017 showed EF 60%, moderate severe left atrial enlargement, no valve disease.     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     Echo September 2020 showed EF 60%, normal RV, no valve disease.     Device check February 2022 showed 46% V pacing, rhythm A-fib, battery 11 years.    Overall he has been fairly stable recently.  He had another knee surgery last July and now gets around slowly with a walker.  He has some issues with his prostate, but this has improved.  Home blood pressure runs 130/80.  He denies any chest pain or palpitations.  He has some mild left lower extremity edema.    MEDICATIONS:  Current Outpatient Medications   Medication     acetaminophen (TYLENOL) 325 MG tablet     apixaban ANTICOAGULANT (ELIQUIS) 5 MG tablet     atorvastatin (LIPITOR) 80 MG tablet     carvedilol (COREG) 3.125 MG tablet     desonide (DESOWEN) 0.05 % external cream     doxycycline hyclate (PERIOSTAT) 20 MG tablet     finasteride (PROSCAR) 5 MG tablet     fluorouracil (EFUDEX) 5 % external cream     fluticasone (FLONASE) 50 MCG/ACT nasal spray     furosemide (LASIX) 20 MG tablet     GABAPENTIN PO     ketoconazole (NIZORAL) 2 % cream     ketoconazole (NIZORAL) 2 % external shampoo     lisinopril (ZESTRIL) 30 MG tablet     Multiple Vitamin (MULTIVITAMIN OR)     nitroGLYcerin (NITROSTAT) 0.4 MG sublingual tablet     OXcarbazepine (TRILEPTAL PO)     tamsulosin (FLOMAX)  "0.4 MG capsule     No current facility-administered medications for this visit.       ALLERGIES:  Allergies   Allergen Reactions     Cats      Cat Dander     Dogs      Dog Dander     Hydromorphone      Other reaction(s): Confusion     Pollen Extract        REVIEW OF SYSTEMS:  Constitutional:  No weight loss, fever, chills  HEENT:  Eyes:  No visual loss, blurred vision, double vision or yellow sclerae. No hearing loss, sneezing, congestion, runny nose or sore throat.  Skin:  No rash or itching.  Cardiovascular: per HPI  Respiratory: per HPI  GI:  No anorexia, nausea, vomiting or diarrhea. No abdominal pain or blood.  :  No dysurea, hematuria  Neurologic:  No headache, paralysis, ataxia, numbness or tingling in the extremities. No change in bowel or bladder control.  Musculoskeletal:  No muscle pain  Hematologic:  No bleeding or bruising.  Lymphatics:  No enlarged nodes. No history of splenectomy.  Endocrine:  No reports of sweating, cold or heat intolerance. No polyuria or polydipsia.  Allergies:  No history of asthma, hives, eczema or rhinitis.    PHYSICAL EXAM:  BP (!) 158/86   Pulse 95   Ht 1.803 m (5' 11\")   Wt 130.1 kg (286 lb 12.8 oz)   SpO2 98%   BMI 40.00 kg/m      Constitutional: awake, alert, no distress  Eyes: PERRL, sclera nonicteric  ENT: trachea midline  Respiratory: Lungs clear  Cardiovascular: Regular rate and rhythm, no murmurs  GI: nondistended, nontender, bowel sounds present  Lymph/Hematologic: no lymphadenopathy  Skin: dry, no rash  Musculoskeletal: good muscle tone, strength 5/5 in upper and lower extremities  Neurologic: no focal deficits  Neuropsychiatric: appropriate affact    DATA:  Lab: October 2022: Sodium 124, potassium 5.1, creatinine 0.5, hemoglobin 11.5  Recent Labs   Lab Test 02/04/22  1053 03/12/21  0947 12/08/16  1040 09/22/15  1825 12/30/14  0757   CHOL 137 151   < > 135 127   HDL 46 47   < > 38* 34*   LDL 64 73   < > 65 50   TRIG 135 154*   < > 160* 214*   CHOLHDLRATIO  --  "  --   --  3.6 3.7    < > = values in this interval not displayed.     ASSESSMENT:  77-year-old male seen for CAD, A-fib, pacemaker.  His cardiac issues are stable.  Medications will be kept the same.  Blood pressure and lipids are well controlled.  He will have an echo and follow-up in 1 year.    RECOMMENDATIONS:  1.  CAD  -Continue current medications    2.  Chronic A-fib  -Rate is controlled, continue Eliquis    3.  Pacemaker  -Continue routine device checks    Follow-up in 1 year after echo.    Aquilino Buchanan MD  Cardiology - [facility]  Pager:  [phone]  Text Page  February 1, 2023

## 2023-02-01 ENCOUNTER — ANCILLARY PROCEDURE (OUTPATIENT)
Dept: CARDIOLOGY | Facility: CLINIC | Age: 78
End: 2023-02-01
Attending: INTERNAL MEDICINE
Payer: COMMERCIAL

## 2023-02-01 ENCOUNTER — OFFICE VISIT (OUTPATIENT)
Dept: CARDIOLOGY | Facility: CLINIC | Age: 78
End: 2023-02-01
Payer: COMMERCIAL

## 2023-02-01 VITALS
HEIGHT: 71 IN | OXYGEN SATURATION: 98 % | WEIGHT: 286.8 LBS | DIASTOLIC BLOOD PRESSURE: 80 MMHG | SYSTOLIC BLOOD PRESSURE: 132 MMHG | HEART RATE: 95 BPM | BODY MASS INDEX: 40.15 KG/M2

## 2023-02-01 DIAGNOSIS — I48.21 PERMANENT ATRIAL FIBRILLATION (H): ICD-10-CM

## 2023-02-01 DIAGNOSIS — I49.5 SSS (SICK SINUS SYNDROME) (H): ICD-10-CM

## 2023-02-01 DIAGNOSIS — Z95.0 CARDIAC PACEMAKER IN SITU: ICD-10-CM

## 2023-02-01 DIAGNOSIS — I25.10 CORONARY ARTERY DISEASE INVOLVING NATIVE CORONARY ARTERY OF NATIVE HEART WITHOUT ANGINA PECTORIS: Primary | ICD-10-CM

## 2023-02-01 PROCEDURE — 99214 OFFICE O/P EST MOD 30 MIN: CPT | Performed by: INTERNAL MEDICINE

## 2023-02-01 PROCEDURE — 93279 PRGRMG DEV EVAL PM/LDLS PM: CPT | Performed by: INTERNAL MEDICINE

## 2023-02-01 NOTE — LETTER
2/1/2023    Jeronimo Painter MD  [address]  [facility] [address]    RE: James Salvador       Dear Colleague,     I had the pleasure of seeing James Salvador in the [facility].  CARDIOLOGY VISIT    REASON FOR VISIT: CAD, A-fib, pacemaker    SUBJECTIVE:  77-year-old male seen for coronary disease, A. fib, and pacemaker.     He had MI in 1994 treated in [place].  He had LAD stent in 2015.  He has a history of SVT runs treated medically.  Nuclear stress in 2016 showed medium sized area of ischemia of the basal to mid inferior and inferolateral wall, EF 60%.     A. fib was diagnosed in 2017, he had no symptoms.  Echo July 2017 showed EF 60%, moderate severe left atrial enlargement, no valve disease.     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     Echo September 2020 showed EF 60%, normal RV, no valve disease.     Device check February 2022 showed 46% V pacing, rhythm A-fib, battery 11 years.    Overall he has been fairly stable recently.  He had another knee surgery last July and now gets around slowly with a walker.  He has some issues with his prostate, but this has improved.  Home blood pressure runs 130/80.  He denies any chest pain or palpitations.  He has some mild left lower extremity edema.    MEDICATIONS:  Current Outpatient Medications   Medication     acetaminophen (TYLENOL) 325 MG tablet     apixaban ANTICOAGULANT (ELIQUIS) 5 MG tablet     atorvastatin (LIPITOR) 80 MG tablet     carvedilol (COREG) 3.125 MG tablet     desonide (DESOWEN) 0.05 % external cream     doxycycline hyclate (PERIOSTAT) 20 MG tablet     finasteride (PROSCAR) 5 MG tablet     fluorouracil (EFUDEX) 5 % external cream     fluticasone (FLONASE) 50 MCG/ACT nasal spray     furosemide (LASIX) 20 MG tablet     GABAPENTIN PO     ketoconazole (NIZORAL) 2 % cream     ketoconazole  "(NIZORAL) 2 % external shampoo     lisinopril (ZESTRIL) 30 MG tablet     Multiple Vitamin (MULTIVITAMIN OR)     nitroGLYcerin (NITROSTAT) 0.4 MG sublingual tablet     OXcarbazepine (TRILEPTAL PO)     tamsulosin (FLOMAX) 0.4 MG capsule     No current facility-administered medications for this visit.       ALLERGIES:  Allergies   Allergen Reactions     Cats      Cat Dander     Dogs      Dog Dander     Hydromorphone      Other reaction(s): Confusion     Pollen Extract        REVIEW OF SYSTEMS:  Constitutional:  No weight loss, fever, chills  HEENT:  Eyes:  No visual loss, blurred vision, double vision or yellow sclerae. No hearing loss, sneezing, congestion, runny nose or sore throat.  Skin:  No rash or itching.  Cardiovascular: per HPI  Respiratory: per HPI  GI:  No anorexia, nausea, vomiting or diarrhea. No abdominal pain or blood.  :  No dysurea, hematuria  Neurologic:  No headache, paralysis, ataxia, numbness or tingling in the extremities. No change in bowel or bladder control.  Musculoskeletal:  No muscle pain  Hematologic:  No bleeding or bruising.  Lymphatics:  No enlarged nodes. No history of splenectomy.  Endocrine:  No reports of sweating, cold or heat intolerance. No polyuria or polydipsia.  Allergies:  No history of asthma, hives, eczema or rhinitis.    PHYSICAL EXAM:  BP (!) 158/86   Pulse 95   Ht 1.803 m (5' 11\")   Wt 130.1 kg (286 lb 12.8 oz)   SpO2 98%   BMI 40.00 kg/m      Constitutional: awake, alert, no distress  Eyes: PERRL, sclera nonicteric  ENT: trachea midline  Respiratory: Lungs clear  Cardiovascular: Regular rate and rhythm, no murmurs  GI: nondistended, nontender, bowel sounds present  Lymph/Hematologic: no lymphadenopathy  Skin: dry, no rash  Musculoskeletal: good muscle tone, strength 5/5 in upper and lower extremities  Neurologic: no focal deficits  Neuropsychiatric: appropriate affact    DATA:  Lab: October 2022: Sodium 124, potassium 5.1, creatinine 0.5, hemoglobin 11.5  Recent " Labs   Lab Test 02/04/22  1053 03/12/21  0947 12/08/16  1040 09/22/15  1825 12/30/14  0757   CHOL 137 151   < > 135 127   HDL 46 47   < > 38* 34*   LDL 64 73   < > 65 50   TRIG 135 154*   < > 160* 214*   CHOLHDLRATIO  --   --   --  3.6 3.7    < > = values in this interval not displayed.     ASSESSMENT:  77-year-old male seen for CAD, A-fib, pacemaker.  His cardiac issues are stable.  Medications will be kept the same.  Blood pressure and lipids are well controlled.  He will have an echo and follow-up in 1 year.    RECOMMENDATIONS:  1.  CAD  -Continue current medications    2.  Chronic A-fib  -Rate is controlled, continue Eliquis    3.  Pacemaker  -Continue routine device checks    Follow-up in 1 year after echo.    Aquilino Buchanan MD  Cardiology - [facility]  Pager:  [phone]  Text Page  February 1, 2023  Thank you for allowing me to participate in the care of your patient.      Sincerely,     Aquilino Buchanan MD     [facility]  cc:   Referred Self

## 2023-02-01 NOTE — PATIENT INSTRUCTIONS
Please keep your medications the same.    We would like to see you back in about 1 years time and do an echocardiogram at that time.  Our schedulers will contact you next year to arrange the follow-up.

## 2023-02-02 ENCOUNTER — MYC REFILL (OUTPATIENT)
Dept: INTERNAL MEDICINE | Facility: CLINIC | Age: 78
End: 2023-02-02
Payer: COMMERCIAL

## 2023-02-02 LAB
MDC_IDC_LEAD_IMPLANT_DT: NORMAL
MDC_IDC_LEAD_LOCATION: NORMAL
MDC_IDC_LEAD_LOCATION_DETAIL_1: NORMAL
MDC_IDC_LEAD_MFG: NORMAL
MDC_IDC_LEAD_MODEL: NORMAL
MDC_IDC_LEAD_POLARITY_TYPE: NORMAL
MDC_IDC_LEAD_SERIAL: NORMAL
MDC_IDC_MSMT_BATTERY_DTM: NORMAL
MDC_IDC_MSMT_BATTERY_REMAINING_LONGEVITY: 139 MO
MDC_IDC_MSMT_BATTERY_RRT_TRIGGER: 2.62
MDC_IDC_MSMT_BATTERY_STATUS: NORMAL
MDC_IDC_MSMT_BATTERY_VOLTAGE: 3.02 V
MDC_IDC_MSMT_LEADCHNL_RV_IMPEDANCE_VALUE: 304 OHM
MDC_IDC_MSMT_LEADCHNL_RV_IMPEDANCE_VALUE: 380 OHM
MDC_IDC_MSMT_LEADCHNL_RV_PACING_THRESHOLD_AMPLITUDE: 0.62 V
MDC_IDC_MSMT_LEADCHNL_RV_PACING_THRESHOLD_AMPLITUDE: 0.75 V
MDC_IDC_MSMT_LEADCHNL_RV_PACING_THRESHOLD_PULSEWIDTH: 0.4 MS
MDC_IDC_MSMT_LEADCHNL_RV_PACING_THRESHOLD_PULSEWIDTH: 0.4 MS
MDC_IDC_MSMT_LEADCHNL_RV_SENSING_INTR_AMPL: 2 MV
MDC_IDC_MSMT_LEADCHNL_RV_SENSING_INTR_AMPL: 2.12 MV
MDC_IDC_PG_IMPLANT_DTM: NORMAL
MDC_IDC_PG_MFG: NORMAL
MDC_IDC_PG_MODEL: NORMAL
MDC_IDC_PG_SERIAL: NORMAL
MDC_IDC_PG_TYPE: NORMAL
MDC_IDC_SESS_CLINIC_NAME: NORMAL
MDC_IDC_SESS_DTM: NORMAL
MDC_IDC_SESS_TYPE: NORMAL
MDC_IDC_SET_BRADY_HYSTRATE: NORMAL
MDC_IDC_SET_BRADY_LOWRATE: 50 {BEATS}/MIN
MDC_IDC_SET_BRADY_MAX_SENSOR_RATE: 130 {BEATS}/MIN
MDC_IDC_SET_BRADY_MODE: NORMAL
MDC_IDC_SET_LEADCHNL_RV_PACING_AMPLITUDE: 2 V
MDC_IDC_SET_LEADCHNL_RV_PACING_ANODE_ELECTRODE_1: NORMAL
MDC_IDC_SET_LEADCHNL_RV_PACING_ANODE_LOCATION_1: NORMAL
MDC_IDC_SET_LEADCHNL_RV_PACING_CAPTURE_MODE: NORMAL
MDC_IDC_SET_LEADCHNL_RV_PACING_CATHODE_ELECTRODE_1: NORMAL
MDC_IDC_SET_LEADCHNL_RV_PACING_CATHODE_LOCATION_1: NORMAL
MDC_IDC_SET_LEADCHNL_RV_PACING_POLARITY: NORMAL
MDC_IDC_SET_LEADCHNL_RV_PACING_PULSEWIDTH: 0.4 MS
MDC_IDC_SET_LEADCHNL_RV_SENSING_ANODE_ELECTRODE_1: NORMAL
MDC_IDC_SET_LEADCHNL_RV_SENSING_ANODE_LOCATION_1: NORMAL
MDC_IDC_SET_LEADCHNL_RV_SENSING_CATHODE_ELECTRODE_1: NORMAL
MDC_IDC_SET_LEADCHNL_RV_SENSING_CATHODE_LOCATION_1: NORMAL
MDC_IDC_SET_LEADCHNL_RV_SENSING_POLARITY: NORMAL
MDC_IDC_SET_LEADCHNL_RV_SENSING_SENSITIVITY: 0.6 MV
MDC_IDC_SET_ZONE_DETECTION_INTERVAL: 360 MS
MDC_IDC_SET_ZONE_TYPE: NORMAL
MDC_IDC_STAT_BRADY_DTM_END: NORMAL
MDC_IDC_STAT_BRADY_DTM_START: NORMAL
MDC_IDC_STAT_BRADY_RV_PERCENT_PACED: 46.44 %
MDC_IDC_STAT_EPISODE_RECENT_COUNT: 0
MDC_IDC_STAT_EPISODE_RECENT_COUNT: 0
MDC_IDC_STAT_EPISODE_RECENT_COUNT_DTM_END: NORMAL
MDC_IDC_STAT_EPISODE_RECENT_COUNT_DTM_END: NORMAL
MDC_IDC_STAT_EPISODE_RECENT_COUNT_DTM_START: NORMAL
MDC_IDC_STAT_EPISODE_RECENT_COUNT_DTM_START: NORMAL
MDC_IDC_STAT_EPISODE_TOTAL_COUNT: 0
MDC_IDC_STAT_EPISODE_TOTAL_COUNT: 3
MDC_IDC_STAT_EPISODE_TOTAL_COUNT_DTM_END: NORMAL
MDC_IDC_STAT_EPISODE_TOTAL_COUNT_DTM_END: NORMAL
MDC_IDC_STAT_EPISODE_TOTAL_COUNT_DTM_START: NORMAL
MDC_IDC_STAT_EPISODE_TOTAL_COUNT_DTM_START: NORMAL
MDC_IDC_STAT_EPISODE_TYPE: NORMAL

## 2023-02-03 RX ORDER — GABAPENTIN 300 MG/1
900 CAPSULE ORAL 3 TIMES DAILY
Qty: 270 CAPSULE | Refills: 3 | Status: CANCELLED | OUTPATIENT
Start: 2023-02-03

## 2023-02-06 ENCOUNTER — THERAPY VISIT (OUTPATIENT)
Dept: PHYSICAL THERAPY | Facility: CLINIC | Age: 78
End: 2023-02-06
Payer: COMMERCIAL

## 2023-02-06 DIAGNOSIS — Z96.652 STATUS POST TOTAL LEFT KNEE REPLACEMENT: Primary | ICD-10-CM

## 2023-02-06 PROCEDURE — 97110 THERAPEUTIC EXERCISES: CPT | Mod: GP | Performed by: PHYSICAL THERAPIST

## 2023-02-06 PROCEDURE — 97112 NEUROMUSCULAR REEDUCATION: CPT | Mod: GP | Performed by: PHYSICAL THERAPIST

## 2023-02-06 PROCEDURE — 97530 THERAPEUTIC ACTIVITIES: CPT | Mod: GP | Performed by: PHYSICAL THERAPIST

## 2023-02-15 ENCOUNTER — TELEPHONE (OUTPATIENT)
Dept: DERMATOLOGY | Facility: CLINIC | Age: 78
End: 2023-02-15

## 2023-02-15 DIAGNOSIS — L21.9 DERMATITIS, SEBORRHEIC: ICD-10-CM

## 2023-02-15 DIAGNOSIS — I48.20 CHRONIC ATRIAL FIBRILLATION (H): ICD-10-CM

## 2023-02-15 RX ORDER — TRIAMCINOLONE ACETONIDE 1 MG/ML
LOTION TOPICAL
Qty: 60 ML | Refills: 3 | Status: SHIPPED | OUTPATIENT
Start: 2023-02-15 | End: 2024-02-23

## 2023-02-15 NOTE — TELEPHONE ENCOUNTER
Routing to provider for approval/denial since med not on protocol.    Freya OCONNELL RN  Dermatology   [phone]

## 2023-02-15 NOTE — TELEPHONE ENCOUNTER
[facility]   Refill for   Triamcinolone lotion     Phone [phone]  Fax [phone]    Thank you   Candis Perez [facility]

## 2023-02-17 RX ORDER — FUROSEMIDE 20 MG
TABLET ORAL
Qty: 90 TABLET | Refills: 3 | Status: SHIPPED | OUTPATIENT
Start: 2023-02-17 | End: 2024-02-02

## 2023-02-20 ENCOUNTER — THERAPY VISIT (OUTPATIENT)
Dept: PHYSICAL THERAPY | Facility: CLINIC | Age: 78
End: 2023-02-20
Payer: COMMERCIAL

## 2023-02-20 DIAGNOSIS — Z96.652 STATUS POST TOTAL LEFT KNEE REPLACEMENT: Primary | ICD-10-CM

## 2023-02-20 PROCEDURE — 97110 THERAPEUTIC EXERCISES: CPT | Mod: GP | Performed by: PHYSICAL THERAPIST

## 2023-02-20 PROCEDURE — 97116 GAIT TRAINING THERAPY: CPT | Mod: GP | Performed by: PHYSICAL THERAPIST

## 2023-02-20 PROCEDURE — 97140 MANUAL THERAPY 1/> REGIONS: CPT | Mod: GP | Performed by: PHYSICAL THERAPIST

## 2023-02-20 NOTE — PROGRESS NOTES
[facility]    OUTPATIENT Physical Therapy ORTHOPEDIC EVALUATION  PLAN OF TREATMENT FOR OUTPATIENT REHABILITATION  (COMPLETE FOR INITIAL CLAIMS ONLY)  Patient's Last Name, First Name, M.I.  YOB: 1945  James Salvador    Provider s Name:  [facility]   Medical Record No.  [id number]   Start of Care Date:  08/31/22   Onset Date:  07/07/22    Treatment Diagnosis:  s/p L TKA revision Medical Diagnosis:  Status post total left knee replacement       Goals:     02/20/23 0500   Body Part   Goals listed below are for Left Knee   Goal #1   Goal #1 ambulation   Previous Functional Level Minutes patient could walk;with walker   Performance Level 10 minutes   Current Functional Level Minutes patient can walk;with walker   Performance Level 5 min, 50ft w/ SEC   STG Target Performance Minutes patient will be able to walk;with walker   Performance Level 20 min, 5 min w/ SEC   Rationale for safe household ambulation;for safe outdoor household ambulation;for safe community ambulation   Due Date 04/03/23   If goal not met, Why? improving again after set back    LTG Target Performance Minutes patient will be able to  walk;with cane   Performance Level 10   Rationale for safe household ambulation;for safe outdoor household ambulation;for safe community ambulation   Due Date 05/15/23         Therapy Frequency:  1x/wk  Predicted Duration of Therapy Intervention:  12 weeks    Ene Shirley, PT                 I CERTIFY THE NEED FOR THESE SERVICES FURNISHED UNDER        THIS PLAN OF TREATMENT AND WHILE UNDER MY CARE     (Physician attestation of this document indicates review and certification of the therapy plan).                     Certification Date From:  02/22/23   Certification Date To:  05/15/23    Referring Provider:  Mynor Singh    Initial Assessment        See Epic  Evaluation SOC Date: 08/31/22

## 2023-02-20 NOTE — PROGRESS NOTES
Subjective:  HPI  Physical Exam                    Objective:  System    Physical Exam    General     ROS    Assessment/Plan:    PROGRESS  REPORT    Progress reporting period is from 12/2/2022 to 2/20/2023.       SUBJECTIVE  Subjective changes noted by patient:  L knee is improving again, feeling more confident in the strength of his L leg. Has been able to take some steps without the walker, about 10ft without turns. Exercises going well; however, the SLR bothers his groin.      Current pain level is 1/10.     Previous pain level was 2/10.     Changes in function:  Yes (See Goal flowsheet attached for changes in current functional level)  Adverse reaction to treatment or activity: None    OBJECTIVE  Changes noted in objective findings:  Yes,     Objective:     AROM L Knee Ext: 0, Flx: 105 (taking seated, doesn't tolerate supine due to back issues);     Strength Knee Ext L: -4/5, R: 5/5, Knee Flx L: 5/5, R: 5/5, Hip Flx L: 4/5, R: -5/5; SLR: no ext lag, toes forward;     Balance FT EO: 30 sec, Tandem EO L; 12 sec, R: 30 sec;     Gait: SEC, SBA, 50ft, able to turn confidently to the right, left turns require CGA and are more challenging due to L LE instability; L TFL tender to palpation     ASSESSMENT/PLAN  Updated problem list and treatment plan: Diagnosis 1:   S/p L TKA revision (DOS 7/7/2022  Pain -  hot/cold therapy, manual therapy, education and home program  Decreased ROM/flexibility - manual therapy and therapeutic exercise  Decreased strength - therapeutic exercise and therapeutic activities  Impaired balance - neuro re-education and therapeutic activities  Decreased proprioception - neuro re-education and therapeutic activities  Impaired gait - gait training  Impaired muscle performance - neuro re-education  Decreased function - therapeutic activities  Diagnosis 2:  Difficulty walking   Pain -  hot/cold therapy, manual therapy, education and home program  Decreased strength - therapeutic exercise and  therapeutic activities  Impaired balance - neuro re-education and therapeutic activities  Decreased proprioception - neuro re-education and therapeutic activities  Impaired muscle performance - neuro re-education  Decreased function - therapeutic activities    STG/LTGs have been met or progress has been made towards goals:  Yes (See Goal flow sheet completed today.)    Assessment of Progress: The patient's condition is improving. Progress is slow; however, considered complicated surgical history of L knee the past 3 years, slow but steady progress is expected.  The patient's condition has potential to improve.  The patient has had set backs in their progress.    Self Management Plans:  Patient has been instructed in a home treatment program.  I have re-evaluated this patient and find that the nature, scope, duration and intensity of the therapy is appropriate for the medical condition of the patient.  James continues to require the following intervention to meet STG and LTG's:  PT    Recommendations:  This patient would benefit from continued therapy.     Frequency:  1 X week, once daily  Duration:  for 12 weeks        Please refer to the daily flowsheet for treatment today, total treatment time and time spent performing 1:1 timed codes.

## 2023-02-20 NOTE — TELEPHONE ENCOUNTER
3 refills given per FM protocol- Fartun Carmen RN-BSN  [facility]  [phone]  
Called and spoke to patient. Patient requesting refills of Efudex. Patient has enough to get him by until Tuesday. Patient voiced understanding.    **Rx not on [facility] protocol.     MIGEL Carmen-BSN  [facility]  [phone]  
[place]

## 2023-02-21 ENCOUNTER — OFFICE VISIT (OUTPATIENT)
Dept: DERMATOLOGY | Facility: CLINIC | Age: 78
End: 2023-02-21
Payer: COMMERCIAL

## 2023-02-21 DIAGNOSIS — D48.5 NEOPLASM OF UNCERTAIN BEHAVIOR OF SKIN: ICD-10-CM

## 2023-02-21 PROCEDURE — 88341 IMHCHEM/IMCYTCHM EA ADD ANTB: CPT | Mod: 59 | Performed by: DERMATOLOGY

## 2023-02-21 PROCEDURE — 88305 TISSUE EXAM BY PATHOLOGIST: CPT | Performed by: DERMATOLOGY

## 2023-02-21 PROCEDURE — 88342 IMHCHEM/IMCYTCHM 1ST ANTB: CPT | Performed by: DERMATOLOGY

## 2023-02-21 PROCEDURE — 11102 TANGNTL BX SKIN SINGLE LES: CPT | Performed by: PHYSICIAN ASSISTANT

## 2023-02-21 ASSESSMENT — PAIN SCALES - GENERAL: PAINLEVEL: NO PAIN (0)

## 2023-02-21 NOTE — PATIENT INSTRUCTIONS
Wound Care Instructions     FOR SUPERFICIAL WOUNDS     [facility]  [phone]                 AFTER 24 HOURS YOU SHOULD REMOVE THE BANDAGE AND BEGIN DAILY DRESSING CHANGES AS FOLLOWS:     1) Remove Dressing.     2) Clean and dry the area with tap water using a Q-tip or sterile gauze pad.     3) Apply Vaseline, Aquaphor, Polysporin ointment or Bacitracin ointment over entire wound.  Do NOT use Neosporin ointment.     4) Cover the wound with a band-aid, or a sterile non-stick gauze pad and micropore paper tape    REPEAT THESE INSTRUCTIONS AT LEAST ONCE A DAY UNTIL THE WOUND HAS COMPLETELY HEALED.    It is an old wives tale that a wound heals better when it is exposed to air and allowed to dry out. The wound will heal faster with a better cosmetic result if it is kept moist with ointment and covered with a bandage.    **Do not let the wound dry out.**    Supplies Needed:      *Cotton tipped applicators (Q-tips)    *Vaseline, Aquaphor, Polysporin or Bacitracin Ointment (NOT NEOSPORIN)    *Band-aids or non-stick gauze pads and micropore paper tape.      PATIENT INFORMATION:    During the healing process you will notice a number of changes. All wounds develop a small halo of redness surrounding the wound.  This means healing is occurring. Severe itching with extensive redness usually indicates sensitivity to the ointment or bandage tape used to dress the wound.  You should call our office if this develops.      Swelling  and/or discoloration around your surgical site is common, particularly when performed around the eye.    All wounds normally drain.  The larger the wound the more drainage there will be.  After 7-10 days, you will notice the wound beginning to shrink and new skin will begin to grow.  The wound is healed when you can see skin has formed over the entire area.  A healed wound has a healthy, shiny look to the surface and is red to dark pink in color to normalize.  Wounds may  take approximately 4-6 weeks to heal.  Larger wounds may take 6-8 weeks.  After the wound is healed you may discontinue dressing changes.    You may experience a sensation of tightness as your wound heals. This is normal and will gradually subside.    Your healed wound may be sensitive to temperature changes. This sensitivity improves with time, but if you re having a lot of discomfort, try to avoid temperature extremes.    Patients frequently experience itching after their wound appears to have healed because of the continue healing under the skin.  Plain Vaseline will help relieve the itching.      POSSIBLE COMPLICATIONS    BLEEDING:    Leave the bandage in place.  Use tightly rolled up gauze or a cloth to apply direct pressure over the bandage for 30  minutes.  Reapply pressure for an additional 30 minutes if necessary  Use additional gauze and tape to maintain pressure once the bleeding has stopped.

## 2023-02-21 NOTE — PROGRESS NOTES
HPI:   Chief complaints: James Salvador is a pleasant 77 year old male who presents for evaluation of a non healing spot on the left cheek. He noticed a pink bump appear about 3 months ago. He treated the area with efudex at home but this did not help. The area is tender.       PHYSICAL EXAM:    There were no vitals taken for this visit.  Skin exam performed as follows: Type 2 skin. Mood appropriate  Alert and Oriented X 3. Well developed, well nourished in no distress.  General appearance: Normal  Head including face: Normal  Eyes: conjunctiva and lids: Normal  Mouth: Lips, teeth, gums: Normal  Neck: Normal  Cardiovascular: Exam of peripheral vascular system by observation for swelling, varicosities, edema: Normal  Right upper extremity: Normal  Left upper extremity: Normal  Right lower extremity: Normal  Left lower extremity: Normal  Skin: Scalp and body hair: See below    20 x 10 mm pink nodule on the left cheek    ASSESSMENT/PLAN:     1. R/o SCC vs other skin malignancy on the left cheek. Shave biopsy performed.  Area cleaned and anesthetized with 1% lidocaine with epinephrine.  Dermablade used to remove the lesion and sent to pathology. Bleeding was cauterized. Pt tolerated procedure well with no complications.             Follow-up: pending path  CC:   Scribed By: Rosa Maria Hansen, MS, PA-C

## 2023-02-21 NOTE — LETTER
2/21/2023         RE: James Salvador  [address] [place] [address]        Dear Colleague,    Thank you for referring your patient, James Salvador, to the [facility]. Please see a copy of my visit note below.    HPI:   Chief complaints: James Salvador is a pleasant 77 year old male who presents for evaluation of a non healing spot on the left cheek. He noticed a pink bump appear about 3 months ago. He treated the area with efudex at home but this did not help. The area is tender.       PHYSICAL EXAM:    There were no vitals taken for this visit.  Skin exam performed as follows: Type 2 skin. Mood appropriate  Alert and Oriented X 3. Well developed, well nourished in no distress.  General appearance: Normal  Head including face: Normal  Eyes: conjunctiva and lids: Normal  Mouth: Lips, teeth, gums: Normal  Neck: Normal  Cardiovascular: Exam of peripheral vascular system by observation for swelling, varicosities, edema: Normal  Right upper extremity: Normal  Left upper extremity: Normal  Right lower extremity: Normal  Left lower extremity: Normal  Skin: Scalp and body hair: See below    20 x 10 mm pink nodule on the left cheek    ASSESSMENT/PLAN:     1. R/o SCC vs other skin malignancy on the left cheek. Shave biopsy performed.  Area cleaned and anesthetized with 1% lidocaine with epinephrine.  Dermablade used to remove the lesion and sent to pathology. Bleeding was cauterized. Pt tolerated procedure well with no complications.             Follow-up: pending path  CC:   Scribed By: Rosa Maria Hansen MS, PAVAISHALI          Again, thank you for allowing me to participate in the care of your patient.        Sincerely,        Rosa Maria Hansen PA-C

## 2023-03-01 ENCOUNTER — THERAPY VISIT (OUTPATIENT)
Dept: PHYSICAL THERAPY | Facility: CLINIC | Age: 78
End: 2023-03-01
Payer: COMMERCIAL

## 2023-03-01 DIAGNOSIS — Z96.652 STATUS POST TOTAL LEFT KNEE REPLACEMENT: Primary | ICD-10-CM

## 2023-03-01 LAB
PATH REPORT.COMMENTS IMP SPEC: ABNORMAL
PATH REPORT.COMMENTS IMP SPEC: YES
PATH REPORT.FINAL DX SPEC: ABNORMAL
PATH REPORT.GROSS SPEC: ABNORMAL
PATH REPORT.MICROSCOPIC SPEC OTHER STN: ABNORMAL
PATH REPORT.RELEVANT HX SPEC: ABNORMAL

## 2023-03-01 PROCEDURE — 97110 THERAPEUTIC EXERCISES: CPT | Mod: GP | Performed by: PHYSICAL THERAPIST

## 2023-03-01 PROCEDURE — 97140 MANUAL THERAPY 1/> REGIONS: CPT | Mod: GP | Performed by: PHYSICAL THERAPIST

## 2023-03-02 ENCOUNTER — TELEPHONE (OUTPATIENT)
Dept: DERMATOLOGY | Facility: CLINIC | Age: 78
End: 2023-03-02
Payer: COMMERCIAL

## 2023-03-02 NOTE — TELEPHONE ENCOUNTER
----- Message from Jv Dutta MD sent at 3/2/2023  8:17 AM CST -----  Please schedule for full skin exam within one month

## 2023-03-06 ENCOUNTER — THERAPY VISIT (OUTPATIENT)
Dept: PHYSICAL THERAPY | Facility: CLINIC | Age: 78
End: 2023-03-06
Payer: COMMERCIAL

## 2023-03-06 DIAGNOSIS — Z96.652 STATUS POST TOTAL LEFT KNEE REPLACEMENT: Primary | ICD-10-CM

## 2023-03-06 PROCEDURE — 97110 THERAPEUTIC EXERCISES: CPT | Mod: GP | Performed by: PHYSICAL THERAPIST

## 2023-03-06 PROCEDURE — 97112 NEUROMUSCULAR REEDUCATION: CPT | Mod: GP | Performed by: PHYSICAL THERAPIST

## 2023-03-06 PROCEDURE — 97140 MANUAL THERAPY 1/> REGIONS: CPT | Mod: GP | Performed by: PHYSICAL THERAPIST

## 2023-03-06 NOTE — TELEPHONE ENCOUNTER
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

## 2023-03-08 ENCOUNTER — OFFICE VISIT (OUTPATIENT)
Dept: OTOLARYNGOLOGY | Facility: CLINIC | Age: 78
End: 2023-03-08
Payer: COMMERCIAL

## 2023-03-08 ENCOUNTER — PREP FOR PROCEDURE (OUTPATIENT)
Dept: OTOLARYNGOLOGY | Facility: CLINIC | Age: 78
End: 2023-03-08

## 2023-03-08 VITALS
TEMPERATURE: 97.9 F | HEIGHT: 71 IN | DIASTOLIC BLOOD PRESSURE: 92 MMHG | SYSTOLIC BLOOD PRESSURE: 154 MMHG | BODY MASS INDEX: 39.48 KG/M2 | HEART RATE: 74 BPM | WEIGHT: 282 LBS | OXYGEN SATURATION: 96 %

## 2023-03-08 DIAGNOSIS — D48.5 NEOPLASM OF UNCERTAIN BEHAVIOR OF SKIN: ICD-10-CM

## 2023-03-08 DIAGNOSIS — C43.39 MELANOMA OF CHEEK (H): Primary | ICD-10-CM

## 2023-03-08 PROCEDURE — 99204 OFFICE O/P NEW MOD 45 MIN: CPT | Mod: GC | Performed by: OTOLARYNGOLOGY

## 2023-03-08 ASSESSMENT — PAIN SCALES - GENERAL: PAINLEVEL: MODERATE PAIN (5)

## 2023-03-08 NOTE — LETTER
3/8/2023       RE: James Salvador  [address] [facility] [address]     Dear Colleague,    Thank you for referring your patient, James Salvador, to the [facility] [place] at [facility]. Please see a copy of my visit note below.    Otolaryngology Head and Neck Surgery Clinic  March 8, 2023    History of Present Illness:  James Salvador is a 78 yo male referred for a left cheek melanoma. He has a complex PMHx, including atrial fibrillation (on apixiban) and a prior melanoma of the left zygoma/orbital rim in 2019 that was treated with MMS by Dr. Dutta. He has closely followed with Dermatology given multiple cutaneous malignancies and returned after a three month history of a growing left cheek nodule. He underwent prostate surgery this past fall which was complicated by recurrent staph infections. Around Thanksgiving he first noted a dark spot on his cheek and during the subsequent months it slowly continued to grow. He has not had any left sided facial pain, vision changes, or neck masses.     Past Medical History: BCC, SCC, CAD s/p stents 2015, atrial fibrillation, HTN, HLD   Past Surgical History:  pacemeaker placement, multiple Mohs, left knee surgery, hip arthroplasty, tonsillectomy, FESS   Social History: previous smoker, quit 50 years ago. No ETOH use. Moved from [place] to [place], [place] to be closer to their three grandchildren. Retired telecommunication worker.     Physical Exam:  Alert and engaged male. Wife accompanying. Smyth I  Multiple facial telangiectasias and areas of hyper/hypopigmentation. Small raised scar just lateral to left lateral canthus. 1cm erythematous nodule to left zygoma, biopsy site scab inferiorly. No CN VII or V weakness.   Oral cavity healthy appearing, lower dentures. No nodularity to buccal mucosa or cheek.   Neck soft, no LAD.     Imaging:  Dermatopathology 2/21/2022 Shave biopsy of left  cheek lesion   Breslow depth 2.2mm, + ulceration, + LVI.   Positive deep and lateral margins.     Assessment & Plan:  James Salvador is a 76yo with a complex PMHx including atrial fibrillation, on eliquis, who presents with a pT3b left zygoma/cheek melanoma. Discussed recommendation for wide local excision and sentinel lymph node biopsy as well as need for delayed reconstruction. We reviewed the risk of injury to the adjacent facial nerve as well any nerves adjacent to the sites of SLNBx.     - Wide local excision with SLNBx  - Will consult with FPRS post-operatively for reconstruction     Patient seen and plan discussed with Dr. Rei Stone MD   ENT         Attestation signed by Rolando Minaya MD at 3/12/2023 10:12 PM:  I, Rolando Minaya MD, saw this patient with the resident/fellow and agree with the resident's findings and plan of care as documented in the resident's/fellow's note.        Again, thank you for allowing me to participate in the care of your patient.      Sincerely,    Rolando Minaya MD

## 2023-03-08 NOTE — PROGRESS NOTES
Otolaryngology Head and Neck Surgery Clinic  March 8, 2023    History of Present Illness:  James Salvador is a 76 yo male referred for a left cheek melanoma. He has a complex PMHx, including atrial fibrillation (on apixiban) and a prior melanoma of the left zygoma/orbital rim in 2019 that was treated with MMS by Dr. Dutta. He has closely followed with Dermatology given multiple cutaneous malignancies and returned after a three month history of a growing left cheek nodule. He underwent prostate surgery this past fall which was complicated by recurrent staph infections. Around Thanksgiving he first noted a dark spot on his cheek and during the subsequent months it slowly continued to grow. He has not had any left sided facial pain, vision changes, or neck masses.     Past Medical History: BCC, SCC, CAD s/p stents 2015, atrial fibrillation, HTN, HLD   Past Surgical History:  pacemeaker placement, multiple Mohs, left knee surgery, hip arthroplasty, tonsillectomy, FESS   Social History: previous smoker, quit 50 years ago. No ETOH use. Moved from [place] to [place], [place] to be closer to their three grandchildren. Retired telecommunication worker.     Physical Exam:  Alert and engaged male. Wife accompanying. Smyth I  Multiple facial telangiectasias and areas of hyper/hypopigmentation. Small raised scar just lateral to left lateral canthus. 1cm erythematous nodule to left zygoma, biopsy site scab inferiorly. No CN VII or V weakness.   Oral cavity healthy appearing, lower dentures. No nodularity to buccal mucosa or cheek.   Neck soft, no LAD.     Imaging:  Dermatopathology 2/21/2022 Shave biopsy of left cheek lesion   Breslow depth 2.2mm, + ulceration, + LVI.   Positive deep and lateral margins.     Assessment & Plan:  James Salvador is a 76yo with a complex PMHx including atrial fibrillation, on eliquis, who presents with a pT3b left zygoma/cheek melanoma. Discussed recommendation for wide local excision and sentinel  lymph node biopsy as well as need for delayed reconstruction. We reviewed the risk of injury to the adjacent facial nerve as well any nerves adjacent to the sites of SLNBx.     - Wide local excision with SLNBx  - Will consult with FPRS post-operatively for reconstruction     Patient seen and plan discussed with Dr. Rei Stone MD   ENT

## 2023-03-08 NOTE — NURSING NOTE
"Chief Complaint   Patient presents with     Consult     Melanoma on cheek      Blood pressure (!) 154/92, pulse 74, temperature 97.9  F (36.6  C), height 1.803 m (5' 11\"), weight 127.9 kg (282 lb), SpO2 96 %.    Dheeraj [place] LPN  "

## 2023-03-08 NOTE — PATIENT INSTRUCTIONS
1. We will call you with date for surgery.   2. You will need to arrange a pre-op physical with your primary care provider. You will need to have a plan for you to hold your Eliquis prior to surgery.   3. . Please call the ENT clinic with any questions,concerns, new or worsening symptoms.    -Clinic number is [phone]   - Kathy's direct line (Dr. Minaya's nurse) [phone]

## 2023-03-08 NOTE — NURSING NOTE
Teaching Flowsheet - ENT   Relevant Diagnosis: melanoma of cheek  Teaching Topic: wide local excision of left cheek melanoma  Person(s) involved in teaching: patient and wife       Motivation Level:  Asks Questions:   Yes  Eager to Learn:   Yes  Cooperative:   Yes  Receptive (willing/able to accept information):   Yes  Comments: Reviewed pre-op H and P,  NPO prior to  surgery,  pre-op scrub (given Hibiclens)  Reviewed post-op  cares , activity and pain.     Patient demonstrates understanding of the following:  Reason for the appointment, diagnosis and treatment plan:   Yes  Knowledge of proper use of medications and conditions for which they are ordered (with special attention to potential side effects or drug interactions):  stop aspirin products 1 week before surgery Yes  Which situations necessitate calling provider and whom to contact:   Yes  Nutritional needs and diet plan:   Yes  Pain management techniques:   Yes  Patient instructed on hand hygiene:  Yes  How and/when to access community resources:   Yes     Infection Prevention:  Patient   demonstrates understanding of the following:  Surgical procedure site care taught Yes  Signs and symptoms of infection taught Yes  Wound care taught Yes  Instructional Materials Used/Given: pre- op booklet,verbal  Instruction.    Kathy Steward, RN, BSN

## 2023-03-13 ENCOUNTER — THERAPY VISIT (OUTPATIENT)
Dept: PHYSICAL THERAPY | Facility: CLINIC | Age: 78
End: 2023-03-13
Payer: COMMERCIAL

## 2023-03-13 DIAGNOSIS — Z96.652 STATUS POST TOTAL LEFT KNEE REPLACEMENT: Primary | ICD-10-CM

## 2023-03-13 PROCEDURE — 97140 MANUAL THERAPY 1/> REGIONS: CPT | Mod: GP | Performed by: PHYSICAL THERAPIST

## 2023-03-13 PROCEDURE — 97112 NEUROMUSCULAR REEDUCATION: CPT | Mod: GP | Performed by: PHYSICAL THERAPIST

## 2023-03-13 PROCEDURE — 97110 THERAPEUTIC EXERCISES: CPT | Mod: GP | Performed by: PHYSICAL THERAPIST

## 2023-03-15 ENCOUNTER — VIRTUAL VISIT (OUTPATIENT)
Dept: OTOLARYNGOLOGY | Facility: CLINIC | Age: 78
End: 2023-03-15
Payer: COMMERCIAL

## 2023-03-15 ENCOUNTER — TELEPHONE (OUTPATIENT)
Dept: OTOLARYNGOLOGY | Facility: CLINIC | Age: 78
End: 2023-03-15

## 2023-03-15 VITALS — HEIGHT: 71 IN | BODY MASS INDEX: 38.36 KG/M2 | WEIGHT: 274 LBS

## 2023-03-15 DIAGNOSIS — C43.39 MELANOMA OF CHEEK (H): Primary | ICD-10-CM

## 2023-03-15 PROCEDURE — 99202 OFFICE O/P NEW SF 15 MIN: CPT | Mod: VID | Performed by: OTOLARYNGOLOGY

## 2023-03-15 ASSESSMENT — PAIN SCALES - GENERAL: PAINLEVEL: NO PAIN (0)

## 2023-03-15 NOTE — LETTER
3/15/2023       RE: James Salvador  [address] [place] [address]     Dear Colleague,    Thank you for referring your patient, James Salvador, to the [facility] EAR NOSE AND THROAT CLINIC [place] at [facility]. Please see a copy of my visit note below.    Facial Plastic and Reconstructive Surgery      James Salvador is a 76 yo male referred for a left cheek melanoma. He has a complex PMHx, including atrial fibrillation (on apixiban) and a prior melanoma of the left zygoma/orbital rim in 2019 that was treated with MMS by Dr. Dutta. He has closely followed with Dermatology given multiple cutaneous malignancies and returned after a three month history of a growing left cheek nodule. He underwent prostate surgery this past fall which was complicated by recurrent staph infections. Around Thanksgiving he first noted a dark spot on his cheek and during the subsequent months it slowly continued to grow. He has not had any left sided facial pain, vision changes, or neck masses.      We had a video visit today and discussed reconstructive options for the anticipated deficits. I described a cervicofacial flap and what it would entail. His questions were answered. We will coordinate with the head and neck team for timing of surgery.     Video duration: 25 minutes  Patient at home  Provider in clinic      Again, thank you for allowing me to participate in the care of your patient.      Sincerely,    Ila Sanchez MD

## 2023-03-15 NOTE — TELEPHONE ENCOUNTER
Called patient to schedule surgery with Dr. Minaya    Date of Surgery: 3/30    Location of surgery: [facility]    Pre-Op H&P: PCP Jeronimo Painter MD scheduled for 3/16    Pre/Post Imaging:  Yes lymphoscintigraphy    Discussed COVID-19 Testing: Not Applicable    Post-Op Appt Date: 1 week    Surgery Packet Mailed: 3/15      Additional comments: TAMAR Zhang on 3/15/2023 at 9:24 AM

## 2023-03-16 ENCOUNTER — OFFICE VISIT (OUTPATIENT)
Dept: INTERNAL MEDICINE | Facility: CLINIC | Age: 78
End: 2023-03-16
Payer: COMMERCIAL

## 2023-03-16 VITALS
TEMPERATURE: 98 F | HEART RATE: 83 BPM | DIASTOLIC BLOOD PRESSURE: 77 MMHG | BODY MASS INDEX: 38.22 KG/M2 | WEIGHT: 274 LBS | RESPIRATION RATE: 15 BRPM | SYSTOLIC BLOOD PRESSURE: 125 MMHG

## 2023-03-16 DIAGNOSIS — C43.39 MELANOMA OF CHEEK (H): ICD-10-CM

## 2023-03-16 DIAGNOSIS — Z01.818 PREOP GENERAL PHYSICAL EXAM: Primary | ICD-10-CM

## 2023-03-16 DIAGNOSIS — I25.118 CORONARY ARTERY DISEASE OF NATIVE ARTERY OF NATIVE HEART WITH STABLE ANGINA PECTORIS (H): ICD-10-CM

## 2023-03-16 DIAGNOSIS — E66.01 MORBID OBESITY DUE TO EXCESS CALORIES (H): ICD-10-CM

## 2023-03-16 DIAGNOSIS — E22.2 SIADH (SYNDROME OF INAPPROPRIATE ADH PRODUCTION) (H): ICD-10-CM

## 2023-03-16 DIAGNOSIS — I48.20 CHRONIC ATRIAL FIBRILLATION (H): ICD-10-CM

## 2023-03-16 DIAGNOSIS — E87.5 HYPERKALEMIA: ICD-10-CM

## 2023-03-16 LAB
ANION GAP SERPL CALCULATED.3IONS-SCNC: 11 MMOL/L (ref 7–15)
BUN SERPL-MCNC: 12.2 MG/DL (ref 8–23)
CALCIUM SERPL-MCNC: 9.5 MG/DL (ref 8.8–10.2)
CHLORIDE SERPL-SCNC: 93 MMOL/L (ref 98–107)
CHOLEST SERPL-MCNC: 132 MG/DL
CREAT SERPL-MCNC: 0.71 MG/DL (ref 0.67–1.17)
DEPRECATED HCO3 PLAS-SCNC: 24 MMOL/L (ref 22–29)
ERYTHROCYTE [DISTWIDTH] IN BLOOD BY AUTOMATED COUNT: 15 % (ref 10–15)
GFR SERPL CREATININE-BSD FRML MDRD: >90 ML/MIN/1.73M2
GLUCOSE SERPL-MCNC: 77 MG/DL (ref 70–99)
HCT VFR BLD AUTO: 41.8 % (ref 40–53)
HDLC SERPL-MCNC: 64 MG/DL
HGB BLD-MCNC: 14.6 G/DL (ref 13.3–17.7)
LDLC SERPL CALC-MCNC: 56 MG/DL
MCH RBC QN AUTO: 32 PG (ref 26.5–33)
MCHC RBC AUTO-ENTMCNC: 34.9 G/DL (ref 31.5–36.5)
MCV RBC AUTO: 92 FL (ref 78–100)
NONHDLC SERPL-MCNC: 68 MG/DL
PLATELET # BLD AUTO: 168 10E3/UL (ref 150–450)
POTASSIUM SERPL-SCNC: 5 MMOL/L (ref 3.4–5.3)
RBC # BLD AUTO: 4.56 10E6/UL (ref 4.4–5.9)
SODIUM SERPL-SCNC: 128 MMOL/L (ref 136–145)
TRIGL SERPL-MCNC: 59 MG/DL
WBC # BLD AUTO: 6.2 10E3/UL (ref 4–11)

## 2023-03-16 PROCEDURE — 85027 COMPLETE CBC AUTOMATED: CPT | Performed by: INTERNAL MEDICINE

## 2023-03-16 PROCEDURE — 99215 OFFICE O/P EST HI 40 MIN: CPT | Performed by: INTERNAL MEDICINE

## 2023-03-16 PROCEDURE — 80061 LIPID PANEL: CPT | Performed by: INTERNAL MEDICINE

## 2023-03-16 PROCEDURE — 36415 COLL VENOUS BLD VENIPUNCTURE: CPT | Performed by: INTERNAL MEDICINE

## 2023-03-16 PROCEDURE — 93000 ELECTROCARDIOGRAM COMPLETE: CPT | Performed by: INTERNAL MEDICINE

## 2023-03-16 PROCEDURE — 80048 BASIC METABOLIC PNL TOTAL CA: CPT | Performed by: INTERNAL MEDICINE

## 2023-03-16 RX ORDER — GABAPENTIN 300 MG/1
600 CAPSULE ORAL 3 TIMES DAILY
COMMUNITY
Start: 2023-03-16

## 2023-03-16 RX ORDER — OXCARBAZEPINE 300 MG/1
600 TABLET, FILM COATED ORAL 3 TIMES DAILY
COMMUNITY
Start: 2023-03-16

## 2023-03-16 NOTE — H&P (VIEW-ONLY)
[address] [address]  Phone: [phone]  Primary Provider: Sean Painter  Pre-op Performing Provider: SEAN PAINTER      PREOPERATIVE EVALUATION:  Today's date: 3/16/2023    James Salvador is a 77 year old male who presents for a preoperative evaluation.    Surgical Information:  Surgery/Procedure: wide local excision of left cheek melanoma  Surgery Location: [facility]  Surgeon: Dr. Minaya  Surgery Date: 3/30/2023  Time of Surgery: 12:15pm   Where patient plans to recover: At home with family  Fax number for surgical facility: Note does not need to be faxed, will be available electronically in Epic.    Type of Anesthesia Anticipated: General    Assessment & Plan     The proposed surgical procedure is considered LOW risk.    Preop general physical exam  Melanoma of cheek (H)  Coronary artery disease of native artery of native heart with stable angina pectoris (H)  Chronic atrial fibrillation (H)  Morbid obesity (H)  SIADH (syndrome of inappropriate ADH production) (H)     Implanted Device:   - For full details on implanted device, refer to device management note in Epic from date:  pacemaker      Risks and Recommendations:  The patient has the following additional risks and recommendations for perioperative complications:   - Morbid obesity (BMI >40)  - please make sure pt is ambulatory prior to discharge home including ability to get up from chair independently w/walker as needs to navigate stairs at home     Medication Instructions:  Patient is to take all scheduled medications on the day of surgery EXCEPT for modifications listed below:   - apixaban (Eliquis), edoxaban (Savaysa), rivaroxaban (Xarelto): Bleeding risk is low for this procedure AND CrCl (>=) 50 mL/min. HOLD 1 day before surgery.     - Diuretics: HOLD on the day of surgery.    RECOMMENDATION:  APPROVAL GIVEN to proceed with proposed procedure, without further  diagnostic evaluation.    Review of prior external note(s) from - Outside records from ENT  Review of the result(s) of each unique test - labs, ekg  Assessment requiring an independent historian(s) - family -    Ordering of each unique test  I spent a total of 50 minutes on the day of the visit.   Time spent doing chart review, history and exam, documentation and further activities per the note      Subjective     HPI related to upcoming procedure: melanoma L cheek     Preop Questions 3/9/2023   1. Have you ever had a heart attack or stroke? YES    2. Have you ever had surgery on your heart or blood vessels, such as a stent placement, a coronary artery bypass, or surgery on an artery in your head, neck, heart, or legs? YES    3. Do you have chest pain with activity? No   4. Do you have a history of  heart failure? YES    5. Do you currently have a cold, bronchitis or symptoms of other infection? No   6. Do you have a cough, shortness of breath, or wheezing? No   7. Do you or anyone in your family have previous history of blood clots? No   8. Do you or does anyone in your family have a serious bleeding problem such as prolonged bleeding following surgeries or cuts? No   9. Have you ever had problems with anemia or been told to take iron pills? No   10. Have you had any abnormal blood loss such as black, tarry or bloody stools? No   11. Have you ever had a blood transfusion? No   12. Are you willing to have a blood transfusion if it is medically needed before, during, or after your surgery? Yes   13. Have you or any of your relatives ever had problems with anesthesia? No   14. Do you have sleep apnea, excessive snoring or daytime drowsiness? YES    14a. Do you have a CPAP machine? No   15. Do you have any artifical heart valves or other implanted medical devices like a pacemaker, defibrillator, or continuous glucose monitor? YES    15a. What type of device do you have? An AzureXT SR MRI SureScan implanted. [employer]  brand   15b. Name of the clinic that manages your device:  [facility]. Ronny Gamble M.D.   16. Do you have artificial joints? YES    17. Are you allergic to latex? No       Health Care Directive:  Patient has a Health Care Directive on file      Preoperative Review of :   reviewed - no record of controlled substances prescribed.          Review of Systems  Constitutional, neuro, ENT, endocrine, pulmonary, cardiac, gastrointestinal, genitourinary, musculoskeletal, integument and psychiatric systems are negative, except as otherwise noted.    Patient Active Problem List    Diagnosis Date Noted     SIADH (syndrome of inappropriate ADH production) (H) 06/17/2022     Priority: Medium     Due to oxcarbazepine . Na+ 121-upper 120s range       Melanoma of cheek (H) 04/07/2022     Priority: Medium     Tachy-edinson syndrome (H) 05/29/2019     Priority: Medium     Added automatically from request for surgery [id number]       Status post total shoulder arthroplasty, right 11/12/2017     Priority: Medium     Mixed hyperlipidemia 06/21/2017     Priority: Medium     Status post total left knee replacement 05/11/2017     Priority: Medium     DJD (degenerative joint disease) of knee 05/09/2017     Priority: Medium     Formatting of this note might be different from the original.  DJD (degenerative joint disease) of knee--left TKA       Chronic atrial fibrillation (H) 04/21/2017     Priority: Medium     Coronary artery disease involving native coronary artery of native heart, angina presence unspecified 01/11/2016     Priority: Medium     Cardiac cath and PCI 1994. Cardiac Cath 9/2015: BRIDGET to LAD  Replacing diagnoses that were inactivated after the 10/1/2021 regulatory import.       Morbid obesity due to excess calories (H) 01/11/2016     Priority: Medium     History of myocardial infarction      Priority: Medium     PTCA       Paroxysmal supraventricular tachycardia (H)      Priority: Medium     On metoprolol       Actinic  keratoses 07/17/2013     Priority: Medium     Hyperlipidemia with target LDL less than 70      Priority: Medium     Benign hypertension      Priority: Medium      Past Medical History:   Diagnosis Date     Actinic 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 Cardiac Cath 9/2015: BRIDGET to LAD     Hearing problem 1995    Wear hearing aids     Hyperlipidemia      Malignant melanoma      Morbid obesity 01/11/2016     Paroxysmal supraventricular tachycardia     on metoprolol     Permanent atrial fibrillation 04/21/2017     Squamous cell carcinoma      Tachy-edinson syndrome 05/29/2019     Past Surgical History:   Procedure Laterality Date     ADENOIDECTOMY  Childhood     ANGIOPLASTY  1994    in [place]     ANKLE SURGERY  07/13/2005    right ankle     ARTHROPLASTY HIP Right 10/2009     EP PERM PACER SINGLE LEAD N/A 05/31/2019    Medtronic single lead pacemaker     Facial biopsy - Melanoma       GENITOURINARY SURGERY  10/20/23    Prostate surgery     HEART CATH STENT COR W/WO PTCA  09/23/2015    BRIDGET stent mid LAD     LASER SURGERY OF EYE  06/01/2002     MOHS MICROGRAPHIC PROCEDURE  06/12/2004    squamous cell carcinoma right temple     SINUS SURGERY  07/11/2006     TONSILLECTOMY  Childhood     Current Outpatient Medications   Medication Sig Dispense Refill     acetaminophen (TYLENOL) 325 MG tablet Take 650 mg by mouth 4 times daily       atorvastatin (LIPITOR) 80 MG tablet TAKE 1 TABLET BY MOUTH EVERYDAY AT BEDTIME 90 tablet 2     carvedilol (COREG) 3.125 MG tablet Take 1 tablet (3.125 mg) by mouth 2 times daily (with meals) 180 tablet 0     doxycycline hyclate (PERIOSTAT) 20 MG tablet TAKE 1 TABLET BY MOUTH TWICE A DAY       ELIQUIS ANTICOAGULANT 5 MG tablet TAKE 1 TABLET BY MOUTH  TWICE DAILY 180 tablet 1     furosemide (LASIX)  20 MG tablet TAKE 1 TABLET BY MOUTH EVERY DAY 90 tablet 3     gabapentin (NEURONTIN) 300 MG capsule Take 2 capsules (600 mg) by mouth 3 times daily       ketoconazole (NIZORAL) 2 % cream Apply topically 2 times daily For face. FAX REFILL REQUESTS TO  [facility]: [phone] 30 g 2     ketoconazole (NIZORAL) 2 % external shampoo Use daily as needed 120 mL 11     lisinopril (ZESTRIL) 30 MG tablet Take 1 tablet (30 mg) by mouth daily 90 tablet 3     Multiple Vitamin (MULTIVITAMIN OR) Take 1 tablet by mouth daily        OXcarbazepine (TRILEPTAL) 300 MG tablet Take 2 tablets (600 mg) by mouth 3 times daily ( @ 7am, 1530 and 2200 hrs each day)       triamcinolone (KENALOG) 0.1 % external lotion Apply to scalp BID x 1-2 weeks then PRN only 60 mL 3     desonide (DESOWEN) 0.05 % external cream Apply topically daily       fluorouracil (EFUDEX) 5 % external cream Apply to scap BID x 3-4 weeks. Protect area from the sun. 40 g 3     fluticasone (FLONASE) 50 MCG/ACT nasal spray INSTILL 2 SPRAYS INTO BOTH NOSTRILS DAILY. 48 mL 3     nitroGLYcerin (NITROSTAT) 0.4 MG sublingual tablet Place 1 tablet (0.4 mg) under the tongue every 5 minutes as needed for chest pain May repeat twice for a total of 3 tablets.  If chest pain not relieved, call 911 25 tablet 11       Allergies   Allergen Reactions     Cats      Cat Dander     Dogs      Dog Dander     Hydromorphone      Other reaction(s): Confusion     Pollen Extract         Social History     Tobacco Use     Smoking status: Former     Packs/day: 0.50     Years: 10.00     Pack years: 5.00     Types: Cigarettes, Cigars, Pipe     Start date: 1966     Quit date: 1974     Years since quittin.9     Smokeless tobacco: Never     Tobacco comments:     Also smoked from 1985 - 1990   Substance Use Topics     Alcohol use: Not Currently     Comment: Socially       History   Drug Use No         Objective     /77   Pulse 83   Temp 98  F (36.7  C) (Temporal)   Resp 15   Wt  124.3 kg (274 lb)   BMI 38.22 kg/m      Physical Exam    GENERAL APPEARANCE: alert, no distress and over weight     EYES: EOMI,  PERRL     HENT: normal cephalic/atraumatic and nodular lesion L cheek      NECK: no adenopathy, no asymmetry, masses, or scars and thyroid normal to palpation     RESP: lungs clear to auscultation - no rales, rhonchi or wheezes     CV: regular and  bradycardic,  normal S1 S2, no S3 or S4 and no murmur, click or rub     ABDOMEN:  soft, nontender, no HSM or masses and bowel sounds normal     MS: extremities normal- no gross deformities noted, no evidence of inflammation in joints, FROM in all extremities.     SKIN: nodular lesion L cheek     NEURO: Normal strength and tone, sensory exam grossly normal, mentation intact and speech normal     PSYCH: mentation appears normal. and affect normal/bright     LYMPHATICS: No cervical adenopathy    Recent Labs   Lab Test 10/06/22  1128 06/20/22  0054   HGB 11.5* 13.6    168   * 125*   POTASSIUM 5.1 4.3   CR 0.56* 0.82        Diagnostics:  Recent Results (from the past 24 hour(s))   CBC with platelets    Collection Time: 03/16/23  9:19 AM   Result Value Ref Range    WBC Count 6.2 4.0 - 11.0 10e3/uL    RBC Count 4.56 4.40 - 5.90 10e6/uL    Hemoglobin 14.6 13.3 - 17.7 g/dL    Hematocrit 41.8 40.0 - 53.0 %    MCV 92 78 - 100 fL    MCH 32.0 26.5 - 33.0 pg    MCHC 34.9 31.5 - 36.5 g/dL    RDW 15.0 10.0 - 15.0 %    Platelet Count 168 150 - 450 10e3/uL      EKG: ventricularly paced rhythm w/unpaced atrial beats     Revised Cardiac Risk Index (RCRI):  The patient has the following serious cardiovascular risks for perioperative complications:   - No serious cardiac risks = 0 points     RCRI Interpretation: 0 points: Class I (very low risk - 0.4% complication rate)           Signed Electronically by: Jeronimo Painter MD  Copy of this evaluation report is provided to requesting physician.

## 2023-03-16 NOTE — PATIENT INSTRUCTIONS
For informational purposes only. Not to replace the advice of your health care provider. Copyright   2003,  [employer] [facility] [facility]. All rights reserved. Clinically reviewed by Scarlet Fuller MD. [place] [id number] - REV .  Preparing for Your Surgery  Getting started  A nurse will call you to review your health history and instructions. They will give you an arrival time based on your scheduled surgery time. Please be ready to share:    Your doctor's clinic name and phone number    Your medical, surgical, and anesthesia history    A list of allergies and sensitivities    A list of medicines, including herbal treatments and over-the-counter drugs    Whether the patient has a legal guardian (ask how to send us the papers in advance)  Please tell us if you're pregnant--or if there's any chance you might be pregnant. Some surgeries may injure a fetus (unborn baby), so they require a pregnancy test. Surgeries that are safe for a fetus don't always need a test, and you can choose whether to have one.   If you have a child who's having surgery, please ask for a copy of Preparing for Your Child's Surgery.    Preparing for surgery    Within 10 to 30 days of surgery: Have a pre-op exam (sometimes called an H&P, or History and Physical). This can be done at a clinic or pre-operative center.  ? If you're having a , you may not need this exam. Talk to your care team.    At your pre-op exam, talk to your care team about all medicines you take. If you need to stop any medicines before surgery, ask when to start taking them again.  ? We do this for your safety. Many medicines can make you bleed too much during surgery. Some change how well surgery (anesthesia) drugs work.    Call your insurance company to let them know you're having surgery. (If you don't have insurance, call [phone].)    Call your clinic if there's any change in your health. This includes signs of a cold or flu (sore throat, runny nose,  cough, rash, fever). It also includes a scrape or scratch near the surgery site.    If you have questions on the day of surgery, call your hospital or surgery center.  Eating and drinking guidelines  For your safety: Unless your surgeon tells you otherwise, follow the guidelines below.    Eat and drink as usual until 8 hours before you arrive for surgery. After that, no food or milk.    Drink clear liquids until 2 hours before you arrive. These are liquids you can see through, like water, Gatorade, and Propel Water. They also include plain black coffee and tea (no cream or milk), candy, and breath mints. You can spit out gum when you arrive.    If you drink alcohol: Stop drinking it the night before surgery.    If your care team tells you to take medicine on the morning of surgery, it's okay to take it with a sip of water.  Preventing infection    Shower or bathe the night before and morning of your surgery. Follow the instructions your clinic gave you. (If no instructions, use regular soap.)    Don't shave or clip hair near your surgery site. We'll remove the hair if needed.    Don't smoke or vape the morning of surgery. You may chew nicotine gum up to 2 hours before surgery. A nicotine patch is okay.  ? Note: Some surgeries require you to completely quit smoking and nicotine. Check with your surgeon.    Your care team will make every effort to keep you safe from infection. We will:  ? Clean our hands often with soap and water (or an alcohol-based hand rub).  ? Clean the skin at your surgery site with a special soap that kills germs.  ? Give you a special gown to keep you warm. (Cold raises the risk of infection.)  ? Wear special hair covers, masks, gowns and gloves during surgery.  ? Give antibiotic medicine, if prescribed. Not all surgeries need antibiotics.  What to bring on the day of surgery    Photo ID and insurance card    Copy of your health care directive, if you have one    Glasses and hearing aids (bring  cases)  ? You can't wear contacts during surgery    Inhaler and eye drops, if you use them (tell us about these when you arrive)    CPAP machine or breathing device, if you use them    A few personal items, if spending the night    If you have . . .  ? A pacemaker, ICD (cardiac defibrillator) or other implant: Bring the ID card.  ? An implanted stimulator: Bring the remote control.  ? A legal guardian: Bring a copy of the certified (court-stamped) guardianship papers.  Please remove any jewelry, including body piercings. Leave jewelry and other valuables at home.  If you're going home the day of surgery    You must have a responsible adult drive you home. They should stay with you overnight as well.    If you don't have someone to stay with you, and you aren't safe to go home alone, we may keep you overnight. Insurance often won't pay for this.  After surgery  If it's hard to control your pain or you need more pain medicine, please call your surgeon's office.  Questions?   If you have any questions for your care team, list them here: _________________________________________________________________________________________________________________________________________________________________________ ____________________________________ ____________________________________ ____________________________________    How to Take Your Medication Before Surgery  - Take all of your medications before surgery except as noted below  - HOLD (do not take) your LASIX (FUROSEMIDE) on the morning of surgery.   - HOLD (do not take) ELIQUIS starting the day prior to your surgery. last dose 2 days prior to your procedure.

## 2023-03-16 NOTE — PROGRESS NOTES
[address] [address]  Phone: [phone]  Primary Provider: Sean Painter  Pre-op Performing Provider: SEAN PAINTER      PREOPERATIVE EVALUATION:  Today's date: 3/16/2023    James Salvador is a 77 year old male who presents for a preoperative evaluation.    Surgical Information:  Surgery/Procedure: wide local excision of left cheek melanoma  Surgery Location: [facility]  Surgeon: Dr. Minaya  Surgery Date: 3/30/2023  Time of Surgery: 12:15pm   Where patient plans to recover: At home with family  Fax number for surgical facility: Note does not need to be faxed, will be available electronically in Epic.    Type of Anesthesia Anticipated: General    Assessment & Plan     The proposed surgical procedure is considered LOW risk.    Preop general physical exam  Melanoma of cheek (H)  Coronary artery disease of native artery of native heart with stable angina pectoris (H)  Chronic atrial fibrillation (H)  Morbid obesity (H)  SIADH (syndrome of inappropriate ADH production) (H)     Implanted Device:   - For full details on implanted device, refer to device management note in Epic from date:  pacemaker      Risks and Recommendations:  The patient has the following additional risks and recommendations for perioperative complications:   - Morbid obesity (BMI >40)  - please make sure pt is ambulatory prior to discharge home including ability to get up from chair independently w/walker as needs to navigate stairs at home     Medication Instructions:  Patient is to take all scheduled medications on the day of surgery EXCEPT for modifications listed below:   - apixaban (Eliquis), edoxaban (Savaysa), rivaroxaban (Xarelto): Bleeding risk is low for this procedure AND CrCl (>=) 50 mL/min. HOLD 1 day before surgery.     - Diuretics: HOLD on the day of surgery.    RECOMMENDATION:  APPROVAL GIVEN to proceed with proposed procedure, without further  diagnostic evaluation.    Review of prior external note(s) from - Outside records from ENT  Review of the result(s) of each unique test - labs, ekg  Assessment requiring an independent historian(s) - family -    Ordering of each unique test  I spent a total of 50 minutes on the day of the visit.   Time spent doing chart review, history and exam, documentation and further activities per the note      Subjective     HPI related to upcoming procedure: melanoma L cheek     Preop Questions 3/9/2023   1. Have you ever had a heart attack or stroke? YES    2. Have you ever had surgery on your heart or blood vessels, such as a stent placement, a coronary artery bypass, or surgery on an artery in your head, neck, heart, or legs? YES    3. Do you have chest pain with activity? No   4. Do you have a history of  heart failure? YES    5. Do you currently have a cold, bronchitis or symptoms of other infection? No   6. Do you have a cough, shortness of breath, or wheezing? No   7. Do you or anyone in your family have previous history of blood clots? No   8. Do you or does anyone in your family have a serious bleeding problem such as prolonged bleeding following surgeries or cuts? No   9. Have you ever had problems with anemia or been told to take iron pills? No   10. Have you had any abnormal blood loss such as black, tarry or bloody stools? No   11. Have you ever had a blood transfusion? No   12. Are you willing to have a blood transfusion if it is medically needed before, during, or after your surgery? Yes   13. Have you or any of your relatives ever had problems with anesthesia? No   14. Do you have sleep apnea, excessive snoring or daytime drowsiness? YES    14a. Do you have a CPAP machine? No   15. Do you have any artifical heart valves or other implanted medical devices like a pacemaker, defibrillator, or continuous glucose monitor? YES    15a. What type of device do you have? An AzureXT SR MRI SureScan implanted. [employer]  brand   15b. Name of the clinic that manages your device:  [facility]. Ronny Gamble M.D.   16. Do you have artificial joints? YES    17. Are you allergic to latex? No       Health Care Directive:  Patient has a Health Care Directive on file      Preoperative Review of :   reviewed - no record of controlled substances prescribed.          Review of Systems  Constitutional, neuro, ENT, endocrine, pulmonary, cardiac, gastrointestinal, genitourinary, musculoskeletal, integument and psychiatric systems are negative, except as otherwise noted.    Patient Active Problem List    Diagnosis Date Noted     SIADH (syndrome of inappropriate ADH production) (H) 06/17/2022     Priority: Medium     Due to oxcarbazepine . Na+ 121-upper 120s range       Melanoma of cheek (H) 04/07/2022     Priority: Medium     Tachy-edinson syndrome (H) 05/29/2019     Priority: Medium     Added automatically from request for surgery [id number]       Status post total shoulder arthroplasty, right 11/12/2017     Priority: Medium     Mixed hyperlipidemia 06/21/2017     Priority: Medium     Status post total left knee replacement 05/11/2017     Priority: Medium     DJD (degenerative joint disease) of knee 05/09/2017     Priority: Medium     Formatting of this note might be different from the original.  DJD (degenerative joint disease) of knee--left TKA       Chronic atrial fibrillation (H) 04/21/2017     Priority: Medium     Coronary artery disease involving native coronary artery of native heart, angina presence unspecified 01/11/2016     Priority: Medium     Cardiac cath and PCI 1994. Cardiac Cath 9/2015: BRIDGET to LAD  Replacing diagnoses that were inactivated after the 10/1/2021 regulatory import.       Morbid obesity due to excess calories (H) 01/11/2016     Priority: Medium     History of myocardial infarction      Priority: Medium     PTCA       Paroxysmal supraventricular tachycardia (H)      Priority: Medium     On metoprolol       Actinic  keratoses 07/17/2013     Priority: Medium     Hyperlipidemia with target LDL less than 70      Priority: Medium     Benign hypertension      Priority: Medium      Past Medical History:   Diagnosis Date     Actinic 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 Cardiac Cath 9/2015: BRIDGET to LAD     Hearing problem 1995    Wear hearing aids     Hyperlipidemia      Malignant melanoma      Morbid obesity 01/11/2016     Paroxysmal supraventricular tachycardia     on metoprolol     Permanent atrial fibrillation 04/21/2017     Squamous cell carcinoma      Tachy-edinson syndrome 05/29/2019     Past Surgical History:   Procedure Laterality Date     ADENOIDECTOMY  Childhood     ANGIOPLASTY  1994    in [place]     ANKLE SURGERY  07/13/2005    right ankle     ARTHROPLASTY HIP Right 10/2009     EP PERM PACER SINGLE LEAD N/A 05/31/2019    Medtronic single lead pacemaker     Facial biopsy - Melanoma       GENITOURINARY SURGERY  10/20/23    Prostate surgery     HEART CATH STENT COR W/WO PTCA  09/23/2015    BRIDGET stent mid LAD     LASER SURGERY OF EYE  06/01/2002     MOHS MICROGRAPHIC PROCEDURE  06/12/2004    squamous cell carcinoma right temple     SINUS SURGERY  07/11/2006     TONSILLECTOMY  Childhood     Current Outpatient Medications   Medication Sig Dispense Refill     acetaminophen (TYLENOL) 325 MG tablet Take 650 mg by mouth 4 times daily       atorvastatin (LIPITOR) 80 MG tablet TAKE 1 TABLET BY MOUTH EVERYDAY AT BEDTIME 90 tablet 2     carvedilol (COREG) 3.125 MG tablet Take 1 tablet (3.125 mg) by mouth 2 times daily (with meals) 180 tablet 0     doxycycline hyclate (PERIOSTAT) 20 MG tablet TAKE 1 TABLET BY MOUTH TWICE A DAY       ELIQUIS ANTICOAGULANT 5 MG tablet TAKE 1 TABLET BY MOUTH  TWICE DAILY 180 tablet 1     furosemide (LASIX)  20 MG tablet TAKE 1 TABLET BY MOUTH EVERY DAY 90 tablet 3     gabapentin (NEURONTIN) 300 MG capsule Take 2 capsules (600 mg) by mouth 3 times daily       ketoconazole (NIZORAL) 2 % cream Apply topically 2 times daily For face. FAX REFILL REQUESTS TO  [facility]: [phone] 30 g 2     ketoconazole (NIZORAL) 2 % external shampoo Use daily as needed 120 mL 11     lisinopril (ZESTRIL) 30 MG tablet Take 1 tablet (30 mg) by mouth daily 90 tablet 3     Multiple Vitamin (MULTIVITAMIN OR) Take 1 tablet by mouth daily        OXcarbazepine (TRILEPTAL) 300 MG tablet Take 2 tablets (600 mg) by mouth 3 times daily ( @ 7am, 1530 and 2200 hrs each day)       triamcinolone (KENALOG) 0.1 % external lotion Apply to scalp BID x 1-2 weeks then PRN only 60 mL 3     desonide (DESOWEN) 0.05 % external cream Apply topically daily       fluorouracil (EFUDEX) 5 % external cream Apply to scap BID x 3-4 weeks. Protect area from the sun. 40 g 3     fluticasone (FLONASE) 50 MCG/ACT nasal spray INSTILL 2 SPRAYS INTO BOTH NOSTRILS DAILY. 48 mL 3     nitroGLYcerin (NITROSTAT) 0.4 MG sublingual tablet Place 1 tablet (0.4 mg) under the tongue every 5 minutes as needed for chest pain May repeat twice for a total of 3 tablets.  If chest pain not relieved, call 911 25 tablet 11       Allergies   Allergen Reactions     Cats      Cat Dander     Dogs      Dog Dander     Hydromorphone      Other reaction(s): Confusion     Pollen Extract         Social History     Tobacco Use     Smoking status: Former     Packs/day: 0.50     Years: 10.00     Pack years: 5.00     Types: Cigarettes, Cigars, Pipe     Start date: 1966     Quit date: 1974     Years since quittin.9     Smokeless tobacco: Never     Tobacco comments:     Also smoked from 1985 - 1990   Substance Use Topics     Alcohol use: Not Currently     Comment: Socially       History   Drug Use No         Objective     /77   Pulse 83   Temp 98  F (36.7  C) (Temporal)   Resp 15   Wt  124.3 kg (274 lb)   BMI 38.22 kg/m      Physical Exam    GENERAL APPEARANCE: alert, no distress and over weight     EYES: EOMI,  PERRL     HENT: normal cephalic/atraumatic and nodular lesion L cheek      NECK: no adenopathy, no asymmetry, masses, or scars and thyroid normal to palpation     RESP: lungs clear to auscultation - no rales, rhonchi or wheezes     CV: regular and  bradycardic,  normal S1 S2, no S3 or S4 and no murmur, click or rub     ABDOMEN:  soft, nontender, no HSM or masses and bowel sounds normal     MS: extremities normal- no gross deformities noted, no evidence of inflammation in joints, FROM in all extremities.     SKIN: nodular lesion L cheek     NEURO: Normal strength and tone, sensory exam grossly normal, mentation intact and speech normal     PSYCH: mentation appears normal. and affect normal/bright     LYMPHATICS: No cervical adenopathy    Recent Labs   Lab Test 10/06/22  1128 06/20/22  0054   HGB 11.5* 13.6    168   * 125*   POTASSIUM 5.1 4.3   CR 0.56* 0.82        Diagnostics:  Recent Results (from the past 24 hour(s))   CBC with platelets    Collection Time: 03/16/23  9:19 AM   Result Value Ref Range    WBC Count 6.2 4.0 - 11.0 10e3/uL    RBC Count 4.56 4.40 - 5.90 10e6/uL    Hemoglobin 14.6 13.3 - 17.7 g/dL    Hematocrit 41.8 40.0 - 53.0 %    MCV 92 78 - 100 fL    MCH 32.0 26.5 - 33.0 pg    MCHC 34.9 31.5 - 36.5 g/dL    RDW 15.0 10.0 - 15.0 %    Platelet Count 168 150 - 450 10e3/uL      EKG: ventricularly paced rhythm w/unpaced atrial beats     Revised Cardiac Risk Index (RCRI):  The patient has the following serious cardiovascular risks for perioperative complications:   - No serious cardiac risks = 0 points     RCRI Interpretation: 0 points: Class I (very low risk - 0.4% complication rate)           Signed Electronically by: Jeronimo Painter MD  Copy of this evaluation report is provided to requesting physician.

## 2023-03-17 DIAGNOSIS — C43.39 MELANOMA OF CHEEK (H): Primary | ICD-10-CM

## 2023-03-17 RX ORDER — CEFAZOLIN SODIUM IN 0.9 % NACL 3 G/100 ML
3 INTRAVENOUS SOLUTION, PIGGYBACK (ML) INTRAVENOUS
Status: CANCELLED | OUTPATIENT
Start: 2023-03-17

## 2023-03-17 RX ORDER — CEFAZOLIN SODIUM IN 0.9 % NACL 3 G/100 ML
3 INTRAVENOUS SOLUTION, PIGGYBACK (ML) INTRAVENOUS SEE ADMIN INSTRUCTIONS
Status: CANCELLED | OUTPATIENT
Start: 2023-03-17

## 2023-03-20 ENCOUNTER — THERAPY VISIT (OUTPATIENT)
Dept: PHYSICAL THERAPY | Facility: CLINIC | Age: 78
End: 2023-03-20
Payer: COMMERCIAL

## 2023-03-20 DIAGNOSIS — Z96.652 STATUS POST TOTAL LEFT KNEE REPLACEMENT: Primary | ICD-10-CM

## 2023-03-20 PROCEDURE — 97110 THERAPEUTIC EXERCISES: CPT | Mod: GP | Performed by: PHYSICAL THERAPIST

## 2023-03-20 PROCEDURE — 97112 NEUROMUSCULAR REEDUCATION: CPT | Mod: GP | Performed by: PHYSICAL THERAPIST

## 2023-03-20 PROCEDURE — 97140 MANUAL THERAPY 1/> REGIONS: CPT | Mod: GP | Performed by: PHYSICAL THERAPIST

## 2023-03-22 ENCOUNTER — MYC REFILL (OUTPATIENT)
Dept: CARDIOLOGY | Facility: CLINIC | Age: 78
End: 2023-03-22
Payer: COMMERCIAL

## 2023-03-22 DIAGNOSIS — I48.11 LONGSTANDING PERSISTENT ATRIAL FIBRILLATION (H): ICD-10-CM

## 2023-03-22 DIAGNOSIS — I25.10 CORONARY ARTERY DISEASE INVOLVING NATIVE CORONARY ARTERY OF NATIVE HEART WITHOUT ANGINA PECTORIS: ICD-10-CM

## 2023-03-23 RX ORDER — CARVEDILOL 3.12 MG/1
3.12 TABLET ORAL 2 TIMES DAILY WITH MEALS
Qty: 180 TABLET | Refills: 3 | Status: SHIPPED | OUTPATIENT
Start: 2023-03-23 | End: 2024-02-06

## 2023-03-23 NOTE — TELEPHONE ENCOUNTER
[facility] Cardiology Refill Guideline reviewed.  Medication meets criteria for refill.    Received refill request for:  Carvedilol  Last OV was: 2/1/23  F/U scheduled: 1 year follow-up ordered   New script sent to: [facility].    Eliane CANAS RN  03/23/23 at 8:28 AM

## 2023-03-27 ENCOUNTER — PRE VISIT (OUTPATIENT)
Dept: OTOLARYNGOLOGY | Facility: CLINIC | Age: 78
End: 2023-03-27

## 2023-03-27 ENCOUNTER — THERAPY VISIT (OUTPATIENT)
Dept: PHYSICAL THERAPY | Facility: CLINIC | Age: 78
End: 2023-03-27
Payer: COMMERCIAL

## 2023-03-27 DIAGNOSIS — Z96.652 STATUS POST TOTAL LEFT KNEE REPLACEMENT: Primary | ICD-10-CM

## 2023-03-27 PROCEDURE — 97140 MANUAL THERAPY 1/> REGIONS: CPT | Mod: GP | Performed by: PHYSICAL THERAPIST

## 2023-03-27 PROCEDURE — 97110 THERAPEUTIC EXERCISES: CPT | Mod: GP | Performed by: PHYSICAL THERAPIST

## 2023-03-27 PROCEDURE — 97112 NEUROMUSCULAR REEDUCATION: CPT | Mod: GP | Performed by: PHYSICAL THERAPIST

## 2023-03-29 RX ORDER — CEFAZOLIN SODIUM/WATER 3 G/30 ML
3 SYRINGE (ML) INTRAVENOUS SEE ADMIN INSTRUCTIONS
Status: CANCELLED | OUTPATIENT
Start: 2023-03-29

## 2023-03-29 NOTE — OR NURSING
"FYI - Pt has a Medtronic pacemaker (IB mess sent to cardiac device pool 3/28), is on Eliquis and holding 24 hrs prior to DOS (Last dose taken on 3/28).  Pt has a full, lower denture plate.     Pt is a falls risk due to weakness left knee. Denies any falls in past 6 months. He uses a carloz-walker to ambulate.  He will arrive via  due to distance.  He is able to stand and pivot transfer with SBA1.  He is independent with cares but does wear pull ups secondary to prostate surgery. (Occ. incontinence - he will bring a couple extra pull-ups on DOS).    IB mess to anesthesia re: hx of very slow to wake along with \"incoherent and unable to navigate on his feet\" per pt and his wife.  As a result he was kept overnight for observation. They say it took about 12 hrs to resolve.  "

## 2023-03-30 ENCOUNTER — ANESTHESIA EVENT (OUTPATIENT)
Dept: SURGERY | Facility: CLINIC | Age: 78
End: 2023-03-30
Payer: COMMERCIAL

## 2023-03-30 ENCOUNTER — HOSPITAL ENCOUNTER (OUTPATIENT)
Dept: NUCLEAR MEDICINE | Facility: CLINIC | Age: 78
Setting detail: NUCLEAR MEDICINE
Discharge: HOME | End: 2023-03-30
Attending: OTOLARYNGOLOGY
Payer: COMMERCIAL

## 2023-03-30 ENCOUNTER — HOSPITAL ENCOUNTER (OUTPATIENT)
Facility: CLINIC | Age: 78
Discharge: HOME | End: 2023-03-30
Attending: OTOLARYNGOLOGY | Admitting: OTOLARYNGOLOGY
Payer: COMMERCIAL

## 2023-03-30 ENCOUNTER — ANESTHESIA (OUTPATIENT)
Dept: SURGERY | Facility: CLINIC | Age: 78
End: 2023-03-30
Payer: COMMERCIAL

## 2023-03-30 VITALS
TEMPERATURE: 97.8 F | SYSTOLIC BLOOD PRESSURE: 157 MMHG | DIASTOLIC BLOOD PRESSURE: 85 MMHG | WEIGHT: 276.02 LBS | HEART RATE: 63 BPM | HEIGHT: 73 IN | RESPIRATION RATE: 16 BRPM | OXYGEN SATURATION: 94 % | BODY MASS INDEX: 36.58 KG/M2

## 2023-03-30 DIAGNOSIS — D48.5 NEOPLASM OF UNCERTAIN BEHAVIOR OF SKIN: ICD-10-CM

## 2023-03-30 DIAGNOSIS — C43.39 MELANOMA OF CHEEK (H): Primary | ICD-10-CM

## 2023-03-30 PROCEDURE — A9520 TC99 TILMANOCEPT DIAG 0.5MCI: HCPCS | Performed by: OTOLARYNGOLOGY

## 2023-03-30 PROCEDURE — 78195 LYMPH SYSTEM IMAGING: CPT | Mod: 26 | Performed by: RADIOLOGY

## 2023-03-30 PROCEDURE — 88305 TISSUE EXAM BY PATHOLOGIST: CPT | Mod: TC | Performed by: OTOLARYNGOLOGY

## 2023-03-30 PROCEDURE — 250N000025 HC SEVOFLURANE, PER MIN: Performed by: OTOLARYNGOLOGY

## 2023-03-30 PROCEDURE — 250N000011 HC RX IP 250 OP 636: Performed by: NURSE ANESTHETIST, CERTIFIED REGISTERED

## 2023-03-30 PROCEDURE — 250N000011 HC RX IP 250 OP 636: Performed by: OTOLARYNGOLOGY

## 2023-03-30 PROCEDURE — 710N000010 HC RECOVERY PHASE 1, LEVEL 2, PER MIN: Performed by: OTOLARYNGOLOGY

## 2023-03-30 PROCEDURE — 999N000141 HC STATISTIC PRE-PROCEDURE NURSING ASSESSMENT: Performed by: OTOLARYNGOLOGY

## 2023-03-30 PROCEDURE — 272N000001 HC OR GENERAL SUPPLY STERILE: Performed by: OTOLARYNGOLOGY

## 2023-03-30 PROCEDURE — 38900 IO MAP OF SENT LYMPH NODE: CPT | Mod: GC | Performed by: OTOLARYNGOLOGY

## 2023-03-30 PROCEDURE — 250N000011 HC RX IP 250 OP 636

## 2023-03-30 PROCEDURE — 370N000017 HC ANESTHESIA TECHNICAL FEE, PER MIN: Performed by: OTOLARYNGOLOGY

## 2023-03-30 PROCEDURE — 38500 BIOPSY/REMOVAL LYMPH NODES: CPT | Mod: 52 | Performed by: OTOLARYNGOLOGY

## 2023-03-30 PROCEDURE — 360N000076 HC SURGERY LEVEL 3, PER MIN: Performed by: OTOLARYNGOLOGY

## 2023-03-30 PROCEDURE — 78830 RP LOCLZJ TUM SPECT W/CT 1: CPT

## 2023-03-30 PROCEDURE — 258N000003 HC RX IP 258 OP 636: Performed by: OTOLARYNGOLOGY

## 2023-03-30 PROCEDURE — 258N000003 HC RX IP 258 OP 636: Performed by: NURSE ANESTHETIST, CERTIFIED REGISTERED

## 2023-03-30 PROCEDURE — 250N000009 HC RX 250: Performed by: NURSE ANESTHETIST, CERTIFIED REGISTERED

## 2023-03-30 PROCEDURE — 710N000012 HC RECOVERY PHASE 2, PER MINUTE: Performed by: OTOLARYNGOLOGY

## 2023-03-30 PROCEDURE — 88305 TISSUE EXAM BY PATHOLOGIST: CPT | Mod: 26 | Performed by: PATHOLOGY

## 2023-03-30 PROCEDURE — 11646 EXC F/E/E/N/L MAL+MRG >4 CM: CPT | Mod: GC | Performed by: OTOLARYNGOLOGY

## 2023-03-30 PROCEDURE — 343N000001 HC RX 343: Performed by: OTOLARYNGOLOGY

## 2023-03-30 PROCEDURE — 272N000002 HC OR SUPPLY OTHER OPNP: Performed by: OTOLARYNGOLOGY

## 2023-03-30 PROCEDURE — 78195 LYMPH SYSTEM IMAGING: CPT

## 2023-03-30 PROCEDURE — 250N000011 HC RX IP 250 OP 636: Performed by: ANESTHESIOLOGY

## 2023-03-30 RX ORDER — FENTANYL CITRATE 50 UG/ML
50 INJECTION, SOLUTION INTRAMUSCULAR; INTRAVENOUS EVERY 5 MIN PRN
Status: DISCONTINUED | OUTPATIENT
Start: 2023-03-30 | End: 2023-03-30 | Stop reason: HOSPADM

## 2023-03-30 RX ORDER — ONDANSETRON 2 MG/ML
INJECTION INTRAMUSCULAR; INTRAVENOUS PRN
Status: DISCONTINUED | OUTPATIENT
Start: 2023-03-30 | End: 2023-03-30

## 2023-03-30 RX ORDER — ONDANSETRON 2 MG/ML
4 INJECTION INTRAMUSCULAR; INTRAVENOUS EVERY 30 MIN PRN
Status: DISCONTINUED | OUTPATIENT
Start: 2023-03-30 | End: 2023-03-30 | Stop reason: HOSPADM

## 2023-03-30 RX ORDER — HYDRALAZINE HYDROCHLORIDE 20 MG/ML
5-10 INJECTION INTRAMUSCULAR; INTRAVENOUS EVERY 6 HOURS PRN
Status: DISCONTINUED | OUTPATIENT
Start: 2023-03-30 | End: 2023-04-03 | Stop reason: HOSPADM

## 2023-03-30 RX ORDER — OXYCODONE HYDROCHLORIDE 5 MG/1
5 TABLET ORAL
Status: CANCELLED | OUTPATIENT
Start: 2023-03-30

## 2023-03-30 RX ORDER — CEFAZOLIN SODIUM/WATER 3 G/30 ML
3 SYRINGE (ML) INTRAVENOUS
Status: COMPLETED | OUTPATIENT
Start: 2023-03-30 | End: 2023-03-30

## 2023-03-30 RX ORDER — SODIUM CHLORIDE, SODIUM LACTATE, POTASSIUM CHLORIDE, CALCIUM CHLORIDE 600; 310; 30; 20 MG/100ML; MG/100ML; MG/100ML; MG/100ML
INJECTION, SOLUTION INTRAVENOUS CONTINUOUS
Status: DISCONTINUED | OUTPATIENT
Start: 2023-03-30 | End: 2023-03-30 | Stop reason: HOSPADM

## 2023-03-30 RX ORDER — HYDRALAZINE HYDROCHLORIDE 20 MG/ML
5 INJECTION INTRAMUSCULAR; INTRAVENOUS ONCE
Status: COMPLETED | OUTPATIENT
Start: 2023-03-30 | End: 2023-03-30

## 2023-03-30 RX ORDER — HYDROMORPHONE HCL IN WATER/PF 6 MG/30 ML
0.4 PATIENT CONTROLLED ANALGESIA SYRINGE INTRAVENOUS EVERY 5 MIN PRN
Status: DISCONTINUED | OUTPATIENT
Start: 2023-03-30 | End: 2023-03-30 | Stop reason: HOSPADM

## 2023-03-30 RX ORDER — DEXAMETHASONE SODIUM PHOSPHATE 4 MG/ML
INJECTION, SOLUTION INTRA-ARTICULAR; INTRALESIONAL; INTRAMUSCULAR; INTRAVENOUS; SOFT TISSUE PRN
Status: DISCONTINUED | OUTPATIENT
Start: 2023-03-30 | End: 2023-03-30

## 2023-03-30 RX ORDER — ONDANSETRON 4 MG/1
4 TABLET, ORALLY DISINTEGRATING ORAL EVERY 30 MIN PRN
Status: DISCONTINUED | OUTPATIENT
Start: 2023-03-30 | End: 2023-03-30 | Stop reason: HOSPADM

## 2023-03-30 RX ORDER — FENTANYL CITRATE 50 UG/ML
INJECTION, SOLUTION INTRAMUSCULAR; INTRAVENOUS PRN
Status: DISCONTINUED | OUTPATIENT
Start: 2023-03-30 | End: 2023-03-30

## 2023-03-30 RX ORDER — LIDOCAINE 40 MG/G
CREAM TOPICAL
Status: CANCELLED | OUTPATIENT
Start: 2023-03-30

## 2023-03-30 RX ORDER — MINERAL OIL/HYDROPHIL PETROLAT
OINTMENT (GRAM) TOPICAL 3 TIMES DAILY
Qty: 198 G | Refills: 3 | Status: SHIPPED | OUTPATIENT
Start: 2023-03-30 | End: 2023-06-20

## 2023-03-30 RX ORDER — SODIUM CHLORIDE, SODIUM LACTATE, POTASSIUM CHLORIDE, CALCIUM CHLORIDE 600; 310; 30; 20 MG/100ML; MG/100ML; MG/100ML; MG/100ML
INJECTION, SOLUTION INTRAVENOUS CONTINUOUS
Status: CANCELLED | OUTPATIENT
Start: 2023-03-30

## 2023-03-30 RX ORDER — SODIUM CHLORIDE, SODIUM LACTATE, POTASSIUM CHLORIDE, CALCIUM CHLORIDE 600; 310; 30; 20 MG/100ML; MG/100ML; MG/100ML; MG/100ML
INJECTION, SOLUTION INTRAVENOUS CONTINUOUS PRN
Status: DISCONTINUED | OUTPATIENT
Start: 2023-03-30 | End: 2023-03-30

## 2023-03-30 RX ORDER — LIDOCAINE HYDROCHLORIDE 20 MG/ML
INJECTION, SOLUTION INFILTRATION; PERINEURAL PRN
Status: DISCONTINUED | OUTPATIENT
Start: 2023-03-30 | End: 2023-03-30

## 2023-03-30 RX ORDER — PROPOFOL 10 MG/ML
INJECTION, EMULSION INTRAVENOUS PRN
Status: DISCONTINUED | OUTPATIENT
Start: 2023-03-30 | End: 2023-03-30

## 2023-03-30 RX ORDER — ACETAMINOPHEN 325 MG/1
650 TABLET ORAL
Status: CANCELLED | OUTPATIENT
Start: 2023-03-30

## 2023-03-30 RX ORDER — OXYCODONE HYDROCHLORIDE 5 MG/1
5-10 TABLET ORAL EVERY 4 HOURS PRN
Qty: 6 TABLET | Refills: 0 | Status: SHIPPED | OUTPATIENT
Start: 2023-03-30 | End: 2023-06-20

## 2023-03-30 RX ORDER — HYDROMORPHONE HCL IN WATER/PF 6 MG/30 ML
0.2 PATIENT CONTROLLED ANALGESIA SYRINGE INTRAVENOUS EVERY 5 MIN PRN
Status: DISCONTINUED | OUTPATIENT
Start: 2023-03-30 | End: 2023-03-30 | Stop reason: HOSPADM

## 2023-03-30 RX ORDER — FENTANYL CITRATE 50 UG/ML
25 INJECTION, SOLUTION INTRAMUSCULAR; INTRAVENOUS EVERY 5 MIN PRN
Status: DISCONTINUED | OUTPATIENT
Start: 2023-03-30 | End: 2023-03-30 | Stop reason: HOSPADM

## 2023-03-30 RX ADMIN — FENTANYL CITRATE 50 MCG: 50 INJECTION, SOLUTION INTRAMUSCULAR; INTRAVENOUS at 12:00

## 2023-03-30 RX ADMIN — PROPOFOL 200 MG: 10 INJECTION, EMULSION INTRAVENOUS at 11:46

## 2023-03-30 RX ADMIN — FENTANYL CITRATE 25 MCG: 50 INJECTION, SOLUTION INTRAMUSCULAR; INTRAVENOUS at 13:30

## 2023-03-30 RX ADMIN — REMIFENTANIL HYDROCHLORIDE 0.08 MCG/KG/MIN: 1 INJECTION, POWDER, LYOPHILIZED, FOR SOLUTION INTRAVENOUS at 11:50

## 2023-03-30 RX ADMIN — HYDRALAZINE HYDROCHLORIDE 5 MG: 20 INJECTION INTRAMUSCULAR; INTRAVENOUS at 13:30

## 2023-03-30 RX ADMIN — DEXAMETHASONE SODIUM PHOSPHATE 8 MG: 4 INJECTION, SOLUTION INTRA-ARTICULAR; INTRALESIONAL; INTRAMUSCULAR; INTRAVENOUS; SOFT TISSUE at 12:02

## 2023-03-30 RX ADMIN — TILMANOCEPT 0.5 MILLICURIE: KIT at 08:45

## 2023-03-30 RX ADMIN — Medication 3 G: at 11:40

## 2023-03-30 RX ADMIN — FENTANYL CITRATE 50 MCG: 50 INJECTION, SOLUTION INTRAMUSCULAR; INTRAVENOUS at 12:51

## 2023-03-30 RX ADMIN — SODIUM CHLORIDE, POTASSIUM CHLORIDE, SODIUM LACTATE AND CALCIUM CHLORIDE: 600; 310; 30; 20 INJECTION, SOLUTION INTRAVENOUS at 11:34

## 2023-03-30 RX ADMIN — SUCCINYLCHOLINE CHLORIDE 100 MG: 20 INJECTION, SOLUTION INTRAMUSCULAR; INTRAVENOUS; PARENTERAL at 11:46

## 2023-03-30 RX ADMIN — FENTANYL CITRATE 50 MCG: 50 INJECTION, SOLUTION INTRAMUSCULAR; INTRAVENOUS at 12:58

## 2023-03-30 RX ADMIN — LIDOCAINE HYDROCHLORIDE 80 MG: 20 INJECTION, SOLUTION INFILTRATION; PERINEURAL at 11:46

## 2023-03-30 RX ADMIN — HYDRALAZINE HYDROCHLORIDE 5 MG: 20 INJECTION INTRAMUSCULAR; INTRAVENOUS at 13:56

## 2023-03-30 RX ADMIN — ONDANSETRON 4 MG: 2 INJECTION INTRAMUSCULAR; INTRAVENOUS at 12:38

## 2023-03-30 ASSESSMENT — ACTIVITIES OF DAILY LIVING (ADL)
ADLS_ACUITY_SCORE: 39
ADLS_ACUITY_SCORE: 37
ADLS_ACUITY_SCORE: 39

## 2023-03-30 ASSESSMENT — ENCOUNTER SYMPTOMS: DYSRHYTHMIAS: 1

## 2023-03-30 NOTE — BRIEF OP NOTE
[facility]    Brief Operative Note    Pre-operative diagnosis: Melanoma of cheek (H) [C43.39]  Post-operative diagnosis Same as pre-operative diagnosis    Procedure: Procedure(s):  wide local excision of left cheek melanoma  BIOPSY, LYMPH NODE, SENTINEL  Surgeon: Surgeon(s) and Role:     * Rolando Minaya MD - Primary     * Tony Garcia MD - Resident - Assisting  Anesthesia: General   Estimated Blood Loss: Minimal    Drains: None  Specimens:   ID Type Source Tests Collected by Time Destination   1 : wide local excision left cheek, double green superior, blue inferior, long black lateral, short black medial Tissue Other SURGICAL PATHOLOGY EXAM Rolando Minaya MD 3/30/2023 12:11 PM      Findings:   Nodular melanoma on left cheek. Gamma probe nonreactive in preauricular area. No lymph nodes were excised. .  Complications: None.  Implants: * No implants in log *

## 2023-03-30 NOTE — OR NURSING
Paged pacemaker nurse, no interventions needed. Patient has underlying rhythm, per RN OR may use a magnet if needed.

## 2023-03-30 NOTE — ANESTHESIA CARE TRANSFER NOTE
Patient: James Salvador    Procedure: Procedure(s):  wide local excision of left cheek melanoma  BIOPSY, LYMPH NODE, SENTINEL       Diagnosis: Melanoma of cheek (H) [C43.39]  Diagnosis Additional Information: No value filed.    Anesthesia Type:   General     Note:    Oropharynx: oropharynx clear of all foreign objects and spontaneously breathing  Level of Consciousness: awake  Oxygen Supplementation: face mask  Level of Supplemental Oxygen (L/min / FiO2): 6  Independent Airway: airway patency satisfactory and stable  Dentition: dentition unchanged  Vital Signs Stable: post-procedure vital signs reviewed and stable  Report to RN Given: handoff report given  Patient transferred to: PACU    Handoff Report: Identifed the Patient, Identified the Reponsible Provider, Reviewed the pertinent medical history, Discussed the surgical course, Reviewed Intra-OP anesthesia mangement and issues during anesthesia, Set expectations for post-procedure period and Allowed opportunity for questions and acknowledgement of understanding      Vitals:  Vitals Value Taken Time   BP  03/30/23 1311   Temp     Pulse 59 03/30/23 1315   Resp 6 03/30/23 1315   SpO2 99 % 03/30/23 1315   Vitals shown include unvalidated device data.    Electronically Signed By: NAV Harris CRNA  March 30, 2023  1:16 PM

## 2023-03-30 NOTE — DISCHARGE INSTRUCTIONS
[facility]  Same-Day Surgery   Adult Discharge Orders & Instructions     For 24 hours after surgery    Get plenty of rest.  A responsible adult must stay with you for at least 24 hours after you leave the hospital.   Do not drive or use heavy equipment.  If you have weakness or tingling, don't drive or use heavy equipment until this feeling goes away.  Do not drink alcohol.  Avoid strenuous or risky activities.  Ask for help when climbing stairs.   You may feel lightheaded.  IF so, sit for a few minutes before standing.  Have someone help you get up.   If you have nausea (feel sick to your stomach): Drink only clear liquids such as apple juice, ginger ale, broth or 7-Up.  Rest may also help.  Be sure to drink enough fluids.  Move to a regular diet as you feel able.  You may have a slight fever. Call the doctor if your fever is over 100 F (37.7 C) (taken under the tongue) or lasts longer than 24 hours.  You may have a dry mouth, a sore throat, muscle aches or trouble sleeping.  These should go away after 24 hours.  Do not make important or legal decisions.   Call your doctor for any of the followin.  Signs of infection (fever, growing tenderness at the surgery site, a large amount of drainage or bleeding, severe pain, foul-smelling drainage, redness, swelling).    2. It has been over 8 to 10 hours since surgery and you are still not able to urinate (pass water).    3.  Headache for over 24 hours.    4.  Numbness, tingling or weakness the day after surgery (if you had spinal anesthesia).  To contact a doctor, call Dr Minaya at the ENT - Otolaryngology clinic at [phone]  or:    '   [phone] and ask for the resident on call for    Otolarynologist (answered 24 hours a day)  '   Emergency Department:    [facility]: [phone]       (TTY for hearing impaired: [phone])    [facility]: [phone]       (TTY for hearing impaired: [phone])      none

## 2023-03-30 NOTE — ANESTHESIA PROCEDURE NOTES
Airway       Patient location during procedure: OR       Procedure Start/Stop Times: 3/30/2023 11:47 AM  Staff -        CRNA: Susan Strong APRN CRNA       Performed By: CRNA  Consent for Airway        Urgency: elective  Indications and Patient Condition       Indications for airway management: ammon-procedural       Induction type:intravenous       Mask difficulty assessment: 1 - vent by mask    Final Airway Details       Final airway type: endotracheal airway       Successful airway: ETT - single and Oral  Endotracheal Airway Details        ETT size (mm): 7.5       Cuffed: yes       Successful intubation technique: direct laryngoscopy       DL Blade Type: MAC 3       Grade View of Cords: 1       Adjucts: stylet       Position: Right       Measured from: lips       Secured at (cm): 21    Post intubation assessment        Placement verified by: capnometry, equal breath sounds and chest rise        Number of attempts at approach: 1       Secured with: pink tape       Ease of procedure: easy       Dentition: Unchanged and Intact    Medication(s) Administered   Medication Administration Time: 3/30/2023 11:47 AM

## 2023-03-30 NOTE — INTERVAL H&P NOTE
"I have reviewed the surgical (or preoperative) H&P that is linked to this encounter, and examined the patient. There are no significant changes    Clinical Conditions Present on Arrival:  Clinically Significant Risk Factors Present on Admission           # Hyponatremia: Lowest Na = 128 mmol/L in last 30 days, will monitor as appropriate        # Drug Induced Coagulation Defect: home medication list includes an anticoagulant medication   # Obesity: Estimated body mass index is 36.42 kg/m  as calculated from the following:    Height as of this encounter: 1.854 m (6' 1\").    Weight as of this encounter: 125.2 kg (276 lb 0.3 oz).       "

## 2023-03-30 NOTE — ANESTHESIA POSTPROCEDURE EVALUATION
Patient: James Salvador    Procedure: Procedure(s):  wide local excision of left cheek melanoma  BIOPSY, LYMPH NODE, SENTINEL       Anesthesia Type:  General    Note:  Disposition: Outpatient   Postop Pain Control: Uneventful            Sign Out: Well controlled pain   PONV: No   Neuro/Psych: Uneventful            Sign Out: Acceptable/Baseline neuro status   Airway/Respiratory: Uneventful            Sign Out: Acceptable/Baseline resp. status   CV/Hemodynamics: Uneventful            Sign Out: Acceptable CV status; No obvious hypovolemia; No obvious fluid overload   Other NRE: NONE   DID A NON-ROUTINE EVENT OCCUR? No           Last vitals:  Vitals Value Taken Time   /107 03/30/23 1420   Temp     Pulse 42 03/30/23 1428   Resp 6 03/30/23 1356   SpO2 94 % 03/30/23 1428   Vitals shown include unvalidated device data.    Electronically Signed By: Meg Gamboa MD  March 30, 2023  2:29 PM

## 2023-03-31 ASSESSMENT — ACTIVITIES OF DAILY LIVING (ADL)
ADLS_ACUITY_SCORE: 39

## 2023-04-01 ASSESSMENT — ACTIVITIES OF DAILY LIVING (ADL)
ADLS_ACUITY_SCORE: 39

## 2023-04-02 ASSESSMENT — ACTIVITIES OF DAILY LIVING (ADL)
ADLS_ACUITY_SCORE: 39

## 2023-04-03 ENCOUNTER — THERAPY VISIT (OUTPATIENT)
Dept: PHYSICAL THERAPY | Facility: CLINIC | Age: 78
End: 2023-04-03
Payer: COMMERCIAL

## 2023-04-03 DIAGNOSIS — Z96.652 STATUS POST TOTAL LEFT KNEE REPLACEMENT: Primary | ICD-10-CM

## 2023-04-03 PROCEDURE — 97116 GAIT TRAINING THERAPY: CPT | Mod: GP | Performed by: PHYSICAL THERAPIST

## 2023-04-03 PROCEDURE — 97110 THERAPEUTIC EXERCISES: CPT | Mod: GP | Performed by: PHYSICAL THERAPIST

## 2023-04-03 ASSESSMENT — ACTIVITIES OF DAILY LIVING (ADL)
ADLS_ACUITY_SCORE: 39

## 2023-04-05 ENCOUNTER — OFFICE VISIT (OUTPATIENT)
Dept: OTOLARYNGOLOGY | Facility: CLINIC | Age: 78
End: 2023-04-05
Payer: COMMERCIAL

## 2023-04-05 VITALS
HEIGHT: 73 IN | HEART RATE: 98 BPM | DIASTOLIC BLOOD PRESSURE: 81 MMHG | WEIGHT: 283 LBS | OXYGEN SATURATION: 99 % | BODY MASS INDEX: 37.51 KG/M2 | TEMPERATURE: 98 F | SYSTOLIC BLOOD PRESSURE: 147 MMHG

## 2023-04-05 DIAGNOSIS — C43.39 MELANOMA OF CHEEK (H): Primary | ICD-10-CM

## 2023-04-05 LAB
PATH REPORT.COMMENTS IMP SPEC: NORMAL
PATH REPORT.FINAL DX SPEC: NORMAL
PATH REPORT.GROSS SPEC: NORMAL
PATH REPORT.MICROSCOPIC SPEC OTHER STN: NORMAL
PATH REPORT.RELEVANT HX SPEC: NORMAL
PHOTO IMAGE: NORMAL

## 2023-04-05 PROCEDURE — 99024 POSTOP FOLLOW-UP VISIT: CPT | Performed by: OTOLARYNGOLOGY

## 2023-04-05 ASSESSMENT — PAIN SCALES - GENERAL: PAINLEVEL: NO PAIN (0)

## 2023-04-05 NOTE — LETTER
4/5/2023       RE: James Salvador  [address] [place] [address]     Dear Colleague,    Thank you for referring your patient, James Salvador, to the [facility] EAR NOSE AND THROAT CLINIC [place] at [facility]. Please see a copy of my visit note below.    HISTORY OF PRESENT ILLNESS:  Mr. Salvador returns.  He is status post wide local excision of left cheek melanoma and preauricular exploration for sentinel lymph node.  During the patient's lymphoscintigraphy, there was some questionable uptake in the preauricular area, but there was no other drainage.  Therefore, I explored that area, but unfortunately did not identify any sentinel nodes in the area.  Therefore, we closed the incision.  On his final pathology report, he had some residual melanoma, but the margins are negative.  He states he is doing well.    PHYSICAL EXAMINATION:  On examination, the Xeroform is in place.  The Steri-Strip in the preauricular area is also in place.  The facial nerve function is normal.  He does not have any infection or collection and no lymphadenopathy.    IMPRESSION:  Doing well.    PLAN:  The patient will be getting reconstructed by Dr. Sanchez in about a week.  He will let us know if he needs additional help in the future.      Rolando Minaya MD, MS  Professor and Chair   Dept. of Otolaryngology-Head and Neck Surgery   [facility]

## 2023-04-05 NOTE — NURSING NOTE
"Chief Complaint   Patient presents with     RECHECK     1 week post op      Blood pressure (!) 147/81, pulse 98, temperature 98  F (36.7  C), height 1.854 m (6' 1\"), weight 128.4 kg (283 lb), SpO2 99 %.    Dheeraj Davis LPN  "

## 2023-04-05 NOTE — PROGRESS NOTES
HISTORY OF PRESENT ILLNESS:  Mr. Salvador returns.  He is status post wide local excision of left cheek melanoma and preauricular exploration for sentinel lymph node.  During the patient's lymphoscintigraphy, there was some questionable uptake in the preauricular area, but there was no other drainage.  Therefore, I explored that area, but unfortunately did not identify any sentinel nodes in the area.  Therefore, we closed the incision.  On his final pathology report, he had some residual melanoma, but the margins are negative.  He states he is doing well.    PHYSICAL EXAMINATION:  On examination, the Xeroform is in place.  The Steri-Strip in the preauricular area is also in place.  The facial nerve function is normal.  He does not have any infection or collection and no lymphadenopathy.    IMPRESSION:  Doing well.    PLAN:  The patient will be getting reconstructed by Dr. Sanchez in about a week.  He will let us know if he needs additional help in the future.      Rolando Minaya MD, MS  Professor and Chair   Dept. of Otolaryngology-Head and Neck Surgery   [facility]

## 2023-04-05 NOTE — PATIENT INSTRUCTIONS
1. Please follow-up in clinic as needed.   2. Please call the ENT clinic with any questions,concerns, new or worsening symptoms.    -Clinic number is [phone]   - Kathy's direct line (Dr. Minaya's nurse) [phone]

## 2023-04-06 ENCOUNTER — OFFICE VISIT (OUTPATIENT)
Dept: DERMATOLOGY | Facility: CLINIC | Age: 78
End: 2023-04-06
Payer: COMMERCIAL

## 2023-04-06 DIAGNOSIS — D18.01 ANGIOMA OF SKIN: ICD-10-CM

## 2023-04-06 DIAGNOSIS — Z85.828 HISTORY OF SKIN CANCER: ICD-10-CM

## 2023-04-06 DIAGNOSIS — Z85.820 HISTORY OF MELANOMA: Primary | ICD-10-CM

## 2023-04-06 DIAGNOSIS — L82.1 SEBORRHEIC KERATOSIS: ICD-10-CM

## 2023-04-06 DIAGNOSIS — D23.9 DERMAL NEVUS: ICD-10-CM

## 2023-04-06 DIAGNOSIS — D22.9 NEVUS: ICD-10-CM

## 2023-04-06 DIAGNOSIS — L57.0 AK (ACTINIC KERATOSIS): ICD-10-CM

## 2023-04-06 DIAGNOSIS — L81.4 LENTIGO: ICD-10-CM

## 2023-04-06 PROCEDURE — 17003 DESTRUCT PREMALG LES 2-14: CPT | Performed by: DERMATOLOGY

## 2023-04-06 PROCEDURE — 99213 OFFICE O/P EST LOW 20 MIN: CPT | Mod: 25 | Performed by: DERMATOLOGY

## 2023-04-06 PROCEDURE — 17000 DESTRUCT PREMALG LESION: CPT | Performed by: DERMATOLOGY

## 2023-04-06 NOTE — PROGRESS NOTES
James Salvador is an extremely pleasant 77 year old year old male patient here today for hx of melanoma 2.2mm SNLBX negative.  Jsut had excsied.  HE notes rough spots on arms and face.  Patient states this has been present for a while.  Patient reports the following symptoms:  none.  Patient reports the following previous treatments none.  These treatments did not work.  Patient reports the following modifying factors none.  Associated symptoms: none.  Patient has no other skin complaints today.  Remainder of the HPI, Meds, PMH, Allergies, FH, and SH was reviewed in chart.      Past Medical History:   Diagnosis Date     Actinic 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 Cardiac Cath 9/2015: BRIDGET to LAD     Hearing problem 1995    Wear hearing aids     Hyperlipidemia      Malignant melanoma      Morbid obesity 01/11/2016     Paroxysmal supraventricular tachycardia     on metoprolol     Permanent atrial fibrillation 04/21/2017     Squamous cell carcinoma      Tachy-edinson syndrome 05/29/2019       Past Surgical History:   Procedure Laterality Date     ADENOIDECTOMY  Childhood     ANGIOPLASTY  1994    in [place]     ANKLE SURGERY  07/13/2005    right ankle     ARTHROPLASTY HIP Right 10/2009     BIOPSY NODE SENTINEL Left 3/30/2023    Procedure: BIOPSY, LYMPH NODE, SENTINEL;  Surgeon: Rolando Minaya MD;  Location: [facility] OR     EP PERM PACER SINGLE LEAD N/A 05/31/2019    Medtronic single lead pacemaker     EXCISE MASS CHEEK Left 3/30/2023    Procedure: wide local excision of left cheek melanoma;  Surgeon: Rolando Minaya MD;  Location: [facility] OR     Facial biopsy - Melanoma       GENITOURINARY SURGERY  10/20/23    Prostate surgery     HEART CATH STENT COR W/WO PTCA  09/23/2015    BRIDGET stent mid LAD     LASER SURGERY OF EYE   2002     MOHS MICROGRAPHIC PROCEDURE  2004    squamous cell carcinoma right temple     SINUS SURGERY  2006     TONSILLECTOMY  Childhood        Family History   Problem Relation Age of Onset     Genitourinary Problems Mother         renal failure     Heart Disease Mother      Cerebrovascular Disease Mother         TIA     Cardiovascular Father         rupture of dorsal aorta     Depression Son      Depression Brother         Suicide     Substance Abuse Brother         Heroin     Skin Cancer No family hx of        Social History     Socioeconomic History     Marital status:      Spouse name: Not on file     Number of children: 3     Years of education: Not on file     Highest education level: Not on file   Occupational History     Employer: RETIRED   Tobacco Use     Smoking status: Former     Packs/day: 0.50     Years: 10.00     Pack years: 5.00     Types: Cigarettes, Cigars, Pipe     Start date: 1966     Quit date: 1974     Years since quittin.9     Smokeless tobacco: Never     Tobacco comments:     Also smoked from 1985 - 1990   Vaping Use     Vaping status: Not on file   Substance and Sexual Activity     Alcohol use: Not Currently     Comment: Socially     Drug use: No     Sexual activity: Not Currently     Partners: Female     Birth control/protection: Male Surgical     Comment: Vasectomy   Other Topics Concern     Parent/sibling w/ CABG, MI or angioplasty before 65F 55M? No      Service Not Asked     Blood Transfusions Not Asked     Caffeine Concern Yes     Comment: 2 big cups coffee daily     Occupational Exposure Not Asked     Hobby Hazards Not Asked     Sleep Concern No     Comment: sleeping better since shoulder replaced 11/3/16     Stress Concern No     Weight Concern Yes     Special Diet Yes     Comment: trying to do more lean meats     Back Care Not Asked     Exercise No     Comment: limited - knee     Bike Helmet Not Asked     Seat Belt Not Asked      Self-Exams Not Asked   Social History Narrative     Not on file     Social Determinants of Health     Financial Resource Strain: Not on file   Food Insecurity: Not on file   Transportation Needs: Not on file   Physical Activity: Not on file   Stress: Not on file   Social Connections: Not on file   Intimate Partner Violence: Not on file   Housing Stability: Not on file       Outpatient Encounter Medications as of 4/6/2023   Medication Sig Dispense Refill     acetaminophen (TYLENOL) 325 MG tablet Take 650 mg by mouth 4 times daily       atorvastatin (LIPITOR) 80 MG tablet TAKE 1 TABLET BY MOUTH EVERYDAY AT BEDTIME 90 tablet 2     carvedilol (COREG) 3.125 MG tablet Take 1 tablet (3.125 mg) by mouth 2 times daily (with meals) 180 tablet 3     desonide (DESOWEN) 0.05 % external cream Apply topically daily       doxycycline hyclate (PERIOSTAT) 20 MG tablet TAKE 1 TABLET BY MOUTH TWICE A DAY       ELIQUIS ANTICOAGULANT 5 MG tablet TAKE 1 TABLET BY MOUTH  TWICE DAILY 180 tablet 1     fluorouracil (EFUDEX) 5 % external cream Apply to scap BID x 3-4 weeks. Protect area from the sun. 40 g 3     fluticasone (FLONASE) 50 MCG/ACT nasal spray INSTILL 2 SPRAYS INTO BOTH NOSTRILS DAILY. 48 mL 3     furosemide (LASIX) 20 MG tablet TAKE 1 TABLET BY MOUTH EVERY DAY 90 tablet 3     gabapentin (NEURONTIN) 300 MG capsule Take 2 capsules (600 mg) by mouth 3 times daily       ketoconazole (NIZORAL) 2 % cream Apply topically 2 times daily For face. FAX REFILL REQUESTS TO [facility]: [phone] 30 g 2     ketoconazole (NIZORAL) 2 % external shampoo Use daily as needed 120 mL 11     lisinopril (ZESTRIL) 30 MG tablet Take 1 tablet (30 mg) by mouth daily 90 tablet 3     mineral oil-hydrophilic petrolatum (AQUAPHOR) external ointment Apply topically 3 times daily 198 g 3     nitroGLYcerin (NITROSTAT) 0.4 MG sublingual tablet Place 1 tablet (0.4 mg) under the tongue every 5 minutes as needed for chest pain May repeat twice for a total of 3  tablets.  If chest pain not relieved, call 911 25 tablet 11     OXcarbazepine (TRILEPTAL) 300 MG tablet Take 2 tablets (600 mg) by mouth 3 times daily ( @ 7am, 1530 and 2200 hrs each day)       oxyCODONE (ROXICODONE) 5 MG tablet Take 1-2 tablets (5-10 mg) by mouth every 4 hours as needed for moderate to severe pain 6 tablet 0     triamcinolone (KENALOG) 0.1 % external lotion Apply to scalp BID x 1-2 weeks then PRN only 60 mL 3     No facility-administered encounter medications on file as of 4/6/2023.             O:   NAD, WDWN, Alert & Oriented, Mood & Affect wnl, Vitals stable   Here today alone   General appearance normal   Vitals stable   Alert, oriented and in no acute distress      Following lymph nodes palpated: Occipital, Cervical, Supraclavicular no lad  Gritty scaly papule on face and arms   L cheek bandage in place   pigmented macules on trunk and ext with regular borders and pigment networks      Stuck on papules and brown macules on trunk and ext   Red papules on trunk  Flesh colored papules on trunk     The remainder of the full exam was normal; the following areas were examined:  conjunctiva/lids, , neck, peripheral vascular system, abdomen, lymph nodes, digits/nails, eccrine and apocrine glands, scalp/hair, face, neck, chest, abdomen, buttocks, back, RUE, LUE, RLE, LLE       Eyes: Conjunctivae/lids:Normal     ENT: Lips, buccal mucosa, tongue: normal    MSK:Normal    Cardiovascular: peripheral edema none    Pulm: Breathing Normal    Lymph Nodes: No Head and Neck Lymphadenopathy     Neuro/Psych: Orientation:Alert and Orientedx3 ; Mood/Affect:normal       A/P:  1. Seborrheic keratosis, lentigo, angioma, dermal nevus, hx of non-melanoma skin cancer  2. Hx of melanoma snlbx negative  MELANOMA DISCUSSED WITH PATIENT:  I discussed the specifics of tumor, prognosis, metachronous melanoma, self exam, and genetics with the patient. I explained the need for monthly skin exams including and taught the patient how  to do this. Patient was asked about new or changing moles . I discussed with patient signs and symptoms that could arise in the setting of recurrent locoregional or metastatic disease. In addition, the need to undergo every 4 month dermatologic full skin survey and evaluation given that patients with a diagnosis of melanoma are at risk of recurrence (local and distant) and of subsequent de marty melanoma.    3. Actinic keratosis   R cheek x2, R elbow x1, L elbow x1  LN2:  Treated with LN2 for 5s for 1-2 cycles. Warned risks of blistering, pain, pigment change, scarring, and incomplete resolution.  Advised patient to return if lesions do not completely resolve.  Wound care sheet given.  Using 5-Flurouracil Cream    5-Fluorouracil (5FU) topical cream (brand names Efudex, Carac) is a prescription topical medicine to treat actinic keratoses (pre-squamous cell skin cancer lesions), sun-damaged skin as well as superficial skin cancers.    When applied the areas of sun-damaged skin, the 5FU will  find  damaged skin cells and destroy them.   During treatment, the skin will become red and look very irritated. This is the expected  normal response,  Some patients using 5FU show minimal redness and scaling while others have a very  vigorous  response where the skin scabs and peels. The important thing to realize is that 5FU is treating sun-damaged skin that carries skin cancer risk.        While the skin is irritated, open, sore or scabbed you can apply aquaphor, vaseline or 1% hydrocortisone cream in the morning.      You should apply a thin layer of the cream to the affected area twice a day for 2  weeks every night. A strip of cream the length of your finger tip should be enough to cover your entire face.  For tougher skin like arms, legs, or back, we may suggest longer treatment plans.  However if you react really strong and fast, you might stop earlier or use less frequently. - please call if it is very strong and you are  concern you might need to stop early.      If you prescription coverage allows we may add calcipotriene (Dovonex ), a vitamin-D derivative, to the treatment plan.  In these cases we will have you mix the calcipotriene with the efudex to help shorten the treatment course and improve outcomes.      Typically very strong reactions are related to lots of underlying sun damage, and this means you are getting a good response to the medication. However, there is no need to be miserable while using this. Please let us know if you are having trouble or concerns!     It was a pleasure speaking to James Salvador today.  Previous clinic notes and pertinent laboratory tests were reviewed prior to James Salvador's visit.  Signs and Symptoms of skin cancer discussed with patient.  Patient encouraged to perform monthly skin exams.  UV precautions reviewed with patient.  Return to clinic 4 months

## 2023-04-06 NOTE — LETTER
4/6/2023         RE: James Salvador  [address] [place] [address]        Dear Colleague,    Thank you for referring your patient, James Salvador, to the [facility]. Please see a copy of my visit note below.    James Salvador is an extremely pleasant 77 year old year old male patient here today for hx of melanoma 2.2mm SNLBX negative.  Jsut had excsied.  HE notes rough spots on arms and face.  Patient states this has been present for a while.  Patient reports the following symptoms:  none.  Patient reports the following previous treatments none.  These treatments did not work.  Patient reports the following modifying factors none.  Associated symptoms: none.  Patient has no other skin complaints today.  Remainder of the HPI, Meds, PMH, Allergies, FH, and SH was reviewed in chart.      Past Medical History:   Diagnosis Date     Actinic 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 Cardiac Cath 9/2015: BRIDGET to LAD     Hearing problem 1995    Wear hearing aids     Hyperlipidemia      Malignant melanoma      Morbid obesity 01/11/2016     Paroxysmal supraventricular tachycardia     on metoprolol     Permanent atrial fibrillation 04/21/2017     Squamous cell carcinoma      Tachy-edinson syndrome 05/29/2019       Past Surgical History:   Procedure Laterality Date     ADENOIDECTOMY  Childhood     ANGIOPLASTY  1994    in [place]     ANKLE SURGERY  07/13/2005    right ankle     ARTHROPLASTY HIP Right 10/2009     BIOPSY NODE SENTINEL Left 3/30/2023    Procedure: BIOPSY, LYMPH NODE, SENTINEL;  Surgeon: Rolando Minaya MD;  Location: [facility] OR     EP PERM PACER SINGLE LEAD N/A 05/31/2019    Medtronic single lead pacemaker     EXCISE MASS CHEEK Left 3/30/2023    Procedure: wide local  excision of left cheek melanoma;  Surgeon: Rolando Minaya MD;  Location: [facility] OR     Facial biopsy - Melanoma       GENITOURINARY SURGERY  10/20/23    Prostate surgery     HEART CATH STENT COR W/WO PTCA  2015    BRDIGET stent mid LAD     LASER SURGERY OF EYE  2002     MOHS MICROGRAPHIC PROCEDURE  2004    squamous cell carcinoma right temple     SINUS SURGERY  2006     TONSILLECTOMY  Childhood        Family History   Problem Relation Age of Onset     Genitourinary Problems Mother         renal failure     Heart Disease Mother      Cerebrovascular Disease Mother         TIA     Cardiovascular Father         rupture of dorsal aorta     Depression Son      Depression Brother         Suicide     Substance Abuse Brother         Heroin     Skin Cancer No family hx of        Social History     Socioeconomic History     Marital status:      Spouse name: Not on file     Number of children: 3     Years of education: Not on file     Highest education level: Not on file   Occupational History     Employer: RETIRED   Tobacco Use     Smoking status: Former     Packs/day: 0.50     Years: 10.00     Pack years: 5.00     Types: Cigarettes, Cigars, Pipe     Start date: 1966     Quit date: 1974     Years since quittin.9     Smokeless tobacco: Never     Tobacco comments:     Also smoked from 1985 - 1990   Vaping Use     Vaping status: Not on file   Substance and Sexual Activity     Alcohol use: Not Currently     Comment: Socially     Drug use: No     Sexual activity: Not Currently     Partners: Female     Birth control/protection: Male Surgical     Comment: Vasectomy   Other Topics Concern     Parent/sibling w/ CABG, MI or angioplasty before 65F 55M? No      Service Not Asked     Blood Transfusions Not Asked     Caffeine Concern Yes     Comment: 2 big cups coffee daily     Occupational Exposure Not Asked     Hobby Hazards Not Asked     Sleep Concern No     Comment: sleeping better  since shoulder replaced 11/3/16     Stress Concern No     Weight Concern Yes     Special Diet Yes     Comment: trying to do more lean meats     Back Care Not Asked     Exercise No     Comment: limited - knee     Bike Helmet Not Asked     Seat Belt Not Asked     Self-Exams Not Asked   Social History Narrative     Not on file     Social Determinants of Health     Financial Resource Strain: Not on file   Food Insecurity: Not on file   Transportation Needs: Not on file   Physical Activity: Not on file   Stress: Not on file   Social Connections: Not on file   Intimate Partner Violence: Not on file   Housing Stability: Not on file       Outpatient Encounter Medications as of 4/6/2023   Medication Sig Dispense Refill     acetaminophen (TYLENOL) 325 MG tablet Take 650 mg by mouth 4 times daily       atorvastatin (LIPITOR) 80 MG tablet TAKE 1 TABLET BY MOUTH EVERYDAY AT BEDTIME 90 tablet 2     carvedilol (COREG) 3.125 MG tablet Take 1 tablet (3.125 mg) by mouth 2 times daily (with meals) 180 tablet 3     desonide (DESOWEN) 0.05 % external cream Apply topically daily       doxycycline hyclate (PERIOSTAT) 20 MG tablet TAKE 1 TABLET BY MOUTH TWICE A DAY       ELIQUIS ANTICOAGULANT 5 MG tablet TAKE 1 TABLET BY MOUTH  TWICE DAILY 180 tablet 1     fluorouracil (EFUDEX) 5 % external cream Apply to scap BID x 3-4 weeks. Protect area from the sun. 40 g 3     fluticasone (FLONASE) 50 MCG/ACT nasal spray INSTILL 2 SPRAYS INTO BOTH NOSTRILS DAILY. 48 mL 3     furosemide (LASIX) 20 MG tablet TAKE 1 TABLET BY MOUTH EVERY DAY 90 tablet 3     gabapentin (NEURONTIN) 300 MG capsule Take 2 capsules (600 mg) by mouth 3 times daily       ketoconazole (NIZORAL) 2 % cream Apply topically 2 times daily For face. FAX REFILL REQUESTS TO [facility]: [phone] 30 g 2     ketoconazole (NIZORAL) 2 % external shampoo Use daily as needed 120 mL 11     lisinopril (ZESTRIL) 30 MG tablet Take 1 tablet (30 mg) by mouth daily 90 tablet 3     mineral  oil-hydrophilic petrolatum (AQUAPHOR) external ointment Apply topically 3 times daily 198 g 3     nitroGLYcerin (NITROSTAT) 0.4 MG sublingual tablet Place 1 tablet (0.4 mg) under the tongue every 5 minutes as needed for chest pain May repeat twice for a total of 3 tablets.  If chest pain not relieved, call 911 25 tablet 11     OXcarbazepine (TRILEPTAL) 300 MG tablet Take 2 tablets (600 mg) by mouth 3 times daily ( @ 7am, 1530 and 2200 hrs each day)       oxyCODONE (ROXICODONE) 5 MG tablet Take 1-2 tablets (5-10 mg) by mouth every 4 hours as needed for moderate to severe pain 6 tablet 0     triamcinolone (KENALOG) 0.1 % external lotion Apply to scalp BID x 1-2 weeks then PRN only 60 mL 3     No facility-administered encounter medications on file as of 4/6/2023.             O:   NAD, WDWN, Alert & Oriented, Mood & Affect wnl, Vitals stable   Here today alone   General appearance normal   Vitals stable   Alert, oriented and in no acute distress      Following lymph nodes palpated: Occipital, Cervical, Supraclavicular no lad  Gritty scaly papule on face and arms   L cheek bandage in place   pigmented macules on trunk and ext with regular borders and pigment networks      Stuck on papules and brown macules on trunk and ext   Red papules on trunk  Flesh colored papules on trunk     The remainder of the full exam was normal; the following areas were examined:  conjunctiva/lids, , neck, peripheral vascular system, abdomen, lymph nodes, digits/nails, eccrine and apocrine glands, scalp/hair, face, neck, chest, abdomen, buttocks, back, RUE, LUE, RLE, LLE       Eyes: Conjunctivae/lids:Normal     ENT: Lips, buccal mucosa, tongue: normal    MSK:Normal    Cardiovascular: peripheral edema none    Pulm: Breathing Normal    Lymph Nodes: No Head and Neck Lymphadenopathy     Neuro/Psych: Orientation:Alert and Orientedx3 ; Mood/Affect:normal       A/P:  1. Seborrheic keratosis, lentigo, angioma, dermal nevus, hx of non-melanoma skin  cancer  2. Hx of melanoma snlbx negative  MELANOMA DISCUSSED WITH PATIENT:  I discussed the specifics of tumor, prognosis, metachronous melanoma, self exam, and genetics with the patient. I explained the need for monthly skin exams including and taught the patient how to do this. Patient was asked about new or changing moles . I discussed with patient signs and symptoms that could arise in the setting of recurrent locoregional or metastatic disease. In addition, the need to undergo every 4 month dermatologic full skin survey and evaluation given that patients with a diagnosis of melanoma are at risk of recurrence (local and distant) and of subsequent de marty melanoma.    3. Actinic keratosis   R cheek x2, R elbow x1, L elbow x1  LN2:  Treated with LN2 for 5s for 1-2 cycles. Warned risks of blistering, pain, pigment change, scarring, and incomplete resolution.  Advised patient to return if lesions do not completely resolve.  Wound care sheet given.  Using 5-Flurouracil Cream    5-Fluorouracil (5FU) topical cream (brand names Efudex, Carac) is a prescription topical medicine to treat actinic keratoses (pre-squamous cell skin cancer lesions), sun-damaged skin as well as superficial skin cancers.    When applied the areas of sun-damaged skin, the 5FU will  find  damaged skin cells and destroy them.   During treatment, the skin will become red and look very irritated. This is the expected  normal response,  Some patients using 5FU show minimal redness and scaling while others have a very  vigorous  response where the skin scabs and peels. The important thing to realize is that 5FU is treating sun-damaged skin that carries skin cancer risk.        While the skin is irritated, open, sore or scabbed you can apply aquaphor, vaseline or 1% hydrocortisone cream in the morning.      You should apply a thin layer of the cream to the affected area twice a day for 2  weeks every night. A strip of cream the length of your finger  tip should be enough to cover your entire face.  For tougher skin like arms, legs, or back, we may suggest longer treatment plans.  However if you react really strong and fast, you might stop earlier or use less frequently. - please call if it is very strong and you are concern you might need to stop early.      If you prescription coverage allows we may add calcipotriene (Dovonex ), a vitamin-D derivative, to the treatment plan.  In these cases we will have you mix the calcipotriene with the efudex to help shorten the treatment course and improve outcomes.      Typically very strong reactions are related to lots of underlying sun damage, and this means you are getting a good response to the medication. However, there is no need to be miserable while using this. Please let us know if you are having trouble or concerns!     It was a pleasure speaking to James Salvador today.  Previous clinic notes and pertinent laboratory tests were reviewed prior to James Salvador's visit.  Signs and Symptoms of skin cancer discussed with patient.  Patient encouraged to perform monthly skin exams.  UV precautions reviewed with patient.  Return to clinic 4 months        Again, thank you for allowing me to participate in the care of your patient.        Sincerely,        Jv Dutta MD

## 2023-04-10 ENCOUNTER — THERAPY VISIT (OUTPATIENT)
Dept: PHYSICAL THERAPY | Facility: CLINIC | Age: 78
End: 2023-04-10
Payer: COMMERCIAL

## 2023-04-10 DIAGNOSIS — Z96.652 STATUS POST TOTAL LEFT KNEE REPLACEMENT: Primary | ICD-10-CM

## 2023-04-10 PROCEDURE — 97140 MANUAL THERAPY 1/> REGIONS: CPT | Mod: GP | Performed by: PHYSICAL THERAPIST

## 2023-04-10 PROCEDURE — 97112 NEUROMUSCULAR REEDUCATION: CPT | Mod: GP | Performed by: PHYSICAL THERAPIST

## 2023-04-10 PROCEDURE — 97110 THERAPEUTIC EXERCISES: CPT | Mod: GP | Performed by: PHYSICAL THERAPIST

## 2023-04-12 ENCOUNTER — ANESTHESIA EVENT (OUTPATIENT)
Dept: SURGERY | Facility: CLINIC | Age: 78
End: 2023-04-12
Payer: COMMERCIAL

## 2023-04-13 ENCOUNTER — HOSPITAL ENCOUNTER (OUTPATIENT)
Facility: CLINIC | Age: 78
Discharge: HOME | End: 2023-04-13
Attending: OTOLARYNGOLOGY | Admitting: OTOLARYNGOLOGY
Payer: COMMERCIAL

## 2023-04-13 ENCOUNTER — ANESTHESIA (OUTPATIENT)
Dept: SURGERY | Facility: CLINIC | Age: 78
End: 2023-04-13
Payer: COMMERCIAL

## 2023-04-13 VITALS
SYSTOLIC BLOOD PRESSURE: 141 MMHG | HEART RATE: 60 BPM | RESPIRATION RATE: 14 BRPM | HEIGHT: 73 IN | DIASTOLIC BLOOD PRESSURE: 108 MMHG | BODY MASS INDEX: 36.44 KG/M2 | WEIGHT: 274.91 LBS | OXYGEN SATURATION: 96 % | TEMPERATURE: 97.5 F

## 2023-04-13 DIAGNOSIS — Z98.890 POST-OPERATIVE STATE: Primary | ICD-10-CM

## 2023-04-13 PROCEDURE — 250N000009 HC RX 250: Performed by: NURSE ANESTHETIST, CERTIFIED REGISTERED

## 2023-04-13 PROCEDURE — 999N000141 HC STATISTIC PRE-PROCEDURE NURSING ASSESSMENT: Performed by: OTOLARYNGOLOGY

## 2023-04-13 PROCEDURE — 250N000009 HC RX 250: Performed by: OTOLARYNGOLOGY

## 2023-04-13 PROCEDURE — 710N000009 HC RECOVERY PHASE 1, LEVEL 1, PER MIN: Performed by: OTOLARYNGOLOGY

## 2023-04-13 PROCEDURE — 250N000013 HC RX MED GY IP 250 OP 250 PS 637: Performed by: OTOLARYNGOLOGY

## 2023-04-13 PROCEDURE — 370N000017 HC ANESTHESIA TECHNICAL FEE, PER MIN: Performed by: OTOLARYNGOLOGY

## 2023-04-13 PROCEDURE — 258N000003 HC RX IP 258 OP 636: Performed by: NURSE ANESTHETIST, CERTIFIED REGISTERED

## 2023-04-13 PROCEDURE — 360N000077 HC SURGERY LEVEL 4, PER MIN: Performed by: OTOLARYNGOLOGY

## 2023-04-13 PROCEDURE — 250N000011 HC RX IP 250 OP 636: Performed by: OTOLARYNGOLOGY

## 2023-04-13 PROCEDURE — 710N000012 HC RECOVERY PHASE 2, PER MINUTE: Performed by: OTOLARYNGOLOGY

## 2023-04-13 PROCEDURE — 272N000001 HC OR GENERAL SUPPLY STERILE: Performed by: OTOLARYNGOLOGY

## 2023-04-13 PROCEDURE — 250N000011 HC RX IP 250 OP 636: Performed by: NURSE ANESTHETIST, CERTIFIED REGISTERED

## 2023-04-13 PROCEDURE — 250N000025 HC SEVOFLURANE, PER MIN: Performed by: OTOLARYNGOLOGY

## 2023-04-13 PROCEDURE — 250N000011 HC RX IP 250 OP 636

## 2023-04-13 RX ORDER — LIDOCAINE HYDROCHLORIDE 20 MG/ML
INJECTION, SOLUTION INFILTRATION; PERINEURAL PRN
Status: DISCONTINUED | OUTPATIENT
Start: 2023-04-13 | End: 2023-04-13

## 2023-04-13 RX ORDER — FENTANYL CITRATE 50 UG/ML
50 INJECTION, SOLUTION INTRAMUSCULAR; INTRAVENOUS EVERY 5 MIN PRN
Status: DISCONTINUED | OUTPATIENT
Start: 2023-04-13 | End: 2023-04-13 | Stop reason: HOSPADM

## 2023-04-13 RX ORDER — ACETAMINOPHEN 325 MG/1
650 TABLET ORAL EVERY 4 HOURS PRN
Qty: 50 TABLET | Refills: 0 | Status: SHIPPED | OUTPATIENT
Start: 2023-04-13 | End: 2023-06-20

## 2023-04-13 RX ORDER — PHENYLEPHRINE HCL IN 0.9% NACL 50MG/250ML
.5-1.25 PLASTIC BAG, INJECTION (ML) INTRAVENOUS CONTINUOUS
Status: DISCONTINUED | OUTPATIENT
Start: 2023-04-13 | End: 2023-04-13 | Stop reason: HOSPADM

## 2023-04-13 RX ORDER — CEPHALEXIN 500 MG/1
500 CAPSULE ORAL 2 TIMES DAILY
Qty: 14 CAPSULE | Refills: 0 | Status: SHIPPED | OUTPATIENT
Start: 2023-04-13 | End: 2023-04-20

## 2023-04-13 RX ORDER — CEFAZOLIN SODIUM/WATER 3 G/30 ML
3 SYRINGE (ML) INTRAVENOUS SEE ADMIN INSTRUCTIONS
Status: DISCONTINUED | OUTPATIENT
Start: 2023-04-13 | End: 2023-04-13 | Stop reason: HOSPADM

## 2023-04-13 RX ORDER — LIDOCAINE HYDROCHLORIDE AND EPINEPHRINE 10; 10 MG/ML; UG/ML
INJECTION, SOLUTION INFILTRATION; PERINEURAL DAILY PRN
Status: DISCONTINUED | OUTPATIENT
Start: 2023-04-13 | End: 2023-04-13 | Stop reason: HOSPADM

## 2023-04-13 RX ORDER — ONDANSETRON 4 MG/1
4 TABLET, ORALLY DISINTEGRATING ORAL EVERY 30 MIN PRN
Status: DISCONTINUED | OUTPATIENT
Start: 2023-04-13 | End: 2023-04-13 | Stop reason: HOSPADM

## 2023-04-13 RX ORDER — SODIUM CHLORIDE, SODIUM LACTATE, POTASSIUM CHLORIDE, CALCIUM CHLORIDE 600; 310; 30; 20 MG/100ML; MG/100ML; MG/100ML; MG/100ML
INJECTION, SOLUTION INTRAVENOUS CONTINUOUS PRN
Status: DISCONTINUED | OUTPATIENT
Start: 2023-04-13 | End: 2023-04-13

## 2023-04-13 RX ORDER — EPHEDRINE SULFATE 50 MG/ML
INJECTION, SOLUTION INTRAMUSCULAR; INTRAVENOUS; SUBCUTANEOUS PRN
Status: DISCONTINUED | OUTPATIENT
Start: 2023-04-13 | End: 2023-04-13

## 2023-04-13 RX ORDER — ONDANSETRON 2 MG/ML
4 INJECTION INTRAMUSCULAR; INTRAVENOUS EVERY 30 MIN PRN
Status: DISCONTINUED | OUTPATIENT
Start: 2023-04-13 | End: 2023-04-13 | Stop reason: HOSPADM

## 2023-04-13 RX ORDER — DEXAMETHASONE SODIUM PHOSPHATE 4 MG/ML
INJECTION, SOLUTION INTRA-ARTICULAR; INTRALESIONAL; INTRAMUSCULAR; INTRAVENOUS; SOFT TISSUE PRN
Status: DISCONTINUED | OUTPATIENT
Start: 2023-04-13 | End: 2023-04-13

## 2023-04-13 RX ORDER — MUPIROCIN 20 MG/G
OINTMENT TOPICAL DAILY PRN
Status: DISCONTINUED | OUTPATIENT
Start: 2023-04-13 | End: 2023-04-13 | Stop reason: HOSPADM

## 2023-04-13 RX ORDER — ACETAMINOPHEN 325 MG/1
650 TABLET ORAL
Status: DISCONTINUED | OUTPATIENT
Start: 2023-04-13 | End: 2023-04-18 | Stop reason: HOSPADM

## 2023-04-13 RX ORDER — PROPOFOL 10 MG/ML
INJECTION, EMULSION INTRAVENOUS PRN
Status: DISCONTINUED | OUTPATIENT
Start: 2023-04-13 | End: 2023-04-13

## 2023-04-13 RX ORDER — CEFAZOLIN SODIUM/WATER 3 G/30 ML
3 SYRINGE (ML) INTRAVENOUS
Status: COMPLETED | OUTPATIENT
Start: 2023-04-13 | End: 2023-04-13

## 2023-04-13 RX ORDER — SODIUM CHLORIDE, SODIUM LACTATE, POTASSIUM CHLORIDE, CALCIUM CHLORIDE 600; 310; 30; 20 MG/100ML; MG/100ML; MG/100ML; MG/100ML
INJECTION, SOLUTION INTRAVENOUS CONTINUOUS
Status: DISCONTINUED | OUTPATIENT
Start: 2023-04-13 | End: 2023-04-13 | Stop reason: HOSPADM

## 2023-04-13 RX ORDER — ONDANSETRON 2 MG/ML
INJECTION INTRAMUSCULAR; INTRAVENOUS PRN
Status: DISCONTINUED | OUTPATIENT
Start: 2023-04-13 | End: 2023-04-13

## 2023-04-13 RX ORDER — LABETALOL 20 MG/4 ML (5 MG/ML) INTRAVENOUS SYRINGE
PRN
Status: DISCONTINUED | OUTPATIENT
Start: 2023-04-13 | End: 2023-04-13

## 2023-04-13 RX ORDER — FENTANYL CITRATE 50 UG/ML
25 INJECTION, SOLUTION INTRAMUSCULAR; INTRAVENOUS EVERY 5 MIN PRN
Status: DISCONTINUED | OUTPATIENT
Start: 2023-04-13 | End: 2023-04-13 | Stop reason: HOSPADM

## 2023-04-13 RX ADMIN — ONDANSETRON 4 MG: 2 INJECTION INTRAMUSCULAR; INTRAVENOUS at 15:06

## 2023-04-13 RX ADMIN — SODIUM CHLORIDE, POTASSIUM CHLORIDE, SODIUM LACTATE AND CALCIUM CHLORIDE: 600; 310; 30; 20 INJECTION, SOLUTION INTRAVENOUS at 12:17

## 2023-04-13 RX ADMIN — PHENYLEPHRINE HYDROCHLORIDE 100 MCG: 10 INJECTION INTRAVENOUS at 13:50

## 2023-04-13 RX ADMIN — SODIUM CHLORIDE, POTASSIUM CHLORIDE, SODIUM LACTATE AND CALCIUM CHLORIDE: 600; 310; 30; 20 INJECTION, SOLUTION INTRAVENOUS at 12:41

## 2023-04-13 RX ADMIN — REMIFENTANIL HYDROCHLORIDE 0.08 MCG/KG/MIN: 1 INJECTION, POWDER, LYOPHILIZED, FOR SOLUTION INTRAVENOUS at 12:48

## 2023-04-13 RX ADMIN — PROPOFOL 50 MG: 10 INJECTION, EMULSION INTRAVENOUS at 12:36

## 2023-04-13 RX ADMIN — PHENYLEPHRINE HYDROCHLORIDE 100 MCG: 10 INJECTION INTRAVENOUS at 13:42

## 2023-04-13 RX ADMIN — PROPOFOL 100 MG: 10 INJECTION, EMULSION INTRAVENOUS at 12:29

## 2023-04-13 RX ADMIN — PHENYLEPHRINE HYDROCHLORIDE 100 MCG: 10 INJECTION INTRAVENOUS at 12:59

## 2023-04-13 RX ADMIN — FENTANYL CITRATE 25 MCG: 50 INJECTION, SOLUTION INTRAMUSCULAR; INTRAVENOUS at 15:33

## 2023-04-13 RX ADMIN — FENTANYL CITRATE 25 MCG: 50 INJECTION, SOLUTION INTRAMUSCULAR; INTRAVENOUS at 15:39

## 2023-04-13 RX ADMIN — Medication 5 MG: at 14:08

## 2023-04-13 RX ADMIN — Medication 3 G: at 12:35

## 2023-04-13 RX ADMIN — PHENYLEPHRINE HYDROCHLORIDE 100 MCG: 10 INJECTION INTRAVENOUS at 13:40

## 2023-04-13 RX ADMIN — FENTANYL CITRATE 25 MCG: 50 INJECTION, SOLUTION INTRAMUSCULAR; INTRAVENOUS at 15:19

## 2023-04-13 RX ADMIN — FENTANYL CITRATE 25 MCG: 50 INJECTION, SOLUTION INTRAMUSCULAR; INTRAVENOUS at 15:06

## 2023-04-13 RX ADMIN — ONDANSETRON 4 MG: 2 INJECTION INTRAMUSCULAR; INTRAVENOUS at 13:32

## 2023-04-13 RX ADMIN — PHENYLEPHRINE HYDROCHLORIDE 100 MCG: 10 INJECTION INTRAVENOUS at 13:14

## 2023-04-13 RX ADMIN — LIDOCAINE HYDROCHLORIDE 100 MG: 20 INJECTION, SOLUTION INFILTRATION; PERINEURAL at 12:29

## 2023-04-13 RX ADMIN — Medication 10 MG: at 13:13

## 2023-04-13 RX ADMIN — ACETAMINOPHEN 650 MG: 325 TABLET, FILM COATED ORAL at 15:19

## 2023-04-13 RX ADMIN — LABETALOL 20 MG/4 ML (5 MG/ML) INTRAVENOUS SYRINGE 10 MG: at 14:40

## 2023-04-13 RX ADMIN — PHENYLEPHRINE HYDROCHLORIDE 100 MCG: 10 INJECTION INTRAVENOUS at 13:29

## 2023-04-13 RX ADMIN — Medication 10 MG: at 12:59

## 2023-04-13 RX ADMIN — DEXAMETHASONE SODIUM PHOSPHATE 6 MG: 4 INJECTION, SOLUTION INTRA-ARTICULAR; INTRALESIONAL; INTRAMUSCULAR; INTRAVENOUS; SOFT TISSUE at 13:04

## 2023-04-13 RX ADMIN — SUCCINYLCHOLINE CHLORIDE 100 MG: 20 INJECTION, SOLUTION INTRAMUSCULAR; INTRAVENOUS; PARENTERAL at 12:29

## 2023-04-13 ASSESSMENT — ACTIVITIES OF DAILY LIVING (ADL)
ADLS_ACUITY_SCORE: 39
ADLS_ACUITY_SCORE: 37
ADLS_ACUITY_SCORE: 39
ADLS_ACUITY_SCORE: 39

## 2023-04-13 ASSESSMENT — ENCOUNTER SYMPTOMS: DYSRHYTHMIAS: 1

## 2023-04-13 NOTE — ANESTHESIA POSTPROCEDURE EVALUATION
Patient: James Salvador    Procedure: Procedure(s):  Left cervical Facial flap per closure of left cheek Defect       Anesthesia Type:  No value filed.    Note:  Disposition: Outpatient   Postop Pain Control: Uneventful            Sign Out: Well controlled pain   PONV: No   Neuro/Psych: Uneventful            Sign Out: Acceptable/Baseline neuro status   Airway/Respiratory: Uneventful            Sign Out: Acceptable/Baseline resp. status   CV/Hemodynamics:    Other NRE: NONE   DID A NON-ROUTINE EVENT OCCUR?            Last vitals:  Vitals Value Taken Time   /81 04/13/23 1530   Temp 36.5  C (97.7  F) 04/13/23 1435   Pulse 51 04/13/23 1535   Resp 13 04/13/23 1535   SpO2 92 % 04/13/23 1535   Vitals shown include unvalidated device data.    Electronically Signed By: Jeremy Chilel Junior, MD  April 13, 2023  3:36 PM

## 2023-04-13 NOTE — BRIEF OP NOTE
[facility]    Brief Operative Note    Pre-operative diagnosis: Melanoma of cheek (H) [C43.39]  Post-operative diagnosis Same as pre-operative diagnosis    Procedure: Procedure(s):  Left cervical Facial flap per closure of left cheek Defect  Surgeon: Surgeon(s) and Role:     * Ila Sanchez MD - Primary  Anesthesia: General   Estimated Blood Loss: 20  Drains: None  Specimens: * No specimens in log *  Findings:   see op note .  Complications: None.  Implants: * No implants in log *      Guadalupe Gregory MD

## 2023-04-13 NOTE — DISCHARGE INSTRUCTIONS
[facility]  Same-Day Surgery   Adult Discharge Orders & Instructions     For 24 hours after surgery    Get plenty of rest.  A responsible adult must stay with you for at least 24 hours after you leave the hospital.   Do not drive or use heavy equipment.  If you have weakness or tingling, don't drive or use heavy equipment until this feeling goes away.  Do not drink alcohol.  Avoid strenuous or risky activities.  Ask for help when climbing stairs.   You may feel lightheaded.  IF so, sit for a few minutes before standing.  Have someone help you get up.   If you have nausea (feel sick to your stomach): Drink only clear liquids such as apple juice, ginger ale, broth or 7-Up.  Rest may also help.  Be sure to drink enough fluids.  Move to a regular diet as you feel able.  You may have a slight fever. Call the doctor if your fever is over 100 F (37.7 C) (taken under the tongue) or lasts longer than 24 hours.  You may have a dry mouth, a sore throat, muscle aches or trouble sleeping.  These should go away after 24 hours.  Do not make important or legal decisions.   Call your doctor for any of the followin.  Signs of infection (fever, growing tenderness at the surgery site, a large amount of drainage or bleeding, severe pain, foul-smelling drainage, redness, swelling).    2. It has been over 8 to 10 hours since surgery and you are still not able to urinate (pass water).    3.  Headache for over 24 hours.    4.  Numbness, tingling or weakness the day after surgery (if you had spinal anesthesia).  To contact a doctor, call Dr Sanchez at the Otolaryngology clinic at [phone]  or:    '   [phone] and ask for the resident on call for ENT (answered 24 hours a day)  '   Emergency Department:    [facility]: [phone]       (TTY for hearing impaired: [phone])    [facility]: [phone]       (TTY for hearing impaired: [phone])

## 2023-04-13 NOTE — ANESTHESIA PREPROCEDURE EVALUATION
Anesthesia Pre-Procedure Evaluation    Patient: James Salvador   MRN: [id number] : 1945        Procedure : Procedure(s):  wide local excision of left cheek melanoma  BIOPSY, LYMPH NODE, SENTINEL          Past Medical History:   Diagnosis Date     Actinic cheilitis 2013    Lower lip, left      Actinic keratosis      Allergic rhinitis      Anxiety 3/1/23     Arthritis 2004     Basal cell carcinoma      Benign hypertension      CAD (coronary artery disease)     Cardiac cath and PCI . Cardiac Cath 2015: BRIDGET to LAD     Hearing problem     Wear hearing aids     Hyperlipidemia      Malignant melanoma      Morbid obesity 2016     Paroxysmal supraventricular tachycardia     on metoprolol     Permanent atrial fibrillation 2017     Squamous cell carcinoma      Tachy-edinson syndrome 2019      Past Surgical History:   Procedure Laterality Date     ADENOIDECTOMY  Childhood     ANGIOPLASTY      in [place]     ANKLE SURGERY  2005    right ankle     ARTHROPLASTY HIP Right 10/2009     BIOPSY NODE SENTINEL Left 3/30/2023    Procedure: BIOPSY, LYMPH NODE, SENTINEL;  Surgeon: Rolando Minaya MD;  Location: [facility] OR     EP PERM PACER SINGLE LEAD N/A 2019    Medtronic single lead pacemaker     EXCISE MASS CHEEK Left 3/30/2023    Procedure: wide local excision of left cheek melanoma;  Surgeon: Rolando Minaya MD;  Location: [facility] OR     Facial biopsy - Melanoma       GENITOURINARY SURGERY  10/20/23    Prostate surgery     HEART CATH STENT COR W/WO PTCA  2015    BRIDGET stent mid LAD     LASER SURGERY OF EYE  2002     MOHS MICROGRAPHIC PROCEDURE  2004    squamous cell carcinoma right temple     SINUS SURGERY  2006     TONSILLECTOMY  Childhood      Allergies   Allergen Reactions     Cats      Cat Dander     Dogs      Dog Dander     Hydromorphone      Other reaction(s): Confusion     Pollen Extract       Social History     Tobacco Use     Smoking status: Former      Packs/day: 0.50     Years: 10.00     Pack years: 5.00     Types: Cigarettes, Cigars, Pipe     Start date: 1966     Quit date: 1974     Years since quittin.9     Smokeless tobacco: Never     Tobacco comments:     Also smoked from 1985 - 1990   Vaping Use     Vaping status: Not on file   Substance Use Topics     Alcohol use: Not Currently     Comment: Socially      Wt Readings from Last 1 Encounters:   23 124.7 kg (274 lb 14.6 oz)        Anesthesia Evaluation   Pt has had prior anesthetic.     History of anesthetic complications       ROS/MED HX  ENT/Pulmonary:       Neurologic:       Cardiovascular:     (+) hypertension--CAD ---pacemaker, Reason placed: atrial fibrillation. type: Medtronic, settings: VVIR, - Patient is dependent on pacemaker. dysrhythmias (currently paced), a-fib,     METS/Exercise Tolerance: 3 - Able to walk 1-2 blocks without stopping    Hematologic:       Musculoskeletal:       GI/Hepatic:       Renal/Genitourinary:       Endo:     (+) Obesity,     Psychiatric/Substance Use:       Infectious Disease:       Malignancy:       Other:            Physical Exam    Airway        Mallampati: I   TM distance: > 3 FB   Neck ROM: full   Mouth opening: > 3 cm    Respiratory Devices and Support         Dental       (+) Removable bridges or other hardware      Cardiovascular          Rhythm and rate: regular and bradycardia     Pulmonary   pulmonary exam normal                OUTSIDE LABS:  CBC:   Lab Results   Component Value Date    WBC 6.2 2023    WBC 7.3 10/06/2022    HGB 14.6 2023    HGB 11.5 (L) 10/06/2022    HCT 41.8 2023    HCT 34.0 (L) 10/06/2022     2023     10/06/2022     BMP:   Lab Results   Component Value Date     (L) 2023     (L) 10/06/2022    POTASSIUM 5.0 2023    POTASSIUM 5.1 10/06/2022    CHLORIDE 93 (L) 2023    CHLORIDE 92 (L) 10/06/2022    CO2 24 2023    CO2 27 10/06/2022    BUN 12.2  03/16/2023    BUN 10 10/06/2022    CR 0.71 03/16/2023    CR 0.56 (L) 10/06/2022    GLC 77 03/16/2023    GLC 81 10/06/2022     COAGS:   Lab Results   Component Value Date    INR 1.02 09/23/2015     POC: No results found for: BGM, HCG, HCGS  HEPATIC:   Lab Results   Component Value Date    ALBUMIN 3.6 06/20/2022    PROTTOTAL 8.0 06/20/2022    ALT 34 06/20/2022    AST 25 06/20/2022    ALKPHOS 141 06/20/2022    BILITOTAL 0.3 06/20/2022     OTHER:   Lab Results   Component Value Date    LACT 1.0 06/20/2022    A1C 5.5 07/18/2018    BENJI 9.5 03/16/2023    MAG 2.0 09/23/2015    TSH 1.12 04/20/2017    SED 10 10/03/2014       Anesthesia Plan    ASA Status:  3   NPO Status:  NPO Appropriate    Anesthesia Type: General.     - Airway: ETT   Induction: Intravenous, Propofol.   Maintenance: Balanced.        Consents    Anesthesia Plan(s) and associated risks, benefits, and realistic alternatives discussed. Questions answered and patient/representative(s) expressed understanding.    - Discussed:     - Discussed with:  Patient      - Extended Intubation/Ventilatory Support Discussed: No.      - Patient is DNR/DNI Status: No    Use of blood products discussed: No .     Postoperative Care    Pain management: Multi-modal analgesia, IV analgesics.   PONV prophylaxis: Ondansetron (or other 5HT-3), Dexamethasone or Solumedrol     Comments:    Other Comments: Chronic hyponatremia. Asymptomatic patient.                Adi Rodas MD

## 2023-04-13 NOTE — OR NURSING
Dr. Sanchez at bedside. Notified this RN and patient that he is to start his Eliquis tomorrow, not today. She called his wife Yanira and instructed her as well.    She will enter this information into patient's discharge instructions.

## 2023-04-13 NOTE — ANESTHESIA CARE TRANSFER NOTE
Patient: James Salvador    Procedure: Procedure(s):  Left cervical Facial flap per closure of left cheek Defect       Diagnosis: Melanoma of cheek (H) [C43.39]  Diagnosis Additional Information: No value filed.    Anesthesia Type:   No value filed.     Note:    Oropharynx: oropharynx clear of all foreign objects  Level of Consciousness: awake  Oxygen Supplementation: nasal cannula  Level of Supplemental Oxygen (L/min / FiO2): 3 LPM  Independent Airway: airway patency satisfactory and stable  Dentition: dentition unchanged  Vital Signs Stable: post-procedure vital signs reviewed and stable  Report to RN Given: handoff report given  Patient transferred to: PACU    Handoff Report: Identifed the Patient, Identified the Reponsible Provider, Reviewed the pertinent medical history, Discussed the surgical course, Reviewed Intra-OP anesthesia mangement and issues during anesthesia, Set expectations for post-procedure period and Allowed opportunity for questions and acknowledgement of understanding      Vitals:  Vitals Value Taken Time   /100 04/13/23 1442   Temp     Pulse 62 04/13/23 1442   Resp 15 04/13/23 1442   SpO2 99 % 04/13/23 1442       Electronically Signed By: NAV Travis CRNA  April 13, 2023  2:42 PM

## 2023-04-13 NOTE — ANESTHESIA PROCEDURE NOTES
Airway       Patient location during procedure: OR       Procedure Start/Stop Times: 4/13/2023 12:29 PM and 4/13/2023 12:31 PM  Staff -        CRNA: Yusuf Atkinson APRN CRNA       Performed By: CRNA  Consent for Airway        Urgency: elective  Indications and Patient Condition       Indications for airway management: ammon-procedural       Induction type:intravenous       Mask difficulty assessment: 0 - not attempted    Final Airway Details       Final airway type: endotracheal airway       Successful airway: ETT - single  Endotracheal Airway Details        ETT size (mm): 7.5       Cuffed: yes       Successful intubation technique: direct laryngoscopy       DL Blade Type: Epperson 2       Grade View of Cords: 1       Adjucts: stylet       Position: Right       Measured from: gums/teeth       Secured at (cm): 23       Bite block used: None    Post intubation assessment        Placement verified by: capnometry, equal breath sounds and chest rise        Number of attempts at approach: 1       Number of other approaches attempted: 0       Secured with: pink tape       Ease of procedure: easy       Dentition: Intact    Medication(s) Administered   Medication Administration Time: 4/13/2023 12:29 PM

## 2023-04-13 NOTE — OR NURSING
Paged Misa Wade, on call for ENT/Otololaryngology to clarify questions on consent    Misa Wade returned call, and stated she did not think a graft would be harvested.

## 2023-04-13 NOTE — OR NURSING
Marcela Rn informed me that the Device RN stated that pt can go to surgery with pacemaker, and a magnet can be used during the case if necessary.

## 2023-04-14 ASSESSMENT — ACTIVITIES OF DAILY LIVING (ADL)
ADLS_ACUITY_SCORE: 39

## 2023-04-15 ASSESSMENT — ACTIVITIES OF DAILY LIVING (ADL)
ADLS_ACUITY_SCORE: 39

## 2023-04-16 ASSESSMENT — ACTIVITIES OF DAILY LIVING (ADL)
ADLS_ACUITY_SCORE: 39

## 2023-04-17 ASSESSMENT — ACTIVITIES OF DAILY LIVING (ADL)
ADLS_ACUITY_SCORE: 39

## 2023-04-19 ENCOUNTER — ALLIED HEALTH/NURSE VISIT (OUTPATIENT)
Dept: OTOLARYNGOLOGY | Facility: CLINIC | Age: 78
End: 2023-04-19
Payer: COMMERCIAL

## 2023-04-19 DIAGNOSIS — Z98.890 POSTOPERATIVE STATE: Primary | ICD-10-CM

## 2023-04-19 PROCEDURE — 99207 PR NO CHARGE NURSE ONLY: CPT

## 2023-04-20 ENCOUNTER — PATIENT OUTREACH (OUTPATIENT)
Dept: CARE COORDINATION | Facility: CLINIC | Age: 78
End: 2023-04-20
Payer: COMMERCIAL

## 2023-04-22 NOTE — PROGRESS NOTES
Pt comes in POD #6 s/p Left Cervical Facial Flap for Closure of Left Cheek Defect.    Sutures removed.  Incision lines well approximated and healing appropriately.  Left cheek is moderately bruised.    Pt instructed to keep a sheen of Aquaphor on incision for another 1-2 wks.    F/u in one month.    [place]ia Tejeda RN  4/22/2023 5:09 PM

## 2023-04-24 ENCOUNTER — THERAPY VISIT (OUTPATIENT)
Dept: PHYSICAL THERAPY | Facility: CLINIC | Age: 78
End: 2023-04-24
Payer: COMMERCIAL

## 2023-04-24 ENCOUNTER — MYC REFILL (OUTPATIENT)
Dept: INTERNAL MEDICINE | Facility: CLINIC | Age: 78
End: 2023-04-24

## 2023-04-24 DIAGNOSIS — Z96.652 STATUS POST TOTAL LEFT KNEE REPLACEMENT: Primary | ICD-10-CM

## 2023-04-24 PROCEDURE — 97110 THERAPEUTIC EXERCISES: CPT | Mod: GP | Performed by: PHYSICAL THERAPIST

## 2023-04-24 PROCEDURE — 97112 NEUROMUSCULAR REEDUCATION: CPT | Mod: GP | Performed by: PHYSICAL THERAPIST

## 2023-04-24 NOTE — OP NOTE
DOS: 4/13/23  SURGEON:  Ila Sanchez MD   ASSISTANT SURGEON: None      TITLE OF OPERATION:  Reconstruction of facial defect from excision of cutanenous malignancy left cheek, 3.7 cm in diameter,  with cervical rotational advancement flap 5cm by 8cm . Surgical preparation of the wound bed                          INDICATIONS: James Salvador underwent a surgical excision of a cutaneous malignancy that resulted in a full thickness defect of the face. The patient had a previous pigmented lesion biopsied that on further pathologic evaluation after completion of excision was found to be malignant melanoma. He thus underwent wide local excision by Dr. Minaya along with sentinel node biopsy. He had a bolster sewn onto the defect in order to wait for permanent pathology results. The patient presents today for reconstruction.     The risks and benefits of the procedure were discussed which primarily include damage to surrounding tissues or structures, facial weakness, numbness, hematoma, unfavorable scar and need for revision surgery.    The patient understood and signed informed consent prior to the procedure. Negative final margin pathology was confirmed prior to the reconstruction.     PREOPERATIVE DIAGNOSES: Full thickness cutaneous defect of the left cheek, malignant melanoma        POSTOPERATIVE DIAGNOSES: Same      ANESTHESIA:  GETA        IMPLANT DEVICES: None      SPECIMENS:  None.       COMPLICATIONS:  None.       BLOOD LOSS: 15 mls          SURGEON'S NARRATIVE:       FINDINGS:  full thickness defect, pretragal sentinel node scar      DESCRIPTION OF PROCEDURE:      The patient was brought back to the operating room by the Anesthesiology staff. They were laid supine on the operating room table and induced into general anesthesia with the endotracheal tube secured at the midline of the chin.  The head of the bed was then turned 90 degrees towards the surgical team.  Arms were tucked at the side with all  pressure points appropriately padded.  A time-out procedure was performed with all members of the operating room staff in agreement of the site of surgery, the patient identification and surgery to be performed.       A 15 blade scalpel was used to sharply excise the rim of cauterized and granulated tissue at the wound edges. Subsequently the skin around the defect was undermined in the subcutaneous plane. We extended the incision in two vectors in order to raise a cervical facial flap pretragally and behind the ear in order to rotate and advance skin into the defect.  the This allowed for advancement of the cheek leading to a central closure of the defect. The resulting Burrow's at the medial cheek was excised.    The dermal edges were then approximated with interrupted 4.0 monocryl and the skin was closed with 5-0 nylon suture.    The patient tolerated the procedure well.  There were no complications. The wound was dressed in standard fashion. The patient was extubated and taken to recovery following commands and breathing spontaneously.       I was present and scrubbed for the entire procedure.           AMY SETHI MD

## 2023-04-25 RX ORDER — GABAPENTIN 300 MG/1
600 CAPSULE ORAL 3 TIMES DAILY
Qty: 540 CAPSULE | Refills: 3 | Status: CANCELLED | OUTPATIENT
Start: 2023-04-25

## 2023-05-01 ENCOUNTER — THERAPY VISIT (OUTPATIENT)
Dept: PHYSICAL THERAPY | Facility: CLINIC | Age: 78
End: 2023-05-01
Payer: COMMERCIAL

## 2023-05-01 DIAGNOSIS — Z96.652 STATUS POST TOTAL LEFT KNEE REPLACEMENT: Primary | ICD-10-CM

## 2023-05-01 PROCEDURE — 97530 THERAPEUTIC ACTIVITIES: CPT | Mod: GP | Performed by: PHYSICAL THERAPIST

## 2023-05-01 PROCEDURE — 97112 NEUROMUSCULAR REEDUCATION: CPT | Mod: GP | Performed by: PHYSICAL THERAPIST

## 2023-05-01 PROCEDURE — 97110 THERAPEUTIC EXERCISES: CPT | Mod: GP | Performed by: PHYSICAL THERAPIST

## 2023-05-03 ENCOUNTER — ANCILLARY PROCEDURE (OUTPATIENT)
Dept: CARDIOLOGY | Facility: CLINIC | Age: 78
End: 2023-05-03
Attending: INTERNAL MEDICINE
Payer: COMMERCIAL

## 2023-05-03 DIAGNOSIS — I49.5 SSS (SICK SINUS SYNDROME) (H): ICD-10-CM

## 2023-05-03 DIAGNOSIS — Z95.0 CARDIAC PACEMAKER IN SITU: ICD-10-CM

## 2023-05-03 PROCEDURE — 93294 REM INTERROG EVL PM/LDLS PM: CPT | Performed by: INTERNAL MEDICINE

## 2023-05-03 PROCEDURE — 93296 REM INTERROG EVL PM/IDS: CPT | Performed by: INTERNAL MEDICINE

## 2023-05-14 LAB
MDC_IDC_LEAD_IMPLANT_DT: NORMAL
MDC_IDC_LEAD_LOCATION: NORMAL
MDC_IDC_LEAD_LOCATION_DETAIL_1: NORMAL
MDC_IDC_LEAD_MFG: NORMAL
MDC_IDC_LEAD_MODEL: NORMAL
MDC_IDC_LEAD_POLARITY_TYPE: NORMAL
MDC_IDC_LEAD_SERIAL: NORMAL
MDC_IDC_MSMT_BATTERY_DTM: NORMAL
MDC_IDC_MSMT_BATTERY_REMAINING_LONGEVITY: 134 MO
MDC_IDC_MSMT_BATTERY_RRT_TRIGGER: 2.62
MDC_IDC_MSMT_BATTERY_STATUS: NORMAL
MDC_IDC_MSMT_BATTERY_VOLTAGE: 3.02 V
MDC_IDC_MSMT_LEADCHNL_RV_IMPEDANCE_VALUE: 304 OHM
MDC_IDC_MSMT_LEADCHNL_RV_IMPEDANCE_VALUE: 361 OHM
MDC_IDC_MSMT_LEADCHNL_RV_PACING_THRESHOLD_AMPLITUDE: 0.88 V
MDC_IDC_MSMT_LEADCHNL_RV_PACING_THRESHOLD_PULSEWIDTH: 0.4 MS
MDC_IDC_MSMT_LEADCHNL_RV_SENSING_INTR_AMPL: 1.62 MV
MDC_IDC_MSMT_LEADCHNL_RV_SENSING_INTR_AMPL: 1.62 MV
MDC_IDC_PG_IMPLANT_DTM: NORMAL
MDC_IDC_PG_MFG: NORMAL
MDC_IDC_PG_MODEL: NORMAL
MDC_IDC_PG_SERIAL: NORMAL
MDC_IDC_PG_TYPE: NORMAL
MDC_IDC_SESS_CLINIC_NAME: NORMAL
MDC_IDC_SESS_DTM: NORMAL
MDC_IDC_SESS_TYPE: NORMAL
MDC_IDC_SET_BRADY_HYSTRATE: NORMAL
MDC_IDC_SET_BRADY_LOWRATE: 50 {BEATS}/MIN
MDC_IDC_SET_BRADY_MAX_SENSOR_RATE: 130 {BEATS}/MIN
MDC_IDC_SET_BRADY_MODE: NORMAL
MDC_IDC_SET_LEADCHNL_RV_PACING_AMPLITUDE: 2 V
MDC_IDC_SET_LEADCHNL_RV_PACING_ANODE_ELECTRODE_1: NORMAL
MDC_IDC_SET_LEADCHNL_RV_PACING_ANODE_LOCATION_1: NORMAL
MDC_IDC_SET_LEADCHNL_RV_PACING_CAPTURE_MODE: NORMAL
MDC_IDC_SET_LEADCHNL_RV_PACING_CATHODE_ELECTRODE_1: NORMAL
MDC_IDC_SET_LEADCHNL_RV_PACING_CATHODE_LOCATION_1: NORMAL
MDC_IDC_SET_LEADCHNL_RV_PACING_POLARITY: NORMAL
MDC_IDC_SET_LEADCHNL_RV_PACING_PULSEWIDTH: 0.4 MS
MDC_IDC_SET_LEADCHNL_RV_SENSING_ANODE_ELECTRODE_1: NORMAL
MDC_IDC_SET_LEADCHNL_RV_SENSING_ANODE_LOCATION_1: NORMAL
MDC_IDC_SET_LEADCHNL_RV_SENSING_CATHODE_ELECTRODE_1: NORMAL
MDC_IDC_SET_LEADCHNL_RV_SENSING_CATHODE_LOCATION_1: NORMAL
MDC_IDC_SET_LEADCHNL_RV_SENSING_POLARITY: NORMAL
MDC_IDC_SET_LEADCHNL_RV_SENSING_SENSITIVITY: 0.6 MV
MDC_IDC_SET_ZONE_DETECTION_INTERVAL: 360 MS
MDC_IDC_SET_ZONE_TYPE: NORMAL
MDC_IDC_STAT_BRADY_DTM_END: NORMAL
MDC_IDC_STAT_BRADY_DTM_START: NORMAL
MDC_IDC_STAT_BRADY_RV_PERCENT_PACED: 73.23 %
MDC_IDC_STAT_EPISODE_RECENT_COUNT: 0
MDC_IDC_STAT_EPISODE_RECENT_COUNT_DTM_END: NORMAL
MDC_IDC_STAT_EPISODE_RECENT_COUNT_DTM_START: NORMAL
MDC_IDC_STAT_EPISODE_TOTAL_COUNT: 0
MDC_IDC_STAT_EPISODE_TOTAL_COUNT: 0
MDC_IDC_STAT_EPISODE_TOTAL_COUNT: 3
MDC_IDC_STAT_EPISODE_TOTAL_COUNT_DTM_END: NORMAL
MDC_IDC_STAT_EPISODE_TOTAL_COUNT_DTM_START: NORMAL
MDC_IDC_STAT_EPISODE_TYPE: NORMAL

## 2023-05-15 ENCOUNTER — THERAPY VISIT (OUTPATIENT)
Dept: PHYSICAL THERAPY | Facility: CLINIC | Age: 78
End: 2023-05-15
Payer: COMMERCIAL

## 2023-05-15 DIAGNOSIS — Z96.652 STATUS POST TOTAL LEFT KNEE REPLACEMENT: Primary | ICD-10-CM

## 2023-05-15 PROCEDURE — 97112 NEUROMUSCULAR REEDUCATION: CPT | Mod: GP | Performed by: PHYSICAL THERAPIST

## 2023-05-15 PROCEDURE — 97110 THERAPEUTIC EXERCISES: CPT | Mod: GP | Performed by: PHYSICAL THERAPIST

## 2023-05-15 NOTE — PROGRESS NOTES
Subjective:  HPI  Physical Exam                    Objective:  System    Physical Exam    General     ROS    Assessment/Plan:    PROGRESS  REPORT    Progress reporting period is from 2/20/2023 to 5/15/2023.       SUBJECTIVE  Subjective changes noted by patient:  L knee has been ok; however, some of the exercises cause L sided groin discomfort. States the stretches from last session didn't help much. Has been walking more, went to [facility], used the walker, 20-30 minutes. Balance is improving    Current pain level is 1/10  .     Previous pain level was 2/10.   Changes in function:  Yes (See Goal flowsheet attached for changes in current functional level)  Adverse reaction to treatment or activity: None    OBJECTIVE  Changes noted in objective findings:  Yes,   Objective:     AROM L Knee Flx: 110, Ext: 0;     Strength Knee Flx: -5/5 mild pain, Knee Ext: -4/5 painful (groin pain as well), Hip Flx L; -5/5,     Gait: FWW; however, able to ambulate short distances (10-12min) w/ SEC, L LE tends to ER;     Step up: 2in w/ rail and SEC, tends to lock knee into extension during push up;    Balance Tandem L: 2 sec, SLS L: unable with hand hold assist     ASSESSMENT/PLAN  Updated problem list and treatment plan: Diagnosis 1:  S/p L TKA revision (DOS 7/7/202  Pain -  hot/cold therapy, manual therapy, education and home program  Decreased ROM/flexibility - manual therapy and therapeutic exercise  Decreased strength - therapeutic exercise and therapeutic activities  Impaired balance - neuro re-education and therapeutic activities  Decreased proprioception - neuro re-education and therapeutic activities  Impaired gait - gait training  Impaired muscle performance - neuro re-education  Decreased function - therapeutic activities  STG/LTGs have been met or progress has been made towards goals:  Yes (See Goal flow sheet completed today.)  Assessment of Progress: The patient's condition is improving.  The patient's condition has potential to  improve.  The patient has had set backs in their progress.  Patient is meeting short term goals and is progressing towards long term goals.  Self Management Plans:  Patient has been instructed in a home treatment program.  I have re-evaluated this patient and find that the nature, scope, duration and intensity of the therapy is appropriate for the medical condition of the patient.  James continues to require the following intervention to meet STG and LTG's:  PT    Recommendations:  This patient would benefit from continued therapy.     Frequency:  2 X a month, once daily  Duration:  for 12 weeks        Please refer to the daily flowsheet for treatment today, total treatment time and time spent performing 1:1 timed codes.

## 2023-05-15 NOTE — PROGRESS NOTES
[facility]    OUTPATIENT Physical Therapy ORTHOPEDIC EVALUATION  PLAN OF TREATMENT FOR OUTPATIENT REHABILITATION  (COMPLETE FOR INITIAL CLAIMS ONLY)  Patient's Last Name, First Name, M.I.  YOB: 1945  James Salvador    Provider s Name:  [facility]   Medical Record No.  [id number]   Start of Care Date:  08/31/22   Onset Date:  07/07/22    Treatment Diagnosis:  s/p L TKA revision Medical Diagnosis:  Status post total left knee replacement       Goals:     05/15/23 0500   Body Part   Goals listed below are for Left Knee   Goal #1   Goal #1 ambulation   Previous Functional Level Minutes patient could walk;with walker   Performance Level 10 minutes   Current Functional Level Minutes patient can walk;with walker   Performance Level 20-30 w/ walker, 10-12 w/ SEC   STG Target Performance Minutes patient will be able to walk;with walker   Performance Level 20 min, 5 min w/ SEC   Rationale for safe household ambulation;for safe outdoor household ambulation;for safe community ambulation   Due Date 04/03/23   Date Goal Met 05/15/23    LTG Target Performance Minutes patient will be able to  walk;with cane   Performance Level 10   Rationale for safe household ambulation;for safe outdoor household ambulation;for safe community ambulation   Due Date 05/15/23   Date Goal Met 05/15/23   Goal #2   Goal #2 ambulation   Previous Functional Level No restrictions   Current Functional Level Minutes patient can walk   Performance Level FWW 20-30, SEC 10-12   STG Target Performance Minutes patient will be able to walk;with cane   Performance Level 15min   Rationale for safe household ambulation;for safe outdoor household ambulation;for safe community ambulation;to maintain proper body mechanics/posture while ambulating to avoid additional compensatory injury due to improper gait mechanics    Due Date 06/26/23    LTG Target Performance Minutes patient will be able to  walk;with cane   Performance Level 20min   Rationale for safe household ambulation;for safe outdoor household ambulation;for safe community ambulation;to maintain proper body mechanics/posture while ambulating to avoid additional compensatory injury due to improper gait mechanics   Due Date 08/07/23   Goal #3   Goal #3 stairs   Previous Functional Level No restrictions;Ascend/descend stairs;in a normal reciprocal pattern;with a railing   Performance Level 1 flight   LTG Target Performance Ascend stairs;in a normal reciprocal pattern;with railing  (SEC)   Performance Level 1 flight   Rationale to enter/leave the house safely;to reach upper level of home safely;for safe community access to buildings   Due Date 08/07/23         Therapy Frequency:  every other week  Predicted Duration of Therapy Intervention:  12 weeks    Ene Shirley PT                 I CERTIFY THE NEED FOR THESE SERVICES FURNISHED UNDER        THIS PLAN OF TREATMENT AND WHILE UNDER MY CARE .             Physician Signature               Date    X_____________________________________________________                         Certification Date From:  05/16/23   Certification Date To:  08/07/23    Referring Provider:  Mynor Singh    Initial Assessment        See Epic Evaluation SOC Date: 08/31/22

## 2023-05-17 ENCOUNTER — ALLIED HEALTH/NURSE VISIT (OUTPATIENT)
Dept: OTOLARYNGOLOGY | Facility: CLINIC | Age: 78
End: 2023-05-17
Payer: COMMERCIAL

## 2023-05-17 DIAGNOSIS — Z98.890 POSTOPERATIVE STATE: Primary | ICD-10-CM

## 2023-05-17 PROCEDURE — 99207 PR NO CHARGE NURSE ONLY: CPT

## 2023-05-22 ENCOUNTER — THERAPY VISIT (OUTPATIENT)
Dept: PHYSICAL THERAPY | Facility: CLINIC | Age: 78
End: 2023-05-22
Payer: COMMERCIAL

## 2023-05-22 DIAGNOSIS — Z96.652 STATUS POST TOTAL LEFT KNEE REPLACEMENT: Primary | ICD-10-CM

## 2023-05-22 PROCEDURE — 97110 THERAPEUTIC EXERCISES: CPT | Mod: GP | Performed by: PHYSICAL THERAPIST

## 2023-05-22 PROCEDURE — 97112 NEUROMUSCULAR REEDUCATION: CPT | Mod: GP | Performed by: PHYSICAL THERAPIST

## 2023-05-22 PROCEDURE — 97530 THERAPEUTIC ACTIVITIES: CPT | Mod: GP | Performed by: PHYSICAL THERAPIST

## 2023-06-03 ENCOUNTER — HEALTH MAINTENANCE LETTER (OUTPATIENT)
Age: 78
End: 2023-06-03

## 2023-06-05 ENCOUNTER — THERAPY VISIT (OUTPATIENT)
Dept: PHYSICAL THERAPY | Facility: CLINIC | Age: 78
End: 2023-06-05
Payer: COMMERCIAL

## 2023-06-05 DIAGNOSIS — Z96.652 STATUS POST TOTAL LEFT KNEE REPLACEMENT: Primary | ICD-10-CM

## 2023-06-05 PROCEDURE — 97530 THERAPEUTIC ACTIVITIES: CPT | Mod: GP | Performed by: PHYSICAL THERAPIST

## 2023-06-05 PROCEDURE — 97112 NEUROMUSCULAR REEDUCATION: CPT | Mod: GP | Performed by: PHYSICAL THERAPIST

## 2023-06-05 PROCEDURE — 97140 MANUAL THERAPY 1/> REGIONS: CPT | Mod: GP | Performed by: PHYSICAL THERAPIST

## 2023-06-12 ENCOUNTER — THERAPY VISIT (OUTPATIENT)
Dept: PHYSICAL THERAPY | Facility: CLINIC | Age: 78
End: 2023-06-12
Payer: COMMERCIAL

## 2023-06-12 DIAGNOSIS — Z96.652 STATUS POST TOTAL LEFT KNEE REPLACEMENT: Primary | ICD-10-CM

## 2023-06-12 PROCEDURE — 97140 MANUAL THERAPY 1/> REGIONS: CPT | Mod: GP | Performed by: PHYSICAL THERAPIST

## 2023-06-12 PROCEDURE — 97110 THERAPEUTIC EXERCISES: CPT | Mod: GP | Performed by: PHYSICAL THERAPIST

## 2023-06-12 PROCEDURE — 97530 THERAPEUTIC ACTIVITIES: CPT | Mod: GP | Performed by: PHYSICAL THERAPIST

## 2023-06-14 ASSESSMENT — ENCOUNTER SYMPTOMS
PALPITATIONS: 0
NERVOUS/ANXIOUS: 0
WEAKNESS: 0
DYSURIA: 0
HEMATURIA: 0
DIARRHEA: 0
FEVER: 0
HEADACHES: 0
ABDOMINAL PAIN: 0
FREQUENCY: 0
MYALGIAS: 0
SHORTNESS OF BREATH: 0
NAUSEA: 0
CONSTIPATION: 0
PARESTHESIAS: 0
DIZZINESS: 0
HEMATOCHEZIA: 0
CHILLS: 0
COUGH: 0
ARTHRALGIAS: 0
EYE PAIN: 0
SORE THROAT: 0
HEARTBURN: 0
JOINT SWELLING: 0

## 2023-06-14 ASSESSMENT — ACTIVITIES OF DAILY LIVING (ADL): CURRENT_FUNCTION: NO ASSISTANCE NEEDED

## 2023-06-16 ENCOUNTER — TELEPHONE (OUTPATIENT)
Dept: DERMATOLOGY | Facility: CLINIC | Age: 78
End: 2023-06-16
Payer: COMMERCIAL

## 2023-06-16 NOTE — TELEPHONE ENCOUNTER
Left message on answering machine for patient to call back.    Freya OCONNELL RN  Dermatology   [phone]

## 2023-06-16 NOTE — TELEPHONE ENCOUNTER
[facility] Call Center    Phone Message    May a detailed message be left on voicemail: yes     Reason for Call: Symptoms or Concerns     If patient has red-flag symptoms, warm transfer to triage line    Current symptom or concern: Black spot on foot. Pt has a Hx of melanoma  and was told to let Dr. Dutta know right away as soon as he sees another one and will need to be see ASAP. Pt was at stage 3 of melanoma last time on his foot. This spot started off very small end of April beginning of May and has been growing.     Symptoms have been present for:  11/2  month(s)    Has patient previously been seen for this? No    By : NA    Date: NA    Are there any new or worsening symptoms? Yes: Please call pt back to discuss. Thanks       Action Taken: Message routed to:  Other: [facility]X DERM    Travel Screening: Not Applicable

## 2023-06-19 ENCOUNTER — THERAPY VISIT (OUTPATIENT)
Dept: PHYSICAL THERAPY | Facility: CLINIC | Age: 78
End: 2023-06-19
Payer: COMMERCIAL

## 2023-06-19 DIAGNOSIS — Z96.652 STATUS POST TOTAL LEFT KNEE REPLACEMENT: Primary | ICD-10-CM

## 2023-06-19 PROCEDURE — 97140 MANUAL THERAPY 1/> REGIONS: CPT | Mod: GP | Performed by: PHYSICAL THERAPIST

## 2023-06-19 PROCEDURE — 97110 THERAPEUTIC EXERCISES: CPT | Mod: GP | Performed by: PHYSICAL THERAPIST

## 2023-06-19 PROCEDURE — 97530 THERAPEUTIC ACTIVITIES: CPT | Mod: GP | Performed by: PHYSICAL THERAPIST

## 2023-06-20 ENCOUNTER — OFFICE VISIT (OUTPATIENT)
Dept: INTERNAL MEDICINE | Facility: CLINIC | Age: 78
End: 2023-06-20
Payer: COMMERCIAL

## 2023-06-20 VITALS
WEIGHT: 276.8 LBS | HEIGHT: 73 IN | BODY MASS INDEX: 36.68 KG/M2 | DIASTOLIC BLOOD PRESSURE: 68 MMHG | HEART RATE: 74 BPM | OXYGEN SATURATION: 97 % | SYSTOLIC BLOOD PRESSURE: 122 MMHG | TEMPERATURE: 97.6 F

## 2023-06-20 DIAGNOSIS — I48.20 CHRONIC ATRIAL FIBRILLATION (H): ICD-10-CM

## 2023-06-20 DIAGNOSIS — Z00.00 ENCOUNTER FOR MEDICARE ANNUAL WELLNESS EXAM: Primary | ICD-10-CM

## 2023-06-20 DIAGNOSIS — I10 BENIGN HYPERTENSION: ICD-10-CM

## 2023-06-20 DIAGNOSIS — C43.39 MELANOMA OF CHEEK (H): ICD-10-CM

## 2023-06-20 DIAGNOSIS — E22.2 SIADH (SYNDROME OF INAPPROPRIATE ADH PRODUCTION) (H): ICD-10-CM

## 2023-06-20 DIAGNOSIS — E66.01 MORBID OBESITY DUE TO EXCESS CALORIES (H): ICD-10-CM

## 2023-06-20 DIAGNOSIS — I25.10 CORONARY ARTERY DISEASE INVOLVING NATIVE CORONARY ARTERY OF NATIVE HEART WITHOUT ANGINA PECTORIS: ICD-10-CM

## 2023-06-20 DIAGNOSIS — G50.0 TRIGEMINAL NEURALGIA: ICD-10-CM

## 2023-06-20 PROCEDURE — 99214 OFFICE O/P EST MOD 30 MIN: CPT | Mod: 25 | Performed by: INTERNAL MEDICINE

## 2023-06-20 PROCEDURE — 0124A COVID-19 BIVALENT 12+ (PFIZER): CPT | Performed by: INTERNAL MEDICINE

## 2023-06-20 PROCEDURE — 91312 COVID-19 BIVALENT 12+ (PFIZER): CPT | Performed by: INTERNAL MEDICINE

## 2023-06-20 PROCEDURE — G0439 PPPS, SUBSEQ VISIT: HCPCS | Performed by: INTERNAL MEDICINE

## 2023-06-20 RX ORDER — ATORVASTATIN CALCIUM 80 MG/1
80 TABLET, FILM COATED ORAL AT BEDTIME
Qty: 90 TABLET | Refills: 3 | Status: SHIPPED | OUTPATIENT
Start: 2023-06-20 | End: 2024-06-26

## 2023-06-20 RX ORDER — LISINOPRIL 30 MG/1
30 TABLET ORAL DAILY
Qty: 90 TABLET | Refills: 3 | Status: SHIPPED | OUTPATIENT
Start: 2023-06-20 | End: 2024-06-26

## 2023-06-20 ASSESSMENT — ENCOUNTER SYMPTOMS
HEMATURIA: 0
SORE THROAT: 0
PARESTHESIAS: 0
FEVER: 0
HEMATOCHEZIA: 0
SHORTNESS OF BREATH: 0
JOINT SWELLING: 0
CONSTIPATION: 0
MYALGIAS: 0
CHILLS: 0
FREQUENCY: 0
PALPITATIONS: 0
NERVOUS/ANXIOUS: 0
DYSURIA: 0
HEARTBURN: 0
DIZZINESS: 0
ABDOMINAL PAIN: 0
WEAKNESS: 0
NAUSEA: 0
EYE PAIN: 0
DIARRHEA: 0
HEADACHES: 0
ARTHRALGIAS: 0
COUGH: 0

## 2023-06-20 ASSESSMENT — ACTIVITIES OF DAILY LIVING (ADL): CURRENT_FUNCTION: NO ASSISTANCE NEEDED

## 2023-06-20 NOTE — PROGRESS NOTES
"SUBJECTIVE:   Alden is a 77 year old who presents for Preventive Visit.    Are you in the first 12 months of your Medicare coverage?  No    Healthy Habits:    In general, how would you rate your overall health?  Fair    Frequency of exercise:  6-7 days/week    Duration of exercise:  45-60 minutes    Do you usually eat at least 4 servings of fruit and vegetables a day, include whole grains    & fiber and avoid regularly eating high fat or \"junk\" foods?  Yes    Taking medications regularly:  Yes    Medication side effects:  None    Ability to successfully perform activities of daily living:  No assistance needed    Home Safety:  No safety concerns identified    Hearing Impairment:  Difficulty following a conversation in a noisy restaurant or crowded room and difficulty understanding soft or whispered speech    In the past 6 months, have you been bothered by leaking of urine? Yes    In general, how would you rate your overall mental or emotional health?  Good      PHQ-2 Total Score:    Additional concerns today:  No        Have you ever done Advance Care Planning? (For example, a Health Directive, POLST, or a discussion with a medical provider or your loved ones about your wishes): Yes, advance care planning is on file.       Fall risk  Fallen 2 or more times in the past year?: No  Any fall with injury in the past year?: No    Cognitive Screening   1) Repeat 3 items (Leader, Season, Table)    2) Clock draw: NORMAL  3) 3 item recall: Recalls 2 objects   Results: NORMAL clock, 1-2 items recalled: COGNITIVE IMPAIRMENT LESS LIKELY    Mini-CogTM Copyright PAOLO Casas. Licensed by the author for use in [facility]; reprinted with permission (cordelia@.[facility]). All rights reserved.      Do you have sleep apnea, excessive snoring or daytime drowsiness?: no    Reviewed and updated as needed this visit by clinical staff   Tobacco  Allergies  Meds  Problems             Reviewed and updated as needed this visit by " Provider      Problems            Social History     Tobacco Use     Smoking status: Former     Packs/day: 0.50     Years: 10.00     Pack years: 5.00     Types: Cigarettes, Cigars, Pipe     Start date: 1966     Quit date: 1974     Years since quittin.1     Smokeless tobacco: Never     Tobacco comments:     Also smoked from 1985 - 1990   Vaping Use     Vaping status: Not on file   Substance Use Topics     Alcohol use: Not Currently     Comment: Socially             2023     1:19 PM   Alcohol Use   Prescreen: >3 drinks/day or >7 drinks/week? Not Applicable     Do you have a current opioid prescription? No  Do you use any other controlled substances or medications that are not prescribed by a provider? None              Current providers sharing in care for this patient include:   Patient Care Team:  Jeronimo Painter MD as PCP - General (Internal Medicine)  Jeronimo Painter MD as Assigned PCP  Aquilino Buchanan MD as Assigned Heart and Vascular Provider  Alice Cortes APRN CNP as Nurse Practitioner (Family Medicine)  Kev Thomson MD as MD (Otolaryngology)  Rosa Maria Hansen PA-C as Assigned Surgical Provider    The following health maintenance items are reviewed in Epic and correct as of today:  Health Maintenance   Topic Date Due     BMP  2024     LIPID  2024     MEDICARE ANNUAL WELLNESS VISIT  2024     ANNUAL REVIEW OF HM ORDERS  2024     FALL RISK ASSESSMENT  2024     ADVANCE CARE PLANNING  2028     DTAP/TDAP/TD IMMUNIZATION (3 - Td or Tdap) 2033     HEPATITIS C SCREENING  Completed     PHQ-2 (once per calendar year)  Completed     INFLUENZA VACCINE  Completed     Pneumococcal Vaccine: 65+ Years  Completed     ZOSTER IMMUNIZATION  Completed     COVID-19 Vaccine  Completed     IPV IMMUNIZATION  Aged Out     MENINGITIS IMMUNIZATION  Aged Out     COLORECTAL CANCER SCREENING  Discontinued               Review of Systems  "  Constitutional: Negative for chills and fever.   HENT: Negative for congestion, ear pain, hearing loss and sore throat.    Eyes: Negative for pain and visual disturbance.   Respiratory: Negative for cough and shortness of breath.    Cardiovascular: Negative for chest pain, palpitations and peripheral edema.   Gastrointestinal: Negative for abdominal pain, constipation, diarrhea, heartburn, hematochezia and nausea.   Genitourinary: Negative for dysuria, frequency, genital sores, hematuria, impotence, penile discharge and urgency.   Musculoskeletal: Negative for arthralgias, joint swelling and myalgias.   Skin: Negative for rash.   Neurological: Negative for dizziness, weakness, headaches and paresthesias.   Psychiatric/Behavioral: Negative for mood changes. The patient is not nervous/anxious.          OBJECTIVE:   /68   Pulse 74   Temp 97.6  F (36.4  C) (Temporal)   Ht 1.852 m (6' 0.9\")   Wt 125.6 kg (276 lb 12.8 oz)   SpO2 97%   BMI 36.62 kg/m   Estimated body mass index is 36.62 kg/m  as calculated from the following:    Height as of this encounter: 1.852 m (6' 0.9\").    Weight as of this encounter: 125.6 kg (276 lb 12.8 oz).  Physical Exam  GENERAL: alert, no distress and obese  HENT: ear canals and TM's normal, nose and mouth without ulcers or lesions  NECK: no adenopathy, no asymmetry, masses, or scars and thyroid normal to palpation  RESP: lungs clear to auscultation - no rales, rhonchi or wheezes  CV: regular rate and rhythm, normal S1 S2, no S3 or S4, no murmur, click or rub, no peripheral edema and peripheral pulses strong  ABDOMEN: soft, nontender, no hepatosplenomegaly, no masses and bowel sounds normal  MS: no gross musculoskeletal defects noted, no edema  SKIN: Bluish-gray small papular lesion left cheek at site of melanoma resection.  Irritated erythematous skin on his face related to 5-FU treatment  NEURO: Normal strength and tone, mentation intact and speech normal  PSYCH: mentation " "appears normal, affect normal/bright  LYMPH: no cervical, supraclavicular, axillary, or inguinal adenopathy    Labs reviewed in Epic  No results found for this or any previous visit (from the past 24 hour(s)).    ASSESSMENT / PLAN:       ICD-10-CM    1. Encounter for Medicare annual wellness exam  Z00.00       2. Coronary artery disease involving native coronary artery of native heart without angina pectoris  I25.10       3. Benign hypertension  I10 lisinopril (ZESTRIL) 30 MG tablet      4. Trigeminal neuralgia  G50.0       5. Chronic atrial fibrillation (H)  I48.20       6. Morbid obesity due to excess calories (H)  E66.01       7. SIADH (syndrome of inappropriate ADH production) (H)  E22.2       8. ASCVD (arteriosclerotic cardiovascular disease)  I25.10 atorvastatin (LIPITOR) 80 MG tablet      9. Melanoma of cheek (H)  C43.39         -Updated screening, immunizations, prevention.  Please see health maintenance list, care gaps  -Continue secondary prevention for coronary disease.  Parameters at goal.  No aspirin given CAD stability and Eliquis use.  -SIADH related to his trigeminal use.  This is stable.  Continue per neurology.  -Continue focus on weight loss once his recovery from orthopedic surgery is complete   will be seeing Derm later this week for recheck on the left cheek area  -Continue antihypertensives    Patient has been advised of split billing requirements and indicates understanding: Yes      COUNSELING:  Reviewed preventive health counseling, as reflected in patient instructions      BMI:   Estimated body mass index is 36.62 kg/m  as calculated from the following:    Height as of this encounter: 1.852 m (6' 0.9\").    Weight as of this encounter: 125.6 kg (276 lb 12.8 oz).   Weight management plan: Discussed healthy diet and exercise guidelines      He reports that he quit smoking about 49 years ago. His smoking use included cigarettes, cigars, and pipe. He started smoking about 56 years ago. He has a " 5.00 pack-year smoking history. He has never used smokeless tobacco.      Appropriate preventive services were discussed with this patient, including applicable screening as appropriate for cardiovascular disease, diabetes, osteopenia/osteoporosis, and glaucoma.  As appropriate for age/gender, discussed screening for colorectal cancer, prostate cancer, breast cancer, and cervical cancer. Checklist reviewing preventive services available has been given to the patient.    Reviewed patients plan of care and provided an AVS. The Basic Care Plan (routine screening as documented in Health Maintenance) for James meets the Care Plan requirement. This Care Plan has been established and reviewed with the Patient.          Jeronimo Painter MD  [facility]    Identified Health Risks:      The patient was provided with suggestions to help him develop a healthy physical lifestyle.  The patient was provided with written information regarding signs of hearing loss.  Information on urinary incontinence and treatment options given to patient.

## 2023-06-20 NOTE — PATIENT INSTRUCTIONS
Patient Education   Personalized Prevention Plan  You are due for the preventive services outlined below.  Your care team is available to assist you in scheduling these services.  If you have already completed any of these items, please share that information with your care team to update in your medical record.  There are no preventive care reminders to display for this patient.  Your Health Risk Assessment indicates you feel you are not in good health    A healthy lifestyle helps keep the body fit and the mind alert. It helps protect you from disease, helps you fight disease, and helps prevent chronic disease (disease that doesn't go away) from getting worse. This is important as you get older and begin to notice twinges in muscles and joints and a decline in the strength and stamina you once took for granted. A healthy lifestyle includes good healthcare, good nutrition, weight control, recreation, and regular exercise. Avoid harmful substances and do what you can to keep safe. Another part of a healthy lifestyle is stay mentally active and socially involved.    Good healthcare     Have a wellness visit every year.     If you have new symptoms, let us know right away. Don't wait until the next checkup.     Take medicines exactly as prescribed and keep your medicines in a safe place. Tell us if your medicine causes problems.   Healthy diet and weight control     Eat 3 or 4 small, nutritious, low-fat, high-fiber meals a day. Include a variety of fruits, vegetables, and whole-grain foods.     Make sure you get enough calcium in your diet. Calcium, vitamin D, and exercise help prevent osteoporosis (bone thinning).     If you live alone, try eating with others when you can. That way you get a good meal and have company while you eat it.     Try to keep a healthy weight. If you eat more calories than your body uses for energy, it will be stored as fat and you will gain weight.     Recreation   Recreation is not  limited to sports and team events. It includes any activity that provides relaxation, interest, enjoyment, and exercise. Recreation provides an outlet for physical, mental, and social energy. It can give a sense of worth and achievement. It can help you stay healthy.    Mental Exercise and Social Involvement  Mental and emotional health is as important as physical health. Keep in touch with friends and family. Stay as active as possible. Continue to learn and challenge yourself.   Things you can do to stay mentally active are:    Learn something new, like a foreign language or musical instrument.     Play SCRABBLE or do crossword puzzles. If you cannot find people to play these games with you at home, you can play them with others on your computer through the Internet.     Join a games club--anything from card games to chess or checkers or lawn bowling.     Start a new hobby.     Go back to school.     Volunteer.     Read.   Keep up with world events.    Signs of Hearing Loss  Hearing loss is a problem shared by many people. In fact, it's one of the most common health problems, particularly as people age. Most people aged 65 and older have some hearing loss. By age 80, almost everyone does. Hearing loss often occurs slowly over the years. So, you may not realize your hearing has gotten worse.   When sudden hearing loss occurs, it's important to contact your healthcare provider right away. Your provider will do a medical exam and a hearing exam as soon as possible. This is to help find the cause and type of your sudden hearing loss. Based on your diagnosis, your healthcare provider will discuss possible treatments.      Hearing much better with one ear can be a sign of hearing loss.     Have your hearing checked  Call your healthcare provider if you:     Have to strain to hear normal conversation    Have to watch other people s faces very carefully to follow what they re saying    Need to ask people to repeat what  they ve said    Often misunderstand what people are saying    Turn the volume of the television or radio up so high that others complain    Feel that people are mumbling when they re talking to you    Find that the effort to hear leaves you feeling tired and irritated    Notice, when using the phone, that you hear better with one ear than the other  [employer] last reviewed this educational content on 6/1/2022 2000-2022 [employer]. All rights reserved. This information is not intended as a substitute for professional medical care. Always follow your healthcare professional's instructions.          Urinary Incontinence (Male)  We understand that gender is a spectrum. We may use gendered terms to talk about anatomy and health risk. Please use this sheet in a way that works best for you and your provider as you talk about your care.     Urinary incontinence means not being able to control the release of urine from the bladder.   Causes  Common causes of urinary incontinence in men include:    Infection    Certain medicines    Aging    Poor pelvic muscle tone    Bladder spasms    Obesity    Enlarged prostate    Trouble urinating and fully emptying the bladder (urinary retention)  Other things that can cause incontinence are:     Nervous system diseases    Diabetes    Sleep apnea    Urinary tract infections    Prostate surgery    Pelvic injury  Constipation and smoking have also been identified as risk factors.   Symptoms    Urge incontinence (overactive bladder). This is a sudden urge to urinate. It occurs even though there may not be much urine in the bladder. The need to urinate often during the night is common. It's due to overactive bladder muscles.    Stress incontinence. This is urine leakage that you can't control. It can occur with sneezing, coughing, and other actions that put stress on the bladder.    Treatment  Treatment depends on what is causing the condition. Bladder infections are treated  with antibiotics. Urinary retention is treated with a bladder catheter.   Home care  Follow these guidelines when caring for yourself at home:    Don't have any foods and drinks that may irritate the bladder. This includes:  ? Chocolate  ? Alcohol  ? Caffeine  ? Carbonated drinks  ? Acidic fruits and juices    Limit fluids to 6 to 8 cups a day.    Lose weight if you are overweight. This may reduce your symptoms.    If advised, do regular pelvic muscle-strengthening exercises such as Kegel exercises.    If needed, wear absorbent pads to catch urine. Change the pads often. This is for good hygiene and to prevent skin and bladder infections.    Bathe daily for good hygiene.    If an antibiotic was prescribed to treat a bladder infection, take it until it's finished. Keep taking it even if you are feeling better. This is to make sure your infection has cleared.    If a catheter was left in place, keep bacteria from getting into the collection bag. Don't disconnect the catheter from the collection bag.    Use a leg band to secure the catheter drainage tube, so it does not pull on the catheter. Drain the collection bag when it becomes full. To do this, use the drain spout at the bottom of the bag. Don't disconnect the bag from the catheter.    Don't pull on or try to remove a catheter. The catheter must be removed by a healthcare provider.    If you smoke, stop. Ask your provider for help if you can't do this on your own.  Follow-up care  Follow up with your healthcare provider, or as advised.  When to get medical advice  Call your healthcare provider right away if any of these occur:     Fever over 100.4 F (38 C), or as directed by your provider    Bladder pain or fullness    Belly swelling, nausea, or vomiting    Back pain    Weakness, dizziness, or fainting    If a catheter was left in place, return if:  ? The catheter falls out  ? The catheter stops draining for 6 hours  ? Your urine gets cloudy or smells  bad  [employer] last reviewed this educational content on 6/1/2022 2000-2022 [employer]. All rights reserved. This information is not intended as a substitute for professional medical care. Always follow your healthcare professional's instructions.

## 2023-06-22 ENCOUNTER — OFFICE VISIT (OUTPATIENT)
Dept: DERMATOLOGY | Facility: CLINIC | Age: 78
End: 2023-06-22
Payer: COMMERCIAL

## 2023-06-22 DIAGNOSIS — D18.01 ANGIOMA OF SKIN: ICD-10-CM

## 2023-06-22 DIAGNOSIS — Z85.820 HISTORY OF MELANOMA: ICD-10-CM

## 2023-06-22 DIAGNOSIS — D23.9 DERMAL NEVUS: ICD-10-CM

## 2023-06-22 DIAGNOSIS — D48.5 NEOPLASM OF UNCERTAIN BEHAVIOR OF SKIN: Primary | ICD-10-CM

## 2023-06-22 DIAGNOSIS — L82.1 SEBORRHEIC KERATOSIS: ICD-10-CM

## 2023-06-22 DIAGNOSIS — L81.4 LENTIGO: ICD-10-CM

## 2023-06-22 DIAGNOSIS — L57.0 AK (ACTINIC KERATOSIS): ICD-10-CM

## 2023-06-22 PROCEDURE — 17000 DESTRUCT PREMALG LESION: CPT | Mod: 59 | Performed by: DERMATOLOGY

## 2023-06-22 PROCEDURE — 88305 TISSUE EXAM BY PATHOLOGIST: CPT | Performed by: DERMATOLOGY

## 2023-06-22 PROCEDURE — 88342 IMHCHEM/IMCYTCHM 1ST ANTB: CPT | Performed by: DERMATOLOGY

## 2023-06-22 PROCEDURE — 11102 TANGNTL BX SKIN SINGLE LES: CPT | Performed by: DERMATOLOGY

## 2023-06-22 PROCEDURE — 88341 IMHCHEM/IMCYTCHM EA ADD ANTB: CPT | Performed by: DERMATOLOGY

## 2023-06-22 PROCEDURE — 99213 OFFICE O/P EST LOW 20 MIN: CPT | Mod: 25 | Performed by: DERMATOLOGY

## 2023-06-22 NOTE — PROGRESS NOTES
James Salvador is an extremely pleasant 77 year old year old male patient here today for hx of 2.2mm melanoma on cheek SNLBx negative.  HE notes a new spot on skin.   .   Patient states this has been present for a week.  Patient reports the following symptoms:  Brown and growing.  Patient reports the following previous treatments none.  These treatments did not work.  Patient reports the following modifying factors none.  Associated symptoms: new.  Patient has no other skin complaints today.  Remainder of the HPI, Meds, PMH, Allergies, FH, and SH was reviewed in chart.      Past Medical History:   Diagnosis Date     Actinic 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 Cardiac Cath 9/2015: BRIDGET to LAD     Hearing problem 1995    Wear hearing aids     Hyperlipidemia      Malignant melanoma      Morbid obesity 01/11/2016     Paroxysmal supraventricular tachycardia     on metoprolol     Permanent atrial fibrillation 04/21/2017     Squamous cell carcinoma      Tachy-edinson syndrome 05/29/2019       Past Surgical History:   Procedure Laterality Date     ADENOIDECTOMY  Childhood     ANGIOPLASTY  1994    in [place]     ANKLE SURGERY  07/13/2005    right ankle     ARTHROPLASTY HIP Right 10/2009     BIOPSY NODE SENTINEL Left 03/30/2023    Procedure: BIOPSY, LYMPH NODE, SENTINEL;  Surgeon: Rolando Minaya MD;  Location: [facility] OR     COLONOSCOPY  4/25/12     EP PERM PACER SINGLE LEAD N/A 05/31/2019    Medtronic single lead pacemaker     EXCISE MASS CHEEK Left 03/30/2023    Procedure: wide local excision of left cheek melanoma;  Surgeon: Rolando Minaya MD;  Location: [facility] OR     EXCISE MASS CHEEK WITH FLAP PEDICLE Left 04/13/2023    Procedure: Left cervical Facial flap per closure of left cheek Defect;  Surgeon: Laura  MD Ila;  Location: [facility] OR     Facial biopsy - Melanoma       GENITOURINARY SURGERY  10/20/23    Prostate surgery     HEART CATH STENT COR W/WO PTCA  2015    BRIDGET stent mid LAD     LASER SURGERY OF EYE  2002     MOHS MICROGRAPHIC PROCEDURE  2004    squamous cell carcinoma right temple     SINUS SURGERY  2006     TONSILLECTOMY  Childhood        Family History   Problem Relation Age of Onset     Genitourinary Problems Mother         renal failure     Heart Disease Mother      Cerebrovascular Disease Mother         TIA     Cardiovascular Father         rupture of dorsal aorta     Depression Son      Depression Brother         Suicide     Substance Abuse Brother         Heroin     Skin Cancer No family hx of        Social History     Socioeconomic History     Marital status:      Spouse name: Not on file     Number of children: 3     Years of education: Not on file     Highest education level: Not on file   Occupational History     Employer: RETIRED   Tobacco Use     Smoking status: Former     Packs/day: 0.50     Years: 10.00     Pack years: 5.00     Types: Cigarettes, Cigars, Pipe     Start date: 1966     Quit date: 1974     Years since quittin.1     Smokeless tobacco: Never     Tobacco comments:     Also smoked from 1985 - 1990   Substance and Sexual Activity     Alcohol use: Not Currently     Comment: Socially     Drug use: No     Sexual activity: Not Currently     Partners: Female     Birth control/protection: Male Surgical     Comment: Vasectomy   Other Topics Concern     Parent/sibling w/ CABG, MI or angioplasty before 65F 55M? No      Service Not Asked     Blood Transfusions Not Asked     Caffeine Concern Yes     Comment: 2 big cups coffee daily     Occupational Exposure Not Asked     Hobby Hazards Not Asked     Sleep Concern No     Comment: sleeping better since shoulder replaced 11/3/16     Stress Concern No     Weight Concern Yes     Special Diet Yes      Comment: trying to do more lean meats     Back Care Not Asked     Exercise No     Comment: limited - knee     Bike Helmet Not Asked     Seat Belt Not Asked     Self-Exams Not Asked   Social History Narrative     Not on file     Social Determinants of Health     Financial Resource Strain: Not on file   Food Insecurity: Not on file   Transportation Needs: Not on file   Physical Activity: Not on file   Stress: Not on file   Social Connections: Not on file   Intimate Partner Violence: Not on file   Housing Stability: Not on file       Outpatient Encounter Medications as of 6/22/2023   Medication Sig Dispense Refill     acetaminophen (TYLENOL) 325 MG tablet Take 650 mg by mouth 4 times daily       apixaban ANTICOAGULANT (ELIQUIS ANTICOAGULANT) 5 MG tablet Take 1 tablet (5 mg) by mouth 2 times daily 180 tablet 1     atorvastatin (LIPITOR) 80 MG tablet Take 1 tablet (80 mg) by mouth At Bedtime 90 tablet 3     carvedilol (COREG) 3.125 MG tablet Take 1 tablet (3.125 mg) by mouth 2 times daily (with meals) 180 tablet 3     COMPOUNDED NON-CONTROLLED SUBSTANCE (CMPD RX) - PHARMACY TO MIX COMPOUNDED MEDICATION Fluorouracil 5% Calcipotriene 0.005% 1:1 Cream apply twice daily for 1 weeks to face, arms for 14 days 30 g 6     desonide (DESOWEN) 0.05 % external cream Apply topically daily       fluticasone (FLONASE) 50 MCG/ACT nasal spray INSTILL 2 SPRAYS INTO BOTH NOSTRILS DAILY. 48 mL 3     furosemide (LASIX) 20 MG tablet TAKE 1 TABLET BY MOUTH EVERY DAY 90 tablet 3     gabapentin (NEURONTIN) 300 MG capsule Take 2 capsules (600 mg) by mouth 3 times daily       ketoconazole (NIZORAL) 2 % cream Apply topically 2 times daily For face. FAX REFILL REQUESTS TO [facility]: [phone] 30 g 2     ketoconazole (NIZORAL) 2 % external shampoo Use daily as needed 120 mL 11     lisinopril (ZESTRIL) 30 MG tablet Take 1 tablet (30 mg) by mouth daily 90 tablet 3     nitroGLYcerin (NITROSTAT) 0.4 MG sublingual tablet Place 1 tablet (0.4 mg)  under the tongue every 5 minutes as needed for chest pain May repeat twice for a total of 3 tablets.  If chest pain not relieved, call 911 25 tablet 11     OXcarbazepine (TRILEPTAL) 300 MG tablet Take 2 tablets (600 mg) by mouth 3 times daily ( @ 7am, 1530 and 2200 hrs each day)       triamcinolone (KENALOG) 0.1 % external lotion Apply to scalp BID x 1-2 weeks then PRN only 60 mL 3     No facility-administered encounter medications on file as of 6/22/2023.             O:   NAD, WDWN, Alert & Oriented, Mood & Affect wnl, Vitals stable   Here today alone   General appearance normal   Vitals stable   Alert, oriented and in no acute distress      Following lymph nodes palpated: Occipital, Cervical, Supraclavicular no lad  L cheek brown papule  L temple gritty scaly papule      Stuck on papules and brown macules on trunk and ext   Red papules on trunk  Flesh colored papules on trunk         Eyes: Conjunctivae/lids:Normal     ENT: Lips, buccal mucosa, tongue: normal    MSK:Normal    Cardiovascular: peripheral edema none    Pulm: Breathing Normal    Lymph Nodes: No Head and Neck Lymphadenopathy     Neuro/Psych: Orientation:Alert and Orientedx3 ; Mood/Affect:normal       A/P:  1. Seborrheic keratosis, lentigo, angioma, dermal nevus  2. Hx of melanoma and new spot  L cheek r/o melanoma  TANGENTIAL BIOPSY SENT OUT:  After consent, anesthesia with LEC and prep, tangential excision performed and specimen sent out for permanent section histology.  No complications and routine wound care. Patient told to call our office in 1-2 weeks for result.       3. L temple actinic keratosis   LN2:  Treated with LN2 for 5s for 1-2 cycles. Warned risks of blistering, pain, pigment change, scarring, and incomplete resolution.  Advised patient to return if lesions do not completely resolve.  Wound care sheet given.  It was a pleasure speaking to James Salvador today.  Previous clinic notes and pertinent laboratory tests were reviewed prior to  James Salvador's visit.  Signs and Symptoms of skin cancer discussed with patient.  Patient encouraged to perform monthly skin exams.  UV precautions reviewed with patient.  Return to clinic pending path

## 2023-06-22 NOTE — LETTER
6/22/2023         RE: James Salvador  [address] [place] [address]        Dear Colleague,    Thank you for referring your patient, James Salvador, to the [facility]. Please see a copy of my visit note below.    James Salvador is an extremely pleasant 77 year old year old male patient here today for hx of 2.2mm melanoma on cheek SNLBx negative.  HE notes a new spot on skin.   .   Patient states this has been present for a week.  Patient reports the following symptoms:  Brown and growing.  Patient reports the following previous treatments none.  These treatments did not work.  Patient reports the following modifying factors none.  Associated symptoms: new.  Patient has no other skin complaints today.  Remainder of the HPI, Meds, PMH, Allergies, FH, and SH was reviewed in chart.      Past Medical History:   Diagnosis Date     Actinic 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 Cardiac Cath 9/2015: BRIDGET to LAD     Hearing problem 1995    Wear hearing aids     Hyperlipidemia      Malignant melanoma      Morbid obesity 01/11/2016     Paroxysmal supraventricular tachycardia     on metoprolol     Permanent atrial fibrillation 04/21/2017     Squamous cell carcinoma      Tachy-edinson syndrome 05/29/2019       Past Surgical History:   Procedure Laterality Date     ADENOIDECTOMY  Childhood     ANGIOPLASTY  1994    in [place]     ANKLE SURGERY  07/13/2005    right ankle     ARTHROPLASTY HIP Right 10/2009     BIOPSY NODE SENTINEL Left 03/30/2023    Procedure: BIOPSY, LYMPH NODE, SENTINEL;  Surgeon: Rolando Minaya MD;  Location: [facility] OR     COLONOSCOPY  4/25/12     EP PERM PACER SINGLE LEAD N/A 05/31/2019    Medtronic single lead pacemaker     EXCISE MASS CHEEK Left 03/30/2023     Procedure: wide local excision of left cheek melanoma;  Surgeon: Rolando Minaya MD;  Location: [facility] OR     EXCISE MASS CHEEK WITH FLAP PEDICLE Left 2023    Procedure: Left cervical Facial flap per closure of left cheek Defect;  Surgeon: Ila Sanchez MD;  Location: [facility] OR     Facial biopsy - Melanoma       GENITOURINARY SURGERY  10/20/23    Prostate surgery     HEART CATH STENT COR W/WO PTCA  2015    BRIDGET stent mid LAD     LASER SURGERY OF EYE  2002     MOHS MICROGRAPHIC PROCEDURE  2004    squamous cell carcinoma right temple     SINUS SURGERY  2006     TONSILLECTOMY  Childhood        Family History   Problem Relation Age of Onset     Genitourinary Problems Mother         renal failure     Heart Disease Mother      Cerebrovascular Disease Mother         TIA     Cardiovascular Father         rupture of dorsal aorta     Depression Son      Depression Brother         Suicide     Substance Abuse Brother         Heroin     Skin Cancer No family hx of        Social History     Socioeconomic History     Marital status:      Spouse name: Not on file     Number of children: 3     Years of education: Not on file     Highest education level: Not on file   Occupational History     Employer: RETIRED   Tobacco Use     Smoking status: Former     Packs/day: 0.50     Years: 10.00     Pack years: 5.00     Types: Cigarettes, Cigars, Pipe     Start date: 1966     Quit date: 1974     Years since quittin.1     Smokeless tobacco: Never     Tobacco comments:     Also smoked from 1985 - 1990   Substance and Sexual Activity     Alcohol use: Not Currently     Comment: Socially     Drug use: No     Sexual activity: Not Currently     Partners: Female     Birth control/protection: Male Surgical     Comment: Vasectomy   Other Topics Concern     Parent/sibling w/ CABG, MI or angioplasty before 65F 55M? No      Service Not Asked     Blood Transfusions Not Asked     Caffeine  Concern Yes     Comment: 2 big cups coffee daily     Occupational Exposure Not Asked     Hobby Hazards Not Asked     Sleep Concern No     Comment: sleeping better since shoulder replaced 11/3/16     Stress Concern No     Weight Concern Yes     Special Diet Yes     Comment: trying to do more lean meats     Back Care Not Asked     Exercise No     Comment: limited - knee     Bike Helmet Not Asked     Seat Belt Not Asked     Self-Exams Not Asked   Social History Narrative     Not on file     Social Determinants of Health     Financial Resource Strain: Not on file   Food Insecurity: Not on file   Transportation Needs: Not on file   Physical Activity: Not on file   Stress: Not on file   Social Connections: Not on file   Intimate Partner Violence: Not on file   Housing Stability: Not on file       Outpatient Encounter Medications as of 6/22/2023   Medication Sig Dispense Refill     acetaminophen (TYLENOL) 325 MG tablet Take 650 mg by mouth 4 times daily       apixaban ANTICOAGULANT (ELIQUIS ANTICOAGULANT) 5 MG tablet Take 1 tablet (5 mg) by mouth 2 times daily 180 tablet 1     atorvastatin (LIPITOR) 80 MG tablet Take 1 tablet (80 mg) by mouth At Bedtime 90 tablet 3     carvedilol (COREG) 3.125 MG tablet Take 1 tablet (3.125 mg) by mouth 2 times daily (with meals) 180 tablet 3     COMPOUNDED NON-CONTROLLED SUBSTANCE (CMPD RX) - PHARMACY TO MIX COMPOUNDED MEDICATION Fluorouracil 5% Calcipotriene 0.005% 1:1 Cream apply twice daily for 1 weeks to face, arms for 14 days 30 g 6     desonide (DESOWEN) 0.05 % external cream Apply topically daily       fluticasone (FLONASE) 50 MCG/ACT nasal spray INSTILL 2 SPRAYS INTO BOTH NOSTRILS DAILY. 48 mL 3     furosemide (LASIX) 20 MG tablet TAKE 1 TABLET BY MOUTH EVERY DAY 90 tablet 3     gabapentin (NEURONTIN) 300 MG capsule Take 2 capsules (600 mg) by mouth 3 times daily       ketoconazole (NIZORAL) 2 % cream Apply topically 2 times daily For face. FAX REFILL REQUESTS TO [facility]:  [phone] 30 g 2     ketoconazole (NIZORAL) 2 % external shampoo Use daily as needed 120 mL 11     lisinopril (ZESTRIL) 30 MG tablet Take 1 tablet (30 mg) by mouth daily 90 tablet 3     nitroGLYcerin (NITROSTAT) 0.4 MG sublingual tablet Place 1 tablet (0.4 mg) under the tongue every 5 minutes as needed for chest pain May repeat twice for a total of 3 tablets.  If chest pain not relieved, call 911 25 tablet 11     OXcarbazepine (TRILEPTAL) 300 MG tablet Take 2 tablets (600 mg) by mouth 3 times daily ( @ 7am, 1530 and 2200 hrs each day)       triamcinolone (KENALOG) 0.1 % external lotion Apply to scalp BID x 1-2 weeks then PRN only 60 mL 3     No facility-administered encounter medications on file as of 6/22/2023.             O:   NAD, WDWN, Alert & Oriented, Mood & Affect wnl, Vitals stable   Here today alone   General appearance normal   Vitals stable   Alert, oriented and in no acute distress      Following lymph nodes palpated: Occipital, Cervical, Supraclavicular no lad  L cheek brown papule  L temple gritty scaly papule      Stuck on papules and brown macules on trunk and ext   Red papules on trunk  Flesh colored papules on trunk         Eyes: Conjunctivae/lids:Normal     ENT: Lips, buccal mucosa, tongue: normal    MSK:Normal    Cardiovascular: peripheral edema none    Pulm: Breathing Normal    Lymph Nodes: No Head and Neck Lymphadenopathy     Neuro/Psych: Orientation:Alert and Orientedx3 ; Mood/Affect:normal       A/P:  1. Seborrheic keratosis, lentigo, angioma, dermal nevus  2. Hx of melanoma and new spot  L cheek r/o melanoma  TANGENTIAL BIOPSY SENT OUT:  After consent, anesthesia with LEC and prep, tangential excision performed and specimen sent out for permanent section histology.  No complications and routine wound care. Patient told to call our office in 1-2 weeks for result.       3. L temple actinic keratosis   LN2:  Treated with LN2 for 5s for 1-2 cycles. Warned risks of blistering, pain, pigment  change, scarring, and incomplete resolution.  Advised patient to return if lesions do not completely resolve.  Wound care sheet given.  It was a pleasure speaking to James Salvador today.  Previous clinic notes and pertinent laboratory tests were reviewed prior to James Salvador's visit.  Signs and Symptoms of skin cancer discussed with patient.  Patient encouraged to perform monthly skin exams.  UV precautions reviewed with patient.  Return to clinic pending path         Again, thank you for allowing me to participate in the care of your patient.        Sincerely,        Jv Dutta MD

## 2023-06-22 NOTE — PATIENT INSTRUCTIONS
Wound Care Instructions     FOR SUPERFICIAL WOUNDS     [facility] [phone]    [facility] [phone]                       AFTER 24 HOURS YOU SHOULD REMOVE THE BANDAGE AND BEGIN DAILY DRESSING CHANGES AS FOLLOWS:     1) Remove Dressing.     2) Clean and dry the area with tap water using a Q-tip or sterile gauze pad.     3) Apply Vaseline, Aquaphor, Polysporin ointment or Bacitracin ointment over entire wound.  Do NOT use Neosporin ointment.     4) Cover the wound with a band-aid, or a sterile non-stick gauze pad and micropore paper tape      REPEAT THESE INSTRUCTIONS AT LEAST ONCE A DAY UNTIL THE WOUND HAS COMPLETELY HEALED.    It is an old wives tale that a wound heals better when it is exposed to air and allowed to dry out. The wound will heal faster with a better cosmetic result if it is kept moist with ointment and covered with a bandage.    **Do not let the wound dry out.**      Supplies Needed:      *Cotton tipped applicators (Q-tips)    *Polysporin Ointment or Bacitracin Ointment (NOT NEOSPORIN)    *Band-aids or non-stick gauze pads and micropore paper tape.      PATIENT INFORMATION:    During the healing process you will notice a number of changes. All wounds develop a small halo of redness surrounding the wound.  This means healing is occurring. Severe itching with extensive redness usually indicates sensitivity to the ointment or bandage tape used to dress the wound.  You should call our office if this develops.      Swelling  and/or discoloration around your surgical site is common, particularly when performed around the eye.    All wounds normally drain.  The larger the wound the more drainage there will be.  After 7-10 days, you will notice the wound beginning to shrink and new skin will begin to grow.  The wound is healed when you can see skin has formed over the entire area.  A healed wound has a healthy, shiny look to the surface and is red to dark pink in color  to normalize.  Wounds may take approximately 4-6 weeks to heal.  Larger wounds may take 6-8 weeks.  After the wound is healed you may discontinue dressing changes.    You may experience a sensation of tightness as your wound heals. This is normal and will gradually subside.    Your healed wound may be sensitive to temperature changes. This sensitivity improves with time, but if you re having a lot of discomfort, try to avoid temperature extremes.    Patients frequently experience itching after their wound appears to have healed because of the continue healing under the skin.  Plain Vaseline will help relieve the itching.        POSSIBLE COMPLICATIONS    BLEEDING:    Leave the bandage in place.  Use tightly rolled up gauze or a cloth to apply direct pressure over the bandage for 30  minutes.  Reapply pressure for an additional 30 minutes if necessary  Use additional gauze and tape to maintain pressure once the bleeding has stopped. Cryotherapy    What is it?  Use of a very cold liquid, such as liquid nitrogen, to freeze and destroy abnormal skin cells that need to be removed    What should I expect?  Tenderness and redness  A small blister that might grow and fill with dark purple blood. There may be crusting.  More than one treatment may be needed if the lesions do not go away.    How do I care for the treated area?  Gently wash the area with your hands when bathing.  Use a thin layer of Vaseline to help with healing. You may use a Band-Aid.   The area should heal within 7-10 days and may leave behind a pink or lighter color.   Do not use an antibiotic or Neosporin ointment.   You may take acetaminophen (Tylenol) for pain.     Call your doctor if you have:  Severe pain  Signs of infection (warmth, redness, cloudy yellow drainage, and or a bad smell)  Questions or concerns    Who should I call with questions?      [facility]: [phone]      [facility]: [phone]      For urgent needs outside of business hours call the [facility] at [phone] and ask for the dermatology resident on call

## 2023-06-28 ENCOUNTER — MYC MEDICAL ADVICE (OUTPATIENT)
Dept: INTERNAL MEDICINE | Facility: CLINIC | Age: 78
End: 2023-06-28
Payer: COMMERCIAL

## 2023-06-28 DIAGNOSIS — J30.2 CHRONIC SEASONAL ALLERGIC RHINITIS: ICD-10-CM

## 2023-06-29 ENCOUNTER — PATIENT OUTREACH (OUTPATIENT)
Dept: ONCOLOGY | Facility: CLINIC | Age: 78
End: 2023-06-29
Payer: COMMERCIAL

## 2023-06-29 ENCOUNTER — TELEPHONE (OUTPATIENT)
Dept: DERMATOLOGY | Facility: CLINIC | Age: 78
End: 2023-06-29
Payer: COMMERCIAL

## 2023-06-29 RX ORDER — FLUTICASONE PROPIONATE 50 MCG
2 SPRAY, SUSPENSION (ML) NASAL DAILY
Qty: 48 ML | Refills: 3 | Status: SHIPPED | OUTPATIENT
Start: 2023-06-29

## 2023-06-29 NOTE — PROGRESS NOTES
New Patient Oncology Nurse Navigator Note     Referring provider: Jv Dutta MD     Referring Clinic/Organization: [facility] DERMATOLOGY     Referred to (specialty:) Medical Oncology     Requested provider (if applicable): NA     Date Referral Received: June 29, 2023     Evaluation for:    D48.5 (ICD-10-CM) - Neoplasm of uncertain behavior of skin   L81.4 (ICD-10-CM) - Lentigo   L82.1 (ICD-10-CM) - Seborrheic keratosis   D18.01 (ICD-10-CM) - Angioma of skin   D23.9 (ICD-10-CM) - Dermal nevus   Z85.820 (ICD-10-CM) - History of melanoma   L57.0 (ICD-10-CM) - AK (actinic keratosis)          Clinical History (per Nurse review of records provided):     Patient seen in 2/2023 for evaluation of a non healing spot on the left cheek. Shaved biopsy was preformed showing:    Final Diagnosis   A(1). L cheek:  - Malignant melanoma, ulcerated, with nodular appearance and Breslow depth at least 2.2mm, extending to the lateral and deep margins - (see comment and description)    Electronically signed by Phil Trotter MD on 3/1/2023 at  4:28 PM     Patient then seen by ENT by Dr. Minaya on 3/8/23. Prior melanoma of the left zygoma/orbital rim in 2019 that was treated with MMS by Dr. Dutta. He has closely followed with Dermatology given multiple cutaneous malignancies and returned after a three month history of a growing left cheek nodule. Recommendation was for local excision with SLN Bx and reconstruction.    WLE and SLN Bx done on 3/30/23:  Final Diagnosis   A(1). Skin, left cheek, wide local excision:  - Residual malignant melanoma, completely excised - (see comment)      Electronically signed by Oren El MD on 4/5/2023 at 10:45 AM   Comment  UUMAYO   The final Breslow depth is 3.5 mm. Microstage remains pT3b.      Clinical Information  UUMAYO   The patient is a 77 year old male  Procedure:  wide local excision of left cheek melanoma - Left  BIOPSY, LYMPH NODE, SENTINEL -  "Left  Pre-op Diagnosis: Melanoma of cheek (H) [C43.39]  Post-op Diagnosis: C43.39 - Melanoma of cheek (H) [ICD-10-CM]   Gross Description  UJORGE JOVEL(1). Other, wide local excision left cheek, double green superior, blue inferior, long black lateral, short black medial:  The specimen is received in formalin with proper patient identification, labeled \"wide local excision left cheek, double green superior, blue inferior, long black lateral, short black medial\".  The specimen consists of a 3.4 x 3.1 cm ovoid skin excision which is excised to a maximum depth of 0.8 cm.       The specimen contains 4 sutures marking laterality: Double green-superior, blue-inferior, long black-lateral, short black-medial.   The superior pole marked by the double green stitch is designated as 12:00. The skin surface contains a raised tan-white lesion measuring 1.4 x 1.0 x 0.2 cm.  The lesion is 1.4 cm from the 3:00 margin (lateral margin) and 1.2 cm from the 9:00 margin (medial margin).     The specimen is inked as follows: 12:00-3:00-black, 3:00-6:00-blue, 6:00-9:00-orange, 9:00-12:00-green.  The specimen is sectioned and entirely and sequentially submitted in cassettes A1-A11.    Summary of Sections:     A1-12:00 ovoid end  A2-slices 1, 2  A3-slice 3, bisected  A4-slice 4, bisected  A5-slice 5, bisected  A6-slice 6, bisected   A7-slice 7, bisected   A8-slice 8, bisected   A9-slice 9, bisected  R63-asirl 10, bisected  A11-6:00 ovoid end           Microscopic Description  UUMAYO   The specimen exhibits a flattened epidermis, dermal fibrosis, and lymphohistiocytic inflammation consistent with scar from the prior procedure. There is residual melanoma to a Breslow depth of 3.5 mm. The lesion appears completely excised in the sections examined.     Component Ref Range & Units  Resulting Agency   Case Report   Surgical Pathology Report                         Case: [id number]                                   Authorizing Provider:  Luzmarai" "Jv Rider,   Collected:           06/22/2023 12:45 PM                                  MD                                                                            Ordering Location:     [facility]   Received:            06/22/2023 04:53 PM                                  [facility]                                                            Pathologist:           Esdras Epperson MD                                                          Specimen:    Skin, Left cheek                                                                           Final Diagnosis   A. Left cheek:  - Consistent with metastatic melanoma - (see description)   Electronically signed by Esdras Epperson MD on 6/28/2023 at  5:38 PM   Clinical Information   UUMAYO   The patient is a 77 year old male.    Gross Description   UULAB   A(1). Skin, Left cheek:  The specimen is received in formalin with proper patient identification, labeled \"left cheek\".  The specimen consists of a 0.9 x 0.8 cm irregular tan skin shave.  The skin surface is remarkable for a 0.3 x 0.3 cm hyperpigmented flat lesion.  The resection margin is inked black, the specimen is quadrisected and submitted in its entirety in cassette A1.   Microscopic Description   UUMAYO   Within the upper dermis, the specimen exhibits densely cellular, nodular aggregates of severely atypical pleomorphic epithelioid cells with mitotic activity, which label positively with Melan-A.  No definite intraepidermal component is identified.  ERG immunostain is negative among lesional cells and also fails to demonstrate evidence of lymphovascular invasion.  These changes are consistent with metastatic or locoregional recurrence of melanoma.       Records Location: See Bookmarked material     Records Needed: NA     Additional testing needed prior to consult: NA    Payor: [facility] / Plan: [employer] MEDICARE ADVANTAGE / Product Type: HMO /     June 29, " 2023:    Called patient to introduced myself and role as nurse navigator with [facility] Hematology/Oncology department and to inform them that we have received the referral for a diagnosis of melanoma from Dr. Dutta. Patient confirms they are aware of the referral and ready to schedule. Provided patient with contact information for NPS and encourage them to call with any questions. Patient verbalized understanding of plan. Transferred patient to NPS to schedule.    Ana Alicea RN, BSN  [facility] Hematology/Oncology Nurse Navigator  [phone]

## 2023-06-29 NOTE — TELEPHONE ENCOUNTER
Called patient:  Patient notified and educated on test results   Mohs procedure explained- all questions answered  appointment scheduled- mohs packet mailed.     Thank you,  Freya OCONNELL RN  Dermatology   [phone]

## 2023-06-29 NOTE — TELEPHONE ENCOUNTER
----- Message from Jv Dutta MD sent at 6/29/2023 12:30 PM CDT -----  Patient called and I discussed with patient results    Please schedule mohs for metastatic lesion with in 2-3 weeks    I am placing referral for medical oncology as well

## 2023-06-30 NOTE — TELEPHONE ENCOUNTER
Neoplasm of uncertain behavior of skin [D48.5]  Lentigo [L81.4]  Seborrheic keratosis [L82.1]  Angioma of skin [D18.01]  Dermal nevus [D23.9]  History of melanoma [Z85.820]  AK (actinic keratosis) [L57.0]    June 30, 2023 11:46 AM TJ   Internal Referral, No outside records needed

## 2023-07-02 NOTE — PROGRESS NOTES
[facility]    Hematology/Oncology New Patient Note      Today's Date: 7/3/23    Reason for Consultation: Melanoma  Referring Provider: Dr. Jv Dutta      HISTORY OF PRESENT ILLNESS: James Salvador is a 78 year old male who presents for evaluation of metastatic melanoma.  His relevant history dates to 10/2019 when he underwent excision of a Breslow depth 0.6 mm malignant melanoma, lentigo maligna type from the left zygoma.  [place] genomic testing (Decision-DX) was performed demonstrating low risk so SLN biopsy was not performed.  He subsequently underwent MOHS surgery on 11/13/19.      He did well until 2/21/2023 when he presented with a non-healing lesion on his left cheek that had persisted for several months and had become tender.  Shave biopsy was performed which demonstrated malignant melanoma, ulcerated with nodular appearance with Breslow depth of at least 2.2mm, extending to the lateral and deep margins.      He was seen by Dr. Minaya in ENT and underwent wide local excision with SLN biopsy on 3/30/23.  Lymphoscintigraphy traced to the preauricular area but no sentinel node was identified there.  Pathology from the re-excision demonstrated residual melanoma with a Brealow depth of 3.5mm.  Margins were negative.  Surgical reconstruction of the surgical defect with flap placement was performed on 4/13 by Dr. Sanchez.    He followed-up in dermatology clinic on 6/22 and had noted a new pigmented lesion on his left cheek.  Biopsy was obtained demonstrating melanoma. No intraepidermal component was identified so consistent with a metastatic lesion.  No lymphvascular invasion was seen.     /He has clinically been stable.  He is scheduled for MOHS surgery on 7/13.  He has clinically been stable, primary physical issue is chronic left knee pain after a complicated knee replacement revision about a year ago, working with PT but this limits his mobility.  He has had a 60 pound  intentional weight loss over that past year to help with some of the orthopedic issues.  He also has chronic pain from right trigeminal neuralgia, takes gabapentin and Trileptal to help control the pain but that does affect his balance.  Had a TURP procedure earlier this year which was complicated by a Staph infection so that was also a difficult recovery but now resolved.  Otherwise he has not noted any changes in his health.  Specifically no change in energy or appetite, no new focal pain, no respiratory of GI complaints. Denies any history of autoimmune problems.        REVIEW OF SYSTEMS:   A 14 point ROS was reviewed with pertinent positives and negatives in the HPI.        HOME MEDICATIONS:  Current Outpatient Medications   Medication Sig Dispense Refill     acetaminophen (TYLENOL) 325 MG tablet Take 650 mg by mouth 4 times daily       apixaban ANTICOAGULANT (ELIQUIS ANTICOAGULANT) 5 MG tablet Take 1 tablet (5 mg) by mouth 2 times daily 180 tablet 1     atorvastatin (LIPITOR) 80 MG tablet Take 1 tablet (80 mg) by mouth At Bedtime 90 tablet 3     carvedilol (COREG) 3.125 MG tablet Take 1 tablet (3.125 mg) by mouth 2 times daily (with meals) 180 tablet 3     desonide (DESOWEN) 0.05 % external cream Apply topically daily       fluticasone (FLONASE) 50 MCG/ACT nasal spray Spray 2 sprays into both nostrils daily 48 mL 3     furosemide (LASIX) 20 MG tablet TAKE 1 TABLET BY MOUTH EVERY DAY 90 tablet 3     gabapentin (NEURONTIN) 300 MG capsule Take 2 capsules (600 mg) by mouth 3 times daily       ketoconazole (NIZORAL) 2 % cream Apply topically 2 times daily For face. FAX REFILL REQUESTS TO [facility]: [phone] 30 g 2     ketoconazole (NIZORAL) 2 % external shampoo Use daily as needed 120 mL 11     lisinopril (ZESTRIL) 30 MG tablet Take 1 tablet (30 mg) by mouth daily 90 tablet 3     nitroGLYcerin (NITROSTAT) 0.4 MG sublingual tablet Place 1 tablet (0.4 mg) under the tongue every 5 minutes as needed for chest pain  May repeat twice for a total of 3 tablets.  If chest pain not relieved, call 911 25 tablet 11     OXcarbazepine (TRILEPTAL) 300 MG tablet Take 2 tablets (600 mg) by mouth 3 times daily ( @ 7am, 1530 and 2200 hrs each day)       triamcinolone (KENALOG) 0.1 % external lotion Apply to scalp BID x 1-2 weeks then PRN only 60 mL 3     COMPOUNDED NON-CONTROLLED SUBSTANCE (CMPD RX) - PHARMACY TO MIX COMPOUNDED MEDICATION Fluorouracil 5% Calcipotriene 0.005% 1:1 Cream apply twice daily for 1 weeks to face, arms for 14 days (Patient not taking: Reported on 7/3/2023) 30 g 6         ALLERGIES:  Allergies   Allergen Reactions     Cats      Cat Dander     Dogs      Dog Dander     Hydromorphone      Other reaction(s): Confusion     Pollen Extract          PAST MEDICAL HISTORY:  Past Medical History:   Diagnosis Date     Actinic 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 Cardiac Cath 9/2015: BRIDGET to LAD     Hearing problem 1995    Wear hearing aids     Hyperlipidemia      Malignant melanoma      Morbid obesity 01/11/2016     Paroxysmal supraventricular tachycardia     on metoprolol     Permanent atrial fibrillation 04/21/2017     Squamous cell carcinoma      Tachy-edinson syndrome 05/29/2019         PAST SURGICAL HISTORY:  Past Surgical History:   Procedure Laterality Date     ADENOIDECTOMY  Childhood     ANGIOPLASTY  1994    in [place]     ANKLE SURGERY  07/13/2005    right ankle     ARTHROPLASTY HIP Right 10/2009     BIOPSY NODE SENTINEL Left 03/30/2023    Procedure: BIOPSY, LYMPH NODE, SENTINEL;  Surgeon: Rolando Minaya MD;  Location: [facility] OR     COLONOSCOPY  4/25/12     EP PERM PACER SINGLE LEAD N/A 05/31/2019    [employer]tronic single lead pacemaker     EXCISE MASS CHEEK Left 03/30/2023    Procedure: wide local excision of left cheek  melanoma;  Surgeon: Rolando Minaya MD;  Location: [facility] OR     EXCISE MASS CHEEK WITH FLAP PEDICLE Left 2023    Procedure: Left cervical Facial flap per closure of left cheek Defect;  Surgeon: Ila Sanchez MD;  Location: [facility] OR     Facial biopsy - Melanoma       GENITOURINARY SURGERY  10/20/23    Prostate surgery     HEART CATH STENT COR W/WO PTCA  2015    BRIDGET stent mid LAD     LASER SURGERY OF EYE  2002     MOHS MICROGRAPHIC PROCEDURE  2004    squamous cell carcinoma right temple     SINUS SURGERY  2006     TONSILLECTOMY  Childhood         SOCIAL HISTORY:  Social History     Socioeconomic History     Marital status:      Spouse name: Not on file     Number of children: 3     Years of education: Not on file     Highest education level: Not on file   Occupational History     Employer: RETIRED   Tobacco Use     Smoking status: Former     Packs/day: 0.50     Years: 10.00     Pack years: 5.00     Types: Cigarettes, Cigars, Pipe     Start date: 1966     Quit date: 1974     Years since quittin.2     Smokeless tobacco: Never     Tobacco comments:     Also smoked from 1985 - 1990   Substance and Sexual Activity     Alcohol use: Not Currently     Comment: Socially     Drug use: No     Sexual activity: Not Currently     Partners: Female     Birth control/protection: Male Surgical     Comment: Vasectomy   Other Topics Concern     Parent/sibling w/ CABG, MI or angioplasty before 65F 55M? No      Service Not Asked     Blood Transfusions Not Asked     Caffeine Concern Yes     Comment: 2 big cups coffee daily     Occupational Exposure Not Asked     Hobby Hazards Not Asked     Sleep Concern No     Comment: sleeping better since shoulder replaced 11/3/16     Stress Concern No     Weight Concern Yes     Special Diet Yes     Comment: trying to do more lean meats     Back Care Not Asked     Exercise No     Comment: limited - knee     Bike Helmet Not Asked     Seat  "Belt Not Asked     Self-Exams Not Asked   Social History Narrative     Not on file     Social Determinants of Health     Financial Resource Strain: Not on file   Food Insecurity: Not on file   Transportation Needs: Not on file   Physical Activity: Not on file   Stress: Not on file   Social Connections: Not on file   Intimate Partner Violence: Not on file   Housing Stability: Not on file         FAMILY HISTORY:  Family History   Problem Relation Age of Onset     Genitourinary Problems Mother         renal failure     Heart Disease Mother      Cerebrovascular Disease Mother         TIA     Cardiovascular Father         rupture of dorsal aorta     Depression Son      Depression Brother         Suicide     Substance Abuse Brother         Heroin     Skin Cancer No family hx of          PHYSICAL EXAM:  Vital signs:  BP (!) 156/86 (Cuff Size: Adult Large)   Pulse 87   Temp 98.8  F (37.1  C) (Tympanic)   Resp 16   Ht 1.849 m (6' 0.8\")   Wt 124.7 kg (275 lb)   SpO2 96%   BMI 36.48 kg/m     GENERAL/CONSTITUTIONAL: Appears well, no acute distress.  HEENT: Gaze conjugate, pupils equal and round. No scleral icterus.  LYMPH: No cervical, supraclavicular, axillary or inguinal adenopathy.   RESPIRATORY: Lungs clear to auscultation bilaterally. No crackles or wheezing.   CARDIOVASCULAR: Regular rate and rhythm without murmurs, gallops, or rubs.    GASTROINTESTINAL: No organomegaly, masses, or tenderness.  No guarding.  No distention.  MUSCULOSKELETAL: Mild lower extremity edema.  NEUROLOGIC:  Alert and oriented, answers questions appropriately.  No evidence of ataxia or tremor.  Speech fluent.  INTEGUMENTARY: Numerous scars and significant solar skin change.  Small erosion at site of biopsy on his left cheek.  Reconstruction is very well healed.      LABS:  CBC RESULTS:   Recent Labs   Lab Test 03/16/23  0919   WBC 6.2   RBC 4.56   HGB 14.6   HCT 41.8   MCV 92   MCH 32.0   MCHC 34.9   RDW 15.0          Recent Labs "   Lab Test 03/16/23  0919 10/06/22  1128   * 124*   POTASSIUM 5.0 5.1   CHLORIDE 93* 92*   CO2 24 27   ANIONGAP 11 5   GLC 77 81   BUN 12.2 10   CR 0.71 0.56*   BENJI 9.5 9.2         PATHOLOGY:  6/22/23 Biopsy left cheek:  Microscopic Description   UUMAYO   Within the upper dermis, the specimen exhibits densely cellular, nodular aggregates of severely atypical pleomorphic epithelioid cells with mitotic activity, which label positively with Melan-A.  No definite intraepidermal component is identified.  ERG immunostain is negative among lesional cells and also fails to demonstrate evidence of lymphovascular invasion.  These changes are consistent with metastatic or locoregional recurrence of melanoma.     3/30/23 Surgical re-excision left cheek:  Final Diagnosis   A(1). Skin, left cheek, wide local excision:  - Residual malignant melanoma, completely excised - (see comment)     Comment  UUMAYO   The final Breslow depth is 3.5 mm. Microstage remains pT3b.     2/21/23 Biopsy left cheek:  Final Diagnosis   A(1). L cheek:  - Malignant melanoma, ulcerated, with nodular appearance and Breslow depth at least 2.2mm, extending to the lateral and deep margins - (see comment and description)      As this lesion appears to arise at a site within or near a previously diagnosed melanoma (see our case [id number]), it may represent a locoregional recurrence. A re-excision is recommended at this site to ensure complete removal.       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  We discussed the significance of finding of an in-transit metastasis.  We discussed concepts of local and distant recurrence.  He is unfortunately at significant risk of metastatic recurrent disease.  We reviewed evidence supporting use of adjuvant immunotherapy to reduce risk of recurrence.  For example, Checkmate 238 compared adjuvant nivolumab to ipilimumab in stage III/IV patients and demonstrated  significant reduction in risk of recurrence in all patient populations.  Without further therapy his risk of recurrent disease is substantial, in the 50-75% range.     We discussed potential side effects and risks of immunotherapy including immune mediated toxicity which can be substantial and life threatening.  Rate of serious adverse events in the clinical trial was 17% so most patients tolerated it well. We discussed treatment schedule and goals of therapy and he would like to pursue treatment.  I would recommend pursuing a staging evaluation with full body PET/CT and CNS imaging.  He does have a pacemaker in place although did have an MRI of his neck last year so it may be MRI compliant.  Will order CT of head for now until can confirm if it is safe to have an MRI.    He also has very poor venous access and we agreed that a portacath would likely make the process easier for him.  He will need to hold anticoagulation for a day prior.  All questions addressed.       PLAN:    Labs today to include CBC, CMP, LDH  PET/CT  CT head  Portacath placement  Tentatively plan to initiate nivolumab 480 mg IV every 4 weeks x 1 year to start in about 3 weeks pending insurance authorization.      Total time is 80 minutes including record review, face to face time, documentation and orders.      Dilshad Holbrook MD  Hematology/Oncology  [facility]

## 2023-07-03 ENCOUNTER — PRE VISIT (OUTPATIENT)
Dept: ONCOLOGY | Facility: CLINIC | Age: 78
End: 2023-07-03

## 2023-07-03 ENCOUNTER — ONCOLOGY VISIT (OUTPATIENT)
Dept: ONCOLOGY | Facility: CLINIC | Age: 78
End: 2023-07-03
Attending: DERMATOLOGY
Payer: COMMERCIAL

## 2023-07-03 ENCOUNTER — LAB (OUTPATIENT)
Dept: ONCOLOGY | Facility: CLINIC | Age: 78
End: 2023-07-03
Attending: INTERNAL MEDICINE
Payer: COMMERCIAL

## 2023-07-03 ENCOUNTER — PATIENT OUTREACH (OUTPATIENT)
Dept: ONCOLOGY | Facility: CLINIC | Age: 78
End: 2023-07-03

## 2023-07-03 ENCOUNTER — THERAPY VISIT (OUTPATIENT)
Dept: PHYSICAL THERAPY | Facility: CLINIC | Age: 78
End: 2023-07-03
Payer: COMMERCIAL

## 2023-07-03 VITALS
TEMPERATURE: 98.8 F | HEART RATE: 87 BPM | OXYGEN SATURATION: 96 % | DIASTOLIC BLOOD PRESSURE: 86 MMHG | SYSTOLIC BLOOD PRESSURE: 156 MMHG | HEIGHT: 73 IN | WEIGHT: 275 LBS | BODY MASS INDEX: 36.45 KG/M2 | RESPIRATION RATE: 16 BRPM

## 2023-07-03 DIAGNOSIS — L81.4 LENTIGO: ICD-10-CM

## 2023-07-03 DIAGNOSIS — C43.39 MELANOMA OF CHEEK (H): Primary | ICD-10-CM

## 2023-07-03 DIAGNOSIS — D18.01 ANGIOMA OF SKIN: ICD-10-CM

## 2023-07-03 DIAGNOSIS — D48.5 NEOPLASM OF UNCERTAIN BEHAVIOR OF SKIN: ICD-10-CM

## 2023-07-03 DIAGNOSIS — C43.39 MELANOMA OF CHEEK (H): ICD-10-CM

## 2023-07-03 DIAGNOSIS — Z85.820 HISTORY OF MELANOMA: ICD-10-CM

## 2023-07-03 DIAGNOSIS — L82.1 SEBORRHEIC KERATOSIS: ICD-10-CM

## 2023-07-03 DIAGNOSIS — L57.0 AK (ACTINIC KERATOSIS): ICD-10-CM

## 2023-07-03 DIAGNOSIS — D23.9 DERMAL NEVUS: ICD-10-CM

## 2023-07-03 DIAGNOSIS — Z96.652 STATUS POST TOTAL LEFT KNEE REPLACEMENT: Primary | ICD-10-CM

## 2023-07-03 LAB
ALBUMIN SERPL BCG-MCNC: 4.3 G/DL (ref 3.5–5.2)
ALP SERPL-CCNC: 231 U/L (ref 40–129)
ALT SERPL W P-5'-P-CCNC: 36 U/L (ref 0–70)
ANION GAP SERPL CALCULATED.3IONS-SCNC: 11 MMOL/L (ref 7–15)
AST SERPL W P-5'-P-CCNC: 39 U/L (ref 0–45)
BASOPHILS # BLD MANUAL: 0 10E3/UL (ref 0–0.2)
BASOPHILS NFR BLD MANUAL: 0 %
BILIRUB SERPL-MCNC: 0.4 MG/DL
BUN SERPL-MCNC: 10.4 MG/DL (ref 8–23)
CALCIUM SERPL-MCNC: 9.2 MG/DL (ref 8.8–10.2)
CHLORIDE SERPL-SCNC: 90 MMOL/L (ref 98–107)
CREAT SERPL-MCNC: 0.59 MG/DL (ref 0.67–1.17)
DEPRECATED HCO3 PLAS-SCNC: 23 MMOL/L (ref 22–29)
EOSINOPHIL # BLD MANUAL: 0.3 10E3/UL (ref 0–0.7)
EOSINOPHIL NFR BLD MANUAL: 5 %
ERYTHROCYTE [DISTWIDTH] IN BLOOD BY AUTOMATED COUNT: 14 % (ref 10–15)
GFR SERPL CREATININE-BSD FRML MDRD: >90 ML/MIN/1.73M2
GLUCOSE SERPL-MCNC: 84 MG/DL (ref 70–99)
HCT VFR BLD AUTO: 39.1 % (ref 40–53)
HGB BLD-MCNC: 14 G/DL (ref 13.3–17.7)
LDH SERPL L TO P-CCNC: 187 U/L (ref 0–250)
LYMPHOCYTES # BLD MANUAL: 0.6 10E3/UL (ref 0.8–5.3)
LYMPHOCYTES NFR BLD MANUAL: 11 %
MCH RBC QN AUTO: 32.9 PG (ref 26.5–33)
MCHC RBC AUTO-ENTMCNC: 35.8 G/DL (ref 31.5–36.5)
MCV RBC AUTO: 92 FL (ref 78–100)
MONOCYTES # BLD MANUAL: 0.6 10E3/UL (ref 0–1.3)
MONOCYTES NFR BLD MANUAL: 10 %
NEUTROPHILS # BLD MANUAL: 4.2 10E3/UL (ref 1.6–8.3)
NEUTROPHILS NFR BLD MANUAL: 74 %
PLAT MORPH BLD: ABNORMAL
PLATELET # BLD AUTO: 172 10E3/UL (ref 150–450)
POTASSIUM SERPL-SCNC: 4.8 MMOL/L (ref 3.4–5.3)
PROT SERPL-MCNC: 7.3 G/DL (ref 6.4–8.3)
RBC # BLD AUTO: 4.25 10E6/UL (ref 4.4–5.9)
RBC MORPH BLD: ABNORMAL
SODIUM SERPL-SCNC: 124 MMOL/L (ref 136–145)
WBC # BLD AUTO: 5.7 10E3/UL (ref 4–11)

## 2023-07-03 PROCEDURE — 97530 THERAPEUTIC ACTIVITIES: CPT | Mod: GP | Performed by: PHYSICAL THERAPIST

## 2023-07-03 PROCEDURE — 97112 NEUROMUSCULAR REEDUCATION: CPT | Mod: GP | Performed by: PHYSICAL THERAPIST

## 2023-07-03 PROCEDURE — 85007 BL SMEAR W/DIFF WBC COUNT: CPT | Performed by: INTERNAL MEDICINE

## 2023-07-03 PROCEDURE — 83615 LACTATE (LD) (LDH) ENZYME: CPT | Performed by: INTERNAL MEDICINE

## 2023-07-03 PROCEDURE — 36415 COLL VENOUS BLD VENIPUNCTURE: CPT

## 2023-07-03 PROCEDURE — 99205 OFFICE O/P NEW HI 60 MIN: CPT | Performed by: INTERNAL MEDICINE

## 2023-07-03 PROCEDURE — 97110 THERAPEUTIC EXERCISES: CPT | Mod: GP | Performed by: PHYSICAL THERAPIST

## 2023-07-03 PROCEDURE — G0463 HOSPITAL OUTPT CLINIC VISIT: HCPCS | Performed by: INTERNAL MEDICINE

## 2023-07-03 PROCEDURE — 85027 COMPLETE CBC AUTOMATED: CPT | Performed by: INTERNAL MEDICINE

## 2023-07-03 PROCEDURE — 80053 COMPREHEN METABOLIC PANEL: CPT | Performed by: INTERNAL MEDICINE

## 2023-07-03 RX ORDER — LORAZEPAM 2 MG/ML
0.5 INJECTION INTRAMUSCULAR EVERY 4 HOURS PRN
Status: CANCELLED | OUTPATIENT
Start: 2023-07-24

## 2023-07-03 RX ORDER — METHYLPREDNISOLONE SODIUM SUCCINATE 125 MG/2ML
125 INJECTION, POWDER, LYOPHILIZED, FOR SOLUTION INTRAMUSCULAR; INTRAVENOUS
Status: CANCELLED
Start: 2023-07-24

## 2023-07-03 RX ORDER — HEPARIN SODIUM,PORCINE 10 UNIT/ML
5-20 VIAL (ML) INTRAVENOUS DAILY PRN
Status: CANCELLED | OUTPATIENT
Start: 2023-07-24

## 2023-07-03 RX ORDER — EPINEPHRINE 1 MG/ML
0.3 INJECTION, SOLUTION INTRAMUSCULAR; SUBCUTANEOUS EVERY 5 MIN PRN
Status: CANCELLED | OUTPATIENT
Start: 2023-07-24

## 2023-07-03 RX ORDER — HEPARIN SODIUM (PORCINE) LOCK FLUSH IV SOLN 100 UNIT/ML 100 UNIT/ML
5 SOLUTION INTRAVENOUS
Status: CANCELLED | OUTPATIENT
Start: 2023-07-24

## 2023-07-03 RX ORDER — DIPHENHYDRAMINE HYDROCHLORIDE 50 MG/ML
50 INJECTION INTRAMUSCULAR; INTRAVENOUS
Status: CANCELLED
Start: 2023-07-24

## 2023-07-03 RX ORDER — ALBUTEROL SULFATE 90 UG/1
1-2 AEROSOL, METERED RESPIRATORY (INHALATION)
Status: CANCELLED
Start: 2023-07-24

## 2023-07-03 RX ORDER — ALBUTEROL SULFATE 0.83 MG/ML
2.5 SOLUTION RESPIRATORY (INHALATION)
Status: CANCELLED | OUTPATIENT
Start: 2023-07-24

## 2023-07-03 RX ORDER — MEPERIDINE HYDROCHLORIDE 25 MG/ML
25 INJECTION INTRAMUSCULAR; INTRAVENOUS; SUBCUTANEOUS EVERY 30 MIN PRN
Status: CANCELLED | OUTPATIENT
Start: 2023-07-24

## 2023-07-03 ASSESSMENT — PAIN SCALES - GENERAL: PAINLEVEL: MILD PAIN (2)

## 2023-07-03 NOTE — PROGRESS NOTES
"[facility]: Cancer Care Plan of Care Education Note                                    Discussion with Patient:                                                      Writer met face to face with Alden and wife, Renea, in the cancer center to provide port and treatment plan education. Writer provided \"Your Implanted Port\" document as well as [facility] notes on Opdivo.     Medication understanding/side effect: Opdivo infusions Q4 weeks  Port concerns: Placement date TBD by IR (after Mohs procedure)    Assessment:                                                      Assessment completed with:: Patient;Spouse or significant other    Current living arrangement  I live in a private home with family    Plan of Care Education   Yearly learning assessment completed?: Yes (see Education tab)  Diagnosis:: Melanoma  Does patient understand diagnosis?: Yes  Tx plan/regimen:: Opdivo Q4 weeks  Does patient understand treatment plan/regimen?: Yes  Preparing for treatment:: Reviewed treatment preparation information with patient (vascular access, day of chemo, visitor policy, what to bring, etc.)  Vascular access:: Port  Side effect education:: Diarrhea/Constipation;Fatigue;Lab value monitoring (anemia, neutropenia, thrombocytopenia);Nausea/Vomiting;Skin changes  Supportive services:: Oncology behavioral health;Palliative care;Nutrition;Social work  Transportation means:: Regular car  Safety/self care at home reviewed with patient:: Yes  Informal Support system:: Spouse;Family;Friends  Coping - concerns/fears reviewed with patient:: Yes  Plan of Care:: Imaging;Treatment schedule  When to call provider:: Bleeding;Uncontrolled nausea/vomiting;Increased shortness of breath;Shaking chills;Temperature >100.4F;Uncontrolled diarrhea/constipation;New/worsening pain  Reasons for deferring treatment reviewed with patient:: Yes    Evaluation of Learning  Patient Education Provided: Yes  Readiness:: Acceptance  Method:: " "Booklet/Handout;Teach Back;Explanation  Response:: Verbalizes understanding      Intervention/Education provided during outreach:                                                       Writer reviewed port education with Alden and Renea including hibiclens shower preparation, eating/drinking restrictions, infection prevention, possible risks, and after care. Writer explained that the port is available for immediate use once placed. Writer explained Alden will need transportation from a friend or family member for the same-day procedure. Writer encouraged Alden to have supervision for 24 hours following port placement in the event of complications. Writer provided 2 bottles of hibiclens to Alden for preparatory shower the night before and morning of procedure. Alden explained that he's completed the hibiclens showers \"many times\" in the past and denied further questions. Alden understands that the port placement should be completed after his upcoming Mohs procedure in 1.5 weeks. IR referral placed by Dr. Holbrook.     Writer reviewed general targeted therapy education with Alden and Renea including infusion procedures, what to wear/bring, nutrition, and infection prevention protocols. Alden will have a  for his first infusion. Writer introduced available ancillary services including oncology dietician, , , and psychologist. Alden will outreach the care team if he is interested in any of these support services.     Writer reviewed [facility] notes on Opdivo with Alden and Renea including administration, mechanism of action, and common side effects. Alden verbalized understanding to alert the care team of any side effects including vomiting, diarrhea, shortness of breath, or cough.      Writer reviewed possible myelosuppression with Alden and Renea and proper infection prevention practices. Writer encouraged Alden to check his temperature frequently and to call the clinic right away with any fevers or " signs/symptoms of infection. Alden confirmed that he has a reliable thermometer at home. Writer educated on lab monitoring and the potential to defer or postpone treatments for patient safety if lab parameters are not met. Alden verbalized understanding of all of the above.     Alden and Renea asked thoughtful questions which were all answered to their satisfaction. Writer offered tour of the infusion center, but Alden and Renea declined. Writer formally introduced RNCC role and Dr. Holbrook's typical RNCC, Lee Ann. Writer confirmed that Phil has the clinic phone number to call with any further questions or concerns ahead of upcoming appointments. He understands that this number can be used 24/7 for triage.     Dr. Holbrook ordered PET scan and brain MRI to be completed ahead of treatment. Writer sent Alden's pacemaker information to Dr. Holbrook for confirmation of safety for MRI scanning. CT head would be appropriate alternative if MRI is not an option. [facility] for updated appointments including imaging, port placement, and initial opdivo infusion.    Jazmin Ferrari, RN, BSN, OCN  Nurse Care Coordinator  [facility] [place]  P: [phone]     F: [phone]

## 2023-07-03 NOTE — PROGRESS NOTES
Medical Assistant Note:  James Salvador presents today for blood draw.    Patient seen by provider today: Yes: Yusef.   present during visit today: Not Applicable.    Concerns: No Concerns.    Procedure:  Lab draw site: left antecub, Needle type: butterfly, Gauge: 23.    Post Assessment:  Labs drawn without difficulty: Yes.    Discharge Plan:  Departure Mode: Ambulatory.    Face to Face Time: 10.    Becki Perez

## 2023-07-03 NOTE — PATIENT INSTRUCTIONS
You have stage 3 melanoma based on the fact that the tumor removed in March invaded deeply and the skin was ulcerated.  Also, the area that was biopsied in June appeared to be a metastasis from the tumor in March.  I'd think of this as the same as an involved lymph node.  This unfortunately places you at high risk of the cancer coming back somewhere else in your body.    Treatment with immunotherapy has been very effective in reducing the risk of melanoma like yours coming back.  I would recommend a one year course of a drug called nivolumab (Opdivo) which will be given once per month by IV.  This gets your immune system activated to attack any cancer cells that might be hiding out in your body.    We first need to make sure there is not spread of the cancer now so we will get a PET scan to look at all of your organs and an MRI to look at your brain.    Finally, because your veins don't look that great we'll order a portacath before treatment starts.  We can use this to both draw blood and give the treatments.    We'll tentatively plan to start treatment in about 3 weeks to give you time to recover from your MOHS surgery.

## 2023-07-03 NOTE — LETTER
7/3/2023         RE: James Salvador  [address] [place] [address]        Dear Colleague,    Thank you for referring your patient, James Salvador, to the [facility]. Please see a copy of my visit note below.    [facility]    Hematology/Oncology New Patient Note      Today's Date: 7/3/23    Reason for Consultation: Melanoma  Referring Provider: Dr. Jv Dutta      HISTORY OF PRESENT ILLNESS: James Salvador is a 78 year old male who presents for evaluation of metastatic melanoma.  His relevant history dates to 10/2019 when he underwent excision of a Breslow depth 0.6 mm malignant melanoma, lentigo maligna type from the left zygoma.  [place] genomic testing (Decision-DX) was performed demonstrating low risk so SLN biopsy was not performed.  He subsequently underwent MOHS surgery on 11/13/19.      He did well until 2/21/2023 when he presented with a non-healing lesion on his left cheek that had persisted for several months and had become tender.  Shave biopsy was performed which demonstrated malignant melanoma, ulcerated with nodular appearance with Breslow depth of at least 2.2mm, extending to the lateral and deep margins.      He was seen by Dr. Minaya in ENT and underwent wide local excision with SLN biopsy on 3/30/23.  Lymphoscintigraphy traced to the preauricular area but no sentinel node was identified there.  Pathology from the re-excision demonstrated residual melanoma with a Brealow depth of 3.5mm.  Margins were negative.  Surgical reconstruction of the surgical defect with flap placement was performed on 4/13 by Dr. Sanchez.    He followed-up in dermatology clinic on 6/22 and had noted a new pigmented lesion on his left cheek.  Biopsy was obtained demonstrating melanoma. No intraepidermal component was identified so consistent with a metastatic lesion.  No lymphvascular invasion was seen.     /He has clinically been stable.   He is scheduled for MOHS surgery on 7/13.  He has clinically been stable, primary physical issue is chronic left knee pain after a complicated knee replacement revision about a year ago, working with PT but this limits his mobility.  He has had a 60 pound intentional weight loss over that past year to help with some of the orthopedic issues.  He also has chronic pain from right trigeminal neuralgia, takes gabapentin and Trileptal to help control the pain but that does affect his balance.  Had a TURP procedure earlier this year which was complicated by a Staph infection so that was also a difficult recovery but now resolved.  Otherwise he has not noted any changes in his health.  Specifically no change in energy or appetite, no new focal pain, no respiratory of GI complaints. Denies any history of autoimmune problems.        REVIEW OF SYSTEMS:   A 14 point ROS was reviewed with pertinent positives and negatives in the HPI.        HOME MEDICATIONS:  Current Outpatient Medications   Medication Sig Dispense Refill     acetaminophen (TYLENOL) 325 MG tablet Take 650 mg by mouth 4 times daily       apixaban ANTICOAGULANT (ELIQUIS ANTICOAGULANT) 5 MG tablet Take 1 tablet (5 mg) by mouth 2 times daily 180 tablet 1     atorvastatin (LIPITOR) 80 MG tablet Take 1 tablet (80 mg) by mouth At Bedtime 90 tablet 3     carvedilol (COREG) 3.125 MG tablet Take 1 tablet (3.125 mg) by mouth 2 times daily (with meals) 180 tablet 3     desonide (DESOWEN) 0.05 % external cream Apply topically daily       fluticasone (FLONASE) 50 MCG/ACT nasal spray Spray 2 sprays into both nostrils daily 48 mL 3     furosemide (LASIX) 20 MG tablet TAKE 1 TABLET BY MOUTH EVERY DAY 90 tablet 3     gabapentin (NEURONTIN) 300 MG capsule Take 2 capsules (600 mg) by mouth 3 times daily       ketoconazole (NIZORAL) 2 % cream Apply topically 2 times daily For face. FAX REFILL REQUESTS TO  [facility]: [phone] 30 g 2     ketoconazole (NIZORAL) 2 % external shampoo  Use daily as needed 120 mL 11     lisinopril (ZESTRIL) 30 MG tablet Take 1 tablet (30 mg) by mouth daily 90 tablet 3     nitroGLYcerin (NITROSTAT) 0.4 MG sublingual tablet Place 1 tablet (0.4 mg) under the tongue every 5 minutes as needed for chest pain May repeat twice for a total of 3 tablets.  If chest pain not relieved, call 911 25 tablet 11     OXcarbazepine (TRILEPTAL) 300 MG tablet Take 2 tablets (600 mg) by mouth 3 times daily ( @ 7am, 1530 and 2200 hrs each day)       triamcinolone (KENALOG) 0.1 % external lotion Apply to scalp BID x 1-2 weeks then PRN only 60 mL 3     COMPOUNDED NON-CONTROLLED SUBSTANCE (CMPD RX) - PHARMACY TO MIX COMPOUNDED MEDICATION Fluorouracil 5% Calcipotriene 0.005% 1:1 Cream apply twice daily for 1 weeks to face, arms for 14 days (Patient not taking: Reported on 7/3/2023) 30 g 6         ALLERGIES:  Allergies   Allergen Reactions     Cats      Cat Dander     Dogs      Dog Dander     Hydromorphone      Other reaction(s): Confusion     Pollen Extract          PAST MEDICAL HISTORY:  Past Medical History:   Diagnosis Date     Actinic 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 Cardiac Cath 9/2015: BRIDGET to LAD     Hearing problem 1995    Wear hearing aids     Hyperlipidemia      Malignant melanoma      Morbid obesity 01/11/2016     Paroxysmal supraventricular tachycardia     on metoprolol     Permanent atrial fibrillation 04/21/2017     Squamous cell carcinoma      Tachy-edinson syndrome 05/29/2019         PAST SURGICAL HISTORY:  Past Surgical History:   Procedure Laterality Date     ADENOIDECTOMY  Childhood     ANGIOPLASTY  1994    in [place]     ANKLE SURGERY  07/13/2005    right ankle     ARTHROPLASTY HIP Right 10/2009     BIOPSY NODE SENTINEL Left 03/30/2023    Procedure: BIOPSY, LYMPH NODE,  SENTINEL;  Surgeon: Rolando Minaya MD;  Location: [facility] OR     COLONOSCOPY  12     EP PERM PACER SINGLE LEAD N/A 2019    Medtronic single lead pacemaker     EXCISE MASS CHEEK Left 2023    Procedure: wide local excision of left cheek melanoma;  Surgeon: Rolando Minaya MD;  Location: [facility] OR     EXCISE MASS CHEEK WITH FLAP PEDICLE Left 2023    Procedure: Left cervical Facial flap per closure of left cheek Defect;  Surgeon: Ila Sanchez MD;  Location: [facility] OR     Facial biopsy - Melanoma       GENITOURINARY SURGERY  10/20/23    Prostate surgery     HEART CATH STENT COR W/WO PTCA  2015    BRIDGET stent mid LAD     LASER SURGERY OF EYE  2002     MOHS MICROGRAPHIC PROCEDURE  2004    squamous cell carcinoma right temple     SINUS SURGERY  2006     TONSILLECTOMY  Childhood         SOCIAL HISTORY:  Social History     Socioeconomic History     Marital status:      Spouse name: Not on file     Number of children: 3     Years of education: Not on file     Highest education level: Not on file   Occupational History     Employer: RETIRED   Tobacco Use     Smoking status: Former     Packs/day: 0.50     Years: 10.00     Pack years: 5.00     Types: Cigarettes, Cigars, Pipe     Start date: 1966     Quit date: 1974     Years since quittin.2     Smokeless tobacco: Never     Tobacco comments:     Also smoked from 1985 - 1990   Substance and Sexual Activity     Alcohol use: Not Currently     Comment: Socially     Drug use: No     Sexual activity: Not Currently     Partners: Female     Birth control/protection: Male Surgical     Comment: Vasectomy   Other Topics Concern     Parent/sibling w/ CABG, MI or angioplasty before 65F 55M? No      Service Not Asked     Blood Transfusions Not Asked     Caffeine Concern Yes     Comment: 2 big cups coffee daily     Occupational Exposure Not Asked     Hobby Hazards Not Asked     Sleep Concern No     Comment:  "sleeping better since shoulder replaced 11/3/16     Stress Concern No     Weight Concern Yes     Special Diet Yes     Comment: trying to do more lean meats     Back Care Not Asked     Exercise No     Comment: limited - knee     Bike Helmet Not Asked     Seat Belt Not Asked     Self-Exams Not Asked   Social History Narrative     Not on file     Social Determinants of Health     Financial Resource Strain: Not on file   Food Insecurity: Not on file   Transportation Needs: Not on file   Physical Activity: Not on file   Stress: Not on file   Social Connections: Not on file   Intimate Partner Violence: Not on file   Housing Stability: Not on file         FAMILY HISTORY:  Family History   Problem Relation Age of Onset     Genitourinary Problems Mother         renal failure     Heart Disease Mother      Cerebrovascular Disease Mother         TIA     Cardiovascular Father         rupture of dorsal aorta     Depression Son      Depression Brother         Suicide     Substance Abuse Brother         Heroin     Skin Cancer No family hx of          PHYSICAL EXAM:  Vital signs:  BP (!) 156/86 (Cuff Size: Adult Large)   Pulse 87   Temp 98.8  F (37.1  C) (Tympanic)   Resp 16   Ht 1.849 m (6' 0.8\")   Wt 124.7 kg (275 lb)   SpO2 96%   BMI 36.48 kg/m     GENERAL/CONSTITUTIONAL: Appears well, no acute distress.  HEENT: Gaze conjugate, pupils equal and round. No scleral icterus.  LYMPH: No cervical, supraclavicular, axillary or inguinal adenopathy.   RESPIRATORY: Lungs clear to auscultation bilaterally. No crackles or wheezing.   CARDIOVASCULAR: Regular rate and rhythm without murmurs, gallops, or rubs.    GASTROINTESTINAL: No organomegaly, masses, or tenderness.  No guarding.  No distention.  MUSCULOSKELETAL: Mild lower extremity edema.  NEUROLOGIC:  Alert and oriented, answers questions appropriately.  No evidence of ataxia or tremor.  Speech fluent.  INTEGUMENTARY: Numerous scars and significant solar skin change.  Small " erosion at site of biopsy on his left cheek.  Reconstruction is very well healed.      LABS:  CBC RESULTS:   Recent Labs   Lab Test 03/16/23  0919   WBC 6.2   RBC 4.56   HGB 14.6   HCT 41.8   MCV 92   MCH 32.0   MCHC 34.9   RDW 15.0          Recent Labs   Lab Test 03/16/23  0919 10/06/22  1128   * 124*   POTASSIUM 5.0 5.1   CHLORIDE 93* 92*   CO2 24 27   ANIONGAP 11 5   GLC 77 81   BUN 12.2 10   CR 0.71 0.56*   BENJI 9.5 9.2         PATHOLOGY:  6/22/23 Biopsy left cheek:  Microscopic Description   [facility]   Within the upper dermis, the specimen exhibits densely cellular, nodular aggregates of severely atypical pleomorphic epithelioid cells with mitotic activity, which label positively with Melan-A.  No definite intraepidermal component is identified.  ERG immunostain is negative among lesional cells and also fails to demonstrate evidence of lymphovascular invasion.  These changes are consistent with metastatic or locoregional recurrence of melanoma.     3/30/23 Surgical re-excision left cheek:  Final Diagnosis   A(1). Skin, left cheek, wide local excision:  - Residual malignant melanoma, completely excised - (see comment)     Comment  [facility]   The final Breslow depth is 3.5 mm. Microstage remains pT3b.     2/21/23 Biopsy left cheek:  Final Diagnosis   A(1). L cheek:  - Malignant melanoma, ulcerated, with nodular appearance and Breslow depth at least 2.2mm, extending to the lateral and deep margins - (see comment and description)      As this lesion appears to arise at a site within or near a previously diagnosed melanoma (see our case [id number]), it may represent a locoregional recurrence. A re-excision is recommended at this site to ensure complete removal.       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  We discussed the significance of finding of an in-transit metastasis.  We discussed concepts of local and distant recurrence.  He is  unfortunately at significant risk of metastatic recurrent disease.  We reviewed evidence supporting use of adjuvant immunotherapy to reduce risk of recurrence.  For example, Checkmate 238 compared adjuvant nivolumab to ipilimumab in stage III/IV patients and demonstrated significant reduction in risk of recurrence in all patient populations.  Without further therapy his risk of recurrent disease is substantial, in the 50-75% range.     We discussed potential side effects and risks of immunotherapy including immune mediated toxicity which can be substantial and life threatening.  Rate of serious adverse events in the clinical trial was 17% so most patients tolerated it well. We discussed treatment schedule and goals of therapy and he would like to pursue treatment.  I would recommend pursuing a staging evaluation with full body PET/CT and CNS imaging.  He does have a pacemaker in place although did have an MRI of his neck last year so it may be MRI compliant.  Will order CT of head for now until can confirm if it is safe to have an MRI.    He also has very poor venous access and we agreed that a portacath would likely make the process easier for him.  He will need to hold anticoagulation for a day prior.  All questions addressed.       PLAN:    Labs today to include CBC, CMP, LDH  PET/CT  CT head  Portacath placement  Tentatively plan to initiate nivolumab 480 mg IV every 4 weeks x 1 year to start in about 3 weeks pending insurance authorization.      Total time is 80 minutes including record review, face to face time, documentation and orders.      Dilshad Holbrook MD  Hematology/Oncology  [facility]      Again, thank you for allowing me to participate in the care of your patient.        Sincerely,        Dilshad Holbrook MD

## 2023-07-03 NOTE — NURSING NOTE
"Oncology Rooming Note    July 3, 2023 3:04 PM   James Salvador is a 78 year old male who presents for:    Chief Complaint   Patient presents with     Oncology Clinic Visit     Melanoma-new patient     Initial Vitals: BP (!) 156/86 (Cuff Size: Adult Large)   Pulse 87   Temp 98.8  F (37.1  C) (Tympanic)   Resp 16   Ht 1.849 m (6' 0.8\")   Wt 124.7 kg (275 lb)   SpO2 96%   BMI 36.48 kg/m   Estimated body mass index is 36.48 kg/m  as calculated from the following:    Height as of this encounter: 1.849 m (6' 0.8\").    Weight as of this encounter: 124.7 kg (275 lb). Body surface area is 2.53 meters squared.  Mild Pain (2) Comment: Data Unavailable   No LMP for male patient.  Allergies reviewed: Yes  Medications reviewed: Yes    Medications: Medication refills not needed today.  Pharmacy name entered into [facility]:    [id number] - [place], [place] - [address] AT [facility] [address] [place]    Clinical concerns: new patient       Pooja Rosen CMA              "

## 2023-07-05 ENCOUNTER — TRANSFERRED RECORDS (OUTPATIENT)
Dept: HEALTH INFORMATION MANAGEMENT | Facility: CLINIC | Age: 78
End: 2023-07-05
Payer: COMMERCIAL

## 2023-07-05 NOTE — PROGRESS NOTES
Per Dr. Holbrook's request, sabrinar outreached to the Radiology team at [facility] to inquire about the safety of the patient receiving an MRI with a pacemaker in place. Per staff at [facility], they will not complete MRIs for patients with pacemakers, but that the [facility] or [facility] are able to sometimes accommodate these patients. Writer outreached to the [facility]'s Radiology team ( [phone]), and spoke with Nano. Nano indicated that an order for the MRI would need to be placed, and then their team would work on the follow up clearance needed by the cardiology team to complete the MRI. Nano indicated that this process could take up to 7 business days. Writer updated Dr. Holbrook of the above via secure chat in Epic, and will assist as needed moving forward with this. Per patient report, the following is the information our team was given regarding his pacemaker:  Placed 5/31/19  Model # [id number]  Assure XTSR MRI Sure Scan  Serial # [id number]     Writer called patient to introduce self and follow up with patient regarding the potential MRI needed. Patient requested that writer call his wife, Renea, as he was at a doctor's appointment. Writer called Renea to update her of the above. Renea stated understanding, all questions answered.     Renea also requested to have insurance authorization completed now for upcoming planned Opdivo infusions, as she indicated through her own research, that they may have a co-pay for these infusions. She also indicated, per her research, that the ordered PET would be covered. Writer will outreach the infusion finance team to request an insurance coverage check to be completed and will follow up with Renea once received. Renea aware to expect a call from the scheduling team for patient's Port placement, as well as for the PET scan and MRI (once ordered).     Lee Ann Washington RN on 7/5/2023 at 4:05 PM

## 2023-07-07 NOTE — PROGRESS NOTES
"Writer called and patient and spouse, Renea, joined call as well to update them that Dr. Holbrook made the decision proceed with the head CT scan, instead of the MRI due to potential safety concerns. Patient asking if his pacemaker will need to be \"shut down\" for the CT and PET scan, and writer informed him that it typically is not required, but that writer will outreach the radiology team to confirm and then update him with their response on Monday, 7/10/23. Writer called the radiology team and per their staff, the CT and PET would not interfere with his pacemaker/pacemaker would not need to be \"shut down\". Writer will plan to follow up with patient on Monday, 7/10/23, with this response.    Writer also updated patient and spouse that the finance team is working on checking insurance coverage for upcoming planned Opdivo infusions. Writer to update patient/spouse once a response is received by the finance team.     Lee Ann Washington RN on 7/7/2023 at 4:21 PM    "

## 2023-07-10 NOTE — PROGRESS NOTES
"Writer able to connect with patient to update him that, per radiology team, his pacemaker will not need to be \"shut down\" for his CT and PET imaging. Patient stated understanding, all questions answered. Writer transferred called directly to scheduling team member to assist with scheduling needed upcoming appointments.     Writer still awaiting response for the finance team regarding patient's insurance coverage for upcoming planned Opdivo infusions.     Lee Ann Washington RN on 7/10/2023 at 11:44 AM    "

## 2023-07-11 DIAGNOSIS — C43.39 MELANOMA OF CHEEK (H): Primary | ICD-10-CM

## 2023-07-11 NOTE — PROGRESS NOTES
Surgical Office Location:  [facility]  [address] [place] [id number]

## 2023-07-13 ENCOUNTER — MYC MEDICAL ADVICE (OUTPATIENT)
Dept: INTERVENTIONAL RADIOLOGY/VASCULAR | Facility: CLINIC | Age: 78
End: 2023-07-13

## 2023-07-13 ENCOUNTER — OFFICE VISIT (OUTPATIENT)
Dept: DERMATOLOGY | Facility: CLINIC | Age: 78
End: 2023-07-13
Payer: COMMERCIAL

## 2023-07-13 ENCOUNTER — TELEPHONE (OUTPATIENT)
Dept: OTOLARYNGOLOGY | Facility: CLINIC | Age: 78
End: 2023-07-13

## 2023-07-13 VITALS — DIASTOLIC BLOOD PRESSURE: 66 MMHG | SYSTOLIC BLOOD PRESSURE: 125 MMHG | HEART RATE: 65 BPM

## 2023-07-13 DIAGNOSIS — D18.01 ANGIOMA OF SKIN: ICD-10-CM

## 2023-07-13 DIAGNOSIS — Z85.820 HISTORY OF MELANOMA: Primary | ICD-10-CM

## 2023-07-13 DIAGNOSIS — D23.9 DERMAL NEVUS: ICD-10-CM

## 2023-07-13 DIAGNOSIS — L82.1 SEBORRHEIC KERATOSIS: ICD-10-CM

## 2023-07-13 DIAGNOSIS — L81.4 LENTIGO: ICD-10-CM

## 2023-07-13 DIAGNOSIS — C43.39 MELANOMA OF CHEEK (H): ICD-10-CM

## 2023-07-13 PROCEDURE — 88342 IMHCHEM/IMCYTCHM 1ST ANTB: CPT | Mod: 59 | Performed by: DERMATOLOGY

## 2023-07-13 PROCEDURE — 99213 OFFICE O/P EST LOW 20 MIN: CPT | Mod: 25 | Performed by: DERMATOLOGY

## 2023-07-13 PROCEDURE — 17311 MOHS 1 STAGE H/N/HF/G: CPT | Performed by: DERMATOLOGY

## 2023-07-13 PROCEDURE — 12051 INTMD RPR FACE/MM 2.5 CM/<: CPT | Performed by: DERMATOLOGY

## 2023-07-13 PROCEDURE — 88341 IMHCHEM/IMCYTCHM EA ADD ANTB: CPT | Performed by: DERMATOLOGY

## 2023-07-13 ASSESSMENT — PAIN SCALES - GENERAL: PAINLEVEL: NO PAIN (0)

## 2023-07-13 NOTE — PATIENT INSTRUCTIONS
Open Wound Care     for left cheek - 7/13/23        No strenuous activity for 48 hours    Take Tylenol as needed for discomfort.                                                .         Do not drink alcoholic beverages for 48 hours.    Keep the pressure bandage in place for 24 hours. If the bandage becomes blood tinged or loose, reinforce it with gauze and tape.        Remove bandage in 24 hours and begin wound care as follows:     Clean area with tap water using a Q tip or gauze pad, (shower / bathe normally)  Dry wound with Q tip or gauze pad  Apply Polysporin or Bacitracin Ointment with a Q tip  Do NOT use Neosporin Ointment *  Cover the wound with a band-aid or nonstick gauze pad and paper tape.  Repeat wound care once a day until wound is completely healed.    It is an old wives tale that a wound heals better when it is exposed to air and allowed to dry out. The wound will heal faster with a better cosmetic result if it is kept moist with ointment and covered with a bandage.    Do not let the wound dry out.    Supplies Needed:                Qtips or gauze pads                Polysporin or Bacitracin Ointment                Bandaids or nonstick gauze pads and paper tape    BLEEDING:    Use tightly rolled up gauze or cloth to apply direct pressure over the bandage for 20   minutes.  Reapply pressure for an additional 20 minutes if necessary  Call the office or go to the nearest emergency room if pressure fails to stop the bleeding.  Use additional gauze and tape to maintain pressure once the bleeding has stopped.  Begin wound care 24 hours after surgery as directed.                WOUND HEALING    One week after surgery a pink / red halo will form around the outside of the wound.   This is new skin.  The center of the wound will appear yellowish white and produce some drainage.  The pink halo will slowly migrate in toward the center of the wound until the wound is covered with new shiny pink skin.  There  will be no more drainage when the wound is completely healed.  It will take six months to one year for the redness to fade.  The scar may be itchy, tight and sensitive to extreme temperatures for a year after the surgery.  Massaging the area several times a day for several minutes after the wound is completely healed will help the scar soften and normalize faster. Begin massage only after healing is complete.    In case of emergency phone:[phone]

## 2023-07-13 NOTE — TELEPHONE ENCOUNTER
Spoke with patient regarding rescheduling appointment. Patient accepted new appointment and is rescheduled accordingly. Patient aware of new appointment time, date, and location. Patient stated they would see appointment info in [facility]ag[facility]. Sent patient new appointment letter to address in chart.

## 2023-07-13 NOTE — PROGRESS NOTES
James Salvador is an extremely pleasant 78 year old year old male patient here today for hx of melanoma.  10/2019 he underwent excision of a Breslow depth 0.6 mm malignant melanoma, lentigo maligna type from the left zygoma. [place] genomic testing (Decision-DX) was performed demonstrating low risk so SLN biopsy was not performed. 2/21/23 He was dx with 2.2mm melanoma, WLE and nodes by ENT and nodes negative.  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  Here for excision today.  HE is getting ET /CT and staging for immune check point therapy and has seen Oncology.  He notes spot on right shoulder today.      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 Cardiac Cath 9/2015: BRIDGET to LAD    Hearing problem 1995    Wear hearing aids    Hyperlipidemia     Malignant melanoma     Morbid obesity 01/11/2016    Paroxysmal supraventricular tachycardia     on metoprolol    Permanent atrial fibrillation 04/21/2017    Squamous cell carcinoma     Tachy-deinson syndrome 05/29/2019       Past Surgical History:   Procedure Laterality Date    ADENOIDECTOMY  Childhood    ANGIOPLASTY  1994    in [place]    ANKLE SURGERY  07/13/2005    right ankle    ARTHROPLASTY HIP Right 10/2009    BIOPSY NODE SENTINEL Left 03/30/2023    Procedure: BIOPSY, LYMPH NODE, SENTINEL;  Surgeon: Rolando Minaya MD;  Location: [facility] OR    COLONOSCOPY  4/25/12    EP PERM PACER SINGLE LEAD N/A 05/31/2019    Medtronic single lead pacemaker    EXCISE MASS CHEEK Left 03/30/2023    Procedure: wide local excision of left cheek melanoma;  Surgeon: Rolando Minaya MD;  Location: [facility] OR    EXCISE MASS CHEEK WITH FLAP PEDICLE Left 04/13/2023    Procedure: Left cervical Facial flap per closure of left cheek Defect;  Surgeon: Ila Sanchez,  MD;  Location: [facility] OR    Facial biopsy - Melanoma      GENITOURINARY SURGERY  10/20/23    Prostate surgery    HEART CATH STENT COR W/WO PTCA  2015    BRIDGET stent mid LAD    LASER SURGERY OF EYE  2002    MOHS MICROGRAPHIC PROCEDURE  2004    squamous cell carcinoma right temple    SINUS SURGERY  2006    TONSILLECTOMY  Childhood        Family History   Problem Relation Age of Onset    Genitourinary Problems Mother         renal failure    Heart Disease Mother     Cerebrovascular Disease Mother         TIA    Cardiovascular Father         rupture of dorsal aorta    Depression Son     Depression Brother         Suicide    Substance Abuse Brother         Heroin    Skin Cancer No family hx of        Social History     Socioeconomic History    Marital status:      Spouse name: Not on file    Number of children: 3    Years of education: Not on file    Highest education level: Not on file   Occupational History     Employer: RETIRED   Tobacco Use    Smoking status: Former     Packs/day: 0.50     Years: 10.00     Pack years: 5.00     Types: Cigarettes, Cigars, Pipe     Start date: 1966     Quit date: 1974     Years since quittin.2    Smokeless tobacco: Never    Tobacco comments:     Also smoked from 1985 - 1990   Substance and Sexual Activity    Alcohol use: Not Currently     Comment: Socially    Drug use: No    Sexual activity: Not Currently     Partners: Female     Birth control/protection: Male Surgical     Comment: Vasectomy   Other Topics Concern    Parent/sibling w/ CABG, MI or angioplasty before 65F 55M? No     Service Not Asked    Blood Transfusions Not Asked    Caffeine Concern Yes     Comment: 2 big cups coffee daily    Occupational Exposure Not Asked    Hobby Hazards Not Asked    Sleep Concern No     Comment: sleeping better since shoulder replaced 11/3/16    Stress Concern No    Weight Concern Yes    Special Diet Yes     Comment: trying to do more lean meats     Back Care Not Asked    Exercise No     Comment: limited - knee    Bike Helmet Not Asked    Seat Belt Not Asked    Self-Exams Not Asked   Social History Narrative    Not on file     Social Determinants of Health     Financial Resource Strain: Not on file   Food Insecurity: Not on file   Transportation Needs: Not on file   Physical Activity: Not on file   Stress: Not on file   Social Connections: Not on file   Intimate Partner Violence: Not on file   Housing Stability: Not on file       Outpatient Encounter Medications as of 7/13/2023   Medication Sig Dispense Refill    acetaminophen (TYLENOL) 325 MG tablet Take 650 mg by mouth 4 times daily      apixaban ANTICOAGULANT (ELIQUIS ANTICOAGULANT) 5 MG tablet Take 1 tablet (5 mg) by mouth 2 times daily 180 tablet 1    atorvastatin (LIPITOR) 80 MG tablet Take 1 tablet (80 mg) by mouth At Bedtime 90 tablet 3    carvedilol (COREG) 3.125 MG tablet Take 1 tablet (3.125 mg) by mouth 2 times daily (with meals) 180 tablet 3    COMPOUNDED NON-CONTROLLED SUBSTANCE (CMPD RX) - PHARMACY TO MIX COMPOUNDED MEDICATION Fluorouracil 5% Calcipotriene 0.005% 1:1 Cream apply twice daily for 1 weeks to face, arms for 14 days (Patient not taking: Reported on 7/13/2023) 30 g 6    desonide (DESOWEN) 0.05 % external cream Apply topically daily      fluticasone (FLONASE) 50 MCG/ACT nasal spray Spray 2 sprays into both nostrils daily 48 mL 3    furosemide (LASIX) 20 MG tablet TAKE 1 TABLET BY MOUTH EVERY DAY 90 tablet 3    gabapentin (NEURONTIN) 300 MG capsule Take 2 capsules (600 mg) by mouth 3 times daily      ketoconazole (NIZORAL) 2 % cream Apply topically 2 times daily For face. FAX REFILL REQUESTS TO [facility]: [phone] 30 g 2    ketoconazole (NIZORAL) 2 % external shampoo Use daily as needed 120 mL 11    lisinopril (ZESTRIL) 30 MG tablet Take 1 tablet (30 mg) by mouth daily 90 tablet 3    nitroGLYcerin (NITROSTAT) 0.4 MG sublingual tablet Place 1 tablet (0.4 mg) under the tongue every  5 minutes as needed for chest pain May repeat twice for a total of 3 tablets.  If chest pain not relieved, call 911 25 tablet 11    OXcarbazepine (TRILEPTAL) 300 MG tablet Take 2 tablets (600 mg) by mouth 3 times daily ( @ 7am, 1530 and 2200 hrs each day)      triamcinolone (KENALOG) 0.1 % external lotion Apply to scalp BID x 1-2 weeks then PRN only 60 mL 3     No facility-administered encounter medications on file as of 7/13/2023.             O:   NAD, WDWN, Alert & Oriented, Mood & Affect wnl, Vitals stable   Here today alone   /66   Pulse 65    General appearance normal   Vitals stable   Alert, oriented and in no acute distress      Following lymph nodes palpated: Occipital, Cervical, Supraclavicular , axilla no lad  R shoulder stuck on papule      Stuck on papules and brown macules on trunk and ext   Red papules on trunk  Flesh colored papules on trunk  L cheek 1cm red plaque         Eyes: Conjunctivae/lids:Normal     ENT: Lips, buccal mucosa, tongue: normal    MSK:Normal    Cardiovascular: peripheral edema none    Pulm: Breathing Normal    Lymph Nodes: No Head and Neck Lymphadenopathy     Neuro/Psych: Orientation:Alert and Orientedx3 ; Mood/Affect:normal       A/P:  1. Seborrheic keratosis, lentigo, angioma, dermal nevus, hx of melanoma , Stage IIIC  MELANOMA DISCUSSED WITH PATIENT:  I discussed the specifics of tumor, prognosis, metachronous melanoma, self exam, and genetics with the patient. I explained the need for monthly skin exams including and taught the patient how to do this. Patient was asked about new or changing moles . I discussed with patient signs and symptoms that could arise in the setting of recurrent locoregional or metastatic disease. In addition, the need to undergo every 4 month dermatologic full skin survey and evaluation given that patients with a diagnosis of melanoma are at risk of recurrence (local and distant) and of subsequent de marty melanoma.    It was a pleasure speaking  to James Salvador today.  Previous clinic notes and pertinent laboratory tests were reviewed prior to James Salvador's visit.  The following medical tests were ordered and interpreted to assist in the evaluation and management of James Salvador's issue.    Signs and Symptoms of skin cancer discussed with patient.  Patient encouraged to perform monthly skin exams.  UV precautions reviewed with patient.  Return to clinic 3 months  PROCEDURE NOTE  In transit melanoma L cheek   MELANOMA DISCUSSED WITH PATIENT:  I discussed the specifics of tumor, prognosis, metachronous melanoma, self exam, and genetics with the patient. I explained the need for monthly skin exams including and taught the patient how to do this. Patient was asked about new or changing moles . I discussed with patient signs and symptoms that could arise in the setting of recurrent locoregional or metastatic disease. In addition, the need to undergo every 4 month dermatologic full skin survey and evaluation given that patients with a diagnosis of melanoma are at risk of recurrence (local and distant) and of subsequent de marty melanoma.  . I reviewed treatment options, including a discussion of wide excision (the gold standard) versus Mohs surgery with MART-1 immunostains.     Note: MART-1 (Melanoma Antigen Recognized by T-cells) antibody immunostaining was used during Mohs surgery as per standard protocol, in addition to routine processing of all specimens with hematoxylin and eosin. The peripheral margins/edges were processed with the MART-1 stain (2 specimens total). The center was examined with hematoxylin & eosin and MART-1 immunostains. The patient was informed of the procedure and its risk/benefits during the consent for the procedure.    One or more of the reagents used in immunohistochemical testing in this case may not have been cleared or approved by the U.S. Food and Drug Administration (FDA). The FDA has determined that such clearance or  approval is not necessary. These tests are used for clinical purposes. They should not be regarded as investigational or for research. These reagents  performance characteristics have been determined by [facility]. This laboratory is certified under the Clinical Laboratory Improvement Amendments of 1988 (CLIA-88) as qualified to perform high complexity clinical laboratory testing.      MOHS:   Aggressive histology    The rationale for Mohs surgery was discussed with the patient and consent was obtained.  The risks and benefits as well as alternatives to therapy were discussed, in detail.  Specifically, the risks of infection, scarring, bleeding, prolonged wound healing, incomplete removal, allergy to anesthesia, nerve injury and recurrence were addressed.  Indication for Mohs was Aggressive histology. Prior to the procedure, the treatment site was clearly identified and, if available, confirmed with previous photos and confirmed by the patient   All components of the Universal Protocol/PAUSE rule were completed.  The Mohs surgeon operated in two distinct and integrated capacities as the surgeon and pathologist.      The area was prepped with Betasept.  A rim of normal appearing skin was marked circumferentially around the lesion.  The area was infiltrated with local anesthesia.  The tumor was first debulked to remove all clinically apparent tumor.  An incision following the standard Mohs approach was done and the specimen was oriented,mapped and placed in 3 block(s).  Each specimen was then chromacoded and processed in the Mohs laboratory using standard Mohs technique and submitted for frozen section histology.  Frozen section analysis showed no residual tumor but CLEAR MARGINS.      The tumor was excised using standard Mohs technique in 1 stages(s).  MART 1 stains were performed on 2 specimens. CLEAR MARGINS OBTAINED and Final defect size was 2.1cm.     We discussed the options for wound management  in full with the patient including risks/benefits/ possible outcomes.      Because of the relatively superficial nature of the wound and patient s preference, an intermediate repair was planned.  Guiding sutures were carefully placed across the wound to control the direction of wound closure, to prevent distortion from wound contraction, and to minimize wound size to facilitate wound care and healing.  No complications; EBL minimal.  Routine wound care discussed with patient.    2.1cm    Return to clinic 1 month

## 2023-07-13 NOTE — LETTER
7/13/2023         RE: James Salvador  [address] [place] [address]        Dear Colleague,    Thank you for referring your patient, James Salvador, to the [facility]. Please see a copy of my visit note below.    Surgical Office Location:  [facility]  [address] [place] [id number]      James Salvador is an extremely pleasant 78 year old year old male patient here today for hx of melanoma.  10/2019 he underwent excision of a Breslow depth 0.6 mm malignant melanoma, lentigo maligna type from the left zygoma. [place] genomic testing (Decision-DX) was performed demonstrating low risk so SLN biopsy was not performed. 2/21/23 He was dx with 2.2mm melanoma, WLE and nodes by ENT and nodes negative.  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  Here for excision today.  HE is getting ET /CT and staging for immune check point therapy and has seen Oncology.  He notes spot on right shoulder today.      Past Medical History:   Diagnosis Date     Actinic 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 Cardiac Cath 9/2015: BRIDGET to LAD     Hearing problem 1995    Wear hearing aids     Hyperlipidemia      Malignant melanoma      Morbid obesity 01/11/2016     Paroxysmal supraventricular tachycardia     on metoprolol     Permanent atrial fibrillation 04/21/2017     Squamous cell carcinoma      Tachy-edinson syndrome 05/29/2019       Past Surgical History:   Procedure Laterality Date     ADENOIDECTOMY  Childhood     ANGIOPLASTY  1994    in [place]     ANKLE SURGERY  07/13/2005    right ankle     ARTHROPLASTY HIP Right 10/2009     BIOPSY NODE SENTINEL Left 03/30/2023    Procedure: BIOPSY, LYMPH NODE, SENTINEL;  Surgeon: Rolando Minaya MD;  Location: [facility] OR     COLONOSCOPY  4/25/12      EP PERM PACER SINGLE LEAD N/A 2019    Medtronic single lead pacemaker     EXCISE MASS CHEEK Left 2023    Procedure: wide local excision of left cheek melanoma;  Surgeon: Rolando Minaya MD;  Location: [facility] OR     EXCISE MASS CHEEK WITH FLAP PEDICLE Left 2023    Procedure: Left cervical Facial flap per closure of left cheek Defect;  Surgeon: Ila Sanchez MD;  Location: [facility] OR     Facial biopsy - Melanoma       GENITOURINARY SURGERY  10/20/23    Prostate surgery     HEART CATH STENT COR W/WO PTCA  2015    BRIDGET stent mid LAD     LASER SURGERY OF EYE  2002     MOHS MICROGRAPHIC PROCEDURE  2004    squamous cell carcinoma right temple     SINUS SURGERY  2006     TONSILLECTOMY  Childhood        Family History   Problem Relation Age of Onset     Genitourinary Problems Mother         renal failure     Heart Disease Mother      Cerebrovascular Disease Mother         TIA     Cardiovascular Father         rupture of dorsal aorta     Depression Son      Depression Brother         Suicide     Substance Abuse Brother         Heroin     Skin Cancer No family hx of        Social History     Socioeconomic History     Marital status:      Spouse name: Not on file     Number of children: 3     Years of education: Not on file     Highest education level: Not on file   Occupational History     Employer: RETIRED   Tobacco Use     Smoking status: Former     Packs/day: 0.50     Years: 10.00     Pack years: 5.00     Types: Cigarettes, Cigars, Pipe     Start date: 1966     Quit date: 1974     Years since quittin.2     Smokeless tobacco: Never     Tobacco comments:     Also smoked from 1985 - 1990   Substance and Sexual Activity     Alcohol use: Not Currently     Comment: Socially     Drug use: No     Sexual activity: Not Currently     Partners: Female     Birth control/protection: Male Surgical     Comment: Vasectomy   Other Topics Concern     Parent/sibling w/  CABG, MI or angioplasty before 65F 55M? No      Service Not Asked     Blood Transfusions Not Asked     Caffeine Concern Yes     Comment: 2 big cups coffee daily     Occupational Exposure Not Asked     Hobby Hazards Not Asked     Sleep Concern No     Comment: sleeping better since shoulder replaced 11/3/16     Stress Concern No     Weight Concern Yes     Special Diet Yes     Comment: trying to do more lean meats     Back Care Not Asked     Exercise No     Comment: limited - knee     Bike Helmet Not Asked     Seat Belt Not Asked     Self-Exams Not Asked   Social History Narrative     Not on file     Social Determinants of Health     Financial Resource Strain: Not on file   Food Insecurity: Not on file   Transportation Needs: Not on file   Physical Activity: Not on file   Stress: Not on file   Social Connections: Not on file   Intimate Partner Violence: Not on file   Housing Stability: Not on file       Outpatient Encounter Medications as of 7/13/2023   Medication Sig Dispense Refill     acetaminophen (TYLENOL) 325 MG tablet Take 650 mg by mouth 4 times daily       apixaban ANTICOAGULANT (ELIQUIS ANTICOAGULANT) 5 MG tablet Take 1 tablet (5 mg) by mouth 2 times daily 180 tablet 1     atorvastatin (LIPITOR) 80 MG tablet Take 1 tablet (80 mg) by mouth At Bedtime 90 tablet 3     carvedilol (COREG) 3.125 MG tablet Take 1 tablet (3.125 mg) by mouth 2 times daily (with meals) 180 tablet 3     COMPOUNDED NON-CONTROLLED SUBSTANCE (CMPD RX) - PHARMACY TO MIX COMPOUNDED MEDICATION Fluorouracil 5% Calcipotriene 0.005% 1:1 Cream apply twice daily for 1 weeks to face, arms for 14 days (Patient not taking: Reported on 7/13/2023) 30 g 6     desonide (DESOWEN) 0.05 % external cream Apply topically daily       fluticasone (FLONASE) 50 MCG/ACT nasal spray Spray 2 sprays into both nostrils daily 48 mL 3     furosemide (LASIX) 20 MG tablet TAKE 1 TABLET BY MOUTH EVERY DAY 90 tablet 3     gabapentin (NEURONTIN) 300 MG capsule Take 2  capsules (600 mg) by mouth 3 times daily       ketoconazole (NIZORAL) 2 % cream Apply topically 2 times daily For face. FAX REFILL REQUESTS TO  [facility]: [phone] 30 g 2     ketoconazole (NIZORAL) 2 % external shampoo Use daily as needed 120 mL 11     lisinopril (ZESTRIL) 30 MG tablet Take 1 tablet (30 mg) by mouth daily 90 tablet 3     nitroGLYcerin (NITROSTAT) 0.4 MG sublingual tablet Place 1 tablet (0.4 mg) under the tongue every 5 minutes as needed for chest pain May repeat twice for a total of 3 tablets.  If chest pain not relieved, call 911 25 tablet 11     OXcarbazepine (TRILEPTAL) 300 MG tablet Take 2 tablets (600 mg) by mouth 3 times daily ( @ 7am, 1530 and 2200 hrs each day)       triamcinolone (KENALOG) 0.1 % external lotion Apply to scalp BID x 1-2 weeks then PRN only 60 mL 3     No facility-administered encounter medications on file as of 7/13/2023.             O:   NAD, WDWN, Alert & Oriented, Mood & Affect wnl, Vitals stable   Here today alone   /66   Pulse 65    General appearance normal   Vitals stable   Alert, oriented and in no acute distress      Following lymph nodes palpated: Occipital, Cervical, Supraclavicular , axilla no lad  R shoulder stuck on papule      Stuck on papules and brown macules on trunk and ext   Red papules on trunk  Flesh colored papules on trunk  L cheek 1cm red plaque         Eyes: Conjunctivae/lids:Normal     ENT: Lips, buccal mucosa, tongue: normal    MSK:Normal    Cardiovascular: peripheral edema none    Pulm: Breathing Normal    Lymph Nodes: No Head and Neck Lymphadenopathy     Neuro/Psych: Orientation:Alert and Orientedx3 ; Mood/Affect:normal       A/P:  1. Seborrheic keratosis, lentigo, angioma, dermal nevus, hx of melanoma , Stage IIIC  MELANOMA DISCUSSED WITH PATIENT:  I discussed the specifics of tumor, prognosis, metachronous melanoma, self exam, and genetics with the patient. I explained the need for monthly skin exams including and taught the patient  how to do this. Patient was asked about new or changing moles . I discussed with patient signs and symptoms that could arise in the setting of recurrent locoregional or metastatic disease. In addition, the need to undergo every 4 month dermatologic full skin survey and evaluation given that patients with a diagnosis of melanoma are at risk of recurrence (local and distant) and of subsequent de marty melanoma.    It was a pleasure speaking to James Salvador today.  Previous clinic notes and pertinent laboratory tests were reviewed prior to James SCHUSTER Madeleine's visit.  The following medical tests were ordered and interpreted to assist in the evaluation and management of James Salvador's issue.    Signs and Symptoms of skin cancer discussed with patient.  Patient encouraged to perform monthly skin exams.  UV precautions reviewed with patient.  Return to clinic 3 months  PROCEDURE NOTE  In transit melanoma L cheek   MELANOMA DISCUSSED WITH PATIENT:  I discussed the specifics of tumor, prognosis, metachronous melanoma, self exam, and genetics with the patient. I explained the need for monthly skin exams including and taught the patient how to do this. Patient was asked about new or changing moles . I discussed with patient signs and symptoms that could arise in the setting of recurrent locoregional or metastatic disease. In addition, the need to undergo every 4 month dermatologic full skin survey and evaluation given that patients with a diagnosis of melanoma are at risk of recurrence (local and distant) and of subsequent de marty melanoma.  . I reviewed treatment options, including a discussion of wide excision (the gold standard) versus Mohs surgery with MART-1 immunostains.     Note: MART-1 (Melanoma Antigen Recognized by T-cells) antibody immunostaining was used during Mohs surgery as per standard protocol, in addition to routine processing of all specimens with hematoxylin and eosin. The peripheral margins/edges were  processed with the MART-1 stain (2 specimens total). The center was examined with hematoxylin & eosin and MART-1 immunostains. The patient was informed of the procedure and its risk/benefits during the consent for the procedure.    One or more of the reagents used in immunohistochemical testing in this case may not have been cleared or approved by the U.S. Food and Drug Administration (FDA). The FDA has determined that such clearance or approval is not necessary. These tests are used for clinical purposes. They should not be regarded as investigational or for research. These reagents  performance characteristics have been determined by [facility]. This laboratory is certified under the Clinical Laboratory Improvement Amendments of 1988 (CLIA-88) as qualified to perform high complexity clinical laboratory testing.      MOHS:   Aggressive histology    The rationale for Mohs surgery was discussed with the patient and consent was obtained.  The risks and benefits as well as alternatives to therapy were discussed, in detail.  Specifically, the risks of infection, scarring, bleeding, prolonged wound healing, incomplete removal, allergy to anesthesia, nerve injury and recurrence were addressed.  Indication for Mohs was Aggressive histology. Prior to the procedure, the treatment site was clearly identified and, if available, confirmed with previous photos and confirmed by the patient   All components of the Universal Protocol/PAUSE rule were completed.  The Mohs surgeon operated in two distinct and integrated capacities as the surgeon and pathologist.      The area was prepped with Betasept.  A rim of normal appearing skin was marked circumferentially around the lesion.  The area was infiltrated with local anesthesia.  The tumor was first debulked to remove all clinically apparent tumor.  An incision following the standard Mohs approach was done and the specimen was oriented,mapped and placed in 3 block(s).   Each specimen was then chromacoded and processed in the Mohs laboratory using standard Mohs technique and submitted for frozen section histology.  Frozen section analysis showed no residual tumor but CLEAR MARGINS.      The tumor was excised using standard Mohs technique in 1 stages(s).  MART 1 stains were performed on 2 specimens. CLEAR MARGINS OBTAINED and Final defect size was 2.1cm.     We discussed the options for wound management in full with the patient including risks/benefits/ possible outcomes.      Because of the relatively superficial nature of the wound and patient s preference, an intermediate repair was planned.  Guiding sutures were carefully placed across the wound to control the direction of wound closure, to prevent distortion from wound contraction, and to minimize wound size to facilitate wound care and healing.  No complications; EBL minimal.  Routine wound care discussed with patient.    2.1cm    Return to clinic 1 month        Again, thank you for allowing me to participate in the care of your patient.        Sincerely,        Jv Dutta MD

## 2023-07-17 ENCOUNTER — THERAPY VISIT (OUTPATIENT)
Dept: PHYSICAL THERAPY | Facility: CLINIC | Age: 78
End: 2023-07-17
Payer: COMMERCIAL

## 2023-07-17 DIAGNOSIS — Z96.652 STATUS POST TOTAL LEFT KNEE REPLACEMENT: Primary | ICD-10-CM

## 2023-07-17 PROCEDURE — 97112 NEUROMUSCULAR REEDUCATION: CPT | Mod: GP | Performed by: PHYSICAL THERAPIST

## 2023-07-17 PROCEDURE — 97110 THERAPEUTIC EXERCISES: CPT | Mod: GP | Performed by: PHYSICAL THERAPIST

## 2023-07-17 NOTE — PROGRESS NOTES
07/17/23 0500   Appointment Info   Signing clinician's name / credentials Ene Shirley, PT, DPT   Total/Authorized Visits 48 (E&T)   Visits Used 42   Medical Diagnosis Status post total left knee replacement   PT Tx Diagnosis s/p L TKA revision   Quick Adds Certification   Progress Note/Certification   Start of Care Date 08/31/22   Onset of illness/injury or Date of Surgery 07/07/22   Therapy Frequency every other week   Predicted Duration 12 weeks   Certification date from 07/17/23   Certification date to 10/09/23   Progress Note Due Date 07/11/23  (MD follow up)   Progress Note Completed Date 05/15/23   GOALS   PT Goals 2;3   PT Goal 1   Goal Identifier walking   Goal Description Patient will be able to walk 20 min w/ SEC.   Rationale to maximize safety and independence with self cares;to maximize safety and independence within the community;to maximize safety and independence within the home;to maximize safety and independence with performance of ADLs and functional tasks   Goal Progress Able to walk 20-30 with FWW, 5-8 with SEC   Target Date 10/09/23   PT Goal 2   Goal Identifier stairs   Goal Description Patient will be able to ascend 1 flight of stairs, in a normal reciprocal pattern with railing and SEC   Rationale to maximize safety and independence with self cares;to maximize safety and independence within the community;to maximize safety and independence within the home;to maximize safety and independence with performance of ADLs and functional tasks   Goal Progress able to ascend a 4in step in clinic with rail and SEC x 5 reps   Target Date 10/09/23   Subjective Report   Subjective Report Follow up with Dr. Singh last week. Xrays demonstrated lateral tilt of patella. Dr Singh does not want to surgerically repair this, suspects subluxations will be chronic, making stairs difficult. Also had a follow up with dermatology as melanoma spot had returned, this was removed via Mohs procedure. Then follow up  with oncology, will be undergoing a PET and CTscan tomorrow. Wednesday will be receiving a port for chemotherapy.   Objective Measures   Objective Measures Objective Measure 1;Objective Measure 2;Objective Measure 3;Objective Measure 4;Objective Measure 5   Objective Measure 1   Objective Measure Balance   Details FT EC: 3 sec, Tandem L; 5 sec, R: 11 sec; L SLS: unable without hands on counter however, able to weightbear on L LE for 20 sec   Objective Measure 2   Objective Measure Stairs   Details able to ascend a 4in step, w/ hand on rail and SEC x 5 reps w/ CGA   Objective Measure 3   Objective Measure Gait   Details FWW and normal gait pattern, when using SEC cole slows, stride length decreases, mildly off balance, L LE tendency to ER   Objective Measure 4   Objective Measure Strength   Details L Knee Flx: 5/5, Ext: unable today d/t L patellar pain, Hip Flx L: 4/5   Objective Measure 5   Objective Measure Observation   Details L patella shits lateral to trochanteric grove with lateral tilt noted   Treatment Interventions (PT)   Interventions Therapeutic Procedure/Exercise;Therapeutic Activity;Neuromuscular Re-education;Gait Training;Manual Therapy   Therapeutic Procedure/Exercise   Therapeutic Procedures: strength, endurance, ROM, flexibillity minutes (71391) 25   Therapeutic Procedures Ther Proc 2   Ther Proc 1 Treadmill   Ther Proc 1 - Details 0.8mph, 5 min   PTRx Ther Proc 1 Isometric Quad   PTRx Ther Proc 2 Short Arc Knee Extension (Long Sitting)   PTRx Ther Proc 3 Hip Flexion Straight Leg Raise   PTRx Ther Proc 4 Hip AROM Standing Abduction   PTRx Ther Proc 4 - Details 1lb x L 15 reps R x 10 reps vc to avoid leaning   PTRx Ther Proc 5 Hip AROM Standing Extension   PTRx Ther Proc 5 - Details 1lb x L 15 reps R x 10 reps vc to avoid leaning   PTRx Ther Proc 6 Toe Raises   PTRx Ther Proc 6 - Details x 15 reps   PTRx Ther Proc 7 Functional Knee Extension with Tubing   PTRx Ther Proc 7 - Details GTB x 20 reps  w/ SEC   Patient Response/Progress fatigues easily today, states upcoming chemo/tests for melanoma weighing heavily on him, requires frequent reminders for form   Therapeutic Activity   Ther Act 1 Repeated Sit to Stand from raised plinth   PTRx Ther Act 1 Forward Step   PTRx Ther Act 2 Knee Bends   Neuromuscular Re-education   Neuromuscular re-ed of mvmt, balance, coord, kinesthetic sense, posture, proprioception minutes (17215) 15   Neuro Re-ed 1 Cup walking   PTRx Neuro Re-ed 1 Tandem Stance   PTRx Neuro Re-ed 1 - Details 30 sec w/ hand taps on counter B x 2 SBA   PTRx Neuro Re-ed 2 Marching in Place   PTRx Neuro Re-ed 2 - Details 1lb w/ SEC and counter x 10 reps B   PTRx Neuro Re-ed 3 Sidestep   PTRx Neuro Re-ed 3 - Details GTB 45ft B at counter   PTRx Neuro Re-ed 4 Single Leg Stance - Balance Left Foot   PTRx Neuro Re-ed 4 - Details w/ support of 4 fingers on counter L 15 sec x 3   Patient Response/Progress balance work more challenging today   Manual Therapy   Manual Therapy 1 MFR   Plan   Home program see PTrx   Plan for next session progress functional strength and balance   Total Session Time   Timed Code Treatment Minutes 40   Total Treatment Time (sum of timed and untimed services) 40         [facility]                                                                                   OUTPATIENT PHYSICAL THERAPY    PLAN OF TREATMENT FOR OUTPATIENT REHABILITATION   Patient's Last Name, First Name, James Pizarro YOB: 1945   Provider's Name   [facility]   Medical Record No.  [id number]     Onset Date: 07/07/22  Start of Care Date: 08/31/22     Medical Diagnosis:  Status post total left knee replacement      PT Treatment Diagnosis:  s/p L TKA revision Plan of Treatment  Frequency/Duration: every other week/ 12 weeks    Certification date from 07/17/23 to 10/09/23         See note for plan of treatment details and functional  goals     Ene Shirley, VERNA                         I CERTIFY THE NEED FOR THESE SERVICES FURNISHED UNDER        THIS PLAN OF TREATMENT AND WHILE UNDER MY CARE .             Physician Signature               Date    X_____________________________________________________                    Referring Provider:  Mynor Singh      Initial Assessment  See Epic Evaluation- Start of Care Date: 08/31/22            PLAN  Continue therapy per current plan of care.    Beginning/End Dates of Progress Note Reporting Period:  05/15/23 to 07/17/2023    Referring Provider:  Mynor Singh

## 2023-07-18 ENCOUNTER — HOSPITAL ENCOUNTER (OUTPATIENT)
Dept: PET IMAGING | Facility: CLINIC | Age: 78
Discharge: HOME | End: 2023-07-18
Attending: INTERNAL MEDICINE
Payer: COMMERCIAL

## 2023-07-18 DIAGNOSIS — C43.39 MELANOMA OF CHEEK (H): ICD-10-CM

## 2023-07-18 PROCEDURE — 74177 CT ABD & PELVIS W/CONTRAST: CPT

## 2023-07-18 PROCEDURE — 343N000001 HC RX 343: Performed by: INTERNAL MEDICINE

## 2023-07-18 PROCEDURE — 78816 PET IMAGE W/CT FULL BODY: CPT | Mod: PS

## 2023-07-18 PROCEDURE — 70460 CT HEAD/BRAIN W/DYE: CPT

## 2023-07-18 PROCEDURE — 250N000011 HC RX IP 250 OP 636: Performed by: INTERNAL MEDICINE

## 2023-07-18 PROCEDURE — A9552 F18 FDG: HCPCS | Performed by: INTERNAL MEDICINE

## 2023-07-18 PROCEDURE — 78816 PET IMAGE W/CT FULL BODY: CPT | Mod: 26 | Performed by: STUDENT IN AN ORGANIZED HEALTH CARE EDUCATION/TRAINING PROGRAM

## 2023-07-18 PROCEDURE — 74177 CT ABD & PELVIS W/CONTRAST: CPT | Mod: 26 | Performed by: STUDENT IN AN ORGANIZED HEALTH CARE EDUCATION/TRAINING PROGRAM

## 2023-07-18 PROCEDURE — 71260 CT THORAX DX C+: CPT | Mod: 26 | Performed by: STUDENT IN AN ORGANIZED HEALTH CARE EDUCATION/TRAINING PROGRAM

## 2023-07-18 PROCEDURE — 70460 CT HEAD/BRAIN W/DYE: CPT | Mod: 26 | Performed by: STUDENT IN AN ORGANIZED HEALTH CARE EDUCATION/TRAINING PROGRAM

## 2023-07-18 RX ORDER — IOPAMIDOL 755 MG/ML
0-135 INJECTION, SOLUTION INTRAVASCULAR ONCE
Status: COMPLETED | OUTPATIENT
Start: 2023-07-18 | End: 2023-07-18

## 2023-07-18 RX ADMIN — FLUDEOXYGLUCOSE F-18 16.57 MILLICURIE: 500 INJECTION, SOLUTION INTRAVENOUS at 10:03

## 2023-07-18 RX ADMIN — IOPAMIDOL 122 ML: 755 INJECTION, SOLUTION INTRAVENOUS at 11:07

## 2023-07-19 ENCOUNTER — APPOINTMENT (OUTPATIENT)
Dept: INTERVENTIONAL RADIOLOGY/VASCULAR | Facility: CLINIC | Age: 78
End: 2023-07-19
Attending: INTERNAL MEDICINE
Payer: COMMERCIAL

## 2023-07-19 ENCOUNTER — HOSPITAL ENCOUNTER (OUTPATIENT)
Facility: CLINIC | Age: 78
Discharge: HOME | End: 2023-07-19
Admitting: RADIOLOGY
Payer: COMMERCIAL

## 2023-07-19 VITALS
TEMPERATURE: 96.7 F | DIASTOLIC BLOOD PRESSURE: 92 MMHG | OXYGEN SATURATION: 95 % | HEART RATE: 53 BPM | SYSTOLIC BLOOD PRESSURE: 161 MMHG | RESPIRATION RATE: 16 BRPM

## 2023-07-19 DIAGNOSIS — C43.39 MELANOMA OF CHEEK (H): ICD-10-CM

## 2023-07-19 PROCEDURE — 250N000009 HC RX 250

## 2023-07-19 PROCEDURE — 250N000009 HC RX 250: Performed by: PHYSICIAN ASSISTANT

## 2023-07-19 PROCEDURE — 272N000504 HC NEEDLE CR4

## 2023-07-19 PROCEDURE — 36561 INSERT TUNNELED CV CATH: CPT

## 2023-07-19 PROCEDURE — 250N000011 HC RX IP 250 OP 636

## 2023-07-19 PROCEDURE — C1788 PORT, INDWELLING, IMP: HCPCS

## 2023-07-19 PROCEDURE — 250N000011 HC RX IP 250 OP 636: Performed by: PHYSICIAN ASSISTANT

## 2023-07-19 PROCEDURE — 77001 FLUOROGUIDE FOR VEIN DEVICE: CPT

## 2023-07-19 PROCEDURE — 99152 MOD SED SAME PHYS/QHP 5/>YRS: CPT

## 2023-07-19 PROCEDURE — 250N000011 HC RX IP 250 OP 636: Performed by: RADIOLOGY

## 2023-07-19 RX ORDER — LIDOCAINE HYDROCHLORIDE ANHYDROUS AND EPINEPHRINE 10; 10 MG/ML; UG/ML
30 INJECTION, SOLUTION INFILTRATION ONCE
Status: COMPLETED | OUTPATIENT
Start: 2023-07-19 | End: 2023-07-19

## 2023-07-19 RX ORDER — NALOXONE HYDROCHLORIDE 0.4 MG/ML
0.2 INJECTION, SOLUTION INTRAMUSCULAR; INTRAVENOUS; SUBCUTANEOUS
Status: DISCONTINUED | OUTPATIENT
Start: 2023-07-19 | End: 2023-07-19 | Stop reason: HOSPADM

## 2023-07-19 RX ORDER — ACETAMINOPHEN 325 MG/1
650 TABLET ORAL
Status: DISCONTINUED | OUTPATIENT
Start: 2023-07-19 | End: 2023-07-19 | Stop reason: HOSPADM

## 2023-07-19 RX ORDER — HEPARIN SODIUM (PORCINE) LOCK FLUSH IV SOLN 100 UNIT/ML 100 UNIT/ML
SOLUTION INTRAVENOUS
Status: DISCONTINUED
Start: 2023-07-19 | End: 2023-07-19 | Stop reason: HOSPADM

## 2023-07-19 RX ORDER — FENTANYL CITRATE 50 UG/ML
25-50 INJECTION, SOLUTION INTRAMUSCULAR; INTRAVENOUS EVERY 5 MIN PRN
Status: DISCONTINUED | OUTPATIENT
Start: 2023-07-19 | End: 2023-07-19 | Stop reason: HOSPADM

## 2023-07-19 RX ORDER — HEPARIN SODIUM (PORCINE) LOCK FLUSH IV SOLN 100 UNIT/ML 100 UNIT/ML
500 SOLUTION INTRAVENOUS ONCE
Status: COMPLETED | OUTPATIENT
Start: 2023-07-19 | End: 2023-07-19

## 2023-07-19 RX ORDER — FENTANYL CITRATE 50 UG/ML
INJECTION, SOLUTION INTRAMUSCULAR; INTRAVENOUS
Status: COMPLETED
Start: 2023-07-19 | End: 2023-07-19

## 2023-07-19 RX ORDER — NALOXONE HYDROCHLORIDE 0.4 MG/ML
0.4 INJECTION, SOLUTION INTRAMUSCULAR; INTRAVENOUS; SUBCUTANEOUS
Status: DISCONTINUED | OUTPATIENT
Start: 2023-07-19 | End: 2023-07-19 | Stop reason: HOSPADM

## 2023-07-19 RX ORDER — FLUMAZENIL 0.1 MG/ML
0.2 INJECTION, SOLUTION INTRAVENOUS
Status: DISCONTINUED | OUTPATIENT
Start: 2023-07-19 | End: 2023-07-19 | Stop reason: HOSPADM

## 2023-07-19 RX ORDER — CEFAZOLIN SODIUM/WATER 3 G/30 ML
3 SYRINGE (ML) INTRAVENOUS
Status: COMPLETED | OUTPATIENT
Start: 2023-07-19 | End: 2023-07-19

## 2023-07-19 RX ORDER — LIDOCAINE HYDROCHLORIDE AND EPINEPHRINE 10; 10 MG/ML; UG/ML
INJECTION, SOLUTION INFILTRATION; PERINEURAL
Status: COMPLETED
Start: 2023-07-19 | End: 2023-07-19

## 2023-07-19 RX ADMIN — LIDOCAINE HYDROCHLORIDE ANHYDROUS AND EPINEPHRINE 10 ML: 10; 10 INJECTION, SOLUTION INFILTRATION at 10:18

## 2023-07-19 RX ADMIN — LIDOCAINE HYDROCHLORIDE,EPINEPHRINE BITARTRATE 10 ML: 10; .01 INJECTION, SOLUTION INFILTRATION; PERINEURAL at 10:18

## 2023-07-19 RX ADMIN — LIDOCAINE HYDROCHLORIDE 3 ML: 10 INJECTION, SOLUTION EPIDURAL; INFILTRATION; INTRACAUDAL; PERINEURAL at 10:18

## 2023-07-19 RX ADMIN — FENTANYL CITRATE 50 MCG: 50 INJECTION, SOLUTION INTRAMUSCULAR; INTRAVENOUS at 10:04

## 2023-07-19 RX ADMIN — Medication 3 G: at 09:56

## 2023-07-19 RX ADMIN — MIDAZOLAM 0.5 MG: 1 INJECTION INTRAMUSCULAR; INTRAVENOUS at 10:09

## 2023-07-19 RX ADMIN — SODIUM CHLORIDE, PRESERVATIVE FREE 500 UNITS: 5 INJECTION INTRAVENOUS at 10:22

## 2023-07-19 RX ADMIN — MIDAZOLAM 1 MG: 1 INJECTION INTRAMUSCULAR; INTRAVENOUS at 10:04

## 2023-07-19 ASSESSMENT — ACTIVITIES OF DAILY LIVING (ADL)
ADLS_ACUITY_SCORE: 37
ADLS_ACUITY_SCORE: 37

## 2023-07-19 NOTE — DISCHARGE INSTRUCTIONS
Going Home after Your Port Is Inserted  _______________________________________    Patient Name: James Salvador  Today's Date: July 19, 2023    The doctor who inserted your port was:  Dr. Fisher at [facility])      Have your port site and/or neck wound checked on: Next time you see your doctor.      Your port may be accessed right away. A nurse needs to flush your port every 30 days or after each use.     When you get home:  You should have an adult with you for the first 6 hours.  No driving or drinking alcohol until tomorrow. You may still have side effects from the medicine you received today (you may feel drowsy, unsteady or forgetful).   You may go back to your regular diet today.   If you take aspirin or Plavix, you may begin taking it again tomorrow. You may restart all other medicines today. Use pain medicine as directed.  Avoid heavy lifting or the overuse of your shoulder for three days.  Caring for the port site and/or neck wound:  Keep the port site clean and dry. Cover the site with plastic before taking a shower. Do this until the site has healed.   Keep the bandage on your port site for three days. Change it if it gets wet or dirty. After three days, you may use Band-Aids until the wound has healed.  For a neck wound, leave the tape on for three days. You may cover it with a Band-Aid for comfort.   If you have oozing or bleeding from the port site or from the cut in your neck:   Put direct pressure on the wound for 5 to 10 minutes with a gauze pad.  If you still have bleeding after 10 minutes, call your doctor.  If you are bleeding a lot and can't control it with direct pressure, call 911.    Call your doctor if you have:  Bleeding from a wound after 10 minutes of direct pressure.  Swelling in your neck or over your port site.  Signs of infection: a fever over 100 F (37.8 C) under the tongue; the site is red, tender or draining.

## 2023-07-19 NOTE — IR NOTE
Post Procedure Summary:  Prior to the start of the procedure and with procedural staff participation, I verbally confirmed the patient s identity using two indicators, relevant allergies, that the procedure was appropriate and matched the consent or emergent situation, and that the correct equipment/implants were available. Immediately prior to starting the procedure I conducted the Time Out with the procedural staff and re-confirmed the patient s name, procedure, and site/side. (The Joint Commission universal protocol was followed.)  Yes       Sedatives: Fentanyl and Midazolam (Versed)    Vital signs, airway and pulse oximetry were monitored and remained stable throughout the procedure and sedation was maintained until the procedure was complete.  The patient was monitored by staff until sedation discharge criteria were met.    Patient tolerance: Patient tolerated the procedure well with no immediate complications.    Time of sedation in minutes: 20 minutes from beginning to end of physician one to one monitoring.      Port placement performed without complication.  Pt tolerated procedure well.  Post procedure recovery without complication.  Discharge instructions reviewed with the pt and his spouse.  Pt discharged home

## 2023-07-19 NOTE — IP AVS SNAPSHOT
[facility]  [address]  [facility] [address]  Phone: [phone]  Fax: [phone]                              After Visit Summary   7/19/2023    James Salvador   MRN: [id number]           After Visit Summary Signature Page    I have received my discharge instructions, and my questions have been answered. I have discussed any challenges I see with this plan with the nurse or doctor.    ..........................................................................................................................................  Patient/Patient Representative Signature      ..........................................................................................................................................  Patient Representative Print Name and Relationship to Patient    ..................................................               ................................................  Date                                   Time    ..........................................................................................................................................  Reviewed by Signature/Title    ...................................................              ..............................................  Date                                               Time    22EPIC Rev 08/18

## 2023-07-20 ENCOUNTER — TELEPHONE (OUTPATIENT)
Dept: INTERVENTIONAL RADIOLOGY/VASCULAR | Facility: CLINIC | Age: 78
End: 2023-07-20
Payer: COMMERCIAL

## 2023-07-20 NOTE — TELEPHONE ENCOUNTER
POST CALL    Spoke with: James Salvador  Call attempt: 1  Any pain: No  Any fever: No  Any redness/swelling/ abnormal drainage around puncture site: No  Were you instructed well enough to take care of yourself at home: Yes  Are you satisfied with the care you received: Yes  Any additional concerns or questions: No  IR nurse triage line number provided: Yes    Post call completed.   July 20, 2023 10:08 AM  Juliet Contreras RN

## 2023-07-21 ENCOUNTER — PATIENT OUTREACH (OUTPATIENT)
Dept: ONCOLOGY | Facility: CLINIC | Age: 78
End: 2023-07-21
Payer: COMMERCIAL

## 2023-07-21 NOTE — PROGRESS NOTES
Patient called writer back inquiring as to when he could shower after having his port placement. Patient's port placed on 7/19/23. Per clinic guidelines, patient was instructed to keep port area dry for 7 days post insertion. Patient indicated that he was given Tegaderm dressing by IR staff to cover incision areas, and writer advised that he utilize this if he plans to shower before the 7 day post-insertion date. Patient also educated that after the 7 day post-insertion date, that if he does take a shower, to let the water just run over the incisions, to not scrub/scratch the incision sites, or attempt to remove the surgical glue in place.  Patient stated understanding, all questions answered.     Lee Ann Washington RN on 7/21/2023 at 10:46 AM

## 2023-07-21 NOTE — PROGRESS NOTES
Writer called patient and spoke with him with spouse, Renea, on the call as well. Writer relayed, per Dr. Holbrook, results of patient's PET/CT on 7/18/23 showed no sign of cancer spread. Renea/patient stated understanding, all questions answered. Writer confirmed upcoming appointments, and patient indicated he is doing well with recent port placement; no concerns presented at time of call. Patient aware to contact the clinic should he have any further questions, or concerns prior to his appointment on 7/31/23.    Lee Ann Washington RN on 7/21/2023 at 9:35 AM

## 2023-07-24 ENCOUNTER — THERAPY VISIT (OUTPATIENT)
Dept: PHYSICAL THERAPY | Facility: CLINIC | Age: 78
End: 2023-07-24
Payer: COMMERCIAL

## 2023-07-24 DIAGNOSIS — Z96.652 STATUS POST TOTAL LEFT KNEE REPLACEMENT: Primary | ICD-10-CM

## 2023-07-24 PROCEDURE — 97140 MANUAL THERAPY 1/> REGIONS: CPT | Mod: GP | Performed by: PHYSICAL THERAPIST

## 2023-07-24 PROCEDURE — 97110 THERAPEUTIC EXERCISES: CPT | Mod: GP | Performed by: PHYSICAL THERAPIST

## 2023-07-24 PROCEDURE — 97112 NEUROMUSCULAR REEDUCATION: CPT | Mod: GP | Performed by: PHYSICAL THERAPIST

## 2023-07-30 NOTE — PROGRESS NOTES
[facility]    Hematology/Oncology Established Patient Follow-up Note    Oncologic History/Treatment Summary    10/2019: Underwent excision of a Breslow depth 0.6mm malignant melanoma, lentigo maligna type form left zygoma by Dr. Dutta.  Decision-DX was low risk so SLN not performed.  Underwent subsequent MOHS resection.  2/21/23: Shave biopsy of a non-healing lesion on left cheek demonstrated malignant melanoma, ulcerated with nodular appearance and Breslow depth of at least 2.2 mm.  3/32/23: Wide local excision with SLN biopsy performed by Dr. Minaya.  SLN could not be identified.  Pathology demonstrated residual melanoma with Breslow depth 3.5 mm, margins negative.  4/13/23: Surgical reconstruction with flap placement performed by Dr. Sanchez.  6/22/23: Noted new pigmented lesion on left cheek, biopsy demonstrated melanoma.  No intraepidermal component noted consistent with metastasis.  7/13/23: Underwent MOHS resection with clear margins.  7/18/23: PET/CT and CT head demonstrated no definite evidence of metastatic disease, several small (subcentimeter) indeterminate lung nodules noted.      Today's Date: 7/31/23    Reason for Follow-up: Stage IIIC cutaneous melanoma.      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  He is scheduled to initiate adjuvant nivolumab later this week.  He did undergo MOHS resection a couple of weeks ago and that went well.  No new symptoms, continues with physical therapy for his knee.      REVIEW OF SYSTEMS:   A 14 point ROS was reviewed with pertinent positives and negatives in the HPI.       HOME MEDICATIONS:  Current Outpatient Medications   Medication Sig Dispense Refill    acetaminophen (TYLENOL) 325 MG tablet Take 650 mg by mouth 4 times daily      apixaban ANTICOAGULANT (ELIQUIS ANTICOAGULANT) 5 MG tablet Take 1 tablet (5 mg) by mouth 2 times daily 180 tablet  1    atorvastatin (LIPITOR) 80 MG tablet Take 1 tablet (80 mg) by mouth At Bedtime 90 tablet 3    carvedilol (COREG) 3.125 MG tablet Take 1 tablet (3.125 mg) by mouth 2 times daily (with meals) 180 tablet 3    COMPOUNDED NON-CONTROLLED SUBSTANCE (CMPD RX) - PHARMACY TO MIX COMPOUNDED MEDICATION Fluorouracil 5% Calcipotriene 0.005% 1:1 Cream apply twice daily for 1 weeks to face, arms for 14 days 30 g 6    desonide (DESOWEN) 0.05 % external cream Apply topically daily      fluticasone (FLONASE) 50 MCG/ACT nasal spray Spray 2 sprays into both nostrils daily 48 mL 3    furosemide (LASIX) 20 MG tablet TAKE 1 TABLET BY MOUTH EVERY DAY 90 tablet 3    gabapentin (NEURONTIN) 300 MG capsule Take 2 capsules (600 mg) by mouth 3 times daily      ketoconazole (NIZORAL) 2 % cream Apply topically 2 times daily For face. FAX REFILL REQUESTS TO [facility]: [phone] 30 g 2    ketoconazole (NIZORAL) 2 % external shampoo Use daily as needed 120 mL 11    lisinopril (ZESTRIL) 30 MG tablet Take 1 tablet (30 mg) by mouth daily 90 tablet 3    nitroGLYcerin (NITROSTAT) 0.4 MG sublingual tablet Place 1 tablet (0.4 mg) under the tongue every 5 minutes as needed for chest pain May repeat twice for a total of 3 tablets.  If chest pain not relieved, call 911 25 tablet 11    OXcarbazepine (TRILEPTAL) 300 MG tablet Take 2 tablets (600 mg) by mouth 3 times daily ( @ 7am, 1530 and 2200 hrs each day)      triamcinolone (KENALOG) 0.1 % external lotion Apply to scalp BID x 1-2 weeks then PRN only 60 mL 3         ALLERGIES:  Allergies   Allergen Reactions    Cats      Cat Dander    Dogs      Dog Dander    Hydromorphone      Other reaction(s): Confusion    Pollen Extract          PAST MEDICAL HISTORY:  Past Medical History:   Diagnosis Date    Actinic cheilitis 07/17/2013    Lower lip, left     Actinic keratosis     Allergic rhinitis     Anxiety 3/1/23    Arthritis 2004    Basal cell carcinoma     Benign hypertension     CAD (coronary artery disease)      Cardiac cath and PCI 1994. Cardiac Cath 9/2015: BRIDGET to LAD    Hearing problem 1995    Wear hearing aids    Hyperlipidemia     Malignant melanoma     Morbid obesity 01/11/2016    Paroxysmal supraventricular tachycardia     on metoprolol    Permanent atrial fibrillation 04/21/2017    Squamous cell carcinoma     Tachy-edinson syndrome 05/29/2019         PAST SURGICAL HISTORY:  Past Surgical History:   Procedure Laterality Date    ADENOIDECTOMY  Childhood    ANGIOPLASTY  1994    in [place]    ANKLE SURGERY  07/13/2005    right ankle    ARTHROPLASTY HIP Right 10/2009    BIOPSY NODE SENTINEL Left 03/30/2023    Procedure: BIOPSY, LYMPH NODE, SENTINEL;  Surgeon: Rolando Minaya MD;  Location: [facility] OR    COLONOSCOPY  4/25/12    EP PERM PACER SINGLE LEAD N/A 05/31/2019    Medtronic single lead pacemaker    EXCISE MASS CHEEK Left 03/30/2023    Procedure: wide local excision of left cheek melanoma;  Surgeon: Rolando Minaya MD;  Location: [facility] OR    EXCISE MASS CHEEK WITH FLAP PEDICLE Left 04/13/2023    Procedure: Left cervical Facial flap per closure of left cheek Defect;  Surgeon: Ila Sanchez MD;  Location: [facility] OR    Facial biopsy - Melanoma      GENITOURINARY SURGERY  10/20/23    Prostate surgery    HEART CATH STENT COR W/WO PTCA  09/23/2015    BRIDGET stent mid LAD    IR CHEST PORT PLACEMENT > 5 YRS OF AGE  7/19/2023    LASER SURGERY OF EYE  06/01/2002    MOHS MICROGRAPHIC PROCEDURE  06/12/2004    squamous cell carcinoma right temple    SINUS SURGERY  07/11/2006    TONSILLECTOMY  Childhood         SOCIAL HISTORY:  Social History     Socioeconomic History    Marital status:      Spouse name: Not on file    Number of children: 3    Years of education: Not on file    Highest education level: Not on file   Occupational History     Employer: RETIRED   Tobacco Use    Smoking status: Former     Packs/day: 0.50     Years: 10.00     Pack years: 5.00     Types: Cigarettes, Cigars, Pipe     Start date: 11/20/1966      Quit date: 1974     Years since quittin.2    Smokeless tobacco: Never    Tobacco comments:     Also smoked from 1985 - 1990   Substance and Sexual Activity    Alcohol use: Not Currently     Comment: Socially    Drug use: No    Sexual activity: Not Currently     Partners: Female     Birth control/protection: Male Surgical     Comment: Vasectomy   Other Topics Concern    Parent/sibling w/ CABG, MI or angioplasty before 65F 55M? No     Service Not Asked    Blood Transfusions Not Asked    Caffeine Concern Yes     Comment: 2 big cups coffee daily    Occupational Exposure Not Asked    Hobby Hazards Not Asked    Sleep Concern No     Comment: sleeping better since shoulder replaced 11/3/16    Stress Concern No    Weight Concern Yes    Special Diet Yes     Comment: trying to do more lean meats    Back Care Not Asked    Exercise No     Comment: limited - knee    Bike Helmet Not Asked    Seat Belt Not Asked    Self-Exams Not Asked   Social History Narrative    Not on file     Social Determinants of Health     Financial Resource Strain: Not on file   Food Insecurity: Not on file   Transportation Needs: Not on file   Physical Activity: Not on file   Stress: Not on file   Social Connections: Not on file   Intimate Partner Violence: Not on file   Housing Stability: Not on file         FAMILY HISTORY:  Family History   Problem Relation Age of Onset    Genitourinary Problems Mother         renal failure    Heart Disease Mother     Cerebrovascular Disease Mother         TIA    Cardiovascular Father         rupture of dorsal aorta    Depression Son     Depression Brother         Suicide    Substance Abuse Brother         Heroin    Skin Cancer No family hx of          PHYSICAL EXAM:  Vital signs:  BP (!) 172/95   Pulse 85   Temp 98.9  F (37.2  C) (Oral)   Resp 16   Wt 126.5 kg (278 lb 14.4 oz)   SpO2 96%   BMI 37.00 kg/m     GENERAL/CONSTITUTIONAL: Appears well, no acute distress.  INTEGUMENTARY: Dressing in  place on left cheek.  Remainder of exam deferred.      LABS:   Latest Reference Range & Units 07/03/23 14:49   Sodium 136 - 145 mmol/L 124 (L)   Potassium 3.4 - 5.3 mmol/L 4.8   Chloride 98 - 107 mmol/L 90 (L)   Carbon Dioxide (CO2) 22 - 29 mmol/L 23   Urea Nitrogen 8.0 - 23.0 mg/dL 10.4   Creatinine 0.67 - 1.17 mg/dL 0.59 (L)   GFR Estimate >60 mL/min/1.73m2 >90   Calcium 8.8 - 10.2 mg/dL 9.2   Anion Gap 7 - 15 mmol/L 11   Albumin 3.5 - 5.2 g/dL 4.3   Protein Total 6.4 - 8.3 g/dL 7.3   Alkaline Phosphatase 40 - 129 U/L 231 (H)   ALT 0 - 70 U/L 36   AST 0 - 45 U/L 39   Bilirubin Total <=1.2 mg/dL 0.4   Glucose 70 - 99 mg/dL 84   Lactate Dehydrogenase 0 - 250 U/L 187   WBC 4.0 - 11.0 10e3/uL 5.7   Hemoglobin 13.3 - 17.7 g/dL 14.0   Hematocrit 40.0 - 53.0 % 39.1 (L)   Platelet Count 150 - 450 10e3/uL 172   RBC Count 4.40 - 5.90 10e6/uL 4.25 (L)   MCV 78 - 100 fL 92   MCH 26.5 - 33.0 pg 32.9   MCHC 31.5 - 36.5 g/dL 35.8   RDW 10.0 - 15.0 % 14.0   % Neutrophils % 74   % Lymphocytes % 11   % Monocytes % 10   % Eosinophils % 5   % Basophils % 0   Absolute Basophils 0.0 - 0.2 10e3/uL 0.0   Absolute Neutrophil 1.6 - 8.3 10e3/uL 4.2   Absolute Lymphocytes 0.8 - 5.3 10e3/uL 0.6 (L)   Absolute Monocytes 0.0 - 1.3 10e3/uL 0.6   Absolute Eosinophils 0.0 - 0.7 10e3/uL 0.3   (L): Data is abnormally low  (H): Data is abnormally high      PATHOLOGY/RELEVANT BIOMARKERS:     Expand All Collapse All    [facility]     Hematology/Oncology New Patient Note        Today's Date: 7/3/23     Reason for Consultation: Melanoma  Referring Provider: Dr. Jv Dutta        HISTORY OF PRESENT ILLNESS: James Salvador is a 78 year old male who presents for evaluation of metastatic melanoma.  His relevant history dates to 10/2019 when he underwent excision of a Breslow depth 0.6 mm malignant melanoma, lentigo maligna type from the left zygoma.  [place] genomic testing (Decision-DX) was performed demonstrating low risk so  SLN biopsy was not performed.  He subsequently underwent MOHS surgery on 11/13/19.       He did well until 2/21/2023 when he presented with a non-healing lesion on his left cheek that had persisted for several months and had become tender.  Shave biopsy was performed which demonstrated malignant melanoma, ulcerated with nodular appearance with Breslow depth of at least 2.2mm, extending to the lateral and deep margins.       He was seen by Dr. Minaya in ENT and underwent wide local excision with SLN biopsy on 3/30/23.  Lymphoscintigraphy traced to the preauricular area but no sentinel node was identified there.  Pathology from the re-excision demonstrated residual melanoma with a Brealow depth of 3.5mm.  Margins were negative.  Surgical reconstruction of the surgical defect with flap placement was performed on 4/13 by Dr. Sanchez.     He followed-up in dermatology clinic on 6/22 and had noted a new pigmented lesion on his left cheek.  Biopsy was obtained demonstrating melanoma. No intraepidermal component was identified so consistent with a metastatic lesion.  No lymphvascular invasion was seen.      /He has clinically been stable.  He is scheduled for MOHS surgery on 7/13.  He has clinically been stable, primary physical issue is chronic left knee pain after a complicated knee replacement revision about a year ago, working with PT but this limits his mobility.  He has had a 60 pound intentional weight loss over that past year to help with some of the orthopedic issues.  He also has chronic pain from right trigeminal neuralgia, takes gabapentin and Trileptal to help control the pain but that does affect his balance.  Had a TURP procedure earlier this year which was complicated by a Staph infection so that was also a difficult recovery but now resolved.  Otherwise he has not noted any changes in his health.  Specifically no change in energy or appetite, no new focal pain, no respiratory of GI complaints. Denies  any history of autoimmune problems.           REVIEW OF SYSTEMS:   A 14 point ROS was reviewed with pertinent positives and negatives in the HPI.           HOME MEDICATIONS:  Current Outpatient Prescriptions          Current Outpatient Medications   Medication Sig Dispense Refill    acetaminophen (TYLENOL) 325 MG tablet Take 650 mg by mouth 4 times daily        apixaban ANTICOAGULANT (ELIQUIS ANTICOAGULANT) 5 MG tablet Take 1 tablet (5 mg) by mouth 2 times daily 180 tablet 1    atorvastatin (LIPITOR) 80 MG tablet Take 1 tablet (80 mg) by mouth At Bedtime 90 tablet 3    carvedilol (COREG) 3.125 MG tablet Take 1 tablet (3.125 mg) by mouth 2 times daily (with meals) 180 tablet 3    desonide (DESOWEN) 0.05 % external cream Apply topically daily        fluticasone (FLONASE) 50 MCG/ACT nasal spray Spray 2 sprays into both nostrils daily 48 mL 3    furosemide (LASIX) 20 MG tablet TAKE 1 TABLET BY MOUTH EVERY DAY 90 tablet 3    gabapentin (NEURONTIN) 300 MG capsule Take 2 capsules (600 mg) by mouth 3 times daily        ketoconazole (NIZORAL) 2 % cream Apply topically 2 times daily For face. [facility]  [facility]: [phone] 30 g 2    ketoconazole (NIZORAL) 2 % external shampoo Use daily as needed 120 mL 11    lisinopril (ZESTRIL) 30 MG tablet Take 1 tablet (30 mg) by mouth daily 90 tablet 3    nitroGLYcerin (NITROSTAT) 0.4 MG sublingual tablet Place 1 tablet (0.4 mg) under the tongue every 5 minutes as needed for chest pain May repeat twice for a total of 3 tablets.  If chest pain not relieved, call 911 25 tablet 11    OXcarbazepine (TRILEPTAL) 300 MG tablet Take 2 tablets (600 mg) by mouth 3 times daily ( @ 7am, 1530 and 2200 hrs each day)        triamcinolone (KENALOG) 0.1 % external lotion Apply to scalp BID x 1-2 weeks then PRN only 60 mL 3    COMPOUNDED NON-CONTROLLED SUBSTANCE (CMPD RX) - PHARMACY TO MIX COMPOUNDED MEDICATION Fluorouracil 5% Calcipotriene 0.005% 1:1 Cream apply twice daily for 1 weeks to  face, arms for 14 days (Patient not taking: Reported on 7/3/2023) 30 g 6               ALLERGIES:        Allergies   Allergen Reactions    Cats         Cat Dander    Dogs         Dog Dander    Hydromorphone         Other reaction(s): Confusion    Pollen Extract              PAST MEDICAL HISTORY:  Past Medical History        Past Medical History:   Diagnosis Date    Actinic cheilitis 07/17/2013     Lower lip, left     Actinic keratosis      Allergic rhinitis      Anxiety 3/1/23    Arthritis 2004    Basal cell carcinoma      Benign hypertension      CAD (coronary artery disease)       Cardiac cath and PCI 1994. Cardiac Cath 9/2015: BRIDGET to LAD    Hearing problem 1995     Wear hearing aids    Hyperlipidemia      Malignant melanoma      Morbid obesity 01/11/2016    Paroxysmal supraventricular tachycardia       on metoprolol    Permanent atrial fibrillation 04/21/2017    Squamous cell carcinoma      Tachy-edinson syndrome 05/29/2019               PAST SURGICAL HISTORY:  Past Surgical History         Past Surgical History:   Procedure Laterality Date    ADENOIDECTOMY   Childhood    ANGIOPLASTY   1994     in [place]    ANKLE SURGERY   07/13/2005     right ankle    ARTHROPLASTY HIP Right 10/2009    BIOPSY NODE SENTINEL Left 03/30/2023     Procedure: BIOPSY, LYMPH NODE, SENTINEL;  Surgeon: Rolando Minaya MD;  Location: [facility] OR    COLONOSCOPY   4/25/12    EP PERM PACER SINGLE LEAD N/A 05/31/2019     Medtronic single lead pacemaker    EXCISE MASS CHEEK Left 03/30/2023     Procedure: wide local excision of left cheek melanoma;  Surgeon: Rolando Minaya MD;  Location: [facility] OR    EXCISE MASS CHEEK WITH FLAP PEDICLE Left 04/13/2023     Procedure: Left cervical Facial flap per closure of left cheek Defect;  Surgeon: Ila Sanchez MD;  Location: [facility] OR    Facial biopsy - Melanoma        GENITOURINARY SURGERY   10/20/23     Prostate surgery    HEART CATH STENT COR W/WO PTCA   09/23/2015     BRIDGET stent mid LAD    LASER  SURGERY OF EYE   2002    MOHS MICROGRAPHIC PROCEDURE   2004     squamous cell carcinoma right temple    SINUS SURGERY   2006    TONSILLECTOMY   Childhood               SOCIAL HISTORY:  Social History   Social History            Socioeconomic History    Marital status:        Spouse name: Not on file    Number of children: 3    Years of education: Not on file    Highest education level: Not on file   Occupational History       Employer: RETIRED   Tobacco Use    Smoking status: Former       Packs/day: 0.50       Years: 10.00       Pack years: 5.00       Types: Cigarettes, Cigars, Pipe       Start date: 1966       Quit date: 1974       Years since quittin.2    Smokeless tobacco: Never    Tobacco comments:       Also smoked from 1985 - 1990   Substance and Sexual Activity    Alcohol use: Not Currently       Comment: Socially    Drug use: No    Sexual activity: Not Currently       Partners: Female       Birth control/protection: Male Surgical       Comment: Vasectomy   Other Topics Concern    Parent/sibling w/ CABG, MI or angioplasty before 65F 55M? No     Service Not Asked    Blood Transfusions Not Asked    Caffeine Concern Yes       Comment: 2 big cups coffee daily    Occupational Exposure Not Asked    Hobby Hazards Not Asked    Sleep Concern No       Comment: sleeping better since shoulder replaced 11/3/16    Stress Concern No    Weight Concern Yes    Special Diet Yes       Comment: trying to do more lean meats    Back Care Not Asked    Exercise No       Comment: limited - knee    Bike Helmet Not Asked    Seat Belt Not Asked    Self-Exams Not Asked   Social History Narrative    Not on file      Social Determinants of Health      Financial Resource Strain: Not on file   Food Insecurity: Not on file   Transportation Needs: Not on file   Physical Activity: Not on file   Stress: Not on file   Social Connections: Not on file   Intimate Partner Violence: Not on file  "  Housing Stability: Not on file               FAMILY HISTORY:  Family History         Family History   Problem Relation Age of Onset    Genitourinary Problems Mother           renal failure    Heart Disease Mother      Cerebrovascular Disease Mother           TIA    Cardiovascular Father           rupture of dorsal aorta    Depression Son      Depression Brother           Suicide    Substance Abuse Brother           Heroin    Skin Cancer No family hx of                 PHYSICAL EXAM:  Vital signs:  BP (!) 156/86 (Cuff Size: Adult Large)   Pulse 87   Temp 98.8  F (37.1  C) (Tympanic)   Resp 16   Ht 1.849 m (6' 0.8\")   Wt 124.7 kg (275 lb)   SpO2 96%   BMI 36.48 kg/m     GENERAL/CONSTITUTIONAL: Appears well, no acute distress.  HEENT: Gaze conjugate, pupils equal and round. No scleral icterus.  LYMPH: No cervical, supraclavicular, axillary or inguinal adenopathy.   RESPIRATORY: Lungs clear to auscultation bilaterally. No crackles or wheezing.   CARDIOVASCULAR: Regular rate and rhythm without murmurs, gallops, or rubs.    GASTROINTESTINAL: No organomegaly, masses, or tenderness.  No guarding.  No distention.  MUSCULOSKELETAL: Mild lower extremity edema.  NEUROLOGIC:  Alert and oriented, answers questions appropriately.  No evidence of ataxia or tremor.  Speech fluent.  INTEGUMENTARY: Numerous scars and significant solar skin change.  Small erosion at site of biopsy on his left cheek.  Reconstruction is very well healed.        LABS:  CBC RESULTS:       Recent Labs   Lab Test 03/16/23 0919   WBC 6.2   RBC 4.56   HGB 14.6   HCT 41.8   MCV 92   MCH 32.0   MCHC 34.9   RDW 15.0                 Recent Labs   Lab Test 03/16/23  0919 10/06/22  1128   * 124*   POTASSIUM 5.0 5.1   CHLORIDE 93* 92*   CO2 24 27   ANIONGAP 11 5   GLC 77 81   BUN 12.2 10   CR 0.71 0.56*   BENJI 9.5 9.2            PATHOLOGY:  6/22/23 Biopsy left cheek:  Microscopic Description     [facility]   Within the upper dermis, the specimen " exhibits densely cellular, nodular aggregates of severely atypical pleomorphic epithelioid cells with mitotic activity, which label positively with Melan-A.  No definite intraepidermal component is identified.  ERG immunostain is negative among lesional cells and also fails to demonstrate evidence of lymphovascular invasion.  These changes are consistent with metastatic or locoregional recurrence of melanoma.      3/30/23 Surgical re-excision left cheek:  Final Diagnosis   A(1). Skin, left cheek, wide local excision:  - Residual malignant melanoma, completely excised - (see comment)      Comment   [facility]   The final Breslow depth is 3.5 mm. Microstage remains pT3b.      23 Biopsy left cheek:  Final Diagnosis   A(1). L cheek:  - Malignant melanoma, ulcerated, with nodular appearance and Breslow depth at least 2.2mm, extending to the lateral and deep margins - (see comment and description)       As this lesion appears to arise at a site within or near a previously diagnosed melanoma (see our case [id number]), it may represent a locoregional recurrence. A re-excision is recommended at this site to ensure complete removal.             MOST RECENT IMAGIN23 PET/CT:  1. Cutaneous defect with surrounding skin thickening and mild  metabolic activity in the left cheek. Findings may represent  inflammatory changes due to recent biopsy versus residual tumor.  Correlate with biopsy margins.  2. Small punctate hypermetabolic cutaneous lesion on the right scalp  without any CT correlate, recommend direct visualization.  3. No evidence of cervical or mediastinal metastatic lymphadenopathy.  4. Findings concerning for underlying interstitial lung disease. Few  scattered lung nodules. Most notably there is an 8 mm right subpleural  nodule with questionable metabolic activity (tiny punctate metabolic  activity seen only on PET but not definitely corresponding to the  location of the nodule, question motion due to  respiration). Recommend  short-term follow-up.  5. Cardiomegaly, right cardiac apical perfusion defect, likely due to  chronic LAD infarct. Right atrial hypermetabolism, either due to  increased right heart hypertrophy or due to atrial ablation.  6. Right tibial plateau hypermetabolic focus, likely degenerative in  nature.      ASSESSMENT:  Stage IIIC cutaneous melanoma of the left cheek.  We reviewed his PET scan results.  There are no definite metastases seen although there are some subcentimeter lung nodules without definite FDG uptake.  There is no baseline CT for comparison.  I told him that we will need to observe these closely but that therapy would be the same with low volume lesions.  Would be difficult to biopsy given small size.  We had an extensive discussion regarding the goals of therapy, potential risks and side effects and treatment schedule.  All questions addressed and they expressed understanding.    PLAN:    Proceed with nivolumab on 8/4 and every 4 weeks.  Plan repeat CT scan in 3 months.    Total time is 25 minutes including scan review, face to face time, documentation and orders.      Dilshad Holbrook MD  Hematology/Oncology  [facility]

## 2023-07-31 ENCOUNTER — ONCOLOGY VISIT (OUTPATIENT)
Dept: ONCOLOGY | Facility: CLINIC | Age: 78
End: 2023-07-31
Attending: INTERNAL MEDICINE
Payer: COMMERCIAL

## 2023-07-31 ENCOUNTER — THERAPY VISIT (OUTPATIENT)
Dept: PHYSICAL THERAPY | Facility: CLINIC | Age: 78
End: 2023-07-31
Payer: COMMERCIAL

## 2023-07-31 VITALS
WEIGHT: 278.9 LBS | BODY MASS INDEX: 37 KG/M2 | SYSTOLIC BLOOD PRESSURE: 172 MMHG | DIASTOLIC BLOOD PRESSURE: 95 MMHG | TEMPERATURE: 98.9 F | RESPIRATION RATE: 16 BRPM | HEART RATE: 85 BPM | OXYGEN SATURATION: 96 %

## 2023-07-31 DIAGNOSIS — Z96.652 STATUS POST TOTAL LEFT KNEE REPLACEMENT: Primary | ICD-10-CM

## 2023-07-31 DIAGNOSIS — C43.39 MELANOMA OF CHEEK (H): Primary | ICD-10-CM

## 2023-07-31 PROCEDURE — 97112 NEUROMUSCULAR REEDUCATION: CPT | Mod: GP | Performed by: PHYSICAL THERAPIST

## 2023-07-31 PROCEDURE — 97110 THERAPEUTIC EXERCISES: CPT | Mod: GP | Performed by: PHYSICAL THERAPIST

## 2023-07-31 PROCEDURE — 97140 MANUAL THERAPY 1/> REGIONS: CPT | Mod: GP | Performed by: PHYSICAL THERAPIST

## 2023-07-31 PROCEDURE — 99213 OFFICE O/P EST LOW 20 MIN: CPT | Performed by: INTERNAL MEDICINE

## 2023-07-31 PROCEDURE — G0463 HOSPITAL OUTPT CLINIC VISIT: HCPCS | Performed by: INTERNAL MEDICINE

## 2023-07-31 ASSESSMENT — PAIN SCALES - GENERAL: PAINLEVEL: MILD PAIN (2)

## 2023-07-31 NOTE — PROGRESS NOTES
"Oncology Rooming Note    July 31, 2023 4:00 PM   James Salvador is a 78 year old male who presents for:    Chief Complaint   Patient presents with    Oncology Clinic Visit     Initial Vitals: BP (!) 172/95   Pulse 85   Temp 98.9  F (37.2  C) (Oral)   Resp 16   Wt 126.5 kg (278 lb 14.4 oz)   SpO2 96%   BMI 37.00 kg/m   Estimated body mass index is 37 kg/m  as calculated from the following:    Height as of 7/3/23: 1.849 m (6' 0.8\").    Weight as of this encounter: 126.5 kg (278 lb 14.4 oz). Body surface area is 2.55 meters squared.  Mild Pain (2) Comment: Data Unavailable   No LMP for male patient.  Allergies reviewed: Yes  Medications reviewed: Yes    Medications: Medication refills not needed today.  Pharmacy name entered into [employer]:    [id number] - [place], [place] - [address] AT [facility] [address] [place]    Clinical concerns: follow up        Becki Perez            "

## 2023-07-31 NOTE — LETTER
7/31/2023         RE: James Salvador  [address] [place] [address]        Dear Colleague,    Thank you for referring your patient, James Salvador, to the [facility]. Please see a copy of my visit note below.    [facility]    Hematology/Oncology Established Patient Follow-up Note    Oncologic History/Treatment Summary    10/2019: Underwent excision of a Breslow depth 0.6mm malignant melanoma, lentigo maligna type form left zygoma by Dr. Dutta.  Decision-DX was low risk so SLN not performed.  Underwent subsequent MOHS resection.  2/21/23: Shave biopsy of a non-healing lesion on left cheek demonstrated malignant melanoma, ulcerated with nodular appearance and Breslow depth of at least 2.2 mm.  3/32/23: Wide local excision with SLN biopsy performed by Dr. Minaya.  SLN could not be identified.  Pathology demonstrated residual melanoma with Breslow depth 3.5 mm, margins negative.  4/13/23: Surgical reconstruction with flap placement performed by Dr. Sanchez.  6/22/23: Noted new pigmented lesion on left cheek, biopsy demonstrated melanoma.  No intraepidermal component noted consistent with metastasis.  7/13/23: Underwent MOHS resection with clear margins.  7/18/23: PET/CT and CT head demonstrated no definite evidence of metastatic disease, several small (subcentimeter) indeterminate lung nodules noted.      Today's Date: 7/31/23    Reason for Follow-up: Stage IIIC cutaneous melanoma.      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  He is scheduled to initiate adjuvant nivolumab later this week.  He did undergo MOHS resection a couple of weeks ago and that went well.  No new symptoms, continues with physical therapy for his knee.      REVIEW OF SYSTEMS:   A 14 point ROS was reviewed with pertinent positives and negatives in the HPI.       HOME  MEDICATIONS:  Current Outpatient Medications   Medication Sig Dispense Refill     acetaminophen (TYLENOL) 325 MG tablet Take 650 mg by mouth 4 times daily       apixaban ANTICOAGULANT (ELIQUIS ANTICOAGULANT) 5 MG tablet Take 1 tablet (5 mg) by mouth 2 times daily 180 tablet 1     atorvastatin (LIPITOR) 80 MG tablet Take 1 tablet (80 mg) by mouth At Bedtime 90 tablet 3     carvedilol (COREG) 3.125 MG tablet Take 1 tablet (3.125 mg) by mouth 2 times daily (with meals) 180 tablet 3     COMPOUNDED NON-CONTROLLED SUBSTANCE (CMPD RX) - PHARMACY TO MIX COMPOUNDED MEDICATION Fluorouracil 5% Calcipotriene 0.005% 1:1 Cream apply twice daily for 1 weeks to face, arms for 14 days 30 g 6     desonide (DESOWEN) 0.05 % external cream Apply topically daily       fluticasone (FLONASE) 50 MCG/ACT nasal spray Spray 2 sprays into both nostrils daily 48 mL 3     furosemide (LASIX) 20 MG tablet TAKE 1 TABLET BY MOUTH EVERY DAY 90 tablet 3     gabapentin (NEURONTIN) 300 MG capsule Take 2 capsules (600 mg) by mouth 3 times daily       ketoconazole (NIZORAL) 2 % cream Apply topically 2 times daily For face. FAX REFILL REQUESTS TO [facility]: [phone] 30 g 2     ketoconazole (NIZORAL) 2 % external shampoo Use daily as needed 120 mL 11     lisinopril (ZESTRIL) 30 MG tablet Take 1 tablet (30 mg) by mouth daily 90 tablet 3     nitroGLYcerin (NITROSTAT) 0.4 MG sublingual tablet Place 1 tablet (0.4 mg) under the tongue every 5 minutes as needed for chest pain May repeat twice for a total of 3 tablets.  If chest pain not relieved, call 911 25 tablet 11     OXcarbazepine (TRILEPTAL) 300 MG tablet Take 2 tablets (600 mg) by mouth 3 times daily ( @ 7am, 1530 and 2200 hrs each day)       triamcinolone (KENALOG) 0.1 % external lotion Apply to scalp BID x 1-2 weeks then PRN only 60 mL 3         ALLERGIES:  Allergies   Allergen Reactions     Cats      Cat Dander     Dogs      Dog Dander     Hydromorphone      Other reaction(s): Confusion     Pollen  Extract          PAST MEDICAL HISTORY:  Past Medical History:   Diagnosis Date     Actinic 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 Cardiac Cath 9/2015: BRIDGET to LAD     Hearing problem 1995    Wear hearing aids     Hyperlipidemia      Malignant melanoma      Morbid obesity 01/11/2016     Paroxysmal supraventricular tachycardia     on metoprolol     Permanent atrial fibrillation 04/21/2017     Squamous cell carcinoma      Tachy-edinson syndrome 05/29/2019         PAST SURGICAL HISTORY:  Past Surgical History:   Procedure Laterality Date     ADENOIDECTOMY  Childhood     ANGIOPLASTY  1994    in [place]     ANKLE SURGERY  07/13/2005    right ankle     ARTHROPLASTY HIP Right 10/2009     BIOPSY NODE SENTINEL Left 03/30/2023    Procedure: BIOPSY, LYMPH NODE, SENTINEL;  Surgeon: Rolando Minaya MD;  [facility]     COLONOSCOPY  4/25/12     EP PERM PACER SINGLE LEAD N/A 05/31/2019    Medtronic single lead pacemaker     EXCISE MASS CHEEK Left 03/30/2023    Procedure: wide local excision of left cheek melanoma;  Surgeon: Rolando Minaya MD;  Location: [facility] OR     EXCISE MASS CHEEK WITH FLAP PEDICLE Left 04/13/2023    Procedure: Left cervical Facial flap per closure of left cheek Defect;  Surgeon: Ila Sanchez MD;  Location: [facility] OR     Facial biopsy - Melanoma       GENITOURINARY SURGERY  10/20/23    Prostate surgery     HEART CATH STENT COR W/WO PTCA  09/23/2015    BRIDGET stent mid LAD     IR CHEST PORT PLACEMENT > 5 YRS OF AGE  7/19/2023     LASER SURGERY OF EYE  06/01/2002     MOHS MICROGRAPHIC PROCEDURE  06/12/2004    squamous cell carcinoma right temple     SINUS SURGERY  07/11/2006     TONSILLECTOMY  Childhood         SOCIAL HISTORY:  Social History     Socioeconomic History     Marital status:      Spouse  name: Not on file     Number of children: 3     Years of education: Not on file     Highest education level: Not on file   Occupational History     Employer: RETIRED   Tobacco Use     Smoking status: Former     Packs/day: 0.50     Years: 10.00     Pack years: 5.00     Types: Cigarettes, Cigars, Pipe     Start date: 1966     Quit date: 1974     Years since quittin.2     Smokeless tobacco: Never     Tobacco comments:     Also smoked from 1985 - 1990   Substance and Sexual Activity     Alcohol use: Not Currently     Comment: Socially     Drug use: No     Sexual activity: Not Currently     Partners: Female     Birth control/protection: Male Surgical     Comment: Vasectomy   Other Topics Concern     Parent/sibling w/ CABG, MI or angioplasty before 65F 55M? No      Service Not Asked     Blood Transfusions Not Asked     Caffeine Concern Yes     Comment: 2 big cups coffee daily     Occupational Exposure Not Asked     Hobby Hazards Not Asked     Sleep Concern No     Comment: sleeping better since shoulder replaced 11/3/16     Stress Concern No     Weight Concern Yes     Special Diet Yes     Comment: trying to do more lean meats     Back Care Not Asked     Exercise No     Comment: limited - knee     Bike Helmet Not Asked     Seat Belt Not Asked     Self-Exams Not Asked   Social History Narrative     Not on file     Social Determinants of Health     Financial Resource Strain: Not on file   Food Insecurity: Not on file   Transportation Needs: Not on file   Physical Activity: Not on file   Stress: Not on file   Social Connections: Not on file   Intimate Partner Violence: Not on file   Housing Stability: Not on file         FAMILY HISTORY:  Family History   Problem Relation Age of Onset     Genitourinary Problems Mother         renal failure     Heart Disease Mother      Cerebrovascular Disease Mother         TIA     Cardiovascular Father         rupture of dorsal aorta     Depression Son       Depression Brother         Suicide     Substance Abuse Brother         Heroin     Skin Cancer No family hx of          PHYSICAL EXAM:  Vital signs:  BP (!) 172/95   Pulse 85   Temp 98.9  F (37.2  C) (Oral)   Resp 16   Wt 126.5 kg (278 lb 14.4 oz)   SpO2 96%   BMI 37.00 kg/m     GENERAL/CONSTITUTIONAL: Appears well, no acute distress.  INTEGUMENTARY: Dressing in place on left cheek.  Remainder of exam deferred.      LABS:   Latest Reference Range & Units 07/03/23 14:49   Sodium 136 - 145 mmol/L 124 (L)   Potassium 3.4 - 5.3 mmol/L 4.8   Chloride 98 - 107 mmol/L 90 (L)   Carbon Dioxide (CO2) 22 - 29 mmol/L 23   Urea Nitrogen 8.0 - 23.0 mg/dL 10.4   Creatinine 0.67 - 1.17 mg/dL 0.59 (L)   GFR Estimate >60 mL/min/1.73m2 >90   Calcium 8.8 - 10.2 mg/dL 9.2   Anion Gap 7 - 15 mmol/L 11   Albumin 3.5 - 5.2 g/dL 4.3   Protein Total 6.4 - 8.3 g/dL 7.3   Alkaline Phosphatase 40 - 129 U/L 231 (H)   ALT 0 - 70 U/L 36   AST 0 - 45 U/L 39   Bilirubin Total <=1.2 mg/dL 0.4   Glucose 70 - 99 mg/dL 84   Lactate Dehydrogenase 0 - 250 U/L 187   WBC 4.0 - 11.0 10e3/uL 5.7   Hemoglobin 13.3 - 17.7 g/dL 14.0   Hematocrit 40.0 - 53.0 % 39.1 (L)   Platelet Count 150 - 450 10e3/uL 172   RBC Count 4.40 - 5.90 10e6/uL 4.25 (L)   MCV 78 - 100 fL 92   MCH 26.5 - 33.0 pg 32.9   MCHC 31.5 - 36.5 g/dL 35.8   RDW 10.0 - 15.0 % 14.0   % Neutrophils % 74   % Lymphocytes % 11   % Monocytes % 10   % Eosinophils % 5   % Basophils % 0   Absolute Basophils 0.0 - 0.2 10e3/uL 0.0   Absolute Neutrophil 1.6 - 8.3 10e3/uL 4.2   Absolute Lymphocytes 0.8 - 5.3 10e3/uL 0.6 (L)   Absolute Monocytes 0.0 - 1.3 10e3/uL 0.6   Absolute Eosinophils 0.0 - 0.7 10e3/uL 0.3   (L): Data is abnormally low  (H): Data is abnormally high      PATHOLOGY/RELEVANT BIOMARKERS:     Expand All Collapse All    [facility]     Hematology/Oncology New Patient Note        Today's Date: 7/3/23     Reason for Consultation: Melanoma  Referring Provider:   Jv Dutta        HISTORY OF PRESENT ILLNESS: James Salvador is a 78 year old male who presents for evaluation of metastatic melanoma.  His relevant history dates to 10/2019 when he underwent excision of a Breslow depth 0.6 mm malignant melanoma, lentigo maligna type from the left zygoma.  [place]eral genomic testing (Decision-DX) was performed demonstrating low risk so SLN biopsy was not performed.  He subsequently underwent MOHS surgery on 11/13/19.       He did well until 2/21/2023 when he presented with a non-healing lesion on his left cheek that had persisted for several months and had become tender.  Shave biopsy was performed which demonstrated malignant melanoma, ulcerated with nodular appearance with Breslow depth of at least 2.2mm, extending to the lateral and deep margins.       He was seen by Dr. Minaya in ENT and underwent wide local excision with SLN biopsy on 3/30/23.  Lymphoscintigraphy traced to the preauricular area but no sentinel node was identified there.  Pathology from the re-excision demonstrated residual melanoma with a Brealow depth of 3.5mm.  Margins were negative.  Surgical reconstruction of the surgical defect with flap placement was performed on 4/13 by Dr. Sanchez.     He followed-up in dermatology clinic on 6/22 and had noted a new pigmented lesion on his left cheek.  Biopsy was obtained demonstrating melanoma. No intraepidermal component was identified so consistent with a metastatic lesion.  No lymphvascular invasion was seen.      /He has clinically been stable.  He is scheduled for MOHS surgery on 7/13.  He has clinically been stable, primary physical issue is chronic left knee pain after a complicated knee replacement revision about a year ago, working with PT but this limits his mobility.  He has had a 60 pound intentional weight loss over that past year to help with some of the orthopedic issues.  He also has chronic pain from right trigeminal neuralgia, takes  gabapentin and Trileptal to help control the pain but that does affect his balance.  Had a TURP procedure earlier this year which was complicated by a Staph infection so that was also a difficult recovery but now resolved.  Otherwise he has not noted any changes in his health.  Specifically no change in energy or appetite, no new focal pain, no respiratory of GI complaints. Denies any history of autoimmune problems.           REVIEW OF SYSTEMS:   A 14 point ROS was reviewed with pertinent positives and negatives in the HPI.           HOME MEDICATIONS:  Current Outpatient Prescriptions          Current Outpatient Medications   Medication Sig Dispense Refill     acetaminophen (TYLENOL) 325 MG tablet Take 650 mg by mouth 4 times daily         apixaban ANTICOAGULANT (ELIQUIS ANTICOAGULANT) 5 MG tablet Take 1 tablet (5 mg) by mouth 2 times daily 180 tablet 1     atorvastatin (LIPITOR) 80 MG tablet Take 1 tablet (80 mg) by mouth At Bedtime 90 tablet 3     carvedilol (COREG) 3.125 MG tablet Take 1 tablet (3.125 mg) by mouth 2 times daily (with meals) 180 tablet 3     desonide (DESOWEN) 0.05 % external cream Apply topically daily         fluticasone (FLONASE) 50 MCG/ACT nasal spray Spray 2 sprays into both nostrils daily 48 mL 3     furosemide (LASIX) 20 MG tablet TAKE 1 TABLET BY MOUTH EVERY DAY 90 tablet 3     gabapentin (NEURONTIN) 300 MG capsule Take 2 capsules (600 mg) by mouth 3 times daily         ketoconazole (NIZORAL) 2 % cream Apply topically 2 times daily For face. FAX REFILL REQUESTS TO [facility]: [phone] 30 g 2     ketoconazole (NIZORAL) 2 % external shampoo Use daily as needed 120 mL 11     lisinopril (ZESTRIL) 30 MG tablet Take 1 tablet (30 mg) by mouth daily 90 tablet 3     nitroGLYcerin (NITROSTAT) 0.4 MG sublingual tablet Place 1 tablet (0.4 mg) under the tongue every 5 minutes as needed for chest pain May repeat twice for a total of 3 tablets.  If chest pain not relieved, call 911 25 tablet 11      OXcarbazepine (TRILEPTAL) 300 MG tablet Take 2 tablets (600 mg) by mouth 3 times daily ( @ 7am, 1530 and 2200 hrs each day)         triamcinolone (KENALOG) 0.1 % external lotion Apply to scalp BID x 1-2 weeks then PRN only 60 mL 3     COMPOUNDED NON-CONTROLLED SUBSTANCE (CMPD RX) - PHARMACY TO MIX COMPOUNDED MEDICATION Fluorouracil 5% Calcipotriene 0.005% 1:1 Cream apply twice daily for 1 weeks to face, arms for 14 days (Patient not taking: Reported on 7/3/2023) 30 g 6               ALLERGIES:        Allergies   Allergen Reactions     Cats         Cat Dander     Dogs         Dog Dander     Hydromorphone         Other reaction(s): Confusion     Pollen Extract              PAST MEDICAL HISTORY:  Past Medical History        Past Medical History:   Diagnosis Date     Actinic cheilitis 07/17/2013     Lower lip, left      Actinic keratosis       Allergic rhinitis       Anxiety 3/1/23     Arthritis 2004     Basal cell carcinoma       Benign hypertension       CAD (coronary artery disease)       Cardiac cath and PCI 1994. Cardiac Cath 9/2015: BRIDGET to LAD     Hearing problem 1995     Wear hearing aids     Hyperlipidemia       Malignant melanoma       Morbid obesity 01/11/2016     Paroxysmal supraventricular tachycardia       on metoprolol     Permanent atrial fibrillation 04/21/2017     Squamous cell carcinoma       Tachy-edinson syndrome 05/29/2019               PAST SURGICAL HISTORY:  Past Surgical History         Past Surgical History:   Procedure Laterality Date     ADENOIDECTOMY   Childhood     ANGIOPLASTY   1994     in [place]     ANKLE SURGERY   07/13/2005     right ankle     ARTHROPLASTY HIP Right 10/2009     BIOPSY NODE SENTINEL Left 03/30/2023     Procedure: BIOPSY, LYMPH NODE, SENTINEL;  Surgeon: Rolando Minaya MD;  Location: [facility] OR     COLONOSCOPY   4/25/12     EP PERM PACER SINGLE LEAD N/A 05/31/2019     Medtronic single lead pacemaker     EXCISE MASS CHEEK Left 03/30/2023     Procedure: wide local excision  of left cheek melanoma;  Surgeon: Rolando Minaya MD;  Location: [facility] OR     EXCISE MASS CHEEK WITH FLAP PEDICLE Left 2023     Procedure: Left cervical Facial flap per closure of left cheek Defect;  Surgeon: Ila Sanchez MD;  Location: [facility] OR     Facial biopsy - Melanoma         GENITOURINARY SURGERY   10/20/23     Prostate surgery     HEART CATH STENT COR W/WO PTCA   2015     BRIDGET stent mid LAD     LASER SURGERY OF EYE   2002     MOHS MICROGRAPHIC PROCEDURE   2004     squamous cell carcinoma right temple     SINUS SURGERY   2006     TONSILLECTOMY   Childhood               SOCIAL HISTORY:  Social History   Social History            Socioeconomic History     Marital status:        Spouse name: Not on file     Number of children: 3     Years of education: Not on file     Highest education level: Not on file   Occupational History       Employer: RETIRED   Tobacco Use     Smoking status: Former       Packs/day: 0.50       Years: 10.00       Pack years: 5.00       Types: Cigarettes, Cigars, Pipe       Start date: 1966       Quit date: 1974       Years since quittin.2     Smokeless tobacco: Never     Tobacco comments:       Also smoked from 1985 - 1990   Substance and Sexual Activity     Alcohol use: Not Currently       Comment: Socially     Drug use: No     Sexual activity: Not Currently       Partners: Female       Birth control/protection: Male Surgical       Comment: Vasectomy   Other Topics Concern     Parent/sibling w/ CABG, MI or angioplasty before 65F 55M? No      Service Not Asked     Blood Transfusions Not Asked     Caffeine Concern Yes       Comment: 2 big cups coffee daily     Occupational Exposure Not Asked     Hobby Hazards Not Asked     Sleep Concern No       Comment: sleeping better since shoulder replaced 11/3/16     Stress Concern No     Weight Concern Yes     Special Diet Yes       Comment: trying to do more lean meats     Back  "Care Not Asked     Exercise No       Comment: limited - knee     Bike Helmet Not Asked     Seat Belt Not Asked     Self-Exams Not Asked   Social History Narrative     Not on file      Social Determinants of Health      Financial Resource Strain: Not on file   Food Insecurity: Not on file   Transportation Needs: Not on file   Physical Activity: Not on file   Stress: Not on file   Social Connections: Not on file   Intimate Partner Violence: Not on file   Housing Stability: Not on file               FAMILY HISTORY:  Family History         Family History   Problem Relation Age of Onset     Genitourinary Problems Mother           renal failure     Heart Disease Mother       Cerebrovascular Disease Mother           TIA     Cardiovascular Father           rupture of dorsal aorta     Depression Son       Depression Brother           Suicide     Substance Abuse Brother           Heroin     Skin Cancer No family hx of                 PHYSICAL EXAM:  Vital signs:  BP (!) 156/86 (Cuff Size: Adult Large)   Pulse 87   Temp 98.8  F (37.1  C) (Tympanic)   Resp 16   Ht 1.849 m (6' 0.8\")   Wt 124.7 kg (275 lb)   SpO2 96%   BMI 36.48 kg/m     GENERAL/CONSTITUTIONAL: Appears well, no acute distress.  HEENT: Gaze conjugate, pupils equal and round. No scleral icterus.  LYMPH: No cervical, supraclavicular, axillary or inguinal adenopathy.   RESPIRATORY: Lungs clear to auscultation bilaterally. No crackles or wheezing.   CARDIOVASCULAR: Regular rate and rhythm without murmurs, gallops, or rubs.    GASTROINTESTINAL: No organomegaly, masses, or tenderness.  No guarding.  No distention.  MUSCULOSKELETAL: Mild lower extremity edema.  NEUROLOGIC:  Alert and oriented, answers questions appropriately.  No evidence of ataxia or tremor.  Speech fluent.  INTEGUMENTARY: Numerous scars and significant solar skin change.  Small erosion at site of biopsy on his left cheek.  Reconstruction is very well healed.        LABS:  CBC RESULTS:     "   Recent Labs   Lab Test 2319   WBC 6.2   RBC 4.56   HGB 14.6   HCT 41.8   MCV 92   MCH 32.0   MCHC 34.9   RDW 15.0                 Recent Labs   Lab Test 03/16/23  0919 10/06/22  1128   * 124*   POTASSIUM 5.0 5.1   CHLORIDE 93* 92*   CO2 24 27   ANIONGAP 11 5   GLC 77 81   BUN 12.2 10   CR 0.71 0.56*   BENJI 9.5 9.2            PATHOLOGY:  23 Biopsy left cheek:  Microscopic Description     [facility]   Within the upper dermis, the specimen exhibits densely cellular, nodular aggregates of severely atypical pleomorphic epithelioid cells with mitotic activity, which label positively with Melan-A.  No definite intraepidermal component is identified.  ERG immunostain is negative among lesional cells and also fails to demonstrate evidence of lymphovascular invasion.  These changes are consistent with metastatic or locoregional recurrence of melanoma.      3/30/23 Surgical re-excision left cheek:  Final Diagnosis   A(1). Skin, left cheek, wide local excision:  - Residual malignant melanoma, completely excised - (see comment)      Comment   [facility]   The final Breslow depth is 3.5 mm. Microstage remains pT3b.      23 Biopsy left cheek:  Final Diagnosis   A(1). L cheek:  - Malignant melanoma, ulcerated, with nodular appearance and Breslow depth at least 2.2mm, extending to the lateral and deep margins - (see comment and description)       As this lesion appears to arise at a site within or near a previously diagnosed melanoma (see our case [id number]), it may represent a locoregional recurrence. A re-excision is recommended at this site to ensure complete removal.             MOST RECENT IMAGIN23 PET/CT:  1. Cutaneous defect with surrounding skin thickening and mild  metabolic activity in the left cheek. Findings may represent  inflammatory changes due to recent biopsy versus residual tumor.  Correlate with biopsy margins.  2. Small punctate hypermetabolic cutaneous lesion on the right  scalp  without any CT correlate, recommend direct visualization.  3. No evidence of cervical or mediastinal metastatic lymphadenopathy.  4. Findings concerning for underlying interstitial lung disease. Few  scattered lung nodules. Most notably there is an 8 mm right subpleural  nodule with questionable metabolic activity (tiny punctate metabolic  activity seen only on PET but not definitely corresponding to the  location of the nodule, question motion due to respiration). Recommend  short-term follow-up.  5. Cardiomegaly, right cardiac apical perfusion defect, likely due to  chronic LAD infarct. Right atrial hypermetabolism, either due to  increased right heart hypertrophy or due to atrial ablation.  6. Right tibial plateau hypermetabolic focus, likely degenerative in  nature.      ASSESSMENT:  Stage IIIC cutaneous melanoma of the left cheek.  We reviewed his PET scan results.  There are no definite metastases seen although there are some subcentimeter lung nodules without definite FDG uptake.  There is no baseline CT for comparison.  I told him that we will need to observe these closely but that therapy would be the same with low volume lesions.  Would be difficult to biopsy given small size.  We had an extensive discussion regarding the goals of therapy, potential risks and side effects and treatment schedule.  All questions addressed and they expressed understanding.    PLAN:    Proceed with nivolumab on 8/4 and every 4 weeks.  Plan repeat CT scan in 3 months.    Total time is 25 minutes including scan review, face to face time, documentation and orders.      Dilshad Holbrook MD  Hematology/Oncology  [facility]       Oncology Rooming Note    July 31, 2023 4:00 PM   James Salvador is a 78 year old male who presents for:    Chief Complaint   Patient presents with     Oncology Clinic Visit     Initial Vitals: BP (!) 172/95   Pulse 85   Temp 98.9  F (37.2  C) (Oral)   Resp 16   Wt 126.5 kg  "(278 lb 14.4 oz)   SpO2 96%   BMI 37.00 kg/m   Estimated body mass index is 37 kg/m  as calculated from the following:    Height as of 7/3/23: 1.849 m (6' 0.8\").    Weight as of this encounter: 126.5 kg (278 lb 14.4 oz). Body surface area is 2.55 meters squared.  Mild Pain (2) Comment: Data Unavailable   No LMP for male patient.  Allergies reviewed: Yes  Medications reviewed: Yes    Medications: Medication refills not needed today.  Pharmacy name entered into [employer]:    [id number] - [place], [place] - [address] [place] [address] [facility]    Clinical concerns: follow up        Becki Perez              Again, thank you for allowing me to participate in the care of your patient.        Sincerely,        Dilshad Holbrook MD  "

## 2023-08-04 ENCOUNTER — INFUSION THERAPY VISIT (OUTPATIENT)
Dept: INFUSION THERAPY | Facility: CLINIC | Age: 78
End: 2023-08-04
Attending: INTERNAL MEDICINE
Payer: COMMERCIAL

## 2023-08-04 VITALS
HEART RATE: 61 BPM | WEIGHT: 282 LBS | RESPIRATION RATE: 16 BRPM | SYSTOLIC BLOOD PRESSURE: 138 MMHG | TEMPERATURE: 98.3 F | HEIGHT: 71 IN | OXYGEN SATURATION: 96 % | DIASTOLIC BLOOD PRESSURE: 78 MMHG | BODY MASS INDEX: 39.48 KG/M2

## 2023-08-04 DIAGNOSIS — C43.39 MELANOMA OF CHEEK (H): Primary | ICD-10-CM

## 2023-08-04 LAB
ALBUMIN SERPL BCG-MCNC: 4.1 G/DL (ref 3.5–5.2)
ALP SERPL-CCNC: 200 U/L (ref 40–129)
ALT SERPL W P-5'-P-CCNC: 27 U/L (ref 0–70)
ANION GAP SERPL CALCULATED.3IONS-SCNC: 9 MMOL/L (ref 7–15)
AST SERPL W P-5'-P-CCNC: 34 U/L (ref 0–45)
BILIRUB SERPL-MCNC: 0.4 MG/DL
BUN SERPL-MCNC: 10.8 MG/DL (ref 8–23)
CALCIUM SERPL-MCNC: 8.7 MG/DL (ref 8.8–10.2)
CHLORIDE SERPL-SCNC: 86 MMOL/L (ref 98–107)
CREAT SERPL-MCNC: 0.55 MG/DL (ref 0.67–1.17)
DEPRECATED HCO3 PLAS-SCNC: 25 MMOL/L (ref 22–29)
GFR SERPL CREATININE-BSD FRML MDRD: >90 ML/MIN/1.73M2
GLUCOSE SERPL-MCNC: 80 MG/DL (ref 70–99)
POTASSIUM SERPL-SCNC: 4.6 MMOL/L (ref 3.4–5.3)
PROT SERPL-MCNC: 6.7 G/DL (ref 6.4–8.3)
SODIUM SERPL-SCNC: 120 MMOL/L (ref 136–145)
TSH SERPL DL<=0.005 MIU/L-ACNC: 1.14 UIU/ML (ref 0.3–4.2)

## 2023-08-04 PROCEDURE — 96413 CHEMO IV INFUSION 1 HR: CPT

## 2023-08-04 PROCEDURE — 258N000003 HC RX IP 258 OP 636: Performed by: INTERNAL MEDICINE

## 2023-08-04 PROCEDURE — 84443 ASSAY THYROID STIM HORMONE: CPT | Performed by: INTERNAL MEDICINE

## 2023-08-04 PROCEDURE — 250N000011 HC RX IP 250 OP 636: Mod: JZ | Performed by: INTERNAL MEDICINE

## 2023-08-04 PROCEDURE — 80053 COMPREHEN METABOLIC PANEL: CPT | Performed by: INTERNAL MEDICINE

## 2023-08-04 RX ORDER — HEPARIN SODIUM (PORCINE) LOCK FLUSH IV SOLN 100 UNIT/ML 100 UNIT/ML
5 SOLUTION INTRAVENOUS
Status: DISCONTINUED | OUTPATIENT
Start: 2023-08-04 | End: 2023-08-04 | Stop reason: HOSPADM

## 2023-08-04 RX ADMIN — SODIUM CHLORIDE 480 MG: 9 INJECTION, SOLUTION INTRAVENOUS at 12:05

## 2023-08-04 RX ADMIN — Medication 5 ML: at 12:50

## 2023-08-04 RX ADMIN — SODIUM CHLORIDE 250 ML: 9 INJECTION, SOLUTION INTRAVENOUS at 12:05

## 2023-08-04 ASSESSMENT — PAIN SCALES - GENERAL: PAINLEVEL: NO PAIN (0)

## 2023-08-04 NOTE — PATIENT INSTRUCTIONS
9/1/23: Labs, Appointment with Kaylin Burrows PA-C + Nivolumab.  9/29/23: Labs, Appointment with Kaylin Burrows PA-C + Nivolumab.  10/20/23: CT C/A/P.  10/23/23: Labs + Appointment with Dr. Holbrook.  10/27/23: Nivolumab.  Lena Moreland RN, BSN, OCN on 8/4/2023 at 2:49 PM

## 2023-08-04 NOTE — PROGRESS NOTES
Infusion Nursing Note:  James Salvador presents today for C1D1 Opdivo.    Patient seen by provider today: No, seen 7/31 by Dr Holbrook   present during visit today: Not Applicable.    Note: Oriented patient to infusion clinic and call bell/emergency bell. Reinforced medication education.       Intravenous Access:  Labs drawn without difficulty.  Implanted Port.    Treatment Conditions:  Lab Results   Component Value Date     (L) 08/04/2023    POTASSIUM 4.6 08/04/2023    MAG 2.0 09/23/2015    CR 0.55 (L) 08/04/2023    BENJI 8.7 (L) 08/04/2023    BILITOTAL 0.4 08/04/2023    ALBUMIN 4.1 08/04/2023    ALT 27 08/04/2023    AST 34 08/04/2023       Results reviewed, labs MET treatment parameters, ok to proceed with treatment.      Post Infusion Assessment:  Patient tolerated infusion without incident.  Blood return noted pre and post infusion.  Site patent and intact, free from redness, edema or discomfort.  No evidence of extravasations.  Access discontinued per protocol.       Discharge Plan:   Discharge instructions reviewed with: Patient and Family.  Patient and/or family verbalized understanding of discharge instructions and all questions answered.  AVS to patient via [employer].  Patient will return 9/1 for next appointment.   Patient discharged in stable condition accompanied by: self and wife.  Departure Mode: Ambulatory.      Nurys Srivastava RN

## 2023-08-07 ENCOUNTER — PATIENT OUTREACH (OUTPATIENT)
Dept: ONCOLOGY | Facility: CLINIC | Age: 78
End: 2023-08-07
Payer: COMMERCIAL

## 2023-08-07 NOTE — PROGRESS NOTES
[facility]: Cancer Care Short Note                                    Discussion with Patient:                                                      Writer following up with patient today after patient received C1D1 of Nivolumab on 8/4/23. Patient reports no symptoms or concerns at this time. Patient reported some fatigue the first day but this was relieved by rest. Skin WNL and denies any SOB or cold like symptoms.         Goals          Blood Pressure    Blood Pressure below 130/80             Assessment:                                                      Assessment completed with:: Patient    Constitutional  Fatigue: Fatigue relieved by rest  Fever: Absent or within normal limits    Respiratory  Cough: Absent or within normal limits  Dyspnea: Absent or within normal limits    Gastrointestinal  Anorexia: Absent or within normal limits  Nausea: Absent or within normal limits  Vomiting: Absent or within normal limits  Dehydration: Absent or within normal limits  Mucositis Oral: Absent or within normal limits  Constipation: Absent or within normal limits  Diarrhea: Absent or within normal limits    Genitourinary  Patient Reported Genitourinary Symptoms?: No    Integumentary  Rash Maculo-Papular: Absent or within normal limits    Pain  Patient Reported Pain?: No  Pain Score: No Pain (0)  (DVPRS) Pain Rating Score : 0-No pain  Interfered with your usual ACTIVITY?: Does not interfere 0  Interfered with your SLEEP?: Does not interfere 0  Affected your MOOD?: Does not affect 0  Contributed to your STRESS?: Does not contribute 0  RASS (Oneil Agitation-Sedation Scale): 0-->alert and calm    Patient Coping  Accepting    Clinic Utilization  Patient Aware of Next Appointment?: Yes    Other Patient Concerns  Other Patient Reported Concerns: No      Intervention/Education provided during outreach:                                                       C1D1 Nivolumab follow-up. Patient educated on treatment scheduled  and next follow-up. Patient aware of clinic number if any new symptoms or concerns arise.     Patient to follow up as scheduled at next appt    Fabricio Montes RN, BSN.  RN Care Coordinator     [facility]   [phone]

## 2023-08-09 ENCOUNTER — ANCILLARY PROCEDURE (OUTPATIENT)
Dept: CARDIOLOGY | Facility: CLINIC | Age: 78
End: 2023-08-09
Attending: INTERNAL MEDICINE
Payer: COMMERCIAL

## 2023-08-09 ENCOUNTER — THERAPY VISIT (OUTPATIENT)
Dept: PHYSICAL THERAPY | Facility: CLINIC | Age: 78
End: 2023-08-09
Payer: COMMERCIAL

## 2023-08-09 DIAGNOSIS — Z95.0 CARDIAC PACEMAKER IN SITU: Primary | ICD-10-CM

## 2023-08-09 DIAGNOSIS — Z96.652 STATUS POST TOTAL LEFT KNEE REPLACEMENT: Primary | ICD-10-CM

## 2023-08-09 DIAGNOSIS — I49.5 SSS (SICK SINUS SYNDROME) (H): ICD-10-CM

## 2023-08-09 DIAGNOSIS — Z95.0 CARDIAC PACEMAKER IN SITU: ICD-10-CM

## 2023-08-09 PROCEDURE — 93296 REM INTERROG EVL PM/IDS: CPT | Performed by: INTERNAL MEDICINE

## 2023-08-09 PROCEDURE — 97530 THERAPEUTIC ACTIVITIES: CPT | Mod: GP | Performed by: PHYSICAL THERAPIST

## 2023-08-09 PROCEDURE — 93294 REM INTERROG EVL PM/LDLS PM: CPT | Performed by: INTERNAL MEDICINE

## 2023-08-09 PROCEDURE — 97110 THERAPEUTIC EXERCISES: CPT | Mod: GP | Performed by: PHYSICAL THERAPIST

## 2023-08-09 PROCEDURE — 97112 NEUROMUSCULAR REEDUCATION: CPT | Mod: GP | Performed by: PHYSICAL THERAPIST

## 2023-08-10 ENCOUNTER — OFFICE VISIT (OUTPATIENT)
Dept: DERMATOLOGY | Facility: CLINIC | Age: 78
End: 2023-08-10
Payer: COMMERCIAL

## 2023-08-10 DIAGNOSIS — Z85.820 HISTORY OF MELANOMA: Primary | ICD-10-CM

## 2023-08-10 LAB
MDC_IDC_LEAD_IMPLANT_DT: NORMAL
MDC_IDC_LEAD_LOCATION: NORMAL
MDC_IDC_LEAD_LOCATION_DETAIL_1: NORMAL
MDC_IDC_LEAD_MFG: NORMAL
MDC_IDC_LEAD_MODEL: NORMAL
MDC_IDC_LEAD_POLARITY_TYPE: NORMAL
MDC_IDC_LEAD_SERIAL: NORMAL
MDC_IDC_MSMT_BATTERY_DTM: NORMAL
MDC_IDC_MSMT_BATTERY_REMAINING_LONGEVITY: 130 MO
MDC_IDC_MSMT_BATTERY_RRT_TRIGGER: 2.62
MDC_IDC_MSMT_BATTERY_STATUS: NORMAL
MDC_IDC_MSMT_BATTERY_VOLTAGE: 3.02 V
MDC_IDC_MSMT_LEADCHNL_RV_IMPEDANCE_VALUE: 323 OHM
MDC_IDC_MSMT_LEADCHNL_RV_IMPEDANCE_VALUE: 380 OHM
MDC_IDC_MSMT_LEADCHNL_RV_PACING_THRESHOLD_AMPLITUDE: 0.88 V
MDC_IDC_MSMT_LEADCHNL_RV_PACING_THRESHOLD_PULSEWIDTH: 0.4 MS
MDC_IDC_MSMT_LEADCHNL_RV_SENSING_INTR_AMPL: 2.12 MV
MDC_IDC_MSMT_LEADCHNL_RV_SENSING_INTR_AMPL: 2.12 MV
MDC_IDC_PG_IMPLANT_DTM: NORMAL
MDC_IDC_PG_MFG: NORMAL
MDC_IDC_PG_MODEL: NORMAL
MDC_IDC_PG_SERIAL: NORMAL
MDC_IDC_PG_TYPE: NORMAL
MDC_IDC_SESS_CLINIC_NAME: NORMAL
MDC_IDC_SESS_DTM: NORMAL
MDC_IDC_SESS_TYPE: NORMAL
MDC_IDC_SET_BRADY_HYSTRATE: NORMAL
MDC_IDC_SET_BRADY_LOWRATE: 50 {BEATS}/MIN
MDC_IDC_SET_BRADY_MAX_SENSOR_RATE: 130 {BEATS}/MIN
MDC_IDC_SET_BRADY_MODE: NORMAL
MDC_IDC_SET_LEADCHNL_RV_PACING_AMPLITUDE: 2 V
MDC_IDC_SET_LEADCHNL_RV_PACING_ANODE_ELECTRODE_1: NORMAL
MDC_IDC_SET_LEADCHNL_RV_PACING_ANODE_LOCATION_1: NORMAL
MDC_IDC_SET_LEADCHNL_RV_PACING_CAPTURE_MODE: NORMAL
MDC_IDC_SET_LEADCHNL_RV_PACING_CATHODE_ELECTRODE_1: NORMAL
MDC_IDC_SET_LEADCHNL_RV_PACING_CATHODE_LOCATION_1: NORMAL
MDC_IDC_SET_LEADCHNL_RV_PACING_POLARITY: NORMAL
MDC_IDC_SET_LEADCHNL_RV_PACING_PULSEWIDTH: 0.4 MS
MDC_IDC_SET_LEADCHNL_RV_SENSING_ANODE_ELECTRODE_1: NORMAL
MDC_IDC_SET_LEADCHNL_RV_SENSING_ANODE_LOCATION_1: NORMAL
MDC_IDC_SET_LEADCHNL_RV_SENSING_CATHODE_ELECTRODE_1: NORMAL
MDC_IDC_SET_LEADCHNL_RV_SENSING_CATHODE_LOCATION_1: NORMAL
MDC_IDC_SET_LEADCHNL_RV_SENSING_POLARITY: NORMAL
MDC_IDC_SET_LEADCHNL_RV_SENSING_SENSITIVITY: 0.6 MV
MDC_IDC_SET_ZONE_DETECTION_INTERVAL: 360 MS
MDC_IDC_SET_ZONE_TYPE: NORMAL
MDC_IDC_STAT_BRADY_DTM_END: NORMAL
MDC_IDC_STAT_BRADY_DTM_START: NORMAL
MDC_IDC_STAT_BRADY_RV_PERCENT_PACED: 77.63 %
MDC_IDC_STAT_EPISODE_RECENT_COUNT: 0
MDC_IDC_STAT_EPISODE_RECENT_COUNT_DTM_END: NORMAL
MDC_IDC_STAT_EPISODE_RECENT_COUNT_DTM_START: NORMAL
MDC_IDC_STAT_EPISODE_TOTAL_COUNT: 0
MDC_IDC_STAT_EPISODE_TOTAL_COUNT: 0
MDC_IDC_STAT_EPISODE_TOTAL_COUNT: 3
MDC_IDC_STAT_EPISODE_TOTAL_COUNT_DTM_END: NORMAL
MDC_IDC_STAT_EPISODE_TOTAL_COUNT_DTM_START: NORMAL
MDC_IDC_STAT_EPISODE_TYPE: NORMAL

## 2023-08-10 PROCEDURE — 99212 OFFICE O/P EST SF 10 MIN: CPT | Performed by: DERMATOLOGY

## 2023-08-10 NOTE — PROGRESS NOTES
James Salvador is an extremely pleasant 78 year old year old male patient here today for hx of melanoma.  10/2019 he underwent excision of a Breslow depth 0.6 mm malignant melanoma, lentigo maligna type from the left zygoma. [place] genomic testing (Decision-DX) was performed demonstrating low risk so SLN biopsy was not performed. 2/21/23 He was dx with 2.2mm melanoma, WLE and nodes by ENT and nodes negative.  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 He is under going ICI therapy.  HAd first infusion no issues.  Cheek is healed.      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 Cardiac Cath 9/2015: BRIDGET to LAD    Hearing problem 1995    Wear hearing aids    Hyperlipidemia     Malignant melanoma     Morbid obesity 01/11/2016    Paroxysmal supraventricular tachycardia     on metoprolol    Permanent atrial fibrillation 04/21/2017    Squamous cell carcinoma     Tachy-edinson syndrome 05/29/2019       Past Surgical History:   Procedure Laterality Date    ADENOIDECTOMY  Childhood    ANGIOPLASTY  1994    in [place]    ANKLE SURGERY  07/13/2005    right ankle    ARTHROPLASTY HIP Right 10/2009    BIOPSY NODE SENTINEL Left 03/30/2023    Procedure: BIOPSY, LYMPH NODE, SENTINEL;  Surgeon: Rolando Minaya MD;  Location: [facility] OR    COLONOSCOPY  4/25/12    EP PERM PACER SINGLE LEAD N/A 05/31/2019    Medtronic single lead pacemaker    EXCISE MASS CHEEK Left 03/30/2023    Procedure: wide local excision of left cheek melanoma;  Surgeon: Rolando Minaya MD;  Location: [facility] OR    EXCISE MASS CHEEK WITH FLAP PEDICLE Left 04/13/2023    Procedure: Left cervical Facial flap per closure of left cheek Defect;  Surgeon: Ila Sanchez MD;  Location: [facility] OR    Facial biopsy - Melanoma      GENITOURINARY  SURGERY  10/20/23    Prostate surgery    HEART CATH STENT COR W/WO PTCA  2015    BRIDGET stent mid LAD    IR CHEST PORT PLACEMENT > 5 YRS OF AGE  2023    LASER SURGERY OF EYE  2002    MOHS MICROGRAPHIC PROCEDURE  2004    squamous cell carcinoma right temple    SINUS SURGERY  2006    TONSILLECTOMY  Childhood        Family History   Problem Relation Age of Onset    Genitourinary Problems Mother         renal failure    Heart Disease Mother     Cerebrovascular Disease Mother         TIA    Cardiovascular Father         rupture of dorsal aorta    Depression Son     Depression Brother         Suicide    Substance Abuse Brother         Heroin    Skin Cancer No family hx of        Social History     Socioeconomic History    Marital status:      Spouse name: Not on file    Number of children: 3    Years of education: Not on file    Highest education level: Not on file   Occupational History     Employer: RETIRED   Tobacco Use    Smoking status: Former     Packs/day: 0.50     Years: 10.00     Pack years: 5.00     Types: Cigarettes, Cigars, Pipe     Start date: 1966     Quit date: 1974     Years since quittin.3    Smokeless tobacco: Never    Tobacco comments:     Also smoked from 1985 - 1990   Substance and Sexual Activity    Alcohol use: Not Currently     Comment: Socially    Drug use: No    Sexual activity: Not Currently     Partners: Female     Birth control/protection: Male Surgical     Comment: Vasectomy   Other Topics Concern    Parent/sibling w/ CABG, MI or angioplasty before 65F 55M? No     Service Not Asked    Blood Transfusions Not Asked    Caffeine Concern Yes     Comment: 2 big cups coffee daily    Occupational Exposure Not Asked    Hobby Hazards Not Asked    Sleep Concern No     Comment: sleeping better since shoulder replaced 11/3/16    Stress Concern No    Weight Concern Yes    Special Diet Yes     Comment: trying to do more lean meats    Back Care Not  Asked    Exercise No     Comment: limited - knee    Bike Helmet Not Asked    Seat Belt Not Asked    Self-Exams Not Asked   Social History Narrative    Not on file     Social Determinants of Health     Financial Resource Strain: Not on file   Food Insecurity: Not on file   Transportation Needs: Not on file   Physical Activity: Not on file   Stress: Not on file   Social Connections: Not on file   Intimate Partner Violence: Not on file   Housing Stability: Not on file       Outpatient Encounter Medications as of 8/10/2023   Medication Sig Dispense Refill    acetaminophen (TYLENOL) 325 MG tablet Take 650 mg by mouth 4 times daily      apixaban ANTICOAGULANT (ELIQUIS ANTICOAGULANT) 5 MG tablet Take 1 tablet (5 mg) by mouth 2 times daily 180 tablet 1    atorvastatin (LIPITOR) 80 MG tablet Take 1 tablet (80 mg) by mouth At Bedtime 90 tablet 3    carvedilol (COREG) 3.125 MG tablet Take 1 tablet (3.125 mg) by mouth 2 times daily (with meals) 180 tablet 3    COMPOUNDED NON-CONTROLLED SUBSTANCE (CMPD RX) - PHARMACY TO MIX COMPOUNDED MEDICATION Fluorouracil 5% Calcipotriene 0.005% 1:1 Cream apply twice daily for 1 weeks to face, arms for 14 days 30 g 6    desonide (DESOWEN) 0.05 % external cream Apply topically daily      fluticasone (FLONASE) 50 MCG/ACT nasal spray Spray 2 sprays into both nostrils daily 48 mL 3    furosemide (LASIX) 20 MG tablet TAKE 1 TABLET BY MOUTH EVERY DAY 90 tablet 3    gabapentin (NEURONTIN) 300 MG capsule Take 2 capsules (600 mg) by mouth 3 times daily      ketoconazole (NIZORAL) 2 % cream Apply topically 2 times daily For face. FAX REFILL REQUESTS TO [facility]: [phone] 30 g 2    ketoconazole (NIZORAL) 2 % external shampoo Use daily as needed 120 mL 11    lisinopril (ZESTRIL) 30 MG tablet Take 1 tablet (30 mg) by mouth daily 90 tablet 3    nitroGLYcerin (NITROSTAT) 0.4 MG sublingual tablet Place 1 tablet (0.4 mg) under the tongue every 5 minutes as needed for chest pain May repeat twice for a  total of 3 tablets.  If chest pain not relieved, call 911 25 tablet 11    OXcarbazepine (TRILEPTAL) 300 MG tablet Take 2 tablets (600 mg) by mouth 3 times daily ( @ 7am, 1530 and 2200 hrs each day)      triamcinolone (KENALOG) 0.1 % external lotion Apply to scalp BID x 1-2 weeks then PRN only 60 mL 3     No facility-administered encounter medications on file as of 8/10/2023.             O:   NAD, WDWN, Alert & Oriented, Mood & Affect wnl, Vitals stable   Here today alone   General appearance normal   Vitals stable   Alert, oriented and in no acute distress      Following lymph nodes palpated: Occipital, Cervical, Supraclavicular no lad  L cheek healed      Eyes: Conjunctivae/lids:Normal     ENT: Lips, buccal mucosa, tongue: normal    MSK:Normal    Cardiovascular: peripheral edema none    Pulm: Breathing Normal    Neuro/Psych: Orientation:Alert and Orientedx3 ; Mood/Affect:normal       A/P:  Hx of melanoma wound check today well healed  Cont infusions   Return to clinic 2 months full exam  It was a pleasure speaking to James Salvador today.  Previous clinic notes and pertinent laboratory tests were reviewed prior to James Salvador's visit.

## 2023-08-10 NOTE — LETTER
8/10/2023         RE: James Salvador  [address] [place] [address]        Dear Colleague,    Thank you for referring your patient, James Salvador, to the [facility]. Please see a copy of my visit note below.    James Salvador is an extremely pleasant 78 year old year old male patient here today for hx of melanoma.  10/2019 he underwent excision of a Breslow depth 0.6 mm malignant melanoma, lentigo maligna type from the left zygoma. [place] genomic testing (Decision-DX) was performed demonstrating low risk so SLN biopsy was not performed. 2/21/23 He was dx with 2.2mm melanoma, WLE and nodes by ENT and nodes negative.  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 He is under going ICI therapy.  HAd first infusion no issues.  Cheek is healed.      Past Medical History:   Diagnosis Date     Actinic 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 Cardiac Cath 9/2015: BRIDGET to LAD     Hearing problem 1995    Wear hearing aids     Hyperlipidemia      Malignant melanoma      Morbid obesity 01/11/2016     Paroxysmal supraventricular tachycardia     on metoprolol     Permanent atrial fibrillation 04/21/2017     Squamous cell carcinoma      Tachy-edinson syndrome 05/29/2019       Past Surgical History:   Procedure Laterality Date     ADENOIDECTOMY  Childhood     ANGIOPLASTY  1994    in [place]     ANKLE SURGERY  07/13/2005    right ankle     ARTHROPLASTY HIP Right 10/2009     BIOPSY NODE SENTINEL Left 03/30/2023    Procedure: BIOPSY, LYMPH NODE, SENTINEL;  Surgeon: Rolando Minaya MD;  Location: [facility] OR     COLONOSCOPY  4/25/12     EP PERM PACER SINGLE LEAD N/A 05/31/2019    Medtronic single lead pacemaker     EXCISE MASS CHEEK Left 03/30/2023    Procedure: wide local excision of left cheek  melanoma;  Surgeon: Rolando Minaya MD;  Location: [facility] OR     EXCISE MASS CHEEK WITH FLAP PEDICLE Left 2023    Procedure: Left cervical Facial flap per closure of left cheek Defect;  Surgeon: Ila Sanchez MD;  Location: [facility] OR     Facial biopsy - Melanoma       GENITOURINARY SURGERY  10/20/23    Prostate surgery     HEART CATH STENT COR W/WO PTCA  2015    BRIDGET stent mid LAD     IR CHEST PORT PLACEMENT > 5 YRS OF AGE  2023     LASER SURGERY OF EYE  2002     MOHS MICROGRAPHIC PROCEDURE  2004    squamous cell carcinoma right temple     SINUS SURGERY  2006     TONSILLECTOMY  Childhood        Family History   Problem Relation Age of Onset     Genitourinary Problems Mother         renal failure     Heart Disease Mother      Cerebrovascular Disease Mother         TIA     Cardiovascular Father         rupture of dorsal aorta     Depression Son      Depression Brother         Suicide     Substance Abuse Brother         Heroin     Skin Cancer No family hx of        Social History     Socioeconomic History     Marital status:      Spouse name: Not on file     Number of children: 3     Years of education: Not on file     Highest education level: Not on file   Occupational History     Employer: RETIRED   Tobacco Use     Smoking status: Former     Packs/day: 0.50     Years: 10.00     Pack years: 5.00     Types: Cigarettes, Cigars, Pipe     Start date: 1966     Quit date: 1974     Years since quittin.3     Smokeless tobacco: Never     Tobacco comments:     Also smoked from 1985 - 1990   Substance and Sexual Activity     Alcohol use: Not Currently     Comment: Socially     Drug use: No     Sexual activity: Not Currently     Partners: Female     Birth control/protection: Male Surgical     Comment: Vasectomy   Other Topics Concern     Parent/sibling w/ CABG, MI or angioplasty before 65F 55M? No      Service Not Asked     Blood Transfusions Not Asked      Caffeine Concern Yes     Comment: 2 big cups coffee daily     Occupational Exposure Not Asked     Hobby Hazards Not Asked     Sleep Concern No     Comment: sleeping better since shoulder replaced 11/3/16     Stress Concern No     Weight Concern Yes     Special Diet Yes     Comment: trying to do more lean meats     Back Care Not Asked     Exercise No     Comment: limited - knee     Bike Helmet Not Asked     Seat Belt Not Asked     Self-Exams Not Asked   Social History Narrative     Not on file     Social Determinants of Health     Financial Resource Strain: Not on file   Food Insecurity: Not on file   Transportation Needs: Not on file   Physical Activity: Not on file   Stress: Not on file   Social Connections: Not on file   Intimate Partner Violence: Not on file   Housing Stability: Not on file       Outpatient Encounter Medications as of 8/10/2023   Medication Sig Dispense Refill     acetaminophen (TYLENOL) 325 MG tablet Take 650 mg by mouth 4 times daily       apixaban ANTICOAGULANT (ELIQUIS ANTICOAGULANT) 5 MG tablet Take 1 tablet (5 mg) by mouth 2 times daily 180 tablet 1     atorvastatin (LIPITOR) 80 MG tablet Take 1 tablet (80 mg) by mouth At Bedtime 90 tablet 3     carvedilol (COREG) 3.125 MG tablet Take 1 tablet (3.125 mg) by mouth 2 times daily (with meals) 180 tablet 3     COMPOUNDED NON-CONTROLLED SUBSTANCE (CMPD RX) - PHARMACY TO MIX COMPOUNDED MEDICATION Fluorouracil 5% Calcipotriene 0.005% 1:1 Cream apply twice daily for 1 weeks to face, arms for 14 days 30 g 6     desonide (DESOWEN) 0.05 % external cream Apply topically daily       fluticasone (FLONASE) 50 MCG/ACT nasal spray Spray 2 sprays into both nostrils daily 48 mL 3     furosemide (LASIX) 20 MG tablet TAKE 1 TABLET BY MOUTH EVERY DAY 90 tablet 3     gabapentin (NEURONTIN) 300 MG capsule Take 2 capsules (600 mg) by mouth 3 times daily       ketoconazole (NIZORAL) 2 % cream Apply topically 2 times daily For face. FAX REFILL REQUESTS TO [facility]:  [phone] 30 g 2     ketoconazole (NIZORAL) 2 % external shampoo Use daily as needed 120 mL 11     lisinopril (ZESTRIL) 30 MG tablet Take 1 tablet (30 mg) by mouth daily 90 tablet 3     nitroGLYcerin (NITROSTAT) 0.4 MG sublingual tablet Place 1 tablet (0.4 mg) under the tongue every 5 minutes as needed for chest pain May repeat twice for a total of 3 tablets.  If chest pain not relieved, call 911 25 tablet 11     OXcarbazepine (TRILEPTAL) 300 MG tablet Take 2 tablets (600 mg) by mouth 3 times daily ( @ 7am, 1530 and 2200 hrs each day)       triamcinolone (KENALOG) 0.1 % external lotion Apply to scalp BID x 1-2 weeks then PRN only 60 mL 3     No facility-administered encounter medications on file as of 8/10/2023.             O:   NAD, WDWN, Alert & Oriented, Mood & Affect wnl, Vitals stable   Here today alone   General appearance normal   Vitals stable   Alert, oriented and in no acute distress      Following lymph nodes palpated: Occipital, Cervical, Supraclavicular no lad  L cheek healed      Eyes: Conjunctivae/lids:Normal     ENT: Lips, buccal mucosa, tongue: normal    MSK:Normal    Cardiovascular: peripheral edema none    Pulm: Breathing Normal    Neuro/Psych: Orientation:Alert and Orientedx3 ; Mood/Affect:normal       A/P:  Hx of melanoma wound check today well healed  Cont infusions   Return to clinic 2 months full exam  It was a pleasure speaking to James Salvador today.  Previous clinic notes and pertinent laboratory tests were reviewed prior to James Salvador's visit.      Again, thank you for allowing me to participate in the care of your patient.        Sincerely,        Jv Dutta MD

## 2023-08-15 ENCOUNTER — OFFICE VISIT (OUTPATIENT)
Dept: DERMATOLOGY | Facility: CLINIC | Age: 78
End: 2023-08-15
Payer: COMMERCIAL

## 2023-08-15 DIAGNOSIS — D18.01 ANGIOMA OF SKIN: ICD-10-CM

## 2023-08-15 DIAGNOSIS — L21.9 SEBORRHEIC DERMATITIS: ICD-10-CM

## 2023-08-15 DIAGNOSIS — L81.4 LENTIGO: ICD-10-CM

## 2023-08-15 DIAGNOSIS — L82.1 SEBORRHEIC KERATOSIS: ICD-10-CM

## 2023-08-15 DIAGNOSIS — L30.9 ECZEMA, UNSPECIFIED TYPE: Primary | ICD-10-CM

## 2023-08-15 DIAGNOSIS — L57.0 ACTINIC KERATOSIS: ICD-10-CM

## 2023-08-15 DIAGNOSIS — Z85.828 HISTORY OF SCC (SQUAMOUS CELL CARCINOMA) OF SKIN: ICD-10-CM

## 2023-08-15 DIAGNOSIS — D22.9 NEVUS: ICD-10-CM

## 2023-08-15 DIAGNOSIS — D48.5 NEOPLASM OF UNCERTAIN BEHAVIOR OF SKIN: ICD-10-CM

## 2023-08-15 DIAGNOSIS — Z85.828 HISTORY OF BASAL CELL CARCINOMA (BCC): ICD-10-CM

## 2023-08-15 DIAGNOSIS — Z85.820 HISTORY OF MELANOMA: ICD-10-CM

## 2023-08-15 PROCEDURE — 99213 OFFICE O/P EST LOW 20 MIN: CPT | Mod: 25 | Performed by: PHYSICIAN ASSISTANT

## 2023-08-15 PROCEDURE — 88342 IMHCHEM/IMCYTCHM 1ST ANTB: CPT | Performed by: DERMATOLOGY

## 2023-08-15 PROCEDURE — 88341 IMHCHEM/IMCYTCHM EA ADD ANTB: CPT | Performed by: DERMATOLOGY

## 2023-08-15 PROCEDURE — 17000 DESTRUCT PREMALG LESION: CPT | Mod: 59 | Performed by: PHYSICIAN ASSISTANT

## 2023-08-15 PROCEDURE — 88305 TISSUE EXAM BY PATHOLOGIST: CPT | Performed by: DERMATOLOGY

## 2023-08-15 PROCEDURE — 17003 DESTRUCT PREMALG LES 2-14: CPT | Performed by: PHYSICIAN ASSISTANT

## 2023-08-15 PROCEDURE — 11102 TANGNTL BX SKIN SINGLE LES: CPT | Performed by: PHYSICIAN ASSISTANT

## 2023-08-15 RX ORDER — CLOBETASOL PROPIONATE 0.5 MG/G
CREAM TOPICAL
Qty: 60 G | Refills: 3 | Status: SHIPPED | OUTPATIENT
Start: 2023-08-15

## 2023-08-15 RX ORDER — PIMECROLIMUS 10 MG/G
CREAM TOPICAL
Qty: 60 G | Refills: 3 | Status: SHIPPED | OUTPATIENT
Start: 2023-08-15 | End: 2023-09-01

## 2023-08-15 RX ORDER — KETOCONAZOLE 20 MG/ML
SHAMPOO TOPICAL
Qty: 120 ML | Refills: 11 | Status: SHIPPED | OUTPATIENT
Start: 2023-08-15

## 2023-08-15 NOTE — PATIENT INSTRUCTIONS
Wound Care Instructions     FOR SUPERFICIAL WOUNDS     [facility]  [phone]                 AFTER 24 HOURS YOU SHOULD REMOVE THE BANDAGE AND BEGIN DAILY DRESSING CHANGES AS FOLLOWS:     1) Remove Dressing.     2) Clean and dry the area with tap water using a Q-tip or sterile gauze pad.     3) Apply Vaseline, Aquaphor, Polysporin ointment or Bacitracin ointment over entire wound.  Do NOT use Neosporin ointment.     4) Cover the wound with a band-aid, or a sterile non-stick gauze pad and micropore paper tape    REPEAT THESE INSTRUCTIONS AT LEAST ONCE A DAY UNTIL THE WOUND HAS COMPLETELY HEALED.    It is an old wives tale that a wound heals better when it is exposed to air and allowed to dry out. The wound will heal faster with a better cosmetic result if it is kept moist with ointment and covered with a bandage.    **Do not let the wound dry out.**    Supplies Needed:      *Cotton tipped applicators (Q-tips)    *Vaseline, Aquaphor, Polysporin or Bacitracin Ointment (NOT NEOSPORIN)    *Band-aids or non-stick gauze pads and micropore paper tape.      PATIENT INFORMATION:    During the healing process you will notice a number of changes. All wounds develop a small halo of redness surrounding the wound.  This means healing is occurring. Severe itching with extensive redness usually indicates sensitivity to the ointment or bandage tape used to dress the wound.  You should call our office if this develops.      Swelling  and/or discoloration around your surgical site is common, particularly when performed around the eye.    All wounds normally drain.  The larger the wound the more drainage there will be.  After 7-10 days, you will notice the wound beginning to shrink and new skin will begin to grow.  The wound is healed when you can see skin has formed over the entire area.  A healed wound has a healthy, shiny look to the surface and is red to dark pink in color to normalize.  Wounds may  take approximately 4-6 weeks to heal.  Larger wounds may take 6-8 weeks.  After the wound is healed you may discontinue dressing changes.    You may experience a sensation of tightness as your wound heals. This is normal and will gradually subside.    Your healed wound may be sensitive to temperature changes. This sensitivity improves with time, but if you re having a lot of discomfort, try to avoid temperature extremes.    Patients frequently experience itching after their wound appears to have healed because of the continue healing under the skin.  Plain Vaseline will help relieve the itching.      POSSIBLE COMPLICATIONS    BLEEDING:    Leave the bandage in place.  Use tightly rolled up gauze or a cloth to apply direct pressure over the bandage for 30  minutes.  Reapply pressure for an additional 30 minutes if necessary  Use additional gauze and tape to maintain pressure once the bleeding has stopped.

## 2023-08-15 NOTE — PROGRESS NOTES
HPI:   Chief complaint: James Salvador (Pete) is a 78 year old male who presents for Full skin cancer screening to rule out skin cancer.   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    Personal HX of Malignant Melanoma: yes, as above   Family HX of Skin Cancer / Malignant Melanoma: none  Personal HX of Atypical Moles: none  Risk factors: sun exposure, history of SCC, history of melanoma   New / Changing lesions: none  Social History: Has grandchildren that he watches  On review of systems, there are no further skin complaints, patient is feeling otherwise well.  See patient intake sheet.  ROS of the following were done and are negative: Constitutional, Eyes, Ears, Nose,   Mouth, Throat, Cardiovascular, Respiratory, GI, Genitourinary, Musculoskeletal,   Psychiatric, Endocrine, Allergic/Immunologic.      PHYSICAL EXAM:   There were no vitals taken for this visit.  Skin exam performed as follows: Type 2 skin. Mood appropriate  Alert and Oriented X 3. Well developed, well nourished in no distress.  General appearance: Normal  Head including face: Normal  Eyes: conjunctiva and lids: Normal  Mouth: Lips, teeth, gums: Normal  Neck: Normal  Chest-breast/axillae: Normal  Back: Normal  Spleen and liver: Normal  Cardiovascular: Exam of peripheral vascular system by observation for swelling, varicosities, edema: Normal  Genitalia: groin, buttocks: Normal  Extremities: digits/nails (clubbing): Normal  Eccrine and Apocrine glands: Normal  Right upper extremity: Normal  Left upper extremity: Normal  Right lower extremity: Normal  Left lower extremity: Normal  Skin: Scalp and body hair: See below    Pt deferred exam of breasts, groin, buttocks: NO    Other physical findings:  1. Multiple pigmented macules on extremities and trunk  2. Multiple pigmented macules on face, trunk  and extremities  3. Multiple vascular papules on trunk, arms and legs  4. Multiple scattered keratotic plaques  5. Dermatitis on the arms, legs and face  6. Pink gritty papule on the right dorsal hand x 2, left dorsal hand x 1  7. Three 4-5 mm tan papules on the left cheek         Except as noted above, no other signs of skin cancer or melanoma.     ASSESSMENT/PLAN:   Benign Full skin cancer screening today.     Patient with history of SCC, BCC and melanoma  Advised on monthly self exams and 1 year  Patient Education: Appropriate brochures given.    Multiple benign appearing nevi on arms, legs and trunk. Discussed ABCDEs of melanoma and sunscreen.   Multiple lentigos on arms, legs and trunk. Advised benign, no treatment needed.  Multiple scattered angiomas. Advised benign, no treatment needed.   Seborrheic keratosis on arms, legs and trunk. Advised benign, no treatment needed.  Dermatitis on the arms and legs; eczematous dermatitis on the face  --Start clobetasol BID x 1-2 weeks PRN flares for body  --Elidel BID for face  Actinic keratosis on the right dorsal hand x 2, left dorsal hand x 1. As precancerous, cryosurgery performed. Advised on blistering and post-op care. Advised if not resolved in 1-2 months to return for evaluation  R/o BCC vs scar vs other skin malignancy on the left cheek (bx from one spot only). Shave biopsy performed.  Area cleaned and anesthetized with 1% lidocaine with epinephrine.  Dermablade used to remove the lesion and sent to pathology. Bleeding was cauterized. Pt tolerated procedure well with no complications.         Follow-up: 6 months    1.) Patient was asked about new and changing moles. YES  2.) Patient received a complete physical skin examination: YES  3.) Patient was counseled to perform a monthly self skin examination: YES  Scribed By: Rosa Maria Hansen, MS, PA-C

## 2023-08-15 NOTE — LETTER
8/15/2023         RE: James Salvador  [address] [place] [address]        Dear Colleague,    Thank you for referring your patient, James Salvador, to the [facility]. Please see a copy of my visit note below.    HPI:   Chief complaint: James Salvador (Pete) is a 78 year old male who presents for Full skin cancer screening to rule out skin cancer.   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    Personal HX of Malignant Melanoma: yes, as above   Family HX of Skin Cancer / Malignant Melanoma: none  Personal HX of Atypical Moles: none  Risk factors: sun exposure, history of SCC, history of melanoma   New / Changing lesions: none  Social History: Has grandchildren that he watches  On review of systems, there are no further skin complaints, patient is feeling otherwise well.  See patient intake sheet.  ROS of the following were done and are negative: Constitutional, Eyes, Ears, Nose,   Mouth, Throat, Cardiovascular, Respiratory, GI, Genitourinary, Musculoskeletal,   Psychiatric, Endocrine, Allergic/Immunologic.      PHYSICAL EXAM:   There were no vitals taken for this visit.  Skin exam performed as follows: Type 2 skin. Mood appropriate  Alert and Oriented X 3. Well developed, well nourished in no distress.  General appearance: Normal  Head including face: Normal  Eyes: conjunctiva and lids: Normal  Mouth: Lips, teeth, gums: Normal  Neck: Normal  Chest-breast/axillae: Normal  Back: Normal  Spleen and liver: Normal  Cardiovascular: Exam of peripheral vascular system by observation for swelling, varicosities, edema: Normal  Genitalia: groin, buttocks: Normal  Extremities: digits/nails (clubbing): Normal  Eccrine and Apocrine glands: Normal  Right upper extremity: Normal  Left upper extremity: Normal  Right lower  extremity: Normal  Left lower extremity: Normal  Skin: Scalp and body hair: See below    Pt deferred exam of breasts, groin, buttocks: NO    Other physical findings:  1. Multiple pigmented macules on extremities and trunk  2. Multiple pigmented macules on face, trunk and extremities  3. Multiple vascular papules on trunk, arms and legs  4. Multiple scattered keratotic plaques  5. Dermatitis on the arms, legs and face  6. Pink gritty papule on the right dorsal hand x 2, left dorsal hand x 1  7. Three 4-5 mm tan papules on the left cheek         Except as noted above, no other signs of skin cancer or melanoma.     ASSESSMENT/PLAN:   Benign Full skin cancer screening today.     Patient with history of SCC, BCC and melanoma  Advised on monthly self exams and 1 year  Patient Education: Appropriate brochures given.    Multiple benign appearing nevi on arms, legs and trunk. Discussed ABCDEs of melanoma and sunscreen.   Multiple lentigos on arms, legs and trunk. Advised benign, no treatment needed.  Multiple scattered angiomas. Advised benign, no treatment needed.   Seborrheic keratosis on arms, legs and trunk. Advised benign, no treatment needed.  Dermatitis on the arms and legs; eczematous dermatitis on the face  --Start clobetasol BID x 1-2 weeks PRN flares for body  --Elidel BID for face  Actinic keratosis on the right dorsal hand x 2, left dorsal hand x 1. As precancerous, cryosurgery performed. Advised on blistering and post-op care. Advised if not resolved in 1-2 months to return for evaluation  R/o BCC vs scar vs other skin malignancy on the left cheek (bx from one spot only). Shave biopsy performed.  Area cleaned and anesthetized with 1% lidocaine with epinephrine.  Dermablade used to remove the lesion and sent to pathology. Bleeding was cauterized. Pt tolerated procedure well with no complications.         Follow-up: 6 months    1.) Patient was asked about new and changing moles. YES  2.) Patient received a  complete physical skin examination: YES  3.) Patient was counseled to perform a monthly self skin examination: YES  Scribed By: Rosa Maria Hansen, MS, PAAnishaC    Again, thank you for allowing me to participate in the care of your patient.        Sincerely,        Rosa Maria Hansen PA-C

## 2023-08-21 ENCOUNTER — THERAPY VISIT (OUTPATIENT)
Dept: PHYSICAL THERAPY | Facility: CLINIC | Age: 78
End: 2023-08-21
Payer: COMMERCIAL

## 2023-08-21 ENCOUNTER — TELEPHONE (OUTPATIENT)
Dept: DERMATOLOGY | Facility: CLINIC | Age: 78
End: 2023-08-21

## 2023-08-21 DIAGNOSIS — L30.9 DERMATITIS: Primary | ICD-10-CM

## 2023-08-21 DIAGNOSIS — Z96.652 STATUS POST TOTAL LEFT KNEE REPLACEMENT: Primary | ICD-10-CM

## 2023-08-21 LAB
PATH REPORT.ADDENDUM SPEC: ABNORMAL
PATH REPORT.COMMENTS IMP SPEC: ABNORMAL
PATH REPORT.COMMENTS IMP SPEC: YES
PATH REPORT.FINAL DX SPEC: ABNORMAL
PATH REPORT.GROSS SPEC: ABNORMAL
PATH REPORT.MICROSCOPIC SPEC OTHER STN: ABNORMAL
PATH REPORT.RELEVANT HX SPEC: ABNORMAL

## 2023-08-21 PROCEDURE — 97530 THERAPEUTIC ACTIVITIES: CPT | Mod: GP | Performed by: PHYSICAL THERAPIST

## 2023-08-21 PROCEDURE — 97112 NEUROMUSCULAR REEDUCATION: CPT | Mod: GP | Performed by: PHYSICAL THERAPIST

## 2023-08-21 PROCEDURE — 97140 MANUAL THERAPY 1/> REGIONS: CPT | Mod: GP | Performed by: PHYSICAL THERAPIST

## 2023-08-21 RX ORDER — TACROLIMUS 1 MG/G
OINTMENT TOPICAL
Qty: 60 G | Refills: 5 | Status: SHIPPED | OUTPATIENT
Start: 2023-08-21 | End: 2023-09-01

## 2023-08-21 NOTE — TELEPHONE ENCOUNTER
Called patient:  Patient notified and educated on test results   Mohs procedure explained- all questions answered  appointment scheduled- mohs packet mailed.     Thank you,  Freya OCONNELL RN  Dermatology   [phone]

## 2023-08-21 NOTE — TELEPHONE ENCOUNTER
----- Message from Rosa Maria Hansen PA-C sent at 8/21/2023  9:36 AM CDT -----  Notified pt of results of metastatic melanoma on the left cheek - please schedule mohs within the next 2-3 weeks.

## 2023-08-21 NOTE — TELEPHONE ENCOUNTER
Prior Authorization Specialty Medication Request    Medication/Dose: tacrolimus (PROTOPIC) 0.1 % external ointment   ICD code (if different than what is on RX):    Previously Tried and Failed:      Important Lab Values:   Rationale:     Insurance Name:   Insurance ID:   Insurance Phone Number:     Pharmacy Information (if different than what is on RX)  Name:    Phone:

## 2023-08-23 NOTE — TELEPHONE ENCOUNTER
PA Initiation    Medication: TACROLIMUS 0.1 % EX OINT  Insurance Company: [employer] Part D - Phone [phone] Fax [phone]  Pharmacy Filling the Rx: [id number] - [place], [place] - [address] AT [facility]  Filling Pharmacy Phone: [phone]  Filling Pharmacy Fax: [phone]  Start Date: 8/23/2023

## 2023-08-23 NOTE — TELEPHONE ENCOUNTER
PRIOR AUTHORIZATION DENIED    Medication: TACROLIMUS 0.1 % EX OINT  Insurance Company: [employer] Part D - Phone [phone] Fax [phone]  Denial Date: 8/23/2023  Denial Rational:     Appeal Information:     Patient Notified: No

## 2023-08-24 NOTE — TELEPHONE ENCOUNTER
[employer] sent with Rosa Maria's message below.    Thank you,  Freya OCONNELL RN  Dermatology   [phone]     None known

## 2023-08-25 NOTE — CONFIDENTIAL NOTE
Closing encounter as pt read message below from Rosa Maria on [employer].    Thank you,  Freya OCONNELL RN  Dermatology   [phone]

## 2023-08-28 ENCOUNTER — THERAPY VISIT (OUTPATIENT)
Dept: PHYSICAL THERAPY | Facility: CLINIC | Age: 78
End: 2023-08-28
Payer: COMMERCIAL

## 2023-08-28 DIAGNOSIS — Z96.652 STATUS POST TOTAL LEFT KNEE REPLACEMENT: Primary | ICD-10-CM

## 2023-08-28 PROCEDURE — 97116 GAIT TRAINING THERAPY: CPT | Mod: GP | Performed by: PHYSICAL THERAPIST

## 2023-08-28 PROCEDURE — 97112 NEUROMUSCULAR REEDUCATION: CPT | Mod: GP | Performed by: PHYSICAL THERAPIST

## 2023-08-28 PROCEDURE — 97530 THERAPEUTIC ACTIVITIES: CPT | Mod: GP | Performed by: PHYSICAL THERAPIST

## 2023-08-29 ENCOUNTER — PATIENT OUTREACH (OUTPATIENT)
Dept: ONCOLOGY | Facility: CLINIC | Age: 78
End: 2023-08-29
Payer: COMMERCIAL

## 2023-08-29 NOTE — PROGRESS NOTES
Surgical Office Location:  [facility]  [address] [place] [id number]

## 2023-08-29 NOTE — PROGRESS NOTES
Writer called Renea to update them that patient is okay to get flu, RSV and COVID vaccines/booster when the are available to be given to patient. Per Julio, patient does not need to space these around his treatments and can get them at anytime, as long as he is feeling well enough to do so. Renea stated understanding, all questions answered.     Lee Ann Washington RN on 8/29/2023 at 1:29 PM

## 2023-08-29 NOTE — PROGRESS NOTES
Renea, patient's spouse, called writer inquiring if it would be appropriate for patient to receive flu, RSV and COVID vaccines/booster while on active treatment. Writer advised Renea that call will be routed to GABY covering for Dr. Holbrook today for review, and then writer will follow up with her once recommendations are received. Renea stated understanding, all questions answered.     Lee Ann Washington RN on 8/29/2023 at 11:06 AM

## 2023-08-31 ENCOUNTER — OFFICE VISIT (OUTPATIENT)
Dept: DERMATOLOGY | Facility: CLINIC | Age: 78
End: 2023-08-31
Payer: COMMERCIAL

## 2023-08-31 DIAGNOSIS — Z85.820 HISTORY OF MELANOMA: Primary | ICD-10-CM

## 2023-08-31 DIAGNOSIS — C43.39 MELANOMA OF CHEEK (H): Primary | ICD-10-CM

## 2023-08-31 DIAGNOSIS — D48.5 NEOPLASM OF UNCERTAIN BEHAVIOR OF SKIN: ICD-10-CM

## 2023-08-31 PROCEDURE — 11103 TANGNTL BX SKIN EA SEP/ADDL: CPT | Performed by: DERMATOLOGY

## 2023-08-31 PROCEDURE — 88305 TISSUE EXAM BY PATHOLOGIST: CPT | Performed by: PATHOLOGY

## 2023-08-31 PROCEDURE — 11102 TANGNTL BX SKIN SINGLE LES: CPT | Performed by: DERMATOLOGY

## 2023-08-31 PROCEDURE — 99213 OFFICE O/P EST LOW 20 MIN: CPT | Mod: 25 | Performed by: DERMATOLOGY

## 2023-08-31 RX ORDER — MEPERIDINE HYDROCHLORIDE 25 MG/ML
25 INJECTION INTRAMUSCULAR; INTRAVENOUS; SUBCUTANEOUS EVERY 30 MIN PRN
Status: CANCELLED | OUTPATIENT
Start: 2023-09-01

## 2023-08-31 RX ORDER — ALBUTEROL SULFATE 90 UG/1
1-2 AEROSOL, METERED RESPIRATORY (INHALATION)
Status: CANCELLED
Start: 2023-09-01

## 2023-08-31 RX ORDER — HEPARIN SODIUM (PORCINE) LOCK FLUSH IV SOLN 100 UNIT/ML 100 UNIT/ML
5 SOLUTION INTRAVENOUS
Status: CANCELLED | OUTPATIENT
Start: 2023-09-01

## 2023-08-31 RX ORDER — EPINEPHRINE 1 MG/ML
0.3 INJECTION, SOLUTION INTRAMUSCULAR; SUBCUTANEOUS EVERY 5 MIN PRN
Status: CANCELLED | OUTPATIENT
Start: 2023-11-24

## 2023-08-31 RX ORDER — ALBUTEROL SULFATE 0.83 MG/ML
2.5 SOLUTION RESPIRATORY (INHALATION)
Status: CANCELLED | OUTPATIENT
Start: 2023-11-24

## 2023-08-31 RX ORDER — METHYLPREDNISOLONE SODIUM SUCCINATE 125 MG/2ML
125 INJECTION, POWDER, LYOPHILIZED, FOR SOLUTION INTRAMUSCULAR; INTRAVENOUS
Status: CANCELLED
Start: 2023-11-24

## 2023-08-31 RX ORDER — LORAZEPAM 2 MG/ML
0.5 INJECTION INTRAMUSCULAR EVERY 4 HOURS PRN
Status: CANCELLED | OUTPATIENT
Start: 2023-09-29

## 2023-08-31 RX ORDER — LORAZEPAM 2 MG/ML
0.5 INJECTION INTRAMUSCULAR EVERY 4 HOURS PRN
Status: CANCELLED | OUTPATIENT
Start: 2023-10-27

## 2023-08-31 RX ORDER — HEPARIN SODIUM (PORCINE) LOCK FLUSH IV SOLN 100 UNIT/ML 100 UNIT/ML
5 SOLUTION INTRAVENOUS
Status: CANCELLED | OUTPATIENT
Start: 2023-10-27

## 2023-08-31 RX ORDER — ALBUTEROL SULFATE 90 UG/1
1-2 AEROSOL, METERED RESPIRATORY (INHALATION)
Status: CANCELLED
Start: 2023-09-29

## 2023-08-31 RX ORDER — HEPARIN SODIUM,PORCINE 10 UNIT/ML
5-20 VIAL (ML) INTRAVENOUS DAILY PRN
Status: CANCELLED | OUTPATIENT
Start: 2023-09-29

## 2023-08-31 RX ORDER — HEPARIN SODIUM,PORCINE 10 UNIT/ML
5-20 VIAL (ML) INTRAVENOUS DAILY PRN
Status: CANCELLED | OUTPATIENT
Start: 2023-09-01

## 2023-08-31 RX ORDER — ALBUTEROL SULFATE 90 UG/1
1-2 AEROSOL, METERED RESPIRATORY (INHALATION)
Status: CANCELLED
Start: 2023-11-24

## 2023-08-31 RX ORDER — ALBUTEROL SULFATE 0.83 MG/ML
2.5 SOLUTION RESPIRATORY (INHALATION)
Status: CANCELLED | OUTPATIENT
Start: 2023-10-27

## 2023-08-31 RX ORDER — HEPARIN SODIUM,PORCINE 10 UNIT/ML
5-20 VIAL (ML) INTRAVENOUS DAILY PRN
Status: CANCELLED | OUTPATIENT
Start: 2023-11-24

## 2023-08-31 RX ORDER — ALBUTEROL SULFATE 0.83 MG/ML
2.5 SOLUTION RESPIRATORY (INHALATION)
Status: CANCELLED | OUTPATIENT
Start: 2023-09-29

## 2023-08-31 RX ORDER — DIPHENHYDRAMINE HYDROCHLORIDE 50 MG/ML
50 INJECTION INTRAMUSCULAR; INTRAVENOUS
Status: CANCELLED
Start: 2023-11-24

## 2023-08-31 RX ORDER — METHYLPREDNISOLONE SODIUM SUCCINATE 125 MG/2ML
125 INJECTION, POWDER, LYOPHILIZED, FOR SOLUTION INTRAMUSCULAR; INTRAVENOUS
Status: CANCELLED
Start: 2023-09-01

## 2023-08-31 RX ORDER — LORAZEPAM 2 MG/ML
0.5 INJECTION INTRAMUSCULAR EVERY 4 HOURS PRN
Status: CANCELLED | OUTPATIENT
Start: 2023-09-01

## 2023-08-31 RX ORDER — ALBUTEROL SULFATE 90 UG/1
1-2 AEROSOL, METERED RESPIRATORY (INHALATION)
Status: CANCELLED
Start: 2023-10-27

## 2023-08-31 RX ORDER — EPINEPHRINE 1 MG/ML
0.3 INJECTION, SOLUTION INTRAMUSCULAR; SUBCUTANEOUS EVERY 5 MIN PRN
Status: CANCELLED | OUTPATIENT
Start: 2023-09-01

## 2023-08-31 RX ORDER — DIPHENHYDRAMINE HYDROCHLORIDE 50 MG/ML
50 INJECTION INTRAMUSCULAR; INTRAVENOUS
Status: CANCELLED
Start: 2023-09-01

## 2023-08-31 RX ORDER — MEPERIDINE HYDROCHLORIDE 25 MG/ML
25 INJECTION INTRAMUSCULAR; INTRAVENOUS; SUBCUTANEOUS EVERY 30 MIN PRN
Status: CANCELLED | OUTPATIENT
Start: 2023-09-29

## 2023-08-31 RX ORDER — ALBUTEROL SULFATE 0.83 MG/ML
2.5 SOLUTION RESPIRATORY (INHALATION)
Status: CANCELLED | OUTPATIENT
Start: 2023-09-01

## 2023-08-31 RX ORDER — HEPARIN SODIUM (PORCINE) LOCK FLUSH IV SOLN 100 UNIT/ML 100 UNIT/ML
5 SOLUTION INTRAVENOUS
Status: CANCELLED | OUTPATIENT
Start: 2023-11-24

## 2023-08-31 RX ORDER — METHYLPREDNISOLONE SODIUM SUCCINATE 125 MG/2ML
125 INJECTION, POWDER, LYOPHILIZED, FOR SOLUTION INTRAMUSCULAR; INTRAVENOUS
Status: CANCELLED
Start: 2023-10-27

## 2023-08-31 RX ORDER — LORAZEPAM 2 MG/ML
0.5 INJECTION INTRAMUSCULAR EVERY 4 HOURS PRN
Status: CANCELLED | OUTPATIENT
Start: 2023-11-24

## 2023-08-31 RX ORDER — DIPHENHYDRAMINE HYDROCHLORIDE 50 MG/ML
50 INJECTION INTRAMUSCULAR; INTRAVENOUS
Status: CANCELLED
Start: 2023-10-27

## 2023-08-31 RX ORDER — METHYLPREDNISOLONE SODIUM SUCCINATE 125 MG/2ML
125 INJECTION, POWDER, LYOPHILIZED, FOR SOLUTION INTRAMUSCULAR; INTRAVENOUS
Status: CANCELLED
Start: 2023-09-29

## 2023-08-31 RX ORDER — DIPHENHYDRAMINE HYDROCHLORIDE 50 MG/ML
50 INJECTION INTRAMUSCULAR; INTRAVENOUS
Status: CANCELLED
Start: 2023-09-29

## 2023-08-31 RX ORDER — EPINEPHRINE 1 MG/ML
0.3 INJECTION, SOLUTION INTRAMUSCULAR; SUBCUTANEOUS EVERY 5 MIN PRN
Status: CANCELLED | OUTPATIENT
Start: 2023-10-27

## 2023-08-31 RX ORDER — MEPERIDINE HYDROCHLORIDE 25 MG/ML
25 INJECTION INTRAMUSCULAR; INTRAVENOUS; SUBCUTANEOUS EVERY 30 MIN PRN
Status: CANCELLED | OUTPATIENT
Start: 2023-10-27

## 2023-08-31 RX ORDER — EPINEPHRINE 1 MG/ML
0.3 INJECTION, SOLUTION INTRAMUSCULAR; SUBCUTANEOUS EVERY 5 MIN PRN
Status: CANCELLED | OUTPATIENT
Start: 2023-09-29

## 2023-08-31 RX ORDER — HEPARIN SODIUM (PORCINE) LOCK FLUSH IV SOLN 100 UNIT/ML 100 UNIT/ML
5 SOLUTION INTRAVENOUS
Status: CANCELLED | OUTPATIENT
Start: 2023-09-29

## 2023-08-31 RX ORDER — HEPARIN SODIUM,PORCINE 10 UNIT/ML
5-20 VIAL (ML) INTRAVENOUS DAILY PRN
Status: CANCELLED | OUTPATIENT
Start: 2023-10-27

## 2023-08-31 RX ORDER — MEPERIDINE HYDROCHLORIDE 25 MG/ML
25 INJECTION INTRAMUSCULAR; INTRAVENOUS; SUBCUTANEOUS EVERY 30 MIN PRN
Status: CANCELLED | OUTPATIENT
Start: 2023-11-24

## 2023-08-31 NOTE — LETTER
8/31/2023         RE: James Salvador  [address] [place] [address]        Dear Colleague,    Thank you for referring your patient, James Salvador, to the [facility]. Please see a copy of my visit note below.    Surgical Office Location:  [facility]  [address] [place] [id number]      James Salvador is an extremely pleasant 78 year old year old male patient here today for hx of metastatic melanoma.  Just started infusions.  Bx new cutanoues mets and was here for excision.  HE notes new spots on face.  Patient has no other skin complaints today.  Remainder of the HPI, Meds, PMH, Allergies, FH, and SH was reviewed in chart.      Past Medical History:   Diagnosis Date     Actinic 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 Cardiac Cath 9/2015: BRIDGET to LAD     Hearing problem 1995    Wear hearing aids     Hyperlipidemia      Malignant melanoma      Morbid obesity 01/11/2016     Paroxysmal supraventricular tachycardia     on metoprolol     Permanent atrial fibrillation 04/21/2017     Squamous cell carcinoma      Tachy-edinson syndrome 05/29/2019       Past Surgical History:   Procedure Laterality Date     ADENOIDECTOMY  Childhood     ANGIOPLASTY  1994    in [place]     ANKLE SURGERY  07/13/2005    right ankle     ARTHROPLASTY HIP Right 10/2009     BIOPSY NODE SENTINEL Left 03/30/2023    Procedure: BIOPSY, LYMPH NODE, SENTINEL;  Surgeon: Rolando Minaya MD;  Location: [facility] OR     COLONOSCOPY  4/25/12     EP PERM PACER SINGLE LEAD N/A 05/31/2019    Medtronic single lead pacemaker     EXCISE MASS CHEEK Left 03/30/2023    Procedure: wide local excision of left cheek melanoma;  Surgeon: Rolando Minaya MD;  Location: [facility] OR     EXCISE MASS CHEEK WITH  FLAP PEDICLE Left 2023    Procedure: Left cervical Facial flap per closure of left cheek Defect;  Surgeon: Ila Sanchez MD;  Location: [facility] OR     Facial biopsy - Melanoma       GENITOURINARY SURGERY  10/20/23    Prostate surgery     HEART CATH STENT COR W/WO PTCA  2015    BRIDGET stent mid LAD     IR CHEST PORT PLACEMENT > 5 YRS OF AGE  2023     LASER SURGERY OF EYE  2002     MOHS MICROGRAPHIC PROCEDURE  2004    squamous cell carcinoma right temple     SINUS SURGERY  2006     TONSILLECTOMY  Childhood        Family History   Problem Relation Age of Onset     Genitourinary Problems Mother         renal failure     Heart Disease Mother      Cerebrovascular Disease Mother         TIA     Cardiovascular Father         rupture of dorsal aorta     Depression Son      Depression Brother         Suicide     Substance Abuse Brother         Heroin     Skin Cancer No family hx of        Social History     Socioeconomic History     Marital status:      Spouse name: Not on file     Number of children: 3     Years of education: Not on file     Highest education level: Not on file   Occupational History     Employer: RETIRED   Tobacco Use     Smoking status: Former     Packs/day: 0.50     Years: 10.00     Pack years: 5.00     Types: Cigarettes, Cigars, Pipe     Start date: 1966     Quit date: 1974     Years since quittin.3     Smokeless tobacco: Never     Tobacco comments:     Also smoked from 1985 - 1990   Substance and Sexual Activity     Alcohol use: Not Currently     Comment: Socially     Drug use: No     Sexual activity: Not Currently     Partners: Female     Birth control/protection: Male Surgical     Comment: Vasectomy   Other Topics Concern     Parent/sibling w/ CABG, MI or angioplasty before 65F 55M? No      Service Not Asked     Blood Transfusions Not Asked     Caffeine Concern Yes     Comment: 2 big cups coffee daily     Occupational Exposure Not  Asked     Hobby Hazards Not Asked     Sleep Concern No     Comment: sleeping better since shoulder replaced 11/3/16     Stress Concern No     Weight Concern Yes     Special Diet Yes     Comment: trying to do more lean meats     Back Care Not Asked     Exercise No     Comment: limited - knee     Bike Helmet Not Asked     Seat Belt Not Asked     Self-Exams Not Asked   Social History Narrative     Not on file     Social Determinants of Health     Financial Resource Strain: Not on file   Food Insecurity: Not on file   Transportation Needs: Not on file   Physical Activity: Not on file   Stress: Not on file   Social Connections: Not on file   Intimate Partner Violence: Not on file   Housing Stability: Not on file       Outpatient Encounter Medications as of 8/31/2023   Medication Sig Dispense Refill     acetaminophen (TYLENOL) 325 MG tablet Take 650 mg by mouth 4 times daily       apixaban ANTICOAGULANT (ELIQUIS ANTICOAGULANT) 5 MG tablet Take 1 tablet (5 mg) by mouth 2 times daily 180 tablet 1     atorvastatin (LIPITOR) 80 MG tablet Take 1 tablet (80 mg) by mouth At Bedtime 90 tablet 3     carvedilol (COREG) 3.125 MG tablet Take 1 tablet (3.125 mg) by mouth 2 times daily (with meals) 180 tablet 3     clobetasol (TEMOVATE) 0.05 % external cream Apply to AA BID x 1-2 weeks then PRN. Do not apply to face. 60 g 3     COMPOUNDED NON-CONTROLLED SUBSTANCE (CMPD RX) - PHARMACY TO MIX COMPOUNDED MEDICATION Fluorouracil 5% Calcipotriene 0.005% 1:1 Cream apply twice daily for 1 weeks to face, arms for 14 days 30 g 6     desonide (DESOWEN) 0.05 % external cream Apply topically daily       fluticasone (FLONASE) 50 MCG/ACT nasal spray Spray 2 sprays into both nostrils daily 48 mL 3     furosemide (LASIX) 20 MG tablet TAKE 1 TABLET BY MOUTH EVERY DAY 90 tablet 3     gabapentin (NEURONTIN) 300 MG capsule Take 2 capsules (600 mg) by mouth 3 times daily       ketoconazole (NIZORAL) 2 % cream Apply topically 2 times daily For face. FAX  REFILL REQUESTS TO  [facility]: [phone] 30 g 2     ketoconazole (NIZORAL) 2 % external shampoo Use daily as needed 120 mL 11     lisinopril (ZESTRIL) 30 MG tablet Take 1 tablet (30 mg) by mouth daily 90 tablet 3     nitroGLYcerin (NITROSTAT) 0.4 MG sublingual tablet Place 1 tablet (0.4 mg) under the tongue every 5 minutes as needed for chest pain May repeat twice for a total of 3 tablets.  If chest pain not relieved, call 911 25 tablet 11     OXcarbazepine (TRILEPTAL) 300 MG tablet Take 2 tablets (600 mg) by mouth 3 times daily ( @ 7am, 1530 and 2200 hrs each day)       pimecrolimus (ELIDEL) 1 % external cream Apply to face BID PRN 60 g 3     tacrolimus (PROTOPIC) 0.1 % external ointment Apply to AA BID PRN 60 g 5     triamcinolone (KENALOG) 0.1 % external lotion Apply to scalp BID x 1-2 weeks then PRN only 60 mL 3     No facility-administered encounter medications on file as of 8/31/2023.             O:   NAD, WDWN, Alert & Oriented, Mood & Affect wnl, Vitals stable   Here today alone   General appearance normal   Vitals stable   Alert, oriented and in no acute distress     L temple red 1cm papule  L lateral canthus 1.1cm red papule  L sideburn 1.1cm red papule  L medial cheek 6mm red papule  L lat cheek 1cm red papule    Red papule son left mid cheek       Eyes: Conjunctivae/lids:Normal     ENT: Lips, buccal mucosa, tongue: normal    MSK:Normal    Cardiovascular: peripheral edema none    Pulm: Breathing Normal    Lymph Nodes: No Head and Neck Lymphadenopathy     Neuro/Psych: Orientation:Alert and Orientedx3 ; Mood/Affect:normal       A/P:  Hx of metastatic melanoma  Hold on excision today  Bx new lesions r/o melanoma will discuss case with oncology  L temple  L lat canthus  L sideburn   L  medial cheek   L lat cheek   It was a pleasure speaking to James Salvador today.  Previous clinic notes and pertinent laboratory tests were reviewed prior to James Salvador's visit.  Return to clinic pending path    \      Again, thank you for allowing me to participate in the care of your patient.        Sincerely,        Jv Dutta MD

## 2023-08-31 NOTE — PATIENT INSTRUCTIONS
Wound Care Instructions     FOR SUPERFICIAL WOUNDS     [facility] [phone]    [facility] [phone]                       AFTER 24 HOURS YOU SHOULD REMOVE THE BANDAGE AND BEGIN DAILY DRESSING CHANGES AS FOLLOWS:     1) Remove Dressing.     2) Clean and dry the area with tap water using a Q-tip or sterile gauze pad.     3) Apply Vaseline, Aquaphor, Polysporin ointment or Bacitracin ointment over entire wound.  Do NOT use Neosporin ointment.     4) Cover the wound with a band-aid, or a sterile non-stick gauze pad and micropore paper tape      REPEAT THESE INSTRUCTIONS AT LEAST ONCE A DAY UNTIL THE WOUND HAS COMPLETELY HEALED.    It is an old wives tale that a wound heals better when it is exposed to air and allowed to dry out. The wound will heal faster with a better cosmetic result if it is kept moist with ointment and covered with a bandage.    **Do not let the wound dry out.**      Supplies Needed:      *Cotton tipped applicators (Q-tips)    *Polysporin Ointment or Bacitracin Ointment (NOT NEOSPORIN)    *Band-aids or non-stick gauze pads and micropore paper tape.      PATIENT INFORMATION:    During the healing process you will notice a number of changes. All wounds develop a small halo of redness surrounding the wound.  This means healing is occurring. Severe itching with extensive redness usually indicates sensitivity to the ointment or bandage tape used to dress the wound.  You should call our office if this develops.      Swelling  and/or discoloration around your surgical site is common, particularly when performed around the eye.    All wounds normally drain.  The larger the wound the more drainage there will be.  After 7-10 days, you will notice the wound beginning to shrink and new skin will begin to grow.  The wound is healed when you can see skin has formed over the entire area.  A healed wound has a healthy, shiny look to the surface and is red to dark pink in color  to normalize.  Wounds may take approximately 4-6 weeks to heal.  Larger wounds may take 6-8 weeks.  After the wound is healed you may discontinue dressing changes.    You may experience a sensation of tightness as your wound heals. This is normal and will gradually subside.    Your healed wound may be sensitive to temperature changes. This sensitivity improves with time, but if you re having a lot of discomfort, try to avoid temperature extremes.    Patients frequently experience itching after their wound appears to have healed because of the continue healing under the skin.  Plain Vaseline will help relieve the itching.        POSSIBLE COMPLICATIONS    BLEEDING:    Leave the bandage in place.  Use tightly rolled up gauze or a cloth to apply direct pressure over the bandage for 30  minutes.  Reapply pressure for an additional 30 minutes if necessary  Use additional gauze and tape to maintain pressure once the bleeding has stopped.

## 2023-08-31 NOTE — PROGRESS NOTES
James Salvador is an extremely pleasant 78 year old year old male patient here today for hx of metastatic melanoma.  Just started infusions.  Bx new cutanoues mets and was here for excision.  HE notes new spots on face.  Patient has no other skin complaints today.  Remainder of the HPI, Meds, PMH, Allergies, FH, and SH was reviewed in chart.      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 Cardiac Cath 9/2015: BRIDGET to LAD    Hearing problem 1995    Wear hearing aids    Hyperlipidemia     Malignant melanoma     Morbid obesity 01/11/2016    Paroxysmal supraventricular tachycardia     on metoprolol    Permanent atrial fibrillation 04/21/2017    Squamous cell carcinoma     Tachy-edinson syndrome 05/29/2019       Past Surgical History:   Procedure Laterality Date    ADENOIDECTOMY  Childhood    ANGIOPLASTY  1994    in [place]    ANKLE SURGERY  07/13/2005    right ankle    ARTHROPLASTY HIP Right 10/2009    BIOPSY NODE SENTINEL Left 03/30/2023    Procedure: BIOPSY, LYMPH NODE, SENTINEL;  Surgeon: Rolando Minaya MD;  Location: [facility] OR    COLONOSCOPY  4/25/12    EP PERM PACER SINGLE LEAD N/A 05/31/2019    Medtronic single lead pacemaker    EXCISE MASS CHEEK Left 03/30/2023    Procedure: wide local excision of left cheek melanoma;  Surgeon: Rolando Minaya MD;  Location: [facility] OR    EXCISE MASS CHEEK WITH FLAP PEDICLE Left 04/13/2023    Procedure: Left cervical Facial flap per closure of left cheek Defect;  Surgeon: Ila Sanchez MD;  Location: [facility] OR    Facial biopsy - Melanoma      GENITOURINARY SURGERY  10/20/23    Prostate surgery    HEART CATH STENT COR W/WO PTCA  09/23/2015    BRIDGET stent mid LAD    IR CHEST PORT PLACEMENT > 5 YRS OF AGE  7/19/2023    LASER SURGERY OF EYE   2002    MOHS MICROGRAPHIC PROCEDURE  2004    squamous cell carcinoma right temple    SINUS SURGERY  2006    TONSILLECTOMY  Childhood        Family History   Problem Relation Age of Onset    Genitourinary Problems Mother         renal failure    Heart Disease Mother     Cerebrovascular Disease Mother         TIA    Cardiovascular Father         rupture of dorsal aorta    Depression Son     Depression Brother         Suicide    Substance Abuse Brother         Heroin    Skin Cancer No family hx of        Social History     Socioeconomic History    Marital status:      Spouse name: Not on file    Number of children: 3    Years of education: Not on file    Highest education level: Not on file   Occupational History     Employer: RETIRED   Tobacco Use    Smoking status: Former     Packs/day: 0.50     Years: 10.00     Pack years: 5.00     Types: Cigarettes, Cigars, Pipe     Start date: 1966     Quit date: 1974     Years since quittin.3    Smokeless tobacco: Never    Tobacco comments:     Also smoked from 1985 - 1990   Substance and Sexual Activity    Alcohol use: Not Currently     Comment: Socially    Drug use: No    Sexual activity: Not Currently     Partners: Female     Birth control/protection: Male Surgical     Comment: Vasectomy   Other Topics Concern    Parent/sibling w/ CABG, MI or angioplasty before 65F 55M? No     Service Not Asked    Blood Transfusions Not Asked    Caffeine Concern Yes     Comment: 2 big cups coffee daily    Occupational Exposure Not Asked    Hobby Hazards Not Asked    Sleep Concern No     Comment: sleeping better since shoulder replaced 11/3/16    Stress Concern No    Weight Concern Yes    Special Diet Yes     Comment: trying to do more lean meats    Back Care Not Asked    Exercise No     Comment: limited - knee    Bike Helmet Not Asked    Seat Belt Not Asked    Self-Exams Not Asked   Social History Narrative    Not on file     Social  Determinants of Health     Financial Resource Strain: Not on file   Food Insecurity: Not on file   Transportation Needs: Not on file   Physical Activity: Not on file   Stress: Not on file   Social Connections: Not on file   Intimate Partner Violence: Not on file   Housing Stability: Not on file       Outpatient Encounter Medications as of 8/31/2023   Medication Sig Dispense Refill    acetaminophen (TYLENOL) 325 MG tablet Take 650 mg by mouth 4 times daily      apixaban ANTICOAGULANT (ELIQUIS ANTICOAGULANT) 5 MG tablet Take 1 tablet (5 mg) by mouth 2 times daily 180 tablet 1    atorvastatin (LIPITOR) 80 MG tablet Take 1 tablet (80 mg) by mouth At Bedtime 90 tablet 3    carvedilol (COREG) 3.125 MG tablet Take 1 tablet (3.125 mg) by mouth 2 times daily (with meals) 180 tablet 3    clobetasol (TEMOVATE) 0.05 % external cream Apply to AA BID x 1-2 weeks then PRN. Do not apply to face. 60 g 3    COMPOUNDED NON-CONTROLLED SUBSTANCE (CMPD RX) - PHARMACY TO MIX COMPOUNDED MEDICATION Fluorouracil 5% Calcipotriene 0.005% 1:1 Cream apply twice daily for 1 weeks to face, arms for 14 days 30 g 6    desonide (DESOWEN) 0.05 % external cream Apply topically daily      fluticasone (FLONASE) 50 MCG/ACT nasal spray Spray 2 sprays into both nostrils daily 48 mL 3    furosemide (LASIX) 20 MG tablet TAKE 1 TABLET BY MOUTH EVERY DAY 90 tablet 3    gabapentin (NEURONTIN) 300 MG capsule Take 2 capsules (600 mg) by mouth 3 times daily      ketoconazole (NIZORAL) 2 % cream Apply topically 2 times daily For face. FAX REFILL REQUESTS TO [facility]: [phone] 30 g 2    ketoconazole (NIZORAL) 2 % external shampoo Use daily as needed 120 mL 11    lisinopril (ZESTRIL) 30 MG tablet Take 1 tablet (30 mg) by mouth daily 90 tablet 3    nitroGLYcerin (NITROSTAT) 0.4 MG sublingual tablet Place 1 tablet (0.4 mg) under the tongue every 5 minutes as needed for chest pain May repeat twice for a total of 3 tablets.  If chest pain not relieved, call [phone]  tablet 11    OXcarbazepine (TRILEPTAL) 300 MG tablet Take 2 tablets (600 mg) by mouth 3 times daily ( @ 7am, 1530 and 2200 hrs each day)      pimecrolimus (ELIDEL) 1 % external cream Apply to face BID PRN 60 g 3    tacrolimus (PROTOPIC) 0.1 % external ointment Apply to AA BID PRN 60 g 5    triamcinolone (KENALOG) 0.1 % external lotion Apply to scalp BID x 1-2 weeks then PRN only 60 mL 3     No facility-administered encounter medications on file as of 8/31/2023.             O:   NAD, WDWN, Alert & Oriented, Mood & Affect wnl, Vitals stable   Here today alone   General appearance normal   Vitals stable   Alert, oriented and in no acute distress     L temple red 1cm papule  L lateral canthus 1.1cm red papule  L sideburn 1.1cm red papule  L medial cheek 6mm red papule  L lat cheek 1cm red papule    Red papule son left mid cheek       Eyes: Conjunctivae/lids:Normal     ENT: Lips, buccal mucosa, tongue: normal    MSK:Normal    Cardiovascular: peripheral edema none    Pulm: Breathing Normal    Lymph Nodes: No Head and Neck Lymphadenopathy     Neuro/Psych: Orientation:Alert and Orientedx3 ; Mood/Affect:normal       A/P:  Hx of metastatic melanoma  Hold on excision today  Bx new lesions r/o melanoma will discuss case with oncology  TANGENTIAL BIOPSY SENT OUT:  After consent, anesthesia with LEC and prep, tangential excision performed and specimen sent out for permanent section histology.  No complications and routine wound care. Patient told to call our office in 1-2 weeks for result.         L temple  L lat canthus  L sideburn   L  medial cheek   L lat cheek   It was a pleasure speaking to James Salvador today.  Previous clinic notes and pertinent laboratory tests were reviewed prior to James Salvador's visit.  Return to clinic pending path   \

## 2023-09-01 ENCOUNTER — ONCOLOGY VISIT (OUTPATIENT)
Dept: ONCOLOGY | Facility: CLINIC | Age: 78
End: 2023-09-01
Attending: PHYSICIAN ASSISTANT
Payer: COMMERCIAL

## 2023-09-01 ENCOUNTER — LAB (OUTPATIENT)
Dept: INFUSION THERAPY | Facility: CLINIC | Age: 78
End: 2023-09-01
Attending: INTERNAL MEDICINE
Payer: COMMERCIAL

## 2023-09-01 VITALS
HEART RATE: 72 BPM | BODY MASS INDEX: 38.33 KG/M2 | OXYGEN SATURATION: 98 % | SYSTOLIC BLOOD PRESSURE: 147 MMHG | WEIGHT: 273.8 LBS | DIASTOLIC BLOOD PRESSURE: 85 MMHG | TEMPERATURE: 98.9 F | RESPIRATION RATE: 16 BRPM

## 2023-09-01 DIAGNOSIS — C43.39 MELANOMA OF CHEEK (H): Primary | ICD-10-CM

## 2023-09-01 LAB
ALBUMIN SERPL BCG-MCNC: 4.3 G/DL (ref 3.5–5.2)
ALP SERPL-CCNC: 196 U/L (ref 40–129)
ALT SERPL W P-5'-P-CCNC: 28 U/L (ref 0–70)
ANION GAP SERPL CALCULATED.3IONS-SCNC: 8 MMOL/L (ref 7–15)
AST SERPL W P-5'-P-CCNC: 35 U/L (ref 0–45)
BILIRUB SERPL-MCNC: 0.5 MG/DL
BUN SERPL-MCNC: 12.4 MG/DL (ref 8–23)
CALCIUM SERPL-MCNC: 8.9 MG/DL (ref 8.8–10.2)
CHLORIDE SERPL-SCNC: 90 MMOL/L (ref 98–107)
CREAT SERPL-MCNC: 0.62 MG/DL (ref 0.67–1.17)
DEPRECATED HCO3 PLAS-SCNC: 26 MMOL/L (ref 22–29)
GFR SERPL CREATININE-BSD FRML MDRD: >90 ML/MIN/1.73M2
GLUCOSE SERPL-MCNC: 93 MG/DL (ref 70–99)
POTASSIUM SERPL-SCNC: 4.5 MMOL/L (ref 3.4–5.3)
PROT SERPL-MCNC: 6.8 G/DL (ref 6.4–8.3)
SODIUM SERPL-SCNC: 124 MMOL/L (ref 136–145)
TSH SERPL DL<=0.005 MIU/L-ACNC: 0.73 UIU/ML (ref 0.3–4.2)

## 2023-09-01 PROCEDURE — 258N000003 HC RX IP 258 OP 636: Performed by: INTERNAL MEDICINE

## 2023-09-01 PROCEDURE — 96413 CHEMO IV INFUSION 1 HR: CPT

## 2023-09-01 PROCEDURE — 80053 COMPREHEN METABOLIC PANEL: CPT | Performed by: INTERNAL MEDICINE

## 2023-09-01 PROCEDURE — 250N000011 HC RX IP 250 OP 636: Mod: JZ | Performed by: INTERNAL MEDICINE

## 2023-09-01 PROCEDURE — 99214 OFFICE O/P EST MOD 30 MIN: CPT | Performed by: PHYSICIAN ASSISTANT

## 2023-09-01 PROCEDURE — 84443 ASSAY THYROID STIM HORMONE: CPT | Performed by: INTERNAL MEDICINE

## 2023-09-01 PROCEDURE — G0463 HOSPITAL OUTPT CLINIC VISIT: HCPCS | Mod: 25 | Performed by: PHYSICIAN ASSISTANT

## 2023-09-01 RX ORDER — HEPARIN SODIUM (PORCINE) LOCK FLUSH IV SOLN 100 UNIT/ML 100 UNIT/ML
5 SOLUTION INTRAVENOUS
Status: DISCONTINUED | OUTPATIENT
Start: 2023-09-01 | End: 2023-09-01 | Stop reason: HOSPADM

## 2023-09-01 RX ADMIN — Medication 5 ML: at 13:25

## 2023-09-01 RX ADMIN — SODIUM CHLORIDE 480 MG: 9 INJECTION, SOLUTION INTRAVENOUS at 12:48

## 2023-09-01 RX ADMIN — SODIUM CHLORIDE 250 ML: 9 INJECTION, SOLUTION INTRAVENOUS at 12:48

## 2023-09-01 ASSESSMENT — PAIN SCALES - GENERAL: PAINLEVEL: NO PAIN (0)

## 2023-09-01 NOTE — PROGRESS NOTES
Nursing Note:  James Salvador presents today for port labs.    Patient seen by provider today: Yes   present during visit today: Not Applicable.    Note: N/A.    Intravenous Access:  Labs drawn without difficulty.  Implanted Port.    Discharge Plan:   Patient was sent to lobby for provider appointment.    Aicha Calero RN

## 2023-09-01 NOTE — PROGRESS NOTES
Oncology/Hematology Visit Note    Sep 1, 2023    Reason for visit: Follow up metastatic melanoma    Oncology HPI: James Salvador is a 78 year old male metastatic melanoma.  Below is his oncology history and treatment summary:    10/2019: Underwent excision of a Breslow depth 0.6mm malignant melanoma, lentigo maligna type form left zygoma by Dr. Dutta.  Decision-DX was low risk so SLN not performed.  Underwent subsequent MOHS resection.  2/21/23: Shave biopsy of a non-healing lesion on left cheek demonstrated malignant melanoma, ulcerated with nodular appearance and Breslow depth of at least 2.2 mm.  3/32/23: Wide local excision with SLN biopsy performed by Dr. Minaya.  SLN could not be identified.  Pathology demonstrated residual melanoma with Breslow depth 3.5 mm, margins negative.  4/13/23: Surgical reconstruction with flap placement performed by Dr. Sanchez.  6/22/23: Noted new pigmented lesion on left cheek, biopsy demonstrated melanoma.  No intraepidermal component noted consistent with metastasis.  7/13/23: Underwent MOHS resection with clear margins.  7/18/23: PET/CT and CT head demonstrated no definite evidence of metastatic disease, several small (subcentimeter) indeterminate lung nodules noted.  8/4/23 Nivolumab C1D1    Dr. Holbrook recommended nivolumab every 4 weeks with repeat scans in 3 months.  Nivolumab C1 D1 completed on 8/4/2023.  He is here today for C2 D1.    Interval History: Alden is here with Renea and doing pretty well.  He has had some ongoing fatigue, but tolerable.  He has been following his dermatology team really closely and he had more biopsies obtained in the left cheek yesterday.  He has had a little bit of itchiness on the upper left and right chest, but very small rash.  Some diarrhea after eating out at a restaurant, resolved.  No fever, vomiting, chills, bleeding, new cough.    Review of Systems: See interval hx. Denies fevers, chills, HA, changes in vision, cough, sore  throat, CP, SOB, abdominal pain, N/V, changes in urination, bleeding, bruising.     PMHx and Social Hx reviewed per EPIC.      Medications:  Current Outpatient Medications   Medication Sig Dispense Refill    acetaminophen (TYLENOL) 325 MG tablet Take 650 mg by mouth 4 times daily      apixaban ANTICOAGULANT (ELIQUIS ANTICOAGULANT) 5 MG tablet Take 1 tablet (5 mg) by mouth 2 times daily 180 tablet 1    atorvastatin (LIPITOR) 80 MG tablet Take 1 tablet (80 mg) by mouth At Bedtime 90 tablet 3    carvedilol (COREG) 3.125 MG tablet Take 1 tablet (3.125 mg) by mouth 2 times daily (with meals) 180 tablet 3    clobetasol (TEMOVATE) 0.05 % external cream Apply to AA BID x 1-2 weeks then PRN. Do not apply to face. 60 g 3    COMPOUNDED NON-CONTROLLED SUBSTANCE (CMPD RX) - PHARMACY TO MIX COMPOUNDED MEDICATION Fluorouracil 5% Calcipotriene 0.005% 1:1 Cream apply twice daily for 1 weeks to face, arms for 14 days 30 g 6    desonide (DESOWEN) 0.05 % external cream Apply topically daily      fluticasone (FLONASE) 50 MCG/ACT nasal spray Spray 2 sprays into both nostrils daily 48 mL 3    furosemide (LASIX) 20 MG tablet TAKE 1 TABLET BY MOUTH EVERY DAY 90 tablet 3    gabapentin (NEURONTIN) 300 MG capsule Take 2 capsules (600 mg) by mouth 3 times daily      ketoconazole (NIZORAL) 2 % cream Apply topically 2 times daily For face. FAX REFILL REQUESTS TO [facility]: [phone] 30 g 2    ketoconazole (NIZORAL) 2 % external shampoo Use daily as needed 120 mL 11    lisinopril (ZESTRIL) 30 MG tablet Take 1 tablet (30 mg) by mouth daily 90 tablet 3    nitroGLYcerin (NITROSTAT) 0.4 MG sublingual tablet Place 1 tablet (0.4 mg) under the tongue every 5 minutes as needed for chest pain May repeat twice for a total of 3 tablets.  If chest pain not relieved, call 911 25 tablet 11    OXcarbazepine (TRILEPTAL) 300 MG tablet Take 2 tablets (600 mg) by mouth 3 times daily ( @ 7am, 1530 and 2200 hrs each day)      triamcinolone (KENALOG) 0.1 % external  lotion Apply to scalp BID x 1-2 weeks then PRN only 60 mL 3       Allergies   Allergen Reactions    Cats      Cat Dander    Dogs      Dog Dander    Hydromorphone      Other reaction(s): Confusion    Pollen Extract          EXAM:    BP (!) 147/85   Pulse 72   Temp 98.9  F (37.2  C) (Oral)   Resp 16   Wt 124.2 kg (273 lb 12.8 oz)   SpO2 98%   BMI 38.33 kg/m      GENERAL:  Male, in no acute distress.  Alert and oriented x3.   HEENT:  Normocephalic, atraumatic.  Multiple scars and recent biopsies to left cheek.  No bleeding, swelling.  There are a few papule looking areas as well.  LUNGS: Non-labored breathing  SKIN: Small, patchy rash to bilateral shoulders. No erythema, blisters.  PSYCH: Calm and cooperative       Labs:    Latest Reference Range & Units 09/01/23 10:24   Sodium 136 - 145 mmol/L 124 (L)   Potassium 3.4 - 5.3 mmol/L 4.5   Chloride 98 - 107 mmol/L 90 (L)   Carbon Dioxide (CO2) 22 - 29 mmol/L 26   Urea Nitrogen 8.0 - 23.0 mg/dL 12.4   Creatinine 0.67 - 1.17 mg/dL 0.62 (L)   GFR Estimate >60 mL/min/1.73m2 >90   Calcium 8.8 - 10.2 mg/dL 8.9   Anion Gap 7 - 15 mmol/L 8   Albumin 3.5 - 5.2 g/dL 4.3   Protein Total 6.4 - 8.3 g/dL 6.8   Alkaline Phosphatase 40 - 129 U/L 196 (H)   ALT 0 - 70 U/L 28   AST 0 - 45 U/L 35   Bilirubin Total <=1.2 mg/dL 0.5   Glucose 70 - 99 mg/dL 93   TSH 0.30 - 4.20 uIU/mL 0.73     Imaging: n/a    Impression/Plan: James Salvador is a 78 year old male with metastatic melanoma s/p multiple excisions, surgical reconstruction with flap placement, MOHS resection currently on nivolumab.    Metastatic melanoma: Currently on nivolumab and C1 D1 completed on 8/4/2023.  No immune related toxicities and labs are stable.  Plan for nivolumab again today and we will continue every 4 weeks.  New biopsies obtained recently.  Dermatologist will be in touch with Dr. Holbrook.  Repeat scans in Oct 2023.  -9/29 nancy Ewing cycle 3    FEN: Chronic hyponatremia, sodium 124 today, relatively  stable.  We discussed this in detail.  No big concerns today.  He will continue to hydrate.    Chart documentation with Dragon Voice recognition Software. Although reviewed after completion, some words and grammatical errors may remain.    30 minutes spent on the date of the encounter doing chart review, review of test results, interpretation of tests, patient visit, documentation, and discussion with family     Kaylin Burrows PA-C  Hematology/Oncology  [facility]

## 2023-09-01 NOTE — LETTER
9/1/2023         RE: James Salvador  [address] [place] [address]        Dear Colleague,    Thank you for referring your patient, James Salvador, to the [facility]. Please see a copy of my visit note below.    Oncology/Hematology Visit Note    Sep 1, 2023    Reason for visit: Follow up metastatic melanoma    Oncology HPI: James Salvador is a 78 year old male metastatic melanoma.  Below is his oncology history and treatment summary:    10/2019: Underwent excision of a Breslow depth 0.6mm malignant melanoma, lentigo maligna type form left zygoma by Dr. Dutta.  Decision-DX was low risk so SLN not performed.  Underwent subsequent MOHS resection.  2/21/23: Shave biopsy of a non-healing lesion on left cheek demonstrated malignant melanoma, ulcerated with nodular appearance and Breslow depth of at least 2.2 mm.  3/32/23: Wide local excision with SLN biopsy performed by Dr. Minaya.  SLN could not be identified.  Pathology demonstrated residual melanoma with Breslow depth 3.5 mm, margins negative.  4/13/23: Surgical reconstruction with flap placement performed by Dr. Sanchez.  6/22/23: Noted new pigmented lesion on left cheek, biopsy demonstrated melanoma.  No intraepidermal component noted consistent with metastasis.  7/13/23: Underwent MOHS resection with clear margins.  7/18/23: PET/CT and CT head demonstrated no definite evidence of metastatic disease, several small (subcentimeter) indeterminate lung nodules noted.  8/4/23 Nivolumab C1D1    Dr. Holbrook recommended nivolumab every 4 weeks with repeat scans in 3 months.  Nivolumab C1 D1 completed on 8/4/2023.  He is here today for C2 D1.    Interval History: Alden is here with Renea and doing pretty well.  He has had some ongoing fatigue, but tolerable.  He has been following his dermatology team really closely and he had more biopsies obtained in the left cheek yesterday.  He has had a little bit of itchiness on  Pt and his mother Savannah were here for cardiac MRI. Pt was unable to follow breathing instructions. Savannah felt like he would not be able to tolerate a 90 min scan as well. Test was ordered when pt was IP in [facility]. Initially Savannah was going to F/U with PCP for further guidance. Pt does have future cards appt, so called and left message  for Savannah with cardiac clinic phone number to discuss if there is an alternative option for imaging prior to the F/U appt with Shea Duncan. Left CV imaging phone number to call back if she has any questions.    the upper left and right chest, but very small rash.  Some diarrhea after eating out at a restaurant, resolved.  No fever, vomiting, chills, bleeding, new cough.    Review of Systems: See interval hx. Denies fevers, chills, HA, changes in vision, cough, sore throat, CP, SOB, abdominal pain, N/V, changes in urination, bleeding, bruising.     PMHx and Social Hx reviewed per EPIC.      Medications:  Current Outpatient Medications   Medication Sig Dispense Refill     acetaminophen (TYLENOL) 325 MG tablet Take 650 mg by mouth 4 times daily       apixaban ANTICOAGULANT (ELIQUIS ANTICOAGULANT) 5 MG tablet Take 1 tablet (5 mg) by mouth 2 times daily 180 tablet 1     atorvastatin (LIPITOR) 80 MG tablet Take 1 tablet (80 mg) by mouth At Bedtime 90 tablet 3     carvedilol (COREG) 3.125 MG tablet Take 1 tablet (3.125 mg) by mouth 2 times daily (with meals) 180 tablet 3     clobetasol (TEMOVATE) 0.05 % external cream Apply to AA BID x 1-2 weeks then PRN. Do not apply to face. 60 g 3     COMPOUNDED NON-CONTROLLED SUBSTANCE (CMPD RX) - PHARMACY TO MIX COMPOUNDED MEDICATION Fluorouracil 5% Calcipotriene 0.005% 1:1 Cream apply twice daily for 1 weeks to face, arms for 14 days 30 g 6     desonide (DESOWEN) 0.05 % external cream Apply topically daily       fluticasone (FLONASE) 50 MCG/ACT nasal spray Spray 2 sprays into both nostrils daily 48 mL 3     furosemide (LASIX) 20 MG tablet TAKE 1 TABLET BY MOUTH EVERY DAY 90 tablet 3     gabapentin (NEURONTIN) 300 MG capsule Take 2 capsules (600 mg) by mouth 3 times daily       ketoconazole (NIZORAL) 2 % cream Apply topically 2 times daily For face. FAX REFILL REQUESTS TO [facility]: [phone] 30 g 2     ketoconazole (NIZORAL) 2 % external shampoo Use daily as needed 120 mL 11     lisinopril (ZESTRIL) 30 MG tablet Take 1 tablet (30 mg) by mouth daily 90 tablet 3     nitroGLYcerin (NITROSTAT) 0.4 MG sublingual tablet Place 1 tablet (0.4 mg) under the tongue every 5 minutes as needed for  chest pain May repeat twice for a total of 3 tablets.  If chest pain not relieved, call 911 25 tablet 11     OXcarbazepine (TRILEPTAL) 300 MG tablet Take 2 tablets (600 mg) by mouth 3 times daily ( @ 7am, 1530 and 2200 hrs each day)       triamcinolone (KENALOG) 0.1 % external lotion Apply to scalp BID x 1-2 weeks then PRN only 60 mL 3       Allergies   Allergen Reactions     Cats      Cat Dander     Dogs      Dog Dander     Hydromorphone      Other reaction(s): Confusion     Pollen Extract          EXAM:    BP (!) 147/85   Pulse 72   Temp 98.9  F (37.2  C) (Oral)   Resp 16   Wt 124.2 kg (273 lb 12.8 oz)   SpO2 98%   BMI 38.33 kg/m      GENERAL:  Male, in no acute distress.  Alert and oriented x3.   HEENT:  Normocephalic, atraumatic.  Multiple scars and recent biopsies to left cheek.  No bleeding, swelling.  There are a few papule looking areas as well.  LUNGS: Non-labored breathing  SKIN: Small, patchy rash to bilateral shoulders. No erythema, blisters.  PSYCH: Calm and cooperative       Labs:    Latest Reference Range & Units 09/01/23 10:24   Sodium 136 - 145 mmol/L 124 (L)   Potassium 3.4 - 5.3 mmol/L 4.5   Chloride 98 - 107 mmol/L 90 (L)   Carbon Dioxide (CO2) 22 - 29 mmol/L 26   Urea Nitrogen 8.0 - 23.0 mg/dL 12.4   Creatinine 0.67 - 1.17 mg/dL 0.62 (L)   GFR Estimate >60 mL/min/1.73m2 >90   Calcium 8.8 - 10.2 mg/dL 8.9   Anion Gap 7 - 15 mmol/L 8   Albumin 3.5 - 5.2 g/dL 4.3   Protein Total 6.4 - 8.3 g/dL 6.8   Alkaline Phosphatase 40 - 129 U/L 196 (H)   ALT 0 - 70 U/L 28   AST 0 - 45 U/L 35   Bilirubin Total <=1.2 mg/dL 0.5   Glucose 70 - 99 mg/dL 93   TSH 0.30 - 4.20 uIU/mL 0.73     Imaging: n/a    Impression/Plan: James Salvador is a 78 year old male with metastatic melanoma s/p multiple excisions, surgical reconstruction with flap placement, MOHS resection currently on nivolumab.    Metastatic melanoma: Currently on nivolumab and C1 D1 completed on 8/4/2023.  No immune related toxicities and labs  "are stable.  Plan for nivolumab again today and we will continue every 4 weeks.  New biopsies obtained recently.  Dermatologist will be in touch with Dr. Holbrook.  Repeat scans in Oct 2023.  -9/29 Kaylin nivolumab cycle 3    FEN: Chronic hyponatremia, sodium 124 today, relatively stable.  We discussed this in detail.  No big concerns today.  He will continue to hydrate.    Chart documentation with Dragon Voice recognition Software. Although reviewed after completion, some words and grammatical errors may remain.    30 minutes spent on the date of the encounter doing chart review, review of test results, interpretation of tests, patient visit, documentation, and discussion with family     Kaylin Burrows PA-C  Hematology/Oncology  [facility]                  Oncology Rooming Note    September 1, 2023 10:35 AM   James Salvador is a 78 year old male who presents for:    Chief Complaint   Patient presents with     Oncology Clinic Visit     Initial Vitals: BP (!) 147/85   Pulse 72   Temp 98.9  F (37.2  C) (Oral)   Resp 16   Wt 124.2 kg (273 lb 12.8 oz)   SpO2 98%   BMI 38.33 kg/m   Estimated body mass index is 38.33 kg/m  as calculated from the following:    Height as of 8/4/23: 1.8 m (5' 10.87\").    Weight as of this encounter: 124.2 kg (273 lb 12.8 oz). Body surface area is 2.49 meters squared.  No Pain (0) Comment: Data Unavailable   No LMP for male patient.  Allergies reviewed: Yes  Medications reviewed: Yes    Medications: Medication refills not needed today.  Pharmacy name entered into [email or web address]:    [id number] - [place], [place] - [address] AT [facility] [address] [place]    Clinical concerns: follow up        Becki Perez              Again, thank you for allowing me to participate in the care of your patient.        Sincerely,        Kaylin Burrows PA-C  "

## 2023-09-01 NOTE — PROGRESS NOTES
Infusion Nursing Note:  James Salvador presents today for C2D1 Opdivo.    Patient seen by provider today: Yes: BRI Gamez   present during visit today: Not Applicable.    Note: Patient confirmed that he is not taking oral steroids.  Patient reports that he has a chronically low sodium level.    Intravenous Access:  Implanted Port accessed in [employer].    Treatment Conditions:  Lab Results   Component Value Date    HGB 14.0 07/03/2023    WBC 5.7 07/03/2023    ANEU 4.2 07/03/2023    ANEUTAUTO 7.3 06/20/2022     07/03/2023        Lab Results   Component Value Date     (L) 09/01/2023    POTASSIUM 4.5 09/01/2023    MAG 2.0 09/23/2015    CR 0.62 (L) 09/01/2023    BENJI 8.9 09/01/2023    BILITOTAL 0.5 09/01/2023    ALBUMIN 4.3 09/01/2023    ALT 28 09/01/2023    AST 35 09/01/2023       Results reviewed, labs MET treatment parameters, ok to proceed with treatment.      Post Infusion Assessment:  Patient tolerated infusion without incident.  Blood return noted pre and post infusion.  Site patent and intact, free from redness, edema or discomfort.  No evidence of extravasations.  Access discontinued per protocol.       Discharge Plan:   Discharge instructions reviewed with: Patient and Family.  Patient and/or family verbalized understanding of discharge instructions and all questions answered.  AVS to patient via [employer].  Patient will return 9/29/23 for next appointment.   Patient discharged in stable condition accompanied by: wife.  Departure Mode: Ambulatory.      Nurys Cox RN

## 2023-09-01 NOTE — PROGRESS NOTES
"Oncology Rooming Note    September 1, 2023 10:35 AM   James Salvador is a 78 year old male who presents for:    Chief Complaint   Patient presents with    Oncology Clinic Visit     Initial Vitals: BP (!) 147/85   Pulse 72   Temp 98.9  F (37.2  C) (Oral)   Resp 16   Wt 124.2 kg (273 lb 12.8 oz)   SpO2 98%   BMI 38.33 kg/m   Estimated body mass index is 38.33 kg/m  as calculated from the following:    Height as of 8/4/23: 1.8 m (5' 10.87\").    Weight as of this encounter: 124.2 kg (273 lb 12.8 oz). Body surface area is 2.49 meters squared.  No Pain (0) Comment: Data Unavailable   No LMP for male patient.  Allergies reviewed: Yes  Medications reviewed: Yes    Medications: Medication refills not needed today.  Pharmacy name entered into [employer]:    [id number] - [place] - [address] AT [facility] - [address] [place]    Clinical concerns: follow up        Becki Perez            "

## 2023-09-11 ENCOUNTER — THERAPY VISIT (OUTPATIENT)
Dept: PHYSICAL THERAPY | Facility: CLINIC | Age: 78
End: 2023-09-11
Payer: COMMERCIAL

## 2023-09-11 DIAGNOSIS — Z96.652 STATUS POST TOTAL LEFT KNEE REPLACEMENT: Primary | ICD-10-CM

## 2023-09-11 PROCEDURE — 97110 THERAPEUTIC EXERCISES: CPT | Mod: GP | Performed by: PHYSICAL THERAPIST

## 2023-09-11 PROCEDURE — 97530 THERAPEUTIC ACTIVITIES: CPT | Mod: GP | Performed by: PHYSICAL THERAPIST

## 2023-09-12 ENCOUNTER — PATIENT OUTREACH (OUTPATIENT)
Dept: ONCOLOGY | Facility: CLINIC | Age: 78
End: 2023-09-12
Payer: COMMERCIAL

## 2023-09-12 DIAGNOSIS — D48.5 NEOPLASM OF UNCERTAIN BEHAVIOR OF SKIN: ICD-10-CM

## 2023-09-12 DIAGNOSIS — C43.39 MELANOMA OF CHEEK (H): Primary | ICD-10-CM

## 2023-09-12 RX ORDER — LIDOCAINE/PRILOCAINE 2.5 %-2.5%
CREAM (GRAM) TOPICAL PRN
Qty: 30 G | Refills: 1 | Status: SHIPPED | OUTPATIENT
Start: 2023-09-12

## 2023-09-12 NOTE — PROGRESS NOTES
Kaylin Ferguson, Lee Ann Knowles RN  Cc: Dilshad Holbrook MD  Caller: Unspecified (Today,  9:10 AM)  Please send the EMLA request to me and I will sign.    The itchiness could certainly be nivolumab.  I would recommend topical hydrocortisone to the most uncomfortable areas and he can also try OTC antihistamine throughout the day as well.  He should also use lotion to keep his skin hydrated.  If this does not work, we may need to consider holding nivolumab or trialing systemic steroids, but would prefer to hold off on that for now.         Order for EMLA signed by Kaylin and sent to [facility] in [place], per patient request.    Writer called patient and reviewed Kaylin's recommendations. Patient stated understanding. Writer advised that he use lotion with out fragrance or alcohol, and to apply, at minimum, daily. Patient stated he will also plan to use the topical hydrocortisone and OTC antihistamine. Patient aware to contact our clinic if his symptoms do not improve with these interventions, or if they worsen.     Lee Ann Washington RN on 9/12/2023 at 11:36 AM

## 2023-09-18 ENCOUNTER — THERAPY VISIT (OUTPATIENT)
Dept: PHYSICAL THERAPY | Facility: CLINIC | Age: 78
End: 2023-09-18
Payer: COMMERCIAL

## 2023-09-18 DIAGNOSIS — Z96.652 STATUS POST TOTAL LEFT KNEE REPLACEMENT: Primary | ICD-10-CM

## 2023-09-18 PROCEDURE — 97112 NEUROMUSCULAR REEDUCATION: CPT | Mod: GP | Performed by: PHYSICAL THERAPIST

## 2023-09-18 PROCEDURE — 97530 THERAPEUTIC ACTIVITIES: CPT | Mod: GP | Performed by: PHYSICAL THERAPIST

## 2023-09-18 PROCEDURE — 97110 THERAPEUTIC EXERCISES: CPT | Mod: GP | Performed by: PHYSICAL THERAPIST

## 2023-09-25 ENCOUNTER — THERAPY VISIT (OUTPATIENT)
Dept: PHYSICAL THERAPY | Facility: CLINIC | Age: 78
End: 2023-09-25
Payer: COMMERCIAL

## 2023-09-25 DIAGNOSIS — Z96.652 STATUS POST TOTAL LEFT KNEE REPLACEMENT: Primary | ICD-10-CM

## 2023-09-25 PROCEDURE — 97530 THERAPEUTIC ACTIVITIES: CPT | Mod: GP | Performed by: PHYSICAL THERAPIST

## 2023-09-25 PROCEDURE — 97110 THERAPEUTIC EXERCISES: CPT | Mod: GP | Performed by: PHYSICAL THERAPIST

## 2023-09-25 PROCEDURE — 97112 NEUROMUSCULAR REEDUCATION: CPT | Mod: GP | Performed by: PHYSICAL THERAPIST

## 2023-09-25 NOTE — PROGRESS NOTES
09/25/23 0500   Appointment Info   Signing clinician's name / credentials Ene Shirley, PT, DPT   Total/Authorized Visits 54 (E&T)   Visits Used 49   Medical Diagnosis Status post total left knee replacement   PT Tx Diagnosis s/p L TKA revision   Quick Adds Certification   Progress Note/Certification   Start of Care Date 08/31/22   Onset of illness/injury or Date of Surgery 07/07/22   Therapy Frequency every other week   Predicted Duration 12 weeks   Certification date from 09/25/23   Certification date to 12/18/23   Progress Note Due Date 12/18/23   Progress Note Completed Date 07/17/23   GOALS   PT Goals 2;3   PT Goal 1   Goal Identifier walking   Goal Description Patient will be able to walk 30 min w/ 4WW   Rationale to maximize safety and independence with self cares;to maximize safety and independence within the community;to maximize safety and independence within the home;to maximize safety and independence with performance of ADLs and functional tasks   Goal Progress 4WW 12 min   Target Date 12/18/23   PT Goal 2   Goal Identifier stairs   Goal Description Patient will be able to ascend 1/2 flight of stairs, in a normal reciprocal pattern with railing and SEC   Rationale to maximize safety and independence with self cares;to maximize safety and independence within the community;to maximize safety and independence within the home;to maximize safety and independence with performance of ADLs and functional tasks   Goal Progress able to ascend a 2in step in clinic with rail and SEC x 8 reps w/ CGA   Target Date 12/18/23   Subjective Report   Subjective Report Energy level is better today. Didn't do much over the weekend, due to the rain on Saturday. Did go to a hockey practice, had to walk up a bit of an incline with the Rollator, which wasn't too bad. Also went out to eat, had to park further then expected, was tired walking into the restaurant, recovered after eating. Did find it a bit challenging going down  the decline with the Rollator. L knee has been feeling so-so. Was tired after the walking to the restaurant.   Objective Measures   Objective Measures Objective Measure 1;Objective Measure 2;Objective Measure 3;Objective Measure 4;Objective Measure 5;Objective Measure 6   Objective Measure 1   Objective Measure Balance   Details Tandem L: 6 sec, R: 10 sec   Objective Measure 2   Objective Measure Stairs   Details 3in w/ rail and SEC x 3 reps   Objective Measure 3   Objective Measure Gait   Details 4WW, improved steadiness compared to SEC and FWW   Objective Measure 4   Objective Measure Strength   Details Knee Ext: +3/5 painful, Flx: 5/5, Hip Flx 5/5   Objective Measure 5   Objective Measure Quad Set   Details good   Objective Measure 6   Objective Measure Observation   Details L patella remains seated laterally, limiting ability to extend L LE from 90* to 0*, able to extend from 90* to 60* without movement of patella, good quad set, does increase pain and is very challenging performing 10 reps   Treatment Interventions (PT)   Interventions Therapeutic Procedure/Exercise;Therapeutic Activity;Neuromuscular Re-education;Gait Training;Manual Therapy   Therapeutic Procedure/Exercise   Therapeutic Procedures: strength, endurance, ROM, flexibillity minutes (74626) 10   Therapeutic Procedures Ther Proc 2   Ther Proc 1 Treadmill   Ther Proc 1 - Details NT   Ther Proc 2 Nustep   Ther Proc 2 - Details level 2, 5 min   PTRx Ther Proc 1 Seated Quad Set in Chair   PTRx Ther Proc 2 Hip Flexion Straight Leg Raise   PTRx Ther Proc 3 Short Arc Knee Extension (Long Sitting)   PTRx Ther Proc 4 Hip AROM Standing Abduction   PTRx Ther Proc 5 Hip AROM Standing Extension   PTRx Ther Proc 6 Toe Raises   PTRx Ther Proc 7 Functional Knee Extension with Tubing   PTRx Ther Proc 7 - Details BTB x 20 reps at 4WW   PTRx Ther Proc 8 Short Arc Knee Extension   PTRx Ther Proc 8 - Details 90-60 x 10 reps difficult vc/mc to limit amount of knee ext    Therapeutic Activity   Therapeutic Activities: dynamic activities to improve functional performance minutes (28163) 15   PTRx Ther Act 1 Forward Step   PTRx Ther Act 1 - Details attempted 3in w/ SEC and counter CGA but unable. 2in w/ SEC and counter CGA x 8 reps requires cues to push up onto step while extending knee tends to lock into knee extension then propel up on step   PTRx Ther Act 2 Knee Bends   PTRx Ther Act 2 - Details x 10 reps at sink partial motion CGA   PTRx Ther Act 3 Sit to Stand   Patient Response/Progress able to performing stepping today as energy level and pain improved from last session, but significant difficulty as compared to prior to chemo   Neuromuscular Re-education   Neuromuscular re-ed of mvmt, balance, coord, kinesthetic sense, posture, proprioception minutes (87423) 15   Neuromuscular Re-education Neuro Re-ed 2   Neuro Re-ed 1 Cup walking   Neuro Re-ed 2 McConnel Tape   PTRx Neuro Re-ed 1 Tandem Stance   PTRx Neuro Re-ed 1 - Details 30 sec w/ hand taps on counter B x 2 CGA   PTRx Neuro Re-ed 2 Marching in Place   PTRx Neuro Re-ed 2 - Details alternating SLS w/ B hands on counter CGA 3 sec hold per leg x 10 reps each   PTRx Neuro Re-ed 3 Sidestep   PTRx Neuro Re-ed 3 - Details at counter top no band high stepping over 4 cups each direction x 2 laps B CGA   PTRx Neuro Re-ed 4 Single Leg Stance - Balance Left Foot   PTRx Neuro Re-ed 4 - Details L at counter w/ SEC performing R foot taps forward and backwards w/ CGA x 10 reps - fatigues 1 episode in which L LE buckled   PTRx Neuro Re-ed 5 Balance Feet Together Eyes Closed   PTRx Neuro Re-ed 5 - Details NT   Patient Response/Progress SLS w/ fwd/bkwd stepping is challenging   Manual Therapy   Manual Therapy 1 MFR   Plan   Home program see PTrx   Plan for next session quad strength, balance, steps   Total Session Time   Timed Code Treatment Minutes 40   Total Treatment Time (sum of timed and untimed services) 40         [facility]                                                                                   OUTPATIENT PHYSICAL THERAPY    PLAN OF TREATMENT FOR OUTPATIENT REHABILITATION   Patient's Last Name, First Name, James Pizarro YOB: 1945   Provider's Name   [facility]   Medical Record No.  [id number]     Onset Date: 07/07/22  Start of Care Date: 08/31/22     Medical Diagnosis:  Status post total left knee replacement      PT Treatment Diagnosis:  s/p L TKA revision Plan of Treatment  Frequency/Duration: every other week/ 12 weeks    Certification date from 09/25/23 to 12/18/23         See note for plan of treatment details and functional goals     Ene Shirley, PT                         I CERTIFY THE NEED FOR THESE SERVICES FURNISHED UNDER        THIS PLAN OF TREATMENT AND WHILE UNDER MY CARE     (Physician attestation of this document indicates review and certification of the therapy plan).                Referring Provider:  Mynor Singh      Initial Assessment  See Epic Evaluation- Start of Care Date: 08/31/22            PLAN  Continue therapy per current plan of care.    Beginning/End Dates of Progress Note Reporting Period:  07/17/23 to 09/25/2023    Referring Provider:  Mynor Singh

## 2023-09-28 ENCOUNTER — PATIENT OUTREACH (OUTPATIENT)
Dept: ONCOLOGY | Facility: CLINIC | Age: 78
End: 2023-09-28

## 2023-09-28 ENCOUNTER — LAB (OUTPATIENT)
Dept: INFUSION THERAPY | Facility: CLINIC | Age: 78
End: 2023-09-28
Attending: INTERNAL MEDICINE
Payer: COMMERCIAL

## 2023-09-28 ENCOUNTER — ONCOLOGY VISIT (OUTPATIENT)
Dept: ONCOLOGY | Facility: CLINIC | Age: 78
End: 2023-09-28
Attending: INTERNAL MEDICINE
Payer: COMMERCIAL

## 2023-09-28 VITALS
TEMPERATURE: 98.6 F | SYSTOLIC BLOOD PRESSURE: 151 MMHG | OXYGEN SATURATION: 95 % | BODY MASS INDEX: 38.05 KG/M2 | RESPIRATION RATE: 16 BRPM | WEIGHT: 271.8 LBS | HEART RATE: 89 BPM | DIASTOLIC BLOOD PRESSURE: 88 MMHG

## 2023-09-28 DIAGNOSIS — C43.39 MELANOMA OF CHEEK (H): Primary | ICD-10-CM

## 2023-09-28 LAB
ALBUMIN SERPL BCG-MCNC: 4.3 G/DL (ref 3.5–5.2)
ALP SERPL-CCNC: 215 U/L (ref 40–129)
ALT SERPL W P-5'-P-CCNC: 29 U/L (ref 0–70)
ANION GAP SERPL CALCULATED.3IONS-SCNC: 9 MMOL/L (ref 7–15)
AST SERPL W P-5'-P-CCNC: 36 U/L (ref 0–45)
BASOPHILS # BLD AUTO: 0.1 10E3/UL (ref 0–0.2)
BASOPHILS NFR BLD AUTO: 2 %
BILIRUB SERPL-MCNC: 0.5 MG/DL
BUN SERPL-MCNC: 12.9 MG/DL (ref 8–23)
CALCIUM SERPL-MCNC: 9.3 MG/DL (ref 8.8–10.2)
CHLORIDE SERPL-SCNC: 93 MMOL/L (ref 98–107)
CREAT SERPL-MCNC: 0.6 MG/DL (ref 0.67–1.17)
EGFRCR SERPLBLD CKD-EPI 2021: >90 ML/MIN/1.73M2
EOSINOPHIL # BLD AUTO: 0.3 10E3/UL (ref 0–0.7)
EOSINOPHIL NFR BLD AUTO: 4 %
ERYTHROCYTE [DISTWIDTH] IN BLOOD BY AUTOMATED COUNT: 13.7 % (ref 10–15)
GLUCOSE SERPL-MCNC: 74 MG/DL (ref 70–99)
HCO3 SERPL-SCNC: 26 MMOL/L (ref 22–29)
HCT VFR BLD AUTO: 39.3 % (ref 40–53)
HGB BLD-MCNC: 13.7 G/DL (ref 13.3–17.7)
IMM GRANULOCYTES # BLD: 0 10E3/UL
IMM GRANULOCYTES NFR BLD: 1 %
LYMPHOCYTES # BLD AUTO: 0.8 10E3/UL (ref 0.8–5.3)
LYMPHOCYTES NFR BLD AUTO: 13 %
MCH RBC QN AUTO: 32.8 PG (ref 26.5–33)
MCHC RBC AUTO-ENTMCNC: 34.9 G/DL (ref 31.5–36.5)
MCV RBC AUTO: 94 FL (ref 78–100)
MONOCYTES # BLD AUTO: 0.6 10E3/UL (ref 0–1.3)
MONOCYTES NFR BLD AUTO: 9 %
NEUTROPHILS # BLD AUTO: 4.3 10E3/UL (ref 1.6–8.3)
NEUTROPHILS NFR BLD AUTO: 71 %
NRBC # BLD AUTO: 0 10E3/UL
NRBC BLD AUTO-RTO: 0 /100
PLATELET # BLD AUTO: 190 10E3/UL (ref 150–450)
POTASSIUM SERPL-SCNC: 4.8 MMOL/L (ref 3.4–5.3)
PROT SERPL-MCNC: 7.3 G/DL (ref 6.4–8.3)
RBC # BLD AUTO: 4.18 10E6/UL (ref 4.4–5.9)
SODIUM SERPL-SCNC: 128 MMOL/L (ref 135–145)
TSH SERPL DL<=0.005 MIU/L-ACNC: 1.3 UIU/ML (ref 0.3–4.2)
WBC # BLD AUTO: 6.1 10E3/UL (ref 4–11)

## 2023-09-28 PROCEDURE — 84443 ASSAY THYROID STIM HORMONE: CPT | Performed by: INTERNAL MEDICINE

## 2023-09-28 PROCEDURE — 85025 COMPLETE CBC W/AUTO DIFF WBC: CPT | Performed by: INTERNAL MEDICINE

## 2023-09-28 PROCEDURE — 250N000011 HC RX IP 250 OP 636: Mod: JZ | Performed by: INTERNAL MEDICINE

## 2023-09-28 PROCEDURE — 99215 OFFICE O/P EST HI 40 MIN: CPT | Performed by: INTERNAL MEDICINE

## 2023-09-28 PROCEDURE — 80053 COMPREHEN METABOLIC PANEL: CPT | Performed by: INTERNAL MEDICINE

## 2023-09-28 PROCEDURE — 36591 DRAW BLOOD OFF VENOUS DEVICE: CPT

## 2023-09-28 PROCEDURE — G0463 HOSPITAL OUTPT CLINIC VISIT: HCPCS | Performed by: INTERNAL MEDICINE

## 2023-09-28 RX ORDER — HEPARIN SODIUM (PORCINE) LOCK FLUSH IV SOLN 100 UNIT/ML 100 UNIT/ML
500 SOLUTION INTRAVENOUS ONCE
Status: COMPLETED | OUTPATIENT
Start: 2023-09-28 | End: 2023-09-28

## 2023-09-28 RX ADMIN — Medication 500 UNITS: at 12:41

## 2023-09-28 ASSESSMENT — PAIN SCALES - GENERAL: PAINLEVEL: NO PAIN (0)

## 2023-09-28 NOTE — NURSING NOTE
"Oncology Rooming Note    September 28, 2023 12:29 PM   James Salvador is a 78 year old male who presents for:    Chief Complaint   Patient presents with    Oncology Clinic Visit     Initial Vitals: BP (!) 151/88   Pulse 89   Temp 98.6  F (37  C) (Oral)   Resp 16   Wt 123.3 kg (271 lb 12.8 oz)   SpO2 95%   BMI 38.05 kg/m   Estimated body mass index is 38.05 kg/m  as calculated from the following:    Height as of 8/4/23: 1.8 m (5' 10.87\").    Weight as of this encounter: 123.3 kg (271 lb 12.8 oz). Body surface area is 2.48 meters squared.  No Pain (0) Comment: Data Unavailable   No LMP for male patient.  Allergies reviewed: Yes  Medications reviewed: Yes    Medications: Medication refills not needed today.  Pharmacy name entered into [employer]:    [id number] - [place], [place] - [address] AT [facility] - [facility] [address]    Clinical concerns: follow up        Becki Perez            "

## 2023-09-28 NOTE — PROGRESS NOTES
Nursing Note:  James Salvador presents today for labs.    Patient seen by provider today: Yes: Dr Holbrook   present during visit today: Not Applicable.    Note: N/A.    Intravenous Access:  Labs drawn without difficulty.  Implanted Port.    Discharge Plan:   Patient was sent to clinic for MD appointment.    Jennifer Jeffers RN

## 2023-09-28 NOTE — LETTER
9/28/2023         RE: James Salvador  [address] [place] [address]        Dear Colleague,    Thank you for referring your patient, James Salvador, to the [facility]. Please see a copy of my visit note below.    [facility]    Hematology/Oncology Established Patient Follow-up Note    Oncologic History/Treatment Summary    10/2019: Underwent excision of a Breslow depth 0.6mm malignant melanoma, lentigo maligna type form left zygoma by Dr. Dutta.  Decision-DX was low risk so SLN not performed.  Underwent subsequent MOHS resection.  2/21/23: Shave biopsy of a non-healing lesion on left cheek demonstrated malignant melanoma, ulcerated with nodular appearance and Breslow depth of at least 2.2 mm.  3/32/23: Wide local excision with SLN biopsy performed by Dr. Minaya.  SLN could not be identified.  Pathology demonstrated residual melanoma with Breslow depth 3.5 mm, margins negative.  4/13/23: Surgical reconstruction with flap placement performed by Dr. Sanchez.  6/22/23: Noted new pigmented lesion on left cheek, biopsy demonstrated melanoma.  No intraepidermal component noted consistent with metastasis.  7/13/23: Underwent MOHS resection with clear margins.  7/18/23: PET/CT and CT head demonstrated no definite evidence of metastatic disease, several small (subcentimeter) indeterminate lung nodules noted.  8/4/23: Initiated adjuvant nivolumab.  8/31/23: Noted to have multiple new nodules left cheek/face, biopsy confirmed locally recurrent melanoma.      Today's Date: 9/28/23    Reason for Follow-up: Stage IIIC cutaneous melanoma.      INTERIM HISTORY:  Alden presents for follow-up of locally advance/recurrent cutaneous melanoma s/p 2 cycles nivolumab.  Due for cycle 3 tomorrow, accompanied by his wife.  He was noted to have multiple new cutaneous lesions on his left face shortly after we started nivolumab, these were biopsied confirming  metastatic melanoma.  Unfortunately the skin lesions have continued to progress in the left face. They are not painful.  He has been tolerating nivolumab very well, no appreciable side effects.  Otherwise no new complaints, no new focal pain, appetite good.  No respiratory complaints.      REVIEW OF SYSTEMS:   A 14 point ROS was reviewed with pertinent positives and negatives in the HPI.       HOME MEDICATIONS:  Current Outpatient Medications   Medication Sig Dispense Refill     acetaminophen (TYLENOL) 325 MG tablet Take 650 mg by mouth 4 times daily       apixaban ANTICOAGULANT (ELIQUIS ANTICOAGULANT) 5 MG tablet Take 1 tablet (5 mg) by mouth 2 times daily 180 tablet 1     atorvastatin (LIPITOR) 80 MG tablet Take 1 tablet (80 mg) by mouth At Bedtime 90 tablet 3     carvedilol (COREG) 3.125 MG tablet Take 1 tablet (3.125 mg) by mouth 2 times daily (with meals) 180 tablet 3     clobetasol (TEMOVATE) 0.05 % external cream Apply to AA BID x 1-2 weeks then PRN. Do not apply to face. 60 g 3     COMPOUNDED NON-CONTROLLED SUBSTANCE (CMPD RX) - PHARMACY TO MIX COMPOUNDED MEDICATION Fluorouracil 5% Calcipotriene 0.005% 1:1 Cream apply twice daily for 1 weeks to face, arms for 14 days 30 g 6     desonide (DESOWEN) 0.05 % external cream Apply topically daily       fluticasone (FLONASE) 50 MCG/ACT nasal spray Spray 2 sprays into both nostrils daily 48 mL 3     furosemide (LASIX) 20 MG tablet TAKE 1 TABLET BY MOUTH EVERY DAY 90 tablet 3     gabapentin (NEURONTIN) 300 MG capsule Take 2 capsules (600 mg) by mouth 3 times daily       ketoconazole (NIZORAL) 2 % cream Apply topically 2 times daily For face. FAX REFILL REQUESTS TO [facility]: [phone] 30 g 2     ketoconazole (NIZORAL) 2 % external shampoo Use daily as needed 120 mL 11     lidocaine-prilocaine (EMLA) 2.5-2.5 % external cream Apply topically as needed for moderate pain 30 g 1     lisinopril (ZESTRIL) 30 MG tablet Take 1 tablet (30 mg) by mouth daily 90 tablet 3      nitroGLYcerin (NITROSTAT) 0.4 MG sublingual tablet Place 1 tablet (0.4 mg) under the tongue every 5 minutes as needed for chest pain May repeat twice for a total of 3 tablets.  If chest pain not relieved, call 911 25 tablet 11     OXcarbazepine (TRILEPTAL) 300 MG tablet Take 2 tablets (600 mg) by mouth 3 times daily ( @ 7am, 1530 and 2200 hrs each day)       triamcinolone (KENALOG) 0.1 % external lotion Apply to scalp BID x 1-2 weeks then PRN only 60 mL 3         ALLERGIES:  Allergies   Allergen Reactions     Cats      Cat Dander     Dogs      Dog Dander     Hydromorphone      Other reaction(s): Confusion     Pollen Extract          PAST MEDICAL HISTORY:  Past Medical History:   Diagnosis Date     Actinic 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 Cardiac Cath 9/2015: BRIDGET to LAD     Hearing problem 1995    Wear hearing aids     Hyperlipidemia      Malignant melanoma      Morbid obesity 01/11/2016     Paroxysmal supraventricular tachycardia     on metoprolol     Permanent atrial fibrillation 04/21/2017     Squamous cell carcinoma      Tachy-edinson syndrome 05/29/2019         PAST SURGICAL HISTORY:  Past Surgical History:   Procedure Laterality Date     ADENOIDECTOMY  Childhood     ANGIOPLASTY  1994    in [place]     ANKLE SURGERY  07/13/2005    right ankle     ARTHROPLASTY HIP Right 10/2009     BIOPSY NODE SENTINEL Left 03/30/2023    Procedure: BIOPSY, LYMPH NODE, SENTINEL;  Surgeon: Rolando Minaya MD;  Location: [facility] OR     COLONOSCOPY  4/25/12     EP PERM PACER SINGLE LEAD N/A 05/31/2019    Medtronic single lead pacemaker     EXCISE MASS CHEEK Left 03/30/2023    Procedure: wide local excision of left cheek melanoma;  Surgeon: Rolando Minaya MD;  Location: [facility] OR     EXCISE MASS CHEEK WITH FLAP PEDICLE Left  2023    Procedure: Left cervical Facial flap per closure of left cheek Defect;  Surgeon: Ila Sanchez MD;  Location: [facility] OR     Facial biopsy - Melanoma       GENITOURINARY SURGERY  10/20/23    Prostate surgery     HEART CATH STENT COR W/WO PTCA  2015    BRIDGET stent mid LAD     IR CHEST PORT PLACEMENT > 5 YRS OF AGE  2023     LASER SURGERY OF EYE  2002     MOHS MICROGRAPHIC PROCEDURE  2004    squamous cell carcinoma right temple     SINUS SURGERY  2006     TONSILLECTOMY  Childhood         SOCIAL HISTORY:  Social History     Socioeconomic History     Marital status:      Spouse name: Not on file     Number of children: 3     Years of education: Not on file     Highest education level: Not on file   Occupational History     Employer: RETIRED   Tobacco Use     Smoking status: Former     Packs/day: 0.50     Years: 10.00     Pack years: 5.00     Types: Cigarettes, Cigars, Pipe     Start date: 1966     Quit date: 1974     Years since quittin.4     Smokeless tobacco: Never     Tobacco comments:     Also smoked from 1985 - 1990   Substance and Sexual Activity     Alcohol use: Not Currently     Comment: Socially     Drug use: No     Sexual activity: Not Currently     Partners: Female     Birth control/protection: Male Surgical     Comment: Vasectomy   Other Topics Concern     Parent/sibling w/ CABG, MI or angioplasty before 65F 55M? No      Service Not Asked     Blood Transfusions Not Asked     Caffeine Concern Yes     Comment: 2 big cups coffee daily     Occupational Exposure Not Asked     Hobby Hazards Not Asked     Sleep Concern No     Comment: sleeping better since shoulder replaced 11/3/16     Stress Concern No     Weight Concern Yes     Special Diet Yes     Comment: trying to do more lean meats     Back Care Not Asked     Exercise No     Comment: limited - knee     Bike Helmet Not Asked     Seat Belt Not Asked     Self-Exams Not Asked   Social  History Narrative     Not on file     Social Determinants of Health     Financial Resource Strain: Not on file   Food Insecurity: Not on file   Transportation Needs: Not on file   Physical Activity: Not on file   Stress: Not on file   Social Connections: Not on file   Interpersonal Safety: Not on file   Housing Stability: Not on file         FAMILY HISTORY:  Family History   Problem Relation Age of Onset     Genitourinary Problems Mother         renal failure     Heart Disease Mother      Cerebrovascular Disease Mother         TIA     Cardiovascular Father         rupture of dorsal aorta     Depression Son      Depression Brother         Suicide     Substance Abuse Brother         Heroin     Skin Cancer No family hx of          PHYSICAL EXAM:  Vital signs:  BP (!) 151/88   Pulse 89   Temp 98.6  F (37  C) (Oral)   Resp 16   Wt 123.3 kg (271 lb 12.8 oz)   SpO2 95%   BMI 38.05 kg/m     GENERAL/CONSTITUTIONAL: Appears well, no acute distress.  EYES: Gaze conjugate, pupils equal and round. No scleral icterus.  LYMPH: No cervical, supraclavicular or axillary adenopathy.   RESPIRATORY: Lungs clear to auscultation bilaterally.    CARDIOVASCULAR: Regular rate and rhythm.    GASTROINTESTINAL: No organomegaly, masses, or tenderness.  No guarding.  No distention.  INTEGUMENTARY: Multiple nodular melanotic cutaneous lesions seen on left cheek/temple, no ulceration.  A lesion also seen on lower left jaw.       LABS:   Latest Reference Range & Units 09/28/23 12:38   Sodium 135 - 145 mmol/L 128 (L)   Potassium 3.4 - 5.3 mmol/L 4.8   Chloride 98 - 107 mmol/L 93 (L)   Carbon Dioxide (CO2) 22 - 29 mmol/L 26   Urea Nitrogen 8.0 - 23.0 mg/dL 12.9   Creatinine 0.67 - 1.17 mg/dL 0.60 (L)   GFR Estimate >60 mL/min/1.73m2 >90   Calcium 8.8 - 10.2 mg/dL 9.3   Anion Gap 7 - 15 mmol/L 9   Albumin 3.5 - 5.2 g/dL 4.3   Protein Total 6.4 - 8.3 g/dL 7.3   Alkaline Phosphatase 40 - 129 U/L 215 (H)   ALT 0 - 70 U/L 29   AST 0 - 45 U/L 36    Bilirubin Total <=1.2 mg/dL 0.5   Glucose 70 - 99 mg/dL 74   TSH 0.30 - 4.20 uIU/mL 1.30   WBC 4.0 - 11.0 10e3/uL 6.1   Hemoglobin 13.3 - 17.7 g/dL 13.7   Hematocrit 40.0 - 53.0 % 39.3 (L)   Platelet Count 150 - 450 10e3/uL 190   RBC Count 4.40 - 5.90 10e6/uL 4.18 (L)   MCV 78 - 100 fL 94   MCH 26.5 - 33.0 pg 32.8   MCHC 31.5 - 36.5 g/dL 34.9   RDW 10.0 - 15.0 % 13.7   % Neutrophils % 71   % Lymphocytes % 13   % Monocytes % 9   % Eosinophils % 4   % Basophils % 2   Absolute Basophils 0.0 - 0.2 10e3/uL 0.1   Absolute Eosinophils 0.0 - 0.7 10e3/uL 0.3   Absolute Immature Granulocytes <=0.4 10e3/uL 0.0   Absolute Lymphocytes 0.8 - 5.3 10e3/uL 0.8   Absolute Monocytes 0.0 - 1.3 10e3/uL 0.6   % Immature Granulocytes % 1   Absolute Neutrophils 1.6 - 8.3 10e3/uL 4.3   Absolute NRBCs 10e3/uL 0.0   NRBCs per 100 WBC <1 /100 0   (L): Data is abnormally low  (H): Data is abnormally high        MOST RECENT IMAGIN23 PET/CT:  1. Cutaneous defect with surrounding skin thickening and mild  metabolic activity in the left cheek. Findings may represent  inflammatory changes due to recent biopsy versus residual tumor.  Correlate with biopsy margins.  2. Small punctate hypermetabolic cutaneous lesion on the right scalp  without any CT correlate, recommend direct visualization.  3. No evidence of cervical or mediastinal metastatic lymphadenopathy.  4. Findings concerning for underlying interstitial lung disease. Few  scattered lung nodules. Most notably there is an 8 mm right subpleural  nodule with questionable metabolic activity (tiny punctate metabolic  activity seen only on PET but not definitely corresponding to the  location of the nodule, question motion due to respiration). Recommend  short-term follow-up.  5. Cardiomegaly, right cardiac apical perfusion defect, likely due to  chronic LAD infarct. Right atrial hypermetabolism, either due to  increased right heart hypertrophy or due to atrial ablation.  6. Right tibial  plateau hypermetabolic focus, likely degenerative in  nature.      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  He has had only 2 doses so early to assess response but certainly concerning that lesions appear to be progressing on therapy.  Difficult area to radiate for local control.  We have ordered NGS results of which are pending, if BRAF [id number] is identified could consider TKI therapy.  Other options would include increased intensity of immunotherapy with either nivolumab plus ipilimumab vs Opdualag.  I would favor the latter given significant increased toxicity seen with ipilimumab.  If he does not have metastatic progression, TVEC would also be an option for local control.  We discussed above at length and he expressed understanding.    PLAN:    Proceed with nivolumab tomorrow.  Follow-up on NGS results.  RTC 3 weeks with CT prior.  Tentatively plan to initiate Opdualag at that point unless actionable mutation identified in which case would plan TKI therapy.    Total time is 45 minutes including face to face time, documentation and orders.      Dilshad Holbrook MD  Hematology/Oncology  [facility]       Again, thank you for allowing me to participate in the care of your patient.        Sincerely,        Dilshad Holbrook MD

## 2023-09-28 NOTE — PROGRESS NOTES
[facility]    Hematology/Oncology Established Patient Follow-up Note    Oncologic History/Treatment Summary    10/2019: Underwent excision of a Breslow depth 0.6mm malignant melanoma, lentigo maligna type form left zygoma by Dr. Dutta.  Decision-DX was low risk so SLN not performed.  Underwent subsequent MOHS resection.  2/21/23: Shave biopsy of a non-healing lesion on left cheek demonstrated malignant melanoma, ulcerated with nodular appearance and Breslow depth of at least 2.2 mm.  3/32/23: Wide local excision with SLN biopsy performed by Dr. Minaya.  SLN could not be identified.  Pathology demonstrated residual melanoma with Breslow depth 3.5 mm, margins negative.  4/13/23: Surgical reconstruction with flap placement performed by Dr. Sanchez.  6/22/23: Noted new pigmented lesion on left cheek, biopsy demonstrated melanoma.  No intraepidermal component noted consistent with metastasis.  7/13/23: Underwent MOHS resection with clear margins.  7/18/23: PET/CT and CT head demonstrated no definite evidence of metastatic disease, several small (subcentimeter) indeterminate lung nodules noted.  8/4/23: Initiated adjuvant nivolumab.  8/31/23: Noted to have multiple new nodules left cheek/face, biopsy confirmed locally recurrent melanoma.      Today's Date: 9/28/23    Reason for Follow-up: Stage IIIC cutaneous melanoma.      INTERIM HISTORY:  Alden presents for follow-up of locally advance/recurrent cutaneous melanoma s/p 2 cycles nivolumab.  Due for cycle 3 tomorrow, accompanied by his wife.  He was noted to have multiple new cutaneous lesions on his left face shortly after we started nivolumab, these were biopsied confirming metastatic melanoma.  Unfortunately the skin lesions have continued to progress in the left face. They are not painful.  He has been tolerating nivolumab very well, no appreciable side effects.  Otherwise no new complaints, no new focal pain, appetite good.  No  respiratory complaints.      REVIEW OF SYSTEMS:   A 14 point ROS was reviewed with pertinent positives and negatives in the HPI.       HOME MEDICATIONS:  Current Outpatient Medications   Medication Sig Dispense Refill    acetaminophen (TYLENOL) 325 MG tablet Take 650 mg by mouth 4 times daily      apixaban ANTICOAGULANT (ELIQUIS ANTICOAGULANT) 5 MG tablet Take 1 tablet (5 mg) by mouth 2 times daily 180 tablet 1    atorvastatin (LIPITOR) 80 MG tablet Take 1 tablet (80 mg) by mouth At Bedtime 90 tablet 3    carvedilol (COREG) 3.125 MG tablet Take 1 tablet (3.125 mg) by mouth 2 times daily (with meals) 180 tablet 3    clobetasol (TEMOVATE) 0.05 % external cream Apply to AA BID x 1-2 weeks then PRN. Do not apply to face. 60 g 3    COMPOUNDED NON-CONTROLLED SUBSTANCE (CMPD RX) - PHARMACY TO MIX COMPOUNDED MEDICATION Fluorouracil 5% Calcipotriene 0.005% 1:1 Cream apply twice daily for 1 weeks to face, arms for 14 days 30 g 6    desonide (DESOWEN) 0.05 % external cream Apply topically daily      fluticasone (FLONASE) 50 MCG/ACT nasal spray Spray 2 sprays into both nostrils daily 48 mL 3    furosemide (LASIX) 20 MG tablet TAKE 1 TABLET BY MOUTH EVERY DAY 90 tablet 3    gabapentin (NEURONTIN) 300 MG capsule Take 2 capsules (600 mg) by mouth 3 times daily      ketoconazole (NIZORAL) 2 % cream Apply topically 2 times daily For face. FAX REFILL REQUESTS TO [facility]: [phone] 30 g 2    ketoconazole (NIZORAL) 2 % external shampoo Use daily as needed 120 mL 11    lidocaine-prilocaine (EMLA) 2.5-2.5 % external cream Apply topically as needed for moderate pain 30 g 1    lisinopril (ZESTRIL) 30 MG tablet Take 1 tablet (30 mg) by mouth daily 90 tablet 3    nitroGLYcerin (NITROSTAT) 0.4 MG sublingual tablet Place 1 tablet (0.4 mg) under the tongue every 5 minutes as needed for chest pain May repeat twice for a total of 3 tablets.  If chest pain not relieved, call 911 25 tablet 11    OXcarbazepine (TRILEPTAL) 300 MG tablet Take 2  tablets (600 mg) by mouth 3 times daily ( @ 7am, 1530 and 2200 hrs each day)      triamcinolone (KENALOG) 0.1 % external lotion Apply to scalp BID x 1-2 weeks then PRN only 60 mL 3         ALLERGIES:  Allergies   Allergen Reactions    Cats      Cat Dander    Dogs      Dog Dander    Hydromorphone      Other reaction(s): Confusion    Pollen Extract          PAST MEDICAL HISTORY:  Past Medical History:   Diagnosis Date    Actinic cheilitis 07/17/2013    Lower lip, left     Actinic keratosis     Allergic rhinitis     Anxiety 3/1/23    Arthritis 2004    Basal cell carcinoma     Benign hypertension     CAD (coronary artery disease)     Cardiac cath and PCI 1994. Cardiac Cath 9/2015: BRIDGET to LAD    Hearing problem 1995    Wear hearing aids    Hyperlipidemia     Malignant melanoma     Morbid obesity 01/11/2016    Paroxysmal supraventricular tachycardia     on metoprolol    Permanent atrial fibrillation 04/21/2017    Squamous cell carcinoma     Tachy-edinson syndrome 05/29/2019         PAST SURGICAL HISTORY:  Past Surgical History:   Procedure Laterality Date    ADENOIDECTOMY  Childhood    ANGIOPLASTY  1994    in [place]    ANKLE SURGERY  07/13/2005    right ankle    ARTHROPLASTY HIP Right 10/2009    BIOPSY NODE SENTINEL Left 03/30/2023    Procedure: BIOPSY, LYMPH NODE, SENTINEL;  Surgeon: Rolando Minaya MD;  Location: [facility] OR    COLONOSCOPY  4/25/12    EP PERM PACER SINGLE LEAD N/A 05/31/2019    [employer]tronic single lead pacemaker    EXCISE MASS CHEEK Left 03/30/2023    Procedure: wide local excision of left cheek melanoma;  Surgeon: Rolando Minaya MD;  Location: [facility] OR    EXCISE MASS CHEEK WITH FLAP PEDICLE Left 04/13/2023    Procedure: Left cervical Facial flap per closure of left cheek Defect;  Surgeon: Ila Sanchez MD;  Location: [facility] OR    Facial biopsy - Melanoma      GENITOURINARY SURGERY  10/20/23    Prostate surgery    HEART CATH STENT COR W/WO PTCA  09/23/2015    BRIDGET stent mid LAD    IR CHEST PORT  PLACEMENT > 5 YRS OF AGE  2023    LASER SURGERY OF EYE  2002    MOHS MICROGRAPHIC PROCEDURE  2004    squamous cell carcinoma right temple    SINUS SURGERY  2006    TONSILLECTOMY  Childhood         SOCIAL HISTORY:  Social History     Socioeconomic History    Marital status:      Spouse name: Not on file    Number of children: 3    Years of education: Not on file    Highest education level: Not on file   Occupational History     Employer: RETIRED   Tobacco Use    Smoking status: Former     Packs/day: 0.50     Years: 10.00     Pack years: 5.00     Types: Cigarettes, Cigars, Pipe     Start date: 1966     Quit date: 1974     Years since quittin.4    Smokeless tobacco: Never    Tobacco comments:     Also smoked from 1985 - 1990   Substance and Sexual Activity    Alcohol use: Not Currently     Comment: Socially    Drug use: No    Sexual activity: Not Currently     Partners: Female     Birth control/protection: Male Surgical     Comment: Vasectomy   Other Topics Concern    Parent/sibling w/ CABG, MI or angioplasty before 65F 55M? No     Service Not Asked    Blood Transfusions Not Asked    Caffeine Concern Yes     Comment: 2 big cups coffee daily    Occupational Exposure Not Asked    Hobby Hazards Not Asked    Sleep Concern No     Comment: sleeping better since shoulder replaced 11/3/16    Stress Concern No    Weight Concern Yes    Special Diet Yes     Comment: trying to do more lean meats    Back Care Not Asked    Exercise No     Comment: limited - knee    Bike Helmet Not Asked    Seat Belt Not Asked    Self-Exams Not Asked   Social History Narrative    Not on file     Social Determinants of Health     Financial Resource Strain: Not on file   Food Insecurity: Not on file   Transportation Needs: Not on file   Physical Activity: Not on file   Stress: Not on file   Social Connections: Not on file   Interpersonal Safety: Not on file   Housing Stability: Not on file          FAMILY HISTORY:  Family History   Problem Relation Age of Onset    Genitourinary Problems Mother         renal failure    Heart Disease Mother     Cerebrovascular Disease Mother         TIA    Cardiovascular Father         rupture of dorsal aorta    Depression Son     Depression Brother         Suicide    Substance Abuse Brother         Heroin    Skin Cancer No family hx of          PHYSICAL EXAM:  Vital signs:  BP (!) 151/88   Pulse 89   Temp 98.6  F (37  C) (Oral)   Resp 16   Wt 123.3 kg (271 lb 12.8 oz)   SpO2 95%   BMI 38.05 kg/m     GENERAL/CONSTITUTIONAL: Appears well, no acute distress.  EYES: Gaze conjugate, pupils equal and round. No scleral icterus.  LYMPH: No cervical, supraclavicular or axillary adenopathy.   RESPIRATORY: Lungs clear to auscultation bilaterally.    CARDIOVASCULAR: Regular rate and rhythm.    GASTROINTESTINAL: No organomegaly, masses, or tenderness.  No guarding.  No distention.  INTEGUMENTARY: Multiple nodular melanotic cutaneous lesions seen on left cheek/temple, no ulceration.  A lesion also seen on lower left jaw.       LABS:   Latest Reference Range & Units 09/28/23 12:38   Sodium 135 - 145 mmol/L 128 (L)   Potassium 3.4 - 5.3 mmol/L 4.8   Chloride 98 - 107 mmol/L 93 (L)   Carbon Dioxide (CO2) 22 - 29 mmol/L 26   Urea Nitrogen 8.0 - 23.0 mg/dL 12.9   Creatinine 0.67 - 1.17 mg/dL 0.60 (L)   GFR Estimate >60 mL/min/1.73m2 >90   Calcium 8.8 - 10.2 mg/dL 9.3   Anion Gap 7 - 15 mmol/L 9   Albumin 3.5 - 5.2 g/dL 4.3   Protein Total 6.4 - 8.3 g/dL 7.3   Alkaline Phosphatase 40 - 129 U/L 215 (H)   ALT 0 - 70 U/L 29   AST 0 - 45 U/L 36   Bilirubin Total <=1.2 mg/dL 0.5   Glucose 70 - 99 mg/dL 74   TSH 0.30 - 4.20 uIU/mL 1.30   WBC 4.0 - 11.0 10e3/uL 6.1   Hemoglobin 13.3 - 17.7 g/dL 13.7   Hematocrit 40.0 - 53.0 % 39.3 (L)   Platelet Count 150 - 450 10e3/uL 190   RBC Count 4.40 - 5.90 10e6/uL 4.18 (L)   MCV 78 - 100 fL 94   MCH 26.5 - 33.0 pg 32.8   MCHC 31.5 - 36.5 g/dL 34.9    RDW 10.0 - 15.0 % 13.7   % Neutrophils % 71   % Lymphocytes % 13   % Monocytes % 9   % Eosinophils % 4   % Basophils % 2   Absolute Basophils 0.0 - 0.2 10e3/uL 0.1   Absolute Eosinophils 0.0 - 0.7 10e3/uL 0.3   Absolute Immature Granulocytes <=0.4 10e3/uL 0.0   Absolute Lymphocytes 0.8 - 5.3 10e3/uL 0.8   Absolute Monocytes 0.0 - 1.3 10e3/uL 0.6   % Immature Granulocytes % 1   Absolute Neutrophils 1.6 - 8.3 10e3/uL 4.3   Absolute NRBCs 10e3/uL 0.0   NRBCs per 100 WBC <1 /100 0   (L): Data is abnormally low  (H): Data is abnormally high        MOST RECENT IMAGIN23 PET/CT:  1. Cutaneous defect with surrounding skin thickening and mild  metabolic activity in the left cheek. Findings may represent  inflammatory changes due to recent biopsy versus residual tumor.  Correlate with biopsy margins.  2. Small punctate hypermetabolic cutaneous lesion on the right scalp  without any CT correlate, recommend direct visualization.  3. No evidence of cervical or mediastinal metastatic lymphadenopathy.  4. Findings concerning for underlying interstitial lung disease. Few  scattered lung nodules. Most notably there is an 8 mm right subpleural  nodule with questionable metabolic activity (tiny punctate metabolic  activity seen only on PET but not definitely corresponding to the  location of the nodule, question motion due to respiration). Recommend  short-term follow-up.  5. Cardiomegaly, right cardiac apical perfusion defect, likely due to  chronic LAD infarct. Right atrial hypermetabolism, either due to  increased right heart hypertrophy or due to atrial ablation.  6. Right tibial plateau hypermetabolic focus, likely degenerative in  nature.      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  He has had only 2 doses so early to assess response but certainly concerning that lesions appear to be progressing on therapy.  Difficult area to radiate for  local control.  We have ordered NGS results of which are pending, if BRAF [id number] is identified could consider TKI therapy.  Other options would include increased intensity of immunotherapy with either nivolumab plus ipilimumab vs Opdualag.  I would favor the latter given significant increased toxicity seen with ipilimumab.  If he does not have metastatic progression, TVEC would also be an option for local control.  We discussed above at length and he expressed understanding.    PLAN:    Proceed with nivolumab tomorrow.  Follow-up on NGS results.  RTC 3 weeks with CT prior.  Tentatively plan to initiate Opdualag at that point unless actionable mutation identified in which case would plan TKI therapy.    Total time is 45 minutes including face to face time, documentation and orders.      Dilshad Holbrook MD  Hematology/Oncology  [facility]

## 2023-09-29 ENCOUNTER — INFUSION THERAPY VISIT (OUTPATIENT)
Dept: INFUSION THERAPY | Facility: CLINIC | Age: 78
End: 2023-09-29
Attending: INTERNAL MEDICINE
Payer: COMMERCIAL

## 2023-09-29 VITALS
OXYGEN SATURATION: 94 % | TEMPERATURE: 98.3 F | BODY MASS INDEX: 38.33 KG/M2 | WEIGHT: 273.8 LBS | SYSTOLIC BLOOD PRESSURE: 147 MMHG | DIASTOLIC BLOOD PRESSURE: 81 MMHG | RESPIRATION RATE: 16 BRPM | HEART RATE: 57 BPM

## 2023-09-29 DIAGNOSIS — C43.39 MELANOMA OF CHEEK (H): Primary | ICD-10-CM

## 2023-09-29 PROCEDURE — 96413 CHEMO IV INFUSION 1 HR: CPT

## 2023-09-29 PROCEDURE — 258N000003 HC RX IP 258 OP 636: Performed by: INTERNAL MEDICINE

## 2023-09-29 PROCEDURE — 250N000011 HC RX IP 250 OP 636: Mod: JZ | Performed by: INTERNAL MEDICINE

## 2023-09-29 RX ORDER — HEPARIN SODIUM (PORCINE) LOCK FLUSH IV SOLN 100 UNIT/ML 100 UNIT/ML
5 SOLUTION INTRAVENOUS
Status: DISCONTINUED | OUTPATIENT
Start: 2023-09-29 | End: 2023-09-29 | Stop reason: HOSPADM

## 2023-09-29 RX ADMIN — SODIUM CHLORIDE 480 MG: 9 INJECTION, SOLUTION INTRAVENOUS at 12:08

## 2023-09-29 RX ADMIN — Medication 5 ML: at 12:47

## 2023-09-29 RX ADMIN — SODIUM CHLORIDE 250 ML: 9 INJECTION, SOLUTION INTRAVENOUS at 12:07

## 2023-09-29 NOTE — PROGRESS NOTES
Infusion Nursing Note:  James Salvador presents today for D1C3 Opdivo.    Patient seen by provider today: No   present during visit today: Not Applicable.    Note: N/A.      Intravenous Access:  Implanted Port.    Treatment Conditions:  Lab Results   Component Value Date    HGB 13.7 09/28/2023    WBC 6.1 09/28/2023    ANEU 4.2 07/03/2023    ANEUTAUTO 4.3 09/28/2023     09/28/2023        Lab Results   Component Value Date     (L) 09/28/2023    POTASSIUM 4.8 09/28/2023    MAG 2.0 09/23/2015    CR 0.60 (L) 09/28/2023    BENJI 9.3 09/28/2023    BILITOTAL 0.5 09/28/2023    ALBUMIN 4.3 09/28/2023    ALT 29 09/28/2023    AST 36 09/28/2023       Results reviewed, labs MET treatment parameters, ok to proceed with treatment.      Post Infusion Assessment:  Patient tolerated infusion without incident.  Blood return noted pre and post infusion.  Site patent and intact, free from redness, edema or discomfort.  No evidence of extravasations.  Access discontinued per protocol.       Discharge Plan:   Discharge instructions reviewed with: Patient.  Patient and/or family verbalized understanding of discharge instructions and all questions answered.  AVS to patient via [employer].  Patient will return 10/23/23 for next appointment.   Patient discharged in stable condition accompanied by: wife.  Departure Mode: Ambulatory with walker.       Ene Rodriguez RN

## 2023-10-01 RX ORDER — ALBUTEROL SULFATE 0.83 MG/ML
2.5 SOLUTION RESPIRATORY (INHALATION)
OUTPATIENT
Start: 2023-10-19

## 2023-10-01 RX ORDER — HEPARIN SODIUM (PORCINE) LOCK FLUSH IV SOLN 100 UNIT/ML 100 UNIT/ML
5 SOLUTION INTRAVENOUS
OUTPATIENT
Start: 2023-10-19

## 2023-10-01 RX ORDER — DIPHENHYDRAMINE HYDROCHLORIDE 50 MG/ML
50 INJECTION INTRAMUSCULAR; INTRAVENOUS
Start: 2023-10-19

## 2023-10-01 RX ORDER — METHYLPREDNISOLONE SODIUM SUCCINATE 125 MG/2ML
125 INJECTION, POWDER, LYOPHILIZED, FOR SOLUTION INTRAMUSCULAR; INTRAVENOUS
Start: 2023-10-19

## 2023-10-01 RX ORDER — MEPERIDINE HYDROCHLORIDE 25 MG/ML
25 INJECTION INTRAMUSCULAR; INTRAVENOUS; SUBCUTANEOUS EVERY 30 MIN PRN
OUTPATIENT
Start: 2023-10-19

## 2023-10-01 RX ORDER — ALBUTEROL SULFATE 90 UG/1
1-2 AEROSOL, METERED RESPIRATORY (INHALATION)
Start: 2023-10-19

## 2023-10-01 RX ORDER — HEPARIN SODIUM,PORCINE 10 UNIT/ML
5-20 VIAL (ML) INTRAVENOUS DAILY PRN
OUTPATIENT
Start: 2023-10-19

## 2023-10-01 RX ORDER — EPINEPHRINE 1 MG/ML
0.3 INJECTION, SOLUTION INTRAMUSCULAR; SUBCUTANEOUS EVERY 5 MIN PRN
OUTPATIENT
Start: 2023-10-19

## 2023-10-01 RX ORDER — LORAZEPAM 2 MG/ML
0.5 INJECTION INTRAMUSCULAR EVERY 4 HOURS PRN
OUTPATIENT
Start: 2023-10-19

## 2023-10-02 ENCOUNTER — THERAPY VISIT (OUTPATIENT)
Dept: PHYSICAL THERAPY | Facility: CLINIC | Age: 78
End: 2023-10-02
Payer: COMMERCIAL

## 2023-10-02 DIAGNOSIS — C43.39 MELANOMA OF CHEEK (H): ICD-10-CM

## 2023-10-02 DIAGNOSIS — R53.83 CHEMOTHERAPY-INDUCED FATIGUE: ICD-10-CM

## 2023-10-02 DIAGNOSIS — Z96.652 STATUS POST TOTAL LEFT KNEE REPLACEMENT: ICD-10-CM

## 2023-10-02 DIAGNOSIS — T45.1X5A CHEMOTHERAPY-INDUCED FATIGUE: ICD-10-CM

## 2023-10-02 DIAGNOSIS — R29.6 FALLS FREQUENTLY: Primary | ICD-10-CM

## 2023-10-02 LAB
INTERPRETATION: NORMAL
LAB DIRECTOR COMMENTS: NORMAL
LAB DIRECTOR DISCLAIMER: NORMAL
LAB DIRECTOR INTERPRETATION: NORMAL
LAB DIRECTOR METHODOLOGY: NORMAL
LAB DIRECTOR RESULTS: NORMAL
SIGNIFICANT RESULTS: NORMAL
SPECIMEN DESCRIPTION: NORMAL
SPECIMEN DESCRIPTION: NORMAL
TEST DETAILS, MDL: NORMAL

## 2023-10-02 PROCEDURE — 81455 SO/HL 51/>GSAP DNA/DNA&RNA: CPT | Performed by: INTERNAL MEDICINE

## 2023-10-02 PROCEDURE — G0452 MOLECULAR PATHOLOGY INTERPR: HCPCS | Mod: 26 | Performed by: PATHOLOGY

## 2023-10-02 PROCEDURE — 97110 THERAPEUTIC EXERCISES: CPT | Mod: GP | Performed by: PHYSICAL THERAPIST

## 2023-10-02 PROCEDURE — G0452 MOLECULAR PATHOLOGY INTERPR: HCPCS | Mod: 26 | Performed by: STUDENT IN AN ORGANIZED HEALTH CARE EDUCATION/TRAINING PROGRAM

## 2023-10-02 PROCEDURE — 97140 MANUAL THERAPY 1/> REGIONS: CPT | Mod: GP | Performed by: PHYSICAL THERAPIST

## 2023-10-02 NOTE — PROGRESS NOTES
[facility]: Cancer Care                                  Late entry from 9/28/23                                                        Writer met with patient to review medication Opdualag. Potential side effects, sign/symptoms to monitor for, and when to seek medical attention reviewed with patient. Written material on Opdualag given to patient as well. Patient stated understanding, all questions answered.     Signature:  Lee Ann Washington RN

## 2023-10-06 ENCOUNTER — TELEPHONE (OUTPATIENT)
Dept: DERMATOLOGY | Facility: CLINIC | Age: 78
End: 2023-10-06

## 2023-10-06 ENCOUNTER — OFFICE VISIT (OUTPATIENT)
Dept: DERMATOLOGY | Facility: CLINIC | Age: 78
End: 2023-10-06
Payer: COMMERCIAL

## 2023-10-06 DIAGNOSIS — Z85.828 HISTORY OF SCC (SQUAMOUS CELL CARCINOMA) OF SKIN: ICD-10-CM

## 2023-10-06 DIAGNOSIS — L81.4 LENTIGO: ICD-10-CM

## 2023-10-06 DIAGNOSIS — D22.9 NEVUS: Primary | ICD-10-CM

## 2023-10-06 DIAGNOSIS — C43.9 METASTATIC MELANOMA (H): ICD-10-CM

## 2023-10-06 DIAGNOSIS — D48.5 NEOPLASM OF UNCERTAIN BEHAVIOR OF SKIN: ICD-10-CM

## 2023-10-06 DIAGNOSIS — D18.01 ANGIOMA OF SKIN: ICD-10-CM

## 2023-10-06 DIAGNOSIS — Z85.828 HISTORY OF BASAL CELL CARCINOMA (BCC): ICD-10-CM

## 2023-10-06 DIAGNOSIS — L82.1 SEBORRHEIC KERATOSIS: ICD-10-CM

## 2023-10-06 PROCEDURE — 88305 TISSUE EXAM BY PATHOLOGIST: CPT | Performed by: PATHOLOGY

## 2023-10-06 PROCEDURE — 11102 TANGNTL BX SKIN SINGLE LES: CPT | Performed by: PHYSICIAN ASSISTANT

## 2023-10-06 PROCEDURE — 99213 OFFICE O/P EST LOW 20 MIN: CPT | Mod: 25 | Performed by: PHYSICIAN ASSISTANT

## 2023-10-06 ASSESSMENT — PAIN SCALES - GENERAL: PAINLEVEL: EXTREME PAIN (8)

## 2023-10-06 NOTE — PROGRESS NOTES
HPI:   Chief complaint: James Salvador (Pete) is a 78 year old male who presents for Full skin cancer screening to rule out skin cancer.   Last Skin Exam: 4 mo ago      1st Baseline: no  Personal HX of Skin Cancer: Multiple SCC and Melanoma 0.6 mm on left zygoma with recurrence depth 3.5 mm; mets seen 6/2023 with subsequent re-excision - currently seeing oncology on nivolumab infusions .    Personal HX of Malignant Melanoma: yes, as above   Family HX of Skin Cancer / Malignant Melanoma: none  Personal HX of Atypical Moles: none  Risk factors: sun exposure, history of SCC, history of melanoma   New / Changing lesions: none  Social History: Has grandchildren that he watches  On review of systems, there are no further skin complaints, patient is feeling otherwise well.  See patient intake sheet.  ROS of the following were done and are negative: Constitutional, Eyes, Ears, Nose,   Mouth, Throat, Cardiovascular, Respiratory, GI, Genitourinary, Musculoskeletal,   Psychiatric, Endocrine, Allergic/Immunologic.      PHYSICAL EXAM:   There were no vitals taken for this visit.  Skin exam performed as follows: Type 2 skin. Mood appropriate  Alert and Oriented X 3. Well developed, well nourished in no distress.  General appearance: Normal  Head including face: Normal  Eyes: conjunctiva and lids: Normal  Mouth: Lips, teeth, gums: Normal  Neck: Normal  Chest-breast/axillae: Normal  Back: Normal  Spleen and liver: Normal  Cardiovascular: Exam of peripheral vascular system by observation for swelling, varicosities, edema: Normal  Genitalia: groin, buttocks: Normal  Extremities: digits/nails (clubbing): Normal  Eccrine and Apocrine glands: Normal  Right upper extremity: Normal  Left upper extremity: Normal  Right lower extremity: Normal  Left lower extremity: Normal  Skin: Scalp and body hair: See below    Pt deferred exam of breasts, groin, buttocks: NO    Other physical findings:  1. Multiple pigmented macules on extremities and  trunk  2. Multiple pigmented macules on face, trunk and extremities  3. Multiple vascular papules on trunk, arms and legs  4. Multiple scattered keratotic plaques  5. Ill defined 5 mm pink papule on the right lower cheek        Except as noted above, no other signs of skin cancer or melanoma.     ASSESSMENT/PLAN:   Benign Full skin cancer screening today.     Patient with history of SCC, BCC and melanoma  Advised on monthly self exams and 1 year  Patient Education: Appropriate brochures given.    Multiple benign appearing nevi on arms, legs and trunk. Discussed ABCDEs of melanoma and sunscreen.   Multiple lentigos on arms, legs and trunk. Advised benign, no treatment needed.  Multiple scattered angiomas. Advised benign, no treatment needed.   Seborrheic keratosis on arms, legs and trunk. Advised benign, no treatment needed.  R/o BCC on the right lower cheek.  Shave biopsy performed.  Area cleaned and anesthetized with 1% lidocaine with epinephrine.  Dermablade used to remove the lesion and sent to pathology. Bleeding was cauterized. Pt tolerated procedure well with no complications.   Dermatitis on the arms and legs; eczematous dermatitis on the face - doing well today  --TAC and ketoconazole to the scalp - use PRN only and not every day  --HC cream to the face and ears as needed - not every day  --Clobetasol cream to the body PRN            Follow-up: 3-4 months    1.) Patient was asked about new and changing moles. YES  2.) Patient received a complete physical skin examination: YES  3.) Patient was counseled to perform a monthly self skin examination: YES  Scribed By: Rosa Maria Hansen, MS, PAVAISHALI

## 2023-10-06 NOTE — TELEPHONE ENCOUNTER
[facility] Call Center    Phone Message    May a detailed message be left on voicemail: yes     Reason for Call: Patient was told he needed to be seen every 3 months. Writer unable to schedule until May - please call back [phone] Thank you    Action Taken: Other: OX DERM    Travel Screening: Not Applicable

## 2023-10-06 NOTE — LETTER
10/6/2023         RE: James Salvador  [address] [place] [address]        Dear Colleague,    Thank you for referring your patient, James Salvador, to the [facility]. Please see a copy of my visit note below.    HPI:   Chief complaint: James Salvador (Pete) is a 78 year old male who presents for Full skin cancer screening to rule out skin cancer.   Last Skin Exam: 4 mo ago      1st Baseline: no  Personal HX of Skin Cancer: Multiple SCC and Melanoma 0.6 mm on left zygoma with recurrence depth 3.5 mm; mets seen 6/2023 with subsequent re-excision - currently seeing oncology on nivolumab infusions .    Personal HX of Malignant Melanoma: yes, as above   Family HX of Skin Cancer / Malignant Melanoma: none  Personal HX of Atypical Moles: none  Risk factors: sun exposure, history of SCC, history of melanoma   New / Changing lesions: none  Social History: Has grandchildren that he watches  On review of systems, there are no further skin complaints, patient is feeling otherwise well.  See patient intake sheet.  ROS of the following were done and are negative: Constitutional, Eyes, Ears, Nose,   Mouth, Throat, Cardiovascular, Respiratory, GI, Genitourinary, Musculoskeletal,   Psychiatric, Endocrine, Allergic/Immunologic.      PHYSICAL EXAM:   There were no vitals taken for this visit.  Skin exam performed as follows: Type 2 skin. Mood appropriate  Alert and Oriented X 3. Well developed, well nourished in no distress.  General appearance: Normal  Head including face: Normal  Eyes: conjunctiva and lids: Normal  Mouth: Lips, teeth, gums: Normal  Neck: Normal  Chest-breast/axillae: Normal  Back: Normal  Spleen and liver: Normal  Cardiovascular: Exam of peripheral vascular system by observation for swelling, varicosities, edema: Normal  Genitalia: groin, buttocks: Normal  Extremities: digits/nails (clubbing): Normal  Eccrine and Apocrine glands: Normal  Right upper extremity:  Normal  Left upper extremity: Normal  Right lower extremity: Normal  Left lower extremity: Normal  Skin: Scalp and body hair: See below    Pt deferred exam of breasts, groin, buttocks: NO    Other physical findings:  1. Multiple pigmented macules on extremities and trunk  2. Multiple pigmented macules on face, trunk and extremities  3. Multiple vascular papules on trunk, arms and legs  4. Multiple scattered keratotic plaques  5. Ill defined 5 mm pink papule on the right lower cheek        Except as noted above, no other signs of skin cancer or melanoma.     ASSESSMENT/PLAN:   Benign Full skin cancer screening today.     Patient with history of SCC, BCC and melanoma  Advised on monthly self exams and 1 year  Patient Education: Appropriate brochures given.    Multiple benign appearing nevi on arms, legs and trunk. Discussed ABCDEs of melanoma and sunscreen.   Multiple lentigos on arms, legs and trunk. Advised benign, no treatment needed.  Multiple scattered angiomas. Advised benign, no treatment needed.   Seborrheic keratosis on arms, legs and trunk. Advised benign, no treatment needed.  R/o BCC on the right lower cheek.  Shave biopsy performed.  Area cleaned and anesthetized with 1% lidocaine with epinephrine.  Dermablade used to remove the lesion and sent to pathology. Bleeding was cauterized. Pt tolerated procedure well with no complications.   Dermatitis on the arms and legs; eczematous dermatitis on the face - doing well today  --TAC and ketoconazole to the scalp - use PRN only and not every day  --HC cream to the face and ears as needed - not every day  --Clobetasol cream to the body PRN            Follow-up: 3-4 months    1.) Patient was asked about new and changing moles. YES  2.) Patient received a complete physical skin examination: YES  3.) Patient was counseled to perform a monthly self skin examination: YES  Scribed By: Rosa Maria Hansen, MS, PA-C      Again, thank you for allowing me to participate in the  care of your patient.        Sincerely,        Rosa Maria Nascimento PA-C

## 2023-10-09 ENCOUNTER — THERAPY VISIT (OUTPATIENT)
Dept: PHYSICAL THERAPY | Facility: CLINIC | Age: 78
End: 2023-10-09
Payer: COMMERCIAL

## 2023-10-09 DIAGNOSIS — Z96.652 STATUS POST TOTAL LEFT KNEE REPLACEMENT: Primary | ICD-10-CM

## 2023-10-09 PROCEDURE — 97112 NEUROMUSCULAR REEDUCATION: CPT | Mod: GP | Performed by: PHYSICAL THERAPIST

## 2023-10-09 PROCEDURE — 97110 THERAPEUTIC EXERCISES: CPT | Mod: GP | Performed by: PHYSICAL THERAPIST

## 2023-10-09 PROCEDURE — 97140 MANUAL THERAPY 1/> REGIONS: CPT | Mod: GP | Performed by: PHYSICAL THERAPIST

## 2023-10-09 NOTE — TELEPHONE ENCOUNTER
Called patient and scheduled next appointments  Advised to check mychart as well.    Thank you,    Eunice BALDERASRN BSN  [facility]- [phone]

## 2023-10-10 LAB
PATH REPORT.COMMENTS IMP SPEC: NORMAL
PATH REPORT.FINAL DX SPEC: NORMAL
PATH REPORT.GROSS SPEC: NORMAL
PATH REPORT.MICROSCOPIC SPEC OTHER STN: NORMAL
PATH REPORT.RELEVANT HX SPEC: NORMAL

## 2023-10-13 ENCOUNTER — THERAPY VISIT (OUTPATIENT)
Dept: PHYSICAL THERAPY | Facility: CLINIC | Age: 78
End: 2023-10-13
Attending: PHYSICAL THERAPIST
Payer: COMMERCIAL

## 2023-10-13 DIAGNOSIS — R29.6 FALLS FREQUENTLY: Primary | ICD-10-CM

## 2023-10-13 DIAGNOSIS — R26.89 BALANCE PROBLEMS: ICD-10-CM

## 2023-10-13 DIAGNOSIS — T45.1X5A CHEMOTHERAPY-INDUCED FATIGUE: ICD-10-CM

## 2023-10-13 DIAGNOSIS — R53.83 CHEMOTHERAPY-INDUCED FATIGUE: ICD-10-CM

## 2023-10-13 DIAGNOSIS — R53.1 DECREASED STRENGTH: ICD-10-CM

## 2023-10-13 PROCEDURE — 97163 PT EVAL HIGH COMPLEX 45 MIN: CPT | Mod: GP | Performed by: PHYSICAL THERAPIST

## 2023-10-13 NOTE — PROGRESS NOTES
"PHYSICAL THERAPY EVALUATION  Type of Visit: Evaluation    See electronic medical record for Abuse and Falls Screening details.    Subjective       Presenting condition or subjective complaint: Fear of falling and unable to get up on my own. Pt presents for evaluation of falls and weakness, likely due to history of knee surgeries leading to decreased mobility as well as ongoing cancer (metastatic melanoma diagnosed Feb 2023) treatment with chemotherapy (started infusions in August of 2023). Has noticed more fatigue since chemo started, balance has been an issue for \"some time\". Has had a quite a few falls, sometimes able to get up with friends/family assist but has had to call EMT's 6 times over 4 years. Reports knee buckling that can happen at any time of day, seems to be related more to leg position (ER) than fatigue. Recently had a fall of the edge of his bed as well as one while walking due to knee buckling.   Date of onset: 10/02/23 (date of order, balance/falls have been an issue for quite some time)    Relevant medical history: Arthritis; Bladder or bowel problems; Cancer; Depression; Hearing problems; Heart problems; High blood pressure; Implanted device; Overweight; Pacemaker; Significant weakness   Dates & types of surgery: Vesectomy 1975, Angioplasty 1994, laser eye surgery 2002, Mohs 2004, Rt ankle & herl plate surgery 2005, sinus surgery deviated septum 2006, Rt hip replacement 2009, Colonoscopy 2012, Rt knee arthroscopic for torn meniscus 2012, see My Chart for more    Prior diagnostic imaging/testing results: MRI; X-ray     Prior therapy history for the same diagnosis, illness or injury: Yes Ongoing PT for left knee after 2 left knee replacements and 2 left kneecap surgeries over past 4 years, see notes from ortho PT for full details.     Prior Level of Function  Transfers: Assistive equipment  Ambulation: Assistive equipment  ADL: Assistive equipment    Living Environment  Social support: With a " significant other or spouse   Type of home: House; 2-story   Stairs to enter the home: Yes 5 Is there a railing: Yes   Ramp: No   Stairs inside the home: Yes 16 Is there a railing: Yes   Help at home: Self Cares (home health aide/personal care attendant, family, etc); Home management tasks (cooking, cleaning); Medication and/or finances; Home and Yard maintenance tasks; Assist for driving and community activities; Emergency call system  Equipment owned: Walker with wheels; Grab bars; Raised toilet seat; Bath bench; Dressing equipment; Lift chair Has an inside walker that is a FWW, walker he uses for outside the house is a 4WW.     Employment: Not Applicable    Hobbies/Interests: Watching sports/tv, reading, visiting with grandchildren and family & friends    Patient goals for therapy: Improve balance and be able to walk steadily without fear of falling, learn how to get up from the floor.     Pain assessment: Pain in L knee with PT exercises as he fatigues, also endorses trigeminal neuralgia.      Objective   Cognitive Status Examination  Orientation: Oriented to person, place and time   Level of Consciousness: Alert  Follows Commands and Answers Questions: 100% of the time  Personal Safety and Judgement: At risk behaviors demonstrated, admits to making some poor decisions leading to falls.   Memory: Intact wife helps with filling in some information throughout    OBSERVATION: Arrives with wife for eval, using 4WW for mobility.   INTEGUMENTARY:  has melanoma of L cheek with various lesions  POSTURE:  forward flexed posture in standing  RANGE OF MOTION:  Pt has grossly functional ROM, endorses some limited ROM in L knee due to number of surgeries  STRENGTH:  Globally weak but focal weakness in L quad due to multiple surgeries including 2 surgeries on patellar tendon and two knee replacements/revision over the last two years.     BED MOBILITY: SBA    TRANSFERS: SBA      GAIT:   Ambulates slowly with forward flexed  posture using 4WW, minimal foot clearance and stride length. Favors L leg throughout due to history of surgeries/weakness. Slow pace throughout    BALANCE:  will formally assess next session, due to history of falls and conversation with patient it is confirmed pt is unable to maintain balance in standing and walking without AD    Unable to complete any testing or objective measures due to length of conversation/history taking. Will complete at next follow up session.     SENSATION: LE Sensation WNL, no other overt neuro concerns in LE's.       Assessment & Plan   CLINICAL IMPRESSIONS  Medical Diagnosis: Falls frequently,  Chemotherapy-induced fatigue    Treatment Diagnosis: Impaired functional mobility   Impression/Assessment: Patient is a 78 year old male here for evaluation of frequent falls and chemo-induced fatigue.  The following significant findings have been identified: Pain, Decreased ROM/flexibility, Decreased strength, Impaired balance, Impaired gait, Decreased activity tolerance, and Impaired posture. These impairments interfere with their ability to perform self care tasks, recreational activities, household chores, household mobility, and community mobility as compared to previous level of function.     Clinical Decision Making (Complexity):  Clinical Presentation: Unstable/Unpredictable   Clinical Presentation Rationale: based on medical and personal factors listed in PT evaluation  Clinical Decision Making (Complexity): High complexity    PLAN OF CARE  Treatment Interventions:  Interventions: Gait Training, Neuromuscular Re-education, Therapeutic Activity, Therapeutic Exercise    Long Term Goals     PT Goal 1  Goal Identifier: Floor to stand transfer  Goal Description: Pt will perform a floor to stand transfer with external support and min A of 1 x 3 in one session to demonstrate improved strength and comfort with transfer in order to increase pt safety at home.  Target Date: 12/12/23  PT Goal  2  Goal Identifier: TUG  Goal Description: Pt will demonstrate a clinically significant reduction in time it takes to complete TUG in order to demonstrate improved mobility and balance to progress towards reduction in falls risk.  Target Date: 12/12/23  PT Goal 3  Goal Identifier: 5 x STS  Goal Description: Pt will demonstrate a clinically signifcant reduction in time taken to complete 5 STS in order to demosntrate improved LE strength and decreased risk for falls.  Target Date: 12/12/23      Frequency of Treatment: 1x/week  Duration of Treatment: 60 days    Recommended Referrals to Other Professionals: already seeing Ortho PT for L LE management due to surgical history.   Education Assessment:   Learner/Method: Patient;Family  Education Comments: Educated on recommendations for POC    Risks and benefits of evaluation/treatment have been explained.   Patient/Family/caregiver agrees with Plan of Care.     Evaluation Time:     PT Eval, High Complexity Minutes (81456): 50     Signing Clinician: Quincy Landaverde, PT      [facility]                                                                                   OUTPATIENT PHYSICAL THERAPY      PLAN OF TREATMENT FOR OUTPATIENT REHABILITATION   Patient's Last Name, First Name, James Pizarro YOB: 1945   Provider's Name   [facility]   Medical Record No.  [id number]     Onset Date: 10/02/23 (date of order, balance/falls have been an issue for quite some time)  Start of Care Date: 10/13/23     Medical Diagnosis:  Falls frequently,  Chemotherapy-induced fatigue      PT Treatment Diagnosis:  Impaired functional mobility Plan of Treatment  Frequency/Duration: 1x/week/ 60 days    Certification date from 10/13/23 to 12/12/23         See note for plan of treatment details and functional goals     Quincy Landaverde, PT                         I CERTIFY THE NEED FOR THESE SERVICES FURNISHED  UNDER        THIS PLAN OF TREATMENT AND WHILE UNDER MY CARE     (Physician attestation of this document indicates review and certification of the therapy plan).                Referring Provider:  Jeronimo Painter MD      Initial Assessment  See Epic Evaluation- Start of Care Date: 10/13/23

## 2023-10-20 ENCOUNTER — HOSPITAL ENCOUNTER (OUTPATIENT)
Dept: CT IMAGING | Facility: CLINIC | Age: 78
Discharge: HOME | End: 2023-10-20
Attending: INTERNAL MEDICINE | Admitting: INTERNAL MEDICINE
Payer: COMMERCIAL

## 2023-10-20 DIAGNOSIS — C43.39 MELANOMA OF CHEEK (H): ICD-10-CM

## 2023-10-20 PROCEDURE — 250N000011 HC RX IP 250 OP 636: Performed by: INTERNAL MEDICINE

## 2023-10-20 PROCEDURE — 250N000011 HC RX IP 250 OP 636

## 2023-10-20 PROCEDURE — 250N000009 HC RX 250: Performed by: INTERNAL MEDICINE

## 2023-10-20 PROCEDURE — 74177 CT ABD & PELVIS W/CONTRAST: CPT

## 2023-10-20 RX ORDER — HEPARIN SODIUM (PORCINE) LOCK FLUSH IV SOLN 100 UNIT/ML 100 UNIT/ML
SOLUTION INTRAVENOUS
Status: COMPLETED
Start: 2023-10-20 | End: 2023-10-20

## 2023-10-20 RX ORDER — HEPARIN SODIUM (PORCINE) LOCK FLUSH IV SOLN 100 UNIT/ML 100 UNIT/ML
5 SOLUTION INTRAVENOUS ONCE
Status: COMPLETED | OUTPATIENT
Start: 2023-10-20 | End: 2023-10-20

## 2023-10-20 RX ORDER — IOPAMIDOL 755 MG/ML
500 INJECTION, SOLUTION INTRAVASCULAR ONCE
Status: COMPLETED | OUTPATIENT
Start: 2023-10-20 | End: 2023-10-20

## 2023-10-20 RX ADMIN — HEPARIN SODIUM (PORCINE) LOCK FLUSH IV SOLN 100 UNIT/ML 5 ML: 100 SOLUTION at 11:46

## 2023-10-20 RX ADMIN — SODIUM CHLORIDE 100 ML: 9 INJECTION, SOLUTION INTRAVENOUS at 11:54

## 2023-10-20 RX ADMIN — IOPAMIDOL 100 ML: 755 INJECTION, SOLUTION INTRAVENOUS at 11:55

## 2023-10-22 NOTE — PROGRESS NOTES
[facility]    Hematology/Oncology Established Patient Follow-up Note    Oncologic History/Treatment Summary    10/2019: Underwent excision of a Breslow depth 0.6mm malignant melanoma, lentigo maligna type form left zygoma by Dr. Dutta.  Decision-DX was low risk so SLN not performed.  Underwent subsequent MOHS resection.  2/21/23: Shave biopsy of a non-healing lesion on left cheek demonstrated malignant melanoma, ulcerated with nodular appearance and Breslow depth of at least 2.2 mm.  3/32/23: Wide local excision with SLN biopsy performed by Dr. Minaya.  SLN could not be identified.  Pathology demonstrated residual melanoma with Breslow depth 3.5 mm, margins negative.  4/13/23: Surgical reconstruction with flap placement performed by Dr. Sanchez.  6/22/23: Noted new pigmented lesion on left cheek, biopsy demonstrated melanoma.  No intraepidermal component noted consistent with metastasis.  7/13/23: Underwent MOHS resection with clear margins.  7/18/23: PET/CT and CT head demonstrated no definite evidence of metastatic disease, several small (subcentimeter) indeterminate lung nodules noted.  8/4/23: Initiated adjuvant nivolumab.  8/31/23: Noted to have multiple new nodules left cheek/face, biopsy confirmed locally recurrent melanoma.      Today's Date: 10/23/23    Reason for Follow-up: Stage IIIC cutaneous melanoma.      INTERIM HISTORY:  Alden presents for follow-up of locally advanced/recurrent cutaneous melanoma s/p 3 cycles nivolumab.  At our last visit noted to have progression of multiple cutaneous nodules on the left face.  Planning to switch to Opdualag later this week.  Presents now to review recent imaging results and NGS testing.  Accompanied by his wife.    He has clinically been feeling OK, no appreciable new side effects from nivolumab.  The cutaneous lesions on his left face have continued to progress and are coalescing.  They are not physically bothersome, no associated  pain.  Did nick one while shaving and bled quite a bit.  He also has some generalized pruritus on his arms and legs.  No diarrhea, respiratory changes.  No new focal pain.      REVIEW OF SYSTEMS:   A 14 point ROS was reviewed with pertinent positives and negatives in the HPI.       HOME MEDICATIONS:  Current Outpatient Medications   Medication Sig Dispense Refill    acetaminophen (TYLENOL) 325 MG tablet Take 650 mg by mouth 4 times daily      apixaban ANTICOAGULANT (ELIQUIS ANTICOAGULANT) 5 MG tablet Take 1 tablet (5 mg) by mouth 2 times daily 180 tablet 1    atorvastatin (LIPITOR) 80 MG tablet Take 1 tablet (80 mg) by mouth At Bedtime 90 tablet 3    carvedilol (COREG) 3.125 MG tablet Take 1 tablet (3.125 mg) by mouth 2 times daily (with meals) 180 tablet 3    clobetasol (TEMOVATE) 0.05 % external cream Apply to AA BID x 1-2 weeks then PRN. Do not apply to face. 60 g 3    COMPOUNDED NON-CONTROLLED SUBSTANCE (CMPD RX) - PHARMACY TO MIX COMPOUNDED MEDICATION Fluorouracil 5% Calcipotriene 0.005% 1:1 Cream apply twice daily for 1 weeks to face, arms for 14 days 30 g 6    desonide (DESOWEN) 0.05 % external cream Apply topically daily      fluticasone (FLONASE) 50 MCG/ACT nasal spray Spray 2 sprays into both nostrils daily 48 mL 3    furosemide (LASIX) 20 MG tablet TAKE 1 TABLET BY MOUTH EVERY DAY 90 tablet 3    gabapentin (NEURONTIN) 300 MG capsule Take 2 capsules (600 mg) by mouth 3 times daily      ketoconazole (NIZORAL) 2 % cream Apply topically 2 times daily For face. FAX REFILL REQUESTS TO [facility]: [phone] 30 g 2    ketoconazole (NIZORAL) 2 % external shampoo Use daily as needed 120 mL 11    lidocaine-prilocaine (EMLA) 2.5-2.5 % external cream Apply topically as needed for moderate pain 30 g 1    lisinopril (ZESTRIL) 30 MG tablet Take 1 tablet (30 mg) by mouth daily 90 tablet 3    nitroGLYcerin (NITROSTAT) 0.4 MG sublingual tablet Place 1 tablet (0.4 mg) under the tongue every 5 minutes as needed for chest  pain May repeat twice for a total of 3 tablets.  If chest pain not relieved, call 911 25 tablet 11    OXcarbazepine (TRILEPTAL) 300 MG tablet Take 2 tablets (600 mg) by mouth 3 times daily ( @ 7am, 1530 and 2200 hrs each day)      triamcinolone (KENALOG) 0.1 % external lotion Apply to scalp BID x 1-2 weeks then PRN only 60 mL 3         ALLERGIES:  Allergies   Allergen Reactions    Cats      Cat Dander    Dogs      Dog Dander    Hydromorphone      Other reaction(s): Confusion    Pollen Extract          PAST MEDICAL HISTORY:  Past Medical History:   Diagnosis Date    Actinic cheilitis 07/17/2013    Lower lip, left     Actinic keratosis     Allergic rhinitis     Anxiety 3/1/23    Arthritis 2004    Basal cell carcinoma     Benign hypertension     CAD (coronary artery disease)     Cardiac cath and PCI 1994. Cardiac Cath 9/2015: BRIDGET to LAD    Hearing problem 1995    Wear hearing aids    Hyperlipidemia     Malignant melanoma     Morbid obesity 01/11/2016    Paroxysmal supraventricular tachycardia     on metoprolol    Permanent atrial fibrillation 04/21/2017    Squamous cell carcinoma     Tachy-edinson syndrome 05/29/2019         PAST SURGICAL HISTORY:  Past Surgical History:   Procedure Laterality Date    ADENOIDECTOMY  Childhood    ANGIOPLASTY  1994    in [place]    ANKLE SURGERY  07/13/2005    right ankle    ARTHROPLASTY HIP Right 10/2009    BIOPSY NODE SENTINEL Left 03/30/2023    Procedure: BIOPSY, LYMPH NODE, SENTINEL;  Surgeon: Rolando Minaya MD;  Location: [facility] OR    COLONOSCOPY  4/25/12    EP PERM PACER SINGLE LEAD N/A 05/31/2019    Medtronic single lead pacemaker    EXCISE MASS CHEEK Left 03/30/2023    Procedure: wide local excision of left cheek melanoma;  Surgeon: Rolando Minaya MD;  Location: [facility] OR    EXCISE MASS CHEEK WITH FLAP PEDICLE Left 04/13/2023    Procedure: Left cervical Facial flap per closure of left cheek Defect;  Surgeon: Ila Sanchez MD;  Location: [facility] OR    Facial biopsy - Melanoma       GENITOURINARY SURGERY  10/20/23    Prostate surgery    HEART CATH STENT COR W/WO PTCA  2015    BRIDGET stent mid LAD    IR CHEST PORT PLACEMENT > 5 YRS OF AGE  2023    LASER SURGERY OF EYE  2002    MOHS MICROGRAPHIC PROCEDURE  2004    squamous cell carcinoma right temple    SINUS SURGERY  2006    TONSILLECTOMY  Childhood         SOCIAL HISTORY:  Social History     Socioeconomic History    Marital status:      Spouse name: Not on file    Number of children: 3    Years of education: Not on file    Highest education level: Not on file   Occupational History     Employer: RETIRED   Tobacco Use    Smoking status: Former     Packs/day: 0.50     Years: 10.00     Additional pack years: 0.00     Total pack years: 5.00     Types: Cigarettes, Cigars, Pipe     Start date: 1966     Quit date: 1974     Years since quittin.5    Smokeless tobacco: Never    Tobacco comments:     Also smoked from 1985 - 1990   Substance and Sexual Activity    Alcohol use: Not Currently     Comment: Socially    Drug use: No    Sexual activity: Not Currently     Partners: Female     Birth control/protection: Male Surgical     Comment: Vasectomy   Other Topics Concern    Parent/sibling w/ CABG, MI or angioplasty before 65F 55M? No     Service Not Asked    Blood Transfusions Not Asked    Caffeine Concern Yes     Comment: 2 big cups coffee daily    Occupational Exposure Not Asked    Hobby Hazards Not Asked    Sleep Concern No     Comment: sleeping better since shoulder replaced 11/3/16    Stress Concern No    Weight Concern Yes    Special Diet Yes     Comment: trying to do more lean meats    Back Care Not Asked    Exercise No     Comment: limited - knee    Bike Helmet Not Asked    Seat Belt Not Asked    Self-Exams Not Asked   Social History Narrative    Not on file     Social Determinants of Health     Financial Resource Strain: Not on file   Food Insecurity: Not on file   Transportation  Needs: Not on file   Physical Activity: Not on file   Stress: Not on file   Social Connections: Not on file   Interpersonal Safety: Not on file   Housing Stability: Not on file         FAMILY HISTORY:  Family History   Problem Relation Age of Onset    Genitourinary Problems Mother         renal failure    Heart Disease Mother     Cerebrovascular Disease Mother         TIA    Cardiovascular Father         rupture of dorsal aorta    Depression Son     Depression Brother         Suicide    Substance Abuse Brother         Heroin    Skin Cancer No family hx of          PHYSICAL EXAM:  Vital signs:  BP (!) 158/81   Pulse 79   Temp 98.1  F (36.7  C) (Oral)   Resp 18   Wt 124.3 kg (274 lb)   SpO2 96%   BMI 38.36 kg/m     GENERAL/CONSTITUTIONAL: Appears well, no acute distress.  EYES: Gaze conjugate, pupils equal and round. No scleral icterus.  LYMPH: No cervical, supraclavicular or axillary adenopathy.   RESPIRATORY: Lungs clear to auscultation bilaterally.    CARDIOVASCULAR: Regular rate and rhythm.    GASTROINTESTINAL: No organomegaly, masses, or tenderness.  No guarding.  No distention.  INTEGUMENTARY: Multiple nodular melanotic cutaneous lesions again seen on left cheek, temple and lower jaw, no ulceration.  They have definitely progressed since our last visit.  Also has scattered eczematous type plaques on his arms with some scaly skin.       LABS:   Latest Reference Range & Units 10/23/23 10:53   Sodium 135 - 145 mmol/L 131 (L)   Potassium 3.4 - 5.3 mmol/L 4.6   Chloride 98 - 107 mmol/L 95 (L)   Carbon Dioxide (CO2) 22 - 29 mmol/L 24   Urea Nitrogen 8.0 - 23.0 mg/dL 14.7   Creatinine 0.67 - 1.17 mg/dL 0.59 (L)   GFR Estimate >60 mL/min/1.73m2 >90   Calcium 8.8 - 10.2 mg/dL 8.9   Anion Gap 7 - 15 mmol/L 12   Albumin 3.5 - 5.2 g/dL 4.2   Protein Total 6.4 - 8.3 g/dL 6.9   Alkaline Phosphatase 40 - 129 U/L 206 (H)   ALT 0 - 70 U/L 28   AST 0 - 45 U/L 30   Bilirubin Total <=1.2 mg/dL 0.4   Glucose 70 - 99 mg/dL 96    TSH 0.30 - 4.20 uIU/mL 1.48   (L): Data is abnormally low  (H): Data is abnormally high      PATHOLOGY/BIOMARKERS:  NGS:  Significant Results    Detected Alterations of Known or Potential Pathogenicity: BRAF S467L  NRAS A146V     TMB Score: 97.508 mut/Mb       MOST RECENT IMAGING:  10/20/23 CT CAP:  IMPRESSION:  1.  There are new and enlarging pulmonary nodules bilaterally  concerning for progressive metastatic disease.  2.  Additional unchanged findings as above including cardiomegaly and  interstitial lung disease.      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  Very little clinical data on efficacy of TKI therapy in this setting and I am unable to identify any case reports of TKI therapy with this specific variant.  There have been case reports of response to dual BRAF/MEK inhibition in other class 3 mutations but presence of NRAS mutation as well I think makes likelihood of efficacy of TKI therapy less likely.  Also noted to have very high TMB which should typically predict for response to checkpoint inhibitor therapy.  CT does demonstrate new lung nodules although lesions are very small.      This is a challenging situation.  Clearly progressing on nivolumab and really the primary clinical issue her is local control of the cutaneous lesions on his face.  Although less effective for systemic therapy, I think T-VEC would be a consideration to gain control of the local lesions.  Discussed this at length with Alden and his wife.  I also discussed the case with Dr. Vineet Burnett, a melanoma specialist with [facility], and he concurs with T-VEC.  We do not provide that therapy here and Dr. Burnett has graciously agreed to see Alden in the near future to assess for therapy.  Discussed with Alden and he is agreeable with that plan.    Pruritus/dermatitis.  May  be related to nivolumab.  States that Dr. Buchanan has recommended avoiding topical steroids in the past, he does have an upcoming dermatology appointment so will discuss at that time.    PLAN:    Hold on Opdualag.  Referral to Dr. Burnett for consideration of T-VEC therapy.    Total time is 45 minutes including face to face time, scan review, phone consultations, documentation and orders.      Dilshad Holbrook MD  Hematology/Oncology  [facility]

## 2023-10-23 ENCOUNTER — LAB (OUTPATIENT)
Dept: INFUSION THERAPY | Facility: CLINIC | Age: 78
End: 2023-10-23
Attending: INTERNAL MEDICINE
Payer: COMMERCIAL

## 2023-10-23 ENCOUNTER — ONCOLOGY VISIT (OUTPATIENT)
Dept: ONCOLOGY | Facility: CLINIC | Age: 78
End: 2023-10-23
Attending: INTERNAL MEDICINE
Payer: COMMERCIAL

## 2023-10-23 VITALS
WEIGHT: 274 LBS | BODY MASS INDEX: 38.36 KG/M2 | HEART RATE: 79 BPM | RESPIRATION RATE: 18 BRPM | DIASTOLIC BLOOD PRESSURE: 81 MMHG | OXYGEN SATURATION: 96 % | TEMPERATURE: 98.1 F | SYSTOLIC BLOOD PRESSURE: 158 MMHG

## 2023-10-23 DIAGNOSIS — C43.39 MELANOMA OF CHEEK (H): Primary | ICD-10-CM

## 2023-10-23 LAB
ALBUMIN SERPL BCG-MCNC: 4.2 G/DL (ref 3.5–5.2)
ALP SERPL-CCNC: 206 U/L (ref 40–129)
ALT SERPL W P-5'-P-CCNC: 28 U/L (ref 0–70)
ANION GAP SERPL CALCULATED.3IONS-SCNC: 12 MMOL/L (ref 7–15)
AST SERPL W P-5'-P-CCNC: 30 U/L (ref 0–45)
BILIRUB SERPL-MCNC: 0.4 MG/DL
BUN SERPL-MCNC: 14.7 MG/DL (ref 8–23)
CALCIUM SERPL-MCNC: 8.9 MG/DL (ref 8.8–10.2)
CHLORIDE SERPL-SCNC: 95 MMOL/L (ref 98–107)
CREAT SERPL-MCNC: 0.59 MG/DL (ref 0.67–1.17)
DEPRECATED HCO3 PLAS-SCNC: 24 MMOL/L (ref 22–29)
EGFRCR SERPLBLD CKD-EPI 2021: >90 ML/MIN/1.73M2
GLUCOSE SERPL-MCNC: 96 MG/DL (ref 70–99)
POTASSIUM SERPL-SCNC: 4.6 MMOL/L (ref 3.4–5.3)
PROT SERPL-MCNC: 6.9 G/DL (ref 6.4–8.3)
SODIUM SERPL-SCNC: 131 MMOL/L (ref 135–145)
TSH SERPL DL<=0.005 MIU/L-ACNC: 1.48 UIU/ML (ref 0.3–4.2)

## 2023-10-23 PROCEDURE — 84443 ASSAY THYROID STIM HORMONE: CPT | Performed by: INTERNAL MEDICINE

## 2023-10-23 PROCEDURE — 99215 OFFICE O/P EST HI 40 MIN: CPT | Performed by: INTERNAL MEDICINE

## 2023-10-23 PROCEDURE — 250N000011 HC RX IP 250 OP 636: Mod: JZ | Performed by: INTERNAL MEDICINE

## 2023-10-23 PROCEDURE — 36591 DRAW BLOOD OFF VENOUS DEVICE: CPT | Performed by: INTERNAL MEDICINE

## 2023-10-23 PROCEDURE — G0463 HOSPITAL OUTPT CLINIC VISIT: HCPCS | Performed by: INTERNAL MEDICINE

## 2023-10-23 PROCEDURE — 80053 COMPREHEN METABOLIC PANEL: CPT | Performed by: INTERNAL MEDICINE

## 2023-10-23 RX ORDER — HEPARIN SODIUM (PORCINE) LOCK FLUSH IV SOLN 100 UNIT/ML 100 UNIT/ML
5 SOLUTION INTRAVENOUS
Status: DISCONTINUED | OUTPATIENT
Start: 2023-10-23 | End: 2023-10-23 | Stop reason: HOSPADM

## 2023-10-23 RX ORDER — HEPARIN SODIUM,PORCINE 10 UNIT/ML
5-20 VIAL (ML) INTRAVENOUS DAILY PRN
OUTPATIENT
Start: 2023-10-23

## 2023-10-23 RX ORDER — HEPARIN SODIUM (PORCINE) LOCK FLUSH IV SOLN 100 UNIT/ML 100 UNIT/ML
5 SOLUTION INTRAVENOUS
OUTPATIENT
Start: 2023-10-23

## 2023-10-23 RX ADMIN — Medication 5 ML: at 10:50

## 2023-10-23 ASSESSMENT — PAIN SCALES - GENERAL: PAINLEVEL: NO PAIN (0)

## 2023-10-23 NOTE — PROGRESS NOTES
Nursing Note:  James Salvador presents today for Port Labs .    Patient seen by provider today: Yes: Dr. Holbrook   present during visit today: Not Applicable.    Note: N/A.    Intravenous Access:  Labs drawn without difficulty.  Implanted Port.    Discharge Plan:   Patient was sent to [facility] for provider appointment.    Karen Barba RN

## 2023-10-23 NOTE — NURSING NOTE
"Oncology Rooming Note    October 23, 2023 11:04 AM   James Salvador is a 78 year old male who presents for:    Chief Complaint   Patient presents with    Oncology Clinic Visit     Initial Vitals: BP (!) 158/81   Pulse 79   Temp 98.1  F (36.7  C) (Oral)   Resp 18   Wt 124.3 kg (274 lb)   SpO2 96%   BMI 38.36 kg/m   Estimated body mass index is 38.36 kg/m  as calculated from the following:    Height as of 8/4/23: 1.8 m (5' 10.87\").    Weight as of this encounter: 124.3 kg (274 lb). Body surface area is 2.49 meters squared.  No Pain (0) Comment: Data Unavailable   No LMP for male patient.  Allergies reviewed: Yes  Medications reviewed: Yes    Medications: Medication refills not needed today.  Pharmacy name entered into [employer]    [id number] - [place], [place] - [address] AT [facility] [place] [address] [facility]    Clinical concerns: f/u       Pooja Rosen, LILIAN              "

## 2023-10-23 NOTE — LETTER
10/23/2023         RE: James Salvador  [address] [place] [address]        Dear Colleague,    Thank you for referring your patient, James aSlvador, to the [facility]. Please see a copy of my visit note below.    [facility]    Hematology/Oncology Established Patient Follow-up Note    Oncologic History/Treatment Summary    10/2019: Underwent excision of a Breslow depth 0.6mm malignant melanoma, lentigo maligna type form left zygoma by Dr. Dutta.  Decision-DX was low risk so SLN not performed.  Underwent subsequent MOHS resection.  2/21/23: Shave biopsy of a non-healing lesion on left cheek demonstrated malignant melanoma, ulcerated with nodular appearance and Breslow depth of at least 2.2 mm.  3/32/23: Wide local excision with SLN biopsy performed by Dr. Minaya.  SLN could not be identified.  Pathology demonstrated residual melanoma with Breslow depth 3.5 mm, margins negative.  4/13/23: Surgical reconstruction with flap placement performed by Dr. Sanchez.  6/22/23: Noted new pigmented lesion on left cheek, biopsy demonstrated melanoma.  No intraepidermal component noted consistent with metastasis.  7/13/23: Underwent MOHS resection with clear margins.  7/18/23: PET/CT and CT head demonstrated no definite evidence of metastatic disease, several small (subcentimeter) indeterminate lung nodules noted.  8/4/23: Initiated adjuvant nivolumab.  8/31/23: Noted to have multiple new nodules left cheek/face, biopsy confirmed locally recurrent melanoma.      Today's Date: 10/23/23    Reason for Follow-up: Stage IIIC cutaneous melanoma.      INTERIM HISTORY:  Alden presents for follow-up of locally advanced/recurrent cutaneous melanoma s/p 3 cycles nivolumab.  At our last visit noted to have progression of multiple cutaneous nodules on the left face.  Planning to switch to Opdualag later this week.  Presents now to review recent imaging results and  NGS testing.  Accompanied by his wife.    He has clinically been feeling OK, no appreciable new side effects from nivolumab.  The cutaneous lesions on his left face have continued to progress and are coalescing.  They are not physically bothersome, no associated pain.  Did nick one while shaving and bled quite a bit.  He also has some generalized pruritus on his arms and legs.  No diarrhea, respiratory changes.  No new focal pain.      REVIEW OF SYSTEMS:   A 14 point ROS was reviewed with pertinent positives and negatives in the HPI.       HOME MEDICATIONS:  Current Outpatient Medications   Medication Sig Dispense Refill     acetaminophen (TYLENOL) 325 MG tablet Take 650 mg by mouth 4 times daily       apixaban ANTICOAGULANT (ELIQUIS ANTICOAGULANT) 5 MG tablet Take 1 tablet (5 mg) by mouth 2 times daily 180 tablet 1     atorvastatin (LIPITOR) 80 MG tablet Take 1 tablet (80 mg) by mouth At Bedtime 90 tablet 3     carvedilol (COREG) 3.125 MG tablet Take 1 tablet (3.125 mg) by mouth 2 times daily (with meals) 180 tablet 3     clobetasol (TEMOVATE) 0.05 % external cream Apply to AA BID x 1-2 weeks then PRN. Do not apply to face. 60 g 3     COMPOUNDED NON-CONTROLLED SUBSTANCE (CMPD RX) - PHARMACY TO MIX COMPOUNDED MEDICATION Fluorouracil 5% Calcipotriene 0.005% 1:1 Cream apply twice daily for 1 weeks to face, arms for 14 days 30 g 6     desonide (DESOWEN) 0.05 % external cream Apply topically daily       fluticasone (FLONASE) 50 MCG/ACT nasal spray Spray 2 sprays into both nostrils daily 48 mL 3     furosemide (LASIX) 20 MG tablet TAKE 1 TABLET BY MOUTH EVERY DAY 90 tablet 3     gabapentin (NEURONTIN) 300 MG capsule Take 2 capsules (600 mg) by mouth 3 times daily       ketoconazole (NIZORAL) 2 % cream Apply topically 2 times daily For face. FAX REFILL REQUESTS TO  [facility]: [phone] 30 g 2     ketoconazole (NIZORAL) 2 % external shampoo Use daily as needed 120 mL 11     lidocaine-prilocaine (EMLA) 2.5-2.5 % external  cream Apply topically as needed for moderate pain 30 g 1     lisinopril (ZESTRIL) 30 MG tablet Take 1 tablet (30 mg) by mouth daily 90 tablet 3     nitroGLYcerin (NITROSTAT) 0.4 MG sublingual tablet Place 1 tablet (0.4 mg) under the tongue every 5 minutes as needed for chest pain May repeat twice for a total of 3 tablets.  If chest pain not relieved, call 911 25 tablet 11     OXcarbazepine (TRILEPTAL) 300 MG tablet Take 2 tablets (600 mg) by mouth 3 times daily ( @ 7am, 1530 and 2200 hrs each day)       triamcinolone (KENALOG) 0.1 % external lotion Apply to scalp BID x 1-2 weeks then PRN only 60 mL 3         ALLERGIES:  Allergies   Allergen Reactions     Cats      Cat Dander     Dogs      Dog Dander     Hydromorphone      Other reaction(s): Confusion     Pollen Extract          PAST MEDICAL HISTORY:  Past Medical History:   Diagnosis Date     Actinic 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 Cardiac Cath 9/2015: BRIDGET to LAD     Hearing problem 1995    Wear hearing aids     Hyperlipidemia      Malignant melanoma      Morbid obesity 01/11/2016     Paroxysmal supraventricular tachycardia     on metoprolol     Permanent atrial fibrillation 04/21/2017     Squamous cell carcinoma      Tachy-edinson syndrome 05/29/2019         PAST SURGICAL HISTORY:  Past Surgical History:   Procedure Laterality Date     ADENOIDECTOMY  Childhood     ANGIOPLASTY  1994    in [place]     ANKLE SURGERY  07/13/2005    right ankle     ARTHROPLASTY HIP Right 10/2009     BIOPSY NODE SENTINEL Left 03/30/2023    Procedure: BIOPSY, LYMPH NODE, SENTINEL;  Surgeon: Rolando Minaya MD;  Location: [facility] OR     COLONOSCOPY  4/25/12     EP PERM PACER SINGLE LEAD N/A 05/31/2019    Medtronic single lead pacemaker     EXCISE MASS CHEEK Left 03/30/2023     Procedure: wide local excision of left cheek melanoma;  Surgeon: Rolando Minaya MD;  Location: [facility] OR     EXCISE MASS CHEEK WITH FLAP PEDICLE Left 2023    Procedure: Left cervical Facial flap per closure of left cheek Defect;  Surgeon: Ila Sanchez MD;  Location: [facility] OR     Facial biopsy - Melanoma       GENITOURINARY SURGERY  10/20/23    Prostate surgery     HEART CATH STENT COR W/WO PTCA  2015    BRIDGET stent mid LAD     IR CHEST PORT PLACEMENT > 5 YRS OF AGE  2023     LASER SURGERY OF EYE  2002     MOHS MICROGRAPHIC PROCEDURE  2004    squamous cell carcinoma right temple     SINUS SURGERY  2006     TONSILLECTOMY  Childhood         SOCIAL HISTORY:  Social History     Socioeconomic History     Marital status:      Spouse name: Not on file     Number of children: 3     Years of education: Not on file     Highest education level: Not on file   Occupational History     Employer: RETIRED   Tobacco Use     Smoking status: Former     Packs/day: 0.50     Years: 10.00     Additional pack years: 0.00     Total pack years: 5.00     Types: Cigarettes, Cigars, Pipe     Start date: 1966     Quit date: 1974     Years since quittin.5     Smokeless tobacco: Never     Tobacco comments:     Also smoked from 1985 - 1990   Substance and Sexual Activity     Alcohol use: Not Currently     Comment: Socially     Drug use: No     Sexual activity: Not Currently     Partners: Female     Birth control/protection: Male Surgical     Comment: Vasectomy   Other Topics Concern     Parent/sibling w/ CABG, MI or angioplasty before 65F 55M? No      Service Not Asked     Blood Transfusions Not Asked     Caffeine Concern Yes     Comment: 2 big cups coffee daily     Occupational Exposure Not Asked     Hobby Hazards Not Asked     Sleep Concern No     Comment: sleeping better since shoulder replaced 11/3/16     Stress Concern No     Weight Concern Yes     Special Diet Yes      Comment: trying to do more lean meats     Back Care Not Asked     Exercise No     Comment: limited - knee     Bike Helmet Not Asked     Seat Belt Not Asked     Self-Exams Not Asked   Social History Narrative     Not on file     Social Determinants of Health     Financial Resource Strain: Not on file   Food Insecurity: Not on file   Transportation Needs: Not on file   Physical Activity: Not on file   Stress: Not on file   Social Connections: Not on file   Interpersonal Safety: Not on file   Housing Stability: Not on file         FAMILY HISTORY:  Family History   Problem Relation Age of Onset     Genitourinary Problems Mother         renal failure     Heart Disease Mother      Cerebrovascular Disease Mother         TIA     Cardiovascular Father         rupture of dorsal aorta     Depression Son      Depression Brother         Suicide     Substance Abuse Brother         Heroin     Skin Cancer No family hx of          PHYSICAL EXAM:  Vital signs:  BP (!) 158/81   Pulse 79   Temp 98.1  F (36.7  C) (Oral)   Resp 18   Wt 124.3 kg (274 lb)   SpO2 96%   BMI 38.36 kg/m     GENERAL/CONSTITUTIONAL: Appears well, no acute distress.  EYES: Gaze conjugate, pupils equal and round. No scleral icterus.  LYMPH: No cervical, supraclavicular or axillary adenopathy.   RESPIRATORY: Lungs clear to auscultation bilaterally.    CARDIOVASCULAR: Regular rate and rhythm.    GASTROINTESTINAL: No organomegaly, masses, or tenderness.  No guarding.  No distention.  INTEGUMENTARY: Multiple nodular melanotic cutaneous lesions again seen on left cheek, temple and lower jaw, no ulceration.  They have definitely progressed since our last visit.  Also has scattered eczematous type plaques on his arms with some scaly skin.       LABS:   Latest Reference Range & Units 10/23/23 10:53   Sodium 135 - 145 mmol/L 131 (L)   Potassium 3.4 - 5.3 mmol/L 4.6   Chloride 98 - 107 mmol/L 95 (L)   Carbon Dioxide (CO2) 22 - 29 mmol/L 24   Urea Nitrogen 8.0 - 23.0  mg/dL 14.7   Creatinine 0.67 - 1.17 mg/dL 0.59 (L)   GFR Estimate >60 mL/min/1.73m2 >90   Calcium 8.8 - 10.2 mg/dL 8.9   Anion Gap 7 - 15 mmol/L 12   Albumin 3.5 - 5.2 g/dL 4.2   Protein Total 6.4 - 8.3 g/dL 6.9   Alkaline Phosphatase 40 - 129 U/L 206 (H)   ALT 0 - 70 U/L 28   AST 0 - 45 U/L 30   Bilirubin Total <=1.2 mg/dL 0.4   Glucose 70 - 99 mg/dL 96   TSH 0.30 - 4.20 uIU/mL 1.48   (L): Data is abnormally low  (H): Data is abnormally high      PATHOLOGY/BIOMARKERS:  NGS:  Significant Results    Detected Alterations of Known or Potential Pathogenicity: BRAF S467L  NRAS A146V     TMB Score: 97.508 mut/Mb       MOST RECENT IMAGING:  10/20/23 CT CAP:  IMPRESSION:  1.  There are new and enlarging pulmonary nodules bilaterally  concerning for progressive metastatic disease.  2.  Additional unchanged findings as above including cardiomegaly and  interstitial lung disease.      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  Very little clinical data on efficacy of TKI therapy in this setting and I am unable to identify any case reports of TKI therapy with this specific variant.  There have been case reports of response to dual BRAF/MEK inhibition in other class 3 mutations but presence of NRAS mutation as well I think makes likelihood of efficacy of TKI therapy less likely.  Also noted to have very high TMB which should typically predict for response to checkpoint inhibitor therapy.  CT does demonstrate new lung nodules although lesions are very small.      This is a challenging situation.  Clearly progressing on nivolumab and really the primary clinical issue her is local control of the cutaneous lesions on his face.  Although less effective for systemic therapy, I think T-VEC would be a consideration to gain control of the local lesions.  Discussed this at length with  Alden and his wife.  I also discussed the case with Dr. Vineet Burnett, a melanoma specialist with [facility], and he concurs with T-VEC.  We do not provide that therapy here and Dr. Burnett has graciously agreed to see Alden in the near future to assess for therapy.  Discussed with Alden and he is agreeable with that plan.    Pruritus/dermatitis.  May be related to nivolumab.  States that Dr. Buchanan has recommended avoiding topical steroids in the past, he does have an upcoming dermatology appointment so will discuss at that time.    PLAN:    Hold on Opdualag.  Referral to Dr. Burnett for consideration of T-VEC therapy.    Total time is 45 minutes including face to face time, scan review, phone consultations, documentation and orders.      Dilshad Holbrook MD  Hematology/Oncology  [facility]       Again, thank you for allowing me to participate in the care of your patient.        Sincerely,        Dilshad Holbrook MD

## 2023-10-25 ENCOUNTER — THERAPY VISIT (OUTPATIENT)
Dept: PHYSICAL THERAPY | Facility: CLINIC | Age: 78
End: 2023-10-25
Payer: COMMERCIAL

## 2023-10-25 DIAGNOSIS — I48.20 CHRONIC ATRIAL FIBRILLATION (H): ICD-10-CM

## 2023-10-25 DIAGNOSIS — Z96.652 STATUS POST TOTAL LEFT KNEE REPLACEMENT: Primary | ICD-10-CM

## 2023-10-25 PROCEDURE — 97110 THERAPEUTIC EXERCISES: CPT | Mod: GP | Performed by: PHYSICAL THERAPIST

## 2023-10-25 PROCEDURE — 97112 NEUROMUSCULAR REEDUCATION: CPT | Mod: GP | Performed by: PHYSICAL THERAPIST

## 2023-10-25 RX ORDER — APIXABAN 5 MG/1
5 TABLET, FILM COATED ORAL 2 TIMES DAILY
Qty: 180 TABLET | Refills: 0 | Status: SHIPPED | OUTPATIENT
Start: 2023-10-25 | End: 2024-02-02

## 2023-10-25 NOTE — TELEPHONE ENCOUNTER
Prescription approved per [facility] Refill Protocol.  Anna Whitlock, RN  [facility] Triage Nurse

## 2023-10-26 ENCOUNTER — THERAPY VISIT (OUTPATIENT)
Dept: PHYSICAL THERAPY | Facility: CLINIC | Age: 78
End: 2023-10-26
Attending: PHYSICAL THERAPIST
Payer: COMMERCIAL

## 2023-10-26 DIAGNOSIS — R29.6 FALLS FREQUENTLY: Primary | ICD-10-CM

## 2023-10-26 DIAGNOSIS — R26.89 BALANCE PROBLEMS: ICD-10-CM

## 2023-10-26 PROCEDURE — 97112 NEUROMUSCULAR REEDUCATION: CPT | Mod: GP

## 2023-10-26 PROCEDURE — 97110 THERAPEUTIC EXERCISES: CPT | Mod: GP

## 2023-10-26 PROCEDURE — 97530 THERAPEUTIC ACTIVITIES: CPT | Mod: GP

## 2023-11-01 ENCOUNTER — THERAPY VISIT (OUTPATIENT)
Dept: PHYSICAL THERAPY | Facility: CLINIC | Age: 78
End: 2023-11-01
Payer: COMMERCIAL

## 2023-11-01 DIAGNOSIS — Z96.652 STATUS POST TOTAL LEFT KNEE REPLACEMENT: Primary | ICD-10-CM

## 2023-11-01 PROCEDURE — 97140 MANUAL THERAPY 1/> REGIONS: CPT | Mod: GP | Performed by: PHYSICAL THERAPIST

## 2023-11-01 PROCEDURE — 97110 THERAPEUTIC EXERCISES: CPT | Mod: GP | Performed by: PHYSICAL THERAPIST

## 2023-11-08 ENCOUNTER — THERAPY VISIT (OUTPATIENT)
Dept: PHYSICAL THERAPY | Facility: CLINIC | Age: 78
End: 2023-11-08
Payer: COMMERCIAL

## 2023-11-08 DIAGNOSIS — Z96.652 STATUS POST TOTAL LEFT KNEE REPLACEMENT: Primary | ICD-10-CM

## 2023-11-08 PROCEDURE — 97116 GAIT TRAINING THERAPY: CPT | Mod: GP | Performed by: PHYSICAL THERAPIST

## 2023-11-08 PROCEDURE — 97112 NEUROMUSCULAR REEDUCATION: CPT | Mod: 59 | Performed by: PHYSICAL THERAPIST

## 2023-11-08 PROCEDURE — 97530 THERAPEUTIC ACTIVITIES: CPT | Mod: GP | Performed by: PHYSICAL THERAPIST

## 2023-11-10 ENCOUNTER — PATIENT OUTREACH (OUTPATIENT)
Dept: ONCOLOGY | Facility: CLINIC | Age: 78
End: 2023-11-10
Payer: COMMERCIAL

## 2023-11-10 NOTE — PROGRESS NOTES
[facility]: Cancer Care                                                                                          MIGEL RamosCC for Dr. Burnett with [facility] called writer to requesting that [facility]'s insurance authorization for Opdualag be canceled, as their team is planning of working for applying for free drug for patient for this medication. Writer sent high priority message to infusion finance team to request assistance with this. Awaiting confirmation of completion, and then will plan to update date Rachel once completed.      Signature:  Lee Ann Washington RN

## 2023-11-13 NOTE — PROGRESS NOTES
[facility]: Cancer Care                                                                                          Monroe Gimenez, Lee Ann OLIVER RN; P Infusion Finance - Sd/Rd  I have requested the insurance to cancel the current approved auth. They said it can take a couple of days for the auth to fully cancel.    Thank you,  Monroe    Writer called Rachel to  update her of the above.     Signature:  Lee Ann Washington RN

## 2023-11-16 ENCOUNTER — THERAPY VISIT (OUTPATIENT)
Dept: PHYSICAL THERAPY | Facility: CLINIC | Age: 78
End: 2023-11-16
Payer: COMMERCIAL

## 2023-11-16 DIAGNOSIS — Z96.652 STATUS POST TOTAL LEFT KNEE REPLACEMENT: Primary | ICD-10-CM

## 2023-11-16 PROCEDURE — 97110 THERAPEUTIC EXERCISES: CPT | Mod: GP | Performed by: PHYSICAL THERAPIST

## 2023-11-16 PROCEDURE — 97530 THERAPEUTIC ACTIVITIES: CPT | Mod: GP | Performed by: PHYSICAL THERAPIST

## 2023-11-16 PROCEDURE — 97112 NEUROMUSCULAR REEDUCATION: CPT | Mod: GP | Performed by: PHYSICAL THERAPIST

## 2023-11-16 NOTE — PROGRESS NOTES
11/16/23 0500   Appointment Info   Signing clinician's name / credentials Ene Shirley, PT, DPT   Total/Authorized Visits 61 ([employer])   Visits Used 55   Medical Diagnosis Status post total left knee replacement   PT Tx Diagnosis s/p L TKA revision   Quick Adds Certification   Progress Note/Certification   Start of Care Date 08/31/22   Onset of illness/injury or Date of Surgery 07/07/22   Therapy Frequency every other week   Predicted Duration 12 weeks   Certification date from 11/16/23   Certification date to 02/08/24   Progress Note Due Date 01/15/24   Progress Note Completed Date 09/25/23   GOALS   PT Goals 2;3   PT Goal 1   Goal Identifier walking   Goal Description Patient will be able to walk 20 min w/ 4WW   Rationale to maximize safety and independence with self cares;to maximize safety and independence within the community;to maximize safety and independence within the home;to maximize safety and independence with performance of ADLs and functional tasks   Goal Progress 4WW 5-10 min, SEC 25 ft w/ CGA - hasn't been walking as much due to fatigue from cancer meds   Target Date 02/08/24   PT Goal 2   Goal Identifier stairs   Goal Description Patient will be able to ascend 1/2 flight of stairs, in a normal reciprocal pattern with railing and SEC   Rationale to maximize safety and independence with self cares;to maximize safety and independence within the community;to maximize safety and independence within the home;to maximize safety and independence with performance of ADLs and functional tasks   Goal Progress 3in w/ rail and SEC x 5 reps, CGA - in clinic - increased fatigue noted today   Target Date 12/18/23  (goal discharged, unlikely patient will be able to perform stairs reciprocally due to laterally displaced L patella and weakness from cancer and cancer treatment)   Subjective Report   Subjective Report Started with a new oncologist, [facility], Dr. Burnett. Will be starting a new treatment, that  requires being in the hospital. Prior to this treatment, is taking oral medications, these are making him very fatigued. Unsure how he will feel once the actual treatments begin, has already been prescribed nausea medication. This treatment will likely occur every other month. Also had a recent CTscan which shows metastases to his lungs, that have grown in size. Today states his L knee is feeling ok; however, hasn't done much as he has been so fatigued.   Objective Measures   Objective Measures Objective Measure 1;Objective Measure 2;Objective Measure 3;Objective Measure 4;Objective Measure 5;Objective Measure 6   Objective Measure 1   Objective Measure Gait   Details 4WW: normal gait pattern; w/ SEC: hesitant to initiate, CGA, 25 ft with increased lateral sway and hip drop, tendency to lock L knee into hyperextension   Objective Measure 2   Objective Measure Stairs   Details able to ascend 3in step w/ hand on rail and SEC x 5 reps, CGA - L LE yeimy on 6th reps   Objective Measure 3   Objective Measure Sit to Stand   Details Able to stand from sitting independently with use of arms, Able to sit independently; however, demonstrates poor eccentric control lowering to chair   Objective Measure 4   Objective Measure SLR   Details improving, able to perform more reps with less pain, demonstrates 8* ext lag   Objective Measure 5   Objective Measure Patellar Position   Details L Patella sits lateral to trochlear grove with lateral tilt   Treatment Interventions (PT)   Interventions Therapeutic Procedure/Exercise;Therapeutic Activity;Neuromuscular Re-education;Gait Training;Manual Therapy   Therapeutic Procedure/Exercise   Therapeutic Procedures: strength, endurance, ROM, flexibillity minutes (95946) 15   Therapeutic Procedures Ther Proc 2   Ther Proc 1 Treadmill   Ther Proc 1 - Details NT due to fatigue   Ther Proc 2 Nustep   Ther Proc 2 - Details level 3, 5 min   PTRx Ther Proc 1 Hip Flexion Straight Leg Raise   PTRx  Ther Proc 1 - Details seated reclined on plinth x 15 reps   PTRx Ther Proc 2 Bridging #1   PTRx Ther Proc 3 Clamshell Feet together   PTRx Ther Proc 4 Seated Toe Raises/Heel Raises   PTRx Ther Proc 5 Roll Ins Hook lying    PTRx Ther Proc 5 - Details sitting up: 3 sec hold 15 reps   PTRx Ther Proc 6 Roll Outs   PTRx Ther Proc 6 - Details BTB x 15 reps vc to focus on lateral hips   PTRx Ther Proc 7 Seated Hip Flexion   PTRx Ther Proc 7 - Details x 20 reps alternating B   PTRx Ther Proc 8 Short Arc Knee Extension (Long Sitting)   PTRx Ther Proc 9 Hip AROM Standing Abduction   PTRx Ther Proc 9 - Details x 10 reps B reports challenge in L SLS   PTRx Ther Proc 10 Hip AROM Standing Extension   PTRx Ther Proc 10 - Details  x 10 reps B reports challenge in L SLS   PTRx Ther Proc 11 Toe Raises   PTRx Ther Proc 12 Functional Knee Extension with Tubing   PTRx Ther Proc 13 Short Arc Knee Extension   Patient Response/Progress improving quad strength with less mention of pain when performing quad exercises   Therapeutic Activity   Therapeutic Activities: dynamic activities to improve functional performance minutes (50914) 10   PTRx Ther Act 1 Lateral Step Down   PTRx Ther Act 2 Forward Step   PTRx Ther Act 2 - Details 2in w/ SEC and counter x 5 reps CGA 3in w/ SEC and counter x 5 reps CGA - then fatigues and L LE buckled   PTRx Ther Act 3 Knee Bends   PTRx Ther Act 4 Sit to Stand   Patient Response/Progress fatigued today   Neuromuscular Re-education   Neuromuscular re-ed of mvmt, balance, coord, kinesthetic sense, posture, proprioception minutes (14787) 10   Neuromuscular Re-education Neuro Re-ed 2   Neuro Re-ed 1 Cup walking   Neuro Re-ed 2 TKE   Neuro Re-ed 2 - Details GTB hands on treadmill x 20 reps, vc/mc at quad   PTRx Neuro Re-ed 1 Seated Quad Set in Chair   PTRx Neuro Re-ed 2 Tandem Stance   PTRx Neuro Re-ed 3 Marching in Place   PTRx Neuro Re-ed 4 Sidestep   PTRx Neuro Re-ed 5 Walking Backwards   PTRx Neuro Re-ed 6 Star  Exercise   PTRx Neuro Re-ed 6 - Details at counter w/ SEC L SLS x 5 reps in all direction   PTRx Neuro Re-ed 7 Single Leg Stance - Balance Left Foot   PTRx Neuro Re-ed 8 Balance Feet Together Eyes Closed   Patient Response/Progress fatigues, reach with star exercise decreases as patient becomes fatigued   Gait Training   Gait Training Minutes, includes stair climbing (39954) 5   Gait 1 SEC   Gait 1 - Details 25ft, CGA x 4 reps - hesitant to initiate ambulation each time due to concerns over L LE stability and increased fatigue   Manual Therapy   Manual Therapy 1 MFR   Patient Response/Progress reports relief   Plan   Home program see PTrx   Plan for next session quad strength, balance, steps   Comments   Comments Discussed with patient, ok'd request to order PT for cancer rehab   Total Session Time   Timed Code Treatment Minutes 40   Total Treatment Time (sum of timed and untimed services) 40         [facility]                                                                                   OUTPATIENT PHYSICAL THERAPY    PLAN OF TREATMENT FOR OUTPATIENT REHABILITATION   Patient's Last Name, First Name, James Pizarro YOB: 1945   Provider's Name   [facility]   Medical Record No.  [id number]     Onset Date: 07/07/22  Start of Care Date: 08/31/22     Medical Diagnosis:  Status post total left knee replacement      PT Treatment Diagnosis:  s/p L TKA revision Plan of Treatment  Frequency/Duration: every other week/ 12 weeks    Certification date from 11/16/23 to 02/08/24         See note for plan of treatment details and functional goals     Ene Shirley, PT                         I CERTIFY THE NEED FOR THESE SERVICES FURNISHED UNDER        THIS PLAN OF TREATMENT AND WHILE UNDER MY CARE .             Physician Signature               Date    X_____________________________________________________                  Referring  Provider:  Mynor Singh    Initial Assessment  See Epic Evaluation- Start of Care Date: 08/31/22            PLAN  Continue therapy per current plan of care.    Beginning/End Dates of Progress Note Reporting Period:  09/25/23 to 11/16/2023    Referring Provider:  Mynor Singh

## 2023-11-20 ENCOUNTER — ANCILLARY PROCEDURE (OUTPATIENT)
Dept: CARDIOLOGY | Facility: CLINIC | Age: 78
End: 2023-11-20
Attending: INTERNAL MEDICINE
Payer: COMMERCIAL

## 2023-11-20 DIAGNOSIS — Z95.0 CARDIAC PACEMAKER IN SITU: Primary | ICD-10-CM

## 2023-11-20 DIAGNOSIS — Z95.0 CARDIAC PACEMAKER IN SITU: ICD-10-CM

## 2023-11-20 DIAGNOSIS — I49.5 SSS (SICK SINUS SYNDROME) (H): ICD-10-CM

## 2023-11-20 PROCEDURE — 93296 REM INTERROG EVL PM/IDS: CPT | Performed by: INTERNAL MEDICINE

## 2023-11-20 PROCEDURE — 93294 REM INTERROG EVL PM/LDLS PM: CPT | Performed by: INTERNAL MEDICINE

## 2023-11-24 LAB
MDC_IDC_LEAD_CONNECTION_STATUS: NORMAL
MDC_IDC_LEAD_IMPLANT_DT: NORMAL
MDC_IDC_LEAD_LOCATION: NORMAL
MDC_IDC_LEAD_LOCATION_DETAIL_1: NORMAL
MDC_IDC_LEAD_MFG: NORMAL
MDC_IDC_LEAD_MODEL: NORMAL
MDC_IDC_LEAD_POLARITY_TYPE: NORMAL
MDC_IDC_LEAD_SERIAL: NORMAL
MDC_IDC_MSMT_BATTERY_DTM: NORMAL
MDC_IDC_MSMT_BATTERY_REMAINING_LONGEVITY: 126 MO
MDC_IDC_MSMT_BATTERY_RRT_TRIGGER: 2.62
MDC_IDC_MSMT_BATTERY_STATUS: NORMAL
MDC_IDC_MSMT_BATTERY_VOLTAGE: 3.02 V
MDC_IDC_MSMT_LEADCHNL_RV_IMPEDANCE_VALUE: 304 OHM
MDC_IDC_MSMT_LEADCHNL_RV_IMPEDANCE_VALUE: 361 OHM
MDC_IDC_MSMT_LEADCHNL_RV_PACING_THRESHOLD_AMPLITUDE: 0.75 V
MDC_IDC_MSMT_LEADCHNL_RV_PACING_THRESHOLD_PULSEWIDTH: 0.4 MS
MDC_IDC_MSMT_LEADCHNL_RV_SENSING_INTR_AMPL: 1.88 MV
MDC_IDC_MSMT_LEADCHNL_RV_SENSING_INTR_AMPL: 1.88 MV
MDC_IDC_PG_IMPLANT_DTM: NORMAL
MDC_IDC_PG_MFG: NORMAL
MDC_IDC_PG_MODEL: NORMAL
MDC_IDC_PG_SERIAL: NORMAL
MDC_IDC_PG_TYPE: NORMAL
MDC_IDC_SESS_CLINIC_NAME: NORMAL
MDC_IDC_SESS_DTM: NORMAL
MDC_IDC_SESS_TYPE: NORMAL
MDC_IDC_SET_BRADY_HYSTRATE: NORMAL
MDC_IDC_SET_BRADY_LOWRATE: 50 {BEATS}/MIN
MDC_IDC_SET_BRADY_MAX_SENSOR_RATE: 130 {BEATS}/MIN
MDC_IDC_SET_BRADY_MODE: NORMAL
MDC_IDC_SET_LEADCHNL_RV_PACING_AMPLITUDE: 2 V
MDC_IDC_SET_LEADCHNL_RV_PACING_ANODE_ELECTRODE_1: NORMAL
MDC_IDC_SET_LEADCHNL_RV_PACING_ANODE_LOCATION_1: NORMAL
MDC_IDC_SET_LEADCHNL_RV_PACING_CAPTURE_MODE: NORMAL
MDC_IDC_SET_LEADCHNL_RV_PACING_CATHODE_ELECTRODE_1: NORMAL
MDC_IDC_SET_LEADCHNL_RV_PACING_CATHODE_LOCATION_1: NORMAL
MDC_IDC_SET_LEADCHNL_RV_PACING_POLARITY: NORMAL
MDC_IDC_SET_LEADCHNL_RV_PACING_PULSEWIDTH: 0.4 MS
MDC_IDC_SET_LEADCHNL_RV_SENSING_ANODE_ELECTRODE_1: NORMAL
MDC_IDC_SET_LEADCHNL_RV_SENSING_ANODE_LOCATION_1: NORMAL
MDC_IDC_SET_LEADCHNL_RV_SENSING_CATHODE_ELECTRODE_1: NORMAL
MDC_IDC_SET_LEADCHNL_RV_SENSING_CATHODE_LOCATION_1: NORMAL
MDC_IDC_SET_LEADCHNL_RV_SENSING_POLARITY: NORMAL
MDC_IDC_SET_LEADCHNL_RV_SENSING_SENSITIVITY: 0.6 MV
MDC_IDC_SET_ZONE_DETECTION_INTERVAL: 360 MS
MDC_IDC_SET_ZONE_STATUS: NORMAL
MDC_IDC_SET_ZONE_TYPE: NORMAL
MDC_IDC_SET_ZONE_VENDOR_TYPE: NORMAL
MDC_IDC_STAT_BRADY_DTM_END: NORMAL
MDC_IDC_STAT_BRADY_DTM_START: NORMAL
MDC_IDC_STAT_BRADY_RV_PERCENT_PACED: 71.98 %
MDC_IDC_STAT_EPISODE_RECENT_COUNT: 0
MDC_IDC_STAT_EPISODE_RECENT_COUNT_DTM_END: NORMAL
MDC_IDC_STAT_EPISODE_RECENT_COUNT_DTM_START: NORMAL
MDC_IDC_STAT_EPISODE_TOTAL_COUNT: 0
MDC_IDC_STAT_EPISODE_TOTAL_COUNT: 0
MDC_IDC_STAT_EPISODE_TOTAL_COUNT: 3
MDC_IDC_STAT_EPISODE_TOTAL_COUNT_DTM_END: NORMAL
MDC_IDC_STAT_EPISODE_TOTAL_COUNT_DTM_START: NORMAL
MDC_IDC_STAT_EPISODE_TYPE: NORMAL

## 2023-11-27 NOTE — PROGRESS NOTES
Facial Plastic and Reconstructive Surgery      James Salvador is a 78 yo male referred for a left cheek melanoma. He has a complex PMHx, including atrial fibrillation (on apixiban) and a prior melanoma of the left zygoma/orbital rim in 2019 that was treated with MMS by Dr. Dutta. He has closely followed with Dermatology given multiple cutaneous malignancies and returned after a three month history of a growing left cheek nodule. He underwent prostate surgery this past fall which was complicated by recurrent staph infections. Around Thanksgiving he first noted a dark spot on his cheek and during the subsequent months it slowly continued to grow. He has not had any left sided facial pain, vision changes, or neck masses.      We had a video visit today and discussed reconstructive options for the anticipated deficits. I described a cervicofacial flap and what it would entail. His questions were answered. We will coordinate with the head and neck team for timing of surgery.     Video duration: 25 minutes  Patient at home  Provider in clinic

## 2023-12-06 ENCOUNTER — THERAPY VISIT (OUTPATIENT)
Dept: PHYSICAL THERAPY | Facility: CLINIC | Age: 78
End: 2023-12-06
Payer: COMMERCIAL

## 2023-12-06 DIAGNOSIS — Z96.652 STATUS POST TOTAL LEFT KNEE REPLACEMENT: Primary | ICD-10-CM

## 2023-12-06 PROCEDURE — 97530 THERAPEUTIC ACTIVITIES: CPT | Mod: GP | Performed by: PHYSICAL THERAPIST

## 2023-12-06 PROCEDURE — 97110 THERAPEUTIC EXERCISES: CPT | Mod: 59 | Performed by: PHYSICAL THERAPIST

## 2023-12-11 ENCOUNTER — TRANSFERRED RECORDS (OUTPATIENT)
Dept: HEALTH INFORMATION MANAGEMENT | Facility: CLINIC | Age: 78
End: 2023-12-11
Payer: COMMERCIAL

## 2023-12-21 ENCOUNTER — THERAPY VISIT (OUTPATIENT)
Dept: PHYSICAL THERAPY | Facility: CLINIC | Age: 78
End: 2023-12-21
Payer: COMMERCIAL

## 2023-12-21 DIAGNOSIS — Z96.652 STATUS POST TOTAL LEFT KNEE REPLACEMENT: Primary | ICD-10-CM

## 2023-12-21 PROCEDURE — 97530 THERAPEUTIC ACTIVITIES: CPT | Mod: GP | Performed by: PHYSICAL THERAPIST

## 2023-12-22 NOTE — PROGRESS NOTES
DISCHARGE  Reason for Discharge: No further expectation of progress while patient is going through cancer treatment. Feels independent in HEP and plans to continue through duration of treatment.    Equipment Issued: n/a    Discharge Plan: Patient to continue home program.    Referring Provider:  Mynor Singh         12/21/23 0500   Appointment Info   Signing clinician's name / credentials Ene Shirley, PT, DPT   Total/Authorized Visits 61 (E&T)   Visits Used 56   Medical Diagnosis Status post total left knee replacement   PT Tx Diagnosis s/p L TKA revision   Quick Adds Certification   Progress Note/Certification   Start of Care Date 08/31/22   Onset of illness/injury or Date of Surgery 07/07/22   Therapy Frequency every other week   Predicted Duration 12 weeks   Certification date from 11/16/23   Certification date to 02/08/24   Progress Note Due Date 01/15/24   Progress Note Completed Date 09/25/23   GOALS   PT Goals 2;3   PT Goal 1   Goal Identifier walking   Goal Description Patient will be able to walk 20 min w/ 4WW   Rationale to maximize safety and independence with self cares;to maximize safety and independence within the community;to maximize safety and independence within the home;to maximize safety and independence with performance of ADLs and functional tasks   Goal Progress 4ww 8-12 minutes   Target Date 02/08/24   PT Goal 2   Goal Identifier stairs   Goal Description Patient will be able to ascend 1/2 flight of stairs, in a normal reciprocal pattern with railing and SEC   Rationale to maximize safety and independence with self cares;to maximize safety and independence within the community;to maximize safety and independence within the home;to maximize safety and independence with performance of ADLs and functional tasks   Goal Progress 3in w/ rail and SEC x 10 reps, CGA - in clinic   Target Date 12/18/23  (goal discharged, unlikely patient will be able to perform stairs reciprocally due to  laterally displaced L patella and weakness from cancer and cancer treatment)   Subjective Report   Subjective Report Began the full treatment for melanoma on 12/11, which did not go well. Was hospitalized from his reaction to the treatment from 12/11-12/15. Since being discharged from the hospital has been very fatigued. Has been able to work on walking with his FWW for 8-12 minutes, has been able to perform some of his standing exercises, as well as some upper body strengthening exercises. Feels comfortable performing these at home and feels ready to be independent in HEP while he goes through his melanoma treatment. Once he completes his treatment, may be interested in restarting PT based on his funcitonal limitations at that time.   Objective Measures   Objective Measures Objective Measure 1;Objective Measure 2;Objective Measure 3;Objective Measure 4;Objective Measure 5;Objective Measure 6   Objective Measure 1   Objective Measure Gait   Details FWW, decreased L knee flx through swing, no signs of L LE buckling   Objective Measure 2   Objective Measure Stairs   Details able to ascend 3in step w/ hand on rail and SEC x 10 reps, CGA - at PT appt on 12/6/2023, did not attempt today due to fatigue and recent hospitalization   Objective Measure 3   Objective Measure Sit to Stand   Details Able to stand from sitting independently with use of arms, Able to sit independently; however, demonstrates fair eccentric control lowering to chair   Objective Measure 4   Objective Measure Strength   Details Knee Ext L: 3/5, Knee Flx: 5/5, Hip Flx: 4/5   Objective Measure 5   Objective Measure Patellar Position   Details L Patella sits lateral to trochlear grove with lateral tilt   Treatment Interventions (PT)   Interventions Therapeutic Procedure/Exercise;Therapeutic Activity;Neuromuscular Re-education;Gait Training;Manual Therapy   Therapeutic Activity   Therapeutic Activities: dynamic activities to improve functional performance  minutes (43700) 20   Ther Act 1 Educated patient on appropriate exercises to perform based on fatigue levels throughout melonoma treatment. Discussed appropriate times for seated exercises, standing exercises and more challenging exercises that involve steps and SLS. Reports understanding that it is not safe to perform exercises in L SLS when he is fatigued and L LE feels it will buckle, ok to perform when his energy is up and he is feeling strong in his L leg.   Ther Act 1 - Details reviewed in detail, also discussed times when it is appropriate to rest and allow body to recover, such as the days following treatment based on fatigue   Patient Response/Progress acknowlegdes understanding   Total Session Time   Timed Code Treatment Minutes 20   Total Treatment Time (sum of timed and untimed services) 20

## 2023-12-29 ENCOUNTER — OFFICE VISIT (OUTPATIENT)
Dept: URGENT CARE | Facility: URGENT CARE | Age: 78
End: 2023-12-29
Payer: COMMERCIAL

## 2023-12-29 ENCOUNTER — NURSE TRIAGE (OUTPATIENT)
Dept: INTERNAL MEDICINE | Facility: CLINIC | Age: 78
End: 2023-12-29
Payer: COMMERCIAL

## 2023-12-29 VITALS
TEMPERATURE: 97.8 F | RESPIRATION RATE: 20 BRPM | OXYGEN SATURATION: 99 % | DIASTOLIC BLOOD PRESSURE: 85 MMHG | SYSTOLIC BLOOD PRESSURE: 146 MMHG | HEART RATE: 80 BPM

## 2023-12-29 DIAGNOSIS — R35.0 URINE FREQUENCY: Primary | ICD-10-CM

## 2023-12-29 DIAGNOSIS — R10.819 SUPRAPUBIC TENDERNESS: ICD-10-CM

## 2023-12-29 LAB
ALBUMIN SERPL-MCNC: 3.7 G/DL (ref 3.4–5)
ALBUMIN UR-MCNC: NEGATIVE MG/DL
ALP SERPL-CCNC: 141 U/L (ref 40–150)
ALT SERPL W P-5'-P-CCNC: 39 U/L (ref 0–70)
ANION GAP SERPL CALCULATED.3IONS-SCNC: 7 MMOL/L (ref 3–14)
APPEARANCE UR: CLEAR
AST SERPL W P-5'-P-CCNC: 37 U/L (ref 0–45)
BILIRUB SERPL-MCNC: 0.6 MG/DL (ref 0.2–1.3)
BILIRUB UR QL STRIP: NEGATIVE
BUN SERPL-MCNC: 14 MG/DL (ref 7–30)
CALCIUM SERPL-MCNC: 9.6 MG/DL (ref 8.5–10.1)
CHLORIDE BLD-SCNC: 108 MMOL/L (ref 94–109)
CO2 SERPL-SCNC: 32 MMOL/L (ref 20–32)
COLOR UR AUTO: YELLOW
CREAT SERPL-MCNC: 0.9 MG/DL (ref 0.66–1.25)
EGFRCR SERPLBLD CKD-EPI 2021: 87 ML/MIN/1.73M2
ERYTHROCYTE [DISTWIDTH] IN BLOOD BY AUTOMATED COUNT: 15 % (ref 10–15)
GLUCOSE BLD-MCNC: 100 MG/DL (ref 70–99)
GLUCOSE UR STRIP-MCNC: NEGATIVE MG/DL
HCT VFR BLD AUTO: 43.3 % (ref 40–53)
HGB BLD-MCNC: 14 G/DL (ref 13.3–17.7)
HGB UR QL STRIP: NEGATIVE
KETONES UR STRIP-MCNC: ABNORMAL MG/DL
LEUKOCYTE ESTERASE UR QL STRIP: NEGATIVE
MCH RBC QN AUTO: 32.8 PG (ref 26.5–33)
MCHC RBC AUTO-ENTMCNC: 32.3 G/DL (ref 31.5–36.5)
MCV RBC AUTO: 101 FL (ref 78–100)
NITRATE UR QL: NEGATIVE
PH UR STRIP: 5.5 [PH] (ref 5–7)
PLATELET # BLD AUTO: 200 10E3/UL (ref 150–450)
POTASSIUM BLD-SCNC: 4.9 MMOL/L (ref 3.4–5.3)
PROT SERPL-MCNC: 7.6 G/DL (ref 6.8–8.8)
RBC # BLD AUTO: 4.27 10E6/UL (ref 4.4–5.9)
SODIUM SERPL-SCNC: 147 MMOL/L (ref 135–145)
SP GR UR STRIP: 1.01 (ref 1–1.03)
UROBILINOGEN UR STRIP-ACNC: 1 E.U./DL
WBC # BLD AUTO: 9.6 10E3/UL (ref 4–11)

## 2023-12-29 PROCEDURE — 80053 COMPREHEN METABOLIC PANEL: CPT | Performed by: PHYSICIAN ASSISTANT

## 2023-12-29 PROCEDURE — 99214 OFFICE O/P EST MOD 30 MIN: CPT | Performed by: PHYSICIAN ASSISTANT

## 2023-12-29 PROCEDURE — 36415 COLL VENOUS BLD VENIPUNCTURE: CPT | Performed by: PHYSICIAN ASSISTANT

## 2023-12-29 PROCEDURE — 87086 URINE CULTURE/COLONY COUNT: CPT | Performed by: PHYSICIAN ASSISTANT

## 2023-12-29 PROCEDURE — 85027 COMPLETE CBC AUTOMATED: CPT | Performed by: PHYSICIAN ASSISTANT

## 2023-12-29 PROCEDURE — 81003 URINALYSIS AUTO W/O SCOPE: CPT | Performed by: PHYSICIAN ASSISTANT

## 2023-12-29 NOTE — PATIENT INSTRUCTIONS
Monitor your symptoms  Right now, your labs are stable, the clinic will call if you have any other abnormalities    Follow-up in ER if development of:  You have worsening abdominal pain  You get an oral temperature above 100.5F  You have blood in your stools. This may be bright red or appear as black, tarry stools.   You keep vomiting (throwing up) or cannot drink liquids.  You see blood when you vomit.   You cannot have a bowel movement or you cannot pass gas.  Your stomach gets bloated or bigger.  Your skin or the whites of your eyes look yellow.  You faint.  You have bloody, frequent or painful urination (peeing).  You have new symptoms or anything that worries you.

## 2023-12-29 NOTE — PROGRESS NOTES
Assessment & Plan     1. Urine frequency  - UA Macroscopic with reflex to Microscopic and Culture - Clinic Collect  - Urine Culture Aerobic Bacterial - lab collect; Future  - Urine Culture Aerobic Bacterial - lab collect    2. Suprapubic tenderness  - CBC with platelets; Future  - Comprehensive metabolic panel; Future  - CBC with platelets  - Comprehensive metabolic panel  - Urine Culture Aerobic Bacterial - lab collect; Future  - Urine Culture Aerobic Bacterial - lab collect        Urine is not consistent with infection. Urine culture is pending. No evidence of pyelonephritis, urosepsis or nephrolithiasis.  He does have some suprapubic tenderness, but does not have rebound, guarding or rigidity.  No lab evidence of leukocytosis, electrolyte abnormality or renal or hepatic dysfunction.   Advised to push fluids, monitor abd pain, if worsening, I advised follow-up in ER  Discussed with patient if development of fever, chills, vomiting unable to hold down fluids, flank pain or weakness/dizziness/ lightheadedness to follow-up in clinic or ER right away.         Diagnosis and treatment plan was reviewed with patient and/or family.   We went over any labs or imaging. Discussed worsening symptoms or little to no relief despite treatment plan to follow-up with PCP or return to clinic.  Patient verbalizes understanding. All questions were addressed and answered.     Re Knowles PA-C  [facility]    CHIEF COMPLAINT:   Chief Complaint   Patient presents with    UTI     Subjective     Alden is a 78 year old male who presents to clinic today for evaluation of suprapubic tenderness and dysuria. Symptoms started yesterday. Reports that his urine is darker than normal.  Suprapubic tenderness has improved since yesterday. 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 He has Stage 3 Melanoma.  Denies having fever, chills, flank pain, nausea, vomiting,  hematuria or weakness.     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 Cardiac Cath 9/2015: BRIDGET to LAD    Hearing problem 1995    Wear hearing aids    Hyperlipidemia     Malignant melanoma     Morbid obesity 01/11/2016    Paroxysmal supraventricular tachycardia     on metoprolol    Permanent atrial fibrillation 04/21/2017    Squamous cell carcinoma     Tachy-edinson syndrome 05/29/2019     Past Surgical History:   Procedure Laterality Date    ADENOIDECTOMY  Childhood    ANGIOPLASTY  1994    in [place]    ANKLE SURGERY  07/13/2005    right ankle    ARTHROPLASTY HIP Right 10/2009    BIOPSY NODE SENTINEL Left 03/30/2023    Procedure: BIOPSY, LYMPH NODE, SENTINEL;  Surgeon: Rolando Minaya MD;  Location: [facility] OR    COLONOSCOPY  4/25/12    EP PERM PACER SINGLE LEAD N/A 05/31/2019    [employer]tronic single lead pacemaker    EXCISE MASS CHEEK Left 03/30/2023    Procedure: wide local excision of left cheek melanoma;  Surgeon: Rolando Minaya MD;  Location: [facility] OR    EXCISE MASS CHEEK WITH FLAP PEDICLE Left 04/13/2023    Procedure: Left cervical Facial flap per closure of left cheek Defect;  Surgeon: Ila Sanchez MD;  Location: [facility] OR    Facial biopsy - Melanoma      GENITOURINARY SURGERY  10/20/23    Prostate surgery    HEART CATH STENT COR W/WO PTCA  09/23/2015    BRIDGET stent mid LAD    IR CHEST PORT PLACEMENT > 5 YRS OF AGE  7/19/2023    LASER SURGERY OF EYE  06/01/2002    MOHS MICROGRAPHIC PROCEDURE  06/12/2004    squamous cell carcinoma right temple    SINUS SURGERY  07/11/2006    TONSILLECTOMY  Childhood     Social History     Tobacco Use    Smoking status: Former     Packs/day: 0.50     Years: 10.00     Additional pack years: 0.00     Total pack years: 5.00     Types:  Cigarettes, Cigars, Pipe     Start date: 1966     Quit date: 1974     Years since quittin.6    Smokeless tobacco: Never    Tobacco comments:     Also smoked from 1985 - 1990   Substance Use Topics    Alcohol use: Not Currently     Comment: Socially     Current Outpatient Medications   Medication    acetaminophen (TYLENOL) 325 MG tablet    apixaban ANTICOAGULANT (ELIQUIS ANTICOAGULANT) 5 MG tablet    atorvastatin (LIPITOR) 80 MG tablet    carvedilol (COREG) 3.125 MG tablet    clobetasol (TEMOVATE) 0.05 % external cream    fluticasone (FLONASE) 50 MCG/ACT nasal spray    furosemide (LASIX) 20 MG tablet    gabapentin (NEURONTIN) 300 MG capsule    ketoconazole (NIZORAL) 2 % cream    ketoconazole (NIZORAL) 2 % external shampoo    lidocaine-prilocaine (EMLA) 2.5-2.5 % external cream    lisinopril (ZESTRIL) 30 MG tablet    nitroGLYcerin (NITROSTAT) 0.4 MG sublingual tablet    OXcarbazepine (TRILEPTAL) 300 MG tablet    triamcinolone (KENALOG) 0.1 % external lotion    COMPOUNDED NON-CONTROLLED SUBSTANCE (CMPD RX) - PHARMACY TO MIX COMPOUNDED MEDICATION    desonide (DESOWEN) 0.05 % external cream     No current facility-administered medications for this visit.     Allergies   Allergen Reactions    Cats      Cat Dander    Dogs      Dog Dander    Hydromorphone      Other reaction(s): Confusion    Pollen Extract        10 point ROS of systems were all negative except for pertinent positives noted in my HPI.      Exam:   BP (!) 146/85   Pulse 80   Temp 97.8  F (36.6  C) (Tympanic)   Resp 20   SpO2 99%   Constitutional: healthy, alert and no distress  ENT: MMM  Cardiovascular: RRR  Respiratory: CTA bilaterally, no rhonchi or rales  Gastrointestinal: soft and nontender. Some suprapubic discomfort. No rebound, guarding or rigidity.   Back: No CVA tenderness B/L  Skin: no rashes    Results for orders placed or performed in visit on 23   UA Macroscopic with reflex to Microscopic and Culture - Clinic  Collect     Status: Abnormal    Specimen: Urine, Clean Catch   Result Value Ref Range    Color Urine Yellow Colorless, Straw, Light Yellow, Yellow    Appearance Urine Clear Clear    Glucose Urine Negative Negative mg/dL    Bilirubin Urine Negative Negative    Ketones Urine Trace (A) Negative mg/dL    Specific Gravity Urine 1.010 1.003 - 1.035    Blood Urine Negative Negative    pH Urine 5.5 5.0 - 7.0    Protein Albumin Urine Negative Negative mg/dL    Urobilinogen Urine 1.0 0.2, 1.0 E.U./dL    Nitrite Urine Negative Negative    Leukocyte Esterase Urine Negative Negative    Narrative    Microscopic not indicated   CBC with platelets     Status: Abnormal   Result Value Ref Range    WBC Count 9.6 4.0 - 11.0 10e3/uL    RBC Count 4.27 (L) 4.40 - 5.90 10e6/uL    Hemoglobin 14.0 13.3 - 17.7 g/dL    Hematocrit 43.3 40.0 - 53.0 %     (H) 78 - 100 fL    MCH 32.8 26.5 - 33.0 pg    MCHC 32.3 31.5 - 36.5 g/dL    RDW 15.0 10.0 - 15.0 %    Platelet Count 200 150 - 450 10e3/uL   Comprehensive metabolic panel     Status: Abnormal   Result Value Ref Range    Sodium 147 (H) 135 - 145 mmol/L    Potassium 4.9 3.4 - 5.3 mmol/L    Chloride 108 94 - 109 mmol/L    Carbon Dioxide (CO2) 32 20 - 32 mmol/L    Anion Gap 7 3 - 14 mmol/L    Urea Nitrogen 14 7 - 30 mg/dL    Creatinine 0.90 0.66 - 1.25 mg/dL    GFR Estimate 87 >60 mL/min/1.73m2    Calcium 9.6 8.5 - 10.1 mg/dL    Glucose 100 (H) 70 - 99 mg/dL    Alkaline Phosphatase 141 40 - 150 U/L    AST 37 0 - 45 U/L    ALT 39 0 - 70 U/L    Protein Total 7.6 6.8 - 8.8 g/dL    Albumin 3.7 3.4 - 5.0 g/dL    Bilirubin Total 0.6 0.2 - 1.3 mg/dL

## 2023-12-29 NOTE — TELEPHONE ENCOUNTER
Pt complains of dysuria for 2 days, suprapubic pain and urine is dark. Denies fever, back pain and unclear of hematuria. He wants advice on tx for a UTI. Triage advised UC visit today. Pt agreed to go to [facility] today.     Reason for Disposition    Pain or burning with passing urine (urination) and male    SEVERE pain with urination    Additional Information    Negative: Shock suspected (e.g., cold/pale/clammy skin, too weak to stand, low BP, rapid pulse)    Negative: Sounds like a life-threatening emergency to the triager    Negative: Unable to urinate (or only a few drops) and bladder feels very full    Negative: Swollen scrotum    Negative: Pain in scrotum or testicle that persists > 1 hour    Negative: Fever > 100.4 F (38.0 C)    Negative: Vomiting    Negative: Patient sounds very sick or weak to the triager    Negative: Shock suspected (e.g., cold/pale/clammy skin, too weak to stand, low BP, rapid pulse)    Negative: Sounds like a life-threatening emergency to the triager    Protocols used: Urinary Symptoms-A-OH, Urination Pain - Male-A-OH

## 2023-12-31 LAB — BACTERIA UR CULT: NO GROWTH

## 2024-01-05 ENCOUNTER — HOSPITAL ENCOUNTER (OUTPATIENT)
Dept: CT IMAGING | Facility: CLINIC | Age: 79
Discharge: HOME | End: 2024-01-05
Attending: INTERNAL MEDICINE
Payer: COMMERCIAL

## 2024-01-05 DIAGNOSIS — C78.01 SECONDARY MALIGNANT NEOPLASM OF RIGHT LUNG (H): ICD-10-CM

## 2024-01-05 DIAGNOSIS — C78.02 SECONDARY MALIGNANT NEOPLASM OF LEFT LUNG (H): ICD-10-CM

## 2024-01-05 DIAGNOSIS — C79.2 METASTASIS TO SKIN (H): ICD-10-CM

## 2024-01-05 DIAGNOSIS — C43.30 MALIGNANT MELANOMA OF FACE (H): ICD-10-CM

## 2024-01-05 PROCEDURE — 71260 CT THORAX DX C+: CPT

## 2024-01-05 PROCEDURE — 250N000009 HC RX 250: Performed by: INTERNAL MEDICINE

## 2024-01-05 PROCEDURE — 70491 CT SOFT TISSUE NECK W/DYE: CPT

## 2024-01-05 PROCEDURE — 250N000011 HC RX IP 250 OP 636: Performed by: RADIOLOGY

## 2024-01-05 PROCEDURE — 250N000011 HC RX IP 250 OP 636: Performed by: INTERNAL MEDICINE

## 2024-01-05 RX ORDER — HEPARIN SODIUM (PORCINE) LOCK FLUSH IV SOLN 100 UNIT/ML 100 UNIT/ML
SOLUTION INTRAVENOUS
Status: DISCONTINUED
Start: 2024-01-05 | End: 2024-01-06 | Stop reason: HOSPADM

## 2024-01-05 RX ORDER — HEPARIN SODIUM (PORCINE) LOCK FLUSH IV SOLN 100 UNIT/ML 100 UNIT/ML
500 SOLUTION INTRAVENOUS ONCE
Status: COMPLETED | OUTPATIENT
Start: 2024-01-05 | End: 2024-01-05

## 2024-01-05 RX ORDER — IOPAMIDOL 755 MG/ML
500 INJECTION, SOLUTION INTRAVASCULAR ONCE
Status: COMPLETED | OUTPATIENT
Start: 2024-01-05 | End: 2024-01-05

## 2024-01-05 RX ADMIN — IOPAMIDOL 125 ML: 755 INJECTION, SOLUTION INTRAVENOUS at 14:26

## 2024-01-05 RX ADMIN — SODIUM CHLORIDE 65 ML: 9 INJECTION, SOLUTION INTRAVENOUS at 14:26

## 2024-01-05 RX ADMIN — HEPARIN SODIUM (PORCINE) LOCK FLUSH IV SOLN 100 UNIT/ML 500 UNITS: 100 SOLUTION at 14:30

## 2024-01-08 ENCOUNTER — TRANSFERRED RECORDS (OUTPATIENT)
Dept: HEALTH INFORMATION MANAGEMENT | Facility: CLINIC | Age: 79
End: 2024-01-08
Payer: COMMERCIAL

## 2024-01-29 ENCOUNTER — TELEPHONE (OUTPATIENT)
Dept: INTERNAL MEDICINE | Facility: CLINIC | Age: 79
End: 2024-01-29
Payer: COMMERCIAL

## 2024-01-29 NOTE — TELEPHONE ENCOUNTER
Provider Communication    Who is calling:  [facility]    Facility in which provider is associated:  [facility]    Reason for call:  need pt. To go off eloquis     Okay to leave detailed message?:  Yes at Other phone number: fax [phone]

## 2024-01-29 NOTE — LETTER
February 2, 2024    James Salvador  [address] [address]      To Whom It May Concern,    James (Alden) AMRIELY RaymundoSalvador is a patient of mine and under my professional care.  He should hold his eliquis for 2 days prior to his implant procedure.    Please call my office if you have any questions or concerns.      Sincerely,        Jeronimo Painter M.D.  Dept of Internal Medicine- [facility]

## 2024-02-01 NOTE — TELEPHONE ENCOUNTER
Patient is coming in for routine appt 2/7/24 and does not need to hold Eliquis for this, however likely will need implants in the future.     Dentist requests letter stating that when patient has his implants procedure, okay to hold eliquis 3 days prior.     [facility]  Fax: [phone]

## 2024-02-02 ENCOUNTER — MYC REFILL (OUTPATIENT)
Dept: INTERNAL MEDICINE | Facility: CLINIC | Age: 79
End: 2024-02-02
Payer: COMMERCIAL

## 2024-02-02 DIAGNOSIS — I48.20 CHRONIC ATRIAL FIBRILLATION (H): ICD-10-CM

## 2024-02-02 RX ORDER — FUROSEMIDE 20 MG
20 TABLET ORAL DAILY
Qty: 90 TABLET | Refills: 1 | Status: SHIPPED | OUTPATIENT
Start: 2024-02-02 | End: 2024-06-26

## 2024-02-02 NOTE — TELEPHONE ENCOUNTER
Letter pended, please review and auth if appropriate and team can fax to dentist    Florecita Srivastava CMA

## 2024-02-06 ENCOUNTER — MYC REFILL (OUTPATIENT)
Dept: CARDIOLOGY | Facility: CLINIC | Age: 79
End: 2024-02-06
Payer: COMMERCIAL

## 2024-02-06 DIAGNOSIS — I48.11 LONGSTANDING PERSISTENT ATRIAL FIBRILLATION (H): ICD-10-CM

## 2024-02-06 DIAGNOSIS — I25.10 CORONARY ARTERY DISEASE INVOLVING NATIVE CORONARY ARTERY OF NATIVE HEART WITHOUT ANGINA PECTORIS: ICD-10-CM

## 2024-02-06 RX ORDER — CARVEDILOL 3.12 MG/1
3.12 TABLET ORAL 2 TIMES DAILY WITH MEALS
Qty: 180 TABLET | Refills: 0 | Status: SHIPPED | OUTPATIENT
Start: 2024-02-06 | End: 2024-06-26

## 2024-02-19 NOTE — PROGRESS NOTES
HISTORY OF PRESENT ILLNESS:    This is a 78 year old male who follows with Dr Liz at [facility]  His past medical history includes:  Coronary artery disease, permanent atrial fibrillation, hypertension, mixed hyperlipidemia, facial melanoma    Mr Salvador suffered a MI (1994) in [place]  He required mid- LAD stenting after he suffered unstable angina (2015)  At that time, his RCA was chronically occluded    He developed A-fib (2017) and did well until 2018 in which he developed symptomatic bradycardia and he underwent single-chamber PPM then    ECHO (2020) showed LVEF 60%, normal RV function, no significant valvular pathology    Device interrogation today showed 70% V-paced, underlying A-fib with controlled rates    Our visit today is for annual review    Mr Salvador comes in with his wife today  His activity is limited due to knee issues and he uses a walker  His physical therapy is on hold due to his need for frequent treatments for he melanoma  He denies any chest pain or significant shortness of breath  He does not feel his A-fib  He denies orthopnea or significant peripheral edema        VITAL SIGNS:  BP:  108/66  Pulse:  60  Weight:  272 lbs  (BMI: 36)    IMPRESSION AND PLAN:    Coronary Artery Disease:  -hx of LAD stenting (2015)  -denies angina  -upcoming ECHO scheduled  -ASA, statin    Permanent A-fib  S/p PPM for bradycardia, tachy-edinson syndrome  -72% V-paced  -chronic Eliquis  (CHADS 2 Vasc score: 4)    Chronic HFpEF  -no signs or symptoms of heart failure  -on Lasix 20 mg  -weight  stable    Hyperlipidemia:  -on Atorvastatin 80 mg  -managed by PMD    The total time for the visit today was 30 minutes which includes patient visit, reviewing of records, discussion, and placing of orders of the outpatient coordination of cardiovascular care as described.  The level of medical decision making during this visit was of moderate complexity.  Thank you for allowing me to participate in their  care.     Orders Placed This Encounter   Procedures    Follow-Up with Cardiology       Orders Placed This Encounter   Medications    predniSONE (DELTASONE) 10 MG tablet     Sig: Take 10 mg by mouth daily       There are no discontinued medications.      Encounter Diagnoses   Name Primary?    Cardiac pacemaker in situ Yes    Permanent atrial fibrillation (H)        CURRENT MEDICATIONS:  Current Outpatient Medications   Medication Sig Dispense Refill    acetaminophen (TYLENOL) 325 MG tablet Take 650 mg by mouth 4 times daily      apixaban ANTICOAGULANT (ELIQUIS ANTICOAGULANT) 5 MG tablet Take 1 tablet (5 mg) by mouth 2 times daily 180 tablet 1    atorvastatin (LIPITOR) 80 MG tablet Take 1 tablet (80 mg) by mouth At Bedtime 90 tablet 3    carvedilol (COREG) 3.125 MG tablet Take 1 tablet (3.125 mg) by mouth 2 times daily (with meals) 180 tablet 0    clobetasol (TEMOVATE) 0.05 % external cream Apply to AA BID x 1-2 weeks then PRN. Do not apply to face. 60 g 3    fluticasone (FLONASE) 50 MCG/ACT nasal spray Spray 2 sprays into both nostrils daily 48 mL 3    furosemide (LASIX) 20 MG tablet Take 1 tablet (20 mg) by mouth daily 90 tablet 1    gabapentin (NEURONTIN) 300 MG capsule Take 2 capsules (600 mg) by mouth 3 times daily      ketoconazole (NIZORAL) 2 % cream Apply topically 2 times daily For face. FAX REFILL REQUESTS TO  [facility]: [phone] 30 g 2    ketoconazole (NIZORAL) 2 % external shampoo Use daily as needed 120 mL 11    lidocaine-prilocaine (EMLA) 2.5-2.5 % external cream Apply topically as needed for moderate pain 30 g 1    lisinopril (ZESTRIL) 30 MG tablet Take 1 tablet (30 mg) by mouth daily 90 tablet 3    nitroGLYcerin (NITROSTAT) 0.4 MG sublingual tablet Place 1 tablet (0.4 mg) under the tongue every 5 minutes as needed for chest pain May repeat twice for a total of 3 tablets.  If chest pain not relieved, call 911 25 tablet 11    OXcarbazepine (TRILEPTAL) 300 MG tablet Take 2 tablets (600 mg) by mouth 3  times daily ( @ 7am, 1530 and 2200 hrs each day)      predniSONE (DELTASONE) 10 MG tablet Take 10 mg by mouth daily      triamcinolone (KENALOG) 0.1 % external lotion Apply to scalp BID x 1-2 weeks then PRN only 60 mL 3    COMPOUNDED NON-CONTROLLED SUBSTANCE (CMPD RX) - PHARMACY TO MIX COMPOUNDED MEDICATION Fluorouracil 5% Calcipotriene 0.005% 1:1 Cream apply twice daily for 1 weeks to face, arms for 14 days 30 g 6    desonide (DESOWEN) 0.05 % external cream Apply topically daily         ALLERGIES     Allergies   Allergen Reactions    Cats      Cat Dander    Dogs      Dog Dander    Hydromorphone      Other reaction(s): Confusion    Pollen Extract        PAST MEDICAL HISTORY:  Past Medical History:   Diagnosis Date    Actinic cheilitis 07/17/2013    Lower lip, left     Actinic keratosis     Allergic rhinitis     Anxiety 3/1/23    Arthritis 2004    Basal cell carcinoma     Benign hypertension     CAD (coronary artery disease)     Cardiac cath and PCI 1994. Cardiac Cath 9/2015: BRIDGET to LAD    Hearing problem 1995    Wear hearing aids    Hyperlipidemia     Malignant melanoma     Morbid obesity 01/11/2016    Paroxysmal supraventricular tachycardia     on metoprolol    Permanent atrial fibrillation 04/21/2017    Squamous cell carcinoma     Tachy-edinson syndrome 05/29/2019       PAST SURGICAL HISTORY:  Past Surgical History:   Procedure Laterality Date    ADENOIDECTOMY  Childhood    ANGIOPLASTY  1994    in [place]    ANKLE SURGERY  07/13/2005    right ankle    ARTHROPLASTY HIP Right 10/2009    BIOPSY NODE SENTINEL Left 03/30/2023    Procedure: BIOPSY, LYMPH NODE, SENTINEL;  Surgeon: Rolando Minaya MD;  Location: [facility] OR    COLONOSCOPY  4/25/12    EP PERM PACER SINGLE LEAD N/A 05/31/2019    Medtronic single lead pacemaker    EXCISE MASS CHEEK Left 03/30/2023    Procedure: wide local excision of left cheek melanoma;  Surgeon: Rolando Minaya MD;  Location: [facility] OR    EXCISE MASS CHEEK WITH FLAP PEDICLE Left 04/13/2023     Procedure: Left cervical Facial flap per closure of left cheek Defect;  Surgeon: Ila Sanchez MD;  Location: [facility] OR    Facial biopsy - Melanoma      GENITOURINARY SURGERY  10/20/23    Prostate surgery    HEART CATH STENT COR W/WO PTCA  2015    BRIDGET stent mid LAD    IR CHEST PORT PLACEMENT > 5 YRS OF AGE  2023    LASER SURGERY OF EYE  2002    MOHS MICROGRAPHIC PROCEDURE  2004    squamous cell carcinoma right temple    SINUS SURGERY  2006    TONSILLECTOMY  Childhood       FAMILY HISTORY:  Family History   Problem Relation Age of Onset    Genitourinary Problems Mother         renal failure    Heart Disease Mother     Cerebrovascular Disease Mother         TIA    Cardiovascular Father         rupture of dorsal aorta    Depression Son     Depression Brother         Suicide    Substance Abuse Brother         Heroin    Skin Cancer No family hx of        SOCIAL HISTORY:  Social History     Socioeconomic History    Marital status:      Spouse name: None    Number of children: 3    Years of education: None    Highest education level: None   Occupational History     Employer: RETIRED   Tobacco Use    Smoking status: Former     Packs/day: 0.50     Years: 10.00     Additional pack years: 0.00     Total pack years: 5.00     Types: Cigarettes, Cigars, Pipe     Start date: 1966     Quit date: 1974     Years since quittin.8    Smokeless tobacco: Never    Tobacco comments:     Also smoked from 1985 - 1990   Substance and Sexual Activity    Alcohol use: Not Currently    Drug use: No    Sexual activity: Not Currently     Partners: Female     Birth control/protection: Male Surgical     Comment: Vasectomy   Other Topics Concern    Parent/sibling w/ CABG, MI or angioplasty before 65F 55M? No    Caffeine Concern Yes     Comment: 2 big cups coffee daily    Sleep Concern No     Comment: sleeping better since shoulder replaced 11/3/16    Stress Concern No    Weight Concern Yes     "Special Diet Yes     Comment: trying to do more lean meats    Exercise No     Comment: limited - knee       Review of Systems:  Skin:  not assessed       Eyes:  not assessed      ENT:  not assessed      Respiratory:  Negative       Cardiovascular:    Positive for;lightheadedness;fatigue lightheadedness due to injection in face per pt - TVEC  Gastroenterology: not assessed      Genitourinary:  not assessed      Musculoskeletal:  not assessed      Neurologic:  not assessed      Psychiatric:  not assessed      Heme/Lymph/Imm:         Endocrine:           Physical Exam:  Vitals: /66   Pulse 60   Ht 1.842 m (6' 0.5\")   Wt 123.7 kg (272 lb 12.8 oz)   BMI 36.49 kg/m      Constitutional:  cooperative morbidly obese      Skin:  warm and dry to the touch   pacemaker incision in the left infraclavicular area was well-healed      Head:  normocephalic        Eyes:           Lymph:      ENT:  no pallor or cyanosis hearing aide(s) present      Neck:  JVP normal        Respiratory:  clear to auscultation;normal respiratory excursion         Cardiac: regular rhythm;normal S1 and S2;no S3 or S4;apical impulse not displaced     no presence of murmur          pulses full and equal                                        GI:    obese      Extremities and Muscular Skeletal:      trace;bilateral LE edema          Neurological:  no gross motor deficits;affect appropriate        Psych:  Alert and Oriented x 3          CC  No referring provider defined for this encounter.                    "

## 2024-02-21 ENCOUNTER — ANCILLARY PROCEDURE (OUTPATIENT)
Dept: CARDIOLOGY | Facility: CLINIC | Age: 79
End: 2024-02-21
Attending: INTERNAL MEDICINE
Payer: COMMERCIAL

## 2024-02-21 ENCOUNTER — OFFICE VISIT (OUTPATIENT)
Dept: CARDIOLOGY | Facility: CLINIC | Age: 79
End: 2024-02-21
Payer: COMMERCIAL

## 2024-02-21 VITALS
HEIGHT: 73 IN | SYSTOLIC BLOOD PRESSURE: 108 MMHG | HEART RATE: 60 BPM | DIASTOLIC BLOOD PRESSURE: 66 MMHG | WEIGHT: 272.8 LBS | BODY MASS INDEX: 36.15 KG/M2

## 2024-02-21 DIAGNOSIS — I49.5 SSS (SICK SINUS SYNDROME) (H): ICD-10-CM

## 2024-02-21 DIAGNOSIS — I48.21 PERMANENT ATRIAL FIBRILLATION (H): ICD-10-CM

## 2024-02-21 DIAGNOSIS — Z95.0 CARDIAC PACEMAKER IN SITU: Primary | ICD-10-CM

## 2024-02-21 DIAGNOSIS — Z95.0 CARDIAC PACEMAKER IN SITU: ICD-10-CM

## 2024-02-21 PROCEDURE — 93279 PRGRMG DEV EVAL PM/LDLS PM: CPT | Performed by: INTERNAL MEDICINE

## 2024-02-21 PROCEDURE — 99214 OFFICE O/P EST MOD 30 MIN: CPT | Mod: 25 | Performed by: NURSE PRACTITIONER

## 2024-02-21 RX ORDER — PREDNISONE 10 MG/1
10 TABLET ORAL DAILY
COMMUNITY

## 2024-02-21 NOTE — LETTER
2/21/2024    Jeronimo Painter MD  [address]  [facility] [address]    RE: James Salvador       Dear Colleague,     I had the pleasure of seeing James SCHUSTER Madeleine in the [facility].  HISTORY OF PRESENT ILLNESS:    This is a 78 year old male who follows with Dr Liz at [facility]  His past medical history includes:  Coronary artery disease, permanent atrial fibrillation, hypertension, mixed hyperlipidemia, facial melanoma    Mr Salvador suffered a MI (1994) in [place]  He required mid- LAD stenting after he suffered unstable angina (2015)  At that time, his RCA was chronically occluded    He developed A-fib (2017) and did well until 2018 in which he developed symptomatic bradycardia and he underwent single-chamber PPM then    ECHO (2020) showed LVEF 60%, normal RV function, no significant valvular pathology    Device interrogation today showed 70% V-paced, underlying A-fib with controlled rates    Our visit today is for annual review    Mr Salvador comes in with his wife today  His activity is limited due to knee issues and he uses a walker  His physical therapy is on hold due to his need for frequent treatments for he melanoma  He denies any chest pain or significant shortness of breath  He does not feel his A-fib  He denies orthopnea or significant peripheral edema        VITAL SIGNS:  BP:  108/66  Pulse:  60  Weight:  272 lbs  (BMI: 36)    IMPRESSION AND PLAN:    Coronary Artery Disease:  -hx of LAD stenting (2015)  -denies angina  -upcoming ECHO scheduled  -ASA, statin    Permanent A-fib  S/p PPM for bradycardia, tachy-edinson syndrome  -72% V-paced  -chronic Eliquis  (CHADS 2 Vasc score: 4)    Chronic HFpEF  -no signs or symptoms of heart failure  -on Lasix 20 mg  -weight  stable    Hyperlipidemia:  -on Atorvastatin 80 mg  -managed by PMD    The total time for the visit today was 30 minutes which includes patient visit, reviewing of records, discussion, and  placing of orders of the outpatient coordination of cardiovascular care as described.  The level of medical decision making during this visit was of moderate complexity.  Thank you for allowing me to participate in their care.     Orders Placed This Encounter   Procedures    Follow-Up with Cardiology       Orders Placed This Encounter   Medications    predniSONE (DELTASONE) 10 MG tablet     Sig: Take 10 mg by mouth daily       There are no discontinued medications.      Encounter Diagnoses   Name Primary?    Cardiac pacemaker in situ Yes    Permanent atrial fibrillation (H)        CURRENT MEDICATIONS:  Current Outpatient Medications   Medication Sig Dispense Refill    acetaminophen (TYLENOL) 325 MG tablet Take 650 mg by mouth 4 times daily      apixaban ANTICOAGULANT (ELIQUIS ANTICOAGULANT) 5 MG tablet Take 1 tablet (5 mg) by mouth 2 times daily 180 tablet 1    atorvastatin (LIPITOR) 80 MG tablet Take 1 tablet (80 mg) by mouth At Bedtime 90 tablet 3    carvedilol (COREG) 3.125 MG tablet Take 1 tablet (3.125 mg) by mouth 2 times daily (with meals) 180 tablet 0    clobetasol (TEMOVATE) 0.05 % external cream Apply to AA BID x 1-2 weeks then PRN. Do not apply to face. 60 g 3    fluticasone (FLONASE) 50 MCG/ACT nasal spray Spray 2 sprays into both nostrils daily 48 mL 3    furosemide (LASIX) 20 MG tablet Take 1 tablet (20 mg) by mouth daily 90 tablet 1    gabapentin (NEURONTIN) 300 MG capsule Take 2 capsules (600 mg) by mouth 3 times daily      ketoconazole (NIZORAL) 2 % cream Apply topically 2 times daily For face. FAX REFILL REQUESTS TO [facility]: [phone] 30 g 2    ketoconazole (NIZORAL) 2 % external shampoo Use daily as needed 120 mL 11    lidocaine-prilocaine (EMLA) 2.5-2.5 % external cream Apply topically as needed for moderate pain 30 g 1    lisinopril (ZESTRIL) 30 MG tablet Take 1 tablet (30 mg) by mouth daily 90 tablet 3    nitroGLYcerin (NITROSTAT) 0.4 MG sublingual tablet Place 1 tablet (0.4 mg) under the  tongue every 5 minutes as needed for chest pain May repeat twice for a total of 3 tablets.  If chest pain not relieved, call 911 25 tablet 11    OXcarbazepine (TRILEPTAL) 300 MG tablet Take 2 tablets (600 mg) by mouth 3 times daily ( @ 7am, 1530 and 2200 hrs each day)      predniSONE (DELTASONE) 10 MG tablet Take 10 mg by mouth daily      triamcinolone (KENALOG) 0.1 % external lotion Apply to scalp BID x 1-2 weeks then PRN only 60 mL 3    COMPOUNDED NON-CONTROLLED SUBSTANCE (CMPD RX) - PHARMACY TO MIX COMPOUNDED MEDICATION Fluorouracil 5% Calcipotriene 0.005% 1:1 Cream apply twice daily for 1 weeks to face, arms for 14 days 30 g 6    desonide (DESOWEN) 0.05 % external cream Apply topically daily         ALLERGIES     Allergies   Allergen Reactions    Cats      Cat Dander    Dogs      Dog Dander    Hydromorphone      Other reaction(s): Confusion    Pollen Extract        PAST MEDICAL HISTORY:  Past Medical History:   Diagnosis Date    Actinic cheilitis 07/17/2013    Lower lip, left     Actinic keratosis     Allergic rhinitis     Anxiety 3/1/23    Arthritis 2004    Basal cell carcinoma     Benign hypertension     CAD (coronary artery disease)     Cardiac cath and PCI 1994. Cardiac Cath 9/2015: BRIDGET to LAD    Hearing problem 1995    Wear hearing aids    Hyperlipidemia     Malignant melanoma     Morbid obesity 01/11/2016    Paroxysmal supraventricular tachycardia     on metoprolol    Permanent atrial fibrillation 04/21/2017    Squamous cell carcinoma     Tachy-edinson syndrome 05/29/2019       PAST SURGICAL HISTORY:  Past Surgical History:   Procedure Laterality Date    ADENOIDECTOMY  Childhood    ANGIOPLASTY  1994    in [place]    ANKLE SURGERY  07/13/2005    right ankle    ARTHROPLASTY HIP Right 10/2009    BIOPSY NODE SENTINEL Left 03/30/2023    Procedure: BIOPSY, LYMPH NODE, SENTINEL;  Surgeon: Rolando Minaya MD;  Location: [facility] OR    COLONOSCOPY  4/25/12    EP PERM PACER SINGLE LEAD N/A 05/31/2019    Medtronic  single lead pacemaker    EXCISE MASS CHEEK Left 2023    Procedure: wide local excision of left cheek melanoma;  Surgeon: Rolando Minaya MD;  Location: [facility] OR    EXCISE MASS CHEEK WITH FLAP PEDICLE Left 2023    Procedure: Left cervical Facial flap per closure of left cheek Defect;  Surgeon: Ila Sanchez MD;  Location: [facility] OR    Facial biopsy - Melanoma      GENITOURINARY SURGERY  10/20/23    Prostate surgery    HEART CATH STENT COR W/WO PTCA  2015    BRIDGET stent mid LAD    IR CHEST PORT PLACEMENT > 5 YRS OF AGE  2023    LASER SURGERY OF EYE  2002    MOHS MICROGRAPHIC PROCEDURE  2004    squamous cell carcinoma right temple    SINUS SURGERY  2006    TONSILLECTOMY  Childhood       FAMILY HISTORY:  Family History   Problem Relation Age of Onset    Genitourinary Problems Mother         renal failure    Heart Disease Mother     Cerebrovascular Disease Mother         TIA    Cardiovascular Father         rupture of dorsal aorta    Depression Son     Depression Brother         Suicide    Substance Abuse Brother         Heroin    Skin Cancer No family hx of        SOCIAL HISTORY:  Social History     Socioeconomic History    Marital status:      Spouse name: None    Number of children: 3    Years of education: None    Highest education level: None   Occupational History     Employer: RETIRED   Tobacco Use    Smoking status: Former     Packs/day: 0.50     Years: 10.00     Additional pack years: 0.00     Total pack years: 5.00     Types: Cigarettes, Cigars, Pipe     Start date: 1966     Quit date: 1974     Years since quittin.8    Smokeless tobacco: Never    Tobacco comments:     Also smoked from 1985 - 1990   Substance and Sexual Activity    Alcohol use: Not Currently    Drug use: No    Sexual activity: Not Currently     Partners: Female     Birth control/protection: Male Surgical     Comment: Vasectomy   Other Topics Concern    Parent/sibling w/ CABG,  "MI or angioplasty before 65F 55M? No    Caffeine Concern Yes     Comment: 2 big cups coffee daily    Sleep Concern No     Comment: sleeping better since shoulder replaced 11/3/16    Stress Concern No    Weight Concern Yes    Special Diet Yes     Comment: trying to do more lean meats    Exercise No     Comment: limited - knee       Review of Systems:  Skin:  not assessed       Eyes:  not assessed      ENT:  not assessed      Respiratory:  Negative       Cardiovascular:    Positive for;lightheadedness;fatigue lightheadedness due to injection in face per pt - TVEC  Gastroenterology: not assessed      Genitourinary:  not assessed      Musculoskeletal:  not assessed      Neurologic:  not assessed      Psychiatric:  not assessed      Heme/Lymph/Imm:         Endocrine:           Physical Exam:  Vitals: /66   Pulse 60   Ht 1.842 m (6' 0.5\")   Wt 123.7 kg (272 lb 12.8 oz)   BMI 36.49 kg/m      Constitutional:  cooperative morbidly obese      Skin:  warm and dry to the touch   pacemaker incision in the left infraclavicular area was well-healed      Head:  normocephalic        Eyes:           Lymph:      ENT:  no pallor or cyanosis hearing aide(s) present      Neck:  JVP normal        Respiratory:  clear to auscultation;normal respiratory excursion         Cardiac: regular rhythm;normal S1 and S2;no S3 or S4;apical impulse not displaced     no presence of murmur          pulses full and equal                                        GI:    obese      Extremities and Muscular Skeletal:      trace;bilateral LE edema          Neurological:  no gross motor deficits;affect appropriate        Psych:  Alert and Oriented x 3          CC  No referring provider defined for this encounter.      Thank you for allowing me to participate in the care of your patient.      Sincerely,     NAV Muller CNP     [facility]  "

## 2024-02-23 DIAGNOSIS — L21.9 DERMATITIS, SEBORRHEIC: ICD-10-CM

## 2024-02-23 LAB
MDC_IDC_LEAD_CONNECTION_STATUS: NORMAL
MDC_IDC_LEAD_IMPLANT_DT: NORMAL
MDC_IDC_LEAD_LOCATION: NORMAL
MDC_IDC_LEAD_LOCATION_DETAIL_1: NORMAL
MDC_IDC_LEAD_MFG: NORMAL
MDC_IDC_LEAD_MODEL: NORMAL
MDC_IDC_LEAD_POLARITY_TYPE: NORMAL
MDC_IDC_LEAD_SERIAL: NORMAL
MDC_IDC_MSMT_BATTERY_DTM: NORMAL
MDC_IDC_MSMT_BATTERY_REMAINING_LONGEVITY: 123 MO
MDC_IDC_MSMT_BATTERY_RRT_TRIGGER: 2.62
MDC_IDC_MSMT_BATTERY_STATUS: NORMAL
MDC_IDC_MSMT_BATTERY_VOLTAGE: 3.02 V
MDC_IDC_MSMT_LEADCHNL_RV_IMPEDANCE_VALUE: 323 OHM
MDC_IDC_MSMT_LEADCHNL_RV_IMPEDANCE_VALUE: 380 OHM
MDC_IDC_MSMT_LEADCHNL_RV_PACING_THRESHOLD_AMPLITUDE: 0.75 V
MDC_IDC_MSMT_LEADCHNL_RV_PACING_THRESHOLD_PULSEWIDTH: 0.4 MS
MDC_IDC_MSMT_LEADCHNL_RV_SENSING_INTR_AMPL: 1.38 MV
MDC_IDC_MSMT_LEADCHNL_RV_SENSING_INTR_AMPL: 1.62 MV
MDC_IDC_PG_IMPLANT_DTM: NORMAL
MDC_IDC_PG_MFG: NORMAL
MDC_IDC_PG_MODEL: NORMAL
MDC_IDC_PG_SERIAL: NORMAL
MDC_IDC_PG_TYPE: NORMAL
MDC_IDC_SESS_CLINIC_NAME: NORMAL
MDC_IDC_SESS_DTM: NORMAL
MDC_IDC_SESS_TYPE: NORMAL
MDC_IDC_SET_BRADY_HYSTRATE: NORMAL
MDC_IDC_SET_BRADY_LOWRATE: 50 {BEATS}/MIN
MDC_IDC_SET_BRADY_MAX_SENSOR_RATE: 130 {BEATS}/MIN
MDC_IDC_SET_BRADY_MODE: NORMAL
MDC_IDC_SET_LEADCHNL_RV_PACING_AMPLITUDE: 2 V
MDC_IDC_SET_LEADCHNL_RV_PACING_ANODE_ELECTRODE_1: NORMAL
MDC_IDC_SET_LEADCHNL_RV_PACING_ANODE_LOCATION_1: NORMAL
MDC_IDC_SET_LEADCHNL_RV_PACING_CAPTURE_MODE: NORMAL
MDC_IDC_SET_LEADCHNL_RV_PACING_CATHODE_ELECTRODE_1: NORMAL
MDC_IDC_SET_LEADCHNL_RV_PACING_CATHODE_LOCATION_1: NORMAL
MDC_IDC_SET_LEADCHNL_RV_PACING_POLARITY: NORMAL
MDC_IDC_SET_LEADCHNL_RV_PACING_PULSEWIDTH: 0.4 MS
MDC_IDC_SET_LEADCHNL_RV_SENSING_ANODE_ELECTRODE_1: NORMAL
MDC_IDC_SET_LEADCHNL_RV_SENSING_ANODE_LOCATION_1: NORMAL
MDC_IDC_SET_LEADCHNL_RV_SENSING_CATHODE_ELECTRODE_1: NORMAL
MDC_IDC_SET_LEADCHNL_RV_SENSING_CATHODE_LOCATION_1: NORMAL
MDC_IDC_SET_LEADCHNL_RV_SENSING_POLARITY: NORMAL
MDC_IDC_SET_LEADCHNL_RV_SENSING_SENSITIVITY: 0.6 MV
MDC_IDC_SET_ZONE_DETECTION_INTERVAL: 360 MS
MDC_IDC_SET_ZONE_STATUS: NORMAL
MDC_IDC_SET_ZONE_TYPE: NORMAL
MDC_IDC_SET_ZONE_VENDOR_TYPE: NORMAL
MDC_IDC_STAT_BRADY_DTM_END: NORMAL
MDC_IDC_STAT_BRADY_DTM_START: NORMAL
MDC_IDC_STAT_BRADY_RV_PERCENT_PACED: 68.33 %
MDC_IDC_STAT_EPISODE_RECENT_COUNT: 0
MDC_IDC_STAT_EPISODE_RECENT_COUNT_DTM_END: NORMAL
MDC_IDC_STAT_EPISODE_RECENT_COUNT_DTM_START: NORMAL
MDC_IDC_STAT_EPISODE_TOTAL_COUNT: 0
MDC_IDC_STAT_EPISODE_TOTAL_COUNT: 0
MDC_IDC_STAT_EPISODE_TOTAL_COUNT: 3
MDC_IDC_STAT_EPISODE_TOTAL_COUNT_DTM_END: NORMAL
MDC_IDC_STAT_EPISODE_TOTAL_COUNT_DTM_START: NORMAL
MDC_IDC_STAT_EPISODE_TYPE: NORMAL

## 2024-02-23 RX ORDER — TRIAMCINOLONE ACETONIDE 1 MG/ML
LOTION TOPICAL
Qty: 60 ML | Refills: 3 | Status: SHIPPED | OUTPATIENT
Start: 2024-02-23

## 2024-02-27 ENCOUNTER — HOSPITAL ENCOUNTER (OUTPATIENT)
Dept: CARDIOLOGY | Facility: CLINIC | Age: 79
Discharge: HOME | End: 2024-02-27
Attending: INTERNAL MEDICINE | Admitting: INTERNAL MEDICINE
Payer: COMMERCIAL

## 2024-02-27 DIAGNOSIS — I25.10 CORONARY ARTERY DISEASE INVOLVING NATIVE CORONARY ARTERY OF NATIVE HEART WITHOUT ANGINA PECTORIS: ICD-10-CM

## 2024-02-27 DIAGNOSIS — Z95.0 CARDIAC PACEMAKER IN SITU: ICD-10-CM

## 2024-02-27 DIAGNOSIS — I48.21 PERMANENT ATRIAL FIBRILLATION (H): ICD-10-CM

## 2024-02-27 LAB — LVEF ECHO: NORMAL

## 2024-02-27 PROCEDURE — 93306 TTE W/DOPPLER COMPLETE: CPT | Mod: 26 | Performed by: INTERNAL MEDICINE

## 2024-02-27 PROCEDURE — 93306 TTE W/DOPPLER COMPLETE: CPT

## 2024-03-11 ENCOUNTER — MEDICAL CORRESPONDENCE (OUTPATIENT)
Dept: SCHEDULING | Facility: CLINIC | Age: 79
End: 2024-03-11
Payer: COMMERCIAL

## 2024-03-15 ENCOUNTER — OFFICE VISIT (OUTPATIENT)
Dept: DERMATOLOGY | Facility: CLINIC | Age: 79
End: 2024-03-15
Payer: COMMERCIAL

## 2024-03-15 DIAGNOSIS — C43.9 METASTATIC MELANOMA (H): ICD-10-CM

## 2024-03-15 DIAGNOSIS — Z85.820 HISTORY OF MELANOMA: ICD-10-CM

## 2024-03-15 DIAGNOSIS — L81.4 LENTIGO: ICD-10-CM

## 2024-03-15 DIAGNOSIS — Z85.828 HISTORY OF SCC (SQUAMOUS CELL CARCINOMA) OF SKIN: ICD-10-CM

## 2024-03-15 DIAGNOSIS — L82.1 SEBORRHEIC KERATOSIS: ICD-10-CM

## 2024-03-15 DIAGNOSIS — D48.5 NEOPLASM OF UNCERTAIN BEHAVIOR OF SKIN: ICD-10-CM

## 2024-03-15 DIAGNOSIS — L57.0 ACTINIC KERATOSIS: Primary | ICD-10-CM

## 2024-03-15 DIAGNOSIS — D18.01 ANGIOMA OF SKIN: ICD-10-CM

## 2024-03-15 DIAGNOSIS — D22.9 NEVUS: ICD-10-CM

## 2024-03-15 PROCEDURE — 17000 DESTRUCT PREMALG LESION: CPT | Mod: 59 | Performed by: PHYSICIAN ASSISTANT

## 2024-03-15 PROCEDURE — 11103 TANGNTL BX SKIN EA SEP/ADDL: CPT | Performed by: PHYSICIAN ASSISTANT

## 2024-03-15 PROCEDURE — 88305 TISSUE EXAM BY PATHOLOGIST: CPT | Performed by: PATHOLOGY

## 2024-03-15 PROCEDURE — 99213 OFFICE O/P EST LOW 20 MIN: CPT | Mod: 25 | Performed by: PHYSICIAN ASSISTANT

## 2024-03-15 PROCEDURE — 11102 TANGNTL BX SKIN SINGLE LES: CPT | Performed by: PHYSICIAN ASSISTANT

## 2024-03-15 NOTE — LETTER
3/15/2024         RE: James Salvador  [address] [place] [address]        Dear Colleague,    Thank you for referring your patient, James Salvador, to the [facility]. Please see a copy of my visit note below.    HPI:   Chief complaint: James Salvador (Pete) is a 78 year old male who presents for Full skin cancer screening to rule out skin cancer.   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 Now receiving injections with [facility] - visible necrosis of the tumors seen today.    Personal HX of Malignant Melanoma: yes, as above   Family HX of Skin Cancer / Malignant Melanoma: none  Personal HX of Atypical Moles: none  Risk factors: sun exposure, history of SCC, history of melanoma   New / Changing lesions: none  Social History: Has grandchildren  On review of systems, there are no further skin complaints, patient is feeling otherwise well.  See patient intake sheet.  ROS of the following were done and are negative: Constitutional, Eyes, Ears, Nose,   Mouth, Throat, Cardiovascular, Respiratory, GI, Genitourinary, Musculoskeletal,   Psychiatric, Endocrine, Allergic/Immunologic.      PHYSICAL EXAM:   There were no vitals taken for this visit.  Skin exam performed as follows: Type 2 skin. Mood appropriate  Alert and Oriented X 3. Well developed, well nourished in no distress.  General appearance: Normal  Head including face: Normal  Eyes: conjunctiva and lids: Normal  Mouth: Lips, teeth, gums: Normal  Neck: Normal  Chest-breast/axillae: Normal  Back: Normal  Spleen and liver: Normal  Cardiovascular: Exam of peripheral vascular system by observation for swelling, varicosities, edema: Normal  Genitalia: groin, buttocks: Normal  Extremities: digits/nails (clubbing):  Normal  Eccrine and Apocrine glands: Normal  Right upper extremity: Normal  Left upper extremity: Normal  Right lower extremity: Normal  Left lower extremity: Normal  Skin: Scalp and body hair: See below    Pt deferred exam of breasts, groin, buttocks: NO    Other physical findings:  1. Multiple pigmented macules on extremities and trunk  2. Multiple pigmented macules on face, trunk and extremities  3. Multiple vascular papules on trunk, arms and legs  4. Multiple scattered keratotic plaques  5. 5 mm pink hyperkeratotic papule on the left scalp  6. 5 mm tender pink hyperkeratotic papule on the right scalp  7. 5 mm pink hyperkeratotic papule on the mid frontal scalp  8. 7 mm pink papule on the right temple  9. Pink gritty papule on the right sideburn x 1        Except as noted above, no other signs of skin cancer or melanoma.     ASSESSMENT/PLAN:   Benign Full skin cancer screening today.     Patient with history of SCC, BCC and melanoma  Advised on monthly self exams and 1 year  Patient Education: Appropriate brochures given.    Multiple benign appearing nevi on arms, legs and trunk. Discussed ABCDEs of melanoma and sunscreen.   Multiple lentigos on arms, legs and trunk. Advised benign, no treatment needed.  Multiple scattered angiomas. Advised benign, no treatment needed.   Seborrheic keratosis on arms, legs and trunk. Advised benign, no treatment needed.  R/o SCC on the left scalp, right scalp, mid frontal scalp, right temple. Shave biopsy performed.  Area cleaned and anesthetized with 1% lidocaine with epinephrine.  Dermablade used to remove the lesion and sent to pathology. Bleeding was cauterized. Pt tolerated procedure well with no complications.   Actinic keratosis on the right sideburn x 1. As precancerous, cryosurgery performed. Advised on blistering and post-op care. Advised if not resolved in 1-2 months to return for evaluation  Dermatitis on the arms and legs; eczematous dermatitis on the face likely  from ICI therapy  --TAC and ketoconazole to the scalp - use PRN only and not every day  --HC cream to the face and ears as needed - not every day  --Clobetasol cream to the body PRN            Follow-up: 3-4 months    1.) Patient was asked about new and changing moles. YES  2.) Patient received a complete physical skin examination: YES  3.) Patient was counseled to perform a monthly self skin examination: YES  Scribed By: Rosa Maria Hansen MS, PA-C      Again, thank you for allowing me to participate in the care of your patient.        Sincerely,        Rosa Maria Hansen PA-C

## 2024-03-15 NOTE — PROGRESS NOTES
HPI:   Chief complaint: James Salvador (Pete) is a 78 year old male who presents for Full skin cancer screening to rule out skin cancer.   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 Now receiving injections with [facility] - visible necrosis of the tumors seen today.    Personal HX of Malignant Melanoma: yes, as above   Family HX of Skin Cancer / Malignant Melanoma: none  Personal HX of Atypical Moles: none  Risk factors: sun exposure, history of SCC, history of melanoma   New / Changing lesions: none  Social History: Has grandchildren  On review of systems, there are no further skin complaints, patient is feeling otherwise well.  See patient intake sheet.  ROS of the following were done and are negative: Constitutional, Eyes, Ears, Nose,   Mouth, Throat, Cardiovascular, Respiratory, GI, Genitourinary, Musculoskeletal,   Psychiatric, Endocrine, Allergic/Immunologic.      PHYSICAL EXAM:   There were no vitals taken for this visit.  Skin exam performed as follows: Type 2 skin. Mood appropriate  Alert and Oriented X 3. Well developed, well nourished in no distress.  General appearance: Normal  Head including face: Normal  Eyes: conjunctiva and lids: Normal  Mouth: Lips, teeth, gums: Normal  Neck: Normal  Chest-breast/axillae: Normal  Back: Normal  Spleen and liver: Normal  Cardiovascular: Exam of peripheral vascular system by observation for swelling, varicosities, edema: Normal  Genitalia: groin, buttocks: Normal  Extremities: digits/nails (clubbing): Normal  Eccrine and Apocrine glands: Normal  Right upper extremity: Normal  Left upper extremity: Normal  Right lower extremity: Normal  Left lower extremity: Normal  Skin: Scalp and body hair: See below    Pt deferred exam of breasts, groin, buttocks: NO    Other  physical findings:  1. Multiple pigmented macules on extremities and trunk  2. Multiple pigmented macules on face, trunk and extremities  3. Multiple vascular papules on trunk, arms and legs  4. Multiple scattered keratotic plaques  5. 5 mm pink hyperkeratotic papule on the left scalp  6. 5 mm tender pink hyperkeratotic papule on the right scalp  7. 5 mm pink hyperkeratotic papule on the mid frontal scalp  8. 7 mm pink papule on the right temple  9. Pink gritty papule on the right sideburn x 1        Except as noted above, no other signs of skin cancer or melanoma.     ASSESSMENT/PLAN:   Benign Full skin cancer screening today.     Patient with history of SCC, BCC and melanoma  Advised on monthly self exams and 1 year  Patient Education: Appropriate brochures given.    Multiple benign appearing nevi on arms, legs and trunk. Discussed ABCDEs of melanoma and sunscreen.   Multiple lentigos on arms, legs and trunk. Advised benign, no treatment needed.  Multiple scattered angiomas. Advised benign, no treatment needed.   Seborrheic keratosis on arms, legs and trunk. Advised benign, no treatment needed.  R/o SCC on the left scalp, right scalp, mid frontal scalp, right temple. Shave biopsy performed.  Area cleaned and anesthetized with 1% lidocaine with epinephrine.  Dermablade used to remove the lesion and sent to pathology. Bleeding was cauterized. Pt tolerated procedure well with no complications.   Actinic keratosis on the right sideburn x 1. As precancerous, cryosurgery performed. Advised on blistering and post-op care. Advised if not resolved in 1-2 months to return for evaluation  Dermatitis on the arms and legs; eczematous dermatitis on the face likely from ICI therapy  --TAC and ketoconazole to the scalp - use PRN only and not every day  --HC cream to the face and ears as needed - not every day  --Clobetasol cream to the body PRN            Follow-up: 3-4 months    1.) Patient was asked about new and changing  moles. YES  2.) Patient received a complete physical skin examination: YES  3.) Patient was counseled to perform a monthly self skin examination: YES  Scribed By: Rosa Maria Hansen MS, PA-C

## 2024-03-15 NOTE — PATIENT INSTRUCTIONS
Patient Education       Proper skin care from [facility]:    -Eliminate harsh soaps as they strip the natural oils from the skin, often resulting in dry itchy skin ( i.e. Dial, Zest, [place])  -Use mild soaps such as Cetaphil or Dove Sensitive Skin in the shower. You do not need to use soap on arms, legs, and trunk every time you shower unless visibly soiled.   -Avoid hot or cold showers.  -After showering, lightly dry off and apply moisturizing within 2-3 minutes. This will help trap moisture in the skin.   -Aggressive use of a moisturizer at least 1-2 times a day to the entire body (including -Vanicream, Cetaphil, Aquaphor or Cerave) and moisturize hands after every washing.  -We recommend using moisturizers that come in a tub that needs to be scooped out, not a pump. This has more of an oil base. It will hold moisture in your skin much better than a water base moisturizer. The above recommended are non-pore clogging.      Wear a sunscreen with at least SPF 30 on your face, ears, neck and V of the chest daily. Wear sunscreen on other areas of the body if those areas are exposed to the sun throughout the day. Sunscreens can contain physical and/or chemical blockers. Physical blockers are less likely to clog pores, these include zinc oxide and titanium dioxide. Reapply every two hour and after swimming.     Sunscreen examples: https://www.ewg.org/sunscreen/    UV radiation  UVA radiation remains constant throughout the day and throughout the year. It is a longer wavelength than UVB and therefore penetrates deeper into the skin leading to immediate and delayed tanning, photoaging, and skin cancer. 70-80% of UVA and UVB radiation occurs between the hours of 10am-2pm.  UVB radiation  UVB radiation causes the most harmful effects and is more significant during the summer months. However, snow and ice can reflect UVB radiation leading to skin damage during the winter months as well. UVB radiation is  responsible for tanning, burning, inflammation, delayed erythema (pinkness), pigmentation (brown spots), and skin cancer.     I recommend self monthly full body exams and yearly full body exams with a dermatology provider. If you develop a new or changing lesion please follow up for examination. Most skin cancers are pink and scaly or pink and pearly. However, we do see blue/brown/black skin cancers.  Consider the ABCDEs of melanoma when giving yourself your monthly full body exam ( don't forget the groin, buttocks, feet, toes, etc). A-asymmetry, B-borders, C-color, D-diameter, E-elevation or evolving. If you see any of these changes please follow up in clinic. If you cannot see your back I recommend purchasing a hand held mirror to use with a larger wall mirror.       Checking for Skin Cancer  You can find cancer early by checking your skin each month. There are 3 kinds of skin cancer. They are melanoma, basal cell carcinoma, and squamous cell carcinoma. Doing monthly skin checks is the best way to find new marks or skin changes. Follow the instructions below for checking your skin.   The ABCDEs of checking moles for melanoma   Check your moles or growths for signs of melanoma using ABCDE:   Asymmetry: the sides of the mole or growth don t match  Border: the edges are ragged, notched, or blurred  Color: the color within the mole or growth varies  Diameter: the mole or growth is larger than 6 mm (size of a pencil eraser)  Evolving: the size, shape, or color of the mole or growth is changing (evolving is not shown in the images below)    Checking for other types of skin cancer  Basal cell carcinoma or squamous cell carcinoma have symptoms such as:     A spot or mole that looks different from all other marks on your skin  Changes in how an area feels, such as itching, tenderness, or pain  Changes in the skin's surface, such as oozing, bleeding, or scaliness  A sore that does not heal  New swelling or redness beyond  the border of a mole    Who s at risk?  Anyone can get skin cancer. But you are at greater risk if you have:   Fair skin, light-colored hair, or light-colored eyes  Many moles or abnormal moles on your skin  A history of sunburns from sunlight or tanning beds  A family history of skin cancer  A history of exposure to radiation or chemicals  A weakened immune system  If you have had skin cancer in the past, you are at risk for recurring skin cancer.   How to check your skin  Do your monthly skin checkups in front of a full-length mirror. Check all parts of your body, including your:   Head (ears, face, neck, and scalp)  Torso (front, back, and sides)  Arms (tops, undersides, upper, and lower armpits)  Hands (palms, backs, and fingers, including under the nails)  Buttocks and genitals  Legs (front, back, and sides)  Feet (tops, soles, toes, including under the nails, and between toes)  If you have a lot of moles, take digital photos of them each month. Make sure to take photos both up close and from a distance. These can help you see if any moles change over time.   Most skin changes are not cancer. But if you see any changes in your skin, call your doctor right away. Only he or she can diagnose a problem. If you have skin cancer, seeing your doctor can be the first step toward getting the treatment that could save your life.   [employer] last reviewed this educational content on 4/1/2019 2000-2020 [employer]. [address] [place] [address], [place] [address]. All rights reserved. This information is not intended as a substitute for professional medical care. Always follow your healthcare professional's instructions.       When should I call my doctor?  If you are worsening or not improving, please, contact us or seek urgent care as noted below.     Who should I call with questions (adults)?  [facility] (adult and pediatric): [phone]  [facility]  [place] (adult): [phone]  [facility] [place] ([place], [place], [place] and [place]) [phone]  For urgent needs outside of business hours call the [facility] at [phone] and ask for the dermatology resident on call to be paged  If this is a medical emergency and you are unable to reach an ER, Call 911      If you need a prescription refill, please contact your pharmacy. Refills are approved or denied by our Physicians during normal business hours, Monday through Fridays  Per office policy, refills will not be granted if you have not been seen within the past year (or sooner depending on your child's condition)

## 2024-03-18 RX ORDER — INFLUENZA A VIRUS A/MICHIGAN/45/2015 X-275 (H1N1) ANTIGEN (FORMALDEHYDE INACTIVATED), INFLUENZA A VIRUS A/SINGAPORE/INFIMH-16-0019/2016 IVR-186 (H3N2) ANTIGEN (FORMALDEHYDE INACTIVATED), INFLUENZA B VIRUS B/PHUKET/3073/2013 ANTIGEN (FORMALDEHYDE INACTIVATED), AND INFLUENZA B VIRUS B/MARYLAND/15/2016 BX-69A ANTIGEN (FORMALDEHYDE INACTIVATED) 60; 60; 60; 60 UG/.7ML; UG/.7ML; UG/.7ML; UG/.7ML
INJECTION, SUSPENSION INTRAMUSCULAR
Qty: 0.7 ML | Refills: 0 | OUTPATIENT
Start: 2024-03-18

## 2024-03-20 ENCOUNTER — TELEPHONE (OUTPATIENT)
Dept: DERMATOLOGY | Facility: CLINIC | Age: 79
End: 2024-03-20
Payer: COMMERCIAL

## 2024-03-20 DIAGNOSIS — D48.5 NEOPLASM OF UNCERTAIN BEHAVIOR OF SKIN: Primary | ICD-10-CM

## 2024-03-20 NOTE — TELEPHONE ENCOUNTER
Called patient:  Patient notified and educated on test results   Mohs procedure explained- all questions answered  appointment scheduled- mohs packet mailed.     Thank you,  Freya OCONNELL RN  Dermatology   [phone]

## 2024-03-20 NOTE — LETTER
[address]  [address]  [phone]        3/20/2024       James Salvador  [address] [place] [address]      Dear James:    You are scheduled for Mohs Surgery on: 5/9/24 at 8:30 am.    Please check in at [facility], [address].     You don't need to arrive more than 5-10 minutes prior to your appointment time.     Be sure to eat a good breakfast and bathe and wash your hair prior to surgery.     If you are taking any anti-coagulants that are prescribed by your Doctor (such as Coumadin/Warfarin, Plavix, Aspirin, Ibuprofen), please continue taking them.     However, if you are taking anti-coagulants over the counter without a Doctor's order for a medical condition, please discontinue them 10 days prior to surgery.     Please wear loose comfortable clothing as it could possibly be 4-6 hours until your surgery is completed depending upon how many layers of tissue need to be removed.      Thank you,    VICENTE Dutta MD

## 2024-03-20 NOTE — TELEPHONE ENCOUNTER
----- Message from Rosa Maria Hansen PA-C sent at 3/20/2024 11:33 AM CDT -----  Path for the biopsy on the left scalp, right scalp and mid frontal scalp all came back as an actinic keratosis. Recommend cryo for complete resolution.     Right temple SCC please schedule excision

## 2024-03-20 NOTE — TELEPHONE ENCOUNTER
Patient Contact    Attempt # 1    Was call answered?  No    Left message on answering machine for patient to call back.    Freya OCONNELL RN  Dermatology   [phone]

## 2024-04-05 ENCOUNTER — HOSPITAL ENCOUNTER (OUTPATIENT)
Dept: CT IMAGING | Facility: CLINIC | Age: 79
Discharge: HOME | End: 2024-04-05
Attending: INTERNAL MEDICINE
Payer: COMMERCIAL

## 2024-04-05 DIAGNOSIS — C43.30 MALIGNANT MELANOMA OF SKIN OF FACE (H): ICD-10-CM

## 2024-04-05 LAB
CREAT BLD-MCNC: 0.6 MG/DL (ref 0.7–1.3)
EGFRCR SERPLBLD CKD-EPI 2021: >60 ML/MIN/1.73M2

## 2024-04-05 PROCEDURE — 71260 CT THORAX DX C+: CPT

## 2024-04-05 PROCEDURE — 250N000011 HC RX IP 250 OP 636: Performed by: INTERNAL MEDICINE

## 2024-04-05 PROCEDURE — 70491 CT SOFT TISSUE NECK W/DYE: CPT

## 2024-04-05 PROCEDURE — 250N000011 HC RX IP 250 OP 636: Performed by: RADIOLOGY

## 2024-04-05 PROCEDURE — 82565 ASSAY OF CREATININE: CPT

## 2024-04-05 PROCEDURE — 250N000009 HC RX 250: Performed by: INTERNAL MEDICINE

## 2024-04-05 RX ORDER — HEPARIN SODIUM (PORCINE) LOCK FLUSH IV SOLN 100 UNIT/ML 100 UNIT/ML
SOLUTION INTRAVENOUS
Status: DISCONTINUED
Start: 2024-04-05 | End: 2024-04-06 | Stop reason: HOSPADM

## 2024-04-05 RX ORDER — HEPARIN SODIUM (PORCINE) LOCK FLUSH IV SOLN 100 UNIT/ML 100 UNIT/ML
500 SOLUTION INTRAVENOUS ONCE
Status: COMPLETED | OUTPATIENT
Start: 2024-04-05 | End: 2024-04-05

## 2024-04-05 RX ORDER — IOPAMIDOL 755 MG/ML
500 INJECTION, SOLUTION INTRAVASCULAR ONCE
Status: COMPLETED | OUTPATIENT
Start: 2024-04-05 | End: 2024-04-05

## 2024-04-05 RX ADMIN — IOPAMIDOL 125 ML: 755 INJECTION, SOLUTION INTRAVENOUS at 13:25

## 2024-04-05 RX ADMIN — SODIUM CHLORIDE 66 ML: 9 INJECTION, SOLUTION INTRAVENOUS at 13:25

## 2024-04-05 RX ADMIN — HEPARIN 500 UNITS: 100 SYRINGE at 13:51

## 2024-04-09 ENCOUNTER — TRANSFERRED RECORDS (OUTPATIENT)
Dept: HEALTH INFORMATION MANAGEMENT | Facility: CLINIC | Age: 79
End: 2024-04-09
Payer: COMMERCIAL

## 2024-04-12 ENCOUNTER — MYC MEDICAL ADVICE (OUTPATIENT)
Dept: INTERNAL MEDICINE | Facility: CLINIC | Age: 79
End: 2024-04-12

## 2024-05-09 ENCOUNTER — OFFICE VISIT (OUTPATIENT)
Dept: DERMATOLOGY | Facility: CLINIC | Age: 79
End: 2024-05-09
Payer: COMMERCIAL

## 2024-05-09 DIAGNOSIS — L57.0 AK (ACTINIC KERATOSIS): Primary | ICD-10-CM

## 2024-05-09 DIAGNOSIS — C44.329 SQUAMOUS CELL CARCINOMA OF SKIN OF RIGHT TEMPLE: ICD-10-CM

## 2024-05-09 DIAGNOSIS — Z85.820 HISTORY OF MELANOMA: ICD-10-CM

## 2024-05-09 PROCEDURE — 17003 DESTRUCT PREMALG LES 2-14: CPT | Performed by: DERMATOLOGY

## 2024-05-09 PROCEDURE — 99212 OFFICE O/P EST SF 10 MIN: CPT | Mod: 25 | Performed by: DERMATOLOGY

## 2024-05-09 PROCEDURE — 17311 MOHS 1 STAGE H/N/HF/G: CPT | Performed by: DERMATOLOGY

## 2024-05-09 PROCEDURE — 17000 DESTRUCT PREMALG LESION: CPT | Performed by: DERMATOLOGY

## 2024-05-09 NOTE — PROGRESS NOTES
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  He has significant hx of melanoma, failed ICI therapy and getting infusion and TVEc injections.  .  Patient has no other skin complaints today.  Remainder of the HPI, Meds, PMH, Allergies, FH, and SH was reviewed in chart.      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 Cardiac Cath 9/2015: BRIDGET to LAD    Hearing problem 1995    Wear hearing aids    Hyperlipidemia     Malignant melanoma     Morbid obesity 01/11/2016    Paroxysmal supraventricular tachycardia     on metoprolol    Permanent atrial fibrillation 04/21/2017    Squamous cell carcinoma     Tachy-edinson syndrome 05/29/2019       Past Surgical History:   Procedure Laterality Date    ADENOIDECTOMY  Childhood    ANGIOPLASTY  1994    in [place]    ANKLE SURGERY  07/13/2005    right ankle    ARTHROPLASTY HIP Right 10/2009    BIOPSY NODE SENTINEL Left 03/30/2023    Procedure: BIOPSY, LYMPH NODE, SENTINEL;  Surgeon: Rolando Minaya MD;  Location: [facility] OR    COLONOSCOPY  4/25/12    EP PERM PACER SINGLE LEAD N/A 05/31/2019    Medtronic single lead pacemaker    EXCISE MASS CHEEK Left 03/30/2023    Procedure: wide local excision of left cheek melanoma;  Surgeon: Rolando Minaya MD;  Location: [facility] OR    EXCISE MASS CHEEK WITH FLAP PEDICLE Left 04/13/2023    Procedure: Left cervical Facial flap per closure of left cheek Defect;  Surgeon: Ila Sanchez MD;  Location: [facility] OR    Facial biopsy - Melanoma      GENITOURINARY SURGERY  10/20/23    Prostate surgery    HEART CATH STENT COR W/WO PTCA  09/23/2015    BRIDGET stent mid LAD    IR CHEST PORT  PLACEMENT > 5 YRS OF AGE  2023    LASER SURGERY OF EYE  2002    MOHS MICROGRAPHIC PROCEDURE  2004    squamous cell carcinoma right temple    SINUS SURGERY  2006    TONSILLECTOMY  Childhood        Family History   Problem Relation Age of Onset    Genitourinary Problems Mother         renal failure    Heart Disease Mother     Cerebrovascular Disease Mother         TIA    Cardiovascular Father         rupture of dorsal aorta    Depression Son     Depression Brother         Suicide    Substance Abuse Brother         Heroin    Skin Cancer No family hx of        Social History     Socioeconomic History    Marital status:      Spouse name: Not on file    Number of children: 3    Years of education: Not on file    Highest education level: Not on file   Occupational History     Employer: RETIRED   Tobacco Use    Smoking status: Former     Current packs/day: 0.00     Average packs/day: 0.5 packs/day for 10.0 years (5.0 ttl pk-yrs)     Types: Cigarettes, Cigars, Pipe     Start date: 1966     Quit date: 1974     Years since quittin.0    Smokeless tobacco: Never    Tobacco comments:     Also smoked from 1985 - 1990   Substance and Sexual Activity    Alcohol use: Not Currently    Drug use: No    Sexual activity: Not Currently     Partners: Female     Birth control/protection: Male Surgical     Comment: Vasectomy   Other Topics Concern    Parent/sibling w/ CABG, MI or angioplasty before 65F 55M? No     Service Not Asked    Blood Transfusions Not Asked    Caffeine Concern Yes     Comment: 2 big cups coffee daily    Occupational Exposure Not Asked    Hobby Hazards Not Asked    Sleep Concern No     Comment: sleeping better since shoulder replaced 11/3/16    Stress Concern No    Weight Concern Yes    Special Diet Yes     Comment: trying to do more lean meats    Back Care Not Asked    Exercise No     Comment: limited - knee    Bike Helmet Not Asked    Seat Belt Not Asked     Self-Exams Not Asked   Social History Narrative    Not on file     Social Determinants of Health     Financial Resource Strain: Low Risk  (12/12/2023)    Received from [facility], [facility]    Financial Resource Strain     Difficulty of Paying Living Expenses: 3     Difficulty of Paying Living Expenses: Not on file   Food Insecurity: No Food Insecurity (12/12/2023)    Received from [facility], [facility]    Food Insecurity     Worried About Running Out of Food in the Last Year: 1   Transportation Needs: No Transportation Needs (12/12/2023)    Received from [facility], [facility]    Transportation Needs     Lack of Transportation (Medical): 1   Physical Activity: Not on file   Stress: Not on file   Social Connections: Socially Integrated (12/12/2023)    Received from [facility], [facility]    Social Connections     Frequency of Communication with Friends and Family: 0   Interpersonal Safety: Not on file   Housing Stability: Low Risk  (12/12/2023)    Received from [facility], [facility]    Housing Stability     Unable to Pay for Housing in the Last Year: 1       Outpatient Encounter Medications as of 5/9/2024   Medication Sig Dispense Refill    acetaminophen (TYLENOL) 325 MG tablet Take 650 mg by mouth 4 times daily      apixaban ANTICOAGULANT (ELIQUIS ANTICOAGULANT) 5 MG tablet Take 1 tablet (5 mg) by mouth 2 times daily 180 tablet 1    atorvastatin (LIPITOR) 80 MG tablet Take 1 tablet (80 mg) by mouth At Bedtime 90 tablet 3    carvedilol (COREG) 3.125 MG tablet Take 1 tablet (3.125 mg) by mouth 2 times daily (with meals) 180 tablet 0    clobetasol (TEMOVATE) 0.05 % external cream  Apply to AA BID x 1-2 weeks then PRN. Do not apply to face. 60 g 3    fluticasone (FLONASE) 50 MCG/ACT nasal spray Spray 2 sprays into both nostrils daily 48 mL 3    furosemide (LASIX) 20 MG tablet Take 1 tablet (20 mg) by mouth daily 90 tablet 1    gabapentin (NEURONTIN) 300 MG capsule Take 2 capsules (600 mg) by mouth 3 times daily      ketoconazole (NIZORAL) 2 % cream Apply topically 2 times daily For face. FAX REFILL REQUESTS TO [facility]: [phone] 30 g 2    ketoconazole (NIZORAL) 2 % external shampoo Use daily as needed 120 mL 11    lidocaine-prilocaine (EMLA) 2.5-2.5 % external cream Apply topically as needed for moderate pain 30 g 1    lisinopril (ZESTRIL) 30 MG tablet Take 1 tablet (30 mg) by mouth daily 90 tablet 3    nitroGLYcerin (NITROSTAT) 0.4 MG sublingual tablet Place 1 tablet (0.4 mg) under the tongue every 5 minutes as needed for chest pain May repeat twice for a total of 3 tablets.  If chest pain not relieved, call 911 25 tablet 11    OXcarbazepine (TRILEPTAL) 300 MG tablet Take 2 tablets (600 mg) by mouth 3 times daily ( @ 7am, 1530 and 2200 hrs each day)      predniSONE (DELTASONE) 10 MG tablet Take 10 mg by mouth daily      triamcinolone (KENALOG) 0.1 % external lotion Apply to scalp BID x 1-2 weeks then PRN only 60 mL 3     No facility-administered encounter medications on file as of 5/9/2024.             O:   NAD, WDWN, Alert & Oriented, Mood & Affect wnl, Vitals stable   General appearance normal   Vitals stable   Alert, oriented and in no acute distress   R temple 7mm scaly papule      Gritty scaly papule on left and R scalp and mid frontal scalp   Black papules and left cheek with erythema and induration     Eyes: Conjunctivae/lids:Normal     ENT: Lips, mucosa: normal    MSK:Normal    Cardiovascular: peripheral edema none    Pulm: Breathing Normal    Lymph Nodes: No Head and Neck Lymphadenopathy     Neuro/Psych: Orientation:Alert and Orientedx3 ; Mood/Affect:normal       A/P:  Scalp  actinic keratosis x3  LN2:  Treated with LN2 for 5s for 1-2 cycles. Warned risks of blistering, pain, pigment change, scarring, and incomplete resolution.  Advised patient to return if lesions do not completely resolve.  Wound care sheet given.  2. R temple squamous cell carcinoma   MOHS:   Location    The rationale for Mohs surgery was discussed with the patient and consent was obtained.  The risks and benefits as well as alternatives to therapy were discussed, in detail.  Specifically, the risks of infection, scarring, bleeding, prolonged wound healing, incomplete removal, allergy to anesthesia, nerve injury and recurrence were addressed.  Indication for Mohs was Location. Prior to the procedure, the treatment site was clearly identified and, if available, confirmed with previous photos and confirmed by the patient   All components of the Universal Protocol/PAUSE rule were completed.  The Mohs surgeon operated in two distinct and integrated capacities as the surgeon and pathologist.      The area was prepped with Betasept.  A rim of normal appearing skin was marked circumferentially around the lesion.  The area was infiltrated with local anesthesia.  The tumor was first debulked to remove all clinically apparent tumor.  An incision following the standard Mohs approach was done and the specimen was oriented,mapped and placed in 1 block(s).  Each specimen was then chromacoded and processed in the Mohs laboratory using standard Mohs technique and submitted for frozen section histology.  Frozen section analysis showed no residual tumor but CLEAR MARGINS.      The tumor was excised using standard Mohs technique in 1 stages(s).  CLEAR MARGINS OBTAINED and Final defect size was 1.3 x 1.2 cm.     We discussed the options for wound management in full with the patient including risks/benefits/ possible outcomes.      LN2:  Treated with LN2 for 5s for 1-2 cycles. Warned risks of blistering, pain, pigment change, scarring,  and incomplete resolution.  Advised patient to return if lesions do not completely resolve.  Wound care sheet given.    3. Hx of melanoma  Cont to follow with onc  Cont 3 month derm follow up   It was a pleasure speaking to James Salvador today.  Previous clinic notes and pertinent laboratory tests were reviewed prior to James Salvador's visit.  Signs and Symptoms of skin cancer discussed with patient.  Patient encouraged to perform monthly skin exams.  UV precautions reviewed with patient.  Risks of non-melanoma skin cancer discussed with patient

## 2024-05-09 NOTE — PATIENT INSTRUCTIONS
Open Wound Care     for ______________        No strenuous activity for 48 hours    Take Tylenol as needed for discomfort.                                                .         Do not drink alcoholic beverages for 48 hours.    Keep the pressure bandage in place for 24 hours. If the bandage becomes blood tinged or loose, reinforce it with gauze and tape.        (Refer to the reverse side of this page for management of bleeding).    Remove bandage in 24 hours and begin wound care as follows:     Clean area with tap water using a Q tip or gauze pad, (shower / bathe normally)  Dry wound with Q tip or gauze pad  Apply Aquaphor, Vaseline, Polysporin or Bacitracin Ointment with a Q tip  Do NOT use Neosporin Ointment *  Cover the wound with a band-aid or nonstick gauze pad and paper tape.  Repeat wound care once a day until wound is completely healed.    It is an old wives tale that a wound heals better when it is exposed to air and allowed to dry out. The wound will heal faster with a better cosmetic result if it is kept moist with ointment and covered with a bandage.  Do not let the wound dry out.      Supplies Needed:                Qtips or gauze pads                Polysporin or Bacitracin Ointment                Bandaids or nonstick gauze pads and paper tape    Wound care kits and brown paper tape are available for purchase at   the pharmacy.    BLEEDING:    Use tightly rolled up gauze or cloth to apply direct pressure over the bandage for 20   minutes.  Reapply pressure for an additional 20 minutes if necessary  Call the office or go to the nearest emergency room if pressure fails to stop the bleeding.  Use additional gauze and tape to maintain pressure once the bleeding has stopped.  Begin wound care 24 hours after surgery as directed.                  WOUND HEALING    One week after surgery a pink / red halo will form around the outside of the wound.   This is new skin.  The center of the wound will appear  yellowish white and produce some drainage.  The pink halo will slowly migrate in toward the center of the wound until the wound is covered with new shiny pink skin.  There will be no more drainage when the wound is completely healed.  It will take six months to one year for the redness to fade.  The scar may be itchy, tight and sensitive to extreme temperatures for a year after the surgery.  Massaging the area several times a day for several minutes after the wound is completely healed will help the scar soften and normalize faster. Begin massage only after healing is complete.      In case of emergency call: Dr Dutta: [phone]    [facility]: [phone]    [facility]:[phone]       WOUND CARE INSTRUCTIONS   FOR CRYOSURGERY   This area treated with liquid nitrogen should form a blister (areas treated may or may not blister-skin may just turn dark and slough off). You do not need to bandage the area unless a blister forms and breaks (which may be a few days). When the blister breaks, begin daily dressing changes as follows:  1) Clean and dry the area with tap water using clean Q-tip or sterile gauze pad.   2) Apply Polysporin ointment or Bacitracin ointment over entire wound. Do NOT use Neosporin ointment.   3) Cover the wound with a band-aid or sterile non-stick gauze pad and micropore paper tape.   REPEAT THESE INSTRUCTIONS AT LEAST ONCE A DAY UNTIL THE WOUND HAS COMPLETELY HEALED.   It is an old wives tale that a wound heals better when it is exposed to air and allowed to dry out. The wound will heal faster with a better cosmetic result if it is kept moist with ointment and covered with a bandage.   Do not let the wound dry out.   IMPORTANT INFORMATION ON REVERSE SIDE   Supplies Needed:   *Cotton tipped applicators (Q-tips)   *Polysporin ointment or Bacitracin ointment (NOT NEOSPORIN)   *Band-aids, or non stick gauze pads and micropore paper tape   PATIENT INFORMATION   During the  healing process you will notice a number of changes. All wounds develop a small halo of redness surrounding the wound. This means healing is occurring. Severe itching with extensive redness usually indicates sensitivity to the ointment or bandage tape used to dress the wound. You should call our office if this develops.   Swelling and/or discoloration around your surgical site is common, particularly when performed around the eye.   All wounds normally drain. The larger the wound the more drainage there will be. After 7-10 days, you will notice the wound beginning to shrink and new skin will begin to grow. The wound is healed when you can see skin has formed over the entire area. A healed wound has a healthy, shiny look to the surface and is red to dark pink in color to normalize. Wounds may take approximately 4-6 weeks to heal. Larger wounds may take 6-8 weeks. After the wound is healed you may discontinue dressing changes.   You may experience a sensation of tightness as your wound heals. This is normal and will gradually subside.   Your healed wound may be sensitive to temperature changes. This sensitivity improves with time, but if you re having a lot of discomfort, try to avoid temperature extremes.   Patients frequently experience itching after their wound appears to have healed because of the continue healing under the skin. Plain Vaseline will help relieve the itching.

## 2024-05-09 NOTE — LETTER
5/9/2024         RE: James Salvador  [address] [place] [address]        Dear Colleague,    Thank you for referring your patient, James Salvador, to the [facility]. Please see a copy of my visit note below.    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  He has significant hx of melanoma, failed ICI therapy and getting infusion and TVEc injections.  .  Patient has no other skin complaints today.  Remainder of the HPI, Meds, PMH, Allergies, FH, and SH was reviewed in chart.      Past Medical History:   Diagnosis Date     Actinic 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 Cardiac Cath 9/2015: BRIDGET to LAD     Hearing problem 1995    Wear hearing aids     Hyperlipidemia      Malignant melanoma      Morbid obesity 01/11/2016     Paroxysmal supraventricular tachycardia     on metoprolol     Permanent atrial fibrillation 04/21/2017     Squamous cell carcinoma      Tachy-edinson syndrome 05/29/2019       Past Surgical History:   Procedure Laterality Date     ADENOIDECTOMY  Childhood     ANGIOPLASTY  1994    in [place]     ANKLE SURGERY  07/13/2005    right ankle     ARTHROPLASTY HIP Right 10/2009     BIOPSY NODE SENTINEL Left 03/30/2023    Procedure: BIOPSY, LYMPH NODE, SENTINEL;  Surgeon: Rolando Minaya MD;  Location: [facility] OR     COLONOSCOPY  4/25/12     EP PERM PACER SINGLE LEAD N/A 05/31/2019    Medtronic single lead pacemaker     EXCISE MASS CHEEK Left 03/30/2023    Procedure: wide local excision of left cheek melanoma;  Surgeon:  Rolando Minaya MD;  Location: [facility] OR     EXCISE MASS CHEEK WITH FLAP PEDICLE Left 2023    Procedure: Left cervical Facial flap per closure of left cheek Defect;  Surgeon: Ila Sanchez MD;  Location: [facility] OR     Facial biopsy - Melanoma       GENITOURINARY SURGERY  10/20/23    Prostate surgery     HEART CATH STENT COR W/WO PTCA  2015    BRIDGET stent mid LAD     IR CHEST PORT PLACEMENT > 5 YRS OF AGE  2023     LASER SURGERY OF EYE  2002     MOHS MICROGRAPHIC PROCEDURE  2004    squamous cell carcinoma right temple     SINUS SURGERY  2006     TONSILLECTOMY  Childhood        Family History   Problem Relation Age of Onset     Genitourinary Problems Mother         renal failure     Heart Disease Mother      Cerebrovascular Disease Mother         TIA     Cardiovascular Father         rupture of dorsal aorta     Depression Son      Depression Brother         Suicide     Substance Abuse Brother         Heroin     Skin Cancer No family hx of        Social History     Socioeconomic History     Marital status:      Spouse name: Not on file     Number of children: 3     Years of education: Not on file     Highest education level: Not on file   Occupational History     Employer: RETIRED   Tobacco Use     Smoking status: Former     Current packs/day: 0.00     Average packs/day: 0.5 packs/day for 10.0 years (5.0 ttl pk-yrs)     Types: Cigarettes, Cigars, Pipe     Start date: 1966     Quit date: 1974     Years since quittin.0     Smokeless tobacco: Never     Tobacco comments:     Also smoked from 1985 - 1990   Substance and Sexual Activity     Alcohol use: Not Currently     Drug use: No     Sexual activity: Not Currently     Partners: Female     Birth control/protection: Male Surgical     Comment: Vasectomy   Other Topics Concern     Parent/sibling w/ CABG, MI or angioplasty before 65F 55M? No      Service Not Asked     Blood Transfusions Not Asked      Caffeine Concern Yes     Comment: 2 big cups coffee daily     Occupational Exposure Not Asked     Hobby Hazards Not Asked     Sleep Concern No     Comment: sleeping better since shoulder replaced 11/3/16     Stress Concern No     Weight Concern Yes     Special Diet Yes     Comment: trying to do more lean meats     Back Care Not Asked     Exercise No     Comment: limited - knee     Bike Helmet Not Asked     Seat Belt Not Asked     Self-Exams Not Asked   Social History Narrative     Not on file     Social Determinants of Health     Financial Resource Strain: Low Risk  (12/12/2023)    Received from [facility] [facility], [facility] [employer] [facility]    Financial Resource Strain      Difficulty of Paying Living Expenses: 3      Difficulty of Paying Living Expenses: Not on file   Food Insecurity: No Food Insecurity (12/12/2023)    [facility]    Food Insecurity      Worried About Running Out of Food in the Last Year: 1   Transportation Needs: No Transportation Needs (12/12/2023)    [facility] [facility]    Transportation Needs      Lack of Transportation (Medical): 1   Physical Activity: Not on file   Stress: Not on file   Social Connections: Socially Integrated (12/12/2023)    [facility] [facility]    Social Connections      Frequency of Communication with Friends and Family: 0   Interpersonal Safety: Not on file   Housing Stability: Low Risk  (12/12/2023)    [facility]    Housing Stability      Unable to Pay for Housing in the Last Year: 1       Outpatient Encounter Medications as of 5/9/2024   Medication Sig Dispense Refill      acetaminophen (TYLENOL) 325 MG tablet Take 650 mg by mouth 4 times daily       apixaban ANTICOAGULANT (ELIQUIS ANTICOAGULANT) 5 MG tablet Take 1 tablet (5 mg) by mouth 2 times daily 180 tablet 1     atorvastatin (LIPITOR) 80 MG tablet Take 1 tablet (80 mg) by mouth At Bedtime 90 tablet 3     carvedilol (COREG) 3.125 MG tablet Take 1 tablet (3.125 mg) by mouth 2 times daily (with meals) 180 tablet 0     clobetasol (TEMOVATE) 0.05 % external cream Apply to AA BID x 1-2 weeks then PRN. Do not apply to face. 60 g 3     fluticasone (FLONASE) 50 MCG/ACT nasal spray Spray 2 sprays into both nostrils daily 48 mL 3     furosemide (LASIX) 20 MG tablet Take 1 tablet (20 mg) by mouth daily 90 tablet 1     gabapentin (NEURONTIN) 300 MG capsule Take 2 capsules (600 mg) by mouth 3 times daily       ketoconazole (NIZORAL) 2 % cream Apply topically 2 times daily For face. FAX REFILL REQUESTS TO [facility]: [phone] 30 g 2     ketoconazole (NIZORAL) 2 % external shampoo Use daily as needed 120 mL 11     lidocaine-prilocaine (EMLA) 2.5-2.5 % external cream Apply topically as needed for moderate pain 30 g 1     lisinopril (ZESTRIL) 30 MG tablet Take 1 tablet (30 mg) by mouth daily 90 tablet 3     nitroGLYcerin (NITROSTAT) 0.4 MG sublingual tablet Place 1 tablet (0.4 mg) under the tongue every 5 minutes as needed for chest pain May repeat twice for a total of 3 tablets.  If chest pain not relieved, call 911 25 tablet 11     OXcarbazepine (TRILEPTAL) 300 MG tablet Take 2 tablets (600 mg) by mouth 3 times daily ( @ 7am, 1530 and 2200 hrs each day)       predniSONE (DELTASONE) 10 MG tablet Take 10 mg by mouth daily       triamcinolone (KENALOG) 0.1 % external lotion Apply to scalp BID x 1-2 weeks then PRN only 60 mL 3     No facility-administered encounter medications on file as of 5/9/2024.             O:   NAD, WDWN, Alert & Oriented, Mood & Affect wnl, Vitals stable   General appearance normal   Vitals stable   Alert, oriented and in  no acute distress   R temple 7mm scaly papule      Gritty scaly papule on left and R scalp and mid frontal scalp   Black papules and left cheek with erythema and induration     Eyes: Conjunctivae/lids:Normal     ENT: Lips, mucosa: normal    MSK:Normal    Cardiovascular: peripheral edema none    Pulm: Breathing Normal    Lymph Nodes: No Head and Neck Lymphadenopathy     Neuro/Psych: Orientation:Alert and Orientedx3 ; Mood/Affect:normal       A/P:  Scalp actinic keratosis x3  LN2:  Treated with LN2 for 5s for 1-2 cycles. Warned risks of blistering, pain, pigment change, scarring, and incomplete resolution.  Advised patient to return if lesions do not completely resolve.  Wound care sheet given.  2. R temple squamous cell carcinoma   MOHS:   Location    The rationale for Mohs surgery was discussed with the patient and consent was obtained.  The risks and benefits as well as alternatives to therapy were discussed, in detail.  Specifically, the risks of infection, scarring, bleeding, prolonged wound healing, incomplete removal, allergy to anesthesia, nerve injury and recurrence were addressed.  Indication for Mohs was Location. Prior to the procedure, the treatment site was clearly identified and, if available, confirmed with previous photos and confirmed by the patient   All components of the Universal Protocol/PAUSE rule were completed.  The Mohs surgeon operated in two distinct and integrated capacities as the surgeon and pathologist.      The area was prepped with Betasept.  A rim of normal appearing skin was marked circumferentially around the lesion.  The area was infiltrated with local anesthesia.  The tumor was first debulked to remove all clinically apparent tumor.  An incision following the standard Mohs approach was done and the specimen was oriented,mapped and placed in 1 block(s).  Each specimen was then chromacoded and processed in the Mohs laboratory using standard Mohs technique and submitted for frozen  section histology.  Frozen section analysis showed no residual tumor but CLEAR MARGINS.      The tumor was excised using standard Mohs technique in 1 stages(s).  CLEAR MARGINS OBTAINED and Final defect size was 1.3 x 1.2 cm.     We discussed the options for wound management in full with the patient including risks/benefits/ possible outcomes.      LN2:  Treated with LN2 for 5s for 1-2 cycles. Warned risks of blistering, pain, pigment change, scarring, and incomplete resolution.  Advised patient to return if lesions do not completely resolve.  Wound care sheet given.    3. Hx of melanoma  Cont to follow with onc  Cont 3 month derm follow up   It was a pleasure speaking to James Salvador today.  Previous clinic notes and pertinent laboratory tests were reviewed prior to James Salvador's visit.  Signs and Symptoms of skin cancer discussed with patient.  Patient encouraged to perform monthly skin exams.  UV precautions reviewed with patient.  Risks of non-melanoma skin cancer discussed with patient         Again, thank you for allowing me to participate in the care of your patient.        Sincerely,        Jv Dutta MD

## 2024-05-29 ENCOUNTER — HOSPITAL ENCOUNTER (EMERGENCY)
Facility: CLINIC | Age: 79
Discharge: HOME | End: 2024-05-29
Attending: EMERGENCY MEDICINE | Admitting: EMERGENCY MEDICINE
Payer: COMMERCIAL

## 2024-05-29 ENCOUNTER — APPOINTMENT (OUTPATIENT)
Dept: CT IMAGING | Facility: CLINIC | Age: 79
End: 2024-05-29
Attending: EMERGENCY MEDICINE
Payer: COMMERCIAL

## 2024-05-29 VITALS
RESPIRATION RATE: 20 BRPM | DIASTOLIC BLOOD PRESSURE: 72 MMHG | HEIGHT: 71 IN | HEART RATE: 82 BPM | OXYGEN SATURATION: 93 % | TEMPERATURE: 100.5 F | BODY MASS INDEX: 36.08 KG/M2 | SYSTOLIC BLOOD PRESSURE: 108 MMHG | WEIGHT: 257.72 LBS

## 2024-05-29 DIAGNOSIS — R50.82 POST-PROCEDURAL FEVER: ICD-10-CM
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
## 2024-05-29 DIAGNOSIS — S00.81XA ABRASION OF FOREHEAD, INITIAL ENCOUNTER: ICD-10-CM

## 2024-05-29 DIAGNOSIS — S51.011A SKIN TEAR OF RIGHT ELBOW WITHOUT COMPLICATION, INITIAL ENCOUNTER: ICD-10-CM

## 2024-05-29 LAB
ALBUMIN SERPL BCG-MCNC: 3.7 G/DL (ref 3.5–5.2)
ALP SERPL-CCNC: 160 U/L (ref 40–150)
ALT SERPL W P-5'-P-CCNC: 28 U/L (ref 0–70)
ANION GAP SERPL CALCULATED.3IONS-SCNC: 14 MMOL/L (ref 7–15)
AST SERPL W P-5'-P-CCNC: 25 U/L (ref 0–45)
BASOPHILS # BLD AUTO: ABNORMAL 10*3/UL
BASOPHILS # BLD MANUAL: 0 10E3/UL (ref 0–0.2)
BASOPHILS NFR BLD AUTO: ABNORMAL %
BASOPHILS NFR BLD MANUAL: 0 %
BILIRUB DIRECT SERPL-MCNC: 0.22 MG/DL (ref 0–0.3)
BILIRUB SERPL-MCNC: 0.6 MG/DL
BUN SERPL-MCNC: 15.5 MG/DL (ref 8–23)
CALCIUM SERPL-MCNC: 9.1 MG/DL (ref 8.8–10.2)
CHLORIDE SERPL-SCNC: 98 MMOL/L (ref 98–107)
CREAT SERPL-MCNC: 0.63 MG/DL (ref 0.67–1.17)
DEPRECATED HCO3 PLAS-SCNC: 22 MMOL/L (ref 22–29)
EGFRCR SERPLBLD CKD-EPI 2021: >90 ML/MIN/1.73M2
EOSINOPHIL # BLD AUTO: ABNORMAL 10*3/UL
EOSINOPHIL # BLD MANUAL: 0 10E3/UL (ref 0–0.7)
EOSINOPHIL NFR BLD AUTO: ABNORMAL %
EOSINOPHIL NFR BLD MANUAL: 0 %
ERYTHROCYTE [DISTWIDTH] IN BLOOD BY AUTOMATED COUNT: 13.9 % (ref 10–15)
FLUAV RNA SPEC QL NAA+PROBE: NEGATIVE
FLUBV RNA RESP QL NAA+PROBE: NEGATIVE
GLUCOSE SERPL-MCNC: 100 MG/DL (ref 70–99)
HCT VFR BLD AUTO: 40.1 % (ref 40–53)
HGB BLD-MCNC: 14 G/DL (ref 13.3–17.7)
HOLD SPECIMEN: NORMAL
IMM GRANULOCYTES # BLD: ABNORMAL 10*3/UL
IMM GRANULOCYTES NFR BLD: ABNORMAL %
INR PPP: 1.06 (ref 0.85–1.15)
LACTATE SERPL-SCNC: 1.4 MMOL/L (ref 0.7–2)
LYMPHOCYTES # BLD AUTO: ABNORMAL 10*3/UL
LYMPHOCYTES # BLD MANUAL: 0.4 10E3/UL (ref 0.8–5.3)
LYMPHOCYTES NFR BLD AUTO: ABNORMAL %
LYMPHOCYTES NFR BLD MANUAL: 4 %
MCH RBC QN AUTO: 32.9 PG (ref 26.5–33)
MCHC RBC AUTO-ENTMCNC: 34.9 G/DL (ref 31.5–36.5)
MCV RBC AUTO: 94 FL (ref 78–100)
MONOCYTES # BLD AUTO: ABNORMAL 10*3/UL
MONOCYTES # BLD MANUAL: 0.5 10E3/UL (ref 0–1.3)
MONOCYTES NFR BLD AUTO: ABNORMAL %
MONOCYTES NFR BLD MANUAL: 5 %
NEUTROPHILS # BLD AUTO: ABNORMAL 10*3/UL
NEUTROPHILS # BLD MANUAL: 8.6 10E3/UL (ref 1.6–8.3)
NEUTROPHILS NFR BLD AUTO: ABNORMAL %
NEUTROPHILS NFR BLD MANUAL: 91 %
PLAT MORPH BLD: ABNORMAL
PLATELET # BLD AUTO: 196 10E3/UL (ref 150–450)
POTASSIUM SERPL-SCNC: 4.2 MMOL/L (ref 3.4–5.3)
PROT SERPL-MCNC: 7 G/DL (ref 6.4–8.3)
RBC # BLD AUTO: 4.25 10E6/UL (ref 4.4–5.9)
RBC MORPH BLD: ABNORMAL
RSV RNA SPEC NAA+PROBE: NEGATIVE
SARS-COV-2 RNA RESP QL NAA+PROBE: NEGATIVE
SODIUM SERPL-SCNC: 134 MMOL/L (ref 135–145)
WBC # BLD AUTO: 9.4 10E3/UL (ref 4–11)

## 2024-05-29 PROCEDURE — 36415 COLL VENOUS BLD VENIPUNCTURE: CPT | Performed by: EMERGENCY MEDICINE

## 2024-05-29 PROCEDURE — 87637 SARSCOV2&INF A&B&RSV AMP PRB: CPT | Performed by: EMERGENCY MEDICINE

## 2024-05-29 PROCEDURE — 82248 BILIRUBIN DIRECT: CPT | Performed by: EMERGENCY MEDICINE

## 2024-05-29 PROCEDURE — 72131 CT LUMBAR SPINE W/O DYE: CPT

## 2024-05-29 PROCEDURE — 250N000013 HC RX MED GY IP 250 OP 250 PS 637: Performed by: EMERGENCY MEDICINE

## 2024-05-29 PROCEDURE — 93005 ELECTROCARDIOGRAM TRACING: CPT

## 2024-05-29 PROCEDURE — 85610 PROTHROMBIN TIME: CPT | Performed by: EMERGENCY MEDICINE

## 2024-05-29 PROCEDURE — 99285 EMERGENCY DEPT VISIT HI MDM: CPT | Mod: 25

## 2024-05-29 PROCEDURE — 83605 ASSAY OF LACTIC ACID: CPT | Performed by: EMERGENCY MEDICINE

## 2024-05-29 PROCEDURE — 85007 BL SMEAR W/DIFF WBC COUNT: CPT | Performed by: EMERGENCY MEDICINE

## 2024-05-29 PROCEDURE — 85027 COMPLETE CBC AUTOMATED: CPT | Performed by: EMERGENCY MEDICINE

## 2024-05-29 PROCEDURE — 87040 BLOOD CULTURE FOR BACTERIA: CPT | Performed by: EMERGENCY MEDICINE

## 2024-05-29 PROCEDURE — 70450 CT HEAD/BRAIN W/O DYE: CPT

## 2024-05-29 RX ORDER — ACETAMINOPHEN 325 MG/1
650 TABLET ORAL ONCE
Status: COMPLETED | OUTPATIENT
Start: 2024-05-29 | End: 2024-05-29

## 2024-05-29 RX ADMIN — ACETAMINOPHEN 650 MG: 325 TABLET, FILM COATED ORAL at 20:46

## 2024-05-29 ASSESSMENT — ACTIVITIES OF DAILY LIVING (ADL)
ADLS_ACUITY_SCORE: 38

## 2024-05-29 ASSESSMENT — COLUMBIA-SUICIDE SEVERITY RATING SCALE - C-SSRS
1. IN THE PAST MONTH, HAVE YOU WISHED YOU WERE DEAD OR WISHED YOU COULD GO TO SLEEP AND NOT WAKE UP?: NO
2. HAVE YOU ACTUALLY HAD ANY THOUGHTS OF KILLING YOURSELF IN THE PAST MONTH?: NO
6. HAVE YOU EVER DONE ANYTHING, STARTED TO DO ANYTHING, OR PREPARED TO DO ANYTHING TO END YOUR LIFE?: NO

## 2024-05-30 LAB
ATRIAL RATE - MUSE: NORMAL BPM
DIASTOLIC BLOOD PRESSURE - MUSE: NORMAL MMHG
INTERPRETATION ECG - MUSE: NORMAL
P AXIS - MUSE: NORMAL DEGREES
PR INTERVAL - MUSE: NORMAL MS
QRS DURATION - MUSE: 106 MS
QT - MUSE: 328 MS
QTC - MUSE: 427 MS
R AXIS - MUSE: 5 DEGREES
SYSTOLIC BLOOD PRESSURE - MUSE: NORMAL MMHG
T AXIS - MUSE: -54 DEGREES
VENTRICULAR RATE- MUSE: 102 BPM

## 2024-05-30 NOTE — ED TRIAGE NOTES
Arrived via EMS from home. Had a fall and hit head after losing balance. Also has a skin tear on right elbow. Patient is on blood thinners for hx afib. Pt states he is always in Afib. No LOC. Had not taken blood thinner last night or today.  Hx melanoma- receives monthly infusions and today had a treatment for his left cheek.     Triage Assessment (Adult)       Row Name 05/29/24 1926          Triage Assessment    Airway WDL WDL        Respiratory WDL    Respiratory WDL WDL        Skin Circulation/Temperature WDL    Skin Circulation/Temperature WDL WDL        Cardiac WDL    Cardiac WDL X;rhythm     Pulse Rate & Regularity radial pulse irregular     Cardiac Rhythm Atrial fibrillation        Peripheral/Neurovascular WDL    Peripheral Neurovascular WDL WDL        Cognitive/Neuro/Behavioral WDL    Cognitive/Neuro/Behavioral WDL WDL

## 2024-05-30 NOTE — DISCHARGE INSTRUCTIONS
-Take acetaminophen 500 to 1000 mg by mouth every 4 to 6 hours as needed for pain or fever.  Do not take more than 4000 mg in 24 hours.  Do not take within 6 hours of another acetaminophen containing medication such as norco (vicodin) or percocet.        Discharge Instructions  Head Injury    You have been seen today for a head injury. Your evaluation included a history and physical examination. You may have had a CT (CAT) scan performed, though most head injuries do not require a scan. Based on this evaluation, your provider today does not feel that your head injury is serious.    Generally, every Emergency Department visit should have a follow-up clinic visit with either a primary or a specialty clinic/provider. Please follow-up as instructed by your emergency provider today.  Return to the Emergency Department if:  You are confused or you are not acting right.  Your headache gets worse or you start to have a really bad headache even with your recommended treatment plan.  You vomit (throw up) more than once.  You have a seizure.  You have trouble walking.  You have weakness or paralysis (cannot move) in an arm or a leg.  You have blood or fluid coming from your ears or nose.  You have new symptoms or anything that worries you.    Sleeping:  It is okay for you to sleep, but someone should wake you up if instructed by your provider, and someone should check on you at your usual time to wake up.     Activity:  Do not drive for at least 24 hours.  Do not drive if you have dizzy spells or trouble concentrating, or remembering things.  Do not return to any contact sports until cleared by your regular provider.     MORE INFORMATION:    Concussion:  A concussion is a minor head injury that may cause temporary problems with the way the brain works. Although concussions are important, they are generally not an emergency or a reason that a person needs to be hospitalized. Some concussion symptoms include confusion, amnesia  (forgetful), nausea (sick to your stomach) and vomiting (throwing up), dizziness, fatigue, memory or concentration problems, irritability and sleep problems. For most people, concussions are mild and temporary but some will have more severe and persistent symptoms that require on-going care and treatment.  CT Scans: Your evaluation today may have included a CT scan (CAT scan) to look for things like bleeding or a skull fracture (broken bone).  CT scans involve radiation and too many CT scans can cause serious health problems like cancer, especially in children.  Because of this, your provider may not have ordered a CT scan today if they think you are at low risk for a serious or life threatening problem.    If you were given a prescription for medicine here today, be sure to read all of the information (including the package insert) that comes with your prescription.  This will include important information about the medicine, its side effects, and any warnings that you need to know about.  The pharmacist who fills the prescription can provide more information and answer questions you may have about the medicine.  If you have questions or concerns that the pharmacist cannot address, please call or return to the Emergency Department.     Remember that you can always come back to the Emergency Department if you are not able to see your regular provider in the amount of time listed above, if you get any new symptoms, or if there is anything that worries you.    Discharge Instructions  Fever    You have been seen today for a fever. Fever is a normal body reaction to illness or inflammation. Fever is a sign that your body is doing what it should to fight something off. Fever is not dangerous, but it can make you feel miserable, and you will probably feel better if you get your fever to go down. Most infections are caused by a virus, and antibiotics will not help; your provider will tell you whether antibiotics are needed  in your case. At this time your provider does not find that your fever is a sign of anything dangerous or life-threatening.  However, sometimes the signs of serious illness do not show up right away so additional care may be necessary.    Generally, every Emergency Department visit should have a follow-up clinic visit with either a primary or a specialty clinic/provider. Please follow-up as instructed by your emergency provider today.    What can I do to help myself?  Fill any prescriptions the provider gave you and take them right away--especially antibiotics.  If you have a fever, get plenty of rest and drink lots of fluids, especially water.  What clothes or blankets you have on will not change your fever. Do what is comfortable for you.  Bathing or sponging in lukewarm water may help you feel better.  Tylenol  (acetaminophen), Motrin  (ibuprofen), or Advil  (ibuprofen) help bring fever down and may help you feel more comfortable. Be sure to read and follow the package directions, and ask your provider if you have questions.  Do not drink alcohol.    Return to the Emergency Department if:  Any of the symptoms you have get much worse.  You seem very sick, like being too weak to get up.  You have any new symptoms, especially serious things like abdominal (belly) pain or chest pain.  You are short of breath.  You have a severe headache.  You are vomiting (throwing up) so much you cannot keep fluids or medicines down.  You have confusion or seem unusually drowsy.  You have a seizure.  You have anything else that worries you.  If you were given a prescription for medicine here today, be sure to read all of the information (including the package insert) that comes with your prescription.  This will include important information about the medicine, its side effects, and any warnings that you need to know about.  The pharmacist who fills the prescription can provide more information and answer questions you may have  about the medicine.  If you have questions or concerns that the pharmacist cannot address, please call or return to the Emergency Department.   Remember that you can always come back to the Emergency Department if you are not able to see your regular provider in the amount of time listed above, if you get any new symptoms, or if there is anything that worries you.

## 2024-05-30 NOTE — ED PROVIDER NOTES
Emergency Department Note      History of Present Illness     Chief Complaint  Fall    HPI  James Salvador is a 78 year old male with history of AFIB and melanoma who presents to the ED via EMS with his family for evaluation from a fall. He reports losing his balance when he was coming out of the bathroom earlier today. His wife states patient was leaning over due to having weakness and fell face down to the ground. Patient has a bruise on his left side of forehead and skin tear on his right elbow. No loss of consciousness. Patient uses a walker for his left knee. He states stopping his Eliquis yesterday because he receives monthly infusion for his melanoma treatments on his left cheek and has not taken Eliquis today either. Denies right leg pain, left arm pain, recent cold symptoms or urinary symptoms. No one is sick at home. No abdominal pain. No smoking or drinking. No use of oxygen at home. Patient gets fevers often as a side effect of his treatment. He took Tylenol at 1530 today.     Independent Historian  Wife as detailed above.    Review of External Notes  dermatology office visit on 5/9/24 for an overview of the patients TVec treatments in consideration of his fever   Past Medical History   Medical History and Problem List  Actinic cheilitis  Actinic keratosis  Allergic rhinitis  Anxiety  Arthritis  Basal cell carcinoma  Benign hypertension  CAD (coronary artery disease)  Hearing problem  Hyperlipidemia  Malignant melanoma  Morbid obesity  Paroxysmal supraventricular tachycardia  Permanent atrial fibrillation  Squamous cell carcinoma  Tachy-edinson syndrome  DJD    Medications  Eliquis  Lipitor  Coreg  Lasix   Gabapentin  Lisinopril  Nitroglycerin  Prednisone  Trileptal   Zofran     Surgical History   Adenoidectomy  Angioplasty   Right ankle surgery  Right hip arthroplasty  Left biopsy node sentinel  Colonoscopy  Medtronic single lead pacemaker  Excise mass cheek - left  Prostate surgery  Chest port  "placement  MOHS procedure   Laser eye surgery  Tonsillectomy     Physical Exam   Patient Vitals for the past 24 hrs:   BP Temp Temp src Pulse Resp SpO2 Height Weight   05/29/24 2255 -- -- -- -- 20 -- -- --   05/29/24 2244 108/72 -- -- 82 -- -- -- --   05/29/24 2234 112/71 -- -- 73 -- 93 % -- --   05/29/24 2224 -- -- -- 79 -- 92 % -- --   05/29/24 2214 110/78 -- -- 84 -- 92 % -- --   05/29/24 2204 99/63 -- -- 78 -- 91 % -- --   05/29/24 2154 -- -- -- 87 -- 92 % -- --   05/29/24 2144 104/60 -- -- 75 -- 91 % -- --   05/29/24 2134 104/68 -- -- 74 -- 93 % -- --   05/29/24 2057 105/63 -- -- 82 -- 93 % -- --   05/29/24 2047 117/74 -- -- 86 -- 93 % -- --   05/29/24 2037 -- -- -- 97 -- 92 % -- --   05/29/24 2027 128/76 -- -- 88 -- 92 % -- --   05/29/24 1941 126/68 -- -- 102 -- -- -- --   05/29/24 1931 117/68 -- -- 95 -- 92 % -- --   05/29/24 1921 123/74 -- -- -- -- -- -- --   05/29/24 1918 123/74 (!) 100.5  F (38.1  C) Oral 93 20 93 % 1.803 m (5' 11\") 116.9 kg (257 lb 11.5 oz)     Physical Exam  Vital signs reviewed.  Nursing note reviewed.  Constitutional: Well-developed, Well-nourished.  Non-diaphoretic.  Mild distress    HENT: Abrasion to left forehead, abrasions to left cheek attributed to dermatology procedure.  No pain or instability to palpation of bony orbits, mandible, maxilla.  Good jaw opening.  No malocclusion.  No nasal septal hematoma.  OP clear and moist.    EYES:  PERRL.  EOMI.  NECK:  No Cspine tenderness.  Trachea midline.  Full ROM.   CARDIAC: Irregularly irregular rate and rhythm. 2+ bilat radial pulses   CHEST:  chest NT  PULM: Effort  Normal.  Breath sounds clear and equal bilat  ABD:  Soft, NT/ND  No guarding, no rebound.    BACK:  No T spinous process tenderness; equivocal L spinous process tenderness.  Pelvis stable.  EXT:  Full ROM X4.  No tenderness, edema, crepitus or obvious deformity  NEURO:  Alert, Oriented X3.  5/5 UE and LE, proximal and distal.  Sensation intact to LT.   SKIN :  Warm.  Dry.  "  Skin tear to right elbow  PSYCH.:  Normal judgment.  Normal affect.      Diagnostics   Lab Results   Labs Ordered and Resulted from Time of ED Arrival to Time of ED Departure   BASIC METABOLIC PANEL - Abnormal       Result Value    Sodium 134 (*)     Potassium 4.2      Chloride 98      Carbon Dioxide (CO2) 22      Anion Gap 14      Urea Nitrogen 15.5      Creatinine 0.63 (*)     GFR Estimate >90      Calcium 9.1      Glucose 100 (*)    HEPATIC FUNCTION PANEL - Abnormal    Protein Total 7.0      Albumin 3.7      Bilirubin Total 0.6      Alkaline Phosphatase 160 (*)     AST 25      ALT 28      Bilirubin Direct 0.22     CBC WITH PLATELETS AND DIFFERENTIAL - Abnormal    WBC Count 9.4      RBC Count 4.25 (*)     Hemoglobin 14.0      Hematocrit 40.1      MCV 94      MCH 32.9      MCHC 34.9      RDW 13.9      Platelet Count 196      % Neutrophils        % Lymphocytes        % Monocytes        % Eosinophils        % Basophils        % Immature Granulocytes        Absolute Neutrophils        Absolute Lymphocytes        Absolute Monocytes        Absolute Eosinophils        Absolute Basophils        Absolute Immature Granulocytes       MANUAL DIFFERENTIAL - Abnormal    % Neutrophils 91      % Lymphocytes 4      % Monocytes 5      % Eosinophils 0      % Basophils 0      Absolute Neutrophils 8.6 (*)     Absolute Lymphocytes 0.4 (*)     Absolute Monocytes 0.5      Absolute Eosinophils 0.0      Absolute Basophils 0.0      RBC Morphology Confirmed RBC Indices      Platelet Assessment        Value: Automated Count Confirmed. Platelet morphology is normal.   LACTIC ACID WHOLE BLOOD WITH 1X REPEAT IN 2 HR WHEN >2 - Normal    Lactic Acid, Initial 1.4     INR - Normal    INR 1.06     INFLUENZA A/B, RSV, & SARS-COV2 PCR - Normal    Influenza A PCR Negative      Influenza B PCR Negative      RSV PCR Negative      SARS CoV2 PCR Negative     BLOOD CULTURE   BLOOD CULTURE     Imaging  CT Lumbar Spine w/o Contrast   Final Result    IMPRESSION:   1.  No fracture or posttraumatic subluxation.   2.  Chronic high-grade central and foraminal stenosis as above.      CT Head w/o Contrast   Final Result   IMPRESSION:   1.  No CT evidence for acute intracranial process.   2.  Brain atrophy and presumed chronic microvascular ischemic changes similar to prior.        Report per radiology    EKG   ECG results from 05/29/24   EKG 12 lead     Value    Systolic Blood Pressure     Diastolic Blood Pressure     Ventricular Rate 102    Atrial Rate     RI Interval     QRS Duration 106        QTc 427    P Axis     R AXIS 5    T Axis -54    Interpretation ECG      Atrial fibrillation with rapid ventricular response  Incomplete right bundle branch block  ST & T wave abnormality, consider anterolateral ischemia  Abnormal ECG  When compared with ECG of 20-JUN-2022 00:52,  Atrial fibrillation has replaced Electronic ventricular pacemaker  Vent. rate has increased BY  37 BPM       Independent Interpretation  CT head negative for hemorrhage, EKG significant for atrial fibrillation  ED Course    Medications Administered  Medications   acetaminophen (TYLENOL) tablet 650 mg (650 mg Oral $Given 5/29/24 2046)     Procedures  Procedures     Discussion of Management  None    Social Determinants of Health adding to complexity of care   Age increasing risk for presentation to the emergency    ED Course  ED Course as of 05/30/24 0110   Wed May 29, 2024   2023 I obtained history and examined the patient as noted above.      Medical Decision Making / Diagnosis   CMS Diagnoses: None    MIPS     None    MDM  78-year-old male presenting with mechanical fall    No evidence of acute significant traumatic abnormality on workup .  Patient did present with a fever and etiology such as sepsis, severe sepsis, viral syndrome were considered.  Per the patient and his wife's report, it is normal for him to have fevers and weakness s/p TVEc therapy which is the most likely etiology for  the patient's fever today.  Labs were largely reassuring.  Given reassuring imaging, Vital signs and symptoms similar to previous, I do not feel admission or further workup is indicated at this time.  Patient was advised to follow-up with his primary dermatologist.  Wound care was performed by ED staff prior to discharge skin tear cleaned and wrapped.  Recommendations were given regarding return to the emergency department as needed.  Patient and his wife are counseled on results, diagnosis and disposition prior to discharge.  They are understanding and agreeable to plan.    Disposition  The patient was discharged.     ICD-10 Codes:    ICD-10-CM    1. Fall, initial encounter  W19.XXXA       2. Abrasion of forehead, initial encounter  S00.81XA       3. Post-procedural fever due to TVEc injections  R50.82       4. Skin tear of right elbow without complication, initial encounter  S51.011A            Discharge Medications  Discharge Medication List as of 5/29/2024 10:55 PM        Scribe Disclosure:  I, Petrona Grant, am serving as a scribe at 8:46 PM on 5/29/2024 to document services personally performed by Yusuf Bullock MD based on my observations and the provider's statements to me.   [employer]      Yusuf Bullock MD  05/30/24 0112       Yusuf Bullock MD  05/30/24 0249

## 2024-06-03 LAB — BACTERIA BLD CULT: NO GROWTH

## 2024-06-04 LAB — BACTERIA BLD CULT: NO GROWTH

## 2024-06-06 ENCOUNTER — ANCILLARY PROCEDURE (OUTPATIENT)
Dept: CARDIOLOGY | Facility: CLINIC | Age: 79
End: 2024-06-06
Attending: INTERNAL MEDICINE
Payer: COMMERCIAL

## 2024-06-06 DIAGNOSIS — Z95.0 CARDIAC PACEMAKER IN SITU: ICD-10-CM

## 2024-06-06 PROCEDURE — 93294 REM INTERROG EVL PM/LDLS PM: CPT | Performed by: INTERNAL MEDICINE

## 2024-06-06 PROCEDURE — 93296 REM INTERROG EVL PM/IDS: CPT | Performed by: INTERNAL MEDICINE

## 2024-06-07 LAB
MDC_IDC_LEAD_CONNECTION_STATUS: NORMAL
MDC_IDC_LEAD_IMPLANT_DT: NORMAL
MDC_IDC_LEAD_LOCATION: NORMAL
MDC_IDC_LEAD_LOCATION_DETAIL_1: NORMAL
MDC_IDC_LEAD_MFG: NORMAL
MDC_IDC_LEAD_MODEL: NORMAL
MDC_IDC_LEAD_POLARITY_TYPE: NORMAL
MDC_IDC_LEAD_SERIAL: NORMAL
MDC_IDC_MSMT_BATTERY_DTM: NORMAL
MDC_IDC_MSMT_BATTERY_REMAINING_LONGEVITY: 119 MO
MDC_IDC_MSMT_BATTERY_RRT_TRIGGER: 2.62
MDC_IDC_MSMT_BATTERY_STATUS: NORMAL
MDC_IDC_MSMT_BATTERY_VOLTAGE: 3.02 V
MDC_IDC_MSMT_LEADCHNL_RV_IMPEDANCE_VALUE: 304 OHM
MDC_IDC_MSMT_LEADCHNL_RV_IMPEDANCE_VALUE: 361 OHM
MDC_IDC_MSMT_LEADCHNL_RV_PACING_THRESHOLD_AMPLITUDE: 0.75 V
MDC_IDC_MSMT_LEADCHNL_RV_PACING_THRESHOLD_PULSEWIDTH: 0.4 MS
MDC_IDC_MSMT_LEADCHNL_RV_SENSING_INTR_AMPL: 1.62 MV
MDC_IDC_MSMT_LEADCHNL_RV_SENSING_INTR_AMPL: 1.62 MV
MDC_IDC_PG_IMPLANT_DTM: NORMAL
MDC_IDC_PG_MFG: NORMAL
MDC_IDC_PG_MODEL: NORMAL
MDC_IDC_PG_SERIAL: NORMAL
MDC_IDC_PG_TYPE: NORMAL
MDC_IDC_SESS_CLINIC_NAME: NORMAL
MDC_IDC_SESS_DTM: NORMAL
MDC_IDC_SESS_TYPE: NORMAL
MDC_IDC_SET_BRADY_HYSTRATE: NORMAL
MDC_IDC_SET_BRADY_LOWRATE: 50 {BEATS}/MIN
MDC_IDC_SET_BRADY_MAX_SENSOR_RATE: 130 {BEATS}/MIN
MDC_IDC_SET_BRADY_MODE: NORMAL
MDC_IDC_SET_LEADCHNL_RV_PACING_AMPLITUDE: 2 V
MDC_IDC_SET_LEADCHNL_RV_PACING_ANODE_ELECTRODE_1: NORMAL
MDC_IDC_SET_LEADCHNL_RV_PACING_ANODE_LOCATION_1: NORMAL
MDC_IDC_SET_LEADCHNL_RV_PACING_CAPTURE_MODE: NORMAL
MDC_IDC_SET_LEADCHNL_RV_PACING_CATHODE_ELECTRODE_1: NORMAL
MDC_IDC_SET_LEADCHNL_RV_PACING_CATHODE_LOCATION_1: NORMAL
MDC_IDC_SET_LEADCHNL_RV_PACING_POLARITY: NORMAL
MDC_IDC_SET_LEADCHNL_RV_PACING_PULSEWIDTH: 0.4 MS
MDC_IDC_SET_LEADCHNL_RV_SENSING_ANODE_ELECTRODE_1: NORMAL
MDC_IDC_SET_LEADCHNL_RV_SENSING_ANODE_LOCATION_1: NORMAL
MDC_IDC_SET_LEADCHNL_RV_SENSING_CATHODE_ELECTRODE_1: NORMAL
MDC_IDC_SET_LEADCHNL_RV_SENSING_CATHODE_LOCATION_1: NORMAL
MDC_IDC_SET_LEADCHNL_RV_SENSING_POLARITY: NORMAL
MDC_IDC_SET_LEADCHNL_RV_SENSING_SENSITIVITY: 0.6 MV
MDC_IDC_SET_ZONE_DETECTION_INTERVAL: 360 MS
MDC_IDC_SET_ZONE_STATUS: NORMAL
MDC_IDC_SET_ZONE_TYPE: NORMAL
MDC_IDC_SET_ZONE_VENDOR_TYPE: NORMAL
MDC_IDC_STAT_BRADY_DTM_END: NORMAL
MDC_IDC_STAT_BRADY_DTM_START: NORMAL
MDC_IDC_STAT_BRADY_RV_PERCENT_PACED: 51.54 %
MDC_IDC_STAT_EPISODE_RECENT_COUNT: 0
MDC_IDC_STAT_EPISODE_RECENT_COUNT_DTM_END: NORMAL
MDC_IDC_STAT_EPISODE_RECENT_COUNT_DTM_START: NORMAL
MDC_IDC_STAT_EPISODE_TOTAL_COUNT: 0
MDC_IDC_STAT_EPISODE_TOTAL_COUNT: 0
MDC_IDC_STAT_EPISODE_TOTAL_COUNT: 3
MDC_IDC_STAT_EPISODE_TOTAL_COUNT_DTM_END: NORMAL
MDC_IDC_STAT_EPISODE_TOTAL_COUNT_DTM_START: NORMAL
MDC_IDC_STAT_EPISODE_TYPE: NORMAL

## 2024-06-11 ENCOUNTER — TRANSFERRED RECORDS (OUTPATIENT)
Dept: HEALTH INFORMATION MANAGEMENT | Facility: CLINIC | Age: 79
End: 2024-06-11
Payer: COMMERCIAL

## 2024-06-13 ENCOUNTER — OFFICE VISIT (OUTPATIENT)
Dept: DERMATOLOGY | Facility: CLINIC | Age: 79
End: 2024-06-13
Payer: COMMERCIAL

## 2024-06-13 DIAGNOSIS — D18.01 ANGIOMA OF SKIN: ICD-10-CM

## 2024-06-13 DIAGNOSIS — L82.1 SEBORRHEIC KERATOSES: ICD-10-CM

## 2024-06-13 DIAGNOSIS — L81.4 LENTIGO: ICD-10-CM

## 2024-06-13 DIAGNOSIS — Z85.828 HISTORY OF SKIN CANCER: ICD-10-CM

## 2024-06-13 DIAGNOSIS — Z85.820 HISTORY OF MELANOMA: ICD-10-CM

## 2024-06-13 DIAGNOSIS — L57.0 AK (ACTINIC KERATOSIS): ICD-10-CM

## 2024-06-13 DIAGNOSIS — D23.9 DERMAL NEVUS: Primary | ICD-10-CM

## 2024-06-13 PROCEDURE — 99213 OFFICE O/P EST LOW 20 MIN: CPT | Mod: 25 | Performed by: DERMATOLOGY

## 2024-06-13 PROCEDURE — 17000 DESTRUCT PREMALG LESION: CPT | Performed by: DERMATOLOGY

## 2024-06-13 PROCEDURE — 17003 DESTRUCT PREMALG LES 2-14: CPT | Performed by: DERMATOLOGY

## 2024-06-13 NOTE — LETTER
6/13/2024      James Salvador  [address] [place] [address]      Dear Colleague,    Thank you for referring your patient, James Salvador, to the [facility]. Please see a copy of my visit note below.    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  He has significant hx of melanoma, failed ICI therapy and getting infusion and TVEc injections.  Tumor responding to this.  .  Patient has no other skin complaints today.  Remainder of the HPI, Meds, PMH, Allergies, FH, and SH was reviewed in chart.     Past Medical History:   Diagnosis Date     Actinic 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 Cardiac Cath 9/2015: BRIDGET to LAD     Hearing problem 1995    Wear hearing aids     Hyperlipidemia      Malignant melanoma      Morbid obesity 01/11/2016     Paroxysmal supraventricular tachycardia     on metoprolol     Permanent atrial fibrillation 04/21/2017     Squamous cell carcinoma      Tachy-edinson syndrome 05/29/2019       Past Surgical History:   Procedure Laterality Date     ADENOIDECTOMY  Childhood     ANGIOPLASTY  1994    in [place]     ANKLE SURGERY  07/13/2005    right ankle     ARTHROPLASTY HIP Right 10/2009     BIOPSY NODE SENTINEL Left 03/30/2023    Procedure: BIOPSY, LYMPH NODE, SENTINEL;  Surgeon: Rolando Minaya MD;  Location: [facility] OR     COLONOSCOPY  4/25/12     EP PERM PACER SINGLE LEAD N/A 05/31/2019    Medtronic single lead pacemaker     EXCISE MASS CHEEK Left 03/30/2023    Procedure: wide local excision of left cheek melanoma;  Surgeon: Rolando Minaya MD;  Location: [facility] OR     EXCISE MASS CHEEK WITH FLAP PEDICLE Left 04/13/2023     Procedure: Left cervical Facial flap per closure of left cheek Defect;  Surgeon: Ila Sanchez MD;  Location: [facility] OR     Facial biopsy - Melanoma       GENITOURINARY SURGERY  10/20/23    Prostate surgery     HEART CATH STENT COR W/WO PTCA  2015    BRIDGET stent mid LAD     IR CHEST PORT PLACEMENT > 5 YRS OF AGE  2023     LASER SURGERY OF EYE  2002     MOHS MICROGRAPHIC PROCEDURE  2004    squamous cell carcinoma right temple     SINUS SURGERY  2006     TONSILLECTOMY  Childhood        Family History   Problem Relation Age of Onset     Genitourinary Problems Mother         renal failure     Heart Disease Mother      Cerebrovascular Disease Mother         TIA     Cardiovascular Father         rupture of dorsal aorta     Depression Son      Depression Brother         Suicide     Substance Abuse Brother         Heroin     Skin Cancer No family hx of        Social History     Socioeconomic History     Marital status:      Spouse name: Not on file     Number of children: 3     Years of education: Not on file     Highest education level: Not on file   Occupational History     Employer: RETIRED   Tobacco Use     Smoking status: Former     Current packs/day: 0.00     Average packs/day: 0.5 packs/day for 10.0 years (5.0 ttl pk-yrs)     Types: Cigarettes, Cigars, Pipe     Start date: 1966     Quit date: 1974     Years since quittin.1     Smokeless tobacco: Never     Tobacco comments:     Also smoked from 1985 - 1990   Substance and Sexual Activity     Alcohol use: Not Currently     Drug use: No     Sexual activity: Not Currently     Partners: Female     Birth control/protection: Male Surgical     Comment: Vasectomy   Other Topics Concern     Parent/sibling w/ CABG, MI or angioplasty before 65F 55M? No      Service Not Asked     Blood Transfusions Not Asked     Caffeine Concern Yes     Comment: 2 big cups coffee daily     Occupational Exposure Not Asked     Hobby  Hazards Not Asked     Sleep Concern No     Comment: sleeping better since shoulder replaced 11/3/16     Stress Concern No     Weight Concern Yes     Special Diet Yes     Comment: trying to do more lean meats     Back Care Not Asked     Exercise No     Comment: limited - knee     Bike Helmet Not Asked     Seat Belt Not Asked     Self-Exams Not Asked   Social History Narrative     Not on file     Social Determinants of Health     Financial Resource Strain: Low Risk  (12/12/2023)    [facility] [employer] [facility]    Financial Resource Strain      Difficulty of Paying Living Expenses: 3      Difficulty of Paying Living Expenses: Not on file   Food Insecurity: No Food Insecurity (12/12/2023)    [facility] [employer]  [facility]    Food Insecurity      Worried About Running Out of Food in the Last Year: 1   Transportation Needs: No Transportation Needs (12/12/2023)    [facility] [employer] [facility] [employer] [facility]    Transportation Needs      Lack of Transportation (Medical): 1   Physical Activity: Not on file   Stress: Not on file   Social Connections: Socially Integrated (12/12/2023)    [facility] [employer] [facility] [employer] [facility]    Social Connections      Frequency of Communication with Friends and Family: 0   Interpersonal Safety: Not on file   Housing Stability: Low Risk  (12/12/2023)    [facility] [employer] [facility] [employer] [facility]    Housing Stability      Unable to Pay for Housing in the Last Year: 1       Outpatient Encounter Medications as of 6/13/2024   Medication Sig Dispense Refill     acetaminophen (TYLENOL) 325 MG tablet Take 650 mg by mouth 4 times daily       apixaban ANTICOAGULANT  (ELIQUIS ANTICOAGULANT) 5 MG tablet Take 1 tablet (5 mg) by mouth 2 times daily 180 tablet 1     atorvastatin (LIPITOR) 80 MG tablet Take 1 tablet (80 mg) by mouth At Bedtime 90 tablet 3     carvedilol (COREG) 3.125 MG tablet Take 1 tablet (3.125 mg) by mouth 2 times daily (with meals) 180 tablet 0     clobetasol (TEMOVATE) 0.05 % external cream Apply to AA BID x 1-2 weeks then PRN. Do not apply to face. 60 g 3     fluticasone (FLONASE) 50 MCG/ACT nasal spray Spray 2 sprays into both nostrils daily 48 mL 3     furosemide (LASIX) 20 MG tablet Take 1 tablet (20 mg) by mouth daily 90 tablet 1     gabapentin (NEURONTIN) 300 MG capsule Take 2 capsules (600 mg) by mouth 3 times daily       ketoconazole (NIZORAL) 2 % cream Apply topically 2 times daily For face. FAX REFILL REQUESTS TO [facility]: [phone] 30 g 2     ketoconazole (NIZORAL) 2 % external shampoo Use daily as needed 120 mL 11     lidocaine-prilocaine (EMLA) 2.5-2.5 % external cream Apply topically as needed for moderate pain 30 g 1     lisinopril (ZESTRIL) 30 MG tablet Take 1 tablet (30 mg) by mouth daily 90 tablet 3     nitroGLYcerin (NITROSTAT) 0.4 MG sublingual tablet Place 1 tablet (0.4 mg) under the tongue every 5 minutes as needed for chest pain May repeat twice for a total of 3 tablets.  If chest pain not relieved, call 911 25 tablet 11     OXcarbazepine (TRILEPTAL) 300 MG tablet Take 2 tablets (600 mg) by mouth 3 times daily ( @ 7am, 1530 and 2200 hrs each day)       predniSONE (DELTASONE) 10 MG tablet Take 10 mg by mouth daily       triamcinolone (KENALOG) 0.1 % external lotion Apply to scalp BID x 1-2 weeks then PRN only 60 mL 3     No facility-administered encounter medications on file as of 6/13/2024.             O:   NAD, WDWN, Alert & Oriented, Mood & Affect wnl, Vitals stable   General appearance normal   Vitals stable   Alert, oriented and in no acute distress     Gritty scaly papule on face   Stuck on papules and brown macules on trunk and  ext   Red papules on trunk  Flesh colored papules on trunk     Blackmacules on left cheek     Eyes: Conjunctivae/lids:Normal     ENT: Lips, mucosa: normal    MSK:Normal    Cardiovascular: peripheral edema none    Pulm: Breathing Normal    Lymph Nodes: No Head and Neck Lymphadenopathy     Neuro/Psych: Orientation:Alert and Orientedx3 ; Mood/Affect:normal       A/P:  1. Seborrheic keratosis, lentigo, angioma, dermal nevus, hx of melanoma  2. Actinic keratosis   R cheek x2, forehead x2, scalp x2, L ear x1  LN2:  Treated with LN2 for 5s for 1-2 cycles. Warned risks of blistering, pain, pigment change, scarring, and incomplete resolution.  Advised patient to return if lesions do not completely resolve.  Wound care sheet given.  It was a pleasure speaking to James Salvador today.  Previous clinic notes and pertinent laboratory tests were reviewed prior to James Salvador's visit.  Nature and genetics of benign skin lesions dicussed with patient.  Signs and Symptoms of skin cancer discussed with patient.  Patient encouraged to perform monthly skin exams.  UV precautions reviewed with patient.  Return to clinic 3 months      Again, thank you for allowing me to participate in the care of your patient.        Sincerely,        Jv Dutta MD

## 2024-06-13 NOTE — PROGRESS NOTES
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  He has significant hx of melanoma, failed ICI therapy and getting infusion and TVEc injections.  Tumor responding to this.  .  Patient has no other skin complaints today.  Remainder of the HPI, Meds, PMH, Allergies, FH, and SH was reviewed in chart.     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 Cardiac Cath 9/2015: BRIDGET to LAD    Hearing problem 1995    Wear hearing aids    Hyperlipidemia     Malignant melanoma     Morbid obesity 01/11/2016    Paroxysmal supraventricular tachycardia     on metoprolol    Permanent atrial fibrillation 04/21/2017    Squamous cell carcinoma     Tachy-edinson syndrome 05/29/2019       Past Surgical History:   Procedure Laterality Date    ADENOIDECTOMY  Childhood    ANGIOPLASTY  1994    in [place]    ANKLE SURGERY  07/13/2005    right ankle    ARTHROPLASTY HIP Right 10/2009    BIOPSY NODE SENTINEL Left 03/30/2023    Procedure: BIOPSY, LYMPH NODE, SENTINEL;  Surgeon: Rolando Minaya MD;  Location: [facility] OR    COLONOSCOPY  4/25/12    EP PERM PACER SINGLE LEAD N/A 05/31/2019    Medtronic single lead pacemaker    EXCISE MASS CHEEK Left 03/30/2023    Procedure: wide local excision of left cheek melanoma;  Surgeon: Rolando Minaya MD;  Location: [facility] OR    EXCISE MASS CHEEK WITH FLAP PEDICLE Left 04/13/2023    Procedure: Left cervical Facial flap per closure of left cheek Defect;  Surgeon: Ila Sanchez MD;  Location: [facility] OR    Facial biopsy - Melanoma      GENITOURINARY SURGERY  10/20/23    Prostate surgery    HEART CATH STENT COR W/WO PTCA  09/23/2015    BRIDGET stent mid  LAD    IR CHEST PORT PLACEMENT > 5 YRS OF AGE  2023    LASER SURGERY OF EYE  2002    MOHS MICROGRAPHIC PROCEDURE  2004    squamous cell carcinoma right temple    SINUS SURGERY  2006    TONSILLECTOMY  Childhood        Family History   Problem Relation Age of Onset    Genitourinary Problems Mother         renal failure    Heart Disease Mother     Cerebrovascular Disease Mother         TIA    Cardiovascular Father         rupture of dorsal aorta    Depression Son     Depression Brother         Suicide    Substance Abuse Brother         Heroin    Skin Cancer No family hx of        Social History     Socioeconomic History    Marital status:      Spouse name: Not on file    Number of children: 3    Years of education: Not on file    Highest education level: Not on file   Occupational History     Employer: RETIRED   Tobacco Use    Smoking status: Former     Current packs/day: 0.00     Average packs/day: 0.5 packs/day for 10.0 years (5.0 ttl pk-yrs)     Types: Cigarettes, Cigars, Pipe     Start date: 1966     Quit date: 1974     Years since quittin.1    Smokeless tobacco: Never    Tobacco comments:     Also smoked from 1985 - 1990   Substance and Sexual Activity    Alcohol use: Not Currently    Drug use: No    Sexual activity: Not Currently     Partners: Female     Birth control/protection: Male Surgical     Comment: Vasectomy   Other Topics Concern    Parent/sibling w/ CABG, MI or angioplasty before 65F 55M? No     Service Not Asked    Blood Transfusions Not Asked    Caffeine Concern Yes     Comment: 2 big cups coffee daily    Occupational Exposure Not Asked    Hobby Hazards Not Asked    Sleep Concern No     Comment: sleeping better since shoulder replaced 11/3/16    Stress Concern No    Weight Concern Yes    Special Diet Yes     Comment: trying to do more lean meats    Back Care Not Asked    Exercise No     Comment: limited - knee    Bike Helmet Not Asked    Seat  Belt Not Asked    Self-Exams Not Asked   Social History Narrative    Not on file     Social Determinants of Health     Financial Resource Strain: Low Risk  (12/12/2023)    Received from [facility], [facility][note 3]    Financial Resource Strain     Difficulty of Paying Living Expenses: 3     Difficulty of Paying Living Expenses: Not on file   Food Insecurity: No Food Insecurity (12/12/2023)    Received from [facility], [facility][note 3]    Food Insecurity     Worried About Running Out of Food in the Last Year: 1   Transportation Needs: No Transportation Needs (12/12/2023)    Received from [facility][note 3], [facility][note 3]    Transportation Needs     Lack of Transportation (Medical): 1   Physical Activity: Not on file   Stress: Not on file   Social Connections: Socially Integrated (12/12/2023)    Received from [facility], [facility][note 3]    Social Connections     Frequency of Communication with Friends and Family: 0   Interpersonal Safety: Not on file   Housing Stability: Low Risk  (12/12/2023)    Received from [facility], [facility][note 3]    Housing Stability     Unable to Pay for Housing in the Last Year: 1       Outpatient Encounter Medications as of 6/13/2024   Medication Sig Dispense Refill    acetaminophen (TYLENOL) 325 MG tablet Take 650 mg by mouth 4 times daily      apixaban ANTICOAGULANT (ELIQUIS ANTICOAGULANT) 5 MG tablet Take 1 tablet (5 mg) by mouth 2 times daily 180 tablet 1    atorvastatin (LIPITOR) 80 MG tablet Take 1 tablet (80 mg) by mouth At Bedtime 90 tablet 3    carvedilol (COREG) 3.125 MG tablet Take 1 tablet (3.125 mg) by mouth 2 times daily (with meals) 180 tablet 0    clobetasol (TEMOVATE) 0.05  % external cream Apply to AA BID x 1-2 weeks then PRN. Do not apply to face. 60 g 3    fluticasone (FLONASE) 50 MCG/ACT nasal spray Spray 2 sprays into both nostrils daily 48 mL 3    furosemide (LASIX) 20 MG tablet Take 1 tablet (20 mg) by mouth daily 90 tablet 1    gabapentin (NEURONTIN) 300 MG capsule Take 2 capsules (600 mg) by mouth 3 times daily      ketoconazole (NIZORAL) 2 % cream Apply topically 2 times daily For face. FAX REFILL REQUESTS TO [facility]: [phone] 30 g 2    ketoconazole (NIZORAL) 2 % external shampoo Use daily as needed 120 mL 11    lidocaine-prilocaine (EMLA) 2.5-2.5 % external cream Apply topically as needed for moderate pain 30 g 1    lisinopril (ZESTRIL) 30 MG tablet Take 1 tablet (30 mg) by mouth daily 90 tablet 3    nitroGLYcerin (NITROSTAT) 0.4 MG sublingual tablet Place 1 tablet (0.4 mg) under the tongue every 5 minutes as needed for chest pain May repeat twice for a total of 3 tablets.  If chest pain not relieved, call 911 25 tablet 11    OXcarbazepine (TRILEPTAL) 300 MG tablet Take 2 tablets (600 mg) by mouth 3 times daily ( @ 7am, 1530 and 2200 hrs each day)      predniSONE (DELTASONE) 10 MG tablet Take 10 mg by mouth daily      triamcinolone (KENALOG) 0.1 % external lotion Apply to scalp BID x 1-2 weeks then PRN only 60 mL 3     No facility-administered encounter medications on file as of 6/13/2024.             O:   NAD, WDWN, Alert & Oriented, Mood & Affect wnl, Vitals stable   General appearance normal   Vitals stable   Alert, oriented and in no acute distress     Gritty scaly papule on face   Stuck on papules and brown macules on trunk and ext   Red papules on trunk  Flesh colored papules on trunk     Blackmacules on left cheek     Eyes: Conjunctivae/lids:Normal     ENT: Lips, mucosa: normal    MSK:Normal    Cardiovascular: peripheral edema none    Pulm: Breathing Normal    Lymph Nodes: No Head and Neck Lymphadenopathy     Neuro/Psych: Orientation:Alert and Orientedx3 ;  Mood/Affect:normal       A/P:  1. Seborrheic keratosis, lentigo, angioma, dermal nevus, hx of melanoma  2. Actinic keratosis   R cheek x2, forehead x2, scalp x2, L ear x1  LN2:  Treated with LN2 for 5s for 1-2 cycles. Warned risks of blistering, pain, pigment change, scarring, and incomplete resolution.  Advised patient to return if lesions do not completely resolve.  Wound care sheet given.  It was a pleasure speaking to James Salvador today.  Previous clinic notes and pertinent laboratory tests were reviewed prior to James Salvador's visit.  Nature and genetics of benign skin lesions dicussed with patient.  Signs and Symptoms of skin cancer discussed with patient.  Patient encouraged to perform monthly skin exams.  UV precautions reviewed with patient.  Return to clinic 3 months

## 2024-06-22 SDOH — HEALTH STABILITY: PHYSICAL HEALTH: ON AVERAGE, HOW MANY MINUTES DO YOU ENGAGE IN EXERCISE AT THIS LEVEL?: 0 MIN

## 2024-06-22 SDOH — HEALTH STABILITY: PHYSICAL HEALTH: ON AVERAGE, HOW MANY DAYS PER WEEK DO YOU ENGAGE IN MODERATE TO STRENUOUS EXERCISE (LIKE A BRISK WALK)?: 0 DAYS

## 2024-06-22 ASSESSMENT — SOCIAL DETERMINANTS OF HEALTH (SDOH): HOW OFTEN DO YOU GET TOGETHER WITH FRIENDS OR RELATIVES?: ONCE A WEEK

## 2024-06-26 ENCOUNTER — OFFICE VISIT (OUTPATIENT)
Dept: INTERNAL MEDICINE | Facility: CLINIC | Age: 79
End: 2024-06-26
Attending: INTERNAL MEDICINE
Payer: COMMERCIAL

## 2024-06-26 VITALS
HEART RATE: 84 BPM | SYSTOLIC BLOOD PRESSURE: 112 MMHG | BODY MASS INDEX: 35.31 KG/M2 | RESPIRATION RATE: 20 BRPM | DIASTOLIC BLOOD PRESSURE: 74 MMHG | OXYGEN SATURATION: 93 % | HEIGHT: 71 IN | WEIGHT: 252.2 LBS

## 2024-06-26 DIAGNOSIS — R26.81 GAIT INSTABILITY: ICD-10-CM

## 2024-06-26 DIAGNOSIS — Z00.00 ENCOUNTER FOR MEDICARE ANNUAL WELLNESS EXAM: Primary | ICD-10-CM

## 2024-06-26 DIAGNOSIS — E66.01 MORBID OBESITY DUE TO EXCESS CALORIES (H): ICD-10-CM

## 2024-06-26 DIAGNOSIS — E22.2 SIADH (SYNDROME OF INAPPROPRIATE ADH PRODUCTION) (H): ICD-10-CM

## 2024-06-26 DIAGNOSIS — I10 BENIGN HYPERTENSION: ICD-10-CM

## 2024-06-26 DIAGNOSIS — C43.30 MALIGNANT MELANOMA OF SKIN OF FACE (H): ICD-10-CM

## 2024-06-26 DIAGNOSIS — I25.118 CORONARY ARTERY DISEASE OF NATIVE ARTERY OF NATIVE HEART WITH STABLE ANGINA PECTORIS (H): ICD-10-CM

## 2024-06-26 DIAGNOSIS — Z13.1 SCREENING FOR DIABETES MELLITUS: ICD-10-CM

## 2024-06-26 DIAGNOSIS — I48.11 LONGSTANDING PERSISTENT ATRIAL FIBRILLATION (H): ICD-10-CM

## 2024-06-26 DIAGNOSIS — C78.01 METASTATIC MELANOMA TO LUNG, RIGHT (H): ICD-10-CM

## 2024-06-26 DIAGNOSIS — E78.5 HYPERLIPIDEMIA WITH TARGET LDL LESS THAN 70: ICD-10-CM

## 2024-06-26 PROCEDURE — G0009 ADMIN PNEUMOCOCCAL VACCINE: HCPCS | Performed by: INTERNAL MEDICINE

## 2024-06-26 PROCEDURE — G0439 PPPS, SUBSEQ VISIT: HCPCS | Performed by: INTERNAL MEDICINE

## 2024-06-26 PROCEDURE — 90677 PCV20 VACCINE IM: CPT | Performed by: INTERNAL MEDICINE

## 2024-06-26 PROCEDURE — 99213 OFFICE O/P EST LOW 20 MIN: CPT | Mod: 25 | Performed by: INTERNAL MEDICINE

## 2024-06-26 RX ORDER — CARVEDILOL 3.12 MG/1
3.12 TABLET ORAL 2 TIMES DAILY WITH MEALS
Qty: 180 TABLET | Refills: 3 | Status: SHIPPED | OUTPATIENT
Start: 2024-06-26

## 2024-06-26 RX ORDER — FUROSEMIDE 20 MG
20 TABLET ORAL DAILY
Qty: 90 TABLET | Refills: 3 | Status: SHIPPED | OUTPATIENT
Start: 2024-06-26

## 2024-06-26 RX ORDER — ATORVASTATIN CALCIUM 80 MG/1
80 TABLET, FILM COATED ORAL AT BEDTIME
Qty: 90 TABLET | Refills: 3 | Status: SHIPPED | OUTPATIENT
Start: 2024-06-26

## 2024-06-26 RX ORDER — LISINOPRIL 30 MG/1
30 TABLET ORAL DAILY
Qty: 90 TABLET | Refills: 3 | Status: SHIPPED | OUTPATIENT
Start: 2024-06-26

## 2024-06-26 NOTE — PROGRESS NOTES
"Preventive Care Visit  [facility]  Jeronimo Painter MD, Internal Medicine  Jun 26, 2024      Assessment & Plan     Encounter for Medicare annual wellness exam  Updated screening, immunizations, prevention.  Please see health maintenance list, care gaps   Prevnar 20 today    Malignant melanoma of skin of face (H)  Metastatic melanoma to lung, right (H)  -per oncology. F/u on upcoming CT. Small pleural effusion noted on last. Slight increase    SIADH (syndrome of inappropriate ADH production) (H24)  Stable - due to oxycarb    Coronary artery disease of native artery of native heart with stable angina pectoris (H24)  Stable. No exertional sx. Continue secondary prevention     Morbid obesity due to excess calories (H)  Gait instability  Did rec'd PT for strengthening. Prefers not to do this right now    Hyperlipidemia with target LDL less than 70  Cont statin   - Lipid panel reflex to direct LDL Fasting; Future    Screening for diabetes mellitus  - Glucose; Future    Patient has been advised of split billing requirements and indicates understanding: Yes        BMI  Estimated body mass index is 35.17 kg/m  as calculated from the following:    Height as of this encounter: 1.803 m (5' 11\").    Weight as of this encounter: 114.4 kg (252 lb 3.2 oz).   Weight management plan: Discussed healthy diet and exercise guidelines    Counseling  Appropriate preventive services were discussed with this patient, including applicable screening as appropriate for fall prevention, nutrition, physical activity, Tobacco-use cessation, weight loss and cognition.  Checklist reviewing preventive services available has been given to the patient.  Reviewed patient's diet, addressing concerns and/or questions.   Updated plan of care.  Patient reported difficulty with activities of daily living were addressed today.    See Patient Instructions    Jose Ruano is a 78 year old, presenting for the " following:  Physical          Health Care Directive  Patient has a Health Care Directive on file  Discussed advance care planning with patient.    HPI  Continues rx for melanoma. Tolerating this better this year.              6/22/2024   General Health   How would you rate your overall physical health? (!) FAIR   Feel stress (tense, anxious, or unable to sleep) To some extent      (!) STRESS CONCERN      6/22/2024   Nutrition   Diet: Low fat/cholesterol            6/22/2024   Exercise   Days per week of moderate/strenous exercise 0 days   Average minutes spent exercising at this level 0 min      (!) EXERCISE CONCERN      6/22/2024   Social Factors   Frequency of gathering with friends or relatives Once a week   Worry food won't last until get money to buy more No   Food not last or not have enough money for food? No   Do you have housing? (Housing is defined as stable permanent housing and does not include staying ouside in a car, in a tent, in an abandoned building, in an overnight shelter, or couch-surfing.) Yes   Are you worried about losing your housing? No   Lack of transportation? No   Unable to get utilities (heat,electricity)? No            6/26/2024   Fall Risk   Gait Speed Test (Document in seconds) 4.8   Gait Speed Test Interpretation Less than or equal to 5.00 seconds - PASS               6/22/2024   Activities of Daily Living- Home Safety   Needs help with the following daily activites Transportation    Shopping    Preparing meals    Housework    Laundry   Safety concerns in the home None of the above       Multiple values from one day are sorted in reverse-chronological order         6/22/2024   Dental   Dentist two times every year? Yes            6/22/2024   Hearing Screening   Hearing concerns? None of the above            6/22/2024   Driving Risk Screening   Patient/family members have concerns about driving No            6/22/2024   General Alertness/Fatigue Screening   Have you been more tired  than usual lately? No            2024   Urinary Incontinence Screening   Bothered by leaking urine in past 6 months No               Today's PHQ-2 Score:       2024     1:40 PM   PHQ-2 (  [employer])   Q1: Little interest or pleasure in doing things 0   Q2: Feeling down, depressed or hopeless 0   PHQ-2 Score 0   Q1: Little interest or pleasure in doing things Not at all   Q2: Feeling down, depressed or hopeless Not at all   PHQ-2 Score 0           2024   Substance Use   Alcohol more than 3/day or more than 7/wk Not Applicable   Do you have a current opioid prescription? No   How severe/bad is pain from 1 to 10? 5/10   Do you use any other substances recreationally? No        Social History     Tobacco Use    Smoking status: Former     Current packs/day: 0.00     Average packs/day: 0.5 packs/day for 10.0 years (5.0 ttl pk-yrs)     Types: Cigarettes, Cigars, Pipe     Start date: 1966     Quit date: 1974     Years since quittin.1    Smokeless tobacco: Never    Tobacco comments:     Also smoked from 1985 - 1990   Vaping Use    Vaping status: Never Used   Substance Use Topics    Alcohol use: Not Currently    Drug use: No       ASCVD Risk   The 10-year ASCVD risk score (Anat BEYER, et al., 2019) is: 23.7%    Values used to calculate the score:      Age: 78 years      Sex: Male      Is Non- : No      Diabetic: No      Tobacco smoker: No      Systolic Blood Pressure: 112 mmHg      Is BP treated: Yes      HDL Cholesterol: 64 mg/dL      Total Cholesterol: 132 mg/dL            Reviewed and updated as needed this visit by Provider                      Current providers sharing in care for this patient include:  Patient Care Team:  Jeronimo Painter MD as PCP - General (Internal Medicine)  Jeronimo Painter MD as Assigned PCP  Alice Cortes APRN CNP as Nurse Practitioner (Family Medicine)  Kev Thomson MD as MD (Otolaryngology)  Luzmaria  "Jv Rider MD as MD (Dermatology)  Jv Dutta MD as Assigned Surgical Provider  Dilshad Holbrook MD as Assigned Cancer Care Provider  Mylene Kat APRN CNP as Nurse Practitioner (Cardiovascular Disease)  Mylene Kat APRN CNP as Assigned Heart and Vascular Provider    The following health maintenance items are reviewed in Epic and correct as of today:  Health Maintenance   Topic Date Due    LIPID  03/16/2024    COVID-19 Vaccine (10 - 2023-24 season) 06/06/2024    BMP  05/29/2025    MEDICARE ANNUAL WELLNESS VISIT  06/26/2025    ANNUAL REVIEW OF HM ORDERS  06/26/2025    FALL RISK ASSESSMENT  06/26/2025    GLUCOSE  05/29/2027    ADVANCE CARE PLANNING  06/26/2029    DTAP/TDAP/TD IMMUNIZATION (3 - Td or Tdap) 04/25/2033    HEPATITIS C SCREENING  Completed    PHQ-2 (once per calendar year)  Completed    INFLUENZA VACCINE  Completed    Pneumococcal Vaccine: 65+ Years  Completed    ZOSTER IMMUNIZATION  Completed    RSV VACCINE (Pregnancy & 60+)  Completed    IPV IMMUNIZATION  Aged Out    HPV IMMUNIZATION  Aged Out    MENINGITIS IMMUNIZATION  Aged Out    RSV MONOCLONAL ANTIBODY  Aged Out    COLORECTAL CANCER SCREENING  Discontinued            Objective    Exam  /74   Pulse 84   Resp 20   Ht 1.803 m (5' 11\")   Wt 114.4 kg (252 lb 3.2 oz)   SpO2 93%   BMI 35.17 kg/m     Estimated body mass index is 35.17 kg/m  as calculated from the following:    Height as of this encounter: 1.803 m (5' 11\").    Weight as of this encounter: 114.4 kg (252 lb 3.2 oz).    Physical Exam  GENERAL: alert, no distress, obese, and elderly  EYES: Eyes grossly normal to inspection, PERRL and conjunctivae and sclerae normal  HENT: normal cephalic/atraumatic, nose and mouth without ulcers or lesions, oropharynx clear, oral mucous membranes moist, and see below for skin findings  NECK: no adenopathy, no asymmetry, masses, or scars  RESP: diminished BS bilat. Crackles at R base. Some dullness R base.   CV: regular rate " and rhythm, normal S1 S2, no S3 or S4, no murmur, click or rub, no peripheral edema  ABDOMEN: soft, nontender, no hepatosplenomegaly, no masses and bowel sounds normal  MS: no gross musculoskeletal defects noted, no edema  SKIN: 2 distinct groupings of dark macular lesions L cheek  NEURO: Normal strength and tone, mentation intact and speech normal  PSYCH: mentation appears normal, affect normal/bright  LYMPH: no cervical adenopathy        6/26/2024   Mini Cog   Clock Draw Score 2 Normal   3 Item Recall 2 objects recalled   Mini Cog Total Score 4                 Signed Electronically by: Jeronimo Painter MD

## 2024-06-26 NOTE — PATIENT INSTRUCTIONS
"Patient Education   Preventive Care Advice   This is general advice we often give to help people stay healthy. Your care team may have specific advice just for you. Please talk to your care team about your own preventive care needs.  Lifestyle  Exercise at least 150 minutes each week (30 minutes a day, 5 days a week).  Do muscle strengthening activities 2 days a week. These help control your weight and prevent disease.  No smoking.  Wear sunscreen to prevent skin cancer.  Have your home tested for radon every 2 to 5 years. Radon is a colorless, odorless gas that can harm your lungs. To learn more, go to www.health.[facility].[place].us and search for \"Radon in Homes.\"  Keep guns unloaded and locked up in a safe place like a safe or gun vault, or, use a gun lock and hide the keys. Always lock away bullets separately. To learn more, visit [employer].[place].gov and search for \"safe gun storage.\"  Nutrition  Eat 5 or more servings of fruits and vegetables each day.  Try wheat bread, brown rice and whole grain pasta (instead of white bread, rice, and pasta).  Get enough calcium and vitamin D. Check the label on foods and aim for 100% of the RDA (recommended daily allowance).  Regular exams  Have a dental exam and cleaning every 6 months.  See your health care team every year to talk about:  Any changes in your health.  Any medicines your care team has prescribed.  Preventive care, family planning, and ways to prevent chronic diseases.  Shots (vaccines)   HPV shots (up to age 26), if you've never had them before.  Hepatitis B shots (up to age 59), if you've never had them before.  COVID-19 shot: Get this shot when it's due.  Flu shot: Get a flu shot every year.  Tetanus shot: Get a tetanus shot every 10 years.  Pneumococcal, hepatitis A, and RSV shots: Ask your care team if you need these based on your risk.  Shingles shot (for age 50 and up).  General health tests  Diabetes screening:  Starting at age 35, Get screened for diabetes at least " every 3 years.  If you are younger than age 35, ask your care team if you should be screened for diabetes.  Cholesterol test: At age 39, start having a cholesterol test every 5 years, or more often if advised.  Bone density scan (DEXA): At age 50, ask your care team if you should have this scan for osteoporosis (brittle bones).  Hepatitis C: Get tested at least once in your life.  Abdominal aortic aneurysm screening: Talk to your doctor about having this screening if you:  Have ever smoked; and  Are biologically male; and  Are between the ages of 65 and 75.  STIs (sexually transmitted infections)  Before age 24: Ask your care team if you should be screened for STIs.  After age 24: Get screened for STIs if you're at risk. You are at risk for STIs (including HIV) if:  You are sexually active with more than one person.  You don't use condoms every time.  You or a partner was diagnosed with a sexually transmitted infection.  If you are at risk for HIV, ask about PrEP medicine to prevent HIV.  Get tested for HIV at least once in your life, whether you are at risk for HIV or not.  Cancer screening tests  Cervical cancer screening: If you have a cervix, begin getting regular cervical cancer screening tests at age 21. Most people who have regular screenings with normal results can stop after age 65. Talk about this with your provider.  Breast cancer scan (mammogram): If you've ever had breasts, begin having regular mammograms starting at age 40. This is a scan to check for breast cancer.  Colon cancer screening: It is important to start screening for colon cancer at age 45.  Have a colonoscopy test every 10 years (or more often if you're at risk) Or, ask your provider about stool tests like a FIT test every year or Cologuard test every 3 years.  To learn more about your testing options, visit: www.Tiqets/336328.pdf.  For help making a decision, visit: jose [email or web address].  Prostate cancer screening test: If you have a  prostate and are age 55 to 69, ask your provider if you would benefit from a yearly prostate cancer screening test.  Lung cancer screening: If you are a current or former smoker age 50 to 80, ask your care team if ongoing lung cancer screenings are right for you.  For informational purposes only. Not to replace the advice of your health care provider. Copyright   2023 [facility]. All rights reserved. Clinically reviewed by the [facility]. [employer] 254415 - REV 04/24.  Learning About Activities of Daily Living  What are activities of daily living?     Activities of daily living (ADLs) are the basic self-care tasks you do every day. These include eating, bathing, dressing, and moving around.  As you age, and if you have health problems, you may find that it's harder to do some of these tasks. If so, your doctor can suggest ideas that may help.  To measure what kind of help you may need, your doctor will ask how well you are able to do ADLs. Let your doctor know if there are any tasks that you are having trouble doing. This is an important first step to getting help. And when you have the help you need, you can stay as independent as possible.  How will a doctor assess your ADLs?  Asking about ADLs is part of a routine health checkup your doctor will likely do as you age. Your health check might be done in a doctor's office, in your home, or at a hospital. The goal is to find out if you are having any problems that could make it hard to care for yourself or that make it unsafe for you to be on your own.  To measure your ADLs, your doctor will ask how hard it is for you to do routine tasks. Your doctor may also want to know if you have changed the way you do a task because of a health problem. Your doctor may watch how you:  Walk back and forth.  Keep your balance while you stand or walk.  Move from sitting to standing or from a bed to a chair.  Button or unbutton a shirt or  sweater.  Remove and put on your shoes.  It's common to feel a little worried or anxious if you find you can't do all the things you used to be able to do. Talking with your doctor about ADLs is a way to make sure you're as safe as possible and able to care for yourself as well as you can. You may want to bring a caregiver, friend, or family member to your checkup. They can help you talk to your doctor.  Follow-up care is a key part of your treatment and safety. Be sure to make and go to all appointments, and call your doctor if you are having problems. It's also a good idea to know your test results and keep a list of the medicines you take.  Current as of: October 24, 2023               Content Version: 14.0    9279-8010 [employer].   Care instructions adapted under license by your healthcare professional. If you have questions about a medical condition or this instruction, always ask your healthcare professional. [employer] disclaims any warranty or liability for your use of this information.      Preventing Falls: Care Instructions  Injuries and health problems such as trouble walking or poor eyesight can increase your risk of falling. So can some medicines. But there are things you can do to help prevent falls. You can exercise to get stronger. You can also arrange your home to make it safer.    Talk to your doctor about the medicines you take. Ask if any of them increase the risk of falls and whether they can be changed or stopped.   Try to exercise regularly. It can help improve your strength and balance. This can help lower your risk of falling.     Practice fall safety and prevention.    Wear low-heeled shoes that fit well and give your feet good support. Talk to your doctor if you have foot problems that make this hard.  Carry a cellphone or wear a medical alert device that you can use to call for help.  Use stepladders instead of chairs to reach high objects. Don't climb if  "you're at risk for falls. Ask for help, if needed.  Wear the correct eyeglasses, if you need them.    Make your home safer.    Remove rugs, cords, clutter, and furniture from walkways.  Keep your house well lit. Use night-lights in hallways and bathrooms.  Install and use sturdy handrails on stairways.  Wear nonskid footwear, even inside. Don't walk barefoot or in socks without shoes.    Be safe outside.    Use handrails, curb cuts, and ramps whenever possible.  Keep your hands free by using a shoulder bag or backpack.  Try to walk in well-lit areas. Watch out for uneven ground, changes in pavement, and debris.  Be careful in the winter. Walk on the grass or gravel when sidewalks are slippery. Use de-icer on steps and walkways. Add non-slip devices to shoes.    Put grab bars and nonskid mats in your shower or tub and near the toilet. Try to use a shower chair or bath bench when bathing.   Get into a tub or shower by putting in your weaker leg first. Get out with your strong side first. Have a phone or medical alert device in the bathroom with you.   Where can you learn more?  Go to https://www.[employer].net/patiented  Enter G117 in the search box to learn more about \"Preventing Falls: Care Instructions.\"  Current as of: July 17, 2023               Content Version: 14.0    1110-7397 [employer].   Care instructions adapted under license by your healthcare professional. If you have questions about a medical condition or this instruction, always ask your healthcare professional. [employer] disclaims any warranty or liability for your use of this information.         "

## 2024-07-01 ENCOUNTER — HOSPITAL ENCOUNTER (OUTPATIENT)
Dept: CT IMAGING | Facility: CLINIC | Age: 79
Discharge: HOME | End: 2024-07-01
Attending: INTERNAL MEDICINE | Admitting: INTERNAL MEDICINE
Payer: COMMERCIAL

## 2024-07-01 DIAGNOSIS — C78.02 SECONDARY MALIGNANT NEOPLASM OF LEFT LUNG (H): ICD-10-CM

## 2024-07-01 DIAGNOSIS — C78.01 SECONDARY MALIGNANT NEOPLASM OF RIGHT LUNG (H): ICD-10-CM

## 2024-07-01 DIAGNOSIS — C43.30 MALIGNANT MELANOMA OF SKIN OF FACE (H): ICD-10-CM

## 2024-07-01 LAB
CREAT BLD-MCNC: 0.6 MG/DL (ref 0.7–1.3)
EGFRCR SERPLBLD CKD-EPI 2021: >60 ML/MIN/1.73M2

## 2024-07-01 PROCEDURE — 250N000011 HC RX IP 250 OP 636: Performed by: INTERNAL MEDICINE

## 2024-07-01 PROCEDURE — 82565 ASSAY OF CREATININE: CPT

## 2024-07-01 PROCEDURE — 250N000009 HC RX 250: Performed by: INTERNAL MEDICINE

## 2024-07-01 PROCEDURE — 71260 CT THORAX DX C+: CPT

## 2024-07-01 PROCEDURE — 70491 CT SOFT TISSUE NECK W/DYE: CPT

## 2024-07-01 RX ORDER — HEPARIN SODIUM (PORCINE) LOCK FLUSH IV SOLN 100 UNIT/ML 100 UNIT/ML
SOLUTION INTRAVENOUS
Status: COMPLETED
Start: 2024-07-01 | End: 2024-07-01

## 2024-07-01 RX ORDER — IOPAMIDOL 755 MG/ML
500 INJECTION, SOLUTION INTRAVASCULAR ONCE
Status: COMPLETED | OUTPATIENT
Start: 2024-07-01 | End: 2024-07-01

## 2024-07-01 RX ORDER — HEPARIN SODIUM (PORCINE) LOCK FLUSH IV SOLN 100 UNIT/ML 100 UNIT/ML
5 SOLUTION INTRAVENOUS ONCE
Status: COMPLETED | OUTPATIENT
Start: 2024-07-01 | End: 2024-07-01

## 2024-07-01 RX ADMIN — SODIUM CHLORIDE 65 ML: 9 INJECTION, SOLUTION INTRAVENOUS at 14:24

## 2024-07-01 RX ADMIN — IOPAMIDOL 125 ML: 755 INJECTION, SOLUTION INTRAVENOUS at 14:24

## 2024-07-01 RX ADMIN — HEPARIN SODIUM (PORCINE) LOCK FLUSH IV SOLN 100 UNIT/ML 5 ML: 100 SOLUTION at 14:35

## 2024-07-01 RX ADMIN — SODIUM CHLORIDE, PRESERVATIVE FREE 5 ML: 5 INJECTION INTRAVENOUS at 14:35

## 2024-07-02 ENCOUNTER — MYC MEDICAL ADVICE (OUTPATIENT)
Dept: INTERNAL MEDICINE | Facility: CLINIC | Age: 79
End: 2024-07-02
Payer: COMMERCIAL

## 2024-07-07 ENCOUNTER — LAB (OUTPATIENT)
Dept: LAB | Facility: CLINIC | Age: 79
End: 2024-07-07
Payer: COMMERCIAL

## 2024-07-07 DIAGNOSIS — E78.5 HYPERLIPIDEMIA WITH TARGET LDL LESS THAN 70: ICD-10-CM

## 2024-07-07 DIAGNOSIS — Z13.1 SCREENING FOR DIABETES MELLITUS: ICD-10-CM

## 2024-07-07 LAB
CHOLEST SERPL-MCNC: 106 MG/DL
FASTING STATUS PATIENT QL REPORTED: YES
FASTING STATUS PATIENT QL REPORTED: YES
GLUCOSE SERPL-MCNC: 78 MG/DL (ref 70–99)
HDLC SERPL-MCNC: 58 MG/DL
LDLC SERPL CALC-MCNC: 38 MG/DL
NONHDLC SERPL-MCNC: 48 MG/DL
TRIGL SERPL-MCNC: 52 MG/DL

## 2024-07-07 PROCEDURE — 80061 LIPID PANEL: CPT

## 2024-07-07 PROCEDURE — 36415 COLL VENOUS BLD VENIPUNCTURE: CPT

## 2024-07-07 PROCEDURE — 82947 ASSAY GLUCOSE BLOOD QUANT: CPT

## 2024-07-11 ENCOUNTER — TRANSFERRED RECORDS (OUTPATIENT)
Dept: HEALTH INFORMATION MANAGEMENT | Facility: CLINIC | Age: 79
End: 2024-07-11
Payer: COMMERCIAL

## 2024-07-25 ENCOUNTER — TRANSFERRED RECORDS (OUTPATIENT)
Dept: HEALTH INFORMATION MANAGEMENT | Facility: CLINIC | Age: 79
End: 2024-07-25
Payer: COMMERCIAL

## 2024-08-27 ENCOUNTER — TRANSFERRED RECORDS (OUTPATIENT)
Dept: HEALTH INFORMATION MANAGEMENT | Facility: CLINIC | Age: 79
End: 2024-08-27
Payer: COMMERCIAL

## 2024-09-19 ENCOUNTER — TELEPHONE (OUTPATIENT)
Dept: DERMATOLOGY | Facility: CLINIC | Age: 79
End: 2024-09-19

## 2024-09-19 ENCOUNTER — OFFICE VISIT (OUTPATIENT)
Dept: DERMATOLOGY | Facility: CLINIC | Age: 79
End: 2024-09-19
Payer: COMMERCIAL

## 2024-09-19 DIAGNOSIS — C44.42 SQUAMOUS CELL CARCINOMA OF SCALP: ICD-10-CM

## 2024-09-19 DIAGNOSIS — D18.01 ANGIOMA OF SKIN: ICD-10-CM

## 2024-09-19 DIAGNOSIS — D23.9 DERMAL NEVUS: Primary | ICD-10-CM

## 2024-09-19 DIAGNOSIS — L81.4 LENTIGO: ICD-10-CM

## 2024-09-19 DIAGNOSIS — Z85.820 HISTORY OF MELANOMA: ICD-10-CM

## 2024-09-19 DIAGNOSIS — L57.0 AK (ACTINIC KERATOSIS): ICD-10-CM

## 2024-09-19 DIAGNOSIS — L82.1 SEBORRHEIC KERATOSES: ICD-10-CM

## 2024-09-19 DIAGNOSIS — C44.42 SQUAMOUS CELL CARCINOMA OF SCALP: Primary | ICD-10-CM

## 2024-09-19 PROCEDURE — 17003 DESTRUCT PREMALG LES 2-14: CPT | Performed by: DERMATOLOGY

## 2024-09-19 PROCEDURE — 17000 DESTRUCT PREMALG LESION: CPT | Mod: 59 | Performed by: DERMATOLOGY

## 2024-09-19 PROCEDURE — 88331 PATH CONSLTJ SURG 1 BLK 1SPC: CPT | Performed by: DERMATOLOGY

## 2024-09-19 PROCEDURE — 11102 TANGNTL BX SKIN SINGLE LES: CPT | Performed by: DERMATOLOGY

## 2024-09-19 PROCEDURE — 11103 TANGNTL BX SKIN EA SEP/ADDL: CPT | Performed by: DERMATOLOGY

## 2024-09-19 PROCEDURE — 99213 OFFICE O/P EST LOW 20 MIN: CPT | Mod: 25 | Performed by: DERMATOLOGY

## 2024-09-19 NOTE — LETTER
9/19/2024      James Salvador  [address] [place] [address]      Dear Colleague,    Thank you for referring your patient, James Salvador, to the [facility]. Please see a copy of my visit note below.    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  Lesions on right lung have improved and shrinking on left leg.  He notes spots on skin.  Tumor responding to this.  .  Patient has no other skin complaints today.  Remainder of the HPI, Meds, PMH, Allergies, FH, and SH was reviewed in chart.     Past Medical History:   Diagnosis Date     Actinic 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 Cardiac Cath 9/2015: BRIDGET to LAD     Hearing problem 1995    Wear hearing aids     Hyperlipidemia      Malignant melanoma      Morbid obesity 01/11/2016     Paroxysmal supraventricular tachycardia     on metoprolol     Permanent atrial fibrillation 04/21/2017     Squamous cell carcinoma      Tachy-edinson syndrome 05/29/2019       Past Surgical History:   Procedure Laterality Date     ADENOIDECTOMY  Childhood     ANGIOPLASTY  1994    in [place]     ANKLE SURGERY  07/13/2005    right ankle     ARTHROPLASTY HIP Right 10/2009     BIOPSY NODE SENTINEL Left 03/30/2023    Procedure: BIOPSY, LYMPH NODE, SENTINEL;  Surgeon: Rolando Minaya MD;  Location: [facility] OR     COLONOSCOPY  4/25/12     EP PERM PACER SINGLE LEAD N/A 05/31/2019    Medtronic single lead pacemaker     EXCISE MASS CHEEK Left 03/30/2023    Procedure: wide local excision of left cheek melanoma;   Surgeon: Rolando Minaya MD;  Location: [facility] OR     EXCISE MASS CHEEK WITH FLAP PEDICLE Left 2023    Procedure: Left cervical Facial flap per closure of left cheek Defect;  Surgeon: Ila Sanchez MD;  Location: [facility] OR     Facial biopsy - Melanoma       GENITOURINARY SURGERY  10/20/23    Prostate surgery     HEART CATH STENT COR W/WO PTCA  2015    BRIDGET stent mid LAD     IR CHEST PORT PLACEMENT > 5 YRS OF AGE  2023     LASER SURGERY OF EYE  2002     MOHS MICROGRAPHIC PROCEDURE  2004    squamous cell carcinoma right temple     SINUS SURGERY  2006     TONSILLECTOMY  Childhood        Family History   Problem Relation Age of Onset     Genitourinary Problems Mother         renal failure     Heart Disease Mother      Cerebrovascular Disease Mother         TIA     Cardiovascular Father         rupture of dorsal aorta     Depression Son      Depression Brother         Suicide     Substance Abuse Brother         Heroin     Skin Cancer No family hx of        Social History     Socioeconomic History     Marital status:      Spouse name: Not on file     Number of children: 3     Years of education: Not on file     Highest education level: Not on file   Occupational History     Employer: RETIRED   Tobacco Use     Smoking status: Former     Current packs/day: 0.00     Average packs/day: 0.5 packs/day for 10.0 years (5.0 ttl pk-yrs)     Types: Cigarettes, Cigars, Pipe     Start date: 1966     Quit date: 1974     Years since quittin.4     Smokeless tobacco: Never     Tobacco comments:     Also smoked from 1985 - 1990   Vaping Use     Vaping status: Never Used   Substance and Sexual Activity     Alcohol use: Not Currently     Drug use: No     Sexual activity: Not Currently     Partners: Female     Birth control/protection: Male Surgical     Comment: Vasectomy   Other Topics Concern     Parent/sibling w/ CABG, MI or angioplasty before 65F 55M? No      Service  Not Asked     Blood Transfusions Not Asked     Caffeine Concern Yes     Comment: 2 big cups coffee daily     Occupational Exposure Not Asked     Hobby Hazards Not Asked     Sleep Concern No     Comment: sleeping better since shoulder replaced 11/3/16     Stress Concern No     Weight Concern Yes     Special Diet Yes     Comment: trying to do more lean meats     Back Care Not Asked     Exercise No     Comment: limited - knee     Bike Helmet Not Asked     Seat Belt Not Asked     Self-Exams Not Asked   Social History Narrative     Not on file     Social Determinants of Health     Financial Resource Strain: Low Risk  (6/22/2024)    Financial Resource Strain      Within the past 12 months, have you or your family members you live with been unable to get utilities (heat, electricity) when it was really needed?: No   Food Insecurity: Low Risk  (6/22/2024)    Food Insecurity      Within the past 12 months, did you worry that your food would run out before you got money to buy more?: No      Within the past 12 months, did the food you bought just not last and you didn t have money to get more?: No   Transportation Needs: Low Risk  (6/22/2024)    Transportation Needs      Within the past 12 months, has lack of transportation kept you from medical appointments, getting your medicines, non-medical meetings or appointments, work, or from getting things that you need?: No   Physical Activity: Inactive (6/22/2024)    Exercise Vital Sign      Days of Exercise per Week: 0 days      Minutes of Exercise per Session: 0 min   Stress: Stress Concern Present (6/22/2024)    [place] [employer] - Occupational Stress Questionnaire      Feeling of Stress : To some extent   Social Connections: Unknown (6/22/2024)    Social Connection and Isolation Panel [NHANES]      Frequency of Communication with Friends and Family: Not on file      Frequency of Social Gatherings with Friends and Family: Once a week      Attends Latter day  Services: Not on file      Active Member of Clubs or Organizations: Not on file      Attends Club or Organization Meetings: Not on file      Marital Status: Not on file   Interpersonal Safety: Low Risk  (6/26/2024)    Interpersonal Safety      Do you feel physically and emotionally safe where you currently live?: Yes      Within the past 12 months, have you been hit, slapped, kicked or otherwise physically hurt by someone?: No      Within the past 12 months, have you been humiliated or emotionally abused in other ways by your partner or ex-partner?: No   Housing Stability: Low Risk  (6/22/2024)    Housing Stability      Do you have housing? : Yes      Are you worried about losing your housing?: No       Outpatient Encounter Medications as of 9/19/2024   Medication Sig Dispense Refill     acetaminophen (TYLENOL) 325 MG tablet Take 650 mg by mouth 4 times daily       apixaban ANTICOAGULANT (ELIQUIS ANTICOAGULANT) 5 MG tablet Take 1 tablet (5 mg) by mouth 2 times daily 180 tablet 3     atorvastatin (LIPITOR) 80 MG tablet Take 1 tablet (80 mg) by mouth at bedtime 90 tablet 3     carvedilol (COREG) 3.125 MG tablet Take 1 tablet (3.125 mg) by mouth 2 times daily (with meals) 180 tablet 3     clobetasol (TEMOVATE) 0.05 % external cream Apply to AA BID x 1-2 weeks then PRN. Do not apply to face. 60 g 3     fluticasone (FLONASE) 50 MCG/ACT nasal spray Spray 2 sprays into both nostrils daily 48 mL 3     furosemide (LASIX) 20 MG tablet Take 1 tablet (20 mg) by mouth daily 90 tablet 3     gabapentin (NEURONTIN) 300 MG capsule Take 2 capsules (600 mg) by mouth 3 times daily       ketoconazole (NIZORAL) 2 % cream Apply topically 2 times daily For face. [facility] [phone] 30 g 2     ketoconazole (NIZORAL) 2 % external shampoo Use daily as needed 120 mL 11     lidocaine-prilocaine (EMLA) 2.5-2.5 % external cream Apply topically as needed for moderate pain 30 g 1     lisinopril (ZESTRIL) 30 MG tablet Take  1 tablet (30 mg) by mouth daily 90 tablet 3     nitroGLYcerin (NITROSTAT) 0.4 MG sublingual tablet Place 1 tablet (0.4 mg) under the tongue every 5 minutes as needed for chest pain May repeat twice for a total of 3 tablets.  If chest pain not relieved, call 911 25 tablet 11     OXcarbazepine (TRILEPTAL) 300 MG tablet Take 2 tablets (600 mg) by mouth 3 times daily ( @ 7am, 1530 and 2200 hrs each day)       predniSONE (DELTASONE) 10 MG tablet Take 10 mg by mouth daily       triamcinolone (KENALOG) 0.1 % external lotion Apply to scalp BID x 1-2 weeks then PRN only 60 mL 3     No facility-administered encounter medications on file as of 9/19/2024.             O:   NAD, WDWN, Alert & Oriented, Mood & Affect wnl, Vitals stable   General appearance normal   Vitals stable   Alert, oriented and in no acute distress  Following lymph nodes palpated: no axilla, inguinal lad no Occipital, Supraclavicular lad  Slightly palpable nodule on left cervical chain     Black macules L zygoma and L jawline   Mid frontal scalp 1.1cm keratotic plaque  L frontal scalp 1.5cm red plaque   Stuck on papules and brown macules on trunk and ext   Red papules on trunk  Gritty scaly papule on scalp   Flesh colored papules on trunk    Eyes: Conjunctivae/lids:Normal     ENT: Lips, mucosa: normal    MSK:Normal    Cardiovascular: peripheral edema none    Pulm: Breathing Normal    Neuro/Psych: Orientation:Alert and Orientedx3 ; Mood/Affect:normal   MICRO  Mid frontal scalp (red)There is a proliferation of irregular nests of abnormal squamous cells arising from the epidermis and invading the dermis. These are well differentiated. The dermis shows a variable superficial perivascular inflammatory infiltrate.     L frontal scalp (blue):There is hyperkeratosis and focal parakeratosis of the epidermis, overlying atypical keratinocytes,  by areas of orthokeratosis, there are scattered basal atypical keratinocytes: with varying degrees of overlying loss  of maturation, hyperchromatism, pleomorphism, increased and abnormal mitoses, dyskeratosis.  The dermis shows a variable inflammatory infiltrate.     A/P:  1. Seborrheic keratosis, lentigo, angioma, dermal nevus, hx of melanoma  Cont infusion and TVEC return to clinic 3 months   Excision of intransit mets discussed with patient if no improvement with TVEC.    2. R/o squamous cell carcinoma   TANGENTIAL BIOPSY IN HOUSE:  After consent, anesthesia with LEC and prep, tangential excision performed and dx above confirmed with frozen section histology.  No complications and routine wound care.  Patient is on  anticoagulants and risk of bleeding discussed with patient.       I have personally reviewed all specimens and/or slides and used them with my medical judgement to determine or confirm the final diagnosis.     Patient told result   Mid frontal scalp squamous cell carcinoma schedule excision   L frontal scalp .  Actinic keratosis schedule cryo     3.actinic keratosis scalp x3  LN2:  Treated with LN2 for 5s for 1-2 cycles. Warned risks of blistering, pain, pigment change, scarring, and incomplete resolution.  Advised patient to return if lesions do not completely resolve.  Wound care sheet given.  It was a pleasure speaking to James Salvador today.  Previous clinic notes and pertinent laboratory tests were reviewed prior to James Salvador's visit.  Signs and Symptoms of skin cancer discussed with patient.  Patient encouraged to perform monthly skin exams.  UV precautions reviewed with patient.  Return to clinic next appt      Again, thank you for allowing me to participate in the care of your patient.        Sincerely,        Jv Dutta MD

## 2024-09-19 NOTE — PROGRESS NOTES
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  Lesions on right lung have improved and shrinking on left leg.  He notes spots on skin.  Tumor responding to this.  .  Patient has no other skin complaints today.  Remainder of the HPI, Meds, PMH, Allergies, FH, and SH was reviewed in chart.     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 Cardiac Cath 9/2015: BRIDGET to LAD    Hearing problem 1995    Wear hearing aids    Hyperlipidemia     Malignant melanoma     Morbid obesity 01/11/2016    Paroxysmal supraventricular tachycardia     on metoprolol    Permanent atrial fibrillation 04/21/2017    Squamous cell carcinoma     Tachy-edinson syndrome 05/29/2019       Past Surgical History:   Procedure Laterality Date    ADENOIDECTOMY  Childhood    ANGIOPLASTY  1994    in [place]    ANKLE SURGERY  07/13/2005    right ankle    ARTHROPLASTY HIP Right 10/2009    BIOPSY NODE SENTINEL Left 03/30/2023    Procedure: BIOPSY, LYMPH NODE, SENTINEL;  Surgeon: Rolando Minaya MD;  Location: [facility] OR    COLONOSCOPY  4/25/12    EP PERM PACER SINGLE LEAD N/A 05/31/2019    Medtronic single lead pacemaker    EXCISE MASS CHEEK Left 03/30/2023    Procedure: wide local excision of left cheek melanoma;  Surgeon: Rolando Minaya MD;  Location: [facility] OR    EXCISE MASS CHEEK WITH FLAP PEDICLE Left 04/13/2023    Procedure: Left cervical Facial flap per closure of left cheek Defect;  Surgeon: Ila Sanchez MD;  Location: [facility] OR    Facial biopsy - Melanoma       GENITOURINARY SURGERY  10/20/23    Prostate surgery    HEART CATH STENT COR W/WO PTCA  2015    BRIDGET stent mid LAD    IR CHEST PORT PLACEMENT > 5 YRS OF AGE  2023    LASER SURGERY OF EYE  2002    MOHS MICROGRAPHIC PROCEDURE  2004    squamous cell carcinoma right temple    SINUS SURGERY  2006    TONSILLECTOMY  Childhood        Family History   Problem Relation Age of Onset    Genitourinary Problems Mother         renal failure    Heart Disease Mother     Cerebrovascular Disease Mother         TIA    Cardiovascular Father         rupture of dorsal aorta    Depression Son     Depression Brother         Suicide    Substance Abuse Brother         Heroin    Skin Cancer No family hx of        Social History     Socioeconomic History    Marital status:      Spouse name: Not on file    Number of children: 3    Years of education: Not on file    Highest education level: Not on file   Occupational History     Employer: RETIRED   Tobacco Use    Smoking status: Former     Current packs/day: 0.00     Average packs/day: 0.5 packs/day for 10.0 years (5.0 ttl pk-yrs)     Types: Cigarettes, Cigars, Pipe     Start date: 1966     Quit date: 1974     Years since quittin.4    Smokeless tobacco: Never    Tobacco comments:     Also smoked from 1985 - 1990   Vaping Use    Vaping status: Never Used   Substance and Sexual Activity    Alcohol use: Not Currently    Drug use: No    Sexual activity: Not Currently     Partners: Female     Birth control/protection: Male Surgical     Comment: Vasectomy   Other Topics Concern    Parent/sibling w/ CABG, MI or angioplasty before 65F 55M? No     Service Not Asked    Blood Transfusions Not Asked    Caffeine Concern Yes     Comment: 2 big cups coffee daily    Occupational Exposure Not Asked    Hobby Hazards Not Asked    Sleep Concern No     Comment: sleeping better since shoulder replaced 11/3/16    Stress Concern No    Weight Concern Yes     Special Diet Yes     Comment: trying to do more lean meats    Back Care Not Asked    Exercise No     Comment: limited - knee    Bike Helmet Not Asked    Seat Belt Not Asked    Self-Exams Not Asked   Social History Narrative    Not on file     Social Determinants of Health     Financial Resource Strain: Low Risk  (6/22/2024)    Financial Resource Strain     Within the past 12 months, have you or your family members you live with been unable to get utilities (heat, electricity) when it was really needed?: No   Food Insecurity: Low Risk  (6/22/2024)    Food Insecurity     Within the past 12 months, did you worry that your food would run out before you got money to buy more?: No     Within the past 12 months, did the food you bought just not last and you didn t have money to get more?: No   Transportation Needs: Low Risk  (6/22/2024)    Transportation Needs     Within the past 12 months, has lack of transportation kept you from medical appointments, getting your medicines, non-medical meetings or appointments, work, or from getting things that you need?: No   Physical Activity: Inactive (6/22/2024)    Exercise Vital Sign     Days of Exercise per Week: 0 days     Minutes of Exercise per Session: 0 min   Stress: Stress Concern Present (6/22/2024)    [employer] [place] [employer] - Occupational Stress Questionnaire     Feeling of Stress : To some extent   Social Connections: Unknown (6/22/2024)    Social Connection and Isolation Panel [NHANES]     Frequency of Communication with Friends and Family: Not on file     Frequency of Social Gatherings with Friends and Family: Once a week     Attends Gnosticist Services: Not on file     Active Member of Clubs or Organizations: Not on file     Attends Club or Organization Meetings: Not on file     Marital Status: Not on file   Interpersonal Safety: Low Risk  (6/26/2024)    Interpersonal Safety     Do you feel physically and emotionally safe where you currently live?: Yes      Within the past 12 months, have you been hit, slapped, kicked or otherwise physically hurt by someone?: No     Within the past 12 months, have you been humiliated or emotionally abused in other ways by your partner or ex-partner?: No   Housing Stability: Low Risk  (6/22/2024)    Housing Stability     Do you have housing? : Yes     Are you worried about losing your housing?: No       Outpatient Encounter Medications as of 9/19/2024   Medication Sig Dispense Refill    acetaminophen (TYLENOL) 325 MG tablet Take 650 mg by mouth 4 times daily      apixaban ANTICOAGULANT (ELIQUIS ANTICOAGULANT) 5 MG tablet Take 1 tablet (5 mg) by mouth 2 times daily 180 tablet 3    atorvastatin (LIPITOR) 80 MG tablet Take 1 tablet (80 mg) by mouth at bedtime 90 tablet 3    carvedilol (COREG) 3.125 MG tablet Take 1 tablet (3.125 mg) by mouth 2 times daily (with meals) 180 tablet 3    clobetasol (TEMOVATE) 0.05 % external cream Apply to AA BID x 1-2 weeks then PRN. Do not apply to face. 60 g 3    fluticasone (FLONASE) 50 MCG/ACT nasal spray Spray 2 sprays into both nostrils daily 48 mL 3    furosemide (LASIX) 20 MG tablet Take 1 tablet (20 mg) by mouth daily 90 tablet 3    gabapentin (NEURONTIN) 300 MG capsule Take 2 capsules (600 mg) by mouth 3 times daily      ketoconazole (NIZORAL) 2 % cream Apply topically 2 times daily For face. FAX REFILL REQUESTS TO [facility]: [phone] 30 g 2    ketoconazole (NIZORAL) 2 % external shampoo Use daily as needed 120 mL 11    lidocaine-prilocaine (EMLA) 2.5-2.5 % external cream Apply topically as needed for moderate pain 30 g 1    lisinopril (ZESTRIL) 30 MG tablet Take 1 tablet (30 mg) by mouth daily 90 tablet 3    nitroGLYcerin (NITROSTAT) 0.4 MG sublingual tablet Place 1 tablet (0.4 mg) under the tongue every 5 minutes as needed for chest pain May repeat twice for a total of 3 tablets.  If chest pain not relieved, call 911 25 tablet 11    OXcarbazepine (TRILEPTAL) 300 MG tablet Take 2 tablets  (600 mg) by mouth 3 times daily ( @ 7am, 1530 and 2200 hrs each day)      predniSONE (DELTASONE) 10 MG tablet Take 10 mg by mouth daily      triamcinolone (KENALOG) 0.1 % external lotion Apply to scalp BID x 1-2 weeks then PRN only 60 mL 3     No facility-administered encounter medications on file as of 9/19/2024.             O:   NAD, WDWN, Alert & Oriented, Mood & Affect wnl, Vitals stable   General appearance normal   Vitals stable   Alert, oriented and in no acute distress  Following lymph nodes palpated: no axilla, inguinal lad no Occipital, Supraclavicular lad  Slightly palpable nodule on left cervical chain     Black macules L zygoma and L jawline   Mid frontal scalp 1.1cm keratotic plaque  L frontal scalp 1.5cm red plaque   Stuck on papules and brown macules on trunk and ext   Red papules on trunk  Gritty scaly papule on scalp   Flesh colored papules on trunk    Eyes: Conjunctivae/lids:Normal     ENT: Lips, mucosa: normal    MSK:Normal    Cardiovascular: peripheral edema none    Pulm: Breathing Normal    Neuro/Psych: Orientation:Alert and Orientedx3 ; Mood/Affect:normal   MICRO  Mid frontal scalp (red)There is a proliferation of irregular nests of abnormal squamous cells arising from the epidermis and invading the dermis. These are well differentiated. The dermis shows a variable superficial perivascular inflammatory infiltrate.     L frontal scalp (blue):There is hyperkeratosis and focal parakeratosis of the epidermis, overlying atypical keratinocytes,  by areas of orthokeratosis, there are scattered basal atypical keratinocytes: with varying degrees of overlying loss of maturation, hyperchromatism, pleomorphism, increased and abnormal mitoses, dyskeratosis.  The dermis shows a variable inflammatory infiltrate.     A/P:  1. Seborrheic keratosis, lentigo, angioma, dermal nevus, hx of melanoma  Cont infusion and TVEC return to clinic 3 months   Excision of intransit mets discussed with patient if no  improvement with TVEC.    2. R/o squamous cell carcinoma   TANGENTIAL BIOPSY IN HOUSE:  After consent, anesthesia with LEC and prep, tangential excision performed and dx above confirmed with frozen section histology.  No complications and routine wound care.  Patient is on  anticoagulants and risk of bleeding discussed with patient.       I have personally reviewed all specimens and/or slides and used them with my medical judgement to determine or confirm the final diagnosis.     Patient told result   Mid frontal scalp squamous cell carcinoma schedule excision   L frontal scalp .  Actinic keratosis schedule cryo     3.actinic keratosis scalp x3  LN2:  Treated with LN2 for 5s for 1-2 cycles. Warned risks of blistering, pain, pigment change, scarring, and incomplete resolution.  Advised patient to return if lesions do not completely resolve.  Wound care sheet given.  It was a pleasure speaking to James Salvador today.  Previous clinic notes and pertinent laboratory tests were reviewed prior to James Salvador's visit.  Signs and Symptoms of skin cancer discussed with patient.  Patient encouraged to perform monthly skin exams.  UV precautions reviewed with patient.  Return to clinic next appt

## 2024-09-19 NOTE — TELEPHONE ENCOUNTER
----- Message from Jv Dutta sent at 9/19/2024  1:16 PM CDT -----  Mid frontal scalp squamous cell carcinoma schedule excision   L frontal scalp .  Actinic keratosis schedule cryo

## 2024-09-19 NOTE — TELEPHONE ENCOUNTER
Called patient:  Patient notified and educated on test results   Mohs procedure explained- all questions answered  appointment scheduled- mohs packet mailed.     Thank you,  Freya OCONNELL RN  Dermatology   [phone]

## 2024-09-19 NOTE — TELEPHONE ENCOUNTER
[facility] Call Center    Phone Message    May a detailed message be left on voicemail: yes     Reason for Call: Patient had appt with Luzmaria today and forgot to schedule 3 months out - writer can't get him in until June- please call back to schedule [phone] Thank you     Action Taken: Other: OX DERM    Travel Screening: Not Applicable     Date of Service:

## 2024-09-20 ENCOUNTER — ANCILLARY PROCEDURE (OUTPATIENT)
Dept: CARDIOLOGY | Facility: CLINIC | Age: 79
End: 2024-09-20
Attending: INTERNAL MEDICINE
Payer: COMMERCIAL

## 2024-09-20 ENCOUNTER — TELEPHONE (OUTPATIENT)
Dept: CARDIOLOGY | Facility: CLINIC | Age: 79
End: 2024-09-20

## 2024-09-20 DIAGNOSIS — Z95.0 CARDIAC PACEMAKER IN SITU: ICD-10-CM

## 2024-09-20 DIAGNOSIS — I49.5 SSS (SICK SINUS SYNDROME) (H): ICD-10-CM

## 2024-09-20 DIAGNOSIS — Z95.0 CARDIAC PACEMAKER IN SITU: Primary | ICD-10-CM

## 2024-09-20 PROCEDURE — 93294 REM INTERROG EVL PM/LDLS PM: CPT | Performed by: INTERNAL MEDICINE

## 2024-09-20 PROCEDURE — 93296 REM INTERROG EVL PM/IDS: CPT | Performed by: INTERNAL MEDICINE

## 2024-09-20 NOTE — TELEPHONE ENCOUNTER
Dr. Tucker,     Per clinic protocol we are to send new NSVT to MD. Your patient had new NSVT on remote PPM check. Episode occurred 9/8/24 at 16:31. Left a message with patient asking him to call back if he recalls any symptoms at the time of episode. Awaiting a call back. Episode lasted 8 beats, ventricular rates 160-200bpm. Taking carvedilol. EF 50-55% as of 2/2024. We will continue to monitor unless you would like to make any changes. Thank you,ADAMARIS Pimentel.        Medtronic Little (S) Remote PPM Device Check  : 52%  Mode: VVIR 50/130  Presenting Rhythm: VS &  rates 81-140bpm  Historical underlying rhythm: AFib, taking eliquis, with v-rates in the 40-70s as of 2/2024  Heart Rate: adequate rates per histograms   Sensing: stable   Pacing Threshold: stable   Impedance: stable   Battery Status: 9.7 years remaining   Atrial Arrhythmia: n/a   Ventricular Arrhythmia: 1 ventricular high rate logged. EGM shows Vs events lasting 8 beats suggestive of NSVT, rates 160-200bpm. Taking carvedilol. EF 50-55% (2/2024). Reviewed EGM w/ PS RN. LM asking pt to call back if he recalls any symptoms.     Care Plan: Remote PPM f/u q 3 months. OV w/ Dr. Buchanan due 2/2025. Lm with results and next appointment. ADAMARIS Pimentel

## 2024-09-24 LAB
MDC_IDC_EPISODE_DTM: NORMAL
MDC_IDC_EPISODE_DURATION: 1 S
MDC_IDC_EPISODE_ID: 4
MDC_IDC_EPISODE_TYPE: NORMAL
MDC_IDC_LEAD_CONNECTION_STATUS: NORMAL
MDC_IDC_LEAD_IMPLANT_DT: NORMAL
MDC_IDC_LEAD_LOCATION: NORMAL
MDC_IDC_LEAD_LOCATION_DETAIL_1: NORMAL
MDC_IDC_LEAD_MFG: NORMAL
MDC_IDC_LEAD_MODEL: NORMAL
MDC_IDC_LEAD_POLARITY_TYPE: NORMAL
MDC_IDC_LEAD_SERIAL: NORMAL
MDC_IDC_MSMT_BATTERY_DTM: NORMAL
MDC_IDC_MSMT_BATTERY_REMAINING_LONGEVITY: 116 MO
MDC_IDC_MSMT_BATTERY_RRT_TRIGGER: 2.62
MDC_IDC_MSMT_BATTERY_STATUS: NORMAL
MDC_IDC_MSMT_BATTERY_VOLTAGE: 3.01 V
MDC_IDC_MSMT_LEADCHNL_RV_IMPEDANCE_VALUE: 323 OHM
MDC_IDC_MSMT_LEADCHNL_RV_IMPEDANCE_VALUE: 380 OHM
MDC_IDC_MSMT_LEADCHNL_RV_PACING_THRESHOLD_AMPLITUDE: 0.62 V
MDC_IDC_MSMT_LEADCHNL_RV_PACING_THRESHOLD_PULSEWIDTH: 0.4 MS
MDC_IDC_MSMT_LEADCHNL_RV_SENSING_INTR_AMPL: 1.88 MV
MDC_IDC_MSMT_LEADCHNL_RV_SENSING_INTR_AMPL: 1.88 MV
MDC_IDC_PG_IMPLANT_DTM: NORMAL
MDC_IDC_PG_MFG: NORMAL
MDC_IDC_PG_MODEL: NORMAL
MDC_IDC_PG_SERIAL: NORMAL
MDC_IDC_PG_TYPE: NORMAL
MDC_IDC_SESS_CLINIC_NAME: NORMAL
MDC_IDC_SESS_DTM: NORMAL
MDC_IDC_SESS_TYPE: NORMAL
MDC_IDC_SET_BRADY_HYSTRATE: NORMAL
MDC_IDC_SET_BRADY_LOWRATE: 50 {BEATS}/MIN
MDC_IDC_SET_BRADY_MAX_SENSOR_RATE: 130 {BEATS}/MIN
MDC_IDC_SET_BRADY_MODE: NORMAL
MDC_IDC_SET_LEADCHNL_RV_PACING_AMPLITUDE: 2 V
MDC_IDC_SET_LEADCHNL_RV_PACING_ANODE_ELECTRODE_1: NORMAL
MDC_IDC_SET_LEADCHNL_RV_PACING_ANODE_LOCATION_1: NORMAL
MDC_IDC_SET_LEADCHNL_RV_PACING_CAPTURE_MODE: NORMAL
MDC_IDC_SET_LEADCHNL_RV_PACING_CATHODE_ELECTRODE_1: NORMAL
MDC_IDC_SET_LEADCHNL_RV_PACING_CATHODE_LOCATION_1: NORMAL
MDC_IDC_SET_LEADCHNL_RV_PACING_POLARITY: NORMAL
MDC_IDC_SET_LEADCHNL_RV_PACING_PULSEWIDTH: 0.4 MS
MDC_IDC_SET_LEADCHNL_RV_SENSING_ANODE_ELECTRODE_1: NORMAL
MDC_IDC_SET_LEADCHNL_RV_SENSING_ANODE_LOCATION_1: NORMAL
MDC_IDC_SET_LEADCHNL_RV_SENSING_CATHODE_ELECTRODE_1: NORMAL
MDC_IDC_SET_LEADCHNL_RV_SENSING_CATHODE_LOCATION_1: NORMAL
MDC_IDC_SET_LEADCHNL_RV_SENSING_POLARITY: NORMAL
MDC_IDC_SET_LEADCHNL_RV_SENSING_SENSITIVITY: 0.6 MV
MDC_IDC_SET_ZONE_DETECTION_INTERVAL: 360 MS
MDC_IDC_SET_ZONE_STATUS: NORMAL
MDC_IDC_SET_ZONE_TYPE: NORMAL
MDC_IDC_SET_ZONE_VENDOR_TYPE: NORMAL
MDC_IDC_STAT_BRADY_DTM_END: NORMAL
MDC_IDC_STAT_BRADY_DTM_START: NORMAL
MDC_IDC_STAT_BRADY_RV_PERCENT_PACED: 52.29 %
MDC_IDC_STAT_EPISODE_RECENT_COUNT: 0
MDC_IDC_STAT_EPISODE_RECENT_COUNT: 0
MDC_IDC_STAT_EPISODE_RECENT_COUNT: 1
MDC_IDC_STAT_EPISODE_RECENT_COUNT_DTM_END: NORMAL
MDC_IDC_STAT_EPISODE_RECENT_COUNT_DTM_START: NORMAL
MDC_IDC_STAT_EPISODE_TOTAL_COUNT: 0
MDC_IDC_STAT_EPISODE_TOTAL_COUNT: 0
MDC_IDC_STAT_EPISODE_TOTAL_COUNT: 4
MDC_IDC_STAT_EPISODE_TOTAL_COUNT_DTM_END: NORMAL
MDC_IDC_STAT_EPISODE_TOTAL_COUNT_DTM_START: NORMAL
MDC_IDC_STAT_EPISODE_TYPE: NORMAL

## 2024-10-04 ENCOUNTER — TRANSFERRED RECORDS (OUTPATIENT)
Dept: HEALTH INFORMATION MANAGEMENT | Facility: CLINIC | Age: 79
End: 2024-10-04
Payer: COMMERCIAL

## 2024-10-16 NOTE — OP NOTE
DATE OF SERVICE:  SURGEON:  Rolando Minaya MD   ASSISTANT SURGEON: Tony Garcia MD      TITLE OF OPERATION: Wide local excision of left cheek melanoma, exploration for sentinel lymph node biopsy, methylene blue mapping      INDICATIONS: Patient is a 77-year-old male with history of left cheek melanoma, 2.2 mm Breslow thickness, with lymphovascular invasion and positive lateral and deep margins after shave biopsy by dermatology.  Patient is thus indicated for above-mentioned procedures.  Risk and benefits were explained and patient wished to proceed with the procedure.    PREOPERATIVE DIAGNOSES: Malignant melanoma of left cheek    POSTOPERATIVE DIAGNOSES: Same    ANESTHESIA: General        IMPLANT DEVICES: None        SPECIMENS: Wide local excision left cheek with orientation sutures      COMPLICATIONS:  None      BLOOD LOSS: Minimal          SURGEON'S NARRATIVE:       FINDINGS:  The patient had a nodular post-biopsy area of melanoma on the left cheek. Wide local excision was performed with final defect measuring approximately 4 x 4.5 centimeters.  Lymphoscintigraphy was only mildly warm in the preauricular area; incision was made, but gamma probe did not identify any increased signal.  No lymph nodes were removed.        DESCRIPTION OF PROCEDURE:      The patient was brought back to the operating room by the Anesthesiology staff. They were laid supine on the operating room table and induced into general anesthesia with the endotracheal tube secured at the midline of the chin. Arms were tucked at the side with all pressure points appropriately padded.  NIM machine for facial nerve monitoring was set up.  A time-out procedure was performed with all members of the operating room staff in agreement of the site of surgery, the patient identification and surgery to be performed.     Methylene blue was injected in the left cheek surrounding the area of the melanoma.  2% epinephrine was injected in the area.   OUTPATIENT BEHAVIORAL HEALTH PROGRESS NOTE/EVALUATION    Patient:  Mansi Cole Date:  10/16/2024   :  1963 Attending:  No att. providers found   61 year old female        Chief Complaint:  \" Medication follow-up.\"    Interval History: Patient's father did pass away in [place] and patient did attend the .  Does note that her sisters were not too nice to her and she felt isolated.  Patient did return home and to focus on her grandchildren.  Mood otherwise is good.  There nursing pool did help follow-up with a prior authorization for Adderall.  Patient cannot take Vyvanse which she tried in the past and is listed as an allergy    Medications:  Current Outpatient Medications   Medication Sig    amphetamine-dextroamphetamine (ADDERALL) 15 MG tablet Take 1 tablet by mouth 2 times daily.    hydrOXYzine (ATARAX) 50 MG tablet TAKE 1 TO 2 TABLETS BY MOUTH IN THE MORNING AND MIDDAY AS NEEDED FOR ANXIETY AND 2 TABLETS AT BEDTIME FOR ANXIETY AND SLEEP    prazosin (MINIPRESS) 1 MG capsule Take 3 capsules by mouth nightly.    B-Complex Tab One tablet by mouth every other day.    sertraline (ZOLOFT) 100 MG tablet Take 2 tablets by mouth daily.    tiotropium (Spiriva HandiHaler) 18 MCG capsule for inhaler INHALE CONTENTS OF 1 CAPSULE ONCE DAILY USING HANDIHALER    fluticasone (FLONASE) 50 MCG/ACT nasal spray SHAKE LIQUID AND USE 1 SPRAY IN EACH NOSTRIL IN THE MORNING AND IN THE EVENING    meloxicam (MOBIC) 15 MG tablet Take 0.5 tablets by mouth in the morning and 0.5 tablets in the evening.    rosuvastatin (Crestor) 5 MG tablet Take 1 tablet by mouth every other day for 7 days, THEN 1 tablet nightly.    montelukast (SINGULAIR) 10 MG tablet Take 1 tablet by mouth nightly.    metFORMIN (GLUCOPHAGE) 500 MG tablet Take 2 tablets by mouth in the morning and 2 tablets in the evening. Take with meals.    DISPENSE Glucose test strips for New Diabetic Pt testing 3 times per day    DISPENSE Glucose Test Lancets for New  Diabetic Pt testing 3 times per day    ipratropium-albuterol (DUONEB) 0.5-2.5 (3) MG/3ML nebulizer solution Take 3 mLs by nebulization every 6 hours as needed for Wheezing.    albuterol (VENTOLIN) (2.5 MG/3ML) 0.083% nebulizer solution Take 3 mLs by nebulization every 6 hours as needed for Wheezing.    Spacer/Aero-Holding Chambers (EasiVent) Misc USE WITH INHALER    albuterol 108 (90 Base) MCG/ACT inhaler Inhale 2 puffs into the lungs every 4 hours as needed for Wheezing.    budesonide-formoterol (Symbicort) 160-4.5 MCG/ACT inhaler Inhale 2 puffs into the lungs in the morning and 2 puffs in the evening.    aspirin 81 MG tablet Take 1 tablet by mouth daily.    B Complex-C (SUPER B COMPLEX PO) Take 1 tablet by mouth every other day.    Ascorbic Acid (VITAMIN C PO) Take 1 tablet by mouth daily.    ZINC SULFATE PO Take 1 tablet by mouth daily.    LYSINE PO Take 1 tablet by mouth daily.    Probiotic Product (PROBIOTIC DAILY PO) Take 1 tablet by mouth daily.    diphenhydrAMINE (BENADRYL) 50 MG capsule Take 50 mg by mouth every 6 hours as needed for Itching.     No current facility-administered medications for this visit.       Labs:    CBC  Recent Labs     11/27/23  1524 11/28/23  0540 11/30/23  0605   WBC 13.7* 13.6* 17.5*   RBC 5.07 4.94 4.62    281 300   SEG 83 85 60   TLYMPH 12 12 31   PMON 3 2 6   PEOS 1 0 1   PBASO 1 0 0   ANEUT 11.5* 11.6* 10.7*   ALYMS 1.6 1.6 5.4*   CHARLI 0.4 0.3 1.0*   AEOS 0.2 0.0 0.1   ABASO 0.1 0.0 0.1       CMP  Recent Labs     11/27/23  1524 11/28/23  0540 11/30/23  0605   SODIUM 137 135 141   POTASSIUM 4.0 4.3 3.9   CHLORIDE 104 102 106   CO2 27 27 28   ANIONGAP 10 10 11   GLUCOSE 259* 253* 102*   BUN 16 12 28*   CREATININE 0.84 0.63 0.84   CALCIUM 9.7 9.8 9.2   ALBUMIN 4.0 4.0 3.4*   BILIRUBIN 0.5 0.4 0.3   ALKPT 108 96 80   AST 16 13 6   GPT 32 30 23   GLOB 3.5 3.8 3.2   AGR 1.1 1.1 1.1       Lipid Panel        Other  Recent Labs     11/27/23  1524 11/28/23  0540 11/30/23  0605  Orientation sutures were placed. Using a 15 blade a wide local excision with approximately 1.5 cm margins all around was performed to the level of the SMAS.  Hemostasis was obtained using bipolar cautery. Final defect measured 4 x 4.5 cm. Xeroform bolster was sutured in place.    We then turned our attention to the sentinel lymph node biopsy.  Scintigraphically had only been mildly warm in the preauricular area. An incision was made using a 15 blade and the area was explored.  However, the gamma probe remained nonreactive throughout, so that no lymph nodes were removed and no blue dye tissue was identified.  Hemostasis was obtained using bipolar cautery. The incision was closed using 3-0 Vicryl and 5-0 Monocryl. A Steri-Strip was applied.  The patient was turned over to anesthesia for extubation.     The patient tolerated the procedure well.  There were no complications. The patient was extubated and taken to recovery following commands and breathing spontaneously.       Dr. Minaya was present and scrubbed for the entire procedure.         Tony Garcia MD     GLUCOSE 259* 253* 102*   HGBA1C  --  7.2*  --        Review of Systems: GI system no nausea or GI upset    EXAM:   Vitals:  Visit Vitals  LMP 01/26/2016      Mental Status:  Mental Status Exam  Alert and oriented x 3.  Casually dressed, fair grooming.  Speech is normal rate, rhythm, and volume.  No motor tics, tremors noted in extremities or facial muscles.  Euthymic mood and affect.  Thought processes are goal directed and logical.  Thought content negative for suicidal/homicidal ideation, negative for auditory/visual hallucinations, negative for paranoia.  Insight/judgment both fair.  Memory grossly intact for long, short. immediate memory.  Estimate of Intellectual Functioning (Fund of Knowledge): IQ estimated to be average from vocabulary and syntax, but not formally tested.        Medical Decision Making and Plan of Treatment:    Mood disorder NOS  History of major depression recurrent moderate severity.   ADHD predominantly inattentive type               Darinel Barros MD  10/16/2024

## 2024-10-17 ENCOUNTER — HOSPITAL ENCOUNTER (OUTPATIENT)
Dept: CT IMAGING | Facility: CLINIC | Age: 79
Discharge: HOME | End: 2024-10-17
Attending: INTERNAL MEDICINE | Admitting: INTERNAL MEDICINE
Payer: COMMERCIAL

## 2024-10-17 DIAGNOSIS — C43.30 MELANOMA OF FACE (H): ICD-10-CM

## 2024-10-17 PROCEDURE — 70491 CT SOFT TISSUE NECK W/DYE: CPT

## 2024-10-17 PROCEDURE — 250N000011 HC RX IP 250 OP 636: Performed by: INTERNAL MEDICINE

## 2024-10-17 PROCEDURE — 250N000009 HC RX 250: Performed by: INTERNAL MEDICINE

## 2024-10-17 PROCEDURE — 74177 CT ABD & PELVIS W/CONTRAST: CPT

## 2024-10-17 RX ORDER — IOPAMIDOL 755 MG/ML
500 INJECTION, SOLUTION INTRAVASCULAR ONCE
Status: COMPLETED | OUTPATIENT
Start: 2024-10-17 | End: 2024-10-17

## 2024-10-17 RX ORDER — HEPARIN SODIUM (PORCINE) LOCK FLUSH IV SOLN 100 UNIT/ML 100 UNIT/ML
SOLUTION INTRAVENOUS
Status: COMPLETED
Start: 2024-10-17 | End: 2024-10-17

## 2024-10-17 RX ORDER — HEPARIN SODIUM (PORCINE) LOCK FLUSH IV SOLN 100 UNIT/ML 100 UNIT/ML
500 SOLUTION INTRAVENOUS ONCE
Status: COMPLETED | OUTPATIENT
Start: 2024-10-17 | End: 2024-10-17

## 2024-10-17 RX ADMIN — HEPARIN SODIUM (PORCINE) LOCK FLUSH IV SOLN 100 UNIT/ML 500 UNITS: 100 SOLUTION at 14:39

## 2024-10-17 RX ADMIN — SODIUM CHLORIDE 65 ML: 9 INJECTION, SOLUTION INTRAVENOUS at 14:39

## 2024-10-17 RX ADMIN — IOPAMIDOL 125 ML: 755 INJECTION, SOLUTION INTRAVENOUS at 14:39

## 2024-10-18 ENCOUNTER — TELEPHONE (OUTPATIENT)
Dept: CARDIOLOGY | Facility: CLINIC | Age: 79
End: 2024-10-18

## 2024-10-18 NOTE — TELEPHONE ENCOUNTER
VM received from patient stating he has a heating pad device he wants to know if it is compatible with his Medtronic device.  Called pt back. Left  with [email or web address] 1-800 number to have pt call.  Cindy LAINEZ

## 2024-10-20 NOTE — TELEPHONE ENCOUNTER
----- Message from Rosa Maria Hansen PA-C sent at 1/17/2022  8:54 AM CST -----  Right lateral neck inferior SCIS please schedule mohs  Right lateral neck superior AK - cryo at [facility] appt  Right thigh AK - cryo at mohs appt  
Called and spoke to patient.    Educated patient on biopsy results- SCCIS (mohs) + AK x2 (cryo).    Educated patient on SCCIS, AK, mohs/cryo, scheduled mohs/cryo appointment, and letter/packet sent.    Patient voiced understanding.    Kermit RN-BSN-N  [facility]  [phone]   
not applicable

## 2024-11-23 ENCOUNTER — HEALTH MAINTENANCE LETTER (OUTPATIENT)
Age: 79
End: 2024-11-23

## 2024-11-23 NOTE — TELEPHONE ENCOUNTER
"Requested Prescriptions   Pending Prescriptions Disp Refills     lisinopril (PRINIVIL/ZESTRIL) 20 MG tablet [Pharmacy Med Name: LISINOPRIL 20 MG TABLET]  Last Written Prescription Date:  07/25/2018  Last Fill Quantity: 90,  # refills: 03   Last Office Visit: 7/9/2018   Future Office Visit:      90 tablet 1     Sig: TAKE 1 TABLET (20 MG) BY MOUTH DAILY **RTS 1/29/17**    ACE Inhibitors (Including Combos) Protocol Passed - 1/28/2019  6:46 PM       Passed - Blood pressure under 140/90 in past 12 months    BP Readings from Last 3 Encounters:   01/03/19 127/80   10/30/18 140/80   08/06/18 135/80                Passed - Recent (12 mo) or future (30 days) visit within the authorizing provider's specialty    Patient had office visit in the last 12 months or has a visit in the next 30 days with authorizing provider or within the authorizing provider's specialty.  See \"Patient Info\" tab in inbasket, or \"Choose Columns\" in Meds & Orders section of the refill encounter.             Passed - Medication is active on med list       Passed - Patient is age 18 or older       Passed - Normal serum creatinine on file in past 12 months    Recent Labs   Lab Test 07/18/18  0820   CR 0.74            Passed - Normal serum potassium on file in past 12 months    Recent Labs   Lab Test 07/18/18  0820   POTASSIUM 4.5               " Female

## 2024-12-11 DIAGNOSIS — L30.9 ECZEMA, UNSPECIFIED TYPE: ICD-10-CM

## 2024-12-11 RX ORDER — CLOBETASOL PROPIONATE 0.5 MG/G
CREAM TOPICAL
Qty: 60 G | Refills: 3 | Status: SHIPPED | OUTPATIENT
Start: 2024-12-11

## 2024-12-11 NOTE — TELEPHONE ENCOUNTER
Received fax requesting refill of Clobetasol. Routing to provider for approval/denial since med not on protocol    Freya OCONNELL RN  Dermatology   [phone]      Freya OCONNELL RN  Dermatology   [phone]

## 2024-12-18 NOTE — PROGRESS NOTES
Surgical Office Location:  [facility]  [address] [place] [id number]

## 2024-12-19 ENCOUNTER — OFFICE VISIT (OUTPATIENT)
Dept: DERMATOLOGY | Facility: CLINIC | Age: 79
End: 2024-12-19
Payer: COMMERCIAL

## 2024-12-19 ENCOUNTER — TRANSFERRED RECORDS (OUTPATIENT)
Dept: HEALTH INFORMATION MANAGEMENT | Facility: CLINIC | Age: 79
End: 2024-12-19

## 2024-12-19 DIAGNOSIS — L57.0 AK (ACTINIC KERATOSIS): Primary | ICD-10-CM

## 2024-12-19 DIAGNOSIS — C44.42 SQUAMOUS CELL CARCINOMA OF SCALP: ICD-10-CM

## 2024-12-19 DIAGNOSIS — Z85.820 HISTORY OF MELANOMA: ICD-10-CM

## 2024-12-19 NOTE — PATIENT INSTRUCTIONS
Stapled Wound Care     for _Scalp_____________    [facility] [phone]    No strenuous activity for 48 hours. Resume moderate activity in 48 hours. No heavy exercising until you are seen for follow up in one week.    Take Tylenol as needed for discomfort.                                        Do not drink alcoholic beverages for 48 hours.    Keep the pressure bandage in place for 24 hours. If the bandage becomes blood tinged or loose, reinforce it with gauze and tape.     Remove bandage in 24 hours and begin wound care as follows:       Rinse the stapled area with tap water. (shower / bathe / shampoo normally)  Dry wound with Q tip or gauze pad  Apply Polysporin or Bacitracin Ointment to the staples.  Do NOT use Neosporin Ointment *  Cover the wound with a bandaid or nonstick gauze pad and paper tape.  Repeat wound care once a day until staples are removed.    BLEEDING:    Use tightly rolled up gauze or cloth to apply direct pressure over the bandage for 20   minutes.  Reapply pressure for an additional 20 minutes if necessary  Call the office or go to the nearest emergency room if pressure fails to stop the bleeding.  Use additional gauze and tape to maintain pressure once the bleeding has stopped.  Begin wound care 24 hours after surgery as directed.            PAIN:    1. Post operative pain should slowly get better, beginning the evening after surgery.  2.  A sudden or severe increase in pain may indicate a problem. Call the office if this occurs.      In case of emergency call Dr. Dutta  at [phone]

## 2024-12-19 NOTE — LETTER
12/19/2024      James Salvador  [address] [place] [address]      Dear Colleague,    Thank you for referring your patient, James Salvador, to the [facility]. Please see a copy of my visit note below.    Surgical Office Location:  [facility]  [address] [place] [id number]      James Salvador is an extremely pleasant 79 year old year old male patient here today for evaluation and managment of squamous cell carcinoma on scalp and actinic keratosis on scalp.  Patient has no other skin complaints today.  Remainder of the HPI, Meds, PMH, Allergies, FH, and SH was reviewed in chart.    Pertinent Hx:   Metastatic melanoma   Past Medical History:   Diagnosis Date     Actinic 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 Cardiac Cath 9/2015: BRIDGET to LAD     Hearing problem 1995    Wear hearing aids     Hyperlipidemia      Malignant melanoma      Morbid obesity 01/11/2016     Paroxysmal supraventricular tachycardia     on metoprolol     Permanent atrial fibrillation 04/21/2017     Squamous cell carcinoma      Tachy-edinson syndrome 05/29/2019       Past Surgical History:   Procedure Laterality Date     ADENOIDECTOMY  Childhood     ANGIOPLASTY  1994    in [place]     ANKLE SURGERY  07/13/2005    right ankle     ARTHROPLASTY HIP Right 10/2009     BIOPSY NODE SENTINEL Left 03/30/2023    Procedure: BIOPSY, LYMPH NODE, SENTINEL;  Surgeon: Rolando Minaya MD;  Location: [facility] OR     COLONOSCOPY  4/25/12     EP PERM PACER SINGLE LEAD N/A 05/31/2019    Medtronic single lead pacemaker     EXCISE MASS CHEEK Left 03/30/2023    Procedure: wide local excision of left cheek melanoma;  Surgeon: Rolando Minaya MD;  Location: [facility] OR     EXCISE MASS CHEEK WITH FLAP PEDICLE  Left 2023    Procedure: Left cervical Facial flap per closure of left cheek Defect;  Surgeon: Ila Sanchez MD;  Location: [facility] OR     Facial biopsy - Melanoma       GENITOURINARY SURGERY  10/20/23    Prostate surgery     HEART CATH STENT COR W/WO PTCA  2015    BRIDGET stent mid LAD     IR CHEST PORT PLACEMENT > 5 YRS OF AGE  2023     LASER SURGERY OF EYE  2002     MOHS MICROGRAPHIC PROCEDURE  2004    squamous cell carcinoma right temple     SINUS SURGERY  2006     TONSILLECTOMY  Childhood        Family History   Problem Relation Age of Onset     Genitourinary Problems Mother         renal failure     Heart Disease Mother      Cerebrovascular Disease Mother         TIA     Cardiovascular Father         rupture of dorsal aorta     Depression Son      Depression Brother         Suicide     Substance Abuse Brother         Heroin     Skin Cancer No family hx of        Social History     Socioeconomic History     Marital status:      Spouse name: Not on file     Number of children: 3     Years of education: Not on file     Highest education level: Not on file   Occupational History     Employer: RETIRED   Tobacco Use     Smoking status: Former     Current packs/day: 0.00     Average packs/day: 0.5 packs/day for 10.0 years (5.0 ttl pk-yrs)     Types: Cigarettes, Cigars, Pipe     Start date: 1966     Quit date: 1974     Years since quittin.6     Smokeless tobacco: Never     Tobacco comments:     Also smoked from 1985 - 1990   Vaping Use     Vaping status: Never Used   Substance and Sexual Activity     Alcohol use: Not Currently     Drug use: No     Sexual activity: Not Currently     Partners: Female     Birth control/protection: Male Surgical     Comment: Vasectomy   Other Topics Concern     Parent/sibling w/ CABG, MI or angioplasty before 65F 55M? No      Service Not Asked     Blood Transfusions Not Asked     Caffeine Concern Yes     Comment: 2 big cups  coffee daily     Occupational Exposure Not Asked     Hobby Hazards Not Asked     Sleep Concern No     Comment: sleeping better since shoulder replaced 11/3/16     Stress Concern No     Weight Concern Yes     Special Diet Yes     Comment: trying to do more lean meats     Back Care Not Asked     Exercise No     Comment: limited - knee     Bike Helmet Not Asked     Seat Belt Not Asked     Self-Exams Not Asked   Social History Narrative     Not on file     Social Drivers of Health     Financial Resource Strain: Low Risk  (6/22/2024)    Financial Resource Strain      Within the past 12 months, have you or your family members you live with been unable to get utilities (heat, electricity) when it was really needed?: No   Food Insecurity: Low Risk  (6/22/2024)    Food Insecurity      Within the past 12 months, did you worry that your food would run out before you got money to buy more?: No      Within the past 12 months, did the food you bought just not last and you didn t have money to get more?: No   Transportation Needs: Low Risk  (6/22/2024)    Transportation Needs      Within the past 12 months, has lack of transportation kept you from medical appointments, getting your medicines, non-medical meetings or appointments, work, or from getting things that you need?: No   Physical Activity: Inactive (6/22/2024)    Exercise Vital Sign      Days of Exercise per Week: 0 days      Minutes of Exercise per Session: 0 min   Stress: Stress Concern Present (6/22/2024)    [employer] - Occupational Stress Questionnaire      Feeling of Stress : To some extent   Social Connections: Unknown (6/22/2024)    Social Connection and Isolation Panel [NHANES]      Frequency of Communication with Friends and Family: Not on file      Frequency of Social Gatherings with Friends and Family: Once a week      Attends Rastafarian Services: Not on file      Active Member of Clubs or Organizations: Not on file      Attends  Club or Organization Meetings: Not on file      Marital Status: Not on file   Interpersonal Safety: Low Risk  (6/26/2024)    Interpersonal Safety      Do you feel physically and emotionally safe where you currently live?: Yes      Within the past 12 months, have you been hit, slapped, kicked or otherwise physically hurt by someone?: No      Within the past 12 months, have you been humiliated or emotionally abused in other ways by your partner or ex-partner?: No   Housing Stability: Low Risk  (6/22/2024)    Housing Stability      Do you have housing? : Yes      Are you worried about losing your housing?: No       Outpatient Encounter Medications as of 12/19/2024   Medication Sig Dispense Refill     acetaminophen (TYLENOL) 325 MG tablet Take 650 mg by mouth 4 times daily       apixaban ANTICOAGULANT (ELIQUIS ANTICOAGULANT) 5 MG tablet Take 1 tablet (5 mg) by mouth 2 times daily 180 tablet 3     atorvastatin (LIPITOR) 80 MG tablet Take 1 tablet (80 mg) by mouth at bedtime 90 tablet 3     carvedilol (COREG) 3.125 MG tablet Take 1 tablet (3.125 mg) by mouth 2 times daily (with meals) 180 tablet 3     clobetasol propionate (TEMOVATE) 0.05 % external cream Apply to AA BID x 1-2 weeks then PRN. Do not apply to face. 60 g 3     fluticasone (FLONASE) 50 MCG/ACT nasal spray Spray 2 sprays into both nostrils daily 48 mL 3     furosemide (LASIX) 20 MG tablet Take 1 tablet (20 mg) by mouth daily 90 tablet 3     gabapentin (NEURONTIN) 300 MG capsule Take 2 capsules (600 mg) by mouth 3 times daily       ketoconazole (NIZORAL) 2 % cream Apply topically 2 times daily For face. FAX REFILL REQUESTS TO [facility]: [phone] 30 g 2     ketoconazole (NIZORAL) 2 % external shampoo Use daily as needed 120 mL 11     lidocaine-prilocaine (EMLA) 2.5-2.5 % external cream Apply topically as needed for moderate pain 30 g 1     lisinopril (ZESTRIL) 30 MG tablet Take 1 tablet (30 mg) by mouth daily 90 tablet 3     nitroGLYcerin (NITROSTAT) 0.4 MG  sublingual tablet Place 1 tablet (0.4 mg) under the tongue every 5 minutes as needed for chest pain May repeat twice for a total of 3 tablets.  If chest pain not relieved, call 911 25 tablet 11     OXcarbazepine (TRILEPTAL) 300 MG tablet Take 2 tablets (600 mg) by mouth 3 times daily ( @ 7am, 1530 and 2200 hrs each day)       predniSONE (DELTASONE) 10 MG tablet Take 10 mg by mouth daily       triamcinolone (KENALOG) 0.1 % external lotion Apply to scalp BID x 1-2 weeks then PRN only 60 mL 3     No facility-administered encounter medications on file as of 12/19/2024.             O:   NAD, WDWN, Alert & Oriented, Mood & Affect wnl, Vitals stable   General appearance normal   Vitals stable   Alert, oriented and in no acute distress      Following lymph nodes palpated: Occipital, Cervical, Supraclavicular no lad  Mid frontal scalp 1.1cm keratotic plaque  L parietal scalp and vertex scalp gritty plaques      Eyes: Conjunctivae/lids:Normal     ENT: Lips, mucosa: normal    MSK:Normal    Cardiovascular: peripheral edema none    Pulm: Breathing Normal    Lymph Nodes: No Head and Neck Lymphadenopathy     Neuro/Psych: Orientation:Alert and Orientedx3 ; Mood/Affect:normal       A/P:  Squamous cell carcinoma scalp  MOHS:   Location    The rationale for Mohs surgery was discussed with the patient and consent was obtained.  The risks and benefits as well as alternatives to therapy were discussed, in detail.  Specifically, the risks of infection, scarring, bleeding, prolonged wound healing, incomplete removal, allergy to anesthesia, nerve injury and recurrence were addressed.  Indication for Mohs was Location. Prior to the procedure, the treatment site was clearly identified and, if available, confirmed with previous photos and confirmed by the patient   All components of the Universal Protocol/PAUSE rule were completed.  The Mohs surgeon operated in two distinct and integrated capacities as the surgeon and pathologist.      The  area was prepped with Betasept.  A rim of normal appearing skin was marked circumferentially around the lesion.  The area was infiltrated with local anesthesia.  The tumor was first debulked to remove all clinically apparent tumor.  An incision following the standard Mohs approach was done and the specimen was oriented,mapped and placed in 2 block(s).  Each specimen was then chromacoded and processed in the Mohs laboratory using standard Mohs technique and submitted for frozen section histology.  Frozen section analysis showed  residual tumor but CLEAR MARGINS.    1st stage:There is a proliferation of irregular nests of abnormal squamous cells arising from the epidermis and invading the dermis. These are well differentiated. The dermis shows a variable superficial perivascular inflammatory infiltrate.     The tumor was excised using standard Mohs technique in 2 stages(s).  CLEAR MARGINS OBTAINED and Final defect size was 2.1 x 1.9 cm.     We discussed the options for wound management in full with the patient including risks/benefits/ possible outcomes.      REPAIR COMPLEX: Because of the tightness of the surrounding skin and Because of the size and full thickness nature of the defect, Because of the tightness of the surrounding skin, To maintain form and function, and In order to avoid distortion, a complex closure was planned. After LE anesthesia and prep, Burow's triangles were excised in the relaxed skin tension lines. The wound edges were widely undermined greater than width of the defect on both sides ) by dissection in the subcutaneous plane until adequate tissue mobility was obtained. Hemostasis was obtained. The wound edges were closed in a layered fashion using Vicryl and Fast Absorbing Plain Gut sutures. Postoperative length was 4 cm.   EBL minimal; complications none; wound care routine.  The patient was discharged in good condition and will return in one week for wound evaluation.  2. Actinic keratosis  scalp x2  LN2:  Treated with LN2 for 5s for 1-2 cycles. Warned risks of blistering, pain, pigment change, scarring, and incomplete resolution.  Advised patient to return if lesions do not completely resolve.  Wound care sheet given.  3. Hx of metastatic melanoma  It was a pleasure speaking to James Salvador today.  Previous clinic notes and pertinent laboratory tests were reviewed prior to James Salvador's visit.  Signs and Symptoms of skin cancer discussed with patient.  Patient encouraged to perform monthly skin exams.  UV precautions reviewed with patient.  Return to clinic 3 months        Again, thank you for allowing me to participate in the care of your patient.        Sincerely,        Jv Dutta MD

## 2024-12-19 NOTE — PROGRESS NOTES
James Salvador is an extremely pleasant 79 year old year old male patient here today for evaluation and managment of squamous cell carcinoma on scalp and actinic keratosis on scalp.  Patient has no other skin complaints today.  Remainder of the HPI, Meds, PMH, Allergies, FH, and SH was reviewed in chart.    Pertinent Hx:   Metastatic melanoma   Past Medical History:   Diagnosis Date    Actinic cheilitis 07/17/2013    Lower lip, left     Actinic keratosis     Allergic rhinitis     Anxiety 3/1/23    Arthritis 2004    Basal cell carcinoma     Benign hypertension     CAD (coronary artery disease)     Cardiac cath and PCI 1994. Cardiac Cath 9/2015: BRIDGET to LAD    Hearing problem 1995    Wear hearing aids    Hyperlipidemia     Malignant melanoma     Morbid obesity 01/11/2016    Paroxysmal supraventricular tachycardia     on metoprolol    Permanent atrial fibrillation 04/21/2017    Squamous cell carcinoma     Tachy-edinson syndrome 05/29/2019       Past Surgical History:   Procedure Laterality Date    ADENOIDECTOMY  Childhood    ANGIOPLASTY  1994    in [place]    ANKLE SURGERY  07/13/2005    right ankle    ARTHROPLASTY HIP Right 10/2009    BIOPSY NODE SENTINEL Left 03/30/2023    Procedure: BIOPSY, LYMPH NODE, SENTINEL;  Surgeon: Rolando Minaya MD;  Location: [facility] OR    COLONOSCOPY  4/25/12    EP PERM PACER SINGLE LEAD N/A 05/31/2019    [employer]tronic single lead pacemaker    EXCISE MASS CHEEK Left 03/30/2023    Procedure: wide local excision of left cheek melanoma;  Surgeon: Rolando Minaya MD;  Location: [facility] OR    EXCISE MASS CHEEK WITH FLAP PEDICLE Left 04/13/2023    Procedure: Left cervical Facial flap per closure of left cheek Defect;  Surgeon: Ila Sanchez MD;  Location: [facility] OR    Facial biopsy - Melanoma      GENITOURINARY SURGERY  10/20/23    Prostate surgery    HEART CATH STENT COR W/WO PTCA  09/23/2015    BRIDGET stent mid LAD    IR CHEST PORT PLACEMENT > 5 YRS OF AGE  7/19/2023    LASER SURGERY OF EYE   2002    MOHS MICROGRAPHIC PROCEDURE  2004    squamous cell carcinoma right temple    SINUS SURGERY  2006    TONSILLECTOMY  Childhood        Family History   Problem Relation Age of Onset    Genitourinary Problems Mother         renal failure    Heart Disease Mother     Cerebrovascular Disease Mother         TIA    Cardiovascular Father         rupture of dorsal aorta    Depression Son     Depression Brother         Suicide    Substance Abuse Brother         Heroin    Skin Cancer No family hx of        Social History     Socioeconomic History    Marital status:      Spouse name: Not on file    Number of children: 3    Years of education: Not on file    Highest education level: Not on file   Occupational History     Employer: RETIRED   Tobacco Use    Smoking status: Former     Current packs/day: 0.00     Average packs/day: 0.5 packs/day for 10.0 years (5.0 ttl pk-yrs)     Types: Cigarettes, Cigars, Pipe     Start date: 1966     Quit date: 1974     Years since quittin.6    Smokeless tobacco: Never    Tobacco comments:     Also smoked from 1985 - 1990   Vaping Use    Vaping status: Never Used   Substance and Sexual Activity    Alcohol use: Not Currently    Drug use: No    Sexual activity: Not Currently     Partners: Female     Birth control/protection: Male Surgical     Comment: Vasectomy   Other Topics Concern    Parent/sibling w/ CABG, MI or angioplasty before 65F 55M? No     Service Not Asked    Blood Transfusions Not Asked    Caffeine Concern Yes     Comment: 2 big cups coffee daily    Occupational Exposure Not Asked    Hobby Hazards Not Asked    Sleep Concern No     Comment: sleeping better since shoulder replaced 11/3/16    Stress Concern No    Weight Concern Yes    Special Diet Yes     Comment: trying to do more lean meats    Back Care Not Asked    Exercise No     Comment: limited - knee    Bike Helmet Not Asked    Seat Belt Not Asked    Self-Exams Not Asked    Social History Narrative    Not on file     Social Drivers of Health     Financial Resource Strain: Low Risk  (6/22/2024)    Financial Resource Strain     Within the past 12 months, have you or your family members you live with been unable to get utilities (heat, electricity) when it was really needed?: No   Food Insecurity: Low Risk  (6/22/2024)    Food Insecurity     Within the past 12 months, did you worry that your food would run out before you got money to buy more?: No     Within the past 12 months, did the food you bought just not last and you didn t have money to get more?: No   Transportation Needs: Low Risk  (6/22/2024)    Transportation Needs     Within the past 12 months, has lack of transportation kept you from medical appointments, getting your medicines, non-medical meetings or appointments, work, or from getting things that you need?: No   Physical Activity: Inactive (6/22/2024)    Exercise Vital Sign     Days of Exercise per Week: 0 days     Minutes of Exercise per Session: 0 min   Stress: Stress Concern Present (6/22/2024)    [place] citizen [place] [employer] - Occupational Stress Questionnaire     Feeling of Stress : To some extent   Social Connections: Unknown (6/22/2024)    Social Connection and Isolation Panel [NHANES]     Frequency of Communication with Friends and Family: Not on file     Frequency of Social Gatherings with Friends and Family: Once a week     Attends Sabianist Services: Not on file     Active Member of Clubs or Organizations: Not on file     Attends Club or Organization Meetings: Not on file     Marital Status: Not on file   Interpersonal Safety: Low Risk  (6/26/2024)    Interpersonal Safety     Do you feel physically and emotionally safe where you currently live?: Yes     Within the past 12 months, have you been hit, slapped, kicked or otherwise physically hurt by someone?: No     Within the past 12 months, have you been humiliated or emotionally abused in other ways  by your partner or ex-partner?: No   Housing Stability: Low Risk  (6/22/2024)    Housing Stability     Do you have housing? : Yes     Are you worried about losing your housing?: No       Outpatient Encounter Medications as of 12/19/2024   Medication Sig Dispense Refill    acetaminophen (TYLENOL) 325 MG tablet Take 650 mg by mouth 4 times daily      apixaban ANTICOAGULANT (ELIQUIS ANTICOAGULANT) 5 MG tablet Take 1 tablet (5 mg) by mouth 2 times daily 180 tablet 3    atorvastatin (LIPITOR) 80 MG tablet Take 1 tablet (80 mg) by mouth at bedtime 90 tablet 3    carvedilol (COREG) 3.125 MG tablet Take 1 tablet (3.125 mg) by mouth 2 times daily (with meals) 180 tablet 3    clobetasol propionate (TEMOVATE) 0.05 % external cream Apply to AA BID x 1-2 weeks then PRN. Do not apply to face. 60 g 3    fluticasone (FLONASE) 50 MCG/ACT nasal spray Spray 2 sprays into both nostrils daily 48 mL 3    furosemide (LASIX) 20 MG tablet Take 1 tablet (20 mg) by mouth daily 90 tablet 3    gabapentin (NEURONTIN) 300 MG capsule Take 2 capsules (600 mg) by mouth 3 times daily      ketoconazole (NIZORAL) 2 % cream Apply topically 2 times daily For face. [facility]  [facility]: [phone] 30 g 2    ketoconazole (NIZORAL) 2 % external shampoo Use daily as needed 120 mL 11    lidocaine-prilocaine (EMLA) 2.5-2.5 % external cream Apply topically as needed for moderate pain 30 g 1    lisinopril (ZESTRIL) 30 MG tablet Take 1 tablet (30 mg) by mouth daily 90 tablet 3    nitroGLYcerin (NITROSTAT) 0.4 MG sublingual tablet Place 1 tablet (0.4 mg) under the tongue every 5 minutes as needed for chest pain May repeat twice for a total of 3 tablets.  If chest pain not relieved, call 911 25 tablet 11    OXcarbazepine (TRILEPTAL) 300 MG tablet Take 2 tablets (600 mg) by mouth 3 times daily ( @ 7am, 1530 and 2200 hrs each day)      predniSONE (DELTASONE) 10 MG tablet Take 10 mg by mouth daily      triamcinolone (KENALOG) 0.1 % external lotion Apply  to scalp BID x 1-2 weeks then PRN only 60 mL 3     No facility-administered encounter medications on file as of 12/19/2024.             O:   NAD, WDWN, Alert & Oriented, Mood & Affect wnl, Vitals stable   General appearance normal   Vitals stable   Alert, oriented and in no acute distress      Following lymph nodes palpated: Occipital, Cervical, Supraclavicular no lad  Mid frontal scalp 1.1cm keratotic plaque  L parietal scalp and vertex scalp gritty plaques      Eyes: Conjunctivae/lids:Normal     ENT: Lips, mucosa: normal    MSK:Normal    Cardiovascular: peripheral edema none    Pulm: Breathing Normal    Lymph Nodes: No Head and Neck Lymphadenopathy     Neuro/Psych: Orientation:Alert and Orientedx3 ; Mood/Affect:normal       A/P:  Squamous cell carcinoma scalp  MOHS:   Location    The rationale for Mohs surgery was discussed with the patient and consent was obtained.  The risks and benefits as well as alternatives to therapy were discussed, in detail.  Specifically, the risks of infection, scarring, bleeding, prolonged wound healing, incomplete removal, allergy to anesthesia, nerve injury and recurrence were addressed.  Indication for Mohs was Location. Prior to the procedure, the treatment site was clearly identified and, if available, confirmed with previous photos and confirmed by the patient   All components of the Universal Protocol/PAUSE rule were completed.  The Mohs surgeon operated in two distinct and integrated capacities as the surgeon and pathologist.      The area was prepped with Betasept.  A rim of normal appearing skin was marked circumferentially around the lesion.  The area was infiltrated with local anesthesia.  The tumor was first debulked to remove all clinically apparent tumor.  An incision following the standard Mohs approach was done and the specimen was oriented,mapped and placed in 2 block(s).  Each specimen was then chromacoded and processed in the Mohs laboratory using standard Mohs  technique and submitted for frozen section histology.  Frozen section analysis showed  residual tumor but CLEAR MARGINS.    1st and 2nd stage:There is a proliferation of irregular nests of abnormal squamous cells arising from the epidermis and invading the dermis. These are well differentiated. The dermis shows a variable superficial perivascular inflammatory infiltrate.     The tumor was excised using standard Mohs technique in 2 stages(s).  CLEAR MARGINS OBTAINED and Final defect size was 2.1 x 1.9 cm.     We discussed the options for wound management in full with the patient including risks/benefits/ possible outcomes.      REPAIR COMPLEX: Because of the tightness of the surrounding skin and Because of the size and full thickness nature of the defect, Because of the tightness of the surrounding skin, To maintain form and function, and In order to avoid distortion, a complex closure was planned. After LE anesthesia and prep, Burow's triangles were excised in the relaxed skin tension lines. The wound edges were widely undermined greater than width of the defect on both sides ) by dissection in the subcutaneous plane until adequate tissue mobility was obtained. Hemostasis was obtained. The wound edges were closed in a layered fashion using Vicryl and Fast Absorbing Plain Gut sutures. Postoperative length was 4 cm.   EBL minimal; complications none; wound care routine.  The patient was discharged in good condition and will return in one week for wound evaluation.  2. Actinic keratosis scalp x2  LN2:  Treated with LN2 for 5s for 1-2 cycles. Warned risks of blistering, pain, pigment change, scarring, and incomplete resolution.  Advised patient to return if lesions do not completely resolve.  Wound care sheet given.  3. Hx of metastatic melanoma  It was a pleasure speaking to James Salvador today.  Previous clinic notes and pertinent laboratory tests were reviewed prior to James Salvador's visit.  Signs and Symptoms  of skin cancer discussed with patient.  Patient encouraged to perform monthly skin exams.  UV precautions reviewed with patient.  Return to clinic 3 months

## 2024-12-27 ENCOUNTER — TRANSFERRED RECORDS (OUTPATIENT)
Dept: HEALTH INFORMATION MANAGEMENT | Facility: CLINIC | Age: 79
End: 2024-12-27
Payer: COMMERCIAL

## 2024-12-30 ENCOUNTER — ALLIED HEALTH/NURSE VISIT (OUTPATIENT)
Dept: DERMATOLOGY | Facility: CLINIC | Age: 79
End: 2024-12-30
Payer: COMMERCIAL

## 2024-12-30 DIAGNOSIS — Z48.02 REMOVAL OF STAPLES: Primary | ICD-10-CM

## 2024-12-30 PROCEDURE — 99207 PR NO CHARGE NURSE ONLY: CPT

## 2024-12-30 NOTE — NURSING NOTE
Patient presents to the clinic today for  removal of sutures and staple.  The patient has had the sutures and staple in place for 11 days.    There has been no history of infection or drainage.    O: 8 sutures and staples are seen located on the frontal scalp.  The wound is healing well with no signs of infection.    Tetanus status is up to date.    A: Suture and Staple removal.    P:  All sutures and staples were easily removed today.  Routine wound care discussed.  The patient will follow up as needed.  Advised to start daily wound care with cleaning and Vaseline application daily until the wound is healed  Advised to watch for signs/sx of infection; spreading redness, drainage, odor, fever.   Call or report promptly to clinic. Patient given written instructions and informed to return to the clinic as needed.   Patient verbalized understanding.   Thank you,    Eunice BALDERASRN BSN  [facility]- [phone]\

## 2025-01-03 ENCOUNTER — ANCILLARY PROCEDURE (OUTPATIENT)
Dept: CARDIOLOGY | Facility: CLINIC | Age: 80
End: 2025-01-03
Attending: INTERNAL MEDICINE
Payer: COMMERCIAL

## 2025-01-03 DIAGNOSIS — Z95.0 CARDIAC PACEMAKER IN SITU: ICD-10-CM

## 2025-01-03 PROCEDURE — 93296 REM INTERROG EVL PM/IDS: CPT | Performed by: INTERNAL MEDICINE

## 2025-01-03 PROCEDURE — 93294 REM INTERROG EVL PM/LDLS PM: CPT | Performed by: INTERNAL MEDICINE

## 2025-01-05 LAB
MDC_IDC_EPISODE_DTM: NORMAL
MDC_IDC_EPISODE_DURATION: 1 S
MDC_IDC_EPISODE_ID: 5
MDC_IDC_EPISODE_TYPE: NORMAL
MDC_IDC_LEAD_CONNECTION_STATUS: NORMAL
MDC_IDC_LEAD_IMPLANT_DT: NORMAL
MDC_IDC_LEAD_LOCATION: NORMAL
MDC_IDC_LEAD_LOCATION_DETAIL_1: NORMAL
MDC_IDC_LEAD_MFG: NORMAL
MDC_IDC_LEAD_MODEL: NORMAL
MDC_IDC_LEAD_POLARITY_TYPE: NORMAL
MDC_IDC_LEAD_SERIAL: NORMAL
MDC_IDC_MSMT_BATTERY_DTM: NORMAL
MDC_IDC_MSMT_BATTERY_REMAINING_LONGEVITY: 113 MO
MDC_IDC_MSMT_BATTERY_RRT_TRIGGER: 2.62
MDC_IDC_MSMT_BATTERY_STATUS: NORMAL
MDC_IDC_MSMT_BATTERY_VOLTAGE: 3.01 V
MDC_IDC_MSMT_LEADCHNL_RV_IMPEDANCE_VALUE: 304 OHM
MDC_IDC_MSMT_LEADCHNL_RV_IMPEDANCE_VALUE: 361 OHM
MDC_IDC_MSMT_LEADCHNL_RV_PACING_THRESHOLD_AMPLITUDE: 0.62 V
MDC_IDC_MSMT_LEADCHNL_RV_PACING_THRESHOLD_PULSEWIDTH: 0.4 MS
MDC_IDC_MSMT_LEADCHNL_RV_SENSING_INTR_AMPL: 1.75 MV
MDC_IDC_MSMT_LEADCHNL_RV_SENSING_INTR_AMPL: 1.75 MV
MDC_IDC_PG_IMPLANT_DTM: NORMAL
MDC_IDC_PG_MFG: NORMAL
MDC_IDC_PG_MODEL: NORMAL
MDC_IDC_PG_SERIAL: NORMAL
MDC_IDC_PG_TYPE: NORMAL
MDC_IDC_SESS_CLINIC_NAME: NORMAL
MDC_IDC_SESS_DTM: NORMAL
MDC_IDC_SESS_TYPE: NORMAL
MDC_IDC_SET_BRADY_HYSTRATE: NORMAL
MDC_IDC_SET_BRADY_LOWRATE: 50 {BEATS}/MIN
MDC_IDC_SET_BRADY_MAX_SENSOR_RATE: 130 {BEATS}/MIN
MDC_IDC_SET_BRADY_MODE: NORMAL
MDC_IDC_SET_LEADCHNL_RV_PACING_AMPLITUDE: 2 V
MDC_IDC_SET_LEADCHNL_RV_PACING_ANODE_ELECTRODE_1: NORMAL
MDC_IDC_SET_LEADCHNL_RV_PACING_ANODE_LOCATION_1: NORMAL
MDC_IDC_SET_LEADCHNL_RV_PACING_CAPTURE_MODE: NORMAL
MDC_IDC_SET_LEADCHNL_RV_PACING_CATHODE_ELECTRODE_1: NORMAL
MDC_IDC_SET_LEADCHNL_RV_PACING_CATHODE_LOCATION_1: NORMAL
MDC_IDC_SET_LEADCHNL_RV_PACING_POLARITY: NORMAL
MDC_IDC_SET_LEADCHNL_RV_PACING_PULSEWIDTH: 0.4 MS
MDC_IDC_SET_LEADCHNL_RV_SENSING_ANODE_ELECTRODE_1: NORMAL
MDC_IDC_SET_LEADCHNL_RV_SENSING_ANODE_LOCATION_1: NORMAL
MDC_IDC_SET_LEADCHNL_RV_SENSING_CATHODE_ELECTRODE_1: NORMAL
MDC_IDC_SET_LEADCHNL_RV_SENSING_CATHODE_LOCATION_1: NORMAL
MDC_IDC_SET_LEADCHNL_RV_SENSING_POLARITY: NORMAL
MDC_IDC_SET_LEADCHNL_RV_SENSING_SENSITIVITY: 0.6 MV
MDC_IDC_SET_ZONE_DETECTION_INTERVAL: 360 MS
MDC_IDC_SET_ZONE_STATUS: NORMAL
MDC_IDC_SET_ZONE_TYPE: NORMAL
MDC_IDC_SET_ZONE_VENDOR_TYPE: NORMAL
MDC_IDC_STAT_BRADY_DTM_END: NORMAL
MDC_IDC_STAT_BRADY_DTM_START: NORMAL
MDC_IDC_STAT_BRADY_RV_PERCENT_PACED: 52.45 %
MDC_IDC_STAT_EPISODE_RECENT_COUNT: 0
MDC_IDC_STAT_EPISODE_RECENT_COUNT: 0
MDC_IDC_STAT_EPISODE_RECENT_COUNT: 1
MDC_IDC_STAT_EPISODE_RECENT_COUNT_DTM_END: NORMAL
MDC_IDC_STAT_EPISODE_RECENT_COUNT_DTM_START: NORMAL
MDC_IDC_STAT_EPISODE_TOTAL_COUNT: 0
MDC_IDC_STAT_EPISODE_TOTAL_COUNT: 0
MDC_IDC_STAT_EPISODE_TOTAL_COUNT: 5
MDC_IDC_STAT_EPISODE_TOTAL_COUNT_DTM_END: NORMAL
MDC_IDC_STAT_EPISODE_TOTAL_COUNT_DTM_START: NORMAL
MDC_IDC_STAT_EPISODE_TYPE: NORMAL

## 2025-01-13 ENCOUNTER — HOSPITAL ENCOUNTER (OUTPATIENT)
Dept: CT IMAGING | Facility: CLINIC | Age: 80
Discharge: HOME | End: 2025-01-13
Payer: COMMERCIAL

## 2025-01-13 DIAGNOSIS — C43.30 MALIGNANT MELANOMA OF SKIN OF FACE (H): ICD-10-CM

## 2025-01-13 LAB
CREAT BLD-MCNC: 0.7 MG/DL (ref 0.7–1.3)
EGFRCR SERPLBLD CKD-EPI 2021: >60 ML/MIN/1.73M2

## 2025-01-13 PROCEDURE — 70491 CT SOFT TISSUE NECK W/DYE: CPT

## 2025-01-13 PROCEDURE — 82565 ASSAY OF CREATININE: CPT

## 2025-01-13 PROCEDURE — 250N000009 HC RX 250

## 2025-01-13 PROCEDURE — 250N000011 HC RX IP 250 OP 636

## 2025-01-13 RX ORDER — HEPARIN SODIUM (PORCINE) LOCK FLUSH IV SOLN 100 UNIT/ML 100 UNIT/ML
500 SOLUTION INTRAVENOUS ONCE
Status: COMPLETED | OUTPATIENT
Start: 2025-01-13 | End: 2025-01-13

## 2025-01-13 RX ORDER — IOPAMIDOL 755 MG/ML
500 INJECTION, SOLUTION INTRAVASCULAR ONCE
Status: COMPLETED | OUTPATIENT
Start: 2025-01-13 | End: 2025-01-13

## 2025-01-13 RX ORDER — HEPARIN SODIUM (PORCINE) LOCK FLUSH IV SOLN 100 UNIT/ML 100 UNIT/ML
SOLUTION INTRAVENOUS
Status: COMPLETED
Start: 2025-01-13 | End: 2025-01-13

## 2025-01-13 RX ADMIN — IOPAMIDOL 90 ML: 755 INJECTION, SOLUTION INTRAVENOUS at 12:50

## 2025-01-13 RX ADMIN — SODIUM CHLORIDE 65 ML: 9 INJECTION, SOLUTION INTRAVENOUS at 12:50

## 2025-01-13 RX ADMIN — HEPARIN SODIUM (PORCINE) LOCK FLUSH IV SOLN 100 UNIT/ML 500 UNITS: 100 SOLUTION at 12:48

## 2025-01-27 ENCOUNTER — HOSPITAL ENCOUNTER (INPATIENT)
Facility: CLINIC | Age: 80
End: 2025-01-27
Attending: EMERGENCY MEDICINE
Payer: COMMERCIAL

## 2025-01-27 DIAGNOSIS — J30.2 CHRONIC SEASONAL ALLERGIC RHINITIS: ICD-10-CM

## 2025-01-27 DIAGNOSIS — I48.11 LONGSTANDING PERSISTENT ATRIAL FIBRILLATION (H): ICD-10-CM

## 2025-01-27 DIAGNOSIS — G50.0 TRIGEMINAL NEURALGIA: ICD-10-CM

## 2025-01-27 DIAGNOSIS — E83.42 HYPOMAGNESEMIA: ICD-10-CM

## 2025-01-27 DIAGNOSIS — L21.9 SEBORRHEIC DERMATITIS: ICD-10-CM

## 2025-01-27 DIAGNOSIS — U07.1 COVID-19: ICD-10-CM

## 2025-01-27 DIAGNOSIS — E87.6 HYPOKALEMIA: ICD-10-CM

## 2025-01-27 DIAGNOSIS — E87.1 HYPONATREMIA: ICD-10-CM

## 2025-01-27 LAB
ALBUMIN UR-MCNC: NEGATIVE MG/DL
ANION GAP SERPL CALCULATED.3IONS-SCNC: 5 MMOL/L (ref 7–15)
APPEARANCE UR: CLEAR
ATRIAL RATE - MUSE: 166 BPM
BASOPHILS # BLD AUTO: 0 10E3/UL (ref 0–0.2)
BASOPHILS NFR BLD AUTO: 1 %
BILIRUB UR QL STRIP: NEGATIVE
BUN SERPL-MCNC: 5.5 MG/DL (ref 8–23)
CA-I BLD-MCNC: 4.3 MG/DL (ref 4.4–5.2)
CALCIUM SERPL-MCNC: 5.8 MG/DL (ref 8.8–10.4)
CHLORIDE SERPL-SCNC: 100 MMOL/L (ref 98–107)
COLOR UR AUTO: ABNORMAL
CREAT SERPL-MCNC: 0.37 MG/DL (ref 0.67–1.17)
CREAT SERPL-MCNC: 0.47 MG/DL (ref 0.67–1.17)
CREAT UR-MCNC: 18.9 MG/DL
DIASTOLIC BLOOD PRESSURE - MUSE: NORMAL MMHG
EGFRCR SERPLBLD CKD-EPI 2021: >90 ML/MIN/1.73M2
EGFRCR SERPLBLD CKD-EPI 2021: >90 ML/MIN/1.73M2
EOSINOPHIL # BLD AUTO: 0.1 10E3/UL (ref 0–0.7)
EOSINOPHIL NFR BLD AUTO: 1 %
ERYTHROCYTE [DISTWIDTH] IN BLOOD BY AUTOMATED COUNT: 14.2 % (ref 10–15)
FLUAV RNA SPEC QL NAA+PROBE: NEGATIVE
FLUBV RNA RESP QL NAA+PROBE: NEGATIVE
FRACT EXCRET NA UR+SERPL-RTO: 1.2 %
GLUCOSE SERPL-MCNC: 72 MG/DL (ref 70–99)
GLUCOSE UR STRIP-MCNC: NEGATIVE MG/DL
HCO3 SERPL-SCNC: 22 MMOL/L (ref 22–29)
HCT VFR BLD AUTO: 35.2 % (ref 40–53)
HGB BLD-MCNC: 12.4 G/DL (ref 13.3–17.7)
HGB UR QL STRIP: NEGATIVE
HYALINE CASTS: 3 /LPF
IMM GRANULOCYTES # BLD: 0 10E3/UL
IMM GRANULOCYTES NFR BLD: 1 %
INTERPRETATION ECG - MUSE: NORMAL
KETONES UR STRIP-MCNC: NEGATIVE MG/DL
LACTATE SERPL-SCNC: 0.9 MMOL/L (ref 0.7–2)
LEUKOCYTE ESTERASE UR QL STRIP: NEGATIVE
LYMPHOCYTES # BLD AUTO: 0.4 10E3/UL (ref 0.8–5.3)
LYMPHOCYTES NFR BLD AUTO: 6 %
MAGNESIUM SERPL-MCNC: 1.1 MG/DL (ref 1.7–2.3)
MAGNESIUM SERPL-MCNC: 1.8 MG/DL (ref 1.7–2.3)
MCH RBC QN AUTO: 32.8 PG (ref 26.5–33)
MCHC RBC AUTO-ENTMCNC: 35.2 G/DL (ref 31.5–36.5)
MCV RBC AUTO: 93 FL (ref 78–100)
MONOCYTES # BLD AUTO: 0.6 10E3/UL (ref 0–1.3)
MONOCYTES NFR BLD AUTO: 7 %
NEUTROPHILS # BLD AUTO: 6.6 10E3/UL (ref 1.6–8.3)
NEUTROPHILS NFR BLD AUTO: 85 %
NITRATE UR QL: NEGATIVE
NRBC # BLD AUTO: 0 10E3/UL
NRBC BLD AUTO-RTO: 0 /100
OSMOLALITY SERPL: 253 MMOL/KG (ref 280–301)
OSMOLALITY UR: 333 MMOL/KG (ref 100–1200)
P AXIS - MUSE: NORMAL DEGREES
PH UR STRIP: 7 [PH] (ref 5–7)
PHOSPHATE SERPL-MCNC: 2.8 MG/DL (ref 2.5–4.5)
PLATELET # BLD AUTO: 149 10E3/UL (ref 150–450)
POTASSIUM SERPL-SCNC: 3 MMOL/L (ref 3.4–5.3)
POTASSIUM SERPL-SCNC: 4.6 MMOL/L (ref 3.4–5.3)
PR INTERVAL - MUSE: NORMAL MS
QRS DURATION - MUSE: 204 MS
QT - MUSE: 524 MS
QTC - MUSE: 501 MS
R AXIS - MUSE: -59 DEGREES
RBC # BLD AUTO: 3.78 10E6/UL (ref 4.4–5.9)
RBC URINE: <1 /HPF
RSV RNA SPEC NAA+PROBE: NEGATIVE
SARS-COV-2 RNA RESP QL NAA+PROBE: POSITIVE
SODIUM SERPL-SCNC: 122 MMOL/L (ref 135–145)
SODIUM SERPL-SCNC: 125 MMOL/L (ref 135–145)
SODIUM SERPL-SCNC: 127 MMOL/L (ref 135–145)
SODIUM UR-SCNC: 56 MMOL/L
SODIUM UR-SCNC: 62 MMOL/L
SP GR UR STRIP: 1.01 (ref 1–1.03)
SYSTOLIC BLOOD PRESSURE - MUSE: NORMAL MMHG
T AXIS - MUSE: 118 DEGREES
TROPONIN T SERPL HS-MCNC: 14 NG/L
TROPONIN T SERPL HS-MCNC: 15 NG/L
UROBILINOGEN UR STRIP-MCNC: NORMAL MG/DL
VENTRICULAR RATE- MUSE: 55 BPM
WBC # BLD AUTO: 7.8 10E3/UL (ref 4–11)
WBC URINE: 1 /HPF

## 2025-01-27 PROCEDURE — 82570 ASSAY OF URINE CREATININE: CPT | Performed by: NURSE PRACTITIONER

## 2025-01-27 PROCEDURE — 83935 ASSAY OF URINE OSMOLALITY: CPT | Performed by: NURSE PRACTITIONER

## 2025-01-27 PROCEDURE — 99285 EMERGENCY DEPT VISIT HI MDM: CPT | Mod: 25

## 2025-01-27 PROCEDURE — 84300 ASSAY OF URINE SODIUM: CPT | Performed by: NURSE PRACTITIONER

## 2025-01-27 PROCEDURE — 84132 ASSAY OF SERUM POTASSIUM: CPT | Performed by: HOSPITALIST

## 2025-01-27 PROCEDURE — 83735 ASSAY OF MAGNESIUM: CPT | Performed by: HOSPITALIST

## 2025-01-27 PROCEDURE — 87637 SARSCOV2&INF A&B&RSV AMP PRB: CPT | Performed by: EMERGENCY MEDICINE

## 2025-01-27 PROCEDURE — 84295 ASSAY OF SERUM SODIUM: CPT | Performed by: EMERGENCY MEDICINE

## 2025-01-27 PROCEDURE — 84520 ASSAY OF UREA NITROGEN: CPT | Performed by: EMERGENCY MEDICINE

## 2025-01-27 PROCEDURE — 84484 ASSAY OF TROPONIN QUANT: CPT | Performed by: EMERGENCY MEDICINE

## 2025-01-27 PROCEDURE — 250N000011 HC RX IP 250 OP 636: Performed by: EMERGENCY MEDICINE

## 2025-01-27 PROCEDURE — 81003 URINALYSIS AUTO W/O SCOPE: CPT | Performed by: EMERGENCY MEDICINE

## 2025-01-27 PROCEDURE — 96361 HYDRATE IV INFUSION ADD-ON: CPT

## 2025-01-27 PROCEDURE — 80048 BASIC METABOLIC PNL TOTAL CA: CPT | Performed by: EMERGENCY MEDICINE

## 2025-01-27 PROCEDURE — 85025 COMPLETE CBC W/AUTO DIFF WBC: CPT | Performed by: EMERGENCY MEDICINE

## 2025-01-27 PROCEDURE — 83605 ASSAY OF LACTIC ACID: CPT | Performed by: EMERGENCY MEDICINE

## 2025-01-27 PROCEDURE — 96360 HYDRATION IV INFUSION INIT: CPT

## 2025-01-27 PROCEDURE — 82330 ASSAY OF CALCIUM: CPT | Performed by: EMERGENCY MEDICINE

## 2025-01-27 PROCEDURE — 84100 ASSAY OF PHOSPHORUS: CPT | Performed by: NURSE PRACTITIONER

## 2025-01-27 PROCEDURE — 82565 ASSAY OF CREATININE: CPT | Performed by: INTERNAL MEDICINE

## 2025-01-27 PROCEDURE — 99222 1ST HOSP IP/OBS MODERATE 55: CPT | Performed by: NURSE PRACTITIONER

## 2025-01-27 PROCEDURE — 83735 ASSAY OF MAGNESIUM: CPT | Performed by: EMERGENCY MEDICINE

## 2025-01-27 PROCEDURE — 120N000001 HC R&B MED SURG/OB

## 2025-01-27 PROCEDURE — 83930 ASSAY OF BLOOD OSMOLALITY: CPT | Performed by: NURSE PRACTITIONER

## 2025-01-27 PROCEDURE — 36415 COLL VENOUS BLD VENIPUNCTURE: CPT | Performed by: EMERGENCY MEDICINE

## 2025-01-27 PROCEDURE — 250N000013 HC RX MED GY IP 250 OP 250 PS 637: Performed by: NURSE PRACTITIONER

## 2025-01-27 PROCEDURE — 250N000013 HC RX MED GY IP 250 OP 250 PS 637: Performed by: EMERGENCY MEDICINE

## 2025-01-27 PROCEDURE — 250N000013 HC RX MED GY IP 250 OP 250 PS 637: Performed by: HOSPITALIST

## 2025-01-27 PROCEDURE — 84295 ASSAY OF SERUM SODIUM: CPT | Performed by: NURSE PRACTITIONER

## 2025-01-27 PROCEDURE — 258N000003 HC RX IP 258 OP 636: Performed by: EMERGENCY MEDICINE

## 2025-01-27 PROCEDURE — 93005 ELECTROCARDIOGRAM TRACING: CPT

## 2025-01-27 RX ORDER — GABAPENTIN 300 MG/1
900 CAPSULE ORAL
Status: DISCONTINUED | OUTPATIENT
Start: 2025-01-27 | End: 2025-01-27

## 2025-01-27 RX ORDER — GABAPENTIN 300 MG/1
600 CAPSULE ORAL 2 TIMES DAILY
Status: CANCELLED | OUTPATIENT
Start: 2025-01-27

## 2025-01-27 RX ORDER — MAGNESIUM OXIDE 400 MG/1
400 TABLET ORAL EVERY 4 HOURS
Status: COMPLETED | OUTPATIENT
Start: 2025-01-27 | End: 2025-01-28

## 2025-01-27 RX ORDER — PREDNISONE 10 MG/1
10 TABLET ORAL DAILY
Status: DISPENSED | OUTPATIENT
Start: 2025-01-28

## 2025-01-27 RX ORDER — NITROGLYCERIN 0.4 MG/1
0.4 TABLET SUBLINGUAL EVERY 5 MIN PRN
Status: ACTIVE | OUTPATIENT
Start: 2025-01-27

## 2025-01-27 RX ORDER — GABAPENTIN 300 MG/1
900 CAPSULE ORAL 3 TIMES DAILY
Status: DISPENSED | OUTPATIENT
Start: 2025-01-27

## 2025-01-27 RX ORDER — POTASSIUM CHLORIDE 1500 MG/1
40 TABLET, EXTENDED RELEASE ORAL ONCE
Status: COMPLETED | OUTPATIENT
Start: 2025-01-27 | End: 2025-01-27

## 2025-01-27 RX ORDER — ONDANSETRON 2 MG/ML
4 INJECTION INTRAMUSCULAR; INTRAVENOUS EVERY 6 HOURS PRN
Status: DISCONTINUED | OUTPATIENT
Start: 2025-01-27 | End: 2025-01-27

## 2025-01-27 RX ORDER — CALCIUM CARBONATE 500 MG/1
1000 TABLET, CHEWABLE ORAL 4 TIMES DAILY PRN
Status: ACTIVE | OUTPATIENT
Start: 2025-01-27

## 2025-01-27 RX ORDER — PROCHLORPERAZINE MALEATE 5 MG/1
5 TABLET ORAL EVERY 6 HOURS PRN
Status: ACTIVE | OUTPATIENT
Start: 2025-01-27

## 2025-01-27 RX ORDER — KETOTIFEN FUMARATE 0.35 MG/ML
1 SOLUTION/ DROPS OPHTHALMIC 2 TIMES DAILY PRN
Status: ON HOLD | COMMUNITY

## 2025-01-27 RX ORDER — CARVEDILOL 3.12 MG/1
3.12 TABLET ORAL 2 TIMES DAILY WITH MEALS
Status: DISPENSED | OUTPATIENT
Start: 2025-01-27

## 2025-01-27 RX ORDER — ONDANSETRON 4 MG/1
4 TABLET, ORALLY DISINTEGRATING ORAL EVERY 6 HOURS PRN
Status: DISCONTINUED | OUTPATIENT
Start: 2025-01-27 | End: 2025-01-27

## 2025-01-27 RX ORDER — GABAPENTIN 600 MG/1
600 TABLET ORAL 2 TIMES DAILY
Status: DISCONTINUED | OUTPATIENT
Start: 2025-01-27 | End: 2025-01-27

## 2025-01-27 RX ORDER — AMOXICILLIN 250 MG
1 CAPSULE ORAL 2 TIMES DAILY PRN
Status: ACTIVE | OUTPATIENT
Start: 2025-01-27

## 2025-01-27 RX ORDER — LIDOCAINE 40 MG/G
CREAM TOPICAL
Status: ACTIVE | OUTPATIENT
Start: 2025-01-27

## 2025-01-27 RX ORDER — ACETAMINOPHEN 650 MG/1
650 SUPPOSITORY RECTAL EVERY 4 HOURS PRN
Status: ACTIVE | OUTPATIENT
Start: 2025-01-27

## 2025-01-27 RX ORDER — POLYETHYLENE GLYCOL 3350 17 G/17G
17 POWDER, FOR SOLUTION ORAL DAILY
Status: DISPENSED | OUTPATIENT
Start: 2025-01-28

## 2025-01-27 RX ORDER — FUROSEMIDE 20 MG/1
20 TABLET ORAL DAILY
Status: ACTIVE | OUTPATIENT
Start: 2025-01-28

## 2025-01-27 RX ORDER — LISINOPRIL 10 MG/1
30 TABLET ORAL DAILY
Status: DISCONTINUED | OUTPATIENT
Start: 2025-01-28 | End: 2025-01-28

## 2025-01-27 RX ORDER — MAGNESIUM SULFATE HEPTAHYDRATE 40 MG/ML
2 INJECTION, SOLUTION INTRAVENOUS ONCE
Status: COMPLETED | OUTPATIENT
Start: 2025-01-27 | End: 2025-01-27

## 2025-01-27 RX ORDER — GABAPENTIN 300 MG/1
600 CAPSULE ORAL 3 TIMES DAILY
Status: DISCONTINUED | OUTPATIENT
Start: 2025-01-27 | End: 2025-01-27

## 2025-01-27 RX ORDER — AMOXICILLIN 250 MG
2 CAPSULE ORAL 2 TIMES DAILY PRN
Status: ACTIVE | OUTPATIENT
Start: 2025-01-27

## 2025-01-27 RX ORDER — PHENYTOIN 50 MG/1
100 TABLET, CHEWABLE ORAL AT BEDTIME
Status: DISCONTINUED | OUTPATIENT
Start: 2025-01-27 | End: 2025-01-28

## 2025-01-27 RX ORDER — SENNOSIDES 8.6 MG
1300 CAPSULE ORAL 2 TIMES DAILY
Status: ON HOLD | COMMUNITY

## 2025-01-27 RX ORDER — ATORVASTATIN CALCIUM 80 MG/1
80 TABLET, FILM COATED ORAL AT BEDTIME
Status: DISPENSED | OUTPATIENT
Start: 2025-01-27

## 2025-01-27 RX ORDER — OXCARBAZEPINE 300 MG/1
600 TABLET, FILM COATED ORAL 3 TIMES DAILY
Status: DISCONTINUED | OUTPATIENT
Start: 2025-01-27 | End: 2025-01-27

## 2025-01-27 RX ORDER — ACETAMINOPHEN 325 MG/1
650 TABLET ORAL EVERY 4 HOURS PRN
Status: DISPENSED | OUTPATIENT
Start: 2025-01-27

## 2025-01-27 RX ADMIN — GABAPENTIN 900 MG: 300 CAPSULE ORAL at 22:06

## 2025-01-27 RX ADMIN — PHENYTOIN 100 MG: 50 TABLET, CHEWABLE ORAL at 22:07

## 2025-01-27 RX ADMIN — CARVEDILOL 3.12 MG: 3.12 TABLET, FILM COATED ORAL at 17:59

## 2025-01-27 RX ADMIN — Medication 400 MG: at 23:52

## 2025-01-27 RX ADMIN — ACETAMINOPHEN 650 MG: 325 TABLET, FILM COATED ORAL at 17:56

## 2025-01-27 RX ADMIN — POTASSIUM CHLORIDE 40 MEQ: 1500 TABLET, EXTENDED RELEASE ORAL at 11:39

## 2025-01-27 RX ADMIN — SODIUM CHLORIDE 500 ML: 9 INJECTION, SOLUTION INTRAVENOUS at 09:41

## 2025-01-27 RX ADMIN — SODIUM CHLORIDE 250 MG PE: 9 INJECTION, SOLUTION INTRAVENOUS at 14:38

## 2025-01-27 RX ADMIN — MAGNESIUM SULFATE HEPTAHYDRATE 2 G: 40 INJECTION, SOLUTION INTRAVENOUS at 11:39

## 2025-01-27 RX ADMIN — ACETAMINOPHEN 650 MG: 325 TABLET, FILM COATED ORAL at 22:24

## 2025-01-27 RX ADMIN — APIXABAN 5 MG: 5 TABLET, FILM COATED ORAL at 22:07

## 2025-01-27 RX ADMIN — ATORVASTATIN CALCIUM 80 MG: 80 TABLET, FILM COATED ORAL at 22:07

## 2025-01-27 RX ADMIN — GABAPENTIN 900 MG: 300 CAPSULE ORAL at 17:56

## 2025-01-27 ASSESSMENT — ACTIVITIES OF DAILY LIVING (ADL)
ADLS_ACUITY_SCORE: 42
FALL_HISTORY_WITHIN_LAST_SIX_MONTHS: YES
TOILETING_ISSUES: NO
ADLS_ACUITY_SCORE: 42
HEARING_DIFFICULTY_OR_DEAF: NO
ADLS_ACUITY_SCORE: 42
DIFFICULTY_EATING/SWALLOWING: NO
ADLS_ACUITY_SCORE: 42
DIFFICULTY_COMMUNICATING: NO
ADLS_ACUITY_SCORE: 42
ADLS_ACUITY_SCORE: 42
WEAR_GLASSES_OR_BLIND: YES
DRESSING/BATHING_DIFFICULTY: NO
ADLS_ACUITY_SCORE: 42
EQUIPMENT_CURRENTLY_USED_AT_HOME: WALKER, STANDARD
ADLS_ACUITY_SCORE: 42
DOING_ERRANDS_INDEPENDENTLY_DIFFICULTY: YES
WALKING_OR_CLIMBING_STAIRS_DIFFICULTY: NO
ADLS_ACUITY_SCORE: 42
ADLS_ACUITY_SCORE: 42
ADLS_ACUITY_SCORE: 47
ADLS_ACUITY_SCORE: 26
CONCENTRATING,_REMEMBERING_OR_MAKING_DECISIONS_DIFFICULTY: NO
ADLS_ACUITY_SCORE: 42
CHANGE_IN_FUNCTIONAL_STATUS_SINCE_ONSET_OF_CURRENT_ILLNESS/INJURY: YES
NUMBER_OF_TIMES_PATIENT_HAS_FALLEN_WITHIN_LAST_SIX_MONTHS: 1

## 2025-01-27 NOTE — H&P
[facility]  History and Physical - Hospitalist Service       Date of Admission:  1/27/2025  PRIMARY CARE PROVIDER:    Jeronimo Painter    Assessment & Plan   James Salvador is a 79 year old male with a PMH significant for A-fib on DOAC, metastatic melanoma, trigeminal neuralgia, SIADH, CAD who is being admitted on 1/27/2025 with multiple metabolic/electrolyte derangements weakness and COVID infection.    Weakness, ddx includes medication effects vs infectious related and pain from TGN and electrolyte abnormalities  -PT consult  - Fall precautions  - Replacing electrolyte abnormalities as below  -Device interrogation    COVID infection only symptom is nasal congestion no hypoxia  - supportive therapies  -Special precautions    Multiple metabolic derangements including  Hyponatremia does have a history of SIADH, baseline runs 132-141 between 2023 and 2024  Hypomagnesemia  Hypokalemia  -UA reviewed, serum osmolality, FeNa, urine sodium, urine osmolality  -s/p K, Mg replacement protocol  -Electrolyte replacement protocols  - Add on phosphorus  -Hold PTA Lasix, and lisinopril with hyponatremia  -fluid restriction 1.5 L/day after sodium decreased following fluid expansion with 0.9% saline  -q6-hour sodium checks  -Goal sodium by a.m. 1/20/2025 should be not higher than 130-132 mL equivalents per liter    Trigeminal neuralgia with recent flare in the preceding week  - Consult neurology  - As needed acetaminophen available  -Continue 900 mg at bedtime gabapentin (this is the increased dose) and 600 mg twice daily in morning and afternoon  -PTA Trileptal    A-fib on DOAC with PPM  -Continue PTA DOAC, Coreg  -Device interrogation as above    Metastatic melanoma, TVEC injections currently on hold, patient follows with [place] [facility]  -Wife has been in touch with oncology and canceled CT chest abdomen pelvis is pending for 1/27/2025  -Continue PTA prednisone    History of CAD with mid LAD PCI in  "2015 after MI, RCA is chronically occluded  -Continue PTA beta-blocker    Clinically Significant Risk Factors Present on Admission        # Hypokalemia: Lowest K = 3 mmol/L in last 2 days, will replace as needed  # Hyponatremia: Lowest Na = 127 mmol/L in last 2 days, will monitor as appropriate     # Hypomagnesemia: Lowest Mg = 1.1 mg/dL in last 2 days, will replace as needed    # Drug Induced Coagulation Defect: home medication list includes an anticoagulant medication    # Hypertension: Noted on problem list                # Pacemaker present        Diet:  Regular diet  DVT Prophylaxis: DOAC  Gill Catheter: Not present  Lines: PRESENT             Cardiac Monitoring: None  Code Status:  Full code patient reports it is okay to attempt to \"treat potentially reversible causes\"         Disposition Plan    see below  Entered: NAV Baxter CNP 01/27/2025, 11:30 AM     The patient's care was discussed with the Patient and Patient's Family and hospitalist attending physician    Medically Ready for Discharge: Anticipated in 2-4 Days pending correction of electrolyte abnormalities, adequate pain control and safe discharge disposition    NAV Baxter CNP  [facility]  Securely message with [employer] (more info)  Text page via [employer] Paging/Directory     ______________________________________________________________________    Chief Complaint   Weakness inability get off the toilet    History is obtained from the patient and EMR    History of Present Illness   James Salvador is a 79 year old male with a PMH significant for A-fib on DOAC, metastatic melanoma, trigeminal neuralgia, SIADH, CAD who is being admitted on 1/27/2025 with multiple metabolic/electrolyte derangements weakness and COVID infection.    Pt presented to the ED via EMS for generalized weakness with a flare of his right trigeminal neuralgia for the last week requiring additional dose of gabapentin and Trileptal to try to " help him sleep which he has been taking for the last 2 nights prior to admission.  Facial pain ranges from 0 - 7 - 10/10 in severity.  Wife notes that with these extra doses he usually has weakness confusion and fatigue.  Patient mobilizes with walker at baseline.  After ambulating to the bathroom on the a.m. of 1/27/2025 patient and spouse noted that he was too weak to get off the toilet.  She also noted that he is hunched over his walker.  He has had approximately 1 week of anorexia since the onset of his flare of trigeminal neuralgia with decreased oral intake tolerating liquids but only able to eat soft foods such as pudding cottage cheese or eggs because of the pain on his face.  Because of the weakness patient sought treatment in the ED.    Patient denied any cough vomiting diarrhea, chest pain or dyspnea, endorses anorexia as above but had a small breakfast of banana and coffee.  Lab data in the ED revealed multiple metabolic derangements primarily electrolyte abnormalities all generally running low, anemia thrombocytopenia he was found to be COVID-positive despite any URI symptoms side from nasal congestion though spouse has had a cold recently.  Patient had a fall approximately 1 week PTA but was able to get up with family assistance.  He has a weak left knee at baseline after 2 replacement surgeries.     I've reviewed the ED course including VS, meds (500 mL fluid bolus), diagnostics and discussed the pt's ED course w/ the ED attending physician who also discussed patient's trigeminal neuralgia symptoms with the on-call neurologist.  10 point ROS neg aside from what is described above.  Hospitalist service was asked to admit the pt for further evaluation and management.     Past Medical History    I have reviewed this patient's medical history and updated it with pertinent information if needed.   Past Medical History:   Diagnosis Date    Actinic cheilitis 07/17/2013    Lower lip, left     Actinic  keratosis     Allergic rhinitis     Anxiety 3/1/23    Arthritis 2004    Basal cell carcinoma     Benign hypertension     CAD (coronary artery disease)     Cardiac cath and PCI 1994. Cardiac Cath 9/2015: BRIDGET to LAD    Hearing problem 1995    Wear hearing aids    Hyperlipidemia     Malignant melanoma     Morbid obesity 01/11/2016    Paroxysmal supraventricular tachycardia     on metoprolol    Permanent atrial fibrillation 04/21/2017    Squamous cell carcinoma     Tachy-edinson syndrome 05/29/2019     Prior to Admission Medications   Prior to Admission Medications   Prescriptions Last Dose Informant Patient Reported? Taking?   OXcarbazepine (TRILEPTAL) 300 MG tablet   Yes No   Sig: Take 2 tablets (600 mg) by mouth 3 times daily ( @ 7am, 1530 and 2200 hrs each day)   acetaminophen (TYLENOL) 325 MG tablet   Yes No   Sig: Take 650 mg by mouth 4 times daily   apixaban ANTICOAGULANT (ELIQUIS ANTICOAGULANT) 5 MG tablet   No No   Sig: Take 1 tablet (5 mg) by mouth 2 times daily   atorvastatin (LIPITOR) 80 MG tablet   No No   Sig: Take 1 tablet (80 mg) by mouth at bedtime   carvedilol (COREG) 3.125 MG tablet   No No   Sig: Take 1 tablet (3.125 mg) by mouth 2 times daily (with meals)   clobetasol propionate (TEMOVATE) 0.05 % external cream   No No   Sig: Apply to AA BID x 1-2 weeks then PRN. Do not apply to face.   fluticasone (FLONASE) 50 MCG/ACT nasal spray   No No   Sig: Spray 2 sprays into both nostrils daily   furosemide (LASIX) 20 MG tablet   No No   Sig: Take 1 tablet (20 mg) by mouth daily   gabapentin (NEURONTIN) 300 MG capsule   Yes No   Sig: Take 2 capsules (600 mg) by mouth 3 times daily   ketoconazole (NIZORAL) 2 % cream   No No   Sig: Apply topically 2 times daily For face. [facility] [phone]   ketoconazole (NIZORAL) 2 % external shampoo   No No   Sig: Use daily as needed   lidocaine-prilocaine (EMLA) 2.5-2.5 % external cream   No No   Sig: Apply topically as needed for moderate pain    lisinopril (ZESTRIL) 30 MG tablet   No No   Sig: Take 1 tablet (30 mg) by mouth daily   nitroGLYcerin (NITROSTAT) 0.4 MG sublingual tablet   No No   Sig: Place 1 tablet (0.4 mg) under the tongue every 5 minutes as needed for chest pain May repeat twice for a total of 3 tablets.  If chest pain not relieved, call 911   predniSONE (DELTASONE) 10 MG tablet   Yes No   Sig: Take 10 mg by mouth daily   triamcinolone (KENALOG) 0.1 % external lotion   No No   Sig: Apply to scalp BID x 1-2 weeks then PRN only      Facility-Administered Medications: None     Allergies   Allergies   Allergen Reactions    Cats      Cat Dander    Dogs      Dog Dander    Hydromorphone      Other reaction(s): Confusion    Pollen Extract        Physical Exam   Vital Signs: Temp: 97  F (36.1  C) Temp src: Temporal BP: 128/77 Pulse: 51   Resp: 16 SpO2: 95 % O2 Device: None (Room air)    Weight: 0 lbs 0 oz    Constitutional: vs as per EMR  General:  adult pt lying in bed without acute distress fatigued and frail appearing  Neuro: +follows commands wiggle toes and show 2 fingers bilat, face symmetric, tongue midline, speech fluent hard of hearing, able to lift his bed up off the bed and void into a urinal  Eyes pupils equal round 3mm briskly reactive bilat, sclera nonicteric, noninjected, conjunctiva pale  Head, ENT & mouth: NC/AT,  mouth moist oral mucosa  Neck: supple  CV S1S2 no murmurs  resp: CTAB upper lobes  gi:normoactive bowel sounds, soft, nontender, nondisteded  Ext: no edema or mottling  Skin: no rashes on exposed skin  Musculoskeletal no bony joint deformities      Medical Decision Making       61 MINUTES SPENT BY ME on the date of service doing chart review, history, exam, documentation & further activities per the note.         Data   Data reviewed today: I reviewed all medications, new labs and imaging results over the last 24 hours. I personally reviewed the EKG tracing showing paced rhythm .      I have personally reviewed the  following data over the past 24 hrs:    7.8  \   12.4 (L)   / 149 (L)     122 (L) 100 5.5 (L) /  72   3.0 (L) 22 0.37 (L) \     Trop: 14 BNP: N/A     Procal: N/A CRP: N/A Lactic Acid: 0.9         Imaging results reviewed over the past 24 hrs:   No results found for this or any previous visit (from the past 24 hours).

## 2025-01-27 NOTE — CONSULTS
Neurology Consult Note  [facility]       [2025]                                                                                       Admission Date: 2025  Hospital Day: 1      Patient: James Salvador      : 1945  MRN:  [id number]     CC:      Chief Complaint   Patient presents with    Generalized Weakness     Patient arrives with generalized weakness and increase fatigue.  Hs a history of Malignant Melanoma. Patient was unable to get out of toilet, wife called EMS.  Patient was hypotensive, low 80's after 500 ml fluid blood pressure went up to 130/ 74.  He was bradycardic and paced. Patient is in blood thinners.       Consult Request:  Referring Provider:  Wai Oscar DO  Primary Care Provider:  Jeronimo Painter MD        HPI:  James Salvador is a 79 year old yo male admitted forFlareup of trigeminal neuralgia pain.    The patient has had trigeminal neuralgia for several years, and as an outpatient has been decently managed with a regime of:  Gabapentin 600 mg in the morning and afternoon, 900 mg at bedtime.  Trileptal: 600 mg twice daily.    His past medical history is evaluated by his diagnosis of melanoma, and frequent hyponatremia episodes, with sodium typically fluctuating in the 130s.  Higher doses of Trileptal have unfortunately led to severe hyponatremia in the past.    Now the patient comes in with a 2-week flareup of his trigeminal neuralgia pain.  They are supposed to call the neurologist today, but ended up in the ER because of generalized weakness that has also built up in the last couple of days.    In the emergency room found to have COVID-19.    When I met him, he tells me that he is in severe pain, has mild generalized muscle aches and pains, but for the most part is at baseline.  No new neurological symptoms.        PAST MEDICAL HISTORY:  ALLERGIES:   Allergies   Allergen Reactions    Cats      Cat Dander    Dogs      Dog  Dander    Hydromorphone      Other reaction(s): Confusion    Pollen Extract      Tobacco:    History   Smoking Status    Former    Types: Cigarettes, Cigars, Pipe   Smokeless Tobacco    Never     Alcohol:  Social History    Substance and Sexual Activity      Alcohol use: Not Currently    MEDICATIONS:       CURRENTLY SCHEDULED MEDICATIONS   Current Facility-Administered Medications   Medication Dose Route Frequency Provider Last Rate Last Admin    fosphenytoin (CEREBYX) 250 mg PE in sodium chloride 0.9 % 50 mL injection  250 mg PE Intravenous Once Marcelle Sadler MD        [START ON 1/28/2025] polyethylene glycol (MIRALAX) Packet 17 g  17 g Oral Daily Tita Smith APRN CNP        sodium chloride (PF) 0.9% PF flush 3 mL  3 mL Intracatheter Q8H Tita Smith APRN CNP              HOME MEDICATIONS  (Not in a hospital admission)    MEDICAL HISTORY  Past Medical History:   Diagnosis Date    Actinic cheilitis 07/17/2013    Lower lip, left     Actinic keratosis     Allergic rhinitis     Anxiety 3/1/23    Arthritis 2004    Basal cell carcinoma     Benign hypertension     CAD (coronary artery disease)     Cardiac cath and PCI 1994. Cardiac Cath 9/2015: BRIDGET to LAD    Hearing problem 1995    Wear hearing aids    Hyperlipidemia     Malignant melanoma     Morbid obesity 01/11/2016    Paroxysmal supraventricular tachycardia     on metoprolol    Permanent atrial fibrillation 04/21/2017    Squamous cell carcinoma     Tachy-edinson syndrome 05/29/2019     SURGICAL HISTORY  Past Surgical History:   Procedure Laterality Date    ADENOIDECTOMY  Childhood    ANGIOPLASTY  1994    in [place]    ANKLE SURGERY  07/13/2005    right ankle    ARTHROPLASTY HIP Right 10/2009    BIOPSY NODE SENTINEL Left 03/30/2023    Procedure: BIOPSY, LYMPH NODE, SENTINEL;  Surgeon: Rolando Minaya MD;  Location: [facility] OR    COLONOSCOPY  4/25/12    EP PERM PACER SINGLE LEAD N/A 05/31/2019    Medtronic single lead pacemaker    EXCISE MASS CHEEK Left  2023    Procedure: wide local excision of left cheek melanoma;  Surgeon: Rolando Minaya MD;  Location: [facility] OR    EXCISE MASS CHEEK WITH FLAP PEDICLE Left 2023    Procedure: Left cervical Facial flap per closure of left cheek Defect;  Surgeon: Ila Sanchez MD;  Location: [facility] OR    Facial biopsy - Melanoma      GENITOURINARY SURGERY  10/20/23    Prostate surgery    HEART CATH STENT COR W/WO PTCA  2015    BRIDGET stent mid LAD    IR CHEST PORT PLACEMENT > 5 YRS OF AGE  2023    LASER SURGERY OF EYE  2002    MOHS MICROGRAPHIC PROCEDURE  2004    squamous cell carcinoma right temple    SINUS SURGERY  2006    TONSILLECTOMY  Childhood     FAMILY HISTORY    Family History   Problem Relation Age of Onset    Genitourinary Problems Mother         renal failure    Heart Disease Mother     Cerebrovascular Disease Mother         TIA    Cardiovascular Father         rupture of dorsal aorta    Depression Son     Depression Brother         Suicide    Substance Abuse Brother         Heroin    Skin Cancer No family hx of      SOCIAL HISTORY  Social History     Socioeconomic History    Marital status:     Number of children: 3   Occupational History     Employer: RETIRED   Tobacco Use    Smoking status: Former     Current packs/day: 0.00     Average packs/day: 0.5 packs/day for 10.0 years (5.0 ttl pk-yrs)     Types: Cigarettes, Cigars, Pipe     Start date: 1966     Quit date: 1974     Years since quittin.7    Smokeless tobacco: Never    Tobacco comments:     Also smoked from 1985 - 1990   Vaping Use    Vaping status: Never Used   Substance and Sexual Activity    Alcohol use: Not Currently    Drug use: No    Sexual activity: Not Currently     Partners: Female     Birth control/protection: Male Surgical     Comment: Vasectomy   Other Topics Concern    Parent/sibling w/ CABG, MI or angioplasty before 65F 55M? No    Caffeine Concern Yes     Comment: 2 big cups coffee  daily    Sleep Concern No     Comment: sleeping better since shoulder replaced 11/3/16    Stress Concern No    Weight Concern Yes    Special Diet Yes     Comment: trying to do more lean meats    Exercise No     Comment: limited - knee     Social Drivers of Health     Financial Resource Strain: Low Risk  (6/22/2024)    Financial Resource Strain     Within the past 12 months, have you or your family members you live with been unable to get utilities (heat, electricity) when it was really needed?: No   Food Insecurity: Low Risk  (6/22/2024)    Food Insecurity     Within the past 12 months, did you worry that your food would run out before you got money to buy more?: No     Within the past 12 months, did the food you bought just not last and you didn t have money to get more?: No   Transportation Needs: Low Risk  (6/22/2024)    Transportation Needs     Within the past 12 months, has lack of transportation kept you from medical appointments, getting your medicines, non-medical meetings or appointments, work, or from getting things that you need?: No   Physical Activity: Inactive (6/22/2024)    Exercise Vital Sign     Days of Exercise per Week: 0 days     Minutes of Exercise per Session: 0 min   Stress: Stress Concern Present (6/22/2024)    [employer] [place] [employer] - Occupational Stress Questionnaire     Feeling of Stress : To some extent   Social Connections: Unknown (6/22/2024)    Social Connection and Isolation Panel [NHANES]     Frequency of Social Gatherings with Friends and Family: Once a week   Interpersonal Safety: Low Risk  (6/26/2024)    Interpersonal Safety     Do you feel physically and emotionally safe where you currently live?: Yes     Within the past 12 months, have you been hit, slapped, kicked or otherwise physically hurt by someone?: No     Within the past 12 months, have you been humiliated or emotionally abused in other ways by your partner or ex-partner?: No   Housing Stability: Low  "Risk  (2024)    Housing Stability     Do you have housing? : Yes     Are you worried about losing your housing?: No                  Temp: 97  F (36.1  C)        Temp src: Temporal         BP: 128/77         Estimated body mass index is 35.17 kg/m  as calculated from the following:    Height as of 24: 1.803 m (5' 11\").    Weight as of 24: 114.4 kg (252 lb 3.2 oz).    Resp: 16   SpO2: 96 %   O2 Device: None (Room air)     Blood Pressure:   BP Readings from Last 3 Encounters:   25 128/77   24 112/74   24 108/72     T24 : Temp (24hrs), Av  F (36.1  C), Min:97  F (36.1  C), Max:97  F (36.1  C)       GENERAL EXAMINATION:  HEENT: PERRLA, EOMI.  Neck: Supple, No myofascial tender points.    Mental status appears normal- Alert oriented x 3.  Good insight into reasons of hospitalization.    Cranial Nerves: Pupils equal and reactive to light and accommodation.  Severe pain in right V2 distribution to mild touch.     Sensory:  No overt lateralizing sensory abnormalities to touch, pinprick.      Motor:  No lateralizing gross motor weakness.      Balance, Gait and Station:  Balance and gait deferred.       Deep Tendon Reflexes:  DTR's (biceps, triceps, brachioradialis, knee jerks, ankle jerks) preserved and symmetric.                ASSESSMENT     Trigeminal neuralgia flareup: Likely related to underlying COVID infection.  Hyponatremia: Sodium at 122 today.    At this point, it is very important to control his trigeminal neuralgia pain, with his severe, almost debilitating flareup.  I discussed options including nasal lidocaine and IV fosphenytoin, and would probably favor the latter given its rapid action and potential to continue as an outpatient.    At the same time, it is probably unfortunate that his sodium keeps dropping.  Trileptal definitely has some role to play with this.  Would recommend cutting back on Trileptal dose.    Gabapentin should probably be increased.  He tells me that " he has tolerated the midday dose fairly well, and would be able to add on some gabapentin without having debilitating side effects.    RECOMMENDATIONS   Would recommend the following medication regime as inpatient:  -Gabapentin 900 mg 3 times a day  -Hold Trileptal for 1 to 2 days.  Once sodium normalizes, would restart at 300 mg twice daily.  -Fosphenytoin to 50 mg IV x 1.  Continue with phenytoin 100 mg at bedtime.  If tolerable dose could be increased further.  -Could try nasal lidocaine as inpatient if needed.    Inpatient neurology will continue to peripherally follow and guide trigeminal neuralgia management.    Continue to provide reorientation, reassurance, and stimulation during the day with lights on, blinds open, and the television on alternating with dark, quiet environment at night.  Interruptions during the night should be limited as much as possible.     Standard neuroprotective measures (post cardiac arrest) - MAP>65mmHg, avoidance of hyperventilation, fever, hyperglycemia, and hyperoxia (PaO2 >300mmHg) apply.          Remi Fuentes MD   Neurologist, [facility]   January 27, 2025 1:44 PM        A total time of 76 minutes was spent on the care of this patient on the date of the visit, outside of time spent performing any procedures. Please excuse any typographical errors, as this note was generated using a Voice Recognition Software.

## 2025-01-27 NOTE — ED NOTES
Bed: ED29  Expected date:   Expected time:   Means of arrival:   Comments:  [facility] 79 M weakness hard time walking paced rhythm 40's fluids running not paced at 120 bs 121

## 2025-01-27 NOTE — PHARMACY-ADMISSION MEDICATION HISTORY
Pharmacist Admission Medication History    Admission medication history is complete. The information provided in this note is only as accurate as the sources available at the time of the update.    Information Source(s): Family member and CareEverywhere/SureScripts via phone      Changes made to PTA medication list:  Added: ketotifen   Deleted: Emla cream  Changed: Patient recently told he can increase doses of gabapentin and oxcarbazepine to 900mg if needed --> has been using this 900mg in the evening the past few days, acetaminophen updated to twice daily, Flonase/ketoconazole updated to as needed,     Medication History Completed By: Stacie Soria, PharmD 1/27/2025 12:47 PM    PTA Med List   Medication Sig Note Last Dose/Taking    acetaminophen (TYLENOL) 650 MG CR tablet Take 1,300 mg by mouth 2 times daily.  1/27/2025 Morning    apixaban ANTICOAGULANT (ELIQUIS ANTICOAGULANT) 5 MG tablet Take 1 tablet (5 mg) by mouth 2 times daily  1/27/2025 Morning    atorvastatin (LIPITOR) 80 MG tablet Take 1 tablet (80 mg) by mouth at bedtime  1/26/2025 Evening    carvedilol (COREG) 3.125 MG tablet Take 1 tablet (3.125 mg) by mouth 2 times daily (with meals)  1/27/2025 Morning    clobetasol propionate (TEMOVATE) 0.05 % external cream Apply to AA BID x 1-2 weeks then PRN. Do not apply to face. 1/27/2025: Now taking as needed.  Taking    fluticasone (FLONASE) 50 MCG/ACT nasal spray Spray 2 sprays into both nostrils daily (Patient taking differently: Spray 2 sprays into both nostrils daily as needed for allergies.)  Taking Differently    furosemide (LASIX) 20 MG tablet Take 1 tablet (20 mg) by mouth daily  1/27/2025 Morning    gabapentin (NEURONTIN) 300 MG capsule Take 600 mg by mouth 3 times daily. 0700, 1330, 2000  May increase dose to 900mg if needed per neurologist. Patient has been doing this with evening dose the last few nights.  1/27/2025 Morning    ketoconazole (NIZORAL) 2 % cream Apply topically 2 times daily For  face. FAX REFILL REQUESTS TO [facility]: [phone] (Patient taking differently: Apply topically 2 times daily as needed. For face. FAX REFILL REQUESTS TO [facility]: [phone])  Taking Differently    ketoconazole (NIZORAL) 2 % external shampoo Use daily as needed  Taking    ketotifen fumarate 0.035% 0.035 % SOLN ophthalmic solution Place 1 drop into both eyes 2 times daily as needed for itching.  Taking As Needed    lisinopril (ZESTRIL) 30 MG tablet Take 1 tablet (30 mg) by mouth daily  1/27/2025 Morning    nitroGLYcerin (NITROSTAT) 0.4 MG sublingual tablet Place 1 tablet (0.4 mg) under the tongue every 5 minutes as needed for chest pain May repeat twice for a total of 3 tablets.  If chest pain not relieved, call 911  Taking As Needed    OXcarbazepine (TRILEPTAL) 300 MG tablet Take 600 mg by mouth 3 times daily. Takes with gabapentin    0700, 1330, 2000  May increase dose to 900mg if needed per neurologist. Patient has been doing this with evening dose the last few nights.  1/27/2025 Morning    predniSONE (DELTASONE) 10 MG tablet Take 10 mg by mouth daily  1/27/2025 Morning    triamcinolone (KENALOG) 0.1 % external lotion Apply to scalp BID x 1-2 weeks then PRN only 1/27/2025: Patient now taking as needed.  Taking

## 2025-01-27 NOTE — ED PROVIDER NOTES
Emergency Department Note      History of Present Illness     Chief Complaint   Generalized Weakness    HPI   James Salvador is a 79 year old male with a history of atrial fibrillation on Eliquis, metastatic melanoma, and trigeminal neuralgia on gabapentin and Trileptal (follows with [facility], Dr. Fuentes) presenting with his wife via EMS for generalized weakness.  He has had a flare of his right trigeminal neuralgia over the last week.  The last couple of nights he is taking an additional dose of gabapentin and Trileptal to try to help sleep.  His wife notes that when he takes these increased doses he has side effects of weakness, confusion, and loss of equilibrium.  Today he was too weak to get off the toilet and seems to be hunching over his walker which prompted their visit.  He has not had fever, cough, vomiting, or diarrhea.  He also denies chest pain or shortness of breath.  He has had poor appetite but had coffee and a banana this morning.    Independent Historian   Wife as detailed above.    Review of External Notes   I reviewed the patient's [facility] visit from 7/2024 for metastatic melanoma. He saw his PCP in 6/2024. History of SIADH and CAD.    Past Medical History     Medical History and Problem List   Actinic cheilitis  Actinic keratosis   Allergic rhinitis  Anxiety  Arthritis  Arteriosclerosis of coronary artery   Basal cell carcinoma   Benign hypertension  CAD  Cytokine release syndrome due to chimeric antigen receptor T-cell immunotherapy   Chronic atrial fibrillation   Complete tear of right rotator cuff   DJD (degenerative joint disease) of knee   S/P TURP (status post transurethral resection of prostate)   SIADH (syndrome of inappropriate ADH production)   Hyperlipidemia  History of myocardial infarction   Malignant melanoma  Malignant neoplasm metastatic to left lung   Morbid obesity  Paroxysmal supraventricular tachycardia  Permanent atrial fibrillation  Squamous  cell carcinoma  SIRS of non-infectious origin w acute organ dysfunction   S/P TURP (status post transurethral resection of prostate)   Tachy-edinson syndrome   Trigeminal neuralgia     Medications   Apixaban   Atorvastatin   Carvedilol   Furosemide   Gabapentin   Lisinopril   Nitroglycerin    Oxcarbazepine  Prednisone     Surgical History   Adenoidectomy  Angioplasty  Arthroplasty hip, right  EP perm pacer single lead  Excise mass cheek  Excise mass cheek with flap pedicle  Prostate surgery  Heart cath stent cor w/wo PTCA  Squamous cell carcinoma right temple  Sinus surgery  Tonsillectomy     Physical Exam     Patient Vitals for the past 24 hrs:   BP Temp Temp src Pulse Resp SpO2   01/27/25 1439 -- -- -- 60 16 --   01/27/25 1437 (!) 177/104 -- -- 64 27 --   01/27/25 1146 -- -- -- -- -- 96 %   01/27/25 0916 -- -- -- -- -- 96 %   01/27/25 0901 128/77 -- -- 51 -- --   01/27/25 0846 -- 97  F (36.1  C) Temporal -- 16 95 %   01/27/25 0845 131/82 -- -- 72 -- --     Physical Exam  General: Well-developed and well-nourished. Fatigued appearing elderly  man. Cooperative.  Head:  Atraumatic.  Eyes:  Conjunctivae, lids, and sclerae are normal.  ENT:    Normal nose. Moist mucous membranes.  Neck:  Supple. Normal range of motion.  CV:  Regular rate and rhythm. Normal heart sounds with no murmurs, rubs, or gallops detected.  Resp:  No respiratory distress. Clear to auscultation bilaterally without decreased breath sounds, wheezing, rales, or rhonchi.  GI:  Soft. Non-distended. Non-tender.    MS:  Normal ROM. No bilateral lower extremity edema.  Skin:  Warm. Non-diaphoretic. No pallor.  Neuro:  Awake. A&Ox3.  Slow speech.  Psych: Normal mood and affect.   Vitals reviewed.    Diagnostics     Lab Results   Labs Ordered and Resulted from Time of ED Arrival to Time of ED Departure   BASIC METABOLIC PANEL - Abnormal       Result Value    Sodium 127 (*)     Potassium 3.0 (*)     Chloride 100      Carbon Dioxide (CO2) 22      Anion  Gap 5 (*)     Urea Nitrogen 5.5 (*)     Creatinine 0.37 (*)     GFR Estimate >90      Calcium 5.8 (*)     Glucose 72     MAGNESIUM - Abnormal    Magnesium 1.1 (*)    ROUTINE UA WITH MICROSCOPIC REFLEX TO CULTURE - Abnormal    Color Urine Light Yellow      Appearance Urine Clear      Glucose Urine Negative      Bilirubin Urine Negative      Ketones Urine Negative      Specific Gravity Urine 1.013      Blood Urine Negative      pH Urine 7.0      Protein Albumin Urine Negative      Urobilinogen Urine Normal      Nitrite Urine Negative      Leukocyte Esterase Urine Negative      RBC Urine <1      WBC Urine 1      Hyaline Casts Urine 3 (*)    INFLUENZA A/B, RSV AND SARS-COV2 PCR - Abnormal    Influenza A PCR Negative      Influenza B PCR Negative      RSV PCR Negative      SARS CoV2 PCR Positive (*)    CBC WITH PLATELETS AND DIFFERENTIAL - Abnormal    WBC Count 7.8      RBC Count 3.78 (*)     Hemoglobin 12.4 (*)     Hematocrit 35.2 (*)     MCV 93      MCH 32.8      MCHC 35.2      RDW 14.2      Platelet Count 149 (*)     % Neutrophils 85      % Lymphocytes 6      % Monocytes 7      % Eosinophils 1      % Basophils 1      % Immature Granulocytes 1      NRBCs per 100 WBC 0      Absolute Neutrophils 6.6      Absolute Lymphocytes 0.4 (*)     Absolute Monocytes 0.6      Absolute Eosinophils 0.1      Absolute Basophils 0.0      Absolute Immature Granulocytes 0.0      Absolute NRBCs 0.0     IONIZED CALCIUM - Abnormal    Calcium Ionized Whole Blood 4.3 (*)    SODIUM - Abnormal    Sodium 122 (*)    LACTIC ACID WHOLE BLOOD WITH 1X REPEAT IN 2 HR WHEN >2 - Normal    Lactic Acid, Initial 0.9     TROPONIN T, HIGH SENSITIVITY - Normal    Troponin T, High Sensitivity 15     TROPONIN T, HIGH SENSITIVITY - Normal    Troponin T, High Sensitivity 14       EKG  Indication: Generalized weakness  Time: 0932  Rate 55 bpm. QRS duration 204. QT/QTc 524/501.   Ventricular-paced rhythm    No acute ST changes.  Now ventricular pacing replacing  atrial fibrillation with RVR as compared to prior, dated 5/29/2024.    Independent Interpretation   Not applicable.     ED Course      Medications Administered   Medications   sodium chloride 0.9% BOLUS 500 mL (0 mLs Intravenous Stopped 1/27/25 1146)   potassium chloride lilian ER (KLOR-CON M20) CR tablet 40 mEq (40 mEq Oral $Given 1/27/25 1139)   magnesium sulfate 2 g in 50 mL sterile water intermittent infusion (0 g Intravenous Stopped 1/27/25 1351)   fosphenytoin (CEREBYX) 250 mg PE in sodium chloride 0.9 % 50 mL injection (250 mg PE Intravenous $New Bag 1/27/25 1438)     Discussion of Management   Admitting Hospitalist, Tita Smith CNP, regarding the patient's history and presentation to the ED who accepted the patient for admission on behalf of Dr. Oscar.  Neurology, Dr. Fuentes, regarding the patient's history and presentation to the ED.    ED Course   ED Course as of 01/27/25 1708   Mon Jan 27, 2025   0911 I obtained the patient's history and examined as noted above.    1052 I consulted with Dr. Fuentes, neurology, regarding the patient's history and presentation here in the emergency department who accepted the patient for admission.   1112 I rechecked the patient and explained findings.    1134 I consulted with Tita Smith CNP, hospitalist, regarding the patient's history and presentation here in the emergency department who accepted the patient for admission on behalf of Dr. Oscar.   1322 I consulted with Dr. Fuentes, [facility], regarding the management of the patient's trigeminal neuralgia flare.      Additional Documentation  Social Determinants of Health: Supportive spouse at bedside.    Medical Decision Making / Diagnosis   SUSHMA Ramirez is a 79 year old man with a history of atrial fibrillation on Eliquis, metastatic melanoma, and trigeminal neuralgia who has had a flare of this trigeminal neuralgia pain over the last week.  He has taken some increased doses of gabapentin and Trileptal  which his wife believes to be the cause of his weakness and some confusion.  He ultimately presented because he was too weak to get off the toilet.  He denies any symptoms other than his facial pain.  He is well-appearing, but seems fatigued.    Workup reveals electrolyte derangements including hypokalemia and hypomagnesemia which were repleted in the emergency department.  He has baseline hyponatremia related to his Trileptal.  This actually worsened after 500 mL fluid from 127 to 122.  Further fluids were withheld in the emergency department.  I am sure these electrolyte derangements are contributing to his weakness though he is also found to be COVID-19 positive which I am sure is contributing.  There is no evidence of UTI.  EKG is reassuring without acute ST changes or arrhythmias and both initial and repeat troponin are normal.  It should be noted he initially appeared to be hypocalcemic although ionized calcium is only minimally low and likely not a major factor.    I consulted with his neurologist, Dr. Fuentes, who evaluated the patient in the emergency department and recommended fosphenytoin for trigeminal neuralgia pain.  He will continue to follow the patient as the patient clearly requires hospitalization for attention to his weakness which is multifactorial including electrolyte derangements and COVID-19.  I updated the patient and his wife and answered all their questions.  They verbalized understanding and are agreeable.  I discussed the patient's case with Tita Smith, hospitalist PA, who accepts admission and has no further orders.    Disposition   He was admitted to the hospital.     Diagnosis     ICD-10-CM    1. COVID-19  U07.1       2. Hyponatremia  E87.1       3. Trigeminal neuralgia  G50.0       4. Hypokalemia  E87.6       5. Hypomagnesemia  E83.42            Scribe Disclosure:  I, Stacie Jimenez, am serving as a scribe at 9:06 AM on 1/27/2025 to document services personally performed by Viktoriya  Marcelle BOONE MD based on my observations and the provider's statements to me.           Marcelle Sadler MD  01/28/25 0491

## 2025-01-27 NOTE — ED NOTES
[facility]  ED Nurse Handoff Report    ED Chief complaint: Generalized Weakness (Patient arrives with generalized weakness and increase fatigue.  Hs a history of Malignant Melanoma. Patient was unable to get out of toilet, wife called EMS.  Patient was hypotensive, low 80's after 500 ml fluid blood pressure went up to 130/ 74.  He was bradycardic and paced. Patient is in blood thinners.)      ED Diagnosis:   Final diagnoses:   None       Code Status: Full Code    Allergies:   Allergies   Allergen Reactions    Cats      Cat Dander    Dogs      Dog Dander    Hydromorphone      Other reaction(s): Confusion    Pollen Extract        Patient Story: generalized weakness  Focused Assessment:  Patient presents to ED with generalized weakness and hypotension. Tested positive for COVID, wife reports having cold symptoms one weeks ago but never tested. Will be admitted for multiple electrolyte imbalances.     Treatments and/or interventions provided: See MAR  Patient's response to treatments and/or interventions: TBD    To be done/followed up on inpatient unit:  close monitoring    Does this patient have any cognitive concerns?:  na    Activity level - Baseline/Home:  Independent  Activity Level - Current:   Stand with Assist    Patient's Preferred language: English   Needed?: No    Isolation: None  Infection: Not Applicable  COVID r/o and special precautions  Patient tested for COVID 19 prior to admission: YES  Bariatric?: No    Vital Signs:   Vitals:    01/27/25 0846 01/27/25 0901 01/27/25 0916 01/27/25 1146   BP:  128/77     Pulse:  51     Resp: 16      Temp: 97  F (36.1  C)      TempSrc: Temporal      SpO2: 95%  96% 96%       Cardiac Rhythm:     Was the PSS-3 completed:   Yes  What interventions are required if any?               Family Comments: wife at bedside  OBS brochure/video discussed/provided to patient/family: N/A              Name of person given brochure if not patient: na               Relationship to patient: na    For the majority of the shift this patient's behavior was Green.   Behavioral interventions performed were none.    ED NURSE PHONE NUMBER: *[id number]

## 2025-01-28 ENCOUNTER — APPOINTMENT (OUTPATIENT)
Dept: PHYSICAL THERAPY | Facility: CLINIC | Age: 80
DRG: 178 | End: 2025-01-28
Attending: NURSE PRACTITIONER
Payer: COMMERCIAL

## 2025-01-28 ENCOUNTER — DOCUMENTATION ONLY (OUTPATIENT)
Dept: OTHER | Facility: CLINIC | Age: 80
End: 2025-01-28
Payer: COMMERCIAL

## 2025-01-28 LAB
ALBUMIN SERPL BCG-MCNC: 3.7 G/DL (ref 3.5–5.2)
ANION GAP SERPL CALCULATED.3IONS-SCNC: 8 MMOL/L (ref 7–15)
BUN SERPL-MCNC: 5.7 MG/DL (ref 8–23)
CALCIUM SERPL-MCNC: 8.7 MG/DL (ref 8.8–10.4)
CHLORIDE SERPL-SCNC: 93 MMOL/L (ref 98–107)
CREAT SERPL-MCNC: 0.48 MG/DL (ref 0.67–1.17)
EGFRCR SERPLBLD CKD-EPI 2021: >90 ML/MIN/1.73M2
ERYTHROCYTE [DISTWIDTH] IN BLOOD BY AUTOMATED COUNT: 14.4 % (ref 10–15)
GLUCOSE SERPL-MCNC: 76 MG/DL (ref 70–99)
HCO3 SERPL-SCNC: 27 MMOL/L (ref 22–29)
HCT VFR BLD AUTO: 37.9 % (ref 40–53)
HGB BLD-MCNC: 13.9 G/DL (ref 13.3–17.7)
MAGNESIUM SERPL-MCNC: 1.7 MG/DL (ref 1.7–2.3)
MCH RBC QN AUTO: 33.3 PG (ref 26.5–33)
MCHC RBC AUTO-ENTMCNC: 36.7 G/DL (ref 31.5–36.5)
MCV RBC AUTO: 91 FL (ref 78–100)
PHOSPHATE SERPL-MCNC: 2.6 MG/DL (ref 2.5–4.5)
PLATELET # BLD AUTO: 155 10E3/UL (ref 150–450)
POTASSIUM SERPL-SCNC: 4.5 MMOL/L (ref 3.4–5.3)
RBC # BLD AUTO: 4.18 10E6/UL (ref 4.4–5.9)
SODIUM SERPL-SCNC: 128 MMOL/L (ref 135–145)
SODIUM SERPL-SCNC: 130 MMOL/L (ref 135–145)
WBC # BLD AUTO: 4.8 10E3/UL (ref 4–11)

## 2025-01-28 PROCEDURE — 120N000001 HC R&B MED SURG/OB

## 2025-01-28 PROCEDURE — 83735 ASSAY OF MAGNESIUM: CPT | Performed by: HOSPITALIST

## 2025-01-28 PROCEDURE — 97530 THERAPEUTIC ACTIVITIES: CPT | Mod: GP

## 2025-01-28 PROCEDURE — 84295 ASSAY OF SERUM SODIUM: CPT | Performed by: HOSPITALIST

## 2025-01-28 PROCEDURE — 80048 BASIC METABOLIC PNL TOTAL CA: CPT | Performed by: NURSE PRACTITIONER

## 2025-01-28 PROCEDURE — 85018 HEMOGLOBIN: CPT | Performed by: NURSE PRACTITIONER

## 2025-01-28 PROCEDURE — 84100 ASSAY OF PHOSPHORUS: CPT | Performed by: HOSPITALIST

## 2025-01-28 PROCEDURE — 250N000009 HC RX 250: Mod: JW | Performed by: PSYCHIATRY & NEUROLOGY

## 2025-01-28 PROCEDURE — 85048 AUTOMATED LEUKOCYTE COUNT: CPT | Performed by: NURSE PRACTITIONER

## 2025-01-28 PROCEDURE — 250N000013 HC RX MED GY IP 250 OP 250 PS 637: Performed by: NURSE PRACTITIONER

## 2025-01-28 PROCEDURE — 97116 GAIT TRAINING THERAPY: CPT | Mod: GP

## 2025-01-28 PROCEDURE — 250N000013 HC RX MED GY IP 250 OP 250 PS 637: Performed by: HOSPITALIST

## 2025-01-28 PROCEDURE — 250N000012 HC RX MED GY IP 250 OP 636 PS 637: Performed by: NURSE PRACTITIONER

## 2025-01-28 PROCEDURE — 250N000013 HC RX MED GY IP 250 OP 250 PS 637: Performed by: PSYCHIATRY & NEUROLOGY

## 2025-01-28 PROCEDURE — 250N000011 HC RX IP 250 OP 636: Performed by: HOSPITALIST

## 2025-01-28 PROCEDURE — 97161 PT EVAL LOW COMPLEX 20 MIN: CPT | Mod: GP

## 2025-01-28 PROCEDURE — 84295 ASSAY OF SERUM SODIUM: CPT | Performed by: NURSE PRACTITIONER

## 2025-01-28 PROCEDURE — 99233 SBSQ HOSP IP/OBS HIGH 50: CPT | Performed by: HOSPITALIST

## 2025-01-28 RX ORDER — HEPARIN SODIUM (PORCINE) LOCK FLUSH IV SOLN 100 UNIT/ML 100 UNIT/ML
5-10 SOLUTION INTRAVENOUS
Status: ACTIVE | OUTPATIENT
Start: 2025-01-28

## 2025-01-28 RX ORDER — PHENYTOIN 50 MG/1
200 TABLET, CHEWABLE ORAL AT BEDTIME
Status: DISPENSED | OUTPATIENT
Start: 2025-01-28

## 2025-01-28 RX ORDER — HEPARIN SODIUM,PORCINE 10 UNIT/ML
5-10 VIAL (ML) INTRAVENOUS EVERY 24 HOURS
Status: DISPENSED | OUTPATIENT
Start: 2025-01-28

## 2025-01-28 RX ORDER — NALOXONE HYDROCHLORIDE 0.4 MG/ML
0.2 INJECTION, SOLUTION INTRAMUSCULAR; INTRAVENOUS; SUBCUTANEOUS
Status: ACTIVE | OUTPATIENT
Start: 2025-01-28

## 2025-01-28 RX ORDER — HEPARIN SODIUM,PORCINE 10 UNIT/ML
5-10 VIAL (ML) INTRAVENOUS
Status: DISPENSED | OUTPATIENT
Start: 2025-01-28

## 2025-01-28 RX ORDER — LISINOPRIL 10 MG/1
30 TABLET ORAL DAILY
Status: DISPENSED | OUTPATIENT
Start: 2025-01-28

## 2025-01-28 RX ORDER — NALOXONE HYDROCHLORIDE 0.4 MG/ML
0.4 INJECTION, SOLUTION INTRAMUSCULAR; INTRAVENOUS; SUBCUTANEOUS
Status: ACTIVE | OUTPATIENT
Start: 2025-01-28

## 2025-01-28 RX ORDER — OXYCODONE HYDROCHLORIDE 5 MG/1
5 TABLET ORAL EVERY 6 HOURS PRN
Status: DISPENSED | OUTPATIENT
Start: 2025-01-28

## 2025-01-28 RX ORDER — MAGNESIUM SULFATE HEPTAHYDRATE 40 MG/ML
2 INJECTION, SOLUTION INTRAVENOUS ONCE
Status: COMPLETED | OUTPATIENT
Start: 2025-01-28 | End: 2025-01-28

## 2025-01-28 RX ORDER — LIDOCAINE HYDROCHLORIDE 10 MG/ML
3 INJECTION, SOLUTION EPIDURAL; INFILTRATION; INTRACAUDAL; PERINEURAL 3 TIMES DAILY PRN
Status: DISPENSED | OUTPATIENT
Start: 2025-01-28

## 2025-01-28 RX ADMIN — Medication 5 ML: at 05:18

## 2025-01-28 RX ADMIN — CARVEDILOL 3.12 MG: 3.12 TABLET, FILM COATED ORAL at 07:49

## 2025-01-28 RX ADMIN — ACETAMINOPHEN 650 MG: 325 TABLET, FILM COATED ORAL at 21:12

## 2025-01-28 RX ADMIN — CARVEDILOL 3.12 MG: 3.12 TABLET, FILM COATED ORAL at 16:21

## 2025-01-28 RX ADMIN — POTASSIUM & SODIUM PHOSPHATES POWDER PACK 280-160-250 MG 1 PACKET: 280-160-250 PACK at 11:52

## 2025-01-28 RX ADMIN — Medication 400 MG: at 05:19

## 2025-01-28 RX ADMIN — ACETAMINOPHEN 650 MG: 325 TABLET, FILM COATED ORAL at 16:21

## 2025-01-28 RX ADMIN — ATORVASTATIN CALCIUM 80 MG: 80 TABLET, FILM COATED ORAL at 21:12

## 2025-01-28 RX ADMIN — ACETAMINOPHEN 650 MG: 325 TABLET, FILM COATED ORAL at 07:56

## 2025-01-28 RX ADMIN — LISINOPRIL 30 MG: 10 TABLET ORAL at 11:52

## 2025-01-28 RX ADMIN — POTASSIUM & SODIUM PHOSPHATES POWDER PACK 280-160-250 MG 1 PACKET: 280-160-250 PACK at 07:48

## 2025-01-28 RX ADMIN — PREDNISONE 10 MG: 10 TABLET ORAL at 07:49

## 2025-01-28 RX ADMIN — GABAPENTIN 900 MG: 300 CAPSULE ORAL at 07:49

## 2025-01-28 RX ADMIN — MAGNESIUM SULFATE HEPTAHYDRATE 2 G: 40 INJECTION, SOLUTION INTRAVENOUS at 20:13

## 2025-01-28 RX ADMIN — PHENYTOIN 200 MG: 50 TABLET, CHEWABLE ORAL at 21:12

## 2025-01-28 RX ADMIN — ACETAMINOPHEN 650 MG: 325 TABLET, FILM COATED ORAL at 11:51

## 2025-01-28 RX ADMIN — Medication 5 ML: at 16:23

## 2025-01-28 RX ADMIN — APIXABAN 5 MG: 5 TABLET, FILM COATED ORAL at 07:48

## 2025-01-28 RX ADMIN — LIDOCAINE HYDROCHLORIDE 3 ML: 10 INJECTION, SOLUTION EPIDURAL; INFILTRATION; INTRACAUDAL; PERINEURAL at 18:52

## 2025-01-28 RX ADMIN — APIXABAN 5 MG: 5 TABLET, FILM COATED ORAL at 21:12

## 2025-01-28 RX ADMIN — GABAPENTIN 900 MG: 300 CAPSULE ORAL at 21:12

## 2025-01-28 RX ADMIN — GABAPENTIN 900 MG: 300 CAPSULE ORAL at 16:21

## 2025-01-28 RX ADMIN — Medication 5 ML: at 20:13

## 2025-01-28 ASSESSMENT — ACTIVITIES OF DAILY LIVING (ADL)
ADLS_ACUITY_SCORE: 39
ADLS_ACUITY_SCORE: 30
ADLS_ACUITY_SCORE: 32
ADLS_ACUITY_SCORE: 30
ADLS_ACUITY_SCORE: 43
ADLS_ACUITY_SCORE: 26
ADLS_ACUITY_SCORE: 39
ADLS_ACUITY_SCORE: 30
ADLS_ACUITY_SCORE: 39
ADLS_ACUITY_SCORE: 32
ADLS_ACUITY_SCORE: 43
ADLS_ACUITY_SCORE: 30
ADLS_ACUITY_SCORE: 43
ADLS_ACUITY_SCORE: 32
ADLS_ACUITY_SCORE: 39
ADLS_ACUITY_SCORE: 26
ADLS_ACUITY_SCORE: 30

## 2025-01-28 NOTE — PROGRESS NOTES
Neurology follow-up: 01/28/25    Patient admitted with worsening trigeminal neuralgia symptoms in the setting of COVID.  Also found to be hyponatremic upon admission.        Interval history and investigations   Not much different as compared to yesterday.  Yesterday's interventions:  1.  Trileptal was discontinued.  2.  Fosphenytoin 250 mg IV x 1 given continued phenytoin 100 mg at bedtime.  3.  Gabapentin increased to 900 mg 3 times a day.      Examination   Mental status appears normal- Alert oriented x 3.  Good insight into reasons of hospitalization.    Cranial Nerves: Pupils equal and reactive to light and accommodation.       Sensory:  No overt lateralizing sensory abnormalities to touch, pinprick.      Motor:  No lateralizing gross motor weakness.      Balance, Gait and Station:  Balance and gait deferred.       Deep Tendon Reflexes:  DTR's (biceps, triceps, brachioradialis, knee jerks) preserved and symmetric.        ASSESSMENT     Trigeminal neuralgia: Patient tells me he is very miserable, and would like to discuss all options available for trigeminal neuralgia management.  I had a very long conversation with him about the longstanding problem of hyponatremia with Trileptal.  Discussed that for the last several months, as well as here in the hospital he goes into having very low sodium levels which cannot only produce acute problem such as confusion, encephalopathy, seizures; but also longer-term consequential problems in the form of having central pontine myelinolysis.  Discussed that going forward we can keep increasing his antiepileptic medication coverage as below.  Can try nasal lidocaine to see if it produces some relief.  Neurosurgery could be consulted for trigeminal vascular decompression as an option.  I would like to avoid increasing Trileptal again, but that might need to be done if he does not get relief with any of the measures below.         RECOMMENDATIONS   Continue gabapentin 900 mg 3  times a day.  Increase phenytoin to 200 mg at bedtime.  Nasal lidocaine.  All of these orders have been placed.  Will request primary hospitalist team to discuss neurosurgery consultation with patient.  He was somewhat up reminded when I brought this up to him.      Follow-up:Inpatient neurology will continue to follow.        Continue to provide reorientation, reassurance, and stimulation during the day with lights on, blinds open, and the television on alternating with dark, quiet environment at night.  Interruptions during the night should be limited as much as possible.     Standard neuroprotective measures (post cardiac arrest) - MAP>65mmHg, avoidance of hyperventilation, fever, hyperglycemia, and hyperoxia (PaO2 >300mmHg) apply.        I have discussed the above details with Mr. Salvador at great length and they expressed understanding.  They seemed satisfied with this conversation, and had no further queries as of now.  he has our contact information and has been advised to stay in touch with us regarding any other issues that may arise.     Thank you for allowing me to participate in Mr. Salvador's care.       Remi Fuentes MD   Neurologist, [facility]   January 28, 2025 12:34 PM      A total time of 56 minutes was spent on the care of this patient on the date of the visit, outside of time spent performing any procedures. Please excuse any typographical errors, as this note was generated using a Voice Recognition Software.

## 2025-01-28 NOTE — PROGRESS NOTES
"   01/28/25 1709   Appointment Info   Signing Clinician's Name / Credentials (PT) Hema Whitlock DPT   Living Environment   People in Home spouse   Current Living Arrangements house  (town house)   Home Accessibility stairs to enter home   Number of Stairs, Main Entrance 5   Stair Railings, Main Entrance railings on both sides of stairs   Transportation Anticipated family or friend will provide   Living Environment Comments Pt lives in townhouse with spouse, 5 STEPHENIE with B railing, additoinal grab bar at top of railing.   Self-Care   Usual Activity Tolerance moderate   Current Activity Tolerance moderate   Equipment Currently Used at Home walker, standard;walker, rolling   Fall history within last six months yes   Number of times patient has fallen within last six months 1   Activity/Exercise/Self-Care Comment IND with functional mobility, FWW for ambulation within home (and in garage to get to stairs), then 4WW for when leaving the home. 1 Fall, hx of L knee buckling and weakness due to various surgeries.   General Information   Onset of Illness/Injury or Date of Surgery 01/27/25   Referring Physician Tita Smith APRN CNP   Pertinent History of Current Problem (include personal factors and/or comorbidities that impact the POC) Pt is 78 yo male who, per chart, \" PMH significant for A-fib on DOAC, status pacemaker placement, metastatic melanoma, trigeminal neuralgia, SIADH, CAD status PCI in 2015 with chronically occluded RCA who presented to the ED on 1/27/2024 with chief complaint of generalized weakness along with likely flareup of his trigeminal neuralgia.\"   Existing Precautions/Restrictions fall   Cognition   Affect/Mental Status (Cognition) WFL   Follows Commands (Cognition) WFL   Cognitive Status Comments Pt slow with responses at times, looses train of thought and needing reminders. [place].   Pain Assessment   Patient Currently in Pain No   Integumentary/Edema   Integumentary/Edema Comments age-related skin " changes; Mepiplex on R arm after fall, hx of skin cancer treatment with increased redness noted in face.   Posture    Posture Forward head position;Kyphosis;Protracted shoulders   Range of Motion (ROM)   Range of Motion ROM is WFL   ROM Comment some deficits noted in L LE, pt reports due to various L knee surgies.   Strength (Manual Muscle Testing)   Strength (Manual Muscle Testing) Able to perform R SLR;Able to perform L SLR;Deficits observed during functional mobility   Bed Mobility   Comment, (Bed Mobility) supine > sit with Violet, assisting L LE around EOB, pt reports it has to move slow otherwise it can be painful.   Transfers   Comment, (Transfers) STS with FWW and CGA, pushing up from bed.   Gait/Stairs (Locomotion)   Comment, (Gait/Stairs) 10' with FWW and CGA, slow gait speed with mild kyphotic posture, no overt LOB during ambulation.   Balance   Balance Comments using walker (also at baseline), mild unsteadiness noted during pivots but SBA throughout.   Sensory Examination   Sensory Perception Comments Pt reports that R foot 1st through 3rd toes with diminished light touch since fractures, otherwise intact and equal on both sides of feet and distal LEs.   Clinical Impression   Criteria for Skilled Therapeutic Intervention Yes, treatment indicated   PT Diagnosis (PT) impaired functional mobility   Influenced by the following impairments decreased ROM, decreased strength and impaired strength   Functional limitations due to impairments difficulty with bed mobility, transfers, and ambulation   Clinical Presentation (PT Evaluation Complexity) stable   Clinical Presentation Rationale clinical judgment   Clinical Decision Making (Complexity) low complexity   Planned Therapy Interventions (PT) balance training;bed mobility training;gait training;home exercise program;patient/family education;ROM (range of motion);stair training;strengthening;transfer training;progressive activity/exercise   Risk & Benefits of  therapy have been explained evaluation/treatment results reviewed;care plan/treatment goals reviewed;risks/benefits reviewed;current/potential barriers reviewed;participants voiced agreement with care plan;participants included;patient   PT Total Evaluation Time   PT Eval, Low Complexity Minutes (97244) 10   Physical Therapy Goals   PT Frequency Daily   PT Predicted Duration/Target Date for Goal Attainment 02/04/25   PT Goals Bed Mobility;Transfers;Gait;Stairs   PT: Bed Mobility Supervision/stand-by assist;Supine to/from sit   PT: Transfers Modified independent;Sit to/from stand;Assistive device   PT: Gait Modified independent;Standard walker;150 feet   PT: Stairs Minimal assist;5 stairs;Rail on both sides   Interventions   Interventions Quick Adds Gait Training;Therapeutic Activity   Therapeutic Activity   Therapeutic Activities: dynamic activities to improve functional performance Minutes (05147) 12   Symptoms Noted During/After Treatment Fatigue;Increased pain   Treatment Detail/Skilled Intervention Pt supine in bed upon PT arrival. Pt on RA throughout, SpO2 spot checked and remained in low-90s throughout. Pt performed STS x4 with FWW and CGA, progressed to SBA, cues on UE placement and pt demo'd learning. Pt performed sit > supine with Violet, needing help getting L LE elevated into bed. Pt educated on importance of increased mobility frequency, walking 3x/day during IP stay with nursing assistance. Educated on continued use of walker as well. Pt educated on follow up possible pending ongoing L knee weakness and buckling, some education on possible bracing to help with L knee extension but cons being impacted gait mechanics. Pt verbalized understanding. Pt supine, all needs within reach. Nurse updated.   Gait Training   Gait Training Minutes (70297) 10   Symptoms Noted During/After Treatment (Gait Training) fatigue   Treatment Detail/Skilled Intervention Pt ambulated ~25' + 35' with FWW and CGA, progressed to SBA,  cues to remain within confines of walker during turns/pivots. Pt demo'd understanding.   PT Discharge Planning   PT Plan bring aerobic step, progress gait and trial steps with UE support.   PT Discharge Recommendation (DC Rec) home with assist   PT Rationale for DC Rec Pt at baseline lives in house with spouse, IND with functional mobility, uses walker for ambulation. Jigar, pt is near baseline with mild deficits in strength and endurance noted, but able to ambulate within room FWW and SBA on this date. Anticpate with continued IP PT, will be able to return home with assistance from spouse as needed. Recommend continued walker use for ambulation. Pt reports some hx with L knee weakness and buckling, he may benefit from OP PT for ongoing strengthening and/or fitting for brace for safety with gait? Will reassess.   PT Brief overview of current status SBA with FWW. Goals of therapy will be to address safe mobility and make recs for d/c to next level of care. Pt and RN will continue to follow all falls risk precautions as documented by RN staff while hospitalized.   PT Total Distance Amb During Session (feet) 60   Physical Therapy Time and Intention   Timed Code Treatment Minutes 22   Total Session Time (sum of timed and untimed services) 32

## 2025-01-28 NOTE — PROVIDER NOTIFICATION
MD Notification    Notified Person: MD    Notified Person Name: Andrae    Notification Date/Time: 1/28/25 0020     Notification Interaction: Vocera    Purpose of Notification: Heparin flush orders needed and Na result 125    Orders Received: Ordered. And acknowledged Na.     Comments:

## 2025-01-28 NOTE — PLAN OF CARE
Goal Outcome Evaluation:      Plan of Care Reviewed With: patient    Overall Patient Progress: no change    DATE & TIME: 1/27/25 0196-9072    Cognitive Concerns/ Orientation : A&O x4. Calm, cooperative & pleasant. Did not sleep well  BEHAVIOR & AGGRESSION TOOL COLOR: Green  ABNL VS/O2: VSS on RA  MOBILITY: A x1 GB/W. Not OOB overnight   PAIN MANAGMENT: Chronic R sided face pain. Scheduled pain medications.   DIET: Reg, 1500FR  BOWEL/BLADDER: Continent   ABNL LAB/BG: Na q6h 125 & 128, Mag 1.8 (recheck at 1300), Phos 2.6 (replacement ordered for this morning, recheck tomorrow AM)  DRAIN/DEVICES: Right chest port  TELEMETRY RHYTHM: Paced  SKIN: scattered abrasion and bruise    D/C DAY/GOALS/PLACE: Pending, 2-3 days. Pt lives at home with his wife  OTHER IMPORTANT INFO: Special precautions maintained

## 2025-01-28 NOTE — PROVIDER NOTIFICATION
MD Notification    Notified Person: MD    Notified Person Name: Andrae    Notification Date/Time: 1/28/25 0626    Notification Interaction: Vocera    Purpose of Notification: Na 128 (was 125)    Orders Received: Acknowledged     Comments:

## 2025-01-28 NOTE — PROGRESS NOTES
"CLINICAL NUTRITION SERVICES - ASSESSMENT NOTE    Malnutrition Status:    % Intake: < 75% for > 7 days (moderate)  % Weight Loss: Weight loss does not meet criteria - intentional  Subcutaneous Fat Loss: Unable to assess - covid iso  Muscle Loss: Unable to assess - covid iso  Fluid Accumulation/Edema: None noted    Malnutrition Diagnosis: Unable to determine due to lack of NFPE  Malnutrition Present on Admission: Unable to assess    Registered Dietitian Interventions:  Ordered Ensure Enlive with lunch     REASON FOR ASSESSMENT  Positive admission nutrition risk screen    PMH of A-fib, metastatic melanoma, trigeminal neuralgia, SIADH, CAD. Presents with weakness and covid-19.    NUTRITION HISTORY  - Per H&P, \"He has had approximately 1 week of anorexia since the onset of his flare of trigeminal neuralgia with decreased oral intake tolerating liquids but only able to eat soft foods such as pudding cottage cheese or eggs because of the pain on his face.\"  - Spoke with wife on phone as patient was busy bathing. Per wife, patient has had pain in his face d/t his trigeminal neuralgia which has impacted his intakes (difficulty chewing). The decrease in appetite started ~1.5 weeks ago. Prior to this time she said his eating was normal. Per wife, his weight loss has been planned. She said back in 2023 patient started a weight loss journey. They have been receiving 6 Factor meals per week to their home which has helped with his weight loss. She said prior to his cancer diagnosis he was 320#.    CURRENT NUTRITION ORDERS  Diet: Regular; 1500 mL fluid restriction    CURRENT INTAKE/TOLERANCE  - Per wife, patient had breakfast this AM - scrambled eggs, 2 blueberry muffins. She said in the past 1.5 weeks he has only been able to eat soft foods/liquids d/t his facial pain - applesauce, yogurt, pudding, scrambled eggs, chicken noodle soup. Per wife, he drinks a protein shake at home and likes them - she said he would be willing to have " "an Ensure to help promote PO intakes.    LABS  Nutrition-relevant labs:   Na 128 (L)  BUN 5.7 (L)  Cr 0.48 (L)    MEDICATIONS  Nutrition-relevant medications:   Lasix (held currently)  Prednisone    ANTHROPOMETRICS  Height: 180.3 cm (5' 11\")  Most Recent Weight: 107.1 kg (236 lb 1.8 oz)  IBW: 78.2 kg  % IBW: 137%  BMI: 32.93  BMI (kg/m ): Obesity Class I BMI 30-34.9  Weight History:   Wt Readings from Last 10 Encounters:   01/27/25 107.1 kg (236 lb 1.8 oz)   06/26/24 114.4 kg (252 lb 3.2 oz)   05/29/24 116.9 kg (257 lb 11.5 oz)   02/21/24 123.7 kg (272 lb 12.8 oz)   10/23/23 124.3 kg (274 lb)   09/29/23 124.2 kg (273 lb 12.8 oz)   09/28/23 123.3 kg (271 lb 12.8 oz)   09/01/23 124.2 kg (273 lb 12.8 oz)   08/04/23 127.9 kg (282 lb)   07/31/23 126.5 kg (278 lb 14.4 oz)     Dosing Weight: 85.4 kg, based on adjusted wt    ASSESSED NUTRITION NEEDS  Estimated Energy Needs: 8220-3886 kcals/day (20 - 25 kcals/kg)  Justification: Maintenance  Estimated Protein Needs: 100-125 grams protein/day (1.2 - 1.5 grams of pro/kg)  Justification: Maintenance  Estimated Fluid Needs: 1500 mL/day  Justification: On a fluid restriction    SYSTEM FINDINGS      Skin/wounds: no concerns  GI symptoms: no concerns    NUTRITION DIAGNOSIS  Inadequate oral intake related to reduced appetite, facial pain as evidenced by family report of poor appetite/intakes x1.5 weeks, need for ONS to help meet nutritional needs    INTERVENTIONS  Medical food supplement therapy - ordered    Goals  Patient to consume % of nutritionally adequate meal trays TID, or the equivalent with supplements/snacks.     Monitoring/Evaluation  Progress toward goals will be monitored and evaluated per policy.     Fifi Nolan RD, LD  Clinical Dietitian - [facility]  "

## 2025-01-28 NOTE — PLAN OF CARE
Goal Outcome Evaluation:    Summary: COVID and electrolyte imbalance   DATE & TIME: 1/27/25  0301-6078      Cognitive Concerns/ Orientation : A&OX4   BEHAVIOR & AGGRESSION TOOL COLOR: Green  CIWA SCORE: NA   ABNL VS/O2: VSS@RA  MOBILITY: Assist 1 GBW  PAIN MANAGMENT: face pain, gabapentin and tylenol effective per pt.   DIET: Reg, 1500FR  BOWEL/BLADDER: Continent, Urinal and Large BM in the BR  ABNL LAB/BG: See labs  DRAIN/DEVICES: Right chest port  TELEMETRY RHYTHM: Paced  SKIN: scattered abrasion and bruise    TESTS/PROCEDURES: NA  D/C DAY/GOALS/PLACE: Pending  OTHER IMPORTANT INFO: Call appropriately.

## 2025-01-28 NOTE — PROVIDER NOTIFICATION
MD Notification    Notified Person: MD    Notified Person Name: Dr Gibson     Notification Date/Time:1/28/25 1234    Notification Interaction:VM    Purpose of Notification:FYI: Na level 128  Delivered - 12:33 pm  Do you still want Na levels checked Q6 hours & called back to you    Orders Received:    Comments:

## 2025-01-28 NOTE — PROGRESS NOTES
[facility]    Medicine Progress Note - Hospitalist Service    Date of Admission:  1/27/2025    Assessment & Plan     James Salvador is a 79 year old male with a PMH significant for A-fib on DOAC, status pacemaker placement, metastatic melanoma, trigeminal neuralgia, SIADH, CAD status PCI in 2015 with chronically occluded RCA who presented to the ED on 1/27/2024 with chief complaint of generalized weakness along with likely flareup of his trigeminal neuralgia.      Generalized weakness  -On admission presented with generalized weakness and differentials are broad including multiple electrolyte abnormalities, flareup of his recent trigeminal neuralgia and underlying COVID infection  -Fall precaution  -PT OT consulted and patient mentioned that he will be better served at home and I told him that we will see what his needs are      COVID-19 infection  -Patient on admission was also found to have COVID-19 but did not had any shortness of breath or chest discomfort  -He is already on DOAC for DVT prophylaxis  -Does not have any indication for steroids as he is on room air  -I did discuss with him and his wife that given his underlying malignancy and his age he is at high risk for progression and recommended that we have 2 options and remdesivir versus Paxlovid and given that he is in the hospital will choose remdesivir.  Patient understand the risk and benefits and he will let me know if he is on board with doing remdesivir. He declined any therapy       Trigeminal neuralgia with recent flare in the preceding week  -PTA regimen includes Tylenol extra strength 1300 mg twice daily, Trileptal 600 mg 3 times daily with gabapentin and can increase the dose to 900 mg if needed for flares and was taking that dose more gabapentin 600 mg 3 times daily and can increase the dose to 900 if needed and has been on that dose  -Of note because of his severe headache he did receive fosphenytoin in the ED and  then his Trileptal was held and Dilantin was started  -Neurology consulted and as per them continue gabapentin 900 mg 3 times a day, hold PTA Trileptal for 1 to 2 days and once sodium normalizes then start at 300 mg twice daily,   -Increase phenytoin to 200 mg at bedtime.  -Nasal lidocaine.  - As needed acetaminophen available  - Patient and wife does not want neurosurgery consult at this point of time    Hyponatremia with history of SIADH  -Last known sodium values in the epic has been on 5/29/2024 with sodium was 139  -Sodium on presentation at 127  -Serum osmolality of 253, urine sodium of 56, urine osmolality of 333  -His PTA lisinopril and Lasix were held and patient was given saline  -Most likely reason can be underlying Trileptal which has been held  -Sodium is trending upwards at 128  -Continue with fluid restriction  -Monitor sodium levels every 12      Hypokalemia-resolved  Hypomagnesemia-resolved  Hypocalcemia  -On admission patient was found to have potassium of 3 with a ionized calcium of 4.3, magnesium of 1.1  -His potassium is stabilized to 4.3 with magnesium of 2.6 and calcium is 8.7  -Continue with close watch of the electrolytes and will add albumin      Chronic anemia  Thrombocytopenia-resolved  -His last CBC on 1/8/2024 showed hemoglobin of 13.1  -Hemoglobin on admit was 12.4 with platelet count of 149  -No evidence of any bleeding  -His hemoglobin has become normal to 13.9 with platelet count of 155       A-fib on DOAC with PPM  -Continue PTA DOAC and Coreg  -Device interrogation as above     Metastatic melanoma  - TVEC injections currently on hold, patient follows with [place] [facility]  -Wife has been in touch with oncology and canceled CT chest abdomen pelvis is pending for 1/27/2025  -Continue PTA prednisone 10 mg daily     History of CAD with mid LAD PCI in 2015 after MI, RCA is chronically occluded  Hyperlipidemia  Hypertension  -Continue PTA Coreg 3.125 twice daily, atorvastatin 80 mg  "nightly, and will restart PTA lisinopril 30 mg daily    Family communication  -I did discuss plan of care in detail with patient's wife and she had questions which were answered to satisfaction          Diet: Combination Diet Regular Diet Adult  Fluid restriction 1500 ML FLUID    DVT Prophylaxis: DOAC  Gill Catheter: Not present  Lines: PRESENT      Port a Cath 01/27/25 Single Lumen Right Chest wall-Site Assessment: WDL      Cardiac Monitoring: None  Code Status: Full Code      Clinically Significant Risk Factors Present on Admission        # Hypokalemia: Lowest K = 3 mmol/L in last 2 days, will replace as needed  # Hyponatremia: Lowest Na = 122 mmol/L in last 2 days, will monitor as appropriate  # Hypochloremia: Lowest Cl = 93 mmol/L in last 2 days, will monitor as appropriate  # Hypocalcemia: Lowest iCa = 4.3 mg/dL in last 2 days, will monitor and replace as appropriate   # Hypomagnesemia: Lowest Mg = 1.1 mg/dL in last 2 days, will replace as needed    # Drug Induced Coagulation Defect: home medication list includes an anticoagulant medication    # Hypertension: Noted on problem list           # Obesity: Estimated body mass index is 32.93 kg/m  as calculated from the following:    Height as of this encounter: 1.803 m (5' 11\").    Weight as of this encounter: 107.1 kg (236 lb 1.8 oz).        # Pacemaker present       Social Drivers of Health    Tobacco Use: Medium Risk (12/19/2024)    Patient History     Smoking Tobacco Use: Former     Smokeless Tobacco Use: Never   Physical Activity: Inactive (6/22/2024)    Exercise Vital Sign     Days of Exercise per Week: 0 days     Minutes of Exercise per Session: 0 min   Interpersonal Safety: Low Risk  (1/27/2025)    Interpersonal Safety     Do you feel physically and emotionally safe where you currently live?: Yes     Within the past 12 months, have you been hit, slapped, kicked or otherwise physically hurt by someone?: No     Within the past 12 months, have you been " humiliated or emotionally abused in other ways by your partner or ex-partner?: No   Recent Concern: Interpersonal Safety - High Risk (1/27/2025)    Interpersonal Safety     Do you feel physically and emotionally safe where you currently live?: No     Within the past 12 months, have you been hit, slapped, kicked or otherwise physically hurt by someone?: No     Within the past 12 months, have you been humiliated or emotionally abused in other ways by your partner or ex-partner?: No   Stress: Stress Concern Present (6/22/2024)    [employer] - Occupational Stress Questionnaire     Feeling of Stress : To some extent   Social Connections: Unknown (6/22/2024)    Social Connection and Isolation Panel [NHANES]     Frequency of Social Gatherings with Friends and Family: Once a week          Disposition Plan     Medically Ready for Discharge: Anticipated in 2-4 Days, improvement in his electrolytes             Sam Gibson MD  Hospitalist Service  [facility]  Securely message with Pixable (more info)  Text page via [employer]lo Paging/Directory     This note was completed in part using dictation via the Dragon voice recognition software. Some word and grammatical errors may occur and must be interpreted in the appropriate clinical context. If there are any questions pertaining to this issue, please contact me for further clarification.    ______________________________________________________________________    Interval History     Patient is new to me    -Saw the patient this morning and she was sitting in the chair and later wife also joined and patient mentioned that he still has a symptoms of trigeminal neuralgia and wanted to see the neurologist  -No shortness of breath, no chest pain and discussed that given he is at high risk for progression I recommended remdesivir and he will think about it  No shortness of breath, chest pain, nausea or vomiting-  -he does not want  telemetry monitor  -Discussed with his wife and RN Merlene  -He also mentioned that his sodium is hardly above 130 most of the times    Physical Exam   Vital Signs: Temp: 98.6  F (37  C) Temp src: Oral BP: (!) 159/84 Pulse: 68   Resp: 16 SpO2: 97 % O2 Device: None (Room air)    Weight: 236 lbs 1.8 oz        General: Patient appears comfortable and in no acute distress.  HEENT: Head is atraumatic, normocephalic.  Pupils are equal, round and reactive to light.  No scleral icterus. Oral mucosa is moist   Neck: Neck is supple   Respiratory: Lungs are clear to auscultation bilaterally with no wheeze or crackles and has port in place  Cardiovascular: Regular rate , S1 and S2 normal with no murmer or rubs or gallops  Abdomen:   soft , non tender , non distended and bowel sound present   Skin: No skin rashes or lesions to inspection or palpation.  Neurologic: No focal deficit  Musculoskeletal: Normal Range of motion over upper and lower extremities bilaterally   Psychiatric: cooperative        Medical Decision Making       Time spent in care of patient is 51 minutes and I reviewed labs including CBC, basic metabolic panel and discussed plan of care in detail with the patient and nursing staff and also discussed with neurology and his wife      Data     I have personally reviewed the following data over the past 24 hrs:    4.8  \   13.9   / 155     128 (L); 128 (L) 93 (L) 5.7 (L) /  76   4.5 27 0.48 (L) \     Trop: 14 BNP: N/A     Procal: N/A CRP: N/A Lactic Acid: 0.9         Imaging results reviewed over the past 24 hrs:   No results found for this or any previous visit (from the past 24 hours).

## 2025-01-29 ENCOUNTER — APPOINTMENT (OUTPATIENT)
Dept: PHYSICAL THERAPY | Facility: CLINIC | Age: 80
DRG: 178 | End: 2025-01-29
Payer: COMMERCIAL

## 2025-01-29 LAB
ANION GAP SERPL CALCULATED.3IONS-SCNC: 8 MMOL/L (ref 7–15)
BUN SERPL-MCNC: 6.2 MG/DL (ref 8–23)
CALCIUM SERPL-MCNC: 8.8 MG/DL (ref 8.8–10.4)
CHLORIDE SERPL-SCNC: 97 MMOL/L (ref 98–107)
CREAT SERPL-MCNC: 0.5 MG/DL (ref 0.67–1.17)
EGFRCR SERPLBLD CKD-EPI 2021: >90 ML/MIN/1.73M2
GLUCOSE BLDC GLUCOMTR-MCNC: 103 MG/DL (ref 70–99)
GLUCOSE BLDC GLUCOMTR-MCNC: 87 MG/DL (ref 70–99)
GLUCOSE SERPL-MCNC: 78 MG/DL (ref 70–99)
HCO3 SERPL-SCNC: 26 MMOL/L (ref 22–29)
MAGNESIUM SERPL-MCNC: 1.9 MG/DL (ref 1.7–2.3)
PHOSPHATE SERPL-MCNC: 3 MG/DL (ref 2.5–4.5)
POTASSIUM SERPL-SCNC: 4.3 MMOL/L (ref 3.4–5.3)
SODIUM SERPL-SCNC: 131 MMOL/L (ref 135–145)
SODIUM SERPL-SCNC: 131 MMOL/L (ref 135–145)

## 2025-01-29 PROCEDURE — 120N000001 HC R&B MED SURG/OB

## 2025-01-29 PROCEDURE — 97116 GAIT TRAINING THERAPY: CPT | Mod: GP

## 2025-01-29 PROCEDURE — 250N000013 HC RX MED GY IP 250 OP 250 PS 637

## 2025-01-29 PROCEDURE — 84100 ASSAY OF PHOSPHORUS: CPT | Performed by: HOSPITALIST

## 2025-01-29 PROCEDURE — 250N000009 HC RX 250: Performed by: PSYCHIATRY & NEUROLOGY

## 2025-01-29 PROCEDURE — 250N000013 HC RX MED GY IP 250 OP 250 PS 637: Performed by: HOSPITALIST

## 2025-01-29 PROCEDURE — 84295 ASSAY OF SERUM SODIUM: CPT | Performed by: HOSPITALIST

## 2025-01-29 PROCEDURE — 250N000012 HC RX MED GY IP 250 OP 636 PS 637: Performed by: NURSE PRACTITIONER

## 2025-01-29 PROCEDURE — 250N000013 HC RX MED GY IP 250 OP 250 PS 637: Performed by: PSYCHIATRY & NEUROLOGY

## 2025-01-29 PROCEDURE — 250N000011 HC RX IP 250 OP 636: Performed by: HOSPITALIST

## 2025-01-29 PROCEDURE — 99232 SBSQ HOSP IP/OBS MODERATE 35: CPT | Performed by: HOSPITALIST

## 2025-01-29 PROCEDURE — 97530 THERAPEUTIC ACTIVITIES: CPT | Mod: GP

## 2025-01-29 PROCEDURE — 250N000013 HC RX MED GY IP 250 OP 250 PS 637: Performed by: NURSE PRACTITIONER

## 2025-01-29 PROCEDURE — 83735 ASSAY OF MAGNESIUM: CPT | Performed by: HOSPITALIST

## 2025-01-29 PROCEDURE — 80048 BASIC METABOLIC PNL TOTAL CA: CPT | Performed by: HOSPITALIST

## 2025-01-29 RX ORDER — MAGNESIUM SULFATE HEPTAHYDRATE 40 MG/ML
2 INJECTION, SOLUTION INTRAVENOUS ONCE
Status: COMPLETED | OUTPATIENT
Start: 2025-01-29 | End: 2025-01-29

## 2025-01-29 RX ADMIN — MAGNESIUM SULFATE HEPTAHYDRATE 2 G: 40 INJECTION, SOLUTION INTRAVENOUS at 08:20

## 2025-01-29 RX ADMIN — ACETAMINOPHEN 650 MG: 325 TABLET, FILM COATED ORAL at 06:24

## 2025-01-29 RX ADMIN — OXYCODONE HYDROCHLORIDE 5 MG: 5 TABLET ORAL at 18:34

## 2025-01-29 RX ADMIN — GABAPENTIN 900 MG: 300 CAPSULE ORAL at 21:21

## 2025-01-29 RX ADMIN — APIXABAN 5 MG: 5 TABLET, FILM COATED ORAL at 08:22

## 2025-01-29 RX ADMIN — CARVEDILOL 3.12 MG: 3.12 TABLET, FILM COATED ORAL at 18:23

## 2025-01-29 RX ADMIN — APIXABAN 5 MG: 5 TABLET, FILM COATED ORAL at 20:33

## 2025-01-29 RX ADMIN — PREDNISONE 10 MG: 10 TABLET ORAL at 08:22

## 2025-01-29 RX ADMIN — GABAPENTIN 900 MG: 300 CAPSULE ORAL at 16:49

## 2025-01-29 RX ADMIN — CARVEDILOL 3.12 MG: 3.12 TABLET, FILM COATED ORAL at 08:21

## 2025-01-29 RX ADMIN — Medication 5 ML: at 08:22

## 2025-01-29 RX ADMIN — ACETAMINOPHEN 650 MG: 325 TABLET, FILM COATED ORAL at 10:02

## 2025-01-29 RX ADMIN — ATORVASTATIN CALCIUM 80 MG: 80 TABLET, FILM COATED ORAL at 21:22

## 2025-01-29 RX ADMIN — LISINOPRIL 30 MG: 10 TABLET ORAL at 08:21

## 2025-01-29 RX ADMIN — Medication 5 ML: at 06:24

## 2025-01-29 RX ADMIN — GABAPENTIN 900 MG: 300 CAPSULE ORAL at 08:21

## 2025-01-29 RX ADMIN — PHENYTOIN 200 MG: 50 TABLET, CHEWABLE ORAL at 21:21

## 2025-01-29 RX ADMIN — LIDOCAINE HYDROCHLORIDE 3 ML: 10 INJECTION, SOLUTION EPIDURAL; INFILTRATION; INTRACAUDAL; PERINEURAL at 16:41

## 2025-01-29 ASSESSMENT — ACTIVITIES OF DAILY LIVING (ADL)
ADLS_ACUITY_SCORE: 43
ADLS_ACUITY_SCORE: 43
ADLS_ACUITY_SCORE: 49
DEPENDENT_IADLS:: CLEANING;COOKING;LAUNDRY;SHOPPING;MEAL PREPARATION;MEDICATION MANAGEMENT;TRANSPORTATION
ADLS_ACUITY_SCORE: 49
ADLS_ACUITY_SCORE: 49
ADLS_ACUITY_SCORE: 43
ADLS_ACUITY_SCORE: 49
ADLS_ACUITY_SCORE: 49
ADLS_ACUITY_SCORE: 43
ADLS_ACUITY_SCORE: 49
ADLS_ACUITY_SCORE: 43
ADLS_ACUITY_SCORE: 49

## 2025-01-29 NOTE — PROVIDER NOTIFICATION
MD Notification    Notified Person: MD    Notified Person Name: Syeda    Notification Date/Time: 1/28/25 2232    Notification Interaction: Vovera    Purpose of Notification: Pt is requesting PRN Oxy for trigeminal neuralgia pain. PRN Tylenol and Lidocaine not effective. Pt cannot take Dilaudid.     Orders Received: Ordered    Comments:

## 2025-01-29 NOTE — PROGRESS NOTES
Neurology follow-up: 01/29/25    Patient admitted with worsening trigeminal neuralgia in the setting of COVID.        Interval history and investigations   Doing better today.  Interventions over the last couple of days:  1.  Trileptal has been discontinued.  Sodium is at 131 today.  2.  Fosphenytoin 200 mg twice daily continued.  Does not report any dizziness or other side effects.  3.  Gabapentin 900 mg 3 times a day continued.  Doing well.  Does not report excessive drowsiness or dizziness.  4.  Nasal lidocaine tried last night.  Did very well.  Had quick pain relief, was able to go to bed, and did not wake up for 7 hours.  This is the first time he slept for this long in several days.  Very encouraged by this response.  Felt that the numbness and pain returned when he woke up.    Examination   Mental status appears normal- Alert oriented x 3.  Good insight into reasons of hospitalization.    Cranial Nerves: Pupils equal and reactive to light and accommodation.  Discomfort in right hemiface in the V2 distribution compatible with history of trigeminal neuralgia.     Sensory:  No overt lateralizing sensory abnormalities to touch, pinprick.      Motor:  No lateralizing gross motor weakness.      Balance, Gait and Station:  Balance and gait intact.       Deep Tendon Reflexes:  DTR's (biceps, triceps, brachioradialis, knee jerks) preserved and symmetric.        ASSESSMENT     Trigeminal neuralgia: Patient doing better today.  Somewhat encouraged by action of lidocaine and other medications.  At this point, we should continue to manage him as such, and avoid using Trileptal again, given frequent hyponatremia       RECOMMENDATIONS   Continue the following medications:  Gabapentin 900 mg 3 times a day  Phenytoin 200 mg at bedtime  Nasal lidocaine 3-4 times a day.    Follow-up: Inpatient neurology will follow until decent pain relief has been obtained.        Continue to provide reorientation, reassurance, and stimulation  during the day with lights on, blinds open, and the television on alternating with dark, quiet environment at night.  Interruptions during the night should be limited as much as possible.     Standard neuroprotective measures (post cardiac arrest) - MAP>65mmHg, avoidance of hyperventilation, fever, hyperglycemia, and hyperoxia (PaO2 >300mmHg) apply.        I have discussed the above details with Mr. Salvador and the hospitalist team, Dr. Gibson at great length and they expressed understanding.  They seemed satisfied with this conversation, and had no further queries as of now.  he has our contact information and has been advised to stay in touch with us regarding any other issues that may arise.     Thank you for allowing me to participate in Mr. Salvador's care.       Remi Fuentes MD   Neurologist, [facility]   January 29, 2025 11:38 AM      A total time of 45 minutes was spent on the care of this patient on the date of the visit, outside of time spent performing any procedures. Please excuse any typographical errors, as this note was generated using a Voice Recognition Software.

## 2025-01-29 NOTE — PROGRESS NOTES
Updated Dr. Gibson our team does not surgically treat trigeminal neuralgia and recommend outpatient follow up with Dr. Higgins at [facility]  Referral placed  Dr. Gibson will cancel inpatient consultation    Janki Centeno PA-C  [facility]  [address] [place] [id number]  Tel [phone]  Fax [phone]  Text page via [facility] Paging/Directory

## 2025-01-29 NOTE — UTILIZATION REVIEW
Admission Status; Secondary Review Determination       Under the authority of the Utilization Management Committee, the utilization review process indicated a secondary review on the above patient. The review outcome is based on review of the medical records, discussions with staff, and applying clinical experience noted on the date of the review.     (x) Inpatient Status Appropriate - This patient's medical care is consistent with medical management for inpatient care and reasonable inpatient medical practice.     RATIONALE FOR DETERMINATION  --concurrent stay review    Patient requires inpatient admission versus short stay observation or outpatient treatment for the following reasons:      A 79-year-old male with a medical history of atrial fibrillation on a direct oral anticoagulant, status post pacemaker placement, metastatic melanoma, trigeminal neuralgia, SIADH, and coronary artery disease post-PCI with a chronically occluded right coronary artery, presented to the emergency department on January 27, 2024, with generalized weakness and a likely flare-up of trigeminal neuralgia. He was found to have significant hyponatremia with sodium levels at 122 mmol/L. Due to the hyponatremia, Trileptal, which manages his trigeminal neuralgia, was withheld. The patient experienced a severe headache and was administered fosphenytoin. Neurology provided daily consultations, adjusting his medications to include phenytoin and gabapentin. Temporary night relief was achieved with nasal lidocaine. The management of trigeminal neuralgia required daily medication adjustments under close supervision to monitor for side effects, manage hyponatremia, and alleviate significant pain.    Inpatient admission was necessary for this patient due to the complexity of his medical condition, particularly the severe hyponatremia and exacerbated trigeminal neuralgia, both requiring close monitoring and treatment adjustments. Hyponatremia at  this level poses a risk for neurological complications, and the management of trigeminal neuralgia involves careful titration of medications to balance efficacy and side effects. The risk of adverse outcomes, including severe pain, seizures, or further electrolyte imbalances, would be significantly higher if the patient were treated in an outpatient setting without the benefit of continuous monitoring and immediate medical intervention.      The expected length of stay at the time of admission was more than 2 nights because of the severity of illness, intensity of service provided, and risk for adverse outcome. Inpatient admission is appropriate.         This document was produced using voice recognition software       The information on this document is developed by the utilization review team in order for the business office to ensure compliance. This only denotes the appropriateness of proper admission status and does not reflect the quality of care rendered.   The definitions of Inpatient Status and Observation Status used in making the determination above are those provided in the CMS Coverage Manual, Chapter 1 and Chapter 6, section 70.4.   Sincerely,   DOMINIK LARA MD   Utilization Review  Physician Advisor  [facility]

## 2025-01-29 NOTE — PLAN OF CARE
Goal Outcome Evaluation:      Plan of Care Reviewed With: patient    Overall Patient Progress: improving    DATE & TIME: 1/28/25 [id number]  Cognitive Concerns/ Orientation : A&O x4. Calm & cooperative    BEHAVIOR & AGGRESSION TOOL COLOR: Green  ABNL VS/O2: VSS on RA  MOBILITY: A x1 GB/W  PAIN MANAGMENT: Chronic R sided face pain. PRN Tylenol given x2. Declined nasal lidocaine. MD ordered PRN Oxy per pt request, pt would like to take this if he has severe breakthrough pain   DIET: Reg, 1500FR  BOWEL/BLADDER: Continent   ABNL LAB/BG: Na Q12h 128, 130. Electrolytes recheck in AM  DRAIN/DEVICES: Right chest port  TELEMETRY RHYTHM: Discontinued  SKIN: scattered abrasions and bruises    D/C DAY/GOALS/PLACE: Pending, 2-3 days. Pt lives at home with his wife  OTHER IMPORTANT INFO: Special precautions maintained
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
## 2025-01-29 NOTE — PLAN OF CARE
Goal Outcome Evaluation:       Summary: COVID and electrolyte imbalance   DATE & TIME: 1/28/25 [id number]   Cognitive Concerns/ Orientation : A&O x4. Calm, cooperative & pleasant. Did not sleep well  BEHAVIOR & AGGRESSION TOOL COLOR: Green  ABNL VS/O2: VSS on RA  MOBILITY: A x1 GB/W  PAIN MANAGMENT: Chronic R sided face pain. Tylenol given Q4 hours   DIET: Reg, 1500FR  BOWEL/BLADDER: Continent   ABNL LAB/BG: Na Q12  12pm 128 Mag 1.7/replaced recheck in am, Phos 2.6 (replacement ordered for this morning, recheck tomorrow AM)  DRAIN/DEVICES: Right chest port  TELEMETRY RHYTHM: Paced  SKIN: scattered abrasion and bruise    D/C DAY/GOALS/PLACE: Pending, 2-3 days. Pt lives at home with his wife  OTHER IMPORTANT INFO: Special precautions maintained

## 2025-01-29 NOTE — PLAN OF CARE
Goal Outcome Evaluation:                 Outcome Evaluation: Patient will discharge home with spouse pending stable electrolytes and trigeminal neuralgia pain sensation improved.

## 2025-01-29 NOTE — PLAN OF CARE
Goal Outcome Evaluation:  Summary: COVID and electrolyte imbalance   DATE & TIME: 1/29/25 8836-8146  Cognitive Concerns/ Orientation : A&O x4. Calm & cooperative    BEHAVIOR & AGGRESSION TOOL COLOR: Green  ABNL VS/O2: VSS on RA  MOBILITY: A x1 GB/W, up in chair  PAIN MANAGMENT: Chronic R sided face pain. PRN Tylenol given x2. Declined nasal lidocaine. MD ordered PRN Oxy per pt request, pt would like to take this if he has severe breakthrough pain   DIET: Reg, 1500FR  BOWEL/BLADDER: Continent   ABNL LAB/BG: Na daily 131, all Electrolytes recheck in AM  DRAIN/DEVICES: Right chest port  TELEMETRY RHYTHM: NA  SKIN: scattered abrasions/bruises/scabs   D/C DAY/GOALS/PLACE: Pending, 2-3 days. Pt lives at home with his wife  OTHER IMPORTANT INFO: Special precautions maintained                       negative

## 2025-01-29 NOTE — PROGRESS NOTES
[facility]    Medicine Progress Note - Hospitalist Service    Date of Admission:  1/27/2025    Assessment & Plan     James Salvador is a 79 year old male with a PMH significant for A-fib on DOAC, status pacemaker placement, metastatic melanoma, trigeminal neuralgia, SIADH, CAD status PCI in 2015 with chronically occluded RCA who presented to the ED on 1/27/2024 with chief complaint of generalized weakness along with likely flareup of his trigeminal neuralgia.    Trigeminal neuralgia with recent flare in the preceding week  -PTA regimen includes Tylenol extra strength 1300 mg twice daily, Trileptal 600 mg 3 times daily with gabapentin and can increase the dose to 900 mg if needed for flares and was taking that dose more gabapentin 600 mg 3 times daily and can increase the dose to 900 if needed and has been on that dose  -Of note because of his severe headache he did receive fosphenytoin in the ED and then his Trileptal was held and Dilantin was started  -Given his hyponatremia and Trileptal held   - Neurology following : Continue with gabapentin 900 mg 3 times a day   -Continue with phenytoin 200 mg at bedtime  -Continue nasal lidocaine 3-4 times a day  -Patient and wife do want to consult with neurosurgery  to understand what surgical options are available to him down the line and I spoke with Janki luis from neurosurgery department and they did mention to me that none of the surgeons at [facility] manage trigeminal neuralgia and they recommended outpatient consultation with Dr. Higgins at [facility] was made aware of the same  - As needed acetaminophen available    Hyponatremia with history of SIADH  -Last known sodium values in the epic has been on 5/29/2024 with sodium was 139  -Sodium on presentation at 127  -Serum osmolality of 253, urine sodium of 56, urine osmolality of 333  -His PTA lisinopril and Lasix were held and patient was given saline  -Most likely reason can  be underlying Trileptal which has been held  -Sodium is trending upwards at 131  -Continue with fluid restriction  -Monitor sodium levels daily    Generalized weakness  -On admission presented with generalized weakness and differentials are broad including multiple electrolyte abnormalities, flareup of his recent trigeminal neuralgia and underlying COVID infection  -Fall precaution  -PT OT consulted and reviewed their notes and patient will be able to go home    COVID-19 infection  -Patient on admission was also found to have COVID-19 but did not had any shortness of breath or chest discomfort  -He is already on DOAC for DVT prophylaxis  -Does not have any indication for steroids as he is on room air  - patient has declined treatment with remdesivir and or Paxlovid      Hypokalemia-resolved  Hypomagnesemia-resolved  Hypocalcemia-resolved    Chronic anemia  Thrombocytopenia-resolved  -His last CBC on 1/8/2024 showed hemoglobin of 13.1  -Hemoglobin on admit was 12.4 with platelet count of 149  -No evidence of any bleeding  -His hemoglobin has become normal to 13.9 with platelet count of 155       A-fib on DOAC with PPM  -Continue PTA DOAC and Coreg  -Device interrogation as above     Metastatic melanoma  - TVEC injections currently on hold, patient follows with [place] [facility]  -Wife has been in touch with oncology and canceled CT chest abdomen pelvis is pending for 1/27/2025  -Continue PTA prednisone 10 mg daily     History of CAD with mid LAD PCI in 2015 after MI, RCA is chronically occluded  Hyperlipidemia  Hypertension  -Continue PTA Coreg 3.125 twice daily, atorvastatin 80 mg nightly, and lisinopril 30 mg daily    Family communication  -I did discuss plan of care in detail with patient's wife and she had questions which were answered to satisfaction          Diet: Combination Diet Regular Diet Adult  Fluid restriction 1500 ML FLUID  Snacks/Supplements Adult: Ensure Enlive; With Meals    DVT Prophylaxis:  "DOAC  Gill Catheter: Not present  Lines: PRESENT      Port a Cath 01/27/25 Single Lumen Right Chest wall-Site Assessment: WDL      Cardiac Monitoring: None  Code Status: Full Code      Clinically Significant Risk Factors        # Hypokalemia: Lowest K = 3 mmol/L in last 2 days, will replace as needed  # Hyponatremia: Lowest Na = 122 mmol/L in last 2 days, will monitor as appropriate  # Hypochloremia: Lowest Cl = 93 mmol/L in last 2 days, will monitor as appropriate  # Hypocalcemia: Lowest Ca = 5.8 mg/dL in last 2 days, will monitor and replace as appropriate   # Hypomagnesemia: Lowest Mg = 1.1 mg/dL in last 2 days, will replace as needed         # Hypertension: Noted on problem list            # Obesity: Estimated body mass index is 31.61 kg/m  as calculated from the following:    Height as of this encounter: 1.803 m (5' 11\").    Weight as of this encounter: 102.8 kg (226 lb 10.1 oz)., PRESENT ON ADMISSION      # Pacemaker present       Social Drivers of Health    Tobacco Use: Medium Risk (12/19/2024)    Patient History     Smoking Tobacco Use: Former     Smokeless Tobacco Use: Never   Physical Activity: Inactive (6/22/2024)    Exercise Vital Sign     Days of Exercise per Week: 0 days     Minutes of Exercise per Session: 0 min   Interpersonal Safety: Low Risk  (1/27/2025)    Interpersonal Safety     Do you feel physically and emotionally safe where you currently live?: Yes     Within the past 12 months, have you been hit, slapped, kicked or otherwise physically hurt by someone?: No     Within the past 12 months, have you been humiliated or emotionally abused in other ways by your partner or ex-partner?: No   Recent Concern: Interpersonal Safety - High Risk (1/27/2025)    Interpersonal Safety     Do you feel physically and emotionally safe where you currently live?: No     Within the past 12 months, have you been hit, slapped, kicked or otherwise physically hurt by someone?: No     Within the past 12 months, have " you been humiliated or emotionally abused in other ways by your partner or ex-partner?: No   Stress: Stress Concern Present (6/22/2024)    [employer] - Occupational Stress Questionnaire     Feeling of Stress : To some extent   Social Connections: Unknown (6/22/2024)    Social Connection and Isolation Panel [NHANES]     Frequency of Social Gatherings with Friends and Family: Once a week          Disposition Plan     Medically Ready for Discharge: Anticipated Tomorrow, improvement in his pain             Sam MD Arthur  Hospitalist Service  [facility]  Securely message with [employer] (more info)  Text page via [employer] Paging/Directory     This note was completed in part using dictation via the Dragon voice recognition software. Some word and grammatical errors may occur and must be interpreted in the appropriate clinical context. If there are any questions pertaining to this issue, please contact me for further clarification.    ______________________________________________________________________    Interval History     -Saw him this morning and he was sitting in the chair and wife was present and denied any fever, chills, nausea, vomiting  -Did mention that lidocaine is helping him with his pain control  -No shortness of breath, chest pain or abdominal discomfort  -Patient and wife okay to see neurosurgery    Physical Exam   Vital Signs: Temp: 97.9  F (36.6  C) Temp src: Oral BP: 139/78 Pulse: 61   Resp: 18 SpO2: 94 % O2 Device: None (Room air)    Weight: 226 lbs 10.13 oz        General: aox3  Respiratory:ctla  Cardiovascular: Regular rate , S1 and S2 normal with no murmer or rubs or gallops  Abdomen:   soft , non tender , non distended and bowel sound present   Skin: No skin rashes or lesions to inspection or palpation.  Neurologic: No focal deficit  Musculoskeletal: Normal Range of motion over upper and lower extremities bilaterally   Psychiatric: cooperative         Medical Decision Making       Time spent in care of patient is 45 minutes and I reviewed labs including CBC, basic metabolic panel and discussed plan of care in detail with the patient and nursing staff ,neurology and his wife      Data     I have personally reviewed the following data over the past 24 hrs:    N/A  \   N/A   / N/A     131 (L); 131 (L) 97 (L) 6.2 (L) /  78   4.3 26 0.50 (L) \     ALT: N/A AST: N/A AP: N/A TBILI: N/A   ALB: N/A TOT PROTEIN: N/A LIPASE: N/A       Imaging results reviewed over the past 24 hrs:   No results found for this or any previous visit (from the past 24 hours).

## 2025-01-30 VITALS
OXYGEN SATURATION: 95 % | TEMPERATURE: 98.8 F | DIASTOLIC BLOOD PRESSURE: 71 MMHG | HEART RATE: 72 BPM | RESPIRATION RATE: 18 BRPM | SYSTOLIC BLOOD PRESSURE: 138 MMHG | BODY MASS INDEX: 31.27 KG/M2 | WEIGHT: 223.33 LBS | HEIGHT: 71 IN

## 2025-01-30 LAB
ANION GAP SERPL CALCULATED.3IONS-SCNC: 8 MMOL/L (ref 7–15)
BUN SERPL-MCNC: 7.6 MG/DL (ref 8–23)
CALCIUM SERPL-MCNC: 9 MG/DL (ref 8.8–10.4)
CHLORIDE SERPL-SCNC: 97 MMOL/L (ref 98–107)
CREAT SERPL-MCNC: 0.56 MG/DL (ref 0.67–1.17)
EGFRCR SERPLBLD CKD-EPI 2021: >90 ML/MIN/1.73M2
GLUCOSE SERPL-MCNC: 82 MG/DL (ref 70–99)
HCO3 SERPL-SCNC: 27 MMOL/L (ref 22–29)
MAGNESIUM SERPL-MCNC: 1.9 MG/DL (ref 1.7–2.3)
PHOSPHATE SERPL-MCNC: 3.4 MG/DL (ref 2.5–4.5)
POTASSIUM SERPL-SCNC: 4.5 MMOL/L (ref 3.4–5.3)
SODIUM SERPL-SCNC: 132 MMOL/L (ref 135–145)

## 2025-01-30 PROCEDURE — 250N000009 HC RX 250: Performed by: PSYCHIATRY & NEUROLOGY

## 2025-01-30 PROCEDURE — 250N000013 HC RX MED GY IP 250 OP 250 PS 637: Performed by: HOSPITALIST

## 2025-01-30 PROCEDURE — 99232 SBSQ HOSP IP/OBS MODERATE 35: CPT | Performed by: HOSPITALIST

## 2025-01-30 PROCEDURE — 120N000001 HC R&B MED SURG/OB

## 2025-01-30 PROCEDURE — 250N000013 HC RX MED GY IP 250 OP 250 PS 637: Performed by: NURSE PRACTITIONER

## 2025-01-30 PROCEDURE — 250N000013 HC RX MED GY IP 250 OP 250 PS 637: Performed by: PSYCHIATRY & NEUROLOGY

## 2025-01-30 PROCEDURE — 250N000012 HC RX MED GY IP 250 OP 636 PS 637: Performed by: NURSE PRACTITIONER

## 2025-01-30 PROCEDURE — 80048 BASIC METABOLIC PNL TOTAL CA: CPT | Performed by: HOSPITALIST

## 2025-01-30 PROCEDURE — 84295 ASSAY OF SERUM SODIUM: CPT | Performed by: HOSPITALIST

## 2025-01-30 PROCEDURE — 250N000013 HC RX MED GY IP 250 OP 250 PS 637

## 2025-01-30 PROCEDURE — 250N000011 HC RX IP 250 OP 636: Performed by: HOSPITALIST

## 2025-01-30 PROCEDURE — 82310 ASSAY OF CALCIUM: CPT | Performed by: HOSPITALIST

## 2025-01-30 PROCEDURE — 84100 ASSAY OF PHOSPHORUS: CPT | Performed by: HOSPITALIST

## 2025-01-30 PROCEDURE — 83735 ASSAY OF MAGNESIUM: CPT | Performed by: HOSPITALIST

## 2025-01-30 RX ADMIN — CARVEDILOL 3.12 MG: 3.12 TABLET, FILM COATED ORAL at 10:05

## 2025-01-30 RX ADMIN — OXYCODONE HYDROCHLORIDE 5 MG: 5 TABLET ORAL at 23:41

## 2025-01-30 RX ADMIN — LISINOPRIL 30 MG: 10 TABLET ORAL at 10:05

## 2025-01-30 RX ADMIN — LIDOCAINE HYDROCHLORIDE 3 ML: 10 INJECTION, SOLUTION EPIDURAL; INFILTRATION; INTRACAUDAL; PERINEURAL at 13:06

## 2025-01-30 RX ADMIN — OXYCODONE HYDROCHLORIDE 5 MG: 5 TABLET ORAL at 10:04

## 2025-01-30 RX ADMIN — CARVEDILOL 3.12 MG: 3.12 TABLET, FILM COATED ORAL at 17:37

## 2025-01-30 RX ADMIN — ACETAMINOPHEN 650 MG: 325 TABLET, FILM COATED ORAL at 10:05

## 2025-01-30 RX ADMIN — Medication 5 ML: at 06:13

## 2025-01-30 RX ADMIN — APIXABAN 5 MG: 5 TABLET, FILM COATED ORAL at 20:55

## 2025-01-30 RX ADMIN — LIDOCAINE HYDROCHLORIDE 3 ML: 10 INJECTION, SOLUTION EPIDURAL; INFILTRATION; INTRACAUDAL; PERINEURAL at 21:00

## 2025-01-30 RX ADMIN — APIXABAN 5 MG: 5 TABLET, FILM COATED ORAL at 10:05

## 2025-01-30 RX ADMIN — PHENYTOIN 200 MG: 50 TABLET, CHEWABLE ORAL at 21:03

## 2025-01-30 RX ADMIN — ATORVASTATIN CALCIUM 80 MG: 80 TABLET, FILM COATED ORAL at 21:00

## 2025-01-30 RX ADMIN — LIDOCAINE HYDROCHLORIDE 3 ML: 10 INJECTION, SOLUTION EPIDURAL; INFILTRATION; INTRACAUDAL; PERINEURAL at 06:03

## 2025-01-30 RX ADMIN — OXYCODONE HYDROCHLORIDE 5 MG: 5 TABLET ORAL at 15:55

## 2025-01-30 RX ADMIN — PREDNISONE 10 MG: 10 TABLET ORAL at 10:05

## 2025-01-30 RX ADMIN — GABAPENTIN 900 MG: 300 CAPSULE ORAL at 10:04

## 2025-01-30 RX ADMIN — GABAPENTIN 900 MG: 300 CAPSULE ORAL at 21:00

## 2025-01-30 RX ADMIN — GABAPENTIN 900 MG: 300 CAPSULE ORAL at 15:55

## 2025-01-30 ASSESSMENT — ACTIVITIES OF DAILY LIVING (ADL)
ADLS_ACUITY_SCORE: 50
ADLS_ACUITY_SCORE: 49
ADLS_ACUITY_SCORE: 49
ADLS_ACUITY_SCORE: 50
ADLS_ACUITY_SCORE: 50
ADLS_ACUITY_SCORE: 49
ADLS_ACUITY_SCORE: 50
ADLS_ACUITY_SCORE: 49
ADLS_ACUITY_SCORE: 50
ADLS_ACUITY_SCORE: 49
ADLS_ACUITY_SCORE: 50
ADLS_ACUITY_SCORE: 49
ADLS_ACUITY_SCORE: 49
ADLS_ACUITY_SCORE: 50
ADLS_ACUITY_SCORE: 49
ADLS_ACUITY_SCORE: 50
ADLS_ACUITY_SCORE: 50
ADLS_ACUITY_SCORE: 49
ADLS_ACUITY_SCORE: 49
ADLS_ACUITY_SCORE: 50
ADLS_ACUITY_SCORE: 50

## 2025-01-30 NOTE — PLAN OF CARE
Goal Outcome Evaluation:       Summary: COVID and electrolyte imbalance   DATE & TIME: 1/29/25-1/30/25 2151-4993  Cognitive Concerns/ Orientation : A&O x4. Calm & cooperative    BEHAVIOR & AGGRESSION TOOL COLOR: Green  ABNL VS/O2: VSS on RA, elevated BP  MOBILITY: A x1 GB/W  PAIN MANAGMENT: Chronic R sided face pain, denied interventions, wanted to rest. Lidocaine given once in AM, little relief. Doesn't want to increase oxycodone dose.  DIET: Reg, 1500FR  BOWEL/BLADDER: Continent   ABNL LAB/BG: none new, on protocols  DRAIN/DEVICES: Right chest port  TELEMETRY RHYTHM: NA  SKIN: scattered abrasions/bruises/scabs   D/C DAY/GOALS/PLACE: Pending, 2-3 days. Pt lives at home with his wife  OTHER IMPORTANT INFO: Special precautions maintained. Wanted interruptions minimized overnight to sleep.

## 2025-01-30 NOTE — PLAN OF CARE
Summary: 9304-0213 1/29/25 generalized weakness along with likely flareup of his trigeminal neuralgia    Orientation: A/Ox4  Activity Level: Ax1 GB/W  Fall Risk: yes  Behavior & Aggression Tool Color: green  Pain Management: R face pain 10/10 PRN Oxycodone and nasal lidocaine given with no relief   ABNL VS/O2: VSS on RA  ABNL Lab/BG:  and 87  Diet: reg fluid restriction 1500 mL  Bowel/Bladder: continent, uses urinal  Drains/Devices: R chest port   Skin: scattered bruising and scabs    Tests/Procedures for next shift: n/a  Anticipated DC date: 2-3 days to home   Other Important Info:

## 2025-01-30 NOTE — PROGRESS NOTES
[facility]    Medicine Progress Note - Hospitalist Service    Date of Admission:  1/27/2025    Assessment & Plan     James Salvador is a 79 year old male with a PMH significant for A-fib on DOAC, status pacemaker placement, metastatic melanoma, trigeminal neuralgia, SIADH, CAD status PCI in 2015 with chronically occluded RCA who presented to the ED on 1/27/2024 with chief complaint of generalized weakness along with likely flareup of his trigeminal neuralgia.    Trigeminal neuralgia with recent flare in the preceding week  -PTA regimen includes Tylenol extra strength 1300 mg twice daily, Trileptal 600 mg 3 times daily with gabapentin and can increase the dose to 900 mg if needed for flares and was taking that dose more gabapentin 600 mg 3 times daily and can increase the dose to 900 if needed and has been on that dose  -Of note because of his severe headache he did receive fosphenytoin in the ED and then his Trileptal was held and Dilantin was started  -Given his hyponatremia and Trileptal held   - Neurology following : Continue with gabapentin 900 mg 3 times a day   -Continue with phenytoin 200 mg at bedtime  -Continue nasal lidocaine 3-4 times a day  -Patient and wife do want to consult with neurosurgery  to understand what surgical options are available to him down the line and I spoke with Janki luis from neurosurgery department and they did mention to me that none of the surgeons at [facility] manage trigeminal neuralgia and they recommended outpatient consultation with Dr. Higgins at [place] family was made aware of the same  - As needed acetaminophen available  -Continue with current regimen and will touch base with neurology regarding definitive plan    Hyponatremia with history of SIADH  -Last known sodium values in the epic has been on 5/29/2024 with sodium was 139  -Sodium on presentation at 127  -Serum osmolality of 253, urine sodium of 56, urine osmolality of  333  -His PTA lisinopril and Lasix were held and patient was given saline  -Most likely reason can be underlying Trileptal which has been held  -Sodium is trending upwards at 132  -Continue with fluid restriction  -Monitor sodium levels daily    Generalized weakness  -On admission presented with generalized weakness and differentials are broad including multiple electrolyte abnormalities, flareup of his recent trigeminal neuralgia and underlying COVID infection  -Fall precaution  -PT OT consulted and reviewed their notes and patient will be able to go home    COVID-19 infection  -Patient on admission was also found to have COVID-19 but did not had any shortness of breath or chest discomfort  -He is already on DOAC for DVT prophylaxis  -Does not have any indication for steroids as he is on room air  - patient has declined treatment with remdesivir and or Paxlovid      Hypokalemia-resolved  Hypomagnesemia-resolved  Hypocalcemia-resolved    Chronic anemia  Thrombocytopenia-resolved  -His last CBC on 1/8/2024 showed hemoglobin of 13.1  -Hemoglobin on admit was 12.4 with platelet count of 149  -No evidence of any bleeding  -His hemoglobin has become normal to 13.9 with platelet count of 155       A-fib on DOAC with PPM  -Continue PTA DOAC and Coreg  -Device interrogation as above     Metastatic melanoma  - TVEC injections currently on hold, patient follows with [place] [facility]  -Wife has been in touch with oncology and canceled CT chest abdomen pelvis is pending for 1/27/2025  -Continue PTA prednisone 10 mg daily     History of CAD with mid LAD PCI in 2015 after MI, RCA is chronically occluded  Hyperlipidemia  Hypertension  -Continue PTA Coreg 3.125 twice daily, atorvastatin 80 mg nightly, and lisinopril 30 mg daily    Family communication  -I did discuss plan of care in detail with patient's wife and she had questions which were answered to satisfaction          Diet: Combination Diet Regular Diet Adult  Fluid  "restriction 1500 ML FLUID  Snacks/Supplements Adult: Ensure Enlive; With Meals    DVT Prophylaxis: DOAC  Gill Catheter: Not present  Lines: PRESENT      Port a Cath 01/27/25 Single Lumen Right Chest wall-Site Assessment: WDL      Cardiac Monitoring: None  Code Status: Full Code      Clinically Significant Risk Factors         # Hyponatremia: Lowest Na = 128 mmol/L in last 2 days, will monitor as appropriate  # Hypochloremia: Lowest Cl = 97 mmol/L in last 2 days, will monitor as appropriate            # Hypertension: Noted on problem list            # Obesity: Estimated body mass index is 31.15 kg/m  as calculated from the following:    Height as of this encounter: 1.803 m (5' 11\").    Weight as of this encounter: 101.3 kg (223 lb 5.2 oz)., PRESENT ON ADMISSION     # Financial/Environmental Concerns: none   # Pacemaker present       Social Drivers of Health    Tobacco Use: Medium Risk (12/19/2024)    Patient History     Smoking Tobacco Use: Former     Smokeless Tobacco Use: Never   Physical Activity: Inactive (6/22/2024)    Exercise Vital Sign     Days of Exercise per Week: 0 days     Minutes of Exercise per Session: 0 min   Interpersonal Safety: Low Risk  (1/29/2025)    Interpersonal Safety     Do you feel physically and emotionally safe where you currently live?: Yes     Within the past 12 months, have you been hit, slapped, kicked or otherwise physically hurt by someone?: No     Within the past 12 months, have you been humiliated or emotionally abused in other ways by your partner or ex-partner?: No   Recent Concern: Interpersonal Safety - High Risk (1/27/2025)    Interpersonal Safety     Do you feel physically and emotionally safe where you currently live?: No     Within the past 12 months, have you been hit, slapped, kicked or otherwise physically hurt by someone?: No     Within the past 12 months, have you been humiliated or emotionally abused in other ways by your partner or ex-partner?: No   Stress: Stress " Concern Present (6/22/2024)    [employer] [place] - Occupational Stress Questionnaire     Feeling of Stress : To some extent   Social Connections: Unknown (6/22/2024)    Social Connection and Isolation Panel [NHANES]     Frequency of Social Gatherings with Friends and Family: Once a week          Disposition Plan     Medically Ready for Discharge: Anticipated Tomorrow, improvement in his pain and final plan per the neurology team             aSm Gibson MD  Hospitalist Service  [facility]  Securely message with [employer] (more info)  Text page via [employer] Paging/Directory     This note was completed in part using dictation via the Dragon voice recognition software. Some word and grammatical errors may occur and must be interpreted in the appropriate clinical context. If there are any questions pertaining to this issue, please contact me for further clarification.    ______________________________________________________________________    Interval History     -Saw him this morning and he was in the bed and wife was present and mentioned that he had severe pain this morning but took oxy and lidocaine kicked and pain is manageable  -No shortness of breath, no chest pain, no nausea or vomiting    Physical Exam   Vital Signs: Temp: 97.5  F (36.4  C) Temp src: Oral BP: (!) 149/83 Pulse: 55   Resp: 18 SpO2: 94 % O2 Device: None (Room air)    Weight: 223 lbs 5.22 oz        General: aox3  Respiratory:ctla  Cardiovascular: Regular rate , S1 and S2 normal  Abdomen:   soft   Neurologic: No focal deficit  Musculoskeletal: Normal Range of motion over upper and lower extremities bilaterally   Psychiatric: cooperative        Medical Decision Making       Time spent in care of patient is 45 minutes and I reviewed labs including CBC, basic metabolic panel and discussed plan of care in detail with the patient and nursing staff and his wife      Data     I have personally reviewed the  following data over the past 24 hrs:    N/A  \   N/A   / N/A     132 (L) 97 (L) 7.6 (L) /  82   4.5 27 0.56 (L) \       Imaging results reviewed over the past 24 hrs:   No results found for this or any previous visit (from the past 24 hours).

## 2025-01-30 NOTE — PLAN OF CARE
Goal Outcome Evaluation:      Plan of Care Reviewed With: patient, spouse    Overall Patient Progress: improvingOverall Patient Progress: improving         Summary: COVID and electrolyte imbalance   DATE & TIME: 1/30/25 0924-0720  Cognitive Concerns/ Orientation : A&O x4. Calm & cooperative    BEHAVIOR & AGGRESSION TOOL COLOR: Green  ABNL VS/O2: VSS on RA, elevated BP  MOBILITY: A x1 GB/W  PAIN MANAGMENT: Chronic R sided face pain, PRN tylenol, oxycodone, and lidocaine given with some relief. Pain 3-8/10.  DIET: Reg, 1500 FR-tolerated well  BOWEL/BLADDER: Continent   ABNL LAB/BG:  on K+, Mg+ and Phos+ protocols recheck 1/31. Na+ 132  DRAIN/DEVICES: Right chest port-heparin locked.  TELEMETRY RHYTHM: NA  SKIN: scattered abrasions/bruises/scabs   D/C DAY/GOALS/PLACE: Pending, 2 days. Pt lives at home with his wife  OTHER IMPORTANT INFO: Special precautions maintained.

## 2025-01-30 NOTE — PROGRESS NOTES
Neurology follow-up: 01/30/25    Patient admitted with trigeminal neuralgia worsening in the setting of COVID        Interval history and investigations   Doing better today.  Interventions over the last couple of days:  1.  Trileptal has been discontinued.  Sodium is at 131 at last check.  2.  Fosphenytoin 200 mg twice daily continued.  Does not report any dizziness or other side effects.  3.  Gabapentin 900 mg 3 times a day continued.  Doing well.  Does not report excessive drowsiness or dizziness.  4.  Nasal lidocaine has been helping.  Has tried about 3-4 times in the last day and a half, and feels that it works every time.  5.  Oxycodone right this morning.  Helped, but both he and I are somewhat opposed to continuing it as a long-term option.      Examination   Mental status appears normal- Alert oriented x 3.  Good insight into reasons of hospitalization.     Cranial Nerves: Pupils equal and reactive to light and accommodation.  Discomfort in right hemiface in the V2 distribution compatible with history of trigeminal neuralgia.     Sensory:  No overt lateralizing sensory abnormalities to touch, pinprick.       Motor:  No lateralizing gross motor weakness.       Balance, Gait and Station:  Balance and gait intact.       Deep Tendon Reflexes:  DTR's (biceps, triceps, brachioradialis, knee jerks) preserved and symmetric.             ASSESSMENT     Trigeminal neuralgia: Doing much better today: Tells me that he was able to have a couple of meals this morning without any problems.  First time in several weeks.  Oxycodone was missed today, which should probably be avoided as an outpatient therapy.  Nevertheless, his combination of fosphenytoin, gabapentin and lidocaine could sustain him going forward with the impact of COVID hopefully weaning off over the next few days.       RECOMMENDATIONS   Continue the following medications:  Gabapentin 900 mg 3 times daily   Phenytoin 200 mg at bedtime.    Nasal Lidocaine 3-4  times a day.  Trileptal should be avoided, but could be used at a low dose going forward if needed.  Oxycodone should be used for only very dire emergent situations.    The patient wants to consider being discharged.  I would leave that decision up to him and hospitalist, Dr. Gibson.  Would strongly recommend being discharged with a 1 to 2-month supply of phenytoin and nasal lidocaine.  I'd be happy to see him as an outpatient in the next few weeks.    I have discussed the above details with Mr. Salvador at great length and they expressed understanding.  They seemed satisfied with this conversation, and had no further queries as of now.  he has our contact information and has been advised to stay in touch with us regarding any other issues that may arise.     Thank you for allowing me to participate in Mr. Salvador's care.       Remi Fuentes MD   Neurologist, [facility]   January 30, 2025 1:56 PM      A total time of 41 minutes was spent on the care of this patient on the date of the visit, outside of time spent performing any procedures. Please excuse any typographical errors, as this note was generated using a Voice Recognition Software.

## 2025-01-31 VITALS
WEIGHT: 226.19 LBS | TEMPERATURE: 97.9 F | HEIGHT: 71 IN | OXYGEN SATURATION: 94 % | BODY MASS INDEX: 31.67 KG/M2 | HEART RATE: 66 BPM | SYSTOLIC BLOOD PRESSURE: 118 MMHG | DIASTOLIC BLOOD PRESSURE: 66 MMHG | RESPIRATION RATE: 18 BRPM

## 2025-01-31 LAB
ANION GAP SERPL CALCULATED.3IONS-SCNC: 10 MMOL/L (ref 7–15)
BUN SERPL-MCNC: 11 MG/DL (ref 8–23)
CALCIUM SERPL-MCNC: 9.2 MG/DL (ref 8.8–10.4)
CHLORIDE SERPL-SCNC: 97 MMOL/L (ref 98–107)
CREAT SERPL-MCNC: 0.56 MG/DL (ref 0.67–1.17)
EGFRCR SERPLBLD CKD-EPI 2021: >90 ML/MIN/1.73M2
GLUCOSE SERPL-MCNC: 80 MG/DL (ref 70–99)
HCO3 SERPL-SCNC: 26 MMOL/L (ref 22–29)
MAGNESIUM SERPL-MCNC: 1.7 MG/DL (ref 1.7–2.3)
PHOSPHATE SERPL-MCNC: 4.1 MG/DL (ref 2.5–4.5)
POTASSIUM SERPL-SCNC: 4.3 MMOL/L (ref 3.4–5.3)
POTASSIUM SERPL-SCNC: 4.5 MMOL/L (ref 3.4–5.3)
SODIUM SERPL-SCNC: 133 MMOL/L (ref 135–145)

## 2025-01-31 PROCEDURE — 250N000012 HC RX MED GY IP 250 OP 636 PS 637: Performed by: NURSE PRACTITIONER

## 2025-01-31 PROCEDURE — 84132 ASSAY OF SERUM POTASSIUM: CPT | Performed by: HOSPITALIST

## 2025-01-31 PROCEDURE — 99239 HOSP IP/OBS DSCHRG MGMT >30: CPT | Performed by: HOSPITALIST

## 2025-01-31 PROCEDURE — 250N000009 HC RX 250: Performed by: PSYCHIATRY & NEUROLOGY

## 2025-01-31 PROCEDURE — 84100 ASSAY OF PHOSPHORUS: CPT | Performed by: HOSPITALIST

## 2025-01-31 PROCEDURE — 250N000011 HC RX IP 250 OP 636: Performed by: HOSPITALIST

## 2025-01-31 PROCEDURE — 250N000013 HC RX MED GY IP 250 OP 250 PS 637

## 2025-01-31 PROCEDURE — 250N000013 HC RX MED GY IP 250 OP 250 PS 637: Performed by: HOSPITALIST

## 2025-01-31 PROCEDURE — 80048 BASIC METABOLIC PNL TOTAL CA: CPT | Performed by: HOSPITALIST

## 2025-01-31 PROCEDURE — 83735 ASSAY OF MAGNESIUM: CPT | Performed by: HOSPITALIST

## 2025-01-31 PROCEDURE — 99207 PR NO BILLABLE SERVICE THIS VISIT: CPT | Performed by: HOSPITALIST

## 2025-01-31 PROCEDURE — 250N000013 HC RX MED GY IP 250 OP 250 PS 637: Performed by: NURSE PRACTITIONER

## 2025-01-31 RX ORDER — LIDOCAINE HYDROCHLORIDE 10 MG/ML
3 INJECTION, SOLUTION EPIDURAL; INFILTRATION; INTRACAUDAL; PERINEURAL 3 TIMES DAILY PRN
Qty: 270 ML | Refills: 0 | Status: SHIPPED | OUTPATIENT
Start: 2025-01-31 | End: 2025-03-02

## 2025-01-31 RX ORDER — KETOCONAZOLE 20 MG/G
CREAM TOPICAL 2 TIMES DAILY PRN
COMMUNITY
Start: 2025-01-31

## 2025-01-31 RX ORDER — PHENYTOIN 50 MG/1
200 TABLET, CHEWABLE ORAL AT BEDTIME
Qty: 120 TABLET | Refills: 1 | Status: SHIPPED | OUTPATIENT
Start: 2025-01-31 | End: 2025-04-01

## 2025-01-31 RX ORDER — FLUTICASONE PROPIONATE 50 MCG
2 SPRAY, SUSPENSION (ML) NASAL DAILY PRN
COMMUNITY
Start: 2025-01-31

## 2025-01-31 RX ORDER — MAGNESIUM OXIDE 400 MG/1
400 TABLET ORAL EVERY 4 HOURS
Status: COMPLETED | OUTPATIENT
Start: 2025-01-31 | End: 2025-01-31

## 2025-01-31 RX ORDER — OXYCODONE HYDROCHLORIDE 5 MG/1
5 TABLET ORAL EVERY 8 HOURS PRN
Qty: 12 TABLET | Refills: 0 | Status: SHIPPED | OUTPATIENT
Start: 2025-01-31

## 2025-01-31 RX ORDER — FUROSEMIDE 20 MG/1
20 TABLET ORAL DAILY
COMMUNITY
Start: 2025-02-02

## 2025-01-31 RX ADMIN — HEPARIN SODIUM (PORCINE) LOCK FLUSH IV SOLN 100 UNIT/ML 5 ML: 100 SOLUTION at 17:17

## 2025-01-31 RX ADMIN — LISINOPRIL 30 MG: 10 TABLET ORAL at 09:03

## 2025-01-31 RX ADMIN — PREDNISONE 10 MG: 10 TABLET ORAL at 09:04

## 2025-01-31 RX ADMIN — Medication 400 MG: at 13:17

## 2025-01-31 RX ADMIN — LIDOCAINE HYDROCHLORIDE 3 ML: 10 INJECTION, SOLUTION EPIDURAL; INFILTRATION; INTRACAUDAL; PERINEURAL at 06:18

## 2025-01-31 RX ADMIN — GABAPENTIN 900 MG: 300 CAPSULE ORAL at 09:03

## 2025-01-31 RX ADMIN — ACETAMINOPHEN 650 MG: 325 TABLET, FILM COATED ORAL at 09:03

## 2025-01-31 RX ADMIN — Medication 5 ML: at 06:01

## 2025-01-31 RX ADMIN — Medication 400 MG: at 09:04

## 2025-01-31 RX ADMIN — APIXABAN 5 MG: 5 TABLET, FILM COATED ORAL at 09:04

## 2025-01-31 RX ADMIN — OXYCODONE HYDROCHLORIDE 5 MG: 5 TABLET ORAL at 13:17

## 2025-01-31 RX ADMIN — Medication 5 ML: at 09:07

## 2025-01-31 RX ADMIN — CARVEDILOL 3.12 MG: 3.12 TABLET, FILM COATED ORAL at 09:04

## 2025-01-31 ASSESSMENT — ACTIVITIES OF DAILY LIVING (ADL)
ADLS_ACUITY_SCORE: 50

## 2025-01-31 NOTE — PLAN OF CARE
Summary: COVID and electrolyte imbalance   DATE & TIME: 1/30/25 5729-2613  Cognitive Concerns/ Orientation : A&O x4. Calm & cooperative    BEHAVIOR & AGGRESSION TOOL COLOR: Green  ABNL VS/O2: VSS on RA  MOBILITY: A x1 GB/W  PAIN MANAGMENT: Chronic R sided face pain, PRN oxycodone, and lidocaine given with some relief. Heat applied-effective  DIET: Reg, 1500 FR-tolerated well  BOWEL/BLADDER: Continent   ABNL LAB/BG: none new, on K+, Mg+ and Phos+ protocols recheck 1/31.  DRAIN/DEVICES: Right chest port-heparin locked.  TELEMETRY RHYTHM: NA  SKIN: scattered abrasions/bruises/scabs   D/C DAY/GOALS/PLACE: Pending, 2 days. Pt lives at home with his wife  OTHER IMPORTANT INFO: Special precautions maintained.

## 2025-01-31 NOTE — PLAN OF CARE
Goal Outcome Evaluation:       Summary: COVID and electrolyte imbalance   DATE & TIME: 1/30/25-1/31/25 5561-3657  Cognitive Concerns/ Orientation : A&O x4. Calm & cooperative, pleasant  BEHAVIOR & AGGRESSION TOOL COLOR: Green  ABNL VS/O2: VSS on RA  MOBILITY: A x1 GB/W  PAIN MANAGMENT: Chronic R sided face pain, PRN oxycodone x1 and lidocaine this AM, helpful.  DIET: Reg, 1500 FR  BOWEL/BLADDER: Continent, uses urinal  ABNL LAB/BG: none new, on K+, Mg+ and Phos+ HIGH protocols  DRAIN/DEVICES: Right chest port-heparin locked. Labs drawn  TELEMETRY RHYTHM: NA  SKIN: scattered abrasions/bruises/scabs   D/C DAY/GOALS/PLACE: Possibly today pending improvement in pain and final neruro plan. Pt lives at home with his wife  OTHER IMPORTANT INFO: Special precautions maintained. [place], hearing aids charging. Dentures on bedside table.

## 2025-01-31 NOTE — PROGRESS NOTES
Care Management Discharge Note    Discharge Date: 01/31/2025       Discharge Disposition: Home    Discharge Services: None    Discharge DME: None    Discharge Transportation: family or friend will provide    Private pay costs discussed: Not applicable    Does the patient's insurance plan have a 3 day qualifying hospital stay waiver?  Yes     Which insurance plan 3 day waiver is available? Alternative insurance waiver    Will the waiver be used for post-acute placement? No    PAS Confirmation Code:  NA  Patient/family educated on Medicare website which has current facility and service quality ratings:  NA    Education Provided on the Discharge Plan: Yes  Persons Notified of Discharge Plans: patient and spouse, Nsg  Patient/Family in Agreement with the Plan: yes    Handoff Referral Completed: Yes, [facility] PCP: Internal handoff referral completed    Additional Information:  Patient will be discharging this afternoon to home.  Patient has the following appointments scheduled:  Feb 4 ED/Hospital Follow Up with Jeronimo Painter  Tuesday Feb 4, 2025 11:25 AM  This is a video visit.    There is no need to come to the facility. Please join the video connection by your Arrive By time.  Please note that if you join more than 10 minutes after this time, this MAY result in re-scheduling of your appointment.       Mar    20 Return Dermatology with Jv Dutta  Thursday Mar 20, 2025 10:45 AM M [address] [id number] [phone]   Apr    10 CARDIAC DEVICE CHECK - IN CLINIC  Thursday Apr 10, 2025 9:30 AM M [facility] [place]  [address]  [facility] [address] [phone]   May    21 Return Cardiology with Aquilino Buchanan  Wednesday May 21, 2025 10:45 AM M [facility]  [address]  [address]  [facility] [address]  [phone]   Jul    3 MEDICARE ANNUAL  WELLNESS VISIT with Jeronimo Painter  Thursday Jul 3, 2025 10:40 AM  The purpose of this visit is to discuss your medical history and prevent health problems before you are sick. If you have additional concerns you would like to discuss outside of preventive care, you may be billed for that service.  If you have further questions, you may want to contact your insurance company to understand what is included in your preventive health benefits. [address] [address] [phone]     In discussion with patient's spouse a few days ago, she did not feel patient would benefit from having home care services.    Vicki Reynoso, RN  Inpatient Care Management  [phone]

## 2025-01-31 NOTE — DISCHARGE SUMMARY
"[facility]  Hospitalist Discharge Summary      Date of Admission:  1/27/2025  Date of Discharge:  1/31/2025  Discharging Provider: Sam Gibson MD  Discharge Service: Hospitalist Service    Discharge Diagnoses     Trigeminal neuralgia with acute flare  Hyponatremia with history of SIADH-improving   Hypokalemia-resolved  Hypomagnesemia-resolved  Hypocalcemia-resolved   Chronic anemia  Thrombocytopenia-resolved  Asymptomatic COVID-19 infection  History of CAD with mid LAD PCI in 2015 after MI, RCA is chronically occluded  Hyperlipidemia  Hypertension    Clinically Significant Risk Factors     # Obesity: Estimated body mass index is 31.55 kg/m  as calculated from the following:    Height as of this encounter: 1.803 m (5' 11\").    Weight as of this encounter: 102.6 kg (226 lb 3.1 oz).       Follow-ups Needed After Discharge   Follow-up Appointments       Follow Up      Keep your appointment with Dr andres from neurolgy  Referral placed to neurosurgery dr torres and they will call you        Hospital Follow-up with Existing Primary Care Provider (PCP)      Please see details below         Schedule Primary Care visit within: 7 Days           {Additional follow-up instructions/to-do's for PCP       Unresulted Labs Ordered in the Past 30 Days of this Admission       No orders found from 12/28/2024 to 1/28/2025.        These results will be followed up by     Discharge Disposition   Discharged to home  Condition at discharge: Stable    Hospital Course     James Salvador is a 79 year old male with a PMH significant for A-fib on DOAC, status pacemaker placement, metastatic melanoma, trigeminal neuralgia, SIADH, CAD status PCI in 2015 with chronically occluded RCA who presented to the ED on 1/27/2024 with chief complaint of generalized weakness along with likely flareup of his trigeminal neuralgia.     Trigeminal neuralgia with recent flare in the preceding week  -PTA regimen includes Tylenol extra " strength 1300 mg twice daily, Trileptal 600 mg 3 times daily with gabapentin and can increase the dose to 900 mg if needed for flares and was taking that dose more gabapentin 600 mg 3 times daily and can increase the dose to 900 if needed and has been on that dose  -Of note because of his severe headache he did receive fosphenytoin in the ED and then his Trileptal was held and Dilantin was started  -Given his hyponatremia and Trileptal held   - Neurology following : Continue with gabapentin 900 mg 3 times a day   -Continue with phenytoin 200 mg at bedtime  -Continue nasal lidocaine 3-4 times a day  -Continue as needed oxy and will provide him few tablets  -Stop Trileptal for now  -Referral already placed for neurosurgery at [facility] and they will call him with appointment to discuss what surgical approach/options are available for his trigeminal neuralgia  -Follow-up with Dr. Fuentes in 3 to 4 weeks and he has appointment already scheduled        Hyponatremia with history of SIADH-improving  -Last known sodium values in the epic has been on 5/29/2024 with sodium was 139  -Sodium on presentation at 127  -Serum osmolality of 253, urine sodium of 56, urine osmolality of 333  -His PTA lisinopril and Lasix were held and patient was given saline  -Most likely reason can be underlying Trileptal which has been held  -Sodium is trending upwards at 133  -Continue with fluid restriction  --Patient will monitor his basic metabolic panel in 1 week with his PCP     Generalized weakness-improved  -On admission presented with generalized weakness and differentials are broad including multiple electrolyte abnormalities, flareup of his recent trigeminal neuralgia and underlying COVID infection  -Fall precaution  -PT OT consulted and reviewed their notes and patient will be able to go home and patient has declined any help as outpatient     COVID-19 infection-asymptomatic  -Patient on admission was also found to have  COVID-19 but did not had any shortness of breath or chest discomfort  -He is already on DOAC for DVT prophylaxis  -Does not have any indication for steroids as he is on room air  - patient has declined treatment with remdesivir and or Paxlovid        Hypokalemia-resolved  Hypomagnesemia-resolved  Hypocalcemia-resolved     Chronic anemia  Thrombocytopenia-resolved  -His last CBC on 1/8/2024 showed hemoglobin of 13.1  -Hemoglobin on admit was 12.4 with platelet count of 149  -No evidence of any bleeding  -His hemoglobin has become normal to 13.9 with platelet count of 155     A-fib on DOAC with PPM  -Continue PTA DOAC and Coreg     Metastatic melanoma  - TVEC injections currently on hold, patient follows with [place] [facility]  -Wife has been in touch with oncology and canceled CT chest abdomen pelvis is pending for 1/27/2025  -Continue PTA prednisone 10 mg daily     History of CAD with mid LAD PCI in 2015 after MI, RCA is chronically occluded  Hyperlipidemia  Hypertension  -Continue PTA Coreg 3.125 twice daily, atorvastatin 80 mg nightly, and lisinopril 30 mg daily    Consultations This Hospital Stay   NEUROLOGY IP CONSULT  PHYSICAL THERAPY ADULT IP CONSULT  CARE MANAGEMENT / SOCIAL WORK IP CONSULT  CARE MANAGEMENT / SOCIAL WORK IP CONSULT  NEUROSURGERY IP CONSULT    Code Status   Full Code    Time Spent on this Encounter   I, Sam Gibson MD, personally saw the patient today and spent greater than 30 minutes discharging this patient.       Sam Gibson MD  [place] [address] [facility]  [facility] [place] [address]  Phone: [phone]  ______________________________________________________________________    Physical Exam   Vital Signs: Temp: 97.9  F (36.6  C) Temp src: Oral BP: 118/66 Pulse: 66   Resp: 18 SpO2: 94 % O2 Device: None (Room air)    Weight: 226 lbs 3.07 oz    General: aox3  Respiratory:ctla  Cardiovascular: Regular rate , S1 and S2 normal  Abdomen:   soft    Neurologic: No focal deficit  Musculoskeletal: Normal Range of motion over upper and lower extremities bilaterally   Psychiatric: cooperative        Primary Care Physician   Jeronimo Painter    Discharge Orders      Primary Care - Care Coordination Referral      Adult Neurosurgery  Referral      Reason for your hospital stay    Covid 19   Trigeminal neuralgia     Activity    Your activity upon discharge: activity as tolerated     Follow Up    Keep your appointment with Dr andres from neurolgy  Referral placed to neurosurgery dr torres and they will call you     Diet    Follow this diet upon discharge: Current Diet:Orders Placed This Encounter      Fluid restriction 1500 ML FLUID      Snacks/Supplements Adult: Ensure Enlive; With Meals      Combination Diet Regular Diet Adult     Hospital Follow-up with Existing Primary Care Provider (PCP)    Please see details below            Significant Results and Procedures   Most Recent 3 CBC's:  Recent Labs   Lab Test 01/28/25  0519 01/27/25  0939 05/29/24 1939   WBC 4.8 7.8 9.4   HGB 13.9 12.4* 14.0   MCV 91 93 94    149* 196     Most Recent 3 BMP's:  Recent Labs   Lab Test 01/31/25  0608 01/30/25  0613 01/29/25  2119 01/29/25  1611 01/29/25  0625   * 132*  --   --  131*  131*   POTASSIUM 4.5  4.3 4.5  --   --  4.3   CHLORIDE 97* 97*  --   --  97*   CO2 26 27  --   --  26   BUN 11.0 7.6*  --   --  6.2*   CR 0.56* 0.56*  --   --  0.50*   ANIONGAP 10 8  --   --  8   BENJI 9.2 9.0  --   --  8.8   GLC 80 82 87   < > 78    < > = values in this interval not displayed.     Most Recent 2 LFT's:  Recent Labs   Lab Test 05/29/24 1939 12/29/23  1545   AST 25 37   ALT 28 39   ALKPHOS 160* 141   BILITOTAL 0.6 0.6     Most Recent 3 INR's:  Recent Labs   Lab Test 05/29/24 1939   INR 1.06     Most Recent INR's and Anticoagulation Dosing History:  Anticoagulation Dose History          Latest Ref Rng & Units 9/23/2015 5/29/2024   Recent Dosing and Labs   INR 0.85 -  1.15 1.02  1.06      Most Recent 3 Creatinines:  Recent Labs   Lab Test 01/31/25  0608 01/30/25  0613 01/29/25  0625   CR 0.56* 0.56* 0.50*     Most Recent 3 Hemoglobins:  Recent Labs   Lab Test 01/28/25  0519 01/27/25  0939 05/29/24  1939   HGB 13.9 12.4* 14.0     Most Recent 3 Troponin's:No lab results found.,   Results for orders placed or performed during the hospital encounter of 01/13/25   CT Soft Tissue Neck w Contrast    Narrative    EXAM: CT SOFT TISSUE NECK W CONTRAST  LOCATION: [facility]  DATE: 1/13/2025    INDICATION: Melanoma. Staging after TVEC plus relatlimab plus nivolumab. Evaluate response to ongoing therapy. Please compare to previous scans.  COMPARISON: 10/17/2024.  CONTRAST: 90 mL Isovue 370  TECHNIQUE: Routine CT Soft Tissue Neck with IV contrast. Multiplanar reformats. Dose reduction techniques were used.    FINDINGS:     MUCOSAL SPACES/SOFT TISSUES: Unremarkable CT appearance of the mucosal spaces of the upper aerodigestive tract. No mucosal mass or inflammation identified. Normal vocal cords and infraglottic trachea. Normal appearance of the  and   parapharyngeal spaces.    LYMPH NODES: No new/enlarging or definite pathologic cervical lymph nodes by size or morphology criteria.    SALIVARY GLANDS: There is a small unchanged nonspecific hyperattenuating nodule measuring approximately 3-4 mm along the anterior superficial lobe of the left parotid gland (series 2 image 41). Otherwise, the salivary glands appear normal.    THYROID: No dominant thyroid mass or nodule.     VESSELS: Partial visualization of a left anterior chest wall cardiac pacing device with associated leads, incompletely evaluated. Right anterior chest wall Port-A-Cath with tubing extending into the lower aspect of the right internal jugular vein and   right brachiocephalic vein, incompletely assessed. The bilateral internal jugular veins appear to be grossly patent. There is bilateral carotid  bifurcation atherosclerosis. Unchanged segmental nonopacification/occlusion of the right vertebral artery. The   cervical left vertebral artery appears to be grossly patent.    VISUALIZED INTRACRANIAL/ORBITS/SINUSES: Presumed age-related changes/chronic small vessel ischemic disease in the visualized aspects of the brain, incompletely assessed. The orbits appear unremarkable. There is mild mucosal thickening in the paranasal   sinuses. The mastoid and middle ear cavities appear clear.    OTHER: Mild presumed scarring in the right more than left lung apices. Ankylosis across the disc space and facet joints at C3-C4. Multilevel degenerative changes throughout the cervical spine.      Impression    IMPRESSION:   1.  No significant interval change.  2.  Unchanged small nonspecific hyperattenuating nodule along the anterior aspect of the superficial lobe of left parotid gland.  3.  No new or enlarging cervical lymphadenopathy identified.     *Note: Due to a large number of results and/or encounters for the requested time period, some results have not been displayed. A complete set of results can be found in Results Review.       Discharge Medications   Current Discharge Medication List        START taking these medications    Details   lidocaine, PF, (XYLOCAINE) 1 % SOLN injection 3 mLs by Other route 3 times daily as needed (moderate pain).  Qty: 270 mL, Refills: 0    Associated Diagnoses: Trigeminal neuralgia      oxyCODONE (ROXICODONE) 5 MG tablet Take 1 tablet (5 mg) by mouth every 8 hours as needed for moderate pain.  Qty: 12 tablet, Refills: 0    Associated Diagnoses: Trigeminal neuralgia      phenytoin (DILANTIN) 50 MG chewable tablet Take 4 tablets (200 mg) by mouth at bedtime.  Qty: 120 tablet, Refills: 1    Associated Diagnoses: Trigeminal neuralgia           CONTINUE these medications which have CHANGED    Details   fluticasone (FLONASE) 50 MCG/ACT nasal spray Spray 2 sprays into both nostrils daily as needed  for allergies.    Associated Diagnoses: Chronic seasonal allergic rhinitis      furosemide (LASIX) 20 MG tablet Take 1 tablet (20 mg) by mouth daily.    Associated Diagnoses: Longstanding persistent atrial fibrillation (H)      ketoconazole (NIZORAL) 2 % external cream Apply topically 2 times daily as needed. For face. FAX REFILL REQUESTS TO [facility]: [phone]    Associated Diagnoses: Seborrheic dermatitis           CONTINUE these medications which have NOT CHANGED    Details   acetaminophen (TYLENOL) 650 MG CR tablet Take 1,300 mg by mouth 2 times daily.      apixaban ANTICOAGULANT (ELIQUIS ANTICOAGULANT) 5 MG tablet Take 1 tablet (5 mg) by mouth 2 times daily  Qty: 180 tablet, Refills: 3    Comments: PROFILE FOR FUTURE REFILLS  Associated Diagnoses: Longstanding persistent atrial fibrillation (H)      atorvastatin (LIPITOR) 80 MG tablet Take 1 tablet (80 mg) by mouth at bedtime  Qty: 90 tablet, Refills: 3    Comments: PROFILE FOR FUTURE REFILLS  Associated Diagnoses: Coronary artery disease of native artery of native heart with stable angina pectoris      carvedilol (COREG) 3.125 MG tablet Take 1 tablet (3.125 mg) by mouth 2 times daily (with meals)  Qty: 180 tablet, Refills: 3    Comments: PROFILE FOR FUTURE REFILLS  Associated Diagnoses: Coronary artery disease of native artery of native heart with stable angina pectoris; Longstanding persistent atrial fibrillation (H)      clobetasol propionate (TEMOVATE) 0.05 % external cream Apply to AA BID x 1-2 weeks then PRN. Do not apply to face.  Qty: 60 g, Refills: 3    Associated Diagnoses: Eczema, unspecified type      gabapentin (NEURONTIN) 300 MG capsule Take 600 mg by mouth 3 times daily. 0700, 1330, 2000  May increase dose to 900mg if needed per neurologist. Patient has been doing this with evening dose the last few nights.      ketoconazole (NIZORAL) 2 % external shampoo Use daily as needed  Qty: 120 mL, Refills: 11    Associated Diagnoses: Seborrheic  dermatitis      ketotifen fumarate 0.035% 0.035 % SOLN ophthalmic solution Place 1 drop into both eyes 2 times daily as needed for itching.      lisinopril (ZESTRIL) 30 MG tablet Take 1 tablet (30 mg) by mouth daily  Qty: 90 tablet, Refills: 3    Comments: PROFILE FOR FUTURE REFILLS  Associated Diagnoses: Coronary artery disease of native artery of native heart with stable angina pectoris; Benign hypertension      nitroGLYcerin (NITROSTAT) 0.4 MG sublingual tablet Place 1 tablet (0.4 mg) under the tongue every 5 minutes as needed for chest pain May repeat twice for a total of 3 tablets.  If chest pain not relieved, call 911  Qty: 25 tablet, Refills: 11    Associated Diagnoses: ACS (acute coronary syndrome) (H)      predniSONE (DELTASONE) 10 MG tablet Take 10 mg by mouth daily      triamcinolone (KENALOG) 0.1 % external lotion Apply to scalp BID x 1-2 weeks then PRN only  Qty: 60 mL, Refills: 3    Associated Diagnoses: Dermatitis, seborrheic           STOP taking these medications       OXcarbazepine (TRILEPTAL) 300 MG tablet Comments:   Reason for Stopping:             Allergies   Allergies   Allergen Reactions    Cats      Cat Dander    Dogs      Dog Dander    Hydromorphone      Other reaction(s): Confusion    Pollen Extract

## 2025-01-31 NOTE — PLAN OF CARE
Summary: COVID and electrolyte imbalance   DATE & TIME: 1/31/25 0637-5300  Cognitive Concerns/ Orientation : A&O x4. Calm & cooperative, pleasant  BEHAVIOR & AGGRESSION TOOL COLOR: Green  ABNL VS/O2: VSS on RA  MOBILITY: A x1 GB/W  PAIN MANAGMENT: Chronic R sided face pain, PRN oxycodone x1   DIET: Reg, 1500 FR  BOWEL/BLADDER: Continent, uses urinal  ABNL LAB/BG: replaced Mg this AM  DRAIN/DEVICES: Right chest port-heparin locked. Labs drawn  TELEMETRY RHYTHM: NA  SKIN: scattered abrasions/bruises/scabs   D/C DAY/GOALS/PLACE:Home with spouse  OTHER IMPORTANT INFO: Special precautions maintained. [place], hearing aids charging. Dentures on bedside table.     .Discharge    Patient discharged to Home with family  Care plan note: Done    Listed belongings gathered and given to patient (including from security/pharmacy). Yes  Care Plan and Patient education resolved: Yes  Prescriptions if needed, hard copies sent with patient  Yes  Medication Bin checked and emptied on discharge Yes  SW/care coordinator/charge RN aware of discharge: Yes

## 2025-01-31 NOTE — DISCHARGE INSTRUCTIONS
Home Instructions for Intranasal Lidocaine     Draw up 3.0 ml of viscous lidocaine into a 3-5 ml syringe. It does not matter if there are air bubbles mixed in the syringe.     Put the syringe into the lower nostril, as far as it will comfortably go, with the tip pointing towards the outer (lateral) wall of the nostril. Inject the contents of the syringe, and then sniff the medicine so that you feel it goes to the back of the nostril, but not into the throat. If you feel burning or numbness into the eye, or if the eye tears, you know you have gotten the medicine where it needs to be. Stay lying down with your head turned for 2 - 3 minutes.     When you sit up, whatever medicine has not been absorbed will roll back into your throat. It will taste bitter and may make your throat numb. Don t eat or drink until the numbness has gone away - otherwise you might swallow food or liquid into your windpipe.     You may repeat the lidocaine as often as 3 times/day.

## 2025-01-31 NOTE — PROGRESS NOTES
[facility]    Medicine Progress Note - Hospitalist Service    Date of Admission:  1/27/2025    Assessment & Plan     James Salvador is a 79 year old male with a PMH significant for A-fib on DOAC, status pacemaker placement, metastatic melanoma, trigeminal neuralgia, SIADH, CAD status PCI in 2015 with chronically occluded RCA who presented to the ED on 1/27/2024 with chief complaint of generalized weakness along with likely flareup of his trigeminal neuralgia.    Trigeminal neuralgia with recent flare in the preceding week  -PTA regimen includes Tylenol extra strength 1300 mg twice daily, Trileptal 600 mg 3 times daily with gabapentin and can increase the dose to 900 mg if needed for flares and was taking that dose more gabapentin 600 mg 3 times daily and can increase the dose to 900 if needed and has been on that dose  -Of note because of his severe headache he did receive fosphenytoin in the ED and then his Trileptal was held and Dilantin was started  -Given his hyponatremia and Trileptal held   - Neurology following : Continue with gabapentin 900 mg 3 times a day   -Continue with phenytoin 200 mg at bedtime  -Continue nasal lidocaine 3-4 times a day  -Continue as needed oxy and will provide him few tablets  -Stop Trileptal for now  -Referral already placed for neurosurgery at [facility] and they will call him with appointment to discuss what surgical approach/options are available for his trigeminal neuralgia  -Follow-up with Dr. Fuentes in 3 to 4 weeks and he has appointment already scheduled        Hyponatremia with history of SIADH-improving  -Last known sodium values in the epic has been on 5/29/2024 with sodium was 139  -Sodium on presentation at 127  -Serum osmolality of 253, urine sodium of 56, urine osmolality of 333  -His PTA lisinopril and Lasix were held and patient was given saline  -Most likely reason can be underlying Trileptal which has been held  -Sodium is  trending upwards at 133  -Continue with fluid restriction  --Patient will monitor his basic metabolic panel in 1 week with his PCP    Generalized weakness-improved  -On admission presented with generalized weakness and differentials are broad including multiple electrolyte abnormalities, flareup of his recent trigeminal neuralgia and underlying COVID infection  -Fall precaution  -PT OT consulted and reviewed their notes and patient will be able to go home and patient has declined any help as outpatient    COVID-19 infection-asymptomatic  -Patient on admission was also found to have COVID-19 but did not had any shortness of breath or chest discomfort  -He is already on DOAC for DVT prophylaxis  -Does not have any indication for steroids as he is on room air  - patient has declined treatment with remdesivir and or Paxlovid      Hypokalemia-resolved  Hypomagnesemia-resolved  Hypocalcemia-resolved    Chronic anemia  Thrombocytopenia-resolved  -His last CBC on 1/8/2024 showed hemoglobin of 13.1  -Hemoglobin on admit was 12.4 with platelet count of 149  -No evidence of any bleeding  -His hemoglobin has become normal to 13.9 with platelet count of 155       A-fib on DOAC with PPM  -Continue PTA DOAC and Coreg  -Device interrogation as above     Metastatic melanoma  - TVEC injections currently on hold, patient follows with [place] [facility]  -Wife has been in touch with oncology and canceled CT chest abdomen pelvis is pending for 1/27/2025  -Continue PTA prednisone 10 mg daily     History of CAD with mid LAD PCI in 2015 after MI, RCA is chronically occluded  Hyperlipidemia  Hypertension  -Continue PTA Coreg 3.125 twice daily, atorvastatin 80 mg nightly, and lisinopril 30 mg daily    Family communication  -I did discuss plan of care in detail with patient's wife and she had questions which were answered to satisfaction and they are in agreement with going home          Diet: Combination Diet Regular Diet Adult  Fluid  "restriction 1500 ML FLUID  Snacks/Supplements Adult: Ensure Enlive; With Meals  Diet    DVT Prophylaxis: DOAC  Gill Catheter: Not present  Lines: PRESENT      Port a Cath 01/27/25 Single Lumen Right Chest wall-Site Assessment: WDL      Cardiac Monitoring: None  Code Status: Full Code      Clinically Significant Risk Factors         # Hyponatremia: Lowest Na = 132 mmol/L in last 2 days, will monitor as appropriate  # Hypochloremia: Lowest Cl = 97 mmol/L in last 2 days, will monitor as appropriate            # Hypertension: Noted on problem list            # Obesity: Estimated body mass index is 31.55 kg/m  as calculated from the following:    Height as of this encounter: 1.803 m (5' 11\").    Weight as of this encounter: 102.6 kg (226 lb 3.1 oz).      # Financial/Environmental Concerns: none   # Pacemaker present       Social Drivers of Health    Tobacco Use: Medium Risk (12/19/2024)    Patient History     Smoking Tobacco Use: Former     Smokeless Tobacco Use: Never   Physical Activity: Inactive (6/22/2024)    Exercise Vital Sign     Days of Exercise per Week: 0 days     Minutes of Exercise per Session: 0 min   Interpersonal Safety: Low Risk  (1/29/2025)    Interpersonal Safety     Do you feel physically and emotionally safe where you currently live?: Yes     Within the past 12 months, have you been hit, slapped, kicked or otherwise physically hurt by someone?: No     Within the past 12 months, have you been humiliated or emotionally abused in other ways by your partner or ex-partner?: No   Recent Concern: Interpersonal Safety - High Risk (1/27/2025)    Interpersonal Safety     Do you feel physically and emotionally safe where you currently live?: No     Within the past 12 months, have you been hit, slapped, kicked or otherwise physically hurt by someone?: No     Within the past 12 months, have you been humiliated or emotionally abused in other ways by your partner or ex-partner?: No   Stress: Stress Concern Present " (6/22/2024)    [employer] [place] [employer] - Occupational Stress Questionnaire     Feeling of Stress : To some extent   Social Connections: Unknown (6/22/2024)    Social Connection and Isolation Panel [NHANES]     Frequency of Social Gatherings with Friends and Family: Once a week          Disposition Plan     Medically Ready for Discharge: Ready Now,              Sam Gibson MD  Hospitalist Service  [facility]  Securely message with [employer] (more info)  Text page via [employer] Paging/Directory     This note was completed in part using dictation via the Dragon voice recognition software. Some word and grammatical errors may occur and must be interpreted in the appropriate clinical context. If there are any questions pertaining to this issue, please contact me for further clarification.    ______________________________________________________________________    Interval History     -Saw him this morning and he was in the bed and wife was present and mentioned that he had severe pain this morning but took oxy and lidocaine kicked and pain is manageable  -No shortness of breath, no chest pain, no nausea or vomiting    Physical Exam   Vital Signs: Temp: 97.9  F (36.6  C) Temp src: Oral BP: 118/66 Pulse: 66   Resp: 18 SpO2: 94 % O2 Device: None (Room air)    Weight: 226 lbs 3.07 oz        General: aox3  Respiratory:ctla  Cardiovascular: Regular rate , S1 and S2 normal  Abdomen:   soft   Neurologic: No focal deficit  Musculoskeletal: Normal Range of motion over upper and lower extremities bilaterally   Psychiatric: cooperative        Medical Decision Making       Time spent in care of patient is 45 minutes and I reviewed labs including CBC, basic metabolic panel and discussed plan of care in detail with the patient and nursing staff and his wife      Data     I have personally reviewed the following data over the past 24 hrs:    N/A  \   N/A   / N/A     133 (L) 97 (L) 11.0 /  80   4.3;  4.5 26 0.56 (L) \       Imaging results reviewed over the past 24 hrs:   No results found for this or any previous visit (from the past 24 hours).

## 2025-02-01 NOTE — PLAN OF CARE
"Physical Therapy Discharge Summary    Reason for therapy discharge:    Discharged to home.    Progress towards therapy goal(s). See goals on Care Plan in [facility] electronic health record for goal details.  Goals not met.  Barriers to achieving goals:   discharge from facility.    Therapy recommendation(s):    Continued therapy is recommended.  Rationale/Recommendations:  Per prior PT notes, \"Pt at baseline lives in house with spouse, IND with functional mobility, uses walker for ambulation. Currenlty, pt is near baseline with mild deficits in strength and endurance noted, but able to ambulate within room FWW and SBA on this date. Anticpate with continued IP PT, will be able to return home with assistance from spouse as needed. Recommend continued walker use for ambulation. Pt reports some hx with L knee weakness and buckling, he may benefit from OP PT for ongoing strengthening and/or fitting for brace for safety with gait\".    Patient not seen by this therapist on this date, discharge summary written based on chart review and previous PT notes. Please see PT flowsheets for further details.        "

## 2025-02-03 ENCOUNTER — PATIENT OUTREACH (OUTPATIENT)
Dept: CARE COORDINATION | Facility: CLINIC | Age: 80
End: 2025-02-03
Payer: COMMERCIAL

## 2025-02-03 NOTE — TELEPHONE ENCOUNTER
RECORDS RECEIVED FROM: Internal    REASON FOR VISIT: Trigeminal Neuralgia   PROVIDER: Luisa Martins APRN CNP   DATE OF APPT: 2/11/25 @ 8:00 am    NOTES (FOR ALL VISITS) STATUS DETAILS   OFFICE NOTE from referring provider Internal Hosp Referral    OFFICE NOTE from other specialist Internal 1/17/25 Rosa Maria Hansen PA-C @[facility]    6/20/23 Jeronimo Painter MD @[facility]     DISCHARGE SUMMARY from hospital Internal 1/27/25-1/31/25 Wai Oscar DO @[facility]     OPERATIVE REPORT Internal 4/13/23 Ila Sanchez MD @[facility] Left cervical Facial flap per closure of left cheek Defect     3/30/23 Rolando Minaya MD @[facility] wide local excision of left cheek melanoma      MEDICATION LIST Internal    IMAGING  (FOR ALL VISITS)     CT (HEAD, NECK, SPINE) Internal [facility]  1/13/25 CT Soft Tissue Neck  10/17/24 CT Soft Tissue Neck  7/1/24 CT Soft Tissue Neck  5/29/24 CT Head

## 2025-02-03 NOTE — PROGRESS NOTES
Clinic Care Coordination Contact  [facility]/Voicemail    Clinical Data: Care Coordinator Outreach    Outreach Documentation Number of Outreach Attempt   2/3/2025   2:49 PM 1       Left message on patient's voicemail with call back information and requested return call.      Plan: Care Coordinator will send care coordination introduction letter with care coordinator contact information and explanation of care coordination services via [employer]. Care Coordinator will try to reach patient again in 1-2 business days.    Madison Gambino,  [facility]  Clinic Care Coordinator  [facility]  [phone]  [email or web address]

## 2025-02-03 NOTE — LETTER
[facility] CARE COORDINATION  [address]  [address]  [facility] [facility] [address]    February 3, 2025    James MARIELY Salvador  [address] [place] [address]      Dear Alden,    I am a clinic care coordinator who works with Jeronimo Painter MD with the [facility]. I wanted to introduce myself and provide you with my contact information for you to be able to call me with any questions or concerns. Below is a description of clinic care coordination and how I can further assist you.       The clinic care coordination team is made up of a registered nurse, , financial resource worker and community health worker who understand the health care system. The goal of clinic care coordination is to help you manage your health and improve access to the health care system. Our team works alongside your provider to assist you in determining your health and social needs. We can help you obtain health care and community resources, providing you with necessary information and education. We can work with you through any barriers and develop a care plan that helps coordinate and strengthen the communication between you and your care team.  Our services are voluntary and are offered without charge to you personally.    Please feel free to contact me with any questions or concerns regarding care coordination and what we can offer.      We are focused on providing you with the highest-quality healthcare experience possible.    Sincerely,     Madison Gambino,  [facility]  Clinic Care Coordinator  [facility]  [phone]  [email or web address]

## 2025-02-04 ENCOUNTER — VIRTUAL VISIT (OUTPATIENT)
Dept: INTERNAL MEDICINE | Facility: CLINIC | Age: 80
End: 2025-02-04
Attending: HOSPITALIST
Payer: COMMERCIAL

## 2025-02-04 DIAGNOSIS — C78.01 METASTATIC MELANOMA TO LUNG, RIGHT (H): ICD-10-CM

## 2025-02-04 DIAGNOSIS — C43.39 MELANOMA OF CHEEK (H): ICD-10-CM

## 2025-02-04 DIAGNOSIS — U07.1 COVID-19: ICD-10-CM

## 2025-02-04 DIAGNOSIS — E22.2 SIADH (SYNDROME OF INAPPROPRIATE ADH PRODUCTION): Primary | ICD-10-CM

## 2025-02-04 DIAGNOSIS — G50.0 TRIGEMINAL NEURALGIA: ICD-10-CM

## 2025-02-04 PROCEDURE — 99495 TRANSJ CARE MGMT MOD F2F 14D: CPT | Mod: 95 | Performed by: INTERNAL MEDICINE

## 2025-02-04 NOTE — PROGRESS NOTES
Clinic Care Coordination Contact  Care Coordination Clinician Chart Review    Situation: Patient chart reviewed by care coordinator.    Background: Clinic Care Coordination Referral received from inpatient care team for transition handoff communication following hospital admission.    Assessment: Upon chart review, patient is not a candidate for Primary Care Clinic Care Coordination enrollment due to reason stated below:  Patient had hospital follow up visit today.  No CCC needs identified.     Plan/Recommendations: Clinic Care Coordination Referral/order cancelled. RN/SW CC will perform no further monitoring/outreaches at this time and will remain available as needed. If new needs arise, a new Care Coordination Referral may be placed.    Madison Gambino,  [facility]  Clinic Care Coordinator  [facility]  [phone]  [email or web address]

## 2025-02-04 NOTE — PROGRESS NOTES
"Alden is a 79 year old who is being evaluated via a billable video visit.    How would you like to obtain your AVS? MyChart  If the video visit is dropped, the invitation should be resent by: Send to e-mail at: [email or web address]  Will anyone else be joining your video visit? No      Assessment & Plan     SIADH (syndrome of inappropriate ADH production)  COVID-19  Trigeminal neuralgia  - likely acute covid + longstanding siadh related to his trileptal caused acute drop in Na+.    - this has resolved and would expect off the trileptal this shouldn't be an issue going forward  - on new regimen for TN. Difficult to say at this point how successful it is. Pain still an issue but tolerating it.   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    Weight as of 1/31/25: 102.6 kg (226 lb 3.1 oz).         Subjective   Alden is a 79 year old, presenting for the following health issues:  Hospital F/U      Video Start Time: 11:45 AM    \[facility]\""       Hospital Follow-up Visit:    Hospital/Nursing Home/IP Rehab Facility: [facility]  Date of Admission: 01/27/2025  Date of Discharge: 01/31/2025  Reason(s) for Admission: Trigeminal neuralgia with acute flare  Hyponatremia with history of SIADH-improving   Hypokalemia-resolved  Hypomagnesemia-resolved  Hypocalcemia-resolved   Chronic anemia  Thrombocytopenia-resolved  Asymptomatic COVID-19 infection  History of CAD with mid LAD PCI in 2015 after MI, RCA is chronically occluded  Hyperlipidemia  Hypertension  Was the patient in the ICU or did " the patient experience delirium during hospitalization?  No      Problems taking medications regularly:  None  Medication changes since discharge: None  Problems adhering to non-medication therapy:  None    Summary of hospitalization:  [facility] discharge summary reviewed  Diagnostic Tests/Treatments reviewed.  Follow up needed: none  Other Healthcare Providers Involved in Patient s Care:         Specialist appointment - neuro, oncology  Update since discharge: improved.       Trigeminal neuralgia with recent flare in the preceding week  -PTA regimen includes Tylenol extra strength 1300 mg twice daily, Trileptal 600 mg 3 times daily with gabapentin and can increase the dose to 900 mg if needed for flares and was taking that dose more gabapentin 600 mg 3 times daily and can increase the dose to 900 if needed and has been on that dose  -Of note because of his severe headache he did receive fosphenytoin in the ED and then his Trileptal was held and Dilantin was started  -Given his hyponatremia and Trileptal held   - Neurology following : Continue with gabapentin 900 mg 3 times a day   -Continue with phenytoin 200 mg at bedtime  -Continue nasal lidocaine 3-4 times a day  -Continue as needed oxy and will provide him few tablets  -Stop Trileptal for now  -Referral already placed for neurosurgery at [facility] and they will call him with appointment to discuss what surgical approach/options are available for his trigeminal neuralgia  -Follow-up with Dr. Fuentes in 3 to 4 weeks and he has appointment already scheduled        Hyponatremia with history of SIADH-improving  -Last known sodium values in the epic has been on 5/29/2024 with sodium was 139  -Sodium on presentation at 127  -Serum osmolality of 253, urine sodium of 56, urine osmolality of 333  -His PTA lisinopril and Lasix were held and patient was given saline  -Most likely reason can be underlying Trileptal which has been held  -Sodium is  trending upwards at 133  -Continue with fluid restriction  --Patient will monitor his basic metabolic panel in 1 week with his PCP     Generalized weakness-improved  -On admission presented with generalized weakness and differentials are broad including multiple electrolyte abnormalities, flareup of his recent trigeminal neuralgia and underlying COVID infection  -Fall precaution  -PT OT consulted and reviewed their notes and patient will be able to go home and patient has declined any help as outpatient     COVID-19 infection-asymptomatic  -Patient on admission was also found to have COVID-19 but did not had any shortness of breath or chest discomfort  -He is already on DOAC for DVT prophylaxis  -Does not have any indication for steroids as he is on room air  - patient has declined treatment with remdesivir and or Paxlovid        Hypokalemia-resolved  Hypomagnesemia-resolved  Hypocalcemia-resolved     Chronic anemia  Thrombocytopenia-resolved  -His last CBC on 1/8/2024 showed hemoglobin of 13.1  -Hemoglobin on admit was 12.4 with platelet count of 149  -No evidence of any bleeding  -His hemoglobin has become normal to 13.9 with platelet count of 155     A-fib on DOAC with PPM  -Continue PTA DOAC and Coreg     Metastatic melanoma  - TVEC injections currently on hold, patient follows with [place] [facility]  -Wife has been in touch with oncology and canceled CT chest abdomen pelvis is pending for 1/27/2025  -Continue PTA prednisone 10 mg daily     History of CAD with mid LAD PCI in 2015 after MI, RCA is chronically occluded  Hyperlipidemia  Hypertension  -Continue PTA Coreg 3.125 twice daily, atorvastatin 80 mg nightly, and lisinopril 30 mg daily    Plan of care communicated with patient and family                     Objective           Vitals:  No vitals were obtained today due to virtual visit.    Physical Exam   GENERAL: alert and no distress  SKIN: Visible skin clear. No significant rash, abnormal pigmentation or  lesions.  NEURO: Cranial nerves grossly intact.  Mentation and speech appropriate for age.  PSYCH: Appropriate affect, tone, and pace of words          Video-Visit Details    Type of service:  Video Visit   Video End Time:12:28 PM  Originating Location (pt. Location): Home    Distant Location (provider location):  On-site  Platform used for Video Visit: Maria Guadalupe  Signed Electronically by: Jeronimo Painter MD

## 2025-02-07 ENCOUNTER — HOSPITAL ENCOUNTER (OUTPATIENT)
Dept: CT IMAGING | Facility: CLINIC | Age: 80
Discharge: HOME | End: 2025-02-07
Attending: INTERNAL MEDICINE | Admitting: INTERNAL MEDICINE
Payer: COMMERCIAL

## 2025-02-07 DIAGNOSIS — C43.30 MALIGNANT MELANOMA OF FACE (H): ICD-10-CM

## 2025-02-07 PROCEDURE — 74177 CT ABD & PELVIS W/CONTRAST: CPT

## 2025-02-07 PROCEDURE — 250N000011 HC RX IP 250 OP 636: Performed by: INTERNAL MEDICINE

## 2025-02-07 RX ORDER — IOPAMIDOL 755 MG/ML
100 INJECTION, SOLUTION INTRAVASCULAR ONCE
Status: COMPLETED | OUTPATIENT
Start: 2025-02-07 | End: 2025-02-07

## 2025-02-07 RX ADMIN — IOPAMIDOL 100 ML: 755 INJECTION, SOLUTION INTRAVENOUS at 10:57

## 2025-02-11 ENCOUNTER — PRE VISIT (OUTPATIENT)
Dept: NEUROSURGERY | Facility: CLINIC | Age: 80
End: 2025-02-11

## 2025-02-11 ENCOUNTER — OFFICE VISIT (OUTPATIENT)
Dept: NEUROSURGERY | Facility: CLINIC | Age: 80
End: 2025-02-11
Attending: PHYSICIAN ASSISTANT
Payer: COMMERCIAL

## 2025-02-11 ENCOUNTER — DOCUMENTATION ONLY (OUTPATIENT)
Dept: NEUROLOGY | Facility: CLINIC | Age: 80
End: 2025-02-11

## 2025-02-11 ENCOUNTER — APPOINTMENT (OUTPATIENT)
Dept: GENERAL RADIOLOGY | Facility: CLINIC | Age: 80
DRG: 682 | End: 2025-02-11
Attending: EMERGENCY MEDICINE
Payer: COMMERCIAL

## 2025-02-11 ENCOUNTER — HOSPITAL ENCOUNTER (INPATIENT)
Facility: CLINIC | Age: 80
DRG: 682 | End: 2025-02-11
Attending: EMERGENCY MEDICINE | Admitting: STUDENT IN AN ORGANIZED HEALTH CARE EDUCATION/TRAINING PROGRAM
Payer: COMMERCIAL

## 2025-02-11 VITALS
DIASTOLIC BLOOD PRESSURE: 30 MMHG | SYSTOLIC BLOOD PRESSURE: 66 MMHG | OXYGEN SATURATION: 92 % | RESPIRATION RATE: 16 BRPM | HEART RATE: 108 BPM

## 2025-02-11 DIAGNOSIS — U07.1 COVID-19: ICD-10-CM

## 2025-02-11 DIAGNOSIS — E22.2 SIADH (SYNDROME OF INAPPROPRIATE ADH PRODUCTION): ICD-10-CM

## 2025-02-11 DIAGNOSIS — Z79.01 LONG TERM (CURRENT) USE OF ANTICOAGULANTS: ICD-10-CM

## 2025-02-11 DIAGNOSIS — I48.20 CHRONIC ATRIAL FIBRILLATION (H): Primary | ICD-10-CM

## 2025-02-11 DIAGNOSIS — I48.11 LONGSTANDING PERSISTENT ATRIAL FIBRILLATION (H): ICD-10-CM

## 2025-02-11 DIAGNOSIS — C78.01 METASTATIC MELANOMA TO LUNG, RIGHT (H): ICD-10-CM

## 2025-02-11 DIAGNOSIS — I95.9 HYPOTENSION, UNSPECIFIED HYPOTENSION TYPE: ICD-10-CM

## 2025-02-11 DIAGNOSIS — R09.02 HYPOXIA: ICD-10-CM

## 2025-02-11 DIAGNOSIS — N17.9 ACUTE KIDNEY INJURY: ICD-10-CM

## 2025-02-11 DIAGNOSIS — G50.0 TRIGEMINAL NEURALGIA: ICD-10-CM

## 2025-02-11 DIAGNOSIS — Z96.611 STATUS POST TOTAL SHOULDER ARTHROPLASTY, RIGHT: ICD-10-CM

## 2025-02-11 DIAGNOSIS — I25.118 CORONARY ARTERY DISEASE OF NATIVE ARTERY OF NATIVE HEART WITH STABLE ANGINA PECTORIS: ICD-10-CM

## 2025-02-11 DIAGNOSIS — E78.2 MIXED HYPERLIPIDEMIA: ICD-10-CM

## 2025-02-11 LAB
ALBUMIN SERPL BCG-MCNC: 2.8 G/DL (ref 3.5–5.2)
ALBUMIN SERPL BCG-MCNC: 2.9 G/DL (ref 3.5–5.2)
ALBUMIN UR-MCNC: 20 MG/DL
ALP SERPL-CCNC: 89 U/L (ref 40–150)
ALP SERPL-CCNC: 92 U/L (ref 40–150)
ALT SERPL W P-5'-P-CCNC: 61 U/L (ref 0–70)
ALT SERPL W P-5'-P-CCNC: 65 U/L (ref 0–70)
ANION GAP SERPL CALCULATED.3IONS-SCNC: 11 MMOL/L (ref 7–15)
ANION GAP SERPL CALCULATED.3IONS-SCNC: 12 MMOL/L (ref 7–15)
APPEARANCE UR: CLEAR
AST SERPL W P-5'-P-CCNC: 66 U/L (ref 0–45)
AST SERPL W P-5'-P-CCNC: 72 U/L (ref 0–45)
BACTERIA #/AREA URNS HPF: ABNORMAL /HPF
BACTERIA SPT CULT: NORMAL
BASOPHILS # BLD AUTO: 0 10E3/UL (ref 0–0.2)
BASOPHILS # BLD AUTO: 0.1 10E3/UL (ref 0–0.2)
BASOPHILS NFR BLD AUTO: 1 %
BASOPHILS NFR BLD AUTO: 1 %
BILIRUB SERPL-MCNC: 0.5 MG/DL
BILIRUB SERPL-MCNC: 0.5 MG/DL
BILIRUB UR QL STRIP: NEGATIVE
BUN SERPL-MCNC: 31.9 MG/DL (ref 8–23)
BUN SERPL-MCNC: 37.9 MG/DL (ref 8–23)
BURR CELLS BLD QL SMEAR: ABNORMAL
C PNEUM DNA SPEC QL NAA+PROBE: NOT DETECTED
CALCIUM SERPL-MCNC: 7.9 MG/DL (ref 8.8–10.4)
CALCIUM SERPL-MCNC: 8.2 MG/DL (ref 8.8–10.4)
CHLORIDE SERPL-SCNC: 101 MMOL/L (ref 98–107)
CHLORIDE SERPL-SCNC: 101 MMOL/L (ref 98–107)
COLOR UR AUTO: ABNORMAL
CREAT SERPL-MCNC: 0.96 MG/DL (ref 0.67–1.17)
CREAT SERPL-MCNC: 0.96 MG/DL (ref 0.67–1.17)
CREAT SERPL-MCNC: 1.32 MG/DL (ref 0.67–1.17)
CREAT UR-MCNC: 70 MG/DL
CRP SERPL-MCNC: 59.3 MG/L
EGFRCR SERPLBLD CKD-EPI 2021: 55 ML/MIN/1.73M2
EGFRCR SERPLBLD CKD-EPI 2021: 80 ML/MIN/1.73M2
EOSINOPHIL # BLD AUTO: 0 10E3/UL (ref 0–0.7)
EOSINOPHIL # BLD AUTO: 0.1 10E3/UL (ref 0–0.7)
EOSINOPHIL NFR BLD AUTO: 0 %
EOSINOPHIL NFR BLD AUTO: 1 %
ERYTHROCYTE [DISTWIDTH] IN BLOOD BY AUTOMATED COUNT: 15 % (ref 10–15)
ERYTHROCYTE [DISTWIDTH] IN BLOOD BY AUTOMATED COUNT: 15.2 % (ref 10–15)
FERRITIN SERPL-MCNC: 1062 NG/ML (ref 31–409)
FLUAV H1 2009 PAND RNA SPEC QL NAA+PROBE: NOT DETECTED
FLUAV H1 RNA SPEC QL NAA+PROBE: NOT DETECTED
FLUAV H3 RNA SPEC QL NAA+PROBE: NOT DETECTED
FLUAV RNA SPEC QL NAA+PROBE: NEGATIVE
FLUAV RNA SPEC QL NAA+PROBE: NOT DETECTED
FLUBV RNA RESP QL NAA+PROBE: NEGATIVE
FLUBV RNA SPEC QL NAA+PROBE: NOT DETECTED
FOLATE SERPL-MCNC: 6.6 NG/ML (ref 4.6–34.8)
FRACT EXCRET NA UR+SERPL-RTO: NORMAL %
GLUCOSE SERPL-MCNC: 118 MG/DL (ref 70–99)
GLUCOSE SERPL-MCNC: 118 MG/DL (ref 70–99)
GLUCOSE UR STRIP-MCNC: NEGATIVE MG/DL
GRAM STAIN RESULT: NORMAL
GRANULAR CAST: 5 /LPF
HADV DNA SPEC QL NAA+PROBE: NOT DETECTED
HCO3 SERPL-SCNC: 20 MMOL/L (ref 22–29)
HCO3 SERPL-SCNC: 21 MMOL/L (ref 22–29)
HCOV PNL SPEC NAA+PROBE: NOT DETECTED
HCT VFR BLD AUTO: 31.4 % (ref 40–53)
HCT VFR BLD AUTO: 31.5 % (ref 40–53)
HGB BLD-MCNC: 10.7 G/DL (ref 13.3–17.7)
HGB BLD-MCNC: 11.1 G/DL (ref 13.3–17.7)
HGB UR QL STRIP: ABNORMAL
HMPV RNA SPEC QL NAA+PROBE: NOT DETECTED
HOLD SPECIMEN: NORMAL
HOLD SPECIMEN: NORMAL
HPIV1 RNA SPEC QL NAA+PROBE: NOT DETECTED
HPIV2 RNA SPEC QL NAA+PROBE: NOT DETECTED
HPIV3 RNA SPEC QL NAA+PROBE: NOT DETECTED
HPIV4 RNA SPEC QL NAA+PROBE: NOT DETECTED
HYALINE CASTS: 2 /LPF
IMM GRANULOCYTES # BLD: 0 10E3/UL
IMM GRANULOCYTES # BLD: 0.1 10E3/UL
IMM GRANULOCYTES NFR BLD: 0 %
IMM GRANULOCYTES NFR BLD: 1 %
IRON BINDING CAPACITY (ROCHE): 126 UG/DL (ref 240–430)
IRON SATN MFR SERPL: 24 % (ref 15–46)
IRON SERPL-MCNC: 30 UG/DL (ref 61–157)
KETONES UR STRIP-MCNC: NEGATIVE MG/DL
LACTATE SERPL-SCNC: 1 MMOL/L (ref 0.7–2)
LACTATE SERPL-SCNC: 1.8 MMOL/L (ref 0.7–2)
LEUKOCYTE ESTERASE UR QL STRIP: NEGATIVE
LYMPHOCYTES # BLD AUTO: 0.4 10E3/UL (ref 0.8–5.3)
LYMPHOCYTES # BLD AUTO: 0.5 10E3/UL (ref 0.8–5.3)
LYMPHOCYTES NFR BLD AUTO: 10 %
LYMPHOCYTES NFR BLD AUTO: 7 %
M PNEUMO DNA SPEC QL NAA+PROBE: NOT DETECTED
MAGNESIUM SERPL-MCNC: 1.5 MG/DL (ref 1.7–2.3)
MCH RBC QN AUTO: 32.7 PG (ref 26.5–33)
MCH RBC QN AUTO: 33 PG (ref 26.5–33)
MCHC RBC AUTO-ENTMCNC: 34 G/DL (ref 31.5–36.5)
MCHC RBC AUTO-ENTMCNC: 35.4 G/DL (ref 31.5–36.5)
MCV RBC AUTO: 94 FL (ref 78–100)
MCV RBC AUTO: 96 FL (ref 78–100)
MONOCYTES # BLD AUTO: 0.4 10E3/UL (ref 0–1.3)
MONOCYTES # BLD AUTO: 0.5 10E3/UL (ref 0–1.3)
MONOCYTES NFR BLD AUTO: 9 %
MONOCYTES NFR BLD AUTO: 9 %
MRSA DNA SPEC QL NAA+PROBE: NEGATIVE
NEUTROPHILS # BLD AUTO: 3.9 10E3/UL (ref 1.6–8.3)
NEUTROPHILS # BLD AUTO: 4.7 10E3/UL (ref 1.6–8.3)
NEUTROPHILS NFR BLD AUTO: 80 %
NEUTROPHILS NFR BLD AUTO: 82 %
NITRATE UR QL: NEGATIVE
NRBC # BLD AUTO: 0 10E3/UL
NRBC # BLD AUTO: 0 10E3/UL
NRBC BLD AUTO-RTO: 0 /100
NRBC BLD AUTO-RTO: 0 /100
NT-PROBNP SERPL-MCNC: 1813 PG/ML (ref 0–1800)
OSMOLALITY SERPL: 285 MMOL/KG (ref 280–301)
OSMOLALITY UR: 373 MMOL/KG (ref 100–1200)
PH UR STRIP: 5.5 [PH] (ref 5–7)
PHOSPHATE SERPL-MCNC: 2.6 MG/DL (ref 2.5–4.5)
PLAT MORPH BLD: ABNORMAL
PLATELET # BLD AUTO: 88 10E3/UL (ref 150–450)
PLATELET # BLD AUTO: 90 10E3/UL (ref 150–450)
POTASSIUM SERPL-SCNC: 3.5 MMOL/L (ref 3.4–5.3)
POTASSIUM SERPL-SCNC: 3.6 MMOL/L (ref 3.4–5.3)
PROCALCITONIN SERPL IA-MCNC: 0.25 NG/ML
PROT SERPL-MCNC: 5.5 G/DL (ref 6.4–8.3)
PROT SERPL-MCNC: 5.6 G/DL (ref 6.4–8.3)
RBC # BLD AUTO: 3.27 10E6/UL (ref 4.4–5.9)
RBC # BLD AUTO: 3.36 10E6/UL (ref 4.4–5.9)
RBC MORPH BLD: ABNORMAL
RBC URINE: 1 /HPF
RETICS # AUTO: 0.02 10E6/UL (ref 0.03–0.1)
RETICS/RBC NFR AUTO: 0.6 % (ref 0.5–2)
RSV RNA SPEC NAA+PROBE: NEGATIVE
RSV RNA SPEC QL NAA+PROBE: NOT DETECTED
RSV RNA SPEC QL NAA+PROBE: NOT DETECTED
RV+EV RNA SPEC QL NAA+PROBE: NOT DETECTED
SA TARGET DNA: NEGATIVE
SARS-COV-2 RNA RESP QL NAA+PROBE: POSITIVE
SODIUM SERPL-SCNC: 133 MMOL/L (ref 135–145)
SODIUM UR-SCNC: <20 MMOL/L
SP GR UR STRIP: 1.02 (ref 1–1.03)
SQUAMOUS EPITHELIAL: <1 /HPF
UROBILINOGEN UR STRIP-MCNC: NORMAL MG/DL
VIT B12 SERPL-MCNC: 479 PG/ML (ref 232–1245)
WBC # BLD AUTO: 4.9 10E3/UL (ref 4–11)
WBC # BLD AUTO: 5.8 10E3/UL (ref 4–11)
WBC URINE: 2 /HPF

## 2025-02-11 PROCEDURE — 80053 COMPREHEN METABOLIC PANEL: CPT | Performed by: EMERGENCY MEDICINE

## 2025-02-11 PROCEDURE — 84155 ASSAY OF PROTEIN SERUM: CPT

## 2025-02-11 PROCEDURE — 258N000003 HC RX IP 258 OP 636: Performed by: EMERGENCY MEDICINE

## 2025-02-11 PROCEDURE — 250N000011 HC RX IP 250 OP 636: Performed by: STUDENT IN AN ORGANIZED HEALTH CARE EDUCATION/TRAINING PROGRAM

## 2025-02-11 PROCEDURE — 250N000009 HC RX 250: Performed by: EMERGENCY MEDICINE

## 2025-02-11 PROCEDURE — 85048 AUTOMATED LEUKOCYTE COUNT: CPT

## 2025-02-11 PROCEDURE — 83605 ASSAY OF LACTIC ACID: CPT

## 2025-02-11 PROCEDURE — 71046 X-RAY EXAM CHEST 2 VIEWS: CPT

## 2025-02-11 PROCEDURE — 250N000011 HC RX IP 250 OP 636

## 2025-02-11 PROCEDURE — 86140 C-REACTIVE PROTEIN: CPT

## 2025-02-11 PROCEDURE — 83605 ASSAY OF LACTIC ACID: CPT | Performed by: EMERGENCY MEDICINE

## 2025-02-11 PROCEDURE — 87637 SARSCOV2&INF A&B&RSV AMP PRB: CPT

## 2025-02-11 PROCEDURE — 82565 ASSAY OF CREATININE: CPT

## 2025-02-11 PROCEDURE — 85045 AUTOMATED RETICULOCYTE COUNT: CPT

## 2025-02-11 PROCEDURE — 250N000011 HC RX IP 250 OP 636: Performed by: EMERGENCY MEDICINE

## 2025-02-11 PROCEDURE — 87040 BLOOD CULTURE FOR BACTERIA: CPT | Performed by: EMERGENCY MEDICINE

## 2025-02-11 PROCEDURE — 85004 AUTOMATED DIFF WBC COUNT: CPT | Performed by: EMERGENCY MEDICINE

## 2025-02-11 PROCEDURE — 87633 RESP VIRUS 12-25 TARGETS: CPT

## 2025-02-11 PROCEDURE — 258N000003 HC RX IP 258 OP 636

## 2025-02-11 PROCEDURE — 83735 ASSAY OF MAGNESIUM: CPT | Performed by: EMERGENCY MEDICINE

## 2025-02-11 PROCEDURE — 87798 DETECT AGENT NOS DNA AMP: CPT

## 2025-02-11 PROCEDURE — 82728 ASSAY OF FERRITIN: CPT

## 2025-02-11 PROCEDURE — 99222 1ST HOSP IP/OBS MODERATE 55: CPT | Performed by: NURSE PRACTITIONER

## 2025-02-11 PROCEDURE — 36415 COLL VENOUS BLD VENIPUNCTURE: CPT | Performed by: EMERGENCY MEDICINE

## 2025-02-11 PROCEDURE — 93005 ELECTROCARDIOGRAM TRACING: CPT | Performed by: EMERGENCY MEDICINE

## 2025-02-11 PROCEDURE — 83930 ASSAY OF BLOOD OSMOLALITY: CPT

## 2025-02-11 PROCEDURE — 99223 1ST HOSP IP/OBS HIGH 75: CPT | Mod: GC | Performed by: STUDENT IN AN ORGANIZED HEALTH CARE EDUCATION/TRAINING PROGRAM

## 2025-02-11 PROCEDURE — 96360 HYDRATION IV INFUSION INIT: CPT | Performed by: EMERGENCY MEDICINE

## 2025-02-11 PROCEDURE — 87205 SMEAR GRAM STAIN: CPT

## 2025-02-11 PROCEDURE — 250N000013 HC RX MED GY IP 250 OP 250 PS 637

## 2025-02-11 PROCEDURE — 87899 AGENT NOS ASSAY W/OPTIC: CPT

## 2025-02-11 PROCEDURE — 83880 ASSAY OF NATRIURETIC PEPTIDE: CPT

## 2025-02-11 PROCEDURE — 36415 COLL VENOUS BLD VENIPUNCTURE: CPT

## 2025-02-11 PROCEDURE — 71046 X-RAY EXAM CHEST 2 VIEWS: CPT | Mod: 26 | Performed by: RADIOLOGY

## 2025-02-11 PROCEDURE — 83935 ASSAY OF URINE OSMOLALITY: CPT

## 2025-02-11 PROCEDURE — 99285 EMERGENCY DEPT VISIT HI MDM: CPT | Mod: 25 | Performed by: EMERGENCY MEDICINE

## 2025-02-11 PROCEDURE — 99285 EMERGENCY DEPT VISIT HI MDM: CPT | Performed by: EMERGENCY MEDICINE

## 2025-02-11 PROCEDURE — 82570 ASSAY OF URINE CREATININE: CPT

## 2025-02-11 PROCEDURE — 99207 PR NO CHARGE LOS: CPT | Performed by: NURSE PRACTITIONER

## 2025-02-11 PROCEDURE — 84145 PROCALCITONIN (PCT): CPT | Performed by: EMERGENCY MEDICINE

## 2025-02-11 PROCEDURE — 82746 ASSAY OF FOLIC ACID SERUM: CPT

## 2025-02-11 PROCEDURE — 85004 AUTOMATED DIFF WBC COUNT: CPT

## 2025-02-11 PROCEDURE — 80074 ACUTE HEPATITIS PANEL: CPT

## 2025-02-11 PROCEDURE — 120N000002 HC R&B MED SURG/OB UMMC

## 2025-02-11 PROCEDURE — 99207 BLOOD MORPHOLOGY PATHOLOGIST REVIEW: CPT | Performed by: STUDENT IN AN ORGANIZED HEALTH CARE EDUCATION/TRAINING PROGRAM

## 2025-02-11 PROCEDURE — 84100 ASSAY OF PHOSPHORUS: CPT

## 2025-02-11 PROCEDURE — 93010 ELECTROCARDIOGRAM REPORT: CPT | Performed by: EMERGENCY MEDICINE

## 2025-02-11 PROCEDURE — 81003 URINALYSIS AUTO W/O SCOPE: CPT

## 2025-02-11 PROCEDURE — 87070 CULTURE OTHR SPECIMN AEROBIC: CPT

## 2025-02-11 PROCEDURE — 83550 IRON BINDING TEST: CPT

## 2025-02-11 PROCEDURE — 84295 ASSAY OF SERUM SODIUM: CPT

## 2025-02-11 PROCEDURE — 82607 VITAMIN B-12: CPT

## 2025-02-11 PROCEDURE — 87641 MR-STAPH DNA AMP PROBE: CPT

## 2025-02-11 PROCEDURE — 81001 URINALYSIS AUTO W/SCOPE: CPT

## 2025-02-11 PROCEDURE — 96361 HYDRATE IV INFUSION ADD-ON: CPT | Performed by: EMERGENCY MEDICINE

## 2025-02-11 RX ORDER — LIDOCAINE 40 MG/G
CREAM TOPICAL
Status: DISCONTINUED | OUTPATIENT
Start: 2025-02-11 | End: 2025-02-17 | Stop reason: HOSPADM

## 2025-02-11 RX ORDER — MAGNESIUM SULFATE HEPTAHYDRATE 40 MG/ML
2 INJECTION, SOLUTION INTRAVENOUS ONCE
Status: COMPLETED | OUTPATIENT
Start: 2025-02-11 | End: 2025-02-12

## 2025-02-11 RX ORDER — PIPERACILLIN SODIUM, TAZOBACTAM SODIUM 4; .5 G/20ML; G/20ML
4.5 INJECTION, POWDER, LYOPHILIZED, FOR SOLUTION INTRAVENOUS EVERY 6 HOURS
Status: DISCONTINUED | OUTPATIENT
Start: 2025-02-11 | End: 2025-02-12

## 2025-02-11 RX ORDER — MORPHINE SULFATE 4 MG/ML
4 INJECTION, SOLUTION INTRAMUSCULAR; INTRAVENOUS
Status: DISCONTINUED | OUTPATIENT
Start: 2025-02-11 | End: 2025-02-11

## 2025-02-11 RX ORDER — OXYCODONE HYDROCHLORIDE 5 MG/1
5 TABLET ORAL EVERY 8 HOURS PRN
Status: DISCONTINUED | OUTPATIENT
Start: 2025-02-11 | End: 2025-02-15

## 2025-02-11 RX ORDER — AMOXICILLIN 250 MG
2 CAPSULE ORAL 2 TIMES DAILY PRN
Status: DISCONTINUED | OUTPATIENT
Start: 2025-02-11 | End: 2025-02-17 | Stop reason: HOSPADM

## 2025-02-11 RX ORDER — CARVEDILOL 3.12 MG/1
3.12 TABLET ORAL 2 TIMES DAILY WITH MEALS
Status: DISCONTINUED | OUTPATIENT
Start: 2025-02-11 | End: 2025-02-17 | Stop reason: HOSPADM

## 2025-02-11 RX ORDER — AMOXICILLIN 250 MG
1 CAPSULE ORAL 2 TIMES DAILY PRN
Status: DISCONTINUED | OUTPATIENT
Start: 2025-02-11 | End: 2025-02-17 | Stop reason: HOSPADM

## 2025-02-11 RX ORDER — ACETAMINOPHEN 325 MG/1
650 TABLET ORAL EVERY 6 HOURS PRN
Status: DISCONTINUED | OUTPATIENT
Start: 2025-02-11 | End: 2025-02-17 | Stop reason: HOSPADM

## 2025-02-11 RX ORDER — MORPHINE SULFATE 2 MG/ML
4 INJECTION, SOLUTION INTRAMUSCULAR; INTRAVENOUS
Status: DISCONTINUED | OUTPATIENT
Start: 2025-02-11 | End: 2025-02-17 | Stop reason: HOSPADM

## 2025-02-11 RX ORDER — LIDOCAINE HYDROCHLORIDE 20 MG/ML
10 JELLY TOPICAL ONCE
Status: COMPLETED | OUTPATIENT
Start: 2025-02-11 | End: 2025-02-11

## 2025-02-11 RX ORDER — ATORVASTATIN CALCIUM 80 MG/1
80 TABLET, FILM COATED ORAL AT BEDTIME
Status: DISCONTINUED | OUTPATIENT
Start: 2025-02-11 | End: 2025-02-17 | Stop reason: HOSPADM

## 2025-02-11 RX ORDER — PHENYTOIN 50 MG/1
200 TABLET, CHEWABLE ORAL AT BEDTIME
Status: DISCONTINUED | OUTPATIENT
Start: 2025-02-11 | End: 2025-02-17 | Stop reason: HOSPADM

## 2025-02-11 RX ORDER — GABAPENTIN 300 MG/1
600 CAPSULE ORAL 3 TIMES DAILY
Status: DISCONTINUED | OUTPATIENT
Start: 2025-02-11 | End: 2025-02-14

## 2025-02-11 RX ORDER — VANCOMYCIN 2 GRAM/500 ML IN 0.9 % SODIUM CHLORIDE INTRAVENOUS
2000 ONCE
Status: COMPLETED | OUTPATIENT
Start: 2025-02-11 | End: 2025-02-12

## 2025-02-11 RX ORDER — SODIUM CHLORIDE, SODIUM LACTATE, POTASSIUM CHLORIDE, CALCIUM CHLORIDE 600; 310; 30; 20 MG/100ML; MG/100ML; MG/100ML; MG/100ML
INJECTION, SOLUTION INTRAVENOUS CONTINUOUS
Status: ACTIVE | OUTPATIENT
Start: 2025-02-11 | End: 2025-02-11

## 2025-02-11 RX ORDER — FUROSEMIDE 20 MG/1
20 TABLET ORAL DAILY
Status: DISCONTINUED | OUTPATIENT
Start: 2025-02-11 | End: 2025-02-17 | Stop reason: HOSPADM

## 2025-02-11 RX ORDER — PREDNISONE 10 MG/1
10 TABLET ORAL DAILY
Status: DISCONTINUED | OUTPATIENT
Start: 2025-02-11 | End: 2025-02-17 | Stop reason: HOSPADM

## 2025-02-11 RX ORDER — ACETAMINOPHEN 325 MG/1
1300 TABLET ORAL 2 TIMES DAILY
Status: DISCONTINUED | OUTPATIENT
Start: 2025-02-11 | End: 2025-02-17 | Stop reason: HOSPADM

## 2025-02-11 RX ORDER — CALCIUM CARBONATE 500 MG/1
1000 TABLET, CHEWABLE ORAL 4 TIMES DAILY PRN
Status: DISCONTINUED | OUTPATIENT
Start: 2025-02-11 | End: 2025-02-17 | Stop reason: HOSPADM

## 2025-02-11 RX ORDER — VANCOMYCIN HYDROCHLORIDE
1500 EVERY 24 HOURS
Status: DISCONTINUED | OUTPATIENT
Start: 2025-02-12 | End: 2025-02-12

## 2025-02-11 RX ADMIN — PIPERACILLIN SODIUM AND TAZOBACTAM SODIUM 3.38 G: 3; .375 INJECTION, SOLUTION INTRAVENOUS at 13:47

## 2025-02-11 RX ADMIN — SODIUM CHLORIDE 1000 ML: 9 INJECTION, SOLUTION INTRAVENOUS at 10:14

## 2025-02-11 RX ADMIN — MAGNESIUM SULFATE HEPTAHYDRATE 2 G: 40 INJECTION, SOLUTION INTRAVENOUS at 16:15

## 2025-02-11 RX ADMIN — GABAPENTIN 600 MG: 300 CAPSULE ORAL at 16:13

## 2025-02-11 RX ADMIN — PIPERACILLIN AND TAZOBACTAM 4.5 G: 4; .5 INJECTION, POWDER, LYOPHILIZED, FOR SOLUTION INTRAVENOUS at 20:12

## 2025-02-11 RX ADMIN — LIDOCAINE HYDROCHLORIDE 10 ML: 20 JELLY TOPICAL at 13:44

## 2025-02-11 RX ADMIN — SODIUM CHLORIDE, POTASSIUM CHLORIDE, SODIUM LACTATE AND CALCIUM CHLORIDE: 600; 310; 30; 20 INJECTION, SOLUTION INTRAVENOUS at 15:07

## 2025-02-11 RX ADMIN — GABAPENTIN 600 MG: 300 CAPSULE ORAL at 20:27

## 2025-02-11 RX ADMIN — Medication 2000 MG: at 16:15

## 2025-02-11 RX ADMIN — ACETAMINOPHEN 1300 MG: 325 TABLET, FILM COATED ORAL at 20:27

## 2025-02-11 RX ADMIN — APIXABAN 5 MG: 5 TABLET, FILM COATED ORAL at 20:27

## 2025-02-11 ASSESSMENT — ACTIVITIES OF DAILY LIVING (ADL)
ADLS_ACUITY_SCORE: 61
ADLS_ACUITY_SCORE: 58
ADLS_ACUITY_SCORE: 61
ADLS_ACUITY_SCORE: 61
ADLS_ACUITY_SCORE: 58
ADLS_ACUITY_SCORE: 58
ADLS_ACUITY_SCORE: 61
ADLS_ACUITY_SCORE: 58
ADLS_ACUITY_SCORE: 61
ADLS_ACUITY_SCORE: 61
ADLS_ACUITY_SCORE: 58
ADLS_ACUITY_SCORE: 58

## 2025-02-11 ASSESSMENT — COLUMBIA-SUICIDE SEVERITY RATING SCALE - C-SSRS
1. IN THE PAST MONTH, HAVE YOU WISHED YOU WERE DEAD OR WISHED YOU COULD GO TO SLEEP AND NOT WAKE UP?: YES
2. HAVE YOU ACTUALLY HAD ANY THOUGHTS OF KILLING YOURSELF IN THE PAST MONTH?: NO
6. HAVE YOU EVER DONE ANYTHING, STARTED TO DO ANYTHING, OR PREPARED TO DO ANYTHING TO END YOUR LIFE?: NO

## 2025-02-11 NOTE — PROGRESS NOTES
Patient is here today for Trigeminal neuralgia. Per prior notes, he is on Oxcarbazepine 600 mg three times a day and Gabapentin 600 mg three times a day. He was found to have symptomatic hypotension. 911 called and patient was transported to [facility] for further evaluation. Inpatient Neurosurgery team notified.

## 2025-02-11 NOTE — CONSULTS
[facility]       NEUROSURGERY CONSULTATION  NOTE    This consultation was requested by Dr. Carrera from the Emergency Medicine service.               Reason for Consultation  Trigeminal neuralgia     HPI:  James Salvador is a 79 year old male with a history of atrial fibrillation on Eliquis s/p pacemaker placement, metastatic melanoma on immunotherapy, right sided trigeminal neuralgia, HTN, HLD, CAD s/p PCI (2015), and SIADH who was transferred from clinic today after being found to be hypotensive.   Patient was scheduled to be seen to discuss potential options for treatment of trigeminal neuralgia.    Patient was recently hospitalized from January 27 to January 31, 2025 at [facility] for concerns of hyponatremia, hypokalemia, hypomagnesia, asymptomatic COVID-19 infection, and trigeminal neuralgia with acute flare.  During his hospitalization the patient was again found to be hyponatremic and therefore had his Trileptal medication held.  Neurology followed the patient during the hospitalization and increase his gabapentin to 900 mg 3 times daily and also began phenytoin 200 mg orally at bedtime.  He was also discharged on nasal lidocaine and oxycodone as needed for acute pain.  In regards to the hyponatremia the patient was started on a 1 L fluid restriction which has since been discontinued per his wife.  Currently the patient's sodium is 133.  The patient states that he has been able to tolerate a soft diet and has been able to drink fluids.  He states that he has been able to talk with and has been able to brush his palate without issue.  The patient does wear dentures at baseline.  The patient states that typically facial movements increases pain however is he is unable to tell me what specific motions exacerbate the pain most.  The patient states that his symptoms have been present for over 10 years, typically he notes pain over the maxilla bone directly  under his right eye.  The pain does not radiate in a typical V2 pattern.  The patient does state that he feels the Trileptal medication did give him better pain relief than his current regimen.  Currently rates his pain a 3 out of 10.  Patient states he has been weak but has not fallen recently.  His wife who accompanies the patient today is concerned that the patient's trigeminal symptoms are attributing to his weakness.  The patient states he has not undergone procedures related to the trigeminal neuralgia in the past.  The patient also states that he is unsure if he would want to undergo an invasive procedure.  The patient is also currently actively undergoing immunotherapy secondary to metastatic melanoma.  The patient does take Eliquis daily for history of atrial fibrillation.            PAST MEDICAL HISTORY:   Past Medical History:   Diagnosis Date    Actinic cheilitis 07/17/2013    Lower lip, left     Actinic keratosis     Allergic rhinitis     Anxiety 3/1/23    Arthritis 2004    Basal cell carcinoma     Benign hypertension     CAD (coronary artery disease)     Cardiac cath and PCI 1994. Cardiac Cath 9/2015: BRIDGET to LAD    Hearing problem 1995    Wear hearing aids    Hyperlipidemia     Malignant melanoma     Morbid obesity 01/11/2016    Paroxysmal supraventricular tachycardia     on metoprolol    Permanent atrial fibrillation 04/21/2017    Squamous cell carcinoma     Tachy-edinson syndrome 05/29/2019       PAST SURGICAL HISTORY:   Past Surgical History:   Procedure Laterality Date    ADENOIDECTOMY  Childhood    ANGIOPLASTY  1994    in [place]    ANKLE SURGERY  07/13/2005    right ankle    ARTHROPLASTY HIP Right 10/2009    BIOPSY NODE SENTINEL Left 03/30/2023    Procedure: BIOPSY, LYMPH NODE, SENTINEL;  Surgeon: Rolando Minaya MD;  Location: [facility] OR    COLONOSCOPY  4/25/12    EP PERM PACER SINGLE LEAD N/A 05/31/2019    [employer] single lead pacemaker    EXCISE MASS CHEEK Left 03/30/2023    Procedure: wide  local excision of left cheek melanoma;  Surgeon: Rolando Minaya MD;  Location: [facility] OR    EXCISE MASS CHEEK WITH FLAP PEDICLE Left 2023    Procedure: Left cervical Facial flap per closure of left cheek Defect;  Surgeon: Ila Sanchez MD;  Location: [facility] OR    Facial biopsy - Melanoma      GENITOURINARY SURGERY  10/20/23    Prostate surgery    HEART CATH STENT COR W/WO PTCA  2015    BRIDGET stent mid LAD    IR CHEST PORT PLACEMENT > 5 YRS OF AGE  2023    LASER SURGERY OF EYE  2002    MOHS MICROGRAPHIC PROCEDURE  2004    squamous cell carcinoma right temple    SINUS SURGERY  2006    TONSILLECTOMY  Childhood       FAMILY HISTORY:   Family History   Problem Relation Age of Onset    Genitourinary Problems Mother         renal failure    Heart Disease Mother     Cerebrovascular Disease Mother         TIA    Cardiovascular Father         rupture of dorsal aorta    Depression Son     Depression Brother         Suicide    Substance Abuse Brother         Heroin    Skin Cancer No family hx of        SOCIAL HISTORY:   Social History     Tobacco Use    Smoking status: Former     Current packs/day: 0.00     Average packs/day: 0.5 packs/day for 10.0 years (5.0 ttl pk-yrs)     Types: Cigarettes, Cigars, Pipe     Start date: 1966     Quit date: 1974     Years since quittin.8    Smokeless tobacco: Never    Tobacco comments:     Also smoked from 1985 - 1990   Substance Use Topics    Alcohol use: Not Currently       MEDICATIONS:  Current Outpatient Medications   Medication Sig Dispense Refill    acetaminophen (TYLENOL) 650 MG CR tablet Take 1,300 mg by mouth 2 times daily.      apixaban ANTICOAGULANT (ELIQUIS ANTICOAGULANT) 5 MG tablet Take 1 tablet (5 mg) by mouth 2 times daily 180 tablet 3    atorvastatin (LIPITOR) 80 MG tablet Take 1 tablet (80 mg) by mouth at bedtime 90 tablet 3    carvedilol (COREG) 3.125 MG tablet Take 1 tablet (3.125 mg) by mouth 2 times daily (with  meals) 180 tablet 3    clobetasol propionate (TEMOVATE) 0.05 % external cream Apply to AA BID x 1-2 weeks then PRN. Do not apply to face. 60 g 3    fluticasone (FLONASE) 50 MCG/ACT nasal spray Spray 2 sprays into both nostrils daily as needed for allergies.      furosemide (LASIX) 20 MG tablet Take 1 tablet (20 mg) by mouth daily.      gabapentin (NEURONTIN) 300 MG capsule Take 600 mg by mouth 3 times daily. 0700, 1330, 2000  May increase dose to 900mg if needed per neurologist. Patient has been doing this with evening dose the last few nights.      ketoconazole (NIZORAL) 2 % external cream Apply topically 2 times daily as needed. For face. FAX REFILL REQUESTS TO [facility]: [phone]      ketoconazole (NIZORAL) 2 % external shampoo Use daily as needed 120 mL 11    ketotifen fumarate 0.035% 0.035 % SOLN ophthalmic solution Place 1 drop into both eyes 2 times daily as needed for itching.      lidocaine, PF, (XYLOCAINE) 1 % SOLN injection 3 mLs by Other route 3 times daily as needed (moderate pain). 270 mL 0    lisinopril (ZESTRIL) 30 MG tablet Take 1 tablet (30 mg) by mouth daily 90 tablet 3    nitroGLYcerin (NITROSTAT) 0.4 MG sublingual tablet Place 1 tablet (0.4 mg) under the tongue every 5 minutes as needed for chest pain May repeat twice for a total of 3 tablets.  If chest pain not relieved, call 911 25 tablet 11    oxyCODONE (ROXICODONE) 5 MG tablet Take 1 tablet (5 mg) by mouth every 8 hours as needed for moderate pain. 12 tablet 0    phenytoin (DILANTIN) 50 MG chewable tablet Take 4 tablets (200 mg) by mouth at bedtime. (Patient taking differently: Take 900 mg by mouth daily.) 120 tablet 1    predniSONE (DELTASONE) 10 MG tablet Take 10 mg by mouth daily      triamcinolone (KENALOG) 0.1 % external lotion Apply to scalp BID x 1-2 weeks then PRN only 60 mL 3       Allergies:  Allergies   Allergen Reactions    Cats      Cat Dander    Dogs      Dog Dander    Hydromorphone      Other reaction(s): Confusion     "Pollen Extract        ROS: 10 point ROS of systems including Constitutional, Eyes, Respiratory, Cardiovascular, Gastroenterology, Genitourinary, Integumentary, Muscularskeletal, Psychiatric were all negative except for pertinent positives noted in my HPI.    Physical exam:   Blood pressure (!) 143/104, pulse 88, temperature 98  F (36.7  C), temperature source Oral, resp. rate 17, height 1.803 m (5' 11\"), weight 103.8 kg (228 lb 12.8 oz), SpO2 99%.  General: awake and alert  HEENT: normocephalic   PULM: breathing comfortably on room air  MSK: normal bulk and tone  NEUROLOGIC:  -- Awake; Alert; oriented x 3  -- Follows commands briskly  -- +repetition, calculation, and naming, able to have normal conversation without exacerbation of pain   -- Speech fluent, spontaneous. No aphasia or dysarthria.  -- no gaze preference. No apparent hemineglect.  Cranial Nerves:  -- visual fields full to confrontation, PERRL 3-2mm bilat and brisk, extraocular movements intact  -- face symmetrical, tongue midline  -- sensory V1-V3 intact bilaterally  -- palate elevates symmetrically, uvula midline  -- hearing grossly intact bilat  -- Trapezii 5/5 strength bilat symmetric  -- Cerebellar: Finger nose finger without dysmetria, intact rapid alternating motions bilaterally    Motor:  Normal bulk / tone; no tremor, rigidity, or bradykinesia.  No muscle wasting or fasciculations  No Pronator Drift     Delt Bi Tri Hand Flexion/  Extension Iliopsoas Quadriceps Hamstrings Tibialis Anterior Gastroc    C5 C6 C7 C8/T1 L2 L3 L4-S1 L4 S1   R 5 5 5 5 5 5 5 5 5   L 5 5 5 5 5 5 5 5 5     Sensory:  intact to LT x 4 extremities       Reflexes: no clonus       Bi Tri BR Cassandra Pat Ach Bab     C5-6 C7-8 C6 UMN L2-4 S1 UMN   R 2+ 2+ 2+ Norm 2+ 2+ Norm   L 2+ 2+ 2+ Norm 2+ 2+ Norm                  LABS:   Last Comprehensive Metabolic Panel:  Lab Results   Component Value Date     (L) 01/31/2025    POTASSIUM 4.3 01/31/2025    POTASSIUM 4.5 01/31/2025    " "CHLORIDE 97 (L) 01/31/2025    CO2 26 01/31/2025    ANIONGAP 10 01/31/2025    GLC 80 01/31/2025    BUN 11.0 01/31/2025    CR 0.56 (L) 01/31/2025    GFRESTIMATED >90 01/31/2025    BENJI 9.2 01/31/2025         Lab Results   Component Value Date    WBC 5.8 02/11/2025    WBC 7.4 02/09/2021     Lab Results   Component Value Date    RBC 3.27 02/11/2025    RBC 4.47 02/09/2021     Lab Results   Component Value Date    HGB 10.7 02/11/2025    HGB 14.6 02/09/2021     Lab Results   Component Value Date    HCT 31.5 02/11/2025    HCT 42.5 02/09/2021     Lab Results   Component Value Date    MCV 96 02/11/2025    MCV 95 02/09/2021     Lab Results   Component Value Date    MCH 32.7 02/11/2025    MCH 32.7 02/09/2021     Lab Results   Component Value Date    MCHC 34.0 02/11/2025    MCHC 34.4 02/09/2021     Lab Results   Component Value Date    RDW 15.0 02/11/2025    RDW 12.6 02/09/2021     Lab Results   Component Value Date    PLT 88 02/11/2025     02/09/2021     INR   Date Value Ref Range Status   05/29/2024 1.06 0.85 - 1.15 Final   09/23/2015 1.02 0.86 - 1.14 Final      No results found for: \"PTT\"        ASSESSMENT:  79 year old male with a history of atrial fibrillation on Eliquis s/p pacemaker placement, metastatic melanoma on immunotherapy, right sided trigeminal neuralgia, HTN, HLD, CAD s/p PCI (2015), and SIADH admitted with symptomatic hypotension       RECOMMENDATIONS   -No neurosurgical intervention indicated at this time   -Given patient's pain is currently controlled, continue oral Gabapentin and dilantin as prescribed.  If patient should develop acute pain, may consider IV Fosphenytoin infusion.   -Given patient's multiple medical co-morbidities and the fact he is anticoagulated, no acute intervention is recommended.   - May consider radiofrequency procedure in the future once medical issues have resolved if patient is able to stop anticoagulation.   - Neurosurgery will schedule follow-up in clinic upon hospital " discharge for reassessment and further discussion of possible treatment options.   - Remainder of cares per primary team       The patient was discussed with Dr. Javier Marroquin, neurosurgery chief resident, and Dr. Jasvir Higgins, neurosurgery staff.      NAV Ramírez, CNP  Department of Neurosurgery  Pager: [phone]

## 2025-02-11 NOTE — H&P
[facility]    History and Physical - Medicine Service, MAROON TEAM 1       Date of Admission:  2/11/2025    Assessment & Plan      James Salvador is a 79 year old male admitted on 2/11/2025. PMHx significant for A-fib on DOAC, metastatic melanoma, trigeminal neuralgia, SIADH, CAD who is being admitted on with Acute hypoxic respiratory failure, hypotension and LEE.    #Acute hypoxic respiratory failure  #COVID-19 infection (dx 1/27)  #Concern for post viral bacterial pneumonia  Patient presented with persistent productive cough and reported fever two days ago. was recently admitted at [facility] from 1/27/2025-1/31/2025 for an acute flare of trigeminal neuralgia and COVID infection. CXR showed Diffuse bilateral mixed opacity most predominantly involving the lower lobes most concerning for infectious etiologies, although procal and lactic acid is negative. EKG without ischemic changes so low suspicion for ACS. Wells score 1 so unlikely PE. Presentation not consistent with CHF.  Differential diagnosis superimposed bacterial pneumonia (patient recently had COVID with reported fever) vs Persistent COVID infection vs pulmonary edema.  - continue Zosyn (2/11 - **)  - Add Vanc (2/11 - **) for staph coverage  - complete viral panel  - follow up Strept, legionella urine antigen and sputum cx  - MRSA swab  - Trend lactate  - Consider CT Chest w/o contrast if increase O2 requirement    #Hypotension  #Generalized weakness  #Dehydration  In the setting of dehydration and/or poor PO intake vs infection with reported fever. Patient has warm extremities with normal Lactate. Component of adrenal insufficiency given chronic prednisone dose of 10 mg.  - S/P 2L IVF  - trend lactate  - consider Echo if not fluid responsive    #LEE   #Hypomagnesemia  Baseline Cr 0.5. Cr on admission 1.3 . Suspect this is secondary to pre-renal azotemia from dehydration and decrease PO  intake from. Currently is making urine.   - Electrolyte replacement protocols  - Monitor BMP  - Avoid nephrotoxins  - Monitor urine output    #Hyponatremia   #history of SIADH  Na on admission 133 baseline runs 130s  -UA reviewed, serum osmolality, FeNa, urine sodium, urine osmolality    #Trigeminal neuralgia   - Neurosurgery following  Pain regimen:  - Tylenol PRN  - Decrease PTA gabapentin from 900 to 600 mg three times a day due to LEE  - phenytoin 200 mg orally at bedtime   - For acute pain, may consider IV Fosphenytoin infusion      #Anemia  #Thrombocytopenia  - Iron panel  - Folate, Vitamin B12  - peripheral smear    #A-fib on DOAC with PPM  -Continue PTA Eliquis , Coreg  -Device interrogation      #Elevated Liver enzymes  Mild elevation of AST   - continue to monitor    #Metastatic melanoma,   TVEC injections currently on hold, patient follows with [place] [facility]  -Continue PTA prednisone     #History of CAD with mid LAD PCI in 2015 after MI, RCA is chronically occluded  -Continue PTA beta-blocker  - hold PTA Lasix  - hold PTA Lisinopril          Diet: Combination Diet Regular Diet    DVT Prophylaxis: DOAC  Gill Catheter: Not present  Fluids: IVF  Lines: PRESENT             Cardiac Monitoring: None  Code Status:  FULL    Clinically Significant Risk Factors Present on Admission         # Hyponatremia: Lowest Na = 133 mmol/L in last 2 days, will monitor as appropriate     # Hypomagnesemia: Lowest Mg = 1.5 mg/dL in last 2 days, will replace as needed   # Hypoalbuminemia: Lowest albumin = 2.9 g/dL at 2/11/2025 10:06 AM, will monitor as appropriate  # Drug Induced Coagulation Defect: home medication list includes an anticoagulant medication  # Thrombocytopenia: Lowest platelets = 88 in last 2 days, will monitor for bleeding  # Acute Kidney Injury, unspecified: based on a >150% or 0.3 mg/dL increase in last creatinine compared to past 90 day average, will monitor renal function  # Hypertension: Noted on  "problem list      # Anemia: based on hgb <11       # Obesity: Estimated body mass index is 31.91 kg/m  as calculated from the following:    Height as of this encounter: 1.803 m (5' 11\").    Weight as of this encounter: 103.8 kg (228 lb 12.8 oz).       # Financial/Environmental Concerns:     # Pacemaker present       Disposition Plan      Expected Discharge Date: 02/13/2025                The patient's care was discussed with the Attending Physician, Dr. Gonzalez .      Domenic Davis MD  Medicine Service, MAROON TEAM 1  [facility]  Securely message with [email or web address] (more info)  Text page via [facility] Paging/Directory   See signed in provider for up to date coverage information  ______________________________________________________________________    Chief Complaint   Weakness and hypotension  History is obtained from the patient and wife    History of Present Illness   James Salvador is a 79 year old male with a PMH significant for A-fib on DOAC, metastatic melanoma, trigeminal neuralgia, SIADH, CAD who is being admitted on 1/27/2025 with Acute hypoxic respiratory failure, hypotension and LEE,     Patient's wife reports that before his clinic visit today patient became weak, lethargic, incoherent, and proceeded to \"slump over\".  Once he got to the clinic the symptoms became worse.  She notes that episodes similar to this have happened before.  Patient reports that he does not believe he could walk right now. His wife states that he uses a walker due to a botched left knee replacement and his friend notes that he has had ongoing issues with strength and has been generally weak.  Of note, wife reports he had a fever of 101.8 2 days ago. Patient reports his current face pain is a 9/10     Per chart review, patient was recently admitted at [facility] from 1/27/2025-1/31/2025 for an acute flare of trigeminal neuralgia and management of COVID-19 infection and " hyponatremia, The patient was taken off Trileptal, put on Dilantin, and increased dose of gabapentin.    ED course:   - Vitals: Afebrile, Bl. P: 80 to 90s/50s, 2L NC  - Labs: Wbcs 5.8, Hgb 10.7, Na 133, Cr 1.3, Mag 1.5, Lactic acid 1.8, Procal 0.25  - Images: CXR: Diffuse bilateral mixed opacity most predominantly involving the  lower lobes. Bilateral pleural effusion, left greater than the right.  EKG: Atrial fib  - Interventions:  1L NS, Zosyn    Past Medical History    Past Medical History:   Diagnosis Date    Actinic cheilitis 07/17/2013    Lower lip, left     Actinic keratosis     Allergic rhinitis     Anxiety 3/1/23    Arthritis 2004    Basal cell carcinoma     Benign hypertension     CAD (coronary artery disease)     Cardiac cath and PCI 1994. Cardiac Cath 9/2015: BRIDGET to LAD    Hearing problem 1995    Wear hearing aids    Hyperlipidemia     Malignant melanoma     Morbid obesity 01/11/2016    Paroxysmal supraventricular tachycardia     on metoprolol    Permanent atrial fibrillation 04/21/2017    Squamous cell carcinoma     Tachy-edinson syndrome 05/29/2019       Past Surgical History   Past Surgical History:   Procedure Laterality Date    ADENOIDECTOMY  Childhood    ANGIOPLASTY  1994    in [place]    ANKLE SURGERY  07/13/2005    right ankle    ARTHROPLASTY HIP Right 10/2009    BIOPSY NODE SENTINEL Left 03/30/2023    Procedure: BIOPSY, LYMPH NODE, SENTINEL;  Surgeon: Rolando Minaya MD;  Location: [facility] OR    COLONOSCOPY  4/25/12    EP PERM PACER SINGLE LEAD N/A 05/31/2019    [employer]tronic single lead pacemaker    EXCISE MASS CHEEK Left 03/30/2023    Procedure: wide local excision of left cheek melanoma;  Surgeon: Rolando Minaya MD;  Location: [facility] OR    EXCISE MASS CHEEK WITH FLAP PEDICLE Left 04/13/2023    Procedure: Left cervical Facial flap per closure of left cheek Defect;  Surgeon: Ila Sanchez MD;  Location: [facility] OR    Facial biopsy - Melanoma      GENITOURINARY SURGERY  10/20/23    Prostate  surgery    HEART CATH STENT COR W/WO PTCA  09/23/2015    BRIDGET stent mid LAD    IR CHEST PORT PLACEMENT > 5 YRS OF AGE  7/19/2023    LASER SURGERY OF EYE  06/01/2002    MOHS MICROGRAPHIC PROCEDURE  06/12/2004    squamous cell carcinoma right temple    SINUS SURGERY  07/11/2006    TONSILLECTOMY  Childhood       Prior to Admission Medications   Prior to Admission Medications   Prescriptions Last Dose Informant Patient Reported? Taking?   acetaminophen (TYLENOL) 650 MG CR tablet  Spouse/Significant Other Yes No   Sig: Take 1,300 mg by mouth 2 times daily.   apixaban ANTICOAGULANT (ELIQUIS ANTICOAGULANT) 5 MG tablet  Spouse/Significant Other, Pharmacy No No   Sig: Take 1 tablet (5 mg) by mouth 2 times daily   atorvastatin (LIPITOR) 80 MG tablet  Spouse/Significant Other, Pharmacy No No   Sig: Take 1 tablet (80 mg) by mouth at bedtime   carvedilol (COREG) 3.125 MG tablet  Spouse/Significant Other, Pharmacy No No   Sig: Take 1 tablet (3.125 mg) by mouth 2 times daily (with meals)   clobetasol propionate (TEMOVATE) 0.05 % external cream  Spouse/Significant Other No No   Sig: Apply to AA BID x 1-2 weeks then PRN. Do not apply to face.   fluticasone (FLONASE) 50 MCG/ACT nasal spray   Yes No   Sig: Spray 2 sprays into both nostrils daily as needed for allergies.   furosemide (LASIX) 20 MG tablet   Yes No   Sig: Take 1 tablet (20 mg) by mouth daily.   gabapentin (NEURONTIN) 300 MG capsule  Spouse/Significant Other, Pharmacy Yes No   Sig: Take 600 mg by mouth 3 times daily. 0700, 1330, 2000  May increase dose to 900mg if needed per neurologist. Patient has been doing this with evening dose the last few nights.   ketoconazole (NIZORAL) 2 % external cream   Yes No   Sig: Apply topically 2 times daily as needed. For face. FAX REFILL REQUESTS TO CACHORRO TRUJILLO: [phone]   ketoconazole (NIZORAL) 2 % external shampoo  Spouse/Significant Other No No   Sig: Use daily as needed   ketotifen fumarate 0.035% 0.035 % SOLN ophthalmic solution   Spouse/Significant Other Yes No   Sig: Place 1 drop into both eyes 2 times daily as needed for itching.   lidocaine, PF, (XYLOCAINE) 1 % SOLN injection   No No   Sig: 3 mLs by Other route 3 times daily as needed (moderate pain).   lisinopril (ZESTRIL) 30 MG tablet  Spouse/Significant Other, Pharmacy No No   Sig: Take 1 tablet (30 mg) by mouth daily   nitroGLYcerin (NITROSTAT) 0.4 MG sublingual tablet  Spouse/Significant Other No No   Sig: Place 1 tablet (0.4 mg) under the tongue every 5 minutes as needed for chest pain May repeat twice for a total of 3 tablets.  If chest pain not relieved, call 911   oxyCODONE (ROXICODONE) 5 MG tablet   No No   Sig: Take 1 tablet (5 mg) by mouth every 8 hours as needed for moderate pain.   phenytoin (DILANTIN) 50 MG chewable tablet   No No   Sig: Take 4 tablets (200 mg) by mouth at bedtime.   Patient taking differently: Take 900 mg by mouth daily.   predniSONE (DELTASONE) 10 MG tablet  Spouse/Significant Other, Pharmacy Yes No   Sig: Take 10 mg by mouth daily   triamcinolone (KENALOG) 0.1 % external lotion  Spouse/Significant Other No No   Sig: Apply to scalp BID x 1-2 weeks then PRN only      Facility-Administered Medications: None          Physical Exam   Vital Signs: Temp: 98  F (36.7  C) Temp src: Oral BP: 92/58 Pulse: 88   Resp: 17 SpO2: 98 % O2 Device: Nasal cannula Oxygen Delivery: 2 LPM  Weight: 228 lbs 12.8 oz  Constitutional:       General: He is not in acute distress.     Appearance: He is well-developed. He is not ill-appearing, toxic-appearing or diaphoretic.   HENT:      Head: Normocephalic and atraumatic.      Mouth/Throat:      Comments: tender in the right face.  Cardiovascular:      Rate and Rhythm: Normal rate and regular rhythm.      Heart sounds: Normal heart sounds.   Pulmonary:      Effort: Pulmonary effort is normal. No respiratory distress.      Breath sounds: Normal breath sounds. No stridor. No wheezing or rhonchi.   Abdominal:      General: There is no  distension.      Palpations: Abdomen is soft.      Tenderness: There is no abdominal tenderness. There is no rebound.   Musculoskeletal:         General: No swelling or tenderness.      Cervical back: Normal range of motion and neck supple.      Right lower leg: No edema.      Left lower leg: No edema.   Skin:     General: Skin is warm.   Neurological:      Mental Status: He is alert and oriented to person, place, and time.   Psychiatric:         Behavior: Behavior normal.         Thought Content: Thought content normal.     Medical Decision Making           Data     I have personally reviewed the following data over the past 24 hrs:    5.8  \   10.7 (L)   / 88 (L)     133 (L) 101 37.9 (H) /  118 (H)   3.6 21 (L) 1.32 (H) \     ALT: 61 AST: 66 (H) AP: 92 TBILI: 0.5   ALB: 2.9 (L) TOT PROTEIN: 5.6 (L) LIPASE: N/A     Trop: N/A BNP: 1,813 (H)     Procal: 0.25 CRP: 59.30 (H) Lactic Acid: 1.8       Ferritin:  1,062 (H) % Retic:  N/A LDH:  N/A       Imaging results reviewed over the past 24 hrs:   Recent Results (from the past 24 hours)   XR Chest 2 Views    Narrative    EXAM: XR CHEST 2 VIEWS 2/11/2025 10:33 AM    DEMOGRAPHICS: 79 years Male    INDICATION: fever, hypotension    COMPARISON: CT chest 2/7/2025    TECHNIQUE: Upright PA and lateral views of the chest.    FINDINGS:   Portable frontal and lateral semiupright view of the chest obtained.  Right IJ Port-A-Cath with its tip at the superior cavoatrial junction.  Left-sided cardiac pacer device. Right shoulder replacement noted.  Trachea is midline. Enlarged cardiac silhouette. Mediastinum is within  normal limits.. Diffuse bilateral interstitial and airspace opacities  most predominantly involving lower lobes. Atelectatic changes towards  the left lower lobe. Blunting of costophrenic angles, left greater  than right.  Unremarkable upper abdomen. No acute or suspicious osseous  abnormalities. Unremarkable soft tissues.      Impression    IMPRESSION:   1. Diffuse  bilateral mixed opacity most predominantly involving the  lower lobes, most concerning for infectious etiologies.  2. Bilateral pleural effusion, left greater than the right.  3. Stable configuration of support devices as described above.     I have personally reviewed the examination and initial interpretation  and I agree with the findings.    SOO GAY MD         SYSTEM ID:  [id number]

## 2025-02-11 NOTE — ED TRIAGE NOTES
BIBA from [facility]. Alden had a neurology appointment today. Noticed to have worsen dizziness and hypotension 60/30's. History of  Trigeminal myalgia, HTN, pacemaker, Melanoma .  Right chest Port was accessed in ED.     Triage Assessment (Adult)       Row Name 02/11/25 0936          Triage Assessment    Airway WDL WDL        Respiratory WDL    Respiratory WDL X   SOB on oxygen        Skin Circulation/Temperature WDL    Skin Circulation/Temperature WDL WDL        Cardiac WDL    Cardiac WDL WDL;X   dizziness,        Peripheral/Neurovascular WDL    Peripheral Neurovascular WDL WDL        [place] Coma Scale    Best Eye Response 4-->(E4) spontaneous     Best Motor Response 6-->(M6) obeys commands     Best Verbal Response 5-->(V5) oriented     [place]te Coma Scale Score 15

## 2025-02-11 NOTE — ED NOTES
Report handoff to MIGEL Murray. Weaning patient off oxygen. Currently on room air. BOP waxes ans weans.  Handoff Vital 73/53 an 85/56, notified Dr. Carrera

## 2025-02-11 NOTE — MEDICATION SCRIBE - ADMISSION MEDICATION HISTORY
Medication Scribe Admission Medication History    Admission medication history is complete. The information provided in this note is only as accurate as the sources available at the time of the update.    Information Source(s): Patient and Family member via in-person    Pertinent Information: Alden reports he is no longer taking gabapentin 600 mg three times daily but now takes gabapentin 900 mg three times daily. Per wife, he is using systane eye drops for his dry eyes caused by the cold weather. Wife denies he is taking any other medications. No dispense report but outside medication reconciliation list has been reviewed.     Changes made to PTA medication list:  Added:   polyethylene glycol-propylene glycol (SYSTANE ULTRA) 0.4-0.3 % SOLN ophthalmic solution   Deleted: None  Changed:   gabapentin (NEURONTIN) 300 MG capsule. Take 600 mg by mouth 3 times daily. ---> gabapentin (NEURONTIN) 300 MG capsule. Take 900 mg by mouth 3 times daily.    Allergies reviewed with patient and updates made in EHR: yes    Medication History Completed By: Sandra Stevens 2/11/2025 3:41 PM    PTA Med List   Medication Sig Note Last Dose/Taking    acetaminophen (TYLENOL) 650 MG CR tablet Take 1,300 mg by mouth 2 times daily.  2/11/2025    apixaban ANTICOAGULANT (ELIQUIS ANTICOAGULANT) 5 MG tablet Take 1 tablet (5 mg) by mouth 2 times daily  2/11/2025 Morning    atorvastatin (LIPITOR) 80 MG tablet Take 1 tablet (80 mg) by mouth at bedtime  2/10/2025 Bedtime    carvedilol (COREG) 3.125 MG tablet Take 1 tablet (3.125 mg) by mouth 2 times daily (with meals)  2/11/2025 Morning    clobetasol propionate (TEMOVATE) 0.05 % external cream Apply to AA BID x 1-2 weeks then PRN. Do not apply to face.  Unknown    fluticasone (FLONASE) 50 MCG/ACT nasal spray Spray 2 sprays into both nostrils daily as needed for allergies.  Unknown    furosemide (LASIX) 20 MG tablet Take 1 tablet (20 mg) by mouth daily.  2/11/2025 Morning    gabapentin (NEURONTIN) 300  MG capsule Take 900 mg by mouth 3 times daily. 0700, 1330, 2000  May increase dose to 900mg if needed per neurologist. Patient has been doing this with evening dose the last few nights.  2/11/2025    ketoconazole (NIZORAL) 2 % external cream Apply topically 2 times daily as needed. For face. FAX REFILL REQUESTS TO  RONNIE: [phone]  Unknown    ketoconazole (NIZORAL) 2 % external shampoo Use daily as needed  Unknown    ketotifen fumarate 0.035% 0.035 % SOLN ophthalmic solution Place 1 drop into both eyes 2 times daily as needed for itching.  2/10/2025    lidocaine, PF, (XYLOCAINE) 1 % SOLN injection 3 mLs by Other route 3 times daily as needed (moderate pain).  2/10/2025 at  9:30 PM    lisinopril (ZESTRIL) 30 MG tablet Take 1 tablet (30 mg) by mouth daily  2/11/2025 Morning    nitroGLYcerin (NITROSTAT) 0.4 MG sublingual tablet Place 1 tablet (0.4 mg) under the tongue every 5 minutes as needed for chest pain May repeat twice for a total of 3 tablets.  If chest pain not relieved, call 911 2/11/2025: Has not needed Taking As Needed    oxyCODONE (ROXICODONE) 5 MG tablet Take 1 tablet (5 mg) by mouth every 8 hours as needed for moderate pain.  2/8/2025    phenytoin (DILANTIN) 50 MG chewable tablet Take 4 tablets (200 mg) by mouth at bedtime.  2/10/2025 at  9:30 PM    predniSONE (DELTASONE) 10 MG tablet Take 10 mg by mouth daily  2/11/2025 Morning    triamcinolone (KENALOG) 0.1 % external lotion Apply to scalp BID x 1-2 weeks then PRN only  Unknown

## 2025-02-11 NOTE — PHARMACY-VANCOMYCIN DOSING SERVICE
"Pharmacy Vancomycin Initial Note  Date of Service 2025  Patient's  1945  79 year old, male    Indication: [facility] Pneumonia    Current estimated CrCl = Estimated Creatinine Clearance: 55.6 mL/min (A) (based on SCr of 1.32 mg/dL (H)).    Creatinine for last 3 days  2025: 10:06 AM Creatinine 1.32 mg/dL    Recent Vancomycin Level(s) for last 3 days  No results found for requested labs within last 3 days.      Vancomycin IV Administrations (past 72 hours)        No vancomycin orders with administrations in past 72 hours.                    Nephrotoxins and other renal medications (From now, onward)      Start     Dose/Rate Route Frequency Ordered Stop    25 193  piperacillin-tazobactam (ZOSYN) 4.5 g vial to attach to  mL bag        Note to Pharmacy: For [facility], [facility] and [facility]: For Zosyn-naive patients, use the \"Zosyn initial dose + extended infusion\" order panel.    4.5 g  over 30 Minutes Intravenous EVERY 6 HOURS 25 1438      25 1530  Vancomycin (VANCOCIN) 2,000 mg in 0.9% NaCl 500 mL intermittent infusion         2,000 mg  250 mL/hr over 2 Hours Intravenous ONCE 25 1444      25 1440  [Held by provider]  furosemide (LASIX) tablet 20 mg        (On hold since today at 1438 until manually unheld; held by Melisa Rivera MDHold Reason: Change in Vitals)   Note to Pharmacy: PTA Sig:Take 1 tablet (20 mg) by mouth daily.      20 mg Oral DAILY 25 1438      25 1440  [Held by provider]  lisinopril (ZESTRIL) tablet 30 mg        (On hold since today at 1438 until manually unheld; held by Melisa Rivera MDHold Reason: Change in Vitals)   Note to Pharmacy: PTA Sig:Take 1 tablet (30 mg) by mouth daily      30 mg Oral DAILY 25 1438              Contrast Orders - past 72 hours (72h ago, onward)      None            [employer] Prediction of Planned Initial Vancomycin Regimen  Loading dose: 2000 mg IV x 1  Regimen: 1500 mg " IV every 24 hours.  Start time: 14:47 on 02/11/2025  Exposure target: AUC24 (range)400-600 mg/L.hr   AUC24,ss: 498 mg/L.hr  Probability of AUC24 > 400: 75 %  Ctrough,ss: 15.4 mg/L  Probability of Ctrough,ss > 20: 24 %  Probability of nephrotoxicity (Lodise TAYE 2009): 11 %          Plan:  Start vancomycin 2000 mg IV x 1 THEN 1500 mg IV q24h.   Vancomycin monitoring method: AUC  Vancomycin therapeutic monitoring goal: 400-600 mg*h/L  Pharmacy will check vancomycin levels as appropriate in 1-3 Days.    Serum creatinine levels will be ordered daily for the first week of therapy and at least twice weekly for subsequent weeks.      Marino Dyson RPH

## 2025-02-11 NOTE — LETTER
2/11/2025       RE: James Salvador  [address] [place] [address]     Dear Colleague,    Thank you for referring your patient, James Salvador, to the [facility] [place] [facility]. Please see a copy of my visit note below.    Patient is here today for Trigeminal neuralgia. Per prior notes, he is on Oxcarbazepine 600 mg three times a day and Gabapentin 600 mg three times a day. He was found to have symptomatic hypotension. 911 called and patient was transported to [facility] for further evaluation. Inpatient Neurosurgery team notified.        Again, thank you for allowing me to participate in the care of your patient.      Sincerely,    NAV Tinajero CNP

## 2025-02-11 NOTE — PROGRESS NOTES
Rapid Response Epic Documentation     Situation:   Male Pt. Came into the clinic c/o Lethargy with Hypotension.     Objective:   Con and Alert Ox3, Skin is PWD     Assessment:            BP:  66/30    Pulse: 87  Respiration: 16  SPO2: 98 %  Glucose:  mg/dl  Mental Status: Alert  CMS: Intact  Stroke Scale: Not Applicable  EKG: Not Performed      Treatment:    IV: Size Left Arm gauge,  Location 22      Location:   [facility]       Disposition:      Transfer to Emergency Room Via: 911    Protocol Used:     Hypotension

## 2025-02-11 NOTE — ED PROVIDER NOTES
"ED Provider Note  [facility]      History   No chief complaint on file.    HPI  James Salvador is a 79 year old male with a history of atrial fibrillation on Eliquis s/p pacemaker placement, metastatic melanoma on immunotherapy, right sided trigeminal neuralgia, HTN, HLD, CAD s/p PCI (2015), and SIADH who presents to the emergency department with his wife and friend with hypotension.  Patient had office visit with neurosurgery for surgical consult of trigeminal neuralgia today and was found to have symptomatic hypotension and was thus transported here.    Patient's wife reports that before his clinic visit today patient became weak, lethargic, incoherent, and proceeded to \"slump over\".  Once he got to the clinic the symptoms became worse.  She notes that episodes similar to this have happened before.  Patient reports that he does not believe he could walk right now. His wife states that he uses a walker due to a botched left knee replacement and his friend notes that he has had ongoing issues with strength and has been generally weak.  Of note, wife reports he had a fever of 101.8 2 days ago. Patient reports his current face pain is a 9/10    Per chart review, patient was recently admitted at [facility] from 1/27/2025-1/31/2025 for an acute flare of trigeminal neuralgia and management of COVID-19 infection.  The patient was taken off Trileptal, put on Dilantin, and increased dose of gabapentin.      CT CHEST/ ABDOMEN/ PELVIS W CONTRAST  1.  No significant change since 10/17/2024. No new metastatic disease in the chest, abdomen and pelvis.  2.  Stable mildly enlarged mediastinal lymph nodes.  3.  Stable small chronic left pleural effusion, left lower lobe rounded atelectasis and mild nonspecific pulmonary fibrosis.   4.  Few stable pulmonary nodules.    Past Medical History  Past Medical History:   Diagnosis Date    Actinic cheilitis 07/17/2013    Lower lip, left     " Actinic keratosis     Allergic rhinitis     Anxiety 3/1/23    Arthritis 2004    Basal cell carcinoma     Benign hypertension     CAD (coronary artery disease)     Cardiac cath and PCI 1994. Cardiac Cath 9/2015: BRIDGET to LAD    Hearing problem 1995    Wear hearing aids    Hyperlipidemia     Malignant melanoma     Morbid obesity 01/11/2016    Paroxysmal supraventricular tachycardia     on metoprolol    Permanent atrial fibrillation 04/21/2017    Squamous cell carcinoma     Tachy-edinson syndrome 05/29/2019     Past Surgical History:   Procedure Laterality Date    ADENOIDECTOMY  Childhood    ANGIOPLASTY  1994    in [place]    ANKLE SURGERY  07/13/2005    right ankle    ARTHROPLASTY HIP Right 10/2009    BIOPSY NODE SENTINEL Left 03/30/2023    Procedure: BIOPSY, LYMPH NODE, SENTINEL;  Surgeon: Rolando Minaya MD;  Location: [facility] OR    COLONOSCOPY  4/25/12    EP PERM PACER SINGLE LEAD N/A 05/31/2019    [employer] single lead pacemaker    EXCISE MASS CHEEK Left 03/30/2023    Procedure: wide local excision of left cheek melanoma;  Surgeon: Rolando Minaya MD;  Location: [facility] OR    EXCISE MASS CHEEK WITH FLAP PEDICLE Left 04/13/2023    Procedure: Left cervical Facial flap per closure of left cheek Defect;  Surgeon: Ila Sanchez MD;  Location: [facility] OR    Facial biopsy - Melanoma      GENITOURINARY SURGERY  10/20/23    Prostate surgery    HEART CATH STENT COR W/WO PTCA  09/23/2015    BRIDGET stent mid LAD    IR CHEST PORT PLACEMENT > 5 YRS OF AGE  7/19/2023    LASER SURGERY OF EYE  06/01/2002    MOHS MICROGRAPHIC PROCEDURE  06/12/2004    squamous cell carcinoma right temple    SINUS SURGERY  07/11/2006    TONSILLECTOMY  Childhood     acetaminophen (TYLENOL) 650 MG CR tablet  apixaban ANTICOAGULANT (ELIQUIS ANTICOAGULANT) 5 MG tablet  atorvastatin (LIPITOR) 80 MG tablet  carvedilol (COREG) 3.125 MG tablet  clobetasol propionate (TEMOVATE) 0.05 % external cream  fluticasone (FLONASE) 50 MCG/ACT nasal spray  furosemide (LASIX)  20 MG tablet  gabapentin (NEURONTIN) 300 MG capsule  ketoconazole (NIZORAL) 2 % external cream  ketoconazole (NIZORAL) 2 % external shampoo  ketotifen fumarate 0.035% 0.035 % SOLN ophthalmic solution  lidocaine, PF, (XYLOCAINE) 1 % SOLN injection  lisinopril (ZESTRIL) 30 MG tablet  nitroGLYcerin (NITROSTAT) 0.4 MG sublingual tablet  oxyCODONE (ROXICODONE) 5 MG tablet  phenytoin (DILANTIN) 50 MG chewable tablet  predniSONE (DELTASONE) 10 MG tablet  triamcinolone (KENALOG) 0.1 % external lotion      Allergies   Allergen Reactions    Cats      Cat Dander    Dogs      Dog Dander    Hydromorphone      Other reaction(s): Confusion    Pollen Extract      Family History  Family History   Problem Relation Age of Onset    Genitourinary Problems Mother         renal failure    Heart Disease Mother     Cerebrovascular Disease Mother         TIA    Cardiovascular Father         rupture of dorsal aorta    Depression Son     Depression Brother         Suicide    Substance Abuse Brother         Heroin    Skin Cancer No family hx of      Social History   Social History     Tobacco Use    Smoking status: Former     Current packs/day: 0.00     Average packs/day: 0.5 packs/day for 10.0 years (5.0 ttl pk-yrs)     Types: Cigarettes, Cigars, Pipe     Start date: 1966     Quit date: 1974     Years since quittin.8    Smokeless tobacco: Never    Tobacco comments:     Also smoked from 1985 - 1990   Vaping Use    Vaping status: Never Used   Substance Use Topics    Alcohol use: Not Currently    Drug use: No      Past medical history, past surgical history, medications, allergies, family history, and social history were reviewed with the patient. No additional pertinent items.   A medically appropriate review of systems was performed with pertinent positives and negatives noted in the HPI, and all other systems negative.    Physical Exam      Physical Exam  Constitutional:       General: He is not in acute distress.      Appearance: He is well-developed. He is not ill-appearing, toxic-appearing or diaphoretic.   HENT:      Head: Normocephalic and atraumatic.      Mouth/Throat:      Comments: tender in the right face.  Cardiovascular:      Rate and Rhythm: Normal rate and regular rhythm.      Heart sounds: Normal heart sounds.   Pulmonary:      Effort: Pulmonary effort is normal. No respiratory distress.      Breath sounds: Normal breath sounds. No stridor. No wheezing or rhonchi.   Abdominal:      General: There is no distension.      Palpations: Abdomen is soft.      Tenderness: There is no abdominal tenderness. There is no rebound.   Musculoskeletal:         General: No swelling or tenderness.      Cervical back: Normal range of motion and neck supple.      Right lower leg: No edema.      Left lower leg: No edema.   Skin:     General: Skin is warm.   Neurological:      Mental Status: He is alert and oriented to person, place, and time.   Psychiatric:         Behavior: Behavior normal.         Thought Content: Thought content normal.           ED Course, Procedures, & Data      Procedures            EKG Interpretation:      Interpreted by Dorie Blevins MD  Time reviewed: 1005  Symptoms at time of EKG: None   Rhythm: atrial fibrillation - controlled  Rate: Normal  Axis: Normal  Ectopy: none  Conduction: normal  ST Segments/ T Waves: No ST-T wave changes and No acute ischemic changes  Q Waves: none  Comparison to prior: Unchanged    Clinical Impression: atrial fibrillation (chronic)      Results for orders placed or performed during the hospital encounter of 02/11/25   XR Chest 2 Views     Status: None    Narrative    EXAM: XR CHEST 2 VIEWS 2/11/2025 10:33 AM    DEMOGRAPHICS: 79 years Male    INDICATION: fever, hypotension    COMPARISON: CT chest 2/7/2025    TECHNIQUE: Upright PA and lateral views of the chest.    FINDINGS:   Portable frontal and lateral semiupright view of the chest obtained.  Right IJ Port-A-Cath with its  tip at the superior cavoatrial junction.  Left-sided cardiac pacer device. Right shoulder replacement noted.  Trachea is midline. Enlarged cardiac silhouette. Mediastinum is within  normal limits.. Diffuse bilateral interstitial and airspace opacities  most predominantly involving lower lobes. Atelectatic changes towards  the left lower lobe. Blunting of costophrenic angles, left greater  than right.  Unremarkable upper abdomen. No acute or suspicious osseous  abnormalities. Unremarkable soft tissues.      Impression    IMPRESSION:   1. Diffuse bilateral mixed opacity most predominantly involving the  lower lobes, most concerning for infectious etiologies.  2. Bilateral pleural effusion, left greater than the right.  3. Stable configuration of support devices as described above.     I have personally reviewed the examination and initial interpretation  and I agree with the findings.    SOO GAY MD         SYSTEM ID:  [id number]   Comprehensive metabolic panel     Status: Abnormal   Result Value Ref Range    Sodium 133 (L) 135 - 145 mmol/L    Potassium 3.6 3.4 - 5.3 mmol/L    Carbon Dioxide (CO2) 21 (L) 22 - 29 mmol/L    Anion Gap 11 7 - 15 mmol/L    Urea Nitrogen 37.9 (H) 8.0 - 23.0 mg/dL    Creatinine 1.32 (H) 0.67 - 1.17 mg/dL    GFR Estimate 55 (L) >60 mL/min/1.73m2    Calcium 7.9 (L) 8.8 - 10.4 mg/dL    Chloride 101 98 - 107 mmol/L    Glucose 118 (H) 70 - 99 mg/dL    Alkaline Phosphatase 92 40 - 150 U/L    AST 66 (H) 0 - 45 U/L    ALT 61 0 - 70 U/L    Protein Total 5.6 (L) 6.4 - 8.3 g/dL    Albumin 2.9 (L) 3.5 - 5.2 g/dL    Bilirubin Total 0.5 <=1.2 mg/dL   Magnesium     Status: Abnormal   Result Value Ref Range    Magnesium 1.5 (L) 1.7 - 2.3 mg/dL   [place] Draw     Status: None    Narrative    The following orders were created for panel order [place] Draw.  Procedure                               Abnormality         Status                     ---------                               -----------          ------                     Extra Blue Top Tube[[id number]]                              Final result               Extra Red Top Tube[[id number]]                               Final result                 Please view results for these tests on the individual orders.   Lactic Acid Whole Blood w/ 1x repeat in 2 hrs when >2     Status: Normal   Result Value Ref Range    Lactic Acid, Initial 1.8 0.7 - 2.0 mmol/L   CBC with platelets and differential     Status: Abnormal   Result Value Ref Range    WBC Count 5.8 4.0 - 11.0 10e3/uL    RBC Count 3.27 (L) 4.40 - 5.90 10e6/uL    Hemoglobin 10.7 (L) 13.3 - 17.7 g/dL    Hematocrit 31.5 (L) 40.0 - 53.0 %    MCV 96 78 - 100 fL    MCH 32.7 26.5 - 33.0 pg    MCHC 34.0 31.5 - 36.5 g/dL    RDW 15.0 10.0 - 15.0 %    Platelet Count 88 (L) 150 - 450 10e3/uL    % Neutrophils 82 %    % Lymphocytes 7 %    % Monocytes 9 %    % Eosinophils 1 %    % Basophils 1 %    % Immature Granulocytes 1 %    NRBCs per 100 WBC 0 <1 /100    Absolute Neutrophils 4.7 1.6 - 8.3 10e3/uL    Absolute Lymphocytes 0.4 (L) 0.8 - 5.3 10e3/uL    Absolute Monocytes 0.5 0.0 - 1.3 10e3/uL    Absolute Eosinophils 0.1 0.0 - 0.7 10e3/uL    Absolute Basophils 0.1 0.0 - 0.2 10e3/uL    Absolute Immature Granulocytes 0.1 <=0.4 10e3/uL    Absolute NRBCs 0.0 10e3/uL   Extra Blue Top Tube     Status: None   Result Value Ref Range    Hold Specimen JIC    Extra Red Top Tube     Status: None   Result Value Ref Range    Hold Specimen JIC    Procalcitonin     Status: Normal   Result Value Ref Range    Procalcitonin 0.25 <0.50 ng/mL   Nt probnp inpatient     Status: Abnormal   Result Value Ref Range    N terminal Pro BNP Inpatient 1,813 (H) 0 - 1,800 pg/mL   Vitamin B12     Status: Normal   Result Value Ref Range    Vitamin B12 479 232 - 1,245 pg/mL   Iron and iron binding capacity     Status: Abnormal   Result Value Ref Range    Iron 30 (L) 61 - 157 ug/dL    Iron Binding Capacity 126 (L) 240 - 430 ug/dL    Iron Sat Index 24 15 - 46 %    Ferritin     Status: Abnormal   Result Value Ref Range    Ferritin 1,062 (H) 31 - 409 ng/mL   EKG 12-lead, tracing only     Status: None (Preliminary result)   Result Value Ref Range    Systolic Blood Pressure  mmHg    Diastolic Blood Pressure  mmHg    Ventricular Rate 87 BPM    Atrial Rate  BPM    DE Interval  ms    QRS Duration 106 ms     ms    QTc 433 ms    P Axis  degrees    R AXIS 11 degrees    T Axis -3 degrees    Interpretation ECG       Atrial fibrillation with a competing junctional pacemaker with premature ventricular or aberrantly conducted complexes  Low voltage QRS  Nonspecific T wave abnormality  Abnormal ECG     CBC with platelets differential     Status: Abnormal    Narrative    The following orders were created for panel order CBC with platelets differential.  Procedure                               Abnormality         Status                     ---------                               -----------         ------                     CBC with platelets and d...[[id number]]  Abnormal            Final result               RBC and Platelet Morphology[[id number]]                                                   Please view results for these tests on the individual orders.     Medications   morphine (PF) injection 4 mg (has no administration in time range)   lidocaine 1 % 0.1-1 mL (has no administration in time range)   lidocaine (LMX4) cream (has no administration in time range)   sodium chloride (PF) 0.9% PF flush 3 mL (3 mLs Intracatheter $Given 2/11/25 7340)   sodium chloride (PF) 0.9% PF flush 3 mL (has no administration in time range)   senna-docusate (SENOKOT-S/PERICOLACE) 8.6-50 MG per tablet 1 tablet (has no administration in time range)     Or   senna-docusate (SENOKOT-S/PERICOLACE) 8.6-50 MG per tablet 2 tablet (has no administration in time range)   calcium carbonate (TUMS) chewable tablet 1,000 mg (has no administration in time range)   acetaminophen (TYLENOL) tablet 1,300 mg (has no  administration in time range)   apixaban ANTICOAGULANT (ELIQUIS) tablet 5 mg (has no administration in time range)   atorvastatin (LIPITOR) tablet 80 mg (has no administration in time range)   carvedilol (COREG) tablet 3.125 mg ( Oral Automatically Held 2/14/25 1800)   furosemide (LASIX) tablet 20 mg ( Oral Automatically Held 2/14/25 0800)   gabapentin (NEURONTIN) capsule 600 mg (has no administration in time range)   lisinopril (ZESTRIL) tablet 30 mg ( Oral Automatically Held 2/14/25 0800)   oxyCODONE (ROXICODONE) tablet 5 mg (has no administration in time range)   phenytoin (DILANTIN) chewable tablet 200 mg (has no administration in time range)   predniSONE (DELTASONE) tablet 10 mg ( Oral Unheld by provider 2/11/25 1445)   lactated ringers infusion ( Intravenous $New Bag 2/11/25 1507)   piperacillin-tazobactam (ZOSYN) 4.5 g vial to attach to  mL bag (has no administration in time range)   Vancomycin (VANCOCIN) 2,000 mg in 0.9% NaCl 500 mL intermittent infusion (has no administration in time range)   vancomycin (VANCOCIN) 1,500 mg in 0.9% NaCl 250 mL intermittent infusion (has no administration in time range)   magnesium sulfate 2 g in 50 mL sterile water intermittent infusion (has no administration in time range)   sodium chloride 0.9% BOLUS 1,000 mL (0 mLs Intravenous Stopped 2/11/25 1200)   lidocaine (XYLOCAINE) 2 % external gel 10 mL (10 mLs Urethral $Given 2/11/25 1344)   piperacillin-tazobactam (ZOSYN) intermittent infusion 3.375 g (3.375 g Intravenous $New Bag 2/11/25 1347)                 No results found for any visits on 02/11/25.  Medications - No data to display  Labs Ordered and Resulted from Time of ED Arrival to Time of ED Departure - No data to display  No orders to display          Critical care was not performed.     Medical Decision Making  The patient's presentation was of high complexity (an acute health issue posing potential threat to life or bodily function).    The patient's  evaluation involved:  review of external note(s) from 3+ sources (see separate area of note for details)  review of 3+ test result(s) ordered prior to this encounter (see separate area of note for details)  ordering and/or review of 3+ test(s) in this encounter (see separate area of note for details)  discussion of management or test interpretation with another health professional (see separate area of note for details)      The patient's management necessitated high risk (a decision regarding hospitalization).    Assessment & Plan    Patient is a 79-year-old male who has a known history of trigeminal neuralgia as well as metastatic melanoma who presents to the ER due to hypotension.  Patient's been having multiple episodes when he becomes weak.  This episode happened today when he was in clinic and his blood pressure dropped to 60 systolic.  Patient here is feeling better and his blood pressure in the 80s.  He will be given some fluids.  Will look for possible source of infection.  I did review patient's recent CT chest that was done last week.  It shows ongoing left lobe pleural effusion but no new malignancy in the chest abdomen and pelvis.  I also spoke to neurosurgery regarding the case.  They said that they would have no acute recommendations for that he would need to follow-up in clinic for ongoing issues with his trigeminal neuralgia.  At this point the plan will be to focus on the cause of his hypotension to see if we can figure out how to make him feel better.    Patient received some IV fluids and the hypotension has improved.  Patient still feeling generalized unwell.  Patient's labs show an LEE as well as new thrombocytopenia and new decreased hemoglobin.  Chest x-ray shows opacities in the right lung as well as ongoing effusion on the left lobe.  Concern for underlying pneumonia.  Patient did have a fever this past Sunday.  Recommendations are to admit him to the hospital for further care.    I have  reviewed the nursing notes. I have reviewed the findings, diagnosis, plan and need for follow up with the patient.    New Prescriptions    No medications on file       Final diagnoses:   Hypotension, unspecified hypotension type   Metastatic melanoma to lung, right (H)   Acute kidney injury   I, Susan Vidales, am serving as a trained medical scribe to document services personally performed by Dorie Blevins MD, based on the provider's statements to me.     I, Dorie Blevins MD, was physically present and have reviewed and verified the accuracy of this note documented by Susan Vidales.     Dorie Blevins MD  [facility] EMERGENCY DEPARTMENT  2/11/2025     Dorie Blevins MD  02/11/25 1514

## 2025-02-12 ENCOUNTER — APPOINTMENT (OUTPATIENT)
Dept: CT IMAGING | Facility: CLINIC | Age: 80
DRG: 682 | End: 2025-02-12
Payer: COMMERCIAL

## 2025-02-12 ENCOUNTER — ANCILLARY PROCEDURE (OUTPATIENT)
Dept: CARDIOLOGY | Facility: CLINIC | Age: 80
DRG: 682 | End: 2025-02-12
Payer: COMMERCIAL

## 2025-02-12 LAB
ALBUMIN SERPL BCG-MCNC: 2.8 G/DL (ref 3.5–5.2)
ALP SERPL-CCNC: 85 U/L (ref 40–150)
ALT SERPL W P-5'-P-CCNC: 70 U/L (ref 0–70)
ANION GAP SERPL CALCULATED.3IONS-SCNC: 11 MMOL/L (ref 7–15)
AST SERPL W P-5'-P-CCNC: 74 U/L (ref 0–45)
ATRIAL RATE - MUSE: NORMAL BPM
BACTERIA SPT CULT: NORMAL
BILIRUB SERPL-MCNC: 0.6 MG/DL
BUN SERPL-MCNC: 23.1 MG/DL (ref 8–23)
CALCIUM SERPL-MCNC: 8.5 MG/DL (ref 8.8–10.4)
CHLORIDE SERPL-SCNC: 104 MMOL/L (ref 98–107)
CREAT SERPL-MCNC: 0.78 MG/DL (ref 0.67–1.17)
DIASTOLIC BLOOD PRESSURE - MUSE: NORMAL MMHG
EGFRCR SERPLBLD CKD-EPI 2021: >90 ML/MIN/1.73M2
ERYTHROCYTE [DISTWIDTH] IN BLOOD BY AUTOMATED COUNT: 15.5 % (ref 10–15)
GLUCOSE SERPL-MCNC: 85 MG/DL (ref 70–99)
GRAM STAIN RESULT: NORMAL
HCO3 SERPL-SCNC: 22 MMOL/L (ref 22–29)
HCT VFR BLD AUTO: 31.9 % (ref 40–53)
HGB BLD-MCNC: 11.1 G/DL (ref 13.3–17.7)
INTERPRETATION ECG - MUSE: NORMAL
L PNEUMO1 AG UR QL IA: NEGATIVE
LACTATE SERPL-SCNC: 0.8 MMOL/L (ref 0.7–2)
LVEF ECHO: NORMAL
MCH RBC QN AUTO: 32.8 PG (ref 26.5–33)
MCHC RBC AUTO-ENTMCNC: 34.8 G/DL (ref 31.5–36.5)
MCV RBC AUTO: 94 FL (ref 78–100)
MDC_IDC_LEAD_CONNECTION_STATUS: NORMAL
MDC_IDC_LEAD_IMPLANT_DT: NORMAL
MDC_IDC_LEAD_LOCATION: NORMAL
MDC_IDC_LEAD_LOCATION_DETAIL_1: NORMAL
MDC_IDC_LEAD_MFG: NORMAL
MDC_IDC_LEAD_MODEL: NORMAL
MDC_IDC_LEAD_POLARITY_TYPE: NORMAL
MDC_IDC_LEAD_SERIAL: NORMAL
MDC_IDC_MSMT_BATTERY_DTM: NORMAL
MDC_IDC_MSMT_BATTERY_REMAINING_LONGEVITY: 111 MO
MDC_IDC_MSMT_BATTERY_RRT_TRIGGER: 2.62
MDC_IDC_MSMT_BATTERY_STATUS: NORMAL
MDC_IDC_MSMT_BATTERY_VOLTAGE: 3.02 V
MDC_IDC_MSMT_LEADCHNL_RV_IMPEDANCE_VALUE: 266 OHM
MDC_IDC_MSMT_LEADCHNL_RV_IMPEDANCE_VALUE: 323 OHM
MDC_IDC_MSMT_LEADCHNL_RV_PACING_THRESHOLD_AMPLITUDE: 1 V
MDC_IDC_MSMT_LEADCHNL_RV_PACING_THRESHOLD_PULSEWIDTH: 0.4 MS
MDC_IDC_MSMT_LEADCHNL_RV_SENSING_INTR_AMPL: 1.5 MV
MDC_IDC_MSMT_LEADCHNL_RV_SENSING_INTR_AMPL: 1.88 MV
MDC_IDC_PG_IMPLANT_DTM: NORMAL
MDC_IDC_PG_MFG: NORMAL
MDC_IDC_PG_MODEL: NORMAL
MDC_IDC_PG_SERIAL: NORMAL
MDC_IDC_PG_TYPE: NORMAL
MDC_IDC_SESS_CLINIC_NAME: NORMAL
MDC_IDC_SESS_DTM: NORMAL
MDC_IDC_SESS_TYPE: NORMAL
MDC_IDC_SET_BRADY_HYSTRATE: NORMAL
MDC_IDC_SET_BRADY_LOWRATE: 50 {BEATS}/MIN
MDC_IDC_SET_BRADY_MAX_SENSOR_RATE: 130 {BEATS}/MIN
MDC_IDC_SET_BRADY_MODE: NORMAL
MDC_IDC_SET_LEADCHNL_RV_PACING_AMPLITUDE: 2 V
MDC_IDC_SET_LEADCHNL_RV_PACING_ANODE_ELECTRODE_1: NORMAL
MDC_IDC_SET_LEADCHNL_RV_PACING_ANODE_LOCATION_1: NORMAL
MDC_IDC_SET_LEADCHNL_RV_PACING_CAPTURE_MODE: NORMAL
MDC_IDC_SET_LEADCHNL_RV_PACING_CATHODE_ELECTRODE_1: NORMAL
MDC_IDC_SET_LEADCHNL_RV_PACING_CATHODE_LOCATION_1: NORMAL
MDC_IDC_SET_LEADCHNL_RV_PACING_POLARITY: NORMAL
MDC_IDC_SET_LEADCHNL_RV_PACING_PULSEWIDTH: 0.4 MS
MDC_IDC_SET_LEADCHNL_RV_SENSING_ANODE_ELECTRODE_1: NORMAL
MDC_IDC_SET_LEADCHNL_RV_SENSING_ANODE_LOCATION_1: NORMAL
MDC_IDC_SET_LEADCHNL_RV_SENSING_CATHODE_ELECTRODE_1: NORMAL
MDC_IDC_SET_LEADCHNL_RV_SENSING_CATHODE_LOCATION_1: NORMAL
MDC_IDC_SET_LEADCHNL_RV_SENSING_POLARITY: NORMAL
MDC_IDC_SET_LEADCHNL_RV_SENSING_SENSITIVITY: 0.6 MV
MDC_IDC_SET_ZONE_DETECTION_INTERVAL: 360 MS
MDC_IDC_SET_ZONE_STATUS: NORMAL
MDC_IDC_SET_ZONE_TYPE: NORMAL
MDC_IDC_SET_ZONE_VENDOR_TYPE: NORMAL
MDC_IDC_STAT_BRADY_DTM_END: NORMAL
MDC_IDC_STAT_BRADY_DTM_START: NORMAL
MDC_IDC_STAT_BRADY_RV_PERCENT_PACED: 50.91 %
MDC_IDC_STAT_EPISODE_RECENT_COUNT: 0
MDC_IDC_STAT_EPISODE_RECENT_COUNT_DTM_END: NORMAL
MDC_IDC_STAT_EPISODE_RECENT_COUNT_DTM_START: NORMAL
MDC_IDC_STAT_EPISODE_TOTAL_COUNT: 0
MDC_IDC_STAT_EPISODE_TOTAL_COUNT: 0
MDC_IDC_STAT_EPISODE_TOTAL_COUNT: 5
MDC_IDC_STAT_EPISODE_TOTAL_COUNT_DTM_END: NORMAL
MDC_IDC_STAT_EPISODE_TOTAL_COUNT_DTM_START: NORMAL
MDC_IDC_STAT_EPISODE_TYPE: NORMAL
P AXIS - MUSE: NORMAL DEGREES
PHOSPHATE SERPL-MCNC: 3.1 MG/DL (ref 2.5–4.5)
PLATELET # BLD AUTO: 99 10E3/UL (ref 150–450)
POTASSIUM SERPL-SCNC: 3.5 MMOL/L (ref 3.4–5.3)
PR INTERVAL - MUSE: NORMAL MS
PROT SERPL-MCNC: 5.5 G/DL (ref 6.4–8.3)
QRS DURATION - MUSE: 106 MS
QT - MUSE: 360 MS
QTC - MUSE: 433 MS
R AXIS - MUSE: 11 DEGREES
RBC # BLD AUTO: 3.38 10E6/UL (ref 4.4–5.9)
S PNEUM AG SPEC QL: NEGATIVE
SODIUM SERPL-SCNC: 137 MMOL/L (ref 135–145)
SYSTOLIC BLOOD PRESSURE - MUSE: NORMAL MMHG
T AXIS - MUSE: -3 DEGREES
TROPONIN T SERPL HS-MCNC: 33 NG/L
TROPONIN T SERPL HS-MCNC: 34 NG/L
VENTRICULAR RATE- MUSE: 87 BPM
WBC # BLD AUTO: 4.5 10E3/UL (ref 4–11)

## 2025-02-12 PROCEDURE — 87070 CULTURE OTHR SPECIMN AEROBIC: CPT

## 2025-02-12 PROCEDURE — 250N000011 HC RX IP 250 OP 636: Performed by: STUDENT IN AN ORGANIZED HEALTH CARE EDUCATION/TRAINING PROGRAM

## 2025-02-12 PROCEDURE — 71250 CT THORAX DX C-: CPT | Mod: 26 | Performed by: RADIOLOGY

## 2025-02-12 PROCEDURE — 250N000012 HC RX MED GY IP 250 OP 636 PS 637

## 2025-02-12 PROCEDURE — 250N000009 HC RX 250

## 2025-02-12 PROCEDURE — 87205 SMEAR GRAM STAIN: CPT

## 2025-02-12 PROCEDURE — 93279 PRGRMG DEV EVAL PM/LDLS PM: CPT

## 2025-02-12 PROCEDURE — 250N000013 HC RX MED GY IP 250 OP 250 PS 637

## 2025-02-12 PROCEDURE — 999N000157 HC STATISTIC RCP TIME EA 10 MIN

## 2025-02-12 PROCEDURE — 82435 ASSAY OF BLOOD CHLORIDE: CPT

## 2025-02-12 PROCEDURE — 85018 HEMOGLOBIN: CPT

## 2025-02-12 PROCEDURE — 94640 AIRWAY INHALATION TREATMENT: CPT | Mod: 76

## 2025-02-12 PROCEDURE — 84484 ASSAY OF TROPONIN QUANT: CPT

## 2025-02-12 PROCEDURE — 71250 CT THORAX DX C-: CPT

## 2025-02-12 PROCEDURE — 120N000002 HC R&B MED SURG/OB UMMC

## 2025-02-12 PROCEDURE — 250N000011 HC RX IP 250 OP 636

## 2025-02-12 PROCEDURE — 94799 UNLISTED PULMONARY SVC/PX: CPT

## 2025-02-12 PROCEDURE — 83605 ASSAY OF LACTIC ACID: CPT

## 2025-02-12 PROCEDURE — 255N000002 HC RX 255 OP 636: Performed by: INTERNAL MEDICINE

## 2025-02-12 PROCEDURE — 84100 ASSAY OF PHOSPHORUS: CPT

## 2025-02-12 PROCEDURE — 99233 SBSQ HOSP IP/OBS HIGH 50: CPT | Mod: GC | Performed by: STUDENT IN AN ORGANIZED HEALTH CARE EDUCATION/TRAINING PROGRAM

## 2025-02-12 PROCEDURE — 36591 DRAW BLOOD OFF VENOUS DEVICE: CPT

## 2025-02-12 PROCEDURE — 36415 COLL VENOUS BLD VENIPUNCTURE: CPT

## 2025-02-12 PROCEDURE — 93279 PRGRMG DEV EVAL PM/LDLS PM: CPT | Mod: 26 | Performed by: INTERNAL MEDICINE

## 2025-02-12 RX ORDER — ALBUTEROL SULFATE 0.83 MG/ML
2.5 SOLUTION RESPIRATORY (INHALATION)
Status: DISCONTINUED | OUTPATIENT
Start: 2025-02-12 | End: 2025-02-14

## 2025-02-12 RX ORDER — VANCOMYCIN HYDROCHLORIDE 1 G/200ML
1000 INJECTION, SOLUTION INTRAVENOUS EVERY 12 HOURS
Status: DISCONTINUED | OUTPATIENT
Start: 2025-02-12 | End: 2025-02-12

## 2025-02-12 RX ADMIN — PIPERACILLIN AND TAZOBACTAM 4.5 G: 4; .5 INJECTION, POWDER, LYOPHILIZED, FOR SOLUTION INTRAVENOUS at 13:15

## 2025-02-12 RX ADMIN — PIPERACILLIN AND TAZOBACTAM 4.5 G: 4; .5 INJECTION, POWDER, LYOPHILIZED, FOR SOLUTION INTRAVENOUS at 09:54

## 2025-02-12 RX ADMIN — PHENYTOIN 200 MG: 50 TABLET, CHEWABLE ORAL at 00:47

## 2025-02-12 RX ADMIN — PIPERACILLIN AND TAZOBACTAM 4.5 G: 4; .5 INJECTION, POWDER, LYOPHILIZED, FOR SOLUTION INTRAVENOUS at 01:06

## 2025-02-12 RX ADMIN — GABAPENTIN 600 MG: 300 CAPSULE ORAL at 20:37

## 2025-02-12 RX ADMIN — APIXABAN 5 MG: 5 TABLET, FILM COATED ORAL at 09:53

## 2025-02-12 RX ADMIN — GABAPENTIN 600 MG: 300 CAPSULE ORAL at 13:16

## 2025-02-12 RX ADMIN — VANCOMYCIN HYDROCHLORIDE 1000 MG: 1 INJECTION, SOLUTION INTRAVENOUS at 11:01

## 2025-02-12 RX ADMIN — ATORVASTATIN CALCIUM 80 MG: 80 TABLET, FILM COATED ORAL at 21:13

## 2025-02-12 RX ADMIN — PREDNISONE 10 MG: 10 TABLET ORAL at 09:54

## 2025-02-12 RX ADMIN — ALBUTEROL SULFATE 2.5 MG: 2.5 SOLUTION RESPIRATORY (INHALATION) at 20:05

## 2025-02-12 RX ADMIN — PHENYTOIN 200 MG: 50 TABLET, CHEWABLE ORAL at 21:12

## 2025-02-12 RX ADMIN — ACETAMINOPHEN 1300 MG: 325 TABLET, FILM COATED ORAL at 20:36

## 2025-02-12 RX ADMIN — ALBUTEROL SULFATE 2.5 MG: 2.5 SOLUTION RESPIRATORY (INHALATION) at 17:42

## 2025-02-12 RX ADMIN — GABAPENTIN 600 MG: 300 CAPSULE ORAL at 09:54

## 2025-02-12 RX ADMIN — OXYCODONE HYDROCHLORIDE 5 MG: 5 TABLET ORAL at 01:03

## 2025-02-12 RX ADMIN — ATORVASTATIN CALCIUM 80 MG: 80 TABLET, FILM COATED ORAL at 00:47

## 2025-02-12 RX ADMIN — HUMAN ALBUMIN MICROSPHERES AND PERFLUTREN 5 ML: 10; .22 INJECTION, SOLUTION INTRAVENOUS at 10:51

## 2025-02-12 RX ADMIN — APIXABAN 5 MG: 5 TABLET, FILM COATED ORAL at 20:36

## 2025-02-12 RX ADMIN — ACETAMINOPHEN 1300 MG: 325 TABLET, FILM COATED ORAL at 09:54

## 2025-02-12 ASSESSMENT — ACTIVITIES OF DAILY LIVING (ADL)
ADLS_ACUITY_SCORE: 61
ADLS_ACUITY_SCORE: 66
ADLS_ACUITY_SCORE: 65
ADLS_ACUITY_SCORE: 65
ADLS_ACUITY_SCORE: 66
ADLS_ACUITY_SCORE: 66
ADLS_ACUITY_SCORE: 65
ADLS_ACUITY_SCORE: 61
ADLS_ACUITY_SCORE: 65
ADLS_ACUITY_SCORE: 66
ADLS_ACUITY_SCORE: 66
ADLS_ACUITY_SCORE: 65
ADLS_ACUITY_SCORE: 66
ADLS_ACUITY_SCORE: 61
ADLS_ACUITY_SCORE: 65
ADLS_ACUITY_SCORE: 66
ADLS_ACUITY_SCORE: 65
ADLS_ACUITY_SCORE: 65
ADLS_ACUITY_SCORE: 66
ADLS_ACUITY_SCORE: 65
ADLS_ACUITY_SCORE: 66

## 2025-02-12 NOTE — PROGRESS NOTES
"RN    Vital signs: BP (!) 82/63 (BP Location: Right arm)   Pulse 89   Temp 99.6  F (37.6  C) (Oral)   Resp 17   Ht 1.803 m (5' 11\")   Wt 103.8 kg (228 lb 12.8 oz)   SpO2 96%   BMI 31.91 kg/m      Neuro: A&Ox4  Pain/Nausea: 4/10 pain, denies nausea  Cardiac: WDL, denies chest pain  Respiratory: 1L NC  Mobility: standby, assist x1  Diet: Regular  Labs: Reviewed  LDAs: single lumen right chest wall port   GI/: voids spontaneously  Plan:  continue with plan of care, notify provider with changes    "

## 2025-02-12 NOTE — PHARMACY-VANCOMYCIN DOSING SERVICE
Pharmacy Vancomycin Dosing  Pharmacy was consulted to dose vancomycin for this patient.  Based on pharmacist's assessment an empiric change is required for this patient's vancomycin dose.     Indication: Hospital-Acquired Pneumonia  Current Dose Ordered: 1500 mg IV q24h  New Dose Ordered: 1000 mg IV q12h  AUC Goal: 400 - 600 mg/L.hr  Reason for Change: Renal function improved. Insights prediction AUC below goal with current dose.     This dose change was based on the pharmacist's assessment of this patient's age, weight, concurrent drug therapy, treatment goals, whether patient's creatinine clearance adequately indicates renal function (factoring in age, muscle mass, fluid and clinical status), and, if applicable, prior pharmacokinetic data.    Creatinine Clearance=  94.2      Will continue to follow and modify dosage according to levels, organ function and clinical condition   Supriya Freire, PGY-1 Pharmacy Resident

## 2025-02-12 NOTE — PLAN OF CARE
"Care from: 0700 - 1930    Neuro: A&Ox4.   Cardiac: Afib. VSS.   Respiratory: Sating greater than 92% on 2L NC.  GI/: Adequate urine output via urinal. No BM this care shift.  Diet/appetite: Tolerating regular diet. Eating well.  Activity:  Assist of 1 w/ walker, up to chair and in halls.  Pain: At acceptable level on current regimen.   Skin: No new deficits noted.  LDA's: Right chest port.  Isolation: Special precautions- COVID.  POCT: N/A    BP 96/60 (BP Location: Right arm)   Pulse 77   Temp 98.6  F (37  C) (Oral)   Resp 18   Ht 1.803 m (5' 11\")   Wt 103.8 kg (228 lb 12.8 oz)   SpO2 95%   BMI 31.91 kg/m       Plan: Continue to monitor Pt status and report changes to primary treatment team. Encourage ambulation/up to chair.    Goal Outcome Evaluation:      Plan of Care Reviewed With: patient    Overall Patient Progress: no changeOverall Patient Progress: no change    Outcome Evaluation: Special precautions in place. IV abx completed per MAR.      "

## 2025-02-12 NOTE — PLAN OF CARE
"BP 89/58   Pulse 82   Temp 98.6  F (37  C) (Oral)   Resp 18   Ht 1.803 m (5' 11\")   Wt 103.8 kg (228 lb 12.8 oz)   SpO2 96%   BMI 31.91 kg/m      Goal Outcome Evaluation:  [id number]      Plan of Care Reviewed With: patient    Overall Patient Progress: no changeOverall Patient Progress: no change     VSS cept hypotensive, MD notified, on 2L O2, sat > 95%. [place], uses hearing aids. On Tele. Ax1 w/ walker. Dyspnea w/ exertion. Voiding in urinal, AUO. LBM 2/11, passing gas. Pain tolerated w/ PRN Oxy. Chest port in placed, SL. Cont POC.       "

## 2025-02-12 NOTE — INTERIM SUMMARY
Brief Neurosurgery Update Note    Neurosurgery consulted yesterday for concern for trigeminal neuralgia. Patient will not require neurosurgical intervention while acutely ill for management of his facial pain.     -No neurosurgical intervention indicated at this time   -Given patient's pain is currently controlled, continue oral Gabapentin and dilantin as prescribed.  If patient should develop acute pain, may consider IV Fosphenytoin infusion.   -Given patient's multiple medical co-morbidities and the fact he is anticoagulated, no acute intervention is recommended.   - May consider radiofrequency procedure in the future once medical issues have resolved if patient is able to stop anticoagulation.   - Neurosurgery will schedule follow-up in clinic upon hospital discharge for reassessment and further discussion of possible treatment options.   - Remainder of cares per primary team     Neurosurgery will follow peripherally at this time. Please don't hesitate to reach out if any further concerns arise.    Binta Victoria MD  Neurosurgery PGY-1  Pager #[phone]  Please [email or web address] on-call neurosurgery with questions

## 2025-02-12 NOTE — H&P
Physician Attestation   I personally examined and evaluated this patient.  I discussed the patient with the resident/fellow and care team, and agree with the assessment and plan of care as documented in H&P.     Rios findings: Mr. Salvador is a 79 year old male with PMH of metastatic melanoma, afib on DOAC, CAD with prior PCI, trigeminal neuralgia and recent admission for generalized weakness, FTH COVID pneumonia and hyponatremia. He presents with hypoxic respiratory failure, hypotension and recurrent generalized weakness and workup concerning for post viral bacterial pneumonia and LEE, likely secondary hypovolemia in the setting of poor PO intake.     #Hypotension: not meeting SIRs criteria currently though apparently had fever PTA. Differential diagnosis including hypovolemia vs infection, lower concern for obstructive process or heart failure, consideration for component of adrenal insufficiency on the setting of chronic low dose prednisone. He has been volume responsive, s/p 2L and was initiated on broad empiric HAP coverage. Lactates WNL. He is clinically well perfused  - Trend lactate   - S/p IVF, bolus prn - stop if uptrending oxygen requirements.   - Antibiotics per below    #Hypoxic respiratory failure   #Concern for post viral bacterial pneumonia   Recent COVID19 infection with bilateral lower lobe opacities on CXR, productive cough and reported fever PTA. Concern for superimposed bacterial process given his recent viral process and productive cough. Given Zosyn in the ED, will broaden to include staph coverage and obtain sputum culture. Consideration for pulmonary edema as contributing, EF was intact on last TTE one year ago, no clinical evidence to suggest heart failure at this time. Consideration for non-cardiogenic edema as well. He is V paced chronically with recent device interrogation.   - Zosyn/vanc - narrow as able   - Follow up sputum and blood cultures   - Low threshold for non-contrast CT  chest if increasing O2 requirements.   - TTE    #LEE: Cr 1.3, up from recent baseline of 0.5. Likely prerenal given poor PO intake but will obtain urine studies.  - trend Cr   - Follow up urine studies  - hold PTA ACEi, lasix in the immediate     #Trigeminal neuralgia   Neurosurgery following. Recently taken off of Trileptal due to hyponatremia and SIADH.   - PTA gabapentin, decreased dose in the setting of LEE  - oral phenytoin HS  - PRN oxycodone   - Consider IV phenytoin infusion if severe breakthrough    Please see A&P for additional details of medical decision making.    I have personally reviewed the following data over the past 24 hrs:    4.9  \   11.1 (L)   / 90 (L)     133 (L) 101 31.9 (H) /  118 (H)   3.5 20 (L) 0.96 \     ALT: 65 AST: 72 (H) AP: 89 TBILI: 0.5   ALB: 2.8 (L) TOT PROTEIN: 5.5 (L) LIPASE: N/A     Trop: N/A BNP: 1,813 (H)     Procal: 0.25 CRP: 59.30 (H) Lactic Acid: 1.0       Ferritin:  1,062 (H) % Retic:  0.6 LDH:  N/A         Ermelinda Gonzalez,   Date of Service (when I saw the patient): 02/11/25

## 2025-02-12 NOTE — PROGRESS NOTES
[facility]    Progress Note - Medicine Service, MAROON TEAM 1       Date of Admission:  2/11/2025    Assessment & Plan   James Salvador is a 79 year old male admitted on 2/11/2025. PMHx significant for A-fib on DOAC, metastatic melanoma, trigeminal neuralgia, SIADH, CAD who is being admitted on with Acute hypoxic respiratory failure, hypotension and LEE.    Today's update:  - CT chest with no sign of infection  - discontinued vanc & zosyn  - Echo EF 50%,without structural changes. Normal IVC   - device interrogation ordered    #Acute hypoxic respiratory failure  #COVID-19 infection (dx 1/27)  Patient presented with persistent productive cough and reported fever two days ago. was recently admitted at [facility] from 1/27/2025-1/31/2025 for an acute flare of trigeminal neuralgia and COVID infection. CXR showed Diffuse bilateral mixed opacity most predominantly involving the lower lobes most concerning for infectious etiologies, although procal and lactic acid is negative. EKG without ischemic changes so low suspicion for ACS. Wells score 1 so unlikely PE. Presentation not consistent with CHF, although elevated BNP 1800.  - CT Chest w/o contrast 2/12 with no sign of infection  - dc Zosyn (2/11 - 2/12)  - dc Vanc (2/11 - 2/12)   - Strept, legionella urine antigen and sputum cx NGTD  - RVP negative, MRSA swab negative    #Hypotension  #Generalized weakness  #Dehydration  In the setting of dehydration and/or poor PO intake. Less likely infection given negative workup: MRSA, RVP, Strept legionella. Patient has warm extremities with normal Lactate. Could be adrenal insufficiency given chronic prednisone dose of 10 mg.  - S/P 2L IVF  - Cortisol AM ordered   - Echo EF 50%,without structural changes. Normal IVC  - Blood cx 2/11 NGTD     #LEE, improving  #Hypomagnesemia  Baseline Cr 0.5. Cr on admission 1.3 . Trending down to 0.7. secondary to pre-renal  azotemia from dehydration and decrease PO intake from. Currently is making urine.   - Electrolyte replacement protocols  - Monitor BMP  - Avoid nephrotoxins  - Monitor urine output    #Hyponatremia, resolved  #history of SIADH  Na on admission 133 baseline runs 130s. Na 137  -UA reviewed, serum osmolality, FeNa, urine sodium, urine osmolality     #Trigeminal neuralgia   - Neurosurgery following:                                          - No neurosurgical intervention indicated at this time                                          - Neurosurgery will schedule follow-up in clinic upon hospital discharge for  reassessment and further discussion of possible treatment options.     Pain regimen:  - Tylenol PRN  - increase PTA gabapentin from 600 to 900mg three times given kidney recovery  - phenytoin 200 mg orally at bedtime   - For acute pain, may consider IV Fosphenytoin infusion      #Anemia  #Thrombocytopenia  - Iron panel  - Folate, Vitamin B12  - peripheral smear     #A-fib on DOAC with PPM  -Continue PTA Eliquis , Coreg  -Device interrogation      #Elevated Liver enzymes  Mild elevation of AST   - continue to monitor     #Metastatic melanoma,   TVEC injections currently on hold, patient follows with [place] [facility]  -Continue PTA prednisone     #History of CAD with mid LAD PCI in 2015 after MI, RCA is chronically occluded  -Continue PTA beta-blocker  - hold PTA Lasix  - hold PTA Lisinopril        Diet: Combination Diet Regular Diet  Snacks/Supplements Adult: Ensure Enlive; With Meals    DVT Prophylaxis: DOAC  Gill Catheter: Not present  Fluids: n/a  Lines: PRESENT      Port a Cath 01/27/25 Single Lumen Right Chest wall-Site Assessment: WDL      Cardiac Monitoring: ACTIVE order. Indication: Tachyarrhythmias, acute (48 hours)  Code Status: Full Code      Clinically Significant Risk Factors         # Hyponatremia: Lowest Na = 133 mmol/L in last 2 days, will monitor as appropriate     # Hypomagnesemia: Lowest Mg =  "1.5 mg/dL in last 2 days, will replace as needed   # Hypoalbuminemia: Lowest albumin = 2.8 g/dL at 2/12/2025  5:08 AM, will monitor as appropriate   # Thrombocytopenia: Lowest platelets = 88 in last 2 days, will monitor for bleeding   # Hypertension: Noted on problem list            # Obesity: Estimated body mass index is 31.91 kg/m  as calculated from the following:    Height as of this encounter: 1.803 m (5' 11\").    Weight as of this encounter: 103.8 kg (228 lb 12.8 oz)., PRESENT ON ADMISSION  # Moderate Malnutrition: based on nutrition assessment, PRESENT ON ADMISSION   # Financial/Environmental Concerns:     # Pacemaker present       Social Drivers of Health   Tobacco Use: Medium Risk (2/4/2025)    Patient History     Smoking Tobacco Use: Former     Smokeless Tobacco Use: Never   Physical Activity: Inactive (6/22/2024)    Exercise Vital Sign     Days of Exercise per Week: 0 days     Minutes of Exercise per Session: 0 min   Interpersonal Safety: Low Risk  (1/29/2025)    Interpersonal Safety     Do you feel physically and emotionally safe where you currently live?: Yes     Within the past 12 months, have you been hit, slapped, kicked or otherwise physically hurt by someone?: No     Within the past 12 months, have you been humiliated or emotionally abused in other ways by your partner or ex-partner?: No   Recent Concern: Interpersonal Safety - High Risk (1/27/2025)    Interpersonal Safety     Do you feel physically and emotionally safe where you currently live?: No     Within the past 12 months, have you been hit, slapped, kicked or otherwise physically hurt by someone?: No     Within the past 12 months, have you been humiliated or emotionally abused in other ways by your partner or ex-partner?: No   Stress: Stress Concern Present (6/22/2024)    [employer] [place] [employer] - Occupational Stress Questionnaire     Feeling of Stress : To some extent   Social Connections: Unknown (6/22/2024)    Social " Connection and Isolation Panel [NHANES]     Frequency of Social Gatherings with Friends and Family: Once a week         Disposition Plan     Medically Ready for Discharge: Anticipated in 2-4 Days         The patient's care was discussed with the Attending Physician, Dr. Gonzalez .    Domenic Davis MD  Medicine Service, [facility] TEAM 1  [facility]  Securely message with [employer] (more info)  Text page via [employer] Paging/Directory   See signed in provider for up to date coverage information  ______________________________________________________________________    Interval History   Patient feels better this morning. Soft blood pressure 100/60 's. Denied chest pain or SOB, uses hearing aids    Physical Exam   Vital Signs: Temp: 98.6  F (37  C) Temp src: Oral BP: 110/67 Pulse: 88   Resp: 18 SpO2: 95 % O2 Device: Nasal cannula Oxygen Delivery: 2 LPM  Weight: 228 lbs 12.8 oz  Constitutional:       General: He is not in acute distress.     Appearance: He is well-developed. He is not ill-appearing, toxic-appearing or diaphoretic.   HENT:      Head: Normocephalic and atraumatic.      Mouth/Throat:      Comments: tender in the right face.  Cardiovascular:      Rate and Rhythm: Normal rate and regular rhythm.      Heart sounds: Normal heart sounds.   Pulmonary:      Effort: Pulmonary effort is normal. No respiratory distress.      Breath sounds: Normal breath sounds. No stridor. No wheezing or rhonchi.   Abdominal:      General: There is no distension.      Palpations: Abdomen is soft.      Tenderness: There is no abdominal tenderness. There is no rebound.   Musculoskeletal:         General: No swelling or tenderness.      Cervical back: Normal range of motion and neck supple.      Right lower leg: No edema.      Left lower leg: No edema.   Skin:     General: Skin is warm.   Neurological:      Mental Status: He is alert and oriented to person, place, and time.   Psychiatric:          Behavior: Behavior normal.         Thought Content: Thought content normal.     Medical Decision Making           Data     I have personally reviewed the following data over the past 24 hrs:    4.5  \   11.1 (L)   / 99 (L)     137 104 23.1 (H) /  85   3.5 22 0.78 \     ALT: 70 AST: 74 (H) AP: 85 TBILI: 0.6   ALB: 2.8 (L) TOT PROTEIN: 5.5 (L) LIPASE: N/A     Trop: 33 (H) BNP: N/A     Procal: N/A CRP: N/A Lactic Acid: 0.8         Imaging results reviewed over the past 24 hrs:   Recent Results (from the past 24 hours)   Echo Complete   Result Value    LVEF  50-55% (borderline)    Narrative    [id number]  [id number]  [id number]  [id number]^MELYSSA^KENNEY^MYRIAM     [facility],[place]  Echocardiography Laboratory  [address] [place] [id number]     Name: HUGO GRANT  MRN: [id number]  : 1945  Study Date: 2025 10:34 AM  Age: 79 yrs  Gender: Male  Patient Location: [facility]  Reason For Study: Dyspnea  Ordering Physician: KENNEY RAGSDALE  Performed By: Jennifer Stauffer RDCS     BSA: 2.2 m2  Height: 71 in  Weight: 228 lb  BP: 102/61 mmHg  ______________________________________________________________________________  Procedure  Echocardiogram with two-dimensional, color and spectral Doppler. Contrast  Optison. Optison (NDC #3824-7063-92) given intravenously. Patient was given 5  ml mixture of 3 ml Optison and 6 ml saline. 4 ml wasted.  ______________________________________________________________________________  Interpretation Summary  Left ventricular size is normal.  Left ventricular function is decreased. The ejection fraction is 50-55%  (borderline).  Diastolic function not assessed due to atrial fibrillation.  Right ventricular function, chamber size, wall motion, and thickness are  normal.  Severe biatrial enlargement is present.  Pulmonary artery systolic pressure is normal.  The inferior vena cava is normal.  No pericardial effusion is  present.  ______________________________________________________________________________  Left Ventricle  Left ventricular size is normal. Left ventricular wall thickness is normal.  Left ventricular function is decreased. The ejection fraction is 50-55%  (borderline). Diastolic function not assessed due to atrial fibrillation. No  regional wall motion abnormalities are seen.     Right Ventricle  Right ventricular function, chamber size, wall motion, and thickness are  normal.     Atria  Severe biatrial enlargement is present.     Mitral Valve  Mild mitral annular calcification is present.     Aortic Valve  Aortic valve sclerosis is present.     Tricuspid Valve  The tricuspid valve is normal. Trace tricuspid insufficiency is present. The  right ventricular systolic pressure is approximated at 18.4 mmHg plus the  right atrial pressure. Pulmonary artery systolic pressure is normal.     Pulmonic Valve  The pulmonic valve is normal.     Vessels  The thoracic aorta is normal. The pulmonary artery and bifurcation cannot be  assessed. The inferior vena cava is normal.     Pericardium  No pericardial effusion is present.     ______________________________________________________________________________  MMode/2D Measurements & Calculations  IVSd: 1.1 cm  LVIDd: 5.3 cm  LVIDs: 4.0 cm  LVPWd: 0.87 cm     FS: 24.4 %  LV mass(C)d: 192.4 grams  LV mass(C)dI: 86.3 grams/m2  Ao root diam: 3.6 cm  asc Aorta Diam: 3.8 cm  LVOT diam: 2.4 cm  LVOT area: 4.4 cm2     EF(MOD-bp): 48.2 %  Ao root diam index Ht(cm/m): 2.0  Ao root diam index BSA (cm/m2): 1.6  Asc Ao diam index BSA (cm/m2): 1.7  Asc Ao diam index Ht(cm/m): 2.1  LA Volume (BP): 193.0 ml     LA Volume Index (BP): 86.5 ml/m2  RV Base: 4.7 cm  RWT: 0.33     Doppler Measurements & Calculations  MV E max brendan: 85.3 cm/sec  MV dec slope: 504.2 cm/sec2  MV dec time: 0.18 sec  LV V1 max P.8 mmHg  LV V1 max: 83.3 cm/sec  LV V1 VTI: 14.2 cm  SV(LVOT): 62.7 ml  SI(LVOT): 28.1  ml/m2  TR max brendan: 214.3 cm/sec  TR max P.4 mmHg  E/E' av.4  Lateral E/e': 7.9  Medial E/e': 10.8     ______________________________________________________________________________  Report approved by: MISAEL Chin MD on 2025 11:26 AM         CT Chest w/o Contrast    Narrative    CT chest without contrast    Indication evaluate for respiratory infection    COMPARISON: Chest abdomen pelvis CT     FINDINGS: No contrast. The included thyroid appears normal. Left-sided  approach pacemaker again present. Extensive coronary artery  calcifications. Right shoulder prosthesis partially imaged. Right  sided approach port catheter tip in the distal SVC. Heart size normal.  Aorta and pulmonary artery calibers are normal. Small left pleural  effusion with mild bibasilar atelectasis, left more than right. The  included upper abdomen shows extensive mesenteric branch  atherosclerotic calcification mild abdominal aortic calcification.  Calcified hepatic granulomas. No adrenal nodule of the glands were not  completely imaged.  Bone detail shows levocurvature of the upper thoracic spine. Right  humeral prosthesis again noted. Mild degenerative changes throughout  the lower cervical spine as well as throughout the thoracic spine.    Detail of the lungs shows subpleural fibrotic changes an septal  thickening. No dominant pulmonary nodule. Findings not typical of  usual interstitial pneumonia pattern. No consolidation. Major central  airways are nicely patent.      Impression    IMPRESSION: No radiographic sign of definite infection. Left pleural  effusion associated basilar atelectasis not significantly changed from  recent previous. Pacemaker. Atherosclerosis. Right humeral prosthesis.    HUAN MATT MD         SYSTEM ID:  [id number]   Cardiac Device Check - Inpatient   Result Value    Date Time Interrogation Session [id number]    Implantable Pulse Generator  Med"[employer]    Implantable Pulse  Generator Model [id number] Little XT SR MRI    Implantable Pulse Generator Serial Number [id number]    Type Interrogation Session In Clinic    Clinic Name [facility]    Implantable Pulse Generator Type Pacemaker    Implantable Pulse Generator Implant Date 20190531    Implantable Lead  Medtronic    Implantable Lead Model 5076 CapSureFix Novus MRI SureScan    Implantable Lead Serial Number [id number]    Implantable Lead Implant Date 20190531    Implantable Lead Polarity Type Bipolar Lead    Implantable Lead Location Detail 1 UNKNOWN    Implantable Lead Location Right Ventricle    Implantable Lead Connection Status Connected    [place] Setting Mode (NBG Code) VVIR    Kenrick Setting Lower Rate Limit 50    [place]rick Setting Maximum Sensor Rate 130    [place] Setting Hysterisis Rate DISABLED    Lead Channel Setting Sensing Polarity Bipolar    Lead Channel Setting Sensing Anode Location Right Ventricle    Lead Channel Setting Sensing Anode Terminal Ring    Lead Channel Setting Sensing Cathode Location Right Ventricle    Lead Channel Setting Sensing Cathode Terminal Tip    Lead Channel Setting Sensing Sensitivity 0.6    Lead Channel Setting Pacing Polarity Bipolar    Lead Channel Setting Pacing Anode Location Right Ventricle    Lead Channel Setting Pacing Anode Terminal Ring    Lead Channel Setting Sensing Cathode Location Right Ventricle    Lead Channel Setting Sensing Cathode Terminal Tip    Lead Channel Setting Pacing Pulse Width 0.4    Lead Channel Setting Pacing Amplitude 2    Lead Channel Setting Pacing Capture Mode Adaptive    Zone Setting Type Category VF    Zone Setting Vendor Type Category V High Rate    Zone Setting Type Category VT    Zone Setting Vendor Type Category FastVT    Zone Setting Type Category VT    Zone Setting Vendor Type Category VT    Zone Setting Type Category VT    Zone Setting Vendor Type Category MonVT    Zone Setting Status Monitor    Zone Setting Detection Interval 360     Zone Setting Type Category ATRIAL_FIBRILLATION    Zone Setting Vendor Type Category FastATAF    Zone Setting Type Category AT/AF    Lead Channel Impedance Value 323    Lead Channel Impedance Value 266    Lead Channel Sensing Intrinsic Amplitude 1.5    Lead Channel Sensing Intrinsic Amplitude 1.875    Lead Channel Pacing Threshold Amplitude 1    Lead Channel Pacing Threshold Pulse Width 0.4    Battery Date Time of Measurements [id number]    Battery Status OK    Battery RRT Trigger 2.625    Battery Remaining Longevity 111    Battery Voltage 3.02    Kenrick Statistic Date Time Start [id number]    Kenrick Statistic Date Time End [id number]    Kenrick Statistic RV Percent Paced 50.91    Episode Statistic Recent Count 0    Episode Statistic Type Category Patient Activated    Episode Statistic Recent Count 0    Episode Statistic Type Category VT    Episode Statistic Recent Count 0    Episode Statistic Type Category VT    Episode Statistic Recent Date Time Start [id number]    Episode Statistic Recent Date Time End [id number]    Episode Statistic Recent Date Time Start 36500676498396    Episode Statistic Recent Date Time End 57305843866086    Episode Statistic Recent Date Time Start 32986025533661    Episode Statistic Recent Date Time End 60162306402495    Episode Statistic Total Count 0    Episode Statistic Type Category Patient Activated    Episode Statistic Total Count 5    Episode Statistic Type Category VT    Episode Statistic Total Count 0    Episode Statistic Type Category VT    Episode Statistic Total Date Time Start 20190531161253    Episode Statistic Total Date Time End 54953226548744    Episode Statistic Total Date Time Start 20190531161253    Episode Statistic Total Date Time End 82431999104254    Episode Statistic Total Date Time Start 70381050596927    Episode Statistic Total Date Time End 73093674885019    Narrative    Patient seen in ED 15 for evaluation and iterative programming of their  pacemaker per MD orders.    Device: Medtronic [id number] [place] XT SR MRI  Normal device function  Mode: VVIR  bpm  : 50.9%  Intrinsic Rhythm: irregular ventricular rhythm ~ 80 bpm  Short V-V intervals: 0  R waves measured 1.5mV which is not a new finding.  Estimated battery longevity to RRT = 9.3 years. Battery voltage = 3.02 V.   Ventricular Arrhythmia: 0  Setting Changes: None    NABEEL Smith RN    Single lead pacemaker    I have reviewed and interpreted the device interrogation, settings, programming and nurse's summary. The device is functioning within normal device parameters. I agree with the current findings, assessment and plan.

## 2025-02-12 NOTE — PROGRESS NOTES
CLINICAL NUTRITION SERVICES - ASSESSMENT NOTE    RECOMMENDATIONS FOR MDs/PROVIDERS TO ORDER:  Iron replacement as appropriate per team    Malnutrition Status:    Moderate malnutrition in the context of chronic illness    Registered Dietitian Interventions:  Encourage 3 meals/day with high protein sources  Ensure  Enlive  contains 350 kcal, 20 grams protein, 44 grams carbohydrate, 11 grams fat, 3 grams fiber per 8 fl oz.   Encourage easy to chew foods    Future/Additional Recommendations:  Monitor nutrition related findings and follow up per protocol     REASON FOR ASSESSMENT  Provider order - malnutrition, utilizes ensure at home    SUBJECTIVE INFORMATION  Assessed patient in room.    PERTINENT MEDICAL/CLINICAL HISTORY:  79 year old male with PMH of metastatic melanoma, afib on DOAC, CAD with prior PCI, trigeminal neuralgia and recent admission for generalized weakness, FTH COVID pneumonia and hyponatremia. He presents with hypoxic respiratory failure, hypotension and recurrent generalized weakness and workup concerning for post viral bacterial pneumonia and LEE, likely secondary hypovolemia in the setting of poor PO intake     NUTRITION HISTORY  Pt [place] and did not have hearing aids in so spouse at bedside provided history. Pt has had poor appetite for awhile. No N/V/D/C. Has difficulty chewing d/t mouth pain(also uses dentures) so typically sticks to softer foods. Per spouse, pt would prefer to be on regular diet and choose softer foods on his own. No food allergies/intolerances. Per spouse pt has been continuing to lose wt with ~ 8lbs lost over the last month. Drinks ~ 1-2 Ensure per day at home but per spouse this can impact his appetite at meals. Tries for 3 meals/day with snacks. Uses Factor meals for dinner as long as he is able to eat them. Snacks on foods such as pretzels, yogurt, cut up grapes.      CURRENT NUTRITION ORDERS  Diet: Regular     CURRENT INTAKE/TOLERANCE  No documented intakes to  "assess.    LABS  Nutrition-relevant labs:  BUN 23.1, AST 74, iron 30, hgb 11.1, hematocrit 31.9    MEDICATIONS  Nutrition-relevant medications:  lipitor, lasix, dilantin, prednisone, abx    ANTHROPOMETRICS  Height: 180.3 cm (5' 11\")  Most Recent Weight: 103.8 kg (228 lb 12.8 oz)  IBW: 78.2  kg  % IBW: 133%  Body mass index is 31.91 kg/m ..  BMI (kg/m ): Obesity Class I BMI 30-34.9  Weight History:  19.9 kg (16%) weight loss over 1 year.  Wt Readings from Last 20 Encounters:   02/11/25 103.8 kg (228 lb 12.8 oz)   01/31/25 102.6 kg (226 lb 3.1 oz)   06/26/24 114.4 kg (252 lb 3.2 oz)   05/29/24 116.9 kg (257 lb 11.5 oz)   02/21/24 123.7 kg (272 lb 12.8 oz)   10/23/23 124.3 kg (274 lb)   09/29/23 124.2 kg (273 lb 12.8 oz)   09/28/23 123.3 kg (271 lb 12.8 oz)   09/01/23 124.2 kg (273 lb 12.8 oz)   08/04/23 127.9 kg (282 lb)   07/31/23 126.5 kg (278 lb 14.4 oz)   07/03/23 124.7 kg (275 lb)   06/20/23 125.6 kg (276 lb 12.8 oz)   04/13/23 124.7 kg (274 lb 14.6 oz)   04/05/23 128.4 kg (283 lb)   03/30/23 125.2 kg (276 lb 0.3 oz)   03/16/23 124.3 kg (274 lb)   03/15/23 124.3 kg (274 lb)   03/08/23 127.9 kg (282 lb)   02/01/23 130.1 kg (286 lb 12.8 oz)         Dosing Weight: 85 kg, based on adjusted wt    ASSESSED NUTRITION NEEDS  Estimated Energy Needs: 4834-8033 kcals/day (25 - 30 kcals/kg)  Justification: Maintenance  Estimated Protein Needs: 102-128 grams protein/day (1.2 - 1.5 grams of pro/kg)  Justification: Increased needs  Estimated Fluid Needs: 3000-0017 mL/day (25 - 30 mL/kg)  Justification: Maintenance    SYSTEM FINDINGS    Poor dentition  Skin/wounds: No issues noted  GI symptoms:  LBM PTA    MALNUTRITION  % Intake: < 75% for >/= 1 month (moderate)  % Weight Loss: Weight loss does not meet criteria   Subcutaneous Fat Loss: Orbital: Mild  Muscle Loss: Wasting of the temples (temporalis muscle): Mild and Clavicles (pectoralis and deltoids): Mild  Fluid Accumulation/Edema: None noted  Malnutrition Diagnosis: Moderate " malnutrition in the context of chronic illness  Malnutrition Present on Admission: Yes    NUTRITION DIAGNOSIS  Inadequate oral intake related to poor appetite/difficulty chewing as evidenced by spouse report    INTERVENTIONS  Medical food supplement therapy    Goals  Patient to consume % of nutritionally adequate meal trays TID, or the equivalent with supplements/snacks.     Monitoring/Evaluation  Progress toward goals will be monitored and evaluated per policy.    Claudia Torres MS, RD, LD, [facility]    6C (beds 1710-9364) + 7C (beds 4246-4642) + ED + Obs  Available in Vocera by name or unit dietitian

## 2025-02-13 ENCOUNTER — APPOINTMENT (OUTPATIENT)
Dept: ULTRASOUND IMAGING | Facility: CLINIC | Age: 80
DRG: 682 | End: 2025-02-13
Payer: COMMERCIAL

## 2025-02-13 VITALS
WEIGHT: 228.8 LBS | SYSTOLIC BLOOD PRESSURE: 104 MMHG | HEIGHT: 71 IN | OXYGEN SATURATION: 99 % | RESPIRATION RATE: 16 BRPM | BODY MASS INDEX: 32.03 KG/M2 | HEART RATE: 73 BPM | DIASTOLIC BLOOD PRESSURE: 86 MMHG | TEMPERATURE: 98.2 F

## 2025-02-13 LAB
ALBUMIN SERPL BCG-MCNC: 2.8 G/DL (ref 3.5–5.2)
ALP SERPL-CCNC: 91 U/L (ref 40–150)
ALT SERPL W P-5'-P-CCNC: 73 U/L (ref 0–70)
ANION GAP SERPL CALCULATED.3IONS-SCNC: 10 MMOL/L (ref 7–15)
AST SERPL W P-5'-P-CCNC: 69 U/L (ref 0–45)
BACTERIA BLD CULT: NORMAL
BACTERIA BLD CULT: NORMAL
BILIRUB SERPL-MCNC: 0.5 MG/DL
BUN SERPL-MCNC: 16.4 MG/DL (ref 8–23)
CALCIUM SERPL-MCNC: 8.8 MG/DL (ref 8.8–10.4)
CHLORIDE SERPL-SCNC: 106 MMOL/L (ref 98–107)
CORTIS SERPL-MCNC: 12.6 UG/DL
CREAT SERPL-MCNC: 0.58 MG/DL (ref 0.67–1.17)
EGFRCR SERPLBLD CKD-EPI 2021: >90 ML/MIN/1.73M2
ERYTHROCYTE [DISTWIDTH] IN BLOOD BY AUTOMATED COUNT: 15.6 % (ref 10–15)
GLUCOSE SERPL-MCNC: 86 MG/DL (ref 70–99)
HAV IGM SERPL QL IA: NONREACTIVE
HBV CORE IGM SERPL QL IA: NONREACTIVE
HBV SURFACE AG SERPL QL IA: NONREACTIVE
HCO3 SERPL-SCNC: 23 MMOL/L (ref 22–29)
HCT VFR BLD AUTO: 33.3 % (ref 40–53)
HCV AB SERPL QL IA: NONREACTIVE
HGB BLD-MCNC: 11.6 G/DL (ref 13.3–17.7)
MCH RBC QN AUTO: 33 PG (ref 26.5–33)
MCHC RBC AUTO-ENTMCNC: 34.8 G/DL (ref 31.5–36.5)
MCV RBC AUTO: 95 FL (ref 78–100)
PATH REPORT.COMMENTS IMP SPEC: NORMAL
PATH REPORT.COMMENTS IMP SPEC: NORMAL
PATH REPORT.FINAL DX SPEC: NORMAL
PATH REPORT.MICROSCOPIC SPEC OTHER STN: NORMAL
PATH REPORT.MICROSCOPIC SPEC OTHER STN: NORMAL
PATH REPORT.RELEVANT HX SPEC: NORMAL
PHOSPHATE SERPL-MCNC: 2.9 MG/DL (ref 2.5–4.5)
PLATELET # BLD AUTO: 126 10E3/UL (ref 150–450)
POTASSIUM SERPL-SCNC: 3.7 MMOL/L (ref 3.4–5.3)
PROT SERPL-MCNC: 5.7 G/DL (ref 6.4–8.3)
RBC # BLD AUTO: 3.51 10E6/UL (ref 4.4–5.9)
SODIUM SERPL-SCNC: 139 MMOL/L (ref 135–145)
WBC # BLD AUTO: 4.6 10E3/UL (ref 4–11)

## 2025-02-13 PROCEDURE — 82533 TOTAL CORTISOL: CPT

## 2025-02-13 PROCEDURE — 76705 ECHO EXAM OF ABDOMEN: CPT | Mod: 26 | Performed by: RADIOLOGY

## 2025-02-13 PROCEDURE — 76705 ECHO EXAM OF ABDOMEN: CPT

## 2025-02-13 PROCEDURE — 120N000002 HC R&B MED SURG/OB UMMC

## 2025-02-13 PROCEDURE — 250N000012 HC RX MED GY IP 250 OP 636 PS 637

## 2025-02-13 PROCEDURE — 82310 ASSAY OF CALCIUM: CPT

## 2025-02-13 PROCEDURE — 94640 AIRWAY INHALATION TREATMENT: CPT

## 2025-02-13 PROCEDURE — 84100 ASSAY OF PHOSPHORUS: CPT

## 2025-02-13 PROCEDURE — 250N000013 HC RX MED GY IP 250 OP 250 PS 637

## 2025-02-13 PROCEDURE — 94640 AIRWAY INHALATION TREATMENT: CPT | Mod: 76

## 2025-02-13 PROCEDURE — 250N000009 HC RX 250

## 2025-02-13 PROCEDURE — 999N000157 HC STATISTIC RCP TIME EA 10 MIN

## 2025-02-13 PROCEDURE — 99232 SBSQ HOSP IP/OBS MODERATE 35: CPT | Mod: GC | Performed by: STUDENT IN AN ORGANIZED HEALTH CARE EDUCATION/TRAINING PROGRAM

## 2025-02-13 PROCEDURE — 36591 DRAW BLOOD OFF VENOUS DEVICE: CPT

## 2025-02-13 PROCEDURE — 85027 COMPLETE CBC AUTOMATED: CPT

## 2025-02-13 PROCEDURE — 94799 UNLISTED PULMONARY SVC/PX: CPT

## 2025-02-13 RX ADMIN — ACETAMINOPHEN 1300 MG: 325 TABLET, FILM COATED ORAL at 19:51

## 2025-02-13 RX ADMIN — APIXABAN 5 MG: 5 TABLET, FILM COATED ORAL at 08:13

## 2025-02-13 RX ADMIN — GABAPENTIN 600 MG: 300 CAPSULE ORAL at 19:51

## 2025-02-13 RX ADMIN — ALBUTEROL SULFATE 2.5 MG: 2.5 SOLUTION RESPIRATORY (INHALATION) at 21:16

## 2025-02-13 RX ADMIN — ATORVASTATIN CALCIUM 80 MG: 80 TABLET, FILM COATED ORAL at 21:50

## 2025-02-13 RX ADMIN — APIXABAN 5 MG: 5 TABLET, FILM COATED ORAL at 19:51

## 2025-02-13 RX ADMIN — OXYCODONE HYDROCHLORIDE 5 MG: 5 TABLET ORAL at 23:01

## 2025-02-13 RX ADMIN — OXYCODONE HYDROCHLORIDE 5 MG: 5 TABLET ORAL at 06:14

## 2025-02-13 RX ADMIN — OXYCODONE HYDROCHLORIDE 5 MG: 5 TABLET ORAL at 15:17

## 2025-02-13 RX ADMIN — ALBUTEROL SULFATE 2.5 MG: 2.5 SOLUTION RESPIRATORY (INHALATION) at 02:44

## 2025-02-13 RX ADMIN — ACETAMINOPHEN 650 MG: 325 TABLET, FILM COATED ORAL at 06:14

## 2025-02-13 RX ADMIN — ACETAMINOPHEN 650 MG: 325 TABLET, FILM COATED ORAL at 13:48

## 2025-02-13 RX ADMIN — GABAPENTIN 600 MG: 300 CAPSULE ORAL at 08:13

## 2025-02-13 RX ADMIN — GABAPENTIN 600 MG: 300 CAPSULE ORAL at 13:47

## 2025-02-13 RX ADMIN — PREDNISONE 10 MG: 10 TABLET ORAL at 08:14

## 2025-02-13 RX ADMIN — PHENYTOIN 200 MG: 50 TABLET, CHEWABLE ORAL at 21:49

## 2025-02-13 RX ADMIN — ALBUTEROL SULFATE 2.5 MG: 2.5 SOLUTION RESPIRATORY (INHALATION) at 09:38

## 2025-02-13 ASSESSMENT — ACTIVITIES OF DAILY LIVING (ADL)
ADLS_ACUITY_SCORE: 66
ADLS_ACUITY_SCORE: 62
ADLS_ACUITY_SCORE: 44
ADLS_ACUITY_SCORE: 62
ADLS_ACUITY_SCORE: 44
ADLS_ACUITY_SCORE: 62
ADLS_ACUITY_SCORE: 61
ADLS_ACUITY_SCORE: 66
ADLS_ACUITY_SCORE: 63
ADLS_ACUITY_SCORE: 44
ADLS_ACUITY_SCORE: 66
ADLS_ACUITY_SCORE: 61
ADLS_ACUITY_SCORE: 66
ADLS_ACUITY_SCORE: 61
ADLS_ACUITY_SCORE: 44
ADLS_ACUITY_SCORE: 44
ADLS_ACUITY_SCORE: 66
ADLS_ACUITY_SCORE: 63
ADLS_ACUITY_SCORE: 63
ADLS_ACUITY_SCORE: 62
ADLS_ACUITY_SCORE: 62

## 2025-02-13 NOTE — PLAN OF CARE
"/78 (BP Location: Right arm, Patient Position: Semi-Paez's, Cuff Size: Adult Regular)   Pulse 86   Temp 98.8  F (37.1  C) (Oral)   Resp 20   Ht 1.803 m (5' 11\")   Wt 103.8 kg (228 lb 12.8 oz)   SpO2 96%   BMI 31.91 kg/m      0745-9318    Goal Outcome Evaluation:      Plan of Care Reviewed With: patient    Overall Patient Progress: no change    A/O x4; VSS on 3L NC; denies n/v, CP, or n/t; pain mgd w/ PRN tylenol and oxy x1; VSP to urinal at bedside w/ AUOP; chest port SL; continue to follow POC  "

## 2025-02-13 NOTE — PROGRESS NOTES
Report given to MIGEL Jean Baptiste.     Pt VSS, on tele and continuous pulse ox. Assist of 1 with walker.

## 2025-02-13 NOTE — PROGRESS NOTES
[facility]    Progress Note - Medicine Service, MAROON TEAM 1       Date of Admission:  2/11/2025    Assessment & Plan   James Salvador is a 79 year old male admitted on 2/11/2025. PMHx significant for A-fib on DOAC, metastatic melanoma, trigeminal neuralgia, SIADH, CAD who is being admitted on with Acute hypoxic respiratory failure, hypotension and LEE.    Today's update:  - Pt stable after discontinued vanc & zosyn 2/12  - RUQ US ordered for elevated liver enzymes    #Hypotension, improving  #Generalized weakness  #Dehydration  In the setting of dehydration and/or poor PO intake. Less likely infection given negative workup: MRSA, RVP, Strept legionella. Patient has warm extremities with normal Lactate. Could be adrenal insufficiency given chronic prednisone dose of 10 mg.  - S/P 2L IVF  - Cortisol AM wnl  - Echo EF 50%,without structural changes. Normal IVC  - Blood cx 2/11 NGTD    #Acute hypoxic respiratory failure, improving  #COVID-19 infection (dx 1/27)  Patient presented with persistent productive cough and reported fever two days ago. was recently admitted at [facility] from 1/27/2025-1/31/2025 for an acute flare of trigeminal neuralgia and COVID infection. CXR showed Diffuse bilateral mixed opacity most predominantly involving the lower lobes most concerning for infectious etiologies, although procal and lactic acid is negative. EKG without ischemic changes so low suspicion for ACS. Wells score 1 so unlikely PE. Presentation not consistent with CHF, although elevated BNP 1800.  - CT Chest w/o contrast 2/12 with no sign of infection or pulmonary edema  - dc Zosyn (2/11 - 2/12)  - dc Vanc (2/11 - 2/12)   - Strept, legionella urine antigen and sputum cx NGTD  - RVP negative, MRSA swab negative       #LEE, resolved  #Hypomagnesemia  Baseline Cr 0.5. Cr on admission 1.3 . Trending down to 0.7. secondary to pre-renal azotemia from dehydration and  decrease PO intake from. Currently is making urine.   - Electrolyte replacement protocols  - Monitor BMP  - Avoid nephrotoxins  - Monitor urine output    #Hyponatremia, resolved  #history of SIADH  Na on admission 133 baseline runs 130s. Na 137  -UA reviewed, serum osmolality, FeNa, urine sodium, urine osmolality     #Trigeminal neuralgia   - Neurosurgery following:                                          - No neurosurgical intervention indicated at this time                                          - Neurosurgery will schedule follow-up in clinic upon hospital discharge for  reassessment and further discussion of possible treatment options.     Pain regimen:  - Tylenol PRN  - increase PTA gabapentin from 600 to 900mg three times given kidney recovery  - phenytoin 200 mg orally at bedtime   - For acute pain, may consider IV Fosphenytoin infusion      #Anemia  #Thrombocytopenia  - Iron panel  - Folate, Vitamin B12  - peripheral smear     #A-fib on DOAC with PPM  -Continue PTA Eliquis  -Device interrogation showed : 50%, irregular ventricular rhythm ~ 80 bpm      #Elevated Liver enzymes  Mild elevation of AST and ALT  - RUQ US 2/13    #Metastatic melanoma,   TVEC injections currently on hold, patient follows with [place] [facility]  -Continue PTA prednisone     #History of CAD with mid LAD PCI in 2015 after MI, RCA is chronically occluded  - hold PTA beta-blocker  - hold PTA Lasix  - hold PTA Lisinopril        Diet: Combination Diet Regular Diet  Snacks/Supplements Adult: Ensure Enlive; With Meals    DVT Prophylaxis: DOAC  Gill Catheter: Not present  Fluids: n/a  Lines: PRESENT      Port a Cath 01/27/25 Single Lumen Right Chest wall-Site Assessment: WDL      Cardiac Monitoring: ACTIVE order. Indication: Tachyarrhythmias, acute (48 hours)  Code Status: Full Code      Clinically Significant Risk Factors         # Hyponatremia: Lowest Na = 133 mmol/L in last 2 days, will monitor as appropriate       #  "Hypoalbuminemia: Lowest albumin = 2.8 g/dL at 2/13/2025  5:37 AM, will monitor as appropriate   # Thrombocytopenia: Lowest platelets = 90 in last 2 days, will monitor for bleeding   # Hypertension: Noted on problem list            # Obesity: Estimated body mass index is 31.91 kg/m  as calculated from the following:    Height as of this encounter: 1.803 m (5' 11\").    Weight as of this encounter: 103.8 kg (228 lb 12.8 oz)., PRESENT ON ADMISSION  # Moderate Malnutrition: based on nutrition assessment, PRESENT ON ADMISSION     # Financial/Environmental Concerns:     # Pacemaker present       Social Drivers of Health   Tobacco Use: Medium Risk (2/4/2025)    Patient History     Smoking Tobacco Use: Former     Smokeless Tobacco Use: Never   Physical Activity: Inactive (6/22/2024)    Exercise Vital Sign     Days of Exercise per Week: 0 days     Minutes of Exercise per Session: 0 min   Interpersonal Safety: Low Risk  (1/29/2025)    Interpersonal Safety     Do you feel physically and emotionally safe where you currently live?: Yes     Within the past 12 months, have you been hit, slapped, kicked or otherwise physically hurt by someone?: No     Within the past 12 months, have you been humiliated or emotionally abused in other ways by your partner or ex-partner?: No   Recent Concern: Interpersonal Safety - High Risk (1/27/2025)    Interpersonal Safety     Do you feel physically and emotionally safe where you currently live?: No     Within the past 12 months, have you been hit, slapped, kicked or otherwise physically hurt by someone?: No     Within the past 12 months, have you been humiliated or emotionally abused in other ways by your partner or ex-partner?: No   Stress: Stress Concern Present (6/22/2024)    [place] [employer] - Occupational Stress Questionnaire     Feeling of Stress : To some extent   Social Connections: Unknown (6/22/2024)    Social Connection and Isolation Panel [NHANES]     Frequency " of Social Gatherings with Friends and Family: Once a week         Disposition Plan     Medically Ready for Discharge: Anticipated in 2-4 Days         The patient's care was discussed with the Attending Physician, Dr. Gonzalez .    Domenic Davis MD  Medicine Service, [facility] TEAM 1  [facility]  Securely message with Vocera (more info)  Text page via [employer] Paging/Directory   See signed in provider for up to date coverage information  ______________________________________________________________________    Interval History   Patient feels better this morning. Soft blood pressure 100/60 's. Denied chest pain or SOB.    Physical Exam   Vital Signs: Temp: 98  F (36.7  C) Temp src: Oral BP: 101/78 Pulse: 85   Resp: 20 SpO2: 93 % O2 Device: Nasal cannula Oxygen Delivery: 3 LPM  Weight: 228 lbs 12.8 oz  Constitutional:       General: He is not in acute distress.     Appearance: He is well-developed. He is not ill-appearing, toxic-appearing or diaphoretic.   HENT:      Head: Normocephalic and atraumatic.      Mouth/Throat:      Comments: tender in the right face.  Cardiovascular:      Rate and Rhythm: Normal rate and regular rhythm.      Heart sounds: Normal heart sounds.   Pulmonary:      Effort: Pulmonary effort is normal. No respiratory distress.      Breath sounds: Normal breath sounds. No stridor. No wheezing or rhonchi.   Abdominal:      General: There is no distension.      Palpations: Abdomen is soft.      Tenderness: There is no abdominal tenderness. There is no rebound.   Musculoskeletal:         General: No swelling or tenderness.      Cervical back: Normal range of motion and neck supple.      Right lower leg: No edema.      Left lower leg: No edema.   Skin:     General: Skin is warm.   Neurological:      Mental Status: He is alert and oriented to person, place, and time.   Psychiatric:         Behavior: Behavior normal.         Thought Content: Thought content normal.      Medical Decision Making           Data     I have personally reviewed the following data over the past 24 hrs:    4.6  \   11.6 (L)   / 126 (L)     139 106 16.4 /  86   3.7 23 0.58 (L) \     ALT: 73 (H) AST: 69 (H) AP: 91 TBILI: 0.5   ALB: 2.8 (L) TOT PROTEIN: 5.7 (L) LIPASE: N/A       Imaging results reviewed over the past 24 hrs:   Recent Results (from the past 24 hours)   Cardiac Device Check - Inpatient   Result Value    Date Time Interrogation Session [id number]    Implantable Pulse Generator  Medtronic    Implantable Pulse Generator Model [id number] [place] XT SR MRI    Implantable Pulse Generator Serial Number [id number]    Type Interrogation Session In Clinic    Clinic Name [facility] [place]    Implantable Pulse Generator Type Pacemaker    Implantable Pulse Generator Implant Date 20190531    Implantable Lead  Medtronic    Implantable Lead Model [id number] CapSureFix Novus MRI SureScan    Implantable Lead Serial Number [id number]    Implantable Lead Implant Date 20190531    Implantable Lead Polarity Type Bipolar Lead    Implantable Lead Location Detail 1 UNKNOWN    Implantable Lead Location Right Ventricle    Implantable Lead Connection Status Connected    [place] Setting Mode (NBG Code) VVIR    Kenrick Setting Lower Rate Limit 50    Kenrick Setting Maximum Sensor Rate 130    Kenrick Setting Hysterisis Rate DISABLED    Lead Channel Setting Sensing Polarity Bipolar    Lead Channel Setting Sensing Anode Location Right Ventricle    Lead Channel Setting Sensing Anode Terminal Ring    Lead Channel Setting Sensing Cathode Location Right Ventricle    Lead Channel Setting Sensing Cathode Terminal Tip    Lead Channel Setting Sensing Sensitivity 0.6    Lead Channel Setting Pacing Polarity Bipolar    Lead Channel Setting Pacing Anode Location Right Ventricle    Lead Channel Setting Pacing Anode Terminal Ring    Lead Channel Setting Sensing Cathode Location Right Ventricle    Lead Channel  Setting Sensing Cathode Terminal Tip    Lead Channel Setting Pacing Pulse Width 0.4    Lead Channel Setting Pacing Amplitude 2    Lead Channel Setting Pacing Capture Mode Adaptive    Zone Setting Type Category VF    Zone Setting Vendor Type Category V High Rate    Zone Setting Type Category VT    Zone Setting Vendor Type Category FastVT    Zone Setting Type Category VT    Zone Setting Vendor Type Category VT    Zone Setting Type Category VT    Zone Setting Vendor Type Category MonVT    Zone Setting Status Monitor    Zone Setting Detection Interval 360    Zone Setting Type Category ATRIAL_FIBRILLATION    Zone Setting Vendor Type Category FastATAF    Zone Setting Type Category AT/AF    Lead Channel Impedance Value 323    Lead Channel Impedance Value 266    Lead Channel Sensing Intrinsic Amplitude 1.5    Lead Channel Sensing Intrinsic Amplitude 1.875    Lead Channel Pacing Threshold Amplitude 1    Lead Channel Pacing Threshold Pulse Width 0.4    Battery Date Time of Measurements 15866774541773    Battery Status OK    Battery RRT Trigger 2.625    Battery Remaining Longevity 111    Battery Voltage 3.02    Kenrick Statistic Date Time Start [id number]    Kenrick Statistic Date Time End 05497466769416    Kenrick Statistic RV Percent Paced 50.91    Episode Statistic Recent Count 0    Episode Statistic Type Category Patient Activated    Episode Statistic Recent Count 0    Episode Statistic Type Category VT    Episode Statistic Recent Count 0    Episode Statistic Type Category VT    Episode Statistic Recent Date Time Start 20213114966290    Episode Statistic Recent Date Time End [id number]    Episode Statistic Recent Date Time Start 78101948783236    Episode Statistic Recent Date Time End 89466282867738    Episode Statistic Recent Date Time Start 05478869912547    Episode Statistic Recent Date Time End 01630979074408    Episode Statistic Total Count 0    Episode Statistic Type Category Patient Activated    Episode Statistic  Total Count 5    Episode Statistic Type Category VT    Episode Statistic Total Count 0    Episode Statistic Type Category VT    Episode Statistic Total Date Time Start 20190531161253    Episode Statistic Total Date Time End 20250212154044    Episode Statistic Total Date Time Start 20190531161253    Episode Statistic Total Date Time End 20250212154044    Episode Statistic Total Date Time Start 20190531161253    Episode Statistic Total Date Time End 20250212154044    Narrative    Patient seen in ED 15 for evaluation and iterative programming of their pacemaker per MD orders.    Device: Medtronic [id number] [place] XT SR MRI  Normal device function  Mode: VVIR  bpm  : 50.9%  Intrinsic Rhythm: irregular ventricular rhythm ~ 80 bpm  Short V-V intervals: 0  R waves measured 1.5mV which is not a new finding.  Estimated battery longevity to RRT = 9.3 years. Battery voltage = 3.02 V.   Ventricular Arrhythmia: 0  Setting Changes: None    NABEEL Smith, RN    Single lead pacemaker    I have reviewed and interpreted the device interrogation, settings, programming and nurse's summary. The device is functioning within normal device parameters. I agree with the current findings, assessment and plan.

## 2025-02-14 ENCOUNTER — APPOINTMENT (OUTPATIENT)
Dept: PHYSICAL THERAPY | Facility: CLINIC | Age: 80
DRG: 682 | End: 2025-02-14
Payer: COMMERCIAL

## 2025-02-14 LAB
ALBUMIN SERPL BCG-MCNC: 2.9 G/DL (ref 3.5–5.2)
ALP SERPL-CCNC: 99 U/L (ref 40–150)
ALT SERPL W P-5'-P-CCNC: 68 U/L (ref 0–70)
ANION GAP SERPL CALCULATED.3IONS-SCNC: 8 MMOL/L (ref 7–15)
AST SERPL W P-5'-P-CCNC: 55 U/L (ref 0–45)
BILIRUB SERPL-MCNC: 0.5 MG/DL
BUN SERPL-MCNC: 13.1 MG/DL (ref 8–23)
CALCIUM SERPL-MCNC: 8.6 MG/DL (ref 8.8–10.4)
CHLORIDE SERPL-SCNC: 104 MMOL/L (ref 98–107)
CREAT SERPL-MCNC: 0.5 MG/DL (ref 0.67–1.17)
EGFRCR SERPLBLD CKD-EPI 2021: >90 ML/MIN/1.73M2
ERYTHROCYTE [DISTWIDTH] IN BLOOD BY AUTOMATED COUNT: 15.6 % (ref 10–15)
GLUCOSE SERPL-MCNC: 90 MG/DL (ref 70–99)
HCO3 SERPL-SCNC: 25 MMOL/L (ref 22–29)
HCT VFR BLD AUTO: 33.6 % (ref 40–53)
HGB BLD-MCNC: 11.8 G/DL (ref 13.3–17.7)
MCH RBC QN AUTO: 32.9 PG (ref 26.5–33)
MCHC RBC AUTO-ENTMCNC: 35.1 G/DL (ref 31.5–36.5)
MCV RBC AUTO: 94 FL (ref 78–100)
PHOSPHATE SERPL-MCNC: 3 MG/DL (ref 2.5–4.5)
PLATELET # BLD AUTO: 146 10E3/UL (ref 150–450)
POTASSIUM SERPL-SCNC: 4.2 MMOL/L (ref 3.4–5.3)
PROT SERPL-MCNC: 5.8 G/DL (ref 6.4–8.3)
RBC # BLD AUTO: 3.59 10E6/UL (ref 4.4–5.9)
SODIUM SERPL-SCNC: 137 MMOL/L (ref 135–145)
WBC # BLD AUTO: 5.3 10E3/UL (ref 4–11)

## 2025-02-14 PROCEDURE — 85014 HEMATOCRIT: CPT

## 2025-02-14 PROCEDURE — 999N000157 HC STATISTIC RCP TIME EA 10 MIN

## 2025-02-14 PROCEDURE — 84132 ASSAY OF SERUM POTASSIUM: CPT

## 2025-02-14 PROCEDURE — 250N000013 HC RX MED GY IP 250 OP 250 PS 637

## 2025-02-14 PROCEDURE — 97116 GAIT TRAINING THERAPY: CPT | Mod: GP

## 2025-02-14 PROCEDURE — 120N000002 HC R&B MED SURG/OB UMMC

## 2025-02-14 PROCEDURE — 97530 THERAPEUTIC ACTIVITIES: CPT | Mod: GP

## 2025-02-14 PROCEDURE — 82374 ASSAY BLOOD CARBON DIOXIDE: CPT

## 2025-02-14 PROCEDURE — 97162 PT EVAL MOD COMPLEX 30 MIN: CPT | Mod: GP

## 2025-02-14 PROCEDURE — 250N000009 HC RX 250

## 2025-02-14 PROCEDURE — 99232 SBSQ HOSP IP/OBS MODERATE 35: CPT | Mod: GC | Performed by: STUDENT IN AN ORGANIZED HEALTH CARE EDUCATION/TRAINING PROGRAM

## 2025-02-14 PROCEDURE — 94640 AIRWAY INHALATION TREATMENT: CPT | Mod: 76

## 2025-02-14 PROCEDURE — 84100 ASSAY OF PHOSPHORUS: CPT

## 2025-02-14 PROCEDURE — 36591 DRAW BLOOD OFF VENOUS DEVICE: CPT

## 2025-02-14 PROCEDURE — 94640 AIRWAY INHALATION TREATMENT: CPT

## 2025-02-14 PROCEDURE — 250N000012 HC RX MED GY IP 250 OP 636 PS 637

## 2025-02-14 RX ORDER — GABAPENTIN 300 MG/1
900 CAPSULE ORAL 3 TIMES DAILY
Status: DISCONTINUED | OUTPATIENT
Start: 2025-02-14 | End: 2025-02-17 | Stop reason: HOSPADM

## 2025-02-14 RX ORDER — ALBUTEROL SULFATE 0.83 MG/ML
2.5 SOLUTION RESPIRATORY (INHALATION)
Status: DISCONTINUED | OUTPATIENT
Start: 2025-02-14 | End: 2025-02-17

## 2025-02-14 RX ADMIN — PREDNISONE 10 MG: 10 TABLET ORAL at 08:21

## 2025-02-14 RX ADMIN — ACETAMINOPHEN 650 MG: 325 TABLET, FILM COATED ORAL at 14:29

## 2025-02-14 RX ADMIN — ACETAMINOPHEN 1300 MG: 325 TABLET, FILM COATED ORAL at 21:36

## 2025-02-14 RX ADMIN — CARVEDILOL 3.12 MG: 3.12 TABLET, FILM COATED ORAL at 18:13

## 2025-02-14 RX ADMIN — APIXABAN 5 MG: 5 TABLET, FILM COATED ORAL at 21:37

## 2025-02-14 RX ADMIN — PHENYTOIN 200 MG: 50 TABLET, CHEWABLE ORAL at 21:43

## 2025-02-14 RX ADMIN — ATORVASTATIN CALCIUM 80 MG: 80 TABLET, FILM COATED ORAL at 21:37

## 2025-02-14 RX ADMIN — GABAPENTIN 900 MG: 300 CAPSULE ORAL at 21:36

## 2025-02-14 RX ADMIN — ACETAMINOPHEN 1300 MG: 325 TABLET, FILM COATED ORAL at 08:21

## 2025-02-14 RX ADMIN — APIXABAN 5 MG: 5 TABLET, FILM COATED ORAL at 08:21

## 2025-02-14 RX ADMIN — ALBUTEROL SULFATE 2.5 MG: 2.5 SOLUTION RESPIRATORY (INHALATION) at 15:18

## 2025-02-14 RX ADMIN — GABAPENTIN 900 MG: 300 CAPSULE ORAL at 14:30

## 2025-02-14 RX ADMIN — GABAPENTIN 600 MG: 300 CAPSULE ORAL at 08:21

## 2025-02-14 RX ADMIN — ALBUTEROL SULFATE 2.5 MG: 2.5 SOLUTION RESPIRATORY (INHALATION) at 19:27

## 2025-02-14 ASSESSMENT — ACTIVITIES OF DAILY LIVING (ADL)
ADLS_ACUITY_SCORE: 44
DEPENDENT_IADLS:: CLEANING;COOKING;LAUNDRY;SHOPPING;MEAL PREPARATION;MEDICATION MANAGEMENT;TRANSPORTATION
ADLS_ACUITY_SCORE: 44

## 2025-02-14 NOTE — PROGRESS NOTES
Care Management Initial Consult    General Information  Assessment completed with: Patient, Spouse or significant other,    Type of CM/SW Visit: Initial Assessment    Primary Care Provider verified and updated as needed: Yes   Readmission within the last 30 days: unable to assess      Reason for Consult:  (elevated risk score)  Advance Care Planning: Advance Care Planning Reviewed: present on chart, verified with patient          Communication Assessment  Patient's communication style: spoken language (English or Bilingual)    Hearing Difficulty or Deaf: yes   Wear Glasses or Blind: yes    Cognitive  Cognitive/Neuro/Behavioral: WDL                      Living Environment:   People in home: spouse     Current living Arrangements: house      Able to return to prior arrangements: yes       Family/Social Support:  Care provided by: self  Provides care for: no one  Marital Status:   Support system: Wife, Children   (Renea)       Description of Support System: Supportive, Involved    Support Assessment: Adequate family and caregiver support, Adequate social supports    Current Resources:   Patient receiving home care services: No        Community Resources: None  Equipment currently used at home: walker, rolling, grab bar, toilet, grab bar, tub/shower, shower chair  Supplies currently used at home: None    Employment/Financial:  Employment Status: retired        Financial Concerns: none   Referral to Financial Worker: No       Does the patient's insurance plan have a 3 day qualifying hospital stay waiver?  No    Lifestyle & Psychosocial Needs:  Social Drivers of Health     Food Insecurity: Low Risk  (2/13/2025)    Food Insecurity     Within the past 12 months, did you worry that your food would run out before you got money to buy more?: No     Within the past 12 months, did the food you bought just not last and you didn t have money to get more?: No   Depression: Not at risk (2/4/2025)    PHQ-2     PHQ-2 Score: 0    Housing Stability: Low Risk  (2/13/2025)    Housing Stability     Do you have housing? : Yes     Are you worried about losing your housing?: No   Tobacco Use: Medium Risk (2/4/2025)    Patient History     Smoking Tobacco Use: Former     Smokeless Tobacco Use: Never     Passive Exposure: Not on file   Financial Resource Strain: Low Risk  (2/13/2025)    Financial Resource Strain     Within the past 12 months, have you or your family members you live with been unable to get utilities (heat, electricity) when it was really needed?: No   Alcohol Use: Not on file   Transportation Needs: Low Risk  (2/13/2025)    Transportation Needs     Within the past 12 months, has lack of transportation kept you from medical appointments, getting your medicines, non-medical meetings or appointments, work, or from getting things that you need?: No   Physical Activity: Inactive (6/22/2024)    Exercise Vital Sign     Days of Exercise per Week: 0 days     Minutes of Exercise per Session: 0 min   Interpersonal Safety: Low Risk  (2/13/2025)    Interpersonal Safety     Do you feel physically and emotionally safe where you currently live?: Yes     Within the past 12 months, have you been hit, slapped, kicked or otherwise physically hurt by someone?: No     Within the past 12 months, have you been humiliated or emotionally abused in other ways by your partner or ex-partner?: No   Recent Concern: Interpersonal Safety - High Risk (1/27/2025)    Interpersonal Safety     Do you feel physically and emotionally safe where you currently live?: No     Within the past 12 months, have you been hit, slapped, kicked or otherwise physically hurt by someone?: No     Within the past 12 months, have you been humiliated or emotionally abused in other ways by your partner or ex-partner?: No   Stress: Stress Concern Present (6/22/2024)    [place] of Occupational Health - Occupational Stress Questionnaire     Feeling of Stress : To some extent   Social  Connections: Unknown (6/22/2024)    Social Connection and Isolation Panel [NHANES]     Frequency of Communication with Friends and Family: Not on file     Frequency of Social Gatherings with Friends and Family: Once a week     Attends [employer] Services: Not on file     Active Member of Clubs or Organizations: Not on file     Attends Club or Organization Meetings: Not on file     Marital Status: Not on file   Health Literacy: Not on file       Functional Status:  Prior to admission patient needed assistance:   Dependent ADLs:: Ambulation-walker  Dependent IADLs:: Cleaning, Cooking, Laundry, Shopping, Meal Preparation, Medication Management, Transportation       Mental Health Status:  Mental Health Status:  (Unable to assess)       Chemical Dependency Status:  Chemical Dependency Status:  (Unable to assess)             Values/Beliefs:  Spiritual, Cultural Beliefs, Baptist Practices, Values that affect care: no  Description of Beliefs that Will Affect Care: Jewish       Values/Beliefs Comment:  (Moravian)    Discussed  Partnership in Safe Discharge Planning  document with patient/family: No    Additional Information:    Met with pt and spouse at bedside to complete initial assessment for discharge planning.    Confirmed address - lives with spouse. Confirmed PCP. Pt independent with ADLs, spouse assists with IADLs and provides transportation. Pt recs home with assist and spouse will provide transportation at discharge.    Next Steps:     No further care management intervention anticipated at this time.  Please re-consult if further needs arise.  Care management signing off.      PETRA Chin   [facility]  Covering Obs: [phone]

## 2025-02-14 NOTE — PROGRESS NOTES
[facility]    Progress Note - Medicine Service, MAROON TEAM 1       Date of Admission:  2/11/2025    Assessment & Plan   James Salvador is a 79 year old male admitted on 2/11/2025. PMHx significant for A-fib on DOAC, metastatic melanoma, trigeminal neuralgia, SIADH, CAD who is being admitted on with Acute hypoxic respiratory failure, hypotension and LEE.    Today's update:  - start Benzocaine spray for trigeminal neuralgia  - Increase Gabapentin to 900 mg TID  - Albuterol nebs    #Hypotension, Resolved  #Generalized weakness  #Dehydration  In the setting of dehydration and/or poor PO intake. Less likely infection given negative workup: MRSA, RVP, Strept legionella. Patient has warm extremities with normal Lactate. Could be adrenal insufficiency given chronic prednisone dose of 10 mg.  - S/P 2L IVF  - Cortisol AM wnl  - Echo EF 50%,without structural changes. Normal IVC  - Blood cx 2/11 NGTD    #Acute hypoxic respiratory failure, improving  #COVID-19 infection (dx 1/27)  Patient presented with persistent productive cough and reported fever two days ago. was recently admitted at [facility] from 1/27/2025-1/31/2025 for an acute flare of trigeminal neuralgia and COVID infection. CXR showed Diffuse bilateral mixed opacity most predominantly involving the lower lobes most concerning for infectious etiologies, although procal and lactic acid is negative. EKG without ischemic changes so low suspicion for ACS. Wells score 1 so unlikely PE. Presentation not consistent with CHF, although elevated BNP 1800.  - CT Chest w/o contrast 2/12 with no sign of infection or pulmonary edema  - dc Zosyn (2/11 - 2/12)  - dc Vanc (2/11 - 2/12)   - Strept, legionella urine antigen and sputum cx NGTD  - RVP negative, MRSA swab negative    #LEE, resolved  #Hypomagnesemia  Baseline Cr 0.5. Cr on admission 1.3 . Trending down to 0.7. secondary to pre-renal azotemia from  dehydration and decrease PO intake from. Currently is making urine.   - Electrolyte replacement protocols  - Monitor BMP  - Avoid nephrotoxins  - Monitor urine output    #Hyponatremia, resolved  #history of SIADH  Na on admission 133 baseline runs 130s. Na 137  -UA reviewed, serum osmolality, FeNa, urine sodium, urine osmolality     #Trigeminal neuralgia   - Neurosurgery following:                                          - No neurosurgical intervention indicated at this time                                          - Neurosurgery will schedule follow-up in clinic upon hospital discharge for  reassessment and further discussion of possible treatment options.     Pain regimen:  - Tylenol PRN  - increase PTA gabapentin from 600 to 900mg three times given kidney recovery  - phenytoin 200 mg orally at bedtime  - start Benzocaine spray   - For acute pain, may consider IV Fosphenytoin infusion      #Anemia  #Thrombocytopenia  - Iron panel  - Folate, Vitamin B12  - peripheral smear     #A-fib on DOAC with PPM  -Continue PTA Eliquis  -Device interrogation showed : 50%, irregular ventricular rhythm ~ 80 bpm      #Elevated Liver enzymes  Mild elevation of AST and ALT  - RUQ US 2/13    #Metastatic melanoma,   TVEC injections currently on hold, patient follows with [place] [facility]  -Continue PTA prednisone     #History of CAD with mid LAD PCI in 2015 after MI, RCA is chronically occluded  - hold PTA beta-blocker  - hold PTA Lasix  - hold PTA Lisinopril        Diet: Combination Diet Regular Diet  Snacks/Supplements Adult: Ensure Enlive; With Meals    DVT Prophylaxis: DOAC  Gill Catheter: Not present  Fluids: n/a  Lines: PRESENT      Port a Cath 01/27/25 Single Lumen Right Chest wall-Site Assessment: WDL      Cardiac Monitoring: ACTIVE order. Indication: Tachyarrhythmias, acute (48 hours)  Code Status: Full Code      Clinically Significant Risk Factors               # Hypoalbuminemia: Lowest albumin = 2.8 g/dL at 2/13/2025   "5:37 AM, will monitor as appropriate   # Thrombocytopenia: Lowest platelets = 126 in last 2 days, will monitor for bleeding   # Hypertension: Noted on problem list            # Obesity: Estimated body mass index is 31.91 kg/m  as calculated from the following:    Height as of this encounter: 1.803 m (5' 11\").    Weight as of this encounter: 103.8 kg (228 lb 12.8 oz)., PRESENT ON ADMISSION  # Moderate Malnutrition: based on nutrition assessment, PRESENT ON ADMISSION     # Financial/Environmental Concerns:     # Pacemaker present       Social Drivers of Health   Tobacco Use: Medium Risk (2/4/2025)    Patient History     Smoking Tobacco Use: Former     Smokeless Tobacco Use: Never   Physical Activity: Inactive (6/22/2024)    Exercise Vital Sign     Days of Exercise per Week: 0 days     Minutes of Exercise per Session: 0 min   Interpersonal Safety: Low Risk  (2/13/2025)    Interpersonal Safety     Do you feel physically and emotionally safe where you currently live?: Yes     Within the past 12 months, have you been hit, slapped, kicked or otherwise physically hurt by someone?: No     Within the past 12 months, have you been humiliated or emotionally abused in other ways by your partner or ex-partner?: No   Recent Concern: Interpersonal Safety - High Risk (1/27/2025)    Interpersonal Safety     Do you feel physically and emotionally safe where you currently live?: No     Within the past 12 months, have you been hit, slapped, kicked or otherwise physically hurt by someone?: No     Within the past 12 months, have you been humiliated or emotionally abused in other ways by your partner or ex-partner?: No   Stress: Stress Concern Present (6/22/2024)    [employer] - Occupational Stress Questionnaire     Feeling of Stress : To some extent   Social Connections: Unknown (6/22/2024)    Social Connection and Isolation Panel [NHANES]     Frequency of Social Gatherings with Friends and Family: Once a " week         Disposition Plan     Medically Ready for Discharge: Anticipated in 2-4 Days         The patient's care was discussed with the Attending Physician, Dr. Gonzalez .    Domenic Davis MD  Medicine Service, [facility] TEAM 1  [facility]  Securely message with [employer] info)  Text page via [facility] Paging/Directory   See signed in provider for up to date coverage information  ______________________________________________________________________    Interval History   Patient feels better this morning. O2 requirement only during night. Encouraged to use spirometry and work with PT    Physical Exam   Vital Signs: Temp: 98.6  F (37  C) Temp src: Oral BP: 119/75 Pulse: 73   Resp: 16 SpO2: 99 % O2 Device: Nasal cannula Oxygen Delivery: 3 LPM  Weight: 228 lbs 12.8 oz  Constitutional:       General: He is not in acute distress.     Appearance: He is well-developed. He is not ill-appearing, toxic-appearing or diaphoretic.   HENT:      Head: Normocephalic and atraumatic.      Mouth/Throat:      Comments: tender in the right face.  Cardiovascular:      Rate and Rhythm: Normal rate and regular rhythm.      Heart sounds: Normal heart sounds.   Pulmonary:      Effort: Pulmonary effort is normal. No respiratory distress.      Breath sounds: Normal breath sounds. No stridor. No wheezing or rhonchi.   Abdominal:      General: There is no distension.      Palpations: Abdomen is soft.      Tenderness: There is no abdominal tenderness. There is no rebound.   Musculoskeletal:         General: No swelling or tenderness.      Cervical back: Normal range of motion and neck supple.      Right lower leg: No edema.      Left lower leg: No edema.   Skin:     General: Skin is warm.   Neurological:      Mental Status: He is alert and oriented to person, place, and time.   Psychiatric:         Behavior: Behavior normal.         Thought Content: Thought content normal.     Medical Decision Making            Data     I have personally reviewed the following data over the past 24 hrs:    5.3  \   11.8 (L)   / 146 (L)     137 104 13.1 /  90   4.2 25 0.50 (L) \     ALT: 68 AST: 55 (H) AP: 99 TBILI: 0.5   ALB: 2.9 (L) TOT PROTEIN: 5.8 (L) LIPASE: N/A       Imaging results reviewed over the past 24 hrs:   Recent Results (from the past 24 hours)   US Abdomen Limited    Narrative    EXAMINATION: Limited Abdominal Ultrasound, 2/13/2025 2:36 PM     COMPARISON: CT chest abdomen pelvis 2/7/2025.    HISTORY: Evaluate in setting of elevated AST/ALT    FINDINGS:   Fluid: No evidence of ascites or pleural effusions.    Liver: The liver demonstrates normal echotexture, measuring 18.1 cm in  craniocaudal dimension. There is no focal mass. The main portal vein  is patent with antegrade flow.    Gallbladder: Gallbladder is collapsed. Prominence of the wall likely  secondary to same. Echogenic material within the lumen, somewhat  shadowing, likely sludge/stones. There is no pericholecystic fluid nor  positive sonographic Scott's sign.    Bile Ducts: No definite intrahepatic biliary dilatation.  The common  bile duct is not visualized.    Pancreas: Visualized portions of the head and body of the pancreas are  unremarkable.     Kidney: The right kidney measures 12.8 cm long. No hydronephrosis.  Cyst in the mid right kidney measuring 2.3 x 2.2 x 1.9 cm is noted.      Impression    IMPRESSION:   1.  Hepatomegaly.  2.  Gallbladder collapsed with suspected sludge/stones present. No  sonographic evidence of acute cholecystitis. Common duct not  visualized.  3.  No hydronephrosis. Right renal cyst.    BRITTANY PANIAGUA MD         SYSTEM ID:  [id number]

## 2025-02-14 NOTE — PROVIDER NOTIFICATION
"Via Vocera: \" Hi patient states that he has a sore throat and he felt it coming on sometime last night & took off the oxygen . He's sating okay at 89 right now\"    Provider Aware    "

## 2025-02-14 NOTE — PLAN OF CARE
Goal Outcome Evaluation:  NEURO: Alert and oriented x4, able to make needs known. [place], uses bilateral  hearing aids.   RESPIRATORY: On 3L O2   CARDIAC: WDL   GI/: Voiding adequally. No BM this shift.   DIET: Reg diet  PAIN/NAUSEA: Continuous pain, had schedule and PRN tylenol, refused oxycodone.   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   IV ACCESS:  PICC SL   ACTIVITY: A1, worked with PT today.   LAB: none pending.   PLAN:  continue with plan of care.

## 2025-02-14 NOTE — PLAN OF CARE
"Shift:1900-0730      VS:/75 (BP Location: Right arm)   Pulse 73   Temp 98.6  F (37  C) (Oral)   Resp 16   Ht 1.803 m (5' 11\")   Wt 103.8 kg (228 lb 12.8 oz)   SpO2 99% 3L NC   BMI 31.91 kg/m      Pain: Pain managed w/ scheduled tylenol and PRN oxy x1  Neuro:WDL [place] Hearing aids  Cardiac:Afib  Respiratory: 3L NC  Diet/Appetite: Regular diet  GI/: Voiding spontaneously. Urinal at bedside. No BM noted on shift   LDA's: R Port Cath SL. Upper and lower dentures  Skin:No new skin changes  Activity: A1 w/ walker  Test/Procedures:AM labs  Com  "

## 2025-02-14 NOTE — PLAN OF CARE
Problem: Adult Inpatient Plan of Care  Goal: Plan of Care Review  Description: The Plan of Care Review/Shift note should be completed every shift.  The Outcome Evaluation is a brief statement about your assessment that the patient is improving, declining, or no change.  This information will be displayed automatically on your shift  note.  Flowsheets (Taken 2/14/2025 1454)  Plan of Care Reviewed With:   patient   spouse  Goal: Readiness for Transition of Care  Recent Flowsheet Documentation  Taken 2/14/2025 1400 by Jillian Dang BSW  Transportation Anticipated: family or friend will provide  Concerns to be Addressed: discharge planning  Intervention: Mutually Develop Transition Plan  Recent Flowsheet Documentation  Taken 2/14/2025 1400 by Jillian Dang BSW  Readmission Within the Last 30 Days: unable to assess  Transportation Anticipated: family or friend will provide  Transportation Concerns: none  Concerns to be Addressed: discharge planning  Patient/Family Anticipated Services at Transition: none  Patient/Family Anticipates Transition to: home with family  Offered/Gave Vendor List: no  Equipment Currently Used at Home:   walker, rolling   grab bar, toilet   grab bar, tub/shower   shower chair      Ivermectin Counseling:  Patient instructed to take medication on an empty stomach with a full glass of water.  Patient informed of potential adverse effects including but not limited to nausea, diarrhea, dizziness, itching, and swelling of the extremities or lymph nodes.  The patient verbalized understanding of the proper use and possible adverse effects of ivermectin.  All of the patient's questions and concerns were addressed.

## 2025-02-14 NOTE — PROGRESS NOTES
"   02/14/25 1300   Appointment Info   Signing Clinician's Name / Credentials (PT) Alysa Correia PT, DPT   Rehab Comments (PT) hx L knee buckling   Living Environment   People in Home spouse   Current Living Arrangements house  ([facility])   Home Accessibility stairs to enter home   Number of Stairs, Main Entrance 5   Stair Railings, Main Entrance railings on both sides of stairs   Transportation Anticipated family or friend will provide   [place] [address] with B railings in garage, all needs met on main floor   Self-Care   Usual Activity Tolerance moderate   Current Activity Tolerance moderate   Equipment Currently Used at Home walker, rolling;grab bar, toilet;grab bar, tub/shower;shower chair  (FWW and 4WW, shower chair is built in)   Fall history within last six months yes   Number of times patient has fallen within last six months 2   Activity/Exercise/Self-Care Comment Reports being Monae with mobility and ADLs, uses FWW within home and 4WW out in community. Has walk in shower, wife is able to provide 24/7 assist   General Information   Onset of Illness/Injury or Date of Surgery 02/11/25   Referring Physician Ermelinda Gonzalez DO   Patient/Family Therapy Goals Statement (PT) wants to return home   Pertinent History of Current Problem (include personal factors and/or comorbidities that impact the POC) Per pt's chart, \"James Salvador is a 79 year old male admitted on 2/11/2025. PMHx significant for A-fib on DOAC, metastatic melanoma, trigeminal neuralgia, SIADH, CAD who is being admitted on with Acute hypoxic respiratory failure, hypotension and LEE.\"   Existing Precautions/Restrictions fall   General Observations activity, up with assist   Cognition   Affect/Mental Status (Cognition) WFL   Pain Assessment   Patient Currently in Pain Yes, see Vital Sign flowsheet   Integumentary/Edema   Integumentary/Edema Comments slight L knee swelling, reports L patella being " "\"permanently dislocated\" 2/2 previous surgeries   Posture    Posture Forward head position;Protracted shoulders;Kyphosis   Range of Motion (ROM)   Range of Motion ROM is WFL   ROM Comment LLE ROM slightly limited primarily at knee but WFL   Strength (Manual Muscle Testing)   Strength (Manual Muscle Testing) Able to perform R SLR;Able to perform L SLR   Strength Comments R SLR > L SLR ROM, completed STS with no additional physical assist   Bed Mobility   Comment, (Bed Mobility) supine<>sit Violet at LLE   Transfers   Comment, (Transfers) STS with FWW and CGA   Gait/Stairs (Locomotion)   Comment, (Gait/Stairs) amb with FWW and CGA   Balance   Balance Comments good static standing balance, fair dynamic balance with FWW   Sensory Examination   Sensory Perception patient reports no sensory changes   Clinical Impression   Criteria for Skilled Therapeutic Intervention Yes, treatment indicated   PT Diagnosis (PT) impaired functional mobility   Influenced by the following impairments decreased strength, decreased activity tolerance, increased pain   Functional limitations due to impairments bed mobility, transfers, gait, stairs   Clinical Presentation (PT Evaluation Complexity) stable   Clinical Presentation Rationale clinical reasoning   Clinical Decision Making (Complexity) moderate complexity   Planned Therapy Interventions (PT) balance training;bed mobility training;gait training;home exercise program;motor coordination training;neuromuscular re-education;patient/family education;postural re-education;ROM (range of motion);stair training;strengthening;stretching;transfer training;progressive activity/exercise;risk factor education;home program guidelines   Risk & Benefits of therapy have been explained evaluation/treatment results reviewed;care plan/treatment goals reviewed;risks/benefits reviewed;current/potential barriers reviewed;participants voiced agreement with care plan;participants " included;patient;spouse/significant other   Clinical Impression Comments Pt is below functional mobility baseline, limited by increased pain as well as decreased strength and activity tolerance. Will benefit from skilled PT during LOS to improve impairments and progress to level of assist that caregiver can provide.   PT Total Evaluation Time   PT Eval, Moderate Complexity Minutes (29929) 10   Physical Therapy Goals   PT Frequency Daily   PT Predicted Duration/Target Date for Goal Attainment 02/28/25   PT Goals Bed Mobility;Transfers;Gait;Stairs;PT Goal 1   PT: Bed Mobility Modified independent;Supine to/from sit   PT: Transfers Modified independent;Sit to/from stand;Bed to/from chair;Assistive device   PT: Gait Modified independent;Rolling walker;50 feet   PT: Stairs Supervision/stand-by assist;5 stairs;Rail on both sides   PT: Goal 1 Pt will demo IND with BLE strengthening HEP.   PT Discharge Planning   PT Plan stairs (priority), w/c ride up to gym, progress gait with FWW, bed mobility IND   PT Discharge Recommendation (DC Rec) home with assist   PT Rationale for DC Rec Pt is below functional mobility baseline, limited by increased pain as well as decreased strength and activity tolerance. Requires Violet-CGA with FWW for functional mobility, pt's wife is able to provide 24/7 assist to pt but is not able to physically assist very much. Pending progression during LOS, anticipate safe d/c home with assist from wife once medically appropriate. May be interested in  PT, pt open to further discussion however stated wanting to focus on cancer tx at this time.   PT Brief overview of current status CGA-SBA with FWW, amb 3-4x/day with RN, encourage time up in chair throughout day   PT Total Distance Amb During Session (feet) 150

## 2025-02-14 NOTE — CONSULTS
See SW note from today for consult details.    PETRA Chin BSWIL  Float   [facility]  Covering Obs: [phone]

## 2025-02-15 LAB
ALBUMIN SERPL BCG-MCNC: 2.8 G/DL (ref 3.5–5.2)
ALP SERPL-CCNC: 102 U/L (ref 40–150)
ALT SERPL W P-5'-P-CCNC: 61 U/L (ref 0–70)
ANION GAP SERPL CALCULATED.3IONS-SCNC: 8 MMOL/L (ref 7–15)
AST SERPL W P-5'-P-CCNC: 47 U/L (ref 0–45)
BILIRUB SERPL-MCNC: 0.5 MG/DL
BUN SERPL-MCNC: 10.2 MG/DL (ref 8–23)
CALCIUM SERPL-MCNC: 8.5 MG/DL (ref 8.8–10.4)
CHLORIDE SERPL-SCNC: 106 MMOL/L (ref 98–107)
CREAT SERPL-MCNC: 0.46 MG/DL (ref 0.67–1.17)
EGFRCR SERPLBLD CKD-EPI 2021: >90 ML/MIN/1.73M2
ERYTHROCYTE [DISTWIDTH] IN BLOOD BY AUTOMATED COUNT: 15.7 % (ref 10–15)
GLUCOSE SERPL-MCNC: 90 MG/DL (ref 70–99)
HCO3 SERPL-SCNC: 26 MMOL/L (ref 22–29)
HCT VFR BLD AUTO: 34.6 % (ref 40–53)
HGB BLD-MCNC: 12.1 G/DL (ref 13.3–17.7)
MCH RBC QN AUTO: 32.8 PG (ref 26.5–33)
MCHC RBC AUTO-ENTMCNC: 35 G/DL (ref 31.5–36.5)
MCV RBC AUTO: 94 FL (ref 78–100)
PHOSPHATE SERPL-MCNC: 3 MG/DL (ref 2.5–4.5)
PLATELET # BLD AUTO: 171 10E3/UL (ref 150–450)
POTASSIUM SERPL-SCNC: 4.2 MMOL/L (ref 3.4–5.3)
PROT SERPL-MCNC: 5.7 G/DL (ref 6.4–8.3)
RBC # BLD AUTO: 3.69 10E6/UL (ref 4.4–5.9)
SODIUM SERPL-SCNC: 140 MMOL/L (ref 135–145)
WBC # BLD AUTO: 6.3 10E3/UL (ref 4–11)

## 2025-02-15 PROCEDURE — 85048 AUTOMATED LEUKOCYTE COUNT: CPT

## 2025-02-15 PROCEDURE — 999N000157 HC STATISTIC RCP TIME EA 10 MIN

## 2025-02-15 PROCEDURE — 94640 AIRWAY INHALATION TREATMENT: CPT | Mod: 76

## 2025-02-15 PROCEDURE — 99232 SBSQ HOSP IP/OBS MODERATE 35: CPT | Mod: GC | Performed by: STUDENT IN AN ORGANIZED HEALTH CARE EDUCATION/TRAINING PROGRAM

## 2025-02-15 PROCEDURE — 84155 ASSAY OF PROTEIN SERUM: CPT

## 2025-02-15 PROCEDURE — 94640 AIRWAY INHALATION TREATMENT: CPT

## 2025-02-15 PROCEDURE — 250N000009 HC RX 250

## 2025-02-15 PROCEDURE — 36415 COLL VENOUS BLD VENIPUNCTURE: CPT

## 2025-02-15 PROCEDURE — 250N000012 HC RX MED GY IP 250 OP 636 PS 637

## 2025-02-15 PROCEDURE — 82374 ASSAY BLOOD CARBON DIOXIDE: CPT

## 2025-02-15 PROCEDURE — 250N000013 HC RX MED GY IP 250 OP 250 PS 637

## 2025-02-15 PROCEDURE — 84100 ASSAY OF PHOSPHORUS: CPT

## 2025-02-15 PROCEDURE — 120N000002 HC R&B MED SURG/OB UMMC

## 2025-02-15 PROCEDURE — 85018 HEMOGLOBIN: CPT

## 2025-02-15 RX ORDER — OXYCODONE HYDROCHLORIDE 5 MG/1
5 TABLET ORAL EVERY 4 HOURS PRN
Status: DISCONTINUED | OUTPATIENT
Start: 2025-02-15 | End: 2025-02-17 | Stop reason: HOSPADM

## 2025-02-15 RX ORDER — NALOXONE HYDROCHLORIDE 0.4 MG/ML
0.4 INJECTION, SOLUTION INTRAMUSCULAR; INTRAVENOUS; SUBCUTANEOUS
Status: DISCONTINUED | OUTPATIENT
Start: 2025-02-15 | End: 2025-02-17 | Stop reason: HOSPADM

## 2025-02-15 RX ORDER — NALOXONE HYDROCHLORIDE 0.4 MG/ML
0.2 INJECTION, SOLUTION INTRAMUSCULAR; INTRAVENOUS; SUBCUTANEOUS
Status: DISCONTINUED | OUTPATIENT
Start: 2025-02-15 | End: 2025-02-17 | Stop reason: HOSPADM

## 2025-02-15 RX ADMIN — CARVEDILOL 3.12 MG: 3.12 TABLET, FILM COATED ORAL at 08:17

## 2025-02-15 RX ADMIN — CARVEDILOL 3.12 MG: 3.12 TABLET, FILM COATED ORAL at 17:39

## 2025-02-15 RX ADMIN — APIXABAN 5 MG: 5 TABLET, FILM COATED ORAL at 20:40

## 2025-02-15 RX ADMIN — PHENYTOIN 200 MG: 50 TABLET, CHEWABLE ORAL at 21:51

## 2025-02-15 RX ADMIN — ACETAMINOPHEN 650 MG: 325 TABLET, FILM COATED ORAL at 16:11

## 2025-02-15 RX ADMIN — GABAPENTIN 900 MG: 300 CAPSULE ORAL at 20:41

## 2025-02-15 RX ADMIN — ALBUTEROL SULFATE 2.5 MG: 2.5 SOLUTION RESPIRATORY (INHALATION) at 01:54

## 2025-02-15 RX ADMIN — ACETAMINOPHEN 1300 MG: 325 TABLET, FILM COATED ORAL at 21:51

## 2025-02-15 RX ADMIN — GABAPENTIN 900 MG: 300 CAPSULE ORAL at 13:56

## 2025-02-15 RX ADMIN — ATORVASTATIN CALCIUM 80 MG: 80 TABLET, FILM COATED ORAL at 21:51

## 2025-02-15 RX ADMIN — APIXABAN 5 MG: 5 TABLET, FILM COATED ORAL at 08:17

## 2025-02-15 RX ADMIN — PREDNISONE 10 MG: 10 TABLET ORAL at 08:17

## 2025-02-15 RX ADMIN — OXYCODONE HYDROCHLORIDE 5 MG: 5 TABLET ORAL at 16:11

## 2025-02-15 RX ADMIN — ALBUTEROL SULFATE 2.5 MG: 2.5 SOLUTION RESPIRATORY (INHALATION) at 14:05

## 2025-02-15 RX ADMIN — ALBUTEROL SULFATE 2.5 MG: 2.5 SOLUTION RESPIRATORY (INHALATION) at 08:25

## 2025-02-15 RX ADMIN — ALBUTEROL SULFATE 2.5 MG: 2.5 SOLUTION RESPIRATORY (INHALATION) at 19:58

## 2025-02-15 RX ADMIN — GABAPENTIN 900 MG: 300 CAPSULE ORAL at 08:17

## 2025-02-15 RX ADMIN — ACETAMINOPHEN 1300 MG: 325 TABLET, FILM COATED ORAL at 08:17

## 2025-02-15 ASSESSMENT — ACTIVITIES OF DAILY LIVING (ADL)
ADLS_ACUITY_SCORE: 47
ADLS_ACUITY_SCORE: 44
ADLS_ACUITY_SCORE: 44
ADLS_ACUITY_SCORE: 47
ADLS_ACUITY_SCORE: 44
ADLS_ACUITY_SCORE: 44
ADLS_ACUITY_SCORE: 47
ADLS_ACUITY_SCORE: 45
ADLS_ACUITY_SCORE: 48
ADLS_ACUITY_SCORE: 47
ADLS_ACUITY_SCORE: 48
ADLS_ACUITY_SCORE: 44
ADLS_ACUITY_SCORE: 47
ADLS_ACUITY_SCORE: 44
ADLS_ACUITY_SCORE: 44
ADLS_ACUITY_SCORE: 48
ADLS_ACUITY_SCORE: 44
ADLS_ACUITY_SCORE: 47
ADLS_ACUITY_SCORE: 47
ADLS_ACUITY_SCORE: 48
ADLS_ACUITY_SCORE: 44
ADLS_ACUITY_SCORE: 47
ADLS_ACUITY_SCORE: 47

## 2025-02-15 NOTE — PLAN OF CARE
"Goal Outcome Evaluation:        Plan of Care Reviewed With: patient    Overall Patient Progress: improvingOverall Patient Progress: improving    Neuro: A&Ox4.   Cardiac: SR. VSS.   Respiratory: Pt 02 Sat 92%. Pt is on 1/2 oxygen nasal cannular. Pt is desaturating while at sleep.  GI/: Adequate urine output. BM X1  Diet/appetite: Tolerating regular diet.   Activity:  Assist of one with walker.  Pain: At acceptable level on current regimen. tylenol  Skin: No new deficits noted.  LDA's: PIV saline locked    Plan: Continue with POC.    Blood pressure 109/72, pulse 76, temperature 98.3  F (36.8  C), temperature source Oral, resp. rate 16, height 1.803 m (5' 11\"), weight 107.3 kg (236 lb 8.9 oz), SpO2 92%.    "

## 2025-02-15 NOTE — PLAN OF CARE
. PIV is patent and saline locked.  Patient is on 1/2 L NC and SpO2 is upper 94-97% and telemetry shows controlled A. Fib. Patient is A & O x 4. No pain or nausea or emesis this shift. Patient is eating and drinking good. Patient has good urine output and no bowel movement, passing gas. RT- Neb tx and tolerating. Patient is using urinal at the bedside and turn every 2 hours as needed. Patient is up with 1 assist + walker + gait belt. Patient declines for bath this time and will do later today. Continue with plan of care and notify MD for status changes.     Problem: Adult Inpatient Plan of Care  Goal: Plan of Care Review  Description: The Plan of Care Review/Shift note should be completed every shift.  The Outcome Evaluation is a brief statement about your assessment that the patient is improving, declining, or no change.  This information will be displayed automatically on your shift  note.  Outcome: Progressing  Flowsheets (Taken 2/15/2025 1308)  Plan of Care Reviewed With: patient  Overall Patient Progress: no change     Problem: Adult Inpatient Plan of Care  Goal: Absence of Hospital-Acquired Illness or Injury  Intervention: Identify and Manage Fall Risk  Recent Flowsheet Documentation  Taken 2/15/2025 1155 by Deb Jean RN  Safety Promotion/Fall Prevention:   activity supervised   clutter free environment maintained   increased rounding and observation   increase visualization of patient   lighting adjusted   room organization consistent   safety round/check completed  Taken 2/15/2025 0811 by Deb Jean, RN  Safety Promotion/Fall Prevention:   activity supervised   clutter free environment maintained   increased rounding and observation   increase visualization of patient   lighting adjusted   mobility aid in reach   nonskid shoes/slippers when out of bed   patient and family education   room organization consistent   safety round/check completed     Problem: Adult Inpatient Plan of  Care  Goal: Absence of Hospital-Acquired Illness or Injury  Intervention: Prevent Skin Injury  Recent Flowsheet Documentation  Taken 2/15/2025 1300 by Deb Jean RN  Body Position:   turned   foot of bed elevated   heels elevated   legs elevated  Taken 2/15/2025 1000 by Deb Jean RN  Body Position:   turned   foot of bed elevated   heels elevated   legs elevated  Taken 2/15/2025 0811 by Deb Jean RN  Body Position:   turned   foot of bed elevated   heels elevated   legs elevated  Skin Protection: adhesive use limited     Problem: Adult Inpatient Plan of Care  Goal: Absence of Hospital-Acquired Illness or Injury  Intervention: Prevent and Manage VTE (Venous Thromboembolism) Risk  Recent Flowsheet Documentation  Taken 2/15/2025 0811 by Deb Jean RN  VTE Prevention/Management: SCDs off (sequential compression devices)     Problem: Adult Inpatient Plan of Care  Goal: Absence of Hospital-Acquired Illness or Injury  Intervention: Prevent Infection  Recent Flowsheet Documentation  Taken 2/15/2025 0811 by Deb Jean RN  Infection Prevention:   cohorting utilized   equipment surfaces disinfected   environmental surveillance performed   hand hygiene promoted   personal protective equipment utilized   other (see comments)     Problem: Pain Acute  Goal: Optimal Pain Control and Function  Intervention: Prevent or Manage Pain  Recent Flowsheet Documentation  Taken 2/15/2025 0811 by Deb Jean RN  Sleep/Rest Enhancement: relaxation techniques promoted  Bowel Elimination Promotion:   ambulation promoted   adequate fluid intake promoted   commode/bedpan at bedside  Medication Review/Management: medications reviewed     Problem: Infection  Goal: Absence of Infection Signs and Symptoms  Intervention: Prevent or Manage Infection  Recent Flowsheet Documentation  Taken 2/15/2025 0811 by Deb Jean RN  Isolation Precautions: protective  environment maintained     Problem: Fall Injury Risk  Goal: Absence of Fall and Fall-Related Injury  Intervention: Identify and Manage Contributors  Recent Flowsheet Documentation  Taken 2/15/2025 0811 by Deb Jean RN  Medication Review/Management: medications reviewed     Problem: Fall Injury Risk  Goal: Absence of Fall and Fall-Related Injury  Intervention: Promote Injury-Free Environment  Recent Flowsheet Documentation  Taken 2/15/2025 1155 by Deb Jean RN  Safety Promotion/Fall Prevention:   activity supervised   clutter free environment maintained   increased rounding and observation   increase visualization of patient   lighting adjusted   room organization consistent   safety round/check completed  Taken 2/15/2025 0811 by Deb Jean RN  Safety Promotion/Fall Prevention:   activity supervised   clutter free environment maintained   increased rounding and observation   increase visualization of patient   lighting adjusted   mobility aid in reach   nonskid shoes/slippers when out of bed   patient and family education   room organization consistent   safety round/check completed    Goal Outcome Evaluation:      Plan of Care Reviewed With: patient    Overall Patient Progress: no changeOverall Patient Progress: no change

## 2025-02-15 NOTE — PROGRESS NOTES
[facility]    Progress Note - Medicine Service, [facility] TEAM 1       Date of Admission:  2/11/2025    Assessment & Plan   James Salvador is a 79 year old male admitted on 2/11/2025. PMHx significant for A-fib on DOAC, metastatic melanoma, trigeminal neuralgia, SIADH, and CAD, who was admitted for hypotension and LEE, likely in the setting of hypovolemia from dehydration.    Today's update:  - continue to work with PT to gain strength  - hopeful discharge home tomorrow    #Hypotension, Resolved  #Generalized weakness, improving  #Dehydration  In the setting of dehydration and/or poor PO intake. Less likely infection given negative workup: MRSA, RVP, Strept legionella. Patient has warm extremities with normal Lactate. Overall, patient continues to greatly improve with sufficient oral intake and supportive cares. AM cortisol within normal limits, echocardiogram EF 50% without structural changes. Normal IVC. Infectious workup with no growth thus far & blood cultures with NGTD.   - PT consulted, encouraged patient to continue to work on getting up and moving around to hopeful move towards discharging home    #Acute hypoxic respiratory failure, resolved  #COVID-19 infection (dx 1/27)  Patient presented with persistent productive cough and reported fever two days ago. was recently admitted at [facility] from 1/27/2025-1/31/2025 for an acute flare of trigeminal neuralgia and COVID infection. CXR showed Diffuse bilateral mixed opacity most predominantly involving the lower lobes most concerning for infectious etiologies, although procal and lactic acid is negative. EKG without ischemic changes so low suspicion for ACS. Wells score 1 so unlikely PE. Presentation not consistent with CHF, although elevated BNP 1800. Continues to wean off oxygen, only requiring a little bit of oxygen at night.  - CT Chest w/o contrast 2/12 with no sign of infection or pulmonary  edema  - dc Zosyn (2/11 - 2/12)  - dc Vanc (2/11 - 2/12)   - Strept, legionella urine antigen and sputum cx NGTD  - RVP negative, MRSA swab negative    #LEE, resolved  #Hypomagnesemia  Baseline Cr 0.5. Cr on admission 1.3 . Trending down to 0.7. secondary to pre-renal azotemia from dehydration and decrease PO intake from. Currently is making urine.   - Electrolyte replacement protocols  - Monitor BMP  - Avoid nephrotoxins  - Monitor urine output    #Hyponatremia, resolved  #Hx of SIADH  Na on admission 133 baseline runs 130s. Na 137. UA reviewed, serum osmolality, FeNa, urine sodium, urine osmolality.     #Trigeminal neuralgia   Neurosurgery following:                                          - No neurosurgical intervention indicated at this time                                          - Neurosurgery will schedule follow-up in clinic upon hospital discharge for  reassessment and further discussion of possible treatment options.     Pain regimen:  - Tylenol PRN  - continue PTA gabapentin at 900 mg TID  - phenytoin 200 mg orally at bedtime  - start Benzocaine spray   - For acute pain, may consider IV Fosphenytoin infusion      #Acute normocytic anemia, improving  #Thrombocytopenia, improving  Folate and B12 within normal limits. Iron panel & ferritin consistent with anemia of chronic disease. Do continue to see improvement in hemoglobin level and expect that the number will continue to increase in the coming days with further recovery. Thrombocytopenia also continues to improve.  - CBC in AM     #A-fib on DOAC with PPM  -Continue PTA Eliquis  -Device interrogation showed : 50%, irregular ventricular rhythm ~ 80 bpm      #Elevated Liver enzymes, improving  Mild elevation of AST and ALT. RUQ US 2/13 without any pathology besides some hepatomegaly. Continue to improve, potential component of shock liver with improvement in blood pressure.   - CMP in AM     #Metastatic melanoma  TVEC injections currently on hold,  "patient follows with [facility]  -Continue PTA prednisone     #History of CAD with mid LAD PCI in 2015 after MI, RCA is chronically occluded  Held initially upon admission with hypotension. Will plan to slowly restart medications as needed.   - restarted carvedilol BID  - hold PTA Lasix  - hold PTA Lisinopril    #c/f YEMI  Does seem to desat at night, so likely component of YEMI with hypoxia. Will send for sleep study outpatient, but concern he won't tolerate a CPAP with his trigeminal neuralgia.  - Sleep study upon discharge          Diet: Combination Diet Regular Diet  Snacks/Supplements Adult: Ensure Enlive; With Meals    DVT Prophylaxis: Apixaban  Gill Catheter: Not present  Fluids: None  Lines: PRESENT      Port a Cath 01/27/25 Single Lumen Right Chest wall-Site Assessment: WDL      Cardiac Monitoring: ACTIVE order. Indication: Tachyarrhythmias, acute (48 hours)  Code Status: Full Code      Clinically Significant Risk Factors               # Hypoalbuminemia: Lowest albumin = 2.8 g/dL at 2/15/2025  6:03 AM, will monitor as appropriate     # Hypertension: Noted on problem list            # Obesity: Estimated body mass index is 32.99 kg/m  as calculated from the following:    Height as of this encounter: 1.803 m (5' 11\").    Weight as of this encounter: 107.3 kg (236 lb 8.9 oz)., PRESENT ON ADMISSION  # Moderate Malnutrition: based on nutrition assessment, PRESENT ON ADMISSION   # Financial/Environmental Concerns: none   # Pacemaker present       Social Drivers of Health   Tobacco Use: Medium Risk (2/4/2025)    Patient History     Smoking Tobacco Use: Former     Smokeless Tobacco Use: Never   Physical Activity: Inactive (6/22/2024)    Exercise Vital Sign     Days of Exercise per Week: 0 days     Minutes of Exercise per Session: 0 min   Interpersonal Safety: Low Risk  (2/13/2025)    Interpersonal Safety     Do you feel physically and emotionally safe where you currently live?: Yes     Within the past 12 " months, have you been hit, slapped, kicked or otherwise physically hurt by someone?: No     Within the past 12 months, have you been humiliated or emotionally abused in other ways by your partner or ex-partner?: No   Recent Concern: Interpersonal Safety - High Risk (1/27/2025)    Interpersonal Safety     Do you feel physically and emotionally safe where you currently live?: No     Within the past 12 months, have you been hit, slapped, kicked or otherwise physically hurt by someone?: No     Within the past 12 months, have you been humiliated or emotionally abused in other ways by your partner or ex-partner?: No   Stress: Stress Concern Present (6/22/2024)    [employer] [place] [employer] - Occupational Stress Questionnaire     Feeling of Stress : To some extent   Social Connections: Unknown (6/22/2024)    Social Connection and Isolation Panel [NHANES]     Frequency of Social Gatherings with Friends and Family: Once a week         Disposition Plan     Medically Ready for Discharge: Anticipated Tomorrow         The patient's care was discussed with the Attending Physician, Dr. Gonzalez .    SUSAN ANDRE MD  Medicine Service, [address]  Securely message with [employer] (more info)  Text page via [facility] Paging/Directory   See signed in provider for up to date coverage information  ______________________________________________________________________    Interval History   Patient feeling that his overall functional status is improving. Was able to get up and walk with physical therapy readily yesterday. Notes he is starting to feel stronger. Has remained off oxygen, except for a little bit at night. Denies any chest pain, abdominal pain, nausea/vomiting, or fevers/chills.     Physical Exam   Vital Signs: Temp: 98.3  F (36.8  C) Temp src: Oral BP: 109/72 Pulse: 76   Resp: 16 SpO2: 96 % O2 Device: Nasal cannula Oxygen Delivery: 1/2 LPM  Weight: 236  lbs 8.86 oz    General Appearance: NAD, resting comfortably in bed  Respiratory: CTAB, breathing non-labored on room air, no crackles or wheezes noted  Cardiovascular: RRR, no murmurs noted  GI: soft, non-tender, non-distended  Skin: no suspicious lesions on exposed skin  Neuro:  alert and oriented x3, approrpiately conversational. Nonfocal neuro exam. Strength 5/5 bilaterally.     Medical Decision Making       Please see A&P for additional details of medical decision making.      Data     I have personally reviewed the following data over the past 24 hrs:    6.3  \   12.1 (L)   / 171     140 106 10.2 /  90   4.2 26 0.46 (L) \     ALT: 61 AST: 47 (H) AP: 102 TBILI: 0.5   ALB: 2.8 (L) TOT PROTEIN: 5.7 (L) LIPASE: N/A

## 2025-02-16 ENCOUNTER — APPOINTMENT (OUTPATIENT)
Dept: PHYSICAL THERAPY | Facility: CLINIC | Age: 80
DRG: 682 | End: 2025-02-16
Payer: COMMERCIAL

## 2025-02-16 LAB
ANION GAP SERPL CALCULATED.3IONS-SCNC: 7 MMOL/L (ref 7–15)
BACTERIA BLD CULT: NO GROWTH
BACTERIA BLD CULT: NO GROWTH
BUN SERPL-MCNC: 12.1 MG/DL (ref 8–23)
CALCIUM SERPL-MCNC: 9 MG/DL (ref 8.8–10.4)
CHLORIDE SERPL-SCNC: 104 MMOL/L (ref 98–107)
CREAT SERPL-MCNC: 0.46 MG/DL (ref 0.67–1.17)
EGFRCR SERPLBLD CKD-EPI 2021: >90 ML/MIN/1.73M2
ERYTHROCYTE [DISTWIDTH] IN BLOOD BY AUTOMATED COUNT: 15.6 % (ref 10–15)
GLUCOSE SERPL-MCNC: 87 MG/DL (ref 70–99)
HCO3 SERPL-SCNC: 28 MMOL/L (ref 22–29)
HCT VFR BLD AUTO: 35.4 % (ref 40–53)
HGB BLD-MCNC: 12.1 G/DL (ref 13.3–17.7)
MAGNESIUM SERPL-MCNC: 1.6 MG/DL (ref 1.7–2.3)
MCH RBC QN AUTO: 32.4 PG (ref 26.5–33)
MCHC RBC AUTO-ENTMCNC: 34.2 G/DL (ref 31.5–36.5)
MCV RBC AUTO: 95 FL (ref 78–100)
P JIROVECII DNA SPEC QL NAA+PROBE: NOT DETECTED
PLATELET # BLD AUTO: 194 10E3/UL (ref 150–450)
POTASSIUM SERPL-SCNC: 4.4 MMOL/L (ref 3.4–5.3)
RBC # BLD AUTO: 3.73 10E6/UL (ref 4.4–5.9)
SODIUM SERPL-SCNC: 139 MMOL/L (ref 135–145)
WBC # BLD AUTO: 6.1 10E3/UL (ref 4–11)

## 2025-02-16 PROCEDURE — 99232 SBSQ HOSP IP/OBS MODERATE 35: CPT | Mod: GC | Performed by: STUDENT IN AN ORGANIZED HEALTH CARE EDUCATION/TRAINING PROGRAM

## 2025-02-16 PROCEDURE — 120N000002 HC R&B MED SURG/OB UMMC

## 2025-02-16 PROCEDURE — 250N000011 HC RX IP 250 OP 636

## 2025-02-16 PROCEDURE — 36415 COLL VENOUS BLD VENIPUNCTURE: CPT

## 2025-02-16 PROCEDURE — 83735 ASSAY OF MAGNESIUM: CPT

## 2025-02-16 PROCEDURE — 250N000013 HC RX MED GY IP 250 OP 250 PS 637

## 2025-02-16 PROCEDURE — 94640 AIRWAY INHALATION TREATMENT: CPT | Mod: 76

## 2025-02-16 PROCEDURE — 250N000012 HC RX MED GY IP 250 OP 636 PS 637

## 2025-02-16 PROCEDURE — 80048 BASIC METABOLIC PNL TOTAL CA: CPT

## 2025-02-16 PROCEDURE — 250N000009 HC RX 250

## 2025-02-16 PROCEDURE — 85048 AUTOMATED LEUKOCYTE COUNT: CPT

## 2025-02-16 PROCEDURE — 85014 HEMATOCRIT: CPT

## 2025-02-16 PROCEDURE — 999N000157 HC STATISTIC RCP TIME EA 10 MIN

## 2025-02-16 PROCEDURE — 97116 GAIT TRAINING THERAPY: CPT | Mod: GP

## 2025-02-16 PROCEDURE — 97530 THERAPEUTIC ACTIVITIES: CPT | Mod: GP

## 2025-02-16 RX ORDER — MAGNESIUM SULFATE HEPTAHYDRATE 40 MG/ML
2 INJECTION, SOLUTION INTRAVENOUS ONCE
Status: COMPLETED | OUTPATIENT
Start: 2025-02-16 | End: 2025-02-16

## 2025-02-16 RX ADMIN — ACETAMINOPHEN 1300 MG: 325 TABLET, FILM COATED ORAL at 20:32

## 2025-02-16 RX ADMIN — ACETAMINOPHEN 1300 MG: 325 TABLET, FILM COATED ORAL at 08:18

## 2025-02-16 RX ADMIN — ATORVASTATIN CALCIUM 80 MG: 80 TABLET, FILM COATED ORAL at 22:07

## 2025-02-16 RX ADMIN — CARVEDILOL 3.12 MG: 3.12 TABLET, FILM COATED ORAL at 17:59

## 2025-02-16 RX ADMIN — ALBUTEROL SULFATE 2.5 MG: 2.5 SOLUTION RESPIRATORY (INHALATION) at 15:19

## 2025-02-16 RX ADMIN — APIXABAN 5 MG: 5 TABLET, FILM COATED ORAL at 08:18

## 2025-02-16 RX ADMIN — TOPICAL ANESTHETIC 0.5 ML: 200 SPRAY DENTAL; PERIODONTAL at 14:55

## 2025-02-16 RX ADMIN — PREDNISONE 10 MG: 10 TABLET ORAL at 08:19

## 2025-02-16 RX ADMIN — TOPICAL ANESTHETIC 0.5 ML: 200 SPRAY DENTAL; PERIODONTAL at 11:44

## 2025-02-16 RX ADMIN — OXYCODONE HYDROCHLORIDE 5 MG: 5 TABLET ORAL at 22:21

## 2025-02-16 RX ADMIN — MAGNESIUM SULFATE HEPTAHYDRATE 2 G: 40 INJECTION, SOLUTION INTRAVENOUS at 14:57

## 2025-02-16 RX ADMIN — CARVEDILOL 3.12 MG: 3.12 TABLET, FILM COATED ORAL at 08:18

## 2025-02-16 RX ADMIN — PHENYTOIN 200 MG: 50 TABLET, CHEWABLE ORAL at 22:07

## 2025-02-16 RX ADMIN — GABAPENTIN 900 MG: 300 CAPSULE ORAL at 14:56

## 2025-02-16 RX ADMIN — APIXABAN 5 MG: 5 TABLET, FILM COATED ORAL at 20:32

## 2025-02-16 RX ADMIN — ALBUTEROL SULFATE 2.5 MG: 2.5 SOLUTION RESPIRATORY (INHALATION) at 20:41

## 2025-02-16 RX ADMIN — GABAPENTIN 900 MG: 300 CAPSULE ORAL at 20:32

## 2025-02-16 RX ADMIN — GABAPENTIN 900 MG: 300 CAPSULE ORAL at 08:18

## 2025-02-16 ASSESSMENT — ACTIVITIES OF DAILY LIVING (ADL)
ADLS_ACUITY_SCORE: 44

## 2025-02-16 NOTE — PLAN OF CARE
Patient has been assessed for Home Oxygen needs. Oxygen readings:    *Pulse oximetry (SpO2) = 96% on room air at rest while awake.    *SpO2 = 95% on room air during activity/with exercise.

## 2025-02-16 NOTE — PROGRESS NOTES
[facility]    Progress Note - Medicine Service, [facility] TEAM 1       Date of Admission:  2/11/2025    Assessment & Plan   James Salvador is a 79 year old male admitted on 2/11/2025. PMHx significant for A-fib on DOAC, metastatic melanoma, trigeminal neuralgia, SIADH, and CAD, who was admitted for hypotension and LEE, likely in the setting of hypovolemia from dehydration.    Today's update:  - Patient ambulated independently today with PT, including ascending and descending stairs, and walked 75 feet without a drop in oxygen sat.  - started Benzocaine nasal spray q3h PRN  - hopeful discharge home tomorrow    #Hypotension, Resolved  #Generalized weakness, improving  #Dehydration  In the setting of dehydration and/or poor PO intake. Less likely infection given negative workup: MRSA, RVP, Strept legionella. Patient has warm extremities with normal Lactate. Overall, patient continues to greatly improve with sufficient oral intake and supportive cares. AM cortisol within normal limits, echocardiogram EF 50% without structural changes. Normal IVC. Infectious workup with no growth thus far & blood cultures with NGTD.   - PT consulted, encouraged patient to continue to work on getting up and moving around to hopeful move towards discharging home    #Acute hypoxic respiratory failure, resolved  #COVID-19 infection (dx 1/27)  Patient presented with persistent productive cough and reported fever two days ago. was recently admitted at [facility] from 1/27/2025-1/31/2025 for an acute flare of trigeminal neuralgia and COVID infection. CXR showed Diffuse bilateral mixed opacity most predominantly involving the lower lobes most concerning for infectious etiologies, although procal and lactic acid is negative. EKG without ischemic changes so low suspicion for ACS. Wells score 1 so unlikely PE. Presentation not consistent with CHF, although elevated BNP 1800. Continues  to wean off oxygen, only requiring a little bit of oxygen at night.  - CT Chest w/o contrast 2/12 with no sign of infection or pulmonary edema  - dc Zosyn (2/11 - 2/12)  - dc Vanc (2/11 - 2/12)   - Strept, legionella urine antigen and sputum cx NGTD  - RVP negative, MRSA swab negative    #LEE, resolved  #Hypomagnesemia  Baseline Cr 0.5. Cr on admission 1.3 . Trending down to 0.7. secondary to pre-renal azotemia from dehydration and decrease PO intake from. Currently is making urine.   - Electrolyte replacement protocols  - Monitor BMP  - Avoid nephrotoxins  - Monitor urine output    #Hyponatremia, resolved  #Hx of SIADH  Na on admission 133 baseline runs 130s. Na 137. UA reviewed, serum osmolality, FeNa, urine sodium, urine osmolality.     #Trigeminal neuralgia   Neurosurgery following:                                          - No neurosurgical intervention indicated at this time                                          - Neurosurgery will schedule follow-up in clinic upon hospital discharge for  reassessment and further discussion of possible treatment options.     Pain regimen:  - Tylenol PRN  - continue PTA gabapentin at 900 mg TID  - phenytoin 200 mg orally at bedtime  - start Benzocaine spray   - For acute pain, may consider IV Fosphenytoin infusion      #Acute normocytic anemia, improving  #Thrombocytopenia, improving  Folate and B12 within normal limits. Iron panel & ferritin consistent with anemia of chronic disease. Do continue to see improvement in hemoglobin level and expect that the number will continue to increase in the coming days with further recovery. Thrombocytopenia also continues to improve.  - CBC in AM     #A-fib on DOAC with PPM  -Continue PTA Eliquis  -Device interrogation showed : 50%, irregular ventricular rhythm ~ 80 bpm      #Elevated Liver enzymes, improving  Mild elevation of AST and ALT. RUQ US 2/13 without any pathology besides some hepatomegaly. Continue to improve, potential  "component of shock liver with improvement in blood pressure.   - CMP in AM     #Metastatic melanoma  TVEC injections currently on hold, patient follows with [place] [facility]  -Continue PTA prednisone     #History of CAD with mid LAD PCI in 2015 after MI, RCA is chronically occluded  Held initially upon admission with hypotension. Will plan to slowly restart medications as needed.   - restarted carvedilol BID  - hold PTA Lasix  - hold PTA Lisinopril    #c/f YEMI  Does seem to desat at night, so likely component of YEMI with hypoxia. Will send for sleep study outpatient, but concern he won't tolerate a CPAP with his trigeminal neuralgia.  - Sleep study upon discharge          Diet: Combination Diet Regular Diet  Snacks/Supplements Adult: Ensure Enlive; With Meals    DVT Prophylaxis: Apixaban  Gill Catheter: Not present  Fluids: None  Lines: PRESENT      Port a Cath 01/27/25 Single Lumen Right Chest wall-Site Assessment: WDL      Cardiac Monitoring: ACTIVE order. Indication: Tachyarrhythmias, acute (48 hours)  Code Status: Full Code      Clinically Significant Risk Factors             # Hypomagnesemia: Lowest Mg = 1.6 mg/dL in last 2 days, will replace as needed   # Hypoalbuminemia: Lowest albumin = 2.8 g/dL at 2/15/2025  6:03 AM, will monitor as appropriate     # Hypertension: Noted on problem list            # Obesity: Estimated body mass index is 33.18 kg/m  as calculated from the following:    Height as of this encounter: 1.803 m (5' 11\").    Weight as of this encounter: 107.9 kg (237 lb 14 oz).   # Moderate Malnutrition: based on nutrition assessment      # Financial/Environmental Concerns: none   # Pacemaker present       Social Drivers of Health   Tobacco Use: Medium Risk (2/4/2025)    Patient History     Smoking Tobacco Use: Former     Smokeless Tobacco Use: Never   Physical Activity: Inactive (6/22/2024)    Exercise Vital Sign     Days of Exercise per Week: 0 days     Minutes of Exercise per Session: 0 min "   Interpersonal Safety: Low Risk  (2/13/2025)    Interpersonal Safety     Do you feel physically and emotionally safe where you currently live?: Yes     Within the past 12 months, have you been hit, slapped, kicked or otherwise physically hurt by someone?: No     Within the past 12 months, have you been humiliated or emotionally abused in other ways by your partner or ex-partner?: No   Recent Concern: Interpersonal Safety - High Risk (1/27/2025)    Interpersonal Safety     Do you feel physically and emotionally safe where you currently live?: No     Within the past 12 months, have you been hit, slapped, kicked or otherwise physically hurt by someone?: No     Within the past 12 months, have you been humiliated or emotionally abused in other ways by your partner or ex-partner?: No   Stress: Stress Concern Present (6/22/2024)    [place] [place] [employer] - Occupational Stress Questionnaire     Feeling of Stress : To some extent   Social Connections: Unknown (6/22/2024)    Social Connection and Isolation Panel [NHANES]     Frequency of Social Gatherings with Friends and Family: Once a week         Disposition Plan     Medically Ready for Discharge: Anticipated Tomorrow         The patient's care was discussed with the Attending Physician, Dr. Gonzalez .    Domenic Davis MD  Medicine Service, [facility] TEAM [address] [place] [address]  Securely message with [employer] (more info)  Text page via [facility] Paging/Directory   See signed in provider for up to date coverage information  ______________________________________________________________________    Interval History   Patient feeling that his overall functional status is improving. Was able to get up and walk with physical therapy readily yesterday. Notes he is starting to feel stronger. Has remained off oxygen, except for a little bit at night. Denies any chest pain, abdominal pain, nausea/vomiting, or fevers/chills.     Physical  Exam   Vital Signs: Temp: 98.3  F (36.8  C) Temp src: Oral BP: 120/72 Pulse: 72   Resp: 18 SpO2: 96 % O2 Device: Nasal cannula Oxygen Delivery: 1 LPM  Weight: 237 lbs 14.02 oz    General Appearance: NAD, resting comfortably in bed  Respiratory: CTAB, breathing non-labored on room air, no crackles or wheezes noted  Cardiovascular: RRR, no murmurs noted  GI: soft, non-tender, non-distended  Skin: no suspicious lesions on exposed skin  Neuro:  alert and oriented x3, approrpiately conversational. Nonfocal neuro exam. Strength 5/5 bilaterally.     Medical Decision Making       Please see A&P for additional details of medical decision making.      Data     I have personally reviewed the following data over the past 24 hrs:    6.1  \   12.1 (L)   / 194     139 104 12.1 /  87   4.4 28 0.46 (L) \

## 2025-02-16 NOTE — PLAN OF CARE
"Goal Outcome Evaluation:        Plan of Care Reviewed With: patient    Overall Patient Progress: no changeOverall Patient Progress: no change       Neuro: A&Ox4.   Cardiac: SR. VSS. Tele/control A.Fib   Respiratory: Pt 02 Sat 96%. Pt  on Nasal cannular 2 L.  GI/: Adequate urine output. No BM  Diet/appetite: Tolerating regular diet.   Activity:  Assist of one with rolling walker and gait belt.  Pain: At acceptable level on current regimen. Pain 7/10 Tylenol, Gabapentin  Skin: No new deficits noted.  LDA's: PIV saline locked    Plan: Continue with POC.    Blood pressure 120/72, pulse 88, temperature 98.3  F (36.8  C), temperature source Oral, resp. rate 18, height 1.803 m (5' 11\"), weight 107.9 kg (237 lb 14 oz), SpO2 96%.  "

## 2025-02-16 NOTE — PLAN OF CARE
Goal Outcome Evaluation:  NEURO: Alert and oriented x4, able to make needs known. [place], uses bilateral  hearing aids.   RESPIRATORY: room air, denies SOB.   CARDIAC: WDL   GI/: Voiding adequally. No BM this shift.   DIET: Reg diet  PAIN/NAUSEA: Continuous pain, had schedule and PRN tylenol, refused oxycodone. Lidocaine spray in right nostril.   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   IV ACCESS:  PICC SL   ACTIVITY: A1, worked with PT today.   LAB: none pending. Mag 1.6, replaced, recheck tomorrow.   PLAN:  continue with plan of care.

## 2025-02-17 VITALS
WEIGHT: 238.1 LBS | HEIGHT: 71 IN | TEMPERATURE: 98.3 F | SYSTOLIC BLOOD PRESSURE: 105 MMHG | DIASTOLIC BLOOD PRESSURE: 71 MMHG | HEART RATE: 78 BPM | RESPIRATION RATE: 16 BRPM | BODY MASS INDEX: 33.33 KG/M2 | OXYGEN SATURATION: 95 %

## 2025-02-17 LAB
ANION GAP SERPL CALCULATED.3IONS-SCNC: 7 MMOL/L (ref 7–15)
BUN SERPL-MCNC: 12.7 MG/DL (ref 8–23)
CALCIUM SERPL-MCNC: 8.8 MG/DL (ref 8.8–10.4)
CHLORIDE SERPL-SCNC: 103 MMOL/L (ref 98–107)
CREAT SERPL-MCNC: 0.47 MG/DL (ref 0.67–1.17)
EGFRCR SERPLBLD CKD-EPI 2021: >90 ML/MIN/1.73M2
ERYTHROCYTE [DISTWIDTH] IN BLOOD BY AUTOMATED COUNT: 15.3 % (ref 10–15)
GLUCOSE SERPL-MCNC: 84 MG/DL (ref 70–99)
HBV SURFACE AG SERPL QL IA: NONREACTIVE
HCO3 SERPL-SCNC: 27 MMOL/L (ref 22–29)
HCT VFR BLD AUTO: 37.2 % (ref 40–53)
HGB BLD-MCNC: 12 G/DL (ref 13.3–17.7)
HIV 1+2 AB+HIV1 P24 AG SERPL QL IA: NONREACTIVE
MAGNESIUM SERPL-MCNC: 1.7 MG/DL (ref 1.7–2.3)
MCH RBC QN AUTO: 31.7 PG (ref 26.5–33)
MCHC RBC AUTO-ENTMCNC: 32.3 G/DL (ref 31.5–36.5)
MCV RBC AUTO: 98 FL (ref 78–100)
PLATELET # BLD AUTO: 225 10E3/UL (ref 150–450)
POTASSIUM SERPL-SCNC: 4.5 MMOL/L (ref 3.4–5.3)
RBC # BLD AUTO: 3.78 10E6/UL (ref 4.4–5.9)
SODIUM SERPL-SCNC: 137 MMOL/L (ref 135–145)
WBC # BLD AUTO: 6.9 10E3/UL (ref 4–11)

## 2025-02-17 PROCEDURE — 80048 BASIC METABOLIC PNL TOTAL CA: CPT

## 2025-02-17 PROCEDURE — 999N000104 HC STATISTIC NO CHARGE: Performed by: INTERNAL MEDICINE

## 2025-02-17 PROCEDURE — 250N000013 HC RX MED GY IP 250 OP 250 PS 637

## 2025-02-17 PROCEDURE — 99239 HOSP IP/OBS DSCHRG MGMT >30: CPT | Performed by: STUDENT IN AN ORGANIZED HEALTH CARE EDUCATION/TRAINING PROGRAM

## 2025-02-17 PROCEDURE — 83735 ASSAY OF MAGNESIUM: CPT

## 2025-02-17 PROCEDURE — 250N000012 HC RX MED GY IP 250 OP 636 PS 637

## 2025-02-17 PROCEDURE — 250N000011 HC RX IP 250 OP 636

## 2025-02-17 PROCEDURE — 82374 ASSAY BLOOD CARBON DIOXIDE: CPT

## 2025-02-17 PROCEDURE — 36591 DRAW BLOOD OFF VENOUS DEVICE: CPT

## 2025-02-17 PROCEDURE — 85018 HEMOGLOBIN: CPT

## 2025-02-17 PROCEDURE — 85048 AUTOMATED LEUKOCYTE COUNT: CPT

## 2025-02-17 RX ORDER — HEPARIN SODIUM,PORCINE 10 UNIT/ML
5-10 VIAL (ML) INTRAVENOUS
Status: DISCONTINUED | OUTPATIENT
Start: 2025-02-17 | End: 2025-02-17 | Stop reason: HOSPADM

## 2025-02-17 RX ORDER — GABAPENTIN 300 MG/1
900 CAPSULE ORAL 3 TIMES DAILY
Qty: 300 CAPSULE | Refills: 0 | Status: SHIPPED | OUTPATIENT
Start: 2025-02-17 | End: 2025-02-17

## 2025-02-17 RX ORDER — ALBUTEROL SULFATE 90 UG/1
2 INHALANT RESPIRATORY (INHALATION) EVERY 6 HOURS PRN
Qty: 18 G | Refills: 0 | Status: SHIPPED | OUTPATIENT
Start: 2025-02-17

## 2025-02-17 RX ORDER — HEPARIN SODIUM (PORCINE) LOCK FLUSH IV SOLN 100 UNIT/ML 100 UNIT/ML
5-10 SOLUTION INTRAVENOUS
Status: DISCONTINUED | OUTPATIENT
Start: 2025-02-17 | End: 2025-02-17 | Stop reason: HOSPADM

## 2025-02-17 RX ORDER — HEPARIN SODIUM,PORCINE 10 UNIT/ML
5-10 VIAL (ML) INTRAVENOUS EVERY 24 HOURS
Status: DISCONTINUED | OUTPATIENT
Start: 2025-02-17 | End: 2025-02-17 | Stop reason: HOSPADM

## 2025-02-17 RX ORDER — ALBUTEROL SULFATE 90 UG/1
2 INHALANT RESPIRATORY (INHALATION) EVERY 6 HOURS PRN
Status: DISCONTINUED | OUTPATIENT
Start: 2025-02-17 | End: 2025-02-17 | Stop reason: HOSPADM

## 2025-02-17 RX ORDER — GABAPENTIN 300 MG/1
900 CAPSULE ORAL 3 TIMES DAILY
Qty: 300 CAPSULE | Refills: 0 | Status: SHIPPED | OUTPATIENT
Start: 2025-02-17

## 2025-02-17 RX ADMIN — APIXABAN 5 MG: 5 TABLET, FILM COATED ORAL at 08:41

## 2025-02-17 RX ADMIN — PREDNISONE 10 MG: 10 TABLET ORAL at 08:41

## 2025-02-17 RX ADMIN — CARVEDILOL 3.12 MG: 3.12 TABLET, FILM COATED ORAL at 08:44

## 2025-02-17 RX ADMIN — GABAPENTIN 900 MG: 300 CAPSULE ORAL at 08:41

## 2025-02-17 RX ADMIN — HEPARIN, PORCINE (PF) 10 UNIT/ML INTRAVENOUS SYRINGE 5 ML: at 12:24

## 2025-02-17 RX ADMIN — ACETAMINOPHEN 1300 MG: 325 TABLET, FILM COATED ORAL at 08:40

## 2025-02-17 ASSESSMENT — ACTIVITIES OF DAILY LIVING (ADL)
ADLS_ACUITY_SCORE: 44

## 2025-02-17 NOTE — PROGRESS NOTES
Pt with diminished LS, on RA, states nebulizer has been helping with breathing and would like to have prescription at discharge.

## 2025-02-17 NOTE — PLAN OF CARE
"Goal Outcome Evaluation:  Plan of Care Reviewed With: patient  Overall Patient Progress: improving  Outcome Evaluation: patient feeling stronger, agreeable to discharge to home today.    Shift: 0700 - 1400    Reason for admission: Hypotension, Dizziness, Dehydration  Vitals: Afebrile, VSS, on RA  /71 (BP Location: Right arm)   Pulse 78   Temp 98.3  F (36.8  C) (Oral)   Resp 16   Ht 1.803 m (5' 11\")   Wt 108 kg (238 lb 1.6 oz)   SpO2 95%   BMI 33.21 kg/m    Activity: Assist x 1 with gait belt and walker  Pain: right facial pain, constant sharp 6-10/10 goes as low as 4/10 with pain medication (Hx: trigeminal neurologia) - scheduled Tylenol and gabapentin, prn Oxy  Neuro: A&O x 4, pain to right face  Cardiac: Hx AFib - on Elliquis, Tele: AFib HR 84 /c PVCs and BBB  Respiratory: no c/o sob  GI/: Abdomen soft, non-tender,   Voiding spontaneously per urinal - able to manage independently  Diet: Regular diet  Lines: Right Chest Port - SL  Wounds/Incisions: na, prior right knee surgery - deformed and sore, limits mobility.   Labs/imaging/Consults: see results review. PT and Nutrition consulted.   Discharge Plan: discharge home today with wife.     Eunice Posadas RN BSN PHN  "

## 2025-02-17 NOTE — PROVIDER NOTIFICATION
Message sen to Melisa Dwyer, Resident and Leatha Larson Ismail for orders to de-access port with heparin flush.   Awaiting response.

## 2025-02-17 NOTE — PLAN OF CARE
"Goal Outcome Evaluation:    Plan of Care Reviewed With: patient    Overall Patient Progress: improving    Shift: 1900- 0730    VS: Blood pressure 122/76, pulse 76, temperature 98.3  F (36.8  C), temperature source Oral, resp. rate 16, height 1.803 m (5' 11\"), weight 108 kg (238 lb 1.6 oz), SpO2 95%.    Pain: Reported right sided facial pain( hx of trigeminal Neuralgia)    Neuro: A&O x4    Cardiac: Denies chest pain Hx of Afib    Respiratory: Desats at night during sleep. On 1L of O2 for comfort.    GI/Diet/Appetite:  Denies nausea, no vomiting. No Bm during shift.     : Voids with a bedside urinal. Denies pain with urination    LDA's: R. Chest port saline locked    Skin: Scabs noted on R.bangura and L. Knee scar surgical incision. No new deficit noted    Activity: Didn't ambulate during shift.       Plan: Pain management, monitor O2. Continue with POC                "

## 2025-02-17 NOTE — DISCHARGE SUMMARY
[facility]  Hospitalist Discharge Summary      Date of Admission:  2/11/2025  Date of Discharge:  2/17/2025  Discharging Provider: Domenic Davis MD  Discharge Service: Medicine Service, WILL TEAM 1    Discharge Diagnoses   Trigeminal neuralgia, Dehydration    Follow-ups Needed After Discharge   Follow-up Appointments       Hospital Follow-up with Existing Primary Care Provider (PCP)      Please see details below         Schedule Primary Care visit within: 7 Days           Additional follow-up instructions/to-do's for PCP: Sleep study for YEMI  LFT follow up   assess for restarting Lasix and Lisinopril    Unresulted Labs Ordered in the Past 30 Days of this Admission       No orders found from 1/12/2025 to 2/12/2025.        These results will be followed up by PCP    Discharge Disposition   Discharged to home  Condition at discharge: Stable    Hospital Course   James Salvador is a 79 year old male admitted on 2/11/2025. PMHx significant for A-fib on DOAC, metastatic melanoma, trigeminal neuralgia, SIADH, and CAD, who was admitted for hypotension and LEE, likely in the setting of hypovolemia.    #Hypotension, Resolved  #Generalized weakness, improving  #Dehydration, resolved  His hypotension resolved with fluid resuscitation, and his generalized weakness improved with supportive care and increased oral intake. Infectious workup was negative, with no evidence of sepsis. Echocardiogram showed an EF of 50% with no structural abnormalities. PT consulted and patient was able to stand up, walk and use stairs without assistance.    #Acute hypoxic respiratory failure, resolved  #COVID-19 infection (dx 1/27)  Due to a recent COVID-19 infection resolved. He continued to wean off oxygen, requiring only nocturnal supplementation. CXR initially showed diffuse bilateral opacities concerning for infection, though CT chest on 2/12 showed no pneumonia or pulmonary edema. His infectious  workup remained negative, and antibiotics (Zosyn, Vanc) were discontinued after two days.    #LEE, resolved  His LEE, likely pre-renal due to dehydration and poor PO intake, improved, with Cr 0.7    #Hyponatremia, resolved  #Hx of SIADH  Na on admission 133 baseline runs 130s. Na 137     #Trigeminal neuralgia   Trigeminal neuralgia was managed with gabapentin, phenytoin, and benzocaine spray, with neurosurgery deferring intervention and planning outpatient follow-up.    Pain regimen:  - Tylenol PRN  - continue PTA gabapentin at 900 mg TID  - phenytoin 200 mg orally at bedtime  - Benzocaine spray Q4 PRN  - For acute pain, may consider IV Fosphenytoin infusion      #Acute normocytic anemia, improving  #Thrombocytopenia, improving  Folate and B12 within normal limits. Iron panel & ferritin consistent with anemia of chronic disease.     #A-fib on DOAC with PPM  A-fib remained stable on Eliquis, with device interrogation showing : 50% and an irregular ventricular rhythm around 80 bpm     #Elevated Liver enzymes, stable  Mild transaminitis, improved with hemodynamic stability. RUQ US 2/13 without any pathology besides some hepatomegaly. Continue to improve, potential component of shock liver OR DILI with improvement in blood pressure.   - follow up CMP     #Metastatic melanoma  His metastatic melanoma treatment remains on hold, continued prednisone      #History of CAD with mid LAD PCI in 2015 after MI, RCA is chronically occluded  CAD management included restarting carvedilol, while Lasix and Lisinopril remained on hold due to prior hypotension.  - hold PTA Lasix  - hold PTA Lisinopril  - PCP follow up and restart after reassessment    #c/f YEMI  Does seem to desat at night, so likely component of YEMI with hypoxia. Will send for sleep study outpatient, but concern he won't tolerate a CPAP with his trigeminal neuralgia.  - Referral for Sleep study     Consultations This Hospital Stay   PHARMACY IP CONSULT  NUTRITION  SERVICES ADULT IP CONSULT  CARE MANAGEMENT / SOCIAL WORK IP CONSULT  PHYSICAL THERAPY ADULT IP CONSULT  INFECTION PREVENTION IP CONSULT  PHYSICAL THERAPY ADULT IP CONSULT    Code Status   Full Code    Time Spent on this Encounter        The patient's care was discussed with the Attending Physician, Dr. Gonzalez .    Domenic Davis MD  [facility] [address] OBSERVATION  [address] [address]  Phone: [phone]  Fax: [phone]  ______________________________________________________________________    Physical Exam   Vital Signs: Temp: 98.3  F (36.8  C) Temp src: Oral BP: 105/71 Pulse: 78   Resp: 16 SpO2: 95 % O2 Device: Nasal cannula Oxygen Delivery: 1 LPM  Weight: 238 lbs 1.55 oz  General Appearance:  NAD, resting comfortably in bed  Respiratory: CTAB, breathing non-labored on room air, no crackles or wheezes noted  Cardiovascular: RRR, no murmurs noted  GI: soft, non-tender, non-distended  Skin: no suspicious lesions on exposed skin  Neuro:  alert and oriented x3, approrpiately conversational. Nonfocal neuro exam.       Primary Care Physician   Jeronimo Painter    Discharge Orders      Primary Care - Care Coordination Referral      Home Care Referral      Reason for your hospital stay    You were hospitalized for concerns of dehydration and decreased oral intake. We gave you plenty of IV fluids and worked with physical therapy to regain strength.     Due to your decreased oral intake, we recommend you continue to hold your lisinopril and furosemide (lasix) upon discharge. Please follow up with your primary care provider in 1-2 weeks to continue to evaluate if you should restart these meds at this time. I encourage a good amount of oral intake of fluid and food.     Neurosurgery will schedule outpatient follow up for you as well.     Activity    Your activity upon discharge: activity as tolerated     Diet    Follow this diet upon discharge: Current Diet:Orders Placed This  Encounter      Snacks/Supplements Adult: Ensure Enlive; With Meals      Combination Diet Regular Diet     Hospital Follow-up with Existing Primary Care Provider (PCP)    Please see details below            Significant Results and Procedures   Most Recent 3 CBC's:  Recent Labs   Lab Test 02/17/25  0605 02/16/25  0621 02/15/25  0603   WBC 6.9 6.1 6.3   HGB 12.0* 12.1* 12.1*   MCV 98 95 94    194 171     Most Recent 3 BMP's:  Recent Labs   Lab Test 02/17/25  0605 02/16/25  0621 02/15/25  0603    139 140   POTASSIUM 4.5 4.4 4.2   CHLORIDE 103 104 106   CO2 27 28 26   BUN 12.7 12.1 10.2   CR 0.47* 0.46* 0.46*   ANIONGAP 7 7 8   BENJI 8.8 9.0 8.5*   GLC 84 87 90       Discharge Medications     Current Facility-Administered Medications:     acetaminophen (TYLENOL) tablet 1,300 mg, 1,300 mg, Oral, BID, Melisa Rivera MD, 1,300 mg at 02/17/25 0840    acetaminophen (TYLENOL) tablet 650 mg, 650 mg, Oral, Q6H PRN, Domenic Davis MD, 650 mg at 02/15/25 1611    albuterol (PROVENTIL HFA/VENTOLIN HFA) inhaler, 2 puff, Inhalation, Q6H PRN, Melisa Rivera MD    apixaban ANTICOAGULANT (ELIQUIS) tablet 5 mg, 5 mg, Oral, BID, Melisa Rivera MD, 5 mg at 02/17/25 0841    atorvastatin (LIPITOR) tablet 80 mg, 80 mg, Oral, At Bedtime, Melisa Rivera MD, 80 mg at 02/16/25 2207    benzocaine 20% (HURRICAINE/TOPEX) 20 % spray 0.5-1 mL, 1-2 spray, Mouth/Throat, Q3H PRN, Domenic Davis MD, 0.5 mL at 02/16/25 1455    calcium carbonate (TUMS) chewable tablet 1,000 mg, 1,000 mg, Oral, 4x Daily PRN, Domenic Davis MD    carvedilol (COREG) tablet 3.125 mg, 3.125 mg, Oral, BID w/meals, Melisa Rivera MD, 3.125 mg at 02/17/25 0844    [Held by provider] furosemide (LASIX) tablet 20 mg, 20 mg, Oral, Daily, Melisa Rivera MD    gabapentin (NEURONTIN) capsule 900 mg, 900 mg, Oral, TID, Domenic Davis  MD [place], 900 mg at 02/17/25 0841    lidocaine (LMX4) cream, , Topical, Q1H PRN, Domenic Davis MD    lidocaine 1 % 0.1-1 mL, 0.1-1 mL, Other, Q1H PRN, Domenic Davis MD    [Held by provider] lisinopril (ZESTRIL) tablet 30 mg, 30 mg, Oral, Daily, Melisa Rivera MD    morphine (PF) injection 4 mg, 4 mg, Intravenous, Once PRN, Dorie Blevins MD    naloxone (NARCAN) injection 0.2 mg, 0.2 mg, Intravenous, Q2 Min PRN **OR** naloxone (NARCAN) injection 0.4 mg, 0.4 mg, Intravenous, Q2 Min PRN **OR** naloxone (NARCAN) injection 0.2 mg, 0.2 mg, Intramuscular, Q2 Min PRN **OR** naloxone (NARCAN) injection 0.4 mg, 0.4 mg, Intramuscular, Q2 Min PRN, Ermelinda Gonzalez, DO    oxyCODONE (ROXICODONE) tablet 5 mg, 5 mg, Oral, Q4H PRN, Melisa Rivera MD, 5 mg at 02/16/25 2221    phenytoin (DILANTIN) chewable tablet 200 mg, 200 mg, Oral, At Bedtime, Melisa Rivera MD, 200 mg at 02/16/25 2207    predniSONE (DELTASONE) tablet 10 mg, 10 mg, Oral, Daily, Melisa Rivera MD, 10 mg at 02/17/25 0841    senna-docusate (SENOKOT-S/PERICOLACE) 8.6-50 MG per tablet 1 tablet, 1 tablet, Oral, BID PRN **OR** senna-docusate (SENOKOT-S/PERICOLACE) 8.6-50 MG per tablet 2 tablet, 2 tablet, Oral, BID PRN, Domenic Davis MD    sodium chloride (PF) 0.9% PF flush 3 mL, 3 mL, Intracatheter, Q8H, Domenic Davis MD, 3 mL at 02/16/25 2142    sodium chloride (PF) 0.9% PF flush 3 mL, 3 mL, Intracatheter, q1 min prn, Domenic Davis MD, 10 mL at 02/17/25 0559    Allergies   Allergies   Allergen Reactions    Cats      Cat Dander    Dogs      Dog Dander    Hydromorphone      Other reaction(s): Confusion    Pollen Extract

## 2025-02-18 ENCOUNTER — PATIENT OUTREACH (OUTPATIENT)
Dept: CARE COORDINATION | Facility: CLINIC | Age: 80
End: 2025-02-18
Payer: COMMERCIAL

## 2025-02-18 LAB — HCV RNA SERPL NAA+PROBE-ACNC: NOT DETECTED IU/ML

## 2025-02-18 NOTE — PROGRESS NOTES
Clinic Care Coordination Contact  [facility] [place]/Voicemail    Clinical Data: Care Coordinator Outreach    Outreach Documentation Number of Outreach Attempt   2/3/2025   2:49 PM 1   2/18/2025  10:44 AM 1       Left message on patient's voicemail with call back information and requested return call.      Plan: Care Coordinator will try to reach patient again in 1-2 business days.    Digna Wagoner, RN Clinic Care Coordinator  [facility]: [place], [place] (on-site Wednesdays), [facility] (on-site Thursdays) & [facility].  [email or web address]  Phone: [phone]

## 2025-02-20 ENCOUNTER — VIRTUAL VISIT (OUTPATIENT)
Dept: INTERNAL MEDICINE | Facility: CLINIC | Age: 80
End: 2025-02-20
Payer: COMMERCIAL

## 2025-02-20 DIAGNOSIS — C78.01 METASTATIC MELANOMA TO LUNG, RIGHT (H): ICD-10-CM

## 2025-02-20 DIAGNOSIS — G50.0 TRIGEMINAL NEURALGIA: ICD-10-CM

## 2025-02-20 DIAGNOSIS — E86.1 HYPOTENSION DUE TO HYPOVOLEMIA: Primary | ICD-10-CM

## 2025-02-20 DIAGNOSIS — U07.1 COVID-19: ICD-10-CM

## 2025-02-20 DIAGNOSIS — C43.39 MELANOMA OF CHEEK (H): ICD-10-CM

## 2025-02-20 DIAGNOSIS — I48.11 LONGSTANDING PERSISTENT ATRIAL FIBRILLATION (H): ICD-10-CM

## 2025-02-20 NOTE — PROGRESS NOTES
"Alden is a 79 year old who is being evaluated via a billable video visit.    How would you like to obtain your AVS? MyChart  If the video visit is dropped, the invitation should be resent by: Send to e-mail at: [email or web address]  Will anyone else be joining your video visit? No      Assessment & Plan     Hypotension due to hypovolemia  COVID-19   Acute hypoxic respiratory failure, resolved  Trigeminal neuralgia  Longstanding persistent atrial fibrillation (H)  Melanoma of cheek (H)  Metastatic melanoma to lung, right (H)    -Continue to hold lisinopril and furosemide.  Since home his blood pressures remain around 120 systolic and his weights and edema are stable.  -We can Riyad the furosemide if needed in the future based on his weights and or increase in edema.  -For his lisinopril  continue to monitor BPs consider readding this for systolic readings above 130.  -For his recent respiratory issues it is difficult to determine the exact etiology.  He is on a immune checkpoint inhibitor and ICI associated pneumonitis is always a possibility though does not appear that his CT images show any consistent findings concerning for this in the last set of imaging done during his hospitalization.  -Since being off  Trileptal his sodium levels have normalized.  The issue now is trying to achieve control over his trigeminal neuralgia.  From a surgical standpoint he has normal LV function.  While I do not have PFTs on him his O2 sats on room air would indicate that he likely has fairly good pulmonary function such that undergoing general anesthesia would not be overly risky.    MED REC REQUIRED  Post Medication Reconciliation Status:  Discharge medications reconciled, continue medications without change  BMI  Estimated body mass index is 33.21 kg/m  as calculated from the following:    Height as of 2/11/25: 1.803 m (5' 11\").    Weight as of 2/17/25: 108 kg (238 lb 1.6 oz).   Weight management plan: Discussed healthy diet and " exercise guidelines      See Patient Instructions    Jose Ruano is a 79 year old, presenting for the following health issues:  Hospital F/U      Video Start Time:  1135    HPI   James Salvador is a 79 year old male admitted on 2/11/2025. PMHx significant for A-fib on DOAC, metastatic melanoma, trigeminal neuralgia, SIADH, and CAD, who was admitted for hypotension and LEE, likely in the setting of hypovolemia.    #Hypotension, Resolved  #Generalized weakness, improving  #Dehydration, resolved  His hypotension resolved with fluid resuscitation, and his generalized weakness improved with supportive care and increased oral intake. Infectious workup was negative, with no evidence of sepsis. Echocardiogram showed an EF of 50% with no structural abnormalities. PT consulted and patient was able to stand up, walk and use stairs without assistance.    #Acute hypoxic respiratory failure, resolved  #COVID-19 infection (dx 1/27)  Due to a recent COVID-19 infection resolved. He continued to wean off oxygen, requiring only nocturnal supplementation. CXR initially showed diffuse bilateral opacities concerning for infection, though CT chest on 2/12 showed no pneumonia or pulmonary edema. His infectious workup remained negative, and antibiotics (Zosyn, Vanc) were discontinued after two days.    #LEE, resolved  His LEE, likely pre-renal due to dehydration and poor PO intake, improved, with Cr 0.7    #Hyponatremia, resolved  #Hx of SIADH  Na on admission 133 baseline runs 130s. Na 137     #Trigeminal neuralgia   Trigeminal neuralgia was managed with gabapentin, phenytoin, and benzocaine spray, with neurosurgery deferring intervention and planning outpatient follow-up.    Hospital Follow-up Visit:    Hospital/Nursing Home/IP Rehab Facility: [facility]  Date of Admission: 02/11/2025  Date of Discharge: 02/17/205  Reason(s) for Admission: Trigeminal neuralgia, Dehydration        Problems taking medications regularly:  None  Medication changes since discharge: None  Problems adhering to non-medication therapy:  None    Summary of hospitalization:  [facility] discharge summary reviewed  Diagnostic Tests/Treatments reviewed.  Follow up needed: none  Other Healthcare Providers Involved in Patient s Care:         Specialist appointment -    Update since discharge: stable.       Plan of care communicated with patient                     Objective         Vitals:  No vitals were obtained today due to virtual visit.    Physical Exam   GENERAL: alert and obese  HENT: normal cephalic/atraumatic          Video-Visit Details    Type of service:  Video Visit   Video End Time: 1215  Originating Location (pt. Location): Home    Distant Location (provider location):  On-site  Platform used for Video Visit: Maria Guadalupe  Signed Electronically by: Jeronimo Painter MD      The longitudinal plan of care for the diagnosis(es)/condition(s) as documented were addressed during this visit. Due to the added complexity in care, I will continue to support Alden in the subsequent management and with ongoing continuity of care.

## 2025-02-24 DIAGNOSIS — G50.0 TRIGEMINAL NEURALGIA: ICD-10-CM

## 2025-02-24 LAB
MDC_IDC_LEAD_CONNECTION_STATUS: NORMAL
MDC_IDC_LEAD_IMPLANT_DT: NORMAL
MDC_IDC_LEAD_LOCATION: NORMAL
MDC_IDC_LEAD_LOCATION_DETAIL_1: NORMAL
MDC_IDC_LEAD_MFG: NORMAL
MDC_IDC_LEAD_MODEL: NORMAL
MDC_IDC_LEAD_POLARITY_TYPE: NORMAL
MDC_IDC_LEAD_SERIAL: NORMAL
MDC_IDC_MSMT_BATTERY_DTM: NORMAL
MDC_IDC_MSMT_BATTERY_REMAINING_LONGEVITY: 111 MO
MDC_IDC_MSMT_BATTERY_RRT_TRIGGER: 2.62
MDC_IDC_MSMT_BATTERY_STATUS: NORMAL
MDC_IDC_MSMT_BATTERY_VOLTAGE: 3.02 V
MDC_IDC_MSMT_LEADCHNL_RV_IMPEDANCE_VALUE: 266 OHM
MDC_IDC_MSMT_LEADCHNL_RV_IMPEDANCE_VALUE: 323 OHM
MDC_IDC_MSMT_LEADCHNL_RV_PACING_THRESHOLD_AMPLITUDE: 1 V
MDC_IDC_MSMT_LEADCHNL_RV_PACING_THRESHOLD_PULSEWIDTH: 0.4 MS
MDC_IDC_MSMT_LEADCHNL_RV_SENSING_INTR_AMPL: 1.5 MV
MDC_IDC_MSMT_LEADCHNL_RV_SENSING_INTR_AMPL: 1.88 MV
MDC_IDC_PG_IMPLANT_DTM: NORMAL
MDC_IDC_PG_MFG: NORMAL
MDC_IDC_PG_MODEL: NORMAL
MDC_IDC_PG_SERIAL: NORMAL
MDC_IDC_PG_TYPE: NORMAL
MDC_IDC_SESS_CLINIC_NAME: NORMAL
MDC_IDC_SESS_DTM: NORMAL
MDC_IDC_SESS_TYPE: NORMAL
MDC_IDC_SET_BRADY_HYSTRATE: NORMAL
MDC_IDC_SET_BRADY_LOWRATE: 50 {BEATS}/MIN
MDC_IDC_SET_BRADY_MAX_SENSOR_RATE: 130 {BEATS}/MIN
MDC_IDC_SET_BRADY_MODE: NORMAL
MDC_IDC_SET_LEADCHNL_RV_PACING_AMPLITUDE: 2 V
MDC_IDC_SET_LEADCHNL_RV_PACING_ANODE_ELECTRODE_1: NORMAL
MDC_IDC_SET_LEADCHNL_RV_PACING_ANODE_LOCATION_1: NORMAL
MDC_IDC_SET_LEADCHNL_RV_PACING_CAPTURE_MODE: NORMAL
MDC_IDC_SET_LEADCHNL_RV_PACING_CATHODE_ELECTRODE_1: NORMAL
MDC_IDC_SET_LEADCHNL_RV_PACING_CATHODE_LOCATION_1: NORMAL
MDC_IDC_SET_LEADCHNL_RV_PACING_POLARITY: NORMAL
MDC_IDC_SET_LEADCHNL_RV_PACING_PULSEWIDTH: 0.4 MS
MDC_IDC_SET_LEADCHNL_RV_SENSING_ANODE_ELECTRODE_1: NORMAL
MDC_IDC_SET_LEADCHNL_RV_SENSING_ANODE_LOCATION_1: NORMAL
MDC_IDC_SET_LEADCHNL_RV_SENSING_CATHODE_ELECTRODE_1: NORMAL
MDC_IDC_SET_LEADCHNL_RV_SENSING_CATHODE_LOCATION_1: NORMAL
MDC_IDC_SET_LEADCHNL_RV_SENSING_POLARITY: NORMAL
MDC_IDC_SET_LEADCHNL_RV_SENSING_SENSITIVITY: 0.6 MV
MDC_IDC_SET_ZONE_DETECTION_INTERVAL: 360 MS
MDC_IDC_SET_ZONE_STATUS: NORMAL
MDC_IDC_SET_ZONE_TYPE: NORMAL
MDC_IDC_SET_ZONE_VENDOR_TYPE: NORMAL
MDC_IDC_STAT_BRADY_DTM_END: NORMAL
MDC_IDC_STAT_BRADY_DTM_START: NORMAL
MDC_IDC_STAT_BRADY_RV_PERCENT_PACED: 50.91 %
MDC_IDC_STAT_EPISODE_RECENT_COUNT: 0
MDC_IDC_STAT_EPISODE_RECENT_COUNT_DTM_END: NORMAL
MDC_IDC_STAT_EPISODE_RECENT_COUNT_DTM_START: NORMAL
MDC_IDC_STAT_EPISODE_TOTAL_COUNT: 0
MDC_IDC_STAT_EPISODE_TOTAL_COUNT: 0
MDC_IDC_STAT_EPISODE_TOTAL_COUNT: 5
MDC_IDC_STAT_EPISODE_TOTAL_COUNT_DTM_END: NORMAL
MDC_IDC_STAT_EPISODE_TOTAL_COUNT_DTM_START: NORMAL
MDC_IDC_STAT_EPISODE_TYPE: NORMAL

## 2025-02-24 RX ORDER — OXYCODONE HYDROCHLORIDE 5 MG/1
5 TABLET ORAL EVERY 8 HOURS PRN
Qty: 12 TABLET | Refills: 0 | OUTPATIENT
Start: 2025-02-24

## 2025-03-04 ENCOUNTER — OFFICE VISIT (OUTPATIENT)
Dept: NEUROSURGERY | Facility: CLINIC | Age: 80
End: 2025-03-04
Payer: COMMERCIAL

## 2025-03-04 VITALS
BODY MASS INDEX: 32.11 KG/M2 | DIASTOLIC BLOOD PRESSURE: 78 MMHG | RESPIRATION RATE: 16 BRPM | SYSTOLIC BLOOD PRESSURE: 133 MMHG | HEART RATE: 74 BPM | HEIGHT: 71 IN | OXYGEN SATURATION: 95 % | WEIGHT: 229.4 LBS

## 2025-03-04 DIAGNOSIS — G50.0 TRIGEMINAL NEURALGIA: Primary | ICD-10-CM

## 2025-03-04 RX ORDER — CARBAMAZEPINE 100 MG/1
100 TABLET, CHEWABLE ORAL DAILY PRN
Qty: 30 TABLET | Refills: 2 | Status: SHIPPED | OUTPATIENT
Start: 2025-03-04

## 2025-03-04 NOTE — PROGRESS NOTES
[facility]  Department of Neurosurgery      Name: James Salvador  MRN: [id number]  Age: 79 year old  : 1945  Referring provider: Luisa Martins  2025      Chief Complaint:   Right trigeminal neuralgia  New patient    History of Present Illness:   James Salvador is a 79 year old male with a history of atrial fibrillation on Eliquis s/p pacemaker placement, metastatic melanoma on immunotherapy, HTN, HLD, CAD s/p PCI (), and SIADH  who is here today for right sided trigeminal neuralgia. Today, patient presents for the evaluation with his wife, Renea.     He was here on , but had to be sent to the ER due to symptomatic hypotension. He was seen by inpatient neurosurgery team.     Acute onset of right sided facial pain in 2969-1767. He started to have intermittent, sharp, shooting pain in right V2 distribution. Triggers are talking, chewing, brushing, shaving etc. For many years, his symptoms were managed on medications. But in 2025, he had COVID and since then, his TN symptoms have been severe. He reports that his TN symptoms and medication side effects are significantly affecting the quality of his life.     He was previously on Trileptal. Due to ongoing hyponatremia, this was stopped. He is currently on Phenytoin 200 mg at bedtime and Gabapentin 900 mg three times a day. He reports fatigue, dizziness and cognitive slowing as medication side effects.     Patient has chronic A-fib and also has a pacemaker. He has been on Eliquis for the past 6+ years.     Review of Systems:   Pertinent items are noted in HPI or as in patient entered ROS below, remainder of complete ROS is negative.       2023     3:41 PM    ENT ROS   Neurology Headache   Psychology Frequently feeling anxious   Ears, Nose, Throat Ear pain        Active Medications:     Current Outpatient Medications:     acetaminophen (TYLENOL) 650 MG CR tablet, Take 1,300 mg by mouth 2 times daily., Disp: , Rfl:      albuterol (PROAIR HFA/PROVENTIL HFA/VENTOLIN HFA) 108 (90 Base) MCG/ACT inhaler, Inhale 2 puffs into the lungs every 6 hours as needed for wheezing., Disp: 18 g, Rfl: 0    apixaban ANTICOAGULANT (ELIQUIS ANTICOAGULANT) 5 MG tablet, Take 1 tablet (5 mg) by mouth 2 times daily, Disp: 180 tablet, Rfl: 3    atorvastatin (LIPITOR) 80 MG tablet, Take 1 tablet (80 mg) by mouth at bedtime, Disp: 90 tablet, Rfl: 3    carvedilol (COREG) 3.125 MG tablet, Take 1 tablet (3.125 mg) by mouth 2 times daily (with meals), Disp: 180 tablet, Rfl: 3    clobetasol propionate (TEMOVATE) 0.05 % external cream, Apply to AA BID x 1-2 weeks then PRN. Do not apply to face., Disp: 60 g, Rfl: 3    fluticasone (FLONASE) 50 MCG/ACT nasal spray, Spray 2 sprays into both nostrils daily as needed for allergies., Disp: , Rfl:     gabapentin (NEURONTIN) 300 MG capsule, Take 3 capsules (900 mg) by mouth 3 times daily., Disp: 300 capsule, Rfl: 0    ketoconazole (NIZORAL) 2 % external cream, Apply topically 2 times daily as needed. For face. FAX REFILL REQUESTS TO [facility]: [phone], Disp: , Rfl:     ketoconazole (NIZORAL) 2 % external shampoo, Use daily as needed, Disp: 120 mL, Rfl: 11    ketotifen fumarate 0.035% 0.035 % SOLN ophthalmic solution, Place 1 drop into both eyes 2 times daily as needed for itching., Disp: , Rfl:     nitroGLYcerin (NITROSTAT) 0.4 MG sublingual tablet, Place 1 tablet (0.4 mg) under the tongue every 5 minutes as needed for chest pain May repeat twice for a total of 3 tablets.  If chest pain not relieved, call 911, Disp: 25 tablet, Rfl: 11    oxyCODONE (ROXICODONE) 5 MG tablet, Take 1 tablet (5 mg) by mouth every 8 hours as needed for moderate pain., Disp: 12 tablet, Rfl: 0    phenytoin (DILANTIN) 50 MG chewable tablet, Take 4 tablets (200 mg) by mouth at bedtime., Disp: 120 tablet, Rfl: 1    polyethylene glycol-propylene glycol (SYSTANE ULTRA) 0.4-0.3 % SOLN ophthalmic solution, Place 1 drop into both eyes as needed for  dry eyes., Disp: , Rfl:     predniSONE (DELTASONE) 10 MG tablet, Take 10 mg by mouth daily, Disp: , Rfl:     triamcinolone (KENALOG) 0.1 % external lotion, Apply to scalp BID x 1-2 weeks then PRN only, Disp: 60 mL, Rfl: 3      Allergies:   Cats, Dogs, Hydromorphone, and Pollen extract      Past Medical History:  Past Medical History:   Diagnosis Date    Actinic cheilitis 07/17/2013    Lower lip, left     Actinic keratosis     Allergic rhinitis     Anxiety 3/1/23    Arthritis 2004    Basal cell carcinoma     Benign hypertension     CAD (coronary artery disease)     Cardiac cath and PCI 1994. Cardiac Cath 9/2015: BRIDGET to LAD    Hearing problem 1995    Wear hearing aids    Hyperlipidemia     Malignant melanoma     Morbid obesity 01/11/2016    Paroxysmal supraventricular tachycardia     on metoprolol    Permanent atrial fibrillation 04/21/2017    Squamous cell carcinoma     Tachy-edinson syndrome 05/29/2019     Patient Active Problem List   Diagnosis    Hyperlipidemia with target LDL less than 70    Benign hypertension    Actinic keratoses    Paroxysmal supraventricular tachycardia    History of myocardial infarction    Coronary artery disease of native artery of native heart with stable angina pectoris    Chronic atrial fibrillation (H)    Mixed hyperlipidemia    Tachy-edinson syndrome (H)    Status post total shoulder arthroplasty, right    DJD (degenerative joint disease) of knee    Melanoma of cheek (H)    SIADH (syndrome of inappropriate ADH production)    Trigeminal neuralgia    Metastatic melanoma to lung, right (H)    COVID-19    Acute kidney injury    Hypotension, unspecified hypotension type        Past Surgical History:  Past Surgical History:   Procedure Laterality Date    ADENOIDECTOMY  Childhood    ANGIOPLASTY  1994    in [place]    ANKLE SURGERY  07/13/2005    right ankle    ARTHROPLASTY HIP Right 10/2009    BIOPSY NODE SENTINEL Left 03/30/2023    Procedure: BIOPSY, LYMPH NODE, SENTINEL;  Surgeon:  "Rolando Minaya MD;  Location: [facility] OR    COLONOSCOPY  12    EP PERM PACER SINGLE LEAD N/A 2019    Medtronic single lead pacemaker    EXCISE MASS CHEEK Left 2023    Procedure: wide local excision of left cheek melanoma;  Surgeon: Rolando Minaya MD;  Location: [facility] OR    EXCISE MASS CHEEK WITH FLAP PEDICLE Left 2023    Procedure: Left cervical Facial flap per closure of left cheek Defect;  Surgeon: Ila Sanchez MD;  Location: [facility] OR    Facial biopsy - Melanoma      GENITOURINARY SURGERY  10/20/23    Prostate surgery    HEART CATH STENT COR W/WO PTCA  2015    BRIDGET stent mid LAD    IR CHEST PORT PLACEMENT > 5 YRS OF AGE  2023    LASER SURGERY OF EYE  2002    MOHS MICROGRAPHIC PROCEDURE  2004    squamous cell carcinoma right temple    SINUS SURGERY  2006    TONSILLECTOMY  Childhood       Family History:   Family History   Problem Relation Age of Onset    Genitourinary Problems Mother         renal failure    Heart Disease Mother     Cerebrovascular Disease Mother         TIA    Cardiovascular Father         rupture of dorsal aorta    Depression Son     Depression Brother         Suicide    Substance Abuse Brother         Heroin    Skin Cancer No family hx of          Social History:   Social History     Tobacco Use    Smoking status: Former     Current packs/day: 0.00     Average packs/day: 0.5 packs/day for 10.0 years (5.0 ttl pk-yrs)     Types: Cigarettes, Cigars, Pipe     Start date: 1966     Quit date: 1974     Years since quittin.8    Smokeless tobacco: Never    Tobacco comments:     Also smoked from 1985 - 1990   Vaping Use    Vaping status: Never Used   Substance Use Topics    Alcohol use: Not Currently    Drug use: No        Physical Exam:   /78 (BP Location: Right arm, Patient Position: Sitting, Cuff Size: Adult Regular)   Pulse 74   Resp 16   Ht 1.803 m (5' 11\")   Wt 104.1 kg (229 lb 6.4 oz)   SpO2 95%   BMI 31.99 " kg/m     General: No acute distress.   Neuro: The patient is fully oriented. Speech is normal. Gait is normal with normal heel-toe walking.   Psych: Normal mood and affect. Behavior is normal.      Imaging:  No recent brain imaging.      Assessment:  Right Trigeminal Neuralgia  New patient    Plan:  Today we had a lengthy discussion about trigeminal neuralgia, medical and surgical options in detail with the help of www.[email or web address]. Patient would like to read more about the surgical options and will contact us if he would like to proceed with any of the procedures.     For TN, we will continue him on current doses of Gabapentin and Phenytoin. For breakthrough pain, I prescribed him carbamazepine 100 mg chewable tablet. He can use this once daily as needed. He is scheduled to follow-up with his neurologist tomorrow.       Luisa Martins CNP  Department of Neurosurgery    I spent 46 minutes on patient care activities related to this encounter on the date of service, including time spent reviewing the chart, obtaining history and examination and in counseling the patient, and in documentation in the electronic medical record.

## 2025-03-04 NOTE — NURSING NOTE
Chief Complaint   Patient presents with    New Patient     Here for consult, confirmed with patient     Danita Mendoza

## 2025-03-04 NOTE — LETTER
3/4/2025       RE: James Salvador  [address] [place] [address]     Dear Colleague,    Thank you for referring your patient, James Salvador, to the [facility] [place] [facility]. Please see a copy of my visit note below.    [facility]  Department of Neurosurgery      Name: James Salvador  MRN: [id number]  Age: 79 year old  : 1945  Referring provider: Luisa Martins  2025      Chief Complaint:   Right trigeminal neuralgia  New patient    History of Present Illness:   James Salvador is a 79 year old male with a history of atrial fibrillation on Eliquis s/p pacemaker placement, metastatic melanoma on immunotherapy, HTN, HLD, CAD s/p PCI (), and SIADH  who is here today for right sided trigeminal neuralgia. Today, patient presents for the evaluation with his wife, Renea.     He was here on , but had to be sent to the ER due to symptomatic hypotension. He was seen by inpatient neurosurgery team.     Acute onset of right sided facial pain in 0408-2566. He started to have intermittent, sharp, shooting pain in right V2 distribution. Triggers are talking, chewing, brushing, shaving etc. For many years, his symptoms were managed on medications. But in 2025, he had COVID and since then, his TN symptoms have been severe. He reports that his TN symptoms and medication side effects are significantly affecting the quality of his life.     He was previously on Trileptal. Due to ongoing hyponatremia, this was stopped. He is currently on Phenytoin 200 mg at bedtime and Gabapentin 900 mg three times a day. He reports fatigue, dizziness and cognitive slowing as medication side effects.     Patient has chronic A-fib and also has a pacemaker. He has been on Eliquis for the past 6+ years.     Review of Systems:   Pertinent items are noted in HPI or as in patient entered ROS below, remainder of complete  ROS is negative.       4/5/2023     3:41 PM    ENT ROS   Neurology Headache   Psychology Frequently feeling anxious   Ears, Nose, Throat Ear pain        Active Medications:     Current Outpatient Medications:      acetaminophen (TYLENOL) 650 MG CR tablet, Take 1,300 mg by mouth 2 times daily., Disp: , Rfl:      albuterol (PROAIR HFA/PROVENTIL HFA/VENTOLIN HFA) 108 (90 Base) MCG/ACT inhaler, Inhale 2 puffs into the lungs every 6 hours as needed for wheezing., Disp: 18 g, Rfl: 0     apixaban ANTICOAGULANT (ELIQUIS ANTICOAGULANT) 5 MG tablet, Take 1 tablet (5 mg) by mouth 2 times daily, Disp: 180 tablet, Rfl: 3     atorvastatin (LIPITOR) 80 MG tablet, Take 1 tablet (80 mg) by mouth at bedtime, Disp: 90 tablet, Rfl: 3     carvedilol (COREG) 3.125 MG tablet, Take 1 tablet (3.125 mg) by mouth 2 times daily (with meals), Disp: 180 tablet, Rfl: 3     clobetasol propionate (TEMOVATE) 0.05 % external cream, Apply to AA BID x 1-2 weeks then PRN. Do not apply to face., Disp: 60 g, Rfl: 3     fluticasone (FLONASE) 50 MCG/ACT nasal spray, Spray 2 sprays into both nostrils daily as needed for allergies., Disp: , Rfl:      gabapentin (NEURONTIN) 300 MG capsule, Take 3 capsules (900 mg) by mouth 3 times daily., Disp: 300 capsule, Rfl: 0     ketoconazole (NIZORAL) 2 % external cream, Apply topically 2 times daily as needed. For face. FAX REFILL REQUESTS TO [facility]: [phone], Disp: , Rfl:      ketoconazole (NIZORAL) 2 % external shampoo, Use daily as needed, Disp: 120 mL, Rfl: 11     ketotifen fumarate 0.035% 0.035 % SOLN ophthalmic solution, Place 1 drop into both eyes 2 times daily as needed for itching., Disp: , Rfl:      nitroGLYcerin (NITROSTAT) 0.4 MG sublingual tablet, Place 1 tablet (0.4 mg) under the tongue every 5 minutes as needed for chest pain May repeat twice for a total of 3 tablets.  If chest pain not relieved, call 911, Disp: 25 tablet, Rfl: 11     oxyCODONE (ROXICODONE) 5 MG tablet, Take 1 tablet (5 mg) by  mouth every 8 hours as needed for moderate pain., Disp: 12 tablet, Rfl: 0     phenytoin (DILANTIN) 50 MG chewable tablet, Take 4 tablets (200 mg) by mouth at bedtime., Disp: 120 tablet, Rfl: 1     polyethylene glycol-propylene glycol (SYSTANE ULTRA) 0.4-0.3 % SOLN ophthalmic solution, Place 1 drop into both eyes as needed for dry eyes., Disp: , Rfl:      predniSONE (DELTASONE) 10 MG tablet, Take 10 mg by mouth daily, Disp: , Rfl:      triamcinolone (KENALOG) 0.1 % external lotion, Apply to scalp BID x 1-2 weeks then PRN only, Disp: 60 mL, Rfl: 3      Allergies:   Cats, Dogs, Hydromorphone, and Pollen extract      Past Medical History:  Past Medical History:   Diagnosis Date     Actinic 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 Cardiac Cath 9/2015: BRIDGET to LAD     Hearing problem 1995    Wear hearing aids     Hyperlipidemia      Malignant melanoma      Morbid obesity 01/11/2016     Paroxysmal supraventricular tachycardia     on metoprolol     Permanent atrial fibrillation 04/21/2017     Squamous cell carcinoma      Tachy-edinson syndrome 05/29/2019     Patient Active Problem List   Diagnosis     Hyperlipidemia with target LDL less than 70     Benign hypertension     Actinic keratoses     Paroxysmal supraventricular tachycardia     History of myocardial infarction     Coronary artery disease of native artery of native heart with stable angina pectoris     Chronic atrial fibrillation (H)     Mixed hyperlipidemia     Tachy-edinson syndrome (H)     Status post total shoulder arthroplasty, right     DJD (degenerative joint disease) of knee     Melanoma of cheek (H)     SIADH (syndrome of inappropriate ADH production)     Trigeminal neuralgia     Metastatic melanoma to lung, right (H)     COVID-19     Acute kidney injury      Hypotension, unspecified hypotension type        Past Surgical History:  Past Surgical History:   Procedure Laterality Date     ADENOIDECTOMY  Childhood     ANGIOPLASTY  1994    in [place]     ANKLE SURGERY  2005    right ankle     ARTHROPLASTY HIP Right 10/2009     BIOPSY NODE SENTINEL Left 2023    Procedure: BIOPSY, LYMPH NODE, SENTINEL;  Surgeon: Rolando Minaya MD;  Location: [facility] OR     COLONOSCOPY  12     EP PERM PACER SINGLE LEAD N/A 2019    Medtronic single lead pacemaker     EXCISE MASS CHEEK Left 2023    Procedure: wide local excision of left cheek melanoma;  Surgeon: Rolando Minaya MD;  Location: [facility] OR     EXCISE MASS CHEEK WITH FLAP PEDICLE Left 2023    Procedure: Left cervical Facial flap per closure of left cheek Defect;  Surgeon: Ila Sanchez MD;  Location: [facility] OR     Facial biopsy - Melanoma       GENITOURINARY SURGERY  10/20/23    Prostate surgery     HEART CATH STENT COR W/WO PTCA  2015    BRIDGET stent mid LAD     IR CHEST PORT PLACEMENT > 5 YRS OF AGE  2023     LASER SURGERY OF EYE  2002     MOHS MICROGRAPHIC PROCEDURE  2004    squamous cell carcinoma right temple     SINUS SURGERY  2006     TONSILLECTOMY  Childhood       Family History:   Family History   Problem Relation Age of Onset     Genitourinary Problems Mother         renal failure     Heart Disease Mother      Cerebrovascular Disease Mother         TIA     Cardiovascular Father         rupture of dorsal aorta     Depression Son      Depression Brother         Suicide     Substance Abuse Brother         Heroin     Skin Cancer No family hx of          Social History:   Social History     Tobacco Use     Smoking status: Former     Current packs/day: 0.00     Average packs/day: 0.5 packs/day for 10.0 years (5.0 ttl pk-yrs)     Types: Cigarettes, Cigars, Pipe     Start date: 1966     Quit date: 1974     Years since quittin.8     Smokeless tobacco:  "Never     Tobacco comments:     Also smoked from 11/1985 - 6/1990   Vaping Use     Vaping status: Never Used   Substance Use Topics     Alcohol use: Not Currently     Drug use: No        Physical Exam:   /78 (BP Location: Right arm, Patient Position: Sitting, Cuff Size: Adult Regular)   Pulse 74   Resp 16   Ht 1.803 m (5' 11\")   Wt 104.1 kg (229 lb 6.4 oz)   SpO2 95%   BMI 31.99 kg/m     General: No acute distress.   Neuro: The patient is fully oriented. Speech is normal. Gait is normal with normal heel-toe walking.   Psych: Normal mood and affect. Behavior is normal.      Imaging:  No recent brain imaging.      Assessment:  Right Trigeminal Neuralgia  New patient    Plan:  Today we had a lengthy discussion about trigeminal neuralgia, medical and surgical options in detail with the help of [email or web address]. Patient would like to read more about the surgical options and will contact us if he would like to proceed with any of the procedures.     For TN, we will continue him on current doses of Gabapentin and Phenytoin. For breakthrough pain, I prescribed him carbamazepine 100 mg chewable tablet. He can use this once daily as needed. He is scheduled to follow-up with his neurologist tomorrow.       Luisa Martins CNP  Department of Neurosurgery    I spent 46 minutes on patient care activities related to this encounter on the date of service, including time spent reviewing the chart, obtaining history and examination and in counseling the patient, and in documentation in the electronic medical record.      Again, thank you for allowing me to participate in the care of your patient.      Sincerely,    NAV Tinajero CNP    "

## 2025-03-06 ENCOUNTER — TRANSFERRED RECORDS (OUTPATIENT)
Dept: HEALTH INFORMATION MANAGEMENT | Facility: CLINIC | Age: 80
End: 2025-03-06
Payer: COMMERCIAL

## 2025-03-20 ENCOUNTER — OFFICE VISIT (OUTPATIENT)
Dept: DERMATOLOGY | Facility: CLINIC | Age: 80
End: 2025-03-20
Payer: COMMERCIAL

## 2025-03-20 DIAGNOSIS — Z85.820 HISTORY OF MELANOMA: ICD-10-CM

## 2025-03-20 DIAGNOSIS — L82.1 SEBORRHEIC KERATOSES: ICD-10-CM

## 2025-03-20 DIAGNOSIS — L81.4 LENTIGO: ICD-10-CM

## 2025-03-20 DIAGNOSIS — D23.9 DERMAL NEVUS: Primary | ICD-10-CM

## 2025-03-20 DIAGNOSIS — Z85.828 HISTORY OF SKIN CANCER: ICD-10-CM

## 2025-03-20 DIAGNOSIS — D18.01 ANGIOMA OF SKIN: ICD-10-CM

## 2025-03-20 NOTE — LETTER
3/20/2025      James Salvador  [address] [place] [address]      Dear Colleague,    Thank you for referring your patient, James Salvador, to the [facility]. Please see a copy of my visit note below.    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  Lesions on right lung have improved and shrinking on left leg.  He notes spots on skin.  Tumor has responded to this.  Patient has no other skin complaints today.  Remainder of the HPI, Meds, PMH, Allergies, FH, and SH was reviewed in chart.     Past Medical History:   Diagnosis Date     Actinic 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 Cardiac Cath 9/2015: BRIDGET to LAD     Hearing problem 1995    Wear hearing aids     Hyperlipidemia      Malignant melanoma      Morbid obesity 01/11/2016     Paroxysmal supraventricular tachycardia     on metoprolol     Permanent atrial fibrillation 04/21/2017     Squamous cell carcinoma      Tachy-edinson syndrome 05/29/2019       Past Surgical History:   Procedure Laterality Date     ADENOIDECTOMY  Childhood     ANGIOPLASTY  1994    in [place]     ANKLE SURGERY  07/13/2005    right ankle     ARTHROPLASTY HIP Right 10/2009     BIOPSY NODE SENTINEL Left 03/30/2023    Procedure: BIOPSY, LYMPH NODE, SENTINEL;  Surgeon: Rolando Minaya MD;  Location: [facility] OR     COLONOSCOPY  4/25/12     EP PERM PACER SINGLE LEAD N/A 05/31/2019    Medtronic single lead pacemaker     EXCISE MASS CHEEK Left 03/30/2023    Procedure: wide local excision of left cheek  melanoma;  Surgeon: Rolando Minaya MD;  Location: [facility] OR     EXCISE MASS CHEEK WITH FLAP PEDICLE Left 2023    Procedure: Left cervical Facial flap per closure of left cheek Defect;  Surgeon: Ila Sanchez MD;  Location: [facility] OR     Facial biopsy - Melanoma       GENITOURINARY SURGERY  10/20/23    Prostate surgery     HEART CATH STENT COR W/WO PTCA  2015    BRIDGET stent mid LAD     IR CHEST PORT PLACEMENT > 5 YRS OF AGE  2023     LASER SURGERY OF EYE  2002     MOHS MICROGRAPHIC PROCEDURE  2004    squamous cell carcinoma right temple     SINUS SURGERY  2006     TONSILLECTOMY  Childhood        Family History   Problem Relation Age of Onset     Genitourinary Problems Mother         renal failure     Heart Disease Mother      Cerebrovascular Disease Mother         TIA     Cardiovascular Father         rupture of dorsal aorta     Depression Son      Depression Brother         Suicide     Substance Abuse Brother         Heroin     Skin Cancer No family hx of        Social History     Socioeconomic History     Marital status:      Spouse name: Not on file     Number of children: 3     Years of education: Not on file     Highest education level: Not on file   Occupational History     Employer: RETIRED   Tobacco Use     Smoking status: Former     Current packs/day: 0.00     Average packs/day: 0.5 packs/day for 10.0 years (5.0 ttl pk-yrs)     Types: Cigarettes, Cigars, Pipe     Start date: 1966     Quit date: 1974     Years since quittin.9     Smokeless tobacco: Never     Tobacco comments:     Also smoked from 1985 - 1990   Vaping Use     Vaping status: Never Used   Substance and Sexual Activity     Alcohol use: Not Currently     Drug use: No     Sexual activity: Not Currently     Partners: Female     Birth control/protection: Male Surgical     Comment: Vasectomy   Other Topics Concern     Parent/sibling w/ CABG, MI or angioplasty before 65F 55M? No       Service Not Asked     Blood Transfusions Not Asked     Caffeine Concern Yes     Comment: 2 big cups coffee daily     Occupational Exposure Not Asked     Hobby Hazards Not Asked     Sleep Concern No     Comment: sleeping better since shoulder replaced 11/3/16     Stress Concern No     Weight Concern Yes     Special Diet Yes     Comment: trying to do more lean meats     Back Care Not Asked     Exercise No     Comment: limited - knee     Bike Helmet Not Asked     Seat Belt Not Asked     Self-Exams Not Asked   Social History Narrative     Not on file     Social Drivers of Health     Financial Resource Strain: Low Risk  (2/13/2025)    Financial Resource Strain      Within the past 12 months, have you or your family members you live with been unable to get utilities (heat, electricity) when it was really needed?: No   Food Insecurity: Low Risk  (2/13/2025)    Food Insecurity      Within the past 12 months, did you worry that your food would run out before you got money to buy more?: No      Within the past 12 months, did the food you bought just not last and you didn t have money to get more?: No   Transportation Needs: Low Risk  (2/13/2025)    Transportation Needs      Within the past 12 months, has lack of transportation kept you from medical appointments, getting your medicines, non-medical meetings or appointments, work, or from getting things that you need?: No   Physical Activity: Inactive (6/22/2024)    Exercise Vital Sign      Days of Exercise per Week: 0 days      Minutes of Exercise per Session: 0 min   Stress: Stress Concern Present (6/22/2024)    [employer] - Occupational Stress Questionnaire      Feeling of Stress : To some extent   Social Connections: Unknown (6/22/2024)    Social Connection and Isolation Panel [NHANES]      Frequency of Communication with Friends and Family: Not on file      Frequency of Social Gatherings with Friends and Family: Once a week       Attends Temple Services: Not on file      Active Member of Clubs or Organizations: Not on file      Attends Club or Organization Meetings: Not on file      Marital Status: Not on file   Interpersonal Safety: Low Risk  (2/13/2025)    Interpersonal Safety      Do you feel physically and emotionally safe where you currently live?: Yes      Within the past 12 months, have you been hit, slapped, kicked or otherwise physically hurt by someone?: No      Within the past 12 months, have you been humiliated or emotionally abused in other ways by your partner or ex-partner?: No   Recent Concern: Interpersonal Safety - High Risk (1/27/2025)    Interpersonal Safety      Do you feel physically and emotionally safe where you currently live?: No      Within the past 12 months, have you been hit, slapped, kicked or otherwise physically hurt by someone?: No      Within the past 12 months, have you been humiliated or emotionally abused in other ways by your partner or ex-partner?: No   Housing Stability: Low Risk  (2/13/2025)    Housing Stability      Do you have housing? : Yes      Are you worried about losing your housing?: No       Outpatient Encounter Medications as of 3/20/2025   Medication Sig Dispense Refill     acetaminophen (TYLENOL) 650 MG CR tablet Take 1,300 mg by mouth 2 times daily.       albuterol (PROAIR HFA/PROVENTIL HFA/VENTOLIN HFA) 108 (90 Base) MCG/ACT inhaler Inhale 2 puffs into the lungs every 6 hours as needed for wheezing. 18 g 0     apixaban ANTICOAGULANT (ELIQUIS ANTICOAGULANT) 5 MG tablet Take 1 tablet (5 mg) by mouth 2 times daily 180 tablet 3     atorvastatin (LIPITOR) 80 MG tablet Take 1 tablet (80 mg) by mouth at bedtime 90 tablet 3     carvedilol (COREG) 3.125 MG tablet Take 1 tablet (3.125 mg) by mouth 2 times daily (with meals) 180 tablet 3     clobetasol propionate (TEMOVATE) 0.05 % external cream Apply to AA BID x 1-2 weeks then PRN. Do not apply to face. 60 g 3     fluticasone (FLONASE) 50  MCG/ACT nasal spray Spray 2 sprays into both nostrils daily as needed for allergies.       gabapentin (NEURONTIN) 300 MG capsule Take 3 capsules (900 mg) by mouth 3 times daily. 300 capsule 0     ketoconazole (NIZORAL) 2 % external cream Apply topically 2 times daily as needed. For face. [phone]       ketoconazole (NIZORAL) 2 % external shampoo Use daily as needed 120 mL 11     ketotifen fumarate 0.035% 0.035 % SOLN ophthalmic solution Place 1 drop into both eyes 2 times daily as needed for itching.       nitroGLYcerin (NITROSTAT) 0.4 MG sublingual tablet Place 1 tablet (0.4 mg) under the tongue every 5 minutes as needed for chest pain May repeat twice for a total of 3 tablets.  If chest pain not relieved, call 911 25 tablet 11     oxyCODONE (ROXICODONE) 5 MG tablet Take 1 tablet (5 mg) by mouth every 8 hours as needed for moderate pain. 12 tablet 0     phenytoin (DILANTIN) 50 MG chewable tablet Take 4 tablets (200 mg) by mouth at bedtime. 120 tablet 1     polyethylene glycol-propylene glycol (SYSTANE ULTRA) 0.4-0.3 % SOLN ophthalmic solution Place 1 drop into both eyes as needed for dry eyes.       predniSONE (DELTASONE) 10 MG tablet Take 10 mg by mouth daily       triamcinolone (KENALOG) 0.1 % external lotion Apply to scalp BID x 1-2 weeks then PRN only 60 mL 3     carBAMazepine (TEGRETOL) 100 MG chewable tablet Take 1 tablet (100 mg) by mouth daily as needed (For breakthrough TN symptoms). 30 tablet 2     No facility-administered encounter medications on file as of 3/20/2025.             O:   NAD, WDWN, Alert & Oriented, Mood & Affect wnl, Vitals stable   General appearance normal   Vitals stable   Alert, oriented and in no acute distress      Following lymph nodes palpated: Occipital, Cervical, Supraclavicular no lad      Stuck on papules and brown macules on face and ext   Red papules on face  Flesh colored papules on face         Eyes: Conjunctivae/lids:Normal     ENT:  Lips, mucosa: normal    MSK:Normal    Cardiovascular: peripheral edema none    Pulm: Breathing Normal    Lymph Nodes: No Head and Neck Lymphadenopathy     Neuro/Psych: Orientation:Alert and Orientedx3 ; Mood/Affect:normal       A/P:  1. Seborrheic keratosis, lentigo, angioma, dermal nevus, hx of stage 4 melanoma   It was a pleasure speaking to James Salvador today.  Previous clinic notes and pertinent laboratory tests were reviewed prior to James Salvador's visit.  Signs and Symptoms of skin cancer discussed with patient.  Patient encouraged to perform monthly skin exams.  UV precautions reviewed with patient.  Return to clinic 3 months      Again, thank you for allowing me to participate in the care of your patient.        Sincerely,        Jv Dutta MD    Electronically signed

## 2025-03-20 NOTE — PROGRESS NOTES
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  Lesions on right lung have improved and shrinking on left leg.  He notes spots on skin.  Tumor has responded to this.  Patient has no other skin complaints today.  Remainder of the HPI, Meds, PMH, Allergies, FH, and SH was reviewed in chart.     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 Cardiac Cath 9/2015: BRIDGET to LAD    Hearing problem 1995    Wear hearing aids    Hyperlipidemia     Malignant melanoma     Morbid obesity 01/11/2016    Paroxysmal supraventricular tachycardia     on metoprolol    Permanent atrial fibrillation 04/21/2017    Squamous cell carcinoma     Tachy-edinson syndrome 05/29/2019       Past Surgical History:   Procedure Laterality Date    ADENOIDECTOMY  Childhood    ANGIOPLASTY  1994    in [place]    ANKLE SURGERY  07/13/2005    right ankle    ARTHROPLASTY HIP Right 10/2009    BIOPSY NODE SENTINEL Left 03/30/2023    Procedure: BIOPSY, LYMPH NODE, SENTINEL;  Surgeon: Rolando Minaya MD;  Location: [facility] OR    COLONOSCOPY  4/25/12    EP PERM PACER SINGLE LEAD N/A 05/31/2019    Medtronic single lead pacemaker    EXCISE MASS CHEEK Left 03/30/2023    Procedure: wide local excision of left cheek melanoma;  Surgeon: Rolando Minaya MD;  Location: [facility] OR    EXCISE MASS CHEEK WITH FLAP PEDICLE Left 04/13/2023    Procedure: Left cervical Facial flap per closure of left cheek Defect;  Surgeon: Ila Sanchez MD;  Location: [facility] OR    Facial biopsy - Melanoma       GENITOURINARY SURGERY  10/20/23    Prostate surgery    HEART CATH STENT COR W/WO PTCA  2015    BRIDGET stent mid LAD    IR CHEST PORT PLACEMENT > 5 YRS OF AGE  2023    LASER SURGERY OF EYE  2002    MOHS MICROGRAPHIC PROCEDURE  2004    squamous cell carcinoma right temple    SINUS SURGERY  2006    TONSILLECTOMY  Childhood        Family History   Problem Relation Age of Onset    Genitourinary Problems Mother         renal failure    Heart Disease Mother     Cerebrovascular Disease Mother         TIA    Cardiovascular Father         rupture of dorsal aorta    Depression Son     Depression Brother         Suicide    Substance Abuse Brother         Heroin    Skin Cancer No family hx of        Social History     Socioeconomic History    Marital status:      Spouse name: Not on file    Number of children: 3    Years of education: Not on file    Highest education level: Not on file   Occupational History     Employer: RETIRED   Tobacco Use    Smoking status: Former     Current packs/day: 0.00     Average packs/day: 0.5 packs/day for 10.0 years (5.0 ttl pk-yrs)     Types: Cigarettes, Cigars, Pipe     Start date: 1966     Quit date: 1974     Years since quittin.9    Smokeless tobacco: Never    Tobacco comments:     Also smoked from 1985 - 1990   Vaping Use    Vaping status: Never Used   Substance and Sexual Activity    Alcohol use: Not Currently    Drug use: No    Sexual activity: Not Currently     Partners: Female     Birth control/protection: Male Surgical     Comment: Vasectomy   Other Topics Concern    Parent/sibling w/ CABG, MI or angioplasty before 65F 55M? No     Service Not Asked    Blood Transfusions Not Asked    Caffeine Concern Yes     Comment: 2 big cups coffee daily    Occupational Exposure Not Asked    Hobby Hazards Not Asked    Sleep Concern No     Comment: sleeping better since shoulder replaced 11/3/16    Stress Concern No    Weight Concern Yes     Special Diet Yes     Comment: trying to do more lean meats    Back Care Not Asked    Exercise No     Comment: limited - knee    Bike Helmet Not Asked    Seat Belt Not Asked    Self-Exams Not Asked   Social History Narrative    Not on file     Social Drivers of Health     Financial Resource Strain: Low Risk  (2/13/2025)    Financial Resource Strain     Within the past 12 months, have you or your family members you live with been unable to get utilities (heat, electricity) when it was really needed?: No   Food Insecurity: Low Risk  (2/13/2025)    Food Insecurity     Within the past 12 months, did you worry that your food would run out before you got money to buy more?: No     Within the past 12 months, did the food you bought just not last and you didn t have money to get more?: No   Transportation Needs: Low Risk  (2/13/2025)    Transportation Needs     Within the past 12 months, has lack of transportation kept you from medical appointments, getting your medicines, non-medical meetings or appointments, work, or from getting things that you need?: No   Physical Activity: Inactive (6/22/2024)    Exercise Vital Sign     Days of Exercise per Week: 0 days     Minutes of Exercise per Session: 0 min   Stress: Stress Concern Present (6/22/2024)    [employer] - Occupational Stress Questionnaire     Feeling of Stress : To some extent   Social Connections: Unknown (6/22/2024)    Social Connection and Isolation Panel [NHANES]     Frequency of Communication with Friends and Family: Not on file     Frequency of Social Gatherings with Friends and Family: Once a week     Attends Judaism Services: Not on file     Active Member of Clubs or Organizations: Not on file     Attends Club or Organization Meetings: Not on file     Marital Status: Not on file   Interpersonal Safety: Low Risk  (2/13/2025)    Interpersonal Safety     Do you feel physically and emotionally safe where you currently live?: Yes      Within the past 12 months, have you been hit, slapped, kicked or otherwise physically hurt by someone?: No     Within the past 12 months, have you been humiliated or emotionally abused in other ways by your partner or ex-partner?: No   Recent Concern: Interpersonal Safety - High Risk (1/27/2025)    Interpersonal Safety     Do you feel physically and emotionally safe where you currently live?: No     Within the past 12 months, have you been hit, slapped, kicked or otherwise physically hurt by someone?: No     Within the past 12 months, have you been humiliated or emotionally abused in other ways by your partner or ex-partner?: No   Housing Stability: Low Risk  (2/13/2025)    Housing Stability     Do you have housing? : Yes     Are you worried about losing your housing?: No       Outpatient Encounter Medications as of 3/20/2025   Medication Sig Dispense Refill    acetaminophen (TYLENOL) 650 MG CR tablet Take 1,300 mg by mouth 2 times daily.      albuterol (PROAIR HFA/PROVENTIL HFA/VENTOLIN HFA) 108 (90 Base) MCG/ACT inhaler Inhale 2 puffs into the lungs every 6 hours as needed for wheezing. 18 g 0    apixaban ANTICOAGULANT (ELIQUIS ANTICOAGULANT) 5 MG tablet Take 1 tablet (5 mg) by mouth 2 times daily 180 tablet 3    atorvastatin (LIPITOR) 80 MG tablet Take 1 tablet (80 mg) by mouth at bedtime 90 tablet 3    carvedilol (COREG) 3.125 MG tablet Take 1 tablet (3.125 mg) by mouth 2 times daily (with meals) 180 tablet 3    clobetasol propionate (TEMOVATE) 0.05 % external cream Apply to AA BID x 1-2 weeks then PRN. Do not apply to face. 60 g 3    fluticasone (FLONASE) 50 MCG/ACT nasal spray Spray 2 sprays into both nostrils daily as needed for allergies.      gabapentin (NEURONTIN) 300 MG capsule Take 3 capsules (900 mg) by mouth 3 times daily. 300 capsule 0    ketoconazole (NIZORAL) 2 % external cream Apply topically 2 times daily as needed. For face. FAX REFILL REQUESTS TO  [facility]: [phone]      ketoconazole  (NIZORAL) 2 % external shampoo Use daily as needed 120 mL 11    ketotifen fumarate 0.035% 0.035 % SOLN ophthalmic solution Place 1 drop into both eyes 2 times daily as needed for itching.      nitroGLYcerin (NITROSTAT) 0.4 MG sublingual tablet Place 1 tablet (0.4 mg) under the tongue every 5 minutes as needed for chest pain May repeat twice for a total of 3 tablets.  If chest pain not relieved, call 911 25 tablet 11    oxyCODONE (ROXICODONE) 5 MG tablet Take 1 tablet (5 mg) by mouth every 8 hours as needed for moderate pain. 12 tablet 0    phenytoin (DILANTIN) 50 MG chewable tablet Take 4 tablets (200 mg) by mouth at bedtime. 120 tablet 1    polyethylene glycol-propylene glycol (SYSTANE ULTRA) 0.4-0.3 % SOLN ophthalmic solution Place 1 drop into both eyes as needed for dry eyes.      predniSONE (DELTASONE) 10 MG tablet Take 10 mg by mouth daily      triamcinolone (KENALOG) 0.1 % external lotion Apply to scalp BID x 1-2 weeks then PRN only 60 mL 3    carBAMazepine (TEGRETOL) 100 MG chewable tablet Take 1 tablet (100 mg) by mouth daily as needed (For breakthrough TN symptoms). 30 tablet 2     No facility-administered encounter medications on file as of 3/20/2025.             O:   NAD, WDWN, Alert & Oriented, Mood & Affect wnl, Vitals stable   General appearance normal   Vitals stable   Alert, oriented and in no acute distress      Following lymph nodes palpated: Occipital, Cervical, Supraclavicular no lad      Stuck on papules and brown macules on face and ext   Red papules on face  Flesh colored papules on face         Eyes: Conjunctivae/lids:Normal     ENT: Lips, mucosa: normal    MSK:Normal    Cardiovascular: peripheral edema none    Pulm: Breathing Normal    Lymph Nodes: No Head and Neck Lymphadenopathy     Neuro/Psych: Orientation:Alert and Orientedx3 ; Mood/Affect:normal       A/P:  1. Seborrheic keratosis, lentigo, angioma, dermal nevus, hx of stage 4 melanoma   It was a pleasure speaking to James Salvador  today.  Previous clinic notes and pertinent laboratory tests were reviewed prior to James Salvador's visit.  Signs and Symptoms of skin cancer discussed with patient.  Patient encouraged to perform monthly skin exams.  UV precautions reviewed with patient.  Return to clinic 3 months

## 2025-03-28 ENCOUNTER — TRANSFERRED RECORDS (OUTPATIENT)
Dept: HEALTH INFORMATION MANAGEMENT | Facility: CLINIC | Age: 80
End: 2025-03-28

## 2025-03-29 ENCOUNTER — MYC MEDICAL ADVICE (OUTPATIENT)
Dept: INTERNAL MEDICINE | Facility: CLINIC | Age: 80
End: 2025-03-29
Payer: COMMERCIAL

## 2025-04-02 ENCOUNTER — TRANSFERRED RECORDS (OUTPATIENT)
Dept: HEALTH INFORMATION MANAGEMENT | Facility: CLINIC | Age: 80
End: 2025-04-02
Payer: COMMERCIAL

## 2025-04-02 RX ORDER — PHENYTOIN 50 MG/1
50 TABLET, CHEWABLE ORAL AT BEDTIME
COMMUNITY

## 2025-04-02 NOTE — TELEPHONE ENCOUNTER
Hello,    Patient was looking to see to get a refill request for his phenytoin 50mg chewable. I don't know for sure if patient is still taking this cause I didn't see anything on his mychart, but I didn't check deeply to see if he still is. But if his primary MD can review, sign and send to the [facility].     Any other questions, please call patient if needed    Thank you,    Mikala Perez Technician  [facility]

## 2025-04-03 DIAGNOSIS — Z79.899 POLYPHARMACY: Primary | ICD-10-CM

## 2025-04-10 ENCOUNTER — ANCILLARY PROCEDURE (OUTPATIENT)
Dept: CARDIOLOGY | Facility: CLINIC | Age: 80
End: 2025-04-10
Attending: INTERNAL MEDICINE
Payer: COMMERCIAL

## 2025-04-10 DIAGNOSIS — Z95.0 CARDIAC PACEMAKER IN SITU: ICD-10-CM

## 2025-04-10 DIAGNOSIS — I49.5 SSS (SICK SINUS SYNDROME) (H): Primary | ICD-10-CM

## 2025-04-10 LAB
MDC_IDC_EPISODE_DTM: NORMAL
MDC_IDC_EPISODE_DURATION: 1 S
MDC_IDC_EPISODE_ID: 6
MDC_IDC_EPISODE_TYPE: NORMAL
MDC_IDC_EPISODE_TYPE_INDUCED: NO
MDC_IDC_LEAD_CONNECTION_STATUS: NORMAL
MDC_IDC_LEAD_IMPLANT_DT: NORMAL
MDC_IDC_LEAD_LOCATION: NORMAL
MDC_IDC_LEAD_LOCATION_DETAIL_1: NORMAL
MDC_IDC_LEAD_MFG: NORMAL
MDC_IDC_LEAD_MODEL: NORMAL
MDC_IDC_LEAD_POLARITY_TYPE: NORMAL
MDC_IDC_LEAD_SERIAL: NORMAL
MDC_IDC_MSMT_BATTERY_DTM: NORMAL
MDC_IDC_MSMT_BATTERY_REMAINING_LONGEVITY: 112 MO
MDC_IDC_MSMT_BATTERY_RRT_TRIGGER: 2.62
MDC_IDC_MSMT_BATTERY_STATUS: NORMAL
MDC_IDC_MSMT_BATTERY_VOLTAGE: 3.01 V
MDC_IDC_MSMT_LEADCHNL_RV_IMPEDANCE_VALUE: 304 OHM
MDC_IDC_MSMT_LEADCHNL_RV_IMPEDANCE_VALUE: 380 OHM
MDC_IDC_MSMT_LEADCHNL_RV_PACING_THRESHOLD_AMPLITUDE: 0.75 V
MDC_IDC_MSMT_LEADCHNL_RV_PACING_THRESHOLD_PULSEWIDTH: 0.4 MS
MDC_IDC_MSMT_LEADCHNL_RV_SENSING_INTR_AMPL: 1.75 MV
MDC_IDC_MSMT_LEADCHNL_RV_SENSING_INTR_AMPL: 2.38 MV
MDC_IDC_PG_IMPLANT_DTM: NORMAL
MDC_IDC_PG_MFG: NORMAL
MDC_IDC_PG_MODEL: NORMAL
MDC_IDC_PG_SERIAL: NORMAL
MDC_IDC_PG_TYPE: NORMAL
MDC_IDC_SESS_CLINIC_NAME: NORMAL
MDC_IDC_SESS_DTM: NORMAL
MDC_IDC_SESS_TYPE: NORMAL
MDC_IDC_SET_BRADY_HYSTRATE: NORMAL
MDC_IDC_SET_BRADY_LOWRATE: 50 {BEATS}/MIN
MDC_IDC_SET_BRADY_MAX_SENSOR_RATE: 130 {BEATS}/MIN
MDC_IDC_SET_BRADY_MODE: NORMAL
MDC_IDC_SET_LEADCHNL_RV_PACING_AMPLITUDE: 2 V
MDC_IDC_SET_LEADCHNL_RV_PACING_ANODE_ELECTRODE_1: NORMAL
MDC_IDC_SET_LEADCHNL_RV_PACING_ANODE_LOCATION_1: NORMAL
MDC_IDC_SET_LEADCHNL_RV_PACING_CAPTURE_MODE: NORMAL
MDC_IDC_SET_LEADCHNL_RV_PACING_CATHODE_ELECTRODE_1: NORMAL
MDC_IDC_SET_LEADCHNL_RV_PACING_CATHODE_LOCATION_1: NORMAL
MDC_IDC_SET_LEADCHNL_RV_PACING_POLARITY: NORMAL
MDC_IDC_SET_LEADCHNL_RV_PACING_PULSEWIDTH: 0.4 MS
MDC_IDC_SET_LEADCHNL_RV_SENSING_ANODE_ELECTRODE_1: NORMAL
MDC_IDC_SET_LEADCHNL_RV_SENSING_ANODE_LOCATION_1: NORMAL
MDC_IDC_SET_LEADCHNL_RV_SENSING_CATHODE_ELECTRODE_1: NORMAL
MDC_IDC_SET_LEADCHNL_RV_SENSING_CATHODE_LOCATION_1: NORMAL
MDC_IDC_SET_LEADCHNL_RV_SENSING_POLARITY: NORMAL
MDC_IDC_SET_LEADCHNL_RV_SENSING_SENSITIVITY: 0.6 MV
MDC_IDC_SET_ZONE_DETECTION_INTERVAL: 360 MS
MDC_IDC_SET_ZONE_STATUS: NORMAL
MDC_IDC_SET_ZONE_TYPE: NORMAL
MDC_IDC_SET_ZONE_VENDOR_TYPE: NORMAL
MDC_IDC_STAT_BRADY_DTM_END: NORMAL
MDC_IDC_STAT_BRADY_DTM_START: NORMAL
MDC_IDC_STAT_BRADY_RV_PERCENT_PACED: 44.7 %
MDC_IDC_STAT_EPISODE_RECENT_COUNT: 0
MDC_IDC_STAT_EPISODE_RECENT_COUNT: 0
MDC_IDC_STAT_EPISODE_RECENT_COUNT: 1
MDC_IDC_STAT_EPISODE_RECENT_COUNT_DTM_END: NORMAL
MDC_IDC_STAT_EPISODE_RECENT_COUNT_DTM_START: NORMAL
MDC_IDC_STAT_EPISODE_TOTAL_COUNT: 0
MDC_IDC_STAT_EPISODE_TOTAL_COUNT: 0
MDC_IDC_STAT_EPISODE_TOTAL_COUNT: 6
MDC_IDC_STAT_EPISODE_TOTAL_COUNT_DTM_END: NORMAL
MDC_IDC_STAT_EPISODE_TOTAL_COUNT_DTM_START: NORMAL
MDC_IDC_STAT_EPISODE_TYPE: NORMAL

## 2025-04-10 PROCEDURE — 93279 PRGRMG DEV EVAL PM/LDLS PM: CPT | Performed by: INTERNAL MEDICINE

## 2025-04-18 ENCOUNTER — TRANSFERRED RECORDS (OUTPATIENT)
Dept: HEALTH INFORMATION MANAGEMENT | Facility: CLINIC | Age: 80
End: 2025-04-18

## 2025-05-06 ENCOUNTER — TELEPHONE (OUTPATIENT)
Dept: MEDSURG UNIT | Facility: CLINIC | Age: 80
End: 2025-05-06
Payer: COMMERCIAL

## 2025-05-07 DIAGNOSIS — J30.2 CHRONIC SEASONAL ALLERGIC RHINITIS: ICD-10-CM

## 2025-05-07 RX ORDER — FLUTICASONE PROPIONATE 50 MCG
SPRAY, SUSPENSION (ML) NASAL
Qty: 48 G | Refills: 1 | Status: SHIPPED | OUTPATIENT
Start: 2025-05-07

## 2025-05-13 NOTE — PROGRESS NOTES
CARDIOLOGY VISIT    REASON FOR VISIT: CAD, A-fib, pacemaker     SUBJECTIVE:  79-year-old male seen for coronary disease, A. fib, and pacemaker.     He had MI in 1994 treated in [place].  He had LAD stent in 2015.  He has a history of SVT runs treated medically.  Nuclear stress in 2016 showed medium sized area of ischemia of the basal to mid inferior and inferolateral wall, EF 60%.     A. fib was diagnosed in 2017, he had no symptoms.  Echo July 2017 showed EF 60%, moderate severe left atrial enlargement, no valve disease.     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     Echo September 2020 showed EF 60%, normal RV, no valve disease.    Echo February 2025 (A-fib) showed EF 50 to 55%, severe atrial enlargement, no valve disease.    Device check April 2025 showed 44% V pacing, rhythm A-fib, battery 9 years.    He had COVID in January and has been more tired and fatigued since then.  He has issues with his knee with multiple previous surgeries and this limits his walking.  His knee will buckle on him sometimes.  He had 1 fall in January.  He has some mild lower extremity edema which he thinks is the same.  He has some chronic dyspnea, but no chest pain.  Blood pressure has been less than 130.    MEDICATIONS:  Current Outpatient Medications   Medication Sig Dispense Refill    acetaminophen (TYLENOL) 650 MG CR tablet Take 1,300 mg by mouth 2 times daily.      albuterol (PROAIR HFA/PROVENTIL HFA/VENTOLIN HFA) 108 (90 Base) MCG/ACT inhaler Inhale 2 puffs into the lungs every 6 hours as needed for wheezing. 18 g 0    apixaban ANTICOAGULANT (ELIQUIS ANTICOAGULANT) 5 MG tablet Take 1 tablet (5 mg) by mouth 2 times daily 180 tablet 3    atorvastatin (LIPITOR) 80 MG tablet Take 1 tablet (80 mg) by mouth at bedtime 90 tablet 3    carBAMazepine (TEGRETOL) 100 MG chewable tablet Take 1 tablet (100 mg) by  mouth daily as needed (For breakthrough TN symptoms). 30 tablet 2    carvedilol (COREG) 3.125 MG tablet Take 1 tablet (3.125 mg) by mouth 2 times daily (with meals) 180 tablet 3    clobetasol propionate (TEMOVATE) 0.05 % external cream Apply to AA BID x 1-2 weeks then PRN. Do not apply to face. 60 g 3    fluticasone (FLONASE) 50 MCG/ACT nasal spray INSTILL TWO SPRAYS INTO BOTH NOSTRILS DAILY 48 g 1    gabapentin (NEURONTIN) 300 MG capsule Take 3 capsules (900 mg) by mouth 3 times daily. 300 capsule 0    ketoconazole (NIZORAL) 2 % external cream Apply topically 2 times daily as needed. For face. FAX REFILL REQUESTS TO [facility]: [phone]      ketoconazole (NIZORAL) 2 % external shampoo Use daily as needed 120 mL 11    ketotifen fumarate 0.035% 0.035 % SOLN ophthalmic solution Place 1 drop into both eyes 2 times daily as needed for itching.      nitroGLYcerin (NITROSTAT) 0.4 MG sublingual tablet Place 1 tablet (0.4 mg) under the tongue every 5 minutes as needed for chest pain May repeat twice for a total of 3 tablets.  If chest pain not relieved, call 911 25 tablet 11    oxyCODONE (ROXICODONE) 5 MG tablet Take 1 tablet (5 mg) by mouth every 8 hours as needed for moderate pain. 12 tablet 0    phenytoin (DILANTIN) 50 MG chewable tablet Take 50 mg by mouth at bedtime.      phenytoin (DILANTIN) 50 MG chewable tablet Take 4 tablets (200 mg) by mouth at bedtime. 120 tablet 1    polyethylene glycol-propylene glycol (SYSTANE ULTRA) 0.4-0.3 % SOLN ophthalmic solution Place 1 drop into both eyes as needed for dry eyes.      predniSONE (DELTASONE) 10 MG tablet Take 10 mg by mouth daily      triamcinolone (KENALOG) 0.1 % external lotion Apply to scalp BID x 1-2 weeks then PRN only 60 mL 3     No current facility-administered medications for this visit.       ALLERGIES:  Allergies   Allergen Reactions    Cats      Cat Dander    Dogs      Dog Dander    Hydromorphone      Other reaction(s): Confusion    Pollen Extract   "      REVIEW OF SYSTEMS:  Constitutional:  No weight loss, fever, chills  HEENT:  Eyes:  No visual loss, blurred vision, double vision or yellow sclerae. No hearing loss, sneezing, congestion, runny nose or sore throat.  Skin:  No rash or itching.  Cardiovascular: per HPI  Respiratory: per HPI  GI:  No anorexia, nausea, vomiting or diarrhea. No abdominal pain or blood.  :  No dysurea, hematuria  Neurologic:  No headache, paralysis, ataxia, numbness or tingling in the extremities. No change in bowel or bladder control.  Musculoskeletal:  No muscle pain  Hematologic:  No bleeding or bruising.  Lymphatics:  No enlarged nodes. No history of splenectomy.  Endocrine:  No reports of sweating, cold or heat intolerance. No polyuria or polydipsia.  Allergies:  No history of asthma, hives, eczema or rhinitis.    PHYSICAL EXAM:  /70   Pulse 67   Ht 1.803 m (5' 11\")   Wt 114.3 kg (251 lb 14.4 oz)   SpO2 97%   BMI 35.13 kg/m    Constitutional: awake, alert, no distress  Eyes: PERRL, sclera nonicteric  ENT: trachea midline  Respiratory: Lungs clear  Cardiovascular: Regular rate and rhythm, no murmurs, 1+ bilateral lower extremity edema  GI: nondistended, nontender, bowel sounds present  Lymph/Hematologic: no lymphadenopathy  Skin: dry, no rash  Musculoskeletal: good muscle tone, strength 5/5 in upper and lower extremities  Neurologic: no focal deficits  Neuropsychiatric: appropriate affact    DATA:  Lab: April 2025: Sodium 128, creatinine 0.4  Recent Labs   Lab Test 07/07/24  0915 03/16/23  0919   CHOL 106 132   HDL 58 64   LDL 38 56   TRIG 52 59     ASSESSMENT:  79-year-old male seen for CAD, A-fib, and pacemaker.  Overall cardiac issues seem stable.  He does have some mild edema which could be from some diastolic dysfunction.  Furosemide will be increased to 40 mg.  Will recheck BMP to monitor potassium and sodium which tends to run a little low.  Otherwise pacemaker is functioning normally.  Ejection fraction is " preserved.    RECOMMENDATIONS:  1.  CAD   - continue current medications    2.  A-fib  - Rates controlled, continue Eliquis    3.  Chronic diastolic CHF  Increase furosemide to 40 mg once daily, check BMP in 1 to 2 weeks-   - If needed could increase carvedilol and add in spironolactone or potentially Jardiance    4.  Pacemaker  - Continue routine device checks    Follow-up in 6 months with GABY.    Aquilino Buchanan MD  Cardiology - [facility]  Pager:  [phone]  Text Page  May 21, 2025

## 2025-05-15 ENCOUNTER — HOSPITAL ENCOUNTER (OUTPATIENT)
Facility: CLINIC | Age: 80
Discharge: HOME | End: 2025-05-15
Admitting: STUDENT IN AN ORGANIZED HEALTH CARE EDUCATION/TRAINING PROGRAM
Payer: COMMERCIAL

## 2025-05-15 ENCOUNTER — MYC MEDICAL ADVICE (OUTPATIENT)
Dept: INTERNAL MEDICINE | Facility: CLINIC | Age: 80
End: 2025-05-15

## 2025-05-15 VITALS
OXYGEN SATURATION: 95 % | HEART RATE: 85 BPM | DIASTOLIC BLOOD PRESSURE: 76 MMHG | RESPIRATION RATE: 16 BRPM | SYSTOLIC BLOOD PRESSURE: 137 MMHG

## 2025-05-15 DIAGNOSIS — I25.118 CORONARY ARTERY DISEASE OF NATIVE ARTERY OF NATIVE HEART WITH STABLE ANGINA PECTORIS: ICD-10-CM

## 2025-05-15 DIAGNOSIS — I10 BENIGN HYPERTENSION: ICD-10-CM

## 2025-05-15 PROCEDURE — 250N000011 HC RX IP 250 OP 636: Performed by: PHYSICIAN ASSISTANT

## 2025-05-15 PROCEDURE — 999N000154 HC STATISTIC RADIOLOGY XRAY, US, CT, MAR, NM

## 2025-05-15 RX ORDER — HEPARIN SODIUM,PORCINE 10 UNIT/ML
5-10 VIAL (ML) INTRAVENOUS EVERY 24 HOURS
Status: DISCONTINUED | OUTPATIENT
Start: 2025-05-15 | End: 2025-05-15 | Stop reason: HOSPADM

## 2025-05-15 RX ORDER — HEPARIN SODIUM (PORCINE) LOCK FLUSH IV SOLN 100 UNIT/ML 100 UNIT/ML
5-10 SOLUTION INTRAVENOUS
Status: DISCONTINUED | OUTPATIENT
Start: 2025-05-15 | End: 2025-05-15 | Stop reason: HOSPADM

## 2025-05-15 RX ORDER — HEPARIN SODIUM,PORCINE 10 UNIT/ML
5-10 VIAL (ML) INTRAVENOUS
Status: DISCONTINUED | OUTPATIENT
Start: 2025-05-15 | End: 2025-05-15 | Stop reason: HOSPADM

## 2025-05-15 RX ADMIN — Medication 5 ML: at 08:12

## 2025-05-15 ASSESSMENT — ACTIVITIES OF DAILY LIVING (ADL)
ADLS_ACUITY_SCORE: 57
ADLS_ACUITY_SCORE: 57

## 2025-05-15 NOTE — TELEPHONE ENCOUNTER
Please see mychart from patient and advise appropriate course of action.    Anna Whitlock RN  [facility] Triage Nurse

## 2025-05-15 NOTE — PROGRESS NOTES
Care Suites Procedure Nursing Note    Patient Information  Name: James Salvador  Age: 79 year old    Procedure  Procedure: MRI of brain. Accessed power port and monitoring Medtronic PPM. Baseline settings are VVIR 50. Remotely reprogrammed to VOO 85 during MRI.   Procedure start time: 0730  Procedure complete time: 0820  Port de-accessed without difficulty.  Pacemaker remotely reprogrammed back to his baseline settings VVIR 50.  Concerns/abnormal assessment: None at this time.  If abnormal assessment, provider notified: N/A  Plan/Other: Discharge per ambulatory.    Kelli Epperson RN

## 2025-05-20 ENCOUNTER — MEDICAL CORRESPONDENCE (OUTPATIENT)
Dept: HEALTH INFORMATION MANAGEMENT | Facility: CLINIC | Age: 80
End: 2025-05-20
Payer: COMMERCIAL

## 2025-05-21 ENCOUNTER — OFFICE VISIT (OUTPATIENT)
Dept: CARDIOLOGY | Facility: CLINIC | Age: 80
End: 2025-05-21
Payer: COMMERCIAL

## 2025-05-21 VITALS
BODY MASS INDEX: 35.27 KG/M2 | WEIGHT: 251.9 LBS | HEIGHT: 71 IN | SYSTOLIC BLOOD PRESSURE: 134 MMHG | OXYGEN SATURATION: 97 % | DIASTOLIC BLOOD PRESSURE: 70 MMHG | HEART RATE: 67 BPM

## 2025-05-21 DIAGNOSIS — I50.32 CHRONIC DIASTOLIC CONGESTIVE HEART FAILURE (H): Primary | ICD-10-CM

## 2025-05-21 PROCEDURE — 3078F DIAST BP <80 MM HG: CPT | Performed by: INTERNAL MEDICINE

## 2025-05-21 PROCEDURE — 3075F SYST BP GE 130 - 139MM HG: CPT | Performed by: INTERNAL MEDICINE

## 2025-05-21 PROCEDURE — 99214 OFFICE O/P EST MOD 30 MIN: CPT | Performed by: INTERNAL MEDICINE

## 2025-05-21 RX ORDER — FUROSEMIDE 40 MG/1
40 TABLET ORAL DAILY
Qty: 90 TABLET | Refills: 3 | Status: SHIPPED | OUTPATIENT
Start: 2025-05-21

## 2025-05-21 RX ORDER — OXCARBAZEPINE 300 MG/1
300 TABLET, FILM COATED ORAL
COMMUNITY
Start: 2025-03-06

## 2025-05-21 RX ORDER — FUROSEMIDE 20 MG/1
TABLET ORAL
COMMUNITY
End: 2025-05-21 | Stop reason: DRUGHIGH

## 2025-05-21 RX ORDER — OXCARBAZEPINE 150 MG/1
TABLET, FILM COATED ORAL
COMMUNITY

## 2025-05-21 NOTE — LETTER
5/21/2025    Jeronimo Painter MD  [address]  [place] [address]    RE: James Salvador       Dear Colleague,     I had the pleasure of seeing James Salvador in the [facility].  CARDIOLOGY VISIT    REASON FOR VISIT: CAD, A-fib, pacemaker     SUBJECTIVE:  79-year-old male seen for coronary disease, A. fib, and pacemaker.     He had MI in 1994 treated in [place].  He had LAD stent in 2015.  He has a history of SVT runs treated medically.  Nuclear stress in 2016 showed medium sized area of ischemia of the basal to mid inferior and inferolateral wall, EF 60%.     A. fib was diagnosed in 2017, he had no symptoms.  Echo July 2017 showed EF 60%, moderate severe left atrial enlargement, no valve disease.     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     Echo September 2020 showed EF 60%, normal RV, no valve disease.    Echo February 2025 (A-fib) showed EF 50 to 55%, severe atrial enlargement, no valve disease.    Device check April 2025 showed 44% V pacing, rhythm A-fib, battery 9 years.    He had COVID in January and has been more tired and fatigued since then.  He has issues with his knee with multiple previous surgeries and this limits his walking.  His knee will buckle on him sometimes.  He had 1 fall in January.  He has some mild lower extremity edema which he thinks is the same.  He has some chronic dyspnea, but no chest pain.  Blood pressure has been less than 130.    MEDICATIONS:  Current Outpatient Medications   Medication Sig Dispense Refill     acetaminophen (TYLENOL) 650 MG CR tablet Take 1,300 mg by mouth 2 times daily.       albuterol (PROAIR HFA/PROVENTIL HFA/VENTOLIN HFA) 108 (90 Base) MCG/ACT inhaler Inhale 2 puffs into the lungs every 6 hours as needed for wheezing. 18 g 0     apixaban ANTICOAGULANT (ELIQUIS ANTICOAGULANT) 5 MG tablet Take 1  tablet (5 mg) by mouth 2 times daily 180 tablet 3     atorvastatin (LIPITOR) 80 MG tablet Take 1 tablet (80 mg) by mouth at bedtime 90 tablet 3     carBAMazepine (TEGRETOL) 100 MG chewable tablet Take 1 tablet (100 mg) by mouth daily as needed (For breakthrough TN symptoms). 30 tablet 2     carvedilol (COREG) 3.125 MG tablet Take 1 tablet (3.125 mg) by mouth 2 times daily (with meals) 180 tablet 3     clobetasol propionate (TEMOVATE) 0.05 % external cream Apply to AA BID x 1-2 weeks then PRN. Do not apply to face. 60 g 3     fluticasone (FLONASE) 50 MCG/ACT nasal spray INSTILL TWO SPRAYS INTO BOTH NOSTRILS DAILY 48 g 1     gabapentin (NEURONTIN) 300 MG capsule Take 3 capsules (900 mg) by mouth 3 times daily. 300 capsule 0     ketoconazole (NIZORAL) 2 % external cream Apply topically 2 times daily as needed. For face. FAX REFILL REQUESTS TO [facility]: [phone]       ketoconazole (NIZORAL) 2 % external shampoo Use daily as needed 120 mL 11     ketotifen fumarate 0.035% 0.035 % SOLN ophthalmic solution Place 1 drop into both eyes 2 times daily as needed for itching.       nitroGLYcerin (NITROSTAT) 0.4 MG sublingual tablet Place 1 tablet (0.4 mg) under the tongue every 5 minutes as needed for chest pain May repeat twice for a total of 3 tablets.  If chest pain not relieved, call 911 25 tablet 11     oxyCODONE (ROXICODONE) 5 MG tablet Take 1 tablet (5 mg) by mouth every 8 hours as needed for moderate pain. 12 tablet 0     phenytoin (DILANTIN) 50 MG chewable tablet Take 50 mg by mouth at bedtime.       phenytoin (DILANTIN) 50 MG chewable tablet Take 4 tablets (200 mg) by mouth at bedtime. 120 tablet 1     polyethylene glycol-propylene glycol (SYSTANE ULTRA) 0.4-0.3 % SOLN ophthalmic solution Place 1 drop into both eyes as needed for dry eyes.       predniSONE (DELTASONE) 10 MG tablet Take 10 mg by mouth daily       triamcinolone (KENALOG) 0.1 % external lotion Apply to scalp BID x 1-2 weeks then PRN only 60 mL 3  "    No current facility-administered medications for this visit.       ALLERGIES:  Allergies   Allergen Reactions     Cats      Cat Dander     Dogs      Dog Dander     Hydromorphone      Other reaction(s): Confusion     Pollen Extract        REVIEW OF SYSTEMS:  Constitutional:  No weight loss, fever, chills  HEENT:  Eyes:  No visual loss, blurred vision, double vision or yellow sclerae. No hearing loss, sneezing, congestion, runny nose or sore throat.  Skin:  No rash or itching.  Cardiovascular: per HPI  Respiratory: per HPI  GI:  No anorexia, nausea, vomiting or diarrhea. No abdominal pain or blood.  :  No dysurea, hematuria  Neurologic:  No headache, paralysis, ataxia, numbness or tingling in the extremities. No change in bowel or bladder control.  Musculoskeletal:  No muscle pain  Hematologic:  No bleeding or bruising.  Lymphatics:  No enlarged nodes. No history of splenectomy.  Endocrine:  No reports of sweating, cold or heat intolerance. No polyuria or polydipsia.  Allergies:  No history of asthma, hives, eczema or rhinitis.    PHYSICAL EXAM:  /70   Pulse 67   Ht 1.803 m (5' 11\")   Wt 114.3 kg (251 lb 14.4 oz)   SpO2 97%   BMI 35.13 kg/m    Constitutional: awake, alert, no distress  Eyes: PERRL, sclera nonicteric  ENT: trachea midline  Respiratory: Lungs clear  Cardiovascular: Regular rate and rhythm, no murmurs, 1+ bilateral lower extremity edema  GI: nondistended, nontender, bowel sounds present  Lymph/Hematologic: no lymphadenopathy  Skin: dry, no rash  Musculoskeletal: good muscle tone, strength 5/5 in upper and lower extremities  Neurologic: no focal deficits  Neuropsychiatric: appropriate affact    DATA:  Lab: April 2025: Sodium 128, creatinine 0.4  Recent Labs   Lab Test 07/07/24  0915 03/16/23  0919   CHOL 106 132   HDL 58 64   LDL 38 56   TRIG 52 59     ASSESSMENT:  79-year-old male seen for CAD, A-fib, and pacemaker.  Overall cardiac issues seem stable.  He does have some mild edema which " could be from some diastolic dysfunction.  Furosemide will be increased to 40 mg.  Will recheck BMP to monitor potassium and sodium which tends to run a little low.  Otherwise pacemaker is functioning normally.  Ejection fraction is preserved.    RECOMMENDATIONS:  1.  CAD   - continue current medications    2.  A-fib  - Rates controlled, continue Eliquis    3.  Chronic diastolic CHF  Increase furosemide to 40 mg once daily, check BMP in 1 to 2 weeks-   - If needed could increase carvedilol and add in spironolactone or potentially Jardiance    4.  Pacemaker  - Continue routine device checks    Follow-up in 6 months with GABY.    Aquilino Buchanan MD  Cardiology - [facility]  Pager:  [phone]  Text Page  May 21, 2025        Thank you for allowing me to participate in the care of your patient.      Sincerely,     Aquilino Buchanan MD     [facility]  cc:   Mylene Kat, NAV CNP  [address]  [place] [address]

## 2025-05-21 NOTE — TELEPHONE ENCOUNTER
Please see [facility] message and advise.     Lyn DICK [facility] Triage Team

## 2025-05-26 RX ORDER — LISINOPRIL 30 MG/1
30 TABLET ORAL DAILY
Status: SHIPPED
Start: 2025-05-26

## 2025-05-29 NOTE — TELEPHONE ENCOUNTER
No answer and no voicemail .Iris Bryant RN     For information on Fall & Injury Prevention, visit: [email or web address] OR  [email or web address] OR  https://www.cdc.gov/steadi/patient.html

## 2025-06-04 ENCOUNTER — HOSPITAL ENCOUNTER (OUTPATIENT)
Dept: CT IMAGING | Facility: CLINIC | Age: 80
Discharge: HOME | End: 2025-06-04
Payer: COMMERCIAL

## 2025-06-04 DIAGNOSIS — C43.30 MALIGNANT MELANOMA OF SKIN OF FACE (H): ICD-10-CM

## 2025-06-04 LAB
CREAT BLD-MCNC: 0.6 MG/DL (ref 0.7–1.2)
EGFRCR SERPLBLD CKD-EPI 2021: >60 ML/MIN/1.73M2

## 2025-06-04 PROCEDURE — 250N000009 HC RX 250

## 2025-06-04 PROCEDURE — 250N000011 HC RX IP 250 OP 636

## 2025-06-04 PROCEDURE — 71260 CT THORAX DX C+: CPT

## 2025-06-04 PROCEDURE — 82565 ASSAY OF CREATININE: CPT

## 2025-06-04 PROCEDURE — 70491 CT SOFT TISSUE NECK W/DYE: CPT

## 2025-06-04 RX ORDER — HEPARIN SODIUM (PORCINE) LOCK FLUSH IV SOLN 100 UNIT/ML 100 UNIT/ML
SOLUTION INTRAVENOUS
Status: COMPLETED
Start: 2025-06-04 | End: 2025-06-04

## 2025-06-04 RX ORDER — HEPARIN SODIUM (PORCINE) LOCK FLUSH IV SOLN 100 UNIT/ML 100 UNIT/ML
5 SOLUTION INTRAVENOUS ONCE
Status: COMPLETED | OUTPATIENT
Start: 2025-06-04 | End: 2025-06-04

## 2025-06-04 RX ORDER — IOPAMIDOL 755 MG/ML
500 INJECTION, SOLUTION INTRAVASCULAR ONCE
Status: COMPLETED | OUTPATIENT
Start: 2025-06-04 | End: 2025-06-04

## 2025-06-04 RX ADMIN — HEPARIN SODIUM (PORCINE) LOCK FLUSH IV SOLN 100 UNIT/ML 500 UNITS: 100 SOLUTION at 09:44

## 2025-06-04 RX ADMIN — IOPAMIDOL 125 ML: 755 INJECTION, SOLUTION INTRAVENOUS at 09:28

## 2025-06-04 RX ADMIN — SODIUM CHLORIDE 65 ML: 9 INJECTION, SOLUTION INTRAVENOUS at 09:28

## 2025-06-05 ENCOUNTER — LAB (OUTPATIENT)
Dept: LAB | Facility: CLINIC | Age: 80
End: 2025-06-05
Payer: COMMERCIAL

## 2025-06-05 DIAGNOSIS — I50.32 CHRONIC DIASTOLIC CONGESTIVE HEART FAILURE (H): ICD-10-CM

## 2025-06-05 LAB
ANION GAP SERPL CALCULATED.3IONS-SCNC: 10 MMOL/L (ref 7–15)
BUN SERPL-MCNC: 11.1 MG/DL (ref 8–23)
CALCIUM SERPL-MCNC: 9.2 MG/DL (ref 8.8–10.4)
CHLORIDE SERPL-SCNC: 90 MMOL/L (ref 98–107)
CREAT SERPL-MCNC: 0.55 MG/DL (ref 0.67–1.17)
EGFRCR SERPLBLD CKD-EPI 2021: >90 ML/MIN/1.73M2
GLUCOSE SERPL-MCNC: 93 MG/DL (ref 70–99)
HCO3 SERPL-SCNC: 28 MMOL/L (ref 22–29)
NT-PROBNP SERPL-MCNC: 859 PG/ML (ref 0–852)
POTASSIUM SERPL-SCNC: 4.4 MMOL/L (ref 3.4–5.3)
SODIUM SERPL-SCNC: 128 MMOL/L (ref 135–145)

## 2025-06-06 ENCOUNTER — TELEPHONE (OUTPATIENT)
Dept: INTERNAL MEDICINE | Facility: CLINIC | Age: 80
End: 2025-06-06
Payer: COMMERCIAL

## 2025-06-06 ENCOUNTER — RESULTS FOLLOW-UP (OUTPATIENT)
Dept: CARDIOLOGY | Facility: CLINIC | Age: 80
End: 2025-06-06

## 2025-06-06 DIAGNOSIS — I10 BENIGN HYPERTENSION: Primary | ICD-10-CM

## 2025-06-06 RX ORDER — SPIRONOLACTONE 25 MG/1
12.5 TABLET ORAL DAILY
Qty: 45 TABLET | Refills: 3 | Status: SHIPPED | OUTPATIENT
Start: 2025-06-06 | End: 2025-07-08

## 2025-06-10 ENCOUNTER — TELEPHONE (OUTPATIENT)
Dept: CARDIOLOGY | Facility: CLINIC | Age: 80
End: 2025-06-10

## 2025-06-10 ENCOUNTER — TELEPHONE (OUTPATIENT)
Dept: SURGERY | Facility: CLINIC | Age: 80
End: 2025-06-10

## 2025-06-10 ENCOUNTER — OFFICE VISIT (OUTPATIENT)
Dept: SURGERY | Facility: CLINIC | Age: 80
End: 2025-06-10
Payer: COMMERCIAL

## 2025-06-10 VITALS
BODY MASS INDEX: 35.14 KG/M2 | HEART RATE: 53 BPM | DIASTOLIC BLOOD PRESSURE: 74 MMHG | HEIGHT: 71 IN | OXYGEN SATURATION: 93 % | WEIGHT: 251 LBS | SYSTOLIC BLOOD PRESSURE: 136 MMHG

## 2025-06-10 DIAGNOSIS — K40.30: Primary | ICD-10-CM

## 2025-06-10 DIAGNOSIS — K40.90 RIGHT INGUINAL HERNIA: ICD-10-CM

## 2025-06-10 PROCEDURE — 3078F DIAST BP <80 MM HG: CPT | Performed by: SURGERY

## 2025-06-10 PROCEDURE — 99204 OFFICE O/P NEW MOD 45 MIN: CPT | Performed by: SURGERY

## 2025-06-10 PROCEDURE — 3075F SYST BP GE 130 - 139MM HG: CPT | Performed by: SURGERY

## 2025-06-10 NOTE — LETTER
Mylene 10, 2025          RE:   James Salvador 1945      Dear Colleague,    Thank you for referring your patient, James Salvador, to [facility]. Please see a copy of my visit note below.    [facility]  New Patient Office Visit    Assessment:   James Salvador is a 79 year old male with Primary, incarcerated right inguinal hernia.    Plan:    We will schedule a robotic assisted right inguinal hernia repair (possible left) at the patient's convenience.  Will need preop H&P by his PCP (Jeronimo Painter MD)    We have discussed observation, reduction techniques and importance, incarceration and strangulation signs, symptoms and importance as well as need to seek emergency treatment.      We have had a detailed discussion regarding the nature of inguinal hernias, and that watchful waiting for small asymptomatic hernias is acceptable, but that in general they do tend to enlarge or become symptomatic over time. Surgery, indications, alternatives, risks, benefits, incisions, scarring, anesthesia, recovery, mesh, infection, bleeding, numbness, nerve damage and chronic pain, testicular loss, hernia recurrence, lifting and activity limitations after surgery.  All questions have been answered to the best of my ability.  He has been given literature to review.     He will touch based with his cardiology team to see if any additional testing would be needed prior to general anesthesia.  We have discussed holding his eliquis, he has done this in the past many times with no issues.    He is seen in consultation for inguinal hernia, at the request of Napoleon Zuniga MD                      HPI:  James Salvador is a 79 year old male who presents for evaluation of pain and a lump in the right groin(s).   He first noticed it 1 month ago.     He does have pain. He describes exacerbating factors including being up during the day. When he first noticed the hernia it  "would be better in the morning and stick out more throughout the day into the evening..    Prior incarceration:  No   Nausea/vomitting/bloating:  Yes   Bulge/mass:  Yes    Previous herniorrhaphy:  No   Heavy lifting > 20 lb: No    Of note pt has CAD, afib, pacemaker, on eliquis, diastolic CHF. I reviewed recent cardiology notes.  Also reviewed hematology/oncology notes regarding metastatic melanoma in the left neck. He is on ongoing immune therapy reports this is going very well.   He had a recent hospitalization for trigeminal neuralgia at [facility] and is going to [place] in August to discuss possible surgery.  He underwent CT CAP for surveillance of melanoma which showed a right inguinal hernia containing small bowel.    PE:    Vitals: /74   Pulse 53   Ht 1.803 m (5' 11\")   Wt 113.9 kg (251 lb)   SpO2 93%   BMI 35.01 kg/m    BMI= Body mass index is 35.01 kg/m .  General- Well-developed, well-nourished, patient able to get up  with significant difficulty.  Abdomen- abdomen is soft without significant tenderness, masses, organomegaly or guarding, no umbilical hernia  Hernia- Upon standing there is an obvious bulge in the right groin  Palpating with a finger at the external ring, a left inguinal hernia is not present with valsalva              Palpating with a finger at the external ring, a right inguinal hernia is present spontaneously              The hernia is not manually reducible in a standing position. Did not attempt for patient to get on high table to lay supine and try to reduce the hernia as he was very unsteady standing.              Testes are descended bilaterally and normal          Again, thank you for allowing me to participate in the care of your patient.      Sincerely,      Amanda Wall MD    "

## 2025-06-10 NOTE — PROGRESS NOTES
[facility]  New Patient Office Visit    Assessment:   James Salvador is a 79 year old male with Primary, incarcerated right inguinal hernia.    Plan:    We will schedule a robotic assisted right inguinal hernia repair (possible left) at the patient's convenience.  Will need preop H&P by his PCP (Jeronimo Painter MD)    We have discussed observation, reduction techniques and importance, incarceration and strangulation signs, symptoms and importance as well as need to seek emergency treatment.      We have had a detailed discussion regarding the nature of inguinal hernias, and that watchful waiting for small asymptomatic hernias is acceptable, but that in general they do tend to enlarge or become symptomatic over time. Surgery, indications, alternatives, risks, benefits, incisions, scarring, anesthesia, recovery, mesh, infection, bleeding, numbness, nerve damage and chronic pain, testicular loss, hernia recurrence, lifting and activity limitations after surgery.  All questions have been answered to the best of my ability.  He has been given literature to review.     He will touch based with his cardiology team to see if any additional testing would be needed prior to general anesthesia.  We have discussed holding his eliquis, he has done this in the past many times with no issues.    He is seen in consultation for inguinal hernia, at the request of Napoleon Zuniga MD                      HPI:  James Salvador is a 79 year old male who presents for evaluation of pain and a lump in the right groin(s).   He first noticed it 1 month ago.     He does have pain. He describes exacerbating factors including being up during the day. When he first noticed the hernia it would be better in the morning and stick out more throughout the day into the evening..    Prior incarceration:  No   Nausea/vomitting/bloating:  Yes   Bulge/mass:  Yes    Previous herniorrhaphy:  No   Heavy lifting > 20 lb: No    Of note pt has  CAD, afib, pacemaker, on eliquis, diastolic CHF. I reviewed recent cardiology notes.  Also reviewed hematology/oncology notes regarding metastatic melanoma in the left neck. He is on ongoing immune therapy reports this is going very well.   He had a recent hospitalization for trigeminal neuralgia at [facility] and is going to [place] in August to discuss possible surgery.  He underwent CT CAP for surveillance of melanoma which showed a right inguinal hernia containing small bowel.    Past Medical History:  Past Medical History:   Diagnosis Date    Actinic cheilitis 07/17/2013    Lower lip, left     Actinic keratosis     Allergic rhinitis     Anxiety 3/1/23    Arthritis 2004    Basal cell carcinoma     Benign hypertension     CAD (coronary artery disease)     Cardiac cath and PCI 1994. Cardiac Cath 9/2015: BRIDGET to LAD    Hearing problem 1995    Wear hearing aids    Hyperlipidemia     Malignant melanoma     Morbid obesity 01/11/2016    Paroxysmal supraventricular tachycardia     on metoprolol    Permanent atrial fibrillation 04/21/2017    Squamous cell carcinoma     Tachy-edinson syndrome 05/29/2019       Current Outpatient Medications   Medication Sig Dispense Refill    acetaminophen (TYLENOL) 650 MG CR tablet Take 1,300 mg by mouth 2 times daily.      albuterol (PROAIR HFA/PROVENTIL HFA/VENTOLIN HFA) 108 (90 Base) MCG/ACT inhaler Inhale 2 puffs into the lungs every 6 hours as needed for wheezing. 18 g 0    apixaban ANTICOAGULANT (ELIQUIS ANTICOAGULANT) 5 MG tablet Take 1 tablet (5 mg) by mouth 2 times daily 180 tablet 3    atorvastatin (LIPITOR) 80 MG tablet Take 1 tablet (80 mg) by mouth at bedtime 90 tablet 3    carvedilol (COREG) 3.125 MG tablet Take 1 tablet (3.125 mg) by mouth 2 times daily (with meals) 180 tablet 3    clobetasol propionate (TEMOVATE) 0.05 % external cream Apply to AA BID x 1-2 weeks then PRN. Do not apply to face. 60 g 3    fluticasone (FLONASE) 50 MCG/ACT nasal spray INSTILL TWO SPRAYS INTO BOTH NOSTRILS  DAILY 48 g 1    gabapentin (NEURONTIN) 300 MG capsule Take 3 capsules (900 mg) by mouth 3 times daily. 300 capsule 0    ketoconazole (NIZORAL) 2 % external cream Apply topically 2 times daily as needed. For face. FAX REFILL REQUESTS TO  RONNIE: [phone]      ketoconazole (NIZORAL) 2 % external shampoo Use daily as needed 120 mL 11    ketotifen fumarate 0.035% 0.035 % SOLN ophthalmic solution Place 1 drop into both eyes 2 times daily as needed for itching.      nitroGLYcerin (NITROSTAT) 0.4 MG sublingual tablet Place 1 tablet (0.4 mg) under the tongue every 5 minutes as needed for chest pain May repeat twice for a total of 3 tablets.  If chest pain not relieved, call 911 25 tablet 11    OXcarbazepine (TRILEPTAL) 150 MG tablet       OXcarbazepine (TRILEPTAL) 300 MG tablet Take 300 mg by mouth.      oxyCODONE (ROXICODONE) 5 MG tablet Take 1 tablet (5 mg) by mouth every 8 hours as needed for moderate pain. 12 tablet 0    polyethylene glycol-propylene glycol (SYSTANE ULTRA) 0.4-0.3 % SOLN ophthalmic solution Place 1 drop into both eyes as needed for dry eyes.      spironolactone (ALDACTONE) 25 MG tablet Take 0.5 tablets (12.5 mg) by mouth daily. 45 tablet 3    triamcinolone (KENALOG) 0.1 % external lotion Apply to scalp BID x 1-2 weeks then PRN only 60 mL 3    lisinopril (ZESTRIL) 30 MG tablet Take 1 tablet (30 mg) by mouth daily. (Patient not taking: Reported on 6/10/2025)       No current facility-administered medications for this visit.        Past Surgical History:  Past Surgical History:   Procedure Laterality Date    ADENOIDECTOMY  Childhood    ANGIOPLASTY  1994    in [place]    ANKLE SURGERY  07/13/2005    right ankle    ARTHROPLASTY HIP Right 10/2009    BIOPSY NODE SENTINEL Left 03/30/2023    Procedure: BIOPSY, LYMPH NODE, SENTINEL;  Surgeon: Rolando Minaya MD;  Location: [facility]    COLONOSCOPY  4/25/12    EP PERM PACER SINGLE LEAD N/A 05/31/2019    Medtronic single lead pacemaker    EXCISE MASS CHEEK  "Left 2023    Procedure: wide local excision of left cheek melanoma;  Surgeon: Rolando Minaya MD;  Location: [facility] OR    EXCISE MASS CHEEK WITH FLAP PEDICLE Left 2023    Procedure: Left cervical Facial flap per closure of left cheek Defect;  Surgeon: Ila Sanchez MD;  Location: [facility] OR    Facial biopsy - Melanoma      GENITOURINARY SURGERY  10/20/23    Prostate surgery    HEART CATH STENT COR W/WO PTCA  2015    BRIDGET stent mid LAD    IR CHEST PORT PLACEMENT > 5 YRS OF AGE  2023    LASER SURGERY OF EYE  2002    MOHS MICROGRAPHIC PROCEDURE  2004    squamous cell carcinoma right temple    SINUS SURGERY  2006    TONSILLECTOMY  Childhood        Social History:  Social History     Tobacco Use    Smoking status: Former     Current packs/day: 0.00     Average packs/day: 0.5 packs/day for 10.0 years (5.0 ttl pk-yrs)     Types: Cigarettes, Cigars, Pipe     Start date: 1966     Quit date: 1974     Years since quittin.1     Passive exposure: Never    Smokeless tobacco: Never    Tobacco comments:     Also smoked from 1985 - 1990   Vaping Use    Vaping status: Never Used   Substance Use Topics    Alcohol use: Not Currently    Drug use: No          Family History:  Family History   Problem Relation Age of Onset    Genitourinary Problems Mother         renal failure    Heart Disease Mother     Cerebrovascular Disease Mother         TIA    Cardiovascular Father         rupture of dorsal aorta    Depression Son     Depression Brother         Suicide    Substance Abuse Brother         Heroin    Skin Cancer No family hx of        PE:    Vitals: /74   Pulse 53   Ht 1.803 m (5' 11\")   Wt 113.9 kg (251 lb)   SpO2 93%   BMI 35.01 kg/m    BMI= Body mass index is 35.01 kg/m .  General- Well-developed, well-nourished, patient able to get up  with significant difficulty.  Abdomen- abdomen is soft without significant tenderness, masses, organomegaly or guarding, no " umbilical hernia  Hernia- Upon standing there is an obvious bulge in the right groin  Palpating with a finger at the external ring, a left inguinal hernia is not present with valsalva              Palpating with a finger at the external ring, a right inguinal hernia is present spontaneously              The hernia is not manually reducible in a standing position. Did not attempt for patient to get on high table to lay supine and try to reduce the hernia as he was very unsteady standing.              Testes are descended bilaterally and normal        This note may have been created using voice recognition software. Undetected word substitutions or other errors may have occurred.     CT reviewed shows large right inguinal-scrotal hernia with nonobstructed small bowel.    45 minutes spent on the date of the encounter doing chart review, history and exam, documentation and further activities as noted above      Amanda Wall MD  06/10/25 8:42 AM     Please route or send letter to:  Referring Provider

## 2025-06-10 NOTE — TELEPHONE ENCOUNTER
[facility] Call Center    Phone Message    May a detailed message be left on voicemail: yes     Reason for Call: Other: appointment scheduled for tomorrow pt  needs cardiac clearance     Action Taken: Other: Cardio    Travel Screening: Not Applicable     Date of Service:     Thank you!  [facility]

## 2025-06-11 ENCOUNTER — OFFICE VISIT (OUTPATIENT)
Dept: CARDIOLOGY | Facility: CLINIC | Age: 80
End: 2025-06-11
Attending: INTERNAL MEDICINE
Payer: COMMERCIAL

## 2025-06-11 VITALS
HEIGHT: 71 IN | SYSTOLIC BLOOD PRESSURE: 130 MMHG | BODY MASS INDEX: 34.07 KG/M2 | WEIGHT: 243.4 LBS | HEART RATE: 83 BPM | OXYGEN SATURATION: 95 % | DIASTOLIC BLOOD PRESSURE: 68 MMHG

## 2025-06-11 DIAGNOSIS — I50.32 CHRONIC DIASTOLIC CONGESTIVE HEART FAILURE (H): ICD-10-CM

## 2025-06-11 DIAGNOSIS — I24.9 ACS (ACUTE CORONARY SYNDROME) (H): ICD-10-CM

## 2025-06-11 RX ORDER — NITROGLYCERIN 0.4 MG/1
0.4 TABLET SUBLINGUAL EVERY 5 MIN PRN
Qty: 25 TABLET | Refills: 11 | Status: SHIPPED | OUTPATIENT
Start: 2025-06-11

## 2025-06-11 NOTE — LETTER
6/11/2025    Jeronimo Painter MD  [address]  [facility] [address]    RE: James Salvador       Dear Colleague,     I had the pleasure of seeing James Ramirezppard in the [facility].  HISTORY OF PRESENT ILLNESS:     This is a 79 year old male who follows with Dr Liz at [facility]  His past medical history includes:  Coronary artery disease, permanent atrial fibrillation, s/p PPM, hypertension, mixed hyperlipidemia, SVT, facial melanoma, and chronic HFpEF.      Mr Salvador suffered a MI (1994) in [place]  He required mid- LAD stenting after he suffered unstable angina (2015)  At that time, his RCA was chronically occluded    NUC stress (2016) showed medium sized basal to mid inferior, inferolateral ischemia  LVEF 60%     He developed A-fib (2017) and did well until 2018 in which he developed symptomatic bradycardia/tachy-edinson syndrome and he underwent single-chamber PPM then     ECHO (2020) showed LVEF 60%, normal RV function, no significant valvular pathology    ECHO (2/2025) showed LVEF 50-55%, normal RV function, severe atrial enlargement, no valve disease    He was hospitalized (2/2025) for trigiminal neuralgia and has been further evaluated at [facility] with possible upcoming surgery    Device interrogation (4/2025) showed 44% V-paced, underlying A-fib with controlled rates One episode of 6 beats VT    When seen in clinic last month, his as needed Lasix dose was increased due to worsening edema  Follow up labs showed low sodium and normal BNP (for his age)  His Lasix was changed back to usual dose (20 mg) and  Spironolactone 12.5 mg was added    He recently underwent abdominal CT per his oncologist for surveillance  He was found to have an inguinal hernia and upcoming surgery has been recommended     Our visit today is for further review       Mr Salvador comes in with his wife today  His activity is limited due to knee issues and he uses a walker   "He denies any chest pain, significant shortness of breath, palpitations, orthopnea, or lightheadedness   He does have chronic dyspnea on exertion after walking a moderate distance but this has been unchanged for several years  He has not needed to take any SL NTG but is requesting refill for emergency    He reports significant improvement in his leg edema and weight decrease after the doubling of his Lasix   He has now been on Spironolactone for 5 days     /68 (BP Location: Right arm, Patient Position: Sitting, Cuff Size: Adult Large)   Pulse 83   Ht 1.803 m (5' 11\")   Wt 110.4 kg (243 lb 6.4 oz)   SpO2 95%   BMI 33.95 kg/m        IMPRESSION AND PLAN:     Coronary Artery Disease:  -hx of LAD stenting (2015)  -denies angina  -ASA, statin     Permanent A-fib  S/p PPM for bradycardia, tachy-edinson syndrome  -72% V-paced  -chronic Eliquis  (CHADS 2 Vasc score: 4) to be temporarily held for upcoming surgery, to be resumed ASAP      Chronic HFpEF  -LVEF 50-55%  -no signs or symptoms of heart failure  - Spironolactone 12.5 mg  -weight down 8 lbs  -improved leg swelling  -continue with Spironolactone for now  Upcoming BMP has been arranged next week        Hyperlipidemia:  -on Atorvastatin 80 mg  -LDL  38    The patient is at moderate risk for upcoming non-cardiac surgery per calculation of Revised Cardiac Risk Index (Cristian Criteria)   His estimated rate of myocardial infarction, pulmonary edema, ventricular fibrillation, cardiac arrest, or complete heart block is 6.6%  He is free from any cardiovascular complaints and is stable on current medical regimen and no further cardiovascular testing is recommended prior to surgery      The total time for the visit today was 30 minutes which includes patient visit, reviewing of records, discussion, and placing of orders of the outpatient coordination of cardiovascular care as described.  The level of medical decision making during this visit was of moderate complexity.  " Thank you for allowing me to participate in their care.    The longitudinal plan of care for the diagnosis(es)/condition(s) as documented were addressed during this visit. Due to the added complexity in care, I will continue to support Alden in the subsequent management and with ongoing continuity of care.      No orders of the defined types were placed in this encounter.      Orders Placed This Encounter   Medications     nitroGLYcerin (NITROSTAT) 0.4 MG sublingual tablet     Sig: Place 1 tablet (0.4 mg) under the tongue every 5 minutes as needed for chest pain. May repeat twice for a total of 3 tablets.  If chest pain not relieved, call 911     Dispense:  25 tablet     Refill:  11       Medications Discontinued During This Encounter   Medication Reason     nitroGLYcerin (NITROSTAT) 0.4 MG sublingual tablet Reorder (No AVS)         Encounter Diagnoses   Name Primary?     Chronic diastolic congestive heart failure (H)      ACS (acute coronary syndrome) (H)        CURRENT MEDICATIONS:  Current Outpatient Medications   Medication Sig Dispense Refill     acetaminophen (TYLENOL) 650 MG CR tablet Take 1,300 mg by mouth 2 times daily.       albuterol (PROAIR HFA/PROVENTIL HFA/VENTOLIN HFA) 108 (90 Base) MCG/ACT inhaler Inhale 2 puffs into the lungs every 6 hours as needed for wheezing. 18 g 0     apixaban ANTICOAGULANT (ELIQUIS ANTICOAGULANT) 5 MG tablet Take 1 tablet (5 mg) by mouth 2 times daily 180 tablet 3     atorvastatin (LIPITOR) 80 MG tablet Take 1 tablet (80 mg) by mouth at bedtime 90 tablet 3     carvedilol (COREG) 3.125 MG tablet Take 1 tablet (3.125 mg) by mouth 2 times daily (with meals) 180 tablet 3     clobetasol propionate (TEMOVATE) 0.05 % external cream Apply to AA BID x 1-2 weeks then PRN. Do not apply to face. 60 g 3     fluticasone (FLONASE) 50 MCG/ACT nasal spray INSTILL TWO SPRAYS INTO BOTH NOSTRILS DAILY 48 g 1     gabapentin (NEURONTIN) 300 MG capsule Take 3 capsules (900 mg) by mouth 3 times  daily. 300 capsule 0     ketoconazole (NIZORAL) 2 % external cream Apply topically 2 times daily as needed. For face. FAX REFILL REQUESTS TO  RONNIE: [phone]       ketoconazole (NIZORAL) 2 % external shampoo Use daily as needed 120 mL 11     ketotifen fumarate 0.035% 0.035 % SOLN ophthalmic solution Place 1 drop into both eyes 2 times daily as needed for itching.       nitroGLYcerin (NITROSTAT) 0.4 MG sublingual tablet Place 1 tablet (0.4 mg) under the tongue every 5 minutes as needed for chest pain. May repeat twice for a total of 3 tablets.  If chest pain not relieved, call 911 25 tablet 11     OXcarbazepine (TRILEPTAL) 150 MG tablet        OXcarbazepine (TRILEPTAL) 300 MG tablet Take 300 mg by mouth.       oxyCODONE (ROXICODONE) 5 MG tablet Take 1 tablet (5 mg) by mouth every 8 hours as needed for moderate pain. 12 tablet 0     polyethylene glycol-propylene glycol (SYSTANE ULTRA) 0.4-0.3 % SOLN ophthalmic solution Place 1 drop into both eyes as needed for dry eyes.       spironolactone (ALDACTONE) 25 MG tablet Take 0.5 tablets (12.5 mg) by mouth daily. 45 tablet 3     triamcinolone (KENALOG) 0.1 % external lotion Apply to scalp BID x 1-2 weeks then PRN only 60 mL 3     lisinopril (ZESTRIL) 30 MG tablet Take 1 tablet (30 mg) by mouth daily. (Patient not taking: Reported on 6/11/2025)         ALLERGIES     Allergies   Allergen Reactions     Cats      Cat Dander     Dogs      Dog Dander     Hydromorphone      Other reaction(s): Confusion     Pollen Extract        PAST MEDICAL HISTORY:  Past Medical History:   Diagnosis Date     Actinic 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 Cardiac Cath 9/2015: BRIDGET to LAD     Hearing problem 1995    Wear hearing aids     Hyperlipidemia      Malignant melanoma      Morbid  obesity 01/11/2016     Paroxysmal supraventricular tachycardia     on metoprolol     Permanent atrial fibrillation 04/21/2017     Squamous cell carcinoma      Tachy-edinson syndrome 05/29/2019       PAST SURGICAL HISTORY:  Past Surgical History:   Procedure Laterality Date     ADENOIDECTOMY  Childhood     ANGIOPLASTY  1994    in [place]     ANKLE SURGERY  07/13/2005    right ankle     ARTHROPLASTY HIP Right 10/2009     BIOPSY NODE SENTINEL Left 03/30/2023    Procedure: BIOPSY, LYMPH NODE, SENTINEL;  Surgeon: Rolando Minaya MD;  Location: [facility] OR     COLONOSCOPY  4/25/12     EP PERM PACER SINGLE LEAD N/A 05/31/2019    Medtronic single lead pacemaker     EXCISE MASS CHEEK Left 03/30/2023    Procedure: wide local excision of left cheek melanoma;  Surgeon: Rolando Minaya MD;  Location: [facility] OR     EXCISE MASS CHEEK WITH FLAP PEDICLE Left 04/13/2023    Procedure: Left cervical Facial flap per closure of left cheek Defect;  Surgeon: Ila Sanchez MD;  Location: [facility] OR     Facial biopsy - Melanoma       GENITOURINARY SURGERY  10/20/23    Prostate surgery     HEART CATH STENT COR W/WO PTCA  09/23/2015    BRIDGET stent mid LAD     IR CHEST PORT PLACEMENT > 5 YRS OF AGE  7/19/2023     LASER SURGERY OF EYE  06/01/2002     MOHS MICROGRAPHIC PROCEDURE  06/12/2004    squamous cell carcinoma right temple     SINUS SURGERY  07/11/2006     TONSILLECTOMY  Childhood       FAMILY HISTORY:  Family History   Problem Relation Age of Onset     Genitourinary Problems Mother         renal failure     Heart Disease Mother      Cerebrovascular Disease Mother         TIA     Cardiovascular Father         rupture of dorsal aorta     Depression Son      Depression Brother         Suicide     Substance Abuse Brother         Heroin     Skin Cancer No family hx of        SOCIAL HISTORY:  Social History     Socioeconomic History     Marital status:      Spouse name: None     Number of children: 3     Years of education: None      Highest education level: None   Occupational History     Employer: RETIRED   Tobacco Use     Smoking status: Former     Current packs/day: 0.00     Average packs/day: 0.5 packs/day for 10.0 years (5.0 ttl pk-yrs)     Types: Cigarettes, Cigars, Pipe     Start date: 1966     Quit date: 1974     Years since quittin.1     Passive exposure: Never     Smokeless tobacco: Never     Tobacco comments:     Also smoked from 1985 - 1990   Vaping Use     Vaping status: Never Used   Substance and Sexual Activity     Alcohol use: Not Currently     Comment: Seldom     Drug use: No     Sexual activity: Not Currently     Partners: Female     Birth control/protection: Male Surgical     Comment: Vasectomy   Other Topics Concern     Parent/sibling w/ CABG, MI or angioplasty before 65F 55M? No     Caffeine Concern Yes     Comment: 2 big cups coffee daily     Sleep Concern No     Comment: sleeping better since shoulder replaced 11/3/16     Stress Concern No     Weight Concern Yes     Special Diet Yes     Comment: trying to do more lean meats     Exercise No     Comment: limited - knee     Social Drivers of Health     Financial Resource Strain: Low Risk  (2025)    Financial Resource Strain      Within the past 12 months, have you or your family members you live with been unable to get utilities (heat, electricity) when it was really needed?: No   Food Insecurity: Low Risk  (2025)    Food Insecurity      Within the past 12 months, did you worry that your food would run out before you got money to buy more?: No      Within the past 12 months, did the food you bought just not last and you didn t have money to get more?: No   Transportation Needs: Low Risk  (2025)    Transportation Needs      Within the past 12 months, has lack of transportation kept you from medical appointments, getting your medicines, non-medical meetings or appointments, work, or from getting things that you need?: No   Physical Activity:  Inactive (6/22/2024)    Exercise Vital Sign      Days of Exercise per Week: 0 days      Minutes of Exercise per Session: 0 min   Stress: Stress Concern Present (6/22/2024)    [place] [employer] [place] [employer] - Occupational Stress Questionnaire      Feeling of Stress : To some extent   Social Connections: Unknown (6/22/2024)    Social Connection and Isolation Panel [NHANES]      Frequency of Social Gatherings with Friends and Family: Once a week   Interpersonal Safety: Low Risk  (2/13/2025)    Interpersonal Safety      Do you feel physically and emotionally safe where you currently live?: Yes      Within the past 12 months, have you been hit, slapped, kicked or otherwise physically hurt by someone?: No      Within the past 12 months, have you been humiliated or emotionally abused in other ways by your partner or ex-partner?: No   Recent Concern: Interpersonal Safety - High Risk (1/27/2025)    Interpersonal Safety      Do you feel physically and emotionally safe where you currently live?: No      Within the past 12 months, have you been hit, slapped, kicked or otherwise physically hurt by someone?: No      Within the past 12 months, have you been humiliated or emotionally abused in other ways by your partner or ex-partner?: No   Housing Stability: Low Risk  (2/13/2025)    Housing Stability      Do you have housing? : Yes      Are you worried about losing your housing?: No       Review of Systems:  Skin:  not assessed       Eyes:  not assessed      ENT:  not assessed      Respiratory:  Positive for dyspnea on exertion     Cardiovascular:    edema, Positive for, fatigue Edema has improved  Gastroenterology: not assessed      Genitourinary:  not assessed      Musculoskeletal:  not assessed      Neurologic:  not assessed      Psychiatric:  not assessed      Heme/Lymph/Imm:  not assessed      Endocrine:  not assessed        Physical Exam:  Vitals: /68 (BP Location: Right arm, Patient Position: Sitting, Cuff  "Size: Adult Large)   Pulse 83   Ht 1.803 m (5' 11\")   Wt 110.4 kg (243 lb 6.4 oz)   SpO2 95%   BMI 33.95 kg/m      Constitutional:  cooperative morbidly obese      Skin:  warm and dry to the touch   pacemaker incision in the left infraclavicular area was well-healed      Head:  normocephalic        Eyes:           Lymph:      ENT:  no pallor or cyanosis hearing aide(s) present      Neck:  JVP normal        Respiratory:  clear to auscultation, normal respiratory excursion         Cardiac:   irregularly irregular rhythm   no presence of murmur          pulses full and equal                                        GI:           Extremities and Muscular Skeletal:      trace, bilateral LE edema          Neurological:  no gross motor deficits, affect appropriate   uses walker    Psych:  Alert and Oriented x 3          CC  Aquilino Buchanan MD  No address on file                      Thank you for allowing me to participate in the care of your patient.      Sincerely,     NAV Muller [facility]  cc:   Aquilino Buchanan MD  No address on file      "

## 2025-06-11 NOTE — PROGRESS NOTES
HISTORY OF PRESENT ILLNESS:     This is a 79 year old male who follows with Dr Liz at [facility]  His past medical history includes:  Coronary artery disease, permanent atrial fibrillation, s/p PPM, hypertension, mixed hyperlipidemia, SVT, facial melanoma, and chronic HFpEF.      Mr Salvador suffered a MI (1994) in [place]  He required mid- LAD stenting after he suffered unstable angina (2015)  At that time, his RCA was chronically occluded    NUC stress (2016) showed medium sized basal to mid inferior, inferolateral ischemia  LVEF 60%     He developed A-fib (2017) and did well until 2018 in which he developed symptomatic bradycardia/tachy-edinson syndrome and he underwent single-chamber PPM then     ECHO (2020) showed LVEF 60%, normal RV function, no significant valvular pathology    ECHO (2/2025) showed LVEF 50-55%, normal RV function, severe atrial enlargement, no valve disease    He was hospitalized (2/2025) for trigiminal neuralgia and has been further evaluated at [facility] with possible upcoming surgery    Device interrogation (4/2025) showed 44% V-paced, underlying A-fib with controlled rates One episode of 6 beats VT    When seen in clinic last month, his as needed Lasix dose was increased due to worsening edema  Follow up labs showed low sodium and normal BNP (for his age)  His Lasix was changed back to usual dose (20 mg) and  Spironolactone 12.5 mg was added    He recently underwent abdominal CT per his oncologist for surveillance  He was found to have an inguinal hernia and upcoming surgery has been recommended     Our visit today is for further review       Mr Salvador comes in with his wife today  His activity is limited due to knee issues and he uses a walker  He denies any chest pain, significant shortness of breath, palpitations, orthopnea, or lightheadedness   He does have chronic dyspnea on exertion after walking a moderate distance but this has been unchanged for several  "years  He has not needed to take any SL NTG but is requesting refill for emergency    He reports significant improvement in his leg edema and weight decrease after the doubling of his Lasix   He has now been on Spironolactone for 5 days     /68 (BP Location: Right arm, Patient Position: Sitting, Cuff Size: Adult Large)   Pulse 83   Ht 1.803 m (5' 11\")   Wt 110.4 kg (243 lb 6.4 oz)   SpO2 95%   BMI 33.95 kg/m        IMPRESSION AND PLAN:     Coronary Artery Disease:  -hx of LAD stenting (2015)  -denies angina  -ASA, statin     Permanent A-fib  S/p PPM for bradycardia, tachy-edinson syndrome  -72% V-paced  -chronic Eliquis  (CHADS 2 Vasc score: 4) to be temporarily held for upcoming surgery, to be resumed ASAP      Chronic HFpEF  -LVEF 50-55%  -no signs or symptoms of heart failure  - Spironolactone 12.5 mg  -weight down 8 lbs  -improved leg swelling  -continue with Spironolactone for now  Upcoming BMP has been arranged next week        Hyperlipidemia:  -on Atorvastatin 80 mg  -LDL  38    The patient is at moderate risk for upcoming non-cardiac surgery per calculation of Revised Cardiac Risk Index (Cristian Criteria)   His estimated rate of myocardial infarction, pulmonary edema, ventricular fibrillation, cardiac arrest, or complete heart block is 6.6%  He is free from any cardiovascular complaints and is stable on current medical regimen and no further cardiovascular testing is recommended prior to surgery      The total time for the visit today was 30 minutes which includes patient visit, reviewing of records, discussion, and placing of orders of the outpatient coordination of cardiovascular care as described.  The level of medical decision making during this visit was of moderate complexity.  Thank you for allowing me to participate in their care.    The longitudinal plan of care for the diagnosis(es)/condition(s) as documented were addressed during this visit. Due to the added complexity in care, I will " continue to support Alden in the subsequent management and with ongoing continuity of care.      No orders of the defined types were placed in this encounter.      Orders Placed This Encounter   Medications    nitroGLYcerin (NITROSTAT) 0.4 MG sublingual tablet     Sig: Place 1 tablet (0.4 mg) under the tongue every 5 minutes as needed for chest pain. May repeat twice for a total of 3 tablets.  If chest pain not relieved, call 911     Dispense:  25 tablet     Refill:  11       Medications Discontinued During This Encounter   Medication Reason    nitroGLYcerin (NITROSTAT) 0.4 MG sublingual tablet Reorder (No AVS)         Encounter Diagnoses   Name Primary?    Chronic diastolic congestive heart failure (H)     ACS (acute coronary syndrome) (H)        CURRENT MEDICATIONS:  Current Outpatient Medications   Medication Sig Dispense Refill    acetaminophen (TYLENOL) 650 MG CR tablet Take 1,300 mg by mouth 2 times daily.      albuterol (PROAIR HFA/PROVENTIL HFA/VENTOLIN HFA) 108 (90 Base) MCG/ACT inhaler Inhale 2 puffs into the lungs every 6 hours as needed for wheezing. 18 g 0    apixaban ANTICOAGULANT (ELIQUIS ANTICOAGULANT) 5 MG tablet Take 1 tablet (5 mg) by mouth 2 times daily 180 tablet 3    atorvastatin (LIPITOR) 80 MG tablet Take 1 tablet (80 mg) by mouth at bedtime 90 tablet 3    carvedilol (COREG) 3.125 MG tablet Take 1 tablet (3.125 mg) by mouth 2 times daily (with meals) 180 tablet 3    clobetasol propionate (TEMOVATE) 0.05 % external cream Apply to AA BID x 1-2 weeks then PRN. Do not apply to face. 60 g 3    fluticasone (FLONASE) 50 MCG/ACT nasal spray INSTILL TWO SPRAYS INTO BOTH NOSTRILS DAILY 48 g 1    gabapentin (NEURONTIN) 300 MG capsule Take 3 capsules (900 mg) by mouth 3 times daily. 300 capsule 0    ketoconazole (NIZORAL) 2 % external cream Apply topically 2 times daily as needed. For face. FAX REFILL REQUESTS TO [facility]: [phone]      ketoconazole (NIZORAL) 2 % external shampoo Use daily as needed  120 mL 11    ketotifen fumarate 0.035% 0.035 % SOLN ophthalmic solution Place 1 drop into both eyes 2 times daily as needed for itching.      nitroGLYcerin (NITROSTAT) 0.4 MG sublingual tablet Place 1 tablet (0.4 mg) under the tongue every 5 minutes as needed for chest pain. May repeat twice for a total of 3 tablets.  If chest pain not relieved, call 911 25 tablet 11    OXcarbazepine (TRILEPTAL) 150 MG tablet       OXcarbazepine (TRILEPTAL) 300 MG tablet Take 300 mg by mouth.      oxyCODONE (ROXICODONE) 5 MG tablet Take 1 tablet (5 mg) by mouth every 8 hours as needed for moderate pain. 12 tablet 0    polyethylene glycol-propylene glycol (SYSTANE ULTRA) 0.4-0.3 % SOLN ophthalmic solution Place 1 drop into both eyes as needed for dry eyes.      spironolactone (ALDACTONE) 25 MG tablet Take 0.5 tablets (12.5 mg) by mouth daily. 45 tablet 3    triamcinolone (KENALOG) 0.1 % external lotion Apply to scalp BID x 1-2 weeks then PRN only 60 mL 3    lisinopril (ZESTRIL) 30 MG tablet Take 1 tablet (30 mg) by mouth daily. (Patient not taking: Reported on 6/11/2025)         ALLERGIES     Allergies   Allergen Reactions    Cats      Cat Dander    Dogs      Dog Dander    Hydromorphone      Other reaction(s): Confusion    Pollen Extract        PAST MEDICAL HISTORY:  Past Medical History:   Diagnosis Date    Actinic cheilitis 07/17/2013    Lower lip, left     Actinic keratosis     Allergic rhinitis     Anxiety 3/1/23    Arthritis 2004    Basal cell carcinoma     Benign hypertension     CAD (coronary artery disease)     Cardiac cath and PCI 1994. Cardiac Cath 9/2015: BRIDGET to LAD    Hearing problem 1995    Wear hearing aids    Hyperlipidemia     Malignant melanoma     Morbid obesity 01/11/2016    Paroxysmal supraventricular tachycardia     on metoprolol    Permanent atrial fibrillation 04/21/2017    Squamous cell carcinoma     Tachy-edinson syndrome 05/29/2019       PAST SURGICAL HISTORY:  Past Surgical History:   Procedure Laterality  Date    ADENOIDECTOMY  Childhood    ANGIOPLASTY  1994    in [place]    ANKLE SURGERY  07/13/2005    right ankle    ARTHROPLASTY HIP Right 10/2009    BIOPSY NODE SENTINEL Left 03/30/2023    Procedure: BIOPSY, LYMPH NODE, SENTINEL;  Surgeon: Rolando Minaya MD;  Location: [facility] OR    COLONOSCOPY  4/25/12    EP PERM PACER SINGLE LEAD N/A 05/31/2019    Medtronic single lead pacemaker    EXCISE MASS CHEEK Left 03/30/2023    Procedure: wide local excision of left cheek melanoma;  Surgeon: Rolando Minaya MD;  Location: [facility] OR    EXCISE MASS CHEEK WITH FLAP PEDICLE Left 04/13/2023    Procedure: Left cervical Facial flap per closure of left cheek Defect;  Surgeon: Ila Sanchez MD;  Location: [facility] OR    Facial biopsy - Melanoma      GENITOURINARY SURGERY  10/20/23    Prostate surgery    HEART CATH STENT COR W/WO PTCA  09/23/2015    BRIDGET stent mid LAD    IR CHEST PORT PLACEMENT > 5 YRS OF AGE  7/19/2023    LASER SURGERY OF EYE  06/01/2002    MOHS MICROGRAPHIC PROCEDURE  06/12/2004    squamous cell carcinoma right temple    SINUS SURGERY  07/11/2006    TONSILLECTOMY  Childhood       FAMILY HISTORY:  Family History   Problem Relation Age of Onset    Genitourinary Problems Mother         renal failure    Heart Disease Mother     Cerebrovascular Disease Mother         TIA    Cardiovascular Father         rupture of dorsal aorta    Depression Son     Depression Brother         Suicide    Substance Abuse Brother         Heroin    Skin Cancer No family hx of        SOCIAL HISTORY:  Social History     Socioeconomic History    Marital status:      Spouse name: None    Number of children: 3    Years of education: None    Highest education level: None   Occupational History     Employer: RETIRED   Tobacco Use    Smoking status: Former     Current packs/day: 0.00     Average packs/day: 0.5 packs/day for 10.0 years (5.0 ttl pk-yrs)     Types: Cigarettes, Cigars, Pipe     Start date: 11/20/1966     Quit date: 5/1/1974      Years since quittin.1     Passive exposure: Never    Smokeless tobacco: Never    Tobacco comments:     Also smoked from 1985 - 1990   Vaping Use    Vaping status: Never Used   Substance and Sexual Activity    Alcohol use: Not Currently     Comment: Seldom    Drug use: No    Sexual activity: Not Currently     Partners: Female     Birth control/protection: Male Surgical     Comment: Vasectomy   Other Topics Concern    Parent/sibling w/ CABG, MI or angioplasty before 65F 55M? No    Caffeine Concern Yes     Comment: 2 big cups coffee daily    Sleep Concern No     Comment: sleeping better since shoulder replaced 11/3/16    Stress Concern No    Weight Concern Yes    Special Diet Yes     Comment: trying to do more lean meats    Exercise No     Comment: limited - knee     Social Drivers of Health     Financial Resource Strain: Low Risk  (2025)    Financial Resource Strain     Within the past 12 months, have you or your family members you live with been unable to get utilities (heat, electricity) when it was really needed?: No   Food Insecurity: Low Risk  (2025)    Food Insecurity     Within the past 12 months, did you worry that your food would run out before you got money to buy more?: No     Within the past 12 months, did the food you bought just not last and you didn t have money to get more?: No   Transportation Needs: Low Risk  (2025)    Transportation Needs     Within the past 12 months, has lack of transportation kept you from medical appointments, getting your medicines, non-medical meetings or appointments, work, or from getting things that you need?: No   Physical Activity: Inactive (2024)    Exercise Vital Sign     Days of Exercise per Week: 0 days     Minutes of Exercise per Session: 0 min   Stress: Stress Concern Present (2024)    [employer] - Occupational Stress Questionnaire     Feeling of Stress : To some extent   Social Connections: Unknown  "(6/22/2024)    Social Connection and Isolation Panel [NHANES]     Frequency of Social Gatherings with Friends and Family: Once a week   Interpersonal Safety: Low Risk  (2/13/2025)    Interpersonal Safety     Do you feel physically and emotionally safe where you currently live?: Yes     Within the past 12 months, have you been hit, slapped, kicked or otherwise physically hurt by someone?: No     Within the past 12 months, have you been humiliated or emotionally abused in other ways by your partner or ex-partner?: No   Recent Concern: Interpersonal Safety - High Risk (1/27/2025)    Interpersonal Safety     Do you feel physically and emotionally safe where you currently live?: No     Within the past 12 months, have you been hit, slapped, kicked or otherwise physically hurt by someone?: No     Within the past 12 months, have you been humiliated or emotionally abused in other ways by your partner or ex-partner?: No   Housing Stability: Low Risk  (2/13/2025)    Housing Stability     Do you have housing? : Yes     Are you worried about losing your housing?: No       Review of Systems:  Skin:  not assessed       Eyes:  not assessed      ENT:  not assessed      Respiratory:  Positive for dyspnea on exertion     Cardiovascular:    edema, Positive for, fatigue Edema has improved  Gastroenterology: not assessed      Genitourinary:  not assessed      Musculoskeletal:  not assessed      Neurologic:  not assessed      Psychiatric:  not assessed      Heme/Lymph/Imm:  not assessed      Endocrine:  not assessed        Physical Exam:  Vitals: /68 (BP Location: Right arm, Patient Position: Sitting, Cuff Size: Adult Large)   Pulse 83   Ht 1.803 m (5' 11\")   Wt 110.4 kg (243 lb 6.4 oz)   SpO2 95%   BMI 33.95 kg/m      Constitutional:  cooperative morbidly obese      Skin:  warm and dry to the touch   pacemaker incision in the left infraclavicular area was well-healed      Head:  normocephalic        Eyes:           Lymph:  "     ENT:  no pallor or cyanosis hearing aide(s) present      Neck:  JVP normal        Respiratory:  clear to auscultation, normal respiratory excursion         Cardiac:   irregularly irregular rhythm   no presence of murmur          pulses full and equal                                        GI:           Extremities and Muscular Skeletal:      trace, bilateral LE edema          Neurological:  no gross motor deficits, affect appropriate   uses walker    Psych:  Alert and Oriented x 3          CC  Aquilino Buchanan MD  No address on file

## 2025-06-16 ENCOUNTER — RESULTS FOLLOW-UP (OUTPATIENT)
Dept: CARDIOLOGY | Facility: CLINIC | Age: 80
End: 2025-06-16

## 2025-06-16 ENCOUNTER — LAB (OUTPATIENT)
Dept: LAB | Facility: CLINIC | Age: 80
End: 2025-06-16
Payer: COMMERCIAL

## 2025-06-16 DIAGNOSIS — Z13.6 SCREENING FOR CARDIOVASCULAR CONDITION: Primary | ICD-10-CM

## 2025-06-16 DIAGNOSIS — I25.118 CORONARY ARTERY DISEASE OF NATIVE ARTERY OF NATIVE HEART WITH STABLE ANGINA PECTORIS: ICD-10-CM

## 2025-06-16 DIAGNOSIS — I10 BENIGN HYPERTENSION: ICD-10-CM

## 2025-06-16 LAB
ANION GAP SERPL CALCULATED.3IONS-SCNC: 8 MMOL/L (ref 7–15)
BUN SERPL-MCNC: 8.4 MG/DL (ref 8–23)
CALCIUM SERPL-MCNC: 9.5 MG/DL (ref 8.8–10.4)
CHLORIDE SERPL-SCNC: 91 MMOL/L (ref 98–107)
CHOLEST SERPL-MCNC: 109 MG/DL
CREAT SERPL-MCNC: 0.53 MG/DL (ref 0.67–1.17)
EGFRCR SERPLBLD CKD-EPI 2021: >90 ML/MIN/1.73M2
FASTING STATUS PATIENT QL REPORTED: NO
FASTING STATUS PATIENT QL REPORTED: NO
GLUCOSE SERPL-MCNC: 83 MG/DL (ref 70–99)
HCO3 SERPL-SCNC: 26 MMOL/L (ref 22–29)
HDLC SERPL-MCNC: 60 MG/DL
LDLC SERPL CALC-MCNC: 39 MG/DL
NONHDLC SERPL-MCNC: 49 MG/DL
POTASSIUM SERPL-SCNC: 4.7 MMOL/L (ref 3.4–5.3)
SODIUM SERPL-SCNC: 125 MMOL/L (ref 135–145)
TRIGL SERPL-MCNC: 51 MG/DL

## 2025-06-16 PROCEDURE — 80061 LIPID PANEL: CPT

## 2025-06-16 PROCEDURE — 80048 BASIC METABOLIC PNL TOTAL CA: CPT

## 2025-06-16 PROCEDURE — 36415 COLL VENOUS BLD VENIPUNCTURE: CPT

## 2025-06-26 ENCOUNTER — OFFICE VISIT (OUTPATIENT)
Dept: INTERNAL MEDICINE | Facility: CLINIC | Age: 80
End: 2025-06-26
Payer: COMMERCIAL

## 2025-06-26 VITALS
RESPIRATION RATE: 16 BRPM | HEIGHT: 71 IN | DIASTOLIC BLOOD PRESSURE: 78 MMHG | HEART RATE: 63 BPM | BODY MASS INDEX: 34.26 KG/M2 | WEIGHT: 244.7 LBS | TEMPERATURE: 97.7 F | SYSTOLIC BLOOD PRESSURE: 135 MMHG | OXYGEN SATURATION: 95 %

## 2025-06-26 DIAGNOSIS — Z01.818 PRE-OP EXAM: Primary | ICD-10-CM

## 2025-06-26 DIAGNOSIS — I10 BENIGN HYPERTENSION: ICD-10-CM

## 2025-06-26 DIAGNOSIS — K40.90 RIGHT INGUINAL HERNIA: ICD-10-CM

## 2025-06-26 DIAGNOSIS — E22.2 SIADH (SYNDROME OF INAPPROPRIATE ADH PRODUCTION): ICD-10-CM

## 2025-06-26 DIAGNOSIS — G50.0 TRIGEMINAL NEURALGIA: ICD-10-CM

## 2025-06-26 LAB
ANION GAP SERPL CALCULATED.3IONS-SCNC: 9 MMOL/L (ref 7–15)
BUN SERPL-MCNC: 8.5 MG/DL (ref 8–23)
CALCIUM SERPL-MCNC: 9.4 MG/DL (ref 8.8–10.4)
CHLORIDE SERPL-SCNC: 87 MMOL/L (ref 98–107)
CREAT SERPL-MCNC: 0.54 MG/DL (ref 0.67–1.17)
EGFRCR SERPLBLD CKD-EPI 2021: >90 ML/MIN/1.73M2
ERYTHROCYTE [DISTWIDTH] IN BLOOD BY AUTOMATED COUNT: 12.5 % (ref 10–15)
GLUCOSE SERPL-MCNC: 86 MG/DL (ref 70–99)
HCO3 SERPL-SCNC: 26 MMOL/L (ref 22–29)
HCT VFR BLD AUTO: 37.4 % (ref 40–53)
HGB BLD-MCNC: 13.6 G/DL (ref 13.3–17.7)
MCH RBC QN AUTO: 33 PG (ref 26.5–33)
MCHC RBC AUTO-ENTMCNC: 36.4 G/DL (ref 31.5–36.5)
MCV RBC AUTO: 91 FL (ref 78–100)
PLATELET # BLD AUTO: 160 10E3/UL (ref 150–450)
POTASSIUM SERPL-SCNC: 5.8 MMOL/L (ref 3.4–5.3)
RBC # BLD AUTO: 4.12 10E6/UL (ref 4.4–5.9)
SODIUM SERPL-SCNC: 122 MMOL/L (ref 135–145)
WBC # BLD AUTO: 7.7 10E3/UL (ref 4–11)

## 2025-06-26 PROCEDURE — 3075F SYST BP GE 130 - 139MM HG: CPT | Performed by: INTERNAL MEDICINE

## 2025-06-26 PROCEDURE — 91320 SARSCV2 VAC 30MCG TRS-SUC IM: CPT | Performed by: INTERNAL MEDICINE

## 2025-06-26 PROCEDURE — 85027 COMPLETE CBC AUTOMATED: CPT | Performed by: INTERNAL MEDICINE

## 2025-06-26 PROCEDURE — 80048 BASIC METABOLIC PNL TOTAL CA: CPT | Performed by: INTERNAL MEDICINE

## 2025-06-26 PROCEDURE — 93000 ELECTROCARDIOGRAM COMPLETE: CPT | Performed by: INTERNAL MEDICINE

## 2025-06-26 PROCEDURE — 99214 OFFICE O/P EST MOD 30 MIN: CPT | Mod: 25 | Performed by: INTERNAL MEDICINE

## 2025-06-26 PROCEDURE — 3078F DIAST BP <80 MM HG: CPT | Performed by: INTERNAL MEDICINE

## 2025-06-26 PROCEDURE — 36415 COLL VENOUS BLD VENIPUNCTURE: CPT | Performed by: INTERNAL MEDICINE

## 2025-06-26 PROCEDURE — 90480 ADMN SARSCOV2 VAC 1/ONLY CMP: CPT | Performed by: INTERNAL MEDICINE

## 2025-06-26 RX ORDER — OXCARBAZEPINE 300 MG/1
300 TABLET, FILM COATED ORAL 3 TIMES DAILY
COMMUNITY
Start: 2025-06-26

## 2025-06-26 RX ORDER — OXCARBAZEPINE 150 MG/1
150 TABLET, FILM COATED ORAL 3 TIMES DAILY
COMMUNITY
Start: 2025-06-26 | End: 2025-07-08

## 2025-06-26 NOTE — PATIENT INSTRUCTIONS
How to Take Your Medication Before Surgery  Preoperative Medication Instructions   Antiplatelet or Anticoagulation Medication Instructions   - apixaban (Eliquis), edoxaban (Savaysa), rivaroxaban (Xarelto): Bleeding risk is moderate or high for this procedure AND CrCl  (>=) 50 mL/min. DO NOT TAKE 2 days before surgery.

## 2025-06-26 NOTE — PROGRESS NOTES
Preoperative Evaluation  [address] [address]  Phone: [phone]  Primary Provider: Jeronimo Painter MD  Pre-op Performing Provider: Jeronimo Painter MD  Jun 26, 2025 6/25/2025   Surgical Information   What procedure is being done? Inguinal hernia surgery   Facility or Hospital where procedure/surgery will be performed: [facility]   Who is doing the procedure / surgery? Amanda Wall MD   Date of surgery / procedure: 7/10/25   Time of surgery / procedure: Early A.M.   Where do you plan to recover after surgery? at home with family     Fax number for surgical facility: Note does not need to be faxed, will be available electronically in Epic.    Assessment & Plan     The proposed surgical procedure is considered INTERMEDIATE risk.    Pre-op exam  Right inguinal hernia  SIADH (syndrome of inappropriate ADH production)  Benign hypertension  Trigeminal neuralgia     Implanted Device   - Type of device: pacemaker Patient advised to bring device information on day of surgery.       - No identified additional risk factors other than previously addressed    Preoperative Medication Instructions  Antiplatelet or Anticoagulation Medication Instructions   - apixaban (Eliquis), edoxaban (Savaysa), rivaroxaban (Xarelto): Bleeding risk is moderate or high for this procedure AND CrCl  (>=) 50 mL/min. DO NOT TAKE 2 days before surgery.     Recommendation  Approval given to proceed with proposed procedure, without further diagnostic evaluation.        Jose Ruano is a 79 year old, presenting for the following:  Pre-Op Exam        HPI: Elderly gentleman with complicated medical history including trigeminal neuralgia for which he takes Trileptal.  See PMH for long detailed list of current chronic issues.  His Trileptal causes SIADH.  His baseline sodium levels typically in the mid 120s to low 130s depending on the use of additional  meds such as diuretics.  Recent imaging revealed a right inguinal hernia for which he states he is moderately symptomatic from.        6/25/2025   Pre-Op Questionnaire   Have you ever had a heart attack or stroke? (!) YES     Have you ever had surgery on your heart or blood vessels, such as a stent placement, a coronary artery bypass, or surgery on an artery in your head, neck, heart, or legs? (!) YES     Do you have chest pain with activity? No   Do you have a history of heart failure? No   Do you currently have a cold, bronchitis or symptoms of other infection? No   Do you have a cough, shortness of breath, or wheezing? No   Do you or anyone in your family have previous history of blood clots? No   Do you or does anyone in your family have a serious bleeding problem such as prolonged bleeding following surgeries or cuts? No   Have you ever had problems with anemia or been told to take iron pills? No   Have you had any abnormal blood loss such as black, tarry or bloody stools? No   Have you ever had a blood transfusion? No   Are you willing to have a blood transfusion if it is medically needed before, during, or after your surgery? Yes   Have you or any of your relatives ever had problems with anesthesia? (!) YES     Do you have sleep apnea, excessive snoring or daytime drowsiness? (!) YES   Do you have a CPAP machine? (!) NO     Do you have any artifical heart valves or other implanted medical devices like a pacemaker, defibrillator, or continuous glucose monitor? (!) YES   What type of device do you have? Pacemaker   Name of the clinic that manages your device [facility]   Do you have artificial joints? (!) YES   Are you allergic to latex? No     Advance Care Planning    Document on file is a Health Care Directive or POLST.    Preoperative Review of    reviewed - controlled substances reflected in medication list.          Patient Active Problem List    Diagnosis Date Noted    Acute kidney injury  02/11/2025     Priority: Medium    Hypotension, unspecified hypotension type 02/11/2025     Priority: Medium    COVID-19 01/27/2025     Priority: Medium    Metastatic melanoma to lung, right (H) 06/26/2024     Priority: Medium    Trigeminal neuralgia 06/20/2023     Priority: Medium    SIADH (syndrome of inappropriate ADH production) 06/17/2022     Priority: Medium     Due to oxcarbazepine . Na+ 121-upper 120s range      Melanoma of cheek (H) 04/07/2022     Priority: Medium     Check 3.24 for follow-up [facility]      Tachy-edinson syndrome (H) 05/29/2019     Priority: Medium     Added automatically from request for surgery [id number]      Status post total shoulder arthroplasty, right 11/12/2017     Priority: Medium    Mixed hyperlipidemia 06/21/2017     Priority: Medium    DJD (degenerative joint disease) of knee 05/09/2017     Priority: Medium     Formatting of this note might be different from the original.  DJD (degenerative joint disease) of knee--left TKA      Chronic atrial fibrillation (H) 04/21/2017     Priority: Medium    Coronary artery disease of native artery of native heart with stable angina pectoris 01/11/2016     Priority: Medium     Cardiac cath and PCI 1994. Cardiac Cath 9/2015: BRIDGET to LAD  Replacing diagnoses that were inactivated after the 10/1/2021 regulatory import.      History of myocardial infarction      Priority: Medium     PTCA      Paroxysmal supraventricular tachycardia      Priority: Medium     On metoprolol      Actinic keratoses 07/17/2013     Priority: Medium    Hyperlipidemia with target LDL less than 70      Priority: Medium    Benign hypertension      Priority: Medium      Past Medical History:   Diagnosis Date    Actinic cheilitis 07/17/2013    Lower lip, left     Actinic keratosis     Allergic rhinitis     Anxiety 3/1/23    Arthritis 2004    Basal cell carcinoma     Benign hypertension     CAD (coronary artery disease)     Cardiac cath and PCI 1994. Cardiac Cath 9/2015: BRIDGET to LAD     Hearing problem 1995    Wear hearing aids    Hyperlipidemia     Malignant melanoma     Morbid obesity 01/11/2016    Paroxysmal supraventricular tachycardia     on metoprolol    Permanent atrial fibrillation 04/21/2017    Squamous cell carcinoma     Tachy-edinson syndrome 05/29/2019     Past Surgical History:   Procedure Laterality Date    ADENOIDECTOMY  Childhood    ANGIOPLASTY  1994    in [place]    ANKLE SURGERY  07/13/2005    right ankle    ARTHROPLASTY HIP Right 10/2009    BIOPSY NODE SENTINEL Left 03/30/2023    Procedure: BIOPSY, LYMPH NODE, SENTINEL;  Surgeon: Rolando Minaya MD;  Location: [facility] OR    COLONOSCOPY  4/25/12    EP PERM PACER SINGLE LEAD N/A 05/31/2019    [employer] single lead pacemaker    EXCISE MASS CHEEK Left 03/30/2023    Procedure: wide local excision of left cheek melanoma;  Surgeon: Rolando Minaya MD;  Location: [facility] OR    EXCISE MASS CHEEK WITH FLAP PEDICLE Left 04/13/2023    Procedure: Left cervical Facial flap per closure of left cheek Defect;  Surgeon: Ila Sanchez MD;  Location: [facility] OR    Facial biopsy - Melanoma      GENITOURINARY SURGERY  10/20/23    Prostate surgery    HEART CATH STENT COR W/WO PTCA  09/23/2015    BRIDGET stent mid LAD    IR CHEST PORT PLACEMENT > 5 YRS OF AGE  7/19/2023    LASER SURGERY OF EYE  06/01/2002    MOHS MICROGRAPHIC PROCEDURE  06/12/2004    squamous cell carcinoma right temple    SINUS SURGERY  07/11/2006    TONSILLECTOMY  Childhood     Current Outpatient Medications   Medication Sig Dispense Refill    acetaminophen (TYLENOL) 650 MG CR tablet Take 1,300 mg by mouth 2 times daily.      apixaban ANTICOAGULANT (ELIQUIS ANTICOAGULANT) 5 MG tablet Take 1 tablet (5 mg) by mouth 2 times daily 180 tablet 3    atorvastatin (LIPITOR) 80 MG tablet Take 1 tablet (80 mg) by mouth at bedtime 90 tablet 3    carvedilol (COREG) 3.125 MG tablet Take 1 tablet (3.125 mg) by mouth 2 times daily (with meals) 180 tablet 3    clobetasol propionate (TEMOVATE) 0.05 %  external cream Apply to AA BID x 1-2 weeks then PRN. Do not apply to face. 60 g 3    fluticasone (FLONASE) 50 MCG/ACT nasal spray INSTILL TWO SPRAYS INTO BOTH NOSTRILS DAILY 48 g 1    gabapentin (NEURONTIN) 300 MG capsule Take 3 capsules (900 mg) by mouth 3 times daily. 300 capsule 0    ketoconazole (NIZORAL) 2 % external cream Apply topically 2 times daily as needed. For face. FAX REFILL REQUESTS TO [facility]: [phone]      ketoconazole (NIZORAL) 2 % external shampoo Use daily as needed 120 mL 11    ketotifen fumarate 0.035% 0.035 % SOLN ophthalmic solution Place 1 drop into both eyes 2 times daily as needed for itching.      nitroGLYcerin (NITROSTAT) 0.4 MG sublingual tablet Place 1 tablet (0.4 mg) under the tongue every 5 minutes as needed for chest pain. May repeat twice for a total of 3 tablets.  If chest pain not relieved, call 911 25 tablet 11    OXcarbazepine (TRILEPTAL) 150 MG tablet Take 1 tablet (150 mg) by mouth 3 times daily.      OXcarbazepine (TRILEPTAL) 300 MG tablet Take 1 tablet (300 mg) by mouth 3 times daily.      oxyCODONE (ROXICODONE) 5 MG tablet Take 1 tablet (5 mg) by mouth every 8 hours as needed for moderate pain. 12 tablet 0    polyethylene glycol-propylene glycol (SYSTANE ULTRA) 0.4-0.3 % SOLN ophthalmic solution Place 1 drop into both eyes as needed for dry eyes.      spironolactone (ALDACTONE) 25 MG tablet Take 0.5 tablets (12.5 mg) by mouth daily. 45 tablet 3    triamcinolone (KENALOG) 0.1 % external lotion Apply to scalp BID x 1-2 weeks then PRN only 60 mL 3    albuterol (PROAIR HFA/PROVENTIL HFA/VENTOLIN HFA) 108 (90 Base) MCG/ACT inhaler Inhale 2 puffs into the lungs every 6 hours as needed for wheezing. (Patient not taking: Reported on 6/26/2025) 18 g 0       Allergies   Allergen Reactions    Cats      Cat Dander    Dogs      Dog Dander    Hydromorphone      Other reaction(s): Confusion    Pollen Extract         Social History     Tobacco Use    Smoking status: Former      "Current packs/day: 0.00     Average packs/day: 0.5 packs/day for 10.0 years (5.0 ttl pk-yrs)     Types: Cigarettes, Cigars, Pipe     Start date: 1966     Quit date: 1974     Years since quittin.1     Passive exposure: Never    Smokeless tobacco: Never    Tobacco comments:     Also smoked from 1985 - 1990   Substance Use Topics    Alcohol use: Not Currently     Comment: Very seldom social drink       History   Drug Use No               Objective    /78   Pulse 63   Temp 97.7  F (36.5  C)   Resp 16   Ht 1.803 m (5' 11\")   Wt 111 kg (244 lb 11.2 oz)   SpO2 95%   BMI 34.13 kg/m     Estimated body mass index is 34.13 kg/m  as calculated from the following:    Height as of this encounter: 1.803 m (5' 11\").    Weight as of this encounter: 111 kg (244 lb 11.2 oz).  Physical Exam  GENERAL: alert, no distress, obese, frail, and elderly  EYES: Eyes grossly normal to inspection, PERRL and conjunctivae and sclerae normal  HENT: normal cephalic/atraumatic, nose and mouth without ulcers or lesions, oropharynx clear, and oral mucous membranes moist  NECK: no adenopathy, no asymmetry, masses, or scars  RESP: decreased breath sounds L lower posterior  CV: regular rhythm, + bilateral lower extremity trace amt of pitting edema to mid shin    ABDOMEN: soft, nontender, no hepatosplenomegaly, no masses and bowel sounds normal  MS: no gross musculoskeletal defects noted, no edema  SKIN: no suspicious lesions or rashes  NEURO: Normal strength and tone, mentation intact and speech normal  PSYCH: mentation appears normal, affect normal/bright  LYMPH: no cervical adenopathy    Recent Labs   Lab Test 25  1116 25  1108 25  1058 25  1121 25  0605   HGB 13.6  --   --   --  12.0*     --   --   --  225   NA  --  125* 128*   < > 137   POTASSIUM  --  4.7 4.4   < > 4.5   CR  --  0.53* 0.55*   < > 0.47*    < > = values in this interval not displayed.        Diagnostics  Recent Results " (from the past 24 hours)   CBC with platelets    Collection Time: 06/26/25 11:16 AM   Result Value Ref Range    WBC Count 7.7 4.0 - 11.0 10e3/uL    RBC Count 4.12 (L) 4.40 - 5.90 10e6/uL    Hemoglobin 13.6 13.3 - 17.7 g/dL    Hematocrit 37.4 (L) 40.0 - 53.0 %    MCV 91 78 - 100 fL    MCH 33.0 26.5 - 33.0 pg    MCHC 36.4 31.5 - 36.5 g/dL    RDW 12.5 10.0 - 15.0 %    Platelet Count 160 150 - 450 10e3/uL      Last Comprehensive Metabolic Panel:  Lab Results   Component Value Date     (L) 07/05/2025    POTASSIUM 4.6 07/05/2025    CHLORIDE 95 (L) 07/05/2025    CO2 25 07/05/2025    ANIONGAP 11 07/05/2025    GLC 80 07/05/2025    BUN 10.1 07/05/2025    CR 0.51 (L) 07/05/2025    GFRESTIMATED >90 07/05/2025    BENJI 9.3 07/05/2025         EKG: paced ventricular complexes , isolated PVC. Rate 80     Revised Cardiac Risk Index (RCRI)  The patient has the following serious cardiovascular risks for perioperative complications:   - Coronary Artery Disease (MI, positive stress test, angina, Qs on EKG) = 1 point     RCRI Interpretation: 1 point: Class II (low risk - 0.9% complication rate)       The longitudinal plan of care for the diagnosis(es)/condition(s) as documented were addressed during this visit. Due to the added complexity in care, I will continue to support Alden in the subsequent management and with ongoing continuity of care.      Signed Electronically by: Jeronimo Painter MD  A copy of this evaluation report is provided to the requesting physician.

## 2025-06-26 NOTE — H&P (VIEW-ONLY)
Preoperative Evaluation  [address] [address]  Phone: [phone]  Primary Provider: Jeronimo Painter MD  Pre-op Performing Provider: Jeronimo Painter MD  Jun 26, 2025 6/25/2025   Surgical Information   What procedure is being done? Inguinal hernia surgery   Facility or Hospital where procedure/surgery will be performed: [facility]   Who is doing the procedure / surgery? Amanda Wall MD   Date of surgery / procedure: 7/10/25   Time of surgery / procedure: Early A.M.   Where do you plan to recover after surgery? at home with family     Fax number for surgical facility: Note does not need to be faxed, will be available electronically in Epic.    Assessment & Plan     The proposed surgical procedure is considered INTERMEDIATE risk.    Pre-op exam  Right inguinal hernia  SIADH (syndrome of inappropriate ADH production)  Benign hypertension  Trigeminal neuralgia     Implanted Device   - Type of device: pacemaker Patient advised to bring device information on day of surgery.       - No identified additional risk factors other than previously addressed    Preoperative Medication Instructions  Antiplatelet or Anticoagulation Medication Instructions   - apixaban (Eliquis), edoxaban (Savaysa), rivaroxaban (Xarelto): Bleeding risk is moderate or high for this procedure AND CrCl  (>=) 50 mL/min. DO NOT TAKE 2 days before surgery.     Recommendation  Approval given to proceed with proposed procedure, without further diagnostic evaluation.        Jose Ruano is a 79 year old, presenting for the following:  Pre-Op Exam        HPI: Elderly gentleman with complicated medical history including trigeminal neuralgia for which he takes Trileptal.  See PMH for long detailed list of current chronic issues.  His Trileptal causes SIADH.  His baseline sodium levels typically in the mid 120s to low 130s depending on the use of additional  meds such as diuretics.  Recent imaging revealed a right inguinal hernia for which he states he is moderately symptomatic from.        6/25/2025   Pre-Op Questionnaire   Have you ever had a heart attack or stroke? (!) YES     Have you ever had surgery on your heart or blood vessels, such as a stent placement, a coronary artery bypass, or surgery on an artery in your head, neck, heart, or legs? (!) YES     Do you have chest pain with activity? No   Do you have a history of heart failure? No   Do you currently have a cold, bronchitis or symptoms of other infection? No   Do you have a cough, shortness of breath, or wheezing? No   Do you or anyone in your family have previous history of blood clots? No   Do you or does anyone in your family have a serious bleeding problem such as prolonged bleeding following surgeries or cuts? No   Have you ever had problems with anemia or been told to take iron pills? No   Have you had any abnormal blood loss such as black, tarry or bloody stools? No   Have you ever had a blood transfusion? No   Are you willing to have a blood transfusion if it is medically needed before, during, or after your surgery? Yes   Have you or any of your relatives ever had problems with anesthesia? (!) YES     Do you have sleep apnea, excessive snoring or daytime drowsiness? (!) YES   Do you have a CPAP machine? (!) NO     Do you have any artifical heart valves or other implanted medical devices like a pacemaker, defibrillator, or continuous glucose monitor? (!) YES   What type of device do you have? Pacemaker   Name of the clinic that manages your device [facility]   Do you have artificial joints? (!) YES   Are you allergic to latex? No     Advance Care Planning    Document on file is a Health Care Directive or POLST.    Preoperative Review of    reviewed - controlled substances reflected in medication list.          Patient Active Problem List    Diagnosis Date Noted    Acute kidney injury  02/11/2025     Priority: Medium    Hypotension, unspecified hypotension type 02/11/2025     Priority: Medium    COVID-19 01/27/2025     Priority: Medium    Metastatic melanoma to lung, right (H) 06/26/2024     Priority: Medium    Trigeminal neuralgia 06/20/2023     Priority: Medium    SIADH (syndrome of inappropriate ADH production) 06/17/2022     Priority: Medium     Due to oxcarbazepine . Na+ 121-upper 120s range      Melanoma of cheek (H) 04/07/2022     Priority: Medium     Check 3.24 for follow-up [facility]      Tachy-edinson syndrome (H) 05/29/2019     Priority: Medium     Added automatically from request for surgery [id number]      Status post total shoulder arthroplasty, right 11/12/2017     Priority: Medium    Mixed hyperlipidemia 06/21/2017     Priority: Medium    DJD (degenerative joint disease) of knee 05/09/2017     Priority: Medium     Formatting of this note might be different from the original.  DJD (degenerative joint disease) of knee--left TKA      Chronic atrial fibrillation (H) 04/21/2017     Priority: Medium    Coronary artery disease of native artery of native heart with stable angina pectoris 01/11/2016     Priority: Medium     Cardiac cath and PCI 1994. Cardiac Cath 9/2015: BRIDGET to LAD  Replacing diagnoses that were inactivated after the 10/1/2021 regulatory import.      History of myocardial infarction      Priority: Medium     PTCA      Paroxysmal supraventricular tachycardia      Priority: Medium     On metoprolol      Actinic keratoses 07/17/2013     Priority: Medium    Hyperlipidemia with target LDL less than 70      Priority: Medium    Benign hypertension      Priority: Medium      Past Medical History:   Diagnosis Date    Actinic cheilitis 07/17/2013    Lower lip, left     Actinic keratosis     Allergic rhinitis     Anxiety 3/1/23    Arthritis 2004    Basal cell carcinoma     Benign hypertension     CAD (coronary artery disease)     Cardiac cath and PCI 1994. Cardiac Cath 9/2015: BRIDGET to LAD     Hearing problem 1995    Wear hearing aids    Hyperlipidemia     Malignant melanoma     Morbid obesity 01/11/2016    Paroxysmal supraventricular tachycardia     on metoprolol    Permanent atrial fibrillation 04/21/2017    Squamous cell carcinoma     Tachy-edinson syndrome 05/29/2019     Past Surgical History:   Procedure Laterality Date    ADENOIDECTOMY  Childhood    ANGIOPLASTY  1994    in [place]    ANKLE SURGERY  07/13/2005    right ankle    ARTHROPLASTY HIP Right 10/2009    BIOPSY NODE SENTINEL Left 03/30/2023    Procedure: BIOPSY, LYMPH NODE, SENTINEL;  Surgeon: Rolando Minaya MD;  Location: [facility] OR    COLONOSCOPY  4/25/12    EP PERM PACER SINGLE LEAD N/A 05/31/2019    [employer] single lead pacemaker    EXCISE MASS CHEEK Left 03/30/2023    Procedure: wide local excision of left cheek melanoma;  Surgeon: Rolando Minaya MD;  Location: [facility] OR    EXCISE MASS CHEEK WITH FLAP PEDICLE Left 04/13/2023    Procedure: Left cervical Facial flap per closure of left cheek Defect;  Surgeon: Ila Sanchez MD;  Location: [facility] OR    Facial biopsy - Melanoma      GENITOURINARY SURGERY  10/20/23    Prostate surgery    HEART CATH STENT COR W/WO PTCA  09/23/2015    BRIDGET stent mid LAD    IR CHEST PORT PLACEMENT > 5 YRS OF AGE  7/19/2023    LASER SURGERY OF EYE  06/01/2002    MOHS MICROGRAPHIC PROCEDURE  06/12/2004    squamous cell carcinoma right temple    SINUS SURGERY  07/11/2006    TONSILLECTOMY  Childhood     Current Outpatient Medications   Medication Sig Dispense Refill    acetaminophen (TYLENOL) 650 MG CR tablet Take 1,300 mg by mouth 2 times daily.      apixaban ANTICOAGULANT (ELIQUIS ANTICOAGULANT) 5 MG tablet Take 1 tablet (5 mg) by mouth 2 times daily 180 tablet 3    atorvastatin (LIPITOR) 80 MG tablet Take 1 tablet (80 mg) by mouth at bedtime 90 tablet 3    carvedilol (COREG) 3.125 MG tablet Take 1 tablet (3.125 mg) by mouth 2 times daily (with meals) 180 tablet 3    clobetasol propionate (TEMOVATE) 0.05 %  external cream Apply to AA BID x 1-2 weeks then PRN. Do not apply to face. 60 g 3    fluticasone (FLONASE) 50 MCG/ACT nasal spray INSTILL TWO SPRAYS INTO BOTH NOSTRILS DAILY 48 g 1    gabapentin (NEURONTIN) 300 MG capsule Take 3 capsules (900 mg) by mouth 3 times daily. 300 capsule 0    ketoconazole (NIZORAL) 2 % external cream Apply topically 2 times daily as needed. For face. FAX REFILL REQUESTS TO [facility]: [phone]      ketoconazole (NIZORAL) 2 % external shampoo Use daily as needed 120 mL 11    ketotifen fumarate 0.035% 0.035 % SOLN ophthalmic solution Place 1 drop into both eyes 2 times daily as needed for itching.      nitroGLYcerin (NITROSTAT) 0.4 MG sublingual tablet Place 1 tablet (0.4 mg) under the tongue every 5 minutes as needed for chest pain. May repeat twice for a total of 3 tablets.  If chest pain not relieved, call 911 25 tablet 11    OXcarbazepine (TRILEPTAL) 150 MG tablet Take 1 tablet (150 mg) by mouth 3 times daily.      OXcarbazepine (TRILEPTAL) 300 MG tablet Take 1 tablet (300 mg) by mouth 3 times daily.      oxyCODONE (ROXICODONE) 5 MG tablet Take 1 tablet (5 mg) by mouth every 8 hours as needed for moderate pain. 12 tablet 0    polyethylene glycol-propylene glycol (SYSTANE ULTRA) 0.4-0.3 % SOLN ophthalmic solution Place 1 drop into both eyes as needed for dry eyes.      spironolactone (ALDACTONE) 25 MG tablet Take 0.5 tablets (12.5 mg) by mouth daily. 45 tablet 3    triamcinolone (KENALOG) 0.1 % external lotion Apply to scalp BID x 1-2 weeks then PRN only 60 mL 3    albuterol (PROAIR HFA/PROVENTIL HFA/VENTOLIN HFA) 108 (90 Base) MCG/ACT inhaler Inhale 2 puffs into the lungs every 6 hours as needed for wheezing. (Patient not taking: Reported on 6/26/2025) 18 g 0       Allergies   Allergen Reactions    Cats      Cat Dander    Dogs      Dog Dander    Hydromorphone      Other reaction(s): Confusion    Pollen Extract         Social History     Tobacco Use    Smoking status: Former      "Current packs/day: 0.00     Average packs/day: 0.5 packs/day for 10.0 years (5.0 ttl pk-yrs)     Types: Cigarettes, Cigars, Pipe     Start date: 1966     Quit date: 1974     Years since quittin.1     Passive exposure: Never    Smokeless tobacco: Never    Tobacco comments:     Also smoked from 1985 - 1990   Substance Use Topics    Alcohol use: Not Currently     Comment: Very seldom social drink       History   Drug Use No               Objective    /78   Pulse 63   Temp 97.7  F (36.5  C)   Resp 16   Ht 1.803 m (5' 11\")   Wt 111 kg (244 lb 11.2 oz)   SpO2 95%   BMI 34.13 kg/m     Estimated body mass index is 34.13 kg/m  as calculated from the following:    Height as of this encounter: 1.803 m (5' 11\").    Weight as of this encounter: 111 kg (244 lb 11.2 oz).  Physical Exam  GENERAL: alert, no distress, obese, frail, and elderly  EYES: Eyes grossly normal to inspection, PERRL and conjunctivae and sclerae normal  HENT: normal cephalic/atraumatic, nose and mouth without ulcers or lesions, oropharynx clear, and oral mucous membranes moist  NECK: no adenopathy, no asymmetry, masses, or scars  RESP: decreased breath sounds L lower posterior  CV: regular rhythm, + bilateral lower extremity trace amt of pitting edema to mid shin    ABDOMEN: soft, nontender, no hepatosplenomegaly, no masses and bowel sounds normal  MS: no gross musculoskeletal defects noted, no edema  SKIN: no suspicious lesions or rashes  NEURO: Normal strength and tone, mentation intact and speech normal  PSYCH: mentation appears normal, affect normal/bright  LYMPH: no cervical adenopathy    Recent Labs   Lab Test 25  1116 25  1108 25  1058 25  1121 25  0605   HGB 13.6  --   --   --  12.0*     --   --   --  225   NA  --  125* 128*   < > 137   POTASSIUM  --  4.7 4.4   < > 4.5   CR  --  0.53* 0.55*   < > 0.47*    < > = values in this interval not displayed.        Diagnostics  Recent Results " (from the past 24 hours)   CBC with platelets    Collection Time: 06/26/25 11:16 AM   Result Value Ref Range    WBC Count 7.7 4.0 - 11.0 10e3/uL    RBC Count 4.12 (L) 4.40 - 5.90 10e6/uL    Hemoglobin 13.6 13.3 - 17.7 g/dL    Hematocrit 37.4 (L) 40.0 - 53.0 %    MCV 91 78 - 100 fL    MCH 33.0 26.5 - 33.0 pg    MCHC 36.4 31.5 - 36.5 g/dL    RDW 12.5 10.0 - 15.0 %    Platelet Count 160 150 - 450 10e3/uL      Last Comprehensive Metabolic Panel:  Lab Results   Component Value Date     (L) 07/05/2025    POTASSIUM 4.6 07/05/2025    CHLORIDE 95 (L) 07/05/2025    CO2 25 07/05/2025    ANIONGAP 11 07/05/2025    GLC 80 07/05/2025    BUN 10.1 07/05/2025    CR 0.51 (L) 07/05/2025    GFRESTIMATED >90 07/05/2025    BENJI 9.3 07/05/2025         EKG: paced ventricular complexes , isolated PVC. Rate 80     Revised Cardiac Risk Index (RCRI)  The patient has the following serious cardiovascular risks for perioperative complications:   - Coronary Artery Disease (MI, positive stress test, angina, Qs on EKG) = 1 point     RCRI Interpretation: 1 point: Class II (low risk - 0.9% complication rate)       The longitudinal plan of care for the diagnosis(es)/condition(s) as documented were addressed during this visit. Due to the added complexity in care, I will continue to support Alden in the subsequent management and with ongoing continuity of care.      Signed Electronically by: Jeronimo Painter MD  A copy of this evaluation report is provided to the requesting physician.

## 2025-06-27 ENCOUNTER — RESULTS FOLLOW-UP (OUTPATIENT)
Dept: INTERNAL MEDICINE | Facility: CLINIC | Age: 80
End: 2025-06-27

## 2025-07-02 RX ORDER — HYDROCORTISONE SODIUM SUCCINATE 100 MG/2ML
100 INJECTION INTRAMUSCULAR; INTRAVENOUS
COMMUNITY
Start: 2024-07-05 | End: 2025-07-08

## 2025-07-05 ENCOUNTER — LAB (OUTPATIENT)
Dept: LAB | Facility: CLINIC | Age: 80
End: 2025-07-05
Payer: COMMERCIAL

## 2025-07-05 DIAGNOSIS — E22.2 SIADH (SYNDROME OF INAPPROPRIATE ADH PRODUCTION): ICD-10-CM

## 2025-07-05 DIAGNOSIS — I48.11 LONGSTANDING PERSISTENT ATRIAL FIBRILLATION (H): ICD-10-CM

## 2025-07-05 DIAGNOSIS — I25.118 CORONARY ARTERY DISEASE OF NATIVE ARTERY OF NATIVE HEART WITH STABLE ANGINA PECTORIS: ICD-10-CM

## 2025-07-05 LAB
ANION GAP SERPL CALCULATED.3IONS-SCNC: 11 MMOL/L (ref 7–15)
BUN SERPL-MCNC: 10.1 MG/DL (ref 8–23)
CALCIUM SERPL-MCNC: 9.3 MG/DL (ref 8.8–10.4)
CHLORIDE SERPL-SCNC: 95 MMOL/L (ref 98–107)
CREAT SERPL-MCNC: 0.51 MG/DL (ref 0.67–1.17)
EGFRCR SERPLBLD CKD-EPI 2021: >90 ML/MIN/1.73M2
GLUCOSE SERPL-MCNC: 80 MG/DL (ref 70–99)
HCO3 SERPL-SCNC: 25 MMOL/L (ref 22–29)
POTASSIUM SERPL-SCNC: 4.6 MMOL/L (ref 3.4–5.3)
SODIUM SERPL-SCNC: 131 MMOL/L (ref 135–145)

## 2025-07-05 PROCEDURE — 36415 COLL VENOUS BLD VENIPUNCTURE: CPT

## 2025-07-05 PROCEDURE — 80048 BASIC METABOLIC PNL TOTAL CA: CPT

## 2025-07-07 RX ORDER — CARVEDILOL 3.12 MG/1
3.12 TABLET ORAL 2 TIMES DAILY WITH MEALS
Qty: 180 TABLET | Refills: 2 | Status: SHIPPED | OUTPATIENT
Start: 2025-07-07

## 2025-07-10 ENCOUNTER — HOSPITAL ENCOUNTER (OUTPATIENT)
Facility: CLINIC | Age: 80
Discharge: HOME | End: 2025-07-10
Attending: SURGERY | Admitting: SURGERY
Payer: COMMERCIAL

## 2025-07-10 ENCOUNTER — ANESTHESIA EVENT (OUTPATIENT)
Dept: SURGERY | Facility: CLINIC | Age: 80
End: 2025-07-10
Payer: COMMERCIAL

## 2025-07-10 ENCOUNTER — ANESTHESIA (OUTPATIENT)
Dept: SURGERY | Facility: CLINIC | Age: 80
End: 2025-07-10
Payer: COMMERCIAL

## 2025-07-10 VITALS
RESPIRATION RATE: 16 BRPM | DIASTOLIC BLOOD PRESSURE: 82 MMHG | BODY MASS INDEX: 33.24 KG/M2 | SYSTOLIC BLOOD PRESSURE: 127 MMHG | TEMPERATURE: 98.1 F | OXYGEN SATURATION: 93 % | WEIGHT: 238.3 LBS | HEART RATE: 70 BPM

## 2025-07-10 DIAGNOSIS — K40.30: ICD-10-CM

## 2025-07-10 DIAGNOSIS — I48.11 LONGSTANDING PERSISTENT ATRIAL FIBRILLATION (H): ICD-10-CM

## 2025-07-10 PROCEDURE — 360N000080 HC SURGERY LEVEL 7, PER MIN: Performed by: SURGERY

## 2025-07-10 PROCEDURE — 250N000009 HC RX 250

## 2025-07-10 PROCEDURE — 710N000009 HC RECOVERY PHASE 1, LEVEL 1, PER MIN: Performed by: SURGERY

## 2025-07-10 PROCEDURE — 250N000013 HC RX MED GY IP 250 OP 250 PS 637: Performed by: ANESTHESIOLOGY

## 2025-07-10 PROCEDURE — 250N000011 HC RX IP 250 OP 636: Performed by: ANESTHESIOLOGY

## 2025-07-10 PROCEDURE — 710N000012 HC RECOVERY PHASE 2, PER MINUTE: Performed by: SURGERY

## 2025-07-10 PROCEDURE — 250N000011 HC RX IP 250 OP 636: Performed by: SURGERY

## 2025-07-10 PROCEDURE — 999N000141 HC STATISTIC PRE-PROCEDURE NURSING ASSESSMENT: Performed by: SURGERY

## 2025-07-10 PROCEDURE — C1781 MESH (IMPLANTABLE): HCPCS | Performed by: SURGERY

## 2025-07-10 PROCEDURE — 250N000009 HC RX 250: Performed by: SURGERY

## 2025-07-10 PROCEDURE — 370N000017 HC ANESTHESIA TECHNICAL FEE, PER MIN: Performed by: SURGERY

## 2025-07-10 PROCEDURE — 49650 LAP ING HERNIA REPAIR INIT: CPT | Mod: AS | Performed by: PHYSICIAN ASSISTANT

## 2025-07-10 PROCEDURE — 258N000003 HC RX IP 258 OP 636

## 2025-07-10 PROCEDURE — 272N000001 HC OR GENERAL SUPPLY STERILE: Performed by: SURGERY

## 2025-07-10 PROCEDURE — 49650 LAP ING HERNIA REPAIR INIT: CPT | Mod: RT | Performed by: SURGERY

## 2025-07-10 PROCEDURE — 250N000011 HC RX IP 250 OP 636

## 2025-07-10 PROCEDURE — 258N000003 HC RX IP 258 OP 636: Performed by: ANESTHESIOLOGY

## 2025-07-10 DEVICE — 3DMAX MID ANATOMICAL MESH, 12 CM X 17 CM (5" X 7"), EXTRA LARGE, RIGHT
Type: IMPLANTABLE DEVICE | Site: INGUINAL | Status: FUNCTIONAL
Brand: 3DMAX

## 2025-07-10 RX ORDER — KETOROLAC TROMETHAMINE 30 MG/ML
INJECTION, SOLUTION INTRAMUSCULAR; INTRAVENOUS PRN
Status: DISCONTINUED | OUTPATIENT
Start: 2025-07-10 | End: 2025-07-10

## 2025-07-10 RX ORDER — LIDOCAINE HYDROCHLORIDE 20 MG/ML
SOLUTION OROPHARYNGEAL
COMMUNITY

## 2025-07-10 RX ORDER — PROPOFOL 10 MG/ML
INJECTION, EMULSION INTRAVENOUS PRN
Status: DISCONTINUED | OUTPATIENT
Start: 2025-07-10 | End: 2025-07-10

## 2025-07-10 RX ORDER — FENTANYL CITRATE 50 UG/ML
INJECTION, SOLUTION INTRAMUSCULAR; INTRAVENOUS PRN
Status: DISCONTINUED | OUTPATIENT
Start: 2025-07-10 | End: 2025-07-10

## 2025-07-10 RX ORDER — SODIUM CHLORIDE, SODIUM LACTATE, POTASSIUM CHLORIDE, CALCIUM CHLORIDE 600; 310; 30; 20 MG/100ML; MG/100ML; MG/100ML; MG/100ML
INJECTION, SOLUTION INTRAVENOUS CONTINUOUS
Status: DISCONTINUED | OUTPATIENT
Start: 2025-07-10 | End: 2025-07-10 | Stop reason: HOSPADM

## 2025-07-10 RX ORDER — HYDROMORPHONE HCL IN WATER/PF 6 MG/30 ML
0.4 PATIENT CONTROLLED ANALGESIA SYRINGE INTRAVENOUS EVERY 5 MIN PRN
Refills: 0 | Status: CANCELLED | OUTPATIENT
Start: 2025-07-10

## 2025-07-10 RX ORDER — HYDROMORPHONE HCL IN WATER/PF 6 MG/30 ML
0.2 PATIENT CONTROLLED ANALGESIA SYRINGE INTRAVENOUS EVERY 5 MIN PRN
Refills: 0 | Status: CANCELLED | OUTPATIENT
Start: 2025-07-10

## 2025-07-10 RX ORDER — SODIUM CHLORIDE, SODIUM LACTATE, POTASSIUM CHLORIDE, CALCIUM CHLORIDE 600; 310; 30; 20 MG/100ML; MG/100ML; MG/100ML; MG/100ML
INJECTION, SOLUTION INTRAVENOUS CONTINUOUS PRN
Status: DISCONTINUED | OUTPATIENT
Start: 2025-07-10 | End: 2025-07-10

## 2025-07-10 RX ORDER — ONDANSETRON 2 MG/ML
INJECTION INTRAMUSCULAR; INTRAVENOUS PRN
Status: DISCONTINUED | OUTPATIENT
Start: 2025-07-10 | End: 2025-07-10

## 2025-07-10 RX ORDER — LIDOCAINE HYDROCHLORIDE 10 MG/ML
10 INJECTION, SOLUTION INFILTRATION; PERINEURAL 3 TIMES DAILY PRN
COMMUNITY
Start: 2025-06-30

## 2025-07-10 RX ORDER — DEXAMETHASONE SODIUM PHOSPHATE 4 MG/ML
INJECTION, SOLUTION INTRA-ARTICULAR; INTRALESIONAL; INTRAMUSCULAR; INTRAVENOUS; SOFT TISSUE PRN
Status: DISCONTINUED | OUTPATIENT
Start: 2025-07-10 | End: 2025-07-10

## 2025-07-10 RX ORDER — BUPIVACAINE HCL/EPINEPHRINE 0.5-1:200K
VIAL (ML) INJECTION PRN
Status: DISCONTINUED | OUTPATIENT
Start: 2025-07-10 | End: 2025-07-10 | Stop reason: HOSPADM

## 2025-07-10 RX ORDER — OXYCODONE HYDROCHLORIDE 5 MG/1
10 TABLET ORAL
Status: DISCONTINUED | OUTPATIENT
Start: 2025-07-10 | End: 2025-07-10 | Stop reason: HOSPADM

## 2025-07-10 RX ORDER — LABETALOL HYDROCHLORIDE 5 MG/ML
10 INJECTION, SOLUTION INTRAVENOUS
Status: DISCONTINUED | OUTPATIENT
Start: 2025-07-10 | End: 2025-07-10 | Stop reason: HOSPADM

## 2025-07-10 RX ORDER — ONDANSETRON 4 MG/1
4 TABLET, ORALLY DISINTEGRATING ORAL EVERY 30 MIN PRN
Status: DISCONTINUED | OUTPATIENT
Start: 2025-07-10 | End: 2025-07-10 | Stop reason: HOSPADM

## 2025-07-10 RX ORDER — MORPHINE SULFATE 4 MG/ML
2 INJECTION, SOLUTION INTRAMUSCULAR; INTRAVENOUS EVERY 5 MIN PRN
Status: DISCONTINUED | OUTPATIENT
Start: 2025-07-10 | End: 2025-07-10 | Stop reason: HOSPADM

## 2025-07-10 RX ORDER — ONDANSETRON 2 MG/ML
4 INJECTION INTRAMUSCULAR; INTRAVENOUS EVERY 30 MIN PRN
Status: DISCONTINUED | OUTPATIENT
Start: 2025-07-10 | End: 2025-07-10 | Stop reason: HOSPADM

## 2025-07-10 RX ORDER — CEFAZOLIN SODIUM/WATER 2 G/20 ML
2 SYRINGE (ML) INTRAVENOUS SEE ADMIN INSTRUCTIONS
Status: DISCONTINUED | OUTPATIENT
Start: 2025-07-10 | End: 2025-07-10 | Stop reason: HOSPADM

## 2025-07-10 RX ORDER — NALOXONE HYDROCHLORIDE 0.4 MG/ML
INJECTION, SOLUTION INTRAMUSCULAR; INTRAVENOUS; SUBCUTANEOUS PRN
Status: DISCONTINUED | OUTPATIENT
Start: 2025-07-10 | End: 2025-07-10

## 2025-07-10 RX ORDER — OXYCODONE HYDROCHLORIDE 5 MG/1
5 TABLET ORAL
Status: COMPLETED | OUTPATIENT
Start: 2025-07-10 | End: 2025-07-10

## 2025-07-10 RX ORDER — CEFAZOLIN SODIUM/WATER 2 G/20 ML
2 SYRINGE (ML) INTRAVENOUS
Status: COMPLETED | OUTPATIENT
Start: 2025-07-10 | End: 2025-07-10

## 2025-07-10 RX ORDER — OXYCODONE HCL 5 MG/5 ML
5 SOLUTION, ORAL ORAL
Status: DISCONTINUED | OUTPATIENT
Start: 2025-07-10 | End: 2025-07-10

## 2025-07-10 RX ORDER — ACETAMINOPHEN 325 MG/1
650 TABLET ORAL
Status: DISCONTINUED | OUTPATIENT
Start: 2025-07-10 | End: 2025-07-10 | Stop reason: HOSPADM

## 2025-07-10 RX ORDER — NALOXONE HYDROCHLORIDE 0.4 MG/ML
0.1 INJECTION, SOLUTION INTRAMUSCULAR; INTRAVENOUS; SUBCUTANEOUS
Status: DISCONTINUED | OUTPATIENT
Start: 2025-07-10 | End: 2025-07-10 | Stop reason: HOSPADM

## 2025-07-10 RX ORDER — LIDOCAINE HYDROCHLORIDE 20 MG/ML
INJECTION, SOLUTION INFILTRATION; PERINEURAL PRN
Status: DISCONTINUED | OUTPATIENT
Start: 2025-07-10 | End: 2025-07-10

## 2025-07-10 RX ORDER — FENTANYL CITRATE 50 UG/ML
50 INJECTION, SOLUTION INTRAMUSCULAR; INTRAVENOUS EVERY 5 MIN PRN
Status: DISCONTINUED | OUTPATIENT
Start: 2025-07-10 | End: 2025-07-10 | Stop reason: HOSPADM

## 2025-07-10 RX ORDER — DEXAMETHASONE SODIUM PHOSPHATE 4 MG/ML
4 INJECTION, SOLUTION INTRA-ARTICULAR; INTRALESIONAL; INTRAMUSCULAR; INTRAVENOUS; SOFT TISSUE
Status: DISCONTINUED | OUTPATIENT
Start: 2025-07-10 | End: 2025-07-10 | Stop reason: HOSPADM

## 2025-07-10 RX ORDER — ALBUTEROL SULFATE 0.83 MG/ML
2.5 SOLUTION RESPIRATORY (INHALATION) EVERY 4 HOURS PRN
Status: DISCONTINUED | OUTPATIENT
Start: 2025-07-10 | End: 2025-07-10 | Stop reason: HOSPADM

## 2025-07-10 RX ORDER — PROPOFOL 10 MG/ML
INJECTION, EMULSION INTRAVENOUS CONTINUOUS PRN
Status: DISCONTINUED | OUTPATIENT
Start: 2025-07-10 | End: 2025-07-10

## 2025-07-10 RX ORDER — HYDRALAZINE HYDROCHLORIDE 20 MG/ML
2.5-5 INJECTION INTRAMUSCULAR; INTRAVENOUS EVERY 10 MIN PRN
Status: DISCONTINUED | OUTPATIENT
Start: 2025-07-10 | End: 2025-07-10 | Stop reason: HOSPADM

## 2025-07-10 RX ORDER — FENTANYL CITRATE 50 UG/ML
25 INJECTION, SOLUTION INTRAMUSCULAR; INTRAVENOUS EVERY 5 MIN PRN
Status: DISCONTINUED | OUTPATIENT
Start: 2025-07-10 | End: 2025-07-10 | Stop reason: HOSPADM

## 2025-07-10 RX ORDER — LIDOCAINE 40 MG/G
CREAM TOPICAL
Status: DISCONTINUED | OUTPATIENT
Start: 2025-07-10 | End: 2025-07-10 | Stop reason: HOSPADM

## 2025-07-10 RX ORDER — MORPHINE SULFATE 4 MG/ML
4 INJECTION, SOLUTION INTRAMUSCULAR; INTRAVENOUS EVERY 5 MIN PRN
Status: DISCONTINUED | OUTPATIENT
Start: 2025-07-10 | End: 2025-07-10 | Stop reason: HOSPADM

## 2025-07-10 RX ADMIN — HYDRALAZINE HYDROCHLORIDE 5 MG: 20 INJECTION INTRAMUSCULAR; INTRAVENOUS at 12:31

## 2025-07-10 RX ADMIN — MORPHINE SULFATE 2 MG: 4 INJECTION INTRAVENOUS at 13:07

## 2025-07-10 RX ADMIN — ROCURONIUM BROMIDE 30 MG: 50 INJECTION, SOLUTION INTRAVENOUS at 10:03

## 2025-07-10 RX ADMIN — SODIUM CHLORIDE, SODIUM LACTATE, POTASSIUM CHLORIDE, AND CALCIUM CHLORIDE: .6; .31; .03; .02 INJECTION, SOLUTION INTRAVENOUS at 12:58

## 2025-07-10 RX ADMIN — Medication 2 G: at 09:37

## 2025-07-10 RX ADMIN — KETOROLAC TROMETHAMINE 15 MG: 30 INJECTION, SOLUTION INTRAMUSCULAR at 11:15

## 2025-07-10 RX ADMIN — LIDOCAINE HYDROCHLORIDE 50 MG: 20 INJECTION, SOLUTION INFILTRATION; PERINEURAL at 09:42

## 2025-07-10 RX ADMIN — ROCURONIUM BROMIDE 50 MG: 50 INJECTION, SOLUTION INTRAVENOUS at 09:42

## 2025-07-10 RX ADMIN — PHENYLEPHRINE HYDROCHLORIDE 100 MCG: 10 INJECTION INTRAVENOUS at 09:57

## 2025-07-10 RX ADMIN — HYDRALAZINE HYDROCHLORIDE 5 MG: 20 INJECTION INTRAMUSCULAR; INTRAVENOUS at 12:04

## 2025-07-10 RX ADMIN — PROPOFOL 80 MG: 10 INJECTION, EMULSION INTRAVENOUS at 09:42

## 2025-07-10 RX ADMIN — MORPHINE SULFATE 2 MG: 4 INJECTION INTRAVENOUS at 13:27

## 2025-07-10 RX ADMIN — SODIUM CHLORIDE, SODIUM LACTATE, POTASSIUM CHLORIDE, AND CALCIUM CHLORIDE: .6; .31; .03; .02 INJECTION, SOLUTION INTRAVENOUS at 09:30

## 2025-07-10 RX ADMIN — OXYCODONE HYDROCHLORIDE 5 MG: 5 TABLET ORAL at 13:30

## 2025-07-10 RX ADMIN — SUGAMMADEX 200 MG: 100 INJECTION, SOLUTION INTRAVENOUS at 11:16

## 2025-07-10 RX ADMIN — DEXAMETHASONE SODIUM PHOSPHATE 8 MG: 4 INJECTION, SOLUTION INTRA-ARTICULAR; INTRALESIONAL; INTRAMUSCULAR; INTRAVENOUS; SOFT TISSUE at 09:42

## 2025-07-10 RX ADMIN — ONDANSETRON 4 MG: 2 INJECTION INTRAMUSCULAR; INTRAVENOUS at 11:12

## 2025-07-10 RX ADMIN — FENTANYL CITRATE 50 MCG: 50 INJECTION INTRAMUSCULAR; INTRAVENOUS at 10:15

## 2025-07-10 RX ADMIN — NALOXONE HYDROCHLORIDE 40 MCG: 0.4 INJECTION, SOLUTION INTRAMUSCULAR; INTRAVENOUS; SUBCUTANEOUS at 11:30

## 2025-07-10 RX ADMIN — PROPOFOL 150 MCG/KG/MIN: 10 INJECTION, EMULSION INTRAVENOUS at 09:42

## 2025-07-10 RX ADMIN — FENTANYL CITRATE 100 MCG: 50 INJECTION INTRAMUSCULAR; INTRAVENOUS at 09:42

## 2025-07-10 RX ADMIN — SODIUM CHLORIDE, SODIUM LACTATE, POTASSIUM CHLORIDE, AND CALCIUM CHLORIDE: .6; .31; .03; .02 INJECTION, SOLUTION INTRAVENOUS at 09:37

## 2025-07-10 ASSESSMENT — ACTIVITIES OF DAILY LIVING (ADL)
ADLS_ACUITY_SCORE: 57
ADLS_ACUITY_SCORE: 59
ADLS_ACUITY_SCORE: 60
ADLS_ACUITY_SCORE: 61
ADLS_ACUITY_SCORE: 60

## 2025-07-10 ASSESSMENT — ENCOUNTER SYMPTOMS: DYSRHYTHMIAS: 1

## 2025-07-10 NOTE — ANESTHESIA PROCEDURE NOTES
Airway       Patient location during procedure: OR       Procedure Start/Stop Times: 7/10/2025 9:46 AM  Staff -        CRNA: Leilani Stout APRN CRNA       Performed By: CRNA  Consent for Airway        Urgency: elective  Indications and Patient Condition       Indications for airway management: ammon-procedural       Induction type:intravenous       Mask difficulty assessment: 1 - vent by mask    Final Airway Details       Final airway type: endotracheal airway       Successful airway: ETT - single  Endotracheal Airway Details        ETT size (mm): 8.0       Cuffed: yes       Successful intubation technique: video laryngoscopy       VL Blade Size: Glidescope 4       Grade View of Cords: 1       Adjucts: stylet       Position: Right       Measured from: gums/teeth       Secured at (cm): 23       Bite block used: None    Post intubation assessment        Placement verified by: capnometry, equal breath sounds and chest rise        Number of attempts at approach: 1       Number of other approaches attempted: 0       Secured with: tape       Ease of procedure: easy       Dentition: Intact and Unchanged    Medication(s) Administered   Medication Administration Time: 7/10/2025 9:46 AM

## 2025-07-10 NOTE — DISCHARGE INSTRUCTIONS
Maximum ibuprofen (Advil) dose from all sources should not exceed 2.5 grams (2,400 mg) per day. You received Toradol, an IV form of Ibuprofen (Motrin) at 11:15 AM.  Do not take any Ibuprofen products until 5:15 PM or after.    Maximum acetaminophen (Tylenol) dose from all sources should not exceed 4 grams (4000 mg) per day.  You have not yet received any acetaminophen (Tylenol) today. You may start at any time.    Restart Eliquis on JULY 13TH.      HOME CARE FOLLOWING INGUINAL/FEMORAL HERNIA REPAIR  ANGE Watson, JOY Meyers, DANYA Hartman, ISAIAH Wall    Special instructions for James Salvador:  --Surgery office appointment recommended with Physician Assistant in 2-3 weeks for postoperative check.  Please contact the surgery office to arrange; phone number: [phone].  We are located at: [address], [address]; [place] [address]  --Please also feel free to call our Nurse Line if you have other questions/concerns prior to your appointment.     DIET:  Start with liquids and gradually resume your regular diet as tolerated.  Drink plenty of fluids.  While taking pain medications, consider use of a stool softener, increase your fiber in your diet, or add a fiber supplement (like Metamucil, Citrucel) to help prevent constipation - a possible side effect of pain medications.    NAUSEA:  If nauseated from the anesthetic/pain meds; rest in bed, get up cautiously with assistance, and drink clear liquids (juice, tea, broth).    ACTIVITY:  Light Activity -- you may immediately be up and about as tolerated.  Walking is encouraged, increase as tolerated.  Driving/Light Work-- when comfortable and off narcotic pain medications.  Strenuous Work/Activity -- limit lifting to 20 pounds for 3 weeks.  Active Sports (running, biking, etc.) -- cautiously resume after 4 weeks.    INCISIONAL CARE:  If you have a dressing in place, keep clean and dry for 48 hours; you may replace the gauze if it becomes  soiled.  After 48 hours you may remove the dressing and shower.  Do not submerse incision in water for 1 week.  If you have a Dermabond dressing (a type of skin glue), you may shower immediately.  Sutures will absorb and need not be removed.  If present, leave the steri-strips (white paper tapes) in place for 14 days after surgery.  If present, leave Dermabond glue in place until it wears/flakes off.  Do not apply lotions, creams, or ointments to incisions.  Expect a variable amount of swelling/black and blue discoloration that may involve the penis/scrotum or labia.  Some numbness around the incision is common.  A lump/ridge under the incision is normal and will gradually resolve.  A fluid cavity in the old hernia pocket is expected and may take weeks or months to fully resolve.    DISCOMFORT:  Local anesthetic placed at surgery should provide relief for 4-8 hours.  Begin taking pain pills before discomfort is severe.  Take the pain medication with some food, when possible, to minimize side effects.  Intermittent use of ice packs to the hernia repair site may help during the first 1-3 weeks after surgery.  Expect gradual improvement.    Recommend the following over the counter medications:  - Ibuprofen (motrin) 600mg every 6 hours (max 2,400mg per day)   - Tylenol (acetaminophen) 500-1000mg every 6 hours (max 4,000mg per day)  - Take with food if GI upset occurs  - If additional pain medication is needed, take the narcotic that you were prescribed.  Over-the-counter anti-inflammatory medications (i.e. Ibuprofen/Advil/Motrin or Naprosyn/Aleve) may be used per package instructions to decrease swelling and sensitivity at the repair site.  DO NOT TAKE these Anti-inflammatory medications if your primary physician has advised against doing so, or if you have acid reflux, ulcer, or bleeding disorder, or take blood-thinner medications.  Call your primary physician or the surgery office if you have medication  questions.    -CONTACT US IF THE FOLLOWING DEVELOPS:   1. A fever that is above 101     2. Increased redness, warmth, drainage, bleeding, or swelling.   3. Pain that is not relieved by rest/ice and your prescription.   4.  Increasing pain after 48 hours.   5. Drainage that is thick, cloudy, yellow, green or white.   6. Any other questions or concerns.      FREQUENTLY ASKED QUESTIONS:    Q:  How should my incision look?    A:  Normally your incision will appear slightly swollen with light redness directly along the incision itself as it heals.  It may feel like a bump or ridge as the healing/scarring happens, and over time (3-4 months) this bump or ridge feeling should slowly go away.  In general, clear or pink watery drainage can be normal at first as your incision heals, but should decrease over time.    Q:  How do I know if my incision is infected?  A:  Look at your incision for signs of infection, like redness around the incision spreading to surrounding skin, or drainage of cloudy or foul-smelling drainage.  If you feel warm, check your temperature to see if you are running a fever.    **If any of these things occur, please notify the nurse at our office.  We may need you to come into the office for an incision check.      Q:  How do I take care of my incision?  A:  If you have a dressing in place - Starting the day after surgery, replace the dressing 1-2 times a day until there is no further drainage from the incision.  At that time, a dressing is no longer needed.  Try to minimize tape on the skin if irritation is occurring at the tape sites.  If you have significant irritation from tape on the skin, please call the office to discuss other method of dressing your incision.    Small pieces of tape called  steri-strips  may be present directly overlying your incision; these may be removed 10 days after surgery unless otherwise specified by your surgeon.  If these tapes start to loosen at the ends, you may trim  them back until they fall off or are removed.    A:  If you had  Dermabond  tissue glue used as a dressing (this causes your incision to look shiny with a clear covering over it) - This type of dressing wears off with time and does not require more dressings over the top unless it is draining around the glue as it wears off.  Do not apply ointments or lotions over the incisions until the glue has completely worn off.    Q:  There is a piece of tape or a sticky  lead  still on my skin.  Can I remove this?  A:  Sometimes the sticky  leads  used for monitoring during surgery or for evaluation in the emergency department are not all removed while you are in the hospital.  These sometimes have a tab or metal dot on them.  You can easily remove these on your own, like taking off a band-aid.  If there is a gel substance under the  lead , simply wipe/clean it off with a washcloth or paper towel.      Q:  What can I do to minimize constipation (very hard stools, or lack of stools)?  A:  Stay well hydrated.  Increase your dietary fiber intake or take a fiber supplement -with plenty of water.  Walk around frequently.  You may consider an over-the-counter stool-softener.  Your Pharmacist can assist you with choosing one that is stocked at your pharmacy.  Constipation is also one of the most common side effects of pain medication.  If you are using pain medication, be pro-active and try to PREVENT problems with constipation by taking the steps above BEFORE constipation becomes a problem.    Q:  What do I do if I need more pain medications?  A:  Call the office to receive refills.  Be aware that certain pain meds cannot be called into a pharmacy and actually require a paper prescription.  A change may be made in your pain med as you progress thru your recovery period or if you have side effects to certain meds.    --Pain meds are NOT refilled after 5pm on weekdays, and NOT AT ALL on the weekends, so please look ahead to prevent  problems.    Q:  Why am I having a hard time sleeping now that I am at home?  A:  Many medications you receive while you are in the hospital can impact your sleep for a number of days after your surgery/hospitalization.  Decreased level of activity and naps during the day may also make sleeping at night difficult.  Try to minimize day-time naps, and get up frequently during the day to walk around your home during your recovery time.  Sleep aides may be of some help, but are not recommended for long-term use.      Q:  I am having some back discomfort.  What should I do?  A:  This may be related to certain positioning that was required for your surgery, extended periods of time in bed, or other changes in your overall activity level.  You may try ice, heat, acetaminophen, or ibuprofen to treat this temporarily.  Note that many pain medications have acetaminophen in them and would state this on the prescription bottle.  Be sure not to exceed the maximum of 4000mg per day of acetaminophen.     **If the pain you are having does not resolve, is severe, or is a flare of back pain you have had on other occasions prior to surgery, please contact your primary physician for further recommendations or for an appointment to be examined at their office.    Q:  Why am I having headaches?  A:  Headaches can be caused by many things:  caffeine withdrawal, use of pain meds, dehydration, high blood pressure, lack of sleep, over-activity/exhaustion, flare-up of usual migraine headaches.  If you feel this is related to muscle tension (a band-like feeling around the head, or a pressure at the low-back of the head) you may try ice or heat to this area.  You may need to drink more fluids (try electrolyte drink like Gatorade), rest, or take your usual migraine medications.   **If your headaches do not resolve, worsen, are accompanied by other symptoms, or if your blood pressure is high, please call your primary physician for recommendation  and/or examination.    Q:  I am unable to urinate.  What do I do?  A:  A small percentage of people can have difficulty urinating initially after surgery.  This includes being able to urinate only a very small amount at a time and feeling discomfort or pressure in the very low abdomen.  This is called  urinary retention , and is actually an urgent situation.  Proceed to your nearest Emergency department for evaluation (not an Urgent Care Center).  Sometimes the bladder does not work correctly after certain medications you receive during surgery, or related to certain procedures.  You may need to have a catheter placed until your bladder recovers.  When planning to go to an Emergency department, it may help to call the ER to let them know you are coming in for this problem after a surgery.  This may help you get in quicker to be evaluated.  **If you have symptoms of a urinary tract infection, please contact your primary physician for the proper evaluation and treatment.        If you have other questions, please call the office Monday thru Friday between 8am and 4:30pm to discuss with the nurse or physician assistant.  #[phone]    There is a surgeon ON CALL on weekday evenings and over the weekend in case of urgent need only, and may be contacted at the same number.    If you are having an emergency, call 911 or proceed to your nearest emergency department.

## 2025-07-10 NOTE — ANESTHESIA POSTPROCEDURE EVALUATION
Patient: James Salvador    Procedure: Procedure(s):  ROBOT-ASSISTED INGUINAL HERNIORRHAPHY, right, with mesh       Anesthesia Type:  General    Note:  Disposition: Outpatient   Postop Pain Control: Uneventful            Sign Out: Well controlled pain   PONV: No   Neuro/Psych: Uneventful            Sign Out: Acceptable/Baseline neuro status   Airway/Respiratory: Uneventful            Sign Out: Acceptable/Baseline resp. status   CV/Hemodynamics: Uneventful            Sign Out: Acceptable CV status; No obvious hypovolemia; No obvious fluid overload   Other NRE:    DID A NON-ROUTINE EVENT OCCUR? No           Last vitals:  Vitals Value Taken Time   /61 07/10/25 13:40   Temp 96.62  F (35.9  C) 07/10/25 13:52   Pulse 52 07/10/25 13:52   Resp 18 07/10/25 13:52   SpO2 91 % 07/10/25 13:52   Vitals shown include unfiled device data.    Electronically Signed By: Rosa Maria Lawson MD  July 10, 2025  2:41 PM

## 2025-07-10 NOTE — INTERVAL H&P NOTE
"I have reviewed the surgical (or preoperative) H&P that is linked to this encounter, and examined the patient. There are no significant changes    Clinical Conditions Present on Arrival:  Clinically Significant Risk Factors Present on Admission        # Hyperkalemia: Highest K = 5.8 mmol/L in last 30 days, will monitor as appropriate  # Hyponatremia: Lowest Na = 122 mmol/L in last 30 days, will monitor as appropriate  # Hypochloremia: Lowest Cl = 87 mmol/L in last 30 days, will monitor as appropriate        # Drug Induced Coagulation Defect: home medication list includes an anticoagulant medication       # Obesity: Estimated body mass index is 33.24 kg/m  as calculated from the following:    Height as of 6/26/25: 1.803 m (5' 11\").    Weight as of this encounter: 108.1 kg (238 lb 4.8 oz).       "

## 2025-07-10 NOTE — OP NOTE
[facility] General Surgery Operative Note    Pre-operative diagnosis: Right Inguinoscrotal hernia   Post-operative diagnosis: same   Procedure: Robotic assisted laparoscopic right inguinal hernia repair   Surgeon: Amanda Wall MD   Assistant(s): Bailey Romero PA-C  The Physician Assistant was medically necessary for their expertise in prepping, camera management, exchanging robotic instruments, passing suture and mesh through the robotic ports, and suturing   Anesthesia: general   Estimated blood loss:  Specimen:  FINDINGS:  5 cc  none  Very large inguinoscrotal indirect defect on the right side. Repaired with [id number] 3D max midweight. Left side no hernia.       Indication for Procedure: This is a 80 year old male who presented to the office with a symptomatic right inguinal hernia. After discussing risks and benefits, they agreed to proceed with robotic approach.  Description of procedure:  Patient was brought to the operating room, placed on the operating table in supine position. Anesthesia was induced. His pressure points were padded and he was secured with a safety strap. A timeout was called to verify the patient, site of procedure and procedure to be performed.  Access was gained in the left upper quadrant with a 5mm visiport trocar using a 0 degree laparoscope under direct visualization. The abdomen insufflated with CO2. Three 8mm trocars were placed across the upper abdomen. The 5mm port was replaced with an 8mm port.  A large right indirect hernia was noted. A right ilioinguinal nerve block was completed with 0.25%marcaine with epinephrine.   The peritoneum was opened transversely from the right medial umbilical ligament to the lateral abdominal wall 5cm above the internal ring and ASIS. The peritoneum was dissected away from the abdominal wall focusing first on the medial dissection to identify the pubic tubercle and coopers ligament, then the lateral dissection toward the iliac crest.  Laterally, care was taken to stay directly on the peritoneum.   The indirect sac was then carefully reduced and the cord structures were parietalized. This took significant time due to the very large defect. The cord was checked for a cord lipoma, and none seen. A [id number] 3D max midweight mesh (polypropylene) was placed in the pocket and unfurled to lay flat and completely cover the myopectineal orifice, crossing the pubic bone medially, 2cm below the jarvis's ligament and ensuring it would be above the inferior peritoneal edge. The mesh was fixed with multiple 3-0 vicryl sutures at coopers, and superior-lateral mesh corner. The peritoneum was closed in a running fashion with a 3-0 stratafix suture. A hole in the peritoneum was closed with 3-0 vicryl. Prior to tying down the last suture, a suction catheter was brought into the space and evacuate some of the gas from the preperitoneal space. The medial portion of the mesh was curling up slightly and this was repositioned through a small peritoneal rent. The suction catheter was brought into the space and evacuate the gas from the preperitoneal spaceand the inferior edges of the mesh was seen to be laying flat with no curling. The last peritoneal hole was closed with 3-0 vicryl.  The cavity was inspected and there was adequate hemostasis.   The trocars were then removed and pneumoperitoneum evacuated. The skin was closed with 4-0 suture. Sterile dressings were applied. At the end of the operation, all sponge, instrument, and needle counts were correct.     Amanda Wall MD

## 2025-07-10 NOTE — ANESTHESIA CARE TRANSFER NOTE
Patient: James Salvador    Procedure: Procedure(s):  ROBOT-ASSISTED INGUINAL HERNIORRHAPHY, right, with mesh       Diagnosis: Non-recurrent inguinal hernia of right side with obstruction [K40.30]  Diagnosis Additional Information: No value filed.    Anesthesia Type:   General     Note:    Oropharynx: oral airway in place  Level of Consciousness: drowsy  Oxygen Supplementation: face mask  Level of Supplemental Oxygen (L/min / FiO2): 6  Independent Airway: airway patency satisfactory and stable  Dentition: dentition unchanged  Vital Signs Stable: post-procedure vital signs reviewed and stable  Report to RN Given: handoff report given  Patient transferred to: PACU    Handoff Report: Identifed the Patient, Identified the Reponsible Provider, Reviewed the pertinent medical history, Discussed the surgical course, Reviewed Intra-OP anesthesia mangement and issues during anesthesia, Set expectations for post-procedure period and Allowed opportunity for questions and acknowledgement of understanding      Vitals:  Vitals Value Taken Time   /74 07/10/25 11:36   Temp 96.62  F (35.9  C) 07/10/25 11:38   Pulse 59 07/10/25 11:38   Resp 37 07/10/25 11:38   SpO2 95 % 07/10/25 11:38   Vitals shown include unfiled device data.    Electronically Signed By: NAV Sutton CRNA  July 10, 2025  11:40 AM

## 2025-07-10 NOTE — ANESTHESIA PREPROCEDURE EVALUATION
Anesthesia Pre-Procedure Evaluation    Patient: James Salvador   MRN: [id number] : 1945          Procedure : Procedure(s):  ROBOT-ASSISTED INGUINAL HERNIORRHAPHY, right, possible left         Past Medical History:   Diagnosis Date    Actinic cheilitis 2013    Lower lip, left     Actinic keratosis     Allergic rhinitis     Anxiety 3/1/23    Arthritis 2004    Basal cell carcinoma     Benign hypertension     CAD (coronary artery disease)     Cardiac cath and PCI . Cardiac Cath 2015: BRIDGET to LAD    Hearing problem     Wear hearing aids    Hyperlipidemia     Malignant melanoma     Morbid obesity 2016    Paroxysmal supraventricular tachycardia     on metoprolol    Permanent atrial fibrillation 2017    Squamous cell carcinoma     Tachy-edinson syndrome 2019      Past Surgical History:   Procedure Laterality Date    ADENOIDECTOMY  Childhood    ANGIOPLASTY      in [place]    ANKLE SURGERY  2005    right ankle    ARTHROPLASTY HIP Right 10/2009    BIOPSY NODE SENTINEL Left 2023    Procedure: BIOPSY, LYMPH NODE, SENTINEL;  Surgeon: Rolando Minaya MD;  Location: [facility] OR    COLONOSCOPY  12    EP PERM PACER SINGLE LEAD N/A 2019    Medtronic single lead pacemaker    EXCISE MASS CHEEK Left 2023    Procedure: wide local excision of left cheek melanoma;  Surgeon: Rolando Minaya MD;  Location: [facility]    EXCISE MASS CHEEK WITH FLAP PEDICLE Left 2023    Procedure: Left cervical Facial flap per closure of left cheek Defect;  Surgeon: Ila Sanchez MD;  Location: [facility] OR    Facial biopsy - Melanoma      GENITOURINARY SURGERY  10/20/23    Prostate surgery    HEART CATH STENT COR W/WO PTCA  2015    BRIDGET stent mid LAD    IR CHEST PORT PLACEMENT > 5 YRS OF AGE  2023    LASER SURGERY OF EYE  2002    MOHS MICROGRAPHIC PROCEDURE  2004    squamous cell carcinoma right temple    SINUS SURGERY  2006    TONSILLECTOMY  Childhood       Allergies   Allergen Reactions    Bee Pollen     Cats      Cat Dander    Dogs      Dog Dander    Hydromorphone      Other reaction(s): Confusion    Pollen Extract       Social History     Tobacco Use    Smoking status: Former     Current packs/day: 0.00     Average packs/day: 0.5 packs/day for 10.0 years (5.0 ttl pk-yrs)     Types: Cigarettes, Cigars, Pipe     Start date: 1966     Quit date: 1974     Years since quittin.2     Passive exposure: Never    Smokeless tobacco: Never    Tobacco comments:     Also smoked from 1985 - 1990   Substance Use Topics    Alcohol use: Not Currently     Comment: Very seldom social drink      Wt Readings from Last 1 Encounters:   07/10/25 108.1 kg (238 lb 4.8 oz)        Anesthesia Evaluation            ROS/MED HX  ENT/Pulmonary:  - neg pulmonary ROS     Neurologic: Comment: Trigeminal neuralgia      (+)   dementia,                             Cardiovascular:     (+)  hypertension- -  CAD - past MI - stent- and .              pacemaker (interrogation 2025 Device interrogation (2025) showed 44% V-paced, underlying A-fib with controlled rates One episode of 6 beats VT), Reason placed: tachy edinson syndrome.         dysrhythmias, a-fib,        Previous cardiac testing   Echo: Date: 2025 Results:    Interpretation Summary  Left ventricular size is normal.  Left ventricular function is decreased. The ejection fraction is 50-55%  (borderline).  Diastolic function not assessed due to atrial fibrillation.  Right ventricular function, chamber size, wall motion, and thickness are  normal.  Severe biatrial enlargement is present.  Pulmonary artery systolic pressure is normal.  The inferior vena cava is normal.  No pericardial effusion is present.    Stress Test:  Date: Results:    ECG Reviewed:  Date: Results:    Cath:  Date: Results:      METS/Exercise Tolerance:     Hematologic:       Musculoskeletal:       GI/Hepatic:       Renal/Genitourinary: Comment: RACHEL  "related to trileptal for trigeminal neuralgia  Chronic hyponatremia, most recent labs 131      Endo:     (+)               Obesity,       Psychiatric/Substance Use:       Infectious Disease:       Malignancy:   (+) Malignancy,     Other:              Physical Exam  Airway  Mallampati: II  TM distance: >3 FB  Neck ROM: full  Mouth opening: >= 4 cm    Cardiovascular - normal exam   Dental     Pulmonary - normal exam      Neurological   Other Findings       OUTSIDE LABS:  CBC:   Lab Results   Component Value Date    WBC 7.7 06/26/2025    WBC 6.9 02/17/2025    HGB 13.6 06/26/2025    HGB 12.0 (L) 02/17/2025    HCT 37.4 (L) 06/26/2025    HCT 37.2 (L) 02/17/2025     06/26/2025     02/17/2025     BMP:   Lab Results   Component Value Date     (L) 07/05/2025     (L) 06/26/2025    POTASSIUM 4.6 07/05/2025    POTASSIUM 5.8 (H) 06/26/2025    CHLORIDE 95 (L) 07/05/2025    CHLORIDE 87 (L) 06/26/2025    CO2 25 07/05/2025    CO2 26 06/26/2025    BUN 10.1 07/05/2025    BUN 8.5 06/26/2025    CR 0.51 (L) 07/05/2025    CR 0.54 (L) 06/26/2025    GLC 80 07/05/2025    GLC 86 06/26/2025     COAGS:   Lab Results   Component Value Date    INR 1.06 05/29/2024     POC: No results found for: \"BGM\", \"HCG\", \"HCGS\"  HEPATIC:   Lab Results   Component Value Date    ALBUMIN 2.8 (L) 02/15/2025    PROTTOTAL 5.7 (L) 02/15/2025    ALT 61 02/15/2025    AST 47 (H) 02/15/2025    ALKPHOS 102 02/15/2025    BILITOTAL 0.5 02/15/2025     OTHER:   Lab Results   Component Value Date    LACT 0.8 02/12/2025    A1C 5.5 07/18/2018    BENJI 9.3 07/05/2025    PHOS 3.0 02/15/2025    MAG 1.7 02/17/2025    TSH 1.48 10/23/2023    SED 10 10/03/2014       Anesthesia Plan    ASA Status:  3      NPO Status: NPO Appropriate   Anesthesia Type: General.  Airway: oral.  Induction: intravenous.  Maintenance: Balanced.   Techniques and Equipment:       - Monitoring Plan: standard ASA monitoring     Consents    Anesthesia Plan(s) and associated risks, benefits, " "and realistic alternatives discussed. Questions answered and patient/representative(s) expressed understanding.     - Discussed: CRNA     - Discussed with:  Patient               Postoperative Care    Pain management: non-narcotic analgesics, plan for postoperative opioid use, multimodal analgesia.     Comments:    Other Comments: Pacemaker- 44%  on most recent device check  Have magnet available    Propofol drip  Patient does NOT want hydromorphone- ok w fentanyl and morphine                 Rosa Maria Lawson MD    I have reviewed the pertinent notes and labs in the chart from the past 30 days and (re)examined the patient.  Any updates or changes from those notes are reflected in this note.    Clinically Significant Risk Factors Present on Admission                # Drug Induced Coagulation Defect: home medication list includes an anticoagulant medication    # Hypertension: Noted on problem list           # Obesity: Estimated body mass index is 33.24 kg/m  as calculated from the following:    Height as of 6/26/25: 1.803 m (5' 11\").    Weight as of this encounter: 108.1 kg (238 lb 4.8 oz).       # Financial/Environmental Concerns:     # Pacemaker present             "

## 2025-07-15 ENCOUNTER — TELEPHONE (OUTPATIENT)
Dept: SURGERY | Facility: CLINIC | Age: 80
End: 2025-07-15
Payer: COMMERCIAL

## 2025-07-15 NOTE — TELEPHONE ENCOUNTER
Name of caller: spouse, Renea     Reason for Call:  Calling for incision care,. Wanting to know when she can remove the tape?     Surgeon:  Amanda Wall MD    Recent Surgery:  Yes.    If yes, when & what type:Robotic assisted laparoscopic right inguinal hernia repair, 7/10,      Best phone number to reach pt at is: [phone]  Ok to leave a message with medical info? Yes.    Pharmacy preferred (if calling for a refill): N/A

## 2025-07-15 NOTE — TELEPHONE ENCOUNTER
Surgery: ROBOT-ASSISTED INGUINAL HERNIORRHAPHY, right, with mesh   Surgery date: 7/10/25  Surgeon: [facility]    Returned pt call  Left detailed vm with information about when to remove tape  Asked to call back if he had any further questions or concerns.    Also called spouse # who was with patient, we discussed removing dressing (tape and gauze) and leaving steri-strips in place for 10 days. If needed can replace gauze if there is drainage but otherwise not needed.   No further questions per pt or spouse.      Sherine Evans RN on 7/15/2025 at 3:14 PM

## 2025-07-22 ENCOUNTER — TRANSFERRED RECORDS (OUTPATIENT)
Dept: HEALTH INFORMATION MANAGEMENT | Facility: CLINIC | Age: 80
End: 2025-07-22
Payer: COMMERCIAL

## 2025-07-22 LAB
ALT SERPL-CCNC: 29 U/L (ref 0–49)
AST SERPL-CCNC: 38 U/L (ref 17–59)
CREATININE (EXTERNAL): 0.5 MG/DL (ref 0.66–1.25)
GFR ESTIMATED (EXTERNAL): 103.1 ML/MIN/1.73M2
GLUCOSE (EXTERNAL): 95 MG/DL (ref 74–100)
POTASSIUM (EXTERNAL): 4.3 MMOL/L (ref 3.5–5.1)
TSH SERPL-ACNC: 1 UIU/ML (ref 0.47–4.68)

## 2025-07-24 ENCOUNTER — OFFICE VISIT (OUTPATIENT)
Dept: DERMATOLOGY | Facility: CLINIC | Age: 80
End: 2025-07-24
Payer: COMMERCIAL

## 2025-07-24 DIAGNOSIS — L82.1 SEBORRHEIC KERATOSES: ICD-10-CM

## 2025-07-24 DIAGNOSIS — L81.4 LENTIGO: ICD-10-CM

## 2025-07-24 DIAGNOSIS — D18.01 ANGIOMA OF SKIN: ICD-10-CM

## 2025-07-24 DIAGNOSIS — D48.5 NEOPLASM OF UNCERTAIN BEHAVIOR OF SKIN: ICD-10-CM

## 2025-07-24 DIAGNOSIS — Z85.828 HISTORY OF SKIN CANCER: ICD-10-CM

## 2025-07-24 DIAGNOSIS — D23.9 DERMAL NEVUS: Primary | ICD-10-CM

## 2025-07-24 NOTE — PROGRESS NOTES
James Salvador is an extremely pleasant 80 year old year old male patient here today for hx of metastatic melanoma and non-melanoma skin cancer.  He notes two tere spot son scalp for one month.  Patient has no other skin complaints today.  Remainder of the HPI, Meds, PMH, Allergies, FH, and SH was reviewed in chart.      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 Cardiac Cath 9/2015: BRIDGET to LAD    Hearing problem 1995    Wear hearing aids    Hyperlipidemia     Malignant melanoma     Morbid obesity 01/11/2016    Paroxysmal supraventricular tachycardia     on metoprolol    Permanent atrial fibrillation 04/21/2017    Squamous cell carcinoma     Tachy-edinson syndrome 05/29/2019       Past Surgical History:   Procedure Laterality Date    ADENOIDECTOMY  Childhood    ANGIOPLASTY  1994    in [place]    ANKLE SURGERY  07/13/2005    right ankle    ARTHROPLASTY HIP Right 10/2009    BIOPSY NODE SENTINEL Left 03/30/2023    Procedure: BIOPSY, LYMPH NODE, SENTINEL;  Surgeon: Rolando Minaya MD;  Location: [facility]    COLONOSCOPY  4/25/12    DAVINCI XI HERNIORRHAPHY INGUINAL Right 7/10/2025    Procedure: ROBOT-ASSISTED INGUINAL HERNIORRHAPHY, right, with mesh;  Surgeon: Amanda Wall MD;  Location: [facility]    EP PERM PACER SINGLE LEAD N/A 05/31/2019    Medtronic single lead pacemaker    EXCISE MASS CHEEK Left 03/30/2023    Procedure: wide local excision of left cheek melanoma;  Surgeon: Rolando Minaya MD;  Location: [facility]    EXCISE MASS CHEEK WITH FLAP PEDICLE Left 04/13/2023    Procedure: Left cervical Facial flap per closure of left cheek Defect;  Surgeon: Ila Sanchez MD;  Location: [facility]    Facial biopsy - Melanoma      GENITOURINARY SURGERY  10/20/23    Prostate surgery     HEART CATH STENT COR W/WO PTCA  2015    BRIDGET stent mid LAD    IR CHEST PORT PLACEMENT > 5 YRS OF AGE  2023    LASER SURGERY OF EYE  2002    MOHS MICROGRAPHIC PROCEDURE  2004    squamous cell carcinoma right temple    SINUS SURGERY  2006    TONSILLECTOMY  Childhood        Family History   Problem Relation Age of Onset    Genitourinary Problems Mother         renal failure    Heart Disease Mother     Cerebrovascular Disease Mother         TIA    Cardiovascular Father         rupture of dorsal aorta    Depression Son     Depression Brother         Suicide    Substance Abuse Brother         Heroin    Chemical Addiction Brother         Heroin and other narcotics.  Committed suicide    Skin Cancer No family hx of        Social History     Socioeconomic History    Marital status:      Spouse name: Not on file    Number of children: 3    Years of education: Not on file    Highest education level: Not on file   Occupational History     Employer: RETIRED   Tobacco Use    Smoking status: Former     Current packs/day: 0.00     Average packs/day: 0.5 packs/day for 10.0 years (5.0 ttl pk-yrs)     Types: Cigarettes, Cigars, Pipe     Start date: 1966     Quit date: 1974     Years since quittin.2     Passive exposure: Never    Smokeless tobacco: Never    Tobacco comments:     Also smoked from 1985 - 1990   Vaping Use    Vaping status: Never Used   Substance and Sexual Activity    Alcohol use: Not Currently     Comment: Very seldom social drink    Drug use: No    Sexual activity: Not Currently     Partners: Female     Birth control/protection: Male Surgical     Comment: Vasectomy   Other Topics Concern    Parent/sibling w/ CABG, MI or angioplasty before 65F 55M? No     Service Not Asked    Blood Transfusions Not Asked    Caffeine Concern Yes     Comment: 2 big cups coffee daily    Occupational Exposure Not Asked    Hobby Hazards Not Asked    Sleep Concern No     Comment:  sleeping better since shoulder replaced 11/3/16    Stress Concern No    Weight Concern Yes    Special Diet Yes     Comment: trying to do more lean meats    Back Care Not Asked    Exercise No     Comment: limited - knee    Bike Helmet Not Asked    Seat Belt Not Asked    Self-Exams Not Asked   Social History Narrative    Not on file     Social Drivers of Health     Financial Resource Strain: Low Risk  (2/13/2025)    Financial Resource Strain     Within the past 12 months, have you or your family members you live with been unable to get utilities (heat, electricity) when it was really needed?: No   Food Insecurity: Low Risk  (2/13/2025)    Food Insecurity     Within the past 12 months, did you worry that your food would run out before you got money to buy more?: No     Within the past 12 months, did the food you bought just not last and you didn t have money to get more?: No   Transportation Needs: Low Risk  (2/13/2025)    Transportation Needs     Within the past 12 months, has lack of transportation kept you from medical appointments, getting your medicines, non-medical meetings or appointments, work, or from getting things that you need?: No   Physical Activity: Inactive (6/22/2024)    Exercise Vital Sign     Days of Exercise per Week: 0 days     Minutes of Exercise per Session: 0 min   Stress: Stress Concern Present (6/22/2024)    [employer] [place] [employer] - Occupational Stress Questionnaire     Feeling of Stress : To some extent   Social Connections: Unknown (6/22/2024)    Social Connection and Isolation Panel [NHANES]     Frequency of Communication with Friends and Family: Not on file     Frequency of Social Gatherings with Friends and Family: Once a week     Attends [employer] Services: Not on file     Active Member of Clubs or Organizations: Not on file     Attends Club or Organization Meetings: Not on file     Marital Status: Not on file   Interpersonal Safety: Low Risk  (2/13/2025)     Interpersonal Safety     Do you feel physically and emotionally safe where you currently live?: Yes     Within the past 12 months, have you been hit, slapped, kicked or otherwise physically hurt by someone?: No     Within the past 12 months, have you been humiliated or emotionally abused in other ways by your partner or ex-partner?: No   Recent Concern: Interpersonal Safety - High Risk (1/27/2025)    Interpersonal Safety     Do you feel physically and emotionally safe where you currently live?: No     Within the past 12 months, have you been hit, slapped, kicked or otherwise physically hurt by someone?: No     Within the past 12 months, have you been humiliated or emotionally abused in other ways by your partner or ex-partner?: No   Housing Stability: Low Risk  (2/13/2025)    Housing Stability     Do you have housing? : Yes     Are you worried about losing your housing?: No       Outpatient Encounter Medications as of 7/24/2025   Medication Sig Dispense Refill    acetaminophen (TYLENOL) 650 MG CR tablet Take 1,300 mg by mouth 2 times daily.      apixaban ANTICOAGULANT (ELIQUIS ANTICOAGULANT) 5 MG tablet Take 1 tablet (5 mg) by mouth 2 times daily.      atorvastatin (LIPITOR) 80 MG tablet Take 1 tablet (80 mg) by mouth at bedtime 90 tablet 3    carvedilol (COREG) 3.125 MG tablet Take 1 tablet (3.125 mg) by mouth 2 times daily (with meals). 180 tablet 2    clobetasol propionate (TEMOVATE) 0.05 % external cream Apply to AA BID x 1-2 weeks then PRN. Do not apply to face. 60 g 3    fluticasone (FLONASE) 50 MCG/ACT nasal spray INSTILL TWO SPRAYS INTO BOTH NOSTRILS DAILY 48 g 1    gabapentin (NEURONTIN) 300 MG capsule Take 3 capsules (900 mg) by mouth 3 times daily. 300 capsule 0    ketoconazole (NIZORAL) 2 % external cream Apply topically 2 times daily as needed. For face. FAX REFILL REQUESTS TO  [facility]: [phone]      ketoconazole (NIZORAL) 2 % external shampoo Use daily as needed 120 mL 11    ketotifen fumarate  0.035% 0.035 % SOLN ophthalmic solution Place 1 drop into both eyes 2 times daily as needed for itching.      lidocaine, viscous, (XYLOCAINE) 2 % solution Swish and spit in mouth every 3 hours as needed for pain. ; Max 8 doses/24 hour period.      nitroGLYcerin (NITROSTAT) 0.4 MG sublingual tablet Place 1 tablet (0.4 mg) under the tongue every 5 minutes as needed for chest pain. May repeat twice for a total of 3 tablets.  If chest pain not relieved, call 911 25 tablet 11    OXcarbazepine (TRILEPTAL) 300 MG tablet Take 1 tablet (300 mg) by mouth 3 times daily.      oxyCODONE (ROXICODONE) 5 MG tablet Take 1 tablet (5 mg) by mouth every 8 hours as needed for moderate pain. 12 tablet 0    polyethylene glycol-propylene glycol (SYSTANE ULTRA) 0.4-0.3 % SOLN ophthalmic solution Place 1 drop into both eyes as needed for dry eyes.      triamcinolone (KENALOG) 0.1 % external lotion Apply to scalp BID x 1-2 weeks then PRN only 60 mL 3    XYLOCAINE 1 % injection 10 mLs by Other route 3 times daily as needed (for facial pain). intranasal       No facility-administered encounter medications on file as of 7/24/2025.             O:   NAD, WDWN, Alert & Oriented, Mood & Affect wnl, Vitals stable   General appearance normal   Vitals stable   Alert, oriented and in no acute distress      Vertex scalp scaly papule  Frontal scalp dermal nodule      Stuck on papules and brown macules on trunk and ext   Red papules on trunk  Flesh colored papules on trunk      Eyes: Conjunctivae/lids:Normal     ENT: Lips, mucosa: normal    MSK:Normal    Cardiovascular: peripheral edema none    Pulm: Breathing Normal    Lymph Nodes: No Head and Neck Lymphadenopathy     Neuro/Psych: Orientation:Alert and Orientedx3 ; Mood/Affect:normal       A/P:  1. Seborrheic keratosis, lentigo, angioma, dermal nevus, hx of non-melanoma skin cancer , hx of metastatic melanoma  2. R/o melanoma  Vertex scalp and frontal scalp  TANGENTIAL BIOPSY SENT OUT:  After consent,  anesthesia with LEC and prep, tangential excision performed and specimen sent out for permanent section histology.  No complications and routine wound care. Patient told to call our office in 1-2 weeks for result.       It was a pleasure speaking to James Salvador today.  Previous clinic notes and pertinent laboratory tests were reviewed prior to James Salvador's visit.  Signs and Symptoms of skin cancer discussed with patient.  Patient encouraged to perform monthly skin exams.  UV precautions reviewed with patient.  Return to clinic pending path

## 2025-07-24 NOTE — LETTER
7/24/2025      James Salvador  [address] [place] [address]      Dear Colleague,    Thank you for referring your patient, James Salvador, to the [facility]. Please see a copy of my visit note below.    James Salvador is an extremely pleasant 80 year old year old male patient here today for hx of metastatic melanoma and non-melanoma skin cancer.  He notes two tere spot son scalp for one month.  Patient has no other skin complaints today.  Remainder of the HPI, Meds, PMH, Allergies, FH, and SH was reviewed in chart.      Past Medical History:   Diagnosis Date     Actinic 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 Cardiac Cath 9/2015: BRIDGET to LAD     Hearing problem 1995    Wear hearing aids     Hyperlipidemia      Malignant melanoma      Morbid obesity 01/11/2016     Paroxysmal supraventricular tachycardia     on metoprolol     Permanent atrial fibrillation 04/21/2017     Squamous cell carcinoma      Tachy-edinson syndrome 05/29/2019       Past Surgical History:   Procedure Laterality Date     ADENOIDECTOMY  Childhood     ANGIOPLASTY  1994    in [place]     ANKLE SURGERY  07/13/2005    right ankle     ARTHROPLASTY HIP Right 10/2009     BIOPSY NODE SENTINEL Left 03/30/2023    Procedure: BIOPSY, LYMPH NODE, SENTINEL;  Surgeon: Rolando Minaya MD;  Location: [facility] OR     COLONOSCOPY  4/25/12     DAVINCI XI HERNIORRHAPHY INGUINAL Right 7/10/2025    Procedure: ROBOT-ASSISTED INGUINAL HERNIORRHAPHY, right, with mesh;  Surgeon: Amanda Wall MD;  Location: [facility] OR     EP PERM PACER SINGLE LEAD N/A 05/31/2019    Medtronic single lead pacemaker     EXCISE MASS CHEEK Left 03/30/2023    Procedure: wide local excision of left cheek melanoma;  Surgeon: Rolando Minaya MD;  Location:  [facility] OR     EXCISE MASS CHEEK WITH FLAP PEDICLE Left 2023    Procedure: Left cervical Facial flap per closure of left cheek Defect;  Surgeon: Ila Sanchez MD;  Location: [facility] OR     Facial biopsy - Melanoma       GENITOURINARY SURGERY  10/20/23    Prostate surgery     HEART CATH STENT COR W/WO PTCA  2015    BRIDGET stent mid LAD     IR CHEST PORT PLACEMENT > 5 YRS OF AGE  2023     LASER SURGERY OF EYE  2002     MOHS MICROGRAPHIC PROCEDURE  2004    squamous cell carcinoma right temple     SINUS SURGERY  2006     TONSILLECTOMY  Childhood        Family History   Problem Relation Age of Onset     Genitourinary Problems Mother         renal failure     Heart Disease Mother      Cerebrovascular Disease Mother         TIA     Cardiovascular Father         rupture of dorsal aorta     Depression Son      Depression Brother         Suicide     Substance Abuse Brother         Heroin     Chemical Addiction Brother         Heroin and other narcotics.  Committed suicide     Skin Cancer No family hx of        Social History     Socioeconomic History     Marital status:      Spouse name: Not on file     Number of children: 3     Years of education: Not on file     Highest education level: Not on file   Occupational History     Employer: RETIRED   Tobacco Use     Smoking status: Former     Current packs/day: 0.00     Average packs/day: 0.5 packs/day for 10.0 years (5.0 ttl pk-yrs)     Types: Cigarettes, Cigars, Pipe     Start date: 1966     Quit date: 1974     Years since quittin.2     Passive exposure: Never     Smokeless tobacco: Never     Tobacco comments:     Also smoked from 1985 - 1990   Vaping Use     Vaping status: Never Used   Substance and Sexual Activity     Alcohol use: Not Currently     Comment: Very seldom social drink     Drug use: No     Sexual activity: Not Currently     Partners: Female     Birth control/protection: Male Surgical     Comment: Vasectomy    Other Topics Concern     Parent/sibling w/ CABG, MI or angioplasty before 65F 55M? No      Service Not Asked     Blood Transfusions Not Asked     Caffeine Concern Yes     Comment: 2 big cups coffee daily     Occupational Exposure Not Asked     Hobby Hazards Not Asked     Sleep Concern No     Comment: sleeping better since shoulder replaced 11/3/16     Stress Concern No     Weight Concern Yes     Special Diet Yes     Comment: trying to do more lean meats     Back Care Not Asked     Exercise No     Comment: limited - knee     Bike Helmet Not Asked     Seat Belt Not Asked     Self-Exams Not Asked   Social History Narrative     Not on file     Social Drivers of Health     Financial Resource Strain: Low Risk  (2/13/2025)    Financial Resource Strain      Within the past 12 months, have you or your family members you live with been unable to get utilities (heat, electricity) when it was really needed?: No   Food Insecurity: Low Risk  (2/13/2025)    Food Insecurity      Within the past 12 months, did you worry that your food would run out before you got money to buy more?: No      Within the past 12 months, did the food you bought just not last and you didn t have money to get more?: No   Transportation Needs: Low Risk  (2/13/2025)    Transportation Needs      Within the past 12 months, has lack of transportation kept you from medical appointments, getting your medicines, non-medical meetings or appointments, work, or from getting things that you need?: No   Physical Activity: Inactive (6/22/2024)    Exercise Vital Sign      Days of Exercise per Week: 0 days      Minutes of Exercise per Session: 0 min   Stress: Stress Concern Present (6/22/2024)    [employer] [place] [employer] - Occupational Stress Questionnaire      Feeling of Stress : To some extent   Social Connections: Unknown (6/22/2024)    Social Connection and Isolation Panel [NHANES]      Frequency of Communication with Friends and Family: Not  on file      Frequency of Social Gatherings with Friends and Family: Once a week      Attends [employer] Services: Not on file      Active Member of Clubs or Organizations: Not on file      Attends Club or Organization Meetings: Not on file      Marital Status: Not on file   Interpersonal Safety: Low Risk  (2/13/2025)    Interpersonal Safety      Do you feel physically and emotionally safe where you currently live?: Yes      Within the past 12 months, have you been hit, slapped, kicked or otherwise physically hurt by someone?: No      Within the past 12 months, have you been humiliated or emotionally abused in other ways by your partner or ex-partner?: No   Recent Concern: Interpersonal Safety - High Risk (1/27/2025)    Interpersonal Safety      Do you feel physically and emotionally safe where you currently live?: No      Within the past 12 months, have you been hit, slapped, kicked or otherwise physically hurt by someone?: No      Within the past 12 months, have you been humiliated or emotionally abused in other ways by your partner or ex-partner?: No   Housing Stability: Low Risk  (2/13/2025)    Housing Stability      Do you have housing? : Yes      Are you worried about losing your housing?: No       Outpatient Encounter Medications as of 7/24/2025   Medication Sig Dispense Refill     acetaminophen (TYLENOL) 650 MG CR tablet Take 1,300 mg by mouth 2 times daily.       apixaban ANTICOAGULANT (ELIQUIS ANTICOAGULANT) 5 MG tablet Take 1 tablet (5 mg) by mouth 2 times daily.       atorvastatin (LIPITOR) 80 MG tablet Take 1 tablet (80 mg) by mouth at bedtime 90 tablet 3     carvedilol (COREG) 3.125 MG tablet Take 1 tablet (3.125 mg) by mouth 2 times daily (with meals). 180 tablet 2     clobetasol propionate (TEMOVATE) 0.05 % external cream Apply to AA BID x 1-2 weeks then PRN. Do not apply to face. 60 g 3     fluticasone (FLONASE) 50 MCG/ACT nasal spray INSTILL TWO SPRAYS INTO BOTH NOSTRILS DAILY 48 g 1     gabapentin  (NEURONTIN) 300 MG capsule Take 3 capsules (900 mg) by mouth 3 times daily. 300 capsule 0     ketoconazole (NIZORAL) 2 % external cream Apply topically 2 times daily as needed. For face. FAX REFILL REQUESTS TO  [facility]: [phone]       ketoconazole (NIZORAL) 2 % external shampoo Use daily as needed 120 mL 11     ketotifen fumarate 0.035% 0.035 % SOLN ophthalmic solution Place 1 drop into both eyes 2 times daily as needed for itching.       lidocaine, viscous, (XYLOCAINE) 2 % solution Swish and spit in mouth every 3 hours as needed for pain. ; Max 8 doses/24 hour period.       nitroGLYcerin (NITROSTAT) 0.4 MG sublingual tablet Place 1 tablet (0.4 mg) under the tongue every 5 minutes as needed for chest pain. May repeat twice for a total of 3 tablets.  If chest pain not relieved, call 911 25 tablet 11     OXcarbazepine (TRILEPTAL) 300 MG tablet Take 1 tablet (300 mg) by mouth 3 times daily.       oxyCODONE (ROXICODONE) 5 MG tablet Take 1 tablet (5 mg) by mouth every 8 hours as needed for moderate pain. 12 tablet 0     polyethylene glycol-propylene glycol (SYSTANE ULTRA) 0.4-0.3 % SOLN ophthalmic solution Place 1 drop into both eyes as needed for dry eyes.       triamcinolone (KENALOG) 0.1 % external lotion Apply to scalp BID x 1-2 weeks then PRN only 60 mL 3     XYLOCAINE 1 % injection 10 mLs by Other route 3 times daily as needed (for facial pain). intranasal       No facility-administered encounter medications on file as of 7/24/2025.             O:   NAD, WDWN, Alert & Oriented, Mood & Affect wnl, Vitals stable   General appearance normal   Vitals stable   Alert, oriented and in no acute distress      Vertex scalp scaly papule  Frontal scalp dermal nodule      Stuck on papules and brown macules on trunk and ext   Red papules on trunk  Flesh colored papules on trunk      Eyes: Conjunctivae/lids:Normal     ENT: Lips, mucosa: normal    MSK:Normal    Cardiovascular: peripheral edema none    Pulm: Breathing  Normal    Lymph Nodes: No Head and Neck Lymphadenopathy     Neuro/Psych: Orientation:Alert and Orientedx3 ; Mood/Affect:normal       A/P:  1. Seborrheic keratosis, lentigo, angioma, dermal nevus, hx of non-melanoma skin cancer , hx of metastatic melanoma  2. R/o melanoma  Vertex scalp and frontal scalp  TANGENTIAL BIOPSY SENT OUT:  After consent, anesthesia with LEC and prep, tangential excision performed and specimen sent out for permanent section histology.  No complications and routine wound care. Patient told to call our office in 1-2 weeks for result.       It was a pleasure speaking to James Salvador today.  Previous clinic notes and pertinent laboratory tests were reviewed prior to James Salvador's visit.  Signs and Symptoms of skin cancer discussed with patient.  Patient encouraged to perform monthly skin exams.  UV precautions reviewed with patient.  Return to clinic pending path       Again, thank you for allowing me to participate in the care of your patient.        Sincerely,        Jv Dutta MD    Electronically signed

## 2025-07-30 ENCOUNTER — OFFICE VISIT (OUTPATIENT)
Dept: SURGERY | Facility: CLINIC | Age: 80
End: 2025-07-30
Payer: COMMERCIAL

## 2025-07-30 VITALS
OXYGEN SATURATION: 93 % | HEIGHT: 71 IN | BODY MASS INDEX: 33.32 KG/M2 | HEART RATE: 86 BPM | WEIGHT: 238 LBS | SYSTOLIC BLOOD PRESSURE: 128 MMHG | DIASTOLIC BLOOD PRESSURE: 88 MMHG

## 2025-07-30 DIAGNOSIS — Z09 SURGICAL FOLLOWUP VISIT: Primary | ICD-10-CM

## 2025-07-30 PROCEDURE — 3074F SYST BP LT 130 MM HG: CPT

## 2025-07-30 PROCEDURE — 99024 POSTOP FOLLOW-UP VISIT: CPT

## 2025-07-30 PROCEDURE — 3079F DIAST BP 80-89 MM HG: CPT

## 2025-07-30 NOTE — PROGRESS NOTES
[facility] Note   Subjective:  James Salvador is here for his first postoperative visit.  He underwent Robotic assisted right inguinal hernia repair with [id number] 3D max midweight mesh by Dr. Wall.  He is now almost 3 weeks postop, and feels his recovery has been progressing nicely.  Rx/OTC pain medication has been used appropriately for pain control.  Postop recovery complications: None.    Today he has minimal discomfort at the repair site with activities, tolerating a regular diet, and having normal bowel activity.  Steri-strips have not fallen off yet.  Current pain management: none for hernia/incision pain.  Following restrictions outlined by surgeon.  Has rolling walker, walking limited by knee pain, trying to improve his walking though.    Objective:  Abd - soft, non-tender, non-distended  Incisions x4 - steri-strips removed, healing well, no erythema/bruising, +normal healing ridges   Right groin: no seroma/hematoma noted, no hernia noted    Assessment:  S/p Robotic right inguinal hernia repair with [id number] 3D max midweight mesh; unremarkable recovery.    Plan:  James may continue to slowly advance his activities at this time.  He may continue to utilize OTC pain management options as well as use of ice/heat to site for comfort.  He should expect progressive resolution of the healing ridge along the incisional site over the following 2-3 months.    James is recommended to contact the office if worsening pain, onset of fever/redness at inc site, or new drainage from the area.  Pt also recommended to call office at any time if ongoing questions/concerns during recovery, but otherwise may follow-up on a prn basis.  Pt is in agreement with this plan.      Bailey Romero PA-C      Please route or send letter to:  Primary Care Provider (PCP)

## 2025-07-31 ENCOUNTER — TELEPHONE (OUTPATIENT)
Dept: DERMATOLOGY | Facility: CLINIC | Age: 80
End: 2025-07-31
Payer: COMMERCIAL

## 2025-07-31 DIAGNOSIS — C44.42 SQUAMOUS CELL CARCINOMA OF SKIN OF SCALP: ICD-10-CM

## 2025-07-31 DIAGNOSIS — D48.5 ATYPICAL FIBROXANTHOMA OF SKIN OF SCALP: Primary | ICD-10-CM

## 2025-08-06 ENCOUNTER — ALLIED HEALTH/NURSE VISIT (OUTPATIENT)
Dept: DERMATOLOGY | Facility: CLINIC | Age: 80
End: 2025-08-06
Payer: COMMERCIAL

## 2025-08-06 DIAGNOSIS — Z48.02 VISIT FOR SUTURE REMOVAL: Primary | ICD-10-CM

## 2025-08-06 PROCEDURE — 99207 PR NO CHARGE NURSE ONLY: CPT | Performed by: STUDENT IN AN ORGANIZED HEALTH CARE EDUCATION/TRAINING PROGRAM

## 2025-08-20 DIAGNOSIS — I25.118 CORONARY ARTERY DISEASE OF NATIVE ARTERY OF NATIVE HEART WITH STABLE ANGINA PECTORIS: ICD-10-CM

## 2025-08-20 RX ORDER — ATORVASTATIN CALCIUM 80 MG/1
80 TABLET, FILM COATED ORAL AT BEDTIME
Qty: 90 TABLET | Refills: 2 | Status: SHIPPED | OUTPATIENT
Start: 2025-08-20

## 2025-08-21 ENCOUNTER — OFFICE VISIT (OUTPATIENT)
Dept: DERMATOLOGY | Facility: CLINIC | Age: 80
End: 2025-08-21
Payer: COMMERCIAL

## 2025-08-21 DIAGNOSIS — C49.0 SARCOMA OF SCALP (H): Primary | ICD-10-CM

## 2025-08-25 SDOH — HEALTH STABILITY: PHYSICAL HEALTH: ON AVERAGE, HOW MANY DAYS PER WEEK DO YOU ENGAGE IN MODERATE TO STRENUOUS EXERCISE (LIKE A BRISK WALK)?: 7 DAYS

## 2025-08-25 SDOH — HEALTH STABILITY: PHYSICAL HEALTH: ON AVERAGE, HOW MANY MINUTES DO YOU ENGAGE IN EXERCISE AT THIS LEVEL?: 20 MIN

## 2025-08-26 ENCOUNTER — OFFICE VISIT (OUTPATIENT)
Dept: INTERNAL MEDICINE | Facility: CLINIC | Age: 80
End: 2025-08-26
Attending: INTERNAL MEDICINE
Payer: COMMERCIAL

## 2025-08-26 VITALS
HEIGHT: 71 IN | OXYGEN SATURATION: 96 % | HEART RATE: 73 BPM | RESPIRATION RATE: 16 BRPM | DIASTOLIC BLOOD PRESSURE: 80 MMHG | WEIGHT: 243 LBS | SYSTOLIC BLOOD PRESSURE: 132 MMHG | BODY MASS INDEX: 34.02 KG/M2 | TEMPERATURE: 97.6 F

## 2025-08-26 DIAGNOSIS — Z00.00 ENCOUNTER FOR MEDICARE ANNUAL WELLNESS EXAM: Primary | ICD-10-CM

## 2025-08-26 DIAGNOSIS — I50.30 HEART FAILURE WITH PRESERVED EJECTION FRACTION, NYHA CLASS II (H): ICD-10-CM

## 2025-08-26 DIAGNOSIS — E22.2 SIADH (SYNDROME OF INAPPROPRIATE ADH PRODUCTION): ICD-10-CM

## 2025-08-26 DIAGNOSIS — G50.0 TRIGEMINAL NEURALGIA: ICD-10-CM

## 2025-08-26 DIAGNOSIS — I25.118 CORONARY ARTERY DISEASE OF NATIVE ARTERY OF NATIVE HEART WITH STABLE ANGINA PECTORIS: ICD-10-CM

## 2025-08-26 ASSESSMENT — PAIN SCALES - GENERAL: PAINLEVEL_OUTOF10: NO PAIN (0)

## 2025-09-02 ENCOUNTER — TRANSFERRED RECORDS (OUTPATIENT)
Dept: HEALTH INFORMATION MANAGEMENT | Facility: CLINIC | Age: 80
End: 2025-09-02
Payer: COMMERCIAL

## 2025-09-03 ENCOUNTER — OFFICE VISIT (OUTPATIENT)
Dept: DERMATOLOGY | Facility: CLINIC | Age: 80
End: 2025-09-03
Payer: COMMERCIAL

## 2025-09-03 DIAGNOSIS — Z85.828 HISTORY OF NONMELANOMA SKIN CANCER: ICD-10-CM

## 2025-09-03 DIAGNOSIS — D48.5 ATYPICAL FIBROXANTHOMA OF SKIN OF SCALP: ICD-10-CM

## 2025-09-03 DIAGNOSIS — L57.0 ACTINIC KERATOSIS: Primary | ICD-10-CM

## 2025-09-03 DIAGNOSIS — C44.42 SQUAMOUS CELL CARCINOMA OF SKIN OF SCALP: ICD-10-CM

## 2025-09-03 DIAGNOSIS — Z85.820 HISTORY OF MELANOMA: ICD-10-CM

## 2025-09-03 DIAGNOSIS — D48.9 NEOPLASM OF UNCERTAIN BEHAVIOR: ICD-10-CM

## (undated) DEVICE — PACK PCMKR PERM SRG PROC LF SAN32PC573

## (undated) DEVICE — CATH TRAY FOLEY COUDE SURESTEP 16FR W/DRN BAG LATEX A304416A

## (undated) DEVICE — CABLE PACING ALLIGATOR CLIP 12FT 5833SL

## (undated) DEVICE — ESU CORD BIPOLAR AND IRR TUBING AESCULAP US355

## (undated) DEVICE — ESU GROUND PAD ADULT W/CORD E7507

## (undated) DEVICE — SU ETHILON 5-0 PC-3 18" 1865G

## (undated) DEVICE — SU SILK 3-0 SH 30" K832H

## (undated) DEVICE — SU MONOCRYL 4-0 PS-2 27" UND Y426H

## (undated) DEVICE — SU VICRYL 3-0 SH 27" J316H

## (undated) DEVICE — ANGLED WIRELESS GAMMA PROBE COVER

## (undated) DEVICE — SU MONOCRYL 3-0 RB-1 27" Y305H

## (undated) DEVICE — SU VICRYL 3-0 SH 8X18" UND J864D

## (undated) DEVICE — ESU ELEC BLADE 2.75" COATED/INSULATED E1455

## (undated) DEVICE — LINEN ORTHO ACL PACK 5447

## (undated) DEVICE — SU VICRYL+ 0 27 UR6 VLT VCP603H

## (undated) DEVICE — Device

## (undated) DEVICE — SU MONOCRYL 5-0 TF 27" UND Y433H

## (undated) DEVICE — LINEN TOWEL PACK X5 5464

## (undated) DEVICE — CLIP HORIZON SM RED WIDE SLOT 001201

## (undated) DEVICE — BLADE KNIFE SURG 15 371115

## (undated) DEVICE — DRSG TELFA 3X8" 1238

## (undated) DEVICE — SU ETHILON 4-0 PC-3 18" 1864G

## (undated) DEVICE — DRSG STERI STRIP 1/4X4" R1546

## (undated) DEVICE — LABEL MEDICATION SYSTEM 3303-P

## (undated) DEVICE — DRSG DRAIN 2X2" 7087

## (undated) DEVICE — DAVINCI XI SEAL UNIVERSAL 5-12MM 470500

## (undated) DEVICE — DRSG JAWBRA  95

## (undated) DEVICE — CLIP HORIZON MED BLUE 002200

## (undated) DEVICE — SU SILK 2-0 TIE 12X30" A305H

## (undated) DEVICE — DRSG XEROFORM 5X9" CUR253590W

## (undated) DEVICE — ESU ELEC NDL 1" COATED/INSULATED E1465

## (undated) DEVICE — SHEATH PRELUDE SNAP 7FRX13CM PLS-1007

## (undated) DEVICE — DRAPE MAYO STAND 23X54 8337

## (undated) DEVICE — SU CHROMIC 5-0 RB-1 27" U202H

## (undated) DEVICE — DRSG GAUZE 4X4" TRAY 6939

## (undated) DEVICE — SPONGE KITTNER 30-101

## (undated) DEVICE — PREP SKIN SCRUB TRAY 4461A

## (undated) DEVICE — SUCTION MANIFOLD NEPTUNE 2 SYS 4 PORT 0702-020-000

## (undated) DEVICE — RAD INTRODUCER KIT MICRO 5FRX10CM .018 NITINOL G/W

## (undated) DEVICE — SU MONOCRYL 5-0 P-3 18" UND Y493G

## (undated) DEVICE — PACK NEURO MINOR UMMC SNE32MNMU4

## (undated) DEVICE — DAVINCI HOT SHEARS TIP COVER  400180

## (undated) DEVICE — GLOVE PROTEXIS BLUE W/NEU-THERA 7.0  2D73EB70

## (undated) DEVICE — SU PLAIN FAST ABSORB 5-0 PC-1 18" 1915G

## (undated) DEVICE — SU SILK 2-0 SH CR 5X18" C0125

## (undated) DEVICE — ESU PENCIL W/HOLSTER E2350H

## (undated) DEVICE — DEFIB PRO-PADZ LVP LQD GEL ADULT 8900-2105-01

## (undated) DEVICE — DAVINCI XI OBTURATOR BLADELESS 8MM 470359

## (undated) DEVICE — SU ETHIBOND 3-0 RB-1DA 36" X558H

## (undated) DEVICE — LUBRICANT INST ELECTROLUBE EL101

## (undated) DEVICE — DAVINCI XI DRAPE ARM 470015

## (undated) DEVICE — STRAP UNIVERSAL POSITIONING 2-PIECE 4X47X76" 91-287

## (undated) DEVICE — DAVINCI XI ESU FORCE BIPOLAR 8MM 471405

## (undated) DEVICE — LINEN TOWEL PACK X6 WHITE 5487

## (undated) DEVICE — SOL NACL 0.9% IRRIG 1000ML BOTTLE 2F7124

## (undated) DEVICE — DRSG STERI STRIP 1/2X4" R1547

## (undated) DEVICE — EYE PREP BETADINE 5% SOLUTION 30ML 0065-0411-30

## (undated) DEVICE — SU STRATAFIX PDS PLUS 3-0 SPIRAL SH 15CM SXPP1B420

## (undated) DEVICE — PREP POVIDONE-IODINE 10% SOLUTION 4OZ BOTTLE MDS093944

## (undated) DEVICE — STPL SKIN 35W ROTATING HEAD PRW35

## (undated) DEVICE — BLADE KNIFE SURG 11 371111

## (undated) DEVICE — ANTIFOG SOLUTION SEE SHARP 150M TROCAR SWABS 30978 (COI)

## (undated) DEVICE — GLOVE BIOGEL PI MICRO SZ 7.0 48570

## (undated) DEVICE — SU PROLENE 3-0 SHDA 36" 8522H

## (undated) DEVICE — GLOVE BIOGEL PI MICRO SZ 7.5 48575

## (undated) DEVICE — SU VICRYL+ 4-0 UNDYED PS-2 VCP496ZH

## (undated) RX ORDER — PROPOFOL 10 MG/ML
INJECTION, EMULSION INTRAVENOUS
Status: DISPENSED
Start: 2025-07-10

## (undated) RX ORDER — CEFAZOLIN SODIUM/WATER 2 G/20 ML
SYRINGE (ML) INTRAVENOUS
Status: DISPENSED
Start: 2025-07-10

## (undated) RX ORDER — LABETALOL HYDROCHLORIDE 5 MG/ML
INJECTION, SOLUTION INTRAVENOUS
Status: DISPENSED
Start: 2023-03-30

## (undated) RX ORDER — FENTANYL CITRATE 50 UG/ML
INJECTION, SOLUTION INTRAMUSCULAR; INTRAVENOUS
Status: DISPENSED
Start: 2023-04-13

## (undated) RX ORDER — FENTANYL CITRATE 50 UG/ML
INJECTION, SOLUTION INTRAMUSCULAR; INTRAVENOUS
Status: DISPENSED
Start: 2023-03-30

## (undated) RX ORDER — PROPOFOL 10 MG/ML
INJECTION, EMULSION INTRAVENOUS
Status: DISPENSED
Start: 2023-03-30

## (undated) RX ORDER — LABETALOL HYDROCHLORIDE 5 MG/ML
INJECTION, SOLUTION INTRAVENOUS
Status: DISPENSED
Start: 2023-04-13

## (undated) RX ORDER — CEFAZOLIN SODIUM/WATER 3 G/30 ML
SYRINGE (ML) INTRAVENOUS
Status: DISPENSED
Start: 2023-03-30

## (undated) RX ORDER — FENTANYL CITRATE 50 UG/ML
INJECTION, SOLUTION INTRAMUSCULAR; INTRAVENOUS
Status: DISPENSED
Start: 2019-05-31

## (undated) RX ORDER — NALOXONE HYDROCHLORIDE 0.4 MG/ML
INJECTION, SOLUTION INTRAMUSCULAR; INTRAVENOUS; SUBCUTANEOUS
Status: DISPENSED
Start: 2025-07-10

## (undated) RX ORDER — BUPIVACAINE HYDROCHLORIDE AND EPINEPHRINE 5; 5 MG/ML; UG/ML
INJECTION, SOLUTION EPIDURAL; INTRACAUDAL; PERINEURAL
Status: DISPENSED
Start: 2025-07-10

## (undated) RX ORDER — DEXAMETHASONE SODIUM PHOSPHATE 4 MG/ML
INJECTION, SOLUTION INTRA-ARTICULAR; INTRALESIONAL; INTRAMUSCULAR; INTRAVENOUS; SOFT TISSUE
Status: DISPENSED
Start: 2023-03-30

## (undated) RX ORDER — HYDRALAZINE HYDROCHLORIDE 20 MG/ML
INJECTION INTRAMUSCULAR; INTRAVENOUS
Status: DISPENSED
Start: 2025-07-10

## (undated) RX ORDER — DEXAMETHASONE SODIUM PHOSPHATE 4 MG/ML
INJECTION, SOLUTION INTRA-ARTICULAR; INTRALESIONAL; INTRAMUSCULAR; INTRAVENOUS; SOFT TISSUE
Status: DISPENSED
Start: 2023-04-13

## (undated) RX ORDER — ACETAMINOPHEN 325 MG/1
TABLET ORAL
Status: DISPENSED
Start: 2023-04-13

## (undated) RX ORDER — BUPIVACAINE HYDROCHLORIDE 2.5 MG/ML
INJECTION, SOLUTION EPIDURAL; INFILTRATION; INTRACAUDAL
Status: DISPENSED
Start: 2019-05-31

## (undated) RX ORDER — HEPARIN SODIUM (PORCINE) LOCK FLUSH IV SOLN 100 UNIT/ML 100 UNIT/ML
SOLUTION INTRAVENOUS
Status: DISPENSED
Start: 2025-05-15

## (undated) RX ORDER — OXYCODONE HYDROCHLORIDE 5 MG/1
TABLET ORAL
Status: DISPENSED
Start: 2025-07-10

## (undated) RX ORDER — GLYCOPYRROLATE 0.2 MG/ML
INJECTION, SOLUTION INTRAMUSCULAR; INTRAVENOUS
Status: DISPENSED
Start: 2023-03-30

## (undated) RX ORDER — ONDANSETRON 2 MG/ML
INJECTION INTRAMUSCULAR; INTRAVENOUS
Status: DISPENSED
Start: 2023-04-13

## (undated) RX ORDER — FENTANYL CITRATE 50 UG/ML
INJECTION, SOLUTION INTRAMUSCULAR; INTRAVENOUS
Status: DISPENSED
Start: 2025-07-10

## (undated) RX ORDER — LIDOCAINE HYDROCHLORIDE 10 MG/ML
INJECTION, SOLUTION EPIDURAL; INFILTRATION; INTRACAUDAL; PERINEURAL
Status: DISPENSED
Start: 2019-05-31

## (undated) RX ORDER — MUPIROCIN 20 MG/G
OINTMENT TOPICAL
Status: DISPENSED
Start: 2023-04-13

## (undated) RX ORDER — LIDOCAINE HYDROCHLORIDE AND EPINEPHRINE 10; 10 MG/ML; UG/ML
INJECTION, SOLUTION INFILTRATION; PERINEURAL
Status: DISPENSED
Start: 2023-04-13

## (undated) RX ORDER — MORPHINE SULFATE 4 MG/ML
INJECTION, SOLUTION INTRAMUSCULAR; INTRAVENOUS
Status: DISPENSED
Start: 2025-07-10

## (undated) RX ORDER — HYDRALAZINE HYDROCHLORIDE 20 MG/ML
INJECTION INTRAMUSCULAR; INTRAVENOUS
Status: DISPENSED
Start: 2023-03-30

## (undated) RX ORDER — EPHEDRINE SULFATE 50 MG/ML
INJECTION, SOLUTION INTRAMUSCULAR; INTRAVENOUS; SUBCUTANEOUS
Status: DISPENSED
Start: 2023-04-13

## (undated) RX ORDER — PROPOFOL 10 MG/ML
INJECTION, EMULSION INTRAVENOUS
Status: DISPENSED
Start: 2023-04-13

## (undated) RX ORDER — ONDANSETRON 2 MG/ML
INJECTION INTRAMUSCULAR; INTRAVENOUS
Status: DISPENSED
Start: 2023-03-30

## (undated) RX ORDER — CEFAZOLIN SODIUM/WATER 3 G/30 ML
SYRINGE (ML) INTRAVENOUS
Status: DISPENSED
Start: 2023-04-13

## (undated) RX ORDER — ACETAMINOPHEN 325 MG/1
TABLET ORAL
Status: DISPENSED
Start: 2019-05-31